# Patient Record
Sex: MALE | Race: WHITE | Employment: OTHER | URBAN - METROPOLITAN AREA
[De-identification: names, ages, dates, MRNs, and addresses within clinical notes are randomized per-mention and may not be internally consistent; named-entity substitution may affect disease eponyms.]

---

## 2017-01-03 DIAGNOSIS — E11.9 TYPE 2 DIABETES MELLITUS WITHOUT COMPLICATION (H): Primary | ICD-10-CM

## 2017-01-03 RX ORDER — INSULIN ASPART 100 [IU]/ML
INJECTION, SOLUTION INTRAVENOUS; SUBCUTANEOUS
Qty: 30 ML | Refills: 3 | Status: SHIPPED
Start: 2017-01-03 | End: 2017-05-26

## 2017-01-05 ENCOUNTER — TELEPHONE (OUTPATIENT)
Dept: INTERNAL MEDICINE | Facility: CLINIC | Age: 62
End: 2017-01-05

## 2017-01-05 NOTE — TELEPHONE ENCOUNTER
Prior Authorization Retail Medication Request  Medication/Dose: NOVOLOG FLEXPEN   Diagnosis and ICD code :  New/Renewal/Insurance Change PA:   Previously Tried and Failed Therapies:     Insurance ID (if provided):   Insurance Phone (if provided):     Any additional info from fax request:     If you received a fax notification from an outside Pharmacy:  Pharmacy Name:  Pharmacy #:  Pharmacy Fax:

## 2017-01-09 NOTE — TELEPHONE ENCOUNTER
[facility] Prior Authorization Team   Phone: [phone]  Fax: [phone]    PA Initiation    Medication: NOVOLOG FLEXPEN   Insurance Company: [facility]  Fax Number: [phone]    Phone Number: [phone]  Pharmacy Filling the Rx: [facility] #[id number] - [address]  Filling Pharmacy Phone: [phone]  Filling Pharmacy Fax: [phone]  Start Date: 1/9/2017

## 2017-01-11 DIAGNOSIS — I85.00 ESOPHAGEAL VARICES (H): Primary | ICD-10-CM

## 2017-01-11 NOTE — TELEPHONE ENCOUNTER
Ranitidine 150 mg tabs      Last Written Prescription Date:  5-31-16  Last Fill Quantity: 180,   # refills: 1  Last Office Visit : 12-22-16  Future Office visit:  3-2-17    Kathleen M Doege RN

## 2017-01-22 ENCOUNTER — MYC MEDICAL ADVICE (OUTPATIENT)
Dept: INTERNAL MEDICINE | Facility: CLINIC | Age: 62
End: 2017-01-22

## 2017-01-26 ENCOUNTER — TELEPHONE (OUTPATIENT)
Dept: INTERNAL MEDICINE | Facility: CLINIC | Age: 62
End: 2017-01-26

## 2017-01-26 DIAGNOSIS — E11.29 TYPE 2 DIABETES MELLITUS WITH MICROALBUMINURIA, UNSPECIFIED LONG TERM INSULIN USE STATUS: Primary | ICD-10-CM

## 2017-01-26 DIAGNOSIS — R80.9 TYPE 2 DIABETES MELLITUS WITH MICROALBUMINURIA, UNSPECIFIED LONG TERM INSULIN USE STATUS: Primary | ICD-10-CM

## 2017-01-26 DIAGNOSIS — F32.89 OTHER DEPRESSION: Primary | ICD-10-CM

## 2017-01-26 NOTE — TELEPHONE ENCOUNTER
Spoke to Rain at [facility], they received Pa on 1/10/2017. At that time Pa was pending,but he now has eligibility through [facility]. Rep will use the same PA to process through. Thanks.

## 2017-01-27 RX ORDER — DESVENLAFAXINE 100 MG/1
200 TABLET, EXTENDED RELEASE ORAL DAILY
Qty: 180 TABLET | Refills: 3 | Status: SHIPPED | OUTPATIENT
Start: 2017-01-27 | End: 2018-01-25

## 2017-01-27 RX ORDER — INSULIN GLARGINE 100 [IU]/ML
40 INJECTION, SOLUTION SUBCUTANEOUS DAILY
Qty: 12 ML | Refills: 3 | Status: SHIPPED | OUTPATIENT
Start: 2017-01-27 | End: 2017-02-02

## 2017-01-27 NOTE — TELEPHONE ENCOUNTER
Hi, I am unable to start on this PA until patient's insurance is active 02/01/17. As of right now he has no active insurance per [place]. Also, can you please update patients script for this med, it looks like it was discontinued in epic. The PA team will start PA 02/01/17.        Thank you, Shivani           Above message left for pt, rx faxed to the pharmacy.  Naomi Lee RN

## 2017-01-27 NOTE — TELEPHONE ENCOUNTER
Hi, I am unable to start this request until 02/01/17 when patient's insurance is active. The PA team will start this request 02/01/17.       Thank you, Shivani

## 2017-02-01 NOTE — TELEPHONE ENCOUNTER
[facility] Prior Authorization Team   Phone: [phone]  Fax: [phone]    PA Initiation    Medication: desvenlafaxine succinate (PRISTIQ) 100 MG TB24 24 hr tablet  Insurance Company: [employer] - Phone [phone] Fax [phone]  Pharmacy Filling the Rx: [facility] #[id number] - [address]  Filling Pharmacy Phone: [phone]  Filling Pharmacy Fax: [phone]  Start Date: 2/1/2017    ALSO FAXED CLINIC NOTE.

## 2017-02-01 NOTE — TELEPHONE ENCOUNTER
[facility] Prior Authorization Team   Phone: [phone]  Fax: [phone]    PA Initiation    Medication: LANTUS SOLOSTAR 100 UNIT/ML soln  Insurance Company: [employer] - Phone [phone] Fax [phone]  Pharmacy Filling the Rx: [facility] #[id number] - [address]  Filling Pharmacy Phone: [phone]  Filling Pharmacy Fax: [phone]  Start Date: 2/1/2017    ALSO FAXED CLINIC NOTE.

## 2017-02-02 DIAGNOSIS — E11.9 DIABETES MELLITUS, TYPE 2 (H): Primary | ICD-10-CM

## 2017-02-02 DIAGNOSIS — Z79.4 TYPE 2 DIABETES MELLITUS WITH DIABETIC NEUROPATHY, WITH LONG-TERM CURRENT USE OF INSULIN (H): Primary | ICD-10-CM

## 2017-02-02 DIAGNOSIS — E11.40 TYPE 2 DIABETES MELLITUS WITH DIABETIC NEUROPATHY, WITH LONG-TERM CURRENT USE OF INSULIN (H): Primary | ICD-10-CM

## 2017-02-02 RX ORDER — INSULIN GLARGINE 100 [IU]/ML
40 INJECTION, SOLUTION SUBCUTANEOUS DAILY
Qty: 12 ML | Refills: 11 | Status: SHIPPED | OUTPATIENT
Start: 2017-02-02 | End: 2017-03-21

## 2017-02-02 RX ORDER — INSULIN GLARGINE 100 [IU]/ML
INJECTION, SOLUTION SUBCUTANEOUS DAILY
Qty: 30 ML | Refills: 1 | Status: CANCELLED | OUTPATIENT
Start: 2017-02-02

## 2017-02-02 NOTE — TELEPHONE ENCOUNTER
[facility] Prior Authorization Team   Phone: [phone]  Fax: [phone]    PA Initiation    Medication: NOVOLOG FLEXPEN   Insurance Company:    Pharmacy Filling the Rx: [facility][id number] [address]  Filling Pharmacy Phone: [phone]  Filling Pharmacy Fax: [phone]  Start Date: 1/9/2017

## 2017-02-06 NOTE — TELEPHONE ENCOUNTER
PRIOR AUTHORIZATION DENIED    Medication: LANTUS SOLOSTAR 100 UNIT/ML soln - DENIED    Denial Date: 2/3/2017    Denial Rational:     PA DENIED: Formulary alternatives have not been tried/failed. (Jhony Vidal).  If you feel there is additional information related to this case that might affect this decision and an appeal is desired, please submit a written letter of medical necessity to our PA Team.          Appeal Information:

## 2017-02-07 NOTE — TELEPHONE ENCOUNTER
PRIOR AUTHORIZATION DENIED    Medication: desvenlafaxine succinate (PRISTIQ) 100 MG TB24 24 hr tablet - DENIED    Denial Date: 2/4/2017    Denial Rational:     PA DENIED: Medical criteria not met. Plan covers this drug when all of these conditions are met. -You tried the requested non formulary drug in the past and this drug worked for you. -Your doctor certified in writing that this drug is the best to treat your condition. - You cannot change to a preferred drug due to medical reasons.   Your use of this drug does not meet the requirement base on the information provided.  If you feel there is additional information related to this case that might affect this decision and an appeal is desired, please submit a written letter of medical necessity to our PA Team.          Appeal Information:

## 2017-02-09 DIAGNOSIS — E11.9 DIABETES MELLITUS, TYPE 2 (H): Primary | ICD-10-CM

## 2017-02-13 NOTE — TELEPHONE ENCOUNTER
MEDICATION APPEAL APPROVED    Medication: desvenlafaxine succinate (PRISTIQ) 100 MG TB24 24 hr tablet - APPEAL APPROVED  Authorization Effective Date: 2/10/2017  Authorization Expiration Date: 2/10/2018  Approved Dose/Quantity:   Reference #: [id number]   Insurance Company: Other (see comments)Comment:  [facility] PH:[phone] FAX: [phone]  Expected CoPay: $0.00 -Patient p/u meds 2/10/17     CoPay Card Available:      Foundation Assistance Needed:    Which Pharmacy is filling the prescription (Not needed for infusion/clinic administered): [facility] #[id number] - [address]

## 2017-02-27 DIAGNOSIS — R94.5 ABNORMAL RESULTS OF LIVER FUNCTION STUDIES: Primary | ICD-10-CM

## 2017-03-15 NOTE — TELEPHONE ENCOUNTER
Per Cover My Meds the request was cancelled as no pa required based on diagnosis. Confirmed with pharmacy patient has picked up medication with $0 copay.

## 2017-03-21 ENCOUNTER — TELEPHONE (OUTPATIENT)
Dept: INTERNAL MEDICINE | Facility: CLINIC | Age: 62
End: 2017-03-21

## 2017-03-21 ENCOUNTER — OFFICE VISIT (OUTPATIENT)
Dept: ORTHOPEDICS | Facility: CLINIC | Age: 62
End: 2017-03-21

## 2017-03-21 ENCOUNTER — OFFICE VISIT (OUTPATIENT)
Dept: INTERNAL MEDICINE | Facility: CLINIC | Age: 62
End: 2017-03-21

## 2017-03-21 VITALS
WEIGHT: 315 LBS | SYSTOLIC BLOOD PRESSURE: 122 MMHG | HEART RATE: 77 BPM | BODY MASS INDEX: 42.66 KG/M2 | DIASTOLIC BLOOD PRESSURE: 77 MMHG

## 2017-03-21 VITALS — WEIGHT: 315 LBS | HEIGHT: 74 IN | BODY MASS INDEX: 40.43 KG/M2

## 2017-03-21 DIAGNOSIS — M17.12 PRIMARY OSTEOARTHRITIS OF LEFT KNEE: Primary | ICD-10-CM

## 2017-03-21 DIAGNOSIS — M79.644 PAIN OF FINGER OF RIGHT HAND: ICD-10-CM

## 2017-03-21 DIAGNOSIS — G56.01 CARPAL TUNNEL SYNDROME OF RIGHT WRIST: ICD-10-CM

## 2017-03-21 DIAGNOSIS — G56.01 CARPAL TUNNEL SYNDROME OF RIGHT WRIST: Primary | ICD-10-CM

## 2017-03-21 DIAGNOSIS — G89.29 CHRONIC PAIN OF LEFT KNEE: ICD-10-CM

## 2017-03-21 DIAGNOSIS — E11.51 TYPE II DIABETES MELLITUS WITH PERIPHERAL CIRCULATORY DISORDER (H): Primary | ICD-10-CM

## 2017-03-21 DIAGNOSIS — E11.638: ICD-10-CM

## 2017-03-21 DIAGNOSIS — R79.89 LOW TESTOSTERONE: ICD-10-CM

## 2017-03-21 DIAGNOSIS — R94.5 ABNORMAL RESULTS OF LIVER FUNCTION STUDIES: ICD-10-CM

## 2017-03-21 DIAGNOSIS — M25.562 CHRONIC PAIN OF LEFT KNEE: ICD-10-CM

## 2017-03-21 DIAGNOSIS — M65.341 TRIGGER RING FINGER OF RIGHT HAND: ICD-10-CM

## 2017-03-21 LAB
ALBUMIN SERPL-MCNC: 3.3 G/DL (ref 3.4–5)
ALP SERPL-CCNC: 113 U/L (ref 40–150)
ALT SERPL W P-5'-P-CCNC: 40 U/L (ref 0–70)
ANION GAP SERPL CALCULATED.3IONS-SCNC: 6 MMOL/L (ref 3–14)
AST SERPL W P-5'-P-CCNC: 33 U/L (ref 0–45)
BILIRUB DIRECT SERPL-MCNC: 0.2 MG/DL (ref 0–0.2)
BILIRUB SERPL-MCNC: 0.6 MG/DL (ref 0.2–1.3)
BUN SERPL-MCNC: 13 MG/DL (ref 7–30)
CALCIUM SERPL-MCNC: 8.4 MG/DL (ref 8.5–10.1)
CHLORIDE SERPL-SCNC: 105 MMOL/L (ref 94–109)
CO2 SERPL-SCNC: 30 MMOL/L (ref 20–32)
CREAT SERPL-MCNC: 0.76 MG/DL (ref 0.66–1.25)
ERYTHROCYTE [DISTWIDTH] IN BLOOD BY AUTOMATED COUNT: 14.1 % (ref 10–15)
GFR SERPL CREATININE-BSD FRML MDRD: ABNORMAL ML/MIN/1.7M2
GLUCOSE SERPL-MCNC: 82 MG/DL (ref 70–99)
HBA1C MFR BLD: 6.4 % (ref 4.3–6)
HCT VFR BLD AUTO: 41.3 % (ref 40–53)
HGB BLD-MCNC: 13.6 G/DL (ref 13.3–17.7)
INR PPP: 1.02 (ref 0.86–1.14)
MCH RBC QN AUTO: 30 PG (ref 26.5–33)
MCHC RBC AUTO-ENTMCNC: 32.9 G/DL (ref 31.5–36.5)
MCV RBC AUTO: 91 FL (ref 78–100)
PLATELET # BLD AUTO: 94 10E9/L (ref 150–450)
POTASSIUM SERPL-SCNC: 4.1 MMOL/L (ref 3.4–5.3)
PROT SERPL-MCNC: 7.1 G/DL (ref 6.8–8.8)
RBC # BLD AUTO: 4.54 10E12/L (ref 4.4–5.9)
SODIUM SERPL-SCNC: 140 MMOL/L (ref 133–144)
WBC # BLD AUTO: 4.2 10E9/L (ref 4–11)

## 2017-03-21 RX ORDER — TRIAMCINOLONE ACETONIDE 40 MG/ML
40 INJECTION, SUSPENSION INTRA-ARTICULAR; INTRAMUSCULAR ONCE
Qty: 1 ML | Refills: 0 | OUTPATIENT
Start: 2017-03-21 | End: 2017-03-21

## 2017-03-21 ASSESSMENT — PAIN SCALES - GENERAL: PAINLEVEL: NO PAIN (0)

## 2017-03-21 NOTE — MR AVS SNAPSHOT
After Visit Summary   3/21/2017    Lawrence Louie    MRN: [id number]           Patient Information     Date Of Birth          1955        Visit Information        Provider Department      3/21/2017 2:00 PM Skyler White MD [facility] Sports Medicine        Today's Diagnoses     Primary osteoarthritis of left knee    -  1    Trigger ring finger of right hand        Carpal tunnel syndrome of right wrist           Follow-ups after your visit        Additional Services     ORTHOPEDICS ADULT REFERRAL       Your provider has referred you to: [facility]: Orthopaedic Clinic [facility] [phone]   [email or web address]  Hand surgery    Please be aware that coverage of these services is subject to the terms and limitations of your health insurance plan.  Call member services at your health plan with any benefit or coverage questions.      Please bring the following to your appointment:    >>   Any x-rays, CTs or MRIs which have been performed.  Contact the facility where they were done to arrange for  prior to your scheduled appointment.    >>   List of current medications   >>   This referral request   >>   Any documents/labs given to you for this referral                  Your next 10 appointments already scheduled     Apr 11, 2017 10:15 AM CDT   Lab with  LAB    Health Lab ([facility])    [address] [place] [address] [phone]            Apr 11, 2017 10:45 AM CDT   US ABDOMEN COMPLETE with [address] [facility] US ([facility])    [address] [place] [address] [phone]           Please bring a list of your medicines (including vitamins, minerals and over-the-counter drugs). Also, tell your doctor about any allergies you may have. Wear comfortable clothes and leave your valuables at home.  Adults: No eating or drinking for  8 hours before the exam. You may take medicine with a small sip of water.  Children: - Children 6+ years: No food or drink for 6 hours before exam. - Children 1-5 years: No food or drink for 4 hours before exam. - Infants, breast-fed: may have breast milk up to 2 hours before exam. - Infants, formula: may have bottle until 4 hours before exam.  Please call the Imaging Department at your exam site with any questions.            Apr 11, 2017 11:50 AM CDT   (Arrive by 11:35 AM)   New General Liver with Meghna Simmons MD   [facility] Hepatology ([facility])    [address]  3rd [facility] [address] [phone]            May 30, 2017  1:00 PM CDT   (Arrive by 12:45 PM)   Return Visit with CORY Toussaint [facility] ([facility])    [address]  4th [facility] [address] [phone]              Who to contact     Please call your clinic at [phone] to:    Ask questions about your health    Make or cancel appointments    Discuss your medicines    Learn about your test results    Speak to your doctor   If you have compliments or concerns about an experience at your clinic, or if you wish to file a complaint, please contact [facility] Patient Relations at [phone] or email us at [email or web address]         Additional Information About Your Visit        [employer] Information     [employer] gives you secure access to your electronic health record. If you see a primary care provider, you can also send messages to your care team and make appointments. If you have questions, please call your primary care clinic.  If you do not have a primary care provider, please call [phone] and they will assist you.      [employer] is an electronic gateway that provides easy, online access to your medical records. With [employer], you can request a clinic  "appointment, read your test results, renew a prescription or communicate with your care team.     To access your existing account, please contact your [facility] or call [phone] for assistance.        Care EveryWhere ID     This is your Care EveryWhere ID. This could be used by other organizations to access your [facility] medical records  [id number]        Your Vitals Were     Height BMI (Body Mass Index)                6' 2\" (1.88 m) 42.63 kg/m2           Blood Pressure from Last 3 Encounters:   03/21/17 122/77   12/22/16 160/84   11/21/16 133/81    Weight from Last 3 Encounters:   03/21/17 (!) 332 lb (150.6 kg)   03/21/17 (!) 332 lb 6.4 oz (150.8 kg)   12/22/16 (!) 339 lb 12.8 oz (154.1 kg)              We Performed the Following     Injection Single Tendon Sheath/Ligament [09126]     ORTHOPEDICS ADULT REFERRAL     TRIAMCINOLONE ACET INJ NOS          Today's Medication Changes          These changes are accurate as of: 3/21/17 11:59 PM.  If you have any questions, ask your nurse or doctor.               Start taking these medicines.        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, unspecified long term insulin use status (H)   Started by:  Ary Murphy APRN CNP        Dose:  40 Units   Inject 40 Units Subcutaneous At Bedtime   Quantity:  30 mL   Refills:  1       triamcinolone acetonide 40 MG/ML injection   Commonly known as:  KENALOG   Used for:  Primary osteoarthritis of left knee   Started by:  Skyler White MD        Dose:  40 mg   1 mL (40 mg) by INTRA-ARTICULAR route once for 1 dose   Quantity:  1 mL   Refills:  0            Where to get your medicines      These medications were sent to [id number] - [place], [place] - [address] [place], [place] [address] [address]     Phone:  [phone]     insulin glargine 100 UNIT/ML " injection         Some of these will need a paper prescription and others can be bought over the counter.  Ask your nurse if you have questions.     You don't need a prescription for these medications     triamcinolone acetonide 40 MG/ML injection                Primary Care Provider Office Phone # Fax #    CORY Toussaint [phone]       [facility] [address] [place] [address] [address]  [facility] [address]        Thank you!     Thank you for choosing [facility]  for your care. Our goal is always to provide you with excellent care. Hearing back from our patients is one way we can continue to improve our services. Please take a few minutes to complete the written survey that you may receive in the mail after your visit with us. Thank you!             Your Updated Medication List - Protect others around you: Learn how to safely use, store and throw away your medicines at www.disposemymeds.org.          This list is accurate as of: 3/21/17 11:59 PM.  Always use your most recent med list.                   Brand Name Dispense Instructions for use    aspirin 81 MG tablet     30 tablet    Take 1 tablet (81 mg) by mouth daily       blood glucose lancets standard    no brand specified    1 Box    Use to test blood sugar 4 X  times daily or as directed.       blood glucose monitoring meter device kit    no brand specified    1 kit    Use to test blood sugar 4 X  times daily or as directed.       blood glucose monitoring test strip    no brand specified    200 strip    Use to test blood sugars 4 X  times daily or as directed       bumetanide 1 MG tablet    BUMEX    90 tablet    Take 1 tablet (1 mg) by mouth 2 times daily       calcium carb 1250 mg (500 mg [place])/vitamin D 200 units 500-200 MG-UNIT per tablet    OSCAL with D    90 tablet    Take 1 tablet by mouth daily       desvenlafaxine succinate  MG 24 hr tablet    PRISTIQ    180 tablet    Take 2 tablets (200 mg) by mouth daily        furosemide 20 MG tablet    LASIX    90 tablet    Take 2 tabs in morning and 1 tablet at noon.       glipiZIDE 5 MG 24 hr tablet    glipiZIDE XL    180 tablet    1 tablet twice a day       insulin glargine 100 UNIT/ML injection    LANTUS SOLOSTAR    30 mL    Inject 40 Units Subcutaneous At Bedtime       insulin pen needle 31G X 8 MM    B-D U/F    200 each    Inject into the skin 6 times daily Use 6 pen needles daily or as directed.       magnesium 250 MG tablet      Take 1 tablet by mouth daily Takes 400 mg daily.Deneen Chin LPN 2:34 PM on 12/22/2015       NovoLOG FLEXPEN 100 UNIT/ML injection   Generic drug:  insulin aspart     30 mL    20 units before breakfast, 20 units before lunch, 20 units before dinner, 20 units before snacks.       ondansetron 4 MG tablet    ZOFRAN    18 tablet    Take 1 tablet (4 mg) by mouth every 8 hours as needed for nausea       order for DME     1 Units    Equipment being ordered: carpal tunnel wrist splint.       propranolol 10 MG tablet    INDERAL    180 tablet    Take 1 tablet (10 mg) by mouth 2 times daily       ranitidine 150 MG tablet    ZANTAC    180 tablet    Take 1 tablet (150 mg) by mouth 2 times daily       spironolactone 50 MG tablet    ALDACTONE    540 tablet    Takes 3 tablets in am.and p.m.       STATIN NOT PRESCRIBED (INTENTIONAL)     0 each    1 each daily Statin not prescribed intentionally due to Active liver disease       triamcinolone acetonide 40 MG/ML injection    KENALOG    1 mL    1 mL (40 mg) by INTRA-ARTICULAR route once for 1 dose       VITAMIN B-12 PO      Take 1 tablet by mouth daily       vitamin D3 2000 UNITS Caps     90 capsule    Take 1 capsule by mouth daily

## 2017-03-21 NOTE — PROGRESS NOTES
HPI: Lawrence Louie is a 61 year old male who comes in for follow-up for his T2 DM.  He takes his Novolog before breakfast and adjusts it according to his breakfast.  He takes his Lantus about 2-3 hours after breakfast.  His blood sugars remain under 150.  When his blood glucose reaches 150 he gets a headache.     He continues to have lower leg edema and tries to take his diuretics but   Finds he cannot leave the house or even go for a walk when he takes them.     He wants to see the Hand Ortho Surgeon concerning his right carpal tunnel symptoms (EMG 2014 indicated significant median nerve dysfunction) , and right fourth trigger finger. He has had cortisone injections 2013 in this finger in the past with resolution of symptoms but now they have returned.     He would also like to see Ortho for his left  Knee.      He has f/u with Hepatology 4/11/2017 with Dr. Simmons and abdominal US.     Patient Active Problem List   Diagnosis     Mild major depression (H)     Hyperglycemia     Thrombocytopenia (H)     Hypertension goal BP (blood pressure) < 130/80     Plantar warts     Corns and callosities     Family history of colon cancer     Type 2 diabetes, HbA1c goal < 7% (H)     Portal hypertension (H)     Alcoholic cirrhosis (H)     Advanced directives, counseling/discussion     Ascites     Esophageal varices (H)     Multiple rib fractures     Tobacco use disorder     Hyperlipidemia LDL goal <100     Dental caries     Prurigo nodularis     Esophageal reflux     Morbid obesity (H)     History of colonic polyps: tubular adenomas and serrated adenomas     Type II diabetes mellitus with peripheral circulatory disorder (H)       He has a medical hx of  does not have any pertinent problems on file.    Current Outpatient Prescriptions   Medication Sig Dispense Refill     blood glucose monitoring (NO BRAND SPECIFIED) meter device kit Use to test blood sugar 4 X  times daily or as directed. 1 kit 0     blood glucose  monitoring (NO BRAND SPECIFIED) test strip Use to test blood sugars 4 X  times daily or as directed 200 strip 3     blood glucose (NO BRAND SPECIFIED) lancets standard Use to test blood sugar 4 X  times daily or as directed. 1 Box 3     insulin glargine (BASAGLAR KWIKPEN) 100 UNIT/ML injection Inject 40 Units Subcutaneous daily 12 mL 11     desvenlafaxine succinate ER (PRISTIQ) 100 MG 24 hr tablet Take 2 tablets (200 mg) by mouth daily 180 tablet 3     ranitidine (ZANTAC) 150 MG tablet Take 1 tablet (150 mg) by mouth 2 times daily 180 tablet 3     NOVOLOG FLEXPEN 100 UNIT/ML soln 20 units before breakfast, 20 units before lunch, 20 units before dinner, 20 units before snacks. 30 mL 3     insulin pen needle (B-D U/F) 31G X 8 MM Inject into the skin 6 times daily Use 6 pen needles daily or as directed. 200 each 3     venlafaxine (EFFEXOR-XR) 75 MG 24 hr capsule Take 3 capsules (225 mg) by mouth daily 270 capsule 3     furosemide (LASIX) 20 MG tablet Take 2 tabs in morning and 1 tablet at noon. 90 tablet 5     propranolol (INDERAL) 10 MG tablet Take 1 tablet (10 mg) by mouth 2 times daily 180 tablet 3     Cyanocobalamin (VITAMIN B-12 PO) Take 1 tablet by mouth daily       STATIN NOT PRESCRIBED, INTENTIONAL, 1 each daily Statin not prescribed intentionally due to Active liver disease 0 each 0     spironolactone (ALDACTONE) 50 MG tablet Takes 3 tablets in am.and p.m. 540 tablet 6     aspirin 81 MG tablet Take 1 tablet (81 mg) by mouth daily 30 tablet 11     Cholecalciferol (VITAMIN D3) 2000 UNITS CAPS Take 1 capsule by mouth daily 90 capsule 3     bumetanide (BUMEX) 1 MG tablet Take 1 tablet (1 mg) by mouth 2 times daily 90 tablet 3     calcium carb 1250 mg, 500 mg [place],/vitamin D 200 units (OSCAL WITH D) 500-200 MG-UNIT per tablet Take 1 tablet by mouth daily 90 tablet 3     glipiZIDE (GLIPIZIDE XL) 5 MG 24 hr tablet 1 tablet twice a day 180 tablet 3     magnesium 250 MG tablet Take 1 tablet by mouth daily Takes 400 mg  daily.Deneen Chin NAE 2:34 PM on 12/22/2015       ondansetron (ZOFRAN) 4 MG tablet Take 1 tablet (4 mg) by mouth every 8 hours as needed for nausea 18 tablet 1     ORDER FOR DME Equipment being ordered: carpal tunnel wrist splint. 1 Units 0       ALLERGIES: Review of patient's allergies indicates no known allergies.    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     Esophagoscopy, gastroscopy, duodenoscopy (egd), combined  2/13/2013     Procedure: COMBINED ESOPHAGOSCOPY, GASTROSCOPY, DUODENOSCOPY (EGD);;  Surgeon: Tara Cook MD;  Location:  GI     Excise lesion trunk  9/24/2012     Procedure: EXCISE LESION TRUNK;;  Surgeon: Pepe Dominguez MD;  Location: [facility]     Esophagoscopy, gastroscopy, duodenoscopy (egd), combined  11/4/2013     Procedure: COMBINED ESOPHAGOSCOPY, GASTROSCOPY, DUODENOSCOPY (EGD);;  Surgeon: Lonny Diaz MD;  Location:  GI     Biopsy of skin lesion       Esophagoscopy, gastroscopy, duodenoscopy (egd), combined Left 6/16/2016     Procedure: COMBINED ESOPHAGOSCOPY, GASTROSCOPY, DUODENOSCOPY (EGD), BIOPSY SINGLE OR MULTIPLE;  Surgeon: Brandy Barnett MD;  Location:  GI     Colonoscopy Left 6/16/2016     Procedure: COMBINED COLONOSCOPY, SINGLE OR MULTIPLE BIOPSY/POLYPECTOMY BY BIOPSY;  Surgeon: Brandy Barnett,  MD;  Location:  GI       IMMUNIZATION HX:   Immunization History   Administered Date(s) Administered     DTAP (<7y) 01/18/1988     Hepatitis A Vac Ped/Adol-2 Dose 01/02/2013, 02/18/2013, 10/30/2013     Hepatitis B 01/02/2013, 02/18/2013, 10/30/2013     Influenza (IIV3) 10/04/2010, 12/18/2012, 09/08/2014, 09/26/2015, 09/27/2016     Pneumococcal (PCV 13) 05/12/2015     Pneumococcal 23 valent 09/25/2009, 10/01/2010, 01/02/2013     TD (ADULT, 7+) 09/08/1997     TDAP (ADACEL AGES 11-64) 01/02/2013     Twinrix A/B 01/02/2013     Zoster vaccine, live 12/22/2016       SOCIAL HX:   Social History     Social History Narrative    . 3 grown daughters. He has been sober since 2012.       ROS:   CONSTITUTIONAL: no fatigue, no unexpected change in weight  SKIN: no worrisome rashes, no worrisome moles, no worrisome lesions  EYES: no acute vision problems or changes  ENT: no ear problems, no mouth problems, no throat problems  RESP: no significant cough, no shortness of breath  CV: no chest pain, no palpitations, no new or worsening peripheral edema  GI: no nausea, no vomiting, no constipation, no diarrhea  : no frequency, no dysuria, no hematuria  MS: no claudication, no myalgias, no joint aches  NEURO: no weakness, no dizziness, no syncope, no headaches  ENDOCRINE: no temperature intolerance, no skin/hair changes  HEME: no easy bruising, no bleeding problems  PSYCHIATRIC: NEGATIVE for changes in mood or affect    OBJECTIVE:  /77  Pulse 77  Wt (!) 150.8 kg (332 lb 6.4 oz)  BMI 42.66 kg/m2   Wt Readings from Last 1 Encounters:   03/21/17 (!) 150.8 kg (332 lb 6.4 oz)     Constitutional: no distress, comfortable, pleasant   Eyes: anicteric..   Cardiovascular: regular rate and rhythm, normal S1 and S2, no murmurs, rubs or gallops, peripheral pulses full and symmetric   Respiratory: clear to auscultation, no wheezes or crackles, normal breath sounds   Gastrointestinal: positive bowel sounds, nontender, no  hepatosplenomegaly, no masses   Musculoskeletal: full range of motion, 3 + edema LE up to upper calf.    Skin: no concerning lesions, no jaundice, temp normal   Neurological: ,normal speech, no tremor   Psychological: appropriate mood     ASSESSMENT/PLAN:  Lawrence was seen today for diabetes.    Diagnoses and all orders for this visit:    Carpal tunnel syndrome of right wrist  -     ORTHOPEDICS ADULT REFERRAL for consultation for surgical repair.     Chronic pain of left knee  -     SPORTS MEDICINE REFERRAL knee pain    Pain of finger of right hand  -     SPORTS MEDICINE REFERRAL 4th finger trigger finger.     Type 2 diabetes mellitus with other oral complication, unspecified long term insulin use status (H)  -     insulin glargine (LANTUS SOLOSTAR) 100 UNIT/ML injection; Inject 40 Units Subcutaneous At Bedtime    Low testosterone  -     Testosterone Free and Total; Future      Results for orders placed or performed in visit on 12/22/16   Comprehensive metabolic panel   Result Value Ref Range    Sodium 139 133 - 144 mmol/L    Potassium 3.9 3.4 - 5.3 mmol/L    Chloride 106 94 - 109 mmol/L    Carbon Dioxide 28 20 - 32 mmol/L    Anion Gap 5 3 - 14 mmol/L    Glucose 88 70 - 99 mg/dL    Urea Nitrogen 14 7 - 30 mg/dL    Creatinine 0.85 0.66 - 1.25 mg/dL    GFR Estimate >90  Non  GFR Calc   >60 mL/min/1.7m2    GFR Estimate If Black >90   GFR Calc   >60 mL/min/1.7m2    Calcium 8.6 8.5 - 10.1 mg/dL    Bilirubin Total 0.5 0.2 - 1.3 mg/dL    Albumin 3.3 (L) 3.4 - 5.0 g/dL    Protein Total 7.2 6.8 - 8.8 g/dL    Alkaline Phosphatase 128 40 - 150 U/L    ALT 55 0 - 70 U/L    AST 46 (H) 0 - 45 U/L   Hemoglobin A1c   Result Value Ref Range    Hemoglobin A1C 6.6 (H) 4.3 - 6.0 %   CRP inflammation   Result Value Ref Range    CRP Inflammation 9.3 (H) 0.0 - 8.0 mg/L         Total time spent 25 minutes.  More than 50% of the time spent with Mr. Louie on counseling / coordinating his care    Ary  Katherine RAY, CNP

## 2017-03-21 NOTE — NURSING NOTE
Chief Complaint   Patient presents with     Diabetes     pt is here for a diabetic follow up        Rosa Tyson CMA at 11:46 AM on 3/21/2017

## 2017-03-21 NOTE — PROGRESS NOTES
"Sports Medicine Clinic Visit    PCP: Ary Murphy Liban is a 61 year old male who is seen  in consultation at the request of Dr. Murphy presenting with right 4th digit trigger finger and right wrist carpal tunnel and left knee pain. He is right handed    Injury: NA    Location of Pain: right 4th digit and right wrist and left knee  Duration of Pain: 4 weeks on right trigger finger  Rating of Pain: 8/10    Pain is worse with: AROM, gripping objects.  Additional Features: Prior visit by , knee injections  Treatment so far consists of: knee injections, 1 injection in trigger finger 1 year ago.  Prior History of related problems: Chronic history of knee.    Ht 6' 2\" (1.88 m)  Wt (!) 332 lb (150.6 kg)  BMI 42.63 kg/m2  "

## 2017-03-21 NOTE — PATIENT INSTRUCTIONS
[facility] Phone Number [phone]  [facility] Medication Refill Request Information:  * Please contact your pharmacy regarding ANY request for medication refills.  ** [facility] Prescription Fax = [phone]  * Please allow 3 business days for routine medication refills.  * Please allow 5 business days for controlled substance medication refills.     [facility] Test Result notification information:  *You will be notified with in 7-10 days of your appointment day regarding the results of your test.  If you are on MyChart you will be notified as soon as the provider has reviewed the results and signed off on them.    [facility] [phone] (4th Floor [facility])   Sports Medicine [phone] (5th Floor [facility])  [facility] [phone] (4th Floor [facility])          HOW TO QUIT SMOKING  Smoking is one of the hardest habits to break. About half of all those who have ever smoked have been able to quit, and most of those (about 70%) who still smoke want to quit. Here are some of the best ways to stop smoking.     KEEP TRYING:  It takes most smokers about 8 tries before they are finally able to fully quit. So, the more often you try and fail, the better your chance of quitting the next time! So, don't give up!    GO COLD TURKEY:  Most ex-smokers quit cold turkey. Trying to cut back gradually doesn't seem to work as well, perhaps because it continues the smoking habit. Also, it is possible to fool yourself by inhaling more while smoking fewer cigarettes. This results in the same amount of nicotine in your body!    GET SUPPORT:  Support programs can make an important difference, especially for the heavy smoker. These groups offer lectures, methods to change your behavior and peer support. Call the free national Quitline for more information. 800-QUIT-NOW (846-169-7847). Low-cost or free programs are offered by many hospitals, local chapters of the American Lung Association  (870.809.6356) and the American Cancer Society (869-097-7608). Support at home is important too. Non-smokers can help by offering praise and encouragement. If the smoker fails to quit, encourage them to try again!  OVER-THE-COUNTER MEDICINES:  For those who can't quit on their own, Nicotine Replacement Therapy (NRT) may make quitting much easier. Certain aids such as the nicotine patch, gum and lozenge are available without a prescription. However, it is best to use these under the guidance of your doctor. The skin patch provides a steady supply of nicotine to the body. Nicotine gum and lozenge gives temporary bursts of low levels of nicotine. Both methods take the edge off the craving for cigarettes. WARNING: If you feel symptoms of nicotine overdose, such as nausea, vomiting, dizziness, weakness, or fast heartbeat, stop using these and see your doctor.    PRESCRIPTION MEDICINES:  After evaluating your smoking patterns and prior attempts at quitting, your doctor may offer a prescription medicine such as bupropion (Zyban, Wellbutrin), varenicline (Chantix, Champix), a niocotine inhaler or nasal spray. Each has its unique advantage and side effects which your doctor can review with you.    HEALTH BENEFITS OF QUITTING:  The benefits of quitting start right away and keep improving the longer you go without smokin minutes: blood pressure and pulse return to normal    8 hours: oxygen levels return to normal    2 days: ability to smell and taste begins to improve as damaged nerves start to regrow    2-3 weeks: circulation and lung function improves    1-9 months: decreased cough, congestion and shortness of breath; less tired    1 year: risk of heart attack decreases by half    5 years: risk of lung cancer decreases by half; risk of stroke becomes the same as a non-smoker  For information about how to quit smoking, visit the following links:    National Cancer [place] ,   Clearing the Air, Quit Smoking Today   -  an online booklet. http://www.smokefree.gov/pubs/clearing_the_air.pdf    Smokefree.gov http://smokefree.gov/    QuitNet http://www.quitnet.com/    7267-3555 [employer], [address] [place] [address], [place] [address]. All rights reserved. This information is not intended as a substitute for professional medical care. Always follow your healthcare professional's instructions.

## 2017-03-21 NOTE — PROGRESS NOTES
"HPI:  Lawrence Louie is a 61 year old male seen at request of Ary Murphy in consultation for left knee djd and pain, and for h/o CTS sx involving his right hand.  He gets episodic numbness in \"all the fingers\" and radiating pain into the 2nd and 3rd fingers.  Had EMG bilateral UE in 2014 indicating significant median nerve dysfunction.  EMG bilateral UE ws repeated about 1.5 years ago when he had successful left CTS surgery done by Dr. Kristine Kothari at [facility].  Was told to \"come back and get the right done\"  Had an appt to see Dr. Belcher in hand surgery in 2014 but for some reason it did not happen.  Had right trigger finger 4th digit injection previously in 2013.  Finger is triggering again and he would like cortisone shot for it.    In 2015 for the left knee he had a cortisone shot for pain relief. He has had better results from synvisc-one injections in knee with longer pain relief.  He will need to check with insurance to make sure this is still covered.  He does not want cortisone today for the knee, he prefers synvisc-one.  The pain has recurred and it keeps him from walking as much as he would like due to the discomfort he feels about the knee joint itself. It is not associated currently with radicular pain from his back or radicular pain into the lower extremity. He has had standing x-rays of the knee that do not show significant degenerative change and had an MRI that shows some horizontal meniscal tearing consistent with degenerative findings. He saw Dr. Rich and was not offered a surgery. Physical therapy was encouraged. He has seen a holistic physical therapist but has not had an alignment strengthening program. He does have diabetes.       [facility]  Electrodiagnostic Laboratory  Nerve Conduction & EMG Report            Patient: Liban Bravo YOB: 1955  MR#: [id number] Age: 58 Years 7 Months  Sex: Male Date of Visit: 7/28/2014      History & " Examination:  Lawrence Louie is a 58 year old man referred for evaluation of possible median and ulnar entrapment neuropathy.     Techniques:  Motor conduction studies were done with surface recording electrodes. Sensory conduction studies were done with subdermal needle recording electrodes. EMG was done with a concentric needle electrode.      Results:  A left median sensory conduction study demonstrated mild attenuation of sensory nerve action potential amplitude and severe slowing of conduction. A right median sensory conduction study demonstrated a normal sensory nerve action potential amplitude and severe slowing of conduction. A left ulnar sensory conduction study demonstrated marked attenuation of amplitude and mild slowing of conduction. A right ulnar sensory conduction study demonstrated marked attenuation of amplitude and normal conduction velocity. Radial sensory conduction studies were normal bilaterally. Median motor conduction studies demonstrated mild prolongation of distal latency bilaterally. Bilateral ulnar motor conduction studies demonstrated severe slowing of conduction across the elbow on the left and mild slowing of conduction across the elbow on the right. Bilateral median and left ulnar minimum F-response latencies were mildly prolonged. Electromyography of the first dorsal interosseous muscle was normal.      Impression:  1. 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  2. Moderately severe left ulnar neuropathy at the elbow.  3. Probable mild right ulnar neuropathy at the elbow.        Mathew العلي M.D.       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)      Splenomegaly      Squamous cell carcinoma (H)      Thrombocytopenia (H)      Varices, esophageal (H)       Banded in 2011       Active problem list:  Patient Active Problem List   Diagnosis     Mild major depression (H)     Hyperglycemia     Thrombocytopenia (H)     Hypertension goal BP (blood pressure) < 130/80     Plantar warts     Corns and callosities     Family history of colon cancer     Type 2 diabetes, HbA1c goal < 7% (H)     Portal hypertension (H)     Alcoholic cirrhosis (H)     Advanced directives, counseling/discussion     Ascites     Esophageal varices (H)     Multiple rib fractures     Tobacco use disorder     Hyperlipidemia LDL goal <100     Dental caries     Prurigo nodularis     Esophageal reflux     Morbid obesity (H)     History of colonic polyps: tubular adenomas and serrated adenomas     Type II diabetes mellitus with peripheral circulatory disorder (H)       FH:  Family History   Problem Relation Age of Onset     Breast Cancer Mother      Cardiovascular Father      CEREBROVASCULAR DISEASE Father      very low blood pressure     CANCER Father      rectal cancer     Cardiovascular Paternal Grandfather      DIABETES Brother      CANCER Sister      skin cancer     Thyroid Disease No family hx of      Lipids No family hx of      Anesthesia Reaction No family hx of      C.A.D. Other      MI, 70's     Liver Cancer Mother        SH:  Social History     Social History     Marital status:      Spouse name: N/A     Number of children: N/A     Years of education: N/A     Occupational History     Not on file.     Social History Main Topics     Smoking status: Current Every Day Smoker     Packs/day: 0.30     Types: Cigarettes     Smokeless tobacco: Never Used      Comment: 45 yr     Alcohol use No      Comment: 2-3 per day , none since Dec 2012     Drug use: No     Sexual activity: Yes     Partners: Female     Birth control/ protection: Surgical     Other Topics Concern      Service No     Blood Transfusions No     Caffeine Concern No     very little coffee, likes beer     Occupational  Exposure No     Hobby Hazards No     Sleep Concern Yes     bed at 3:30 AM, up at 1:00 PM     Stress Concern Yes     Weight Concern Yes     Special Diet No     Back Care No     Exercise No     Bike Helmet No     Seat Belt Yes     Self-Exams No     Social History Narrative    . 3 grown daughters. He has been sober since 2012.       MEDS:  See EMR, reviewed  ALL:  See EMR, reviewed    REVIEW OF SYSTEMS:  CONSTITUTIONAL:NEGATIVE for fever, chills, change in weight  INTEGUMENTARY/SKIN: NEGATIVE for worrisome rashes, moles or lesions  EYES: NEGATIVE for vision changes or irritation  ENT/MOUTH: NEGATIVE for ear, mouth and throat problems  RESP:NEGATIVE for significant cough or SOB  BREAST: NEGATIVE for masses, tenderness or discharge  CV: NEGATIVE for chest pain, palpitations or peripheral edema  GI: NEGATIVE for nausea, abdominal pain, heartburn, or change in bowel habits  :NEGATIVE for frequency, dysuria, or hematuria  :NEGATIVE for frequency, dysuria, or hematuria  NEURO: NEGATIVE for weakness, dizziness or paresthesias  ENDOCRINE: NEGATIVE for temperature intolerance, skin/hair changes  HEME/ALLERGY/IMMUNE: NEGATIVE for bleeding problems  PSYCHIATRIC: NEGATIVE for changes in mood or affect      OBJECTIVE:  He has a tender nodule at the base of the A1 pulley at the right 4th digit, ring finger.  The digit itself is not swollen.  There are no signs of synovial thickening about the MCPs of his hands, the PIPs or DIPs.  He can make a full fist.  There are no signs of thenar atrophy.  His thumb strength is intact to abduction, to opponens and to cocking.  He can make a full fist with good strength.  There is no effusion at the wrist.  Phalen sign reproduces numbness and tingling into his fingertips.  Tinel sign reproduces shooting pain into his hand on the right.  The right knee reveals no effusion.  He can flex and extend it fully.  He is mildly tender over the medial joint line, nontender over the lateral joint  line, nontender at the pes anserine bursa or distal IT band.  There appears to be good range of motion at the hip.  Straight leg raise is negative.  Lower extremity strength to flexion/extension at hip, knee, ankle and foot is intact.  He is above ideal weight.  He is appropriate in conversation, alert and oriented x3 with a normal-appearing affect.      ASSESSMENT:   1.  Trigger finger of the 4th digit of the right hand.   2.  Right-sided carpal tunnel syndrome.   3.  Left-sided knee DJD.      PLAN:  He will check with his insurance to make sure Synvisc-One is covered before we give the injection so he does not get stuck with a large bill.  He knows that if it is not covered cortisone is an option.  He will see one of the hand surgeons to discuss his ongoing carpal tunnel syndrome and we will send off a release of information to get copies of his most recent EMG of the bilateral upper extremities from [facility].  Finally, after informed consent about bleeding, infection or steroid flare and after prepping with surgical scrub, he was injected with 0.5 cc of Kenalog-40 and 2 cc of 1% lidocaine.  One cc of this was injected over the tender nodule at the base of the 4th digit of the right hand.  He tolerated this without difficulty.  He will look for improvement with the injection over the next week.  He knows if the trigger finger is recurrent despite the injection that he can discuss it with hand therapy and a trigger finger release could be considered.

## 2017-03-21 NOTE — NURSING NOTE
[address] [address]  Dept: [phone]  ______________________________________________________________________________    Patient: Lawrence Louie   : 1955   MRN: [id number]   2017    INVASIVE PROCEDURE SAFETY CHECKLIST    Date: 3/21/2017   Procedure:right fourth finger trigger finger injection  Patient Name: Lawrence Louie  MRN: [id number]  YOB: 1955    Action: Complete sections as appropriate. Any discrepancy results in a HARD COPY until resolved.     PRE PROCEDURE:  Patient ID verified with 2 identifiers (name and  or MRN): Yes  Procedure and site verified with patient/designee (when able): Yes  Accurate consent documentation in medical record: Yes  H&P (or appropriate assessment) documented in medical record: Yes  H&P must be up to 20 days prior to procedure and updates within 24 hours of procedure as applicable: NA  Relevant diagnostic and radiology test results appropriately labeled and displayed as applicable: Yes  Procedure site(s) marked with provider initials: NA    TIMEOUT:  Time-Out performed immediately prior to starting procedure, including verbal and active participation of all team members addressing the following:Yes  * Correct patient identify  * Confirmed that the correct side and site are marked  * An accurate procedure consent form  * Agreement on the procedure to be done  * Correct patient position  * Relevant images and results are properly labeled and appropriately displayed  * The need to administer antibiotics or fluids for irrigation purposes during the procedure as applicable   * Safety precautions based on patient history or medication use    DURING PROCEDURE: Verification of correct person, site, and procedures any time the responsibility for care of the patient is transferred to another member of the care team.

## 2017-03-21 NOTE — LETTER
"3/21/2017    RE: Lawrence Louie  [facility] [facility] [address]     HPI:  Lawrence Louie is a 61 year old male seen at request of Ary Murphy in consultation for left knee djd and pain, and for h/o CTS sx involving his right hand.  He gets episodic numbness in \"all the fingers\" and radiating pain into the 2nd and 3rd fingers.  Had EMG bilateral UE in 2014 indicating significant median nerve dysfunction.  EMG bilateral UE ws repeated about 1.5 years ago when he had successful left CTS surgery done by Dr. Kristine Kothari at [facility].  Was told to \"come back and get the right done\"  Had an appt to see Dr. Belcher in hand surgery in 2014 but for some reason it did not happen.  Had right trigger finger 4th digit injection previously in 2013.  Finger is triggering again and he would like cortisone shot for it.    In 2015 for the left knee he had a cortisone shot for pain relief. He has had better results from synvisc-one injections in knee with longer pain relief.  He will need to check with insurance to make sure this is still covered.  He does not want cortisone today for the knee, he prefers synvisc-one.  The pain has recurred and it keeps him from walking as much as he would like due to the discomfort he feels about the knee joint itself. It is not associated currently with radicular pain from his back or radicular pain into the lower extremity. He has had standing x-rays of the knee that do not show significant degenerative change and had an MRI that shows some horizontal meniscal tearing consistent with degenerative findings. He saw Dr. Rich and was not offered a surgery. Physical therapy was encouraged. He has seen a holistic physical therapist but has not had an alignment strengthening program. He does have diabetes.       [facility]  Electrodiagnostic Laboratory  Nerve Conduction & EMG Report            Patient: Liban Bravo Date of Birth: " 1955  MR#: [id number] Age: 58 Years 7 Months  Sex: Male Date of Visit: 7/28/2014      History & Examination:  Lawrence Louie is a 58 year old man referred for evaluation of possible median and ulnar entrapment neuropathy.     Techniques:  Motor conduction studies were done with surface recording electrodes. Sensory conduction studies were done with subdermal needle recording electrodes. EMG was done with a concentric needle electrode.      Results:  A left median sensory conduction study demonstrated mild attenuation of sensory nerve action potential amplitude and severe slowing of conduction. A right median sensory conduction study demonstrated a normal sensory nerve action potential amplitude and severe slowing of conduction. A left ulnar sensory conduction study demonstrated marked attenuation of amplitude and mild slowing of conduction. A right ulnar sensory conduction study demonstrated marked attenuation of amplitude and normal conduction velocity. Radial sensory conduction studies were normal bilaterally. Median motor conduction studies demonstrated mild prolongation of distal latency bilaterally. Bilateral ulnar motor conduction studies demonstrated severe slowing of conduction across the elbow on the left and mild slowing of conduction across the elbow on the right. Bilateral median and left ulnar minimum F-response latencies were mildly prolonged. Electromyography of the first dorsal interosseous muscle was normal.      Impression:  1. 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  2. Moderately severe left ulnar neuropathy at the elbow.  3. Probable mild right ulnar neuropathy at the elbow.        Mathew العلي M.D.       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)       Splenomegaly      Squamous cell carcinoma (H)      Thrombocytopenia (H)      Varices, esophageal (H)      Banded in 2011       Active problem list:  Patient Active Problem List   Diagnosis     Mild major depression (H)     Hyperglycemia     Thrombocytopenia (H)     Hypertension goal BP (blood pressure) < 130/80     Plantar warts     Corns and callosities     Family history of colon cancer     Type 2 diabetes, HbA1c goal < 7% (H)     Portal hypertension (H)     Alcoholic cirrhosis (H)     Advanced directives, counseling/discussion     Ascites     Esophageal varices (H)     Multiple rib fractures     Tobacco use disorder     Hyperlipidemia LDL goal <100     Dental caries     Prurigo nodularis     Esophageal reflux     Morbid obesity (H)     History of colonic polyps: tubular adenomas and serrated adenomas     Type II diabetes mellitus with peripheral circulatory disorder (H)       FH:  Family History   Problem Relation Age of Onset     Breast Cancer Mother      Cardiovascular Father      CEREBROVASCULAR DISEASE Father      very low blood pressure     CANCER Father      rectal cancer     Cardiovascular Paternal Grandfather      DIABETES Brother      CANCER Sister      skin cancer     Thyroid Disease No family hx of      Lipids No family hx of      Anesthesia Reaction No family hx of      C.A.D. Other      MI, 70's     Liver Cancer Mother        SH:  Social History     Social History     Marital status:      Spouse name: N/A     Number of children: N/A     Years of education: N/A     Occupational History     Not on file.     Social History Main Topics     Smoking status: Current Every Day Smoker     Packs/day: 0.30     Types: Cigarettes     Smokeless tobacco: Never Used      Comment: 45 yr     Alcohol use No      Comment: 2-3 per day , none since Dec 2012     Drug use: No     Sexual activity: Yes     Partners: Female     Birth control/ protection: Surgical     Other Topics Concern      Service No      Blood Transfusions No     Caffeine Concern No     very little coffee, likes beer     Occupational Exposure No     Hobby Hazards No     Sleep Concern Yes     bed at 3:30 AM, up at 1:00 PM     Stress Concern Yes     Weight Concern Yes     Special Diet No     Back Care No     Exercise No     Bike Helmet No     Seat Belt Yes     Self-Exams No     Social History Narrative    . 3 grown daughters. He has been sober since 2012.       MEDS:  See EMR, reviewed  ALL:  See EMR, reviewed    REVIEW OF SYSTEMS:  CONSTITUTIONAL:NEGATIVE for fever, chills, change in weight  INTEGUMENTARY/SKIN: NEGATIVE for worrisome rashes, moles or lesions  EYES: NEGATIVE for vision changes or irritation  ENT/MOUTH: NEGATIVE for ear, mouth and throat problems  RESP:NEGATIVE for significant cough or SOB  BREAST: NEGATIVE for masses, tenderness or discharge  CV: NEGATIVE for chest pain, palpitations or peripheral edema  GI: NEGATIVE for nausea, abdominal pain, heartburn, or change in bowel habits  :NEGATIVE for frequency, dysuria, or hematuria  :NEGATIVE for frequency, dysuria, or hematuria  NEURO: NEGATIVE for weakness, dizziness or paresthesias  ENDOCRINE: NEGATIVE for temperature intolerance, skin/hair changes  HEME/ALLERGY/IMMUNE: NEGATIVE for bleeding problems  PSYCHIATRIC: NEGATIVE for changes in mood or affect      OBJECTIVE:  He has a tender nodule at the base of the A1 pulley at the right 4th digit, ring finger.  The digit itself is not swollen.  There are no signs of synovial thickening about the MCPs of his hands, the PIPs or DIPs.  He can make a full fist.  There are no signs of thenar atrophy.  His thumb strength is intact to abduction, to opponens and to cocking.  He can make a full fist with good strength.  There is no effusion at the wrist.  Phalen sign reproduces numbness and tingling into his fingertips.  Tinel sign reproduces shooting pain into his hand on the right.  The right knee reveals no effusion.  He can flex and  extend it fully.  He is mildly tender over the medial joint line, nontender over the lateral joint line, nontender at the pes anserine bursa or distal IT band.  There appears to be good range of motion at the hip.  Straight leg raise is negative.  Lower extremity strength to flexion/extension at hip, knee, ankle and foot is intact.  He is above ideal weight.  He is appropriate in conversation, alert and oriented x3 with a normal-appearing affect.      ASSESSMENT:   1.  Trigger finger of the 4th digit of the right hand.   2.  Right-sided carpal tunnel syndrome.   3.  Left-sided knee DJD.      PLAN:  He will check with his insurance to make sure Synvisc-One is covered before we give the injection so he does not get stuck with a large bill.  He knows that if it is not covered cortisone is an option.  He will see one of the hand surgeons to discuss his ongoing carpal tunnel syndrome and we will send off a release of information to get copies of his most recent EMG of the bilateral upper extremities from [facility].  Finally, after informed consent about bleeding, infection or steroid flare and after prepping with surgical scrub, he was injected with 0.5 cc of Kenalog-40 and 2 cc of 1% lidocaine.  One cc of this was injected over the tender nodule at the base of the 4th digit of the right hand.  He tolerated this without difficulty.  He will look for improvement with the injection over the next week.  He knows if the trigger finger is recurrent despite the injection that he can discuss it with hand therapy and a trigger finger release could be considered.                 Sports Medicine Clinic Visit    PCP: Ary Murphy is a 61 year old male who is seen  in consultation at the request of Dr. Murphy presenting with right 4th digit trigger finger and right wrist carpal tunnel and left knee pain. He is right handed    Injury: NA    Location of Pain: right 4th digit and right wrist and left  "knee  Duration of Pain: 4 weeks on right trigger finger  Rating of Pain: 8/10    Pain is worse with: AROM, gripping objects.  Additional Features: Prior visit by , knee injections  Treatment so far consists of: knee injections, 1 injection in trigger finger 1 year ago.  Prior History of related problems: Chronic history of knee.    Ht 6' 2\" (1.88 m)  Wt (!) 332 lb (150.6 kg)  BMI 42.63 kg/m2    Skyler White MD    "

## 2017-03-21 NOTE — TELEPHONE ENCOUNTER
----- Message from Roberto Carlos Ponce RN sent at 3/21/2017  2:29 PM CDT -----  Pt is in the lab now, was told by Ary to have A1C today, but no order. Please order A1C.

## 2017-03-21 NOTE — MR AVS SNAPSHOT
After Visit Summary   3/21/2017    Lawrence Louie    MRN: [id number]           Patient Information     Date Of Birth          1955        Visit Information        Provider Department      3/21/2017 11:45 AM Ary Murphy, APRN CNP [facility]        Today's Diagnoses     Carpal tunnel syndrome of right wrist    -  1    Chronic pain of left knee        Pain of finger of right hand        Type 2 diabetes mellitus with other oral complication, unspecified long term insulin use status (H)        Low testosterone          Care Instructions    Primary Care Center Phone Number [phone]  Primary Care Center Medication Refill Request Information:  * Please contact your pharmacy regarding ANY request for medication refills.  ** [facility] Prescription Fax = [phone]  * Please allow 3 business days for routine medication refills.  * Please allow 5 business days for controlled substance medication refills.     Primary Care Center Test Result notification information:  *You will be notified with in 7-10 days of your appointment day regarding the results of your test.  If you are on MyChart you will be notified as soon as the provider has reviewed the results and signed off on them.    Primary Care Center [phone] (4th Floor [facility])   Sports Medicine [phone] (5th Floor [facility])  U Ortho [phone] (4th Floor [facility])          HOW TO QUIT SMOKING  Smoking is one of the hardest habits to break. About half of all those who have ever smoked have been able to quit, and most of those (about 70%) who still smoke want to quit. Here are some of the best ways to stop smoking.     KEEP TRYING:  It takes most smokers about 8 tries before they are finally able to fully quit. So, the more often you try and fail, the better your chance of quitting the next time! So, don't give up!    GO COLD TURKEY:  Most ex-smokers quit cold turkey. Trying to cut back gradually doesn't  seem to work as well, perhaps because it continues the smoking habit. Also, it is possible to fool yourself by inhaling more while smoking fewer cigarettes. This results in the same amount of nicotine in your body!    GET SUPPORT:  Support programs can make an important difference, especially for the heavy smoker. These groups offer lectures, methods to change your behavior and peer support. Call the free national Quitline for more information. 800-QUIT-NOW (058-858-5587). Low-cost or free programs are offered by many hospitals, local chapters of the American Lung Association (103-478-8147) and the American Cancer Society (171-678-5379). Support at home is important too. Non-smokers can help by offering praise and encouragement. If the smoker fails to quit, encourage them to try again!  OVER-THE-COUNTER MEDICINES:  For those who can't quit on their own, Nicotine Replacement Therapy (NRT) may make quitting much easier. Certain aids such as the nicotine patch, gum and lozenge are available without a prescription. However, it is best to use these under the guidance of your doctor. The skin patch provides a steady supply of nicotine to the body. Nicotine gum and lozenge gives temporary bursts of low levels of nicotine. Both methods take the edge off the craving for cigarettes. WARNING: If you feel symptoms of nicotine overdose, such as nausea, vomiting, dizziness, weakness, or fast heartbeat, stop using these and see your doctor.    PRESCRIPTION MEDICINES:  After evaluating your smoking patterns and prior attempts at quitting, your doctor may offer a prescription medicine such as bupropion (Zyban, Wellbutrin), varenicline (Chantix, Champix), a niocotine inhaler or nasal spray. Each has its unique advantage and side effects which your doctor can review with you.    HEALTH BENEFITS OF QUITTING:  The benefits of quitting start right away and keep improving the longer you go without smokin minutes: blood pressure  and pulse return to normal    8 hours: oxygen levels return to normal    2 days: ability to smell and taste begins to improve as damaged nerves start to regrow    2-3 weeks: circulation and lung function improves    1-9 months: decreased cough, congestion and shortness of breath; less tired    1 year: risk of heart attack decreases by half    5 years: risk of lung cancer decreases by half; risk of stroke becomes the same as a non-smoker  For information about how to quit smoking, visit the following links:    National Cancer [place] ,   Clearing the Air, Quit Smoking Today   - an online booklet. http://www.smokefree.gov/pubs/clearing_the_air.pdf    Smokefree.gov http://smokefree.gov/    QuitNet http://www.quitnet.com/    4921-3793 [facility] [facility], [address] [place] [address]. All rights reserved. This information is not intended as a substitute for professional medical care. Always follow your healthcare professional's instructions.             Follow-ups after your visit        Additional Services     ORTHOPEDICS ADULT REFERRAL           SPORTS MEDICINE REFERRAL       Your provider has referred you to:  Sports medicine, Dr. Eason    Please be aware that coverage of these services is subject to the terms and limitations of your health insurance plan.  Call member services at your health plan with any benefit or coverage questions.      Please bring the following to your appointment:    >>   Any x-rays, CTs or MRIs which have been performed.  Contact the facility where they were done to arrange for  prior to your scheduled appointment.    >>   List of current medications   >>   This referral request   >>   Any documents/labs given to you for this referral                  Your next 10 appointments already scheduled     Mar 21, 2017  2:00 PM CDT   (Arrive by 1:45 PM)   New Patient Visit with Skyler White MD   [facility] ([facility])    [address] [place] [address]  [facility]  [address] [facility] [address]   [phone]            Apr 11, 2017 10:15 AM CDT   Lab with  LAB   [facility] ([facility])    [address] [facility]  [address] [facility] [id number] [phone]            Apr 11, 2017 10:45 AM CDT   US ABDOMEN COMPLETE with UCUS1   [facility] US ([facility])    [address] [facility]  [address] [facility] [id number] [phone]           Please bring a list of your medicines (including vitamins, minerals and over-the-counter drugs). Also, tell your doctor about any allergies you may have. Wear comfortable clothes and leave your valuables at home.  Adults: No eating or drinking for 8 hours before the exam. You may take medicine with a small sip of water.  Children: - Children 6+ years: No food or drink for 6 hours before exam. - Children 1-5 years: No food or drink for 4 hours before exam. - Infants, breast-fed: may have breast milk up to 2 hours before exam. - Infants, formula: may have bottle until 4 hours before exam.  Please call the Imaging Department at your exam site with any questions.            Apr 11, 2017 11:50 AM CDT   (Arrive by 11:35 AM)   New General Liver with Meghna Simmons MD   [facility] Hepatology ([facility])    [address] [place] [address]  [address] [facility] [id number] [phone]            May 30, 2017  1:00 PM CDT   (Arrive by 12:45 PM)   Return Visit with CORY Toussaint CNP   [facility] ([facility])    [address] [facility]  [address] [facility] [id number] [phone]              Future tests that were ordered for you today     Open Future Orders        Priority Expected Expires Ordered    Testosterone Free and Total Routine 3/21/2017 3/21/2018 3/21/2017            Who to contact     Please call your clinic at [phone] to:    Ask questions about your  health    Make or cancel appointments    Discuss your medicines    Learn about your test results    Speak to your doctor   If you have compliments or concerns about an experience at your clinic, or if you wish to file a complaint, please contact [facility] Patient Relations at [phone] or email us at [email or web address]         Additional Information About Your Visit        MyChart Information     Fly me [employer] the [employer] gives you secure access to your electronic health record. If you see a primary care provider, you can also send messages to your care team and make appointments. If you have questions, please call your primary care clinic.  If you do not have a primary care provider, please call [phone] and they will assist you.      [employer] is an electronic gateway that provides easy, online access to your medical records. With [employer], you can request a clinic appointment, read your test results, renew a prescription or communicate with your care team.     To access your existing account, please contact your [facility] or call [phone] for assistance.        Care EveryWhere ID     This is your Care EveryWhere ID. This could be used by other organizations to access your [place] medical records  [id number]        Your Vitals Were     Pulse BMI (Body Mass Index)                77 42.66 kg/m2           Blood Pressure from Last 3 Encounters:   03/21/17 122/77   12/22/16 160/84   11/21/16 133/81    Weight from Last 3 Encounters:   03/21/17 (!) 150.8 kg (332 lb 6.4 oz)   12/22/16 (!) 154.1 kg (339 lb 12.8 oz)   11/21/16 (!) 152.9 kg (337 lb)              We Performed the Following     ORTHOPEDICS ADULT REFERRAL     SPORTS MEDICINE REFERRAL          Today's Medication Changes          These changes are accurate as of: 3/21/17 12:55 PM.  If you have any questions, ask your nurse or doctor.               Start taking these medicines.         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, unspecified long term insulin use status (H)   Started by:  Ary Murphy APRN CNP        Dose:  40 Units   Inject 40 Units Subcutaneous At Bedtime   Quantity:  30 mL   Refills:  1            Where to get your medicines      These medications were sent to [id number] - [place], [place] - [address]  [address] [place] [address]     Phone:  [phone]     insulin glargine 100 UNIT/ML injection                Primary Care Provider Office Phone # Fax #    CORY Toussaint [phone]       PRIMARY CARE CENTER [address] [place] [address] [address]  [facility] [address]        Thank you!     Thank you for choosing [facility]  for your care. Our goal is always to provide you with excellent care. Hearing back from our patients is one way we can continue to improve our services. Please take a few minutes to complete the written survey that you may receive in the mail after your visit with us. Thank you!             Your Updated Medication List - Protect others around you: Learn how to safely use, store and throw away your medicines at www.disposemymeds.org.          This list is accurate as of: 3/21/17 12:55 PM.  Always use your most recent med list.                   Brand Name Dispense Instructions for use    aspirin 81 MG tablet     30 tablet    Take 1 tablet (81 mg) by mouth daily       blood glucose lancets standard    no brand specified    1 Box    Use to test blood sugar 4 X  times daily or as directed.       blood glucose monitoring meter device kit    no brand specified    1 kit    Use to test blood sugar 4 X  times daily or as directed.       blood glucose monitoring test strip    no brand specified    200 strip    Use to test blood sugars 4 X  times daily or as directed       bumetanide 1 MG  tablet    BUMEX    90 tablet    Take 1 tablet (1 mg) by mouth 2 times daily       calcium carb 1250 mg (500 mg [place])/vitamin D 200 units 500-200 MG-UNIT per tablet    OSCAL with D    90 tablet    Take 1 tablet by mouth daily       desvenlafaxine succinate  MG 24 hr tablet    PRISTIQ    180 tablet    Take 2 tablets (200 mg) by mouth daily       furosemide 20 MG tablet    LASIX    90 tablet    Take 2 tabs in morning and 1 tablet at noon.       glipiZIDE 5 MG 24 hr tablet    glipiZIDE XL    180 tablet    1 tablet twice a day       insulin glargine 100 UNIT/ML injection    LANTUS SOLOSTAR    30 mL    Inject 40 Units Subcutaneous At Bedtime       insulin pen needle 31G X 8 MM    B-D U/F    200 each    Inject into the skin 6 times daily Use 6 pen needles daily or as directed.       magnesium 250 MG tablet      Take 1 tablet by mouth daily Takes 400 mg daily.Deneen Chin LPN 2:34 PM on 12/22/2015       NovoLOG FLEXPEN 100 UNIT/ML injection   Generic drug:  insulin aspart     30 mL    20 units before breakfast, 20 units before lunch, 20 units before dinner, 20 units before snacks.       ondansetron 4 MG tablet    ZOFRAN    18 tablet    Take 1 tablet (4 mg) by mouth every 8 hours as needed for nausea       order for DME     1 Units    Equipment being ordered: carpal tunnel wrist splint.       propranolol 10 MG tablet    INDERAL    180 tablet    Take 1 tablet (10 mg) by mouth 2 times daily       ranitidine 150 MG tablet    ZANTAC    180 tablet    Take 1 tablet (150 mg) by mouth 2 times daily       spironolactone 50 MG tablet    ALDACTONE    540 tablet    Takes 3 tablets in am.and p.m.       STATIN NOT PRESCRIBED (INTENTIONAL)     0 each    1 each daily Statin not prescribed intentionally due to Active liver disease       VITAMIN B-12 PO      Take 1 tablet by mouth daily       vitamin D3 2000 UNITS Caps     90 capsule    Take 1 capsule by mouth daily

## 2017-03-23 LAB
SHBG SERPL-SCNC: 70 NMOL/L (ref 11–80)
TESTOST FREE SERPL-MCNC: 3.77 NG/DL (ref 4.7–24.4)
TESTOST SERPL-MCNC: 323 NG/DL (ref 240–950)

## 2017-04-04 ENCOUNTER — TELEPHONE (OUTPATIENT)
Dept: ORTHOPEDICS | Facility: CLINIC | Age: 62
End: 2017-04-04

## 2017-04-04 NOTE — TELEPHONE ENCOUNTER
Sent BASHIR request to [facility] who said they do not have records of a repeat EMG done of UE. Only EMG is by Dr. العلي at [place].

## 2017-04-05 DIAGNOSIS — R94.5 ABNORMAL RESULTS OF LIVER FUNCTION STUDIES: Primary | ICD-10-CM

## 2017-04-06 ENCOUNTER — PRE VISIT (OUTPATIENT)
Dept: GASTROENTEROLOGY | Facility: CLINIC | Age: 62
End: 2017-04-06

## 2017-04-06 NOTE — TELEPHONE ENCOUNTER
Was the patient contacted by phone and reminded of the upcoming visit? message left    Was the patient instructed to bring a current list of all medications to the appointment or instructed to bring in all medication bottles? Yes     Is it anticipated the patient will need additional appointments? Yes, Testing    Were the additional appointments scheduled? Yes, message left reminding to fast for 8 hours prior to ultrasound     Was the patient instructed to arrive prior to the appointment time to have ordered labs drawn? Yes     Were the needed lab orders placed? Yes    Jenifer Banda CMA

## 2017-04-11 ENCOUNTER — OFFICE VISIT (OUTPATIENT)
Dept: GASTROENTEROLOGY | Facility: CLINIC | Age: 62
End: 2017-04-11
Attending: INTERNAL MEDICINE
Payer: COMMERCIAL

## 2017-04-11 ENCOUNTER — TELEPHONE (OUTPATIENT)
Dept: INTERNAL MEDICINE | Facility: CLINIC | Age: 62
End: 2017-04-11

## 2017-04-11 VITALS
WEIGHT: 315 LBS | SYSTOLIC BLOOD PRESSURE: 121 MMHG | OXYGEN SATURATION: 97 % | HEART RATE: 74 BPM | DIASTOLIC BLOOD PRESSURE: 80 MMHG | BODY MASS INDEX: 40.43 KG/M2 | TEMPERATURE: 98.4 F | HEIGHT: 74 IN

## 2017-04-11 DIAGNOSIS — K70.31 ALCOHOLIC CIRRHOSIS OF LIVER WITH ASCITES (H): Primary | ICD-10-CM

## 2017-04-11 DIAGNOSIS — R94.5 ABNORMAL RESULTS OF LIVER FUNCTION STUDIES: ICD-10-CM

## 2017-04-11 DIAGNOSIS — E66.01 MORBID OBESITY DUE TO EXCESS CALORIES (H): ICD-10-CM

## 2017-04-11 DIAGNOSIS — I85.10 SECONDARY ESOPHAGEAL VARICES WITHOUT BLEEDING (H): ICD-10-CM

## 2017-04-11 DIAGNOSIS — K76.6 PORTAL HYPERTENSION (H): ICD-10-CM

## 2017-04-11 DIAGNOSIS — E11.9 DIABETES MELLITUS, TYPE 2 (H): ICD-10-CM

## 2017-04-11 DIAGNOSIS — E11.9 DIABETES MELLITUS, TYPE 2 (H): Primary | ICD-10-CM

## 2017-04-11 DIAGNOSIS — F17.200 TOBACCO USE DISORDER: ICD-10-CM

## 2017-04-11 LAB
ALBUMIN SERPL-MCNC: 3.1 G/DL (ref 3.4–5)
ALP SERPL-CCNC: 115 U/L (ref 40–150)
ALT SERPL W P-5'-P-CCNC: 37 U/L (ref 0–70)
ANION GAP SERPL CALCULATED.3IONS-SCNC: 5 MMOL/L (ref 3–14)
AST SERPL W P-5'-P-CCNC: 31 U/L (ref 0–45)
BILIRUB DIRECT SERPL-MCNC: 0.1 MG/DL (ref 0–0.2)
BILIRUB SERPL-MCNC: 0.5 MG/DL (ref 0.2–1.3)
BUN SERPL-MCNC: 13 MG/DL (ref 7–30)
CALCIUM SERPL-MCNC: 8.4 MG/DL (ref 8.5–10.1)
CHLORIDE SERPL-SCNC: 104 MMOL/L (ref 94–109)
CHOLEST SERPL-MCNC: 141 MG/DL
CO2 SERPL-SCNC: 30 MMOL/L (ref 20–32)
CREAT SERPL-MCNC: 0.84 MG/DL (ref 0.66–1.25)
ERYTHROCYTE [DISTWIDTH] IN BLOOD BY AUTOMATED COUNT: 14.5 % (ref 10–15)
GFR SERPL CREATININE-BSD FRML MDRD: ABNORMAL ML/MIN/1.7M2
GLUCOSE SERPL-MCNC: 93 MG/DL (ref 70–99)
HCT VFR BLD AUTO: 39.1 % (ref 40–53)
HDLC SERPL-MCNC: 50 MG/DL
HGB BLD-MCNC: 12.8 G/DL (ref 13.3–17.7)
INR PPP: 1.11 (ref 0.86–1.14)
LDLC SERPL CALC-MCNC: 79 MG/DL
MCH RBC QN AUTO: 30 PG (ref 26.5–33)
MCHC RBC AUTO-ENTMCNC: 32.7 G/DL (ref 31.5–36.5)
MCV RBC AUTO: 92 FL (ref 78–100)
NONHDLC SERPL-MCNC: 90 MG/DL
PLATELET # BLD AUTO: 86 10E9/L (ref 150–450)
POTASSIUM SERPL-SCNC: 4.1 MMOL/L (ref 3.4–5.3)
PROT SERPL-MCNC: 7 G/DL (ref 6.8–8.8)
RBC # BLD AUTO: 4.27 10E12/L (ref 4.4–5.9)
SODIUM SERPL-SCNC: 139 MMOL/L (ref 133–144)
TRIGL SERPL-MCNC: 57 MG/DL
WBC # BLD AUTO: 3.6 10E9/L (ref 4–11)

## 2017-04-11 PROCEDURE — 36415 COLL VENOUS BLD VENIPUNCTURE: CPT | Performed by: INTERNAL MEDICINE

## 2017-04-11 PROCEDURE — 80076 HEPATIC FUNCTION PANEL: CPT | Performed by: INTERNAL MEDICINE

## 2017-04-11 PROCEDURE — 99212 OFFICE O/P EST SF 10 MIN: CPT | Mod: ZF

## 2017-04-11 PROCEDURE — 85610 PROTHROMBIN TIME: CPT | Performed by: INTERNAL MEDICINE

## 2017-04-11 PROCEDURE — 85027 COMPLETE CBC AUTOMATED: CPT | Performed by: INTERNAL MEDICINE

## 2017-04-11 PROCEDURE — 80048 BASIC METABOLIC PNL TOTAL CA: CPT | Performed by: INTERNAL MEDICINE

## 2017-04-11 RX ORDER — CALCIUM CARBONATE/VITAMIN D3 500MG-5MCG
500 TABLET ORAL DAILY
Refills: 3 | COMMUNITY
Start: 2016-09-26 | End: 2017-08-25

## 2017-04-11 ASSESSMENT — PAIN SCALES - GENERAL: PAINLEVEL: MODERATE PAIN (4)

## 2017-04-11 NOTE — PROGRESS NOTES
"SUBJECTIVE:  Mr. Louie is a 61-year-old man with SMITH and alcoholic cirrhosis.  He has been sober from alcohol since 2012.      He previously was seen by Dr. Tara Cook, Cecy Hanks and Nichole Avelar.  This is my first time meeting him.      His cirrhosis was previously decompensated, but he has improved over the years.  Now he has normal liver tests and is doing quite well.  He had blood tests done this morning and also had an ultrasound.  His ultrasound has not yet been read.  He had an MRI in October that showed no masses.      Last EGD and colonoscopy were in 06/2016.  He had grade 1 varices and is on nadolol.  He also had evidence of scarring from previous banding.  There is a question of Boateng's, but the biopsy did not show this.  His colonoscopy showed adenomatous polyps.      His biggest issue is fluid retention.  He is morbidly obese, does not strictly follow a low-sodium diet but is on diuretics.  He is on Lasix 40 a.m. and 20 p.m., spironolactone 150 b.i.d. and Bumex 1 b.i.d.  Yesterday, for example, for lunch he had a packet of [place] soup and 2 pieces of bread and some fruit.  He said he does not salt foods, but he is not as careful as he should be about sodium in his diet.      He has struggled with his weight.  He said he has been a \"yo-yo\" dieter, and when I look back at the last few years, he has a varied his weight by approximately 40 pounds.      He had a DEXA scan in December that showed some osteopenia.  He is on calcium and vitamin D.  He is also on a PPI and ranitidine.        OTHER COMORBIDITIES:  He has diabetes, GERD and hyperlipidemia.      SOCIAL HISTORY:  He lives with his wife.  He has 3 kids in the [facility].  He does most of the cooking.  He is retired.      PHYSICAL EXAMINATION:   VITAL SIGNS:  His blood pressure is 121/80, temperature 98.4, pulse 74, O2 sats 97%, weight is 355, BMI is 46.   GENERAL:  Pleasant, well-appearing, in no acute distress.   HEENT:  No " icterus, no oral lesions.   LYMPH:  No supraclavicular or cervical lymphadenopathy.   CARDIOVASCULAR:  Regular rate and rhythm.   CHEST:  Lungs are clear.   ABDOMEN:  Bowel sounds are present.  He is morbidly obese.   EXTREMITIES:  2+ edema bilaterally.   NEUROLOGIC:  Speech is fluent and clear.  No asterixis or tremor.      LABORATORY DATA:  From today were reviewed and are normal with the exception a platelet count of 86,000.      ASSESSMENT AND PLAN:  A 61-year-old man with alcoholic and SMITH cirrhosis.  He has been sober from alcohol for 5 years.  He is well compensated and up-to-date on all of his screening.      We discussed fluid overload and sodium.  I encouraged him to really watch the sodium in his diet, as this is the 1 thing that he can have some control over.  I also encouraged him really to focus on weight loss.  Even if he were to lose 20-30 pounds, I think this would be very helpful for him.  He complains of knee problems, and certainly his morbid obesity is contributing to multiple comorbidities and risks with such.  I also discussed his continued tobacco abuse with him and recommended he quit smoking.      From a liver standpoint, he will continue to get labs and ultrasound every 6 months, and I will see her back in 6 months.

## 2017-04-11 NOTE — MR AVS SNAPSHOT
After Visit Summary   4/11/2017    Lawrence Louie    MRN: [id number]           Patient Information     Date Of Birth          1955        Visit Information        Provider Department      4/11/2017 11:50 AM Meghna Simmons MD [facility]        Today's Diagnoses     Alcoholic cirrhosis of liver with ascites (H)    -  1    Morbid obesity due to excess calories (H)        Portal hypertension (H)        Secondary esophageal varices without bleeding (H)        Tobacco use disorder           Follow-ups after your visit        Follow-up notes from your care team     Return in about 6 months (around 10/11/2017).      Your next 10 appointments already scheduled     May 30, 2017  1:00 PM CDT   (Arrive by 12:45 PM)   Return Visit with CORY Toussaint CNP   [facility] ([facility])    [address] [place] [address]  4th Floor  [facility] [id number] [phone]            Oct 03, 2017 11:00 AM CDT   Lab with  LAB   [facility] ([facility])    [address] [place] [address]  [address]st [facility] [id number] [phone]            Oct 03, 2017 11:50 AM CDT   (Arrive by 11:35 AM)   Return General Liver with Meghna Simmons MD   [facility] ([facility])    [address] [place] [address]  3rd Floor  [facility] [id number] [phone]              Future tests that were ordered for you today     Open Future Orders        Priority Expected Expires Ordered    INR Routine 10/11/2017 4/11/2018 4/11/2017    CBC with platelets Routine 10/11/2017 4/11/2018 4/11/2017    Hepatic panel Routine 10/11/2017 4/11/2018 4/11/2017    Basic metabolic panel Routine 10/11/2017 4/11/2018 4/11/2017            Who to contact     If you have questions or need follow up information about today's clinic visit or your schedule please contact [facility] directly at [phone].  Normal or  "non-critical lab and imaging results will be communicated to you by MyChart, letter or phone within 4 business days after the clinic has received the results. If you do not hear from us within 7 days, please contact the clinic through Oppa or phone. If you have a critical or abnormal lab result, we will notify you by phone as soon as possible.  Submit refill requests through [employer] or call your pharmacy and they will forward the refill request to us. Please allow 3 business days for your refill to be completed.          Additional Information About Your Visit        Oppa Information     Oppa gives you secure access to your electronic health record. If you see a primary care provider, you can also send messages to your care team and make appointments. If you have questions, please call your primary care clinic.  If you do not have a primary care provider, please call [phone] and they will assist you.        Care EveryWhere ID     This is your Care EveryWhere ID. This could be used by other organizations to access your [place] medical records  [id number]        Your Vitals Were     Pulse Temperature Height Pulse Oximetry BMI (Body Mass Index)       74 98.4  F (36.9  C) (Oral) 1.88 m (6' 2\") 97% 45.68 kg/m2        Blood Pressure from Last 3 Encounters:   04/11/17 121/80   03/21/17 122/77   12/22/16 160/84    Weight from Last 3 Encounters:   04/11/17 (!) 161.4 kg (355 lb 12.8 oz)   03/21/17 (!) 150.6 kg (332 lb)   03/21/17 (!) 150.8 kg (332 lb 6.4 oz)               Primary Care Provider Office Phone # Fax #    Ary JACKSON CORY Murphy [phone]       [facility] [address] [facility] [address]  [facility] [address]        Thank you!     Thank you for choosing [facility]  for your care. Our goal is always to provide you with excellent care. Hearing back from our patients is one way we can continue to improve our services. Please take a few minutes to complete the written " survey that you may receive in the mail after your visit with us. Thank you!             Your Updated Medication List - Protect others around you: Learn how to safely use, store and throw away your medicines at www.disposemymeds.org.          This list is accurate as of: 4/11/17 12:27 PM.  Always use your most recent med list.                   Brand Name Dispense Instructions for use    aspirin 81 MG tablet     30 tablet    Take 1 tablet (81 mg) by mouth daily       blood glucose lancets standard    no brand specified    1 Box    Use to test blood sugar 4 X  times daily or as directed.       blood glucose monitoring meter device kit    no brand specified    1 kit    Use to test blood sugar 4 X  times daily or as directed.       blood glucose monitoring test strip    no brand specified    200 strip    Use to test blood sugars 4 X  times daily or as directed       bumetanide 1 MG tablet    BUMEX    90 tablet    Take 1 tablet (1 mg) by mouth 2 times daily       * calcium-vitamin D 600-400 MG-UNIT per tablet    CALTRATE     Take by mouth daily       * calcium carb 1250 mg (500 mg [place])/vitamin D 200 units 500-200 MG-UNIT per tablet    OSCAL with D    90 tablet    Take 1 tablet by mouth daily       DESVENLAFAXINE ER PO      Take 100 mg by mouth daily 2 tablets once daily.       desvenlafaxine succinate  MG 24 hr tablet    PRISTIQ    180 tablet    Take 2 tablets (200 mg) by mouth daily       furosemide 20 MG tablet    LASIX    90 tablet    Take 2 tabs in morning and 1 tablet at noon.       glipiZIDE 5 MG 24 hr tablet    glipiZIDE XL    180 tablet    1 tablet twice a day       insulin glargine 100 UNIT/ML injection    LANTUS SOLOSTAR    30 mL    Inject 40 Units Subcutaneous At Bedtime       insulin pen needle 31G X 8 MM    B-D U/F    200 each    Inject into the skin 6 times daily Use 6 pen needles daily or as directed.       magnesium 250 MG tablet      Take 1 tablet by mouth daily Takes 400 mg daily.Deneen Chin  LPN 2:34 PM on 12/22/2015       NovoLOG FLEXPEN 100 UNIT/ML injection   Generic drug:  insulin aspart     30 mL    20 units before breakfast, 20 units before lunch, 20 units before dinner, 20 units before snacks.       ondansetron 4 MG tablet    ZOFRAN    18 tablet    Take 1 tablet (4 mg) by mouth every 8 hours as needed for nausea       order for DME     1 Units    Equipment being ordered: carpal tunnel wrist splint.       OYSCO 500 + D 500-200 MG-UNIT Tabs   Generic drug:  Calcium Carb-Cholecalciferol      Take 500 mg by mouth daily       PANTOPRAZOLE SODIUM PO      Take by mouth daily       propranolol 10 MG tablet    INDERAL    180 tablet    Take 1 tablet (10 mg) by mouth 2 times daily       ranitidine 150 MG tablet    ZANTAC    180 tablet    Take 1 tablet (150 mg) by mouth 2 times daily       spironolactone 50 MG tablet    ALDACTONE    540 tablet    Takes 3 tablets in am.and p.m.       STATIN NOT PRESCRIBED (INTENTIONAL)     0 each    1 each daily Statin not prescribed intentionally due to Active liver disease       VITAMIN B-12 PO      Take 1 tablet by mouth daily       vitamin D3 2000 UNITS Caps     90 capsule    Take 1 capsule by mouth daily       * Notice:  This list has 2 medication(s) that are the same as other medications prescribed for you. Read the directions carefully, and ask your doctor or other care provider to review them with you.

## 2017-04-11 NOTE — LETTER
"4/11/2017      RE: Lawrence Louie  [facility] [facility] [address]       SUBJECTIVE:  Mr. Louie is a 61-year-old man with SMITH and alcoholic cirrhosis.  He has been sober from alcohol since 2012.      He previously was seen by Dr. Tara Cook, Cecy Hanks and Nichole Avelar.  This is my first time meeting him.      His cirrhosis was previously decompensated, but he has improved over the years.  Now he has normal liver tests and is doing quite well.  He had blood tests done this morning and also had an ultrasound.  His ultrasound has not yet been read.  He had an MRI in October that showed no masses.      Last EGD and colonoscopy were in 06/2016.  He had grade 1 varices and is on nadolol.  He also had evidence of scarring from previous banding.  There is a question of Boateng's, but the biopsy did not show this.  His colonoscopy showed adenomatous polyps.      His biggest issue is fluid retention.  He is morbidly obese, does not strictly follow a low-sodium diet but is on diuretics.  He is on Lasix 40 a.m. and 20 p.m., spironolactone 150 b.i.d. and Bumex 1 b.i.d.  Yesterday, for example, for lunch he had a packet of [place] soup and 2 pieces of bread and some fruit.  He said he does not salt foods, but he is not as careful as he should be about sodium in his diet.      He has struggled with his weight.  He said he has been a \"yo-yo\" dieter, and when I look back at the last few years, he has a varied his weight by approximately 40 pounds.      He had a DEXA scan in December that showed some osteopenia.  He is on calcium and vitamin D.  He is also on a PPI and ranitidine.        OTHER COMORBIDITIES:  He has diabetes, GERD and hyperlipidemia.      SOCIAL HISTORY:  He lives with his wife.  He has 3 kids in the [facility].  He does most of the cooking.  He is retired.      PHYSICAL EXAMINATION:   VITAL SIGNS:  His blood pressure is 121/80, temperature 98.4, pulse 74, O2 sats 97%, " weight is 355, BMI is 46.   GENERAL:  Pleasant, well-appearing, in no acute distress.   HEENT:  No icterus, no oral lesions.   LYMPH:  No supraclavicular or cervical lymphadenopathy.   CARDIOVASCULAR:  Regular rate and rhythm.   CHEST:  Lungs are clear.   ABDOMEN:  Bowel sounds are present.  He is morbidly obese.   EXTREMITIES:  2+ edema bilaterally.   NEUROLOGIC:  Speech is fluent and clear.  No asterixis or tremor.      LABORATORY DATA:  From today were reviewed and are normal with the exception a platelet count of 86,000.      ASSESSMENT AND PLAN:  A 61-year-old man with alcoholic and SMITH cirrhosis.  He has been sober from alcohol for 5 years.  He is well compensated and up-to-date on all of his screening.      We discussed fluid overload and sodium.  I encouraged him to really watch the sodium in his diet, as this is the 1 thing that he can have some control over.  I also encouraged him really to focus on weight loss.  Even if he were to lose 20-30 pounds, I think this would be very helpful for him.  He complains of knee problems, and certainly his morbid obesity is contributing to multiple comorbidities and risks with such.  I also discussed his continued tobacco abuse with him and recommended he quit smoking.      From a liver standpoint, he will continue to get labs and ultrasound every 6 months, and I will see her back in 6 months.       Meghna Simmons MD

## 2017-04-11 NOTE — NURSING NOTE
"Chief Complaint   Patient presents with     Consult For     Alcoholic cirrhosis, Referral for Liver check.       Initial /80  Pulse 74  Temp 98.4  F (36.9  C) (Oral)  Ht 1.88 m (6' 2\")  Wt (!) 161.4 kg (355 lb 12.8 oz)  SpO2 97%  BMI 45.68 kg/m2 Estimated body mass index is 45.68 kg/(m^2) as calculated from the following:    Height as of this encounter: 1.88 m (6' 2\").    Weight as of this encounter: 161.4 kg (355 lb 12.8 oz).  Medication Reconciliation: complete   Sussy Evans. CMA    "

## 2017-04-12 ENCOUNTER — TELEPHONE (OUTPATIENT)
Dept: GASTROENTEROLOGY | Facility: CLINIC | Age: 62
End: 2017-04-12

## 2017-04-12 DIAGNOSIS — K70.31 ALCOHOLIC CIRRHOSIS OF LIVER WITH ASCITES (H): Primary | ICD-10-CM

## 2017-04-12 RX ORDER — ALBUMIN (HUMAN) 12.5 G/50ML
12.5 SOLUTION INTRAVENOUS 4 TIMES DAILY PRN
Status: CANCELLED
Start: 2017-04-12

## 2017-04-12 NOTE — TELEPHONE ENCOUNTER
Instructed pt the following orders per Dr. Simmons: Significant ascites. If he wants to have a paracentesis, he can (max 10 L, therapeutic with albumin). Pt states that he would be interested in having syed weekly when needed. Writer informed pt that the para order has been created and gave pt the number to schedule with [facility].

## 2017-04-13 ENCOUNTER — OFFICE VISIT (OUTPATIENT)
Dept: INFUSION THERAPY | Facility: CLINIC | Age: 62
End: 2017-04-13
Attending: INTERNAL MEDICINE
Payer: COMMERCIAL

## 2017-04-13 ENCOUNTER — RADIANT APPOINTMENT (OUTPATIENT)
Dept: ULTRASOUND IMAGING | Facility: CLINIC | Age: 62
End: 2017-04-13
Attending: INTERNAL MEDICINE
Payer: COMMERCIAL

## 2017-04-13 VITALS
HEART RATE: 82 BPM | DIASTOLIC BLOOD PRESSURE: 76 MMHG | BODY MASS INDEX: 41.33 KG/M2 | OXYGEN SATURATION: 97 % | SYSTOLIC BLOOD PRESSURE: 141 MMHG | WEIGHT: 315 LBS | TEMPERATURE: 97.2 F

## 2017-04-13 DIAGNOSIS — K70.31 ALCOHOLIC CIRRHOSIS OF LIVER WITH ASCITES (H): Primary | ICD-10-CM

## 2017-04-13 PROCEDURE — 27210190 US PARACENTESIS

## 2017-04-13 PROCEDURE — P9047 ALBUMIN (HUMAN), 25%, 50ML: HCPCS | Mod: ZF | Performed by: INTERNAL MEDICINE

## 2017-04-13 PROCEDURE — 27210995 ZZH RX 272: Mod: ZF | Performed by: INTERNAL MEDICINE

## 2017-04-13 PROCEDURE — 25000128 H RX IP 250 OP 636: Mod: ZF | Performed by: INTERNAL MEDICINE

## 2017-04-13 RX ORDER — ALBUMIN (HUMAN) 12.5 G/50ML
12.5 SOLUTION INTRAVENOUS 4 TIMES DAILY PRN
Status: DISCONTINUED | OUTPATIENT
Start: 2017-04-13 | End: 2017-04-13 | Stop reason: HOSPADM

## 2017-04-13 RX ORDER — ALBUMIN (HUMAN) 12.5 G/50ML
12.5 SOLUTION INTRAVENOUS 4 TIMES DAILY PRN
Status: CANCELLED
Start: 2017-04-13

## 2017-04-13 RX ADMIN — ALBUMIN HUMAN 12.5 G: 0.25 SOLUTION INTRAVENOUS at 12:42

## 2017-04-13 RX ADMIN — ALBUMIN HUMAN 12.5 G: 0.25 SOLUTION INTRAVENOUS at 12:47

## 2017-04-13 RX ADMIN — ALBUMIN HUMAN 12.5 G: 0.25 SOLUTION INTRAVENOUS at 12:56

## 2017-04-13 RX ADMIN — ALBUMIN HUMAN 12.5 G: 0.25 SOLUTION INTRAVENOUS at 13:16

## 2017-04-13 RX ADMIN — LIDOCAINE HYDROCHLORIDE 20 ML: 10 INJECTION, SOLUTION INFILTRATION; PERINEURAL at 12:27

## 2017-04-13 NOTE — PROGRESS NOTES
Paracentesis Nursing Note  Lawrence Louie presents today to [facility] for a paracentesis.    During today's appointment orders from Meghna Simmons MD were completed.    Progress Note:  Patient identification verified by name and date of birth.  Assessment completed.  Vitals monitored throughout appointment and recorded in Doc Flowsheets.  See proceduralist note in ultrasound.    Date of consent or authorization: 4/13/17.  Invasive Procedure Safety Checklist was completed and sent for scanning.     Paracentesis performed by Bren Toribio PA-C Radiology on the right side in the usual sterile fashion.   Catheter fell out after draining 3.1 liters, Bren returned to place another catheter.      The following labs were communicated to provider performing paracentesis:136/58  Lab Results   Component Value Date    PLT 86 04/11/2017       Total amount of ascites fluid drained: 5.5 liters.  Color of ascites fluid: yellow.  Total amount of albumin given: 50  grams.    Patient tolerated procedure well.    Post procedure,denies pain or discomfort post paracentesis.      Discharge Plan:  Discharge instructions were reviewed with patient.  Patient/Representative verbalized understanding and all questions were answered.   Discharged from [facility] in stable condition.    Dulce Anderson RN        /64  Pulse 86  Temp 97.2  F (36.2  C) (Tympanic)  Wt (!) 151.4 kg (333 lb 12.8 oz)  SpO2 97%  BMI 42.86 kg/m2

## 2017-04-13 NOTE — MR AVS SNAPSHOT
After Visit Summary   4/13/2017    Lawrence Louie    MRN: [id number]           Patient Information     Date Of Birth          1955        Visit Information        Provider Department      4/13/2017 12:00 PM Provider, Cristobal [facility] Inf Para;  [facility]        Today's Diagnoses     Alcoholic cirrhosis of liver with ascites (H)    -  1      Care Instructions    Dear Lawrence Louie    Thank you for choosing [facility] ([facility]) for your procedure.  The following information is a summary of our appointment as well as important reminders.              We look forward in seeing you on your next appointment here at [facility].  Please don t hesitate to call us at [phone] to reschedule any of your appointments or to speak with one of the [facility] registered nurses.  It was a pleasure taking care of you today.    Sincerely,  Dulce Anderson, RADHA  [facility]  Specialty [facility]fusion [facility] Procedure Center  [address] [place] [address]  [address]  Phone:  [phone]      Paracentesis  Your doctor recommends that you have paracentesis to remove excess fluid from your abdomen (belly). A needle is used to drain the fluid. A small sample of fluid may be taken and tested for problems. If the fluid buildup is causing discomfort or pain, all of the fluid may be drained. To do this, a tube is attached to the needle. The fluid is drained into a container that sits outside of the body. If symptoms are severe, paracentesis may be done as an emergency procedure. Otherwise, it will be scheduled ahead of time. Read on to learn more about paracentesis and how it works.     Understanding ascites  Many of the body s organs, including the liver and intestines, are inside the abdomen. The organs are covered in a thin membrane called the peritoneum. The peritoneum has two layers. The  membrane makes a fluid that allows the layers to glide smoothly past each other. If this fluid builds up in the abdomen, the condition is called ascites. Ascites causes pain and discomfort. It can also make it hard to breathe. Fluid can build up for a number of reasons. Some of these include chronic liver disease (cirrhosis), heart or kidney failure, and cancer. Your doctor can tell you more about the cause of your ascites.  How paracentesis works  The goal of paracentesis may be to help diagnose the cause of the excess fluid. Or, the goal may be to drain excess fluid from the abdomen. In some cases, fluid returns and the procedure needs to be repeated.  Before the procedure    Tell your doctor about all medications you take. Also, mention any allergies you have.    Before the procedure begins, you ll be asked to empty your bladder. This helps prevent injury to the bladder during the procedure. If needed, a thin tube (Payne catheter) may be placed into your bladder to drain urine during the procedure. This tube is removed after the procedure.    An IV line may be put into a vein in your arm or hand. This line supplies fluids and medications.  During the procedure    You are awake during the procedure.    An imaging method called ultrasound may be used to guide the procedure. It shows live images of the inside of your abdomen on a video screen. This helps the doctor locate the site of the excess fluid inside your abdomen and decide where to insert the needle.    Local anesthesia (numbing medication) is injected into the skin of the abdomen where the needle will be inserted.    Once the skin is numb, the doctor carefully inserts the needle into the abdomen. This causes the needle to fill with fluid.    The needle may be removed with only a small sample of fluid. This sample is sent to a lab for testing. Obtaining a sample takes about 10 to15 minutes.    Or, a tube may be attached to the needle so that more of the  excess fluid can be drained. The tube may be taped or stitched into place. This keeps it from pulling the needle out of your abdomen.    How long it takes to drain all of the fluid varies for each person. In most cases, it takes about 30 minutes. Your doctor will let you know if the procedure is expected to take longer than usual.    Once all of the fluid is drained, the needle and tube are removed.    Pressure is put on the puncture site to stop any fluid leakage or bleeding.    A small bandage is placed over the puncture site. Albumin may be given during or after the procedure to prevent low blood pressure or kidney problems.  After the procedure  You may be taken to a recovery room to rest after the procedure. If you are in pain, you will be given medication as needed. You will likely be sent home 1 to 2 hours after the procedure is done. When you leave the hospital, have an adult family member or friend drive you home.  Risks and possible complications of paracentesis  The procedure is considered safe. But like all procedures, it carries some risks. These include the following:    Bleeding    Infection    Injury to structures in the abdomen    Fast drop in blood pressure   Recovering at home    If needed, your doctor can prescribe or recommend pain medications for you to take at home. Take these exactly as directed. If you stopped taking other medications before the procedure, ask your doctor when you can start them again.    You may remove the bandage 24 hours after the procedure.    Take it easy for 24 hours after the procedure. Avoid physical activity until your doctor says it s OK  Follow-up care  Make a follow-up appointment with your doctor as directed. During your follow-up visit, your doctor will check your healing. Let your doctor know how you are feeling. You can also discuss the cause of your ascites and whether any further treatment is needed.  When to seek medical care  Call your doctor if you  notice any of the following after the procedure:    A fever of 100.4 F (38.0 C) or higher    Trouble breathing    Pain that does not go away even after taking pain medication    Abdominal pain not caused by having the skin punctured    Bleeding from the puncture site    More than a small amount of fluid leakage from the puncture site    Swelling of the abdomen    Signs of infection at the puncture site. These include increased pain, redness, or swelling, warmth, or foul-smelling drainage.    Blood in your urine    Dizziness, lightheadedness, or fainting     2562-8717 [employer]. [address] [place] [address]. All rights reserved. This information is not intended as a substitute for professional medical care. Always follow your healthcare professional's instructions.              Follow-ups after your visit        Your next 10 appointments already scheduled     May 30, 2017  1:00 PM CDT   (Arrive by 12:45 PM)   Return Visit with CORY Toussaint CNP   [facility] ([facility] [place])    [address] [place] [address]  4th Floor  [facility] [address] [phone]            Oct 03, 2017 11:00 AM CDT   Lab with  LAB   [facility] Lab ([facility])    [address] [place] [address]  1st Floor  [facility] [address] [phone]            Oct 03, 2017 11:50 AM CDT   (Arrive by 11:35 AM)   Return General Liver with Meghna Simmons MD   [facility] Hepatology ([facility])    [address] [place] [address]  3rd Floor  [facility] [id number] [phone]              Who to contact     If you have questions or need follow up information about today's clinic visit or your schedule please contact [facility] directly at [phone].  Normal or non-critical lab and imaging results will be communicated to you by MyChart, letter or phone within 4 business days after the  clinic has received the results. If you do not hear from us within 7 days, please contact the clinic through [employer] or phone. If you have a critical or abnormal lab result, we will notify you by phone as soon as possible.  Submit refill requests through [employer] or call your pharmacy and they will forward the refill request to us. Please allow 3 business days for your refill to be completed.          Additional Information About Your Visit        [employer] Information     [employer] gives you secure access to your electronic health record. If you see a primary care provider, you can also send messages to your care team and make appointments. If you have questions, please call your primary care clinic.  If you do not have a primary care provider, please call [phone] and they will assist you.        Care EveryWhere ID     This is your Care EveryWhere ID. This could be used by other organizations to access your [place] medical records  [id number]        Your Vitals Were     Pulse Temperature Pulse Oximetry BMI (Body Mass Index)          82 97.2  F (36.2  C) (Tympanic) 97% 42.86 kg/m2         Blood Pressure from Last 3 Encounters:   04/13/17 141/76   04/11/17 121/80   03/21/17 122/77    Weight from Last 3 Encounters:   04/13/17 (!) 151.4 kg (333 lb 12.8 oz)   04/11/17 (!) 161.4 kg (355 lb 12.8 oz)   03/21/17 (!) 150.6 kg (332 lb)              We Performed the Following     Treatment Conditions     US Paracentesis        Primary Care Provider Office Phone # Fax #    Ary CORY López [phone]       PRIMARY CARE CENTER [address] [facility] [address]  [facility] [address]        Thank you!     Thank you for choosing [facility] SPECIAL[facility] PROCED[facility]  for your care. Our goal is always to provide you with excellent care. Hearing back from our patients is one way we can continue to improve our services. Please take a few minutes to complete the written survey that you may  receive in the mail after your visit with us. Thank you!             Your Updated Medication List - Protect others around you: Learn how to safely use, store and throw away your medicines at www.disposemymeds.org.          This list is accurate as of: 4/13/17  2:01 PM.  Always use your most recent med list.                   Brand Name Dispense Instructions for use    aspirin 81 MG tablet     30 tablet    Take 1 tablet (81 mg) by mouth daily       blood glucose lancets standard    no brand specified    1 Box    Use to test blood sugar 4 X  times daily or as directed.       blood glucose monitoring meter device kit    no brand specified    1 kit    Use to test blood sugar 4 X  times daily or as directed.       blood glucose monitoring test strip    no brand specified    200 strip    Use to test blood sugars 4 X  times daily or as directed       bumetanide 1 MG tablet    BUMEX    90 tablet    Take 1 tablet (1 mg) by mouth 2 times daily       * calcium-vitamin D 600-400 MG-UNIT per tablet    CALTRATE     Take by mouth daily       * calcium carb 1250 mg (500 mg Barrow)/vitamin D 200 units 500-200 MG-UNIT per tablet    OSCAL with D    90 tablet    Take 1 tablet by mouth daily       DESVENLAFAXINE ER PO      Take 100 mg by mouth daily 2 tablets once daily.       desvenlafaxine succinate  MG 24 hr tablet    PRISTIQ    180 tablet    Take 2 tablets (200 mg) by mouth daily       furosemide 20 MG tablet    LASIX    90 tablet    Take 2 tabs in morning and 1 tablet at noon.       glipiZIDE 5 MG 24 hr tablet    glipiZIDE XL    180 tablet    1 tablet twice a day       insulin glargine 100 UNIT/ML injection    LANTUS SOLOSTAR    30 mL    Inject 40 Units Subcutaneous At Bedtime       insulin pen needle 31G X 8 MM    B-D U/F    200 each    Inject into the skin 6 times daily Use 6 pen needles daily or as directed.       magnesium 250 MG tablet      Take 1 tablet by mouth daily Takes 400 mg daily.Deneen Chin LPN 2:34 PM on  12/22/2015       NovoLOG FLEXPEN 100 UNIT/ML injection   Generic drug:  insulin aspart     30 mL    20 units before breakfast, 20 units before lunch, 20 units before dinner, 20 units before snacks.       ondansetron 4 MG tablet    ZOFRAN    18 tablet    Take 1 tablet (4 mg) by mouth every 8 hours as needed for nausea       order for DME     1 Units    Equipment being ordered: carpal tunnel wrist splint.       OYSCO 500 + D 500-200 MG-UNIT Tabs   Generic drug:  Calcium Carb-Cholecalciferol      Take 500 mg by mouth daily       PANTOPRAZOLE SODIUM PO      Take by mouth daily       propranolol 10 MG tablet    INDERAL    180 tablet    Take 1 tablet (10 mg) by mouth 2 times daily       ranitidine 150 MG tablet    ZANTAC    180 tablet    Take 1 tablet (150 mg) by mouth 2 times daily       spironolactone 50 MG tablet    ALDACTONE    540 tablet    Takes 3 tablets in am.and p.m.       STATIN NOT PRESCRIBED (INTENTIONAL)     0 each    1 each daily Statin not prescribed intentionally due to Active liver disease       VITAMIN B-12 PO      Take 1 tablet by mouth daily       vitamin D3 2000 UNITS Caps     90 capsule    Take 1 capsule by mouth daily       * Notice:  This list has 2 medication(s) that are the same as other medications prescribed for you. Read the directions carefully, and ask your doctor or other care provider to review them with you.

## 2017-04-13 NOTE — PATIENT INSTRUCTIONS
Dear Lawrence Louie    Thank you for choosing [facility] ([facility]) for your procedure.  The following information is a summary of our appointment as well as important reminders.              We look forward in seeing you on your next appointment here at [facility].  Please don t hesitate to call us at [phone] to reschedule any of your appointments or to speak with one of the [facility] registered nurses.  It was a pleasure taking care of you today.    Sincerely,  Dulce Anderson, RADHA  [facility]  Specialty Infusion & Procedure Center  [address] [place] [address]  [address]  Phone:  [phone]      Paracentesis  Your doctor recommends that you have paracentesis to remove excess fluid from your abdomen (belly). A needle is used to drain the fluid. A small sample of fluid may be taken and tested for problems. If the fluid buildup is causing discomfort or pain, all of the fluid may be drained. To do this, a tube is attached to the needle. The fluid is drained into a container that sits outside of the body. If symptoms are severe, paracentesis may be done as an emergency procedure. Otherwise, it will be scheduled ahead of time. Read on to learn more about paracentesis and how it works.     Understanding ascites  Many of the body s organs, including the liver and intestines, are inside the abdomen. The organs are covered in a thin membrane called the peritoneum. The peritoneum has two layers. The membrane makes a fluid that allows the layers to glide smoothly past each other. If this fluid builds up in the abdomen, the condition is called ascites. Ascites causes pain and discomfort. It can also make it hard to breathe. Fluid can build up for a number of reasons. Some of these include chronic liver disease (cirrhosis), heart or kidney failure, and cancer. Your doctor can tell you more about the cause of your ascites.  How  paracentesis works  The goal of paracentesis may be to help diagnose the cause of the excess fluid. Or, the goal may be to drain excess fluid from the abdomen. In some cases, fluid returns and the procedure needs to be repeated.  Before the procedure    Tell your doctor about all medications you take. Also, mention any allergies you have.    Before the procedure begins, you ll be asked to empty your bladder. This helps prevent injury to the bladder during the procedure. If needed, a thin tube (Payne catheter) may be placed into your bladder to drain urine during the procedure. This tube is removed after the procedure.    An IV line may be put into a vein in your arm or hand. This line supplies fluids and medications.  During the procedure    You are awake during the procedure.    An imaging method called ultrasound may be used to guide the procedure. It shows live images of the inside of your abdomen on a video screen. This helps the doctor locate the site of the excess fluid inside your abdomen and decide where to insert the needle.    Local anesthesia (numbing medication) is injected into the skin of the abdomen where the needle will be inserted.    Once the skin is numb, the doctor carefully inserts the needle into the abdomen. This causes the needle to fill with fluid.    The needle may be removed with only a small sample of fluid. This sample is sent to a lab for testing. Obtaining a sample takes about 10 to15 minutes.    Or, a tube may be attached to the needle so that more of the excess fluid can be drained. The tube may be taped or stitched into place. This keeps it from pulling the needle out of your abdomen.    How long it takes to drain all of the fluid varies for each person. In most cases, it takes about 30 minutes. Your doctor will let you know if the procedure is expected to take longer than usual.    Once all of the fluid is drained, the needle and tube are removed.    Pressure is put on the puncture  site to stop any fluid leakage or bleeding.    A small bandage is placed over the puncture site. Albumin may be given during or after the procedure to prevent low blood pressure or kidney problems.  After the procedure  You may be taken to a recovery room to rest after the procedure. If you are in pain, you will be given medication as needed. You will likely be sent home 1 to 2 hours after the procedure is done. When you leave the hospital, have an adult family member or friend drive you home.  Risks and possible complications of paracentesis  The procedure is considered safe. But like all procedures, it carries some risks. These include the following:    Bleeding    Infection    Injury to structures in the abdomen    Fast drop in blood pressure   Recovering at home    If needed, your doctor can prescribe or recommend pain medications for you to take at home. Take these exactly as directed. If you stopped taking other medications before the procedure, ask your doctor when you can start them again.    You may remove the bandage 24 hours after the procedure.    Take it easy for 24 hours after the procedure. Avoid physical activity until your doctor says it s OK  Follow-up care  Make a follow-up appointment with your doctor as directed. During your follow-up visit, your doctor will check your healing. Let your doctor know how you are feeling. You can also discuss the cause of your ascites and whether any further treatment is needed.  When to seek medical care  Call your doctor if you notice any of the following after the procedure:    A fever of 100.4 F (38.0 C) or higher    Trouble breathing    Pain that does not go away even after taking pain medication    Abdominal pain not caused by having the skin punctured    Bleeding from the puncture site    More than a small amount of fluid leakage from the puncture site    Swelling of the abdomen    Signs of infection at the puncture site. These include increased pain,  redness, or swelling, warmth, or foul-smelling drainage.    Blood in your urine    Dizziness, lightheadedness, or fainting     2242-8884 [employer]. [address] [place] [address], [place] [address]. All rights reserved. This information is not intended as a substitute for professional medical care. Always follow your healthcare professional's instructions.

## 2017-05-03 ENCOUNTER — OFFICE VISIT (OUTPATIENT)
Dept: INFUSION THERAPY | Facility: CLINIC | Age: 62
End: 2017-05-03
Attending: INTERNAL MEDICINE
Payer: COMMERCIAL

## 2017-05-03 ENCOUNTER — RADIANT APPOINTMENT (OUTPATIENT)
Dept: ULTRASOUND IMAGING | Facility: CLINIC | Age: 62
End: 2017-05-03
Attending: INTERNAL MEDICINE
Payer: COMMERCIAL

## 2017-05-03 VITALS
TEMPERATURE: 97.5 F | BODY MASS INDEX: 43.37 KG/M2 | DIASTOLIC BLOOD PRESSURE: 62 MMHG | SYSTOLIC BLOOD PRESSURE: 124 MMHG | HEART RATE: 68 BPM | WEIGHT: 315 LBS

## 2017-05-03 DIAGNOSIS — K70.31 ALCOHOLIC CIRRHOSIS OF LIVER WITH ASCITES (H): Primary | ICD-10-CM

## 2017-05-03 PROCEDURE — 25000128 H RX IP 250 OP 636: Mod: ZF | Performed by: INTERNAL MEDICINE

## 2017-05-03 PROCEDURE — 27210995 ZZH RX 272: Mod: ZF | Performed by: INTERNAL MEDICINE

## 2017-05-03 PROCEDURE — P9047 ALBUMIN (HUMAN), 25%, 50ML: HCPCS | Mod: ZF | Performed by: INTERNAL MEDICINE

## 2017-05-03 PROCEDURE — 27210190 US PARACENTESIS

## 2017-05-03 RX ORDER — ALBUMIN (HUMAN) 12.5 G/50ML
12.5 SOLUTION INTRAVENOUS 4 TIMES DAILY PRN
Status: CANCELLED
Start: 2017-05-03

## 2017-05-03 RX ORDER — ALBUMIN (HUMAN) 12.5 G/50ML
12.5 SOLUTION INTRAVENOUS 4 TIMES DAILY PRN
Status: DISCONTINUED | OUTPATIENT
Start: 2017-05-03 | End: 2017-05-03 | Stop reason: HOSPADM

## 2017-05-03 RX ADMIN — ALBUMIN HUMAN 12.5 G: 0.25 SOLUTION INTRAVENOUS at 15:06

## 2017-05-03 RX ADMIN — ALBUMIN HUMAN 12.5 G: 0.25 SOLUTION INTRAVENOUS at 14:52

## 2017-05-03 RX ADMIN — ALBUMIN HUMAN 12.5 G: 0.25 SOLUTION INTRAVENOUS at 15:15

## 2017-05-03 RX ADMIN — LIDOCAINE HYDROCHLORIDE 20 ML: 10 INJECTION, SOLUTION INFILTRATION; PERINEURAL at 14:45

## 2017-05-03 RX ADMIN — ALBUMIN HUMAN 12.5 G: 0.25 SOLUTION INTRAVENOUS at 14:59

## 2017-05-03 NOTE — PATIENT INSTRUCTIONS
Dear Lawrence Louie    Thank you for choosing [facility] Specialty Infusion [facility] Procedure [facility] ([facility]) for your procedure.  The following information is a summary of our appointment as well as important reminders.        To cancel or re-schedule any appointment, call Specialty Infusion at [phone]    Option 7 for Specialty Infusion   Option 2 for Scheduling    DISCHARGE INSTRUCTIONS FOLLOWING ABDOMINAL PARACENTESIS    After you go home:    No strenuous activity for 24 hours    Resume your regular diet    Limit fluid intake for the first 48 hours to no more than 2 quarts per day.  There should be minimal drainage from the needle site.  If drainage does occur and soaks through the bandage, apply gentle pressure with your hand for 5 minutes.    Notify MD for the following:    Excessive drainage    Excessive swelling, redness or tenderness at the needle site    Fever greater than 101 degrees F    Dizziness or light-headedness when getting up or walking      IF THIS IS A MEDICAL EMERGENCY, CALL [phone]    If you have any questions or concerns    Contact the Hepatology Clinic:   [phone]    If you are a post-transplant patient, contact the Transplant Office:  [phone]    If this is after hours, contact the hospital :  [phone] and as to have the GI resident on call paged                         Additional information: you had a paracentesis performed today.       We look forward in seeing you on your next appointment here at [facility].  Please don t hesitate to call us at [phone] to reschedule any of your appointments or to speak with one of the [facility] registered nurses.  It was a pleasure taking care of you today.    Sincerely,  Ita Chin RN  [facility]  Specialty Infusion & Procedure Center  [address] [place] [address]  [address]  Phone:  [phone]

## 2017-05-03 NOTE — PROGRESS NOTES
Paracentesis Nursing Note  Lawrence Louie presents [facility] to [facility] for a paracentesis.    During today's appointment orders from Meghna Simmons MD were completed.    Progress Note:  Patient identification verified by name and date of birth.  Assessment completed.  Vitals monitored throughout appointment and recorded in Doc Flowsheets.  See proceduralist note in ultrasound.    Date of consent or authorization: 4/13/17.  Invasive Procedure Safety Checklist was completed and sent for scanning.     Paracentesis performed by Bren Toribio PA-C.    The following labs were communicated to provider performing paracentesis:  Lab Results   Component Value Date    PLT 86 04/11/2017       Total amount of ascites fluid drained: 8.1 liters.  Color of ascites fluid: light yellow and clear.  Total amount of albumin given: 50  grams.    Patient tolerated procedure well.    Post procedure,denies pain or discomfort post paracentesis. Pt had vague c/o abd spasms following withdrawal of large quantity of ascites. Encouraged pt to make more frequent appointments; now set up for weekly procedures.       Discharge Plan:  Discharge instructions were reviewed with patient.  Patient/Representative verbalized understanding and all questions were answered.   Discharged from [facility] in stable condition.    Ita Chin RN    Administrations This Visit     albumin human 25 % injection 12.5 g     Admin Date Action Dose Route Administered By             05/03/2017 New Bag 12.5 g Intravenous Ita Chin RN              Admin Date Action Dose Route Administered By             05/03/2017 New Bag 12.5 g Intravenous Ita Chin RN              Admin Date Action Dose Route Administered By             05/03/2017 New Bag 12.5 g Intravenous Ita Chin RN              Admin Date Action Dose Route Administered By             05/03/2017 New Bag 12.5 g Intravenous  Ita Chin, RN                    lidocaine BUFFERED 1 % injection 20 mL     Admin Date Action Dose Route Administered By             05/03/2017 Given by Other Clinician 20 mL Injection Ita Chin, RN                          /72  Pulse 66  Temp 97.5  F (36.4  C) (Tympanic)  Wt (!) 153.2 kg (337 lb 12.8 oz)  BMI 43.37 kg/m2

## 2017-05-03 NOTE — MR AVS SNAPSHOT
After Visit Summary   5/3/2017    Lawrence Louie    MRN: [id number]           Patient Information     Date Of Birth          1955        Visit Information        Provider Department      5/3/2017 2:00 PM Provider, Cristobal Spec Inf Para; CRISTOBAL 40 ATC [facility] Specialty and Procedure        Today's Diagnoses     Alcoholic cirrhosis of liver with ascites (H)    -  1      Care Instructions    Dear Lawrence Louie    Thank you for choosing [facility] ([facility]) for your procedure.  The following information is a summary of our appointment as well as important reminders.        To cancel or re-schedule any appointment, call Specialty Infusion at [phone]    Option 7 for Specialty Infusion   Option 2 for Scheduling    DISCHARGE INSTRUCTIONS FOLLOWING ABDOMINAL PARACENTESIS    After you go home:    No strenuous activity for 24 hours    Resume your regular diet    Limit fluid intake for the first 48 hours to no more than 2 quarts per day.  There should be minimal drainage from the needle site.  If drainage does occur and soaks through the bandage, apply gentle pressure with your hand for 5 minutes.    Notify MD for the following:    Excessive drainage    Excessive swelling, redness or tenderness at the needle site    Fever greater than 101 degrees F    Dizziness or light-headedness when getting up or walking      IF THIS IS A MEDICAL EMERGENCY, CALL 911    If you have any questions or concerns    Contact the Hepatology Clinic:   [phone]    If you are a post-transplant patient, contact the Transplant Office:  [phone]    If this is after hours, contact the hospital :  [phone] and as to have the GI resident on call paged                         Additional information: you had a paracentesis performed today.       We look forward in seeing you on your next appointment here at [facility].  Please don t  hesitate to call us at [phone] to reschedule any of your appointments or to speak with one of the [facility] registered nurses.  It was a pleasure taking care of you today.    Sincerely,  Ita Chin, RN  [facility]  Specialty Infusion & Procedure Center  [address] [place] [address]  [id number]  Phone:  [phone]          Follow-ups after your visit        Your next 10 appointments already scheduled     May 11, 2017  2:00 PM CDT   Paracentesis Visit with Uc Spec Inf Para Provider, UC 40 ATC   [facility] Specialty and Procedure ([facility])    [address] [place] [address]  [address] [facility] [id number] [phone]            May 17, 2017 12:00 PM CDT   Paracentesis Visit with Uc Spec Inf Para Provider, UC 39 ATC   [facility] Specialty and Procedure ([facility])    [address] [place] [address]  [address] [facility] [id number] [phone]            May 24, 2017 12:00 PM CDT   Paracentesis Visit with Uc Spec Inf Para Provider, UC 39 ATC   [facility] Specialty and Procedure ([facility])    [address] [place] [address]  [address] [facility] [id number] [phone]            May 30, 2017  1:00 PM CDT   (Arrive by 12:45 PM)   Return Visit with CORY Toussaint CNP   [facility] Primary [facility] Clinic ([facility])    [address] [place] [address]  4th Floor  [facility] [id number] [phone]            Oct 03, 2017 11:00 AM CDT   Lab with UC LAB   [facility] Lab ([facility])    [address] [place] [address]  [address] [facility] [id number] [phone]            Oct 03, 2017 11:50 AM CDT   (Arrive by 11:35 AM)   Return General Liver with Meghna Simmons MD   [facility] Hepatology ([facility])    [address] [place] [address]  [address] [facility] [address]    [phone]              Who to contact     If you have questions or need follow up information about today's clinic visit or your schedule please contact [facility] SPECIALTY AND PROCEDURE directly at [phone].  Normal or non-critical lab and imaging results will be communicated to you by MyChart, letter or phone within 4 business days after the clinic has received the results. If you do not hear from us within 7 days, please contact the clinic through MyChart or phone. If you have a critical or abnormal lab result, we will notify you by phone as soon as possible.  Submit refill requests through [employer] or call your pharmacy and they will forward the refill request to us. Please allow 3 business days for your refill to be completed.          Additional Information About Your Visit        [employer] Information     [employer] gives you secure access to your electronic health record. If you see a primary care provider, you can also send messages to your care team and make appointments. If you have questions, please call your primary care clinic.  If you do not have a primary care provider, please call [phone] and they will assist you.        Care EveryWhere ID     This is your Care EveryWhere ID. This could be used by other organizations to access your [place] medical records  [id number]        Your Vitals Were     Pulse Temperature BMI (Body Mass Index)             68 97.5  F (36.4  C) (Tympanic) 43.37 kg/m2          Blood Pressure from Last 3 Encounters:   05/03/17 124/62   04/13/17 141/76   04/11/17 121/80    Weight from Last 3 Encounters:   05/03/17 (!) 153.2 kg (337 lb 12.8 oz)   04/13/17 (!) 146 kg (321 lb 14 oz)   04/11/17 (!) 161.4 kg (355 lb 12.8 oz)              We Performed the Following     Treatment Conditions     US Paracentesis        Primary Care Provider Office Phone # Fax #    CORY Toussaint [phone]       [facility] CARE CENTER [facility]  [address]  [facility] [address]        Thank you!     Thank you for choosing [facility] SPECIALTY AND PROCEDURE  for your care. Our goal is always to provide you with excellent care. Hearing back from our patients is one way we can continue to improve our services. Please take a few minutes to complete the written survey that you may receive in the mail after your visit with us. Thank you!             Your Updated Medication List - Protect others around you: Learn how to safely use, store and throw away your medicines at www.disposemymeds.org.          This list is accurate as of: 5/3/17  3:49 PM.  Always use your most recent med list.                   Brand Name Dispense Instructions for use    aspirin 81 MG tablet     30 tablet    Take 1 tablet (81 mg) by mouth daily       blood glucose lancets standard    no brand specified    1 Box    Use to test blood sugar 4 X  times daily or as directed.       blood glucose monitoring meter device kit    no brand specified    1 kit    Use to test blood sugar 4 X  times daily or as directed.       blood glucose monitoring test strip    no brand specified    200 strip    Use to test blood sugars 4 X  times daily or as directed       bumetanide 1 MG tablet    BUMEX    90 tablet    Take 1 tablet (1 mg) by mouth 2 times daily       * calcium-vitamin D 600-400 MG-UNIT per tablet    CALTRATE     Take by mouth daily       * calcium carb 1250 mg (500 mg [place])/vitamin D 200 units 500-200 MG-UNIT per tablet    OSCAL with D    90 tablet    Take 1 tablet by mouth daily       DESVENLAFAXINE ER PO      Take 100 mg by mouth daily 2 tablets once daily.       desvenlafaxine succinate 100 MG 24 hr tablet    PRISTIQ    180 tablet    Take 2 tablets (200 mg) by mouth daily       furosemide 20 MG tablet    LASIX    90 tablet    Take 2 tabs in morning and 1 tablet at noon.       glipiZIDE 5 MG 24 hr tablet    glipiZIDE XL    180 tablet    1 tablet twice a day        insulin glargine 100 UNIT/ML injection    LANTUS SOLOSTAR    30 mL    Inject 40 Units Subcutaneous At Bedtime       insulin pen needle 31G X 8 MM    B-D U/F    200 each    Inject into the skin 6 times daily Use 6 pen needles daily or as directed.       magnesium 250 MG tablet      Take 1 tablet by mouth daily Takes 400 mg daily.Deneen Chin LPN 2:34 PM on 12/22/2015       NovoLOG FLEXPEN 100 UNIT/ML injection   Generic drug:  insulin aspart     30 mL    20 units before breakfast, 20 units before lunch, 20 units before dinner, 20 units before snacks.       ondansetron 4 MG tablet    ZOFRAN    18 tablet    Take 1 tablet (4 mg) by mouth every 8 hours as needed for nausea       order for DME     1 Units    Equipment being ordered: carpal tunnel wrist splint.       OYSCO 500 + D 500-200 MG-UNIT Tabs   Generic drug:  Calcium Carb-Cholecalciferol      Take 500 mg by mouth daily       PANTOPRAZOLE SODIUM PO      Take by mouth daily       propranolol 10 MG tablet    INDERAL    180 tablet    Take 1 tablet (10 mg) by mouth 2 times daily       ranitidine 150 MG tablet    ZANTAC    180 tablet    Take 1 tablet (150 mg) by mouth 2 times daily       spironolactone 50 MG tablet    ALDACTONE    540 tablet    Takes 3 tablets in am.and p.m.       STATIN NOT PRESCRIBED (INTENTIONAL)     0 each    1 each daily Statin not prescribed intentionally due to Active liver disease       VITAMIN B-12 PO      Take 1 tablet by mouth daily       vitamin D3 2000 UNITS Caps     90 capsule    Take 1 capsule by mouth daily       * Notice:  This list has 2 medication(s) that are the same as other medications prescribed for you. Read the directions carefully, and ask your doctor or other care provider to review them with you.

## 2017-05-11 ENCOUNTER — RADIANT APPOINTMENT (OUTPATIENT)
Dept: ULTRASOUND IMAGING | Facility: CLINIC | Age: 62
End: 2017-05-11
Attending: INTERNAL MEDICINE
Payer: COMMERCIAL

## 2017-05-11 ENCOUNTER — OFFICE VISIT (OUTPATIENT)
Dept: INTERNAL MEDICINE | Facility: CLINIC | Age: 62
End: 2017-05-11

## 2017-05-11 ENCOUNTER — OFFICE VISIT (OUTPATIENT)
Dept: INFUSION THERAPY | Facility: CLINIC | Age: 62
End: 2017-05-11
Attending: INTERNAL MEDICINE
Payer: COMMERCIAL

## 2017-05-11 VITALS
DIASTOLIC BLOOD PRESSURE: 73 MMHG | BODY MASS INDEX: 41.68 KG/M2 | HEART RATE: 72 BPM | WEIGHT: 315 LBS | OXYGEN SATURATION: 98 % | TEMPERATURE: 97.7 F | SYSTOLIC BLOOD PRESSURE: 150 MMHG

## 2017-05-11 DIAGNOSIS — M79.662 PAIN OF LEFT LOWER LEG: Primary | ICD-10-CM

## 2017-05-11 DIAGNOSIS — K70.31 ALCOHOLIC CIRRHOSIS OF LIVER WITH ASCITES (H): Primary | ICD-10-CM

## 2017-05-11 PROCEDURE — P9047 ALBUMIN (HUMAN), 25%, 50ML: HCPCS | Mod: ZF | Performed by: INTERNAL MEDICINE

## 2017-05-11 PROCEDURE — 25000128 H RX IP 250 OP 636: Mod: ZF | Performed by: INTERNAL MEDICINE

## 2017-05-11 PROCEDURE — 27210995 ZZH RX 272: Mod: ZF | Performed by: INTERNAL MEDICINE

## 2017-05-11 PROCEDURE — 27210190 US PARACENTESIS

## 2017-05-11 RX ORDER — ALBUMIN (HUMAN) 12.5 G/50ML
12.5 SOLUTION INTRAVENOUS 4 TIMES DAILY PRN
Status: DISCONTINUED | OUTPATIENT
Start: 2017-05-11 | End: 2017-05-11 | Stop reason: HOSPADM

## 2017-05-11 RX ORDER — ALBUMIN (HUMAN) 12.5 G/50ML
12.5 SOLUTION INTRAVENOUS 4 TIMES DAILY PRN
Status: CANCELLED
Start: 2017-05-11

## 2017-05-11 RX ADMIN — ALBUMIN HUMAN 12.5 G: 0.25 SOLUTION INTRAVENOUS at 15:34

## 2017-05-11 RX ADMIN — ALBUMIN HUMAN 12.5 G: 0.25 SOLUTION INTRAVENOUS at 15:55

## 2017-05-11 RX ADMIN — ALBUMIN HUMAN 12.5 G: 0.25 SOLUTION INTRAVENOUS at 15:24

## 2017-05-11 RX ADMIN — LIDOCAINE HYDROCHLORIDE 20 ML: 10 INJECTION, SOLUTION INFILTRATION; PERINEURAL at 15:10

## 2017-05-11 RX ADMIN — ALBUMIN HUMAN 12.5 G: 0.25 SOLUTION INTRAVENOUS at 15:14

## 2017-05-11 NOTE — NURSING NOTE
Chief Complaint   Patient presents with     Pain     Patient is here to discuss pain in the left leg.      Sahara Mims LPN at 4:44 PM on 5/11/2017.

## 2017-05-11 NOTE — MR AVS SNAPSHOT
After Visit Summary   5/11/2017    Lawrence Louie    MRN: [id number]           Patient Information     Date Of Birth          1955        Visit Information        Provider Department      5/11/2017 4:20 PM Ary Murphy APRN CNP [facility]        Today's Diagnoses     Pain of left lower leg    -  1       Follow-ups after your visit        Your next 10 appointments already scheduled     May 12, 2017 12:30 PM CDT   US LOWER EXTREMITY VENOUS DUPLEX LEFT with UCUSV2   [facility] Imaging [place] US ([facility])    [address] [place] [id number] [phone]           Please bring a list of your medicines (including vitamins, minerals and over-the-counter drugs). Also, tell your doctor about any allergies you may have. Wear comfortable clothes and leave your valuables at home.  You do not need to do anything special to prepare for your exam.  Please call the Imaging Department at your exam site with any questions.            May 17, 2017 12:00 PM CDT   Paracentesis Visit with Uc Spec Inf Para Provider, UC 39 ATC   [facility] Specialty and Procedure ([facility])    [address] [address]   [phone]            May 24, 2017 12:00 PM CDT   Paracentesis Visit with Uc Spec Inf Para Provider, UC 39 ATC   [facility] Specialty and Procedure ([facility])    [address] [address]   [phone]            May 31, 2017 12:00 PM CDT   Paracentesis Visit with Uc Spec Inf Para Provider, UC 39 ATC   [facility] Specialty and Procedure ([facility])    [address] [address]   [phone]            Jun 07, 2017 12:00 PM CDT   Paracentesis Visit with Uc Spec Inf Para Provider, UC 39  ATC   [facility] Specialty and Procedure ([facility])    [address] [facility]  [address] [facility] [id number] [phone]            Jun 14, 2017 12:00 PM CDT   Paracentesis Visit with  Spec Inf Para Provider   [facility] Specialty and Procedure ([facility])    [address] [facility]  [address] [facility] [id number] [phone]              Future tests that were ordered for you today     Open Future Orders        Priority Expected Expires Ordered    US Lower Extremity Venous Duplex Left Routine  5/11/2018 5/11/2017            Who to contact     Please call your clinic at [phone] to:    Ask questions about your health    Make or cancel appointments    Discuss your medicines    Learn about your test results    Speak to your doctor   If you have compliments or concerns about an experience at your clinic, or if you wish to file a complaint, please contact [facility] Patient Relations at [phone] or email us at [email or web address]         Additional Information About Your Visit        [employer] Information     [employer] gives you secure access to your electronic health record. If you see a primary care provider, you can also send messages to your care team and make appointments. If you have questions, please call your primary care clinic.  If you do not have a primary care provider, please call [phone] and they will assist you.      [employer] is an electronic gateway that provides easy, online access to your medical records. With [employer] you can request a clinic appointment, read your test results, renew a prescription or communicate with your care team.     To access your existing account, please contact your [facility] Clinic or call [phone] for assistance.        Care EveryWhere ID     This is your Care EveryWhere ID. This could  be used by other organizations to access your [place] medical records  [id number]         Blood Pressure from Last 3 Encounters:   05/11/17 (P) 139/61   05/03/17 124/62   04/13/17 141/76    Weight from Last 3 Encounters:   05/11/17 (!) 147.2 kg (324 lb 9.6 oz)   05/03/17 (!) 153.2 kg (337 lb 12.8 oz)   04/13/17 (!) 146 kg (321 lb 14 oz)               Primary Care Provider Office Phone # Fax #    CORY Toussaint [phone]       PRIMARY CARE CENTER [address] [facility] [address]  [facility] [address]        Thank you!     Thank you for choosing [facility] PRIMARY CARE CLINIC  for your care. Our goal is always to provide you with excellent care. Hearing back from our patients is one way we can continue to improve our services. Please take a few minutes to complete the written survey that you may receive in the mail after your visit with us. Thank you!             Your Updated Medication List - Protect others around you: Learn how to safely use, store and throw away your medicines at www.disposemymeds.org.          This list is accurate as of: 5/11/17  5:12 PM.  Always use your most recent med list.                   Brand Name Dispense Instructions for use    aspirin 81 MG tablet     30 tablet    Take 1 tablet (81 mg) by mouth daily       blood glucose lancets standard    no brand specified    1 Box    Use to test blood sugar 4 X  times daily or as directed.       blood glucose monitoring meter device kit    no brand specified    1 kit    Use to test blood sugar 4 X  times daily or as directed.       blood glucose monitoring test strip    no brand specified    200 strip    Use to test blood sugars 4 X  times daily or as directed       bumetanide 1 MG tablet    BUMEX    90 tablet    Take 1 tablet (1 mg) by mouth 2 times daily       * calcium-vitamin D 600-400 MG-UNIT per tablet    CALTRATE     Take by mouth daily       * calcium carb 1250 mg (500 mg [place])/vitamin D 200 units 500-200 MG-UNIT per  tablet    OSCAL with D    90 tablet    Take 1 tablet by mouth daily       DESVENLAFAXINE ER PO      Take 100 mg by mouth daily 2 tablets once daily.       desvenlafaxine succinate 100 MG 24 hr tablet    PRISTIQ    180 tablet    Take 2 tablets (200 mg) by mouth daily       furosemide 20 MG tablet    LASIX    90 tablet    Take 2 tabs in morning and 1 tablet at noon.       glipiZIDE 5 MG 24 hr tablet    glipiZIDE XL    180 tablet    1 tablet twice a day       insulin glargine 100 UNIT/ML injection    LANTUS SOLOSTAR    30 mL    Inject 40 Units Subcutaneous At Bedtime       insulin pen needle 31G X 8 MM    B-D U/F    200 each    Inject into the skin 6 times daily Use 6 pen needles daily or as directed.       magnesium 250 MG tablet      Take 1 tablet by mouth daily Takes 400 mg daily.Deneen Chin LPN 2:34 PM on 12/22/2015       NovoLOG FLEXPEN 100 UNIT/ML injection   Generic drug:  insulin aspart     30 mL    20 units before breakfast, 20 units before lunch, 20 units before dinner, 20 units before snacks.       ondansetron 4 MG tablet    ZOFRAN    18 tablet    Take 1 tablet (4 mg) by mouth every 8 hours as needed for nausea       order for DME     1 Units    Equipment being ordered: carpal tunnel wrist splint.       OYSCO 500 + D 500-200 MG-UNIT Tabs   Generic drug:  Calcium Carb-Cholecalciferol      Take 500 mg by mouth daily       PANTOPRAZOLE SODIUM PO      Take by mouth daily       propranolol 10 MG tablet    INDERAL    180 tablet    Take 1 tablet (10 mg) by mouth 2 times daily       ranitidine 150 MG tablet    ZANTAC    180 tablet    Take 1 tablet (150 mg) by mouth 2 times daily       spironolactone 50 MG tablet    ALDACTONE    540 tablet    Takes 3 tablets in am.and p.m.       STATIN NOT PRESCRIBED (INTENTIONAL)     0 each    1 each daily Statin not prescribed intentionally due to Active liver disease       VITAMIN B-12 PO      Take 1 tablet by mouth daily       vitamin D3 2000 UNITS Caps     90 capsule    Take  1 capsule by mouth daily       * Notice:  This list has 2 medication(s) that are the same as other medications prescribed for you. Read the directions carefully, and ask your doctor or other care provider to review them with you.

## 2017-05-11 NOTE — PROGRESS NOTES
Paracentesis Nursing Note  Lawrence Louie presents today [facility] for a [facility]acentesis.    During today's appointment orders from Meghna Simmons MD were completed.    Progress Note:  Patient identification verified by name and date of birth.  Assessment completed.  Vitals monitored throughout appointment and recorded in Doc Flowsheets.  See proceduralist note in ultrasound.    Date of consent or authorization: 4/13/17.  Invasive Procedure Safety Checklist was completed and sent for scanning.     Paracentesis performed by Vipin Fisher PA-C Radiology.    The following labs were communicated to provider performing paracentesis:  Lab Results   Component Value Date    PLT 86 04/11/2017     1510  Total amount of ascites fluid drained: 4.4 liters.  Color of ascites fluid: clear gold.  Total amount of albumin given: 50  grams.    Patient tolerated procedure well.    Post procedure,denies pain or discomfort post paracentesis.  Pt denies dizziness    Pt will be going to see Dr Murphy directly after this appointment to have a very sore spot on his calf assessed.    Discharge Plan:  Discharge instructions were reviewed with patient.  Patient/Representative verbalized understanding and all questions were answered.   Discharged from [facility] in stable condition.    Astrid Saldaña RN        Temp 97.7  F (36.5  C) (Tympanic)  Wt (!) 147.2 kg (324 lb 9.6 oz)  SpO2 98%  BMI 41.68 kg/m2

## 2017-05-11 NOTE — MR AVS SNAPSHOT
After Visit Summary   5/11/2017    Lawrence Louie    MRN: [id number]           Patient Information     Date Of Birth          1955        Visit Information        Provider Department      5/11/2017 2:00 PM Vipin Fisher PA-C; UC 40 ATC [facility]        Today's Diagnoses     Alcoholic cirrhosis of liver with ascites (H)    -  1       Follow-ups after your visit        Your next 10 appointments already scheduled     May 12, 2017 12:30 PM CDT   US LOWER EXTREMITY VENOUS DUPLEX LEFT with [id number]   [facility] US ([facility])    [address] [id number] [phone]           Please bring a list of your medicines (including vitamins, minerals and over-the-counter drugs). Also, tell your doctor about any allergies you may have. Wear comfortable clothes and leave your valuables at home.  You do not need to do anything special to prepare for your exam.  Please call the Imaging Department at your exam site with any questions.            May 17, 2017 12:00 PM CDT   Paracentesis Visit with Uc Spec Inf Para Provider, UC 39 ATC   [facility] ([facility])    [address] [id number] [phone]            May 24, 2017 12:00 PM CDT   Paracentesis Visit with Uc Spec Inf Para Provider, UC 39 ATC   [facility] ([facility])    [address] [id number] [phone]            May 31, 2017 12:00 PM CDT   Paracentesis Visit with Uc Spec Inf Para Provider, UC 39 ATC   [facility] ([facility])    [address] [address] [phone]            Jun 07, 2017 12:00 PM CDT    Paracentesis Visit with  Spec Inf Para Provider, UC 39 ATC   [facility] ([facility])    [address]  2nd [address]  [facility] [address] [phone]            Jun 14, 2017 12:00 PM CDT   Paracentesis Visit with  Spec Inf Para Provider   [facility] ([facility])    [address]  [address] [facility] [address] [phone]              Future tests that were ordered for you today     Open Future Orders        Priority Expected Expires Ordered    US Lower Extremity Venous Duplex Left Routine  5/11/2018 5/11/2017            Who to contact     If you have questions or need follow up information about today's clinic visit or your schedule please contact [facility] directly at [phone].  Normal or non-critical lab and imaging results will be communicated to you by MyChart, letter or phone within 4 business days after the clinic has received the results. If you do not hear from us within 7 days, please contact the clinic through medineeringhart or phone. If you have a critical or abnormal lab result, we will notify you by phone as soon as possible.  Submit refill requests through [employer] or call your pharmacy and they will forward the refill request to us. Please allow 3 business days for your refill to be completed.          Additional Information About Your Visit        MyChart Information     [employer] gives you secure access to your electronic health record. If you see a primary care provider, you can also send messages to your care team and make appointments. If you have questions, please call your primary care clinic.  If you do not have a primary care provider, please call [phone] and they will assist you.        Care EveryWhere ID     This is your Care EveryWhere ID. This could be used by  other organizations to access your [place] medical records  [id number]        Your Vitals Were     Pulse Temperature Pulse Oximetry BMI (Body Mass Index)          72 97.7  F (36.5  C) (Tympanic) 98% 41.68 kg/m2         Blood Pressure from Last 3 Encounters:   05/11/17 (P) 139/61   05/03/17 124/62   04/13/17 141/76    Weight from Last 3 Encounters:   05/11/17 (!) 147.2 kg (324 lb 9.6 oz)   05/03/17 (!) 153.2 kg (337 lb 12.8 oz)   04/13/17 (!) 146 kg (321 lb 14 oz)              We Performed the Following     Treatment Conditions     US Paracentesis        Primary Care Provider Office Phone # Fax #    Ary JACKSON CORY Murphy [phone]       [facility] [address] [facility] [address]  [facility] [address]        Thank you!     Thank you for choosing [facility]  for your care. Our goal is always to provide you with excellent care. Hearing back from our patients is one way we can continue to improve our services. Please take a few minutes to complete the written survey that you may receive in the mail after your visit with us. Thank you!             Your Updated Medication List - Protect others around you: Learn how to safely use, store and throw away your medicines at www.disposemymeds.org.          This list is accurate as of: 5/11/17  7:28 PM.  Always use your most recent med list.                   Brand Name Dispense Instructions for use    aspirin 81 MG tablet     30 tablet    Take 1 tablet (81 mg) by mouth daily       blood glucose lancets standard    no brand specified    1 Box    Use to test blood sugar 4 X  times daily or as directed.       blood glucose monitoring meter device kit    no brand specified    1 kit    Use to test blood sugar 4 X  times daily or as directed.       blood glucose monitoring test strip    no brand specified    200 strip    Use to test blood sugars 4 X  times daily or as directed       bumetanide 1 MG tablet     BUMEX    90 tablet    Take 1 tablet (1 mg) by mouth 2 times daily       * calcium-vitamin D 600-400 MG-UNIT per tablet    CALTRATE     Take by mouth daily       * calcium carb 1250 mg (500 mg [place])/vitamin D 200 units 500-200 MG-UNIT per tablet    OSCAL with D    90 tablet    Take 1 tablet by mouth daily       DESVENLAFAXINE ER PO      Take 100 mg by mouth daily 2 tablets once daily.       desvenlafaxine succinate 100 MG 24 hr tablet    PRISTIQ    180 tablet    Take 2 tablets (200 mg) by mouth daily       furosemide 20 MG tablet    LASIX    90 tablet    Take 2 tabs in morning and 1 tablet at noon.       glipiZIDE 5 MG 24 hr tablet    glipiZIDE XL    180 tablet    1 tablet twice a day       insulin glargine 100 UNIT/ML injection    LANTUS SOLOSTAR    30 mL    Inject 40 Units Subcutaneous At Bedtime       insulin pen needle 31G X 8 MM    B-D U/F    200 each    Inject into the skin 6 times daily Use 6 pen needles daily or as directed.       magnesium 250 MG tablet      Take 1 tablet by mouth daily Takes 400 mg daily.Deneen Chin LPN 2:34 PM on 12/22/2015       NovoLOG FLEXPEN 100 UNIT/ML injection   Generic drug:  insulin aspart     30 mL    20 units before breakfast, 20 units before lunch, 20 units before dinner, 20 units before snacks.       ondansetron 4 MG tablet    ZOFRAN    18 tablet    Take 1 tablet (4 mg) by mouth every 8 hours as needed for nausea       order for DME     1 Units    Equipment being ordered: carpal tunnel wrist splint.       OYSCO 500 + D 500-200 MG-UNIT Tabs   Generic drug:  Calcium Carb-Cholecalciferol      Take 500 mg by mouth daily       PANTOPRAZOLE SODIUM PO      Take by mouth daily       propranolol 10 MG tablet    INDERAL    180 tablet    Take 1 tablet (10 mg) by mouth 2 times daily       ranitidine 150 MG tablet    ZANTAC    180 tablet    Take 1 tablet (150 mg) by mouth 2 times daily       spironolactone 50 MG tablet    ALDACTONE    540 tablet    Takes 3 tablets in am.and p.m.        STATIN NOT PRESCRIBED (INTENTIONAL)     0 each    1 each daily Statin not prescribed intentionally due to Active liver disease       VITAMIN B-12 PO      Take 1 tablet by mouth daily       vitamin D3 2000 UNITS Caps     90 capsule    Take 1 capsule by mouth daily       * Notice:  This list has 2 medication(s) that are the same as other medications prescribed for you. Read the directions carefully, and ask your doctor or other care provider to review them with you.

## 2017-05-12 NOTE — PROGRESS NOTES
HPI: Lawrence Louie is a 61 year old male who comes in for left leg swelling, redness and pain.  He was seen today for a paracentesis and the nurses there called regarding his left lower leg redness, pain and warmth.They advised him to suraj to [facility] and be evaluated for possible DVT.  Hre denies any recent chest pain, SOB, fever, cough.      Patient Active Problem List   Diagnosis     Mild major depression (H)     Hyperglycemia     Thrombocytopenia (H)     Hypertension goal BP (blood pressure) < 130/80     Plantar warts     Corns and callosities     Family history of colon cancer     Type 2 diabetes, HbA1c goal < 7% (H)     Portal hypertension (H)     Alcoholic cirrhosis (H)     Advanced directives, counseling/discussion     Ascites     Esophageal varices (H)     Multiple rib fractures     Tobacco use disorder     Hyperlipidemia LDL goal <100     Dental caries     Prurigo nodularis     Esophageal reflux     Morbid obesity (H)     History of colonic polyps: tubular adenomas and serrated adenomas     Type II diabetes mellitus with peripheral circulatory disorder (H)     Alcoholic cirrhosis of liver with ascites (H)       He has a medical hx of  does not have any pertinent problems on file.    Current Outpatient Prescriptions   Medication Sig Dispense Refill     DESVENLAFAXINE ER PO Take 100 mg by mouth daily 2 tablets once daily.  3     OYSCO 500 + D 500-200 MG-UNIT TABS Take 500 mg by mouth daily  3     calcium-vitamin D (CALTRATE) 600-400 MG-UNIT per tablet Take by mouth daily  3     PANTOPRAZOLE SODIUM PO Take by mouth daily       insulin glargine (LANTUS SOLOSTAR) 100 UNIT/ML injection Inject 40 Units Subcutaneous At Bedtime 30 mL 1     blood glucose monitoring (NO BRAND SPECIFIED) meter device kit Use to test blood sugar 4 X  times daily or as directed. 1 kit 0     blood glucose monitoring (NO BRAND SPECIFIED) test strip Use to test blood sugars 4 X  times daily or as directed 200 strip 3     blood glucose (NO  BRAND SPECIFIED) lancets standard Use to test blood sugar 4 X  times daily or as directed. 1 Box 3     desvenlafaxine succinate ER (PRISTIQ) 100 MG 24 hr tablet Take 2 tablets (200 mg) by mouth daily 180 tablet 3     ranitidine (ZANTAC) 150 MG tablet Take 1 tablet (150 mg) by mouth 2 times daily 180 tablet 3     NOVOLOG FLEXPEN 100 UNIT/ML soln 20 units before breakfast, 20 units before lunch, 20 units before dinner, 20 units before snacks. 30 mL 3     insulin pen needle (B-D U/F) 31G X 8 MM Inject into the skin 6 times daily Use 6 pen needles daily or as directed. 200 each 3     furosemide (LASIX) 20 MG tablet Take 2 tabs in morning and 1 tablet at noon. 90 tablet 5     propranolol (INDERAL) 10 MG tablet Take 1 tablet (10 mg) by mouth 2 times daily 180 tablet 3     Cyanocobalamin (VITAMIN B-12 PO) Take 1 tablet by mouth daily       STATIN NOT PRESCRIBED, INTENTIONAL, 1 each daily Statin not prescribed intentionally due to Active liver disease 0 each 0     spironolactone (ALDACTONE) 50 MG tablet Takes 3 tablets in am.and p.m. 540 tablet 6     aspirin 81 MG tablet Take 1 tablet (81 mg) by mouth daily 30 tablet 11     Cholecalciferol (VITAMIN D3) 2000 UNITS CAPS Take 1 capsule by mouth daily 90 capsule 3     bumetanide (BUMEX) 1 MG tablet Take 1 tablet (1 mg) by mouth 2 times daily 90 tablet 3     calcium carb 1250 mg, 500 mg [place],/vitamin D 200 units (OSCAL WITH D) 500-200 MG-UNIT per tablet Take 1 tablet by mouth daily 90 tablet 3     glipiZIDE (GLIPIZIDE XL) 5 MG 24 hr tablet 1 tablet twice a day 180 tablet 3     magnesium 250 MG tablet Take 1 tablet by mouth daily Takes 400 mg daily.Deneen Chin LPN 2:34 PM on 12/22/2015       ondansetron (ZOFRAN) 4 MG tablet Take 1 tablet (4 mg) by mouth every 8 hours as needed for nausea 18 tablet 1     ORDER FOR DME Equipment being ordered: carpal tunnel wrist splint. 1 Units 0         ALLERGIES: Review of patient's allergies indicates no known allergies.    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)      Splenomegaly      Squamous cell carcinoma (H)      Thrombocytopenia (H)      Varices, esophageal (H)     Banded in 2011       PAST SURGICAL HX:   Past Surgical History:   Procedure Laterality Date     BIOPSY OF SKIN LESION       COLONOSCOPY Left 6/16/2016    Procedure: COMBINED COLONOSCOPY, SINGLE OR MULTIPLE BIOPSY/POLYPECTOMY BY BIOPSY;  Surgeon: Brandy Barnett MD;  [facility]     ESOPHAGOSCOPY, GASTROSCOPY, DUODENOSCOPY (EGD), COMBINED  2/13/2013    Procedure: COMBINED ESOPHAGOSCOPY, GASTROSCOPY, DUODENOSCOPY (EGD);;  Surgeon: Tara Cook MD;  [facility]     ESOPHAGOSCOPY, GASTROSCOPY, DUODENOSCOPY (EGD), COMBINED  11/4/2013    Procedure: COMBINED ESOPHAGOSCOPY, GASTROSCOPY, DUODENOSCOPY (EGD);;  Surgeon: Lonny Diaz MD;  Location:  GI     ESOPHAGOSCOPY, GASTROSCOPY, DUODENOSCOPY (EGD), COMBINED Left 6/16/2016    Procedure: COMBINED ESOPHAGOSCOPY, GASTROSCOPY, DUODENOSCOPY (EGD), BIOPSY SINGLE OR MULTIPLE;  Surgeon: Brandy Barnett MD;  Location:  GI     EXCISE LESION TRUNK  9/24/2012    Procedure: EXCISE LESION TRUNK;;  Surgeon: Pepe Dominguez MD;  Location: [facility]     GENITOURINARY SURGERY      vasectomy     HERNIORRHAPHY UMBILICAL  9/24/2012    Procedure: HERNIORRHAPHY UMBILICAL;  UMBILICAL HERNIA REPAIR , EXCISION OF PERIUMBILICAL CYST;  Surgeon: Pepe Dominguez MD;  Location: [facility]       IMMUNIZATION HX:   Immunization History   Administered Date(s) Administered     DTAP (<7y) 01/18/1988     Hepatitis A Vac Ped/Adol-2 Dose 01/02/2013, 02/18/2013, 10/30/2013     Hepatitis B 01/02/2013, 02/18/2013, 10/30/2013     Influenza (IIV3) 10/04/2010, 12/18/2012, 09/08/2014, 09/26/2015, 09/27/2016      Pneumococcal (PCV 13) 05/12/2015     Pneumococcal 23 valent 09/25/2009, 10/01/2010, 01/02/2013     TD (ADULT, 7+) 09/08/1997     TDAP Vaccine (Adacel) 01/02/2013     Twinrix A/B 01/02/2013     Zoster vaccine, live 12/22/2016       SOCIAL HX:   Social History     Social History Narrative    . 3 grown daughters. He has been sober since 2012.     ROS: 7 system ROS reviewed w/o changes except for above      OBJECTIVE:  There were no vitals taken for this visit.   Wt Readings from Last 1 Encounters:   05/11/17 (!) 147.2 kg (324 lb 9.6 oz)     Constitutional: no distress, comfortable, pleasant, friendly, happy.  Eyes: anicteric,conjunctiva pink   Cardiovascular: regular rate and rhythm, normal S1 and S2, no murmurs, rubs or gallops, peripheral pulses full and symmetric   Respiratory: clear to auscultation, no wheezes or crackles, normal breath sounds   Musculoskeletal: LLE warmth, redness but no calf tenderness.   Skin: no concerning lesions, no jaundice, temp normal   Neurological: normal speech, no tremor   Psychological: appropriate mood       ASSESSMENT/PLAN:  Lawrence was seen today for pain.    Diagnoses and all orders for this visit:    Pain of left lower leg  -     US Lower Extremity Venous Duplex Left; Future    US negative.        Total time spent 15 minutes.  More than 50% of the time spent with Mr. Louie on counseling / coordinating his care    Ary RAY, CNP

## 2017-05-16 DIAGNOSIS — R60.1 GENERALIZED EDEMA: ICD-10-CM

## 2017-05-16 RX ORDER — FUROSEMIDE 20 MG
TABLET ORAL
Qty: 90 TABLET | Refills: 5 | Status: SHIPPED | OUTPATIENT
Start: 2017-05-16 | End: 2017-05-18

## 2017-05-16 NOTE — TELEPHONE ENCOUNTER
----- Message from Jodi Vera sent at 5/15/2017  3:59 PM CDT -----  Regarding: med refill       furosemide (LASIX) 20 MG tablet               Take 2 tabs in morning and 1 tablet at noon.    [facility] # [id number]

## 2017-05-16 NOTE — TELEPHONE ENCOUNTER
Lasix      Last Written Prescription Date: 12-27-16  Last Fill Quantity: 90, # refills: 5  Last Office Visit : 5-11-17  Next Clinic Visit: none       Potassium   Date Value Ref Range Status   04/11/2017 4.1 3.4 - 5.3 mmol/L Final     Creatinine   Date Value Ref Range Status   04/11/2017 0.84 0.66 - 1.25 mg/dL Final     BP Readings from Last 3 Encounters:   05/11/17 (P) 139/61   05/03/17 124/62   04/13/17 141/76     Kathleen M Doege RN

## 2017-05-17 ENCOUNTER — OFFICE VISIT (OUTPATIENT)
Dept: INFUSION THERAPY | Facility: CLINIC | Age: 62
End: 2017-05-17
Attending: INTERNAL MEDICINE
Payer: COMMERCIAL

## 2017-05-17 ENCOUNTER — RADIANT APPOINTMENT (OUTPATIENT)
Dept: ULTRASOUND IMAGING | Facility: CLINIC | Age: 62
End: 2017-05-17
Attending: INTERNAL MEDICINE
Payer: COMMERCIAL

## 2017-05-17 VITALS
DIASTOLIC BLOOD PRESSURE: 55 MMHG | WEIGHT: 310.7 LBS | BODY MASS INDEX: 39.89 KG/M2 | HEART RATE: 82 BPM | SYSTOLIC BLOOD PRESSURE: 117 MMHG

## 2017-05-17 DIAGNOSIS — K70.31 ALCOHOLIC CIRRHOSIS OF LIVER WITH ASCITES (H): Primary | ICD-10-CM

## 2017-05-17 LAB — PLATELET # BLD AUTO: 98 10E9/L (ref 150–450)

## 2017-05-17 PROCEDURE — 25000128 H RX IP 250 OP 636: Mod: ZF | Performed by: INTERNAL MEDICINE

## 2017-05-17 PROCEDURE — 27210995 ZZH RX 272: Mod: ZF | Performed by: INTERNAL MEDICINE

## 2017-05-17 PROCEDURE — 85049 AUTOMATED PLATELET COUNT: CPT | Performed by: INTERNAL MEDICINE

## 2017-05-17 PROCEDURE — P9047 ALBUMIN (HUMAN), 25%, 50ML: HCPCS | Mod: ZF | Performed by: INTERNAL MEDICINE

## 2017-05-17 PROCEDURE — 27210190 US PARACENTESIS

## 2017-05-17 RX ORDER — ALBUMIN (HUMAN) 12.5 G/50ML
12.5 SOLUTION INTRAVENOUS 4 TIMES DAILY PRN
Status: DISCONTINUED | OUTPATIENT
Start: 2017-05-17 | End: 2017-05-17 | Stop reason: HOSPADM

## 2017-05-17 RX ORDER — ALBUMIN (HUMAN) 12.5 G/50ML
12.5 SOLUTION INTRAVENOUS 4 TIMES DAILY PRN
Status: CANCELLED
Start: 2017-05-17

## 2017-05-17 RX ADMIN — ALBUMIN HUMAN 12.5 G: 0.25 SOLUTION INTRAVENOUS at 13:20

## 2017-05-17 RX ADMIN — ALBUMIN HUMAN 12.5 G: 0.25 SOLUTION INTRAVENOUS at 13:28

## 2017-05-17 RX ADMIN — ALBUMIN HUMAN 12.5 G: 0.25 SOLUTION INTRAVENOUS at 13:37

## 2017-05-17 RX ADMIN — ALBUMIN HUMAN 12.5 G: 0.25 SOLUTION INTRAVENOUS at 13:09

## 2017-05-17 RX ADMIN — LIDOCAINE HYDROCHLORIDE 20 ML: 10 INJECTION, SOLUTION INFILTRATION; PERINEURAL at 13:09

## 2017-05-17 NOTE — PATIENT INSTRUCTIONS
Dear Lawrence Louie    Thank you for choosing [facility] ([facility]) for your procedure.  The following information is a summary of our appointment as well as important reminders.              We look forward in seeing you on your next appointment here at [facility].  Please don t hesitate to call us at [phone] to reschedule any of your appointments or to speak with one of the [facility] registered nurses.  It was a pleasure taking care of you today.    Sincerely,  Dulce Anderson, RADHA  [facility]  [facility]  [address] [place] [address]  [address]  Phone:  [phone]      Paracentesis  Your doctor recommends that you have paracentesis to remove excess fluid from your abdomen (belly). A needle is used to drain the fluid. A small sample of fluid may be taken and tested for problems. If the fluid buildup is causing discomfort or pain, all of the fluid may be drained. To do this, a tube is attached to the needle. The fluid is drained into a container that sits outside of the body. If symptoms are severe, paracentesis may be done as an emergency procedure. Otherwise, it will be scheduled ahead of time. Read on to learn more about paracentesis and how it works.     Understanding ascites  Many of the body s organs, including the liver and intestines, are inside the abdomen. The organs are covered in a thin membrane called the peritoneum. The peritoneum has two layers. The membrane makes a fluid that allows the layers to glide smoothly past each other. If this fluid builds up in the abdomen, the condition is called ascites. Ascites causes pain and discomfort. It can also make it hard to breathe. Fluid can build up for a number of reasons. Some of these include chronic liver disease (cirrhosis), heart or kidney failure, and cancer. Your doctor can tell you more about the cause of your ascites.  How  paracentesis works  The goal of paracentesis may be to help diagnose the cause of the excess fluid. Or, the goal may be to drain excess fluid from the abdomen. In some cases, fluid returns and the procedure needs to be repeated.  Before the procedure    Tell your doctor about all medications you take. Also, mention any allergies you have.    Before the procedure begins, you ll be asked to empty your bladder. This helps prevent injury to the bladder during the procedure. If needed, a thin tube (Payne catheter) may be placed into your bladder to drain urine during the procedure. This tube is removed after the procedure.    An IV line may be put into a vein in your arm or hand. This line supplies fluids and medications.  During the procedure    You are awake during the procedure.    An imaging method called ultrasound may be used to guide the procedure. It shows live images of the inside of your abdomen on a video screen. This helps the doctor locate the site of the excess fluid inside your abdomen and decide where to insert the needle.    Local anesthesia (numbing medication) is injected into the skin of the abdomen where the needle will be inserted.    Once the skin is numb, the doctor carefully inserts the needle into the abdomen. This causes the needle to fill with fluid.    The needle may be removed with only a small sample of fluid. This sample is sent to a lab for testing. Obtaining a sample takes about 10 to15 minutes.    Or, a tube may be attached to the needle so that more of the excess fluid can be drained. The tube may be taped or stitched into place. This keeps it from pulling the needle out of your abdomen.    How long it takes to drain all of the fluid varies for each person. In most cases, it takes about 30 minutes. Your doctor will let you know if the procedure is expected to take longer than usual.    Once all of the fluid is drained, the needle and tube are removed.    Pressure is put on the puncture  site to stop any fluid leakage or bleeding.    A small bandage is placed over the puncture site. Albumin may be given during or after the procedure to prevent low blood pressure or kidney problems.  After the procedure  You may be taken to a recovery room to rest after the procedure. If you are in pain, you will be given medication as needed. You will likely be sent home 1 to 2 hours after the procedure is done. When you leave the hospital, have an adult family member or friend drive you home.  Risks and possible complications of paracentesis  The procedure is considered safe. But like all procedures, it carries some risks. These include the following:    Bleeding    Infection    Injury to structures in the abdomen    Fast drop in blood pressure   Recovering at home    If needed, your doctor can prescribe or recommend pain medications for you to take at home. Take these exactly as directed. If you stopped taking other medications before the procedure, ask your doctor when you can start them again.    You may remove the bandage 24 hours after the procedure.    Take it easy for 24 hours after the procedure. Avoid physical activity until your doctor says it s OK  Follow-up care  Make a follow-up appointment with your doctor as directed. During your follow-up visit, your doctor will check your healing. Let your doctor know how you are feeling. You can also discuss the cause of your ascites and whether any further treatment is needed.  When to seek medical care  Call your doctor if you notice any of the following after the procedure:    A fever of 100.4 F (38.0 C) or higher    Trouble breathing    Pain that does not go away even after taking pain medication    Abdominal pain not caused by having the skin punctured    Bleeding from the puncture site    More than a small amount of fluid leakage from the puncture site    Swelling of the abdomen    Signs of infection at the puncture site. These include increased pain,  redness, or swelling, warmth, or foul-smelling drainage.    Blood in your urine    Dizziness, lightheadedness, or fainting     6107-8252 [employer]. [address] [place] [address], [place] [address]. All rights reserved. This information is not intended as a substitute for professional medical care. Always follow your healthcare professional's instructions.

## 2017-05-17 NOTE — PROGRESS NOTES
Paracentesis Nursing Note  Lawrence Lou[facility].    During today's appointment orders from Meghna Simmons MD were completed.    Progress Note:  Patient identification verified by name and date of birth.  Assessment completed.  Vitals monitored throughout appointment and recorded in Doc Flowsheets.  See proceduralist note in ultrasound.    Date of consent or authorization: 5/17/17  Invasive Procedure Safety Checklist was completed and sent for scanning.     Paracentesis performed by Sudeep Kramer PA-C.    The following labs were communicated to provider performing paracentesis:  Lab Results   Component Value Date    PLT 98 05/17/2017       Total amount of ascites fluid drained: 4 liters.  Color of ascites fluid: light yellow and clear.  Total amount of albumin given: 50  grams.    Patient tolerated procedure well.    Post procedure,denies pain or discomfort post paracentesis. Pt had vague c/o abd spasms following withdrawal of large quantity of ascites. Encouraged pt to make more frequent appointments; now set up for weekly procedures.       Discharge Plan:  Discharge instructions were reviewed with patient.  Patient/Representative verbalized understanding and all questions were answered.   [facility] in stable condition.    Dulce Anderson RN        /60  Pulse 60

## 2017-05-17 NOTE — MR AVS SNAPSHOT
After Visit Summary   5/17/2017    Lawrence Louie    MRN: [id number]           Patient Information     Date Of Birth          1955        Visit Information        Provider Department      5/17/2017 12:00 PM Provider, Cristobal Spec Inf Para;  39 [facility] Special[facility] Procedure        Today's Diagnoses     Alcoholic cirrhosis of liver with ascites (H)    -  1      Care Instructions    Dear Lawrence Louie    Thank you for choosing [facility] ([facility]) for your procedure.  The following information is a summary of our appointment as well as important reminders.              We look forward in seeing you on your next appointment here at [facility].  Please don t hesitate to call us at [phone] to reschedule any of your appointments or to speak with one of the [facility] registered nurses.  It was a pleasure taking care of you today.    Sincerely,  Dulce Anderson, RADHA  [facility]  [address] [place] [address]  [address]  Phone:  [phone]      Paracentesis  Your doctor recommends that you have paracentesis to remove excess fluid from your abdomen (belly). A needle is used to drain the fluid. A small sample of fluid may be taken and tested for problems. If the fluid buildup is causing discomfort or pain, all of the fluid may be drained. To do this, a tube is attached to the needle. The fluid is drained into a container that sits outside of the body. If symptoms are severe, paracentesis may be done as an emergency procedure. Otherwise, it will be scheduled ahead of time. Read on to learn more about paracentesis and how it works.     Understanding ascites  Many of the body s organs, including the liver and intestines, are inside the abdomen. The organs are covered in a thin membrane called the peritoneum. The peritoneum has two layers. The  membrane makes a fluid that allows the layers to glide smoothly past each other. If this fluid builds up in the abdomen, the condition is called ascites. Ascites causes pain and discomfort. It can also make it hard to breathe. Fluid can build up for a number of reasons. Some of these include chronic liver disease (cirrhosis), heart or kidney failure, and cancer. Your doctor can tell you more about the cause of your ascites.  How paracentesis works  The goal of paracentesis may be to help diagnose the cause of the excess fluid. Or, the goal may be to drain excess fluid from the abdomen. In some cases, fluid returns and the procedure needs to be repeated.  Before the procedure    Tell your doctor about all medications you take. Also, mention any allergies you have.    Before the procedure begins, you ll be asked to empty your bladder. This helps prevent injury to the bladder during the procedure. If needed, a thin tube (Payne catheter) may be placed into your bladder to drain urine during the procedure. This tube is removed after the procedure.    An IV line may be put into a vein in your arm or hand. This line supplies fluids and medications.  During the procedure    You are awake during the procedure.    An imaging method called ultrasound may be used to guide the procedure. It shows live images of the inside of your abdomen on a video screen. This helps the doctor locate the site of the excess fluid inside your abdomen and decide where to insert the needle.    Local anesthesia (numbing medication) is injected into the skin of the abdomen where the needle will be inserted.    Once the skin is numb, the doctor carefully inserts the needle into the abdomen. This causes the needle to fill with fluid.    The needle may be removed with only a small sample of fluid. This sample is sent to a lab for testing. Obtaining a sample takes about 10 to15 minutes.    Or, a tube may be attached to the needle so that more of the  excess fluid can be drained. The tube may be taped or stitched into place. This keeps it from pulling the needle out of your abdomen.    How long it takes to drain all of the fluid varies for each person. In most cases, it takes about 30 minutes. Your doctor will let you know if the procedure is expected to take longer than usual.    Once all of the fluid is drained, the needle and tube are removed.    Pressure is put on the puncture site to stop any fluid leakage or bleeding.    A small bandage is placed over the puncture site. Albumin may be given during or after the procedure to prevent low blood pressure or kidney problems.  After the procedure  You may be taken to a recovery room to rest after the procedure. If you are in pain, you will be given medication as needed. You will likely be sent home 1 to 2 hours after the procedure is done. When you leave the hospital, have an adult family member or friend drive you home.  Risks and possible complications of paracentesis  The procedure is considered safe. But like all procedures, it carries some risks. These include the following:    Bleeding    Infection    Injury to structures in the abdomen    Fast drop in blood pressure   Recovering at home    If needed, your doctor can prescribe or recommend pain medications for you to take at home. Take these exactly as directed. If you stopped taking other medications before the procedure, ask your doctor when you can start them again.    You may remove the bandage 24 hours after the procedure.    Take it easy for 24 hours after the procedure. Avoid physical activity until your doctor says it s OK  Follow-up care  Make a follow-up appointment with your doctor as directed. During your follow-up visit, your doctor will check your healing. Let your doctor know how you are feeling. You can also discuss the cause of your ascites and whether any further treatment is needed.  When to seek medical care  Call your doctor if you  notice any of the following after the procedure:    A fever of 100.4 F (38.0 C) or higher    Trouble breathing    Pain that does not go away even after taking pain medication    Abdominal pain not caused by having the skin punctured    Bleeding from the puncture site    More than a small amount of fluid leakage from the puncture site    Swelling of the abdomen    Signs of infection at the puncture site. These include increased pain, redness, or swelling, warmth, or foul-smelling drainage.    Blood in your urine    Dizziness, lightheadedness, or fainting     8295-4432 [employer]. [address] [place] [address]. All rights reserved. This information is not intended as a substitute for professional medical care. Always follow your healthcare professional's instructions.              Follow-ups after your visit        Your next 10 appointments already scheduled     May 24, 2017 12:00 PM CDT   Paracentesis Visit with Uc Spec Inf Para Provider, UC 39 ATC   [facility] Specialty and Procedure ([facility])    [address] [place] [address]   [phone]            May 31, 2017 12:00 PM CDT   Paracentesis Visit with Uc Spec Inf Para Provider, UC 39 ATC   [facility] Specialty and Procedure ([facility])    [address] [address]   [phone]            Jun 07, 2017 12:00 PM CDT   Paracentesis Visit with Uc Spec Inf Para Provider, UC 39 ATC   [facility] Specialty and Procedure ([facility])    [address] [address]   [phone]            Jun 14, 2017 12:00 PM CDT   Paracentesis Visit with Uc Spec Inf Para Provider, UC 39 ATC   [facility] Specialty and Procedure ([facility])    [address] [place] [address]  Floor  [facility] [address] [phone]            Jun 21, 2017 12:00 PM CDT   Paracentesis Visit with Uc Spec Inf Para Provider, UC 39 ATC   [facility] Specialty and Procedure ([facility])    [address] [facility]  2nd Floor  [facility] [address] [phone]              Who to contact     If you have questions or need follow up information about today's clinic visit or your schedule please contact [facility] SPECIALTY AND PROCEDURE directly at [phone].  Normal or non-critical lab and imaging results will be communicated to you by [employer], letter or phone within 4 business days after the clinic has received the results. If you do not hear from us within 7 days, please contact the clinic through Manzamat or phone. If you have a critical or abnormal lab result, we will notify you by phone as soon as possible.  Submit refill requests through Corduro or call your pharmacy and they will forward the refill request to us. Please allow 3 business days for your refill to be completed.          Additional Information About Your Visit        MyChart Information     [employer] gives you secure access to your electronic health record. If you see a primary care provider, you can also send messages to your care team and make appointments. If you have questions, please call your primary care clinic.  If you do not have a primary care provider, please call [phone] and they will assist you.        Care EveryWhere ID     This is your Care EveryWhere ID. This could be used by other organizations to access your [place] medical records  [id number]        Your Vitals Were     Pulse BMI (Body Mass Index)                82 39.89 kg/m2           Blood Pressure from Last 3 Encounters:   05/17/17 117/55   05/11/17 (P) 139/61   05/03/17 124/62    Weight from Last 3 Encounters:   05/17/17 (!) 140.9 kg (310 lb 11.2 oz)   05/11/17 (!) 147.2 kg (324 lb  9.6 oz)   05/03/17 (!) 153.2 kg (337 lb 12.8 oz)              We Performed the Following     Platelet count     Treatment Conditions     US Paracentesis        Primary Care Provider Office Phone # Fax #    CORY Toussaint [phone]       PRIMARY [facility]ARE C[facility] [address] [facility] [address]  [facility] [address]        Thank you!     Thank you for choosing [facility] PROCED[facility]  for your care. Our goal is always to provide you with excellent care. Hearing back from our patients is one way we can continue to improve our services. Please take a few minutes to complete the written survey that you may receive in the mail after your visit with us. Thank you!             Your Updated Medication List - Protect others around you: Learn how to safely use, store and throw away your medicines at www.disposemymeds.org.          This list is accurate as of: 5/17/17  2:07 PM.  Always use your most recent med list.                   Brand Name Dispense Instructions for use    aspirin 81 MG tablet     30 tablet    Take 1 tablet (81 mg) by mouth daily       blood glucose lancets standard    no brand specified    1 Box    Use to test blood sugar 4 X  times daily or as directed.       blood glucose monitoring meter device kit    no brand specified    1 kit    Use to test blood sugar 4 X  times daily or as directed.       blood glucose monitoring test strip    no brand specified    200 strip    Use to test blood sugars 4 X  times daily or as directed       bumetanide 1 MG tablet    BUMEX    90 tablet    Take 1 tablet (1 mg) by mouth 2 times daily       * calcium-vitamin D 600-400 MG-UNIT per tablet    CALTRATE     Take by mouth daily       * calcium carb 1250 mg (500 mg Tribe)/vitamin D 200 units 500-200 MG-UNIT per tablet    OSCAL with D    90 tablet    Take 1 tablet by mouth daily       DESVENLAFAXINE ER PO      Take 100 mg by mouth daily 2 tablets once daily.       desvenlafaxine  succinate 100 MG 24 hr tablet    PRISTIQ    180 tablet    Take 2 tablets (200 mg) by mouth daily       furosemide 20 MG tablet    LASIX    90 tablet    Take 2 tabs in morning and 1 tablet at noon.       glipiZIDE 5 MG 24 hr tablet    glipiZIDE XL    180 tablet    1 tablet twice a day       insulin glargine 100 UNIT/ML injection    LANTUS SOLOSTAR    30 mL    Inject 40 Units Subcutaneous At Bedtime       insulin pen needle 31G X 8 MM    B-D U/F    200 each    Inject into the skin 6 times daily Use 6 pen needles daily or as directed.       magnesium 250 MG tablet      Take 1 tablet by mouth daily Takes 400 mg daily.Deneen Chin LPN 2:34 PM on 12/22/2015       NovoLOG FLEXPEN 100 UNIT/ML injection   Generic drug:  insulin aspart     30 mL    20 units before breakfast, 20 units before lunch, 20 units before dinner, 20 units before snacks.       ondansetron 4 MG tablet    ZOFRAN    18 tablet    Take 1 tablet (4 mg) by mouth every 8 hours as needed for nausea       order for DME     1 Units    Equipment being ordered: carpal tunnel wrist splint.       OYSCO 500 + D 500-200 MG-UNIT Tabs   Generic drug:  Calcium Carb-Cholecalciferol      Take 500 mg by mouth daily       PANTOPRAZOLE SODIUM PO      Take by mouth daily       propranolol 10 MG tablet    INDERAL    180 tablet    Take 1 tablet (10 mg) by mouth 2 times daily       ranitidine 150 MG tablet    ZANTAC    180 tablet    Take 1 tablet (150 mg) by mouth 2 times daily       spironolactone 50 MG tablet    ALDACTONE    540 tablet    Takes 3 tablets in am.and p.m.       STATIN NOT PRESCRIBED (INTENTIONAL)     0 each    1 each daily Statin not prescribed intentionally due to Active liver disease       VITAMIN B-12 PO      Take 1 tablet by mouth daily       vitamin D3 2000 UNITS Caps     90 capsule    Take 1 capsule by mouth daily       * Notice:  This list has 2 medication(s) that are the same as other medications prescribed for you. Read the directions carefully, and ask  your doctor or other care provider to review them with you.

## 2017-05-18 RX ORDER — FUROSEMIDE 20 MG
TABLET ORAL
Qty: 270 TABLET | Refills: 1 | Status: SHIPPED | OUTPATIENT
Start: 2017-05-18 | End: 2017-05-18

## 2017-05-18 RX ORDER — FUROSEMIDE 20 MG
TABLET ORAL
Qty: 270 TABLET | Refills: 1 | Status: SHIPPED | OUTPATIENT
Start: 2017-05-18 | End: 2017-08-25

## 2017-05-26 DIAGNOSIS — E11.9 TYPE 2 DIABETES MELLITUS WITHOUT COMPLICATION (H): ICD-10-CM

## 2017-05-29 NOTE — TELEPHONE ENCOUNTER
NOVOLOG FLEXPEN 100 UNIT/ML soln      Last Written Prescription Date:  1-3-17  Last Fill Quantity: 30ml,   # refills: 3  Last Office Visit : 5-11-17  Future Office visit:  none    Creatinine   Date Value Ref Range Status   04/11/2017 0.84 0.66 - 1.25 mg/dL Final   ]  Lab Results   Component Value Date    A1C 6.4 03/21/2017    A1C 6.6 12/22/2016    A1C 6.7 09/27/2016    A1C 6.5 06/28/2016    A1C 6.5 12/22/2015     Kathleen M Doege RN

## 2017-05-31 ENCOUNTER — RADIANT APPOINTMENT (OUTPATIENT)
Dept: ULTRASOUND IMAGING | Facility: CLINIC | Age: 62
End: 2017-05-31
Attending: INTERNAL MEDICINE
Payer: COMMERCIAL

## 2017-05-31 ENCOUNTER — OFFICE VISIT (OUTPATIENT)
Dept: INFUSION THERAPY | Facility: CLINIC | Age: 62
End: 2017-05-31
Attending: INTERNAL MEDICINE
Payer: COMMERCIAL

## 2017-05-31 VITALS
HEART RATE: 73 BPM | WEIGHT: 313.2 LBS | DIASTOLIC BLOOD PRESSURE: 54 MMHG | OXYGEN SATURATION: 97 % | BODY MASS INDEX: 40.21 KG/M2 | SYSTOLIC BLOOD PRESSURE: 121 MMHG

## 2017-05-31 DIAGNOSIS — I85.10 SECONDARY ESOPHAGEAL VARICES WITHOUT BLEEDING (H): ICD-10-CM

## 2017-05-31 DIAGNOSIS — K76.6 PORTAL HYPERTENSION (H): ICD-10-CM

## 2017-05-31 DIAGNOSIS — E66.01 MORBID OBESITY DUE TO EXCESS CALORIES (H): ICD-10-CM

## 2017-05-31 DIAGNOSIS — K70.31 ALCOHOLIC CIRRHOSIS OF LIVER WITH ASCITES (H): Primary | ICD-10-CM

## 2017-05-31 DIAGNOSIS — K70.31 ALCOHOLIC CIRRHOSIS OF LIVER WITH ASCITES (H): ICD-10-CM

## 2017-05-31 DIAGNOSIS — F17.200 TOBACCO USE DISORDER: ICD-10-CM

## 2017-05-31 PROCEDURE — 27210190 US PARACENTESIS

## 2017-05-31 PROCEDURE — P9047 ALBUMIN (HUMAN), 25%, 50ML: HCPCS | Mod: ZF | Performed by: INTERNAL MEDICINE

## 2017-05-31 PROCEDURE — 27210995 ZZH RX 272: Mod: ZF | Performed by: INTERNAL MEDICINE

## 2017-05-31 PROCEDURE — 25000128 H RX IP 250 OP 636: Mod: ZF | Performed by: INTERNAL MEDICINE

## 2017-05-31 RX ORDER — ALBUMIN (HUMAN) 12.5 G/50ML
12.5 SOLUTION INTRAVENOUS 4 TIMES DAILY PRN
Status: DISCONTINUED | OUTPATIENT
Start: 2017-05-31 | End: 2017-05-31 | Stop reason: HOSPADM

## 2017-05-31 RX ORDER — ALBUMIN (HUMAN) 12.5 G/50ML
12.5 SOLUTION INTRAVENOUS 4 TIMES DAILY PRN
Status: CANCELLED
Start: 2017-05-31

## 2017-05-31 RX ADMIN — LIDOCAINE HYDROCHLORIDE 20 ML: 10 INJECTION, SOLUTION INFILTRATION; PERINEURAL at 12:48

## 2017-05-31 RX ADMIN — ALBUMIN HUMAN 50 G: 0.25 SOLUTION INTRAVENOUS at 12:48

## 2017-05-31 NOTE — MR AVS SNAPSHOT
After Visit Summary   5/31/2017    Lawrence Louie    MRN: [id number]           Patient Information     Date Of Birth          1955        Visit Information        Provider Department      5/31/2017 12:00 PM Provider, Uc Spec Inf Para; UC 39 ATC [facility] Specialty and Procedure        Today's Diagnoses     Alcoholic cirrhosis of liver with ascites (H)    -  1      Care Instructions      Discharge Instructions for Paracentesis  Paracentesis is a procedure to remove extra fluid from your belly (abdomen). Buildup of fluid in the abdomen is called ascites. The procedure may have been done to take a sample of the fluid. Or, it may have been done to drain the extra fluid from your abdomen.     Ascites is buildup of excess fluid in the abdomen.   Home care    If you have pain after the procedure, your health care provider can prescribe or recommend pain medicines. Take these exactly as directed. If you stopped taking other medicines before the procedure, ask your provider when you can start them again.    Take it easy for 24 hours after the procedure. Avoid physical activity until your provider says it s OK.    You will have a small bandage over the puncture site. Stitches (sutures), surgical staples, adhesive tapes, adhesive strips, or surgical glue may be used to close the incision. They also help stop bleeding and speed healing. You may take the bandage off in 24 hours.    Check the puncture site for the signs of infection listed below.  Follow-up care  Make a follow-up appointment with your health care provider as directed. During your follow-up visit, your provider will check your healing. Let your provider know how you are feeling. You can also discuss the cause of your ascites and whether you need any further treatment.  When to call the doctor  Call your health care provider if you notice any of the following after the procedure:    A fever of 100.4 F (38.0 C) or  higher    Trouble breathing    Pain that doesn't go away even after taking pain medicine    Abdominal pain not caused by having the skin punctured    Bleeding from the puncture site    More than a small amount of fluid leaking from the puncture site    Swelling of the abdomen    Signs of infection at the puncture site. These include increased pain, redness, or swelling, warmth, or foul-smelling drainage.    Blood in your urine    Dizziness, lightheadedness, or fainting      3591-3078 [employer]. [address] [place] [address]. All rights reserved. This information is not intended as a substitute for professional medical care. Always follow your healthcare professional's instructions.                Follow-ups after your visit        Your next 10 appointments already scheduled     Jun 07, 2017 12:00 PM CDT   Paracentesis Visit with Uc Spec Inf Para Provider, UC 39 ATC   [facility] Specialty and Procedure ([facility])    [address] [place] [address]   [phone]            Jun 14, 2017 12:00 PM CDT   Paracentesis Visit with Uc Spec Inf Para Provider, UC 39 ATC   [facility] Specialty and Procedure ([facility])    [address] [address]   [phone]            Jun 21, 2017 12:00 PM CDT   Paracentesis Visit with Uc Spec Inf Para Provider, UC 39 ATC   [facility] Specialty and Procedure ([facility])    [address] [address]   [phone]            Jun 28, 2017 12:00 PM CDT   Paracentesis Visit with Uc Spec Inf Para Provider, UC 39 ATC   [facility] Specialty and Procedure ([facility])    [address] [address]   [phone]            Jul 05, 2017 12:00 PM CDT    Paracentesis Visit with  Spec Inf Para Provider,  39 ATC   [facility] Specialty and Procedure ([facility])    [address] [facility]  2nd Floor  [facility] [address] [phone]              Who to contact     If you have questions or need follow up information about today's clinic visit or your schedule please contact [facility] SPECIALTY AND PROCEDURE directly at [phone].  Normal or non-critical lab and imaging results will be communicated to you by Pairyhart, letter or phone within 4 business days after the clinic has received the results. If you do not hear from us within 7 days, please contact the clinic through [employer] or phone. If you have a critical or abnormal lab result, we will notify you by phone as soon as possible.  Submit refill requests through [employer] or call your pharmacy and they will forward the refill request to us. Please allow 3 business days for your refill to be completed.          Additional Information About Your Visit        PairyharYododo Information     [employer] gives you secure access to your electronic health record. If you see a primary care provider, you can also send messages to your care team and make appointments. If you have questions, please call your primary care clinic.  If you do not have a primary care provider, please call [phone] and they will assist you.        Care EveryWhere ID     This is your Care EveryWhere ID. This could be used by other organizations to access your [place] medical records  [id number]        Your Vitals Were     Pulse Pulse Oximetry BMI (Body Mass Index)             73 97% 40.21 kg/m2          Blood Pressure from Last 3 Encounters:   05/31/17 121/54   05/17/17 117/55   05/11/17 (P) 139/61    Weight from Last 3 Encounters:   05/31/17 (!) 142.1 kg (313 lb 3.2 oz)   05/17/17 (!) 140.9 kg (310 lb 11.2 oz)   05/11/17 (!) 147.2 kg (324 lb 9.6 oz)              We  Performed the Following     US Paracentesis        Primary Care Provider Office Phone # Fax #    CORY Toussaint [phone]       PRIMARY CARE CENTER [address] [place] [address]  [facility] [address]        Thank you!     Thank you for choosing [facility] SPECIALTY AND PROCEDURE  for your care. Our goal is always to provide you with excellent care. Hearing back from our patients is one way we can continue to improve our services. Please take a few minutes to complete the written survey that you may receive in the mail after your visit with us. Thank you!             Your Updated Medication List - Protect others around you: Learn how to safely use, store and throw away your medicines at www.disposemymeds.org.          This list is accurate as of: 5/31/17  1:45 PM.  Always use your most recent med list.                   Brand Name Dispense Instructions for use    aspirin 81 MG tablet     30 tablet    Take 1 tablet (81 mg) by mouth daily       blood glucose lancets standard    no brand specified    1 Box    Use to test blood sugar 4 X  times daily or as directed.       blood glucose monitoring meter device kit    no brand specified    1 kit    Use to test blood sugar 4 X  times daily or as directed.       blood glucose monitoring test strip    no brand specified    200 strip    Use to test blood sugars 4 X  times daily or as directed       bumetanide 1 MG tablet    BUMEX    90 tablet    Take 1 tablet (1 mg) by mouth 2 times daily       * calcium-vitamin D 600-400 MG-UNIT per tablet    CALTRATE     Take by mouth daily       * calcium carb 1250 mg (500 mg [place])/vitamin D 200 units 500-200 MG-UNIT per tablet    OSCAL with D    90 tablet    Take 1 tablet by mouth daily       DESVENLAFAXINE ER PO      Take 100 mg by mouth daily 2 tablets once daily.       desvenlafaxine succinate 100 MG 24 hr tablet    PRISTIQ    180 tablet    Take 2 tablets (200 mg) by mouth daily        furosemide 20 MG tablet    LASIX    270 tablet    Take 2 tabs in morning and 1 tablet at noon.       glipiZIDE 5 MG 24 hr tablet    glipiZIDE XL    180 tablet    1 tablet twice a day       insulin aspart 100 UNIT/ML injection    NovoLOG FLEXPEN    30 mL    20 units before breakfast, 20 units before lunch, 20 units before dinner, 20 units before snacks       insulin glargine 100 UNIT/ML injection    LANTUS SOLOSTAR    30 mL    Inject 40 Units Subcutaneous At Bedtime       insulin pen needle 31G X 8 MM    B-D U/F    200 each    Inject into the skin 6 times daily Use 6 pen needles daily or as directed.       magnesium 250 MG tablet      Take 1 tablet by mouth daily Takes 400 mg daily.Deneen Chin LPN 2:34 PM on 12/22/2015       ondansetron 4 MG tablet    ZOFRAN    18 tablet    Take 1 tablet (4 mg) by mouth every 8 hours as needed for nausea       order for DME     1 Units    Equipment being ordered: carpal tunnel wrist splint.       OYSCO 500 + D 500-200 MG-UNIT Tabs   Generic drug:  Calcium Carb-Cholecalciferol      Take 500 mg by mouth daily       PANTOPRAZOLE SODIUM PO      Take by mouth daily       propranolol 10 MG tablet    INDERAL    180 tablet    Take 1 tablet (10 mg) by mouth 2 times daily       ranitidine 150 MG tablet    ZANTAC    180 tablet    Take 1 tablet (150 mg) by mouth 2 times daily       spironolactone 50 MG tablet    ALDACTONE    540 tablet    Takes 3 tablets in am.and p.m.       STATIN NOT PRESCRIBED (INTENTIONAL)     0 each    1 each daily Statin not prescribed intentionally due to Active liver disease       VITAMIN B-12 PO      Take 1 tablet by mouth daily       vitamin D3 2000 UNITS Caps     90 capsule    Take 1 capsule by mouth daily       * Notice:  This list has 2 medication(s) that are the same as other medications prescribed for you. Read the directions carefully, and ask your doctor or other care provider to review them with you.

## 2017-05-31 NOTE — PATIENT INSTRUCTIONS
Discharge Instructions for Paracentesis  Paracentesis is a procedure to remove extra fluid from your belly (abdomen). Buildup of fluid in the abdomen is called ascites. The procedure may have been done to take a sample of the fluid. Or, it may have been done to drain the extra fluid from your abdomen.     Ascites is buildup of excess fluid in the abdomen.   Home care    If you have pain after the procedure, your health care provider can prescribe or recommend pain medicines. Take these exactly as directed. If you stopped taking other medicines before the procedure, ask your provider when you can start them again.    Take it easy for 24 hours after the procedure. Avoid physical activity until your provider says it s OK.    You will have a small bandage over the puncture site. Stitches (sutures), surgical staples, adhesive tapes, adhesive strips, or surgical glue may be used to close the incision. They also help stop bleeding and speed healing. You may take the bandage off in 24 hours.    Check the puncture site for the signs of infection listed below.  Follow-up care  Make a follow-up appointment with your health care provider as directed. During your follow-up visit, your provider will check your healing. Let your provider know how you are feeling. You can also discuss the cause of your ascites and whether you need any further treatment.  When to call the doctor  Call your health care provider if you notice any of the following after the procedure:    A fever of 100.4 F (38.0 C) or higher    Trouble breathing    Pain that doesn't go away even after taking pain medicine    Abdominal pain not caused by having the skin punctured    Bleeding from the puncture site    More than a small amount of fluid leaking from the puncture site    Swelling of the abdomen    Signs of infection at the puncture site. These include increased pain, redness, or swelling, warmth, or foul-smelling drainage.    Blood in your  urine    Dizziness, lightheadedness, or fainting      1298-2164 [employer]. [address] [place] [address], [place] [address]. All rights reserved. This information is not intended as a substitute for professional medical care. Always follow your healthcare professional's instructions.

## 2017-05-31 NOTE — PROGRESS NOTES
Paracentesis Nursing Note  Lawrence Louie presents today to [facility] for a paracentesis.    During today's appointment orders from Meghna Simmons MD were completed.    Progress Note:  Patient identification verified by name and date of birth.  Assessment completed.  Vitals monitored throughout appointment and recorded in Doc Flowsheets.  See proceduralist note in ultrasound.    Date of consent or authorization: 5/17/17.  Invasive Procedure Safety Checklist was completed and sent for scanning.     Paracentesis performed by Vipin Fisher PA-C Radiology.    The following labs were communicated to provider performing paracentesis:  Lab Results   Component Value Date    PLT 98 05/17/2017       Total amount of ascites fluid drained: 8.5 liters.  Color of ascites fluid: yellow and clear.  Total amount of albumin given: 50  grams.    Patient tolerated procedure well.    Post procedure, denies.       Discharge Plan:  Discharge instructions were reviewed with patient.  Patient/Representative verbalized understanding and all questions were answered.   Discharged from [facility] in stable condition.    Mariajose Billingsley RN        /88  Pulse 70  Wt (!) 150.4 kg (331 lb 9.6 oz)  SpO2 97%  BMI 42.57 kg/m2    Administrations This Visit     albumin human 25 % injection 12.5 g     Admin Date Action Dose Route Administered By             05/31/2017 New Bag 50 g Intravenous Mariajose Billingsley RN                    lidocaine BUFFERED 1 % injection 20 mL     Admin Date Action Dose Route Administered By             05/31/2017 Given by Other Clinician 20 mL Injection Dulce Anderson, RADHA

## 2017-06-01 RX ORDER — PANTOPRAZOLE SODIUM 40 MG/1
40 TABLET, DELAYED RELEASE ORAL DAILY
Qty: 90 TABLET | Refills: 3 | Status: SHIPPED | OUTPATIENT
Start: 2017-06-01 | End: 2017-12-21

## 2017-06-01 NOTE — TELEPHONE ENCOUNTER
PANTOPRAZOLE      Last Written Prescription Date: UNK  Last Fill Quantity:UNK   # refills: UNK  Last Office Visit : 5/11/17  Future Office visit:  NONE    Routing refill request to provider for review/approval because:  Medication is reported/historical

## 2017-06-14 ENCOUNTER — RADIANT APPOINTMENT (OUTPATIENT)
Dept: ULTRASOUND IMAGING | Facility: CLINIC | Age: 62
End: 2017-06-14
Attending: INTERNAL MEDICINE
Payer: COMMERCIAL

## 2017-06-14 ENCOUNTER — OFFICE VISIT (OUTPATIENT)
Dept: INFUSION THERAPY | Facility: CLINIC | Age: 62
End: 2017-06-14
Attending: INTERNAL MEDICINE
Payer: COMMERCIAL

## 2017-06-14 VITALS
DIASTOLIC BLOOD PRESSURE: 65 MMHG | SYSTOLIC BLOOD PRESSURE: 133 MMHG | HEART RATE: 68 BPM | BODY MASS INDEX: 40.96 KG/M2 | WEIGHT: 315 LBS | OXYGEN SATURATION: 97 % | TEMPERATURE: 97.8 F

## 2017-06-14 DIAGNOSIS — K70.31 ALCOHOLIC CIRRHOSIS OF LIVER WITH ASCITES (H): Primary | ICD-10-CM

## 2017-06-14 LAB
GLUCOSE BLDC GLUCOMTR-MCNC: 48 MG/DL (ref 70–99)
GLUCOSE BLDC GLUCOMTR-MCNC: 53 MG/DL (ref 70–99)
PLATELET # BLD AUTO: 96 10E9/L (ref 150–450)

## 2017-06-14 PROCEDURE — 82962 GLUCOSE BLOOD TEST: CPT

## 2017-06-14 PROCEDURE — 85049 AUTOMATED PLATELET COUNT: CPT | Performed by: INTERNAL MEDICINE

## 2017-06-14 PROCEDURE — 27210190 US PARACENTESIS

## 2017-06-14 PROCEDURE — 36415 COLL VENOUS BLD VENIPUNCTURE: CPT | Performed by: INTERNAL MEDICINE

## 2017-06-14 PROCEDURE — P9047 ALBUMIN (HUMAN), 25%, 50ML: HCPCS | Mod: ZF | Performed by: INTERNAL MEDICINE

## 2017-06-14 PROCEDURE — 27210995 ZZH RX 272: Mod: ZF | Performed by: INTERNAL MEDICINE

## 2017-06-14 PROCEDURE — 25000128 H RX IP 250 OP 636: Mod: ZF | Performed by: INTERNAL MEDICINE

## 2017-06-14 RX ORDER — ALBUMIN (HUMAN) 12.5 G/50ML
12.5 SOLUTION INTRAVENOUS 4 TIMES DAILY PRN
Status: DISCONTINUED | OUTPATIENT
Start: 2017-06-14 | End: 2017-06-14 | Stop reason: HOSPADM

## 2017-06-14 RX ORDER — ALBUMIN (HUMAN) 12.5 G/50ML
12.5 SOLUTION INTRAVENOUS 4 TIMES DAILY PRN
Status: CANCELLED
Start: 2017-06-14

## 2017-06-14 RX ADMIN — ALBUMIN HUMAN 12.5 G: 0.25 SOLUTION INTRAVENOUS at 13:13

## 2017-06-14 RX ADMIN — ALBUMIN HUMAN 12.5 G: 0.25 SOLUTION INTRAVENOUS at 13:03

## 2017-06-14 RX ADMIN — ALBUMIN HUMAN 12.5 G: 0.25 SOLUTION INTRAVENOUS at 12:58

## 2017-06-14 RX ADMIN — ALBUMIN HUMAN 12.5 G: 0.25 SOLUTION INTRAVENOUS at 13:23

## 2017-06-14 RX ADMIN — Medication 20 ML: at 12:49

## 2017-06-14 NOTE — PROGRESS NOTES
Paracentesis Nursing Note  Lawrence Luoie presents today to [facility] for a paracentesis.    During today's appointment orders from Meghna Simmons MD were completed.    Progress Note:  Patient identification verified by name and date of birth.  Assessment completed.  Vitals monitored throughout appointment and recorded in Doc Flowsheets.  See proceduralist note in ultrasound.    Date of consent or authorization: 5/17/17.  Invasive Procedure Safety Checklist was completed and sent for scanning.     Paracentesis performed by Sudeep Kramer PA-C Radiology on the right side in the usual sterile fashion.     The following labs were communicated to provider performing paracentesis:  Lab Results   Component Value Date    PLT 96 06/14/2017       Total amount of ascites fluid drained: 9 liters.  Color of ascites fluid: yellow and clear.  Total amount of albumin given: 50  grams.    Patient tolerated procedure well.    Post procedure, denies pain. However patient c/o of dizziness after 9 liters pulled: blood pressure 133/65 HR of 68. Blood sugar checked and was 48: patient a/o x 4: apple juice given and patient ate one of his own suckers.   Blood sugar recheck was 53. Patient declined a sandwich but ate an oreo. Patient did not want to stay for observation. Patient reports he will continue to eat snacks and check his blood sugar. His wife drove him today. Patient was told if he can't keep his sugars up to go to the ER and recommend patient follow up with his endocrinologist if he continues to have drops in his blood sugar.      Discharge Plan:  Discharge instructions were reviewed with patient.  Patient/Representative verbalized understanding and all questions were answered.   Discharged from [facility] in stable condition.    Dulce Anderson RN        Temp 97.8  F (36.6  C) (Tympanic)  Wt (!) 153.9 kg (339 lb 3.2 oz)  SpO2 97%  BMI 43.55 kg/m2

## 2017-06-14 NOTE — PATIENT INSTRUCTIONS
Discharge Instructions for Paracentesis  Paracentesis is a procedure to remove extra fluid from your belly (abdomen). Buildup of fluid in the abdomen is called ascites. The procedure may have been done to take a sample of the fluid. Or, it may have been done to drain the extra fluid from your abdomen.     Ascites is buildup of excess fluid in the abdomen.   Home care    If you have pain after the procedure, your health care provider can prescribe or recommend pain medicines. Take these exactly as directed. If you stopped taking other medicines before the procedure, ask your provider when you can start them again.    Take it easy for 24 hours after the procedure. Avoid physical activity until your provider says it s OK.    You will have a small bandage over the puncture site. Stitches (sutures), surgical staples, adhesive tapes, adhesive strips, or surgical glue may be used to close the incision. They also help stop bleeding and speed healing. You may take the bandage off in 24 hours.    Check the puncture site for the signs of infection listed below.  Follow-up care  Make a follow-up appointment with your health care provider as directed. During your follow-up visit, your provider will check your healing. Let your provider know how you are feeling. You can also discuss the cause of your ascites and whether you need any further treatment.  When to call the doctor  Call your health care provider if you notice any of the following after the procedure:    A fever of 100.4 F (38.0 C) or higher    Trouble breathing    Pain that doesn't go away even after taking pain medicine    Abdominal pain not caused by having the skin punctured    Bleeding from the puncture site    More than a small amount of fluid leaking from the puncture site    Swelling of the abdomen    Signs of infection at the puncture site. These include increased pain, redness, or swelling, warmth, or foul-smelling drainage.    Blood in your  urine    Dizziness, lightheadedness, or fainting      6208-5280 [employer]. [address] [place] [address], [place] [address]. All rights reserved. This information is not intended as a substitute for professional medical care. Always follow your healthcare professional's instructions.

## 2017-06-14 NOTE — MR AVS SNAPSHOT
After Visit Summary   6/14/2017    Lawrence Louie    MRN: [id number]           Patient Information     Date Of Birth          1955        Visit Information        Provider Department      6/14/2017 12:00 PM Provider, Uc Spec Inf Para; UC 39 ATC M [facility]        Today's Diagnoses     Alcoholic cirrhosis of liver with ascites (H)    -  1      Care Instructions      Discharge Instructions for Paracentesis  Paracentesis is a procedure to remove extra fluid from your belly (abdomen). Buildup of fluid in the abdomen is called ascites. The procedure may have been done to take a sample of the fluid. Or, it may have been done to drain the extra fluid from your abdomen.     Ascites is buildup of excess fluid in the abdomen.   Home care    If you have pain after the procedure, your health care provider can prescribe or recommend pain medicines. Take these exactly as directed. If you stopped taking other medicines before the procedure, ask your provider when you can start them again.    Take it easy for 24 hours after the procedure. Avoid physical activity until your provider says it s OK.    You will have a small bandage over the puncture site. Stitches (sutures), surgical staples, adhesive tapes, adhesive strips, or surgical glue may be used to close the incision. They also help stop bleeding and speed healing. You may take the bandage off in 24 hours.    Check the puncture site for the signs of infection listed below.  Follow-up care  Make a follow-up appointment with your health care provider as directed. During your follow-up visit, your provider will check your healing. Let your provider know how you are feeling. You can also discuss the cause of your ascites and whether you need any further treatment.  When to call the doctor  Call your health care provider if you notice any of the following after the procedure:    A fever of 100.4 F (38.0 C) or  higher    Trouble breathing    Pain that doesn't go away even after taking pain medicine    Abdominal pain not caused by having the skin punctured    Bleeding from the puncture site    More than a small amount of fluid leaking from the puncture site    Swelling of the abdomen    Signs of infection at the puncture site. These include increased pain, redness, or swelling, warmth, or foul-smelling drainage.    Blood in your urine    Dizziness, lightheadedness, or fainting      3617-3920 [employer]. [address] [place] [address]. All rights reserved. This information is not intended as a substitute for professional medical care. Always follow your healthcare professional's instructions.                Follow-ups after your visit        Your next 10 appointments already scheduled     Jun 21, 2017 12:00 PM CDT   Paracentesis Visit with Uc Spec Inf Para Provider, UC 39 ATC   [facility] Procedure ([facility])    [address] [place] [address]   [phone]            Jun 28, 2017 12:00 PM CDT   Paracentesis Visit with Uc Spec Inf Para Provider, UC 39 ATC   [facility] ([facility])    [address] [address]   [phone]            Jul 05, 2017 12:00 PM CDT   Paracentesis Visit with Uc Spec Inf Para Provider, UC 39 ATC   [facility] ([facility])    [address] [address]   [phone]            Jul 12, 2017 12:00 PM CDT   Paracentesis Visit with Uc Spec Inf Para Provider, UC 39 ATC   [facility] ([facility])    [address] [address]   [phone]            Jul 19, 2017 12:00 PM CDT    Paracentesis Visit with  Spec Inf Para Provider,  39 ATC   [facility] ([facility])    [address] [facility]  2nd Floor  [facility] [address] [phone]              Who to contact     If you have questions or need follow up information about today's clinic visit or your schedule please contact [facility] SPECIAL[facility] AND PROCEDURE directly at [phone].  Normal or non-critical lab and imaging results will be communicated to you by WatchPartyhart, letter or phone within 4 business days after the clinic has received the results. If you do not hear from us within 7 days, please contact the clinic through [employer] Buzz[employer] or phone. If you have a critical or abnormal lab result, we will notify you by phone as soon as possible.  Submit refill requests through [employer] or call your pharmacy and they will forward the refill request to us. Please allow 3 business days for your refill to be completed.          Additional Information About Your Visit        WatchPartyharItaro Information     [employer] gives you secure access to your electronic health record. If you see a primary care provider, you can also send messages to your care team and make appointments. If you have questions, please call your primary care clinic.  If you do not have a primary care provider, please call [phone] and they will assist you.        Care EveryWhere ID     This is your Care EveryWhere ID. This could be used by other organizations to access your [facility] medical records  [id number]        Your Vitals Were     Pulse Temperature Pulse Oximetry BMI (Body Mass Index)          68 97.8  F (36.6  C) (Tympanic) 97% 40.96 kg/m2         Blood Pressure from Last 3 Encounters:   06/14/17 133/65   05/31/17 121/54   05/17/17 117/55    Weight from Last 3 Encounters:   06/14/17 (!) 144.7 kg (319 lb)   05/31/17 (!) 142.1 kg (313 lb 3.2 oz)   05/17/17 (!) 140.9 kg (310 lb 11.2  oz)              We Performed the Following     Platelet count     US Paracentesis        Primary Care Provider Office Phone # Fax #    CORY Toussaint [phone]       [facility] [address] [facility] [address]  [facility] [address]        Thank you!     Thank you for choosing [facility] PROCED[facility]  for your care. Our goal is always to provide you with excellent care. Hearing back from our patients is one way we can continue to improve our services. Please take a few minutes to complete the written survey that you may receive in the mail after your visit with us. Thank you!             Your Updated Medication List - Protect others around you: Learn how to safely use, store and throw away your medicines at www.disposemymeds.org.          This list is accurate as of: 6/14/17  2:39 PM.  Always use your most recent med list.                   Brand Name Dispense Instructions for use    aspirin 81 MG tablet     30 tablet    Take 1 tablet (81 mg) by mouth daily       blood glucose lancets standard    no brand specified    1 Box    Use to test blood sugar 4 X  times daily or as directed.       blood glucose monitoring meter device kit    no brand specified    1 kit    Use to test blood sugar 4 X  times daily or as directed.       blood glucose monitoring test strip    no brand specified    200 strip    Use to test blood sugars 4 X  times daily or as directed       bumetanide 1 MG tablet    BUMEX    90 tablet    Take 1 tablet (1 mg) by mouth 2 times daily       * calcium-vitamin D 600-400 MG-UNIT per tablet    CALTRATE     Take by mouth daily       * calcium carb 1250 mg (500 mg [place])/vitamin D 200 units 500-200 MG-UNIT per tablet    OSCAL with D    90 tablet    Take 1 tablet by mouth daily       DESVENLAFAXINE ER PO      Take 100 mg by mouth daily 2 tablets once daily.       desvenlafaxine succinate 100 MG 24 hr tablet    PRISTIQ    180 tablet    Take 2  tablets (200 mg) by mouth daily       furosemide 20 MG tablet    LASIX    270 tablet    Take 2 tabs in morning and 1 tablet at noon.       glipiZIDE 5 MG 24 hr tablet    glipiZIDE XL    180 tablet    1 tablet twice a day       insulin aspart 100 UNIT/ML injection    NovoLOG FLEXPEN    30 mL    20 units before breakfast, 20 units before lunch, 20 units before dinner, 20 units before snacks       insulin glargine 100 UNIT/ML injection    LANTUS SOLOSTAR    30 mL    Inject 40 Units Subcutaneous At Bedtime       insulin pen needle 31G X 8 MM    B-D U/F    200 each    Inject into the skin 6 times daily Use 6 pen needles daily or as directed.       magnesium 250 MG tablet      Take 1 tablet by mouth daily Takes 400 mg daily.Deneen Chin LPN 2:34 PM on 12/22/2015       ondansetron 4 MG tablet    ZOFRAN    18 tablet    Take 1 tablet (4 mg) by mouth every 8 hours as needed for nausea       order for DME     1 Units    Equipment being ordered: carpal tunnel wrist splint.       OYSCO 500 + D 500-200 MG-UNIT Tabs   Generic drug:  Calcium Carb-Cholecalciferol      Take 500 mg by mouth daily       pantoprazole 40 MG EC tablet    PROTONIX    90 tablet    Take 1 tablet (40 mg) by mouth daily       propranolol 10 MG tablet    INDERAL    180 tablet    Take 1 tablet (10 mg) by mouth 2 times daily       ranitidine 150 MG tablet    ZANTAC    180 tablet    Take 1 tablet (150 mg) by mouth 2 times daily       spironolactone 50 MG tablet    ALDACTONE    540 tablet    Takes 3 tablets in am.and p.m.       STATIN NOT PRESCRIBED (INTENTIONAL)     0 each    1 each daily Statin not prescribed intentionally due to Active liver disease       VITAMIN B-12 PO      Take 1 tablet by mouth daily       vitamin D3 2000 UNITS Caps     90 capsule    Take 1 capsule by mouth daily       * Notice:  This list has 2 medication(s) that are the same as other medications prescribed for you. Read the directions carefully, and ask your doctor or other care provider  to review them with you.

## 2017-06-21 ENCOUNTER — RADIANT APPOINTMENT (OUTPATIENT)
Dept: ULTRASOUND IMAGING | Facility: CLINIC | Age: 62
End: 2017-06-21
Attending: INTERNAL MEDICINE
Payer: COMMERCIAL

## 2017-06-21 ENCOUNTER — OFFICE VISIT (OUTPATIENT)
Dept: INFUSION THERAPY | Facility: CLINIC | Age: 62
End: 2017-06-21
Attending: INTERNAL MEDICINE
Payer: COMMERCIAL

## 2017-06-21 VITALS
HEART RATE: 66 BPM | DIASTOLIC BLOOD PRESSURE: 58 MMHG | SYSTOLIC BLOOD PRESSURE: 108 MMHG | OXYGEN SATURATION: 96 % | BODY MASS INDEX: 39.62 KG/M2 | WEIGHT: 308.6 LBS

## 2017-06-21 DIAGNOSIS — K70.31 ALCOHOLIC CIRRHOSIS OF LIVER WITH ASCITES (H): Primary | ICD-10-CM

## 2017-06-21 PROCEDURE — P9047 ALBUMIN (HUMAN), 25%, 50ML: HCPCS | Mod: ZF | Performed by: INTERNAL MEDICINE

## 2017-06-21 PROCEDURE — 27210190 US PARACENTESIS

## 2017-06-21 PROCEDURE — 27210995 ZZH RX 272: Mod: ZF | Performed by: INTERNAL MEDICINE

## 2017-06-21 PROCEDURE — 25000128 H RX IP 250 OP 636: Mod: ZF | Performed by: INTERNAL MEDICINE

## 2017-06-21 RX ORDER — ALBUMIN (HUMAN) 12.5 G/50ML
12.5 SOLUTION INTRAVENOUS 4 TIMES DAILY PRN
Status: CANCELLED
Start: 2017-06-21

## 2017-06-21 RX ORDER — ALBUMIN (HUMAN) 12.5 G/50ML
12.5 SOLUTION INTRAVENOUS 4 TIMES DAILY PRN
Status: DISCONTINUED | OUTPATIENT
Start: 2017-06-21 | End: 2017-06-21 | Stop reason: HOSPADM

## 2017-06-21 RX ADMIN — ALBUMIN HUMAN 12.5 G: 0.25 SOLUTION INTRAVENOUS at 13:53

## 2017-06-21 RX ADMIN — ALBUMIN HUMAN 12.5 G: 0.25 SOLUTION INTRAVENOUS at 13:22

## 2017-06-21 RX ADMIN — Medication 20 ML: at 12:50

## 2017-06-21 RX ADMIN — ALBUMIN HUMAN 12.5 G: 0.25 SOLUTION INTRAVENOUS at 13:12

## 2017-06-21 RX ADMIN — ALBUMIN HUMAN 12.5 G: 0.25 SOLUTION INTRAVENOUS at 13:05

## 2017-06-21 NOTE — MR AVS SNAPSHOT
After Visit Summary   6/21/2017    Lawrence Louie    MRN: [id number]           Patient Information     Date Of Birth          1955        Visit Information        Provider Department      6/21/2017 12:00 PM Provider, Uc Spec Inf Para; UC 39 ATC [facility]        Today's Diagnoses     Alcoholic cirrhosis of liver with ascites (H)    -  1       Follow-ups after your visit        Your next 10 appointments already scheduled     Jun 28, 2017 12:00 PM CDT   Paracentesis Visit with Uc Spec Inf Para Provider, UC 39 ATC   [facility] ([facility])    [address]  [address] [facility] [address]   [phone]            Jul 05, 2017 12:00 PM CDT   Paracentesis Visit with Uc Spec Inf Para Provider, UC 39 ATC   [facility] ([facility])    [address]  [address] [facility] [address] [phone]            Jul 12, 2017 12:00 PM CDT   Paracentesis Visit with Uc Spec Inf Para Provider, UC 39 ATC   [facility] ([facility])    [address]  2nd Floor  [facility] [address] [phone]            Jul 19, 2017 12:00 PM CDT   Paracentesis Visit with Uc Spec Inf Para Provider, UC 39 ATC   [facility] ([facility])    [address]  2nd Floor  [facility] [address] [phone]            Jul 26, 2017 12:00 PM CDT   Paracentesis Visit with Uc Spec Inf Para Provider, UC 39 ATC   [facility] ([facility])    [address]  2nd Floor  [facility] [address] [phone]              Who to contact     If you have questions or need follow up  information about today's clinic visit or your schedule please contact [facility] directly at [phone].  Normal or non-critical lab and imaging results will be communicated to you by for; to (do)hart, letter or phone within 4 business days after the clinic has received the results. If you do not hear from us within 7 days, please contact the clinic through for; to (do)hart or phone. If you have a critical or abnormal lab result, we will notify you by phone as soon as possible.  Submit refill requests through [employer] or call your pharmacy and they will forward the refill request to us. Please allow 3 business days for your refill to be completed.          Additional Information About Your Visit        for; to (do)harSigmatix Information     [employer] gives you secure access to your electronic health record. If you see a primary care provider, you can also send messages to your care team and make appointments. If you have questions, please call your primary care clinic.  If you do not have a primary care provider, please call [phone] and they will assist you.        Care EveryWhere ID     This is your Care EveryWhere ID. This could be used by other organizations to access your [place] medical records  [id number]        Your Vitals Were     Pulse Pulse Oximetry BMI (Body Mass Index)             66 96% 39.62 kg/m2          Blood Pressure from Last 3 Encounters:   06/21/17 108/58   06/14/17 133/65   05/31/17 121/54    Weight from Last 3 Encounters:   06/21/17 (!) 140 kg (308 lb 9.6 oz)   06/14/17 (!) 144.7 kg (319 lb)   05/31/17 (!) 142.1 kg (313 lb 3.2 oz)              We Performed the Following     US Paracentesis        Primary Care Provider Office Phone # Fax #    Ary JACKSON CORY Murphy [phone]       [facility] [address] [facility] [address]  [facility] [address]        Equal Access to Services     JOSEF LORENZO : brianda Reyes qaybta  silvia atkinson haymiguel christensensarah richardson ah. [facility] [phone].    ATENCIÓN: Si zarina myers, tiene a rondon disposición servicios gratuitos de asistencia lingüística. Boaz al [phone].    We comply with applicable federal civil rights laws and [place] laws. We do not discriminate on the basis of race, color, national origin, age, disability sex, sexual orientation or gender identity.            Thank you!     Thank you for choosing [facility] SPECIALTY AND PROCEDURE  for your care. Our goal is always to provide you with excellent care. Hearing back from our patients is one way we can continue to improve our services. Please take a few minutes to complete the written survey that you may receive in the mail after your visit with us. Thank you!             Your Updated Medication List - Protect others around you: Learn how to safely use, store and throw away your medicines at www.disposemymeds.org.          This list is accurate as of: 6/21/17  2:28 PM.  Always use your most recent med list.                   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    Diabetes mellitus, type 2 (H)       blood glucose monitoring meter device kit    no brand specified    1 kit    Use to test blood sugar 4 X  times daily or as directed.    Diabetes mellitus, type 2 (H)       blood glucose monitoring test strip    no brand specified    200 strip    Use to test blood sugars 4 X  times daily or as directed    Diabetes mellitus, type 2 (H)       bumetanide 1 MG tablet    BUMEX    90 tablet    Take 1 tablet (1 mg) by mouth 2 times daily    Generalized edema       * calcium-vitamin D 600-400 MG-UNIT per tablet    CALTRATE      Take by mouth daily    Morbid obesity due to excess calories (H), Alcoholic cirrhosis of liver with ascites (H), Portal hypertension (H), Secondary esophageal varices without bleeding (H), Tobacco use disorder       * calcium carb 1250 mg (500 mg [place])/vitamin D 200 units 500-200 MG-UNIT per tablet    OSCAL with D    90 tablet    Take 1 tablet by mouth daily    Hypotestosteronism       DESVENLAFAXINE ER PO      Take 100 mg by mouth daily 2 tablets once daily.    Morbid obesity due to excess calories (H), Alcoholic cirrhosis of liver with ascites (H), Portal hypertension (H), Secondary esophageal varices without bleeding (H), Tobacco use disorder       desvenlafaxine succinate 100 MG 24 hr tablet    PRISTIQ    180 tablet    Take 2 tablets (200 mg) by mouth daily    Other depression       furosemide 20 MG tablet    LASIX    270 tablet    Take 2 tabs in morning and 1 tablet at noon.    Generalized edema       glipiZIDE 5 MG 24 hr tablet    glipiZIDE XL    180 tablet    1 tablet twice a day    Type 2 diabetes mellitus without complication (H)       insulin aspart 100 UNIT/ML injection    NovoLOG FLEXPEN    30 mL    20 units before breakfast, 20 units before lunch, 20 units before dinner, 20 units before snacks    Type 2 diabetes mellitus without complication (H)       insulin glargine 100 UNIT/ML injection    LANTUS SOLOSTAR    30 mL    Inject 40 Units Subcutaneous At Bedtime    Type 2 diabetes mellitus with other oral complication, unspecified long term insulin use status (H)       insulin pen needle 31G X 8 MM    B-D U/F    200 each    Inject into the skin 6 times daily Use 6 pen needles daily or as directed.    Type 2 diabetes, HbA1c goal < 7% (H)       magnesium 250 MG tablet      Take 1 tablet by mouth daily Takes 400 mg daily.Deneen Chin LPN 2:34 PM on 12/22/2015        ondansetron 4 MG tablet    ZOFRAN    18 tablet    Take 1 tablet (4 mg) by mouth every 8 hours as needed for nausea    Alcoholic cirrhosis  (H), Nausea       order for DME     1 Units    Equipment being ordered: carpal tunnel wrist splint.    CTS (carpal tunnel syndrome)       OYSCO 500 + D 500-200 MG-UNIT Tabs   Generic drug:  Calcium Carb-Cholecalciferol      Take 500 mg by mouth daily    Morbid obesity due to excess calories (H), Alcoholic cirrhosis of liver with ascites (H), Portal hypertension (H), Secondary esophageal varices without bleeding (H), Tobacco use disorder       pantoprazole 40 MG EC tablet    PROTONIX    90 tablet    Take 1 tablet (40 mg) by mouth daily    Morbid obesity due to excess calories (H), Alcoholic cirrhosis of liver with ascites (H), Portal hypertension (H), Secondary esophageal varices without bleeding (H), Tobacco use disorder       propranolol 10 MG tablet    INDERAL    180 tablet    Take 1 tablet (10 mg) by mouth 2 times daily    Portal hypertension (H)       ranitidine 150 MG tablet    ZANTAC    180 tablet    Take 1 tablet (150 mg) by mouth 2 times daily    Esophageal varices (H)       spironolactone 50 MG tablet    ALDACTONE    540 tablet    Takes 3 tablets in am.and p.m.    Portal hypertension (H), Generalized edema       STATIN NOT PRESCRIBED (INTENTIONAL)     0 each    1 each daily Statin not prescribed intentionally due to Active liver disease    Hyperlipidemia LDL goal <100       VITAMIN B-12 PO      Take 1 tablet by mouth daily        vitamin D3 2000 UNITS Caps     90 capsule    Take 1 capsule by mouth daily    Mild major depression (H)       * Notice:  This list has 2 medication(s) that are the same as other medications prescribed for you. Read the directions carefully, and ask your doctor or other care provider to review them with you.

## 2017-06-21 NOTE — PROGRESS NOTES
Paracentesis Nursing Note  Lawrence Louie [facility].    During today's appointment orders from Meghna Simmons MD were completed.    Progress Note:  Patient identification verified by name and date of birth.  Assessment completed.  Vitals monitored throughout appointment and recorded in Doc Flowsheets.  See proceduralist note in ultrasound.    Date of consent or authorization: 6/21/17.  Invasive Procedure Safety Checklist was completed and sent for scanning.     Paracentesis performed by OSKAR Torres.    The following labs were communicated to provider performing paracentesis:  Lab Results   Component Value Date    PLT 96 06/14/2017       Total amount of ascites fluid drained: 8.3 liters.  Color of ascites fluid: yellow.  Total amount of albumin given: 50  grams.    Patient tolerated procedure well.    Post procedure,denies pain or discomfort post paracentesis.      Discharge Plan:  Discharge instructions were reviewed with patient.  Patient/Representative verbalized understanding and all questions were answered.   [facility] in stable condition.    Zahra Murphy RN        Wt (!) 148.2 kg (326 lb 12.8 oz)  SpO2 96%  BMI 41.96 kg/m2

## 2017-06-28 ENCOUNTER — RADIANT APPOINTMENT (OUTPATIENT)
Dept: ULTRASOUND IMAGING | Facility: CLINIC | Age: 62
End: 2017-06-28
Attending: INTERNAL MEDICINE
Payer: COMMERCIAL

## 2017-06-28 ENCOUNTER — OFFICE VISIT (OUTPATIENT)
Dept: INFUSION THERAPY | Facility: CLINIC | Age: 62
End: 2017-06-28
Attending: INTERNAL MEDICINE
Payer: COMMERCIAL

## 2017-06-28 VITALS
OXYGEN SATURATION: 97 % | WEIGHT: 311.4 LBS | RESPIRATION RATE: 18 BRPM | TEMPERATURE: 97.1 F | SYSTOLIC BLOOD PRESSURE: 118 MMHG | BODY MASS INDEX: 39.98 KG/M2 | HEART RATE: 60 BPM | DIASTOLIC BLOOD PRESSURE: 68 MMHG

## 2017-06-28 DIAGNOSIS — K70.31 ALCOHOLIC CIRRHOSIS OF LIVER WITH ASCITES (H): Primary | ICD-10-CM

## 2017-06-28 PROCEDURE — 25000128 H RX IP 250 OP 636: Mod: ZF | Performed by: INTERNAL MEDICINE

## 2017-06-28 PROCEDURE — 25000125 ZZHC RX 250: Mod: ZF | Performed by: INTERNAL MEDICINE

## 2017-06-28 PROCEDURE — 27210190 US PARACENTESIS

## 2017-06-28 PROCEDURE — P9047 ALBUMIN (HUMAN), 25%, 50ML: HCPCS | Mod: ZF | Performed by: INTERNAL MEDICINE

## 2017-06-28 RX ORDER — ALBUMIN (HUMAN) 12.5 G/50ML
12.5 SOLUTION INTRAVENOUS 4 TIMES DAILY PRN
Status: CANCELLED
Start: 2017-06-28

## 2017-06-28 RX ORDER — ALBUMIN (HUMAN) 12.5 G/50ML
12.5 SOLUTION INTRAVENOUS 4 TIMES DAILY PRN
Status: DISCONTINUED | OUTPATIENT
Start: 2017-06-28 | End: 2017-06-28 | Stop reason: HOSPADM

## 2017-06-28 RX ADMIN — ALBUMIN HUMAN 12.5 G: 0.25 SOLUTION INTRAVENOUS at 13:01

## 2017-06-28 RX ADMIN — LIDOCAINE HYDROCHLORIDE 20 ML: 10 INJECTION, SOLUTION INFILTRATION; PERINEURAL at 12:45

## 2017-06-28 RX ADMIN — ALBUMIN HUMAN 12.5 G: 0.25 SOLUTION INTRAVENOUS at 12:55

## 2017-06-28 RX ADMIN — ALBUMIN HUMAN 12.5 G: 0.25 SOLUTION INTRAVENOUS at 13:17

## 2017-06-28 RX ADMIN — ALBUMIN HUMAN 12.5 G: 0.25 SOLUTION INTRAVENOUS at 13:08

## 2017-06-28 NOTE — PROGRESS NOTES
Paracentesis Nursing Note  Lawrence Louie presents [facility] for a [facility]esis.    During today's appointment orders from Meghna Simmons MD were completed.    Progress Note:  Patient identification verified by name and date of birth.  Assessment completed.  Vitals monitored throughout appointment and recorded in Doc Flowsheets.  See proceduralist note in ultrasound.    Date of consent or authorization: 6/21/17.  Invasive Procedure Safety Checklist was completed and sent for scanning.     Paracentesis performed by Vipin Fisher PA-C Radiology.    The following labs were communicated to provider performing paracentesis:  Lab Results   Component Value Date    PLT 96 06/14/2017       Total amount of ascites fluid drained: 4.7 liters.  Color of ascites fluid: yellow.  Total amount of albumin given: 50  grams.    Patient tolerated procedure well.    Post procedure,denies pain or discomfort post paracentesis.      Discharge Plan:  Discharge instructions were reviewed with patient.  Patient/Representative verbalized understanding and all questions were answered.   [facility] in [facility] condition.    Zahra Murphy RN    Administrations This Visit     albumin human 25 % injection 12.5 g     Admin Date Action Dose Route Administered By             06/28/2017 New Bag 12.5 g Intravenous Zahra Murphy RN                    lidocaine 1 % 20 mL     Admin Date Action Dose Route Administered By             06/28/2017 Given 20 mL Injection Zahra Murphy RN                          /70  Pulse 64  Temp 97.1  F (36.2  C) (Oral)  Resp 18  Wt (!) 146.1 kg (322 lb)  SpO2 97%  BMI 41.34 kg/m2

## 2017-06-28 NOTE — MR AVS SNAPSHOT
After Visit Summary   6/28/2017    Lawrence Louie    MRN: [id number]           Patient Information     Date Of Birth          1955        Visit Information        Provider Department      6/28/2017 12:00 PM Vipin Fisher PA-C; UC 39 ATC [facility]        Today's Diagnoses     Alcoholic cirrhosis of liver with ascites (H)    -  1       Follow-ups after your visit        Your next 10 appointments already scheduled     Jul 05, 2017 12:00 PM CDT   Paracentesis Visit with Uc Spec Inf Para Provider, UC 39 ATC   [facility] ([facility])    [address]  [address] [facility] [address]   [phone]            Jul 12, 2017 12:00 PM CDT   Paracentesis Visit with Uc Spec Inf Para Provider, UC 39 ATC   [facility] ([facility])    [address]  [address] [facility] [address] [phone]            Jul 19, 2017 12:00 PM CDT   Paracentesis Visit with Uc Spec Inf Para Provider, UC 39 ATC   [facility] ([facility])    [address]  [address] [facility] [address] [phone]            Jul 26, 2017 12:00 PM CDT   Paracentesis Visit with Uc Spec Inf Para Provider, UC 39 ATC   [facility] ([facility])    [address]  [address] [facility] [address] [phone]            Aug 02, 2017 12:00 PM CDT   Paracentesis Visit with Uc Spec Inf Para Provider, UC 39 ATC   [facility] ([facility])    [address]  2nd Floor  [facility] [address] [phone]              Who to contact     If you have questions or need follow up  information about today's clinic visit or your schedule please contact [facility]ED[facility] directly at [phone].  Normal or non-critical lab and imaging results will be communicated to you by Baremetricshart, letter or phone within 4 business days after the clinic has received the results. If you do not hear from us within 7 days, please contact the clinic through Baremetricshart or phone. If you have a critical or abnormal lab result, we will notify you by phone as soon as possible.  Submit refill requests through [employer] or call your pharmacy and they will forward the refill request to us. Please allow 3 business days for your refill to be completed.          Additional Information About Your Visit        Bare[employer]sharLockitron Information     [employer] gives you secure access to your electronic health record. If you see a primary care provider, you can also send messages to your care team and make appointments. If you have questions, please call your primary care clinic.  If you do not have a primary care provider, please call [phone] and they will assist you.        Care EveryWhere ID     This is your Care EveryWhere ID. This could be used by other organizations to access your [place] medical records  [id number]        Your Vitals Were     Pulse Temperature Respirations Pulse Oximetry BMI (Body Mass Index)       60 97.1  F (36.2  C) (Oral) 18 97% 39.98 kg/m2        Blood Pressure from Last 3 Encounters:   06/28/17 118/68   06/21/17 108/58   06/14/17 133/65    Weight from Last 3 Encounters:   06/28/17 (!) 141.3 kg (311 lb 6.4 oz)   06/21/17 (!) 140 kg (308 lb 9.6 oz)   06/14/17 (!) 144.7 kg (319 lb)              We Performed the Following     US Paracentesis        Primary Care Provider Office Phone # Fax #    CORY Toussaint [phone]       [facility] [address]  [facility] [address]        Equal Access to Services     JOSEF LORENZO AH: Jose Eduardo mujica  raj Del Toro, wateganda luqadaha, qaybta karosibel cohen, silvia carolynin hayaan amparosarah galarza laavtargerson maritza. So [facility] [phone].    ATENCIÓN: Si habla louis, tiene a rondon disposición servicios gratuitos de asistencia lingüística. Antoninoame al [phone].    We comply with applicable federal civil rights laws and [place] laws. We do not discriminate on the basis of race, color, national origin, age, disability sex, sexual orientation or gender identity.            Thank you!     Thank you for choosing [facility] SPECIALTY AND PROCEDURE  for your care. Our goal is always to provide you with excellent care. Hearing back from our patients is one way we can continue to improve our services. Please take a few minutes to complete the written survey that you may receive in the mail after your visit with us. Thank you!             Your Updated Medication List - Protect others around you: Learn how to safely use, store and throw away your medicines at www.disposemymeds.org.          This list is accurate as of: 6/28/17  3:11 PM.  Always use your most recent med list.                   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    Diabetes mellitus, type 2 (H)       blood glucose monitoring meter device kit    no brand specified    1 kit    Use to test blood sugar 4 X  times daily or as directed.    Diabetes mellitus, type 2 (H)       blood glucose monitoring test strip    no brand specified    200 strip    Use to test blood sugars 4 X  times daily or as directed    Diabetes mellitus, type 2 (H)       bumetanide 1 MG tablet    BUMEX    90 tablet    Take 1 tablet (1 mg) by mouth 2 times daily    Generalized edema       *  calcium-vitamin D 600-400 MG-UNIT per tablet    CALTRATE     Take by mouth daily    Morbid obesity due to excess calories (H), Alcoholic cirrhosis of liver with ascites (H), Portal hypertension (H), Secondary esophageal varices without bleeding (H), Tobacco use disorder       * calcium carb 1250 mg (500 mg [place])/vitamin D 200 units 500-200 MG-UNIT per tablet    OSCAL with D    90 tablet    Take 1 tablet by mouth daily    Hypotestosteronism       DESVENLAFAXINE ER PO      Take 100 mg by mouth daily 2 tablets once daily.    Morbid obesity due to excess calories (H), Alcoholic cirrhosis of liver with ascites (H), Portal hypertension (H), Secondary esophageal varices without bleeding (H), Tobacco use disorder       desvenlafaxine succinate 100 MG 24 hr tablet    PRISTIQ    180 tablet    Take 2 tablets (200 mg) by mouth daily    Other depression       furosemide 20 MG tablet    LASIX    270 tablet    Take 2 tabs in morning and 1 tablet at noon.    Generalized edema       glipiZIDE 5 MG 24 hr tablet    glipiZIDE XL    180 tablet    1 tablet twice a day    Type 2 diabetes mellitus without complication (H)       insulin aspart 100 UNIT/ML injection    NovoLOG FLEXPEN    30 mL    20 units before breakfast, 20 units before lunch, 20 units before dinner, 20 units before snacks    Type 2 diabetes mellitus without complication (H)       insulin glargine 100 UNIT/ML injection    LANTUS SOLOSTAR    30 mL    Inject 40 Units Subcutaneous At Bedtime    Type 2 diabetes mellitus with other oral complication, unspecified long term insulin use status (H)       insulin pen needle 31G X 8 MM    B-D U/F    200 each    Inject into the skin 6 times daily Use 6 pen needles daily or as directed.    Type 2 diabetes, HbA1c goal < 7% (H)       magnesium 250 MG tablet      Take 1 tablet by mouth daily Takes 400 mg daily.Deneen Chin LPN 2:34 PM on 12/22/2015        ondansetron 4 MG tablet    ZOFRAN    18 tablet    Take 1 tablet (4 mg) by mouth  every 8 hours as needed for nausea    Alcoholic cirrhosis (H), Nausea       order for DME     1 Units    Equipment being ordered: carpal tunnel wrist splint.    CTS (carpal tunnel syndrome)       OYSCO 500 + D 500-200 MG-UNIT Tabs   Generic drug:  Calcium Carb-Cholecalciferol      Take 500 mg by mouth daily    Morbid obesity due to excess calories (H), Alcoholic cirrhosis of liver with ascites (H), Portal hypertension (H), Secondary esophageal varices without bleeding (H), Tobacco use disorder       pantoprazole 40 MG EC tablet    PROTONIX    90 tablet    Take 1 tablet (40 mg) by mouth daily    Morbid obesity due to excess calories (H), Alcoholic cirrhosis of liver with ascites (H), Portal hypertension (H), Secondary esophageal varices without bleeding (H), Tobacco use disorder       propranolol 10 MG tablet    INDERAL    180 tablet    Take 1 tablet (10 mg) by mouth 2 times daily    Portal hypertension (H)       ranitidine 150 MG tablet    ZANTAC    180 tablet    Take 1 tablet (150 mg) by mouth 2 times daily    Esophageal varices (H)       spironolactone 50 MG tablet    ALDACTONE    540 tablet    Takes 3 tablets in am.and p.m.    Portal hypertension (H), Generalized edema       STATIN NOT PRESCRIBED (INTENTIONAL)     0 each    1 each daily Statin not prescribed intentionally due to Active liver disease    Hyperlipidemia LDL goal <100       VITAMIN B-12 PO      Take 1 tablet by mouth daily        vitamin D3 2000 UNITS Caps     90 capsule    Take 1 capsule by mouth daily    Mild major depression (H)       * Notice:  This list has 2 medication(s) that are the same as other medications prescribed for you. Read the directions carefully, and ask your doctor or other care provider to review them with you.

## 2017-06-29 DIAGNOSIS — E11.9 TYPE 2 DIABETES MELLITUS WITHOUT COMPLICATION (H): ICD-10-CM

## 2017-06-29 NOTE — TELEPHONE ENCOUNTER
glipiZIDE (GLIPIZIDE XL) 5 MG 24 hr tablet  Last Written Prescription Date:  6/28/16  Last Fill Quantity: 180,   # refills: 3  Last Office Visit with G, P or [facility] prescribing provider: 5/11/17  Future Office visit:   None  Creatinine   Date Value Ref Range Status   04/11/2017 0.84 0.66 - 1.25 mg/dL Final   ]  Lab Results   Component Value Date    A1C 6.4 03/21/2017    A1C 6.6 12/22/2016    A1C 6.7 09/27/2016    A1C 6.5 06/28/2016    A1C 6.5 12/22/2015     Lab Results   Component Value Date    MICROL 5 06/28/2016     No results found for: MICROALBUMIN      Routing refill request to provider for review/approval because:   glipiZIDE (GLIPIZIDE XL) 5 MG 24 hr tablet   microalbumin lab due  now rf?

## 2017-06-30 DIAGNOSIS — E11.9 TYPE 2 DIABETES, HBA1C GOAL < 7% (H): ICD-10-CM

## 2017-06-30 RX ORDER — GLIPIZIDE 5 MG/1
TABLET, FILM COATED, EXTENDED RELEASE ORAL
Qty: 180 TABLET | Refills: 0 | Status: SHIPPED | OUTPATIENT
Start: 2017-06-30 | End: 2017-09-25

## 2017-07-05 ENCOUNTER — OFFICE VISIT (OUTPATIENT)
Dept: INFUSION THERAPY | Facility: CLINIC | Age: 62
End: 2017-07-05
Attending: INTERNAL MEDICINE
Payer: COMMERCIAL

## 2017-07-05 ENCOUNTER — RADIANT APPOINTMENT (OUTPATIENT)
Dept: ULTRASOUND IMAGING | Facility: CLINIC | Age: 62
End: 2017-07-05
Attending: INTERNAL MEDICINE
Payer: COMMERCIAL

## 2017-07-05 VITALS
BODY MASS INDEX: 39.6 KG/M2 | HEART RATE: 60 BPM | WEIGHT: 308.4 LBS | SYSTOLIC BLOOD PRESSURE: 112 MMHG | RESPIRATION RATE: 20 BRPM | DIASTOLIC BLOOD PRESSURE: 58 MMHG

## 2017-07-05 DIAGNOSIS — K70.31 ALCOHOLIC CIRRHOSIS OF LIVER WITH ASCITES (H): Primary | ICD-10-CM

## 2017-07-05 PROCEDURE — 27210190 US PARACENTESIS

## 2017-07-05 PROCEDURE — P9047 ALBUMIN (HUMAN), 25%, 50ML: HCPCS | Mod: ZF | Performed by: INTERNAL MEDICINE

## 2017-07-05 PROCEDURE — 25000128 H RX IP 250 OP 636: Mod: ZF | Performed by: INTERNAL MEDICINE

## 2017-07-05 PROCEDURE — 25000125 ZZHC RX 250: Mod: ZF | Performed by: INTERNAL MEDICINE

## 2017-07-05 RX ORDER — ALBUMIN (HUMAN) 12.5 G/50ML
12.5 SOLUTION INTRAVENOUS 4 TIMES DAILY PRN
Status: CANCELLED
Start: 2017-07-05

## 2017-07-05 RX ORDER — ALBUMIN (HUMAN) 12.5 G/50ML
12.5 SOLUTION INTRAVENOUS 4 TIMES DAILY PRN
Status: DISCONTINUED | OUTPATIENT
Start: 2017-07-05 | End: 2017-07-05 | Stop reason: HOSPADM

## 2017-07-05 RX ADMIN — ALBUMIN HUMAN 12.5 G: 0.25 SOLUTION INTRAVENOUS at 13:06

## 2017-07-05 RX ADMIN — ALBUMIN HUMAN 12.5 G: 0.25 SOLUTION INTRAVENOUS at 13:20

## 2017-07-05 RX ADMIN — ALBUMIN HUMAN 12.5 G: 0.25 SOLUTION INTRAVENOUS at 13:11

## 2017-07-05 RX ADMIN — LIDOCAINE HYDROCHLORIDE 20 ML: 10 INJECTION, SOLUTION INFILTRATION; PERINEURAL at 12:48

## 2017-07-05 RX ADMIN — ALBUMIN HUMAN 12.5 G: 0.25 SOLUTION INTRAVENOUS at 13:29

## 2017-07-05 NOTE — PATIENT INSTRUCTIONS
Dear Lawrence Louie    Thank you for choosing [facility] ([facility]) for your procedure.  The following information is a summary of our appointment as well as important reminders.        To cancel or re-schedule any appointment, call Specialty Infusion at [phone]    Option 7 for Specialty Infusion   Option 2 for Scheduling    DISCHARGE INSTRUCTIONS FOLLOWING ABDOMINAL PARACENTESIS    After you go home:    No strenuous activity for 24 hours    Resume your regular diet    Limit fluid intake for the first 48 hours to no more than 2 quarts per day.  There should be minimal drainage from the needle site.  If drainage does occur and soaks through the bandage, apply gentle pressure with your hand for 5 minutes.    Notify MD for the following:    Excessive drainage    Excessive swelling, redness or tenderness at the needle site    Fever greater than 101 degrees F    Dizziness or light-headedness when getting up or walking      IF THIS IS A MEDICAL EMERGENCY, CALL [phone]    If you have any questions or concerns    Contact the Hepatology Clinic:   [phone]    If you are a post-transplant patient, contact the Transplant Office:  [phone]    If this is after hours, contact the hospital :  [phone] and as to have the GI resident on call paged                         Additional information: you had a paracentesis performed today.       We look forward in seeing you on your next appointment here at [facility].  Please don t hesitate to call us at [phone] to reschedule any of your appointments or to speak with one of the [facility] registered nurses.  It was a pleasure taking care of you today.    Sincerely,  Ita Chin RN  [facility]  [facility]fusion [facility]  [address] [place] [address]  [address]  Phone:  [phone]

## 2017-07-05 NOTE — MR AVS SNAPSHOT
After Visit Summary   7/5/2017    Lawrence Louie    MRN: [id number]           Patient Information     Date Of Birth          1955        Visit Information        Provider Department      7/5/2017 12:00 PM Frankie Yu PA-C; [address] Specialty and Procedure        Today's Diagnoses     Alcoholic cirrhosis of liver with ascites (H)    -  1      Care Instructions    Dear Lawrence Louie    Thank you for choosing [facility] ([facility]) for your procedure.  The following information is a summary of our appointment as well as important reminders.        To cancel or re-schedule any appointment, call Specialty Infusion at [phone]    Option 7 for Specialty Infusion   Option 2 for Scheduling    DISCHARGE INSTRUCTIONS FOLLOWING ABDOMINAL PARACENTESIS    After you go home:    No strenuous activity for 24 hours    Resume your regular diet    Limit fluid intake for the first 48 hours to no more than 2 quarts per day.  There should be minimal drainage from the needle site.  If drainage does occur and soaks through the bandage, apply gentle pressure with your hand for 5 minutes.    Notify MD for the following:    Excessive drainage    Excessive swelling, redness or tenderness at the needle site    Fever greater than 101 degrees F    Dizziness or light-headedness when getting up or walking      IF THIS IS A MEDICAL EMERGENCY, CALL [phone]    If you have any questions or concerns    Contact the Hepatology Clinic:   [phone]    If you are a post-transplant patient, contact the Transplant Office:  [phone]    If this is after hours, contact the hospital :  [phone] and as to have the GI resident on call paged                         Additional information: you had a paracentesis performed today.       We look forward in seeing you on your next appointment here at [facility].  Please don t  hesitate to call us at [phone] to reschedule any of your appointments or to speak with one of the [facility] registered nurses.  It was a pleasure taking care of you today.    Sincerely,  Ita Chin RN  [facility]  Specialty Infusion & Procedure Center  [address] [place] [address]  [address]  Phone:  [phone]            Follow-ups after your visit        Your next 10 appointments already scheduled     Jul 12, 2017 12:00 PM CDT   Paracentesis Visit with Uc Spec Inf Para Provider, UC 39 ATC   [facility] Specialty and Procedure ([facility])    [address] [place] [id number] [phone]            Jul 19, 2017 12:00 PM CDT   Paracentesis Visit with Uc Spec Inf Para Provider, UC 39 ATC   [facility] Specialty and Procedure ([facility])    [address] [place] [address] [id number] [phone]            Jul 26, 2017 12:00 PM CDT   Paracentesis Visit with Uc Spec Inf Para Provider, UC 39 ATC   [facility] Specialty and Procedure ([facility])    [address] [place] [phone]            Aug 02, 2017 12:00 PM CDT   Paracentesis Visit with Uc Spec Inf Para Provider, UC 39 ATC   [facility] Specialty and Procedure ([facility])    [address] [place] [id number] [phone]            Aug 09, 2017 12:00 PM CDT   Paracentesis Visit with Uc Spec Inf Para Provider, UC 39 ATC   [facility] Specialty and Procedure ([facility])    [address] [place] [address] [phone]              Who to contact     If you have questions or need follow up information about today's clinic visit or your schedule  please contact [facility] directly at [phone].  Normal or non-critical lab and imaging results will be communicated to you by MyChart, letter or phone within 4 business days after the clinic has received the results. If you do not hear from us within 7 days, please contact the clinic through [employer] or phone. If you have a critical or abnormal lab result, we will notify you by phone as soon as possible.  Submit refill requests through [employer] or call your pharmacy and they will forward the refill request to us. Please allow 3 business days for your refill to be completed.          Additional Information About Your Visit        StruqharJildy Information     [employer] gives you secure access to your electronic health record. If you see a primary care provider, you can also send messages to your care team and make appointments. If you have questions, please call your primary care clinic.  If you do not have a primary care provider, please call [phone] and they will assist you.        Care EveryWhere ID     This is your Care EveryWhere ID. This could be used by other organizations to access your [facility] medical records  [id number]        Your Vitals Were     Pulse Respirations BMI (Body Mass Index)             60 20 39.6 kg/m2          Blood Pressure from Last 3 Encounters:   07/05/17 112/58   06/28/17 118/68   06/21/17 108/58    Weight from Last 3 Encounters:   07/05/17 (!) 139.9 kg (308 lb 6.4 oz)   06/28/17 (!) 141.3 kg (311 lb 6.4 oz)   06/21/17 (!) 140 kg (308 lb 9.6 oz)              We Performed the Following     US Paracentesis        Primary Care Provider Office Phone # Fax #    Ary JACKSON CORY Murphy [phone]       [facility] [address] [facility] [address]  [facility] [address]        Equal Access to Services     JOSEF LORENZO : Jose Eduardo Del Toro, brianda borden, jonathan cohen, silvia galarza  lalorenzo salas. So [facility] [phone].    ATENCIÓN: Si habla louis, tiene a rondon disposición servicios gratuitos de asistencia lingüística. Boaz al [phone].    We comply with applicable federal civil rights laws and [place] laws. We do not discriminate on the basis of race, color, national origin, age, disability sex, sexual orientation or gender identity.            Thank you!     Thank you for choosing [facility] SPECIALTY AND PROCEDURE  for your care. Our goal is always to provide you with excellent care. Hearing back from our patients is one way we can continue to improve our services. Please take a few minutes to complete the written survey that you may receive in the mail after your visit with us. Thank you!             Your Updated Medication List - Protect others around you: Learn how to safely use, store and throw away your medicines at www.disposemymeds.org.          This list is accurate as of: 7/5/17  5:20 PM.  Always use your most recent med list.                   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    Diabetes mellitus, type 2 (H)       blood glucose monitoring meter device kit    no brand specified    1 kit    Use to test blood sugar 4 X  times daily or as directed.    Diabetes mellitus, type 2 (H)       blood glucose monitoring test strip    no brand specified    200 strip    Use to test blood sugars 4 X  times daily or as directed    Diabetes mellitus, type 2 (H)       bumetanide 1 MG tablet    BUMEX    90 tablet    Take 1 tablet (1 mg) by mouth 2 times daily    Generalized edema       * calcium-vitamin D 600-400 MG-UNIT per tablet    CALTRATE     Take by mouth daily    Morbid obesity due to  excess calories (H), Alcoholic cirrhosis of liver with ascites (H), Portal hypertension (H), Secondary esophageal varices without bleeding (H), Tobacco use disorder       * calcium carb 1250 mg (500 mg [place])/vitamin D 200 units 500-200 MG-UNIT per tablet    OSCAL with D    90 tablet    Take 1 tablet by mouth daily    Hypotestosteronism       DESVENLAFAXINE ER PO      Take 100 mg by mouth daily 2 tablets once daily.    Morbid obesity due to excess calories (H), Alcoholic cirrhosis of liver with ascites (H), Portal hypertension (H), Secondary esophageal varices without bleeding (H), Tobacco use disorder       desvenlafaxine succinate 100 MG 24 hr tablet    PRISTIQ    180 tablet    Take 2 tablets (200 mg) by mouth daily    Other depression       furosemide 20 MG tablet    LASIX    270 tablet    Take 2 tabs in morning and 1 tablet at noon.    Generalized edema       glipiZIDE 5 MG 24 hr tablet    glipiZIDE XL    180 tablet    1 tablet twice a day    Type 2 diabetes mellitus without complication (H)       insulin aspart 100 UNIT/ML injection    NovoLOG FLEXPEN    30 mL    20 units before breakfast, 20 units before lunch, 20 units before dinner, 20 units before snacks    Type 2 diabetes mellitus without complication (H)       insulin glargine 100 UNIT/ML injection    LANTUS SOLOSTAR    30 mL    Inject 40 Units Subcutaneous At Bedtime    Type 2 diabetes mellitus with other oral complication, unspecified long term insulin use status (H)       insulin pen needle 31G X 8 MM    B-D U/F    300 each    USE  6 times daily / OR AS DIRECTED    Type 2 diabetes, HbA1c goal < 7% (H)       magnesium 250 MG tablet      Take 1 tablet by mouth daily Takes 400 mg daily.Deneen Chin LPN 2:34 PM on 12/22/2015        ondansetron 4 MG tablet    ZOFRAN    18 tablet    Take 1 tablet (4 mg) by mouth every 8 hours as needed for nausea    Alcoholic cirrhosis (H), Nausea       order for DME     1 Units    Equipment being ordered: carpal tunnel  wrist splint.    CTS (carpal tunnel syndrome)       OYSCO 500 + D 500-200 MG-UNIT Tabs   Generic drug:  Calcium Carb-Cholecalciferol      Take 500 mg by mouth daily    Morbid obesity due to excess calories (H), Alcoholic cirrhosis of liver with ascites (H), Portal hypertension (H), Secondary esophageal varices without bleeding (H), Tobacco use disorder       pantoprazole 40 MG EC tablet    PROTONIX    90 tablet    Take 1 tablet (40 mg) by mouth daily    Morbid obesity due to excess calories (H), Alcoholic cirrhosis of liver with ascites (H), Portal hypertension (H), Secondary esophageal varices without bleeding (H), Tobacco use disorder       propranolol 10 MG tablet    INDERAL    180 tablet    Take 1 tablet (10 mg) by mouth 2 times daily    Portal hypertension (H)       ranitidine 150 MG tablet    ZANTAC    180 tablet    Take 1 tablet (150 mg) by mouth 2 times daily    Esophageal varices (H)       spironolactone 50 MG tablet    ALDACTONE    540 tablet    Takes 3 tablets in am.and p.m.    Portal hypertension (H), Generalized edema       STATIN NOT PRESCRIBED (INTENTIONAL)     0 each    1 each daily Statin not prescribed intentionally due to Active liver disease    Hyperlipidemia LDL goal <100       VITAMIN B-12 PO      Take 1 tablet by mouth daily        vitamin D3 2000 UNITS Caps     90 capsule    Take 1 capsule by mouth daily    Mild major depression (H)       * Notice:  This list has 2 medication(s) that are the same as other medications prescribed for you. Read the directions carefully, and ask your doctor or other care provider to review them with you.

## 2017-07-05 NOTE — PROGRESS NOTES
Paracentesis Nursing Note  Lawrence Louie presents [facility] for a [facility]acentesis.    During today's appointment orders from Meghna Simmons MD were completed.    Progress Note:  Patient identification verified by name and date of birth.  Assessment completed.  Vitals monitored throughout appointment and recorded in Doc Flowsheets.  See proceduralist note in ultrasound.    Date of consent or authorization: 6/21/17.  Invasive Procedure Safety Checklist was completed and sent for scanning.     Paracentesis performed by Vipin Fisher PA-C Radiology.    The following labs were communicated to provider performing paracentesis:  Lab Results   Component Value Date    PLT 96 06/14/2017       Total amount of ascites fluid drained: 8.1 liters.  Color of ascites fluid: light yellow,nearly clear.  Total amount of albumin given: 50  grams.    Patient tolerated procedure well.    Post procedure,denies pain or discomfort post paracentesis.      Discharge Plan:  Discharge instructions were reviewed with patient.  Patient/Representative verbalized understanding and all questions were answered.   Discharged from [facility] in stable condition.    Ita Chin RN       Administrations This Visit     albumin human 25 % injection 12.5 g     Admin Date Action Dose Route Administered By             07/05/2017 New Bag 12.5 g Intravenous Ita Chin RN              Admin Date Action Dose Route Administered By             07/05/2017 New Bag 12.5 g Intravenous Ita Chin RN              Admin Date Action Dose Route Administered By             07/05/2017 New Bag 12.5 g Intravenous Ita Chin RN              Admin Date Action Dose Route Administered By             07/05/2017 New Bag 12.5 g Intravenous Ita Chin RN                    lidocaine 1 % 20 mL     Admin Date Action Dose Route Administered By             07/05/2017 Given by Other  Clinician 20 mL Injection Ita Chin, RN                            /58  Pulse 60  Resp 20  Wt (!) 139.9 kg (308 lb 6.4 oz)  BMI 39.6 kg/m2

## 2017-07-12 ENCOUNTER — OFFICE VISIT (OUTPATIENT)
Dept: INFUSION THERAPY | Facility: CLINIC | Age: 62
End: 2017-07-12
Attending: INTERNAL MEDICINE
Payer: COMMERCIAL

## 2017-07-12 ENCOUNTER — RADIANT APPOINTMENT (OUTPATIENT)
Dept: ULTRASOUND IMAGING | Facility: CLINIC | Age: 62
End: 2017-07-12
Attending: INTERNAL MEDICINE
Payer: COMMERCIAL

## 2017-07-12 VITALS
RESPIRATION RATE: 18 BRPM | SYSTOLIC BLOOD PRESSURE: 118 MMHG | DIASTOLIC BLOOD PRESSURE: 56 MMHG | BODY MASS INDEX: 39.29 KG/M2 | WEIGHT: 306 LBS | HEART RATE: 60 BPM | OXYGEN SATURATION: 98 %

## 2017-07-12 DIAGNOSIS — K70.31 ALCOHOLIC CIRRHOSIS OF LIVER WITH ASCITES (H): Primary | ICD-10-CM

## 2017-07-12 LAB — PLATELET # BLD AUTO: 96 10E9/L (ref 150–450)

## 2017-07-12 PROCEDURE — P9047 ALBUMIN (HUMAN), 25%, 50ML: HCPCS | Mod: ZF | Performed by: INTERNAL MEDICINE

## 2017-07-12 PROCEDURE — 25000128 H RX IP 250 OP 636: Mod: ZF | Performed by: INTERNAL MEDICINE

## 2017-07-12 PROCEDURE — 25000125 ZZHC RX 250: Mod: ZF | Performed by: INTERNAL MEDICINE

## 2017-07-12 PROCEDURE — 27210190 US PARACENTESIS

## 2017-07-12 PROCEDURE — 36415 COLL VENOUS BLD VENIPUNCTURE: CPT | Performed by: INTERNAL MEDICINE

## 2017-07-12 PROCEDURE — 85049 AUTOMATED PLATELET COUNT: CPT | Performed by: INTERNAL MEDICINE

## 2017-07-12 RX ORDER — ALBUMIN (HUMAN) 12.5 G/50ML
12.5 SOLUTION INTRAVENOUS 4 TIMES DAILY PRN
Status: DISCONTINUED | OUTPATIENT
Start: 2017-07-12 | End: 2017-07-12 | Stop reason: HOSPADM

## 2017-07-12 RX ORDER — ALBUMIN (HUMAN) 12.5 G/50ML
12.5 SOLUTION INTRAVENOUS 4 TIMES DAILY PRN
Status: CANCELLED
Start: 2017-07-12

## 2017-07-12 RX ADMIN — ALBUMIN HUMAN 12.5 G: 0.25 SOLUTION INTRAVENOUS at 13:30

## 2017-07-12 RX ADMIN — ALBUMIN HUMAN 12.5 G: 0.25 SOLUTION INTRAVENOUS at 13:22

## 2017-07-12 RX ADMIN — LIDOCAINE HYDROCHLORIDE 20 ML: 10 INJECTION, SOLUTION INFILTRATION; PERINEURAL at 12:44

## 2017-07-12 RX ADMIN — ALBUMIN HUMAN 12.5 G: 0.25 SOLUTION INTRAVENOUS at 13:41

## 2017-07-12 RX ADMIN — ALBUMIN HUMAN 12.5 G: 0.25 SOLUTION INTRAVENOUS at 13:17

## 2017-07-12 NOTE — MR AVS SNAPSHOT
After Visit Summary   7/12/2017    Lawrence Louie    MRN: [id number]           Patient Information     Date Of Birth          1955        Visit Information        Provider Department      7/12/2017 12:00 PM Provider, Cristobal Spec Inf Para;  [facility] Specialty and Procedure        Today's Diagnoses     Alcoholic cirrhosis of liver with ascites (H)    -  1      Care Instructions    Dear Lawrence Louie    Thank you for choosing [facility] Specialty Infusion [facility] Procedure Center ([facility]) for your procedure.  The following information is a summary of our appointment as well as important reminders.        To cancel or re-schedule any appointment, call Specialty Infusion at [phone]    Option 7 for Specialty Infusion   Option 2 for Scheduling    DISCHARGE INSTRUCTIONS FOLLOWING ABDOMINAL PARACENTESIS    After you go home:    No strenuous activity for 24 hours    Resume your regular diet    Limit fluid intake for the first 48 hours to no more than 2 quarts per day.  There should be minimal drainage from the needle site.  If drainage does occur and soaks through the bandage, apply gentle pressure with your hand for 5 minutes.    Notify MD for the following:    Excessive drainage    Excessive swelling, redness or tenderness at the needle site    Fever greater than 101 degrees F    Dizziness or light-headedness when getting up or walking      IF THIS IS A MEDICAL EMERGENCY, CALL 911    If you have any questions or concerns    Contact the Hepatology Clinic:   [phone]    If you are a post-transplant patient, contact the Transplant Office:  [phone]    If this is after hours, contact the hospital :  [phone] and as to have the GI resident on call paged                         Additional information: you had a paracentesis performed today.       We look forward in seeing you on your next appointment here at [facility].  Please  don t hesitate to call us at [phone] to reschedule any of your appointments or to speak with one of the [facility] registered nurses.  It was a pleasure taking care of you today.    Sincerely,  Ita Chin RN  [facility]  Specialty Infusion & Procedure Center  [address] [place] [address]  [address]  Phone:  [phone]            Follow-ups after your visit        Your next 10 appointments already scheduled     Jul 19, 2017 12:00 PM CDT   Paracentesis Visit with Uc Spec Inf Para Provider, UC 39 ATC   [facility] Specialty and Procedure ([facility] [place])    [address] [place] [address]  2nd Floor  [facility] [id number] [phone]            Jul 20, 2017  3:40 PM CDT   (Arrive by 3:25 PM)   Return Visit with C[facility] ([facility] [place])    [address] [place] [address]  4th Floor  [facility] [id number] [phone]            Jul 26, 2017 12:00 PM CDT   Paracentesis Visit with Uc Spec Inf Para Provider, UC 39 ATC   [facility] Specialty and Procedure ([facility] [place])    [address] [place] [address]  2nd [facility] [id number] [phone]            Aug 02, 2017 12:00 PM CDT   Paracentesis Visit with Uc Spec Inf Para Provider, UC 39 ATC   [facility] Specialty and Procedure ([facility] [place])    [address] [place] [address]  2nd [facility] [id number] [phone]            Aug 09, 2017 12:00 PM CDT   Paracentesis Visit with Uc Spec Inf Para Provider, UC 39 ATC   [facility] Specialty and Procedure ([facility] [place])    [address] [place] [address]  2nd [facility] [id number] [phone]            Aug 16, 2017 12:00 PM CDT   Paracentesis Visit with Uc Spec Inf Para Provider   [facility] Specialty and  Procedure ([facility])    [address] [facility]  2nd Floor  [facility] [address] [phone]              Who to contact     If you have questions or need follow up information about today's clinic visit or your schedule please contact [facility] [place] SPECIALTY AND PROCEDURE directly at [phone].  Normal or non-critical lab and imaging results will be communicated to you by MyChart, letter or phone within 4 business days after the clinic has received the results. If you do not hear from us within 7 days, please contact the clinic through [employer] or phone. If you have a critical or abnormal lab result, we will notify you by phone as soon as possible.  Submit refill requests through [email or web address] or call your pharmacy and they will forward the refill request to us. Please allow 3 business days for your refill to be completed.          Additional Information About Your Visit        [employer] Information     [email or web address] gives you secure access to your electronic health record. If you see a primary care provider, you can also send messages to your care team and make appointments. If you have questions, please call your primary care clinic.  If you do not have a primary care provider, please call [phone] and they will assist you.        Care EveryWhere ID     This is your Care EveryWhere ID. This could be used by other organizations to access your [facility] medical records  [id number]        Your Vitals Were     Pulse Respirations Pulse Oximetry BMI (Body Mass Index)          60 18 98% 39.29 kg/m2         Blood Pressure from Last 3 Encounters:   07/12/17 118/56   07/05/17 112/58   06/28/17 118/68    Weight from Last 3 Encounters:   07/12/17 (!) 138.8 kg (306 lb)   07/05/17 (!) 139.9 kg (308 lb 6.4 oz)   06/28/17 (!) 141.3 kg (311 lb 6.4 oz)              We Performed the Following     Platelet count     US Paracentesis        Primary Care Provider Office Phone # Fax #     Ary Murphy, APRN [phone]       PRIMARY CARE CENTER [address] [place] [address]  [facility] [address]        Equal Access to Services     JOSEF LORENZO : Hadii aad ku hadgardeniao Sodonnaali, waaxda luqadaha, qaybta kaalmada adeegyada, silvia sarkargerson christensensarah galarza debra salas. So [facility] [phone].    ATENCIÓN: Si habla español, tiene a rondon disposición servicios gratuitos de asistencia lingüística. Llame al [phone].    We comply with applicable federal civil rights laws and [place] laws. We do not discriminate on the basis of race, color, national origin, age, disability sex, sexual orientation or gender identity.            Thank you!     Thank you for choosing [facility] SPECIALTY AND PROCEDURE  for your care. Our goal is always to provide you with excellent care. Hearing back from our patients is one way we can continue to improve our services. Please take a few minutes to complete the written survey that you may receive in the mail after your visit with us. Thank you!             Your Updated Medication List - Protect others around you: Learn how to safely use, store and throw away your medicines at www.disposemymeds.org.          This list is accurate as of: 7/12/17  2:12 PM.  Always use your most recent med list.                   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    Diabetes mellitus, type 2 (H)       blood glucose monitoring meter device kit    no brand specified    1 kit    Use to test blood sugar 4 X  times daily or as directed.    Diabetes mellitus, type 2 (H)       blood glucose monitoring test strip    no brand specified    200 strip    Use to test blood sugars 4 X  times daily  or as directed    Diabetes mellitus, type 2 (H)       bumetanide 1 MG tablet    BUMEX    90 tablet    Take 1 tablet (1 mg) by mouth 2 times daily    Generalized edema       * calcium-vitamin D 600-400 MG-UNIT per tablet    CALTRATE     Take by mouth daily    Morbid obesity due to excess calories (H), Alcoholic cirrhosis of liver with ascites (H), Portal hypertension (H), Secondary esophageal varices without bleeding (H), Tobacco use disorder       * calcium carb 1250 mg (500 mg [place])/vitamin D 200 units 500-200 MG-UNIT per tablet    OSCAL with D    90 tablet    Take 1 tablet by mouth daily    Hypotestosteronism       DESVENLAFAXINE ER PO      Take 100 mg by mouth daily 2 tablets once daily.    Morbid obesity due to excess calories (H), Alcoholic cirrhosis of liver with ascites (H), Portal hypertension (H), Secondary esophageal varices without bleeding (H), Tobacco use disorder       desvenlafaxine succinate 100 MG 24 hr tablet    PRISTIQ    180 tablet    Take 2 tablets (200 mg) by mouth daily    Other depression       furosemide 20 MG tablet    LASIX    270 tablet    Take 2 tabs in morning and 1 tablet at noon.    Generalized edema       glipiZIDE 5 MG 24 hr tablet    glipiZIDE XL    180 tablet    1 tablet twice a day    Type 2 diabetes mellitus without complication (H)       insulin aspart 100 UNIT/ML injection    NovoLOG FLEXPEN    30 mL    20 units before breakfast, 20 units before lunch, 20 units before dinner, 20 units before snacks    Type 2 diabetes mellitus without complication (H)       insulin glargine 100 UNIT/ML injection    LANTUS SOLOSTAR    30 mL    Inject 40 Units Subcutaneous At Bedtime    Type 2 diabetes mellitus with other oral complication, unspecified long term insulin use status (H)       insulin pen needle 31G X 8 MM    B-D U/F    300 each    USE  6 times daily / OR AS DIRECTED    Type 2 diabetes, HbA1c goal < 7% (H)       magnesium 250 MG tablet      Take 1 tablet by mouth daily Takes 400 mg  daily.Deneen Chin NAE 2:34 PM on 12/22/2015        ondansetron 4 MG tablet    ZOFRAN    18 tablet    Take 1 tablet (4 mg) by mouth every 8 hours as needed for nausea    Alcoholic cirrhosis (H), Nausea       order for DME     1 Units    Equipment being ordered: carpal tunnel wrist splint.    CTS (carpal tunnel syndrome)       OYSCO 500 + D 500-200 MG-UNIT Tabs   Generic drug:  Calcium Carb-Cholecalciferol      Take 500 mg by mouth daily    Morbid obesity due to excess calories (H), Alcoholic cirrhosis of liver with ascites (H), Portal hypertension (H), Secondary esophageal varices without bleeding (H), Tobacco use disorder       pantoprazole 40 MG EC tablet    PROTONIX    90 tablet    Take 1 tablet (40 mg) by mouth daily    Morbid obesity due to excess calories (H), Alcoholic cirrhosis of liver with ascites (H), Portal hypertension (H), Secondary esophageal varices without bleeding (H), Tobacco use disorder       propranolol 10 MG tablet    INDERAL    180 tablet    Take 1 tablet (10 mg) by mouth 2 times daily    Portal hypertension (H)       ranitidine 150 MG tablet    ZANTAC    180 tablet    Take 1 tablet (150 mg) by mouth 2 times daily    Esophageal varices (H)       spironolactone 50 MG tablet    ALDACTONE    540 tablet    Takes 3 tablets in am.and p.m.    Portal hypertension (H), Generalized edema       STATIN NOT PRESCRIBED (INTENTIONAL)     0 each    1 each daily Statin not prescribed intentionally due to Active liver disease    Hyperlipidemia LDL goal <100       VITAMIN B-12 PO      Take 1 tablet by mouth daily        vitamin D3 2000 UNITS Caps     90 capsule    Take 1 capsule by mouth daily    Mild major depression (H)       * Notice:  This list has 2 medication(s) that are the same as other medications prescribed for you. Read the directions carefully, and ask your doctor or other care provider to review them with you.

## 2017-07-12 NOTE — PATIENT INSTRUCTIONS
Dear Lawrence Louie    Thank you for choosing [facility] Specialty Infusion and Procedure Center ([facility]) for your procedure.  The following information is a summary of our appointment as well as important reminders.        To cancel or re-schedule any appointment, call Specialty Infusion at [phone]    Option 7 for Specialty Infusion   Option 2 for Scheduling    DISCHARGE INSTRUCTIONS FOLLOWING ABDOMINAL PARACENTESIS    After you go home:    No strenuous activity for 24 hours    Resume your regular diet    Limit fluid intake for the first 48 hours to no more than 2 quarts per day.  There should be minimal drainage from the needle site.  If drainage does occur and soaks through the bandage, apply gentle pressure with your hand for 5 minutes.    Notify MD for the following:    Excessive drainage    Excessive swelling, redness or tenderness at the needle site    Fever greater than 101 degrees F    Dizziness or light-headedness when getting up or walking      IF THIS IS A MEDICAL EMERGENCY, CALL [phone]    If you have any questions or concerns    Contact the Hepatology Clinic:   [phone]    If you are a post-transplant patient, contact the Transplant Office:  [phone]    If this is after hours, contact the hospital :  [phone] and as to have the GI resident on call paged                         Additional information: you had a paracentesis performed today.       We look forward in seeing you on your next appointment here at [facility].  Please don t hesitate to call us at [phone] to reschedule any of your appointments or to speak with one of the [facility] registered nurses.  It was a pleasure taking care of you today.    Sincerely,  Ita Chin RN  [facility]  Specialty Infusion & Procedure Center  [address] [place] [address]  [address]  Phone:  [phone]

## 2017-07-12 NOTE — PROGRESS NOTES
Paracentesis Nursing Note  Lawrence Louie presents today to [facility] Center for a paracentesis.    During today's appointment orders from Meghna Simmons MD were completed.    Progress Note:  Patient identification verified by name and date of birth.  Assessment completed.  Vitals monitored throughout appointment and recorded in Doc Flowsheets.  See proceduralist note in ultrasound.    Date of consent or authorization: 6/21/17.  Invasive Procedure Safety Checklist was completed and sent for scanning.     Paracentesis performed by Donal Kramer PA-C Radiology.    The following labs were communicated to provider performing paracentesis:  Lab Results   Component Value Date    PLT 96 06/14/2017       Total amount of ascites fluid drained: 7.6 liters.  Color of ascites fluid: light yellow.  Total amount of albumin given: 50  grams.    Patient tolerated procedure well.    Post procedure,denies pain or discomfort post paracentesis.      Discharge Plan:  Discharge instructions were reviewed with patient.  Patient/Representative verbalized understanding and all questions were answered.   Discharged from [facility] Infusion [facility] Procedure Center in stable condition.    Ita Chin RN    Administrations This Visit     albumin human 25 % injection 12.5 g     Admin Date Action Dose Route Administered By             07/12/2017 New Bag 12.5 g Intravenous Ita Chin RN              Admin Date Action Dose Route Administered By             07/12/2017 New Bag 12.5 g Intravenous Ita Chin RN              Admin Date Action Dose Route Administered By             07/12/2017 New Bag 12.5 g Intravenous Ita Chin RN              Admin Date Action Dose Route Administered By             07/12/2017 New Bag 12.5 g Intravenous Ita Chin RN                    lidocaine 1 % 20 mL     Admin Date Action Dose Route Administered By             07/12/2017 Given by Other  Clinician 20 mL Injection Ita Chin, RN                          Resp 18  Wt (!) 146.4 kg (322 lb 11.2 oz)  SpO2 98%  BMI 41.43 kg/m2

## 2017-07-13 DIAGNOSIS — E34.9 HYPOTESTOSTERONISM: ICD-10-CM

## 2017-07-13 DIAGNOSIS — F32.0 MILD MAJOR DEPRESSION (H): ICD-10-CM

## 2017-07-16 RX ORDER — ACETAMINOPHEN 160 MG
1 TABLET,DISINTEGRATING ORAL DAILY
Qty: 90 CAPSULE | Refills: 3 | Status: SHIPPED | OUTPATIENT
Start: 2017-07-16 | End: 2018-08-15

## 2017-07-16 NOTE — TELEPHONE ENCOUNTER
Vitamin D3       Last Written Prescription Date:  7/14/16  Last Fill Quantity: 90,   # refills: 3  Last Office Visit : 5/11/17  Future Office visit:  7/20/17     Oyster Shell Calcium/D      Last Written Prescription Date:  6/29/16  Last Fill Quantity: 90,   # refills: 3  Last Office Visit : 5/11/17  Future Office visit:  7/20/17

## 2017-07-17 DIAGNOSIS — E34.9 HYPOTESTOSTERONISM: ICD-10-CM

## 2017-07-19 ENCOUNTER — RADIANT APPOINTMENT (OUTPATIENT)
Dept: ULTRASOUND IMAGING | Facility: CLINIC | Age: 62
End: 2017-07-19
Attending: INTERNAL MEDICINE
Payer: COMMERCIAL

## 2017-07-19 ENCOUNTER — OFFICE VISIT (OUTPATIENT)
Dept: INFUSION THERAPY | Facility: CLINIC | Age: 62
End: 2017-07-19
Attending: INTERNAL MEDICINE
Payer: COMMERCIAL

## 2017-07-19 VITALS
HEART RATE: 69 BPM | RESPIRATION RATE: 16 BRPM | WEIGHT: 304 LBS | SYSTOLIC BLOOD PRESSURE: 107 MMHG | TEMPERATURE: 97.4 F | DIASTOLIC BLOOD PRESSURE: 59 MMHG | OXYGEN SATURATION: 97 % | BODY MASS INDEX: 39.03 KG/M2

## 2017-07-19 DIAGNOSIS — K70.31 ALCOHOLIC CIRRHOSIS OF LIVER WITH ASCITES (H): Primary | ICD-10-CM

## 2017-07-19 PROCEDURE — 27210190 US PARACENTESIS

## 2017-07-19 PROCEDURE — P9047 ALBUMIN (HUMAN), 25%, 50ML: HCPCS | Mod: ZF | Performed by: INTERNAL MEDICINE

## 2017-07-19 PROCEDURE — 25000128 H RX IP 250 OP 636: Mod: ZF | Performed by: INTERNAL MEDICINE

## 2017-07-19 PROCEDURE — 25000125 ZZHC RX 250: Mod: ZF | Performed by: INTERNAL MEDICINE

## 2017-07-19 RX ORDER — ALBUMIN (HUMAN) 12.5 G/50ML
12.5 SOLUTION INTRAVENOUS 4 TIMES DAILY PRN
Status: DISCONTINUED | OUTPATIENT
Start: 2017-07-19 | End: 2017-07-19 | Stop reason: HOSPADM

## 2017-07-19 RX ORDER — ALBUMIN (HUMAN) 12.5 G/50ML
12.5 SOLUTION INTRAVENOUS 4 TIMES DAILY PRN
Status: CANCELLED
Start: 2017-07-19

## 2017-07-19 RX ADMIN — ALBUMIN HUMAN 50 G: 0.25 SOLUTION INTRAVENOUS at 13:03

## 2017-07-19 RX ADMIN — LIDOCAINE HYDROCHLORIDE 20 ML: 10 INJECTION, SOLUTION INFILTRATION; PERINEURAL at 12:29

## 2017-07-19 NOTE — PATIENT INSTRUCTIONS
Dear Lawrence Louie    Thank you for choosing [facility] Specialty Infusion and Procedure Center ([facility]) for your infusion.  The following information is a summary of our appointment as well as important reminders.      Additional information: Your paracentesis procedure today, beginning weight 320.7 lb (145.5 lb), removed 7.6 liters ascites fluid, gave 50.0 grams albumin, ending weight 304 lb (    We look forward in seeing you on your next appointment here at [facility].  Please don t hesitate to call us at [phone] to reschedule any of your appointments or to speak with one of the [facility] registered nurses.  It was a pleasure taking care of you today.    Sincerely,  Miranda Kenyon RN  [facility]  Specialty Infusion & Procedure Center  [address] [place] [address]  [address]  Phone:  [phone]  DISCHARGE INSTRUCTIONS FOLLOWING ABDOMINAL PARACENTESIS    After you go home:    No strenuous activity for 24 hours    Resume your regular diet    Limit fluid intake for the first 48 hours to no more than 2 quarts per day.  There should be minimal drainage from the needle site.  If drainage does occur and soaks through the bandage, apply gentle pressure with your hand for 5 minutes.    Notify MD for the following:    Excessive drainage    Excessive swelling, redness or tenderness at the needle site    Fever greater than 101 degrees F    Dizziness or light-headedness when getting up or walking      IF THIS IS A MEDICAL EMERGENCY, CALL [phone]    If you have any questions or concerns    Contact the Hepatology Clinic:   [phone]    If you are a post-transplant patient, contact the Transplant Office:  [phone]    If this is after hours, contact the hospital :  [phone] and as to have the GI resident on call paged                   I have received and understand my discharge instructions and I have all of my personal  belongings.          ------------------------------------------------------        ---------------------------------------------------    Patient / Significant Other's Signature     Relationship

## 2017-07-19 NOTE — MR AVS SNAPSHOT
After Visit Summary   7/19/2017    Lawrence Louie    MRN: [id number]           Patient Information     Date Of Birth          1955        Visit Information        Provider Department      7/19/2017 12:00 PM Provider, Cristobal Spec Inf Para;  39 [facility]        Today's Diagnoses     Alcoholic cirrhosis of liver with ascites (H)    -  1      Care Instructions    Dear Lawrence Louie    Thank you for choosing [facility] ([facility]) for your infusion.  The following information is a summary of our appointment as well as important reminders.      Additional information: Your paracentesis procedure today, beginning weight 320.7 lb (145.5 lb), removed 7.6 liters ascites fluid, gave 50.0 grams albumin, ending weight 304 lb (    We look forward in seeing you on your next appointment here at [facility].  Please don t hesitate to call us at [phone] to reschedule any of your appointments or to speak with one of the [facility] registered nurses.  It was a pleasure taking care of you today.    Sincerely,  Miranda Kenyon RN  [facility]  [facility]  [address] [place] [address]  Phone:  [phone]  DISCHARGE INSTRUCTIONS FOLLOWING ABDOMINAL PARACENTESIS    After you go home:    No strenuous activity for 24 hours    Resume your regular diet    Limit fluid intake for the first 48 hours to no more than 2 quarts per day.  There should be minimal drainage from the needle site.  If drainage does occur and soaks through the bandage, apply gentle pressure with your hand for 5 minutes.    Notify MD for the following:    Excessive drainage    Excessive swelling, redness or tenderness at the needle site    Fever greater than 101 degrees F    Dizziness or light-headedness when getting up or walking      IF THIS IS A MEDICAL EMERGENCY,  CALL [phone]    If you have any questions or concerns    Contact the Hepatology Clinic:   [phone]    If you are a post-transplant patient, contact the Transplant Office:  [phone]    If this is after hours, contact the hospital :  [phone] and as to have the GI resident on call paged                   I have received and understand my discharge instructions and I have all of my personal belongings.          ------------------------------------------------------        ---------------------------------------------------    Patient / Significant Other's Signature     Relationship            Follow-ups after your visit        Your next 10 appointments already scheduled     Jul 26, 2017 12:00 PM CDT   Paracentesis Visit with Uc Spec Inf Para Provider, UC 39 ATC   [facility] Specialty and Procedure ([facility])    [address] [place] [id number] [phone]            Jul 27, 2017  3:20 PM CDT   [facility]    [address] [place] [id number] [phone]            Aug 02, 2017 12:00 PM CDT   Paracentesis Visit with Uc Spec Inf Para Provider, UC 39 ATC   [facility] Special[facility] Procedure ([facility])    [address] [place] [id number] [phone]            Aug 09, 2017 12:00 PM CDT   Paracentesis Visit with Uc Spec Inf Para Provider, UC 39 ATC   [facility] ([facility])    [address] [place] [id number] [phone]            Aug 16, 2017 12:00 PM CDT   Paracentesis Visit with Uc Spec Inf Para Provider, UC 39 ATC   [facility] ([facility])    [address] [place] [address]  Floor  [facility] [address] [phone]            Aug 23, 2017 12:00 PM CDT   Paracentesis Visit with  Spec Inf Para Provider   [facility] Specialty and Procedure ([facility])    [address]  2nd Floor  [facility] [id number] [phone]              Who to contact     If you have questions or need follow up information about today's clinic visit or your schedule please contact [facility] SPECIALTY AND PROCEDURE directly at [phone].  Normal or non-critical lab and imaging results will be communicated to you by [employer], letter or phone within 4 business days after the clinic has received the results. If you do not hear from us within 7 days, please contact the clinic through [employer] or phone. If you have a critical or abnormal lab result, we will notify you by phone as soon as possible.  Submit refill requests through [employer] or call your pharmacy and they will forward the refill request to us. Please allow 3 business days for your refill to be completed.          Additional Information About Your Visit        [facility] [facility] Information     [employer] gives you secure access to your electronic health record. If you see a primary care provider, you can also send messages to your care team and make appointments. If you have questions, please call your primary care clinic.  If you do not have a primary care provider, please call [phone] and they will assist you.        Care EveryWhere ID     This is your Care EveryWhere ID. This could be used by other organizations to access your [place] medical records  [id number]        Your Vitals Were     Pulse Temperature Respirations Pulse Oximetry BMI (Body Mass Index)       69 97.4  F (36.3  C) (Oral) 16 97% 39.03 kg/m2        Blood Pressure from Last 3 Encounters:   07/19/17 107/59   07/12/17 118/56   07/05/17 112/58    Weight from Last 3 Encounters:   07/19/17 (!) 137.9 kg  (304 lb)   07/12/17 (!) 138.8 kg (306 lb)   07/05/17 (!) 139.9 kg (308 lb 6.4 oz)              We Performed the Following     US Paracentesis        Primary Care Provider Office Phone # Fax #    CORY Toussaint [phone]       PRIMARY [facility] [address] [place] [address] [phone]  [facility] [address]        Equal Access to Services     JOSEF LORENZO : Hadii aad ku hadasho Soomaali, waaxda luqadaha, qaybta kaalmada adeegyada, waxay idiin hayaan adeeg kharash la'miguel . So [facility] [phone].    ATENCIÓN: Si habla español, tiene a rondon disposición servicios gratuitos de asistencia lingüística. Llame al [phone].    We comply with applicable federal civil rights laws and [place] laws. We do not discriminate on the basis of race, color, national origin, age, disability sex, sexual orientation or gender identity.            Thank you!     Thank you for choosing [facility] SPECIALTY AND PROCEDURE  for your care. Our goal is always to provide you with excellent care. Hearing back from our patients is one way we can continue to improve our services. Please take a few minutes to complete the written survey that you may receive in the mail after your visit with us. Thank you!             Your Updated Medication List - Protect others around you: Learn how to safely use, store and throw away your medicines at www.disposemymeds.org.          This list is accurate as of: 7/19/17  5:02 PM.  Always use your most recent med list.                   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    Diabetes mellitus, type 2 (H)       blood glucose monitoring meter device kit    no brand specified    1 kit    Use to test  blood sugar 4 X  times daily or as directed.    Diabetes mellitus, type 2 (H)       blood glucose monitoring test strip    no brand specified    200 strip    Use to test blood sugars 4 X  times daily or as directed    Diabetes mellitus, type 2 (H)       bumetanide 1 MG tablet    BUMEX    90 tablet    Take 1 tablet (1 mg) by mouth 2 times daily    Generalized edema       * calcium-vitamin D 600-400 MG-UNIT per tablet    CALTRATE     Take by mouth daily    Morbid obesity due to excess calories (H), Alcoholic cirrhosis of liver with ascites (H), Portal hypertension (H), Secondary esophageal varices without bleeding (H), Tobacco use disorder       * calcium carb 1250 mg (500 mg [place])/vitamin D 200 units 500-200 MG-UNIT per tablet    OSCAL with D    90 tablet    Take 1 tablet by mouth daily    Hypotestosteronism       DESVENLAFAXINE ER PO      Take 100 mg by mouth daily 2 tablets once daily.    Morbid obesity due to excess calories (H), Alcoholic cirrhosis of liver with ascites (H), Portal hypertension (H), Secondary esophageal varices without bleeding (H), Tobacco use disorder       desvenlafaxine succinate 100 MG 24 hr tablet    PRISTIQ    180 tablet    Take 2 tablets (200 mg) by mouth daily    Other depression       furosemide 20 MG tablet    LASIX    270 tablet    Take 2 tabs in morning and 1 tablet at noon.    Generalized edema       glipiZIDE 5 MG 24 hr tablet    glipiZIDE XL    180 tablet    1 tablet twice a day    Type 2 diabetes mellitus without complication (H)       insulin aspart 100 UNIT/ML injection    NovoLOG FLEXPEN    30 mL    20 units before breakfast, 20 units before lunch, 20 units before dinner, 20 units before snacks    Type 2 diabetes mellitus without complication (H)       insulin glargine 100 UNIT/ML injection    LANTUS SOLOSTAR    30 mL    Inject 40 Units Subcutaneous At Bedtime    Type 2 diabetes mellitus with other oral complication, unspecified long term insulin use status (H)       insulin  pen needle 31G X 8 MM    B-D U/F    300 each    USE  6 times daily / OR AS DIRECTED    Type 2 diabetes, HbA1c goal < 7% (H)       magnesium 250 MG tablet      Take 1 tablet by mouth daily Takes 400 mg daily.Deneen Chin LPN 2:34 PM on 12/22/2015        ondansetron 4 MG tablet    ZOFRAN    18 tablet    Take 1 tablet (4 mg) by mouth every 8 hours as needed for nausea    Alcoholic cirrhosis (H), Nausea       order for DME     1 Units    Equipment being ordered: carpal tunnel wrist splint.    CTS (carpal tunnel syndrome)       OYSCO 500 + D 500-200 MG-UNIT Tabs   Generic drug:  Calcium Carb-Cholecalciferol      Take 500 mg by mouth daily    Morbid obesity due to excess calories (H), Alcoholic cirrhosis of liver with ascites (H), Portal hypertension (H), Secondary esophageal varices without bleeding (H), Tobacco use disorder       pantoprazole 40 MG EC tablet    PROTONIX    90 tablet    Take 1 tablet (40 mg) by mouth daily    Morbid obesity due to excess calories (H), Alcoholic cirrhosis of liver with ascites (H), Portal hypertension (H), Secondary esophageal varices without bleeding (H), Tobacco use disorder       propranolol 10 MG tablet    INDERAL    180 tablet    Take 1 tablet (10 mg) by mouth 2 times daily    Portal hypertension (H)       ranitidine 150 MG tablet    ZANTAC    180 tablet    Take 1 tablet (150 mg) by mouth 2 times daily    Esophageal varices (H)       spironolactone 50 MG tablet    ALDACTONE    540 tablet    Takes 3 tablets in am.and p.m.    Portal hypertension (H), Generalized edema       STATIN NOT PRESCRIBED (INTENTIONAL)     0 each    1 each daily Statin not prescribed intentionally due to Active liver disease    Hyperlipidemia LDL goal <100       VITAMIN B-12 PO      Take 1 tablet by mouth daily        vitamin D3 2000 UNITS Caps     90 capsule    Take 1 capsule by mouth daily    Mild major depression (H)       * Notice:  This list has 2 medication(s) that are the same as other medications  prescribed for you. Read the directions carefully, and ask your doctor or other care provider to review them with you.

## 2017-07-19 NOTE — PROGRESS NOTES
Paracentesis Nursing Note  Lawrence Louie [facility].    During today's appointment orders from Meghna Simmons MD were completed.    Progress Note:  Patient identification verified by name and date of birth.  Assessment completed.  Vitals monitored throughout appointment and recorded in Doc Flowsheets.  See proceduralist note in ultrasound.    Date of consent or authorization: 06/21/2017.  Invasive Procedure Safety Checklist was completed and sent for scanning.     Paracentesis performed by Bren Toribio PA-C.    The following labs were communicated to provider performing paracentesis:  Lab Results   Component Value Date    PLT 96 07/12/2017       Total amount of ascites fluid drained: 7.6 liters.  Color of ascites fluid: yellow.  Total amount of albumin given: 50  grams.    Patient tolerated procedure well.    Post procedure,denies pain or discomfort post paracentesis.      Discharge Plan:  Discharge instructions were reviewed with patient.  Patient/Representative verbalized understanding and all questions were answered.   [facility] in stable condition.    Miranda Kenyon RN    Administrations This Visit     albumin human 25 % injection 12.5 g     Admin Date Action Dose Route Administered By             07/19/2017 New Bag 50 g Intravenous Mrianda Kenyon RN                    lidocaine 1 % 20 mL     Admin Date Action Dose Route Administered By             07/19/2017 Given by Other 20 mL Injection Miranda Kenyon RN                          /79  Pulse 68  Temp 97.4  F (36.3  C) (Oral)  Resp 16  Wt (!) 145.5 kg (320 lb 11.2 oz)  SpO2 97%  BMI 41.18 kg/m2

## 2017-07-26 ENCOUNTER — OFFICE VISIT (OUTPATIENT)
Dept: INFUSION THERAPY | Facility: CLINIC | Age: 62
End: 2017-07-26
Attending: INTERNAL MEDICINE
Payer: COMMERCIAL

## 2017-07-26 ENCOUNTER — RADIANT APPOINTMENT (OUTPATIENT)
Dept: ULTRASOUND IMAGING | Facility: CLINIC | Age: 62
End: 2017-07-26
Attending: INTERNAL MEDICINE
Payer: COMMERCIAL

## 2017-07-26 VITALS
RESPIRATION RATE: 18 BRPM | WEIGHT: 307 LBS | BODY MASS INDEX: 39.42 KG/M2 | TEMPERATURE: 97.8 F | DIASTOLIC BLOOD PRESSURE: 80 MMHG | HEART RATE: 63 BPM | SYSTOLIC BLOOD PRESSURE: 158 MMHG

## 2017-07-26 DIAGNOSIS — K70.31 ALCOHOLIC CIRRHOSIS OF LIVER WITH ASCITES (H): Primary | ICD-10-CM

## 2017-07-26 PROCEDURE — P9047 ALBUMIN (HUMAN), 25%, 50ML: HCPCS | Mod: ZF | Performed by: INTERNAL MEDICINE

## 2017-07-26 PROCEDURE — 27210190 US PARACENTESIS

## 2017-07-26 PROCEDURE — 25000125 ZZHC RX 250: Mod: ZF | Performed by: INTERNAL MEDICINE

## 2017-07-26 PROCEDURE — 25000128 H RX IP 250 OP 636: Mod: ZF | Performed by: INTERNAL MEDICINE

## 2017-07-26 RX ORDER — ALBUMIN (HUMAN) 12.5 G/50ML
12.5 SOLUTION INTRAVENOUS 4 TIMES DAILY PRN
Status: CANCELLED
Start: 2017-07-26

## 2017-07-26 RX ORDER — ALBUMIN (HUMAN) 12.5 G/50ML
12.5 SOLUTION INTRAVENOUS 4 TIMES DAILY PRN
Status: DISCONTINUED | OUTPATIENT
Start: 2017-07-26 | End: 2017-07-26 | Stop reason: HOSPADM

## 2017-07-26 RX ADMIN — ALBUMIN HUMAN 12.5 G: 0.25 SOLUTION INTRAVENOUS at 13:14

## 2017-07-26 RX ADMIN — ALBUMIN HUMAN 12.5 G: 0.25 SOLUTION INTRAVENOUS at 12:55

## 2017-07-26 RX ADMIN — ALBUMIN HUMAN 12.5 G: 0.25 SOLUTION INTRAVENOUS at 13:06

## 2017-07-26 RX ADMIN — LIDOCAINE HYDROCHLORIDE 20 ML: 10 INJECTION, SOLUTION INFILTRATION; PERINEURAL at 12:55

## 2017-07-26 RX ADMIN — ALBUMIN HUMAN 12.5 G: 0.25 SOLUTION INTRAVENOUS at 12:57

## 2017-07-26 NOTE — PROGRESS NOTES
Paracentesis Nursing Note  Lawrence Louie presents [facility] for a [facility]acentesis.    During today's appointment orders from Meghna Simmons MD were completed.    Progress Note:  Patient identification verified by name and date of birth.  Assessment completed.  Vitals monitored throughout appointment and recorded in Doc Flowsheets.  See proceduralist note in ultrasound.    Date of consent or authorization: 7/26/17.   Invasive Procedure Safety Checklist was completed and sent for scanning.     Paracentesis performed by Bren Toribio PA-C on the right side in the usual sterile fashion. Catheter appeared to have slipped out after draining 4.9 liters of fluid. There was still a drain-able amount left. Bren paged to come back and assess. Patient required a second poke to drain the remaining fluid.     The following labs were communicated to provider performing paracentesis:  Lab Results   Component Value Date    PLT 96 07/12/2017       Total amount of ascites fluid drained: 7.5 liters.  Color of ascites fluid: yellow.  Total amount of albumin given: 50  grams.    Patient tolerated procedure well.    Post procedure,denies pain or discomfort post paracentesis.     Administrations This Visit     albumin human 25 % injection 12.5 g     Admin Date Action Dose Route Administered By             07/26/2017 New Bag 12.5 g Intravenous Dulce Anderson RN              Admin Date Action Dose Route Administered By             07/26/2017 New Bag 12.5 g Intravenous Dulce Anderson RN              Admin Date Action Dose Route Administered By             07/26/2017 New Bag 12.5 g Intravenous Dulce Anderson RN              Admin Date Action Dose Route Administered By             07/26/2017 New Bag 12.5 g Intravenous Dulce Anderson RN                    lidocaine 1 % 20 mL     Admin Date Action Dose Route Administered By             07/26/2017 Given 20 mL Injection Dulce Anderson RN                             Discharge Plan:  Discharge instructions were reviewed with patient.  Patient/Representative verbalized understanding and all questions were answered.   Dis[facility] in stable condition.    Dulce Anderson RN        /89  Pulse 68  Temp 97.8  F (36.6  C) (Tympanic)  Resp 18  Wt 105.3 kg (232 lb 3.2 oz)  BMI 29.81 kg/m2

## 2017-07-26 NOTE — PATIENT INSTRUCTIONS
Discharge Instructions for Paracentesis  Paracentesis is a procedure to remove extra fluid from your belly (abdomen). Buildup of fluid in the abdomen is called ascites. The procedure may have been done to take a sample of the fluid. Or, it may have been done to drain the extra fluid from your abdomen.     Ascites is buildup of excess fluid in the abdomen.   Home care    If you have pain after the procedure, your health care provider can prescribe or recommend pain medicines. Take these exactly as directed. If you stopped taking other medicines before the procedure, ask your provider when you can start them again.    Take it easy for 24 hours after the procedure. Avoid physical activity until your provider says it s OK.    You will have a small bandage over the puncture site. Stitches (sutures), surgical staples, adhesive tapes, adhesive strips, or surgical glue may be used to close the incision. They also help stop bleeding and speed healing. You may take the bandage off in 24 hours.    Check the puncture site for the signs of infection listed below.  Follow-up care  Make a follow-up appointment with your health care provider as directed. During your follow-up visit, your provider will check your healing. Let your provider know how you are feeling. You can also discuss the cause of your ascites and whether you need any further treatment.  When to call the doctor  Call your health care provider if you notice any of the following after the procedure:    A fever of 100.4 F (38.0 C) or higher    Trouble breathing    Pain that doesn't go away even after taking pain medicine    Abdominal pain not caused by having the skin punctured    Bleeding from the puncture site    More than a small amount of fluid leaking from the puncture site    Swelling of the abdomen    Signs of infection at the puncture site. These include increased pain, redness, or swelling, warmth, or foul-smelling drainage.    Blood in your  urine    Dizziness, lightheadedness, or fainting      5171-4974 [employer]. [address] [place] [address], [place] [address]. All rights reserved. This information is not intended as a substitute for professional medical care. Always follow your healthcare professional's instructions.

## 2017-07-26 NOTE — MR AVS SNAPSHOT
After Visit Summary   7/26/2017    Lawrence Louie    MRN: [id number]           Patient Information     Date Of Birth          1955        Visit Information        Provider Department      7/26/2017 12:00 PM Provider, Uc Spec Inf Para; UC 39 ATC M [facility]        Today's Diagnoses     Alcoholic cirrhosis of liver with ascites (H)    -  1      Care Instructions      Discharge Instructions for Paracentesis  Paracentesis is a procedure to remove extra fluid from your belly (abdomen). Buildup of fluid in the abdomen is called ascites. The procedure may have been done to take a sample of the fluid. Or, it may have been done to drain the extra fluid from your abdomen.     Ascites is buildup of excess fluid in the abdomen.   Home care    If you have pain after the procedure, your health care provider can prescribe or recommend pain medicines. Take these exactly as directed. If you stopped taking other medicines before the procedure, ask your provider when you can start them again.    Take it easy for 24 hours after the procedure. Avoid physical activity until your provider says it s OK.    You will have a small bandage over the puncture site. Stitches (sutures), surgical staples, adhesive tapes, adhesive strips, or surgical glue may be used to close the incision. They also help stop bleeding and speed healing. You may take the bandage off in 24 hours.    Check the puncture site for the signs of infection listed below.  Follow-up care  Make a follow-up appointment with your health care provider as directed. During your follow-up visit, your provider will check your healing. Let your provider know how you are feeling. You can also discuss the cause of your ascites and whether you need any further treatment.  When to call the doctor  Call your health care provider if you notice any of the following after the procedure:    A fever of 100.4 F (38.0 C) or  higher    Trouble breathing    Pain that doesn't go away even after taking pain medicine    Abdominal pain not caused by having the skin punctured    Bleeding from the puncture site    More than a small amount of fluid leaking from the puncture site    Swelling of the abdomen    Signs of infection at the puncture site. These include increased pain, redness, or swelling, warmth, or foul-smelling drainage.    Blood in your urine    Dizziness, lightheadedness, or fainting      0442-5945 [employer]. [address] [place] [address]. All rights reserved. This information is not intended as a substitute for professional medical care. Always follow your healthcare professional's instructions.                Follow-ups after your visit        Your next 10 appointments already scheduled     Jul 27, 2017  3:20 PM CDT   Return Visit with Mathew Eason MD   [facility] ([facility])    [address] [place] [address]   [phone]            Aug 02, 2017 12:00 PM CDT   Paracentesis Visit with Uc Spec Inf Para Provider, UC 39 ATC   [facility] Specialty and Procedure ([facility])    [address] [address]   [phone]            Aug 09, 2017 12:00 PM CDT   Paracentesis Visit with Uc Spec Inf Para Provider, UC 39 ATC   [facility] Procedure ([facility])    [address] [address]   [phone]            Aug 16, 2017 12:00 PM CDT   Paracentesis Visit with Uc Spec Inf Para Provider, UC 39 ATC   [facility] Procedure ([facility])    [address] [address]   [phone]            Aug 23, 2017 12:00 PM CDT   Paracentesis Visit with Uc Spec Inf Para Provider, UC 39 ATC   M  [facility] Specialty [facility] Procedure ([facility])    [address] [facility]  2nd [facility] [id number] [phone]            Aug 30, 2017 12:00 PM CDT   Paracentesis Visit with  Spec Inf Para Provider   [facility] Specialty and Procedure ([facility])    [address] [facility]  2nd [facility] [id number] [phone]              Who to contact     If you have questions or need follow up information about today's clinic visit or your schedule please contact [facility] SPECIALTY AND PROCEDURE directly at [phone].  Normal or non-critical lab and imaging results will be communicated to you by BioCriticahart, letter or phone within 4 business days after the clinic has received the results. If you do not hear from us within 7 days, please contact the clinic through GroupStreamt or phone. If you have a critical or abnormal lab result, we will notify you by phone as soon as possible.  Submit refill requests through Qloud or call your pharmacy and they will forward the refill request to us. Please allow 3 business days for your refill to be completed.          Additional Information About Your Visit        BioCriticaharSurfly Information     Qloud gives you secure access to your electronic health record. If you see a primary care provider, you can also send messages to your care team and make appointments. If you have questions, please call your primary care clinic.  If you do not have a primary care provider, please call [phone] and they will assist you.        Care EveryWhere ID     This is your Care EveryWhere ID. This could be used by other organizations to access your [place] medical records  [id number]        Your Vitals Were     Pulse Temperature Respirations BMI (Body Mass Index)          63 97.8  F (36.6  C) (Tympanic) 18 39.42 kg/m2         Blood Pressure from Last 3 Encounters:    07/26/17 158/80   07/19/17 107/59   07/12/17 118/56    Weight from Last 3 Encounters:   07/26/17 (!) 139.3 kg (307 lb)   07/19/17 (!) 137.9 kg (304 lb)   07/12/17 (!) 138.8 kg (306 lb)              We Performed the Following     US Paracentesis        Primary Care Provider Office Phone # Fax #    Ary Murphy, CORY [phone]       PRIMARY CARE CENTER [address] [place] [address] [phone]  [facility] [address]        Equal Access to Services     JOSEF LORENZO : Hadii sil ku hadasho Soanthony, waaxda luqadaha, qaybta kaalmada danielayadeysi, silvia richardson . So [facility] [phone].    ATENCIÓN: Si habla español, tiene a rondon disposición servicios gratuitos de asistencia lingüística. [facility] [phone].    We comply with applicable federal civil rights laws and [place] laws. We do not discriminate on the basis of race, color, national origin, age, disability sex, sexual orientation or gender identity.            Thank you!     Thank you for choosing [facility] SPECIALTY AND PROCEDURE  for your care. Our goal is always to provide you with excellent care. Hearing back from our patients is one way we can continue to improve our services. Please take a few minutes to complete the written survey that you may receive in the mail after your visit with us. Thank you!             Your Updated Medication List - Protect others around you: Learn how to safely use, store and throw away your medicines at www.disposemymeds.org.          This list is accurate as of: 7/26/17  2:34 PM.  Always use your most recent med list.                   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    Diabetes mellitus, type  2 (H)       blood glucose monitoring meter device kit    no brand specified    1 kit    Use to test blood sugar 4 X  times daily or as directed.    Diabetes mellitus, type 2 (H)       blood glucose monitoring test strip    no brand specified    200 strip    Use to test blood sugars 4 X  times daily or as directed    Diabetes mellitus, type 2 (H)       bumetanide 1 MG tablet    BUMEX    90 tablet    Take 1 tablet (1 mg) by mouth 2 times daily    Generalized edema       * calcium-vitamin D 600-400 MG-UNIT per tablet    CALTRATE     Take by mouth daily    Morbid obesity due to excess calories (H), Alcoholic cirrhosis of liver with ascites (H), Portal hypertension (H), Secondary esophageal varices without bleeding (H), Tobacco use disorder       * calcium carb 1250 mg (500 mg [place])/vitamin D 200 units 500-200 MG-UNIT per tablet    OSCAL with D    90 tablet    Take 1 tablet by mouth daily    Hypotestosteronism       DESVENLAFAXINE ER PO      Take 100 mg by mouth daily 2 tablets once daily.    Morbid obesity due to excess calories (H), Alcoholic cirrhosis of liver with ascites (H), Portal hypertension (H), Secondary esophageal varices without bleeding (H), Tobacco use disorder       desvenlafaxine succinate 100 MG 24 hr tablet    PRISTIQ    180 tablet    Take 2 tablets (200 mg) by mouth daily    Other depression       furosemide 20 MG tablet    LASIX    270 tablet    Take 2 tabs in morning and 1 tablet at noon.    Generalized edema       glipiZIDE 5 MG 24 hr tablet    glipiZIDE XL    180 tablet    1 tablet twice a day    Type 2 diabetes mellitus without complication (H)       insulin aspart 100 UNIT/ML injection    NovoLOG FLEXPEN    30 mL    20 units before breakfast, 20 units before lunch, 20 units before dinner, 20 units before snacks    Type 2 diabetes mellitus without complication (H)       insulin glargine 100 UNIT/ML injection    LANTUS SOLOSTAR    30 mL    Inject 40 Units Subcutaneous At Bedtime    Type 2 diabetes  mellitus with other oral complication, unspecified long term insulin use status (H)       insulin pen needle 31G X 8 MM    B-D U/F    300 each    USE  6 times daily / OR AS DIRECTED    Type 2 diabetes, HbA1c goal < 7% (H)       magnesium 250 MG tablet      Take 1 tablet by mouth daily Takes 400 mg daily.Deneen Chin LPN 2:34 PM on 12/22/2015        ondansetron 4 MG tablet    ZOFRAN    18 tablet    Take 1 tablet (4 mg) by mouth every 8 hours as needed for nausea    Alcoholic cirrhosis (H), Nausea       order for DME     1 Units    Equipment being ordered: carpal tunnel wrist splint.    CTS (carpal tunnel syndrome)       OYSCO 500 + D 500-200 MG-UNIT Tabs   Generic drug:  Calcium Carb-Cholecalciferol      Take 500 mg by mouth daily    Morbid obesity due to excess calories (H), Alcoholic cirrhosis of liver with ascites (H), Portal hypertension (H), Secondary esophageal varices without bleeding (H), Tobacco use disorder       pantoprazole 40 MG EC tablet    PROTONIX    90 tablet    Take 1 tablet (40 mg) by mouth daily    Morbid obesity due to excess calories (H), Alcoholic cirrhosis of liver with ascites (H), Portal hypertension (H), Secondary esophageal varices without bleeding (H), Tobacco use disorder       propranolol 10 MG tablet    INDERAL    180 tablet    Take 1 tablet (10 mg) by mouth 2 times daily    Portal hypertension (H)       ranitidine 150 MG tablet    ZANTAC    180 tablet    Take 1 tablet (150 mg) by mouth 2 times daily    Esophageal varices (H)       spironolactone 50 MG tablet    ALDACTONE    540 tablet    Takes 3 tablets in am.and p.m.    Portal hypertension (H), Generalized edema       STATIN NOT PRESCRIBED (INTENTIONAL)     0 each    1 each daily Statin not prescribed intentionally due to Active liver disease    Hyperlipidemia LDL goal <100       VITAMIN B-12 PO      Take 1 tablet by mouth daily        vitamin D3 2000 UNITS Caps     90 capsule    Take 1 capsule by mouth daily    Mild major  depression (H)       * Notice:  This list has 2 medication(s) that are the same as other medications prescribed for you. Read the directions carefully, and ask your doctor or other care provider to review them with you.

## 2017-07-27 ENCOUNTER — OFFICE VISIT (OUTPATIENT)
Dept: ORTHOPEDICS | Facility: CLINIC | Age: 62
End: 2017-07-27
Payer: COMMERCIAL

## 2017-07-27 VITALS — HEART RATE: 61 BPM | DIASTOLIC BLOOD PRESSURE: 72 MMHG | SYSTOLIC BLOOD PRESSURE: 130 MMHG

## 2017-07-27 DIAGNOSIS — M17.12 PRIMARY OSTEOARTHRITIS OF LEFT KNEE: Primary | ICD-10-CM

## 2017-07-27 PROCEDURE — 20610 DRAIN/INJ JOINT/BURSA W/O US: CPT | Mod: LT | Performed by: PREVENTIVE MEDICINE

## 2017-07-27 PROCEDURE — 99207 ZZC NO CHARGE LOS: CPT | Performed by: PREVENTIVE MEDICINE

## 2017-07-27 ASSESSMENT — PAIN SCALES - GENERAL: PAINLEVEL: EXTREME PAIN (8)

## 2017-07-27 NOTE — MR AVS SNAPSHOT
After Visit Summary   7/27/2017    Lawrence Louie    MRN: [id number]           Patient Information     Date Of Birth          1955        Visit Information        Provider Department      7/27/2017 3:20 PM Mathew Eason MD [facility]        Today's Diagnoses     Primary osteoarthritis of left knee    -  1      Care Instructions    Thanks for coming today.  Ortho/Sports Medicine Clinic  [address] [place] [address]    To schedule future appointments in Ortho Clinic, you may call [phone].    To schedule ordered imaging by your Provider: Call [facility] at [phone]    [employer] available online at:   [email or web address]    Please call if any further questions or concerns [phone] and ask for the Orthopedic Department. Clinic hours 8 am to 5 pm.    Return to clinic if symptoms worsen.          Follow-ups after your visit        Your next 10 appointments already scheduled     Aug 02, 2017 12:00 PM CDT   Paracentesis Visit with Uc Spec Inf Para Provider,  39 ATC   [facility] Procedure ([facility])    [address] [place] [id number] [phone]            Aug 09, 2017 12:00 PM CDT   Paracentesis Visit with Uc Spec Inf Para Provider,  39 ATC   [facility] ([facility])    [address] [place] [id number] [phone]            Aug 16, 2017 12:00 PM CDT   Paracentesis Visit with Uc Spec Inf Para Provider,  39 ATC   [facility] ([facility])    [address] [id number] [phone]            Aug 23, 2017 12:00 PM CDT   Paracentesis Visit with Uc Spec Inf Para Provider, UC 39 ATC   [facility] Specialty and  Procedure ([facility])    [address] [facility]  2nd Floor  [facility] [address] [phone]            Aug 30, 2017 12:00 PM CDT   Paracentesis Visit with Cristobal Spec Inf Para Provider, UC 39 ATC   [facility] Specialty and Procedure ([facility])    [address] [facility]  [address]nd [facility] [address] [phone]              Who to contact     If you have questions or need follow up information about today's clinic visit or your schedule please contact [facility] directly at [phone].  Normal or non-critical lab and imaging results will be communicated to you by Swink.tvhart, letter or phone within 4 business days after the clinic has received the results. If you do not hear from us within 7 days, please contact the clinic through Swink.tvhart or phone. If you have a critical or abnormal lab result, we will notify you by phone as soon as possible.  Submit refill requests through Ticket Cake or call your pharmacy and they will forward the refill request to us. Please allow 3 business days for your refill to be completed.          Additional Information About Your Visit        Swink.tvharGertrude Information     Ticket Cake gives you secure access to your electronic health record. If you see a primary care provider, you can also send messages to your care team and make appointments. If you have questions, please call your primary care clinic.  If you do not have a primary care provider, please call [phone] and they will assist you.      Ticket Cake is an electronic gateway that provides easy, online access to your medical records. With Ticket Cake, you can request a clinic appointment, read your test results, renew a prescription or communicate with your care team.     To access your existing account, please contact your [facility] or call [phone] for assistance.        Care EveryWhere ID     This is your  Care EveryWhere ID. This could be used by other organizations to access your [place] medical records  [id number]        Your Vitals Were     Pulse                   61            Blood Pressure from Last 3 Encounters:   07/27/17 130/72   07/26/17 158/80   07/19/17 107/59    Weight from Last 3 Encounters:   07/26/17 (!) 139.3 kg (307 lb)   07/19/17 (!) 137.9 kg (304 lb)   07/12/17 (!) 138.8 kg (306 lb)              We Performed the Following     DRAIN/INJECT LARGE JOINT/BURSA     SYNVISC PER 1 MG [] - specify units in quantity        Primary Care Provider Office Phone # Fax #    Ary JACKSON Katherine, APRN [phone]       PRIMARY CARE CENTER [address] [place] [address] [address] [place] [address]        Equal Access to Services     JOSEF LORENZO : Hadii sil ku hadasho Soomaali, waaxda luqadaha, qaybta kaalmada adeegyada, silvia galaviz haymiguel richardson . So [facility] [phone].    ATENCIÓN: Si habla español, tiene a rondon disposición servicios gratuitos de asistencia lingüística. Llame al [phone].    We comply with applicable federal civil rights laws and [place] laws. We do not discriminate on the basis of race, color, national origin, age, disability sex, sexual orientation or gender identity.            Thank you!     Thank you for choosing [facility]  for your care. Our goal is always to provide you with excellent care. Hearing back from our patients is one way we can continue to improve our services. Please take a few minutes to complete the written survey that you may receive in the mail after your visit with us. Thank you!             Your Updated Medication List - Protect others around you: Learn how to safely use, store and throw away your medicines at www.disposemymeds.org.          This list is accurate as of: 7/27/17 11:59 PM.  Always use your most recent med list.                   Brand Name Dispense Instructions for use Diagnosis    aspirin 81 MG tablet     30  tablet    Take 1 tablet (81 mg) by mouth daily    Type 2 diabetes mellitus with other skin complications (H)       blood glucose lancets standard    no brand specified    1 Box    Use to test blood sugar 4 X  times daily or as directed.    Diabetes mellitus, type 2 (H)       blood glucose monitoring meter device kit    no brand specified    1 kit    Use to test blood sugar 4 X  times daily or as directed.    Diabetes mellitus, type 2 (H)       blood glucose monitoring test strip    no brand specified    200 strip    Use to test blood sugars 4 X  times daily or as directed    Diabetes mellitus, type 2 (H)       bumetanide 1 MG tablet    BUMEX    90 tablet    Take 1 tablet (1 mg) by mouth 2 times daily    Generalized edema       * calcium-vitamin D 600-400 MG-UNIT per tablet    CALTRATE     Take by mouth daily    Morbid obesity due to excess calories (H), Alcoholic cirrhosis of liver with ascites (H), Portal hypertension (H), Secondary esophageal varices without bleeding (H), Tobacco use disorder       * calcium carb 1250 mg (500 mg [place])/vitamin D 200 units 500-200 MG-UNIT per tablet    OSCAL with D    90 tablet    Take 1 tablet by mouth daily    Hypotestosteronism       DESVENLAFAXINE ER PO      Take 100 mg by mouth daily 2 tablets once daily.    Morbid obesity due to excess calories (H), Alcoholic cirrhosis of liver with ascites (H), Portal hypertension (H), Secondary esophageal varices without bleeding (H), Tobacco use disorder       desvenlafaxine succinate 100 MG 24 hr tablet    PRISTIQ    180 tablet    Take 2 tablets (200 mg) by mouth daily    Other depression       furosemide 20 MG tablet    LASIX    270 tablet    Take 2 tabs in morning and 1 tablet at noon.    Generalized edema       glipiZIDE 5 MG 24 hr tablet    glipiZIDE XL    180 tablet    1 tablet twice a day    Type 2 diabetes mellitus without complication (H)       insulin aspart 100 UNIT/ML injection    NovoLOG FLEXPEN    30 mL    20 units before  breakfast, 20 units before lunch, 20 units before dinner, 20 units before snacks    Type 2 diabetes mellitus without complication (H)       insulin glargine 100 UNIT/ML injection    LANTUS SOLOSTAR    30 mL    Inject 40 Units Subcutaneous At Bedtime    Type 2 diabetes mellitus with other oral complication, unspecified long term insulin use status (H)       insulin pen needle 31G X 8 MM    B-D U/F    300 each    USE  6 times daily / OR AS DIRECTED    Type 2 diabetes, HbA1c goal < 7% (H)       magnesium 250 MG tablet      Take 1 tablet by mouth daily Takes 400 mg daily.Deneen Chin LPN 2:34 PM on 12/22/2015        ondansetron 4 MG tablet    ZOFRAN    18 tablet    Take 1 tablet (4 mg) by mouth every 8 hours as needed for nausea    Alcoholic cirrhosis (H), Nausea       order for DME     1 Units    Equipment being ordered: carpal tunnel wrist splint.    CTS (carpal tunnel syndrome)       OYSCO 500 + D 500-200 MG-UNIT Tabs   Generic drug:  Calcium Carb-Cholecalciferol      Take 500 mg by mouth daily    Morbid obesity due to excess calories (H), Alcoholic cirrhosis of liver with ascites (H), Portal hypertension (H), Secondary esophageal varices without bleeding (H), Tobacco use disorder       pantoprazole 40 MG EC tablet    PROTONIX    90 tablet    Take 1 tablet (40 mg) by mouth daily    Morbid obesity due to excess calories (H), Alcoholic cirrhosis of liver with ascites (H), Portal hypertension (H), Secondary esophageal varices without bleeding (H), Tobacco use disorder       propranolol 10 MG tablet    INDERAL    180 tablet    Take 1 tablet (10 mg) by mouth 2 times daily    Portal hypertension (H)       ranitidine 150 MG tablet    ZANTAC    180 tablet    Take 1 tablet (150 mg) by mouth 2 times daily    Esophageal varices (H)       spironolactone 50 MG tablet    ALDACTONE    540 tablet    Takes 3 tablets in am.and p.m.    Portal hypertension (H), Generalized edema       STATIN NOT PRESCRIBED (INTENTIONAL)     0 each     1 each daily Statin not prescribed intentionally due to Active liver disease    Hyperlipidemia LDL goal <100       VITAMIN B-12 PO      Take 1 tablet by mouth daily        vitamin D3 2000 UNITS Caps     90 capsule    Take 1 capsule by mouth daily    Mild major depression (H)       * Notice:  This list has 2 medication(s) that are the same as other medications prescribed for you. Read the directions carefully, and ask your doctor or other care provider to review them with you.

## 2017-07-27 NOTE — NURSING NOTE
"Lawrence Louie's goals for this visit include: Left knee Synvisc injection.   He requests these members of his care team be copied on today's visit information: no    PCP: Ary Murphy    Referring Provider:  No referring provider defined for this encounter.    Chief Complaint   Patient presents with     RECHECK     Follow up for left knee. Requesting Synvisc injection.       Initial /72  Pulse 61 Estimated body mass index is 39.42 kg/(m^2) as calculated from the following:    Height as of 4/11/17: 1.88 m (6' 2\").    Weight as of 7/26/17: 139.3 kg (307 lb).  Medication Reconciliation: complete  "

## 2017-07-27 NOTE — PATIENT INSTRUCTIONS
Thanks for coming today.  Ortho/Sports Medicine Clinic  [address] [place] [address]    To schedule future appointments in Ortho Clinic, you may call [phone].    To schedule ordered imaging by your Provider: Call [facility] at [phone]    [employer] available online at:   [email or web address]    Please call if any further questions or concerns [phone] and ask for the Orthopedic Department. Clinic hours 8 am to 5 pm.    Return to clinic if symptoms worsen.

## 2017-07-27 NOTE — PROGRESS NOTES
Left Knee Injection   synvisc  Had last year and helped for greater than 6 months, here for another.  The patient was informed of the risks and the benefits of the procedure and a written consent was signed.  The patient s knee was prepped with chlorhexidine in sterile fashion.   SYNVISC-ONE NDC [address]-23 WAS INJECTED    Injection was performed using sterile technique. 1.5-inch 22-gauge needle was used to enter the superolateral aspect of the knee and inject .  There were no complications. The patient tolerated the procedure well. There was negligible bleeding.   The patient was instructed to ice the knee upon leaving clinic and refrain from overuse over the next 3 days.   The patient was instructed to call or go to the emergency room with any unusual pain, swelling, redness, or if otherwise concerned.  A follow up appointment will be scheduled to evaluate response to the injection, and to assess range of motion and pain.  followup in 6 months for repeat synvisc injection  Dr Eason

## 2017-07-31 ENCOUNTER — TELEPHONE (OUTPATIENT)
Dept: ORTHOPEDICS | Facility: CLINIC | Age: 62
End: 2017-07-31

## 2017-07-31 NOTE — TELEPHONE ENCOUNTER
[facility] Call Center    Phone Message    Name of Caller: Lawrence    Phone Number: Home number on file [phone] (home)    Best time to return call: Any    May a detailed message be left on voicemail: yes    Relation to patient: Self    Reason for Call: Lawrence called to confirm that he received a gel injection and not just Cortisone.  Lawrence said the injection has given him no relief.  Requesting a call to discuss.  Thank you.      Action Taken: Message routed to:  Adult Clinics: Sports Medicine p [phone]

## 2017-08-01 NOTE — TELEPHONE ENCOUNTER
Returned call to patient- he just wanted to verify that the injection he received on the 27th of July with Dr. Eason.     All questions answered.

## 2017-08-02 ENCOUNTER — RADIANT APPOINTMENT (OUTPATIENT)
Dept: ULTRASOUND IMAGING | Facility: CLINIC | Age: 62
End: 2017-08-02
Attending: INTERNAL MEDICINE
Payer: COMMERCIAL

## 2017-08-02 ENCOUNTER — OFFICE VISIT (OUTPATIENT)
Dept: INFUSION THERAPY | Facility: CLINIC | Age: 62
End: 2017-08-02
Attending: INTERNAL MEDICINE
Payer: COMMERCIAL

## 2017-08-02 VITALS
WEIGHT: 315 LBS | TEMPERATURE: 97.6 F | HEART RATE: 64 BPM | DIASTOLIC BLOOD PRESSURE: 72 MMHG | RESPIRATION RATE: 18 BRPM | BODY MASS INDEX: 41.69 KG/M2 | SYSTOLIC BLOOD PRESSURE: 134 MMHG

## 2017-08-02 DIAGNOSIS — K70.31 ALCOHOLIC CIRRHOSIS OF LIVER WITH ASCITES (H): Primary | ICD-10-CM

## 2017-08-02 PROCEDURE — P9047 ALBUMIN (HUMAN), 25%, 50ML: HCPCS | Mod: ZF | Performed by: INTERNAL MEDICINE

## 2017-08-02 PROCEDURE — 25000128 H RX IP 250 OP 636: Mod: ZF | Performed by: INTERNAL MEDICINE

## 2017-08-02 PROCEDURE — 27210190 US PARACENTESIS

## 2017-08-02 PROCEDURE — 25000125 ZZHC RX 250: Mod: ZF | Performed by: INTERNAL MEDICINE

## 2017-08-02 RX ORDER — ALBUMIN (HUMAN) 12.5 G/50ML
12.5 SOLUTION INTRAVENOUS 4 TIMES DAILY PRN
Status: DISCONTINUED | OUTPATIENT
Start: 2017-08-02 | End: 2017-08-02 | Stop reason: HOSPADM

## 2017-08-02 RX ORDER — ALBUMIN (HUMAN) 12.5 G/50ML
12.5 SOLUTION INTRAVENOUS 4 TIMES DAILY PRN
Status: CANCELLED
Start: 2017-08-02

## 2017-08-02 RX ADMIN — ALBUMIN HUMAN 12.5 G: 0.25 SOLUTION INTRAVENOUS at 13:37

## 2017-08-02 RX ADMIN — ALBUMIN HUMAN 12.5 G: 0.25 SOLUTION INTRAVENOUS at 13:23

## 2017-08-02 RX ADMIN — ALBUMIN HUMAN 12.5 G: 0.25 SOLUTION INTRAVENOUS at 13:14

## 2017-08-02 RX ADMIN — LIDOCAINE HYDROCHLORIDE 20 ML: 10 INJECTION, SOLUTION INFILTRATION; PERINEURAL at 13:00

## 2017-08-02 RX ADMIN — ALBUMIN HUMAN 12.5 G: 0.25 SOLUTION INTRAVENOUS at 13:10

## 2017-08-02 NOTE — MR AVS SNAPSHOT
After Visit Summary   8/2/2017    Lawrence Louie    MRN: [id number]           Patient Information     Date Of Birth          1955        Visit Information        Provider Department      8/2/2017 12:00 PM Vipin Fisher PA-C; [facility]        Today's Diagnoses     Alcoholic cirrhosis of liver with ascites (H)    -  1       Follow-ups after your visit        Your next 10 appointments already scheduled     Aug 09, 2017 12:00 PM CDT   Paracentesis Visit with [facility] Spec Inf Para Provider, [facility]   [facility] ([facility])    [address]  [address] [facility] [address]   [phone]            Aug 16, 2017 12:00 PM CDT   Paracentesis Visit with Uc Spec Inf Para Provider, UC 39 ATC   [facility] ([facility])    [address]  [address] [facility] [id number] [phone]            Aug 23, 2017 12:00 PM CDT   Paracentesis Visit with Uc Spec Inf Para Provider, UC 39 ATC   [facility] ([facility])    [address]  [address] [facility] [id number] [phone]            Aug 30, 2017 12:00 PM CDT   Paracentesis Visit with Uc Spec Inf Para Provider, UC 39 ATC   [facility] ([facility])    [address]  [address] [facility] [id number] [phone]            Sep 06, 2017 12:00 PM CDT   Paracentesis Visit with Uc Spec Inf Para Provider, UC 39 ATC   [facility] ([facility])    [address]  2nd [address]  [facility] [id number] [phone]              Who to contact     If you have questions or need follow up  information about today's clinic visit or your schedule please contact [facility] directly at [phone].  Normal or non-critical lab and imaging results will be communicated to you by [employer], letter or phone within 4 business days after the clinic has received the results. If you do not hear from us within 7 days, please contact the clinic through [employer] or phone. If you have a critical or abnormal lab result, we will notify you by phone as soon as possible.  Submit refill requests through [employer] or call your pharmacy and they will forward the refill request to us. Please allow 3 business days for your refill to be completed.          Additional Information About Your Visit        [employer] Information     [employer] gives you secure access to your electronic health record. If you see a primary care provider, you can also send messages to your care team and make appointments. If you have questions, please call your primary care clinic.  If you do not have a primary care provider, please call [phone] and they will assist you.        Care EveryWhere ID     This is your Care EveryWhere ID. This could be used by other organizations to access your [facility] medical records  [id number]        Your Vitals Were     Pulse Temperature Respirations BMI (Body Mass Index)          64 97.6  F (36.4  C) (Oral) 18 41.69 kg/m2         Blood Pressure from Last 3 Encounters:   08/02/17 134/72   07/27/17 130/72   07/26/17 158/80    Weight from Last 3 Encounters:   08/02/17 (!) 147.3 kg (324 lb 11.8 oz)   07/26/17 (!) 139.3 kg (307 lb)   07/19/17 (!) 137.9 kg (304 lb)              We Performed the Following     US Paracentesis        Primary Care Provider Office Phone # Fax #    Ary CORY López [phone]       [facility] [address]  [facility] [address]        Equal Access to Services     JOSEF LORENZO AH: Jose Eduardo Del Toro,  dianada barbarapb, qaybta karosibel cohen, silvia bobbyaagerson ah. So [facility] [phone].    ATENCIÓN: Si zarina myers, tiene a rondon disposición servicios gratuitos de asistencia lingüística. Llame al [phone].    We comply with applicable federal civil rights laws and [place] laws. We do not discriminate on the basis of race, color, national origin, age, disability sex, sexual orientation or gender identity.            Thank you!     Thank you for choosing [facility]  for your care. Our goal is always to provide you with excellent care. Hearing back from our patients is one way we can continue to improve our services. Please take a few minutes to complete the written survey that you may receive in the mail after your visit with us. Thank you!             Your Updated Medication List - Protect others around you: Learn how to safely use, store and throw away your medicines at www.disposemymeds.org.          This list is accurate as of: 8/2/17  2:51 PM.  Always use your most recent med list.                   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    Diabetes mellitus, type 2 (H)       blood glucose monitoring meter device kit    no brand specified    1 kit    Use to test blood sugar 4 X  times daily or as directed.    Diabetes mellitus, type 2 (H)       blood glucose monitoring test strip    no brand specified    200 strip    Use to test blood sugars 4 X  times daily or as directed    Diabetes mellitus, type 2 (H)       bumetanide 1 MG tablet    BUMEX    90 tablet    Take 1 tablet (1 mg) by mouth 2 times daily    Generalized edema       * calcium-vitamin D 600-400 MG-UNIT  per tablet    CALTRATE     Take by mouth daily    Morbid obesity due to excess calories (H), Alcoholic cirrhosis of liver with ascites (H), Portal hypertension (H), Secondary esophageal varices without bleeding (H), Tobacco use disorder       * calcium carb 1250 mg (500 mg [place])/vitamin D 200 units 500-200 MG-UNIT per tablet    OSCAL with D    90 tablet    Take 1 tablet by mouth daily    Hypotestosteronism       DESVENLAFAXINE ER PO      Take 100 mg by mouth daily 2 tablets once daily.    Morbid obesity due to excess calories (H), Alcoholic cirrhosis of liver with ascites (H), Portal hypertension (H), Secondary esophageal varices without bleeding (H), Tobacco use disorder       desvenlafaxine succinate 100 MG 24 hr tablet    PRISTIQ    180 tablet    Take 2 tablets (200 mg) by mouth daily    Other depression       furosemide 20 MG tablet    LASIX    270 tablet    Take 2 tabs in morning and 1 tablet at noon.    Generalized edema       glipiZIDE 5 MG 24 hr tablet    glipiZIDE XL    180 tablet    1 tablet twice a day    Type 2 diabetes mellitus without complication (H)       insulin aspart 100 UNIT/ML injection    NovoLOG FLEXPEN    30 mL    20 units before breakfast, 20 units before lunch, 20 units before dinner, 20 units before snacks    Type 2 diabetes mellitus without complication (H)       insulin glargine 100 UNIT/ML injection    LANTUS SOLOSTAR    30 mL    Inject 40 Units Subcutaneous At Bedtime    Type 2 diabetes mellitus with other oral complication, unspecified long term insulin use status (H)       insulin pen needle 31G X 8 MM    B-D U/F    300 each    USE  6 times daily / OR AS DIRECTED    Type 2 diabetes, HbA1c goal < 7% (H)       magnesium 250 MG tablet      Take 1 tablet by mouth daily Takes 400 mg daily.Deneen Chin LPN 2:34 PM on 12/22/2015        ondansetron 4 MG tablet    ZOFRAN    18 tablet    Take 1 tablet (4 mg) by mouth every 8 hours as needed for nausea    Alcoholic cirrhosis (H), Nausea        order for DME     1 Units    Equipment being ordered: carpal tunnel wrist splint.    CTS (carpal tunnel syndrome)       OYSCO 500 + D 500-200 MG-UNIT Tabs   Generic drug:  Calcium Carb-Cholecalciferol      Take 500 mg by mouth daily    Morbid obesity due to excess calories (H), Alcoholic cirrhosis of liver with ascites (H), Portal hypertension (H), Secondary esophageal varices without bleeding (H), Tobacco use disorder       pantoprazole 40 MG EC tablet    PROTONIX    90 tablet    Take 1 tablet (40 mg) by mouth daily    Morbid obesity due to excess calories (H), Alcoholic cirrhosis of liver with ascites (H), Portal hypertension (H), Secondary esophageal varices without bleeding (H), Tobacco use disorder       propranolol 10 MG tablet    INDERAL    180 tablet    Take 1 tablet (10 mg) by mouth 2 times daily    Portal hypertension (H)       ranitidine 150 MG tablet    ZANTAC    180 tablet    Take 1 tablet (150 mg) by mouth 2 times daily    Esophageal varices (H)       spironolactone 50 MG tablet    ALDACTONE    540 tablet    Takes 3 tablets in am.and p.m.    Portal hypertension (H), Generalized edema       STATIN NOT PRESCRIBED (INTENTIONAL)     0 each    1 each daily Statin not prescribed intentionally due to Active liver disease    Hyperlipidemia LDL goal <100       VITAMIN B-12 PO      Take 1 tablet by mouth daily        vitamin D3 2000 UNITS Caps     90 capsule    Take 1 capsule by mouth daily    Mild major depression (H)       * Notice:  This list has 2 medication(s) that are the same as other medications prescribed for you. Read the directions carefully, and ask your doctor or other care provider to review them with you.

## 2017-08-02 NOTE — PROGRESS NOTES
Paracentesis Nursing Note  Lawrence Louie presents [facility].    During today's appointment orders from Meghna Simmons MD were completed.    Progress Note:  Patient identification verified by name and date of birth.  Assessment completed.  Vitals monitored throughout appointment and recorded in Doc Flowsheets.  See proceduralist note in ultrasound.    Date of consent or authorization: 7/26/17.  Invasive Procedure Safety Checklist was completed and sent for scanning.     Paracentesis performed by Vipin Fisher PA-C Radiology.    The following labs were communicated to provider performing paracentesis:  Lab Results   Component Value Date    PLT 96 07/12/2017       Total amount of ascites fluid drained: 7.8 liters.  Color of ascites fluid: yellow.  Total amount of albumin given: 50  grams.    Patient tolerated procedure well.    Post procedure,denies pain or discomfort post paracentesis.      Discharge Plan:  Discharge instructions were reviewed with patient.  Patient/Representative verbalized understanding and all questions were answered.   Discharged from [facility] in stable condition.    Zahra Murphy RN        /74  Pulse 69  Temp 97.6  F (36.4  C) (Oral)  Resp 18  Wt (!) 147.3 kg (324 lb 11.8 oz)  BMI 41.69 kg/m2

## 2017-08-09 ENCOUNTER — RADIANT APPOINTMENT (OUTPATIENT)
Dept: ULTRASOUND IMAGING | Facility: CLINIC | Age: 62
End: 2017-08-09
Attending: INTERNAL MEDICINE
Payer: COMMERCIAL

## 2017-08-09 ENCOUNTER — OFFICE VISIT (OUTPATIENT)
Dept: INFUSION THERAPY | Facility: CLINIC | Age: 62
End: 2017-08-09
Attending: INTERNAL MEDICINE
Payer: COMMERCIAL

## 2017-08-09 VITALS
HEART RATE: 69 BPM | RESPIRATION RATE: 18 BRPM | BODY MASS INDEX: 39.17 KG/M2 | TEMPERATURE: 98.1 F | WEIGHT: 305.1 LBS | SYSTOLIC BLOOD PRESSURE: 120 MMHG | DIASTOLIC BLOOD PRESSURE: 59 MMHG

## 2017-08-09 DIAGNOSIS — K70.31 ALCOHOLIC CIRRHOSIS OF LIVER WITH ASCITES (H): Primary | ICD-10-CM

## 2017-08-09 LAB — PLATELET # BLD AUTO: 86 10E9/L (ref 150–450)

## 2017-08-09 PROCEDURE — 25000125 ZZHC RX 250: Mod: ZF | Performed by: INTERNAL MEDICINE

## 2017-08-09 PROCEDURE — P9047 ALBUMIN (HUMAN), 25%, 50ML: HCPCS | Mod: ZF | Performed by: INTERNAL MEDICINE

## 2017-08-09 PROCEDURE — 85049 AUTOMATED PLATELET COUNT: CPT | Performed by: INTERNAL MEDICINE

## 2017-08-09 PROCEDURE — 25000128 H RX IP 250 OP 636: Mod: ZF | Performed by: INTERNAL MEDICINE

## 2017-08-09 PROCEDURE — 27210190 US PARACENTESIS

## 2017-08-09 RX ORDER — ALBUMIN (HUMAN) 12.5 G/50ML
12.5 SOLUTION INTRAVENOUS 4 TIMES DAILY PRN
Status: DISCONTINUED | OUTPATIENT
Start: 2017-08-09 | End: 2017-08-09 | Stop reason: HOSPADM

## 2017-08-09 RX ORDER — ALBUMIN (HUMAN) 12.5 G/50ML
12.5 SOLUTION INTRAVENOUS 4 TIMES DAILY PRN
Status: CANCELLED
Start: 2017-08-09

## 2017-08-09 RX ADMIN — ALBUMIN HUMAN 50 G: 0.25 SOLUTION INTRAVENOUS at 13:12

## 2017-08-09 RX ADMIN — LIDOCAINE HYDROCHLORIDE 20 ML: 10 INJECTION, SOLUTION INFILTRATION; PERINEURAL at 12:25

## 2017-08-09 NOTE — MR AVS SNAPSHOT
After Visit Summary   8/9/2017    Lawrence Louie    MRN: [id number]           Patient Information     Date Of Birth          1955        Visit Information        Provider Department      8/9/2017 12:00 PM Provider, Cristobal Spec Inf Para;  [facility]        Today's Diagnoses     Alcoholic cirrhosis of liver with ascites (H)    -  1      Care Instructions    Dear Lawrence Louie    Thank you for choosing [facility] ([facility]) for your procedure.  The following information is a summary of our appointment as well as important reminders.      Additional information: Your paracentesis procedure today, beginning weight 325.0 lb (147.6 kg), removed 9.0 liters ascites fluid, gave 50.0 grams albumin, ending weight 305.1 lb (138.4 kg).    We look forward in seeing you on your next appointment here at [facility].  Please don t hesitate to call us at [phone] to reschedule any of your appointments or to speak with one of the [facility] registered nurses.  It was a pleasure taking care of you today.    Sincerely,  Miranda Kenyon, RN  [facility]  [facility]  [address] [place] [address]  [address]  Phone:  [phone]  DISCHARGE INSTRUCTIONS FOLLOWING ABDOMINAL PARACENTESIS    After you go home:    No strenuous activity for 24 hours    Resume your regular diet    Limit fluid intake for the first 48 hours to no more than 2 quarts per day.  There should be minimal drainage from the needle site.  If drainage does occur and soaks through the bandage, apply gentle pressure with your hand for 5 minutes.    Notify MD for the following:    Excessive drainage    Excessive swelling, redness or tenderness at the needle site    Fever greater than 101 degrees F    Dizziness or light-headedness when getting up or walking      IF THIS IS A MEDICAL  EMERGENCY, CALL [phone]    If you have any questions or concerns    Contact the Hepatology Clinic:   [phone]    If you are a post-transplant patient, contact the Transplant Office:  [phone]    If this is after hours, contact the hospital :  [phone] and as to have the GI resident on call paged                   I have received and understand my discharge instructions and I have all of my personal belongings.          ------------------------------------------------------        ---------------------------------------------------    Patient / Significant Other's Signature     Relationship              Follow-ups after your visit        Your next 10 appointments already scheduled     Aug 16, 2017 12:00 PM CDT   Paracentesis Visit with [facility]c Spec Inf Para Provider, UC 39 ATC   [facility] ([facility])    [address] [id number] [phone]            Aug 23, 2017 12:00 PM CDT   Paracentesis Visit with Uc Spec Inf Para Provider, UC 39 ATC   [facility] ([facility])    [address] [id number] [phone]            Aug 25, 2017  2:00 PM CDT   (Arrive by 1:45 PM)   Return Visit with CORY Toussaint CNP   [facility] ([facility])    [address] [facility]  [address]th [facility] [address] [phone]            Aug 30, 2017 12:00 PM CDT   Paracentesis Visit with Uc Spec Inf Para Provider, UC 39 ATC   [facility] ([facility])    [address] [id number] [phone]            Sep 06, 2017 12:00 PM CDT   Paracentesis Visit with Uc Spec Inf Para Provider, UC 39 ATC   [facility] Special[facility] ( [facility])    [address] [facility]  2nd [facility] [address] [phone]            Sep 13, 2017 12:00 PM CDT   Paracentesis Visit with  Spec Inf Para Provider   [facility] Specialty and Procedure ([facility])    [address] [address] [phone]              Who to contact     If you have questions or need follow up information about today's clinic visit or your schedule please contact [facility] SPECIAL[facility] PROCEDURE directly at [phone].  Normal or non-critical lab and imaging results will be communicated to you by [employer], letter or phone within 4 business days after the clinic has received the results. If you do not hear from us within 7 days, please contact the clinic through [employer] or phone. If you have a critical or abnormal lab result, we will notify you by phone as soon as possible.  Submit refill requests through [employer] or call your pharmacy and they will forward the refill request to us. Please allow 3 business days for your refill to be completed.          Additional Information About Your Visit        [employer] Information     [employer] gives you secure access to your electronic health record. If you see a primary care provider, you can also send messages to your care team and make appointments. If you have questions, please call your primary care clinic.  If you do not have a primary care provider, please call [phone] and they will assist you.        Care EveryWhere ID     This is your Care EveryWhere ID. This could be used by other organizations to access your [facility] medical records  [id number]        Your Vitals Were     Pulse Temperature Respirations BMI (Body Mass Index)          69 98.1  F (36.7  C) (Oral) 18 39.17 kg/m2         Blood Pressure from Last 3 Encounters:   08/09/17 120/59   08/02/17 134/72   07/27/17 130/72    Weight from Last  3 Encounters:   08/09/17 (!) 138.4 kg (305 lb 1.6 oz)   08/02/17 (!) 147.3 kg (324 lb 11.8 oz)   07/26/17 (!) 139.3 kg (307 lb)              We Performed the Following     Platelet count     US Paracentesis        Primary Care Provider Office Phone # Fax #    Ary Murphy, CORY [phone]       [address] [place] [address]        Equal Access to Services     JOSEF LORENZO : Hadii aad ku hadasho Soomaali, waaxda luqadaha, qaybta kaalmada adeegyada, waxay idiin hayaan daniela khcande richardson . So [facility] [phone].    ATENCIÓN: Si habla español, tiene a rondon disposición servicios gratuitos de asistencia lingüística. Llame al [phone].    We comply with applicable federal civil rights laws and [place] laws. We do not discriminate on the basis of race, color, national origin, age, disability sex, sexual orientation or gender identity.            Thank you!     Thank you for choosing [facility] SPECIAL[facility] PROCEDURE  for your care. Our goal is always to provide you with excellent care. Hearing back from our patients is one way we can continue to improve our services. Please take a few minutes to complete the written survey that you may receive in the mail after your visit with us. Thank you!             Your Updated Medication List - Protect others around you: Learn how to safely use, store and throw away your medicines at www.disposemymeds.org.          This list is accurate as of: 8/9/17  2:45 PM.  Always use your most recent med list.                   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    Diabetes mellitus, type 2 (H)       blood glucose monitoring meter device kit    no  brand specified    1 kit    Use to test blood sugar 4 X  times daily or as directed.    Diabetes mellitus, type 2 (H)       blood glucose monitoring test strip    no brand specified    200 strip    Use to test blood sugars 4 X  times daily or as directed    Diabetes mellitus, type 2 (H)       bumetanide 1 MG tablet    BUMEX    90 tablet    Take 1 tablet (1 mg) by mouth 2 times daily    Generalized edema       * calcium-vitamin D 600-400 MG-UNIT per tablet    CALTRATE     Take by mouth daily    Morbid obesity due to excess calories (H), Alcoholic cirrhosis of liver with ascites (H), Portal hypertension (H), Secondary esophageal varices without bleeding (H), Tobacco use disorder       * calcium carb 1250 mg (500 mg [place])/vitamin D 200 units 500-200 MG-UNIT per tablet    OSCAL with D    90 tablet    Take 1 tablet by mouth daily    Hypotestosteronism       DESVENLAFAXINE ER PO      Take 100 mg by mouth daily 2 tablets once daily.    Morbid obesity due to excess calories (H), Alcoholic cirrhosis of liver with ascites (H), Portal hypertension (H), Secondary esophageal varices without bleeding (H), Tobacco use disorder       desvenlafaxine succinate 100 MG 24 hr tablet    PRISTIQ    180 tablet    Take 2 tablets (200 mg) by mouth daily    Other depression       furosemide 20 MG tablet    LASIX    270 tablet    Take 2 tabs in morning and 1 tablet at noon.    Generalized edema       glipiZIDE 5 MG 24 hr tablet    glipiZIDE XL    180 tablet    1 tablet twice a day    Type 2 diabetes mellitus without complication (H)       insulin aspart 100 UNIT/ML injection    NovoLOG FLEXPEN    30 mL    20 units before breakfast, 20 units before lunch, 20 units before dinner, 20 units before snacks    Type 2 diabetes mellitus without complication (H)       insulin glargine 100 UNIT/ML injection    LANTUS SOLOSTAR    30 mL    Inject 40 Units Subcutaneous At Bedtime    Type 2 diabetes mellitus with other oral complication, unspecified long  term insulin use status (H)       insulin pen needle 31G X 8 MM    B-D U/F    300 each    USE  6 times daily / OR AS DIRECTED    Type 2 diabetes, HbA1c goal < 7% (H)       magnesium 250 MG tablet      Take 1 tablet by mouth daily Takes 400 mg daily.Deneen Chin LPN 2:34 PM on 12/22/2015        ondansetron 4 MG tablet    ZOFRAN    18 tablet    Take 1 tablet (4 mg) by mouth every 8 hours as needed for nausea    Alcoholic cirrhosis (H), Nausea       order for DME     1 Units    Equipment being ordered: carpal tunnel wrist splint.    CTS (carpal tunnel syndrome)       OYSCO 500 + D 500-200 MG-UNIT Tabs   Generic drug:  Calcium Carb-Cholecalciferol      Take 500 mg by mouth daily    Morbid obesity due to excess calories (H), Alcoholic cirrhosis of liver with ascites (H), Portal hypertension (H), Secondary esophageal varices without bleeding (H), Tobacco use disorder       pantoprazole 40 MG EC tablet    PROTONIX    90 tablet    Take 1 tablet (40 mg) by mouth daily    Morbid obesity due to excess calories (H), Alcoholic cirrhosis of liver with ascites (H), Portal hypertension (H), Secondary esophageal varices without bleeding (H), Tobacco use disorder       propranolol 10 MG tablet    INDERAL    180 tablet    Take 1 tablet (10 mg) by mouth 2 times daily    Portal hypertension (H)       ranitidine 150 MG tablet    ZANTAC    180 tablet    Take 1 tablet (150 mg) by mouth 2 times daily    Esophageal varices (H)       spironolactone 50 MG tablet    ALDACTONE    540 tablet    Takes 3 tablets in am.and p.m.    Portal hypertension (H), Generalized edema       STATIN NOT PRESCRIBED (INTENTIONAL)     0 each    1 each daily Statin not prescribed intentionally due to Active liver disease    Hyperlipidemia LDL goal <100       VITAMIN B-12 PO      Take 1 tablet by mouth daily        vitamin D3 2000 UNITS Caps     90 capsule    Take 1 capsule by mouth daily    Mild major depression (H)       * Notice:  This list has 2 medication(s)  that are the same as other medications prescribed for you. Read the directions carefully, and ask your doctor or other care provider to review them with you.

## 2017-08-09 NOTE — PATIENT INSTRUCTIONS
Dear Lawrence Louie    Thank you for choosing [facility] ([facility]) for your procedure.  The following information is a summary of our appointment as well as important reminders.      Additional information: Your paracentesis procedure today, beginning weight 325.0 lb (147.6 kg), removed 9.0 liters ascites fluid, gave 50.0 grams albumin, ending weight 305.1 lb (138.4 kg).    We look forward in seeing you on your next appointment here at [facility].  Please don t hesitate to call us at [phone] to reschedule any of your appointments or to speak with one of the [facility] registered nurses.  It was a pleasure taking care of you today.    Sincerely,  Miranda Kenyon RN  [facility]  Specialty Infusion [facility] Procedure Center  [address] [place] [address]  [address]  Phone:  [phone]  DISCHARGE INSTRUCTIONS FOLLOWING ABDOMINAL PARACENTESIS    After you go home:    No strenuous activity for 24 hours    Resume your regular diet    Limit fluid intake for the first 48 hours to no more than 2 quarts per day.  There should be minimal drainage from the needle site.  If drainage does occur and soaks through the bandage, apply gentle pressure with your hand for 5 minutes.    Notify MD for the following:    Excessive drainage    Excessive swelling, redness or tenderness at the needle site    Fever greater than 101 degrees F    Dizziness or light-headedness when getting up or walking      IF THIS IS A MEDICAL EMERGENCY, CALL [phone]    If you have any questions or concerns    Contact the Hepatology Clinic:   [phone]    If you are a post-transplant patient, contact the Transplant Office:  [phone]    If this is after hours, contact the hospital :  [phone] and as to have the GI resident on call paged                   I have received and understand my discharge instructions and I have all of my personal  belongings.          ------------------------------------------------------        ---------------------------------------------------    Patient / Significant Other's Signature     Relationship

## 2017-08-09 NOTE — PROGRESS NOTES
Paracentesis Nursing Note  Lawrence Louie [facility] for [facility]acentesis.    During today's appointment orders from Meghna Simmons MD were completed.    Progress Note:  Patient identification verified by name and date of birth.  Assessment completed.  Vitals monitored throughout appointment and recorded in Doc Flowsheets.  See proceduralist note in ultrasound.    Date of consent or authorization: 7/26/2017.  Invasive Procedure Safety Checklist was completed and sent for scanning.     Paracentesis performed by Donal Kramer PA-C Radiology.    The following labs were communicated to provider performing paracentesis:  Lab Results   Component Value Date    PLT 86 08/09/2017       Total amount of ascites fluid drained: 9.0 liters.  Color of ascites fluid: yellow.  Total amount of albumin given: 50  grams.    Patient tolerated procedure well.    Post procedure,denies pain or discomfort post paracentesis.      Discharge Plan:  Discharge instructions were reviewed with patient.  Patient/Representative verbalized understanding and all questions were answered.   Discharged from [facility] in stable condition.    Miranda Kenyon RN    Administrations This Visit     albumin human 25 % injection 12.5 g     Admin Date Action Dose Route Administered By             08/09/2017 New Bag 50 g Intravenous Miranda Kenyon RN                    lidocaine 1 % 20 mL     Admin Date Action Dose Route Administered By             08/09/2017 Given by Other 20 mL Injection Miranda Kenyon RN                          /63  Pulse 68  Temp 98.1  F (36.7  C) (Oral)  Resp 18  Wt (!) 147.6 kg (325 lb 8 oz)  BMI 41.79 kg/m2

## 2017-08-16 ENCOUNTER — RADIANT APPOINTMENT (OUTPATIENT)
Dept: ULTRASOUND IMAGING | Facility: CLINIC | Age: 62
End: 2017-08-16
Attending: INTERNAL MEDICINE
Payer: COMMERCIAL

## 2017-08-16 ENCOUNTER — OFFICE VISIT (OUTPATIENT)
Dept: INFUSION THERAPY | Facility: CLINIC | Age: 62
End: 2017-08-16
Attending: INTERNAL MEDICINE
Payer: COMMERCIAL

## 2017-08-16 VITALS
DIASTOLIC BLOOD PRESSURE: 66 MMHG | BODY MASS INDEX: 39.33 KG/M2 | HEART RATE: 60 BPM | WEIGHT: 306.3 LBS | TEMPERATURE: 97.5 F | OXYGEN SATURATION: 96 % | SYSTOLIC BLOOD PRESSURE: 117 MMHG

## 2017-08-16 DIAGNOSIS — K70.31 ALCOHOLIC CIRRHOSIS OF LIVER WITH ASCITES (H): Primary | ICD-10-CM

## 2017-08-16 PROCEDURE — 27210190 US PARACENTESIS

## 2017-08-16 PROCEDURE — 25000128 H RX IP 250 OP 636: Mod: ZF | Performed by: INTERNAL MEDICINE

## 2017-08-16 PROCEDURE — 25000125 ZZHC RX 250: Mod: ZF | Performed by: INTERNAL MEDICINE

## 2017-08-16 PROCEDURE — P9047 ALBUMIN (HUMAN), 25%, 50ML: HCPCS | Mod: ZF | Performed by: INTERNAL MEDICINE

## 2017-08-16 RX ORDER — ALBUMIN (HUMAN) 12.5 G/50ML
12.5 SOLUTION INTRAVENOUS 4 TIMES DAILY PRN
Status: CANCELLED
Start: 2017-08-16

## 2017-08-16 RX ORDER — ALBUMIN (HUMAN) 12.5 G/50ML
12.5 SOLUTION INTRAVENOUS 4 TIMES DAILY PRN
Status: DISCONTINUED | OUTPATIENT
Start: 2017-08-16 | End: 2017-08-16 | Stop reason: HOSPADM

## 2017-08-16 RX ADMIN — LIDOCAINE HYDROCHLORIDE 20 ML: 10 INJECTION, SOLUTION INFILTRATION; PERINEURAL at 13:00

## 2017-08-16 RX ADMIN — ALBUMIN HUMAN 50 G: 0.25 SOLUTION INTRAVENOUS at 13:00

## 2017-08-16 NOTE — PROGRESS NOTES
Paracentesis Nursing Note  Lawrence Louie presents [facility] for a [facility]acentesis.    During today's appointment orders from Meghna Simmons MD were completed.    Progress Note:  Patient identification verified by name and date of birth.  Assessment completed.  Vitals monitored throughout appointment and recorded in Doc Flowsheets.  See proceduralist note in ultrasound.    Date of consent or authorization: 7/26/17.  Invasive Procedure Safety Checklist was completed and sent for scanning.     Paracentesis performed by Bren Toribio PA-C Radiology.    The following labs were communicated to provider performing paracentesis:  Lab Results   Component Value Date    PLT 86    08/09/2017 07/12/2017       Total amount of ascites fluid drained: 8.4 liters.  Color of ascites fluid: yellow.  Total amount of albumin given: 50  grams.    Patient tolerated procedure well.    Post procedure,denies pain or discomfort post paracentesis.      Discharge Plan:  Discharge instructions were reviewed with patient.  Patient/Representative verbalized understanding and all questions were answered.   Discharged from [facility] in stable condition.    Kailyn Jennings RN    Administrations This Visit     albumin human 25 % injection 12.5 g     Admin Date Action Dose Route Administered By             08/16/2017 New Bag 50 g Intravenous Kailyn Jennings RN                    lidocaine 1 % 20 mL     Admin Date Action Dose Route Administered By             08/16/2017 Given by Other Clinician 20 mL Injection Kailyn Jennings RN                          /58  Pulse 67  Temp 97.5  F (36.4  C) (Oral)  Wt (!) 147.6 kg (325 lb 6.4 oz)  SpO2 96%  BMI 41.78 kg/m2

## 2017-08-16 NOTE — MR AVS SNAPSHOT
After Visit Summary   8/16/2017    Lawrence Louie    MRN: [id number]           Patient Information     Date Of Birth          1955        Visit Information        Provider Department      8/16/2017 12:00 PM Provider, Uc Spec Inf Para; UC 39 ATC [facility]        Today's Diagnoses     Alcoholic cirrhosis of liver with ascites (H)    -  1       Follow-ups after your visit        Your next 10 appointments already scheduled     Aug 23, 2017 12:00 PM CDT   Paracentesis Visit with Uc Spec Inf Para Provider, UC 39 ATC   [facility] ([facility])    [address]  2nd [address]  [place] [address]   [phone]            Aug 25, 2017  2:00 PM CDT   (Arrive by 1:45 PM)   Return Visit with CORY Toussaint [facility] ([facility])    [address]  4th [address]  [place] [address] [phone]            Aug 30, 2017 12:00 PM CDT   Paracentesis Visit with Uc Spec Inf Para Provider, UC 39 ATC   [facility] ([facility])    [address]  [address]  [place] [facility] [address] [phone]            Sep 06, 2017 12:00 PM CDT   Paracentesis Visit with Uc Spec Inf Para Provider, UC 39 ATC   [facility] ([facility])    [address]  [address]  [place] [facility] [address]   [phone]            Sep 13, 2017 12:00 PM CDT   Paracentesis Visit with Uc Spec Inf Para Provider, UC 39 ATC   [facility] ([facility])    [address]  [address]  [place] [facility] [address]   [phone]            Sep 20, 2017 12:00 PM CDT   Paracentesis Visit with Uc Spec Inf Para Provider   [facility]  [facility] ([facility])    [address]  [address]  [place] [facility] [address] [phone]              Who to contact     If you have questions or need follow up information about today's clinic visit or your schedule please contact [facility] directly at [phone].  Normal or non-critical lab and imaging results will be communicated to you by MyChart, letter or phone within 4 business days after the clinic has received the results. If you do not hear from us within 7 days, please contact the clinic through [employer] or phone. If you have a critical or abnormal lab result, we will notify you by phone as soon as possible.  Submit refill requests through [employer] or call your pharmacy and they will forward the refill request to us. Please allow 3 business days for your refill to be completed.          Additional Information About Your Visit        [employer] Information     [employer] gives you secure access to your electronic health record. If you see a primary care provider, you can also send messages to your care team and make appointments. If you have questions, please call your primary care clinic.  If you do not have a primary care provider, please call [phone] and they will assist you.        Care EveryWhere ID     This is your Care EveryWhere ID. This could be used by other organizations to access your [place] medical records  [id number]        Your Vitals Were     Pulse Temperature Pulse Oximetry BMI (Body Mass Index)          60 97.5  F (36.4  C) (Oral) 96% 39.33 kg/m2         Blood Pressure from Last 3 Encounters:   08/16/17 117/66   08/09/17 120/59   08/02/17 134/72    Weight from Last 3 Encounters:   08/16/17 (!) 138.9 kg (306 lb 4.8 oz)   08/09/17 (!) 138.4 kg (305 lb 1.6 oz)   08/02/17 (!) 147.3 kg (324 lb 11.8 oz)              We Performed the Following     US Paracentesis         Primary Care Provider Office Phone # Fax #    Ary Murphy, APRN [phone]       [address] [place] [address]  [facility] [address]        Equal Access to Services     JOSEF LORENZO : Hadii aad ku hadgardeniao Soomaali, waaxda luqadaha, qaybta kaalmada adeegyada, silvia galarza laKellymiguel salas. So [facility] [phone].    ATENCIÓN: Si habla español, tiene a rondon disposición servicios gratuitos de asistencia lingüística. Llame al [phone].    We comply with applicable federal civil rights laws and [place] laws. We do not discriminate on the basis of race, color, national origin, age, disability sex, sexual orientation or gender identity.            Thank you!     Thank you for choosing [facility]  for your care. Our goal is always to provide you with excellent care. Hearing back from our patients is one way we can continue to improve our services. Please take a few minutes to complete the written survey that you may receive in the mail after your visit with us. Thank you!             Your Updated Medication List - Protect others around you: Learn how to safely use, store and throw away your medicines at www.disposemymeds.org.          This list is accurate as of: 8/16/17  2:06 PM.  Always use your most recent med list.                   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    Diabetes mellitus, type 2 (H)       blood glucose monitoring meter device kit    no brand specified    1 kit    Use to test blood sugar 4 X  times daily or as directed.    Diabetes mellitus, type 2 (H)       blood glucose monitoring test strip    no brand specified    200 strip    Use to test  blood sugars 4 X  times daily or as directed    Diabetes mellitus, type 2 (H)       bumetanide 1 MG tablet    BUMEX    90 tablet    Take 1 tablet (1 mg) by mouth 2 times daily    Generalized edema       * calcium-vitamin D 600-400 MG-UNIT per tablet    CALTRATE     Take by mouth daily    Morbid obesity due to excess calories (H), Alcoholic cirrhosis of liver with ascites (H), Portal hypertension (H), Secondary esophageal varices without bleeding (H), Tobacco use disorder       * calcium carb 1250 mg (500 mg [place])/vitamin D 200 units 500-200 MG-UNIT per tablet    OSCAL with D    90 tablet    Take 1 tablet by mouth daily    Hypotestosteronism       DESVENLAFAXINE ER PO      Take 100 mg by mouth daily 2 tablets once daily.    Morbid obesity due to excess calories (H), Alcoholic cirrhosis of liver with ascites (H), Portal hypertension (H), Secondary esophageal varices without bleeding (H), Tobacco use disorder       desvenlafaxine succinate 100 MG 24 hr tablet    PRISTIQ    180 tablet    Take 2 tablets (200 mg) by mouth daily    Other depression       furosemide 20 MG tablet    LASIX    270 tablet    Take 2 tabs in morning and 1 tablet at noon.    Generalized edema       glipiZIDE 5 MG 24 hr tablet    glipiZIDE XL    180 tablet    1 tablet twice a day    Type 2 diabetes mellitus without complication (H)       insulin aspart 100 UNIT/ML injection    NovoLOG FLEXPEN    30 mL    20 units before breakfast, 20 units before lunch, 20 units before dinner, 20 units before snacks    Type 2 diabetes mellitus without complication (H)       insulin glargine 100 UNIT/ML injection    LANTUS SOLOSTAR    30 mL    Inject 40 Units Subcutaneous At Bedtime    Type 2 diabetes mellitus with other oral complication, unspecified long term insulin use status (H)       insulin pen needle 31G X 8 MM    B-D U/F    300 each    USE  6 times daily / OR AS DIRECTED    Type 2 diabetes, HbA1c goal < 7% (H)       magnesium 250 MG tablet      Take 1  tablet by mouth daily Takes 400 mg daily.Deneen Chin NAE 2:34 PM on 12/22/2015        ondansetron 4 MG tablet    ZOFRAN    18 tablet    Take 1 tablet (4 mg) by mouth every 8 hours as needed for nausea    Alcoholic cirrhosis (H), Nausea       order for DME     1 Units    Equipment being ordered: carpal tunnel wrist splint.    CTS (carpal tunnel syndrome)       OYSCO 500 + D 500-200 MG-UNIT Tabs   Generic drug:  Calcium Carb-Cholecalciferol      Take 500 mg by mouth daily    Morbid obesity due to excess calories (H), Alcoholic cirrhosis of liver with ascites (H), Portal hypertension (H), Secondary esophageal varices without bleeding (H), Tobacco use disorder       pantoprazole 40 MG EC tablet    PROTONIX    90 tablet    Take 1 tablet (40 mg) by mouth daily    Morbid obesity due to excess calories (H), Alcoholic cirrhosis of liver with ascites (H), Portal hypertension (H), Secondary esophageal varices without bleeding (H), Tobacco use disorder       propranolol 10 MG tablet    INDERAL    180 tablet    Take 1 tablet (10 mg) by mouth 2 times daily    Portal hypertension (H)       ranitidine 150 MG tablet    ZANTAC    180 tablet    Take 1 tablet (150 mg) by mouth 2 times daily    Esophageal varices (H)       spironolactone 50 MG tablet    ALDACTONE    540 tablet    Takes 3 tablets in am.and p.m.    Portal hypertension (H), Generalized edema       STATIN NOT PRESCRIBED (INTENTIONAL)     0 each    1 each daily Statin not prescribed intentionally due to Active liver disease    Hyperlipidemia LDL goal <100       VITAMIN B-12 PO      Take 1 tablet by mouth daily        vitamin D3 2000 UNITS Caps     90 capsule    Take 1 capsule by mouth daily    Mild major depression (H)       * Notice:  This list has 2 medication(s) that are the same as other medications prescribed for you. Read the directions carefully, and ask your doctor or other care provider to review them with you.

## 2017-08-23 ENCOUNTER — OFFICE VISIT (OUTPATIENT)
Dept: INFUSION THERAPY | Facility: CLINIC | Age: 62
End: 2017-08-23
Attending: INTERNAL MEDICINE
Payer: COMMERCIAL

## 2017-08-23 ENCOUNTER — RADIANT APPOINTMENT (OUTPATIENT)
Dept: ULTRASOUND IMAGING | Facility: CLINIC | Age: 62
End: 2017-08-23
Attending: INTERNAL MEDICINE
Payer: COMMERCIAL

## 2017-08-23 VITALS
HEART RATE: 70 BPM | BODY MASS INDEX: 39.01 KG/M2 | WEIGHT: 303.8 LBS | SYSTOLIC BLOOD PRESSURE: 110 MMHG | RESPIRATION RATE: 18 BRPM | DIASTOLIC BLOOD PRESSURE: 60 MMHG | TEMPERATURE: 97.1 F

## 2017-08-23 DIAGNOSIS — K70.31 ALCOHOLIC CIRRHOSIS OF LIVER WITH ASCITES (H): Primary | ICD-10-CM

## 2017-08-23 PROCEDURE — 40000269 ZZH STATISTIC NO CHARGE FACILITY FEE: Mod: ZF

## 2017-08-23 PROCEDURE — 25000125 ZZHC RX 250: Mod: ZF | Performed by: INTERNAL MEDICINE

## 2017-08-23 PROCEDURE — 25000128 H RX IP 250 OP 636: Mod: ZF | Performed by: INTERNAL MEDICINE

## 2017-08-23 PROCEDURE — P9047 ALBUMIN (HUMAN), 25%, 50ML: HCPCS | Mod: ZF | Performed by: INTERNAL MEDICINE

## 2017-08-23 PROCEDURE — 27210190 US PARACENTESIS

## 2017-08-23 RX ORDER — ALBUMIN (HUMAN) 12.5 G/50ML
12.5 SOLUTION INTRAVENOUS 4 TIMES DAILY PRN
Status: CANCELLED
Start: 2017-08-23

## 2017-08-23 RX ORDER — ALBUMIN (HUMAN) 12.5 G/50ML
12.5 SOLUTION INTRAVENOUS 4 TIMES DAILY PRN
Status: DISCONTINUED | OUTPATIENT
Start: 2017-08-23 | End: 2017-08-23 | Stop reason: HOSPADM

## 2017-08-23 RX ADMIN — LIDOCAINE HYDROCHLORIDE 20 ML: 10 INJECTION, SOLUTION INFILTRATION; PERINEURAL at 12:45

## 2017-08-23 RX ADMIN — ALBUMIN HUMAN 50 G: 0.25 SOLUTION INTRAVENOUS at 12:44

## 2017-08-23 NOTE — PROGRESS NOTES
Paracentesis Nursing Note  Lawrence Louie presents [facility].    During today's appointment orders from Meghna Simmons MD were completed.    Progress Note:  Patient identification verified by name and date of birth.  Assessment completed.  Vitals monitored throughout appointment and recorded in Doc Flowsheets.  See proceduralist note in ultrasound.    Date of consent or authorization: 7/26/17.  Invasive Procedure Safety Checklist was completed and sent for scanning.     Paracentesis performed by Vipin Fisher PA-C Radiology.    The following labs were communicated to provider performing paracentesis:  Lab Results   Component Value Date    PLT 86 08/09/2017       Total amount of ascites fluid drained: 9.1 liters.  Color of ascites fluid: yellow.  Total amount of albumin given: 50  grams.    Patient tolerated procedure well.    Post procedure,denies pain or discomfort post paracentesis.      Discharge Plan:  Discharge instructions were reviewed with patient.  Patient/Representative verbalized understanding and all questions were answered.   Discharged from [facility] in stable condition.    Maday Almonte RN       Administrations This Visit     albumin human 25 % injection 12.5 g     Admin Date Action Dose Route Administered By             08/23/2017 New Bag 50 g Intravenous Maday Almonte, RADHA                    lidocaine 1 % 20 mL     Admin Date Action Dose Route Administered By             08/23/2017 Given by Other Clinician 20 mL Injection Maday Almonte, RADHA

## 2017-08-23 NOTE — PATIENT INSTRUCTIONS
Discharge Instructions for Paracentesis  Paracentesis is a procedure to remove extra fluid from your belly (abdomen). This fluid buildup in the abdomen is called ascites. The procedure may have been done to take a sample of the fluid. Or, it may have been done to drain the extra fluid from your abdomen and help make you more comfortable.     Ascites is buildup of excess fluid in the abdomen.   Home care    If you have pain after the procedure, your healthcare provider can prescribe or recommend pain medicines. Take these exactly as directed. If you stopped taking other medicines before the procedure, ask your provider when you can start them again.    Take it easy for 24 hours after the procedure. Avoid physical activity until your provider says it s OK.    You will have a small bandage over the puncture site. Stitches (sutures), surgical staples, adhesive tapes, adhesive strips, or surgical glue may be used to close the incision. They also help stop bleeding and speed healing. You may take the bandage off in 24 hours.    Check the puncture site for the signs of infection listed below.  Follow-up care  Make a follow-up appointment with your healthcare provider as directed. During your follow-up visit, your provider will check your healing. Let your provider know how you are feeling. You can also discuss the cause of your ascites and if you need any further treatment.  When to seek medical advice  Call your healthcare provider if you have any of the following after the procedure:    A fever of 100.4 F (38.0 C) or higher    Trouble breathing    Pain that doesn't go away even after taking pain medicine    Belly pain not caused by having the skin punctured    Bleeding from the puncture site    More than a small amount of fluid leaking from the puncture site    Swollen belly    Signs of infection at the puncture site. These include increased pain, redness, or swelling, warmth, or bad-smelling drainage.    Blood in your  urine    Feeling dizzy or lightheaded, or fainting   Date Last Reviewed: 7/1/2016 2000-2017 [employer]. [address] [place] [address], [place] [address]. All rights reserved. This information is not intended as a substitute for professional medical care. Always follow your healthcare professional's instructions.

## 2017-08-23 NOTE — MR AVS SNAPSHOT
After Visit Summary   8/23/2017    Lawrence Louie    MRN: [id number]           Patient Information     Date Of Birth          1955        Visit Information        Provider Department      8/23/2017 12:00 PM Vipin Fisher PA-C;  [address] [facility]        Today's Diagnoses     Alcoholic cirrhosis of liver with ascites (H)    -  1      Care Instructions      Discharge Instructions for Paracentesis  Paracentesis is a procedure to remove extra fluid from your belly (abdomen). This fluid buildup in the abdomen is called ascites. The procedure may have been done to take a sample of the fluid. Or, it may have been done to drain the extra fluid from your abdomen and help make you more comfortable.     Ascites is buildup of excess fluid in the abdomen.   Home care    If you have pain after the procedure, your healthcare provider can prescribe or recommend pain medicines. Take these exactly as directed. If you stopped taking other medicines before the procedure, ask your provider when you can start them again.    Take it easy for 24 hours after the procedure. Avoid physical activity until your provider says it s OK.    You will have a small bandage over the puncture site. Stitches (sutures), surgical staples, adhesive tapes, adhesive strips, or surgical glue may be used to close the incision. They also help stop bleeding and speed healing. You may take the bandage off in 24 hours.    Check the puncture site for the signs of infection listed below.  Follow-up care  Make a follow-up appointment with your healthcare provider as directed. During your follow-up visit, your provider will check your healing. Let your provider know how you are feeling. You can also discuss the cause of your ascites and if you need any further treatment.  When to seek medical advice  Call your healthcare provider if you have any of the following after the procedure:    A  fever of 100.4 F (38.0 C) or higher    Trouble breathing    Pain that doesn't go away even after taking pain medicine    Belly pain not caused by having the skin punctured    Bleeding from the puncture site    More than a small amount of fluid leaking from the puncture site    Swollen belly    Signs of infection at the puncture site. These include increased pain, redness, or swelling, warmth, or bad-smelling drainage.    Blood in your urine    Feeling dizzy or lightheaded, or fainting   Date Last Reviewed: 7/1/2016 2000-2017 [employer]. [address] [place] [id number]. All rights reserved. This information is not intended as a substitute for professional medical care. Always follow your healthcare professional's instructions.                Follow-ups after your visit        Your next 10 appointments already scheduled     Aug 25, 2017  2:00 PM CDT   (Arrive by 1:45 PM)   Return Visit with CORY Toussaint CNP   [facility] ([facility])    [address] [place] [address]  [address] [facility] [id number] [phone]            Aug 30, 2017 12:00 PM CDT   Paracentesis Visit with Uc Spec Inf Para Provider, UC 39 ATC   [facility] Procedure ([facility])    [address] [place] [id number] [phone]            Sep 06, 2017 12:00 PM CDT   Paracentesis Visit with Uc Spec Inf Para Provider, UC 39 ATC   [facility] ([facility])    [address] [place] [id number] [phone]            Sep 13, 2017 12:00 PM CDT   Paracentesis Visit with Uc Spec Inf Para Provider, UC 39 ATC   [facility] ([facility])    [address] [place] [id number] [phone]            Sep  20, 2017 12:00 PM CDT   Paracentesis Visit with Uc Spec Inf Para Provider, UC 39 ATC   [facility] ([facility])    [address] [facility]  [address] [facility] [address] [phone]            Sep 27, 2017 12:00 PM CDT   Paracentesis Visit with Uc Spec Inf Para Provider   [facility] ([facility])    [address] [address] [phone]              Who to contact     If you have questions or need follow up information about today's clinic visit or your schedule please contact [facility] directly at [phone].  Normal or non-critical lab and imaging results will be communicated to you by [employer], letter or phone within 4 business days after the clinic has received the results. If you do not hear from us within 7 days, please contact the clinic through [employer] or phone. If you have a critical or abnormal lab result, we will notify you by phone as soon as possible.  Submit refill requests through [employer] or call your pharmacy and they will forward the refill request to us. Please allow 3 business days for your refill to be completed.          Additional Information About Your Visit        [employer] Information     [employer] gives you secure access to your electronic health record. If you see a primary care provider, you can also send messages to your care team and make appointments. If you have questions, please call your primary care clinic.  If you do not have a primary care provider, please call [phone] and they will assist you.        Care EveryWhere ID     This is your Care EveryWhere ID. This could be used by other organizations to access your [facility] medical records  [id number]        Your Vitals Were     Pulse Temperature Respirations BMI (Body Mass Index)          70 97.1   F (36.2  C) (Oral) 18 39.01 kg/m2         Blood Pressure from Last 3 Encounters:   08/23/17 110/60   08/16/17 117/66   08/09/17 120/59    Weight from Last 3 Encounters:   08/23/17 (!) 137.8 kg (303 lb 12.8 oz)   08/16/17 (!) 138.9 kg (306 lb 4.8 oz)   08/09/17 (!) 138.4 kg (305 lb 1.6 oz)              We Performed the Following     US Paracentesis        Primary Care Provider Office Phone # Fax #    Ary Murphy, APRN [phone]       [address] [facility] [address]  [facility] [address]        Equal Access to Services     JOSEF LORENZO : Hadii aad ku hadasho Soanthony, waaxda luqadaha, qaybta kaalmada adeegyada, waxmary salas. So [facility] [phone].    ATENCIÓN: Si habla español, tiene a rondon disposición servicios gratuitos de asistencia lingüística. Llame al [phone].    We comply with applicable federal civil rights laws and [place] laws. We do not discriminate on the basis of race, color, national origin, age, disability sex, sexual orientation or gender identity.            Thank you!     Thank you for choosing [facility]  for your care. Our goal is always to provide you with excellent care. Hearing back from our patients is one way we can continue to improve our services. Please take a few minutes to complete the written survey that you may receive in the mail after your visit with us. Thank you!             Your Updated Medication List - Protect others around you: Learn how to safely use, store and throw away your medicines at www.disposemymeds.org.          This list is accurate as of: 8/23/17  2:19 PM.  Always use your most recent med list.                   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    Diabetes mellitus, type 2 (H)       blood glucose monitoring meter device kit    no brand specified    1 kit    Use to test blood sugar 4 X  times daily or as directed.    Diabetes mellitus, type 2 (H)       blood glucose monitoring test strip    no brand specified    200 strip    Use to test blood sugars 4 X  times daily or as directed    Diabetes mellitus, type 2 (H)       bumetanide 1 MG tablet    BUMEX    90 tablet    Take 1 tablet (1 mg) by mouth 2 times daily    Generalized edema       * calcium-vitamin D 600-400 MG-UNIT per tablet    CALTRATE     Take by mouth daily    Morbid obesity due to excess calories (H), Alcoholic cirrhosis of liver with ascites (H), Portal hypertension (H), Secondary esophageal varices without bleeding (H), Tobacco use disorder       * calcium carb 1250 mg (500 mg [place])/vitamin D 200 units 500-200 MG-UNIT per tablet    OSCAL with D    90 tablet    Take 1 tablet by mouth daily    Hypotestosteronism       DESVENLAFAXINE ER PO      Take 100 mg by mouth daily 2 tablets once daily.    Morbid obesity due to excess calories (H), Alcoholic cirrhosis of liver with ascites (H), Portal hypertension (H), Secondary esophageal varices without bleeding (H), Tobacco use disorder       desvenlafaxine succinate 100 MG 24 hr tablet    PRISTIQ    180 tablet    Take 2 tablets (200 mg) by mouth daily    Other depression       furosemide 20 MG tablet    LASIX    270 tablet    Take 2 tabs in morning and 1 tablet at noon.    Generalized edema       glipiZIDE 5 MG 24 hr tablet    glipiZIDE XL    180 tablet    1 tablet twice a day    Type 2 diabetes mellitus without complication (H)       insulin aspart 100 UNIT/ML injection    NovoLOG FLEXPEN    30 mL    20 units before breakfast, 20 units before lunch, 20 units before dinner, 20 units before snacks    Type 2 diabetes mellitus without complication (H)       insulin glargine 100 UNIT/ML injection     LANTUS SOLOSTAR    30 mL    Inject 40 Units Subcutaneous At Bedtime    Type 2 diabetes mellitus with other oral complication, unspecified long term insulin use status (H)       insulin pen needle 31G X 8 MM    B-D U/F    300 each    USE  6 times daily / OR AS DIRECTED    Type 2 diabetes, HbA1c goal < 7% (H)       magnesium 250 MG tablet      Take 1 tablet by mouth daily Takes 400 mg daily.Deneen Cihn LPN 2:34 PM on 12/22/2015        ondansetron 4 MG tablet    ZOFRAN    18 tablet    Take 1 tablet (4 mg) by mouth every 8 hours as needed for nausea    Alcoholic cirrhosis (H), Nausea       order for DME     1 Units    Equipment being ordered: carpal tunnel wrist splint.    CTS (carpal tunnel syndrome)       OYSCO 500 + D 500-200 MG-UNIT Tabs   Generic drug:  Calcium Carb-Cholecalciferol      Take 500 mg by mouth daily    Morbid obesity due to excess calories (H), Alcoholic cirrhosis of liver with ascites (H), Portal hypertension (H), Secondary esophageal varices without bleeding (H), Tobacco use disorder       pantoprazole 40 MG EC tablet    PROTONIX    90 tablet    Take 1 tablet (40 mg) by mouth daily    Morbid obesity due to excess calories (H), Alcoholic cirrhosis of liver with ascites (H), Portal hypertension (H), Secondary esophageal varices without bleeding (H), Tobacco use disorder       propranolol 10 MG tablet    INDERAL    180 tablet    Take 1 tablet (10 mg) by mouth 2 times daily    Portal hypertension (H)       ranitidine 150 MG tablet    ZANTAC    180 tablet    Take 1 tablet (150 mg) by mouth 2 times daily    Esophageal varices (H)       spironolactone 50 MG tablet    ALDACTONE    540 tablet    Takes 3 tablets in am.and p.m.    Portal hypertension (H), Generalized edema       STATIN NOT PRESCRIBED (INTENTIONAL)     0 each    1 each daily Statin not prescribed intentionally due to Active liver disease    Hyperlipidemia LDL goal <100       VITAMIN B-12 PO      Take 1 tablet by mouth daily        vitamin D3  2000 UNITS Caps     90 capsule    Take 1 capsule by mouth daily    Mild major depression (H)       * Notice:  This list has 2 medication(s) that are the same as other medications prescribed for you. Read the directions carefully, and ask your doctor or other care provider to review them with you.

## 2017-08-25 ENCOUNTER — OFFICE VISIT (OUTPATIENT)
Dept: INTERNAL MEDICINE | Facility: CLINIC | Age: 62
End: 2017-08-25

## 2017-08-25 VITALS
DIASTOLIC BLOOD PRESSURE: 76 MMHG | HEART RATE: 64 BPM | SYSTOLIC BLOOD PRESSURE: 148 MMHG | BODY MASS INDEX: 40.21 KG/M2 | WEIGHT: 313.2 LBS | OXYGEN SATURATION: 98 %

## 2017-08-25 DIAGNOSIS — I85.10 SECONDARY ESOPHAGEAL VARICES WITHOUT BLEEDING (H): ICD-10-CM

## 2017-08-25 DIAGNOSIS — E66.01 MORBID OBESITY DUE TO EXCESS CALORIES (H): ICD-10-CM

## 2017-08-25 DIAGNOSIS — E11.9 TYPE 2 DIABETES MELLITUS WITHOUT COMPLICATION, WITH LONG-TERM CURRENT USE OF INSULIN (H): ICD-10-CM

## 2017-08-25 DIAGNOSIS — F17.200 TOBACCO USE DISORDER: ICD-10-CM

## 2017-08-25 DIAGNOSIS — E11.628 TYPE 2 DIABETES MELLITUS WITH OTHER SKIN COMPLICATIONS (H): ICD-10-CM

## 2017-08-25 DIAGNOSIS — R60.1 GENERALIZED EDEMA: ICD-10-CM

## 2017-08-25 DIAGNOSIS — K70.31 ALCOHOLIC CIRRHOSIS OF LIVER WITH ASCITES (H): ICD-10-CM

## 2017-08-25 DIAGNOSIS — Z79.4 TYPE 2 DIABETES MELLITUS WITHOUT COMPLICATION, WITH LONG-TERM CURRENT USE OF INSULIN (H): ICD-10-CM

## 2017-08-25 DIAGNOSIS — E11.638: ICD-10-CM

## 2017-08-25 DIAGNOSIS — E11.69 TYPE 2 DIABETES MELLITUS WITH OTHER SPECIFIED COMPLICATION (H): ICD-10-CM

## 2017-08-25 DIAGNOSIS — K76.6 PORTAL HYPERTENSION (H): ICD-10-CM

## 2017-08-25 DIAGNOSIS — R25.2 CRAMP OF LIMB: Primary | ICD-10-CM

## 2017-08-25 LAB
ALBUMIN SERPL-MCNC: 3.4 G/DL (ref 3.4–5)
ALP SERPL-CCNC: 116 U/L (ref 40–150)
ALT SERPL W P-5'-P-CCNC: 36 U/L (ref 0–70)
ANION GAP SERPL CALCULATED.3IONS-SCNC: 5 MMOL/L (ref 3–14)
AST SERPL W P-5'-P-CCNC: 31 U/L (ref 0–45)
BILIRUB SERPL-MCNC: 0.5 MG/DL (ref 0.2–1.3)
BUN SERPL-MCNC: 12 MG/DL (ref 7–30)
CALCIUM SERPL-MCNC: 8.4 MG/DL (ref 8.5–10.1)
CHLORIDE SERPL-SCNC: 104 MMOL/L (ref 94–109)
CO2 SERPL-SCNC: 29 MMOL/L (ref 20–32)
CREAT SERPL-MCNC: 0.81 MG/DL (ref 0.66–1.25)
CREAT UR-MCNC: 249 MG/DL
ERYTHROCYTE [DISTWIDTH] IN BLOOD BY AUTOMATED COUNT: 14.7 % (ref 10–15)
GFR SERPL CREATININE-BSD FRML MDRD: >90 ML/MIN/1.7M2
GLUCOSE SERPL-MCNC: 94 MG/DL (ref 70–99)
HBA1C MFR BLD: 6 % (ref 4.3–6)
HCT VFR BLD AUTO: 40.6 % (ref 40–53)
HGB BLD-MCNC: 13.3 G/DL (ref 13.3–17.7)
MCH RBC QN AUTO: 29.4 PG (ref 26.5–33)
MCHC RBC AUTO-ENTMCNC: 32.8 G/DL (ref 31.5–36.5)
MCV RBC AUTO: 90 FL (ref 78–100)
MICROALBUMIN UR-MCNC: 12 MG/L
MICROALBUMIN/CREAT UR: 5.02 MG/G CR (ref 0–17)
PLATELET # BLD AUTO: 89 10E9/L (ref 150–450)
POTASSIUM SERPL-SCNC: 3.9 MMOL/L (ref 3.4–5.3)
PROT SERPL-MCNC: 6.9 G/DL (ref 6.8–8.8)
RBC # BLD AUTO: 4.53 10E12/L (ref 4.4–5.9)
SODIUM SERPL-SCNC: 138 MMOL/L (ref 133–144)
WBC # BLD AUTO: 4.4 10E9/L (ref 4–11)

## 2017-08-25 RX ORDER — CALCIUM CARBONATE/VITAMIN D3 500MG-5MCG
500 TABLET ORAL DAILY
Qty: 200 TABLET | Refills: 3 | Status: SHIPPED | OUTPATIENT
Start: 2017-08-25 | End: 2017-12-21

## 2017-08-25 RX ORDER — MULTIVITAMIN WITH IRON
1 TABLET ORAL DAILY
Qty: 30 TABLET | Refills: 3 | Status: SHIPPED | OUTPATIENT
Start: 2017-08-25 | End: 2017-08-25

## 2017-08-25 RX ORDER — SPIRONOLACTONE 50 MG/1
TABLET, FILM COATED ORAL
Qty: 540 TABLET | Refills: 6 | Status: ON HOLD | OUTPATIENT
Start: 2017-08-25 | End: 2017-11-06

## 2017-08-25 RX ORDER — BUMETANIDE 1 MG/1
1 TABLET ORAL 2 TIMES DAILY
Qty: 90 TABLET | Refills: 3 | Status: SHIPPED | OUTPATIENT
Start: 2017-08-25 | End: 2017-09-07 | Stop reason: ALTCHOICE

## 2017-08-25 RX ORDER — FUROSEMIDE 20 MG
TABLET ORAL
Qty: 270 TABLET | Refills: 3 | Status: SHIPPED | OUTPATIENT
Start: 2017-08-25 | End: 2017-09-14

## 2017-08-25 RX ORDER — PROPRANOLOL HYDROCHLORIDE 10 MG/1
10 TABLET ORAL 2 TIMES DAILY
Qty: 180 TABLET | Refills: 3 | Status: SHIPPED | OUTPATIENT
Start: 2017-08-25 | End: 2017-12-21 | Stop reason: ALTCHOICE

## 2017-08-25 ASSESSMENT — PAIN SCALES - GENERAL: PAINLEVEL: MODERATE PAIN (5)

## 2017-08-25 NOTE — NURSING NOTE
Chief Complaint   Patient presents with     Medication Follow-up     Patient is here to follow up on medications.      Sahara Mims LPN at 2:09 PM on 8/25/2017.

## 2017-08-25 NOTE — MR AVS SNAPSHOT
After Visit Summary   8/25/2017    Lawrence Louie    MRN: [id number]           Patient Information     Date Of Birth          1955        Visit Information        Provider Department      8/25/2017 2:00 PM Ary Murphy [facility]N [facility]        Today's Diagnoses     Cramp of limb    -  1    Generalized edema        Type 2 diabetes mellitus without complication, with long-term current use of insulin (H)        Morbid obesity due to excess calories (H)        Alcoholic cirrhosis of liver with ascites (H)        Portal hypertension (H)        Secondary esophageal varices without bleeding (H)        Tobacco use disorder        Type 2 diabetes mellitus with other specified complication (H)        Type 2 diabetes mellitus with other oral complication, unspecified long term insulin use status (H)        Type 2 diabetes mellitus with other skin complications (H)          Care Instructions    [facility] Medication Refill Request Information:  * Please contact your pharmacy regarding ANY request for medication refills.  ** [facility] Prescription Fax = [phone]  * Please allow 3 business days for routine medication refills.  * Please allow 5 business days for controlled substance medication refills.     [facility] Center Test Result notification information:  *You will be notified with in 7-10 days of your appointment day regarding the results of your test.  If you are on MyChart you will be notified as soon as the provider has reviewed the results and signed off on them.    Primary Care Center [phone]             Follow-ups after your visit        Follow-up notes from your care team     Return in about 3 months (around 11/25/2017).      Your next 10 appointments already scheduled     Aug 30, 2017 12:00 PM CDT   Paracentesis Visit with Uc Spec Inf Para Provider, UC 39 [facility] Specialty [facility] Procedure ([facility]  [place])    [address] [address]   [phone]            Sep 06, 2017 12:00 PM CDT   Paracentesis Visit with Uc Spec Inf Para Provider, UC 39 ATC   [facility] Specialty and Procedure ([facility])    [address] [address]   [phone]            Sep 13, 2017 12:00 PM CDT   Paracentesis Visit with Uc Spec Inf Para Provider, UC 39 ATC   [facility] Specialty and Procedure ([facility])    [address] [address]   [phone]            Sep 20, 2017 12:00 PM CDT   Paracentesis Visit with Uc Spec Inf Para Provider, UC 39 ATC   [facility] Specialty and Procedure ([facility])    [address] [address]   [phone]            Sep 27, 2017 12:00 PM CDT   Paracentesis Visit with Uc Spec Inf Para Provider, UC 39 ATC   [facility] Specialty and Procedure ([facility])    [address] [address]   [phone]              Future tests that were ordered for you today     Open Future Orders        Priority Expected Expires Ordered    CBC with platelets Routine 8/25/2017 9/8/2017 8/25/2017    Comprehensive metabolic panel Routine 8/25/2017 9/8/2017 8/25/2017    HEMOGLOBIN A1C -(Today) Routine 8/25/2017 8/25/2018 8/25/2017    Albumin Random Urine Quantitative with Creat Ratio Routine 8/25/2017 8/25/2018 8/25/2017            Who to contact     Please call your clinic at [phone] to:    Ask questions about your health    Make or cancel appointments    Discuss your medicines    Learn about your test results    Speak to your doctor   If you have compliments or concerns about an experience at your clinic, or if you wish to file a complaint, please contact  [facility] Patient Relations at [phone] or email us at [email or web address]         Additional Information About Your Visit        AppMeshhart Information     [employer] gives you secure access to your electronic health record. If you see a primary care provider, you can also send messages to your care team and make appointments. If you have questions, please call your primary care clinic.  If you do not have a primary care provider, please call [phone] and they will assist you.      [employer] is an electronic gateway that provides easy, online access to your medical records. With [employer], you can request a clinic appointment, read your test results, renew a prescription or communicate with your care team.     To access your existing account, please contact your [facility] or call [phone] for assistance.        Care EveryWhere ID     This is your Care EveryWhere ID. This could be used by other organizations to access your [place] medical records  [id number]        Your Vitals Were     Pulse Pulse Oximetry BMI (Body Mass Index)             64 98% 40.21 kg/m2          Blood Pressure from Last 3 Encounters:   08/25/17 148/76   08/23/17 110/60   08/16/17 117/66    Weight from Last 3 Encounters:   08/25/17 (!) 142.1 kg (313 lb 3.2 oz)   08/23/17 (!) 137.8 kg (303 lb 12.8 oz)   08/16/17 (!) 138.9 kg (306 lb 4.8 oz)                 Today's Medication Changes          These changes are accurate as of: 8/25/17  3:03 PM.  If you have any questions, ask your nurse or doctor.               These medicines have changed or have updated prescriptions.        Dose/Directions    * calcium carb 1250 mg (500 mg [place] IRA)/vitamin D 200 units 500-200 MG-UNIT per tablet   Commonly known as:  OSCAL with D   This may have changed:  Another medication with the same name was changed. Make sure you understand how and when to take each.   Used for:  Hypotestosteronism    Changed by:  Ary Murphy APRN CNP        Dose:  1 tablet   Take 1 tablet by mouth daily   Quantity:  90 tablet   Refills:  3       * calcium-vitamin D 600-400 MG-UNIT per tablet   Commonly known as:  CALTRATE   This may have changed:  how much to take   Used for:  Morbid obesity due to excess calories (H), Alcoholic cirrhosis of liver with ascites (H), Portal hypertension (H), Secondary esophageal varices without bleeding (H), Tobacco use disorder   Changed by:  Ary Murphy APRN CNP        Dose:  1 tablet   Take 1 tablet by mouth daily   Quantity:  60 tablet   Refills:  11       * Notice:  This list has 2 medication(s) that are the same as other medications prescribed for you. Read the directions carefully, and ask your doctor or other care provider to review them with you.         Where to get your medicines      These medications were sent to [id number] - [place] [id number] [place] [address] [address].  [address] [place] [address] N. [facility] [address]     Phone:  [phone]     aspirin 81 MG tablet    blood glucose monitoring meter device kit    bumetanide 1 MG tablet    calcium-vitamin D 600-400 MG-UNIT per tablet    furosemide 20 MG tablet    insulin aspart 100 UNIT/ML injection    insulin glargine 100 UNIT/ML injection    magnesium 250 MG tablet    OYSCO 500 + D 500-200 MG-UNIT Tabs    propranolol 10 MG tablet    spironolactone 50 MG tablet                Primary Care Provider Office Phone # Fax #    CORY Toussaint [phone]       [address] [place] [address] 741  [facility] [address]        Equal Access to Services     JOSEF LORENZO AH: Hadii aad ku hadasho Soomaali, waaxda luqadaha, qaybta kaalmada adeegyada, silvia galaviz haymiguel richardson . So [facility] [phone].    ATENCIÓN: Si habla español, tiene a rondon disposición servicios gratuitos de asistencia lingüística. Llame al [phone].    We comply with applicable federal civil rights laws and [place] laws. We do not  discriminate on the basis of race, color, national origin, age, disability sex, sexual orientation or gender identity.            Thank you!     Thank you for choosing [facility]  for your care. Our goal is always to provide you with excellent care. Hearing back from our patients is one way we can continue to improve our services. Please take a few minutes to complete the written survey that you may receive in the mail after your visit with us. Thank you!             Your Updated Medication List - Protect others around you: Learn how to safely use, store and throw away your medicines at www.disposemymeds.org.          This list is accurate as of: 8/25/17  3:03 PM.  Always use your most recent med list.                   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    Diabetes mellitus, type 2 (H)       blood glucose monitoring meter device kit    no brand specified    1 kit    Use to test blood sugar 4 X  times daily or as directed.    Type 2 diabetes mellitus with other specified complication (H)       blood glucose monitoring test strip    no brand specified    200 strip    Use to test blood sugars 4 X  times daily or as directed    Diabetes mellitus, type 2 (H)       bumetanide 1 MG tablet    BUMEX    90 tablet    Take 1 tablet (1 mg) by mouth 2 times daily    Generalized edema       * calcium carb 1250 mg (500 mg [place])/vitamin D 200 units 500-200 MG-UNIT per tablet    OSCAL with D    90 tablet    Take 1 tablet by mouth daily    Hypotestosteronism       * calcium-vitamin D 600-400 MG-UNIT per tablet    CALTRATE    60 tablet    Take 1 tablet by mouth daily    Morbid obesity due to excess calories (H), Alcoholic cirrhosis of  liver with ascites (H), Portal hypertension (H), Secondary esophageal varices without bleeding (H), Tobacco use disorder       DESVENLAFAXINE ER PO      Take 100 mg by mouth daily 2 tablets once daily.    Morbid obesity due to excess calories (H), Alcoholic cirrhosis of liver with ascites (H), Portal hypertension (H), Secondary esophageal varices without bleeding (H), Tobacco use disorder       desvenlafaxine succinate 100 MG 24 hr tablet    PRISTIQ    180 tablet    Take 2 tablets (200 mg) by mouth daily    Other depression       furosemide 20 MG tablet    LASIX    270 tablet    Take 2 tabs in morning and 1 tablet at noon.    Generalized edema       glipiZIDE 5 MG 24 hr tablet    glipiZIDE XL    180 tablet    1 tablet twice a day    Type 2 diabetes mellitus without complication (H)       insulin aspart 100 UNIT/ML injection    NovoLOG FLEXPEN    30 mL    20 units before breakfast, 20 units before lunch, 20 units before dinner, 20 units before snacks    Type 2 diabetes mellitus without complication, with long-term current use of insulin (H)       insulin glargine 100 UNIT/ML injection    LANTUS SOLOSTAR    30 mL    Inject 40 Units Subcutaneous At Bedtime    Type 2 diabetes mellitus with other oral complication, unspecified long term insulin use status (H)       insulin pen needle 31G X 8 MM    B-D U/F    300 each    USE  6 times daily / OR AS DIRECTED    Type 2 diabetes, HbA1c goal < 7% (H)       magnesium 250 MG tablet     30 tablet    Take 1 tablet by mouth daily Takes 400 mg daily.Deneen Chin LPN 2:34 PM on 12/22/2015    Cramp of limb       ondansetron 4 MG tablet    ZOFRAN    18 tablet    Take 1 tablet (4 mg) by mouth every 8 hours as needed for nausea    Alcoholic cirrhosis (H), Nausea       order for DME     1 Units    Equipment being ordered: carpal tunnel wrist splint.    CTS (carpal tunnel syndrome)       OYSCO 500 + D 500-200 MG-UNIT Tabs   Generic drug:  Calcium Carb-Cholecalciferol     200 tablet     Take 500 mg by mouth daily    Morbid obesity due to excess calories (H), Alcoholic cirrhosis of liver with ascites (H), Portal hypertension (H), Secondary esophageal varices without bleeding (H), Tobacco use disorder       pantoprazole 40 MG EC tablet    PROTONIX    90 tablet    Take 1 tablet (40 mg) by mouth daily    Morbid obesity due to excess calories (H), Alcoholic cirrhosis of liver with ascites (H), Portal hypertension (H), Secondary esophageal varices without bleeding (H), Tobacco use disorder       propranolol 10 MG tablet    INDERAL    180 tablet    Take 1 tablet (10 mg) by mouth 2 times daily    Portal hypertension (H)       ranitidine 150 MG tablet    ZANTAC    180 tablet    Take 1 tablet (150 mg) by mouth 2 times daily    Esophageal varices (H)       spironolactone 50 MG tablet    ALDACTONE    540 tablet    Takes 3 tablets in am.and p.m.    Portal hypertension (H), Generalized edema       STATIN NOT PRESCRIBED (INTENTIONAL)     0 each    1 each daily Statin not prescribed intentionally due to Active liver disease    Hyperlipidemia LDL goal <100       VITAMIN B-12 PO      Take 1 tablet by mouth daily        vitamin D3 2000 UNITS Caps     90 capsule    Take 1 capsule by mouth daily    Mild major depression (H)       * Notice:  This list has 2 medication(s) that are the same as other medications prescribed for you. Read the directions carefully, and ask your doctor or other care provider to review them with you.

## 2017-08-25 NOTE — PATIENT INSTRUCTIONS
[facility] Medication Refill Request Information:  * Please contact your pharmacy regarding ANY request for medication refills.  ** [facility] Prescription Fax = [phone]  * Please allow 3 business days for routine medication refills.  * Please allow 5 business days for controlled substance medication refills.     [facility] Test Result notification information:  *You will be notified with in 7-10 days of your appointment day regarding the results of your test.  If you are on MyChart you will be notified as soon as the provider has reviewed the results and signed off on them.    [facility] [phone]

## 2017-08-28 NOTE — PROGRESS NOTES
HPI: Lawrence Louie is a 61 year old male who comes in for follow-up of his several medical issues.      He is discouraged that he is requiring paracentesis weekly and amounts being removed are >9L.  He is not eating much because he feels distended all the time. He takes his 40 u of Lantus but because he is not eating much takes 18 units of Novolog twice a day only. He occasionally checks his BS.  It is rarely high. His legs continue to be very edematous but he currently has no open wounds.     He is asking if his antidepressant dose can be increased. He is on max. dose.     He sometimes skips his diuretic if he wants to leave the house.     His wife is being followed for a lung lesion. May need surgery.    Patient Active Problem List   Diagnosis     Mild major depression (H)     Hyperglycemia     Thrombocytopenia (H)     Hypertension goal BP (blood pressure) < 130/80     Plantar warts     Corns and callosities     Family history of colon cancer     Type 2 diabetes, HbA1c goal < 7% (H)     Portal hypertension (H)     Alcoholic cirrhosis (H)     Advanced directives, counseling/discussion     Ascites     Esophageal varices (H)     Multiple rib fractures     Tobacco use disorder     Hyperlipidemia LDL goal <100     Dental caries     Prurigo nodularis     Esophageal reflux     Morbid obesity (H)     History of colonic polyps: tubular adenomas and serrated adenomas     Type II diabetes mellitus with peripheral circulatory disorder (H)     Alcoholic cirrhosis of liver with ascites (H)       He has a medical hx of  does not have any pertinent problems on file.    Current Outpatient Prescriptions   Medication Sig Dispense Refill     furosemide (LASIX) 20 MG tablet Take 2 tabs in morning and 1 tablet at noon. 270 tablet 3     insulin aspart (NOVOLOG FLEXPEN) 100 UNIT/ML injection 20 units before breakfast, 20 units before lunch, 20 units before dinner, 20 units before snacks 30 mL 3     calcium-vitamin D (CALTRATE)  600-400 MG-UNIT per tablet Take 1 tablet by mouth daily 60 tablet 11     blood glucose monitoring (NO BRAND SPECIFIED) meter device kit Use to test blood sugar 4 X  times daily or as directed. 1 kit 0     insulin glargine (LANTUS SOLOSTAR) 100 UNIT/ML injection Inject 40 Units Subcutaneous At Bedtime 30 mL 1     propranolol (INDERAL) 10 MG tablet Take 1 tablet (10 mg) by mouth 2 times daily 180 tablet 3     spironolactone (ALDACTONE) 50 MG tablet Takes 3 tablets in am.and p.m. 540 tablet 6     bumetanide (BUMEX) 1 MG tablet Take 1 tablet (1 mg) by mouth 2 times daily 90 tablet 3     aspirin 81 MG tablet Take 1 tablet (81 mg) by mouth daily 30 tablet 11     OYSCO 500 + D 500-200 MG-UNIT TABS Take 500 mg by mouth daily 200 tablet 3     magnesium oxide (MAG-OX) 400 (241.3 MG) MG tablet Take 1 tablet (400 mg) by mouth daily 90 tablet 3     Cholecalciferol (VITAMIN D3) 2000 UNITS CAPS Take 1 capsule by mouth daily 90 capsule 3     insulin pen needle (B-D U/F) 31G X 8 MM USE  6 times daily / OR AS DIRECTED 300 each 3     glipiZIDE  XL) 5 MG 24 hr tablet 1 tablet twice a day 180 tablet 0     pantoprazole (PROTONIX) 40 MG EC tablet Take 1 tablet (40 mg) by mouth daily 90 tablet 3     DESVENLAFAXINE ER PO Take 100 mg by mouth daily 2 tablets once daily.  3     blood glucose monitoring (NO BRAND SPECIFIED) test strip Use to test blood sugars 4 X  times daily or as directed 200 strip 3     blood glucose (NO BRAND SPECIFIED) lancets standard Use to test blood sugar 4 X  times daily or as directed. 1 Box 3     desvenlafaxine succinate ER (PRISTIQ) 100 MG 24 hr tablet Take 2 tablets (200 mg) by mouth daily 180 tablet 3     ranitidine (ZANTAC) 150 MG tablet Take 1 tablet (150 mg) by mouth 2 times daily 180 tablet 3     Cyanocobalamin (VITAMIN B-12 PO) Take 1 tablet by mouth daily       STATIN NOT PRESCRIBED, INTENTIONAL, 1 each daily Statin not prescribed intentionally due to Active liver disease 0 each 0     ondansetron (ZOFRAN)  4 MG tablet Take 1 tablet (4 mg) by mouth every 8 hours as needed for nausea 18 tablet 1     ORDER FOR DME Equipment being ordered: carpal tunnel wrist splint. 1 Units 0                ALLERGIES: Review of patient's allergies indicates no known allergies.    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)      Splenomegaly      Squamous cell carcinoma      Thrombocytopenia (H)      Varices, esophageal (H)     Banded in 2011       PAST SURGICAL HX:   Past Surgical History:   Procedure Laterality Date     BIOPSY OF SKIN LESION       COLONOSCOPY Left 6/16/2016    Procedure: COMBINED COLONOSCOPY, SINGLE OR MULTIPLE BIOPSY/POLYPECTOMY BY BIOPSY;  Surgeon: Brandy Barnett MD;  Location:  GI     ESOPHAGOSCOPY, GASTROSCOPY, DUODENOSCOPY (EGD), COMBINED  2/13/2013    Procedure: COMBINED ESOPHAGOSCOPY, GASTROSCOPY, DUODENOSCOPY (EGD);;  Surgeon: Tara Cook MD;  Location:  GI     ESOPHAGOSCOPY, GASTROSCOPY, DUODENOSCOPY (EGD), COMBINED  11/4/2013    Procedure: COMBINED ESOPHAGOSCOPY, GASTROSCOPY, DUODENOSCOPY (EGD);;  Surgeon: Lonny Diaz MD;  Location:  GI     ESOPHAGOSCOPY, GASTROSCOPY, DUODENOSCOPY (EGD), COMBINED Left 6/16/2016    Procedure: COMBINED ESOPHAGOSCOPY, GASTROSCOPY, DUODENOSCOPY (EGD), BIOPSY SINGLE OR MULTIPLE;  Surgeon: Brandy Barnett MD;  Location:  GI     EXCISE LESION TRUNK  9/24/2012    Procedure: EXCISE LESION TRUNK;;  Surgeon: Pepe Dominguez MD;  Location: [facility]     GENITOURINARY SURGERY      vasectomy     HERNIORRHAPHY UMBILICAL  9/24/2012    Procedure: HERNIORRHAPHY UMBILICAL;  UMBILICAL HERNIA REPAIR , EXCISION OF PERIUMBILICAL CYST;  Surgeon: Pepe Dominguez MD;  Location: [facility]       FAMILY HX:    Family History   Problem Relation  Age of Onset     Breast Cancer Mother      Cardiovascular Father      CEREBROVASCULAR DISEASE Father      very low blood pressure     CANCER Father      rectal cancer     Cardiovascular Paternal Grandfather      DIABETES Brother      CANCER Sister      skin cancer     Thyroid Disease No family hx of      Lipids No family hx of      Anesthesia Reaction No family hx of      C.A.D. Other      MI, 70's     Liver Cancer Mother        IMMUNIZATION HX:   Immunization History   Administered Date(s) Administered     DTAP (<7y) 01/18/1988     HepA-Ped 2 dose 01/02/2013, 02/18/2013, 10/30/2013     HepB-Peds 01/02/2013, 02/18/2013, 10/30/2013     Influenza (IIV3) 10/04/2010, 12/18/2012, 09/08/2014, 09/26/2015, 09/27/2016     Pneumococcal (PCV 13) 05/12/2015     Pneumococcal 23 valent 09/25/2009, 10/01/2010, 01/02/2013     TD (ADULT, 7+) 09/08/1997     TDAP Vaccine (Adacel) 01/02/2013     Twinrix A/B 01/02/2013     Zoster vaccine, live 12/22/2016       SOCIAL HX:   Social History     Social History Narrative    . 3 grown daughters. He has been sober since 2012.       ROS: 7 system ROS reviewed w/o changes except for above      OBJECTIVE:  /76  Pulse 64  Wt (!) 142.1 kg (313 lb 3.2 oz)  SpO2 98%  BMI 40.21 kg/m2   Wt Readings from Last 1 Encounters:   08/25/17 (!) 142.1 kg (313 lb 3.2 oz)     Constitutional: no distress, comfortable, pleasant   Eyes: anicteric,conjunctiva pink.  Cardiovascular: regular rate and rhythm, normal S1 and S2, no murmurs, rubs or gallops. Both legs are very large and edematous to the upper thighs with 3+ pitting. No acute redness, open wounds, increased warmth.   Respiratory: clear to auscultation, no wheezes or crackles, normal breath sounds    Musculoskeletal: full range of motion, no edema   Skin: no concerning lesions, no jaundice, temp normal.  Several paracentesis needle markings present on abdomen.   Neurological: normal speech, no tremor   Psychological: appropriate  mood    ASSESSMENT/PLAN:  Lawrence was seen today for medication follow-up.    Diagnoses and all orders for this visit:    Cramp of limb  -     magnesium oxide (MAG-OX) 400 (241.3 MG) MG tablet; Take 1 tablet (400 mg) by mouth daily    Generalized edema  -     HEMOGLOBIN A1C -(Today); Future  -     Albumin Random Urine Quantitative with Creat Ratio; Future  -     furosemide (LASIX) 20 MG tablet; Take 2 tabs in morning and 1 tablet at noon.  -     spironolactone (ALDACTONE) 50 MG tablet; Takes 3 tablets in am.and p.m.  -     bumetanide (BUMEX) 1 MG tablet; Take 1 tablet (1 mg) by mouth 2 times daily    Type 2 diabetes mellitus without complication, with long-term current use of insulin (H)  -     insulin aspart (NOVOLOG FLEXPEN) 100 UNIT/ML injection; 20 units before breakfast, 20 units before lunch, 20 units before dinner, 20 units before snacks    Alcoholic cirrhosis of liver with ascites (H)  -     Comprehensive metabolic panel; Future        -     He has an appointment with Dr. Simmons 10/3/2017.  Portal hypertension (H)  -     propranolol (INDERAL) 10 MG tablet; Take 1 tablet (10 mg) by mouth 2 times daily  -     spironolactone (ALDACTONE) 50 MG tablet; Takes 3 tablets in am.and p.m.      Type 2 diabetes mellitus with other specified complication (H)  -     blood glucose monitoring (NO BRAND SPECIFIED) meter device kit; Use to test blood sugar 4 X  times daily or as directed  -     insulin glargine (LANTUS SOLOSTAR) 100 UNIT/ML injection; Inject 40 Units Subcutaneous At Bedtime -             aspirin 81 MG tablet; Take 1 tablet (81 mg) by mouth daily  -     CBC with platelets; Future    RTC 3 months.     Total time spent 40 minutes.  More than 50% of the time spent with Mr. Louie on counseling / coordinating his care    Ary RAY, CNP

## 2017-08-30 ENCOUNTER — OFFICE VISIT (OUTPATIENT)
Dept: INFUSION THERAPY | Facility: CLINIC | Age: 62
End: 2017-08-30
Attending: INTERNAL MEDICINE
Payer: COMMERCIAL

## 2017-08-30 ENCOUNTER — RADIANT APPOINTMENT (OUTPATIENT)
Dept: ULTRASOUND IMAGING | Facility: CLINIC | Age: 62
End: 2017-08-30
Attending: INTERNAL MEDICINE
Payer: COMMERCIAL

## 2017-08-30 VITALS
OXYGEN SATURATION: 98 % | TEMPERATURE: 97.7 F | WEIGHT: 315 LBS | RESPIRATION RATE: 18 BRPM | HEART RATE: 63 BPM | SYSTOLIC BLOOD PRESSURE: 113 MMHG | DIASTOLIC BLOOD PRESSURE: 53 MMHG | BODY MASS INDEX: 41.83 KG/M2

## 2017-08-30 DIAGNOSIS — K70.31 ALCOHOLIC CIRRHOSIS OF LIVER WITH ASCITES (H): Primary | ICD-10-CM

## 2017-08-30 PROCEDURE — 25000125 ZZHC RX 250: Mod: ZF | Performed by: INTERNAL MEDICINE

## 2017-08-30 PROCEDURE — 25000128 H RX IP 250 OP 636: Mod: ZF | Performed by: INTERNAL MEDICINE

## 2017-08-30 PROCEDURE — 27210190 US PARACENTESIS

## 2017-08-30 PROCEDURE — P9047 ALBUMIN (HUMAN), 25%, 50ML: HCPCS | Mod: ZF | Performed by: INTERNAL MEDICINE

## 2017-08-30 RX ORDER — ALBUMIN (HUMAN) 12.5 G/50ML
12.5 SOLUTION INTRAVENOUS 4 TIMES DAILY PRN
Status: CANCELLED
Start: 2017-08-30

## 2017-08-30 RX ORDER — ALBUMIN (HUMAN) 12.5 G/50ML
12.5 SOLUTION INTRAVENOUS 4 TIMES DAILY PRN
Status: DISCONTINUED | OUTPATIENT
Start: 2017-08-30 | End: 2017-08-30 | Stop reason: HOSPADM

## 2017-08-30 RX ADMIN — LIDOCAINE HYDROCHLORIDE 20 ML: 10 INJECTION, SOLUTION INFILTRATION; PERINEURAL at 13:15

## 2017-08-30 RX ADMIN — ALBUMIN HUMAN 12.5 G: 0.25 SOLUTION INTRAVENOUS at 13:35

## 2017-08-30 RX ADMIN — ALBUMIN HUMAN 12.5 G: 0.25 SOLUTION INTRAVENOUS at 13:20

## 2017-08-30 RX ADMIN — ALBUMIN HUMAN 12.5 G: 0.25 SOLUTION INTRAVENOUS at 13:28

## 2017-08-30 RX ADMIN — ALBUMIN HUMAN 12.5 G: 0.25 SOLUTION INTRAVENOUS at 13:46

## 2017-08-30 NOTE — MR AVS SNAPSHOT
After Visit Summary   8/30/2017    Lawrence Louie    MRN: [id number]           Patient Information     Date Of Birth          1955        Visit Information        Provider Department      8/30/2017 12:00 PM Provider, Uc Spec Inf Para; UC 39 ATC [facility] Special[facility] Procedure        Today's Diagnoses     Alcoholic cirrhosis of liver with ascites (H)    -  1       Follow-ups after your visit        Your next 10 appointments already scheduled     Sep 06, 2017 12:00 PM CDT   Paracentesis Visit with Uc Spec Inf Para Provider, UC 39 ATC   [facility] Special[facility] Procedure ([facility])    [address]  [address] [facility] [address]   [phone]            Sep 13, 2017 12:00 PM CDT   Paracentesis Visit with Uc Spec Inf Para Provider, UC 39 ATC   [facility] Special[facility] Procedure ([facility])    [address]  [address] [facility] [address]   [phone]            Sep 20, 2017 12:00 PM CDT   Paracentesis Visit with Uc Spec Inf Para Provider, UC 39 ATC   [facility] ([facility])    [address]  [address] [facility] [address]   [phone]            Sep 27, 2017 12:00 PM CDT   Paracentesis Visit with Uc Spec Inf Para Provider, UC 39 ATC   [facility] Procedure ([facility])    [address]  [address] [facility] [address]   [phone]            Oct 03, 2017 11:00 AM CDT   Lab with UC LAB   [facility] Lab ([facility])    [address]  1st Floor  [facility] [address]   [phone]            Oct 03, 2017 11:50 AM CDT   (Arrive by 11:35 AM)   Return General Liver with Meghna Simmons MD   [facility] Hepatology ([facility])    [address]  [address] [facility] [address] [phone]              Who to contact     If you have questions or need follow up information about today's clinic visit or your schedule please contact [facility] SPECIAL[facility] AND PROCEDURE directly at [phone].  Normal or non-critical lab and imaging results will be communicated to you by MyChart, letter or phone within 4 business days after the clinic has received the results. If you do not hear from us within 7 days, please contact the clinic through [employer] or phone. If you have a critical or abnormal lab result, we will notify you by phone as soon as possible.  Submit refill requests through [employer] or call your pharmacy and they will forward the refill request to us. Please allow 3 business days for your refill to be completed.          Additional Information About Your Visit        MyChart Information     Word[employer] gives you secure access to your electronic health record. If you see a primary care provider, you can also send messages to your care team and make appointments. If you have questions, please call your primary care clinic.  If you do not have a primary care provider, please call [phone] and they will assist you.        Care EveryWhere ID     This is your Care EveryWhere ID. This could be used by other organizations to access your [place] medical records  [id number]        Your Vitals Were     Pulse Temperature Respirations Pulse Oximetry BMI (Body Mass Index)       63 97.7  F (36.5  C) (Oral) 18 98% 41.83 kg/m2        Blood Pressure from Last 3 Encounters:   08/30/17 113/53   08/25/17 148/76   08/23/17 110/60    Weight from Last 3 Encounters:   08/30/17 (!) 147.8 kg (325 lb 12.8 oz)   08/25/17 (!) 142.1 kg (313 lb 3.2 oz)   08/23/17 (!) 137.8 kg (303 lb 12.8 oz)              We Performed the Following     US Paracentesis        Primary Care Provider Office Phone # Fax #    Ary Murphy,  APRN [phone]       [address] [place] [address] [address]  [facility] [address]        Equal Access to Services     JOSEF LORENZO : Hadii aad ku hadblanca Del Toro, brianda barbaracalha, jonathan vickrosibel cohen, silvia bobbymiguel salas. So [facility] [phone].    ATENCIÓN: Si habla español, tiene a rondon disposición servicios gratuitos de asistencia lingüística. Llame al [phone].    We comply with applicable federal civil rights laws and [place] laws. We do not discriminate on the basis of race, color, national origin, age, disability sex, sexual orientation or gender identity.            Thank you!     Thank you for choosing [facility] SPECIALTY AND PROCEDURE  for your care. Our goal is always to provide you with excellent care. Hearing back from our patients is one way we can continue to improve our services. Please take a few minutes to complete the written survey that you may receive in the mail after your visit with us. Thank you!             Your Updated Medication List - Protect others around you: Learn how to safely use, store and throw away your medicines at www.disposemymeds.org.          This list is accurate as of: 8/30/17  5:24 PM.  Always use your most recent med list.                   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    Diabetes mellitus, type 2 (H)       blood glucose monitoring meter device kit    no brand specified    1 kit    Use to test blood sugar 4 X  times daily or as directed.    Type 2 diabetes mellitus with other specified complication (H)       blood glucose monitoring test strip    no brand specified    200 strip    Use to test blood sugars 4 X  times daily or as  directed    Diabetes mellitus, type 2 (H)       bumetanide 1 MG tablet    BUMEX    90 tablet    Take 1 tablet (1 mg) by mouth 2 times daily    Generalized edema       calcium-vitamin D 600-400 MG-UNIT per tablet    CALTRATE    60 tablet    Take 1 tablet by mouth daily    Morbid obesity due to excess calories (H), Alcoholic cirrhosis of liver with ascites (H), Portal hypertension (H), Secondary esophageal varices without bleeding (H), Tobacco use disorder       DESVENLAFAXINE ER PO      Take 100 mg by mouth daily 2 tablets once daily.    Morbid obesity due to excess calories (H), Alcoholic cirrhosis of liver with ascites (H), Portal hypertension (H), Secondary esophageal varices without bleeding (H), Tobacco use disorder       desvenlafaxine succinate 100 MG 24 hr tablet    PRISTIQ    180 tablet    Take 2 tablets (200 mg) by mouth daily    Other depression       furosemide 20 MG tablet    LASIX    270 tablet    Take 2 tabs in morning and 1 tablet at noon.    Generalized edema       glipiZIDE 5 MG 24 hr tablet    glipiZIDE XL    180 tablet    1 tablet twice a day    Type 2 diabetes mellitus without complication (H)       insulin aspart 100 UNIT/ML injection    NovoLOG FLEXPEN    30 mL    20 units before breakfast, 20 units before lunch, 20 units before dinner, 20 units before snacks    Type 2 diabetes mellitus without complication, with long-term current use of insulin (H)       insulin glargine 100 UNIT/ML injection    LANTUS SOLOSTAR    30 mL    Inject 40 Units Subcutaneous At Bedtime    Type 2 diabetes mellitus with other oral complication, unspecified long term insulin use status (H)       insulin pen needle 31G X 8 MM    B-D U/F    300 each    USE  6 times daily / OR AS DIRECTED    Type 2 diabetes, HbA1c goal < 7% (H)       magnesium oxide 400 (241.3 MG) MG tablet    MAG-OX    90 tablet    Take 1 tablet (400 mg) by mouth daily    Cramp of limb       ondansetron 4 MG tablet    ZOFRAN    18 tablet    Take 1 tablet  (4 mg) by mouth every 8 hours as needed for nausea    Alcoholic cirrhosis (H), Nausea       order for DME     1 Units    Equipment being ordered: carpal tunnel wrist splint.    CTS (carpal tunnel syndrome)       OYSCO 500 + D 500-200 MG-UNIT Tabs   Generic drug:  Calcium Carb-Cholecalciferol     200 tablet    Take 500 mg by mouth daily    Morbid obesity due to excess calories (H), Alcoholic cirrhosis of liver with ascites (H), Portal hypertension (H), Secondary esophageal varices without bleeding (H), Tobacco use disorder       pantoprazole 40 MG EC tablet    PROTONIX    90 tablet    Take 1 tablet (40 mg) by mouth daily    Morbid obesity due to excess calories (H), Alcoholic cirrhosis of liver with ascites (H), Portal hypertension (H), Secondary esophageal varices without bleeding (H), Tobacco use disorder       propranolol 10 MG tablet    INDERAL    180 tablet    Take 1 tablet (10 mg) by mouth 2 times daily    Portal hypertension (H)       ranitidine 150 MG tablet    ZANTAC    180 tablet    Take 1 tablet (150 mg) by mouth 2 times daily    Esophageal varices (H)       spironolactone 50 MG tablet    ALDACTONE    540 tablet    Takes 3 tablets in am.and p.m.    Portal hypertension (H), Generalized edema       STATIN NOT PRESCRIBED (INTENTIONAL)     0 each    1 each daily Statin not prescribed intentionally due to Active liver disease    Hyperlipidemia LDL goal <100       VITAMIN B-12 PO      Take 1 tablet by mouth daily        vitamin D3 2000 UNITS Caps     90 capsule    Take 1 capsule by mouth daily    Mild major depression (H)

## 2017-08-30 NOTE — PROGRESS NOTES
Paracentesis Nursing Note  Lawrence Louie [facility].    During today's appointment orders from Meghna Simmons MD were completed.    Progress Note:  Patient identification verified by name and date of birth.  Assessment completed.  Vitals monitored throughout appointment and recorded in Doc Flowsheets.  See proceduralist note in ultrasound.    Date of consent or authorization: 8/30/17.  Invasive Procedure Safety Checklist was completed and sent for scanning.     Paracentesis performed by Moy Potter PA-C Radiology.    The following labs were communicated to provider performing paracentesis:  Lab Results   Component Value Date    PLT 89 08/25/2017       Total amount of ascites fluid drained: 9.3 liters.  Color of ascites fluid: yellow.  Total amount of albumin given: 50  grams.    Patient tolerated procedure well.    Post procedure,denies pain or discomfort post paracentesis.      Discharge Plan:  Discharge instructions were reviewed with patient.  Patient/Representative verbalized understanding and all questions were answered.   [facility] in stable condition.    Zahra Murphy RN    Administrations This Visit     albumin human 25 % injection 12.5 g     Admin Date Action Dose Route Administered By             08/30/2017 New Bag 12.5 g Intravenous Zahra Murphy RN                    lidocaine 1 % 20 mL     Admin Date Action Dose Route Administered By             08/30/2017 Given 20 mL Injection Zahra Murphy RN                          /56  Pulse 60  Temp 97.7  F (36.5  C) (Oral)  Resp 18  Wt (!) 147.8 kg (325 lb 12.8 oz)  SpO2 98%  BMI 41.83 kg/m2

## 2017-09-01 ENCOUNTER — MYC MEDICAL ADVICE (OUTPATIENT)
Dept: INTERNAL MEDICINE | Facility: CLINIC | Age: 62
End: 2017-09-01

## 2017-09-06 ENCOUNTER — RADIANT APPOINTMENT (OUTPATIENT)
Dept: ULTRASOUND IMAGING | Facility: CLINIC | Age: 62
End: 2017-09-06
Attending: INTERNAL MEDICINE
Payer: COMMERCIAL

## 2017-09-06 ENCOUNTER — OFFICE VISIT (OUTPATIENT)
Dept: INFUSION THERAPY | Facility: CLINIC | Age: 62
End: 2017-09-06
Attending: INTERNAL MEDICINE
Payer: COMMERCIAL

## 2017-09-06 ENCOUNTER — MYC MEDICAL ADVICE (OUTPATIENT)
Dept: INTERNAL MEDICINE | Facility: CLINIC | Age: 62
End: 2017-09-06

## 2017-09-06 ENCOUNTER — TELEPHONE (OUTPATIENT)
Dept: GASTROENTEROLOGY | Facility: CLINIC | Age: 62
End: 2017-09-06

## 2017-09-06 VITALS
WEIGHT: 302.5 LBS | OXYGEN SATURATION: 95 % | SYSTOLIC BLOOD PRESSURE: 119 MMHG | HEART RATE: 64 BPM | DIASTOLIC BLOOD PRESSURE: 64 MMHG | BODY MASS INDEX: 38.84 KG/M2 | TEMPERATURE: 97.6 F

## 2017-09-06 DIAGNOSIS — E11.51 TYPE II DIABETES MELLITUS WITH PERIPHERAL CIRCULATORY DISORDER (H): Primary | ICD-10-CM

## 2017-09-06 DIAGNOSIS — K70.31 ALCOHOLIC CIRRHOSIS OF LIVER WITH ASCITES (H): Primary | ICD-10-CM

## 2017-09-06 PROCEDURE — 25000128 H RX IP 250 OP 636: Mod: ZF | Performed by: INTERNAL MEDICINE

## 2017-09-06 PROCEDURE — P9047 ALBUMIN (HUMAN), 25%, 50ML: HCPCS | Mod: ZF | Performed by: INTERNAL MEDICINE

## 2017-09-06 PROCEDURE — 27210190 US PARACENTESIS

## 2017-09-06 PROCEDURE — 25000125 ZZHC RX 250: Mod: ZF | Performed by: INTERNAL MEDICINE

## 2017-09-06 RX ORDER — ALBUMIN (HUMAN) 12.5 G/50ML
12.5 SOLUTION INTRAVENOUS 4 TIMES DAILY PRN
Status: DISCONTINUED | OUTPATIENT
Start: 2017-09-06 | End: 2017-09-06 | Stop reason: HOSPADM

## 2017-09-06 RX ORDER — ALBUMIN (HUMAN) 12.5 G/50ML
12.5 SOLUTION INTRAVENOUS 4 TIMES DAILY PRN
Status: CANCELLED
Start: 2017-09-06

## 2017-09-06 RX ADMIN — ALBUMIN HUMAN 50 G: 0.25 SOLUTION INTRAVENOUS at 12:49

## 2017-09-06 RX ADMIN — LIDOCAINE HYDROCHLORIDE 20 ML: 10 INJECTION, SOLUTION INFILTRATION; PERINEURAL at 12:49

## 2017-09-06 NOTE — MR AVS SNAPSHOT
After Visit Summary   9/6/2017    Lawrence Louie    MRN: [id number]           Patient Information     Date Of Birth          1955        Visit Information        Provider Department      9/6/2017 12:00 PM Provider, Uc Spec Inf Para; UC 39 ATC [facility] Procedure        Today's Diagnoses     Alcoholic cirrhosis of liver with ascites (H)    -  1       Follow-ups after your visit        Your next 10 appointments already scheduled     Sep 13, 2017 12:00 PM CDT   Paracentesis Visit with Uc Spec Inf Para Provider, UC 39 ATC   [facility] Procedure ([facility])    [address]  [address] [facility] [address]   [phone]            Sep 20, 2017 12:00 PM CDT   Paracentesis Visit with Uc Spec Inf Para Provider, UC 39 ATC   [facility] Procedure ([facility])    [address]  [address] [facility] [address]   [phone]            Sep 27, 2017 12:00 PM CDT   Paracentesis Visit with Uc Spec Inf Para Provider, UC 39 ATC   [facility] Procedure ([facility])    [address]  2nd Floor  [facility] [address]   [phone]            Oct 03, 2017 11:00 AM CDT   Lab with UC LAB   [facility] Lab ([facility])    [address] [place] [address]  1st Floor  [facility] [address]   [phone]            Oct 03, 2017 11:50 AM CDT   (Arrive by 11:35 AM)   Return General Liver with Meghna Simmons MD   [facility] Hepatology ([facility])    [address] [place] [address]  3rd Floor  [facility] [address]   [phone]            Oct 04, 2017 12:00 PM CDT   Paracentesis Visit with Uc Spec Inf Para Provider, UC 40 ATC   [facility] Procedure ([facility]  [place])    [address] [facility]  [address] [facility] [address] [phone]              Who to contact     If you have questions or need follow up information about today's clinic visit or your schedule please contact [facility] SPECIALTY AND PROCEDURE directly at [phone].  Normal or non-critical lab and imaging results will be communicated to you by MyChart, letter or phone within 4 business days after the clinic has received the results. If you do not hear from us within 7 days, please contact the clinic through [employer] or phone. If you have a critical or abnormal lab result, we will notify you by phone as soon as possible.  Submit refill requests through [employer] or call your pharmacy and they will forward the refill request to us. Please allow 3 business days for your refill to be completed.          Additional Information About Your Visit        MyChart Information     [employer] gives you secure access to your electronic health record. If you see a primary care provider, you can also send messages to your care team and make appointments. If you have questions, please call your primary care clinic.  If you do not have a primary care provider, please call [phone] and they will assist you.        Care EveryWhere ID     This is your Care EveryWhere ID. This could be used by other organizations to access your [place] medical records  [id number]        Your Vitals Were     Pulse Temperature Pulse Oximetry BMI (Body Mass Index)          64 97.6  F (36.4  C) (Oral) 95% 38.84 kg/m2         Blood Pressure from Last 3 Encounters:   09/06/17 119/64   08/30/17 113/53   08/25/17 148/76    Weight from Last 3 Encounters:   09/06/17 (!) 137.2 kg (302 lb 8 oz)   08/30/17 (!) 147.8 kg (325 lb 12.8 oz)   08/25/17 (!) 142.1 kg (313 lb 3.2 oz)              We Performed the Following     US Paracentesis        Primary Care Provider Office Phone # Fax #    CORY Toussaint CNP  [phone]       [address] [place] [address] [address]  [facility] [address]        Equal Access to Services     JOSEF LORENZO : Hadii sil anthony spencer Del Toro, wateganda lunikko, qakunta kabernadetteda noel, silvia wattsgerson sheridan So [facility] [phone].    ATENCIÓN: Si habla español, tiene a rondon disposición servicios gratuitos de asistencia lingüística. Llame al [phone].    We comply with applicable federal civil rights laws and [place] laws. We do not discriminate on the basis of race, color, national origin, age, disability sex, sexual orientation or gender identity.            Thank you!     Thank you for choosing [facility] SPECIALTY AND PROCEDURE  for your care. Our goal is always to provide you with excellent care. Hearing back from our patients is one way we can continue to improve our services. Please take a few minutes to complete the written survey that you may receive in the mail after your visit with us. Thank you!             Your Updated Medication List - Protect others around you: Learn how to safely use, store and throw away your medicines at www.disposemymeds.org.          This list is accurate as of: 9/6/17  3:45 PM.  Always use your most recent med list.                   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    Diabetes mellitus, type 2 (H)       blood glucose monitoring meter device kit    no brand specified    1 kit    Use to test blood sugar 4 X  times daily or as directed.    Type 2 diabetes mellitus with other specified complication (H)       blood glucose monitoring test strip    no brand specified    200 strip    Use to test blood sugars 4 X  times daily or as directed     Diabetes mellitus, type 2 (H)       bumetanide 1 MG tablet    BUMEX    90 tablet    Take 1 tablet (1 mg) by mouth 2 times daily    Generalized edema       calcium-vitamin D 600-400 MG-UNIT per tablet    CALTRATE    60 tablet    Take 1 tablet by mouth daily    Morbid obesity due to excess calories (H), Alcoholic cirrhosis of liver with ascites (H), Portal hypertension (H), Secondary esophageal varices without bleeding (H), Tobacco use disorder       DESVENLAFAXINE ER PO      Take 100 mg by mouth daily 2 tablets once daily.    Morbid obesity due to excess calories (H), Alcoholic cirrhosis of liver with ascites (H), Portal hypertension (H), Secondary esophageal varices without bleeding (H), Tobacco use disorder       desvenlafaxine succinate 100 MG 24 hr tablet    PRISTIQ    180 tablet    Take 2 tablets (200 mg) by mouth daily    Other depression       furosemide 20 MG tablet    LASIX    270 tablet    Take 2 tabs in morning and 1 tablet at noon.    Generalized edema       glipiZIDE 5 MG 24 hr tablet    glipiZIDE XL    180 tablet    1 tablet twice a day    Type 2 diabetes mellitus without complication (H)       insulin aspart 100 UNIT/ML injection    NovoLOG FLEXPEN    30 mL    20 units before breakfast, 20 units before lunch, 20 units before dinner, 20 units before snacks    Type 2 diabetes mellitus without complication, with long-term current use of insulin (H)       insulin glargine 100 UNIT/ML injection    LANTUS SOLOSTAR    30 mL    Inject 40 Units Subcutaneous At Bedtime    Type 2 diabetes mellitus with other oral complication, unspecified long term insulin use status (H)       insulin pen needle 31G X 8 MM    B-D U/F    300 each    USE  6 times daily / OR AS DIRECTED    Type 2 diabetes, HbA1c goal < 7% (H)       magnesium oxide 400 (241.3 MG) MG tablet    MAG-OX    90 tablet    Take 1 tablet (400 mg) by mouth daily    Cramp of limb       ondansetron 4 MG tablet    ZOFRAN    18 tablet    Take 1 tablet (4 mg) by  mouth every 8 hours as needed for nausea    Alcoholic cirrhosis (H), Nausea       order for DME     1 Units    Equipment being ordered: carpal tunnel wrist splint.    CTS (carpal tunnel syndrome)       OYSCO 500 + D 500-200 MG-UNIT Tabs   Generic drug:  Calcium Carb-Cholecalciferol     200 tablet    Take 500 mg by mouth daily    Morbid obesity due to excess calories (H), Alcoholic cirrhosis of liver with ascites (H), Portal hypertension (H), Secondary esophageal varices without bleeding (H), Tobacco use disorder       pantoprazole 40 MG EC tablet    PROTONIX    90 tablet    Take 1 tablet (40 mg) by mouth daily    Morbid obesity due to excess calories (H), Alcoholic cirrhosis of liver with ascites (H), Portal hypertension (H), Secondary esophageal varices without bleeding (H), Tobacco use disorder       propranolol 10 MG tablet    INDERAL    180 tablet    Take 1 tablet (10 mg) by mouth 2 times daily    Portal hypertension (H)       ranitidine 150 MG tablet    ZANTAC    180 tablet    Take 1 tablet (150 mg) by mouth 2 times daily    Esophageal varices (H)       spironolactone 50 MG tablet    ALDACTONE    540 tablet    Takes 3 tablets in am.and p.m.    Portal hypertension (H), Generalized edema       STATIN NOT PRESCRIBED (INTENTIONAL)     0 each    1 each daily Statin not prescribed intentionally due to Active liver disease    Hyperlipidemia LDL goal <100       VITAMIN B-12 PO      Take 1 tablet by mouth daily        vitamin D3 2000 UNITS Caps     90 capsule    Take 1 capsule by mouth daily    Mild major depression (H)

## 2017-09-06 NOTE — PROGRESS NOTES
Paracentesis Nursing Note  Lawrence Louie presents [facility].    During today's appointment orders from Meghna Simmons MD were completed.    Progress Note:  Patient identification verified by name and date of birth.  Assessment completed.  Vitals monitored throughout appointment and recorded in Doc Flowsheets.  See proceduralist note in ultrasound.    Date of consent or authorization: 08/30/17.  Invasive Procedure Safety Checklist was completed and sent for scanning.     Paracentesis performed by Aimee LEON PA-C Radiology.    The following labs were communicated to provider performing paracentesis:  Lab Results   Component Value Date    PLT 86    08/09/2017 07/12/2017       Total amount of ascites fluid drained: 9.8 liters.  Color of ascites fluid: yellow.  Total amount of albumin given: 50. grams.    Patient tolerated procedure well.    Post procedure,denies pain or discomfort post paracentesis.      Discharge Plan:  Discharge instructions were reviewed with patient.  Patient/Representative verbalized understanding and all questions were answered.   Discharged [facility] in stable condition.    Kailyn Jennings RN    Administrations This Visit     albumin human 25 % injection 12.5 g     Admin Date Action Dose Route Administered By             09/06/2017 New Bag 50 g Intravenous Kailyn Jennings RN                    lidocaine 1 % 20 mL     Admin Date Action Dose Route Administered By             09/06/2017 Given by Other Clinician 20 mL Injection Kailyn Jennings RN                          /68  Pulse 63  Temp 97.6  F (36.4  C) (Oral)  Wt (!) 147.6 kg (325 lb 4.8 oz)  SpO2 95%  BMI 41.77 kg/m2

## 2017-09-06 NOTE — TELEPHONE ENCOUNTER
Pt stops by as he was in the building for para. Pt states that 9.5 Liters was taken today. Pt states that by Sunday, abdomen feels pretty full and becomes easily winded with minimal excursion. Pt c/o getting up quite frequently during the night from the diuretics. Questioned what time of day the diuretics were being taken and pt states @ noon and 6pm. Writer suggested changing the time to 8 and 2pm to at least eliminate one bathroom run dscussed following a low sodium diet. Pt does have an appt with you on October 3rd. Writer asked if pt was willing to go in for syed twice weekly rather then once and pt agreed. Informed pt I would discuss with Dr. Simmons. Please advise.

## 2017-09-07 DIAGNOSIS — K70.31 ALCOHOLIC CIRRHOSIS OF LIVER WITH ASCITES (H): Primary | ICD-10-CM

## 2017-09-07 NOTE — TELEPHONE ENCOUNTER
Verified diuretic dosing. The doses were correct. Instructed pt the diuretic dose changing, recheck lab work and changed appt per Dr. Simmons's recommendations.

## 2017-09-07 NOTE — TELEPHONE ENCOUNTER
Current diuretic regimen recorded (not created by me)    Spironolactone 150 mg bid  Furosemide 40 am 20 pm  Bumex 1 mg bid    Can you verify this?  If this is correct, I would like to make some changes  Continue spironolactone at 150 bid  Stop bumex  Change furosemide to 60 mg am and  40 mg pm  Check BMP in 1 week.    Reiterate importance of sodium restriction!! The diuretics will not work if he is taking in a lot of sodium    I can see him Friday Sept 29 at 11:10 am.

## 2017-09-13 ENCOUNTER — OFFICE VISIT (OUTPATIENT)
Dept: INFUSION THERAPY | Facility: CLINIC | Age: 62
End: 2017-09-13
Attending: INTERNAL MEDICINE
Payer: COMMERCIAL

## 2017-09-13 ENCOUNTER — RADIANT APPOINTMENT (OUTPATIENT)
Dept: ULTRASOUND IMAGING | Facility: CLINIC | Age: 62
End: 2017-09-13
Attending: INTERNAL MEDICINE
Payer: COMMERCIAL

## 2017-09-13 VITALS
TEMPERATURE: 97.5 F | BODY MASS INDEX: 38.84 KG/M2 | DIASTOLIC BLOOD PRESSURE: 69 MMHG | SYSTOLIC BLOOD PRESSURE: 111 MMHG | RESPIRATION RATE: 16 BRPM | HEART RATE: 77 BPM | WEIGHT: 302.5 LBS

## 2017-09-13 DIAGNOSIS — K70.31 ALCOHOLIC CIRRHOSIS OF LIVER WITH ASCITES (H): Primary | ICD-10-CM

## 2017-09-13 LAB
ANION GAP SERPL CALCULATED.3IONS-SCNC: 6 MMOL/L (ref 3–14)
BUN SERPL-MCNC: 10 MG/DL (ref 7–30)
CALCIUM SERPL-MCNC: 7.8 MG/DL (ref 8.5–10.1)
CHLORIDE SERPL-SCNC: 103 MMOL/L (ref 94–109)
CO2 SERPL-SCNC: 27 MMOL/L (ref 20–32)
CREAT SERPL-MCNC: 0.72 MG/DL (ref 0.66–1.25)
GFR SERPL CREATININE-BSD FRML MDRD: >90 ML/MIN/1.7M2
GLUCOSE SERPL-MCNC: 125 MG/DL (ref 70–99)
POTASSIUM SERPL-SCNC: 3.5 MMOL/L (ref 3.4–5.3)
SODIUM SERPL-SCNC: 136 MMOL/L (ref 133–144)

## 2017-09-13 PROCEDURE — 27210190 US PARACENTESIS

## 2017-09-13 PROCEDURE — P9047 ALBUMIN (HUMAN), 25%, 50ML: HCPCS | Mod: ZF | Performed by: INTERNAL MEDICINE

## 2017-09-13 PROCEDURE — 80048 BASIC METABOLIC PNL TOTAL CA: CPT | Performed by: INTERNAL MEDICINE

## 2017-09-13 PROCEDURE — 25000128 H RX IP 250 OP 636: Mod: ZF | Performed by: INTERNAL MEDICINE

## 2017-09-13 PROCEDURE — 25000125 ZZHC RX 250: Mod: ZF | Performed by: INTERNAL MEDICINE

## 2017-09-13 RX ORDER — ALBUMIN (HUMAN) 12.5 G/50ML
12.5 SOLUTION INTRAVENOUS 4 TIMES DAILY PRN
Status: CANCELLED
Start: 2017-09-13

## 2017-09-13 RX ORDER — ALBUMIN (HUMAN) 12.5 G/50ML
12.5 SOLUTION INTRAVENOUS 4 TIMES DAILY PRN
Status: DISCONTINUED | OUTPATIENT
Start: 2017-09-13 | End: 2017-09-13 | Stop reason: HOSPADM

## 2017-09-13 RX ADMIN — ALBUMIN HUMAN 50 G: 0.25 SOLUTION INTRAVENOUS at 12:51

## 2017-09-13 RX ADMIN — LIDOCAINE HYDROCHLORIDE 20 ML: 10 INJECTION, SOLUTION INFILTRATION; PERINEURAL at 12:50

## 2017-09-13 NOTE — MR AVS SNAPSHOT
After Visit Summary   9/13/2017    Lawrence Louie    MRN: [id number]           Patient Information     Date Of Birth          1955        Visit Information        Provider Department      9/13/2017 12:00 PM Vipin Fisher PA-C; [address] Specialty and Procedure        Today's Diagnoses     Alcoholic cirrhosis of liver with ascites (H)    -  1      Care Instructions    DISCHARGE INSTRUCTIONS FOLLOWING ABDOMINAL PARACENTESIS    After you go home:    No strenuous activity for 24 hours    Resume your regular diet    Limit fluid intake for the first 48 hours to no more than 2 quarts per day.  There should be minimal drainage from the needle site.  If drainage does occur and soaks through the bandage, apply gentle pressure with your hand for 5 minutes.    Notify MD for the following:    Excessive drainage    Excessive swelling, redness or tenderness at the needle site    Fever greater than 101 degrees F    Dizziness or light-headedness when getting up or walking      IF THIS IS A MEDICAL EMERGENCY, CALL [phone]    If you have any questions or concerns    Contact the Hepatology Clinic:   [phone]    If you are a post-transplant patient, contact the Transplant Office:  [phone]    If this is after hours, contact the hospital :  [phone] and as to have the GI resident on call paged                   I have received and understand my discharge instructions and I have all of my personal belongings.          ------------------------------------------------------        ---------------------------------------------------    Patient / Significant Other's Signature     Relationship            Follow-ups after your visit        Your next 10 appointments already scheduled     Sep 20, 2017 12:00 PM CDT   Paracentesis Visit with Uc Spec Inf Para Provider, [address] Specialty and Procedure ([facility]  [place])    [address] [place] [address]  2nd [facility] [address]   [phone]            Sep 27, 2017 12:00 PM CDT   Paracentesis Visit with Uc Spec Inf Para Provider, UC 39 ATC   [facility] Specialty and Procedure ([facility])    [address] [place] [address]   [phone]            Sep 29, 2017 10:15 AM CDT   Lab with UC LAB   [facility] Lab ([facility])    [address] [place] [address]   [phone]            Sep 29, 2017 11:10 AM CDT   (Arrive by 10:55 AM)   Return General Liver with Meghna Simmons MD   [facility] Hepatology ([facility])    [address] [place] [address]   [phone]            Oct 04, 2017 12:00 PM CDT   Paracentesis Visit with Uc Spec Inf Para Provider, UC 40 ATC   [facility] Specialty and Procedure ([facility])    [address] [place] [id number] [phone]            Oct 11, 2017 12:00 PM CDT   Paracentesis Visit with Uc Spec Inf Para Provider, UC 40 ATC   [facility] Specialty and Procedure ([facility])    [address] [place] [id number] [phone]              Who to contact     If you have questions or need follow up information about today's clinic visit or your schedule please contact [facility] SPECIALTY AND PROCEDURE directly at [phone].  Normal or non-critical lab and imaging results will be communicated to you by MyChart, letter or phone within 4 business days after the clinic has received the results. If you do not hear from us within 7 days, please contact the clinic through MyChart or phone. If you have a critical or abnormal lab result, we will notify you by phone as soon as possible.  Submit  refill requests through [employer] or call your pharmacy and they will forward the refill request to us. Please allow 3 business days for your refill to be completed.          Additional Information About Your Visit        [employer] Information     [employer] gives you secure access to your electronic health record. If you see a primary care provider, you can also send messages to your care team and make appointments. If you have questions, please call your primary care clinic.  If you do not have a primary care provider, please call [phone] and they will assist you.        Care EveryWhere ID     This is your Care EveryWhere ID. This could be used by other organizations to access your [place] medical records  [id number]        Your Vitals Were     Pulse Temperature Respirations BMI (Body Mass Index)          77 97.5  F (36.4  C) (Oral) 16 38.84 kg/m2         Blood Pressure from Last 3 Encounters:   09/13/17 111/69   09/06/17 119/64   08/30/17 113/53    Weight from Last 3 Encounters:   09/13/17 (!) 137.2 kg (302 lb 8 oz)   09/06/17 (!) 137.2 kg (302 lb 8 oz)   08/30/17 (!) 147.8 kg (325 lb 12.8 oz)              We Performed the Following     Basic metabolic panel     US Paracentesis          Today's Medication Changes          These changes are accurate as of: 9/13/17  1:40 PM.  If you have any questions, ask your nurse or doctor.               These medicines have changed or have updated prescriptions.        Dose/Directions    furosemide 20 MG tablet   Commonly known as:  LASIX   This may have changed:    - how much to take  - how to take this  - when to take this  - additional instructions   Used for:  Generalized edema        Take 2 tabs in morning and 1 tablet at noon.   Quantity:  270 tablet   Refills:  3                Primary Care Provider Office Phone # Fax #    CORY Toussaint [phone]       [address] [place] [address]  [facility] [place] [facility] [address]        Equal Access to Services     JOSEF  GAAR : Hadii aad raj spencer Del Toro, waaxda luqadaha, qaybta karosibel amparoclyde, waxmary anastacia haymiguel bernardpatricksangeeta richardson . [facility] [phone].    ATENCIÓN: Si habla español, tiene a rondon disposición servicios gratuitos de asistencia lingüística. Llame al [phone].    We comply with applicable federal civil rights laws and [place] laws. We do not discriminate on the basis of race, color, national origin, age, disability sex, sexual orientation or gender identity.            Thank you!     Thank you for choosing [facility] SPECIALTY AND PROCEDURE  for your care. Our goal is always to provide you with excellent care. Hearing back from our patients is one way we can continue to improve our services. Please take a few minutes to complete the written survey that you may receive in the mail after your visit with us. Thank you!             Your Updated Medication List - Protect others around you: Learn how to safely use, store and throw away your medicines at www.disposemymeds.org.          This list is accurate as of: 9/13/17  1:40 PM.  Always use your most recent med list.                   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    Diabetes mellitus, type 2 (H)       blood glucose monitoring meter device kit    no brand specified    1 kit    Use to test blood sugar 4 X  times daily or as directed.    Type 2 diabetes mellitus with other specified complication (H)       blood glucose monitoring test strip    no brand specified    200 strip    Use to test blood sugars 4 X  times daily or as directed    Diabetes mellitus, type 2 (H)       calcium-vitamin D 600-400 MG-UNIT per tablet    CALTRATE    60 tablet    Take 1  tablet by mouth daily    Morbid obesity due to excess calories (H), Alcoholic cirrhosis of liver with ascites (H), Portal hypertension (H), Secondary esophageal varices without bleeding (H), Tobacco use disorder       DESVENLAFAXINE ER PO      Take 100 mg by mouth daily 2 tablets once daily.    Morbid obesity due to excess calories (H), Alcoholic cirrhosis of liver with ascites (H), Portal hypertension (H), Secondary esophageal varices without bleeding (H), Tobacco use disorder       desvenlafaxine succinate 100 MG 24 hr tablet    PRISTIQ    180 tablet    Take 2 tablets (200 mg) by mouth daily    Other depression       furosemide 20 MG tablet    LASIX    270 tablet    Take 2 tabs in morning and 1 tablet at noon.    Generalized edema       glipiZIDE 5 MG 24 hr tablet    glipiZIDE XL    180 tablet    1 tablet twice a day    Type 2 diabetes mellitus without complication (H)       insulin aspart 100 UNIT/ML injection    NovoLOG FLEXPEN    30 mL    20 units before breakfast, 20 units before lunch, 20 units before dinner, 20 units before snacks    Type 2 diabetes mellitus without complication, with long-term current use of insulin (H)       insulin glargine 100 UNIT/ML injection    LANTUS SOLOSTAR    30 mL    Inject 40 Units Subcutaneous At Bedtime    Type 2 diabetes mellitus with other oral complication, unspecified long term insulin use status (H)       insulin pen needle 31G X 8 MM    B-D U/F    300 each    USE  6 times daily / OR AS DIRECTED    Type 2 diabetes, HbA1c goal < 7% (H)       magnesium oxide 400 (241.3 MG) MG tablet    MAG-OX    90 tablet    Take 1 tablet (400 mg) by mouth daily    Cramp of limb       ondansetron 4 MG tablet    ZOFRAN    18 tablet    Take 1 tablet (4 mg) by mouth every 8 hours as needed for nausea    Alcoholic cirrhosis (H), Nausea       order for DME     1 Units    Equipment being ordered: carpal tunnel wrist splint.    CTS (carpal tunnel syndrome)       OYSCO 500 + D 500-200 MG-UNIT Tabs    Generic drug:  Calcium Carb-Cholecalciferol     200 tablet    Take 500 mg by mouth daily    Morbid obesity due to excess calories (H), Alcoholic cirrhosis of liver with ascites (H), Portal hypertension (H), Secondary esophageal varices without bleeding (H), Tobacco use disorder       pantoprazole 40 MG EC tablet    PROTONIX    90 tablet    Take 1 tablet (40 mg) by mouth daily    Morbid obesity due to excess calories (H), Alcoholic cirrhosis of liver with ascites (H), Portal hypertension (H), Secondary esophageal varices without bleeding (H), Tobacco use disorder       propranolol 10 MG tablet    INDERAL    180 tablet    Take 1 tablet (10 mg) by mouth 2 times daily    Portal hypertension (H)       ranitidine 150 MG tablet    ZANTAC    180 tablet    Take 1 tablet (150 mg) by mouth 2 times daily    Esophageal varices (H)       spironolactone 50 MG tablet    ALDACTONE    540 tablet    Takes 3 tablets in am.and p.m.    Portal hypertension (H), Generalized edema       STATIN NOT PRESCRIBED (INTENTIONAL)     0 each    1 each daily Statin not prescribed intentionally due to Active liver disease    Hyperlipidemia LDL goal <100       VITAMIN B-12 PO      Take 1 tablet by mouth daily        vitamin D3 2000 UNITS Caps     90 capsule    Take 1 capsule by mouth daily    Mild major depression (H)

## 2017-09-13 NOTE — PATIENT INSTRUCTIONS
DISCHARGE INSTRUCTIONS FOLLOWING ABDOMINAL PARACENTESIS    After you go home:    No strenuous activity for 24 hours    Resume your regular diet    Limit fluid intake for the first 48 hours to no more than 2 quarts per day.  There should be minimal drainage from the needle site.  If drainage does occur and soaks through the bandage, apply gentle pressure with your hand for 5 minutes.    Notify MD for the following:    Excessive drainage    Excessive swelling, redness or tenderness at the needle site    Fever greater than 101 degrees F    Dizziness or light-headedness when getting up or walking      IF THIS IS A MEDICAL EMERGENCY, CALL [phone]    If you have any questions or concerns    Contact the Hepatology Clinic:   [phone]    If you are a post-transplant patient, contact the Transplant Office:  [phone]    If this is after hours, contact the hospital :  [phone] and as to have the GI resident on call paged                   I have received and understand my discharge instructions and I have all of my personal belongings.          ------------------------------------------------------        ---------------------------------------------------    Patient / Significant Other's Signature     Relationship

## 2017-09-14 ENCOUNTER — TELEPHONE (OUTPATIENT)
Dept: GASTROENTEROLOGY | Facility: CLINIC | Age: 62
End: 2017-09-14

## 2017-09-14 DIAGNOSIS — R60.1 GENERALIZED EDEMA: ICD-10-CM

## 2017-09-14 DIAGNOSIS — E87.6 HYPOKALEMIA: Primary | ICD-10-CM

## 2017-09-14 RX ORDER — POTASSIUM CHLORIDE 1500 MG/1
20 TABLET, EXTENDED RELEASE ORAL DAILY
Qty: 30 TABLET | Refills: 1 | Status: SHIPPED | OUTPATIENT
Start: 2017-09-14 | End: 2017-11-04

## 2017-09-14 RX ORDER — FUROSEMIDE 20 MG
60 TABLET ORAL 2 TIMES DAILY
Qty: 180 TABLET | Refills: 1 | Status: ON HOLD | OUTPATIENT
Start: 2017-09-14 | End: 2017-11-06

## 2017-09-14 NOTE — TELEPHONE ENCOUNTER
Connected with patient regarding result note.  Went over new orders as stated per Dr. Simmons below.  Patient verified current diuretic regimen is correct.  Pt voiced understanding of new instructions.  Pt will have BMP drawn plus other labs needed for appointment with Dr. Simmons nest Wednesday during his para that way Dr. Simmons will have the results for Fridays appointment.  KCL 20 meq ordered from preferred pharmacy.            Meghna Simmons MD Boos, Anna, NAE                     Labs look good. Continue sodium restriction.   According to notes current diuretic regimen is spironolactone 150 bid,  furosemide to 60 mg am and  40 mg pm   He can increase furosemide to 60 bid and take KCl 20 meq with that. BMP in 1 week.

## 2017-09-20 ENCOUNTER — RADIANT APPOINTMENT (OUTPATIENT)
Dept: ULTRASOUND IMAGING | Facility: CLINIC | Age: 62
End: 2017-09-20
Attending: INTERNAL MEDICINE
Payer: COMMERCIAL

## 2017-09-20 ENCOUNTER — OFFICE VISIT (OUTPATIENT)
Dept: INFUSION THERAPY | Facility: CLINIC | Age: 62
End: 2017-09-20
Attending: INTERNAL MEDICINE
Payer: COMMERCIAL

## 2017-09-20 VITALS
TEMPERATURE: 98.7 F | WEIGHT: 306 LBS | SYSTOLIC BLOOD PRESSURE: 118 MMHG | BODY MASS INDEX: 39.29 KG/M2 | DIASTOLIC BLOOD PRESSURE: 55 MMHG | HEART RATE: 68 BPM

## 2017-09-20 DIAGNOSIS — I85.10 SECONDARY ESOPHAGEAL VARICES WITHOUT BLEEDING (H): ICD-10-CM

## 2017-09-20 DIAGNOSIS — K76.6 PORTAL HYPERTENSION (H): ICD-10-CM

## 2017-09-20 DIAGNOSIS — F17.200 TOBACCO USE DISORDER: ICD-10-CM

## 2017-09-20 DIAGNOSIS — K70.31 ALCOHOLIC CIRRHOSIS OF LIVER WITH ASCITES (H): Primary | ICD-10-CM

## 2017-09-20 DIAGNOSIS — E66.01 MORBID OBESITY DUE TO EXCESS CALORIES (H): ICD-10-CM

## 2017-09-20 LAB
ALBUMIN SERPL-MCNC: 3.1 G/DL (ref 3.4–5)
ALP SERPL-CCNC: 147 U/L (ref 40–150)
ALT SERPL W P-5'-P-CCNC: 41 U/L (ref 0–70)
ANION GAP SERPL CALCULATED.3IONS-SCNC: 6 MMOL/L (ref 3–14)
AST SERPL W P-5'-P-CCNC: 42 U/L (ref 0–45)
BILIRUB DIRECT SERPL-MCNC: 0.1 MG/DL (ref 0–0.2)
BILIRUB SERPL-MCNC: 0.4 MG/DL (ref 0.2–1.3)
BUN SERPL-MCNC: 9 MG/DL (ref 7–30)
CALCIUM SERPL-MCNC: 8.3 MG/DL (ref 8.5–10.1)
CHLORIDE SERPL-SCNC: 105 MMOL/L (ref 94–109)
CO2 SERPL-SCNC: 27 MMOL/L (ref 20–32)
CREAT SERPL-MCNC: 0.89 MG/DL (ref 0.66–1.25)
ERYTHROCYTE [DISTWIDTH] IN BLOOD BY AUTOMATED COUNT: 14.5 % (ref 10–15)
GFR SERPL CREATININE-BSD FRML MDRD: 86 ML/MIN/1.7M2
GLUCOSE SERPL-MCNC: 71 MG/DL (ref 70–99)
HCT VFR BLD AUTO: 38.8 % (ref 40–53)
HGB BLD-MCNC: 12.8 G/DL (ref 13.3–17.7)
INR PPP: 1.08 (ref 0.86–1.14)
MCH RBC QN AUTO: 29.6 PG (ref 26.5–33)
MCHC RBC AUTO-ENTMCNC: 33 G/DL (ref 31.5–36.5)
MCV RBC AUTO: 90 FL (ref 78–100)
PLATELET # BLD AUTO: 108 10E9/L (ref 150–450)
POTASSIUM SERPL-SCNC: 4.2 MMOL/L (ref 3.4–5.3)
PROT SERPL-MCNC: 7 G/DL (ref 6.8–8.8)
RBC # BLD AUTO: 4.33 10E12/L (ref 4.4–5.9)
SODIUM SERPL-SCNC: 138 MMOL/L (ref 133–144)
WBC # BLD AUTO: 3.9 10E9/L (ref 4–11)

## 2017-09-20 PROCEDURE — 27210190 US PARACENTESIS

## 2017-09-20 PROCEDURE — 25000128 H RX IP 250 OP 636: Mod: ZF | Performed by: INTERNAL MEDICINE

## 2017-09-20 PROCEDURE — 80048 BASIC METABOLIC PNL TOTAL CA: CPT | Performed by: INTERNAL MEDICINE

## 2017-09-20 PROCEDURE — 80076 HEPATIC FUNCTION PANEL: CPT | Performed by: INTERNAL MEDICINE

## 2017-09-20 PROCEDURE — P9047 ALBUMIN (HUMAN), 25%, 50ML: HCPCS | Mod: ZF | Performed by: INTERNAL MEDICINE

## 2017-09-20 PROCEDURE — 85610 PROTHROMBIN TIME: CPT | Performed by: INTERNAL MEDICINE

## 2017-09-20 PROCEDURE — 85027 COMPLETE CBC AUTOMATED: CPT | Performed by: INTERNAL MEDICINE

## 2017-09-20 PROCEDURE — 25000125 ZZHC RX 250: Mod: ZF | Performed by: INTERNAL MEDICINE

## 2017-09-20 RX ORDER — ALBUMIN (HUMAN) 12.5 G/50ML
12.5 SOLUTION INTRAVENOUS 4 TIMES DAILY PRN
Status: CANCELLED
Start: 2017-09-20

## 2017-09-20 RX ORDER — ALBUMIN (HUMAN) 12.5 G/50ML
12.5 SOLUTION INTRAVENOUS 4 TIMES DAILY PRN
Status: DISCONTINUED | OUTPATIENT
Start: 2017-09-20 | End: 2017-09-20 | Stop reason: HOSPADM

## 2017-09-20 RX ADMIN — ALBUMIN HUMAN 12.5 G: 0.25 SOLUTION INTRAVENOUS at 13:25

## 2017-09-20 RX ADMIN — LIDOCAINE HYDROCHLORIDE 20 ML: 10 INJECTION, SOLUTION INFILTRATION; PERINEURAL at 12:38

## 2017-09-20 RX ADMIN — ALBUMIN HUMAN 12.5 G: 0.25 SOLUTION INTRAVENOUS at 13:16

## 2017-09-20 RX ADMIN — ALBUMIN HUMAN 12.5 G: 0.25 SOLUTION INTRAVENOUS at 13:08

## 2017-09-20 RX ADMIN — ALBUMIN HUMAN 12.5 G: 0.25 SOLUTION INTRAVENOUS at 13:02

## 2017-09-20 NOTE — LETTER
September 25, 2017       TO: Lawrence Louie  [address] [facility] [address]       Dear Mr. Louie,    We are writing to inform you of your test results.    Blood tests look good.   Current diuretic regimen   Spironolactone 150 bid, furosemide 60 am, 40 pm     Ok to increase furosemide to 60 bid. Check BMP in 1 week      Resulted Orders   INR   Result Value Ref Range    INR 1.08 0.86 - 1.14   CBC with platelets   Result Value Ref Range    WBC 3.9 (L) 4.0 - 11.0 10e9/L    RBC Count 4.33 (L) 4.4 - 5.9 10e12/L    Hemoglobin 12.8 (L) 13.3 - 17.7 g/dL    Hematocrit 38.8 (L) 40.0 - 53.0 %    MCV 90 78 - 100 fl    MCH 29.6 26.5 - 33.0 pg    MCHC 33.0 31.5 - 36.5 g/dL    RDW 14.5 10.0 - 15.0 %    Platelet Count 108 (L) 150 - 450 10e9/L   Hepatic panel   Result Value Ref Range    Bilirubin Direct 0.1 0.0 - 0.2 mg/dL    Bilirubin Total 0.4 0.2 - 1.3 mg/dL    Albumin 3.1 (L) 3.4 - 5.0 g/dL    Protein Total 7.0 6.8 - 8.8 g/dL    Alkaline Phosphatase 147 40 - 150 U/L    ALT 41 0 - 70 U/L    AST 42 0 - 45 U/L   Basic metabolic panel   Result Value Ref Range    Sodium 138 133 - 144 mmol/L    Potassium 4.2 3.4 - 5.3 mmol/L    Chloride 105 94 - 109 mmol/L    Carbon Dioxide 27 20 - 32 mmol/L    Anion Gap 6 3 - 14 mmol/L    Glucose 71 70 - 99 mg/dL    Urea Nitrogen 9 7 - 30 mg/dL    Creatinine 0.89 0.66 - 1.25 mg/dL    GFR Estimate 86 >60 mL/min/1.7m2      Comment:      Non  GFR Calc    GFR Estimate If Black >90 >60 mL/min/1.7m2      Comment:       GFR Calc    Calcium 8.3 (L) 8.5 - 10.1 mg/dL         It was a pleasure to see you at your recent visit. Please let me know if you have any questions or concerns.     Clinic Staff - [phone] option 3     Sincerely,     Meghna Simmons MD  [address] [place] [address], Mail Code [id number]  [place]  [address].

## 2017-09-20 NOTE — PROGRESS NOTES
Paracentesis Nursing Note  Lawrnece Louie [facility].    During today's appointment orders from Meghna Simmons MD were completed.    Progress Note:  Patient identification verified by name and date of birth.  Assessment completed.  Vitals monitored throughout appointment and recorded in Doc Flowsheets.  See proceduralist note in ultrasound.    Date of consent or authorization: 8/30/17.  Invasive Procedure Safety Checklist was completed and sent for scanning.     Paracentesis performed by Moy Potter PA-C Radiology.    The following labs were communicated to provider performing paracentesis:  Lab Results   Component Value Date    PLT 89 08/25/2017       Total amount of ascites fluid drained: 9.1 liters.  Color of ascites fluid: light yellow, nearly clear.  Total amount of albumin given: 50  grams.    Patient tolerated procedure well.    Post procedure,denies pain or discomfort post paracentesis.      Discharge Plan:  Discharge instructions were reviewed with patient.  Patient/Representative verbalized understanding and all questions were answered.   Discharged [facility] in stable condition.    Ita Chin RN       Administrations This Visit     albumin human 25 % injection 12.5 g     Admin Date Action Dose Route Administered By             09/20/2017 New Bag 12.5 g Intravenous Ita Chin RN              Admin Date Action Dose Route Administered By             09/20/2017 New Bag 12.5 g Intravenous Ita Chin RN              Admin Date Action Dose Route Administered By             09/20/2017 New Bag 12.5 g Intravenous Ita Chin RN              Admin Date Action Dose Route Administered By             09/20/2017 New Bag 12.5 g Intravenous Ita Chin RN                    lidocaine 1 % 20 mL     Admin Date Action Dose Route Administered By             09/20/2017 Given  20 mL Injection Ita Chin, RN                            /55  Pulse 68  Temp 98.7  F (37.1  C) (Oral)  Wt (!) 138.8 kg (306 lb)  BMI 39.29 kg/m2

## 2017-09-20 NOTE — MR AVS SNAPSHOT
After Visit Summary   9/20/2017    Lawrence Louie    MRN: [id number]           Patient Information     Date Of Birth          1955        Visit Information        Provider Department      9/20/2017 12:00 PM Provider, Cristobal Spec Inf Para;  [address] [facility] Specialty and Procedure        Today's Diagnoses     Alcoholic cirrhosis of liver with ascites (H)    -  1    Morbid obesity due to excess calories (H)        Portal hypertension (H)        Secondary esophageal varices without bleeding (H)        Tobacco use disorder          Care Instructions    Dear Lawrence Louie    Thank you for choosing [facility] ([facility] for your procedure.  The following information is a summary of our appointment as well as important reminders.        To cancel or re-schedule any appointment, call Specialty Infusion at [phone]    Option 7 for Specialty Infusion   Option 2 for Scheduling    DISCHARGE INSTRUCTIONS FOLLOWING ABDOMINAL PARACENTESIS    After you go home:    No strenuous activity for 24 hours    Resume your regular diet    Limit fluid intake for the first 48 hours to no more than 2 quarts per day.  There should be minimal drainage from the needle site.  If drainage does occur and soaks through the bandage, apply gentle pressure with your hand for 5 minutes.    Notify MD for the following:    Excessive drainage    Excessive swelling, redness or tenderness at the needle site    Fever greater than 101 degrees F    Dizziness or light-headedness when getting up or walking      IF THIS IS A MEDICAL EMERGENCY, CALL [phone]    If you have any questions or concerns    Contact the Hepatology Clinic:   [phone]    If you are a post-transplant patient, contact the Transplant Office:  [phone]    If this is after hours, contact the hospital :  [phone] and as to have the GI resident on call paged                          Additional information: you had a paracentesis performed today.       We look forward in seeing you on your next appointment here at [facility].  Please don t hesitate to call us at [phone] to reschedule any of your appointments or to speak with one of the [facility] registered nurses.  It was a pleasure taking care of you today.    Sincerely,  Ita Chin RN  [facility]  Specialty Infusion & Procedure Center  [address] [place] [address]  [address]  Phone:  [phone]            Follow-ups after your visit        Your next 10 appointments already scheduled     Sep 27, 2017 12:00 PM CDT   Paracentesis Visit with Uc Spec Inf Para Provider, UC 39 ATC   [facility] Specialty and Procedure ([facility])    [address] [place] [address]  [address] [facility] [address] [phone]            Sep 29, 2017 10:15 AM CDT   Lab with UC LAB   [facility] ([facility])    [address] [place] [address]  [address] [facility] [address] [phone]            Sep 29, 2017 11:10 AM CDT   (Arrive by 10:55 AM)   Return General Liver with Meghna Simmons MD   [facility] Hepatology ([facility])    [address] [place] [address]  [address] [facility] [id number] [phone]            Oct 04, 2017 12:00 PM CDT   Paracentesis Visit with Uc Spec Inf Para Provider, UC 40 ATC   [facility] Specialty and Procedure ([facility])    [address] [place] [address]  [address] [facility] [id number] [phone]            Oct 11, 2017 12:00 PM CDT   Paracentesis Visit with Uc Spec Inf Para Provider, UC 40 ATC   [facility] Specialty and Procedure ([facility])    [address] [place] [id number] [phone]            Oct 18, 2017 12:00 PM CDT   Paracentesis Visit with Uc Spec  Inf Para Provider, UC 40 ATC   [facility] Specialty and Procedure ([facility])    [address] [facility]  2nd Floor  [facility] [address] [phone]              Who to contact     If you have questions or need follow up information about today's clinic visit or your schedule please contact [facility] SPECIALTY AND PROCEDURE directly at [phone].  Normal or non-critical lab and imaging results will be communicated to you by STAR FESTIVALhart, letter or phone within 4 business days after the clinic has received the results. If you do not hear from us within 7 days, please contact the clinic through Pulmonxt or phone. If you have a critical or abnormal lab result, we will notify you by phone as soon as possible.  Submit refill requests through [employer] or call your pharmacy and they will forward the refill request to us. Please allow 3 business days for your refill to be completed.          Additional Information About Your Visit        STAR FESTIVALharRocketick Information     [employer] gives you secure access to your electronic health record. If you see a primary care provider, you can also send messages to your care team and make appointments. If you have questions, please call your primary care clinic.  If you do not have a primary care provider, please call [phone] and they will assist you.        Care EveryWhere ID     This is your Care EveryWhere ID. This could be used by other organizations to access your [place] medical records  [id number]        Your Vitals Were     Pulse Temperature BMI (Body Mass Index)             68 98.7  F (37.1  C) (Oral) 39.29 kg/m2          Blood Pressure from Last 3 Encounters:   09/20/17 118/55   09/13/17 111/69   09/06/17 119/64    Weight from Last 3 Encounters:   09/20/17 (!) 138.8 kg (306 lb)   09/13/17 (!) 137.2 kg (302 lb 8 oz)   09/06/17 (!) 137.2 kg (302 lb 8 oz)              We Performed the Following     Basic  metabolic panel     CBC with platelets     Hepatic panel     INR     US Paracentesis        Primary Care Provider Office Phone # Fax #    Ary Murphy, APRN [phone]       [address] [place] [address]  [facility] [address]        Equal Access to Services     JOSEF LORENZO : Hadii aad ku hadasho Soomaali, waaxda luqadaha, qaybta kaalmada adeegyada, waxay idiin hayaan adeeg kharash lalorenzo salas. So [facility] [phone].    ATENCIÓN: Si habla español, tiene a rondon disposición servicios gratuitos de asistencia lingüística. Llame al [phone].    We comply with applicable federal civil rights laws and [place] laws. We do not discriminate on the basis of race, color, national origin, age, disability sex, sexual orientation or gender identity.            Thank you!     Thank you for choosing [facility] SPECIALTY AND PROCEDURE  for your care. Our goal is always to provide you with excellent care. Hearing back from our patients is one way we can continue to improve our services. Please take a few minutes to complete the written survey that you may receive in the mail after your visit with us. Thank you!             Your Updated Medication List - Protect others around you: Learn how to safely use, store and throw away your medicines at www.disposemymeds.org.          This list is accurate as of: 9/20/17  3:03 PM.  Always use your most recent med list.                   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    Diabetes mellitus, type 2 (H)       blood glucose monitoring meter device kit    no brand specified    1 kit    Use to test blood sugar 4 X  times daily or as directed.    Type 2 diabetes mellitus with  other specified complication (H)       blood glucose monitoring test strip    no brand specified    200 strip    Use to test blood sugars 4 X  times daily or as directed    Diabetes mellitus, type 2 (H)       calcium-vitamin D 600-400 MG-UNIT per tablet    CALTRATE    60 tablet    Take 1 tablet by mouth daily    Morbid obesity due to excess calories (H), Alcoholic cirrhosis of liver with ascites (H), Portal hypertension (H), Secondary esophageal varices without bleeding (H), Tobacco use disorder       DESVENLAFAXINE ER PO      Take 100 mg by mouth daily 2 tablets once daily.    Morbid obesity due to excess calories (H), Alcoholic cirrhosis of liver with ascites (H), Portal hypertension (H), Secondary esophageal varices without bleeding (H), Tobacco use disorder       desvenlafaxine succinate 100 MG 24 hr tablet    PRISTIQ    180 tablet    Take 2 tablets (200 mg) by mouth daily    Other depression       furosemide 20 MG tablet    LASIX    180 tablet    Take 3 tablets (60 mg) by mouth 2 times daily    Generalized edema       glipiZIDE 5 MG 24 hr tablet    glipiZIDE XL    180 tablet    1 tablet twice a day    Type 2 diabetes mellitus without complication (H)       insulin aspart 100 UNIT/ML injection    NovoLOG FLEXPEN    30 mL    20 units before breakfast, 20 units before lunch, 20 units before dinner, 20 units before snacks    Type 2 diabetes mellitus without complication, with long-term current use of insulin (H)       insulin glargine 100 UNIT/ML injection    LANTUS SOLOSTAR    30 mL    Inject 40 Units Subcutaneous At Bedtime    Type 2 diabetes mellitus with other oral complication, unspecified long term insulin use status (H)       insulin pen needle 31G X 8 MM    B-D U/F    300 each    USE  6 times daily / OR AS DIRECTED    Type 2 diabetes, HbA1c goal < 7% (H)       magnesium oxide 400 (241.3 MG) MG tablet    MAG-OX    90 tablet    Take 1 tablet (400 mg) by mouth daily    Cramp of limb       ondansetron 4 MG  tablet    ZOFRAN    18 tablet    Take 1 tablet (4 mg) by mouth every 8 hours as needed for nausea    Alcoholic cirrhosis (H), Nausea       order for DME     1 Units    Equipment being ordered: carpal tunnel wrist splint.    CTS (carpal tunnel syndrome)       OYSCO 500 + D 500-200 MG-UNIT Tabs   Generic drug:  Calcium Carb-Cholecalciferol     200 tablet    Take 500 mg by mouth daily    Morbid obesity due to excess calories (H), Alcoholic cirrhosis of liver with ascites (H), Portal hypertension (H), Secondary esophageal varices without bleeding (H), Tobacco use disorder       pantoprazole 40 MG EC tablet    PROTONIX    90 tablet    Take 1 tablet (40 mg) by mouth daily    Morbid obesity due to excess calories (H), Alcoholic cirrhosis of liver with ascites (H), Portal hypertension (H), Secondary esophageal varices without bleeding (H), Tobacco use disorder       potassium chloride SA 20 MEQ CR tablet    potassium chloride    30 tablet    Take 1 tablet (20 mEq) by mouth daily    Hypokalemia       propranolol 10 MG tablet    INDERAL    180 tablet    Take 1 tablet (10 mg) by mouth 2 times daily    Portal hypertension (H)       ranitidine 150 MG tablet    ZANTAC    180 tablet    Take 1 tablet (150 mg) by mouth 2 times daily    Esophageal varices (H)       spironolactone 50 MG tablet    ALDACTONE    540 tablet    Takes 3 tablets in am.and p.m.    Portal hypertension (H), Generalized edema       STATIN NOT PRESCRIBED (INTENTIONAL)     0 each    1 each daily Statin not prescribed intentionally due to Active liver disease    Hyperlipidemia LDL goal <100       VITAMIN B-12 PO      Take 1 tablet by mouth daily        vitamin D3 2000 UNITS Caps     90 capsule    Take 1 capsule by mouth daily    Mild major depression (H)

## 2017-09-20 NOTE — PATIENT INSTRUCTIONS
Dear Lawrence Louie    Thank you for choosing [facility] ([facility]) for your procedure.  The following information is a summary of our appointment as well as important reminders.        To cancel or re-schedule any appointment, call Specialty Infusion at [phone]    Option 7 for Specialty Infusion   Option 2 for Scheduling    DISCHARGE INSTRUCTIONS FOLLOWING ABDOMINAL PARACENTESIS    After you go home:    No strenuous activity for 24 hours    Resume your regular diet    Limit fluid intake for the first 48 hours to no more than 2 quarts per day.  There should be minimal drainage from the needle site.  If drainage does occur and soaks through the bandage, apply gentle pressure with your hand for 5 minutes.    Notify MD for the following:    Excessive drainage    Excessive swelling, redness or tenderness at the needle site    Fever greater than 101 degrees F    Dizziness or light-headedness when getting up or walking      IF THIS IS A MEDICAL EMERGENCY, CALL [phone]    If you have any questions or concerns    Contact the Hepatology Clinic:   [phone]    If you are a post-transplant patient, contact the Transplant Office:  [phone]    If this is after hours, contact the hospital :  [phone] and as to have the GI resident on call paged                         Additional information: you had a paracentesis performed today.       We look forward in seeing you on your next appointment here at [facility].  Please don t hesitate to call us at [phone] to reschedule any of your appointments or to speak with one of the [facility] registered nurses.  It was a pleasure taking care of you today.    Sincerely,  Ita Chin RN  [facility]  Specialty Infusion & Procedure Center  [address] [place] [address]  [address]  Phone:  [phone]

## 2017-09-21 ENCOUNTER — TELEPHONE (OUTPATIENT)
Dept: GASTROENTEROLOGY | Facility: CLINIC | Age: 62
End: 2017-09-21

## 2017-09-21 ENCOUNTER — PRE VISIT (OUTPATIENT)
Dept: GASTROENTEROLOGY | Facility: CLINIC | Age: 62
End: 2017-09-21

## 2017-09-21 NOTE — TELEPHONE ENCOUNTER
Was the patient contacted by phone and reminded of the upcoming visit? message left    Was the patient instructed to bring a current list of all medications to the appointment or instructed to bring in all medication bottles? Yes     Were ordered labs and tests completed prior to the appointment? Yes    Labs were drawn yesterday, no additional labs needed, message left that lab appointment has been cancelled and to arrive to provider visit only    Patient instructed to arrive early for check-in    Jenifer Banda CMA  9/21/2017 12:01 PM

## 2017-09-22 ENCOUNTER — MYC MEDICAL ADVICE (OUTPATIENT)
Dept: INTERNAL MEDICINE | Facility: CLINIC | Age: 62
End: 2017-09-22

## 2017-09-22 DIAGNOSIS — E11.9 TYPE 2 DIABETES, HBA1C GOAL < 7% (H): Primary | ICD-10-CM

## 2017-09-25 DIAGNOSIS — E11.9 TYPE 2 DIABETES MELLITUS WITHOUT COMPLICATION (H): ICD-10-CM

## 2017-09-25 DIAGNOSIS — E11.9 TYPE 2 DIABETES, HBA1C GOAL < 7% (H): ICD-10-CM

## 2017-09-27 ENCOUNTER — OFFICE VISIT (OUTPATIENT)
Dept: INFUSION THERAPY | Facility: CLINIC | Age: 62
End: 2017-09-27
Attending: INTERNAL MEDICINE
Payer: COMMERCIAL

## 2017-09-27 ENCOUNTER — RADIANT APPOINTMENT (OUTPATIENT)
Dept: ULTRASOUND IMAGING | Facility: CLINIC | Age: 62
End: 2017-09-27
Attending: INTERNAL MEDICINE
Payer: COMMERCIAL

## 2017-09-27 VITALS
TEMPERATURE: 97.5 F | WEIGHT: 301.15 LBS | DIASTOLIC BLOOD PRESSURE: 56 MMHG | HEART RATE: 70 BPM | OXYGEN SATURATION: 100 % | BODY MASS INDEX: 38.67 KG/M2 | RESPIRATION RATE: 18 BRPM | SYSTOLIC BLOOD PRESSURE: 113 MMHG

## 2017-09-27 DIAGNOSIS — K70.31 ALCOHOLIC CIRRHOSIS OF LIVER WITH ASCITES (H): Primary | ICD-10-CM

## 2017-09-27 PROCEDURE — P9047 ALBUMIN (HUMAN), 25%, 50ML: HCPCS | Mod: ZF | Performed by: INTERNAL MEDICINE

## 2017-09-27 PROCEDURE — 27210190 US PARACENTESIS

## 2017-09-27 PROCEDURE — 25000125 ZZHC RX 250: Mod: ZF | Performed by: INTERNAL MEDICINE

## 2017-09-27 PROCEDURE — 25000128 H RX IP 250 OP 636: Mod: ZF | Performed by: INTERNAL MEDICINE

## 2017-09-27 RX ORDER — ALBUMIN (HUMAN) 12.5 G/50ML
12.5 SOLUTION INTRAVENOUS 4 TIMES DAILY PRN
Status: DISCONTINUED | OUTPATIENT
Start: 2017-09-27 | End: 2017-09-27 | Stop reason: HOSPADM

## 2017-09-27 RX ORDER — ALBUMIN (HUMAN) 12.5 G/50ML
12.5 SOLUTION INTRAVENOUS 4 TIMES DAILY PRN
Status: CANCELLED
Start: 2017-09-27

## 2017-09-27 RX ADMIN — ALBUMIN HUMAN 12.5 G: 0.25 SOLUTION INTRAVENOUS at 13:05

## 2017-09-27 RX ADMIN — ALBUMIN HUMAN 12.5 G: 0.25 SOLUTION INTRAVENOUS at 12:56

## 2017-09-27 RX ADMIN — ALBUMIN HUMAN 12.5 G: 0.25 SOLUTION INTRAVENOUS at 12:51

## 2017-09-27 RX ADMIN — ALBUMIN HUMAN 12.5 G: 0.25 SOLUTION INTRAVENOUS at 13:14

## 2017-09-27 RX ADMIN — LIDOCAINE HYDROCHLORIDE 20 ML: 10 INJECTION, SOLUTION INFILTRATION; PERINEURAL at 12:51

## 2017-09-27 NOTE — MR AVS SNAPSHOT
After Visit Summary   9/27/2017    Lawrence Louie    MRN: [id number]           Patient Information     Date Of Birth          1955        Visit Information        Provider Department      9/27/2017 12:00 PM Provider, Cristobal Spec Inf Para;  [facility] Specialty and Procedure        Today's Diagnoses     Alcoholic cirrhosis of liver with ascites (H)    -  1      Care Instructions    Dear Lawrence Louie    Thank you for choosing [facility] ([facility]) for your procedure.  The following information is a summary of our appointment as well as important reminders.          DISCHARGE INSTRUCTIONS FOLLOWING ABDOMINAL PARACENTESIS    After you go home:    No strenuous activity for 24 hours    Resume your regular diet    Limit fluid intake for the first 48 hours to no more than 2 quarts per day.  There should be minimal drainage from the needle site.  If drainage does occur and soaks through the bandage, apply gentle pressure with your hand for 5 minutes.    Notify MD for the following:    Excessive drainage    Excessive swelling, redness or tenderness at the needle site    Fever greater than 101 degrees F    Dizziness or light-headedness when getting up or walking      IF THIS IS A MEDICAL EMERGENCY, CALL [phone]    If you have any questions or concerns    Contact the Hepatology Clinic:   [phone]    If you are a post-transplant patient, contact the Transplant Office:  [phone]    If this is after hours, contact the hospital :  [phone] and as to have the GI resident on call paged                   I have received and understand my discharge instructions and I have all of my personal belongings.          ------------------------------------------------------        ---------------------------------------------------    Patient / Significant Other's Signature     Relationship        We look  forward in seeing you on your next appointment here at [facility].  Please don t hesitate to call us at [phone] to reschedule any of your appointments or to speak with one of the [facility] registered nurses.  It was a pleasure taking care of you today.    Sincerely,    [facility]  Specialty Infusion & Procedure Center  [address] [place] [address]  [address]  Phone:  [phone]            Follow-ups after your visit        Your next 10 appointments already scheduled     Sep 29, 2017 11:10 AM CDT   (Arrive by 10:55 AM)   Return General Liver with Meghna Simmons MD   [facility] Hepatology ([facility] [place])    [address]  3rd Floor  [facility] [id number] [phone]            Oct 04, 2017 12:00 PM CDT   Paracentesis Visit with Uc Spec Inf Para Provider, UC 40 ATC   [facility] Specialty and Procedure ([facility] [place])    [address]  2nd [facility] [id number] [phone]            Oct 11, 2017 12:00 PM CDT   Paracentesis Visit with Uc Spec Inf Para Provider, UC 40 ATC   [facility] Specialty and Procedure ([facility])    [address]  2nd [facility] [id number] [phone]            Oct 18, 2017 12:00 PM CDT   Paracentesis Visit with Uc Spec Inf Para Provider, UC 40 ATC   [facility] Specialty and Procedure ([facility] [place])    [address]  [address]nd [facility] [id number] [phone]            Oct 25, 2017 12:00 PM CDT   Paracentesis Visit with Uc Spec Inf Para Provider, UC 40 ATC   [facility] Specialty and Procedure ([facility] [place])    [address]  2nd Floor  [facility] [id number] [phone]            Nov 01, 2017 12:00 PM CDT   Paracentesis Visit with Uc Spec Inf Para Provider   M  [facility] Specialty [facility] Procedure ([facility])    [address]  2nd Floor  [facility] [address] [phone]              Who to contact     If you have questions or need follow up information about today's clinic visit or your schedule please contact [facility] SPECIALTY AND PROCEDURE directly at [phone].  Normal or non-critical lab and imaging results will be communicated to you by MyChart, letter or phone within 4 business days after the clinic has received the results. If you do not hear from us within 7 days, please contact the clinic through [employer] or phone. If you have a critical or abnormal lab result, we will notify you by phone as soon as possible.  Submit refill requests through [employer] or call your pharmacy and they will forward the refill request to us. Please allow 3 business days for your refill to be completed.          Additional Information About Your Visit        [email or web address] Information     [employer] gives you secure access to your electronic health record. If you see a primary care provider, you can also send messages to your care team and make appointments. If you have questions, please call your primary care clinic.  If you do not have a primary care provider, please call [phone] and they will assist you.        Care EveryWhere ID     This is your Care EveryWhere ID. This could be used by other organizations to access your [facility] medical records  [id number]        Your Vitals Were     Pulse Temperature Respirations Pulse Oximetry BMI (Body Mass Index)       70 97.5  F (36.4  C) (Oral) 18 100% 41.15 kg/m2        Blood Pressure from Last 3 Encounters:   09/27/17 117/59   09/20/17 118/55   09/13/17 111/69    Weight from Last 3 Encounters:   09/27/17 (!) 145.4 kg (320 lb 8 oz)   09/20/17 (!) 138.8 kg (306 lb)   09/13/17 (!) 137.2 kg (302 lb 8 oz)              We Performed the Following     US  Paracentesis        Primary Care Provider Office Phone # Fax #    Ary Murphy, APRN [phone]       [address] [place] [address]  [facility] [address]        Equal Access to Services     JOSEF LORENZO : Hadii aad ku hadgardeniao Sodonnaali, waaxda luqadaha, qaybta kaalmada adeegyada, waxmary idiin mellon daniela galarza debra salas. So [facility] [phone].    ATENCIÓN: Si habla español, tiene a rondon disposición servicios gratuitos de asistencia lingüística. Llame al [phone].    We comply with applicable federal civil rights laws and [place] laws. We do not discriminate on the basis of race, color, national origin, age, disability sex, sexual orientation or gender identity.            Thank you!     Thank you for choosing [facility] SPECIALTY AND PROCEDURE  for your care. Our goal is always to provide you with excellent care. Hearing back from our patients is one way we can continue to improve our services. Please take a few minutes to complete the written survey that you may receive in the mail after your visit with us. Thank you!             Your Updated Medication List - Protect others around you: Learn how to safely use, store and throw away your medicines at www.disposemymeds.org.          This list is accurate as of: 9/27/17  1:00 PM.  Always use your most recent med list.                   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    Diabetes mellitus, type 2 (H)       blood glucose monitoring meter device kit    no brand specified    1 kit    Use to test blood sugar 4 X  times daily or as directed.    Type 2 diabetes mellitus with other specified complication (H)       blood glucose monitoring test strip     no brand specified    200 strip    Use to test blood sugars 4 X  times daily or as directed    Diabetes mellitus, type 2 (H)       calcium-vitamin D 600-400 MG-UNIT per tablet    CALTRATE    60 tablet    Take 1 tablet by mouth daily    Morbid obesity due to excess calories (H), Alcoholic cirrhosis of liver with ascites (H), Portal hypertension (H), Secondary esophageal varices without bleeding (H), Tobacco use disorder       DESVENLAFAXINE ER PO      Take 100 mg by mouth daily 2 tablets once daily.    Morbid obesity due to excess calories (H), Alcoholic cirrhosis of liver with ascites (H), Portal hypertension (H), Secondary esophageal varices without bleeding (H), Tobacco use disorder       desvenlafaxine succinate 100 MG 24 hr tablet    PRISTIQ    180 tablet    Take 2 tablets (200 mg) by mouth daily    Other depression       furosemide 20 MG tablet    LASIX    180 tablet    Take 3 tablets (60 mg) by mouth 2 times daily    Generalized edema       glipiZIDE 5 MG 24 hr tablet    glipiZIDE XL    180 tablet    1 tablet twice a day    Type 2 diabetes mellitus without complication (H)       insulin aspart 100 UNIT/ML injection    NovoLOG FLEXPEN    30 mL    20 units before breakfast, 20 units before lunch, 20 units before dinner, 20 units before snacks    Type 2 diabetes mellitus without complication, with long-term current use of insulin (H)       insulin glargine 100 UNIT/ML injection    LANTUS SOLOSTAR    30 mL    Inject 40 Units Subcutaneous At Bedtime    Type 2 diabetes mellitus with other oral complication, unspecified long term insulin use status (H)       * insulin pen needle 31G X 8 MM    B-D U/F    300 each    USE  6 times daily / OR AS DIRECTED    Type 2 diabetes, HbA1c goal < 7% (H)       * insulin pen needle 31G X 8 MM    B-D U/F    600 each    Use 6 times daily or as directed    Type 2 diabetes, HbA1c goal < 7% (H)       magnesium oxide 400 (241.3 MG) MG tablet    MAG-OX    90 tablet    Take 1 tablet (400  mg) by mouth daily    Cramp of limb       ondansetron 4 MG tablet    ZOFRAN    18 tablet    Take 1 tablet (4 mg) by mouth every 8 hours as needed for nausea    Alcoholic cirrhosis (H), Nausea       order for DME     1 Units    Equipment being ordered: carpal tunnel wrist splint.    CTS (carpal tunnel syndrome)       order for DME     1 each    Orthotic diabetic shoes -1 pair    Type 2 diabetes, HbA1c goal < 7% (H)       OYSCO 500 + D 500-200 MG-UNIT Tabs   Generic drug:  Calcium Carb-Cholecalciferol     200 tablet    Take 500 mg by mouth daily    Morbid obesity due to excess calories (H), Alcoholic cirrhosis of liver with ascites (H), Portal hypertension (H), Secondary esophageal varices without bleeding (H), Tobacco use disorder       pantoprazole 40 MG EC tablet    PROTONIX    90 tablet    Take 1 tablet (40 mg) by mouth daily    Morbid obesity due to excess calories (H), Alcoholic cirrhosis of liver with ascites (H), Portal hypertension (H), Secondary esophageal varices without bleeding (H), Tobacco use disorder       potassium chloride SA 20 MEQ CR tablet    KLOR-CON    30 tablet    Take 1 tablet (20 mEq) by mouth daily    Hypokalemia       propranolol 10 MG tablet    INDERAL    180 tablet    Take 1 tablet (10 mg) by mouth 2 times daily    Portal hypertension (H)       ranitidine 150 MG tablet    ZANTAC    180 tablet    Take 1 tablet (150 mg) by mouth 2 times daily    Esophageal varices (H)       spironolactone 50 MG tablet    ALDACTONE    540 tablet    Takes 3 tablets in am.and p.m.    Portal hypertension (H), Generalized edema       STATIN NOT PRESCRIBED (INTENTIONAL)     0 each    1 each daily Statin not prescribed intentionally due to Active liver disease    Hyperlipidemia LDL goal <100       VITAMIN B-12 PO      Take 1 tablet by mouth daily        vitamin D3 2000 UNITS Caps     90 capsule    Take 1 capsule by mouth daily    Mild major depression (H)       * Notice:  This list has 2 medication(s) that are the  same as other medications prescribed for you. Read the directions carefully, and ask your doctor or other care provider to review them with you.

## 2017-09-27 NOTE — PATIENT INSTRUCTIONS
Dear Lawrence Louie    Thank you for choosing [facility] Specialty Infusion [facility] Procedure Center ([facility]) for your procedure.  The following information is a summary of our appointment as well as important reminders.          DISCHARGE INSTRUCTIONS FOLLOWING ABDOMINAL PARACENTESIS    After you go home:    No strenuous activity for 24 hours    Resume your regular diet    Limit fluid intake for the first 48 hours to no more than 2 quarts per day.  There should be minimal drainage from the needle site.  If drainage does occur and soaks through the bandage, apply gentle pressure with your hand for 5 minutes.    Notify MD for the following:    Excessive drainage    Excessive swelling, redness or tenderness at the needle site    Fever greater than 101 degrees F    Dizziness or light-headedness when getting up or walking      IF THIS IS A MEDICAL EMERGENCY, CALL [phone]    If you have any questions or concerns    Contact the Hepatology Clinic:   [phone]    If you are a post-transplant patient, contact the Transplant Office:  [phone]    If this is after hours, contact the hospital :  [phone] and as to have the GI resident on call paged                   I have received and understand my discharge instructions and I have all of my personal belongings.          ------------------------------------------------------        ---------------------------------------------------    Patient / Significant Other's Signature     Relationship        We look forward in seeing you on your next appointment here at [facility].  Please don t hesitate to call us at [phone] to reschedule any of your appointments or to speak with one of the [facility] registered nurses.  It was a pleasure taking care of you today.    Sincerely,    [facility]  Specialty Infusion & Procedure Center  [address] [place] [address]  [address]  Phone:  [phone]

## 2017-09-27 NOTE — PROGRESS NOTES
Paracentesis Nursing Note  Lawrence Louie presents today to [facility] for a paracentesis.    During today's appointment orders from Meghna Simmons MD were completed.    Progress Note:  Patient identification verified by name and date of birth.  Assessment completed.  Vitals monitored throughout appointment and recorded in Doc Flowsheets.  See proceduralist note in ultrasound.    Date of consent or authorization: 8/30/2017.  Invasive Procedure Safety Checklist was completed and sent for scanning.     Paracentesis performed by Donal Kramer PA-C Radiology.    The following labs were communicated to provider performing paracentesis:  Lab Results   Component Value Date     09/20/2017       Total amount of ascites fluid drained: 8.9 liters.  Color of ascites fluid: light yellow.  Total amount of albumin given: 50  grams.    Patient tolerated procedure well.    Post procedure,denies pain or discomfort post paracentesis.      Discharge Plan:  Discharge instructions were reviewed with patient.  Patient/Representative verbalized understanding and all questions were answered.   Discharged from [facility] in stable condition.    Kathleen Damon RN        /72  Pulse 83  Temp 97.5  F (36.4  C) (Oral)  Resp 18  Wt (!) 145.4 kg (320 lb 8 oz)  SpO2 100%  BMI 41.15 kg/m2

## 2017-09-27 NOTE — TELEPHONE ENCOUNTER
glipiZIDE (GLIPIZIDE XL) 5 MG 24 hr tablet  Last Written Prescription Date:  6/30/17  Last Fill Quantity: 180,   # refills: 0  Last Office Visit with G, P or [facility] prescribing provider: 8/25/17  Future Office visit:   11/28/17  Creatinine   Date Value Ref Range Status   09/20/2017 0.89 0.66 - 1.25 mg/dL Final   ]  Lab Results   Component Value Date    A1C 6.0 08/25/2017    A1C 6.4 03/21/2017    A1C 6.6 12/22/2016    A1C 6.7 09/27/2016    A1C 6.5 06/28/2016     Lab Results   Component Value Date    MICROL 12 08/25/2017     No results found for: MICROALBUMIN        Routing refill request to provider for review/approval because:   glipiZIDE (GLIPIZIDE XL) 5 MG 24 hr tablet.   dosing alert.

## 2017-09-28 RX ORDER — GLIPIZIDE 5 MG/1
5 TABLET, FILM COATED, EXTENDED RELEASE ORAL 2 TIMES DAILY
Qty: 180 TABLET | Refills: 1 | Status: ON HOLD | OUTPATIENT
Start: 2017-09-28 | End: 2017-11-06

## 2017-09-29 ENCOUNTER — OFFICE VISIT (OUTPATIENT)
Dept: GASTROENTEROLOGY | Facility: CLINIC | Age: 62
End: 2017-09-29
Attending: INTERNAL MEDICINE
Payer: COMMERCIAL

## 2017-09-29 VITALS
HEART RATE: 67 BPM | TEMPERATURE: 97.8 F | BODY MASS INDEX: 39.66 KG/M2 | DIASTOLIC BLOOD PRESSURE: 77 MMHG | OXYGEN SATURATION: 98 % | SYSTOLIC BLOOD PRESSURE: 133 MMHG | HEIGHT: 74 IN | WEIGHT: 309 LBS

## 2017-09-29 DIAGNOSIS — K76.6 PORTAL HYPERTENSION (H): Primary | ICD-10-CM

## 2017-09-29 DIAGNOSIS — K70.31 ALCOHOLIC CIRRHOSIS OF LIVER WITH ASCITES (H): ICD-10-CM

## 2017-09-29 DIAGNOSIS — I85.10 SECONDARY ESOPHAGEAL VARICES WITHOUT BLEEDING (H): ICD-10-CM

## 2017-09-29 PROCEDURE — 99212 OFFICE O/P EST SF 10 MIN: CPT | Mod: ZF

## 2017-09-29 ASSESSMENT — PAIN SCALES - GENERAL: PAINLEVEL: NO PAIN (0)

## 2017-09-29 NOTE — LETTER
"9/29/2017      RE: Lawrence Louie  [facility] [facility] [address]       SUBJECTIVE:  Mr. Louie is a 61-year-old man with SMITH and alcoholic cirrhosis.  He has been sober from alcohol since 2012. He is here to discuss fluid retention.          His biggest issue is fluid retention. He gets weekly paracentesis 8-9 L He is morbidly obese, does not strictly follow a low-sodium diet but is on diuretics.  He is on Lasix 60 BID, spironolactone 150 b.i.d. and Bumex was stopped. He was not following a low sodium diet. He says he does not ear much processed food. He does not watch the sodium intake specifically. He said he has poor appetite.He said he does not salt foods, but he is not as careful as he should be about sodium in his diet.     We have not yet discussed TIPS.  His creat is 0.89, tbili 0.4, INR 1.08,       OTHER COMORBIDITIES:  He has diabetes, GERD and hyperlipidemia.       SOCIAL HISTORY:  He lives with his wife.  He has 3 kids in the [facility].  He does most of the cooking.  He is retired.       PHYSICAL EXAMINATION:   Vitals: /77  Pulse 67  Temp 97.8  F (36.6  C) (Oral)  Ht 1.88 m (6' 2\")  Wt (!) 140.2 kg (309 lb)  SpO2 98%  BMI 39.67 kg/m2  BMI= Body mass index is 39.67 kg/(m^2).  GENERAL:  Pleasant, well-appearing, in no acute distress.   HEENT:  No icterus, no oral lesions.   LYMPH:  No supraclavicular or cervical lymphadenopathy.   CARDIOVASCULAR:  Regular rate and rhythm.   CHEST:  Lungs are clear.   ABDOMEN:  Bowel sounds are present.  He is morbidly obese.   EXTREMITIES:  2+ edema bilaterally.   NEUROLOGIC:  Speech is fluent and clear.  No asterixis or tremor.       LABORATORY DATA:  From today were reviewed from last week  .      ASSESSMENT AND PLAN:  A 61-year-old man with alcoholic and SMITH cirrhosis.  He has been sober from alcohol for 5 years.   He has maximized his diuretics and is not having any improvement in the ascites.. We discussed TIPS. I He is " a good candidate, has well compensated liver function tests and no HE. There is no indication for prophylactic HE treatment.     I again encouraged him to really watch the sodium in his diet, as this is the 1 thing that he can have some control over, but I don't think it is going to fix the underlying ascites to the degree he has it.    I ordered TIPS referral and will see him back in 3-4 months.    This was a 25 minute visit, over 50% counseling and coordination of care regarding ascites, portal hypertension. Effects of diuretics and sodium restriction, TIPS and potential complications.       Meghna Simmons MD

## 2017-09-29 NOTE — MR AVS SNAPSHOT
After Visit Summary   9/29/2017    Lawrence Louie    MRN: [id number]           Patient Information     Date Of Birth          1955        Visit Information        Provider Department      9/29/2017 11:10 AM Meghna Simmons MD [facility] Hepatology        Today's Diagnoses     Portal hypertension (H)    -  1    Alcoholic cirrhosis of liver with ascites (H)        Secondary esophageal varices without bleeding (H)           Follow-ups after your visit        Additional Services     IR REFERRAL       [facility] IR REFERRAL  Call [phone] to schedule.    IR General Referral    Procedure requested: TIPS  Associated Diagnosis: diuretic refractory ascites  Date preferred: as able      The Interventional Radiology 1C will consult patients for these procedures:  Lung biopsy, kidney biopsy, liver biopsy, line placement, port placement.  If the procedure requested is not in the list above, please place this referral and call [phone].    Please be aware that coverage of these services is subject to the terms and limitations of your health insurance plan.  Call member services at your health plan with any benefit or coverage questions.       Please bring the following to your appointment:  >>   Any x-rays, CTs or MRIs which have been performed.  Contact the facility where they were done to arrange for  prior to your scheduled appointment.  Any new CT, MRI or other procedures ordered by your specialist must be performed at a [place] facility or coordinated by your clinic's referral office.    >>   List of current medications  >>   This referral request   >>   Any documents/labs given to you for this referral                  Follow-up notes from your care team     Return in about 4 months (around 1/29/2018).      Your next 10 appointments already scheduled     Oct 04, 2017 12:00 PM CDT   Paracentesis Visit with  Spec Inf Para Provider,  [facility]  Specialty and [facility] ([facility])    [address]  2nd Floor  [facility] [address]   [phone]            Oct 11, 2017 12:00 PM CDT   Paracentesis Visit with Uc Spec Inf Para Provider, UC 40 ATC   [facility] Specialty and Procedure ([facility])    [address]  2nd Floor  [facility] [address]   [phone]            Oct 11, 2017  2:40 PM CDT   (Arrive by 2:25 PM)   New Vascular Visit with David Davis MD   [facility] ([facility]    [address]  3rd Floor  [facility] [address] [phone]            Oct 18, 2017 12:00 PM CDT   Paracentesis Visit with Uc Spec Inf Para Provider, UC 40 ATC   [facility] Specialty and Procedure ([facility])    [address]  [address] [facility] [address]   [phone]            Oct 25, 2017 12:00 PM CDT   Paracentesis Visit with Uc Spec Inf Para Provider, UC 40 ATC   [facility] Specialty and Procedure ([facility])    [address]  [address] [facility] [address]   [phone]            Oct 26, 2017 12:00 PM CDT   (Arrive by 11:45 AM)   Hui Rivera with CORY Toussaint CNP   [facility] ([facility]    [address] [place] [address]  [address] [facility] [address] [phone]              Who to contact     If you have questions or need follow up information about today's clinic visit or your schedule please contact [facility] HEPATOLOGY directly at [phone].  Normal or non-critical lab and imaging results will be communicated to you by MyChart, letter or phone within 4 business days after the clinic has received the results. If you do not hear from us within 7 days, please contact the clinic through MyChart or phone. If you have a critical or  "abnormal lab result, we will notify you by phone as soon as possible.  Submit refill requests through [employer] or call your pharmacy and they will forward the refill request to us. Please allow 3 business days for your refill to be completed.          Additional Information About Your Visit        [employer] Information     [employer] gives you secure access to your electronic health record. If you see a primary care provider, you can also send messages to your care team and make appointments. If you have questions, please call your primary care clinic.  If you do not have a primary care provider, please call [phone] and they will assist you.        Care EveryWhere ID     This is your Care EveryWhere ID. This could be used by other organizations to access your [facility] medical records  [id number]        Your Vitals Were     Pulse Temperature Height Pulse Oximetry BMI (Body Mass Index)       67 97.8  F (36.6  C) (Oral) 1.88 m (6' 2\") 98% 39.67 kg/m2        Blood Pressure from Last 3 Encounters:   09/29/17 133/77   09/27/17 113/56   09/20/17 118/55    Weight from Last 3 Encounters:   09/29/17 (!) 140.2 kg (309 lb)   09/27/17 (!) 136.6 kg (301 lb 2.4 oz)   09/20/17 (!) 138.8 kg (306 lb)              We Performed the Following     IR REFERRAL        Primary Care Provider Office Phone # Fax #    Ary Murphy, APRN [phone]       [address] [place] [address] [address] [place] [address]        Equal Access to Services     JOSEF LORENZO AH: Hadii sil ku hadasho Soomaali, waaxda luqadaha, qaybta kaalmada adeegyada, silvia salas. So [facility] [phone].    ATENCIÓN: Si habla español, tiene a rondon disposición servicios gratuitos de asistencia lingüística. Llame al [phone].    We comply with applicable federal civil rights laws and [place] laws. We do not discriminate on the basis of race, color, national origin, age, disability sex, sexual orientation or gender identity.          "   Thank you!     Thank you for choosing [facility] HEPATOLOGY  for your care. Our goal is always to provide you with excellent care. Hearing back from our patients is one way we can continue to improve our services. Please take a few minutes to complete the written survey that you may receive in the mail after your visit with us. Thank you!             Your Updated Medication List - Protect others around you: Learn how to safely use, store and throw away your medicines at www.disposemymeds.org.          This list is accurate as of: 9/29/17 12:04 PM.  Always use your most recent med list.                   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    Diabetes mellitus, type 2 (H)       blood glucose monitoring meter device kit    no brand specified    1 kit    Use to test blood sugar 4 X  times daily or as directed.    Type 2 diabetes mellitus with other specified complication (H)       blood glucose monitoring test strip    no brand specified    200 strip    Use to test blood sugars 4 X  times daily or as directed    Diabetes mellitus, type 2 (H)       calcium-vitamin D 600-400 MG-UNIT per tablet    CALTRATE    60 tablet    Take 1 tablet by mouth daily    Morbid obesity due to excess calories (H), Alcoholic cirrhosis of liver with ascites (H), Portal hypertension (H), Secondary esophageal varices without bleeding (H), Tobacco use disorder       DESVENLAFAXINE ER PO      Take 100 mg by mouth daily 2 tablets once daily.    Morbid obesity due to excess calories (H), Alcoholic cirrhosis of liver with ascites (H), Portal hypertension (H), Secondary esophageal varices without bleeding (H), Tobacco use disorder       desvenlafaxine succinate 100 MG 24  hr tablet    PRISTIQ    180 tablet    Take 2 tablets (200 mg) by mouth daily    Other depression       furosemide 20 MG tablet    LASIX    180 tablet    Take 3 tablets (60 mg) by mouth 2 times daily    Generalized edema       glipiZIDE 5 MG 24 hr tablet    glipiZIDE XL    180 tablet    Take 1 tablet (5 mg) by mouth 2 times daily    Type 2 diabetes mellitus without complication (H)       insulin aspart 100 UNIT/ML injection    NovoLOG FLEXPEN    30 mL    20 units before breakfast, 20 units before lunch, 20 units before dinner, 20 units before snacks    Type 2 diabetes mellitus without complication, with long-term current use of insulin (H)       insulin glargine 100 UNIT/ML injection    LANTUS SOLOSTAR    30 mL    Inject 40 Units Subcutaneous At Bedtime    Type 2 diabetes mellitus with other oral complication, unspecified long term insulin use status (H)       * insulin pen needle 31G X 8 MM    B-D U/F    300 each    USE  6 times daily / OR AS DIRECTED    Type 2 diabetes, HbA1c goal < 7% (H)       * insulin pen needle 31G X 8 MM    B-D U/F    600 each    Use 6 times daily or as directed    Type 2 diabetes, HbA1c goal < 7% (H)       magnesium oxide 400 (241.3 MG) MG tablet    MAG-OX    90 tablet    Take 1 tablet (400 mg) by mouth daily    Cramp of limb       ondansetron 4 MG tablet    ZOFRAN    18 tablet    Take 1 tablet (4 mg) by mouth every 8 hours as needed for nausea    Alcoholic cirrhosis (H), Nausea       order for DME     1 Units    Equipment being ordered: carpal tunnel wrist splint.    CTS (carpal tunnel syndrome)       order for DME     1 each    Orthotic diabetic shoes -1 pair    Type 2 diabetes, HbA1c goal < 7% (H)       OYSCO 500 + D 500-200 MG-UNIT Tabs   Generic drug:  Calcium Carb-Cholecalciferol     200 tablet    Take 500 mg by mouth daily    Morbid obesity due to excess calories (H), Alcoholic cirrhosis of liver with ascites (H), Portal hypertension (H), Secondary esophageal varices without bleeding  (H), Tobacco use disorder       pantoprazole 40 MG EC tablet    PROTONIX    90 tablet    Take 1 tablet (40 mg) by mouth daily    Morbid obesity due to excess calories (H), Alcoholic cirrhosis of liver with ascites (H), Portal hypertension (H), Secondary esophageal varices without bleeding (H), Tobacco use disorder       potassium chloride SA 20 MEQ CR tablet    KLOR-CON    30 tablet    Take 1 tablet (20 mEq) by mouth daily    Hypokalemia       propranolol 10 MG tablet    INDERAL    180 tablet    Take 1 tablet (10 mg) by mouth 2 times daily    Portal hypertension (H)       ranitidine 150 MG tablet    ZANTAC    180 tablet    Take 1 tablet (150 mg) by mouth 2 times daily    Esophageal varices (H)       spironolactone 50 MG tablet    ALDACTONE    540 tablet    Takes 3 tablets in am.and p.m.    Portal hypertension (H), Generalized edema       STATIN NOT PRESCRIBED (INTENTIONAL)     0 each    1 each daily Statin not prescribed intentionally due to Active liver disease    Hyperlipidemia LDL goal <100       VITAMIN B-12 PO      Take 1 tablet by mouth daily        vitamin D3 2000 UNITS Caps     90 capsule    Take 1 capsule by mouth daily    Mild major depression (H)       * Notice:  This list has 2 medication(s) that are the same as other medications prescribed for you. Read the directions carefully, and ask your doctor or other care provider to review them with you.

## 2017-09-29 NOTE — PROGRESS NOTES
"SUBJECTIVE:  Mr. Louie is a 61-year-old man with SMITH and alcoholic cirrhosis.  He has been sober from alcohol since 2012. He is here to discuss fluid retention.          His biggest issue is fluid retention. He gets weekly paracentesis 8-9 L He is morbidly obese, does not strictly follow a low-sodium diet but is on diuretics.  He is on Lasix 60 BID, spironolactone 150 b.i.d. and Bumex was stopped. He was not following a low sodium diet. He says he does not ear much processed food. He does not watch the sodium intake specifically. He said he has poor appetite.He said he does not salt foods, but he is not as careful as he should be about sodium in his diet.     We have not yet discussed TIPS.  His creat is 0.89, tbili 0.4, INR 1.08,       OTHER COMORBIDITIES:  He has diabetes, GERD and hyperlipidemia.       SOCIAL HISTORY:  He lives with his wife.  He has 3 kids in the [facility].  He does most of the cooking.  He is retired.       PHYSICAL EXAMINATION:   Vitals: /77  Pulse 67  Temp 97.8  F (36.6  C) (Oral)  Ht 1.88 m (6' 2\")  Wt (!) 140.2 kg (309 lb)  SpO2 98%  BMI 39.67 kg/m2  BMI= Body mass index is 39.67 kg/(m^2).  GENERAL:  Pleasant, well-appearing, in no acute distress.   HEENT:  No icterus, no oral lesions.   LYMPH:  No supraclavicular or cervical lymphadenopathy.   CARDIOVASCULAR:  Regular rate and rhythm.   CHEST:  Lungs are clear.   ABDOMEN:  Bowel sounds are present.  He is morbidly obese.   EXTREMITIES:  2+ edema bilaterally.   NEUROLOGIC:  Speech is fluent and clear.  No asterixis or tremor.       LABORATORY DATA:  From today were reviewed from last week  .      ASSESSMENT AND PLAN:  A 61-year-old man with alcoholic and SMITH cirrhosis.  He has been sober from alcohol for 5 years.  He has maximized his diuretics and is not having any improvement in the ascites.. We discussed TIPS. I He is a good candidate, has well compensated liver function tests and no HE. There is no indication for " prophylactic HE treatment.     I again encouraged him to really watch the sodium in his diet, as this is the 1 thing that he can have some control over, but I don't think it is going to fix the underlying ascites to the degree he has it.    I ordered TIPS referral and will see him back in 3-4 months.    This was a 25 minute visit, over 50% counseling and coordination of care regarding ascites, portal hypertension. Effects of diuretics and sodium restriction, TIPS and potential complications.

## 2017-09-29 NOTE — NURSING NOTE
"Chief Complaint   Patient presents with     RECHECK     CIRRHOSIS OF LIVER       Initial /77  Pulse 67  Temp 97.8  F (36.6  C) (Oral)  Ht 1.88 m (6' 2\")  Wt (!) 140.2 kg (309 lb)  SpO2 98%  BMI 39.67 kg/m2 Estimated body mass index is 39.67 kg/(m^2) as calculated from the following:    Height as of this encounter: 1.88 m (6' 2\").    Weight as of this encounter: 140.2 kg (309 lb).  Medication Reconciliation: complete   KARLEE TILLMAN CMA      "

## 2017-10-04 ENCOUNTER — OFFICE VISIT (OUTPATIENT)
Dept: INFUSION THERAPY | Facility: CLINIC | Age: 62
End: 2017-10-04
Attending: INTERNAL MEDICINE
Payer: COMMERCIAL

## 2017-10-04 ENCOUNTER — RADIANT APPOINTMENT (OUTPATIENT)
Dept: ULTRASOUND IMAGING | Facility: CLINIC | Age: 62
End: 2017-10-04
Attending: INTERNAL MEDICINE
Payer: COMMERCIAL

## 2017-10-04 VITALS
TEMPERATURE: 97.5 F | SYSTOLIC BLOOD PRESSURE: 117 MMHG | DIASTOLIC BLOOD PRESSURE: 64 MMHG | RESPIRATION RATE: 18 BRPM | WEIGHT: 309.08 LBS | HEART RATE: 65 BPM | BODY MASS INDEX: 39.68 KG/M2 | OXYGEN SATURATION: 98 %

## 2017-10-04 DIAGNOSIS — K70.31 ALCOHOLIC CIRRHOSIS OF LIVER WITH ASCITES (H): Primary | ICD-10-CM

## 2017-10-04 PROCEDURE — 27210190 US PARACENTESIS

## 2017-10-04 PROCEDURE — P9047 ALBUMIN (HUMAN), 25%, 50ML: HCPCS | Mod: ZF | Performed by: INTERNAL MEDICINE

## 2017-10-04 PROCEDURE — 25000128 H RX IP 250 OP 636: Mod: ZF | Performed by: INTERNAL MEDICINE

## 2017-10-04 PROCEDURE — 25000125 ZZHC RX 250: Mod: ZF | Performed by: INTERNAL MEDICINE

## 2017-10-04 RX ORDER — ALBUMIN (HUMAN) 12.5 G/50ML
12.5 SOLUTION INTRAVENOUS 4 TIMES DAILY PRN
Status: CANCELLED
Start: 2017-10-04

## 2017-10-04 RX ORDER — ALBUMIN (HUMAN) 12.5 G/50ML
12.5 SOLUTION INTRAVENOUS 4 TIMES DAILY PRN
Status: DISCONTINUED | OUTPATIENT
Start: 2017-10-04 | End: 2017-10-04 | Stop reason: HOSPADM

## 2017-10-04 RX ADMIN — ALBUMIN HUMAN 12.5 G: 0.25 SOLUTION INTRAVENOUS at 13:01

## 2017-10-04 RX ADMIN — LIDOCAINE HYDROCHLORIDE 20 ML: 10 INJECTION, SOLUTION INFILTRATION; PERINEURAL at 12:53

## 2017-10-04 RX ADMIN — ALBUMIN HUMAN 12.5 G: 0.25 SOLUTION INTRAVENOUS at 13:12

## 2017-10-04 RX ADMIN — ALBUMIN HUMAN 12.5 G: 0.25 SOLUTION INTRAVENOUS at 13:21

## 2017-10-04 RX ADMIN — ALBUMIN HUMAN 12.5 G: 0.25 SOLUTION INTRAVENOUS at 12:53

## 2017-10-04 NOTE — PATIENT INSTRUCTIONS
Dear Lawrence Louie    Thank you for choosing [facility] ([facility]) for your procedure.  The following information is a summary of our appointment as well as important reminders.      DISCHARGE INSTRUCTIONS FOLLOWING ABDOMINAL PARACENTESIS    After you go home:    No strenuous activity for 24 hours    Resume your regular diet    Limit fluid intake for the first 48 hours to no more than 2 quarts per day.  There should be minimal drainage from the needle site.  If drainage does occur and soaks through the bandage, apply gentle pressure with your hand for 5 minutes.    Notify MD for the following:    Excessive drainage    Excessive swelling, redness or tenderness at the needle site    Fever greater than 101 degrees F    Dizziness or light-headedness when getting up or walking      IF THIS IS A MEDICAL EMERGENCY, CALL [phone]    If you have any questions or concerns    Contact the Hepatology Clinic:   [phone]    If you are a post-transplant patient, contact the Transplant Office:  [phone]    If this is after hours, contact the hospital :  [phone] and as to have the GI resident on call paged                   I have received and understand my discharge instructions and I have all of my personal belongings.          ------------------------------------------------------        ---------------------------------------------------    Patient / Significant Other's Signature     Relationship      We look forward in seeing you on your next appointment here at [facility].  Please don t hesitate to call us at [phone] to reschedule any of your appointments or to speak with one of the [facility] registered nurses.  It was a pleasure taking care of you today.    Sincerely,    [facility]  Specialty Infusion [facility] Procedure Center  [address] [place] [address]  [id number]  Phone:  [phone]

## 2017-10-04 NOTE — PROGRESS NOTES
Paracentesis Nursing Note  Lawrence Louie presents today [facility] for a [facility]acentesis.    During today's appointment orders from Meghna Simmons MD were completed.    Progress Note:  Patient identification verified by name and date of birth.  Assessment completed.  Vitals monitored throughout appointment and recorded in Doc Flowsheets.  See proceduralist note in ultrasound.    Date of consent or authorization: 8/30/2017.  Invasive Procedure Safety Checklist was completed and sent for scanning.     Paracentesis performed by OSKAR Torres.    The following labs were communicated to provider performing paracentesis:  Lab Results   Component Value Date     09/20/2017       Total amount of ascites fluid drained: 10 liters.  Color of ascites fluid: cream and light yellow.  Total amount of albumin given: 50  grams.    Patient tolerated procedure well.    Post procedure,denies pain or discomfort post paracentesis.      Discharge Plan:  Discharge instructions were reviewed with patient. Pr declined printed AVS  Patient/Representative verbalized understanding and all questions were answered.   Discharged from [facility] in stable condition.    Kathleen Damon RN        Temp 97.5  F (36.4  C) (Oral)  Resp 18  Wt (!) 149.7 kg (330 lb 0.5 oz)  SpO2 98%  BMI 42.37 kg/m2

## 2017-10-04 NOTE — MR AVS SNAPSHOT
After Visit Summary   10/4/2017    Lawrence Louie    MRN: [id number]           Patient Information     Date Of Birth          1955        Visit Information        Provider Department      10/4/2017 12:00 PM Provider, Uc Spec Inf Para;  40 ATC [facility] Specialty [facility] Procedure        Today's Diagnoses     Alcoholic cirrhosis of liver with ascites (H)    -  1      Care Instructions    Dear Lawrence Louie    Thank you for choosing [facility] ([facility]) for your procedure.  The following information is a summary of our appointment as well as important reminders.      DISCHARGE INSTRUCTIONS FOLLOWING ABDOMINAL PARACENTESIS    After you go home:    No strenuous activity for 24 hours    Resume your regular diet    Limit fluid intake for the first 48 hours to no more than 2 quarts per day.  There should be minimal drainage from the needle site.  If drainage does occur and soaks through the bandage, apply gentle pressure with your hand for 5 minutes.    Notify MD for the following:    Excessive drainage    Excessive swelling, redness or tenderness at the needle site    Fever greater than 101 degrees F    Dizziness or light-headedness when getting up or walking      IF THIS IS A MEDICAL EMERGENCY, CALL 911    If you have any questions or concerns    Contact the Hepatology Clinic:   [phone]    If you are a post-transplant patient, contact the Transplant Office:  [phone]    If this is after hours, contact the hospital :  [phone] and as to have the GI resident on call paged                   I have received and understand my discharge instructions and I have all of my personal belongings.          ------------------------------------------------------        ---------------------------------------------------    Patient / Significant Other's Signature     Relationship      We look forward in  seeing you on your next appointment here at [facility].  Please don t hesitate to call us at [phone] to reschedule any of your appointments or to speak with one of the [facility] registered nurses.  It was a pleasure taking care of you today.    Sincerely,    [facility]  [facility]fusion [facility] Center  [address] [place] [address]  [id number]  Phone:  [phone]            Follow-ups after your visit        Your next 10 appointments already scheduled     Oct 11, 2017 12:00 PM CDT   Paracentesis Visit with Uc Spec Inf Para Provider, UC 40 ATC   [facility] Specialty [facility] Procedure ([facility])    [address]  2nd Floor  [facility] [id number] [phone]            Oct 11, 2017  2:40 PM CDT   (Arrive by 2:25 PM)   New Vascular Visit with David Davis MD   [facility] ([facility])    [address] [place] [address]  3rd Floor  [facility] [id number] [phone]            Oct 18, 2017 12:00 PM CDT   Paracentesis Visit with Uc Spec Inf Para Provider, UC 40 ATC   [facility] Specialty and Procedure ([facility])    [address] [place] [address]  2nd Floor  [facility] [id number] [phone]            Oct 25, 2017 12:00 PM CDT   Paracentesis Visit with Uc Spec Inf Para Provider, UC 40 ATC   [facility] Specialty and Procedure ([facility])    [address]  2nd Floor  [facility] [id number] [phone]            Oct 26, 2017 12:00 PM CDT   (Arrive by 11:45 AM)   Hui Rivera with CORY Toussaint CNP   [facility] ([facility])    [address] [place] [address]  4th Floor  [facility] [id number] [phone]            Nov 01, 2017 12:00 PM CDT   Paracentesis Visit with Uc Spec Inf Para Provider, UC 39 ATC   [facility] Specialty and Procedure ([facility])    [address]  2nd Floor  [facility] [address] [phone]              Who to contact     If you have questions or need follow up information about today's clinic visit or your schedule please contact [facility] directly at [phone].  Normal or non-critical lab and imaging results will be communicated to you by MyChart, letter or phone within 4 business days after the clinic has received the results. If you do not hear from us within 7 days, please contact the clinic through [facility] or phone. If you have a critical or abnormal lab result, we will notify you by phone as soon as possible.  Submit refill requests through [employer] or call your pharmacy and they will forward the refill request to us. Please allow 3 business days for your refill to be completed.          Additional Information About Your Visit        [place] Information     [employer] gives you secure access to your electronic health record. If you see a primary care provider, you can also send messages to your care team and make appointments. If you have questions, please call your primary care clinic.  If you do not have a primary care provider, please call [phone] and they will assist you.        Care EveryWhere ID     This is your Care EveryWhere ID. This could be used by other organizations to access your [place] medical records  [id number]        Your Vitals Were     Pulse Temperature Respirations Pulse Oximetry BMI (Body Mass Index)       65 97.5  F (36.4  C) (Oral) 18 98% 39.68 kg/m2        Blood Pressure from Last 3 Encounters:   10/04/17 117/64   09/29/17 133/77   09/27/17 113/56    Weight from Last 3 Encounters:   10/04/17 (!) 140.2 kg (309 lb 1.4 oz)   09/29/17 (!) 140.2 kg (309 lb)   09/27/17 (!) 136.6 kg (301 lb 2.4 oz)              We Performed the Following     US Paracentesis        Primary Care  Provider Office Phone # Fax #    CORY Toussaint [phone]       [address] [place] [address]  [facility] [address]        Equal Access to Services     JOSEF LORENZO : Hadii sil ku hadgardeniao Soomaali, waaxda luqadaha, qaybta kaalmada adeegyada, silvia sarkarn daniela galarza laKellymiguel salas. So [facility] [phone].    ATENCIÓN: Si habla español, tiene a rondon disposición servicios gratuitos de asistencia lingüística. Llame al [phone].    We comply with applicable federal civil rights laws and [place] laws. We do not discriminate on the basis of race, color, national origin, age, disability, sex, sexual orientation, or gender identity.            Thank you!     Thank you for choosing [facility] SPECIALTY AND PROCEDURE  for your care. Our goal is always to provide you with excellent care. Hearing back from our patients is one way we can continue to improve our services. Please take a few minutes to complete the written survey that you may receive in the mail after your visit with us. Thank you!             Your Updated Medication List - Protect others around you: Learn how to safely use, store and throw away your medicines at www.disposemymeds.org.          This list is accurate as of: 10/4/17  2:22 PM.  Always use your most recent med list.                   Brand Name Dispense Instructions for use Diagnosis    aspirin 81 MG tablet     30 tablet    Take 1 tablet (81 mg) by mouth daily    Type 2 diabetes mellitus with other skin complications       blood glucose lancets standard    no brand specified    1 Box    Use to test blood sugar 4 X  times daily or as directed.    Diabetes mellitus, type 2 (H)       blood glucose monitoring meter device kit    no brand specified    1 kit    Use to test blood sugar 4 X  times daily or as directed.    Type 2 diabetes mellitus with other specified complication (H)       blood glucose monitoring test strip    no brand specified    200 strip     Use to test blood sugars 4 X  times daily or as directed    Diabetes mellitus, type 2 (H)       calcium-vitamin D 600-400 MG-UNIT per tablet    CALTRATE    60 tablet    Take 1 tablet by mouth daily    Morbid obesity due to excess calories (H), Alcoholic cirrhosis of liver with ascites (H), Portal hypertension (H), Secondary esophageal varices without bleeding (H), Tobacco use disorder       DESVENLAFAXINE ER PO      Take 100 mg by mouth daily 2 tablets once daily.    Morbid obesity due to excess calories (H), Alcoholic cirrhosis of liver with ascites (H), Portal hypertension (H), Secondary esophageal varices without bleeding (H), Tobacco use disorder       desvenlafaxine succinate 100 MG 24 hr tablet    PRISTIQ    180 tablet    Take 2 tablets (200 mg) by mouth daily    Other depression       furosemide 20 MG tablet    LASIX    180 tablet    Take 3 tablets (60 mg) by mouth 2 times daily    Generalized edema       glipiZIDE 5 MG 24 hr tablet    glipiZIDE XL    180 tablet    Take 1 tablet (5 mg) by mouth 2 times daily    Type 2 diabetes mellitus without complication (H)       insulin aspart 100 UNIT/ML injection    NovoLOG FLEXPEN    30 mL    20 units before breakfast, 20 units before lunch, 20 units before dinner, 20 units before snacks    Type 2 diabetes mellitus without complication, with long-term current use of insulin (H)       insulin glargine 100 UNIT/ML injection    LANTUS SOLOSTAR    30 mL    Inject 40 Units Subcutaneous At Bedtime    Type 2 diabetes mellitus with other oral complication, unspecified long term insulin use status (H)       * insulin pen needle 31G X 8 MM    B-D U/F    300 each    USE  6 times daily / OR AS DIRECTED    Type 2 diabetes, HbA1c goal < 7% (H)       * insulin pen needle 31G X 8 MM    B-D U/F    600 each    Use 6 times daily or as directed    Type 2 diabetes, HbA1c goal < 7% (H)       magnesium oxide 400 (241.3 MG) MG tablet    MAG-OX    90 tablet    Take 1 tablet (400 mg) by  mouth daily    Cramp of limb       ondansetron 4 MG tablet    ZOFRAN    18 tablet    Take 1 tablet (4 mg) by mouth every 8 hours as needed for nausea    Alcoholic cirrhosis (H), Nausea       order for DME     1 Units    Equipment being ordered: carpal tunnel wrist splint.    CTS (carpal tunnel syndrome)       order for DME     1 each    Orthotic diabetic shoes -1 pair    Type 2 diabetes, HbA1c goal < 7% (H)       OYSCO 500 + D 500-200 MG-UNIT Tabs   Generic drug:  Calcium Carb-Cholecalciferol     200 tablet    Take 500 mg by mouth daily    Morbid obesity due to excess calories (H), Alcoholic cirrhosis of liver with ascites (H), Portal hypertension (H), Secondary esophageal varices without bleeding (H), Tobacco use disorder       pantoprazole 40 MG EC tablet    PROTONIX    90 tablet    Take 1 tablet (40 mg) by mouth daily    Morbid obesity due to excess calories (H), Alcoholic cirrhosis of liver with ascites (H), Portal hypertension (H), Secondary esophageal varices without bleeding (H), Tobacco use disorder       potassium chloride SA 20 MEQ CR tablet    KLOR-CON    30 tablet    Take 1 tablet (20 mEq) by mouth daily    Hypokalemia       propranolol 10 MG tablet    INDERAL    180 tablet    Take 1 tablet (10 mg) by mouth 2 times daily    Portal hypertension (H)       ranitidine 150 MG tablet    ZANTAC    180 tablet    Take 1 tablet (150 mg) by mouth 2 times daily    Esophageal varices (H)       spironolactone 50 MG tablet    ALDACTONE    540 tablet    Takes 3 tablets in am.and p.m.    Portal hypertension (H), Generalized edema       STATIN NOT PRESCRIBED (INTENTIONAL)     0 each    1 each daily Statin not prescribed intentionally due to Active liver disease    Hyperlipidemia LDL goal <100       VITAMIN B-12 PO      Take 1 tablet by mouth daily        vitamin D3 2000 UNITS Caps     90 capsule    Take 1 capsule by mouth daily    Mild major depression (H)       * Notice:  This list has 2 medication(s) that are the same  as other medications prescribed for you. Read the directions carefully, and ask your doctor or other care provider to review them with you.

## 2017-10-11 ENCOUNTER — OFFICE VISIT (OUTPATIENT)
Dept: RADIOLOGY | Facility: CLINIC | Age: 62
End: 2017-10-11

## 2017-10-11 ENCOUNTER — OFFICE VISIT (OUTPATIENT)
Dept: INFUSION THERAPY | Facility: CLINIC | Age: 62
End: 2017-10-11
Attending: INTERNAL MEDICINE
Payer: COMMERCIAL

## 2017-10-11 ENCOUNTER — RADIANT APPOINTMENT (OUTPATIENT)
Dept: ULTRASOUND IMAGING | Facility: CLINIC | Age: 62
End: 2017-10-11
Attending: INTERNAL MEDICINE
Payer: COMMERCIAL

## 2017-10-11 VITALS
DIASTOLIC BLOOD PRESSURE: 66 MMHG | WEIGHT: 313.1 LBS | BODY MASS INDEX: 40.2 KG/M2 | SYSTOLIC BLOOD PRESSURE: 123 MMHG | HEART RATE: 58 BPM

## 2017-10-11 VITALS — SYSTOLIC BLOOD PRESSURE: 110 MMHG | HEART RATE: 58 BPM | DIASTOLIC BLOOD PRESSURE: 61 MMHG

## 2017-10-11 DIAGNOSIS — K70.31 ALCOHOLIC CIRRHOSIS OF LIVER WITH ASCITES (H): Primary | ICD-10-CM

## 2017-10-11 PROCEDURE — P9047 ALBUMIN (HUMAN), 25%, 50ML: HCPCS | Mod: ZF | Performed by: INTERNAL MEDICINE

## 2017-10-11 PROCEDURE — 25000128 H RX IP 250 OP 636: Mod: ZF | Performed by: INTERNAL MEDICINE

## 2017-10-11 PROCEDURE — 25000125 ZZHC RX 250: Mod: ZF | Performed by: INTERNAL MEDICINE

## 2017-10-11 PROCEDURE — 27210190 US PARACENTESIS

## 2017-10-11 RX ORDER — ALBUMIN (HUMAN) 12.5 G/50ML
12.5 SOLUTION INTRAVENOUS 4 TIMES DAILY PRN
Status: CANCELLED
Start: 2017-10-11

## 2017-10-11 RX ORDER — ALBUMIN (HUMAN) 12.5 G/50ML
12.5 SOLUTION INTRAVENOUS 4 TIMES DAILY PRN
Status: DISCONTINUED | OUTPATIENT
Start: 2017-10-11 | End: 2017-10-11 | Stop reason: HOSPADM

## 2017-10-11 RX ADMIN — ALBUMIN HUMAN 12.5 G: 0.25 SOLUTION INTRAVENOUS at 12:50

## 2017-10-11 RX ADMIN — ALBUMIN HUMAN 12.5 G: 0.25 SOLUTION INTRAVENOUS at 12:59

## 2017-10-11 RX ADMIN — ALBUMIN HUMAN 12.5 G: 0.25 SOLUTION INTRAVENOUS at 13:08

## 2017-10-11 RX ADMIN — LIDOCAINE HYDROCHLORIDE 20 ML: 10 INJECTION, SOLUTION INFILTRATION; PERINEURAL at 12:40

## 2017-10-11 RX ADMIN — ALBUMIN HUMAN 12.5 G: 0.25 SOLUTION INTRAVENOUS at 12:42

## 2017-10-11 ASSESSMENT — PAIN SCALES - GENERAL: PAINLEVEL: NO PAIN (0)

## 2017-10-11 NOTE — LETTER
10/11/2017       RE: Lawrence Louie  [facility] [facility] [address]     Dear Colleague,    Thank you for referring your patient, Lawrence Louie, to the [facility] VASCULAR [facility]IC at [facility]. Please see a copy of my visit note below.        Interventional Radiology Clinic Visit    Date of this visit: 10/11/2017    Lawrence Louie presents for consultation for evaluation of large volume recurrent ascites in setting of cirrhosis     Primary Physician: Ary Murphy        History Of Present Illness:    Lawrence Louie is a 61 year old male with a diagnosis of SMITH/EtOH Cirrhosis complicated by recurrent large volume ascites necessitating weekly large volume paracentesis.     Denies recent GI bleed, episodes of cognitive impairment, sleep issues, abdominal pain apart from distension/ascites. Has significant lower extremity swelling and edema.    Review of Systems:    10 Point ROS is positive for what is described in the HPI. Otherwise, the remainder of the ROS is negative.    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)      Splenomegaly      Squamous cell carcinoma      Thrombocytopenia (H)      Varices, esophageal (H)     Banded in 2011     Past Surgical History:   Procedure Laterality Date     BIOPSY OF SKIN LESION       COLONOSCOPY Left 6/16/2016    Procedure: COMBINED COLONOSCOPY, SINGLE OR MULTIPLE BIOPSY/POLYPECTOMY BY BIOPSY;  Surgeon: Brandy Barnett MD;  Location: [facility]     ESOPHAGOSCOPY, GASTROSCOPY, DUODENOSCOPY (EGD), COMBINED  2/13/2013    Procedure: COMBINED ESOPHAGOSCOPY, GASTROSCOPY, DUODENOSCOPY (EGD);;  Surgeon: Tara Cook MD;  Location:  GI      ESOPHAGOSCOPY, GASTROSCOPY, DUODENOSCOPY (EGD), COMBINED  11/4/2013    Procedure: COMBINED ESOPHAGOSCOPY, GASTROSCOPY, DUODENOSCOPY (EGD);;  Surgeon: Lonny Diaz MD;  Location:  GI     ESOPHAGOSCOPY, GASTROSCOPY, DUODENOSCOPY (EGD), COMBINED Left 6/16/2016    Procedure: COMBINED ESOPHAGOSCOPY, GASTROSCOPY, DUODENOSCOPY (EGD), BIOPSY SINGLE OR MULTIPLE;  Surgeon: Brandy Barnett MD;  Location:  GI     EXCISE LESION TRUNK  9/24/2012    Procedure: EXCISE LESION TRUNK;;  Surgeon: Pepe Dominguez MD;  Location: [facility]     GENITOURINARY SURGERY      vasectomy     HERNIORRHAPHY UMBILICAL  9/24/2012    Procedure: HERNIORRHAPHY UMBILICAL;  UMBILICAL HERNIA REPAIR , EXCISION OF PERIUMBILICAL CYST;  Surgeon: Pepe Dominguez MD;  Location: [facility]       Current Medications:    Current Outpatient Prescriptions   Medication Sig Dispense Refill     glipiZIDE (GLIPIZIDE XL) 5 MG 24 hr tablet Take 1 tablet (5 mg) by mouth 2 times daily 180 tablet 1     insulin pen needle (B-D U/F) 31G X 8 MM Use 6 times daily or as directed 600 each 1     order for DME Orthotic diabetic shoes -1 pair 1 each 0     potassium chloride SA (POTASSIUM CHLORIDE) 20 MEQ CR tablet Take 1 tablet (20 mEq) by mouth daily 30 tablet 1     furosemide (LASIX) 20 MG tablet Take 3 tablets (60 mg) by mouth 2 times daily 180 tablet 1     insulin aspart (NOVOLOG FLEXPEN) 100 UNIT/ML injection 20 units before breakfast, 20 units before lunch, 20 units before dinner, 20 units before snacks 30 mL 3     calcium-vitamin D (CALTRATE) 600-400 MG-UNIT per tablet Take 1 tablet by mouth daily 60 tablet 11     blood glucose monitoring (NO BRAND SPECIFIED) meter device kit Use to test blood sugar 4 X  times daily or as directed. 1 kit 0     insulin glargine (LANTUS SOLOSTAR) 100 UNIT/ML injection Inject 40 Units Subcutaneous At Bedtime 30 mL 1     propranolol (INDERAL) 10 MG tablet Take 1 tablet (10 mg) by mouth 2 times daily 180 tablet 3     spironolactone  (ALDACTONE) 50 MG tablet Takes 3 tablets in am.and p.m. 540 tablet 6     aspirin 81 MG tablet Take 1 tablet (81 mg) by mouth daily 30 tablet 11     OYSCO 500 + D 500-200 MG-UNIT TABS Take 500 mg by mouth daily 200 tablet 3     magnesium oxide (MAG-OX) 400 (241.3 MG) MG tablet Take 1 tablet (400 mg) by mouth daily 90 tablet 3     Cholecalciferol (VITAMIN D3) 2000 UNITS CAPS Take 1 capsule by mouth daily 90 capsule 3     insulin pen needle (B-D U/F) 31G X 8 MM USE  6 times daily / OR AS DIRECTED 300 each 3     pantoprazole (PROTONIX) 40 MG EC tablet Take 1 tablet (40 mg) by mouth daily 90 tablet 3     DESVENLAFAXINE ER PO Take 100 mg by mouth daily 2 tablets once daily.  3     blood glucose monitoring (NO BRAND SPECIFIED) test strip Use to test blood sugars 4 X  times daily or as directed 200 strip 3     blood glucose (NO BRAND SPECIFIED) lancets standard Use to test blood sugar 4 X  times daily or as directed. 1 Box 3     desvenlafaxine succinate ER (PRISTIQ) 100 MG 24 hr tablet Take 2 tablets (200 mg) by mouth daily 180 tablet 3     ranitidine (ZANTAC) 150 MG tablet Take 1 tablet (150 mg) by mouth 2 times daily 180 tablet 3     Cyanocobalamin (VITAMIN B-12 PO) Take 1 tablet by mouth daily       STATIN NOT PRESCRIBED, INTENTIONAL, 1 each daily Statin not prescribed intentionally due to Active liver disease 0 each 0     ondansetron (ZOFRAN) 4 MG tablet Take 1 tablet (4 mg) by mouth every 8 hours as needed for nausea 18 tablet 1     ORDER FOR DME Equipment being ordered: carpal tunnel wrist splint. 1 Units 0     Allergies:    Review of patient's allergies indicates no known allergies.    Family History:    Family History   Problem Relation Age of Onset     Breast Cancer Mother      Liver Cancer Mother      Cardiovascular Father      CEREBROVASCULAR DISEASE Father      very low blood pressure     CANCER Father      rectal cancer     Cardiovascular Paternal Grandfather      DIABETES Brother      CANCER Sister      skin  cancer     C.A.D. Other      MI, 70's     Thyroid Disease No family hx of      Lipids No family hx of      Anesthesia Reaction No family hx of      Social History:    Social History     Social History     Marital status:      Spouse name: N/A     Number of children: N/A     Years of education: N/A     Social History Main Topics     Smoking status: Current Every Day Smoker     Packs/day: 0.30     Types: Cigarettes     Smokeless tobacco: Never Used      Comment: 43 yr     Alcohol use No      Comment: 2-3 per day , none since Dec 2012     Drug use: No     Sexual activity: Yes     Partners: Female     Birth control/ protection: Surgical     Other Topics Concern      Service No     Blood Transfusions No     Caffeine Concern No     very little coffee, likes beer     Occupational Exposure No     Hobby Hazards No     Sleep Concern Yes     bed at 3:30 AM, up at 1:00 PM     Stress Concern Yes     Weight Concern Yes     Special Diet No     Back Care No     Exercise No     Bike Helmet No     Seat Belt Yes     Self-Exams No     Social History Narrative    . 3 grown daughters. He has been sober since 2012.       Physical Exam:    /61 (BP Location: Left arm, Patient Position: Sitting, Cuff Size: Adult Regular)  Pulse 58     GENERAL APPEARANCE: alert, nad, obese  HEENT: nc/at  RESP: cta  CARDIOVASCULAR: rrr  ABDOMEN: protuberant, distended, no focal tenderness  LOWER EXTREMITIES: +2 pitting edema to knees    Laboratory Studies:    Lab Results   Component Value Date    HGB 12.8 09/20/2017     Lab Results   Component Value Date     09/20/2017     Lab Results   Component Value Date    WBC 3.9 09/20/2017       Lab Results   Component Value Date    INR 1.08 09/20/2017         Lab Results   Component Value Date    CR 0.89 09/20/2017     Lab Results   Component Value Date    BUN 9 09/20/2017       Alpha Fetoprotein   Date Value Ref Range Status   09/21/2015 5.2 0 - 8 ug/L Final       Imaging:     I  reviewed the MRI from 10/25/16, which demonstrates hepatic hepatic vasculature.    ASSESSMENT/PLAN:      Lawrence Louie is a 61 year old male with SMITH/EtOH with medication refractory ascites necessitating weekly LVP.     WILMER-Mayra is 7.     Apart from morbid obesity, he is good candidate for TIPS, MELD is low, CPT A.     Plan:  TIPS with offsite GET.    A total of 45 minutes was spent in care for the patient, of which >50% was spent in counseling and co-ordination of care.     It was a pleasure seeing the patient.     Signed,    David Davis M.D.  Department of Interventional Radiology  [facility]    CC  Patient Care Team:  Ary Murphy APRN CNP as PCP - General (Nurse Practitioner)  Monisha Velásquez MD as MD (Dermatology)  Junie Reddy, RN (Diabetic Education)  Ary Murphy APRN CNP as Referring Physician (Nurse Practitioner)  Meghna Simmons MD as MD (Internal Medicine)

## 2017-10-11 NOTE — PROGRESS NOTES
Paracentesis Nursing Note  Lawrence Louie [facility] for [facility].    During today's appointment orders from Dr KAN Simmons were completed.    Progress Note:  Patient identification verified by name and date of birth.  Assessment completed.  Vitals monitored throughout appointment and recorded in Doc Flowsheets.  See proceduralist note in ultrasound.    Date of consent or authorization: 8/30/2017.  Invasive Procedure Safety Checklist was completed and sent for scanning.     Paracentesis performed by OSKAR Goldstein.    The following labs were communicated to provider performing paracentesis:  Lab Results   Component Value Date     09/20/2017     Total amount of ascites fluid drained: 10 liters.  Color of ascites fluid: straw colored, clear  Total amount of albumin given: 50 grams.    Patient tolerated procedure well.    Post procedure,denies pain or discomfort post paracentesis.      Discharge Plan:  Discharge instructions were reviewed with patient.  Patient/Representative verbalized understanding and all questions were answered.   Discharged [facility] in stable condition.    DAVID GONZALEZ, RADHA Champagne RN        /66  Pulse 58  Wt (!) 142 kg (313 lb 1.6 oz)  BMI 40.2 kg/m2

## 2017-10-11 NOTE — MR AVS SNAPSHOT
After Visit Summary   10/11/2017    Lawrence Louie    MRN: [id number]           Patient Information     Date Of Birth          1955        Visit Information        Provider Department      10/11/2017 12:00 PM Provider, Uc Spec Inf Para; MARY 40 ATC [facility] Specialty [facility] Procedure        Today's Diagnoses     Alcoholic cirrhosis of liver with ascites (H)    -  1      Care Instructions    Dear Lawrence Louie    Thank you for choosing [facility] ([facility]) for your procedure.  The following information is a summary of our appointment as well as important reminders.      Additional information: You had a paracentesis today with 10,000 mls fluid removed. Also received Albumin 50 Gms. (200 mls.) infused through a PIV.    DISCHARGE INSTRUCTIONS FOLLOWING ABDOMINAL PARACENTESIS    After you go home:    No strenuous activity for 24 hours    Resume your regular diet    Limit fluid intake for the first 48 hours to no more than 2 quarts per day.  There should be minimal drainage from the needle site.  If drainage does occur and soaks through the bandage, apply gentle pressure with your hand for 5 minutes.    Notify MD for the following:    Excessive drainage    Excessive swelling, redness or tenderness at the needle site    Fever greater than 101 degrees F    Dizziness or light-headedness when getting up or walking      IF THIS IS A MEDICAL EMERGENCY, CALL 911    If you have any questions or concerns    Contact the Hepatology Clinic:   [phone]    If you are a post-transplant patient, contact the Transplant Office:  [phone]    If this is after hours, contact the hospital :  [phone] and as to have the GI resident on call paged         We look forward in seeing you on your next appointment here at [facility].  Please don t hesitate to call us at [phone] to reschedule any of your appointments or  to speak with one of the [facility] registered nurses.  It was a pleasure taking care of you today.    Sincerely,    [facility]  Specialty Infusion & Procedure Center  [address] [place] [address]  [address]  Phone:  [phone]          Follow-ups after your visit        Your next 10 appointments already scheduled     Oct 11, 2017  2:20 PM CDT   (Arrive by 2:05 PM)   New Vascular Visit with David Davis MD   [facility] ([facility])    [address] [place] [address]  3rd [facility] [id number] [phone]            Oct 18, 2017 12:00 PM CDT   Paracentesis Visit with Uc Spec Inf Para Provider, UC 40 ATC   [facility] ([facility])    [address] [place] [id number] [phone]            Oct 25, 2017 12:00 PM CDT   Paracentesis Visit with Uc Spec Inf Para Provider, UC 40 ATC   [facility] ([facility])    [address] [place] [id number] [phone]            Oct 26, 2017 12:00 PM CDT   (Arrive by 11:45 AM)   Hui Rivera with CORY Toussaint CNP   [facility] ([facility])    [address] [place] [address]  4th [facility] [id number] [phone]            Nov 01, 2017 12:00 PM CDT   Paracentesis Visit with Uc Spec Inf Para Provider, UC 39 ATC   [facility] ([facility])    [address] [place] [id number] [phone]            Nov 08, 2017 12:00 PM CST   Paracentesis Visit with Uc Spec Inf Para Provider, UC 39 ATC   [facility] ([facility])    [address] [place] [address] [phone]               Who to contact     If you have questions or need follow up information about today's clinic visit or your schedule please contact [facility] SPECIALTY AND PROCEDURE directly at [phone].  Normal or non-critical lab and imaging results will be communicated to you by MyChart, letter or phone within 4 business days after the clinic has received the results. If you do not hear from us within 7 days, please contact the clinic through MyChart or phone. If you have a critical or abnormal lab result, we will notify you by phone as soon as possible.  Submit refill requests through [employer] or call your pharmacy and they will forward the refill request to us. Please allow 3 business days for your refill to be completed.          Additional Information About Your Visit        [employer] Information     [employer] gives you secure access to your electronic health record. If you see a primary care provider, you can also send messages to your care team and make appointments. If you have questions, please call your primary care clinic.  If you do not have a primary care provider, please call [phone] and they will assist you.        Care EveryWhere ID     This is your Care EveryWhere ID. This could be used by other organizations to access your [facility] medical records  [id number]        Your Vitals Were     Pulse                   58            Blood Pressure from Last 3 Encounters:   10/11/17 123/66   10/04/17 117/64   09/29/17 133/77    Weight from Last 3 Encounters:   10/04/17 (!) 140.2 kg (309 lb 1.4 oz)   09/29/17 (!) 140.2 kg (309 lb)   09/27/17 (!) 136.6 kg (301 lb 2.4 oz)              We Performed the Following     US Paracentesis        Primary Care Provider Office Phone # Fax #    Ary CORY López [phone]       [address] [facility] [address]  [facility] [address]        Equal Access to Services     JOSEF LORENZO : brianda Reyes,  jonathan vickbernadettedeysi suarezroddy silvia carolynin hayaan adeeg kharash la'aan ah. So [facility] [phone].    ATENCIÓN: Si zarina myers, tiene a rondon disposición servicios gratuitos de asistencia lingüística. Boaz al [phone].    We comply with applicable federal civil rights laws and [place] laws. We do not discriminate on the basis of race, color, national origin, age, disability, sex, sexual orientation, or gender identity.            Thank you!     Thank you for choosing [facility] SPECIALTY AND PROCEDURE  for your care. Our goal is always to provide you with excellent care. Hearing back from our patients is one way we can continue to improve our services. Please take a few minutes to complete the written survey that you may receive in the mail after your visit with us. Thank you!             Your Updated Medication List - Protect others around you: Learn how to safely use, store and throw away your medicines at www.disposemymeds.org.          This list is accurate as of: 10/11/17  1:27 PM.  Always use your most recent med list.                   Brand Name Dispense Instructions for use Diagnosis    aspirin 81 MG tablet     30 tablet    Take 1 tablet (81 mg) by mouth daily    Type 2 diabetes mellitus with other skin complications       blood glucose lancets standard    no brand specified    1 Box    Use to test blood sugar 4 X  times daily or as directed.    Diabetes mellitus, type 2 (H)       blood glucose monitoring meter device kit    no brand specified    1 kit    Use to test blood sugar 4 X  times daily or as directed.    Type 2 diabetes mellitus with other specified complication (H)       blood glucose monitoring test strip    no brand specified    200 strip    Use to test blood sugars 4 X  times daily or as directed    Diabetes mellitus, type 2 (H)       calcium-vitamin D 600-400 MG-UNIT per tablet    CALTRATE    60 tablet    Take 1 tablet by mouth daily    Morbid obesity due to excess  calories (H), Alcoholic cirrhosis of liver with ascites (H), Portal hypertension (H), Secondary esophageal varices without bleeding (H), Tobacco use disorder       DESVENLAFAXINE ER PO      Take 100 mg by mouth daily 2 tablets once daily.    Morbid obesity due to excess calories (H), Alcoholic cirrhosis of liver with ascites (H), Portal hypertension (H), Secondary esophageal varices without bleeding (H), Tobacco use disorder       desvenlafaxine succinate 100 MG 24 hr tablet    PRISTIQ    180 tablet    Take 2 tablets (200 mg) by mouth daily    Other depression       furosemide 20 MG tablet    LASIX    180 tablet    Take 3 tablets (60 mg) by mouth 2 times daily    Generalized edema       glipiZIDE 5 MG 24 hr tablet    glipiZIDE XL    180 tablet    Take 1 tablet (5 mg) by mouth 2 times daily    Type 2 diabetes mellitus without complication (H)       insulin aspart 100 UNIT/ML injection    NovoLOG FLEXPEN    30 mL    20 units before breakfast, 20 units before lunch, 20 units before dinner, 20 units before snacks    Type 2 diabetes mellitus without complication, with long-term current use of insulin (H)       insulin glargine 100 UNIT/ML injection    LANTUS SOLOSTAR    30 mL    Inject 40 Units Subcutaneous At Bedtime    Type 2 diabetes mellitus with other oral complication, unspecified long term insulin use status (H)       * insulin pen needle 31G X 8 MM    B-D U/F    300 each    USE  6 times daily / OR AS DIRECTED    Type 2 diabetes, HbA1c goal < 7% (H)       * insulin pen needle 31G X 8 MM    B-D U/F    600 each    Use 6 times daily or as directed    Type 2 diabetes, HbA1c goal < 7% (H)       magnesium oxide 400 (241.3 MG) MG tablet    MAG-OX    90 tablet    Take 1 tablet (400 mg) by mouth daily    Cramp of limb       ondansetron 4 MG tablet    ZOFRAN    18 tablet    Take 1 tablet (4 mg) by mouth every 8 hours as needed for nausea    Alcoholic cirrhosis (H), Nausea       order for DME     1 Units    Equipment being  ordered: carpal tunnel wrist splint.    CTS (carpal tunnel syndrome)       order for DME     1 each    Orthotic diabetic shoes -1 pair    Type 2 diabetes, HbA1c goal < 7% (H)       OYSCO 500 + D 500-200 MG-UNIT Tabs   Generic drug:  Calcium Carb-Cholecalciferol     200 tablet    Take 500 mg by mouth daily    Morbid obesity due to excess calories (H), Alcoholic cirrhosis of liver with ascites (H), Portal hypertension (H), Secondary esophageal varices without bleeding (H), Tobacco use disorder       pantoprazole 40 MG EC tablet    PROTONIX    90 tablet    Take 1 tablet (40 mg) by mouth daily    Morbid obesity due to excess calories (H), Alcoholic cirrhosis of liver with ascites (H), Portal hypertension (H), Secondary esophageal varices without bleeding (H), Tobacco use disorder       potassium chloride SA 20 MEQ CR tablet    KLOR-CON    30 tablet    Take 1 tablet (20 mEq) by mouth daily    Hypokalemia       propranolol 10 MG tablet    INDERAL    180 tablet    Take 1 tablet (10 mg) by mouth 2 times daily    Portal hypertension (H)       ranitidine 150 MG tablet    ZANTAC    180 tablet    Take 1 tablet (150 mg) by mouth 2 times daily    Esophageal varices (H)       spironolactone 50 MG tablet    ALDACTONE    540 tablet    Takes 3 tablets in am.and p.m.    Portal hypertension (H), Generalized edema       STATIN NOT PRESCRIBED (INTENTIONAL)     0 each    1 each daily Statin not prescribed intentionally due to Active liver disease    Hyperlipidemia LDL goal <100       VITAMIN B-12 PO      Take 1 tablet by mouth daily        vitamin D3 2000 UNITS Caps     90 capsule    Take 1 capsule by mouth daily    Mild major depression (H)       * Notice:  This list has 2 medication(s) that are the same as other medications prescribed for you. Read the directions carefully, and ask your doctor or other care provider to review them with you.

## 2017-10-11 NOTE — PATIENT INSTRUCTIONS
Dear Lawrence Louie    Thank you for choosing [facility] ([facility]) for your procedure.  The following information is a summary of our appointment as well as important reminders.      Additional information: You had a paracentesis today with 10,000 mls fluid removed. Also received Albumin 50 Gms. (200 mls.) infused through a PIV.    DISCHARGE INSTRUCTIONS FOLLOWING ABDOMINAL PARACENTESIS    After you go home:    No strenuous activity for 24 hours    Resume your regular diet    Limit fluid intake for the first 48 hours to no more than 2 quarts per day.  There should be minimal drainage from the needle site.  If drainage does occur and soaks through the bandage, apply gentle pressure with your hand for 5 minutes.    Notify MD for the following:    Excessive drainage    Excessive swelling, redness or tenderness at the needle site    Fever greater than 101 degrees F    Dizziness or light-headedness when getting up or walking      IF THIS IS A MEDICAL EMERGENCY, CALL 911    If you have any questions or concerns    Contact the Hepatology Clinic:   [phone]    If you are a post-transplant patient, contact the Transplant Office:  [phone]    If this is after hours, contact the hospital :  [phone] and as to have the GI resident on call paged         We look forward in seeing you on your next appointment here at [facility].  Please don t hesitate to call us at [phone] to reschedule any of your appointments or to speak with one of the [facility] registered nurses.  It was a pleasure taking care of you today.    Sincerely,    [facility]  Specialty Infusion & Procedure Center  [address] [place] [address]  [address]  Phone:  [phone]  Paracentesis  Your healthcare provider recommends that you have paracentesis. This is a procedure to remove extra fluid from your belly (abdomen). A needle is used to drain the  fluid. A small sample of fluid may be taken and tested for problems. If the fluid buildup is causing discomfort or pain, all of the fluid may be drained. To do this, a tube is attached to the needle. The fluid is drained into a container that sits outside of the body. If symptoms are severe, paracentesis may be done as an emergency procedure. Otherwise, it will be scheduled ahead of time. Read on to learn more about paracentesis and how it works.     Understanding ascites  Many of the body s organs, including the liver and intestines, are inside the belly (abdomen). The organs are covered in a thin membrane called the peritoneum. The peritoneum has 2 layers. It makes a fluid that allows the layers to glide smoothly past each other. If this fluid builds up in the belly, the condition is called ascites. Ascites causes pain and discomfort. It can also make it hard to breathe. Fluid can build up for a number of reasons. These include chronic liver disease (cirrhosis), heart or kidney failure, and cancer. Your provider can tell you more about the cause of your ascites.  How paracentesis works  The goal of paracentesis may be to help diagnose the cause of the excess fluid. Or, the goal may be to drain excess fluid from the abdomen. In some cases, fluid returns and the procedure needs to be repeated.  Before the procedure    Tell your provider about any medicines you are taking. This includes all prescription medicines, over-the-counter medicines, street drugs, herbs, vitamins, and other supplements.    Tell your provider about any allergies you have.    Before the procedure begins, you ll be asked to empty your bladder. This helps prevent injury to the bladder during the procedure. If needed, a thin tube (Payne catheter) may be placed into your bladder to drain urine during the procedure. This tube is removed after the procedure.    An IV (intravenous) line may be put into a vein in your arm or hand. This line supplies  fluids and medicines.  During the procedure    You are awake during the procedure.    An imaging method called ultrasound may be used to guide the procedure. It shows live images of the inside of your belly on a video screen. This helps the provider find the site of the excess fluid inside your belly and decide where to insert the needle.    A numbing medicine (local anesthesia) is injected into your belly where the needle will be inserted.    Once the skin is numb, the provider carefully inserts the needle into the belly. This causes the needle to fill with fluid.    The needle may be removed with only a small sample of fluid. This sample is sent to a lab for testing. Getting a sample takes about 10 to 15 minutes.    Or, a tube may be attached to the needle so that more of the excess fluid can be drained. The tube may be taped or stitched into place. This keeps it from pulling the needle out of your belly.    How long it takes to drain all of the fluid varies for each person. In most cases, it takes about 30 minutes. Your provider will let you know if the procedure is expected to take longer than usual.    Once all of the fluid is drained, the needle and tube are removed.    Pressure is put on the puncture site to stop any fluid leakage or bleeding.    A small bandage is placed over the puncture site. Albumin may be given during or after the procedure to prevent low blood pressure or kidney problems.  After the procedure  You may be taken to a recovery room to rest after the procedure. If you are in pain, you will be given medicine as needed. You will likely be sent home 1 to 2 hours after the procedure is done. When you leave the hospital, have an adult family member or friend drive you home. If you are staying in the hospital, you will return to your hospital room.  Risks and possible complications of paracentesis  This procedure is considered safe. But like all procedures, it carries some risks. These include  the following:    Bleeding    Infection    Injury to structures in the belly    Fast drop in blood pressure   Recovering at home    If needed, your provider can prescribe or recommend pain medicines for you to take at home. Take these exactly as directed. If you stopped taking other medicines before the procedure, ask your provider when you can start them again.    You may remove the bandage 24 hours after the procedure.    Take it easy for 24 hours after the procedure. Avoid physical activity until your provider says it s OK.  Follow-up care  Make a follow-up appointment with your provider as directed. During your follow-up visit, your provider will check your healing. Let your provider know how you are feeling. You can also discuss the cause of your ascites and if any more treatment is needed.   When to seek medical care  Call your healthcare provider if you notice any of the following after the procedure:    A fever of 100.4 F (38.0 C) or higher    Trouble breathing    Pain that does not go away even after taking pain medicine    Belly pain not caused by having the skin punctured    Bleeding from the puncture site    More than a small amount of fluid leakage from the puncture site    Swollen belly    Signs of infection at the puncture site. These include increased pain, redness, or swelling, as well as warmth or bad-smelling drainage.    Blood in your urine    Dizziness, lightheadedness, or fainting   Date Last Reviewed: 7/1/2016 2000-2017 [employer]. [address] [place] [address], [place] [address]. All rights reserved. This information is not intended as a substitute for professional medical care. Always follow your healthcare professional's instructions.

## 2017-10-11 NOTE — MR AVS SNAPSHOT
MRN:[id number]                      After Visit Summary   10/11/2017    Lawrence Louie    MRN: [id number]           Visit Information        Provider Department      10/11/2017 12:00 PM Provider, Cristobal Spec Inf Para; CRISTOBAL 40 ATC [facility]           Review of your medicines          These changes are accurate as of: 10/11/17  1:35 PM.  If you have any questions, ask your nurse or doctor.               CONTINUE these medicines which have NOT CHANGED        Dose / Directions    aspirin 81 MG tablet   Used for:  Type 2 diabetes mellitus with other skin complications        Dose:  81 mg   Take 1 tablet (81 mg) by mouth daily   Quantity:  30 tablet   Refills:  11       blood glucose lancets standard   Commonly known as:  no brand specified   Used for:  Diabetes mellitus, type 2 (H)        Use to test blood sugar 4 X  times daily or as directed.   Quantity:  1 Box   Refills:  3       blood glucose monitoring meter device kit   Commonly known as:  no brand specified   Used for:  Type 2 diabetes mellitus with other specified complication (H)        Use to test blood sugar 4 X  times daily or as directed.   Quantity:  1 kit   Refills:  0       blood glucose monitoring test strip   Commonly known as:  no brand specified   Used for:  Diabetes mellitus, type 2 (H)        Use to test blood sugars 4 X  times daily or as directed   Quantity:  200 strip   Refills:  3       calcium-vitamin D 600-400 MG-UNIT per tablet   Commonly known as:  CALTRATE   Used for:  Morbid obesity due to excess calories (H), Alcoholic cirrhosis of liver with ascites (H), Portal hypertension (H), Secondary esophageal varices without bleeding (H), Tobacco use disorder        Dose:  1 tablet   Take 1 tablet by mouth daily   Quantity:  60 tablet   Refills:  11       DESVENLAFAXINE ER PO   Used for:  Morbid obesity due to excess calories (H), Alcoholic cirrhosis of liver with ascites (H),  Portal hypertension (H), Secondary esophageal varices without bleeding (H), Tobacco use disorder        Dose:  100 mg   Take 100 mg by mouth daily 2 tablets once daily.   Refills:  3       desvenlafaxine succinate 100 MG 24 hr tablet   Commonly known as:  PRISTIQ   Used for:  Other depression        Dose:  200 mg   Take 2 tablets (200 mg) by mouth daily   Quantity:  180 tablet   Refills:  3       furosemide 20 MG tablet   Commonly known as:  LASIX   Used for:  Generalized edema        Dose:  60 mg   Take 3 tablets (60 mg) by mouth 2 times daily   Quantity:  180 tablet   Refills:  1       glipiZIDE 5 MG 24 hr tablet   Commonly known as:  glipiZIDE XL   Used for:  Type 2 diabetes mellitus without complication (H)        Dose:  5 mg   Take 1 tablet (5 mg) by mouth 2 times daily   Quantity:  180 tablet   Refills:  1       insulin aspart 100 UNIT/ML injection   Commonly known as:  NovoLOG FLEXPEN   Used for:  Type 2 diabetes mellitus without complication, with long-term current use of insulin (H)        20 units before breakfast, 20 units before lunch, 20 units before dinner, 20 units before snacks   Quantity:  30 mL   Refills:  3       insulin glargine 100 UNIT/ML injection   Commonly known as:  LANTUS SOLOSTAR   Used for:  Type 2 diabetes mellitus with other oral complication, unspecified long term insulin use status (H)        Dose:  40 Units   Inject 40 Units Subcutaneous At Bedtime   Quantity:  30 mL   Refills:  1       * insulin pen needle 31G X 8 MM   Commonly known as:  B-D U/F   Used for:  Type 2 diabetes, HbA1c goal < 7% (H)        USE  6 times daily / OR AS DIRECTED   Quantity:  300 each   Refills:  3       * insulin pen needle 31G X 8 MM   Commonly known as:  B-D U/F   Used for:  Type 2 diabetes, HbA1c goal < 7% (H)        Use 6 times daily or as directed   Quantity:  600 each   Refills:  1       magnesium oxide 400 (241.3 MG) MG tablet   Commonly known as:  MAG-OX   Used for:  Cramp of limb        Dose:  400  mg   Take 1 tablet (400 mg) by mouth daily   Quantity:  90 tablet   Refills:  3       ondansetron 4 MG tablet   Commonly known as:  ZOFRAN   Used for:  Alcoholic cirrhosis (H), Nausea        Dose:  4 mg   Take 1 tablet (4 mg) by mouth every 8 hours as needed for nausea   Quantity:  18 tablet   Refills:  1       order for DME   Used for:  CTS (carpal tunnel syndrome)        Equipment being ordered: carpal tunnel wrist splint.   Quantity:  1 Units   Refills:  0       order for DME   Used for:  Type 2 diabetes, HbA1c goal < 7% (H)        Orthotic diabetic shoes -1 pair   Quantity:  1 each   Refills:  0       OYSCO 500 + D 500-200 MG-UNIT Tabs   Used for:  Morbid obesity due to excess calories (H), Alcoholic cirrhosis of liver with ascites (H), Portal hypertension (H), Secondary esophageal varices without bleeding (H), Tobacco use disorder   Generic drug:  Calcium Carb-Cholecalciferol        Dose:  500 mg   Take 500 mg by mouth daily   Quantity:  200 tablet   Refills:  3       pantoprazole 40 MG EC tablet   Commonly known as:  PROTONIX   Used for:  Morbid obesity due to excess calories (H), Alcoholic cirrhosis of liver with ascites (H), Portal hypertension (H), Secondary esophageal varices without bleeding (H), Tobacco use disorder        Dose:  40 mg   Take 1 tablet (40 mg) by mouth daily   Quantity:  90 tablet   Refills:  3       potassium chloride SA 20 MEQ CR tablet   Commonly known as:  KLOR-CON   Used for:  Hypokalemia        Dose:  20 mEq   Take 1 tablet (20 mEq) by mouth daily   Quantity:  30 tablet   Refills:  1       propranolol 10 MG tablet   Commonly known as:  INDERAL   Used for:  Portal hypertension (H)        Dose:  10 mg   Take 1 tablet (10 mg) by mouth 2 times daily   Quantity:  180 tablet   Refills:  3       ranitidine 150 MG tablet   Commonly known as:  ZANTAC   Used for:  Esophageal varices (H)        Dose:  150 mg   Take 1 tablet (150 mg) by mouth 2 times daily   Quantity:  180 tablet   Refills:  3        spironolactone 50 MG tablet   Commonly known as:  ALDACTONE   Used for:  Portal hypertension (H), Generalized edema        Takes 3 tablets in am.and p.m.   Quantity:  540 tablet   Refills:  6       STATIN NOT PRESCRIBED (INTENTIONAL)   Used for:  Hyperlipidemia LDL goal <100        Dose:  1 each   1 each daily Statin not prescribed intentionally due to Active liver disease   Quantity:  0 each   Refills:  0       VITAMIN B-12 PO        Dose:  1 tablet   Take 1 tablet by mouth daily   Refills:  0       vitamin D3 2000 UNITS Caps   Used for:  Mild major depression (H)        Dose:  1 capsule   Take 1 capsule by mouth daily   Quantity:  90 capsule   Refills:  3       * Notice:  This list has 2 medication(s) that are the same as other medications prescribed for you. Read the directions carefully, and ask your doctor or other care provider to review them with you.             Protect others around you: Learn how to safely use, store and throw away your medicines at www.disposemymeds.org.         Follow-ups after your visit        Your next 10 appointments already scheduled     Oct 11, 2017  2:20 PM CDT   (Arrive by 2:05 PM)   New Vascular Visit with David Davis MD   [facility] ([facility])    [address] [place] [address]  3rd [facility] [address]   [phone]            Oct 18, 2017 12:00 PM CDT   Paracentesis Visit with  Spec Inf Para Provider,  40 ATC   [facility] ([facility])    [address]  [address] [facility] [address]   [phone]            Oct 25, 2017 12:00 PM CDT   Paracentesis Visit with  Spec Inf Para Provider,  40 ATC   [facility] ([facility])    [address]  [address] [facility] [address]   [phone]            Oct 26, 2017 12:00 PM CDT   (Arrive by 11:45 AM)    Hui Rivera with CORY Toussaint CNP   [facility] ([facility])    [address]  4th Floor  [facility] [id number] [phone]            Nov 01, 2017 12:00 PM CDT   Paracentesis Visit with  Spec Inf Para Provider, UC 39 ATC   [facility] ([facility])    [address]  2nd Floor  [facility] [id number] [phone]            Nov 08, 2017 12:00 PM CST   Paracentesis Visit with  Spec Inf Para Provider, UC 39 ATC   [facility] Special[facility] Procedure ([facility])    [address]  2nd Floor  [facility] [id number] [phone]               Care Instructions        Care Instructions    Dear Lawrence Louie    Thank you for choosing [facility] ([facility]) for your procedure.  The following information is a summary of our appointment as well as important reminders.      Additional information: You had a paracentesis today with 10,000 mls fluid removed. Also received Albumin 50 Gms. (200 mls.) infused through a PIV.    DISCHARGE INSTRUCTIONS FOLLOWING ABDOMINAL PARACENTESIS    After you go home:    No strenuous activity for 24 hours    Resume your regular diet    Limit fluid intake for the first 48 hours to no more than 2 quarts per day.  There should be minimal drainage from the needle site.  If drainage does occur and soaks through the bandage, apply gentle pressure with your hand for 5 minutes.    Notify MD for the following:    Excessive drainage    Excessive swelling, redness or tenderness at the needle site    Fever greater than 101 degrees F    Dizziness or light-headedness when getting up or walking      IF THIS IS A MEDICAL EMERGENCY, CALL 911    If you have any questions or concerns    Contact the Hepatology Clinic:   [phone]    If  you are a post-transplant patient, contact the Transplant Office:  [phone]    If this is after hours, contact the hospital :  [phone] and as to have the GI resident on call paged         We look forward in seeing you on your next appointment here at [facility].  Please don t hesitate to call us at [phone] to reschedule any of your appointments or to speak with one of the [facility] registered nurses.  It was a pleasure taking care of you today.    Sincerely,    [facility]  [address] [place] [address]  [address]  Phone:  [phone]  Paracentesis  Your healthcare provider recommends that you have paracentesis. This is a procedure to remove extra fluid from your belly (abdomen). A needle is used to drain the fluid. A small sample of fluid may be taken and tested for problems. If the fluid buildup is causing discomfort or pain, all of the fluid may be drained. To do this, a tube is attached to the needle. The fluid is drained into a container that sits outside of the body. If symptoms are severe, paracentesis may be done as an emergency procedure. Otherwise, it will be scheduled ahead of time. Read on to learn more about paracentesis and how it works.     Understanding ascites  Many of the body s organs, including the liver and intestines, are inside the belly (abdomen). The organs are covered in a thin membrane called the peritoneum. The peritoneum has 2 layers. It makes a fluid that allows the layers to glide smoothly past each other. If this fluid builds up in the belly, the condition is called ascites. Ascites causes pain and discomfort. It can also make it hard to breathe. Fluid can build up for a number of reasons. These include chronic liver disease (cirrhosis), heart or kidney failure, and cancer. Your provider can tell you more about the cause of your ascites.  How paracentesis works  The goal of paracentesis may be to help  diagnose the cause of the excess fluid. Or, the goal may be to drain excess fluid from the abdomen. In some cases, fluid returns and the procedure needs to be repeated.  Before the procedure    Tell your provider about any medicines you are taking. This includes all prescription medicines, over-the-counter medicines, street drugs, herbs, vitamins, and other supplements.    Tell your provider about any allergies you have.    Before the procedure begins, you ll be asked to empty your bladder. This helps prevent injury to the bladder during the procedure. If needed, a thin tube (Payne catheter) may be placed into your bladder to drain urine during the procedure. This tube is removed after the procedure.    An IV (intravenous) line may be put into a vein in your arm or hand. This line supplies fluids and medicines.  During the procedure    You are awake during the procedure.    An imaging method called ultrasound may be used to guide the procedure. It shows live images of the inside of your belly on a video screen. This helps the provider find the site of the excess fluid inside your belly and decide where to insert the needle.    A numbing medicine (local anesthesia) is injected into your belly where the needle will be inserted.    Once the skin is numb, the provider carefully inserts the needle into the belly. This causes the needle to fill with fluid.    The needle may be removed with only a small sample of fluid. This sample is sent to a lab for testing. Getting a sample takes about 10 to 15 minutes.    Or, a tube may be attached to the needle so that more of the excess fluid can be drained. The tube may be taped or stitched into place. This keeps it from pulling the needle out of your belly.    How long it takes to drain all of the fluid varies for each person. In most cases, it takes about 30 minutes. Your provider will let you know if the procedure is expected to take longer than usual.    Once all of the fluid  is drained, the needle and tube are removed.    Pressure is put on the puncture site to stop any fluid leakage or bleeding.    A small bandage is placed over the puncture site. Albumin may be given during or after the procedure to prevent low blood pressure or kidney problems.  After the procedure  You may be taken to a recovery room to rest after the procedure. If you are in pain, you will be given medicine as needed. You will likely be sent home 1 to 2 hours after the procedure is done. When you leave the hospital, have an adult family member or friend drive you home. If you are staying in the hospital, you will return to your hospital room.  Risks and possible complications of paracentesis  This procedure is considered safe. But like all procedures, it carries some risks. These include the following:    Bleeding    Infection    Injury to structures in the belly    Fast drop in blood pressure   Recovering at home    If needed, your provider can prescribe or recommend pain medicines for you to take at home. Take these exactly as directed. If you stopped taking other medicines before the procedure, ask your provider when you can start them again.    You may remove the bandage 24 hours after the procedure.    Take it easy for 24 hours after the procedure. Avoid physical activity until your provider says it s OK.  Follow-up care  Make a follow-up appointment with your provider as directed. During your follow-up visit, your provider will check your healing. Let your provider know how you are feeling. You can also discuss the cause of your ascites and if any more treatment is needed.   When to seek medical care  Call your healthcare provider if you notice any of the following after the procedure:    A fever of 100.4 F (38.0 C) or higher    Trouble breathing    Pain that does not go away even after taking pain medicine    Belly pain not caused by having the skin punctured    Bleeding from the puncture site    More than a  small amount of fluid leakage from the puncture site    Swollen belly    Signs of infection at the puncture site. These include increased pain, redness, or swelling, as well as warmth or bad-smelling drainage.    Blood in your urine    Dizziness, lightheadedness, or fainting   Date Last Reviewed: 7/1/2016 2000-2017 [employer]. [address] [place] [address]. All rights reserved. This information is not intended as a substitute for professional medical care. Always follow your healthcare professional's instructions.                Additional Information About Your Visit        MyChart Information     [employer] gives you secure access to your electronic health record. If you see a primary care provider, you can also send messages to your care team and make appointments. If you have questions, please call your primary care clinic.  If you do not have a primary care provider, please call [phone] and they will assist you.        Care EveryWhere ID     This is your Care EveryWhere ID. This could be used by other organizations to access your [facility] medical records  [id number]        Your Vitals Were     Blood Pressure Pulse                123/66 58           Primary Care Provider Office Phone # Fax #    Ary Murphy, APRN [phone]      Equal Access to Services     JOSEF LORENZO AH: Hadii sil kellyo Sodonnaali, waaxda luqadaha, qaybta kaalmada adeegyada, silvia salas. So [facility] [phone].    ATENCIÓN: Si habla español, tiene a rondon disposición servicios gratuitos de asistencia lingüística. Llame al [phone].    We comply with applicable federal civil rights laws and [place] laws. We do not discriminate on the basis of race, color, national origin, age, disability, sex, sexual orientation, or gender identity.            Thank you!     Thank you for choosing [facility] for your care. Our goal is always to provide you with excellent  care. Hearing back from our patients is one way we can continue to improve our services. Please take a few minutes to complete the written survey that you may receive in the mail after you visit with us. Thank you!             Medication List: This is a list of all your medications and when to take them. Check marks below indicate your daily home schedule. Keep this list as a reference.          This list is accurate as of: 10/11/17  1:35 PM.  Always use your most recent med list.               Medications           Morning Afternoon Evening Bedtime As Needed    aspirin 81 MG tablet   Take 1 tablet (81 mg) by mouth daily                                blood glucose lancets standard   Commonly known as:  no brand specified   Use to test blood sugar 4 X  times daily or as directed.                                blood glucose monitoring meter device kit   Commonly known as:  no brand specified   Use to test blood sugar 4 X  times daily or as directed.                                blood glucose monitoring test strip   Commonly known as:  no brand specified   Use to test blood sugars 4 X  times daily or as directed                                calcium-vitamin D 600-400 MG-UNIT per tablet   Commonly known as:  CALTRATE   Take 1 tablet by mouth daily                                DESVENLAFAXINE ER PO   Take 100 mg by mouth daily 2 tablets once daily.                                desvenlafaxine succinate 100 MG 24 hr tablet   Commonly known as:  PRISTIQ   Take 2 tablets (200 mg) by mouth daily                                furosemide 20 MG tablet   Commonly known as:  LASIX   Take 3 tablets (60 mg) by mouth 2 times daily                                glipiZIDE 5 MG 24 hr tablet   Commonly known as:  glipiZIDE XL   Take 1 tablet (5 mg) by mouth 2 times daily                                insulin aspart 100 UNIT/ML injection   Commonly known as:  NovoLOG FLEXPEN   20 units before breakfast, 20 units before lunch,  20 units before dinner, 20 units before snacks                                insulin glargine 100 UNIT/ML injection   Commonly known as:  LANTUS SOLOSTAR   Inject 40 Units Subcutaneous At Bedtime                                * insulin pen needle 31G X 8 MM   Commonly known as:  B-D U/F   USE  6 times daily / OR AS DIRECTED                                * insulin pen needle 31G X 8 MM   Commonly known as:  B-D U/F   Use 6 times daily or as directed                                magnesium oxide 400 (241.3 MG) MG tablet   Commonly known as:  MAG-OX   Take 1 tablet (400 mg) by mouth daily                                ondansetron 4 MG tablet   Commonly known as:  ZOFRAN   Take 1 tablet (4 mg) by mouth every 8 hours as needed for nausea                                order for DME   Equipment being ordered: carpal tunnel wrist splint.                                order for DME   Orthotic diabetic shoes -1 pair                                OYSCO 500 + D 500-200 MG-UNIT Tabs   Take 500 mg by mouth daily   Generic drug:  Calcium Carb-Cholecalciferol                                pantoprazole 40 MG EC tablet   Commonly known as:  PROTONIX   Take 1 tablet (40 mg) by mouth daily                                potassium chloride SA 20 MEQ CR tablet   Commonly known as:  KLOR-CON   Take 1 tablet (20 mEq) by mouth daily                                propranolol 10 MG tablet   Commonly known as:  INDERAL   Take 1 tablet (10 mg) by mouth 2 times daily                                ranitidine 150 MG tablet   Commonly known as:  ZANTAC   Take 1 tablet (150 mg) by mouth 2 times daily                                spironolactone 50 MG tablet   Commonly known as:  ALDACTONE   Takes 3 tablets in am.and p.m.                                STATIN NOT PRESCRIBED (INTENTIONAL)   1 each daily Statin not prescribed intentionally due to Active liver disease                                VITAMIN B-12 PO   Take 1 tablet by mouth  daily                                vitamin D3 2000 UNITS Caps   Take 1 capsule by mouth daily                                * Notice:  This list has 2 medication(s) that are the same as other medications prescribed for you. Read the directions carefully, and ask your doctor or other care provider to review them with you.

## 2017-10-11 NOTE — NURSING NOTE
Chief Complaint   Patient presents with     Consult     Lawrence comes to clinic today for a tips consult.     Merced Shah, CMA

## 2017-10-11 NOTE — LETTER
October 12, 2017    Lawrence Louie  [address] [facility] [address]    Dear Lawrence,     We will call you with the appointment if your TIPS PLACEMENT Friday, October 27th @ 8 am.    On the day of the appointment, you will check  in 2 hours early to your scheduled procedure.  Please check in at 6 am.     Please check into the 3rd floor, Pre-Op , located  at [facility], located at [address] [place] [address], [place] [address].     *please note that you will be given General Anesthesia sedation for this procedure.       Reminders:    -Nothing to eat or drink after midnight the night before.     -Please take your morning medications as indicated with enough water to swallow, with the exception of your Diabetes medications.  Hold your Insulin and Glipizide day of the procedure.  Please Hold your Aspirin 5 days before the procedure.    -You will need a History and Physical completed within 30 days of your procedure date (prior to).    -Please also note that you may be admitted afterwards so therefore you can pack a small overnight bag.       *We ask that you continue to take your medications and attend Paracentesis appointments as scheduled. We do not want you to discontinue your medications, especially if it is related to your liver disease.     Once the TIPS is placed, it will take a few weeks for it to function properly, so therefore attending Paracentesis appointments is important as you will notice that over time there will be less fluid removed.     **Should you experience any of the symptoms below please let us know.     1. Confusion- Hepatic encephalopathy. This is a dangerous symptom that can happen after the TIPS procedure. Please go immediately to the Emergency room    2. Swelling of lower legs- please notify us as soon as possible. This can happen afterwards as well. Please make sure to connect with your Hepatologist/Liver doctor for any diuretics.     3. Fever and  abd pain-please call me as this may indicate an infection or so.      We will call you 2-3 days after you go home.     Follow up: We will see you at 1, 3 ,6, 12 months after TIPS placement for proper follow up with an Ultrasound to evaluate if the TIPS is still patent. Otherwise your Hepatologist will follow up with you.       Should you have any further questions, please call us anytime. It has been a pleasure to see you today.        Kristi Burrell RN, BSN  Interventional Radiology Nurse Coordinator   Phone: [phone]

## 2017-10-16 NOTE — PROGRESS NOTES
Interventional Radiology Clinic Visit    Date of this visit: 10/11/2017    Lawrence Louie presents for consultation for evaluation of large volume recurrent ascites in setting of cirrhosis     Primary Physician: Ary Murphy        History Of Present Illness:    Lawrence Louie is a 61 year old male with a diagnosis of SMITH/EtOH Cirrhosis complicated by recurrent large volume ascites necessitating weekly large volume paracentesis.     Denies recent GI bleed, episodes of cognitive impairment, sleep issues, abdominal pain apart from distension/ascites. Has significant lower extremity swelling and edema.    Review of Systems:    10 Point ROS is positive for what is described in the HPI. Otherwise, the remainder of the ROS is negative.    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)      Splenomegaly      Squamous cell carcinoma      Thrombocytopenia (H)      Varices, esophageal (H)     Banded in 2011     Past Surgical History:   Procedure Laterality Date     BIOPSY OF SKIN LESION       COLONOSCOPY Left 6/16/2016    Procedure: COMBINED COLONOSCOPY, SINGLE OR MULTIPLE BIOPSY/POLYPECTOMY BY BIOPSY;  Surgeon: Brandy Barnett MD;  Location:  GI     ESOPHAGOSCOPY, GASTROSCOPY, DUODENOSCOPY (EGD), COMBINED  2/13/2013    Procedure: COMBINED ESOPHAGOSCOPY, GASTROSCOPY, DUODENOSCOPY (EGD);;  Surgeon: Tara Cook MD;  Location:  GI     ESOPHAGOSCOPY, GASTROSCOPY, DUODENOSCOPY (EGD), COMBINED  11/4/2013    Procedure: COMBINED ESOPHAGOSCOPY, GASTROSCOPY, DUODENOSCOPY (EGD);;  Surgeon: Lonny Diaz MD;  Location:  GI     ESOPHAGOSCOPY, GASTROSCOPY, DUODENOSCOPY (EGD), COMBINED Left 6/16/2016    Procedure: COMBINED ESOPHAGOSCOPY, GASTROSCOPY,  DUODENOSCOPY (EGD), BIOPSY SINGLE OR MULTIPLE;  Surgeon: Brandy Barnett MD;  Location: [facility]     EXCISE LESION TRUNK  9/24/2012    Procedure: EXCISE LESION TRUNK;;  Surgeon: Pepe Dominguez MD;  Location: [facility]     GENITOURINARY SURGERY      vasectomy     HERNIORRHAPHY UMBILICAL  9/24/2012    Procedure: HERNIORRHAPHY UMBILICAL;  UMBILICAL HERNIA REPAIR , EXCISION OF PERIUMBILICAL CYST;  Surgeon: Pepe Dominguez MD;  Location: [facility]       Current Medications:    Current Outpatient Prescriptions   Medication Sig Dispense Refill     glipiZIDE (GLIPIZIDE XL) 5 MG 24 hr tablet Take 1 tablet (5 mg) by mouth 2 times daily 180 tablet 1     insulin pen needle (B-D U/F) 31G X 8 MM Use 6 times daily or as directed 600 each 1     order for DME Orthotic diabetic shoes -1 pair 1 each 0     potassium chloride SA (POTASSIUM CHLORIDE) 20 MEQ CR tablet Take 1 tablet (20 mEq) by mouth daily 30 tablet 1     furosemide (LASIX) 20 MG tablet Take 3 tablets (60 mg) by mouth 2 times daily 180 tablet 1     insulin aspart (NOVOLOG FLEXPEN) 100 UNIT/ML injection 20 units before breakfast, 20 units before lunch, 20 units before dinner, 20 units before snacks 30 mL 3     calcium-vitamin D (CALTRATE) 600-400 MG-UNIT per tablet Take 1 tablet by mouth daily 60 tablet 11     blood glucose monitoring (NO BRAND SPECIFIED) meter device kit Use to test blood sugar 4 X  times daily or as directed. 1 kit 0     insulin glargine (LANTUS SOLOSTAR) 100 UNIT/ML injection Inject 40 Units Subcutaneous At Bedtime 30 mL 1     propranolol (INDERAL) 10 MG tablet Take 1 tablet (10 mg) by mouth 2 times daily 180 tablet 3     spironolactone (ALDACTONE) 50 MG tablet Takes 3 tablets in am.and p.m. 540 tablet 6     aspirin 81 MG tablet Take 1 tablet (81 mg) by mouth daily 30 tablet 11     OYSCO 500 + D 500-200 MG-UNIT TABS Take 500 mg by mouth daily 200 tablet 3     magnesium oxide (MAG-OX) 400 (241.3 MG) MG tablet Take 1 tablet (400 mg) by mouth daily 90  tablet 3     Cholecalciferol (VITAMIN D3) 2000 UNITS CAPS Take 1 capsule by mouth daily 90 capsule 3     insulin pen needle (B-D U/F) 31G X 8 MM USE  6 times daily / OR AS DIRECTED 300 each 3     pantoprazole (PROTONIX) 40 MG EC tablet Take 1 tablet (40 mg) by mouth daily 90 tablet 3     DESVENLAFAXINE ER PO Take 100 mg by mouth daily 2 tablets once daily.  3     blood glucose monitoring (NO BRAND SPECIFIED) test strip Use to test blood sugars 4 X  times daily or as directed 200 strip 3     blood glucose (NO BRAND SPECIFIED) lancets standard Use to test blood sugar 4 X  times daily or as directed. 1 Box 3     desvenlafaxine succinate ER (PRISTIQ) 100 MG 24 hr tablet Take 2 tablets (200 mg) by mouth daily 180 tablet 3     ranitidine (ZANTAC) 150 MG tablet Take 1 tablet (150 mg) by mouth 2 times daily 180 tablet 3     Cyanocobalamin (VITAMIN B-12 PO) Take 1 tablet by mouth daily       STATIN NOT PRESCRIBED, INTENTIONAL, 1 each daily Statin not prescribed intentionally due to Active liver disease 0 each 0     ondansetron (ZOFRAN) 4 MG tablet Take 1 tablet (4 mg) by mouth every 8 hours as needed for nausea 18 tablet 1     ORDER FOR DME Equipment being ordered: carpal tunnel wrist splint. 1 Units 0     Allergies:    Review of patient's allergies indicates no known allergies.    Family History:    Family History   Problem Relation Age of Onset     Breast Cancer Mother      Liver Cancer Mother      Cardiovascular Father      CEREBROVASCULAR DISEASE Father      very low blood pressure     CANCER Father      rectal cancer     Cardiovascular Paternal Grandfather      DIABETES Brother      CANCER Sister      skin cancer     C.A.D. Other      MI, 70's     Thyroid Disease No family hx of      Lipids No family hx of      Anesthesia Reaction No family hx of      Social History:    Social History     Social History     Marital status:      Spouse name: N/A     Number of children: N/A     Years of education: N/A     Social  History Main Topics     Smoking status: Current Every Day Smoker     Packs/day: 0.30     Types: Cigarettes     Smokeless tobacco: Never Used      Comment: 43 yr     Alcohol use No      Comment: 2-3 per day , none since Dec 2012     Drug use: No     Sexual activity: Yes     Partners: Female     Birth control/ protection: Surgical     Other Topics Concern      Service No     Blood Transfusions No     Caffeine Concern No     very little coffee, likes beer     Occupational Exposure No     Hobby Hazards No     Sleep Concern Yes     bed at 3:30 AM, up at 1:00 PM     Stress Concern Yes     Weight Concern Yes     Special Diet No     Back Care No     Exercise No     Bike Helmet No     Seat Belt Yes     Self-Exams No     Social History Narrative    . 3 grown daughters. He has been sober since 2012.       Physical Exam:    /61 (BP Location: Left arm, Patient Position: Sitting, Cuff Size: Adult Regular)  Pulse 58     GENERAL APPEARANCE: alert, nad, obese  HEENT: nc/at  RESP: cta  CARDIOVASCULAR: rrr  ABDOMEN: protuberant, distended, no focal tenderness  LOWER EXTREMITIES: +2 pitting edema to knees    Laboratory Studies:    Lab Results   Component Value Date    HGB 12.8 09/20/2017     Lab Results   Component Value Date     09/20/2017     Lab Results   Component Value Date    WBC 3.9 09/20/2017       Lab Results   Component Value Date    INR 1.08 09/20/2017         Lab Results   Component Value Date    CR 0.89 09/20/2017     Lab Results   Component Value Date    BUN 9 09/20/2017       Alpha Fetoprotein   Date Value Ref Range Status   09/21/2015 5.2 0 - 8 ug/L Final       Imaging:     I reviewed the MRI from 10/25/16, which demonstrates hepatic hepatic vasculature.    ASSESSMENT/PLAN:      Lawrence Louie is a 61 year old male with SMITH/EtOH with medication refractory ascites necessitating weekly LVP.     WILMER-Mayra is 7.     Apart from morbid obesity, he is good candidate for TIPS, MELD is low, CPT  A.     Plan:  TIPS with offsite GET.    A total of 45 minutes was spent in care for the patient, of which >50% was spent in counseling and co-ordination of care.     It was a pleasure seeing the patient.     David Bearden M.D.  Department of Interventional Radiology  [facility]  Patient Care Team:  Ary Murphy APRN CNP as PCP - General (Nurse Practitioner)  Monisha Velásquez MD as MD (Dermatology)  Junie Reddy, RN (Diabetic Education)  Ary Murphy APRN CNP as Referring Physician (Nurse Practitioner)  Dinora Burr MD as MD (Internal Medicine)  David Davis MD as Resident (Radiology)  DINORA BURR

## 2017-10-18 ENCOUNTER — OFFICE VISIT (OUTPATIENT)
Dept: INFUSION THERAPY | Facility: CLINIC | Age: 62
End: 2017-10-18
Attending: INTERNAL MEDICINE
Payer: COMMERCIAL

## 2017-10-18 ENCOUNTER — RADIANT APPOINTMENT (OUTPATIENT)
Dept: ULTRASOUND IMAGING | Facility: CLINIC | Age: 62
End: 2017-10-18
Attending: INTERNAL MEDICINE
Payer: COMMERCIAL

## 2017-10-18 VITALS
RESPIRATION RATE: 18 BRPM | TEMPERATURE: 96.6 F | DIASTOLIC BLOOD PRESSURE: 71 MMHG | BODY MASS INDEX: 40.24 KG/M2 | WEIGHT: 313.4 LBS | HEART RATE: 64 BPM | SYSTOLIC BLOOD PRESSURE: 132 MMHG

## 2017-10-18 DIAGNOSIS — K70.31 ALCOHOLIC CIRRHOSIS OF LIVER WITH ASCITES (H): Primary | ICD-10-CM

## 2017-10-18 PROCEDURE — P9047 ALBUMIN (HUMAN), 25%, 50ML: HCPCS | Mod: ZF | Performed by: INTERNAL MEDICINE

## 2017-10-18 PROCEDURE — 27210190 US PARACENTESIS

## 2017-10-18 PROCEDURE — 25000125 ZZHC RX 250: Mod: ZF | Performed by: INTERNAL MEDICINE

## 2017-10-18 PROCEDURE — 25000128 H RX IP 250 OP 636: Mod: ZF | Performed by: INTERNAL MEDICINE

## 2017-10-18 RX ORDER — ALBUMIN (HUMAN) 12.5 G/50ML
12.5 SOLUTION INTRAVENOUS 4 TIMES DAILY PRN
Status: DISCONTINUED | OUTPATIENT
Start: 2017-10-18 | End: 2017-10-18 | Stop reason: HOSPADM

## 2017-10-18 RX ORDER — ALBUMIN (HUMAN) 12.5 G/50ML
12.5 SOLUTION INTRAVENOUS 4 TIMES DAILY PRN
Status: CANCELLED
Start: 2017-10-18

## 2017-10-18 RX ADMIN — ALBUMIN HUMAN 12.5 G: 0.25 SOLUTION INTRAVENOUS at 12:58

## 2017-10-18 RX ADMIN — ALBUMIN HUMAN 12.5 G: 0.25 SOLUTION INTRAVENOUS at 13:07

## 2017-10-18 RX ADMIN — ALBUMIN HUMAN 12.5 G: 0.25 SOLUTION INTRAVENOUS at 13:15

## 2017-10-18 RX ADMIN — LIDOCAINE HYDROCHLORIDE 20 ML: 10 INJECTION, SOLUTION INFILTRATION; PERINEURAL at 12:40

## 2017-10-18 RX ADMIN — ALBUMIN HUMAN 12.5 G: 0.25 SOLUTION INTRAVENOUS at 12:50

## 2017-10-18 NOTE — MR AVS SNAPSHOT
After Visit Summary   10/18/2017    Lawrence Louie    MRN: [id number]           Patient Information     Date Of Birth          1955        Visit Information        Provider Department      10/18/2017 12:00 PM Vipin Fisher PA-C;  40 ATC [facility] Specialty [facility] Procedure        Today's Diagnoses     Alcoholic cirrhosis of liver with ascites (H)    -  1       Follow-ups after your visit        Your next 10 appointments already scheduled     Oct 25, 2017 12:00 PM CDT   Paracentesis Visit with  Spec Inf Para Provider,  40 ATC   [facility] Specialty and Procedure ([facility])    [address]  2nd Floor  [facility] [address]   [phone]            Oct 26, 2017 12:00 PM CDT   (Arrive by 11:45 AM)   Hui Rivera with CORY Toussaint [facility] ([facility])    [address]  4th Floor  [facility] [address] [phone]            Oct 27, 2017   Procedure with GENERIC ANESTHESIA PROVIDER   [facility], Same Day Surgery (--)    [address] [address]   [phone]            Oct 27, 2017  8:00 AM CDT   IR TRANSVEN INTRAHEPATIC PORTOSYST SHUNT with [id number]   [facility], Interventional Radiology ([facility])    [address] [address] [phone]           1. You will need to have had a history and physical exam within 7 days of the procedure. 2. Laboratory test are to be obtained by your doctor prior to the exam (CBCP, INR and PTT) 3. Someone will need to drive you to and from the hospital. 4. If you are or may be pregnant, contact your doctor or a Radiology nurse prior to the day of the exam. 5. If you have diabetes, check with your doctor or a Radiology nurse to see if your insulin needs to be adjusted for the exam. 6. If you are  taking Coumadin (to thin you blood) please contact your doctor or a Radiology nurse at least 3 days before the exam for special instructions. 7. The day before your exam you may eat your regular diet and are encouraged to drink at least 2 quarts of clear liquids. Drink no alcoholic beverages for 24 hours prior to the exam. 8. Do not eat any solid food or milk products for 6 hours prior to the exam. You may drink clear liquids until 2 hours prior to the exam. Clear liquids include the following: water, Jell-O, clear broth, apple juice or any noncarbonated drink that you can see through (no pop!) 9. The morning of the exam you may brush your teeth and take medications as directed with a sip of water. 10. Tell the Radiology nurse if you have any allergies.            Nov 01, 2017 12:00 PM CDT   Paracentesis Visit with  Spec Inf Para Provider, UC 39 ATC   [facility] Specialty and Procedure ([facility])    [address] [id number] [phone]            Nov 08, 2017 12:00 PM CST   Paracentesis Visit with  Spec Inf Para Provider, UC 39 ATC   [facility] Specialty and Procedure ([facility])    [address] [id number] [phone]            Nov 15, 2017 12:00 PM CST   Paracentesis Visit with  Spec Inf Para Provider   [facility] Specialty and Procedure ([facility])    [address] [id number] [phone]              Who to contact     If you have questions or need follow up information about today's clinic visit or your schedule please contact [facility] SPECIALTY AND PROCEDURE directly at [phone].  Normal or non-critical lab and imaging results will be communicated to you by MyChart, letter or phone within 4 business days after the  clinic has received the results. If you do not hear from us within 7 days, please contact the clinic through Basha or phone. If you have a critical or abnormal lab result, we will notify you by phone as soon as possible.  Submit refill requests through Basha or call your pharmacy and they will forward the refill request to us. Please allow 3 business days for your refill to be completed.          Additional Information About Your Visit        [email or web address] Information     [facility] gives you secure access to your electronic health record. If you see a primary care provider, you can also send messages to your care team and make appointments. If you have questions, please call your primary care clinic.  If you do not have a primary care provider, please call [phone] and they will assist you.        Care EveryWhere ID     This is your Care EveryWhere ID. This could be used by other organizations to access your [place] medical records  [id number]        Your Vitals Were     Pulse Temperature Respirations BMI (Body Mass Index)          64 96.6  F (35.9  C) (Oral) 18 40.24 kg/m2         Blood Pressure from Last 3 Encounters:   10/18/17 132/71   10/11/17 110/61   10/11/17 123/66    Weight from Last 3 Encounters:   10/18/17 (!) 142.2 kg (313 lb 6.4 oz)   10/11/17 (!) 142 kg (313 lb 1.6 oz)   10/04/17 (!) 140.2 kg (309 lb 1.4 oz)              We Performed the Following     US Paracentesis        Primary Care Provider Office Phone # Fax #    Ary JACKSON Katherine, APRN [phone]       [address] [place] [address] [address] [place] [address]        Equal Access to Services     JOSEF LORENZO AH: Hadii sil palmer Soanthony, waaxda luqadaha, qaybta kaalmasilvia pena. So [facility] [phone].    ATENCIÓN: Si habla español, tiene a rondon disposición servicios gratuitos de asistencia lingüística. Boaz al [phone].    We comply with applicable federal civil rights laws and [place]  laws. We do not discriminate on the basis of race, color, national origin, age, disability, sex, sexual orientation, or gender identity.            Thank you!     Thank you for choosing [facility] SPECIALTY AND PROCEDURE  for your care. Our goal is always to provide you with excellent care. Hearing back from our patients is one way we can continue to improve our services. Please take a few minutes to complete the written survey that you may receive in the mail after your visit with us. Thank you!             Your Updated Medication List - Protect others around you: Learn how to safely use, store and throw away your medicines at www.disposemymeds.org.          This list is accurate as of: 10/18/17  2:49 PM.  Always use your most recent med list.                   Brand Name Dispense Instructions for use Diagnosis    aspirin 81 MG tablet     30 tablet    Take 1 tablet (81 mg) by mouth daily    Type 2 diabetes mellitus with other skin complications       blood glucose lancets standard    no brand specified    1 Box    Use to test blood sugar 4 X  times daily or as directed.    Diabetes mellitus, type 2 (H)       blood glucose monitoring meter device kit    no brand specified    1 kit    Use to test blood sugar 4 X  times daily or as directed.    Type 2 diabetes mellitus with other specified complication (H)       blood glucose monitoring test strip    no brand specified    200 strip    Use to test blood sugars 4 X  times daily or as directed    Diabetes mellitus, type 2 (H)       calcium-vitamin D 600-400 MG-UNIT per tablet    CALTRATE    60 tablet    Take 1 tablet by mouth daily    Morbid obesity due to excess calories (H), Alcoholic cirrhosis of liver with ascites (H), Portal hypertension (H), Secondary esophageal varices without bleeding (H), Tobacco use disorder       DESVENLAFAXINE ER PO      Take 100 mg by mouth daily 2 tablets once daily.    Morbid obesity due to excess calories (H),  Alcoholic cirrhosis of liver with ascites (H), Portal hypertension (H), Secondary esophageal varices without bleeding (H), Tobacco use disorder       desvenlafaxine succinate 100 MG 24 hr tablet    PRISTIQ    180 tablet    Take 2 tablets (200 mg) by mouth daily    Other depression       furosemide 20 MG tablet    LASIX    180 tablet    Take 3 tablets (60 mg) by mouth 2 times daily    Generalized edema       glipiZIDE 5 MG 24 hr tablet    glipiZIDE XL    180 tablet    Take 1 tablet (5 mg) by mouth 2 times daily    Type 2 diabetes mellitus without complication (H)       insulin aspart 100 UNIT/ML injection    NovoLOG FLEXPEN    30 mL    20 units before breakfast, 20 units before lunch, 20 units before dinner, 20 units before snacks    Type 2 diabetes mellitus without complication, with long-term current use of insulin (H)       insulin glargine 100 UNIT/ML injection    LANTUS SOLOSTAR    30 mL    Inject 40 Units Subcutaneous At Bedtime    Type 2 diabetes mellitus with other oral complication, unspecified long term insulin use status (H)       * insulin pen needle 31G X 8 MM    B-D U/F    300 each    USE  6 times daily / OR AS DIRECTED    Type 2 diabetes, HbA1c goal < 7% (H)       * insulin pen needle 31G X 8 MM    B-D U/F    600 each    Use 6 times daily or as directed    Type 2 diabetes, HbA1c goal < 7% (H)       magnesium oxide 400 (241.3 MG) MG tablet    MAG-OX    90 tablet    Take 1 tablet (400 mg) by mouth daily    Cramp of limb       ondansetron 4 MG tablet    ZOFRAN    18 tablet    Take 1 tablet (4 mg) by mouth every 8 hours as needed for nausea    Alcoholic cirrhosis (H), Nausea       order for DME     1 Units    Equipment being ordered: carpal tunnel wrist splint.    CTS (carpal tunnel syndrome)       order for DME     1 each    Orthotic diabetic shoes -1 pair    Type 2 diabetes, HbA1c goal < 7% (H)       OYSCO 500 + D 500-200 MG-UNIT Tabs   Generic drug:  Calcium Carb-Cholecalciferol     200 tablet    Take  500 mg by mouth daily    Morbid obesity due to excess calories (H), Alcoholic cirrhosis of liver with ascites (H), Portal hypertension (H), Secondary esophageal varices without bleeding (H), Tobacco use disorder       pantoprazole 40 MG EC tablet    PROTONIX    90 tablet    Take 1 tablet (40 mg) by mouth daily    Morbid obesity due to excess calories (H), Alcoholic cirrhosis of liver with ascites (H), Portal hypertension (H), Secondary esophageal varices without bleeding (H), Tobacco use disorder       potassium chloride SA 20 MEQ CR tablet    KLOR-CON    30 tablet    Take 1 tablet (20 mEq) by mouth daily    Hypokalemia       propranolol 10 MG tablet    INDERAL    180 tablet    Take 1 tablet (10 mg) by mouth 2 times daily    Portal hypertension (H)       ranitidine 150 MG tablet    ZANTAC    180 tablet    Take 1 tablet (150 mg) by mouth 2 times daily    Esophageal varices (H)       spironolactone 50 MG tablet    ALDACTONE    540 tablet    Takes 3 tablets in am.and p.m.    Portal hypertension (H), Generalized edema       STATIN NOT PRESCRIBED (INTENTIONAL)     0 each    1 each daily Statin not prescribed intentionally due to Active liver disease    Hyperlipidemia LDL goal <100       VITAMIN B-12 PO      Take 1 tablet by mouth daily        vitamin D3 2000 UNITS Caps     90 capsule    Take 1 capsule by mouth daily    Mild major depression (H)       * Notice:  This list has 2 medication(s) that are the same as other medications prescribed for you. Read the directions carefully, and ask your doctor or other care provider to review them with you.

## 2017-10-18 NOTE — PROGRESS NOTES
Paracentesis Nursing Note  Lawrence Louie [facility]    During today's appointment orders from Meghna Simmons MD were completed.    Progress Note:  Patient identification verified by name and date of birth.  Assessment completed.  Vitals monitored throughout appointment and recorded in Doc Flowsheets.  See proceduralist note in ultrasound.    Date of consent or authorization: 8/30/17.  Invasive Procedure Safety Checklist was completed and sent for scanning.     Paracentesis performed by Vipin Fisher PA-C Radiology.    The following labs were communicated to provider performing paracentesis:  Lab Results   Component Value Date     09/20/2017       Total amount of ascites fluid drained: 10 liters.  Color of ascites fluid: yellow.  Total amount of albumin given: 50  grams.    Patient tolerated procedure well.    Post procedure,denies pain or discomfort post paracentesis.      Discharge Plan:  Discharge instructions were reviewed with patient.  Patient/Representative verbalized understanding and all questions were answered.   [facility] in stable condition.    Zahra Murphy RN        Temp 96.6  F (35.9  C) (Oral)  Resp 18  Wt (!) 152.2 kg (335 lb 9.6 oz)  BMI 43.09 kg/m2

## 2017-10-22 ASSESSMENT — ENCOUNTER SYMPTOMS
SNORES LOUDLY: 0
WHEEZING: 0
SYNCOPE: 0
BRUISES/BLEEDS EASILY: 1
LOSS OF CONSCIOUSNESS: 0
SWOLLEN GLANDS: 0
VOMITING: 0
ABDOMINAL PAIN: 0
TINGLING: 0
JAUNDICE: 0
BLOOD IN STOOL: 0
NUMBNESS: 1
DYSURIA: 1
DIFFICULTY URINATING: 0
DYSPNEA ON EXERTION: 0
PARALYSIS: 0
CLAUDICATION: 0
SLEEP DISTURBANCES DUE TO BREATHING: 0
SEIZURES: 0
COUGH: 0
RESPIRATORY PAIN: 0
WEIGHT GAIN: 1
HYPOTENSION: 0
RECTAL BLEEDING: 0
LIGHT-HEADEDNESS: 0
ALTERED TEMPERATURE REGULATION: 0
POSTURAL DYSPNEA: 1
DECREASED APPETITE: 1
EXERCISE INTOLERANCE: 1
WEAKNESS: 0
FLANK PAIN: 0
POLYDIPSIA: 0
SKIN CHANGES: 0
BOWEL INCONTINENCE: 0
FEVER: 0
NAUSEA: 1
NAIL CHANGES: 0
DISTURBANCES IN COORDINATION: 0
SPUTUM PRODUCTION: 0
CONSTIPATION: 0
LEG SWELLING: 1
SPEECH CHANGE: 0
HEMOPTYSIS: 0
TREMORS: 0
DIZZINESS: 1
HEARTBURN: 0
POLYPHAGIA: 0
PALPITATIONS: 0
RECTAL PAIN: 0
TACHYCARDIA: 0
SHORTNESS OF BREATH: 1
MEMORY LOSS: 0
CHILLS: 0
HEADACHES: 1
LEG PAIN: 1
COUGH DISTURBING SLEEP: 0
INCREASED ENERGY: 1
HEMATURIA: 0
NIGHT SWEATS: 0
HALLUCINATIONS: 0
POOR WOUND HEALING: 1
WEIGHT LOSS: 0
BLOATING: 1
DIARRHEA: 0
FATIGUE: 1
ORTHOPNEA: 0
HYPERTENSION: 0

## 2017-10-25 ENCOUNTER — RADIANT APPOINTMENT (OUTPATIENT)
Dept: ULTRASOUND IMAGING | Facility: CLINIC | Age: 62
End: 2017-10-25
Attending: INTERNAL MEDICINE
Payer: COMMERCIAL

## 2017-10-25 ENCOUNTER — OFFICE VISIT (OUTPATIENT)
Dept: INFUSION THERAPY | Facility: CLINIC | Age: 62
End: 2017-10-25
Attending: INTERNAL MEDICINE
Payer: COMMERCIAL

## 2017-10-25 VITALS
WEIGHT: 313.6 LBS | TEMPERATURE: 98 F | SYSTOLIC BLOOD PRESSURE: 117 MMHG | HEART RATE: 60 BPM | OXYGEN SATURATION: 95 % | BODY MASS INDEX: 40.26 KG/M2 | DIASTOLIC BLOOD PRESSURE: 54 MMHG

## 2017-10-25 DIAGNOSIS — K70.31 ALCOHOLIC CIRRHOSIS OF LIVER WITH ASCITES (H): Primary | ICD-10-CM

## 2017-10-25 LAB — PLATELET # BLD AUTO: 90 10E9/L (ref 150–450)

## 2017-10-25 PROCEDURE — 27210190 US PARACENTESIS

## 2017-10-25 PROCEDURE — P9047 ALBUMIN (HUMAN), 25%, 50ML: HCPCS | Mod: ZF | Performed by: INTERNAL MEDICINE

## 2017-10-25 PROCEDURE — 25000128 H RX IP 250 OP 636: Mod: ZF | Performed by: INTERNAL MEDICINE

## 2017-10-25 PROCEDURE — 25000125 ZZHC RX 250: Mod: ZF | Performed by: INTERNAL MEDICINE

## 2017-10-25 PROCEDURE — 85049 AUTOMATED PLATELET COUNT: CPT | Performed by: INTERNAL MEDICINE

## 2017-10-25 RX ORDER — ALBUMIN (HUMAN) 12.5 G/50ML
12.5 SOLUTION INTRAVENOUS 4 TIMES DAILY PRN
Status: DISCONTINUED | OUTPATIENT
Start: 2017-10-25 | End: 2017-10-25 | Stop reason: HOSPADM

## 2017-10-25 RX ORDER — ALBUMIN (HUMAN) 12.5 G/50ML
12.5 SOLUTION INTRAVENOUS 4 TIMES DAILY PRN
Status: CANCELLED
Start: 2017-10-25

## 2017-10-25 RX ADMIN — ALBUMIN HUMAN 12.5 G: 0.25 SOLUTION INTRAVENOUS at 13:49

## 2017-10-25 RX ADMIN — ALBUMIN HUMAN 12.5 G: 0.25 SOLUTION INTRAVENOUS at 13:39

## 2017-10-25 RX ADMIN — ALBUMIN HUMAN 12.5 G: 0.25 SOLUTION INTRAVENOUS at 13:57

## 2017-10-25 RX ADMIN — LIDOCAINE HYDROCHLORIDE 20 ML: 10 INJECTION, SOLUTION INFILTRATION; PERINEURAL at 13:16

## 2017-10-25 RX ADMIN — ALBUMIN HUMAN 12.5 G: 0.25 SOLUTION INTRAVENOUS at 13:21

## 2017-10-25 NOTE — PATIENT INSTRUCTIONS
Dear Lawrence Louie    Thank you for choosing [facility] ([facility]) for your procedure.  The following information is a summary of our appointment as well as important reminders.      Additional information: You had a paracentesis today for 10 liters of fluid  And received 50 grams Albumin.    We look forward in seeing you on your next appointment here at [facility].  Please don t hesitate to call us at [phone] to reschedule any of your appointments or to speak with one of the [facility] registered nurses.  It was a pleasure taking care of you today.    Sincerely,    [facility]  Special[facility] Center  [address] [place] [address]  [address]  Phone:  [phone]

## 2017-10-25 NOTE — MR AVS SNAPSHOT
After Visit Summary   10/25/2017    Lawrence Louie    MRN: [id number]           Patient Information     Date Of Birth          1955        Visit Information        Provider Department      10/25/2017 12:00 PM Provider, Mary Spec Inf Para; MARY 40 ATC [facility] Specialty and Procedure        Today's Diagnoses     Alcoholic cirrhosis of liver with ascites (H)    -  1      Care Instructions    Dear Lawrence Louie    Thank you for choosing [facility] ([facility]) for your procedure.  The following information is a summary of our appointment as well as important reminders.      Additional information: You had a paracentesis today for 10 liters of fluid  And received 50 grams Albumin.    We look forward in seeing you on your next appointment here at [facility].  Please don t hesitate to call us at [phone] to reschedule any of your appointments or to speak with one of the [facility] registered nurses.  It was a pleasure taking care of you today.    Sincerely,    [facility]  Specialty Infusion & Procedure Center  [address] [place] [address]  [address]  Phone:  [phone]          Follow-ups after your visit        Your next 10 appointments already scheduled     Oct 26, 2017 12:00 PM CDT   (Arrive by 11:45 AM)   PRE-OP with CORY Toussaint CNP   [facility] ([facility])    [address] [place] [address] [phone]            Oct 27, 2017   Procedure with GENERIC ANESTHESIA PROVIDER   [facility]    [address] [facility] [id number] [phone]            Oct 27, 2017  8:00 AM CDT   IR TRANSVEN INTRAHEPATIC PORTOSYST SHUNT with UUIR5   [facility] ([facility]    [address] [facility]  [address]   [phone]           1. You will need to have had a history and physical exam within 7 days of the procedure. 2. Laboratory test are to be obtained by your doctor prior to the exam (CBCP, INR and PTT) 3. Someone will need to drive you to and from the hospital. 4. If you are or may be pregnant, contact your doctor or a Radiology nurse prior to the day of the exam. 5. If you have diabetes, check with your doctor or a Radiology nurse to see if your insulin needs to be adjusted for the exam. 6. If you are taking Coumadin (to thin you blood) please contact your doctor or a Radiology nurse at least 3 days before the exam for special instructions. 7. The day before your exam you may eat your regular diet and are encouraged to drink at least 2 quarts of clear liquids. Drink no alcoholic beverages for 24 hours prior to the exam. 8. Do not eat any solid food or milk products for 6 hours prior to the exam. You may drink clear liquids until 2 hours prior to the exam. Clear liquids include the following: water, Jell-O, clear broth, apple juice or any noncarbonated drink that you can see through (no pop!) 9. The morning of the exam you may brush your teeth and take medications as directed with a sip of water. 10. Tell the Radiology nurse if you have any allergies.            Nov 01, 2017 12:00 PM CDT   Paracentesis Visit with Cristobal Spec Inf Para Provider, UC 39 ATC   [facility] Specialty and Procedure ([facility])    [address] [address]   [phone]            Nov 08, 2017 12:00 PM CST   Paracentesis Visit with Cristobal Spec Inf Para Provider, UC 39 ATC   [facility] Specialty and Procedure ([facility])    [address] [address]   [phone]            Nov 15, 2017 12:00 PM CST   Paracentesis Visit with Cristobal Spec Inf Para Provider, UC 40 ATC   [facility] Specialty and Procedure ([facility])    [address] [facility]  [address] [facility] [id number] [phone]            Nov 22, 2017 12:00 PM CST   Paracentesis Visit with  Spec Inf Para Provider   [facility] Specialty and Procedure ([facility])    [address] [id number] [phone]              Who to contact     If you have questions or need follow up information about today's clinic visit or your schedule please contact [facility] SPECIALTY AND PROCEDURE directly at [phone].  Normal or non-critical lab and imaging results will be communicated to you by [employer], letter or phone within 4 business days after the clinic has received the results. If you do not hear from us within 7 days, please contact the clinic through Text[employer] or phone. If you have a critical or abnormal lab result, we will notify you by phone as soon as possible.  Submit refill requests through [employer] or call your pharmacy and they will forward the refill request to us. Please allow 3 business days for your refill to be completed.          Additional Information About Your Visit        [employer] (ML[employer]D) Information     [employer] gives you secure access to your electronic health record. If you see a primary care provider, you can also send messages to your care team and make appointments. If you have questions, please call your primary care clinic.  If you do not have a primary care provider, please call [phone] and they will assist you.        Care EveryWhere ID     This is your Care EveryWhere ID. This could be used by other organizations to access your [place] medical records  [id number]        Your Vitals Were     Pulse Temperature Pulse Oximetry BMI (Body Mass Index)          60 98  F (36.7  C) 95% 43.13 kg/m2         Blood Pressure from Last 3 Encounters:   10/25/17 117/54    10/18/17 132/71   10/11/17 110/61    Weight from Last 3 Encounters:   10/25/17 (!) 152.4 kg (335 lb 14.4 oz)   10/18/17 (!) 142.2 kg (313 lb 6.4 oz)   10/11/17 (!) 142 kg (313 lb 1.6 oz)              We Performed the Following     Platelet count     US Paracentesis        Primary Care Provider Office Phone # Fax #    Ary Murphy, CORY [phone]       [address] [place] [address] [address] [place] [phone]        Equal Access to Services     MARY LOU [facility] SystemNOHEMY : Hadii sil ku hadasho Soanthony, waaxda luqadaha, qaybta kaalmada adesarahyadeysi, silvia richardson . So [facility] [phone].    ATENCIÓN: Si habla español, tiene a rondon disposición servicios gratuitos de asistencia lingüística. LlSelect [facility] [phone].    We comply with applicable federal civil rights laws and [place] laws. We do not discriminate on the basis of race, color, national origin, age, disability, sex, sexual orientation, or gender identity.            Thank you!     Thank you for choosing [facility] SPECIALTY AND PROCEDURE  for your care. Our goal is always to provide you with excellent care. Hearing back from our patients is one way we can continue to improve our services. Please take a few minutes to complete the written survey that you may receive in the mail after your visit with us. Thank you!             Your Updated Medication List - Protect others around you: Learn how to safely use, store and throw away your medicines at www.disposemymeds.org.          This list is accurate as of: 10/25/17  2:07 PM.  Always use your most recent med list.                   Brand Name Dispense Instructions for use Diagnosis    aspirin 81 MG tablet     30 tablet    Take 1 tablet (81 mg) by mouth daily    Type 2 diabetes mellitus with other skin complications       blood glucose lancets standard    no brand specified    1 Box    Use to test blood sugar 4 X  times daily or as directed.    Diabetes mellitus, type  2 (H)       blood glucose monitoring meter device kit    no brand specified    1 kit    Use to test blood sugar 4 X  times daily or as directed.    Type 2 diabetes mellitus with other specified complication (H)       blood glucose monitoring test strip    no brand specified    200 strip    Use to test blood sugars 4 X  times daily or as directed    Diabetes mellitus, type 2 (H)       calcium-vitamin D 600-400 MG-UNIT per tablet    CALTRATE    60 tablet    Take 1 tablet by mouth daily    Morbid obesity due to excess calories (H), Alcoholic cirrhosis of liver with ascites (H), Portal hypertension (H), Secondary esophageal varices without bleeding (H), Tobacco use disorder       DESVENLAFAXINE ER PO      Take 100 mg by mouth daily 2 tablets once daily.    Morbid obesity due to excess calories (H), Alcoholic cirrhosis of liver with ascites (H), Portal hypertension (H), Secondary esophageal varices without bleeding (H), Tobacco use disorder       desvenlafaxine succinate 100 MG 24 hr tablet    PRISTIQ    180 tablet    Take 2 tablets (200 mg) by mouth daily    Other depression       furosemide 20 MG tablet    LASIX    180 tablet    Take 3 tablets (60 mg) by mouth 2 times daily    Generalized edema       glipiZIDE 5 MG 24 hr tablet    glipiZIDE XL    180 tablet    Take 1 tablet (5 mg) by mouth 2 times daily    Type 2 diabetes mellitus without complication (H)       insulin aspart 100 UNIT/ML injection    NovoLOG FLEXPEN    30 mL    20 units before breakfast, 20 units before lunch, 20 units before dinner, 20 units before snacks    Type 2 diabetes mellitus without complication, with long-term current use of insulin (H)       insulin glargine 100 UNIT/ML injection    LANTUS SOLOSTAR    30 mL    Inject 40 Units Subcutaneous At Bedtime    Type 2 diabetes mellitus with other oral complication, unspecified long term insulin use status (H)       * insulin pen needle 31G X 8 MM    B-D U/F    300 each    USE  6 times daily / OR AS  DIRECTED    Type 2 diabetes, HbA1c goal < 7% (H)       * insulin pen needle 31G X 8 MM    B-D U/F    600 each    Use 6 times daily or as directed    Type 2 diabetes, HbA1c goal < 7% (H)       magnesium oxide 400 (241.3 MG) MG tablet    MAG-OX    90 tablet    Take 1 tablet (400 mg) by mouth daily    Cramp of limb       ondansetron 4 MG tablet    ZOFRAN    18 tablet    Take 1 tablet (4 mg) by mouth every 8 hours as needed for nausea    Alcoholic cirrhosis (H), Nausea       order for DME     1 Units    Equipment being ordered: carpal tunnel wrist splint.    CTS (carpal tunnel syndrome)       order for DME     1 each    Orthotic diabetic shoes -1 pair    Type 2 diabetes, HbA1c goal < 7% (H)       OYSCO 500 + D 500-200 MG-UNIT Tabs   Generic drug:  Calcium Carb-Cholecalciferol     200 tablet    Take 500 mg by mouth daily    Morbid obesity due to excess calories (H), Alcoholic cirrhosis of liver with ascites (H), Portal hypertension (H), Secondary esophageal varices without bleeding (H), Tobacco use disorder       pantoprazole 40 MG EC tablet    PROTONIX    90 tablet    Take 1 tablet (40 mg) by mouth daily    Morbid obesity due to excess calories (H), Alcoholic cirrhosis of liver with ascites (H), Portal hypertension (H), Secondary esophageal varices without bleeding (H), Tobacco use disorder       potassium chloride SA 20 MEQ CR tablet    KLOR-CON    30 tablet    Take 1 tablet (20 mEq) by mouth daily    Hypokalemia       propranolol 10 MG tablet    INDERAL    180 tablet    Take 1 tablet (10 mg) by mouth 2 times daily    Portal hypertension (H)       ranitidine 150 MG tablet    ZANTAC    180 tablet    Take 1 tablet (150 mg) by mouth 2 times daily    Esophageal varices (H)       spironolactone 50 MG tablet    ALDACTONE    540 tablet    Takes 3 tablets in am.and p.m.    Portal hypertension (H), Generalized edema       STATIN NOT PRESCRIBED (INTENTIONAL)     0 each    1 each daily Statin not prescribed intentionally due to  Active liver disease    Hyperlipidemia LDL goal <100       VITAMIN B-12 PO      Take 1 tablet by mouth daily        vitamin D3 2000 UNITS Caps     90 capsule    Take 1 capsule by mouth daily    Mild major depression (H)       * Notice:  This list has 2 medication(s) that are the same as other medications prescribed for you. Read the directions carefully, and ask your doctor or other care provider to review them with you.

## 2017-10-25 NOTE — PROGRESS NOTES
Paracentesis Nursing Note  Lawrence Louie [facility].    During today's appointment orders from Meghna Simmons MD were completed.    Progress Note:  Patient identification verified by name and date of birth.  Assessment completed.  Vitals monitored throughout appointment and recorded in Doc Flowsheets.  See proceduralist note in ultrasound.    Date of consent or authorization: 8/30/2017.  Invasive Procedure Safety Checklist was completed and sent for scanning.     Paracentesis performed by Moy Potter PA-C Radiology.    The following labs were communicated to provider performing paracentesis: Plt Ct today=90k  Lab Results   Component Value Date     09/20/2017       Total amount of ascites fluid drained: 10 liters.  Color of ascites fluid: pale yellow.  Total amount of albumin given: 50                                      grams.    Patient tolerated procedure well.    Post procedure,denies pain or discomfort post paracentesis.      Discharge Plan:  Discharge instructions were reviewed with patient.  Patient/Representative verbalized understanding and all questions were answered.   [facility] in stable condition.    DAVID GONZALEZ RN    Administrations This Visit     albumin human 25 % injection 12.5 g     Admin Date Action Dose Route Administered By             10/25/2017 New Bag 12.5 g Intravenous Oly Bah RN              Admin Date Action Dose Route Administered By             10/25/2017 New Bag 12.5 g Intravenous Oly Bah RN              Admin Date Action Dose Route Administered By             10/25/2017 New Bag 12.5 g Intravenous Oly Bah RN              Admin Date Action Dose Route Administered By             10/25/2017 New Bag 12.5 g Intravenous Oly Bah RN                    lidocaine 1 % 20 mL     Admin Date Action Dose Route Administered By              10/25/2017 Given by Other Clinician 20 mL Injection Oly Bah, RN                          /54  Pulse 60  Temp 98  F (36.7  C)  Wt (!) 152.4 kg (335 lb 14.4 oz)  SpO2 95%  BMI 43.13 kg/m2

## 2017-10-26 ENCOUNTER — OFFICE VISIT (OUTPATIENT)
Dept: INTERNAL MEDICINE | Facility: CLINIC | Age: 62
End: 2017-10-26

## 2017-10-26 ENCOUNTER — ANESTHESIA EVENT (OUTPATIENT)
Dept: SURGERY | Facility: CLINIC | Age: 62
End: 2017-10-26
Payer: COMMERCIAL

## 2017-10-26 VITALS
DIASTOLIC BLOOD PRESSURE: 77 MMHG | HEART RATE: 74 BPM | WEIGHT: 315 LBS | SYSTOLIC BLOOD PRESSURE: 112 MMHG | BODY MASS INDEX: 40.57 KG/M2

## 2017-10-26 DIAGNOSIS — L28.1 PRURIGO NODULARIS: ICD-10-CM

## 2017-10-26 DIAGNOSIS — Z01.810 PRE-OPERATIVE CARDIOVASCULAR EXAMINATION: ICD-10-CM

## 2017-10-26 DIAGNOSIS — Z01.810 PRE-OPERATIVE CARDIOVASCULAR EXAMINATION: Primary | ICD-10-CM

## 2017-10-26 LAB
ALBUMIN SERPL-MCNC: 3.5 G/DL (ref 3.4–5)
ALP SERPL-CCNC: 144 U/L (ref 40–150)
ALT SERPL W P-5'-P-CCNC: 36 U/L (ref 0–70)
ANION GAP SERPL CALCULATED.3IONS-SCNC: 4 MMOL/L (ref 3–14)
AST SERPL W P-5'-P-CCNC: 31 U/L (ref 0–45)
BILIRUB SERPL-MCNC: 0.4 MG/DL (ref 0.2–1.3)
BUN SERPL-MCNC: 9 MG/DL (ref 7–30)
CALCIUM SERPL-MCNC: 8.2 MG/DL (ref 8.5–10.1)
CHLORIDE SERPL-SCNC: 106 MMOL/L (ref 94–109)
CO2 SERPL-SCNC: 29 MMOL/L (ref 20–32)
CREAT SERPL-MCNC: 0.74 MG/DL (ref 0.66–1.25)
ERYTHROCYTE [DISTWIDTH] IN BLOOD BY AUTOMATED COUNT: 14.6 % (ref 10–15)
GFR SERPL CREATININE-BSD FRML MDRD: >90 ML/MIN/1.7M2
GLUCOSE SERPL-MCNC: 70 MG/DL (ref 70–99)
HCT VFR BLD AUTO: 39.5 % (ref 40–53)
HGB BLD-MCNC: 12.7 G/DL (ref 13.3–17.7)
MCH RBC QN AUTO: 29.1 PG (ref 26.5–33)
MCHC RBC AUTO-ENTMCNC: 32.2 G/DL (ref 31.5–36.5)
MCV RBC AUTO: 91 FL (ref 78–100)
PLATELET # BLD AUTO: 97 10E9/L (ref 150–450)
POTASSIUM SERPL-SCNC: 4.1 MMOL/L (ref 3.4–5.3)
PROT SERPL-MCNC: 7 G/DL (ref 6.8–8.8)
RBC # BLD AUTO: 4.36 10E12/L (ref 4.4–5.9)
SODIUM SERPL-SCNC: 139 MMOL/L (ref 133–144)
WBC # BLD AUTO: 4.1 10E9/L (ref 4–11)

## 2017-10-26 ASSESSMENT — PAIN SCALES - GENERAL: PAINLEVEL: NO PAIN (0)

## 2017-10-26 NOTE — PATIENT INSTRUCTIONS
[facility]: [phone]     [facility] Medication Refill Request Information:  * Please contact your pharmacy regarding ANY request for medication refills.  ** [facility] Prescription Fax = [phone]  * Please allow 3 business days for routine medication refills.  * Please allow 5 business days for controlled substance medication refills.     [facility] Test Result notification information:  *You will be notified with in 7-10 days of your appointment day regarding the results of your test.  If you are on MyChart you will be notified as soon as the provider has reviewed the results and signed off on them.

## 2017-10-26 NOTE — MR AVS SNAPSHOT
After Visit Summary   10/26/2017    Lawrence Louie    MRN: [id number]           Patient Information     Date Of Birth          1955        Visit Information        Provider Department      10/26/2017 12:00 PM Ary Murphy, APRN CNP [facility]        Today's Diagnoses     Pre-operative cardiovascular examination    -  1      Care Instructions    [facility]: [phone]     [facility] Medication Refill Request Information:  * Please contact your pharmacy regarding ANY request for medication refills.  ** [facility] Prescription Fax = [phone]  * Please allow 3 business days for routine medication refills.  * Please allow 5 business days for controlled substance medication refills.     Primary [facility] Center Test Result notification information:  *You will be notified with in 7-10 days of your appointment day regarding the results of your test.  If you are on MyChart you will be notified as soon as the provider has reviewed the results and signed off on them.            Follow-ups after your visit        Your next 10 appointments already scheduled     Oct 27, 2017   Procedure with GENERIC ANESTHESIA PROVIDER   [facility]    [address] [place] [id number] [phone]            Oct 27, 2017  8:00 AM CDT   IR TRANSVEN INTRAHEPATIC PORTOSYST SHUNT with [id number]   [facility] ([facility]    [address] [place] [id number] [phone]           1. You will need to have had a history and physical exam within 7 days of the procedure. 2. Laboratory test are to be obtained by your doctor prior to the exam (CBCP, INR and PTT) 3. Someone will need to drive you to and from the hospital. 4. If you are or may be pregnant, contact your doctor or a Radiology nurse prior to the day of the exam. 5. If you have diabetes, check with your doctor or a  Radiology nurse to see if your insulin needs to be adjusted for the exam. 6. If you are taking Coumadin (to thin you blood) please contact your doctor or a Radiology nurse at least 3 days before the exam for special instructions. 7. The day before your exam you may eat your regular diet and are encouraged to drink at least 2 quarts of clear liquids. Drink no alcoholic beverages for 24 hours prior to the exam. 8. Do not eat any solid food or milk products for 6 hours prior to the exam. You may drink clear liquids until 2 hours prior to the exam. Clear liquids include the following: water, Jell-O, clear broth, apple juice or any noncarbonated drink that you can see through (no pop!) 9. The morning of the exam you may brush your teeth and take medications as directed with a sip of water. 10. Tell the Radiology nurse if you have any allergies.            Nov 01, 2017 12:00 PM CDT   Paracentesis Visit with Uc Spec Inf Para Provider, UC 39 ATC   [facility] ([facility])    [address] [place] [id number] [phone]            Nov 08, 2017 12:00 PM CST   Paracentesis Visit with Uc Spec Inf Para Provider, UC 39 ATC   [facility] ([facility])    [address] [place] [id number]   [phone]            Nov 15, 2017 12:00 PM CST   Paracentesis Visit with Uc Spec Inf Para Provider, UC 40 ATC   [facility] ([facility])    [address] [place] [id number]   [phone]            Nov 22, 2017 12:00 PM CST   Paracentesis Visit with Uc Spec Inf Para Provider, UC 40 ATC   [facility] ([facility])    [address] [place] [id number]    [phone]              Future tests that were ordered for you today     Open Future Orders        Priority Expected Expires Ordered    Comprehensive metabolic panel Routine 10/26/2017 11/9/2017 10/26/2017    CBC with platelets Routine 10/26/2017 11/9/2017 10/26/2017            Who to contact     Please call your clinic at [phone] to:    Ask questions about your health    Make or cancel appointments    Discuss your medicines    Learn about your test results    Speak to your doctor   If you have compliments or concerns about an experience at your clinic, or if you wish to file a complaint, please contact [facility] Patient Relations at [phone] or email us at [email or web address]         Additional Information About Your Visit        [employer] Information     [employer] gives you secure access to your electronic health record. If you see a primary care provider, you can also send messages to your care team and make appointments. If you have questions, please call your primary care clinic.  If you do not have a primary care provider, please call [phone] and they will assist you.      [employer] is an electronic gateway that provides easy, online access to your medical records. With [employer], you can request a clinic appointment, read your test results, renew a prescription or communicate with your care team.     To access your existing account, please contact your [facility] or call [phone] for assistance.        Care EveryWhere ID     This is your Care EveryWhere ID. This could be used by other organizations to access your [facility] medical records  [id number]        Your Vitals Were     Pulse BMI (Body Mass Index)                74 40.57 kg/m2           Blood Pressure from Last 3 Encounters:   10/26/17 112/77   10/25/17 117/54   10/18/17 132/71    Weight from Last 3 Encounters:   10/26/17 (!) 143.3 kg (316 lb)   10/25/17 (!) 142.2 kg  (313 lb 9.6 oz)   10/18/17 (!) 142.2 kg (313 lb 6.4 oz)              We Performed the Following     EKG Performed in Clinic w/ Provider Reading Fee        Primary Care Provider Office Phone # Fax #    CORY Toussaint [phone]       [address] [place] [address]  [facility] [address]        Equal Access to Services     JOSEF LORENZO : Hadii aad ku hadasho Soomaali, waaxda luqadaha, qaybta kaalmada adeegyada, waxay idiin hayaan adeeg kharash la'aan . So [facility] [phone].    ATENCIÓN: Si habla español, tiene a rondon disposición servicios gratuitos de asistencia lingüística. Llame al [phone].    We comply with applicable federal civil rights laws and [place] laws. We do not discriminate on the basis of race, color, national origin, age, disability, sex, sexual orientation, or gender identity.            Thank you!     Thank you for choosing [facility]  for your care. Our goal is always to provide you with excellent care. Hearing back from our patients is one way we can continue to improve our services. Please take a few minutes to complete the written survey that you may receive in the mail after your visit with us. Thank you!             Your Updated Medication List - Protect others around you: Learn how to safely use, store and throw away your medicines at www.disposemymeds.org.          This list is accurate as of: 10/26/17  1:12 PM.  Always use your most recent med list.                   Brand Name Dispense Instructions for use Diagnosis    aspirin 81 MG tablet     30 tablet    Take 1 tablet (81 mg) by mouth daily    Type 2 diabetes mellitus with other skin complications       blood glucose lancets standard    no brand specified    1 Box    Use to test blood sugar 4 X  times daily or as directed.    Diabetes mellitus, type 2 (H)       blood glucose monitoring meter device kit    no brand specified    1 kit    Use to test blood sugar 4 X  times daily or as directed.     Type 2 diabetes mellitus with other specified complication (H)       blood glucose monitoring test strip    no brand specified    200 strip    Use to test blood sugars 4 X  times daily or as directed    Diabetes mellitus, type 2 (H)       calcium-vitamin D 600-400 MG-UNIT per tablet    CALTRATE    60 tablet    Take 1 tablet by mouth daily    Morbid obesity due to excess calories (H), Alcoholic cirrhosis of liver with ascites (H), Portal hypertension (H), Secondary esophageal varices without bleeding (H), Tobacco use disorder       desvenlafaxine succinate 100 MG 24 hr tablet    PRISTIQ    180 tablet    Take 2 tablets (200 mg) by mouth daily    Other depression       furosemide 20 MG tablet    LASIX    180 tablet    Take 3 tablets (60 mg) by mouth 2 times daily    Generalized edema       glipiZIDE 5 MG 24 hr tablet    glipiZIDE XL    180 tablet    Take 1 tablet (5 mg) by mouth 2 times daily    Type 2 diabetes mellitus without complication (H)       insulin aspart 100 UNIT/ML injection    NovoLOG FLEXPEN    30 mL    20 units before breakfast, 20 units before lunch, 20 units before dinner, 20 units before snacks    Type 2 diabetes mellitus without complication, with long-term current use of insulin (H)       insulin glargine 100 UNIT/ML injection    LANTUS SOLOSTAR    30 mL    Inject 40 Units Subcutaneous At Bedtime    Type 2 diabetes mellitus with other oral complication, unspecified long term insulin use status (H)       * insulin pen needle 31G X 8 MM    B-D U/F    300 each    USE  6 times daily / OR AS DIRECTED    Type 2 diabetes, HbA1c goal < 7% (H)       * insulin pen needle 31G X 8 MM    B-D U/F    600 each    Use 6 times daily or as directed    Type 2 diabetes, HbA1c goal < 7% (H)       magnesium oxide 400 (241.3 MG) MG tablet    MAG-OX    90 tablet    Take 1 tablet (400 mg) by mouth daily    Cramp of limb       ondansetron 4 MG tablet    ZOFRAN    18 tablet    Take 1 tablet (4 mg) by mouth every 8 hours as  needed for nausea    Alcoholic cirrhosis (H), Nausea       order for DME     1 Units    Equipment being ordered: carpal tunnel wrist splint.    CTS (carpal tunnel syndrome)       order for DME     1 each    Orthotic diabetic shoes -1 pair    Type 2 diabetes, HbA1c goal < 7% (H)       OYSCO 500 + D 500-200 MG-UNIT Tabs   Generic drug:  Calcium Carb-Cholecalciferol     200 tablet    Take 500 mg by mouth daily    Morbid obesity due to excess calories (H), Alcoholic cirrhosis of liver with ascites (H), Portal hypertension (H), Secondary esophageal varices without bleeding (H), Tobacco use disorder       pantoprazole 40 MG EC tablet    PROTONIX    90 tablet    Take 1 tablet (40 mg) by mouth daily    Morbid obesity due to excess calories (H), Alcoholic cirrhosis of liver with ascites (H), Portal hypertension (H), Secondary esophageal varices without bleeding (H), Tobacco use disorder       potassium chloride SA 20 MEQ CR tablet    KLOR-CON    30 tablet    Take 1 tablet (20 mEq) by mouth daily    Hypokalemia       propranolol 10 MG tablet    INDERAL    180 tablet    Take 1 tablet (10 mg) by mouth 2 times daily    Portal hypertension (H)       ranitidine 150 MG tablet    ZANTAC    180 tablet    Take 1 tablet (150 mg) by mouth 2 times daily    Esophageal varices (H)       spironolactone 50 MG tablet    ALDACTONE    540 tablet    Takes 3 tablets in am.and p.m.    Portal hypertension (H), Generalized edema       STATIN NOT PRESCRIBED (INTENTIONAL)     0 each    1 each daily Statin not prescribed intentionally due to Active liver disease    Hyperlipidemia LDL goal <100       VITAMIN B-12 PO      Take 1 tablet by mouth daily        vitamin D3 2000 UNITS Caps     90 capsule    Take 1 capsule by mouth daily    Mild major depression (H)       * Notice:  This list has 2 medication(s) that are the same as other medications prescribed for you. Read the directions carefully, and ask your doctor or other care provider to review them  with you.

## 2017-10-26 NOTE — PROGRESS NOTES
Pre-Operative Information  [facility] PREOP PROCEDURE RS 10/22/2017   Date? 10/27/2017   Time? 8:00am   Location? [facility]   Surgeon? David Davis    Surgical Procedure? TIPS procedure       History of Present Illness       10/22/2017   Surgical procedure TIPS procedure   Surgeon David Davis    Date 10/27/2017       Anesthesia     This pt has been león;luated in PAC and is rated as ASAIII.       10/22/2017   Personal or family hx of adverse reaction to anesthesia? No   Anticipated anesthesia type: General      Relevant Medical History      10/22/2017   Hx of MI/CHF, cardiac stent, CABG, other heart surgery, prosthetic heart valve, stroke, CKD, or prosthetic joint? None     Cardiac/Vascular      10/22/2017   Chest pain? No   Claudication? Yes     Respiratory    10/22/2017   Cough, SOB, or wheezing? Yes   Orthopnea, dyspnea walking on a level surface, or dyspnea walking up a slight hill? Yes   Current symptoms of a cold, bronchitis, or respiratory infection? No   Hx of sleep apnea, snoring or daytime drowsiness? Yes     Hematology/Blood Loss  [facility] PREOP HEME 10/22/2017   Personal or family history of blood clots? No   Excessive bleeding or post-surgical bleeding complications? Yes   Abnormal blood loss such as melena? No   Hx of anemia or iron deficiency? No   Hx of blood transfusion? No     Medications      10/22/2017   Taking a blood thinner? No   Taking ASA, plavix, or NSAID? Yes   Which one? Aspirin       Review of Systems     Notable ROS 10/22/2017   Fever? No   Post-operative complications? No   Easy bruising or bleeding? Yes   Bright blood in stool? No   Melena? No   Anemia? No   Chest pain? No   Chest pain at rest? No   Paroxysmal nocturnal dyspnea? No   Orthopnea? No   Exercise intolerance? Yes   Claudication? Yes   Syncope? No   Wheezing? No   Shortness of breath? Yes       Past Medical History    Medications updated and reviewed.  Past, family and surgical history is updated and reviewed in the  record.    Current Outpatient Prescriptions   Medication Sig Dispense Refill     glipiZIDE (GLIPIZIDE XL) 5 MG 24 hr tablet Take 1 tablet (5 mg) by mouth 2 times daily 180 tablet 1     insulin pen needle (B-D U/F) 31G X 8 MM Use 6 times daily or as directed 600 each 1     order for DME Orthotic diabetic shoes -1 pair 1 each 0     potassium chloride SA (POTASSIUM CHLORIDE) 20 MEQ CR tablet Take 1 tablet (20 mEq) by mouth daily 30 tablet 1     furosemide (LASIX) 20 MG tablet Take 3 tablets (60 mg) by mouth 2 times daily 180 tablet 1     insulin aspart (NOVOLOG FLEXPEN) 100 UNIT/ML injection 20 units before breakfast, 20 units before lunch, 20 units before dinner, 20 units before snacks 30 mL 3     calcium-vitamin D (CALTRATE) 600-400 MG-UNIT per tablet Take 1 tablet by mouth daily 60 tablet 11     blood glucose monitoring (NO BRAND SPECIFIED) meter device kit Use to test blood sugar 4 X  times daily or as directed. 1 kit 0     insulin glargine (LANTUS SOLOSTAR) 100 UNIT/ML injection Inject 40 Units Subcutaneous At Bedtime 30 mL 1     propranolol (INDERAL) 10 MG tablet Take 1 tablet (10 mg) by mouth 2 times daily 180 tablet 3     spironolactone (ALDACTONE) 50 MG tablet Takes 3 tablets in am.and p.m. 540 tablet 6     aspirin 81 MG tablet Take 1 tablet (81 mg) by mouth daily 30 tablet 11     OYSCO 500 + D 500-200 MG-UNIT TABS Take 500 mg by mouth daily 200 tablet 3     magnesium oxide (MAG-OX) 400 (241.3 MG) MG tablet Take 1 tablet (400 mg) by mouth daily 90 tablet 3     Cholecalciferol (VITAMIN D3) 2000 UNITS CAPS Take 1 capsule by mouth daily 90 capsule 3     insulin pen needle (B-D U/F) 31G X 8 MM USE  6 times daily / OR AS DIRECTED 300 each 3     pantoprazole (PROTONIX) 40 MG EC tablet Take 1 tablet (40 mg) by mouth daily 90 tablet 3     blood glucose monitoring (NO BRAND SPECIFIED) test strip Use to test blood sugars 4 X  times daily or as directed 200 strip 3     blood glucose (NO BRAND SPECIFIED) lancets standard  Use to test blood sugar 4 X  times daily or as directed. 1 Box 3     desvenlafaxine succinate ER (PRISTIQ) 100 MG 24 hr tablet Take 2 tablets (200 mg) by mouth daily 180 tablet 3     ranitidine (ZANTAC) 150 MG tablet Take 1 tablet (150 mg) by mouth 2 times daily 180 tablet 3     Cyanocobalamin (VITAMIN B-12 PO) Take 1 tablet by mouth daily       STATIN NOT PRESCRIBED, INTENTIONAL, 1 each daily Statin not prescribed intentionally due to Active liver disease 0 each 0     ondansetron (ZOFRAN) 4 MG tablet Take 1 tablet (4 mg) by mouth every 8 hours as needed for nausea 18 tablet 1     ORDER FOR DME Equipment being ordered: carpal tunnel wrist splint. 1 Units 0       Social History   Social History   Substance Use Topics     Smoking status: Current Every Day Smoker     Packs/day: 0.30     Types: Cigarettes     Smokeless tobacco: Never Used      Comment: 43 yr     Alcohol use No      Comment: 2-3 per day , none since Dec 2012       Physical Exam   /77  Pulse 74  Wt (!) 143.3 kg (316 lb)  BMI 40.57 kg/m2    GENERAL APPEARANCE:alert and no distress     EYES: EOMI,  PERRL, conjunctiva pink.     HENT: ear canals and TM's normal and nose and mouth without ulcers or lesions     RESP: lungs clear to auscultation - no rales, rhonchi or wheezes     CV: regular rates and rhythm, normal S1 S2, no S3 or S4 and no murmur, click or rub     ABDOMEN:  Distended due to ascites,     MS: .3+++pitting edema bilateral lower extremities with numerous open lesions both LEs but dry.      SKIN: ecchymosis on the right abdomen from LVP. 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     NEURO: Mood is good, , mentation intact and speech normal     PSYCH: mentation appears normal. and affect normal/bright     LYMPHATICS: No axillary, cervical, or supraclavicular nodes.    Data   EKG:Incomplete right BBB,sinus arrhythmia no acute ST/T changes c/w ischemia, no LVH by voltage criteria, unchanged from  previous tracings  Lawrence was seen today for pre-op exam.    Diagnoses and all orders for this visit:    Pre-operative cardiovascular examination  -     EKG Performed in Clinic w/ Provider Reading Fee  -     Comprehensive metabolic panel; Future  -     CBC with platelets; Future    Pt evaluated in PAC    Assessment & Plan   Lawrence Louie presents for a preoperative assessment prior to a planned TIPS    Lawrence was seen today for pre-op exam.    Diagnoses and all orders for this visit:    Pre-operative cardiovascular examination  -     EKG Performed in Clinic w/ Provider Reading Fee  -     Comprehensive metabolic panel; Future  -     CBC with platelets; Future        Recommendations:  The morning of surgery:  He has been instructed.    Prurigo Nodularis: Lesion on posterior neck and left lateral abdomen treated with 4 applications of LN2 for 3-5 seconds each application.     Signed Electronically by: CORY Frye CNP    Copy of this evaluation report is provided to requesting physician.    [facility] Preop Guidelines

## 2017-10-26 NOTE — ANESTHESIA PREPROCEDURE EVALUATION
Anesthesia Evaluation     . Pt has had prior anesthetic.            ROS/MED HX    ENT/Pulmonary:     (+)CARRIE risk factors hypertension, obese, , . .    Neurologic:       Cardiovascular:     (+) hypertension----. : . . . :. . Previous cardiac testing date:results:date: results:ECG reviewed date:3/4/14 results:Sinus bradycardia with Premature atrial complexes  Incomplete right bundle branch block  Minimal voltage criteria for LVH, may be normal variant  Nonspecific ST abnormality  Abnormal ECG  Unconfirmed report - interpretation of this ECG is computer generated - see medical record for final interpretation date: results:          METS/Exercise Tolerance:     Hematologic:         Musculoskeletal:         GI/Hepatic:     (+) GERD liver disease, Other GI/Hepatic SMITH, splenomegally, varicies with previous banding,       Renal/Genitourinary:     (+) Nephrolithiasis ,       Endo:     (+) type II DM Obesity, .      Psychiatric:         Infectious Disease:         Malignancy:         Other:                   Procedure: Procedure(s):  Anesthesia Out of OR Transvenous Intrahepatic Portosystemic Shunt  - Wound Class:     HPI: Lawrence Louie is a 61 year old male with complex PMH as noted above presenting for TIPS procedure.    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)      Splenomegaly      Squamous cell carcinoma      Thrombocytopenia (H)      Varices, esophageal (H)     Banded in 2011       Past Surgical History:   Procedure Laterality Date     BIOPSY OF SKIN LESION       COLONOSCOPY Left 6/16/2016    Procedure: COMBINED COLONOSCOPY, SINGLE OR MULTIPLE BIOPSY/POLYPECTOMY BY BIOPSY;  Surgeon: Brandy Barnett MD;  Location:  GI     ESOPHAGOSCOPY, GASTROSCOPY, DUODENOSCOPY (EGD), COMBINED   2/13/2013    Procedure: COMBINED ESOPHAGOSCOPY, GASTROSCOPY, DUODENOSCOPY (EGD);;  Surgeon: Tara Cook MD;  Location: [facility]     ESOPHAGOSCOPY, GASTROSCOPY, DUODENOSCOPY (EGD), COMBINED  11/4/2013    Procedure: COMBINED ESOPHAGOSCOPY, GASTROSCOPY, DUODENOSCOPY (EGD);;  Surgeon: Lonny Diaz MD;  Location: [facility]     ESOPHAGOSCOPY, GASTROSCOPY, DUODENOSCOPY (EGD), COMBINED Left 6/16/2016    Procedure: COMBINED ESOPHAGOSCOPY, GASTROSCOPY, DUODENOSCOPY (EGD), BIOPSY SINGLE OR MULTIPLE;  Surgeon: Brandy Barnett MD;  Location:  GI     EXCISE LESION TRUNK  9/24/2012    Procedure: EXCISE LESION TRUNK;;  Surgeon: Pepe Dominguez MD;  Location: [facility]     GENITOURINARY SURGERY      vasectomy     HERNIORRHAPHY UMBILICAL  9/24/2012    Procedure: HERNIORRHAPHY UMBILICAL;  UMBILICAL HERNIA REPAIR , EXCISION OF PERIUMBILICAL CYST;  Surgeon: Pepe Dominguez MD;  Location: [facility]         No current facility-administered medications on file prior to encounter.   Current Outpatient Prescriptions on File Prior to Encounter:  glipiZIDE (GLIPIZIDE XL) 5 MG 24 hr tablet Take 1 tablet (5 mg) by mouth 2 times daily   insulin pen needle (B-D U/F) 31G X 8 MM Use 6 times daily or as directed   order for DME Orthotic diabetic shoes -1 pair   potassium chloride SA (POTASSIUM CHLORIDE) 20 MEQ CR tablet Take 1 tablet (20 mEq) by mouth daily   furosemide (LASIX) 20 MG tablet Take 3 tablets (60 mg) by mouth 2 times daily   insulin aspart (NOVOLOG FLEXPEN) 100 UNIT/ML injection 20 units before breakfast, 20 units before lunch, 20 units before dinner, 20 units before snacks   calcium-vitamin D (CALTRATE) 600-400 MG-UNIT per tablet Take 1 tablet by mouth daily   blood glucose monitoring (NO BRAND SPECIFIED) meter device kit Use to test blood sugar 4 X  times daily or as directed.   insulin glargine (LANTUS SOLOSTAR) 100 UNIT/ML injection Inject 40 Units Subcutaneous At Bedtime   propranolol (INDERAL) 10 MG tablet Take 1  tablet (10 mg) by mouth 2 times daily   spironolactone (ALDACTONE) 50 MG tablet Takes 3 tablets in am.and p.m.   aspirin 81 MG tablet Take 1 tablet (81 mg) by mouth daily   OYSCO 500 + D 500-200 MG-UNIT TABS Take 500 mg by mouth daily   magnesium oxide (MAG-OX) 400 (241.3 MG) MG tablet Take 1 tablet (400 mg) by mouth daily   Cholecalciferol (VITAMIN D3) 2000 UNITS CAPS Take 1 capsule by mouth daily   insulin pen needle (B-D U/F) 31G X 8 MM USE  6 times daily / OR AS DIRECTED   pantoprazole (PROTONIX) 40 MG EC tablet Take 1 tablet (40 mg) by mouth daily   blood glucose monitoring (NO BRAND SPECIFIED) test strip Use to test blood sugars 4 X  times daily or as directed   blood glucose (NO BRAND SPECIFIED) lancets standard Use to test blood sugar 4 X  times daily or as directed.   desvenlafaxine succinate ER (PRISTIQ) 100 MG 24 hr tablet Take 2 tablets (200 mg) by mouth daily   ranitidine (ZANTAC) 150 MG tablet Take 1 tablet (150 mg) by mouth 2 times daily   Cyanocobalamin (VITAMIN B-12 PO) Take 1 tablet by mouth daily   STATIN NOT PRESCRIBED, INTENTIONAL, 1 each daily Statin not prescribed intentionally due to Active liver disease   ondansetron (ZOFRAN) 4 MG tablet Take 1 tablet (4 mg) by mouth every 8 hours as needed for nausea   ORDER FOR DME Equipment being ordered: carpal tunnel wrist splint.       Social Hx:   Social History   Substance Use Topics     Smoking status: Current Every Day Smoker     Packs/day: 0.30     Types: Cigarettes     Smokeless tobacco: Never Used      Comment: 43 yr     Alcohol use No      Comment: 2-3 per day , none since Dec 2012       Allergies: No Known Allergies      NPO Status: Per ASA Guidelines    Labs:    Blood Bank:  No results found for: ABO, RH, AS  BMP:  Recent Labs   Lab Test  09/20/17   1225   NA  138   POTASSIUM  4.2   CHLORIDE  105   CO2  27   BUN  9   CR  0.89   GLC  71   ERIN  8.3*     CBC:   Recent Labs   Lab Test  10/25/17   1200  09/20/17   1225   WBC   --   3.9*   RBC   --    4.33*   HGB   --   12.8*   HCT   --   38.8*   MCV   --   90   MCH   --   29.6   MCHC   --   33.0   RDW   --   14.5   PLT  90*  108*     Coags:  Recent Labs   Lab Test  09/20/17   1225   09/24/12   1202  09/13/12   1411   INR  1.08   < >  Duplicate request  Charge credited  1.17*  1.16*   PTT   --    --   29  30   FIBR   --    --    --   256    < > = values in this interval not displayed.     Physical Exam  Normal systems: cardiovascular and pulmonary    Airway   Mallampati: II  TM distance: >3 FB  Neck ROM: full    Dental     Cardiovascular   Rhythm and rate: regular and normal      Pulmonary    breath sounds clear to auscultation                    Anesthesia Plan      History & Physical Review  History and physical reviewed and following examination; no interval change.    ASA Status:  3 .    NPO Status:  > 6 hours    Plan for General, ETT and RSI with Intravenous and Propofol induction. Maintenance will be Balanced.    PONV prophylaxis:  Ondansetron (or other 5HT-3)  Additional equipment: 2nd IV     - Standard ASA monitors  - Abx per procedural teams    Final anesthetic plan per staff anesthesiologist on the day of procedure.      Postoperative Care  Postoperative pain management:  IV analgesics.      Consents  Anesthetic plan, risks, benefits and alternatives discussed with:  Patient.  Use of blood products discussed: No .   .        Ish Mendoza MD  Anesthesiology Resident CA2, PGY3

## 2017-10-26 NOTE — NURSING NOTE
Chief Complaint   Patient presents with     Pre-Op Exam     Patient here for pre op exam.       Don Mckay CMA at 12:12 PM on 10/26/2017.

## 2017-10-27 ENCOUNTER — HOSPITAL ENCOUNTER (OUTPATIENT)
Facility: CLINIC | Age: 62
Discharge: HOME | End: 2017-10-27
Attending: RADIOLOGY | Admitting: RADIOLOGY
Payer: COMMERCIAL

## 2017-10-27 ENCOUNTER — ANESTHESIA (OUTPATIENT)
Dept: SURGERY | Facility: CLINIC | Age: 62
End: 2017-10-27
Payer: COMMERCIAL

## 2017-10-27 ENCOUNTER — APPOINTMENT (OUTPATIENT)
Dept: INTERVENTIONAL RADIOLOGY/VASCULAR | Facility: CLINIC | Age: 62
End: 2017-10-27
Attending: RADIOLOGY
Payer: COMMERCIAL

## 2017-10-27 VITALS
BODY MASS INDEX: 40.43 KG/M2 | HEIGHT: 74 IN | RESPIRATION RATE: 16 BRPM | TEMPERATURE: 97.7 F | WEIGHT: 315 LBS | OXYGEN SATURATION: 97 % | SYSTOLIC BLOOD PRESSURE: 115 MMHG | DIASTOLIC BLOOD PRESSURE: 54 MMHG

## 2017-10-27 DIAGNOSIS — I10 HYPERTENSION GOAL BP (BLOOD PRESSURE) < 130/80: Primary | ICD-10-CM

## 2017-10-27 DIAGNOSIS — K70.31 ALCOHOLIC CIRRHOSIS OF LIVER WITH ASCITES (H): ICD-10-CM

## 2017-10-27 DIAGNOSIS — E11.9 TYPE 2 DIABETES MELLITUS WITHOUT COMPLICATION (H): ICD-10-CM

## 2017-10-27 LAB
ABO + RH BLD: NORMAL
ABO + RH BLD: NORMAL
BLD GP AB SCN SERPL QL: NORMAL
BLD PROD TYP BPU: NORMAL
BLOOD BANK CMNT PATIENT-IMP: NORMAL
GLUCOSE BLDC GLUCOMTR-MCNC: 133 MG/DL (ref 70–99)
GLUCOSE BLDC GLUCOMTR-MCNC: 157 MG/DL (ref 70–99)
GLUCOSE BLDC GLUCOMTR-MCNC: 186 MG/DL (ref 70–99)
INR PPP: 1.01 (ref 0.86–1.14)
INTERPRETATION ECG - MUSE: NORMAL
NUM BPU REQUESTED: 2
SPECIMEN EXP DATE BLD: NORMAL

## 2017-10-27 PROCEDURE — 27210732 ZZH ACCESSORY CR1

## 2017-10-27 PROCEDURE — 25000125 ZZHC RX 250: Performed by: NURSE ANESTHETIST, CERTIFIED REGISTERED

## 2017-10-27 PROCEDURE — 25000128 H RX IP 250 OP 636: Performed by: RADIOLOGY

## 2017-10-27 PROCEDURE — C1769 GUIDE WIRE: HCPCS

## 2017-10-27 PROCEDURE — 25000128 H RX IP 250 OP 636: Performed by: ANESTHESIOLOGY

## 2017-10-27 PROCEDURE — 71000015 ZZH RECOVERY PHASE 1 LEVEL 2 EA ADDTL HR

## 2017-10-27 PROCEDURE — 37000009 ZZH ANESTHESIA TECHNICAL FEE, EACH ADDTL 15 MIN

## 2017-10-27 PROCEDURE — 27210845 ZZH DEVICE INFLATION CR5

## 2017-10-27 PROCEDURE — 27210891 ZZH BALLOON (NON-PTA) CR6

## 2017-10-27 PROCEDURE — 27210907 ZZH KIT CR9

## 2017-10-27 PROCEDURE — 25000128 H RX IP 250 OP 636: Performed by: NURSE ANESTHETIST, CERTIFIED REGISTERED

## 2017-10-27 PROCEDURE — 37000008 ZZH ANESTHESIA TECHNICAL FEE, 1ST 30 MIN

## 2017-10-27 PROCEDURE — 27210995 ZZH RX 272: Performed by: RADIOLOGY

## 2017-10-27 PROCEDURE — C1725 CATH, TRANSLUMIN NON-LASER: HCPCS

## 2017-10-27 PROCEDURE — 27210903 ZZH KIT CR5

## 2017-10-27 PROCEDURE — 86850 RBC ANTIBODY SCREEN: CPT | Performed by: RADIOLOGY

## 2017-10-27 PROCEDURE — P9041 ALBUMIN (HUMAN),5%, 50ML: HCPCS | Performed by: NURSE ANESTHETIST, CERTIFIED REGISTERED

## 2017-10-27 PROCEDURE — 86901 BLOOD TYPING SEROLOGIC RH(D): CPT | Performed by: RADIOLOGY

## 2017-10-27 PROCEDURE — 86923 COMPATIBILITY TEST ELECTRIC: CPT | Performed by: RADIOLOGY

## 2017-10-27 PROCEDURE — 25000565 ZZH ISOFLURANE, EA 15 MIN

## 2017-10-27 PROCEDURE — 71000014 ZZH RECOVERY PHASE 1 LEVEL 2 FIRST HR

## 2017-10-27 PROCEDURE — C1887 CATHETER, GUIDING: HCPCS

## 2017-10-27 PROCEDURE — C9399 UNCLASSIFIED DRUGS OR BIOLOG: HCPCS | Performed by: ANESTHESIOLOGY

## 2017-10-27 PROCEDURE — 40000170 ZZH STATISTIC PRE-PROCEDURE ASSESSMENT II

## 2017-10-27 PROCEDURE — C1874 STENT, COATED/COV W/DEL SYS: HCPCS

## 2017-10-27 PROCEDURE — 37182 INSERT HEPATIC SHUNT (TIPS): CPT

## 2017-10-27 PROCEDURE — 27210908 ZZH NEEDLE CR4

## 2017-10-27 PROCEDURE — 40000014 ZZH STATISTIC ARTERIAL MONITORING DAILY

## 2017-10-27 PROCEDURE — C1729 CATH, DRAINAGE: HCPCS

## 2017-10-27 PROCEDURE — 40000065 ZZH STATISTIC EKG NON-CHARGEABLE

## 2017-10-27 PROCEDURE — 27211039 ZZH NEEDLE CR2

## 2017-10-27 PROCEDURE — 00000146 ZZHCL STATISTIC GLUCOSE BY METER IP

## 2017-10-27 PROCEDURE — 25000132 ZZH RX MED GY IP 250 OP 250 PS 637: Performed by: RADIOLOGY

## 2017-10-27 PROCEDURE — 25000125 ZZHC RX 250: Performed by: RADIOLOGY

## 2017-10-27 PROCEDURE — 85610 PROTHROMBIN TIME: CPT | Performed by: RADIOLOGY

## 2017-10-27 PROCEDURE — 27210905 ZZH KIT CR7

## 2017-10-27 PROCEDURE — 36415 COLL VENOUS BLD VENIPUNCTURE: CPT | Performed by: RADIOLOGY

## 2017-10-27 PROCEDURE — 49083 ABD PARACENTESIS W/IMAGING: CPT

## 2017-10-27 PROCEDURE — 40000275 ZZH STATISTIC RCP TIME EA 10 MIN

## 2017-10-27 PROCEDURE — 40000196 ZZH STATISTIC RAPCV CVP MONITORING

## 2017-10-27 PROCEDURE — 71000027 ZZH RECOVERY PHASE 2 EACH 15 MINS

## 2017-10-27 PROCEDURE — 86900 BLOOD TYPING SEROLOGIC ABO: CPT | Performed by: RADIOLOGY

## 2017-10-27 RX ORDER — SODIUM CHLORIDE, SODIUM LACTATE, POTASSIUM CHLORIDE, CALCIUM CHLORIDE 600; 310; 30; 20 MG/100ML; MG/100ML; MG/100ML; MG/100ML
INJECTION, SOLUTION INTRAVENOUS CONTINUOUS
Status: DISCONTINUED | OUTPATIENT
Start: 2017-10-27 | End: 2017-10-27 | Stop reason: HOSPADM

## 2017-10-27 RX ORDER — SODIUM CHLORIDE 9 MG/ML
INJECTION, SOLUTION INTRAVENOUS CONTINUOUS
Status: DISCONTINUED | OUTPATIENT
Start: 2017-10-27 | End: 2017-10-27 | Stop reason: HOSPADM

## 2017-10-27 RX ORDER — ALBUMIN, HUMAN INJ 5% 5 %
SOLUTION INTRAVENOUS CONTINUOUS PRN
Status: DISCONTINUED | OUTPATIENT
Start: 2017-10-27 | End: 2017-10-27

## 2017-10-27 RX ORDER — LIDOCAINE 40 MG/G
CREAM TOPICAL
Status: DISCONTINUED | OUTPATIENT
Start: 2017-10-27 | End: 2017-10-27 | Stop reason: HOSPADM

## 2017-10-27 RX ORDER — OXYCODONE HYDROCHLORIDE 5 MG/1
5 TABLET ORAL EVERY 6 HOURS PRN
Qty: 18 TABLET | Refills: 0 | Status: SHIPPED | OUTPATIENT
Start: 2017-10-27 | End: 2017-12-21

## 2017-10-27 RX ORDER — AMPICILLIN AND SULBACTAM 2; 1 G/1; G/1
3 INJECTION, POWDER, FOR SOLUTION INTRAMUSCULAR; INTRAVENOUS
Status: COMPLETED | OUTPATIENT
Start: 2017-10-27 | End: 2017-10-27

## 2017-10-27 RX ORDER — MEPERIDINE HYDROCHLORIDE 25 MG/ML
12.5 INJECTION INTRAMUSCULAR; INTRAVENOUS; SUBCUTANEOUS ONCE
Status: DISCONTINUED | OUTPATIENT
Start: 2017-10-27 | End: 2017-10-27 | Stop reason: HOSPADM

## 2017-10-27 RX ORDER — ONDANSETRON 4 MG/1
4 TABLET, ORALLY DISINTEGRATING ORAL EVERY 30 MIN PRN
Status: DISCONTINUED | OUTPATIENT
Start: 2017-10-27 | End: 2017-10-27 | Stop reason: HOSPADM

## 2017-10-27 RX ORDER — ONDANSETRON 2 MG/ML
INJECTION INTRAMUSCULAR; INTRAVENOUS PRN
Status: DISCONTINUED | OUTPATIENT
Start: 2017-10-27 | End: 2017-10-27

## 2017-10-27 RX ORDER — FENTANYL CITRATE 50 UG/ML
25-50 INJECTION, SOLUTION INTRAMUSCULAR; INTRAVENOUS EVERY 5 MIN PRN
Status: DISCONTINUED | OUTPATIENT
Start: 2017-10-27 | End: 2017-10-27 | Stop reason: HOSPADM

## 2017-10-27 RX ORDER — NALOXONE HYDROCHLORIDE 0.4 MG/ML
.1-.4 INJECTION, SOLUTION INTRAMUSCULAR; INTRAVENOUS; SUBCUTANEOUS
Status: DISCONTINUED | OUTPATIENT
Start: 2017-10-27 | End: 2017-10-27 | Stop reason: HOSPADM

## 2017-10-27 RX ORDER — IODIXANOL 320 MG/ML
150 INJECTION, SOLUTION INTRAVASCULAR ONCE
Status: COMPLETED | OUTPATIENT
Start: 2017-10-27 | End: 2017-10-27

## 2017-10-27 RX ORDER — LIDOCAINE HYDROCHLORIDE 20 MG/ML
INJECTION, SOLUTION INFILTRATION; PERINEURAL PRN
Status: DISCONTINUED | OUTPATIENT
Start: 2017-10-27 | End: 2017-10-27

## 2017-10-27 RX ORDER — ONDANSETRON 2 MG/ML
4 INJECTION INTRAMUSCULAR; INTRAVENOUS EVERY 30 MIN PRN
Status: DISCONTINUED | OUTPATIENT
Start: 2017-10-27 | End: 2017-10-27 | Stop reason: HOSPADM

## 2017-10-27 RX ORDER — EPHEDRINE SULFATE 50 MG/ML
INJECTION, SOLUTION INTRAMUSCULAR; INTRAVENOUS; SUBCUTANEOUS PRN
Status: DISCONTINUED | OUTPATIENT
Start: 2017-10-27 | End: 2017-10-27

## 2017-10-27 RX ORDER — LIDOCAINE HYDROCHLORIDE 10 MG/ML
1-30 INJECTION, SOLUTION EPIDURAL; INFILTRATION; INTRACAUDAL; PERINEURAL
Status: COMPLETED | OUTPATIENT
Start: 2017-10-27 | End: 2017-10-27

## 2017-10-27 RX ORDER — PROPOFOL 10 MG/ML
INJECTION, EMULSION INTRAVENOUS PRN
Status: DISCONTINUED | OUTPATIENT
Start: 2017-10-27 | End: 2017-10-27

## 2017-10-27 RX ORDER — FENTANYL CITRATE 50 UG/ML
INJECTION, SOLUTION INTRAMUSCULAR; INTRAVENOUS PRN
Status: DISCONTINUED | OUTPATIENT
Start: 2017-10-27 | End: 2017-10-27

## 2017-10-27 RX ORDER — GLYCOPYRROLATE 0.2 MG/ML
INJECTION, SOLUTION INTRAMUSCULAR; INTRAVENOUS PRN
Status: DISCONTINUED | OUTPATIENT
Start: 2017-10-27 | End: 2017-10-27

## 2017-10-27 RX ORDER — OXYCODONE HYDROCHLORIDE 5 MG/1
5 TABLET ORAL ONCE
Status: COMPLETED | OUTPATIENT
Start: 2017-10-27 | End: 2017-10-27

## 2017-10-27 RX ADMIN — FENTANYL CITRATE 25 MCG: 50 INJECTION, SOLUTION INTRAMUSCULAR; INTRAVENOUS at 11:45

## 2017-10-27 RX ADMIN — Medication 0.2 MG: at 08:23

## 2017-10-27 RX ADMIN — MIDAZOLAM HYDROCHLORIDE 1 MG: 1 INJECTION, SOLUTION INTRAMUSCULAR; INTRAVENOUS at 08:00

## 2017-10-27 RX ADMIN — Medication 10 MG: at 09:21

## 2017-10-27 RX ADMIN — AMPICILLIN SODIUM AND SULBACTAM SODIUM 3 G: 2; 1 INJECTION, POWDER, FOR SOLUTION INTRAMUSCULAR; INTRAVENOUS at 08:54

## 2017-10-27 RX ADMIN — PROPOFOL 30 MG: 10 INJECTION, EMULSION INTRAVENOUS at 10:37

## 2017-10-27 RX ADMIN — Medication 5000 UNITS: at 09:13

## 2017-10-27 RX ADMIN — FENTANYL CITRATE 25 MCG: 50 INJECTION, SOLUTION INTRAMUSCULAR; INTRAVENOUS at 11:30

## 2017-10-27 RX ADMIN — SUGAMMADEX 200 MG: 100 INJECTION, SOLUTION INTRAVENOUS at 10:47

## 2017-10-27 RX ADMIN — FENTANYL CITRATE 100 MCG: 50 INJECTION, SOLUTION INTRAMUSCULAR; INTRAVENOUS at 08:23

## 2017-10-27 RX ADMIN — ONDANSETRON 4 MG: 2 INJECTION INTRAMUSCULAR; INTRAVENOUS at 08:00

## 2017-10-27 RX ADMIN — ROCURONIUM BROMIDE 50 MG: 10 INJECTION INTRAVENOUS at 08:23

## 2017-10-27 RX ADMIN — MIDAZOLAM HYDROCHLORIDE 1 MG: 1 INJECTION, SOLUTION INTRAMUSCULAR; INTRAVENOUS at 08:10

## 2017-10-27 RX ADMIN — Medication 10 MG: at 09:43

## 2017-10-27 RX ADMIN — ONDANSETRON 4 MG: 2 INJECTION INTRAMUSCULAR; INTRAVENOUS at 11:30

## 2017-10-27 RX ADMIN — ROCURONIUM BROMIDE 30 MG: 10 INJECTION INTRAVENOUS at 09:51

## 2017-10-27 RX ADMIN — SODIUM CHLORIDE, POTASSIUM CHLORIDE, SODIUM LACTATE AND CALCIUM CHLORIDE: 600; 310; 30; 20 INJECTION, SOLUTION INTRAVENOUS at 07:45

## 2017-10-27 RX ADMIN — PROPOFOL 10 MG: 10 INJECTION, EMULSION INTRAVENOUS at 10:06

## 2017-10-27 RX ADMIN — LIDOCAINE HYDROCHLORIDE 30 ML: 10 INJECTION, SOLUTION EPIDURAL; INFILTRATION; INTRACAUDAL; PERINEURAL at 09:12

## 2017-10-27 RX ADMIN — OXYCODONE HYDROCHLORIDE 5 MG: 5 TABLET ORAL at 13:00

## 2017-10-27 RX ADMIN — ALBUMIN (HUMAN): 12.5 SOLUTION INTRAVENOUS at 09:20

## 2017-10-27 RX ADMIN — IODIXANOL 50 ML: 320 INJECTION, SOLUTION INTRAVASCULAR at 10:35

## 2017-10-27 RX ADMIN — LIDOCAINE HYDROCHLORIDE 100 MG: 20 INJECTION, SOLUTION INFILTRATION; PERINEURAL at 08:23

## 2017-10-27 RX ADMIN — ROCURONIUM BROMIDE 50 MG: 10 INJECTION INTRAVENOUS at 08:50

## 2017-10-27 NOTE — IP AVS SNAPSHOT
Same Day Surgery [address] [address]    Phone:  [phone]                                       After Visit Summary   10/27/2017    Lawrence Louie    MRN: [id number]           After Visit Summary Signature Page     I have received my discharge instructions, and my questions have been answered. I have discussed any challenges I see with this plan with the nurse or doctor.    ..........................................................................................................................................  Patient/Patient Representative Signature      ..........................................................................................................................................  Patient Representative Print Name and Relationship to Patient    ..................................................               ................................................  Date                                            Time    ..........................................................................................................................................  Reviewed by Signature/Title    ...................................................              ..............................................  Date                                                            Time

## 2017-10-27 NOTE — OR NURSING
Dr. Manzanares and Dr. Davis at bedside reviewing procedure and performing discharge instructions with patient. Rx for oxycodone received and sent to outpatient pharmacy at this time.

## 2017-10-27 NOTE — ANESTHESIA PROCEDURE NOTES
Arterial Line Procedure Note  Staff:     Anesthesiologist:  KARLI PITTMAN    Resident/CRNA:  MIHIR RUIZ    Arterial line performed by resident/CRNA in presence of a teaching physician    Location: In OR After Induction  Procedure Start/Stop Times:     patient identified, IV checked, site marked, risks and benefits discussed, informed consent, monitors and equipment checked, pre-op evaluation and at physician/surgeon's request      Correct Patient: Yes      Correct Position: Yes      Correct Site: Yes      Correct Procedure: Yes      Correct Laterality:  Yes    Site Marked:  Yes  Line Placement:     Procedure:  Arterial Line    Insertion Site:  Radial    Insertion laterality:  Right    Skin Prep: Chloraprep      Patient Prep: patient draped, mask, sterile gloves, hat and hand hygiene      Local skin infiltration:  None    Ultrasound Guided?: No      Catheter size:  20 gauge, 12 cm    Dressing:  Tegaderm    Complications:  None obvious    Arterial waveform: Yes      IBP within 10% of NIBP: Yes

## 2017-10-27 NOTE — OR NURSING
Dr. Quezada notified of pt's BG of 186.  MD does not want insulin drip started.  They will manage BG intra-op as needed per MD.

## 2017-10-27 NOTE — ANESTHESIA CARE TRANSFER NOTE
Patient: Lawrence Louie    Procedure(s):  Anesthesia Out of OR Transvenous Intrahepatic Portosystemic Shunt @0800     Diagnosis: Cirrhosis of Liver   Diagnosis Additional Information: No value filed.    Anesthesia Type:   General, ETT     Note:  Airway :ETT (ambu bag)  Patient transferred to:PACU  Comments: Pt transported to PACU on 10L ambu bag with monitors in place and VSS throughout transport. VSS on arrival. Reversed NMB on arrival, pulling large tidal volumes on CPAP. Extubated on arrival. Report given to PACU nurse on arrival.Handoff Report: Identifed the Patient, Identified the Reponsible Provider, Reviewed the pertinent medical history, Discussed the surgical course, Reviewed Intra-OP anesthesia mangement and issues during anesthesia, Set expectations for post-procedure period and Allowed opportunity for questions and acknowledgement of understanding      Vitals: (Last set prior to Anesthesia Care Transfer)    CRNA VITALS  10/27/2017 1009 - 10/27/2017 1054      10/27/2017             ART BP: 135/60    ART Mean: 86    Resp Rate (observed): 15                Electronically Signed By: Ish Mendoza MD  October 27, 2017  10:54 AM

## 2017-10-27 NOTE — OR NURSING
1044 pt on arrived on ventilator from IR. Vent settings CMV -TV, 600, RR 12, Peep 5 and FIO2 40%.Pt reversed at 1045 per anesthesia and  pt placed on CPAP, PS 5, and FIO2 . BBS clear and RR 12. Patient's  Tidal Volumes 900-1000, and RR 20. Patient tolerating ventilator weaning well. 1050  patient extubated  RR 14-16 and respirations even and non-labored and lung sounds clear in BUD lobes and diminished on BLLs.

## 2017-10-27 NOTE — DISCHARGE INSTRUCTIONS
IR Discharge Instructions per Dr. Manzanares's Orders:    - Up ad homar. No heavy lifting for 6-7 days. Hold Right neck puncture site during valsalva (straining, coughing)  - Clear liquid diet and advance as tolerated  - Change dressing to right upper neck daily for 3 days  [facility], [place]  Same-Day Surgery   Adult Discharge Orders & Instructions     For 24 hours after surgery    1. Get plenty of rest.  A responsible adult must stay with you for at least 24 hours after you leave the hospital.   2. Do not drive or use heavy equipment.  If you have weakness or tingling, don't drive or use heavy equipment until this feeling goes away.  3. Do not drink alcohol.  4. Avoid strenuous or risky activities.  Ask for help when climbing stairs.   5. You may feel lightheaded.  IF so, sit for a few minutes before standing.  Have someone help you get up.   6. If you have nausea (feel sick to your stomach): Drink only clear liquids such as apple juice, ginger ale, broth or 7-Up.  Rest may also help.  Be sure to drink enough fluids.  Move to a regular diet as you feel able.  7. You may have a slight fever. Call the doctor if your fever is over 100 F (37.7 C) (taken under the tongue) or lasts longer than 24 hours.  8. You may have a dry mouth, a sore throat, muscle aches or trouble sleeping.  These should go away after 24 hours.  9. Do not make important or legal decisions.   Call your doctor for any of the followin.  Signs of infection (fever, growing tenderness at the surgery site, a large amount of drainage or bleeding, severe pain, foul-smelling drainage, redness, swelling).    2. It has been over 8 to 10 hours since surgery and you are still not able to urinate (pass water).    3.  Headache for over 24 hours.    4.  Numbness, tingling or weakness the day after surgery (if you had spinal anesthesia).         To contact the anesthesiologist on call dial [phone] and ask for the resident  on call for   Anesthesiology (answered 24 hours a day)      Emergency Department:    [facility]: [phone]       (TTY for hearing impaired: [phone])

## 2017-10-27 NOTE — PROCEDURES
Interventional Radiology Brief Post Procedure Note    Procedure: IR TRANSVEN INTRAHEPATIC PORTOSYST SHUNT    Proceduralist: David Davis MD    Assistant: Eric Manzanares MD    Time Out: Prior to the start of the procedure and with procedural staff participation, I verbally confirmed the patient s identity using two indicators, relevant allergies, that the procedure was appropriate and matched the consent or emergent situation, and that the correct equipment/implants were available. Immediately prior to starting the procedure I conducted the Time Out with the procedural staff and re-confirmed the patient s name, procedure, and site/side. (The Joint Commission universal protocol was followed.)  Yes    Medications   Medication Event Details Admin User Admin Time   lidocaine (PF) (XYLOCAINE) 1 % injection  mg Medication Given by Other Dose: 30 mL; Route: Subcutaneous; Comment: placed on sterile field for use during IR procedure Tiffany Calderón RN 10/27/2017  9:12 AM   heparin 5,000 units in 0.9% sodium chloride 1000 mL Medication New Bag by Other Clinician Dose: 5,000 Units; Route: TABLE SOLN; Comment: placed on sterile field for use during IR procedure Tiffany Calderón RN 10/27/2017  9:13 AM       Sedation: General Endotracheal Anesthesia (GET) administered and documented by Anesthesia Care Provider    Findings:   Successful TIPS placement (8/2 cm Viator)     Estimated Blood Loss: Less than 10 ml    Fluoroscopy Time:  minute(s)    SPECIMENS: None    Complications: 1. None     Condition: Stable    Plan:   - bedrest for 4 hours, first in the PACU, then 3C.     Comments: See dictated procedure note for full details.    Eric Manzanares MD

## 2017-10-27 NOTE — PROGRESS NOTES
Interventional Radiology Intra-procedural Nursing Note    Patient Name: Lawrence Louie  Medical Record Number: [id number]  Today's Date: October 27, 2017    Procedure: transvenouis intra-hepatic portosystemic shunt, paracentesis    Attending MD in room during timeout: Dr Davis  Proceduralists: Dr Davis, Dr Nunez    Sedation start time:  Sedation end time:  Sedation medications given:  General anesthesia    Procedure Start Time: 0900  Procedure Puncture time: 0905  Procedure End Time: 1030    Report given to: per CRNA  : not needed    D: Patient arrived to IR  Room 4 at 0810 accompanied by the anesthesia team.. Verified Patient's ID and informed consent using two identifiers. Patient denied having questions or concerns.  A: Patient was positioned supine and was monitored per Anesthesia protocol. Patient tolerated the procedure without apparent incident. 4000 mL of serous ascites was removed. The right lower quadrant dressing and the right internal jugular dressing is clean, dry and intact.   P: Patient transferred to the PACU by the CRNA for post procedure recovery.     Tiffany Calderón RN  [phone]

## 2017-10-27 NOTE — IP AVS SNAPSHOT
MRN:[id number]                      After Visit Summary   10/27/2017    Lawrence Louie    MRN: [id number]           Thank you!     Thank you for choosing [facility] for your care. Our goal is always to provide you with excellent care. Hearing back from our patients is one way we can continue to improve our services. Please take a few minutes to complete the written survey that you may receive in the mail after you visit with us. Thank you!        Patient Information     Date Of Birth          1955        About your hospital stay     You were admitted on:  October 27, 2017 You last received care in the:  Same Day Surgery [facility]    You were discharged on:  October 27, 2017       Who to Call     For medical emergencies, please call 911.  For non-urgent questions about your medical care, please call your primary care provider or clinic, [phone]  For questions related to your surgery, please call your surgery clinic        Attending Provider     Provider Specialty    David Davis MD Radiology       Primary Care Provider Office Phone # Fax #    Ary CORY López [phone]      Your next 10 appointments already scheduled     Nov 01, 2017 12:00 PM CDT   Paracentesis Visit with Uc Spec Inf Para Provider, UC 39 ATC   [facility] ([facility])    [address] [place] [address] [id number] [phone]            Nov 08, 2017 12:00 PM CST   Paracentesis Visit with Uc Spec Inf Para Provider, UC 39 ATC   [facility] ([facility])    [address] [place] [address] [id number] [phone]            Nov 15, 2017 12:00 PM CST   Paracentesis Visit with Uc Spec Inf Para Provider, UC 40 ATC   [facility] ([facility])     [address]  [address] [facility] [address]   [phone]            Nov 22, 2017 12:00 PM CST   Paracentesis Visit with Uc Spec Inf Para Provider, UC 40 ATC   [facility] ([facility])    [address]  [address] [facility] [address]   [phone]            Nov 28, 2017  1:00 PM CST   (Arrive by 12:45 PM)   Return Visit with CORY Toussaint CNP   [facility] ([facility])    [address]  4th [address]  [facility] [address]   [phone]            Nov 29, 2017 12:00 PM CST   Paracentesis Visit with  Spec Inf Para Provider   [facility] ([facility])    [address]  [address] [facility] [address]   [phone]              Further instructions from your care team       IR Discharge Instructions per Dr. Manzanares's Orders:    - Up ad homar. No heavy lifting for 6-7 days. Hold Right neck puncture site during valsalva (straining, coughing)  - Clear liquid diet and advance as tolerated  - Change dressing to right upper neck daily for 3 days  [facility]  Same-Day Surgery   Adult Discharge Orders & Instructions     For 24 hours after surgery    1. Get plenty of rest.  A responsible adult must stay with you for at least 24 hours after you leave the hospital.   2. Do not drive or use heavy equipment.  If you have weakness or tingling, don't drive or use heavy equipment until this feeling goes away.  3. Do not drink alcohol.  4. Avoid strenuous or risky activities.  Ask for help when climbing stairs.   5. You may feel lightheaded.  IF so, sit for a few minutes before standing.  Have someone help you get up.   6. If you have nausea (feel sick to your stomach): Drink only clear liquids such as apple juice, ginger ale, broth or 7-Up.  Rest  "may also help.  Be sure to drink enough fluids.  Move to a regular diet as you feel able.  7. You may have a slight fever. Call the doctor if your fever is over 100 F (37.7 C) (taken under the tongue) or lasts longer than 24 hours.  8. You may have a dry mouth, a sore throat, muscle aches or trouble sleeping.  These should go away after 24 hours.  9. Do not make important or legal decisions.   Call your doctor for any of the followin.  Signs of infection (fever, growing tenderness at the surgery site, a large amount of drainage or bleeding, severe pain, foul-smelling drainage, redness, swelling).    2. It has been over 8 to 10 hours since surgery and you are still not able to urinate (pass water).    3.  Headache for over 24 hours.    4.  Numbness, tingling or weakness the day after surgery (if you had spinal anesthesia).         To contact the anesthesiologist on call dial [phone] and ask for the resident on call for   Anesthesiology (answered 24 hours a day)      Emergency Department:    [facility]: [phone]       (TTY for hearing impaired: [phone])          Pending Results     Date and Time Order Name Status Description    10/27/2017 0705 CBC with platelets In process     10/27/2017 0705 Comprehensive metabolic panel In process     10/27/2017 0652 IR Transven Intrahepatic Portosyst Shunt In process     10/26/2017 1253 EKG 12-LEAD CLINIC READ Preliminary             Admission Information     Date & Time Provider Department Dept. Phone    10/27/2017 David Davis MD Same Day Surgery [facility] [phone]      Your Vitals Were     Blood Pressure Temperature Respirations Height Weight Pulse Oximetry    123/54 97.6  F (36.4  C) (Oral) 16 1.88 m (6' 2\") 144.2 kg (317 lb 14.5 oz) 97%    BMI (Body Mass Index)                   40.82 kg/m2           MyChart Information     [employer] gives you secure access to your electronic health record. If you see a primary care provider, you can " also send messages to your care team and make appointments. If you have questions, please call your primary care clinic.  If you do not have a primary care provider, please call [phone] and they will assist you.        Care EveryWhere ID     This is your Care EveryWhere ID. This could be used by other organizations to access your [place] medical records  [id number]        Equal Access to Services     [facility] : Hadii aad raj hadasho Sodonnaali, waaxda luqadaha, qaybta kaalmada adesarahyada, silvia bernardpatricksangeeta richardson . So [facility] [phone].    ATENCIÓN: Si habla español, tiene a rondon disposición servicios gratuitos de asistencia lingüística. Boaz al [phone].    We comply with applicable federal civil rights laws and [place] laws. We do not discriminate on the basis of race, color, national origin, age, disability, sex, sexual orientation, or gender identity.               Review of your medicines      UNREVIEWED medicines. Ask your doctor about these medicines        Dose / Directions    aspirin 81 MG tablet   Used for:  Type 2 diabetes mellitus with other skin complications        Dose:  81 mg   Take 1 tablet (81 mg) by mouth daily   Quantity:  30 tablet   Refills:  11       calcium-vitamin D 600-400 MG-UNIT per tablet   Commonly known as:  CALTRATE   Used for:  Morbid obesity due to excess calories (H), Alcoholic cirrhosis of liver with ascites (H), Portal hypertension (H), Secondary esophageal varices without bleeding (H), Tobacco use disorder        Dose:  1 tablet   Take 1 tablet by mouth daily   Quantity:  60 tablet   Refills:  11       desvenlafaxine succinate 100 MG 24 hr tablet   Commonly known as:  PRISTIQ   Used for:  Other depression        Dose:  200 mg   Take 2 tablets (200 mg) by mouth daily   Quantity:  180 tablet   Refills:  3       furosemide 20 MG tablet   Commonly known as:  LASIX   Used for:  Generalized edema        Dose:  60 mg   Take 3 tablets (60 mg) by mouth 2  times daily   Quantity:  180 tablet   Refills:  1       glipiZIDE 5 MG 24 hr tablet   Commonly known as:  glipiZIDE XL   Used for:  Type 2 diabetes mellitus without complication (H)        Dose:  5 mg   Take 1 tablet (5 mg) by mouth 2 times daily   Quantity:  180 tablet   Refills:  1       insulin aspart 100 UNIT/ML injection   Commonly known as:  NovoLOG FLEXPEN   Used for:  Type 2 diabetes mellitus without complication, with long-term current use of insulin (H)        20 units before breakfast, 20 units before lunch, 20 units before dinner, 20 units before snacks   Quantity:  30 mL   Refills:  3       insulin glargine 100 UNIT/ML injection   Commonly known as:  LANTUS SOLOSTAR   Used for:  Type 2 diabetes mellitus with other oral complication, unspecified long term insulin use status (H)        Dose:  40 Units   Inject 40 Units Subcutaneous At Bedtime   Quantity:  30 mL   Refills:  1       magnesium oxide 400 (241.3 MG) MG tablet   Commonly known as:  MAG-OX   Used for:  Cramp of limb        Dose:  400 mg   Take 1 tablet (400 mg) by mouth daily   Quantity:  90 tablet   Refills:  3       ondansetron 4 MG tablet   Commonly known as:  ZOFRAN   Used for:  Alcoholic cirrhosis (H), Nausea        Dose:  4 mg   Take 1 tablet (4 mg) by mouth every 8 hours as needed for nausea   Quantity:  18 tablet   Refills:  1       OYSCO 500 + D 500-200 MG-UNIT Tabs   Used for:  Morbid obesity due to excess calories (H), Alcoholic cirrhosis of liver with ascites (H), Portal hypertension (H), Secondary esophageal varices without bleeding (H), Tobacco use disorder   Generic drug:  Calcium Carb-Cholecalciferol        Dose:  500 mg   Take 500 mg by mouth daily   Quantity:  200 tablet   Refills:  3       pantoprazole 40 MG EC tablet   Commonly known as:  PROTONIX   Used for:  Morbid obesity due to excess calories (H), Alcoholic cirrhosis of liver with ascites (H), Portal hypertension (H), Secondary esophageal varices without bleeding (H),  Tobacco use disorder        Dose:  40 mg   Take 1 tablet (40 mg) by mouth daily   Quantity:  90 tablet   Refills:  3       potassium chloride SA 20 MEQ CR tablet   Commonly known as:  KLOR-CON   Used for:  Hypokalemia        Dose:  20 mEq   Take 1 tablet (20 mEq) by mouth daily   Quantity:  30 tablet   Refills:  1       propranolol 10 MG tablet   Commonly known as:  INDERAL   Used for:  Portal hypertension (H)        Dose:  10 mg   Take 1 tablet (10 mg) by mouth 2 times daily   Quantity:  180 tablet   Refills:  3       ranitidine 150 MG tablet   Commonly known as:  ZANTAC   Used for:  Esophageal varices (H)        Dose:  150 mg   Take 1 tablet (150 mg) by mouth 2 times daily   Quantity:  180 tablet   Refills:  3       spironolactone 50 MG tablet   Commonly known as:  ALDACTONE   Used for:  Portal hypertension (H), Generalized edema        Takes 3 tablets in am.and p.m.   Quantity:  540 tablet   Refills:  6       STATIN NOT PRESCRIBED (INTENTIONAL)   Used for:  Hyperlipidemia LDL goal <100        Dose:  1 each   1 each daily Statin not prescribed intentionally due to Active liver disease   Quantity:  0 each   Refills:  0       VITAMIN B-12 PO        Dose:  1 tablet   Take 1 tablet by mouth daily   Refills:  0       vitamin D3 2000 UNITS Caps   Used for:  Mild major depression (H)        Dose:  1 capsule   Take 1 capsule by mouth daily   Quantity:  90 capsule   Refills:  3         START taking        Dose / Directions    oxyCODONE 5 MG IR tablet   Commonly known as:  ROXICODONE   Used for:  Alcoholic cirrhosis of liver with ascites (H)        Dose:  5 mg   Take 1 tablet (5 mg) by mouth every 6 hours as needed for moderate to severe pain maximum 6 tablet(s) per day   Quantity:  18 tablet   Refills:  0         CONTINUE these medicines which have NOT CHANGED        Dose / Directions    blood glucose lancets standard   Commonly known as:  no brand specified   Used for:  Diabetes mellitus, type 2 (H)        Use to test  blood sugar 4 X  times daily or as directed.   Quantity:  1 Box   Refills:  3       blood glucose monitoring meter device kit   Commonly known as:  no brand specified   Used for:  Type 2 diabetes mellitus with other specified complication (H)        Use to test blood sugar 4 X  times daily or as directed.   Quantity:  1 kit   Refills:  0       blood glucose monitoring test strip   Commonly known as:  no brand specified   Used for:  Diabetes mellitus, type 2 (H)        Use to test blood sugars 4 X  times daily or as directed   Quantity:  200 strip   Refills:  3       * insulin pen needle 31G X 8 MM   Commonly known as:  B-D U/F   Used for:  Type 2 diabetes, HbA1c goal < 7% (H)        USE  6 times daily / OR AS DIRECTED   Quantity:  300 each   Refills:  3       * insulin pen needle 31G X 8 MM   Commonly known as:  B-D U/F   Used for:  Type 2 diabetes, HbA1c goal < 7% (H)        Use 6 times daily or as directed   Quantity:  600 each   Refills:  1       order for DME   Used for:  CTS (carpal tunnel syndrome)        Equipment being ordered: carpal tunnel wrist splint.   Quantity:  1 Units   Refills:  0       order for DME   Used for:  Type 2 diabetes, HbA1c goal < 7% (H)        Orthotic diabetic shoes -1 pair   Quantity:  1 each   Refills:  0       * Notice:  This list has 2 medication(s) that are the same as other medications prescribed for you. Read the directions carefully, and ask your doctor or other care provider to review them with you.         Where to get your medicines      Some of these will need a paper prescription and others can be bought over the counter. Ask your nurse if you have questions.     Bring a paper prescription for each of these medications     oxyCODONE 5 MG IR tablet                Protect others around you: Learn how to safely use, store and throw away your medicines at www.disposemymeds.org.             Medication List: This is a list of all your medications and when to take them. Check  marks below indicate your daily home schedule. Keep this list as a reference.      Medications           Morning Afternoon Evening Bedtime As Needed    aspirin 81 MG tablet   Take 1 tablet (81 mg) by mouth daily                                blood glucose lancets standard   Commonly known as:  no brand specified   Use to test blood sugar 4 X  times daily or as directed.                                blood glucose monitoring meter device kit   Commonly known as:  no brand specified   Use to test blood sugar 4 X  times daily or as directed.                                blood glucose monitoring test strip   Commonly known as:  no brand specified   Use to test blood sugars 4 X  times daily or as directed                                calcium-vitamin D 600-400 MG-UNIT per tablet   Commonly known as:  CALTRATE   Take 1 tablet by mouth daily                                desvenlafaxine succinate 100 MG 24 hr tablet   Commonly known as:  PRISTIQ   Take 2 tablets (200 mg) by mouth daily                                furosemide 20 MG tablet   Commonly known as:  LASIX   Take 3 tablets (60 mg) by mouth 2 times daily                                glipiZIDE 5 MG 24 hr tablet   Commonly known as:  glipiZIDE XL   Take 1 tablet (5 mg) by mouth 2 times daily                                insulin aspart 100 UNIT/ML injection   Commonly known as:  NovoLOG FLEXPEN   20 units before breakfast, 20 units before lunch, 20 units before dinner, 20 units before snacks                                insulin glargine 100 UNIT/ML injection   Commonly known as:  LANTUS SOLOSTAR   Inject 40 Units Subcutaneous At Bedtime                                * insulin pen needle 31G X 8 MM   Commonly known as:  B-D U/F   USE  6 times daily / OR AS DIRECTED                                * insulin pen needle 31G X 8 MM   Commonly known as:  B-D U/F   Use 6 times daily or as directed                                magnesium oxide 400 (241.3 MG) MG  tablet   Commonly known as:  MAG-OX   Take 1 tablet (400 mg) by mouth daily                                ondansetron 4 MG tablet   Commonly known as:  ZOFRAN   Take 1 tablet (4 mg) by mouth every 8 hours as needed for nausea                                order for DME   Equipment being ordered: carpal tunnel wrist splint.                                order for DME   Orthotic diabetic shoes -1 pair                                oxyCODONE 5 MG IR tablet   Commonly known as:  ROXICODONE   Take 1 tablet (5 mg) by mouth every 6 hours as needed for moderate to severe pain maximum 6 tablet(s) per day   Last time this was given:  5 mg on 10/27/2017  1:00 PM                                OYSCO 500 + D 500-200 MG-UNIT Tabs   Take 500 mg by mouth daily   Generic drug:  Calcium Carb-Cholecalciferol                                pantoprazole 40 MG EC tablet   Commonly known as:  PROTONIX   Take 1 tablet (40 mg) by mouth daily                                potassium chloride SA 20 MEQ CR tablet   Commonly known as:  KLOR-CON   Take 1 tablet (20 mEq) by mouth daily                                propranolol 10 MG tablet   Commonly known as:  INDERAL   Take 1 tablet (10 mg) by mouth 2 times daily                                ranitidine 150 MG tablet   Commonly known as:  ZANTAC   Take 1 tablet (150 mg) by mouth 2 times daily                                spironolactone 50 MG tablet   Commonly known as:  ALDACTONE   Takes 3 tablets in am.and p.m.                                STATIN NOT PRESCRIBED (INTENTIONAL)   1 each daily Statin not prescribed intentionally due to Active liver disease                                VITAMIN B-12 PO   Take 1 tablet by mouth daily                                vitamin D3 2000 UNITS Caps   Take 1 capsule by mouth daily                                * Notice:  This list has 2 medication(s) that are the same as other medications prescribed for you. Read the directions carefully, and ask  your doctor or other care provider to review them with you.

## 2017-10-27 NOTE — ANESTHESIA POSTPROCEDURE EVALUATION
Patient: Lawrence Louie    Procedure(s):  Anesthesia Out of OR Transvenous Intrahepatic Portosystemic Shunt @0800     Diagnosis:Cirrhosis of Liver   Diagnosis Additional Information: No value filed.    Anesthesia Type:  General, ETT    Note:  Anesthesia Post Evaluation    Patient location during evaluation: PACU and Bedside  Patient participation: Able to fully participate in evaluation  Level of consciousness: awake  Pain management: adequate  Airway patency: patent  Cardiovascular status: acceptable  Respiratory status: acceptable  Hydration status: acceptable  PONV: controlled     Anesthetic complications: None          Last vitals:  Vitals:    10/27/17 1115 10/27/17 1130 10/27/17 1145   BP: 115/59 114/53 117/57   Resp:      Temp:      SpO2: 97% 95% 96%         Electronically Signed By: Jose David Quezada MD  October 27, 2017  12:00 PM

## 2017-10-27 NOTE — OR NURSING
Discharge instruction reviewed with patient, wife, and daughter.  No questions or concerns at this time.  All belongings returned to patient, prescription picked up by wife.  Wheelchair transportation to the front lobby.

## 2017-10-31 ENCOUNTER — MYC MEDICAL ADVICE (OUTPATIENT)
Dept: INTERNAL MEDICINE | Facility: CLINIC | Age: 62
End: 2017-10-31

## 2017-10-31 ENCOUNTER — TELEPHONE (OUTPATIENT)
Dept: INTERVENTIONAL RADIOLOGY/VASCULAR | Facility: CLINIC | Age: 62
End: 2017-10-31

## 2017-10-31 DIAGNOSIS — Z95.828 S/P TIPS (TRANSJUGULAR INTRAHEPATIC PORTOSYSTEMIC SHUNT): Primary | ICD-10-CM

## 2017-10-31 DIAGNOSIS — R11.0 NAUSEA: ICD-10-CM

## 2017-10-31 LAB
BLD PROD TYP BPU: NORMAL
BLD PROD TYP BPU: NORMAL
BLD UNIT ID BPU: 0
BLD UNIT ID BPU: 0
BLOOD PRODUCT CODE: NORMAL
BLOOD PRODUCT CODE: NORMAL
BPU ID: NORMAL
BPU ID: NORMAL
TRANSFUSION STATUS PATIENT QL: NORMAL

## 2017-10-31 RX ORDER — ONDANSETRON 4 MG/1
4 TABLET, FILM COATED ORAL EVERY 8 HOURS PRN
Qty: 30 TABLET | Refills: 0 | Status: SHIPPED | OUTPATIENT
Start: 2017-10-31 | End: 2017-12-21

## 2017-10-31 NOTE — TELEPHONE ENCOUNTER
I called and spoke with Lawrence, he is doing ok after his TIPS procedure.  He has been having nausea.and vomiting, but denies pain.  No issues with his access sites.  Dr. Davis notified and order sent to [facility] pharmacy for Zofran.  Pt encouraged to eat and drink, to attend his para appts until deemed not necessary.  All questions addressed.  CARLO Burrell RN, BSN  Interventional Radiology Care Coordinator   Phone:  [phone]

## 2017-11-04 ENCOUNTER — HOSPITAL ENCOUNTER (OUTPATIENT)
Facility: CLINIC | Age: 62
Setting detail: OBSERVATION
Discharge: HOME | End: 2017-11-06
Attending: INTERNAL MEDICINE | Admitting: INTERNAL MEDICINE
Payer: COMMERCIAL

## 2017-11-04 ENCOUNTER — APPOINTMENT (OUTPATIENT)
Dept: CT IMAGING | Facility: CLINIC | Age: 62
End: 2017-11-04
Attending: INTERNAL MEDICINE
Payer: COMMERCIAL

## 2017-11-04 ENCOUNTER — APPOINTMENT (OUTPATIENT)
Dept: GENERAL RADIOLOGY | Facility: CLINIC | Age: 62
End: 2017-11-04
Attending: INTERNAL MEDICINE
Payer: COMMERCIAL

## 2017-11-04 DIAGNOSIS — I89.0 LYMPHEDEMA OF BOTH LOWER EXTREMITIES: ICD-10-CM

## 2017-11-04 DIAGNOSIS — R11.0 NAUSEA: ICD-10-CM

## 2017-11-04 DIAGNOSIS — Z95.828 S/P TIPS (TRANSJUGULAR INTRAHEPATIC PORTOSYSTEMIC SHUNT): ICD-10-CM

## 2017-11-04 DIAGNOSIS — R63.0 ANOREXIA: ICD-10-CM

## 2017-11-04 DIAGNOSIS — M62.81 GENERALIZED MUSCLE WEAKNESS: ICD-10-CM

## 2017-11-04 DIAGNOSIS — E11.51 TYPE II DIABETES MELLITUS WITH PERIPHERAL CIRCULATORY DISORDER (H): ICD-10-CM

## 2017-11-04 DIAGNOSIS — E11.638: ICD-10-CM

## 2017-11-04 DIAGNOSIS — K70.30 ALCOHOLIC CIRRHOSIS OF LIVER WITHOUT ASCITES (H): ICD-10-CM

## 2017-11-04 DIAGNOSIS — K76.6 PORTAL HYPERTENSION (H): ICD-10-CM

## 2017-11-04 DIAGNOSIS — K76.82 HEPATIC ENCEPHALOPATHY (H): Primary | ICD-10-CM

## 2017-11-04 DIAGNOSIS — R60.1 GENERALIZED EDEMA: ICD-10-CM

## 2017-11-04 LAB
ALBUMIN SERPL-MCNC: 2.8 G/DL (ref 3.4–5)
ALBUMIN UR-MCNC: 10 MG/DL
ALP SERPL-CCNC: 174 U/L (ref 40–150)
ALT SERPL W P-5'-P-CCNC: 110 U/L (ref 0–70)
AMMONIA PLAS-SCNC: 41 UMOL/L (ref 10–50)
ANION GAP SERPL CALCULATED.3IONS-SCNC: 6 MMOL/L (ref 3–14)
APPEARANCE UR: CLEAR
AST SERPL W P-5'-P-CCNC: 62 U/L (ref 0–45)
BASOPHILS # BLD AUTO: 0 10E9/L (ref 0–0.2)
BASOPHILS NFR BLD AUTO: 0.2 %
BILIRUB DIRECT SERPL-MCNC: 0.6 MG/DL (ref 0–0.2)
BILIRUB SERPL-MCNC: 1.7 MG/DL (ref 0.2–1.3)
BILIRUB UR QL STRIP: NEGATIVE
BUN SERPL-MCNC: 9 MG/DL (ref 7–30)
CALCIUM SERPL-MCNC: 8.3 MG/DL (ref 8.5–10.1)
CHLORIDE SERPL-SCNC: 108 MMOL/L (ref 94–109)
CO2 SERPL-SCNC: 28 MMOL/L (ref 20–32)
COLOR UR AUTO: ABNORMAL
CREAT SERPL-MCNC: 0.79 MG/DL (ref 0.66–1.25)
DIFFERENTIAL METHOD BLD: ABNORMAL
EOSINOPHIL # BLD AUTO: 0.1 10E9/L (ref 0–0.7)
EOSINOPHIL NFR BLD AUTO: 2 %
ERYTHROCYTE [DISTWIDTH] IN BLOOD BY AUTOMATED COUNT: 17.2 % (ref 10–15)
GFR SERPL CREATININE-BSD FRML MDRD: >90 ML/MIN/1.7M2
GLUCOSE SERPL-MCNC: 99 MG/DL (ref 70–99)
GLUCOSE UR STRIP-MCNC: NEGATIVE MG/DL
HCT VFR BLD AUTO: 38.5 % (ref 40–53)
HGB BLD-MCNC: 12.3 G/DL (ref 13.3–17.7)
HGB UR QL STRIP: NEGATIVE
IMM GRANULOCYTES # BLD: 0 10E9/L (ref 0–0.4)
IMM GRANULOCYTES NFR BLD: 0.2 %
INTERPRETATION ECG - MUSE: NORMAL
KETONES UR STRIP-MCNC: NEGATIVE MG/DL
LEUKOCYTE ESTERASE UR QL STRIP: NEGATIVE
LYMPHOCYTES # BLD AUTO: 0.8 10E9/L (ref 0.8–5.3)
LYMPHOCYTES NFR BLD AUTO: 15.8 %
MCH RBC QN AUTO: 29.3 PG (ref 26.5–33)
MCHC RBC AUTO-ENTMCNC: 31.9 G/DL (ref 31.5–36.5)
MCV RBC AUTO: 92 FL (ref 78–100)
MONOCYTES # BLD AUTO: 0.7 10E9/L (ref 0–1.3)
MONOCYTES NFR BLD AUTO: 14.9 %
MUCOUS THREADS #/AREA URNS LPF: PRESENT /LPF
NEUTROPHILS # BLD AUTO: 3.3 10E9/L (ref 1.6–8.3)
NEUTROPHILS NFR BLD AUTO: 66.9 %
NITRATE UR QL: NEGATIVE
NRBC # BLD AUTO: 0 10*3/UL
NRBC BLD AUTO-RTO: 0 /100
PH UR STRIP: 7 PH (ref 5–7)
PLATELET # BLD AUTO: 77 10E9/L (ref 150–450)
POTASSIUM SERPL-SCNC: 3.9 MMOL/L (ref 3.4–5.3)
PROT SERPL-MCNC: 6.2 G/DL (ref 6.8–8.8)
RBC # BLD AUTO: 4.2 10E12/L (ref 4.4–5.9)
RBC #/AREA URNS AUTO: 2 /HPF (ref 0–2)
SODIUM SERPL-SCNC: 141 MMOL/L (ref 133–144)
SOURCE: ABNORMAL
SP GR UR STRIP: 1.02 (ref 1–1.03)
UROBILINOGEN UR STRIP-MCNC: 4 MG/DL (ref 0–2)
WBC # BLD AUTO: 5 10E9/L (ref 4–11)
WBC #/AREA URNS AUTO: 1 /HPF (ref 0–2)

## 2017-11-04 PROCEDURE — 96374 THER/PROPH/DIAG INJ IV PUSH: CPT | Performed by: INTERNAL MEDICINE

## 2017-11-04 PROCEDURE — 87086 URINE CULTURE/COLONY COUNT: CPT | Performed by: EMERGENCY MEDICINE

## 2017-11-04 PROCEDURE — 82140 ASSAY OF AMMONIA: CPT | Performed by: INTERNAL MEDICINE

## 2017-11-04 PROCEDURE — 80076 HEPATIC FUNCTION PANEL: CPT | Performed by: EMERGENCY MEDICINE

## 2017-11-04 PROCEDURE — 80048 BASIC METABOLIC PNL TOTAL CA: CPT | Performed by: EMERGENCY MEDICINE

## 2017-11-04 PROCEDURE — 93005 ELECTROCARDIOGRAM TRACING: CPT | Performed by: INTERNAL MEDICINE

## 2017-11-04 PROCEDURE — 81001 URINALYSIS AUTO W/SCOPE: CPT | Performed by: EMERGENCY MEDICINE

## 2017-11-04 PROCEDURE — 85025 COMPLETE CBC W/AUTO DIFF WBC: CPT | Performed by: EMERGENCY MEDICINE

## 2017-11-04 PROCEDURE — 99285 EMERGENCY DEPT VISIT HI MDM: CPT | Mod: 25 | Performed by: INTERNAL MEDICINE

## 2017-11-04 PROCEDURE — 93010 ELECTROCARDIOGRAM REPORT: CPT | Mod: Z6 | Performed by: INTERNAL MEDICINE

## 2017-11-04 PROCEDURE — 71020 XR CHEST 2 VW: CPT

## 2017-11-04 PROCEDURE — 74020 XR ABDOMEN 2 VW: CPT

## 2017-11-04 PROCEDURE — 70450 CT HEAD/BRAIN W/O DYE: CPT

## 2017-11-04 PROCEDURE — 25000128 H RX IP 250 OP 636: Performed by: INTERNAL MEDICINE

## 2017-11-04 RX ORDER — ONDANSETRON 2 MG/ML
4 INJECTION INTRAMUSCULAR; INTRAVENOUS ONCE
Status: COMPLETED | OUTPATIENT
Start: 2017-11-04 | End: 2017-11-04

## 2017-11-04 RX ORDER — ONDANSETRON 4 MG/1
4 TABLET, ORALLY DISINTEGRATING ORAL
Status: DISCONTINUED | OUTPATIENT
Start: 2017-11-04 | End: 2017-11-05

## 2017-11-04 RX ORDER — SODIUM CHLORIDE 9 MG/ML
1000 INJECTION, SOLUTION INTRAVENOUS CONTINUOUS
Status: DISCONTINUED | OUTPATIENT
Start: 2017-11-04 | End: 2017-11-05

## 2017-11-04 RX ORDER — NICOTINE 21 MG/24HR
1 PATCH, TRANSDERMAL 24 HOURS TRANSDERMAL ONCE
Status: DISCONTINUED | OUTPATIENT
Start: 2017-11-04 | End: 2017-11-05

## 2017-11-04 RX ADMIN — SODIUM CHLORIDE 1000 ML: 9 INJECTION, SOLUTION INTRAVENOUS at 21:35

## 2017-11-04 RX ADMIN — ONDANSETRON 4 MG: 2 INJECTION INTRAMUSCULAR; INTRAVENOUS at 21:36

## 2017-11-04 ASSESSMENT — ENCOUNTER SYMPTOMS
ABDOMINAL PAIN: 1
DIFFICULTY URINATING: 0
ABDOMINAL DISTENTION: 1
CHILLS: 0
FEVER: 0
BACK PAIN: 0
ADENOPATHY: 0
SPEECH DIFFICULTY: 0
DIARRHEA: 0
HEADACHES: 0
PALPITATIONS: 0
DIZZINESS: 1
NECK PAIN: 0
FATIGUE: 1
COUGH: 0
NAUSEA: 1
LIGHT-HEADEDNESS: 1
WEAKNESS: 1
SHORTNESS OF BREATH: 0
WHEEZING: 0
VOMITING: 1
NUMBNESS: 0
CONFUSION: 0
APPETITE CHANGE: 1

## 2017-11-04 NOTE — IP AVS SNAPSHOT
MRN:[id number]                      After Visit Summary   11/4/2017    Lawrence Louie    MRN: [id number]           Thank you!     Thank you for choosing [facility] for your care. Our goal is always to provide you with excellent care. Hearing back from our patients is one way we can continue to improve our services. Please take a few minutes to complete the written survey that you may receive in the mail after you visit with us. Thank you!        Patient Information     Date Of Birth          1955        Designated Caregiver       Most Recent Value    Caregiver    Will someone help with your care after discharge? no      About your hospital stay     You were admitted on:  November 5, 2017 You last received care in the:  [address] [facility] [place]    You were discharged on:  November 6, 2017        Reason for your hospital stay       You were hospitalized due to confusion and generalized weakness following a recent TIPS procedure. You were started on lactulose and your mental status improved.                  Who to Call     For medical emergencies, please call 911.  For non-urgent questions about your medical care, please call your primary care provider or clinic, [phone]          Attending Provider     Provider Specialty    Jose David Rico MD Emergency Medicine    [facility]    Tommy Man MD Internal Medicine       Primary Care Provider Office Phone # Fax #    Ary CORY López [phone]       When to contact your care team       Call your primary doctor if you have any of the following: temperature greater than 102 or less than 95, chest pain, shortness of breath, difficulty breathing, increased confusion or weakness.                  After Care Instructions     Activity       Your activity upon discharge: activity as tolerated            Diet       Follow this diet upon discharge: Orders Placed This Encounter      2  Gram Sodium Diet            Discharge Instructions       We decreased your dose of insulin from 40 units every morning to 20 units as you have had decreased oral intake recently and your sugars were running low on admission. We also discontinued your glipizide and meal time insulin. Please check your blood glucose with meals and at bedtime, record your results and present them at your follow up appointment with your PCP. If sugars are not in range adjustments can be made at that visit.            Monitor and record       blood glucose 4 times a day, before meals and at bedtime                  Follow-up Appointments     Adult [facility] Follow-up and recommended labs and tests       Follow up with primary care provider, Ary Murphy, within 7 days for hospital follow- up.  The following labs/tests are recommended: BG. Please adjust BG regimen as needed.    Follow up with Lymphedema clinic.      Appointments on [place] and/or [facility] (with [facility] or [facility] provider or service). Call [phone] if you haven't heard regarding these appointments within 7 days of discharge.                  Your next 10 appointments already scheduled     Nov 08, 2017 12:00 PM CST   Paracentesis Visit with  Spec Inf Para Provider, UC 39 ATC   [facility] ([facility])    [address] [address] [phone]            Nov 15, 2017 12:00 PM CST   Paracentesis Visit with  Spec Inf Para Provider,  40 ATC   [facility] ([facility])    [address] [address] [phone]            Nov 22, 2017 12:00 PM CST   Paracentesis Visit with  Spec Inf Para Provider,  40 ATC   [facility] ([facility])    [address]  Floor  [facility] [address]   [phone]            Nov 28, 2017  1:00 PM CST   (Arrive by 12:45 PM)   Return Visit with CORY Toussaint CNP   [facility] ([facility])    [address] [place] [address]  4th Floor  [facility] [address]   [phone]            Nov 29, 2017 12:00 PM CST   Paracentesis Visit with Uc Spec Inf Para Provider, UC 40 ATC   [facility] ([facility])    [address] [place] [address]  2nd Floor  [facility] [address] [phone]            Dec 06, 2017 12:00 PM CST   Paracentesis Visit with Uc Spec Inf Para Provider   [facility] ([facility])    [address] [place] [address]  2nd Floor  [facility] [id number] [phone]              Additional Services     Lymphedema Clinic Referral           Occupational Therapy Referral       *This therapy referral will be filtered to a centralized scheduling office at [facility] and the patient will receive a call to schedule an appointment at a [place] location most convenient for them. *     [facility] provides Occupational Therapy evaluation and treatment and many specialty services across the [place] system.      If you have not heard from the scheduling office within 2 business days, please call [phone] for all locations.     Treatment: Evaluation & Treatment  Special Instructions/Modalities: none  Special Programs: [facility]    Please be aware that coverage of these services is subject to the terms and limitations of your health insurance plan.  Call member services at your health plan with any benefit or coverage questions.            Physical Therapy Referral       *This therapy referral will be filtered to a centralized scheduling office at [facility] and the patient will  "receive a call to schedule an appointment at a [place] location most convenient for them. *     [facility] provides Physical Therapy evaluation and treatment and many specialty services across the [place] system.      If you have not heard from the scheduling office within 2 business days, please call [phone] for all locations.  Treatment: Evaluation & Treatment  Special Instructions/Modalities: none  Special Programs: None    Please be aware that coverage of these services is subject to the terms and limitations of your health insurance plan.  Call member services at your health plan with any benefit or coverage questions.                  Pending Results     Date and Time Order Name Status Description    11/5/2017 0251 Fluid Culture Aerobic Bacterial Preliminary     11/4/2017 2024 EKG 12-lead, tracing only Preliminary             Statement of Approval     Ordered          11/06/17 1523  I have reviewed and agree with all the recommendations and orders detailed in this document.  EFFECTIVE NOW     Approved and electronically signed by:  Dulce Wilkes PA-C             Admission Information     Date & Time Department Dept. Phone    11/4/2017 [address] [facility] [phone]      Your Vitals Were     Blood Pressure Pulse Temperature Respirations Height Weight    118/65 (BP Location: Right arm) 69 95.5  F (35.3  C) (Oral) 20 1.88 m (6' 2\") 145.2 kg (320 lb)    Pulse Oximetry BMI (Body Mass Index)                96% 41.09 kg/m2          MyChart Information     [employer] gives you secure access to your electronic health record. If you see a primary care provider, you can also send messages to your care team and make appointments. If you have questions, please call your primary care clinic.  If you do not have a primary care provider, please call [phone] and they will assist you.        Care EveryWhere ID     This is your Care EveryWhere ID. This could be used by other organizations " to access your [place] medical records  [id number]        Equal Access to Services     JOSEF LORENZO : Hadii sil Del Toro, brianda borden, jonathan cohen, silvia salas. So [facility] [phone].    ATENCIÓN: Si habla español, tiene a rondon disposición servicios gratuitos de asistencia lingüística. Llame al [phone].    We comply with applicable federal civil rights laws and [place] laws. We do not discriminate on the basis of race, color, national origin, age, disability, sex, sexual orientation, or gender identity.               Review of your medicines      START taking        Dose / Directions    lactulose 10 GM/15ML solution   Commonly known as:  CHRONULAC   Used for:  Hepatic encephalopathy (H)        Dose:  20 g   30 mLs (20 g) by Oral or NG Tube route 3 times daily   Quantity:  473 mL   Refills:  0         CONTINUE these medicines which may have CHANGED, or have new prescriptions. If we are uncertain of the size of tablets/capsules you have at home, strength may be listed as something that might have changed.        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   Quantity:  540 tablet   Refills:  6         CONTINUE these medicines which have NOT CHANGED        Dose / Directions    aspirin 81 MG tablet   Used for:  Type 2 diabetes mellitus with other skin complications        Dose:  81 mg   Take 1 tablet (81 mg) by mouth daily   Quantity:  30 tablet   Refills:  11       blood glucose lancets standard   Commonly known as:  no brand specified   Used for:  Diabetes mellitus, type 2 (H)        Use to test blood sugar 4 X  times daily or as directed.   Quantity:  1 Box   Refills:  3       blood glucose monitoring meter device kit   Commonly known as:  no brand specified   Used for:  Type 2 diabetes mellitus with other specified complication (H)        Use to test blood sugar 4 X  times daily or as directed.   Quantity:  1 kit   Refills:  0       blood glucose monitoring test strip   Commonly known as:  no brand specified   Used for:  Diabetes mellitus, type 2 (H)        Use to test blood sugars 4 X  times daily or as directed   Quantity:  200 strip   Refills:  3       calcium-vitamin D 600-400 MG-UNIT per tablet   Commonly known as:  CALTRATE   Used for:  Morbid obesity due to excess calories (H), Alcoholic cirrhosis of liver with ascites (H), Portal hypertension (H), Secondary esophageal varices without bleeding (H), Tobacco use disorder        Dose:  1 tablet   Take 1 tablet by mouth daily   Quantity:  60 tablet   Refills:  11       desvenlafaxine succinate 100 MG 24 hr tablet   Commonly known as:  PRISTIQ   Used for:  Other depression        Dose:  200 mg   Take 2 tablets (200 mg) by mouth daily   Quantity:  180 tablet   Refills:  3       * insulin pen needle 31G X 8 MM   Commonly known as:  B-D U/F   Used for:  Type 2 diabetes, HbA1c goal < 7% (H)        USE  6 times daily / OR AS DIRECTED   Quantity:  300 each   Refills:  3       * insulin pen needle 31G X 8 MM   Commonly known as:  B-D U/F   Used for:  Type 2 diabetes, HbA1c goal < 7% (H)        Use 6 times daily or  as directed   Quantity:  600 each   Refills:  1       magnesium oxide 400 (241.3 MG) MG tablet   Commonly known as:  MAG-OX   Used for:  Cramp of limb        Dose:  400 mg   Take 1 tablet (400 mg) by mouth daily   Quantity:  90 tablet   Refills:  3       * ondansetron 4 MG tablet   Commonly known as:  ZOFRAN   Used for:  Alcoholic cirrhosis (H), Nausea        Dose:  4 mg   Take 1 tablet (4 mg) by mouth every 8 hours as needed for nausea   Quantity:  18 tablet   Refills:  1       * ondansetron 4 MG tablet   Commonly known as:  ZOFRAN   Used for:  S/P TIPS (transjugular intrahepatic portosystemic shunt), Nausea        Dose:  4 mg   Take 1 tablet (4 mg) by mouth every 8 hours as needed for nausea (4 mg by mouth as needed for Nausea)   Quantity:  30 tablet   Refills:  0       order for DME   Used for:  CTS (carpal tunnel syndrome)        Equipment being ordered: carpal tunnel wrist splint.   Quantity:  1 Units   Refills:  0       order for DME   Used for:  Type 2 diabetes, HbA1c goal < 7% (H)        Orthotic diabetic shoes -1 pair   Quantity:  1 each   Refills:  0       oxyCODONE IR 5 MG tablet   Commonly known as:  ROXICODONE   Used for:  Alcoholic cirrhosis of liver with ascites (H)        Dose:  5 mg   Take 1 tablet (5 mg) by mouth every 6 hours as needed for moderate to severe pain maximum 6 tablet(s) per day   Quantity:  18 tablet   Refills:  0       OYSCO 500 + D 500-200 MG-UNIT Tabs   Used for:  Morbid obesity due to excess calories (H), Alcoholic cirrhosis of liver with ascites (H), Portal hypertension (H), Secondary esophageal varices without bleeding (H), Tobacco use disorder   Generic drug:  Calcium Carb-Cholecalciferol        Dose:  500 mg   Take 500 mg by mouth daily   Quantity:  200 tablet   Refills:  3       pantoprazole 40 MG EC tablet   Commonly known as:  PROTONIX   Used for:  Morbid obesity due to excess calories (H), Alcoholic cirrhosis of liver with ascites (H), Portal hypertension (H), Secondary  esophageal varices without bleeding (H), Tobacco use disorder        Dose:  40 mg   Take 1 tablet (40 mg) by mouth daily   Quantity:  90 tablet   Refills:  3       propranolol 10 MG tablet   Commonly known as:  INDERAL   Used for:  Portal hypertension (H)        Dose:  10 mg   Take 1 tablet (10 mg) by mouth 2 times daily   Quantity:  180 tablet   Refills:  3       ranitidine 150 MG tablet   Commonly known as:  ZANTAC   Used for:  Esophageal varices (H)        Dose:  150 mg   Take 1 tablet (150 mg) by mouth 2 times daily   Quantity:  180 tablet   Refills:  3       STATIN NOT PRESCRIBED (INTENTIONAL)   Used for:  Hyperlipidemia LDL goal <100        Dose:  1 each   1 each daily Statin not prescribed intentionally due to Active liver disease   Quantity:  0 each   Refills:  0       VITAMIN B-12 PO        Dose:  1 tablet   Take 1 tablet by mouth daily   Refills:  0       vitamin D3 2000 UNITS Caps   Used for:  Mild major depression (H)        Dose:  1 capsule   Take 1 capsule by mouth daily   Quantity:  90 capsule   Refills:  3       * Notice:  This list has 4 medication(s) that are the same as other medications prescribed for you. Read the directions carefully, and ask your doctor or other care provider to review them with you.      STOP taking     glipiZIDE 5 MG 24 hr tablet   Commonly known as:  glipiZIDE XL           insulin aspart 100 UNIT/ML injection   Commonly known as:  NovoLOG FLEXPEN                Where to get your medicines      These medications were sent to [facility] - [place], [place] - [address] [address]     Phone:  [phone]     insulin glargine 100 UNIT/ML injection    lactulose 10 GM/15ML solution                Protect others around you: Learn how to safely use, store and throw away your medicines at www.disposemymeds.org.             Medication List: This is a list of all your medications and when to take them. Check marks below  indicate your daily home schedule. Keep this list as a reference.      Medications           Morning Afternoon Evening Bedtime As Needed    aspirin 81 MG tablet   Take 1 tablet (81 mg) by mouth daily                                blood glucose lancets standard   Commonly known as:  no brand specified   Use to test blood sugar 4 X  times daily or as directed.                                blood glucose monitoring meter device kit   Commonly known as:  no brand specified   Use to test blood sugar 4 X  times daily or as directed.                                blood glucose monitoring test strip   Commonly known as:  no brand specified   Use to test blood sugars 4 X  times daily or as directed                                calcium-vitamin D 600-400 MG-UNIT per tablet   Commonly known as:  CALTRATE   Take 1 tablet by mouth daily   Last time this was given:  1 tablet on 11/6/2017  8:29 AM                                desvenlafaxine succinate 100 MG 24 hr tablet   Commonly known as:  PRISTIQ   Take 2 tablets (200 mg) by mouth daily   Last time this was given:  200 mg on 11/6/2017  8:29 AM                                furosemide 20 MG tablet   Commonly known as:  LASIX   Take 1 tablet (20 mg) by mouth 2 times daily                                insulin glargine 100 UNIT/ML injection   Commonly known as:  LANTUS SOLOSTAR   Inject 20 Units Subcutaneous every morning   Last time this was given:  20 Units on 11/6/2017  8:32 AM                                * insulin pen needle 31G X 8 MM   Commonly known as:  B-D U/F   USE  6 times daily / OR AS DIRECTED                                * insulin pen needle 31G X 8 MM   Commonly known as:  B-D U/F   Use 6 times daily or as directed                                lactulose 10 GM/15ML solution   Commonly known as:  CHRONULAC   30 mLs (20 g) by Oral or NG Tube route 3 times daily   Last time this was given:  20 g on 11/6/2017  2:02 PM                                 magnesium oxide 400 (241.3 MG) MG tablet   Commonly known as:  MAG-OX   Take 1 tablet (400 mg) by mouth daily   Last time this was given:  400 mg on 11/6/2017  8:29 AM                                * ondansetron 4 MG tablet   Commonly known as:  ZOFRAN   Take 1 tablet (4 mg) by mouth every 8 hours as needed for nausea                                * ondansetron 4 MG tablet   Commonly known as:  ZOFRAN   Take 1 tablet (4 mg) by mouth every 8 hours as needed for nausea (4 mg by mouth as needed for Nausea)                                order for DME   Equipment being ordered: carpal tunnel wrist splint.                                order for DME   Orthotic diabetic shoes -1 pair                                oxyCODONE IR 5 MG tablet   Commonly known as:  ROXICODONE   Take 1 tablet (5 mg) by mouth every 6 hours as needed for moderate to severe pain maximum 6 tablet(s) per day                                OYSCO 500 + D 500-200 MG-UNIT Tabs   Take 500 mg by mouth daily   Generic drug:  Calcium Carb-Cholecalciferol                                pantoprazole 40 MG EC tablet   Commonly known as:  PROTONIX   Take 1 tablet (40 mg) by mouth daily   Last time this was given:  40 mg on 11/6/2017  8:29 AM                                propranolol 10 MG tablet   Commonly known as:  INDERAL   Take 1 tablet (10 mg) by mouth 2 times daily   Last time this was given:  10 mg on 11/5/2017  8:48 AM                                ranitidine 150 MG tablet   Commonly known as:  ZANTAC   Take 1 tablet (150 mg) by mouth 2 times daily   Last time this was given:  150 mg on 11/6/2017  8:29 AM                                spironolactone 50 MG tablet   Commonly known as:  ALDACTONE   1 tablet (50 mg) 2 times daily Takes 3 tablets in am.and p.m.   Last time this was given:  75 mg on 11/6/2017 11:33 AM                                STATIN NOT PRESCRIBED (INTENTIONAL)   1 each daily Statin not prescribed intentionally due to Active liver  disease                                VITAMIN B-12 PO   Take 1 tablet by mouth daily                                vitamin D3 2000 UNITS Caps   Take 1 capsule by mouth daily                                * Notice:  This list has 4 medication(s) that are the same as other medications prescribed for you. Read the directions carefully, and ask your doctor or other care provider to review them with you.

## 2017-11-04 NOTE — IP AVS SNAPSHOT
[address] [address]    Phone:  [phone]                                       After Visit Summary   11/4/2017    Lawrence Louie    MRN: [id number]           After Visit Summary Signature Page     I have received my discharge instructions, and my questions have been answered. I have discussed any challenges I see with this plan with the nurse or doctor.    ..........................................................................................................................................  Patient/Patient Representative Signature      ..........................................................................................................................................  Patient Representative Print Name and Relationship to Patient    ..................................................               ................................................  Date                                            Time    ..........................................................................................................................................  Reviewed by Signature/Title    ...................................................              ..............................................  Date                                                            Time

## 2017-11-05 ENCOUNTER — APPOINTMENT (OUTPATIENT)
Dept: ULTRASOUND IMAGING | Facility: CLINIC | Age: 62
End: 2017-11-05
Attending: INTERNAL MEDICINE
Payer: COMMERCIAL

## 2017-11-05 ENCOUNTER — APPOINTMENT (OUTPATIENT)
Dept: PHYSICAL THERAPY | Facility: CLINIC | Age: 62
End: 2017-11-05
Attending: INTERNAL MEDICINE
Payer: COMMERCIAL

## 2017-11-05 LAB
ALBUMIN SERPL-MCNC: 2.5 G/DL (ref 3.4–5)
ALP SERPL-CCNC: 142 U/L (ref 40–150)
ALT SERPL W P-5'-P-CCNC: 91 U/L (ref 0–70)
ANION GAP SERPL CALCULATED.3IONS-SCNC: 9 MMOL/L (ref 3–14)
APPEARANCE FLD: NORMAL
AST SERPL W P-5'-P-CCNC: 54 U/L (ref 0–45)
BACTERIA SPEC CULT: NORMAL
BILIRUB SERPL-MCNC: 1.9 MG/DL (ref 0.2–1.3)
BUN SERPL-MCNC: 9 MG/DL (ref 7–30)
CALCIUM SERPL-MCNC: 8.1 MG/DL (ref 8.5–10.1)
CHLORIDE SERPL-SCNC: 111 MMOL/L (ref 94–109)
CO2 SERPL-SCNC: 24 MMOL/L (ref 20–32)
COLOR FLD: NORMAL
CREAT SERPL-MCNC: 0.68 MG/DL (ref 0.66–1.25)
ERYTHROCYTE [DISTWIDTH] IN BLOOD BY AUTOMATED COUNT: 17.8 % (ref 10–15)
GFR SERPL CREATININE-BSD FRML MDRD: >90 ML/MIN/1.7M2
GLUCOSE BLDC GLUCOMTR-MCNC: 111 MG/DL (ref 70–99)
GLUCOSE BLDC GLUCOMTR-MCNC: 120 MG/DL (ref 70–99)
GLUCOSE BLDC GLUCOMTR-MCNC: 130 MG/DL (ref 70–99)
GLUCOSE BLDC GLUCOMTR-MCNC: 158 MG/DL (ref 70–99)
GLUCOSE BLDC GLUCOMTR-MCNC: 69 MG/DL (ref 70–99)
GLUCOSE BLDC GLUCOMTR-MCNC: 78 MG/DL (ref 70–99)
GLUCOSE BLDC GLUCOMTR-MCNC: 81 MG/DL (ref 70–99)
GLUCOSE BLDC GLUCOMTR-MCNC: 90 MG/DL (ref 70–99)
GLUCOSE SERPL-MCNC: 91 MG/DL (ref 70–99)
GRAM STN SPEC: NORMAL
HCT VFR BLD AUTO: 35.6 % (ref 40–53)
HGB BLD-MCNC: 11.3 G/DL (ref 13.3–17.7)
HGB BLD-MCNC: 11.7 G/DL (ref 13.3–17.7)
INR PPP: 1.3 (ref 0.86–1.14)
INTERPRETATION ECG - MUSE: NORMAL
LYMPHOCYTES NFR FLD MANUAL: 63 %
Lab: NORMAL
MCH RBC QN AUTO: 29.2 PG (ref 26.5–33)
MCHC RBC AUTO-ENTMCNC: 31.7 G/DL (ref 31.5–36.5)
MCV RBC AUTO: 92 FL (ref 78–100)
NEUTS BAND NFR FLD MANUAL: 5 %
OTHER CELLS FLD MANUAL: 32 %
PLATELET # BLD AUTO: 85 10E9/L (ref 150–450)
POTASSIUM SERPL-SCNC: 3.6 MMOL/L (ref 3.4–5.3)
PROT SERPL-MCNC: 5.6 G/DL (ref 6.8–8.8)
RBC # BLD AUTO: 3.87 10E12/L (ref 4.4–5.9)
RBC # FLD: NORMAL /UL
SODIUM SERPL-SCNC: 143 MMOL/L (ref 133–144)
SPECIMEN SOURCE FLD: NORMAL
SPECIMEN SOURCE: NORMAL
SPECIMEN SOURCE: NORMAL
WBC # BLD AUTO: 4.2 10E9/L (ref 4–11)
WBC # FLD AUTO: 587 /UL

## 2017-11-05 PROCEDURE — 49082 ABD PARACENTESIS: CPT | Performed by: INTERNAL MEDICINE

## 2017-11-05 PROCEDURE — 25000132 ZZH RX MED GY IP 250 OP 250 PS 637: Performed by: INTERNAL MEDICINE

## 2017-11-05 PROCEDURE — 87205 SMEAR GRAM STAIN: CPT | Performed by: INTERNAL MEDICINE

## 2017-11-05 PROCEDURE — 85610 PROTHROMBIN TIME: CPT | Performed by: INTERNAL MEDICINE

## 2017-11-05 PROCEDURE — 80053 COMPREHEN METABOLIC PANEL: CPT | Performed by: INTERNAL MEDICINE

## 2017-11-05 PROCEDURE — 36415 COLL VENOUS BLD VENIPUNCTURE: CPT | Performed by: INTERNAL MEDICINE

## 2017-11-05 PROCEDURE — 89051 BODY FLUID CELL COUNT: CPT | Performed by: INTERNAL MEDICINE

## 2017-11-05 PROCEDURE — 99220 ZZC INITIAL OBSERVATION CARE,LEVL III: CPT | Mod: 25 | Performed by: INTERNAL MEDICINE

## 2017-11-05 PROCEDURE — 85018 HEMOGLOBIN: CPT | Mod: 91 | Performed by: PHYSICIAN ASSISTANT

## 2017-11-05 PROCEDURE — 40000193 ZZH STATISTIC PT WARD VISIT: Performed by: PHYSICAL THERAPIST

## 2017-11-05 PROCEDURE — 00000146 ZZHCL STATISTIC GLUCOSE BY METER IP

## 2017-11-05 PROCEDURE — 85027 COMPLETE CBC AUTOMATED: CPT | Performed by: INTERNAL MEDICINE

## 2017-11-05 PROCEDURE — 87070 CULTURE OTHR SPECIMN AEROBIC: CPT | Performed by: INTERNAL MEDICINE

## 2017-11-05 PROCEDURE — 76705 ECHO EXAM OF ABDOMEN: CPT

## 2017-11-05 PROCEDURE — 36415 COLL VENOUS BLD VENIPUNCTURE: CPT | Performed by: PHYSICIAN ASSISTANT

## 2017-11-05 PROCEDURE — 97161 PT EVAL LOW COMPLEX 20 MIN: CPT | Mod: GP | Performed by: PHYSICAL THERAPIST

## 2017-11-05 PROCEDURE — G0378 HOSPITAL OBSERVATION PER HR: HCPCS

## 2017-11-05 PROCEDURE — 96372 THER/PROPH/DIAG INJ SC/IM: CPT

## 2017-11-05 PROCEDURE — 99225 ZZC SUBSEQUENT OBSERVATION CARE,LEVEL II: CPT | Performed by: PHYSICIAN ASSISTANT

## 2017-11-05 PROCEDURE — 25800025 ZZH RX 258: Performed by: INTERNAL MEDICINE

## 2017-11-05 PROCEDURE — 25000128 H RX IP 250 OP 636: Performed by: PHYSICIAN ASSISTANT

## 2017-11-05 PROCEDURE — 99207 ZZC CDG-CODE CATEGORY CHANGED: CPT | Performed by: PHYSICIAN ASSISTANT

## 2017-11-05 PROCEDURE — 96375 TX/PRO/DX INJ NEW DRUG ADDON: CPT

## 2017-11-05 PROCEDURE — 25000131 ZZH RX MED GY IP 250 OP 636 PS 637: Performed by: PHYSICIAN ASSISTANT

## 2017-11-05 PROCEDURE — 25000128 H RX IP 250 OP 636: Performed by: INTERNAL MEDICINE

## 2017-11-05 RX ORDER — MAGNESIUM OXIDE 400 MG/1
400 TABLET ORAL DAILY
Status: DISCONTINUED | OUTPATIENT
Start: 2017-11-05 | End: 2017-11-06 | Stop reason: HOSPADM

## 2017-11-05 RX ORDER — ACETAMINOPHEN 650 MG/1
650 SUPPOSITORY RECTAL EVERY 4 HOURS PRN
Status: DISCONTINUED | OUTPATIENT
Start: 2017-11-05 | End: 2017-11-06 | Stop reason: HOSPADM

## 2017-11-05 RX ORDER — PANTOPRAZOLE SODIUM 40 MG/1
40 TABLET, DELAYED RELEASE ORAL DAILY
Status: DISCONTINUED | OUTPATIENT
Start: 2017-11-05 | End: 2017-11-06 | Stop reason: HOSPADM

## 2017-11-05 RX ORDER — LACTULOSE 10 G/15ML
20 SOLUTION ORAL 3 TIMES DAILY
Status: DISCONTINUED | OUTPATIENT
Start: 2017-11-05 | End: 2017-11-06 | Stop reason: HOSPADM

## 2017-11-05 RX ORDER — ASPIRIN 81 MG/1
81 TABLET ORAL DAILY
Status: DISCONTINUED | OUTPATIENT
Start: 2017-11-05 | End: 2017-11-05

## 2017-11-05 RX ORDER — LACTULOSE 10 G/15ML
100 SOLUTION ORAL
Status: DISCONTINUED | OUTPATIENT
Start: 2017-11-05 | End: 2017-11-06 | Stop reason: HOSPADM

## 2017-11-05 RX ORDER — ACETAMINOPHEN 325 MG/1
650 TABLET ORAL EVERY 4 HOURS PRN
Status: DISCONTINUED | OUTPATIENT
Start: 2017-11-05 | End: 2017-11-06 | Stop reason: HOSPADM

## 2017-11-05 RX ORDER — ONDANSETRON 2 MG/ML
4 INJECTION INTRAMUSCULAR; INTRAVENOUS EVERY 6 HOURS PRN
Status: DISCONTINUED | OUTPATIENT
Start: 2017-11-05 | End: 2017-11-06 | Stop reason: HOSPADM

## 2017-11-05 RX ORDER — DESVENLAFAXINE 50 MG/1
200 TABLET, FILM COATED, EXTENDED RELEASE ORAL DAILY
Status: DISCONTINUED | OUTPATIENT
Start: 2017-11-05 | End: 2017-11-06 | Stop reason: HOSPADM

## 2017-11-05 RX ORDER — PROPRANOLOL HYDROCHLORIDE 10 MG/1
10 TABLET ORAL 2 TIMES DAILY
Status: DISCONTINUED | OUTPATIENT
Start: 2017-11-05 | End: 2017-11-05

## 2017-11-05 RX ORDER — DEXTROSE MONOHYDRATE 25 G/50ML
25-50 INJECTION, SOLUTION INTRAVENOUS
Status: DISCONTINUED | OUTPATIENT
Start: 2017-11-05 | End: 2017-11-06 | Stop reason: HOSPADM

## 2017-11-05 RX ORDER — NICOTINE POLACRILEX 4 MG
15-30 LOZENGE BUCCAL
Status: DISCONTINUED | OUTPATIENT
Start: 2017-11-05 | End: 2017-11-06 | Stop reason: HOSPADM

## 2017-11-05 RX ORDER — LACTULOSE 10 G/15ML
20 SOLUTION ORAL
Status: DISCONTINUED | OUTPATIENT
Start: 2017-11-05 | End: 2017-11-06 | Stop reason: HOSPADM

## 2017-11-05 RX ORDER — ONDANSETRON 4 MG/1
4 TABLET, ORALLY DISINTEGRATING ORAL EVERY 6 HOURS PRN
Status: DISCONTINUED | OUTPATIENT
Start: 2017-11-05 | End: 2017-11-06 | Stop reason: HOSPADM

## 2017-11-05 RX ADMIN — SODIUM CHLORIDE 1000 ML: 9 INJECTION, SOLUTION INTRAVENOUS at 00:59

## 2017-11-05 RX ADMIN — PROPRANOLOL HYDROCHLORIDE 10 MG: 10 TABLET ORAL at 08:48

## 2017-11-05 RX ADMIN — PANTOPRAZOLE SODIUM 40 MG: 40 TABLET, DELAYED RELEASE ORAL at 08:49

## 2017-11-05 RX ADMIN — RANITIDINE 150 MG: 75 TABLET, FILM COATED ORAL at 08:48

## 2017-11-05 RX ADMIN — Medication 1 TABLET: at 08:49

## 2017-11-05 RX ADMIN — INSULIN GLARGINE 20 UNITS: 100 INJECTION, SOLUTION SUBCUTANEOUS at 09:33

## 2017-11-05 RX ADMIN — VITAMIN D, TAB 1000IU (100/BT) 2000 UNITS: 25 TAB at 08:49

## 2017-11-05 RX ADMIN — LACTULOSE 20 G: 20 SOLUTION ORAL at 08:48

## 2017-11-05 RX ADMIN — ASPIRIN 81 MG: 81 TABLET, COATED ORAL at 08:49

## 2017-11-05 RX ADMIN — DEXTROSE 25 ML: 50 INJECTION, SOLUTION INTRAVENOUS at 04:06

## 2017-11-05 RX ADMIN — MAGNESIUM OXIDE TAB 400 MG (241.3 MG ELEMENTAL MG) 400 MG: 400 (241.3 MG) TAB at 08:48

## 2017-11-05 RX ADMIN — SODIUM CHLORIDE 500 ML: 9 INJECTION, SOLUTION INTRAVENOUS at 13:05

## 2017-11-05 RX ADMIN — DESVENLAFAXINE SUCCINATE 200 MG: 50 TABLET, EXTENDED RELEASE ORAL at 08:49

## 2017-11-05 RX ADMIN — LACTULOSE 20 G: 20 SOLUTION ORAL at 14:14

## 2017-11-05 RX ADMIN — LACTULOSE 20 G: 20 SOLUTION ORAL at 19:51

## 2017-11-05 RX ADMIN — RANITIDINE 150 MG: 75 TABLET, FILM COATED ORAL at 19:51

## 2017-11-05 NOTE — ED NOTES
Lawrence comes in for progressive weakness and nausea over the last 5 days. He did miss his paracentesis appointment on Tuesday, October 31st. Now, over the last two days he complains of sleepiness and dizziness. He is alert and oriented at this time.

## 2017-11-05 NOTE — PROGRESS NOTES
11/05/17 0850   Quick Adds   Type of Visit Initial PT Evaluation   Living Environment   Lives With spouse   Living Arrangements house   Home Accessibility stairs to enter home;grab bars present (bathtub);bed and bath on same level;tub/shower is not walk in   Number of Stairs to Enter Home 2   Number of Stairs Within Home 12  (to basement, does not use)   Transportation Available car;family or friend will provide   Living Environment Comment Pt lives with his wife who still works full time   Self-Care   Usual Activity Tolerance good   Current Activity Tolerance moderate   Regular Exercise no   Equipment Currently Used at Home none   Activity/Exercise/Self-Care Comment Pt reports he has grab bars, shower chair, cane, but does not currently use any equipment   Functional Level Prior   Ambulation 0-->independent   Transferring 0-->independent   Toileting 0-->independent   Bathing 0-->independent   Dressing 0-->independent   Eating 0-->independent   Communication 0-->understands/communicates without difficulty   Swallowing 0-->swallows foods/liquids without difficulty   Cognition 0 - no cognition issues reported   Fall history within last six months no   Which of the above functional risks had a recent onset or change? cognition;ambulation   Prior Functional Level Comment pt reporting he is IND at baseline, reports since his TIPS his memory feels worse and his balance is off   General Information   Onset of Illness/Injury or Date of Surgery - Date 11/04/17   Referring Physician Adonis Song MD   Patient/Family Goals Statement to feel better and go home   Pertinent History of Current Problem (include personal factors and/or comorbidities that impact the POC) 61 year old male with history of SMITH cirrhosis who is s/p TIPS on 10/27 and presents with fatigue, weakness, poor balance, nausea/vomiting, and abdominal pain.    Precautions/Limitations fall precautions   Heart Disease Risk Factors Medical  history;Overweight;Gender   General Observations pt with LE edema, supine, pleasant   General Info Comments Activity orders: ambulate with assist    Cognitive Status Examination   Orientation orientation to person, place and time   Level of Consciousness alert   Follows Commands and Answers Questions 100% of the time   Personal Safety and Judgment intact   Memory impaired   Cognitive Comment pt reports some recent memory changes after procedure however appears cognitively intact    Pain Assessment   Patient Currently in Pain No   Integumentary/Edema   Integumentary/Edema Comments LE edema, pt reports he has had this for awhile, has not tried wraps   Posture    Posture Forward head position;Protracted shoulders   Range of Motion (ROM)   ROM Comment WFL per mobility    Strength   Strength Comments WFL per mobility    Bed Mobility   Bed Mobility Comments SBA, appears difficult but pt able to complete with some increased time without physical assist    Transfer Skills   Transfer Comments IND   Gait   Gait Comments pt ambulates ~ 300ft without AD, reports some feelings of instability, no overt losses of balance, pt limited by LE edema    Balance   Balance Comments pt without LOB, ocassionally reaching for external objects for stability   Sensory Examination   Sensory Perception no deficits were identified   Sensory Perception Comments pt reports some neuropathy in hands at night    Coordination   Coordination no deficits were identified   Muscle Tone   Muscle Tone no deficits were identified   Clinical Impression   Criteria for Skilled Therapeutic Intervention evaluation only   PT Diagnosis impaired mobility    Influenced by the following impairments LE edema, some balance deficits    Clinical Presentation Stable/Uncomplicated   Clinical Presentation Rationale Pt with clear presentation and discharge plan    Clinical Decision Making (Complexity) Low complexity   Anticipated Discharge Disposition Home with Outpatient  "Therapy   Risk & Benefits of therapy have been explained Yes   Patient, Family & other staff in agreement with plan of care Yes   Clinical Impression Comments pt presents with some minor balance deficits and LE edema, agreeable to OP PT   [facility] TM \"6 Clicks\"   2016, Trustees of [facility], under license to [employer].  All rights reserved.   6 Clicks Short Forms Basic Mobility Inpatient Short Form   [facility] AM-PAC  \"6 Clicks\" V.2 Basic Mobility Inpatient Short Form   1. Turning from your back to your side while in a flat bed without using bedrails? 4 - None   2. Moving from lying on your back to sitting on the side of a flat bed without using bedrails? 4 - None   3. Moving to and from a bed to a chair (including a wheelchair)? 4 - None   4. Standing up from a chair using your arms (e.g., wheelchair, or bedside chair)? 4 - None   5. To walk in hospital room? 3 - A Little   6. Climbing 3-5 steps with a railing? 3 - A Little   Basic Mobility Raw Score (Score out of 24.Lower scores equate to lower levels of function) 22   Total Evaluation Time   Total Evaluation Time (Minutes) 30     "

## 2017-11-05 NOTE — H&P
[facility] History and Physical  Department of Internal Medicine    Patient Name: Lawrence Louie MRN# [id number]   Age: 61 year old YOB: 1955     Date of Admission:11/4/2017   Primary care provider: Ary Murphy  Date of Service: 11/5/2017  Admitting Team: [facility]         Assessment and Plan:   Lawrence Louie is a 61 year old male with history of SMITH cirrhosis who is s/p TIPS on 10/27 and presents with fatigue, weakness, poor balance, nausea/vomiting, and abdominal pain.     # Fatigue, weakness, gait disturbance, abdominal pain, N/V: Possible hepatic encephalopathy, though no asterixis on exam. Ammonia 40 in the ER. No recent comparison but was <9 back in 2013. Has never been on lactulose before. May now need it after TIPS. Bili is up slightly.  Also consider infections. Urine bland. CXR with left basilar infilatrate--infection vs atelectasis. No fever, no cough or sputum production, so favor atelectasis  - Diagnostic para tonight.   - TIPS US in am  - F/u urine culture  - Start TID lactulose + [facility] protocol. If improving with lactulose, consider rifaximin  - Zofran PRN  - Hold oxycodone. Tylenol for pain    # SMITH Cirrhosis: Bili up to 1.7 from baseline below 1. Transaminases at baseline.   - TIPS US  - Trend MELD labs  - Consider GI consult if further concerns    # FEN: Poor PO intake. Was given 1 L NS in ER. Seems overall hypervolemic.   - Will hold off on further IVF for now  - Restart lasix and spironolactone in am  - PO as tolerated    # Type 2 DM: Most recent A1c 6.  - Home regimen:       - glipizide: will hold      - Lantus 40 units q am: Will cut to 30 units given poor PO intake      - Novolog 20 units TID with meals. Will cut back to 15 units with poor PO. Hold if not eating  - Medium intensity correction scale     Stable chronic conditions:  # Depression: Continue pristique  # GERD: continue protonix and ranitidine    CODE: full  DVT ppx:  ambulate  Disposition/Admission Status: observation for now.     Anival Song MD   of Medicine  Med-Peds Hospitalist  Pager [phone]             Chief Complaint:   Faitgue, weakness, abdominal pain         HPI:   Lawrence Louie is a 61 year old male with history of SMITH cirrhosis who is s/p TIPS on 10/27 and presents with fatigue, weakness, poor balance, nausea/vomiting, and abdominal pain. Pt states he was at his baseline (requiring weekly paracentesis) prior to getting his TIPS earlier this week. Since then, he has had the above symptoms which have gotten progressively worse. He was not discharged on lactulose after his TIPS and has never been on it before. He has had poor appetite since the procedure. He has not had any low blood glucoses.  He feels that his abdominal distention is significantly improved since the time of his procedure.          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)      Splenomegaly      Squamous cell carcinoma      Thrombocytopenia (H)      Varices, esophageal (H)     Banded in 2011              Past Surgical History:     Past Surgical History:   Procedure Laterality Date     BIOPSY OF SKIN LESION       COLONOSCOPY Left 6/16/2016    Procedure: COMBINED COLONOSCOPY, SINGLE OR MULTIPLE BIOPSY/POLYPECTOMY BY BIOPSY;  Surgeon: Brandy Barnett MD;  Location:  GI     ESOPHAGOSCOPY, GASTROSCOPY, DUODENOSCOPY (EGD), COMBINED  2/13/2013    Procedure: COMBINED ESOPHAGOSCOPY, GASTROSCOPY, DUODENOSCOPY (EGD);;  Surgeon: Tara Cook MD;  Location:  GI     ESOPHAGOSCOPY, GASTROSCOPY, DUODENOSCOPY (EGD), COMBINED  11/4/2013    Procedure: COMBINED ESOPHAGOSCOPY, GASTROSCOPY, DUODENOSCOPY (EGD);;  Surgeon: Lonny Diaz MD;   Location:  GI     ESOPHAGOSCOPY, GASTROSCOPY, DUODENOSCOPY (EGD), COMBINED Left 6/16/2016    Procedure: COMBINED ESOPHAGOSCOPY, GASTROSCOPY, DUODENOSCOPY (EGD), BIOPSY SINGLE OR MULTIPLE;  Surgeon: Brandy Barnett MD;  Location:  GI     EXCISE LESION TRUNK  9/24/2012    Procedure: EXCISE LESION TRUNK;;  Surgeon: Pepe Dominguez MD;  Location[facility]     GENITOURINARY SURGERY      vasectomy     HERNIORRHAPHY UMBILICAL  9/24/2012    Procedure: HERNIORRHAPHY UMBILICAL;  UMBILICAL HERNIA REPAIR , EXCISION OF PERIUMBILICAL CYST;  Surgeon: Pepe Dominguez MD;  Location: [facility]              Social History:     Social History     Social History     Marital status:      Spouse name: N/A     Number of children: N/A     Years of education: N/A     Occupational History     Not on file.     Social History Main Topics     Smoking status: Current Every Day Smoker     Packs/day: 0.30     Types: Cigarettes     Smokeless tobacco: Never Used      Comment: 43 yr     Alcohol use No      Comment: 2-3 per day , none since Dec 2012     Drug use: No     Sexual activity: Yes     Partners: Female     Birth control/ protection: Surgical     Other Topics Concern      Service No     Blood Transfusions No     Caffeine Concern No     very little coffee, likes beer     Occupational Exposure No     Hobby Hazards No     Sleep Concern Yes     bed at 3:30 AM, up at 1:00 PM     Stress Concern Yes     Weight Concern Yes     Special Diet No     Back Care No     Exercise No     Bike Helmet No     Seat Belt Yes     Self-Exams No     Social History Narrative    . 3 grown daughters. He has been sober since 2012.            Family History:     Family History   Problem Relation Age of Onset     Breast Cancer Mother      Liver Cancer Mother      Cardiovascular Father      CEREBROVASCULAR DISEASE Father      very low blood pressure     CANCER Father      rectal cancer     Cardiovascular Paternal Grandfather      DIABETES  Brother      CANCER Sister      skin cancer     C.A.D. Other      MI, 70's     Breast Cancer Sister      Thyroid Disease No family hx of      Lipids No family hx of      Anesthesia Reaction No family hx of             Immunizations:     Immunization History   Administered Date(s) Administered     DTAP (<7y) 1988     HEPA 2013, 2013, 10/30/2013     HepB 2013, 2013, 10/30/2013     Influenza (IIV3) 10/04/2010, 2012, 2014, 2015, 2016     Influenza Intranasal Vaccine 4 valent 10/12/2017     Pneumococcal (PCV 13) 2015     Pneumococcal 23 valent 2009, 10/01/2010, 2013     TD (ADULT, 7+) 1997     TDAP Vaccine (Adacel) 2013     Twinrix A/B 2013     Zoster vaccine, live 2016              Allergies:    No Known Allergies         Medications:     Prior to Admission Medications   Prescriptions Last Dose Informant Patient Reported? Taking?   Cholecalciferol (VITAMIN D3) 2000 UNITS CAPS 11/3/2017 at Unknown time  No Yes   Sig: Take 1 capsule by mouth daily   Cyanocobalamin (VITAMIN B-12 PO) 11/3/2017 at Unknown time  Yes Yes   Sig: Take 1 tablet by mouth daily   ORDER FOR DME 2017 at Unknown time  No Yes   Sig: Equipment being ordered: carpal tunnel wrist splint.   [employer] 500 + D 500-200 MG-UNIT TABS 2017 at Unknown time  No Yes   Sig: Take 500 mg by mouth daily   STATIN NOT PRESCRIBED, INTENTIONAL, 2017 at Unknown time  No Yes   Si each daily Statin not prescribed intentionally due to Active liver disease   aspirin 81 MG tablet 11/3/2017 at Unknown time  No Yes   Sig: Take 1 tablet (81 mg) by mouth daily   blood glucose (NO BRAND SPECIFIED) lancets standard 2017 at Unknown time  No Yes   Sig: Use to test blood sugar 4 X  times daily or as directed.   blood glucose monitoring (NO BRAND SPECIFIED) meter device kit 2017 at Unknown time  No Yes   Sig: Use to test blood sugar 4 X  times daily or as directed.    blood glucose monitoring (NO BRAND SPECIFIED) test strip 2017 at Unknown time  No Yes   Sig: Use to test blood sugars 4 X  times daily or as directed   calcium-vitamin D (CALTRATE) 600-400 MG-UNIT per tablet 11/3/2017 at Unknown time  No Yes   Sig: Take 1 tablet by mouth daily   desvenlafaxine succinate ER (PRISTIQ) 100 MG 24 hr tablet 2017 at Unknown time  No Yes   Sig: Take 2 tablets (200 mg) by mouth daily   furosemide (LASIX) 20 MG tablet 11/3/2017 at Unknown time  No Yes   Sig: Take 3 tablets (60 mg) by mouth 2 times daily   glipiZIDE (GLIPIZIDE XL) 5 MG 24 hr tablet 2017 at Unknown time  No Yes   Sig: Take 1 tablet (5 mg) by mouth 2 times daily   insulin aspart (NOVOLOG FLEXPEN) 100 UNIT/ML injection 11/3/2017 at Unknown time  No Yes   Si units before breakfast, 20 units before lunch, 20 units before dinner, 20 units before snacks   insulin glargine (LANTUS SOLOSTAR) 100 UNIT/ML injection 2017 at Unknown time  No Yes   Sig: Inject 40 Units Subcutaneous At Bedtime   Patient taking differently: Inject 40 Units Subcutaneous every morning    insulin pen needle (B-D U/F) 31G X 8 MM 2017 at Unknown time  No Yes   Sig: USE  6 times daily / OR AS DIRECTED   insulin pen needle (B-D U/F) 31G X 8 MM 2017 at Unknown time  No Yes   Sig: Use 6 times daily or as directed   magnesium oxide (MAG-OX) 400 (241.3 MG) MG tablet 11/3/2017 at Unknown time  No Yes   Sig: Take 1 tablet (400 mg) by mouth daily   ondansetron (ZOFRAN) 4 MG tablet 2017 at Unknown time  No Yes   Sig: Take 1 tablet (4 mg) by mouth every 8 hours as needed for nausea   ondansetron (ZOFRAN) 4 MG tablet 2017 at Unknown time  No Yes   Sig: Take 1 tablet (4 mg) by mouth every 8 hours as needed for nausea (4 mg by mouth as needed for Nausea)   order for DME 2017 at Unknown time  No Yes   Sig: Orthotic diabetic shoes -1 pair   oxyCODONE (ROXICODONE) 5 MG IR tablet Past Month at Unknown time  No Yes   Sig: Take 1  "tablet (5 mg) by mouth every 6 hours as needed for moderate to severe pain maximum 6 tablet(s) per day   pantoprazole (PROTONIX) 40 MG EC tablet 11/4/2017 at Unknown time  No Yes   Sig: Take 1 tablet (40 mg) by mouth daily   propranolol (INDERAL) 10 MG tablet 11/4/2017 at Unknown time  No Yes   Sig: Take 1 tablet (10 mg) by mouth 2 times daily   ranitidine (ZANTAC) 150 MG tablet 11/4/2017 at Unknown time  No Yes   Sig: Take 1 tablet (150 mg) by mouth 2 times daily   spironolactone (ALDACTONE) 50 MG tablet 11/3/2017 at Unknown time  No Yes   Sig: Takes 3 tablets in am.and p.m.      Facility-Administered Medications: None             Review of Systems:   A complete ROS was performed and is negative other than what is stated in the HPI.             Physical Exam:   Blood pressure 105/63, pulse 71, temperature 98.2  F (36.8  C), temperature source Oral, resp. rate 21, height 1.88 m (6' 2\"), weight (!) 143.8 kg (317 lb), SpO2 99 %.  General: Sleeping on stretcher in ER. Easily arousable, NAD  HEENT: NCAT, EOMI, Anicteric. Mouth slightly dry.   Neck: supple  Chest/Resp: CTAB  Heart/CV: RRR, no MRG, 3+ LE edema  Abdomen/GI: soft, diffusely tender but no rebound or guarding, not distended, + BS  Extremities/MSK: normal exam of major joints  Skin: multiple scabs at insulin injection areas on abdomen, otherwise no rash or notable jaundice.  Neuro: moves all ext, oriented x3. No asterixis  Psych: normal affect, memory intact    Tubes/Lines/Devices:   Peripheral IV 11/04/17 Right Upper forearm (Active)   Number of days:1            Data:   All labs and imaging reviewed.           "

## 2017-11-05 NOTE — ED PROVIDER NOTES
"  History     Chief Complaint   Patient presents with     Generalized Weakness     HPI  Jacob Simmons is a 39 year old male who presents with family to to weakness, poor balance, nausea, vomiting and epigastric pain with eating. He has a history of alcoholic liver disease. He was requiring therapeutic paracenteses of 10 liters every Wednesday. He had  TIPS and paracentesis at the beginning of this week. He states he has not done well since the procedure. He hasn't eaten much. He has nausea and pain with eating. He has moderate recurrence of ascites. He denies fever, chills, vomiting blood, shortness of breath, chest pain, diarrhea or constipation. He has chronic bilateral leg pain unchanged from baseline. He denies headache, visual changes, focal weakness.      I have reviewed the Medications, Allergies, Past Medical and Surgical History, and Social History in the Epic system.    Review of Systems   Constitutional: Positive for appetite change and fatigue. Negative for chills and fever.   HENT: Negative for congestion.    Eyes: Negative for visual disturbance.   Respiratory: Negative for cough, shortness of breath and wheezing.    Cardiovascular: Positive for leg swelling. Negative for chest pain and palpitations.   Gastrointestinal: Positive for abdominal distention, abdominal pain, nausea and vomiting. Negative for diarrhea.   Genitourinary: Negative for difficulty urinating.   Musculoskeletal: Positive for gait problem. Negative for back pain and neck pain.   Skin: Negative for rash.   Neurological: Positive for dizziness, weakness and light-headedness. Negative for speech difficulty, numbness and headaches.   Hematological: Negative for adenopathy.   Psychiatric/Behavioral: Negative for confusion.       Physical Exam   BP: 134/61  Pulse: 71  Heart Rate: 66  Temp: 98.3  F (36.8  C)  Resp: 22  Height: 188 cm (6' 2\")  Weight: (!) 143.8 kg (317 lb)  SpO2: 97 %      Physical Exam   Constitutional: He is oriented " to person, place, and time. He appears well-developed and well-nourished. He appears lethargic. No distress.   HENT:   Head: Normocephalic and atraumatic.   Right Ear: External ear normal.   Left Ear: External ear normal.   Nose: Nose normal.   Mouth/Throat: Oropharynx is clear and moist. No oropharyngeal exudate.   Eyes: EOM are normal. Pupils are equal, round, and reactive to light. No scleral icterus.   Neck: Normal range of motion. Neck supple. No JVD present.   Cardiovascular: Normal rate, regular rhythm and S2 normal.    Murmur heard.   Systolic murmur is present with a grade of 2/6   Pulmonary/Chest: He has no wheezes. He has no rales.   Abdominal: He exhibits distension. There is no tenderness. There is no rebound and no guarding.   Musculoskeletal: He exhibits edema (3+ bilaterally to the knees).   Lymphadenopathy:     He has no cervical adenopathy.   Neurological: He is oriented to person, place, and time. He appears lethargic. He displays normal reflexes. No cranial nerve deficit. Coordination normal.   Skin: Skin is warm and dry.   Psychiatric: He has a normal mood and affect. His behavior is normal.   Nursing note and vitals reviewed.      ED Course     ED Course     Procedures             EKG Interpretation:      Interpreted by KARLI DO  Time reviewed: 2035  Symptoms at time of EKG: weakness   Rhythm: normal sinus   Rate: Normal  Axis: Left Axis Deviation  Ectopy: none  Conduction: normal  ST Segments/ T Waves: No ST-T wave changes and No acute ischemic changes  Q Waves: none  Comparison to prior: Unchanged    Clinical Impression: right bundle branch block    Labs/Imaging    Results for orders placed or performed during the hospital encounter of 11/04/17 (from the past 24 hour(s))   Routine UA with microscopic   Result Value Ref Range    Color Urine Dark Yellow     Appearance Urine Clear     Glucose Urine Negative NEG^Negative mg/dL    Bilirubin Urine Negative NEG^Negative    Ketones Urine  Negative NEG^Negative mg/dL    Specific Gravity Urine 1.020 1.003 - 1.035    Blood Urine Negative NEG^Negative    pH Urine 7.0 5.0 - 7.0 pH    Protein Albumin Urine 10 (A) NEG^Negative mg/dL    Urobilinogen mg/dL 4.0 (H) 0.0 - 2.0 mg/dL    Nitrite Urine Negative NEG^Negative    Leukocyte Esterase Urine Negative NEG^Negative    Source Midstream Urine     WBC Urine 1 0 - 2 /HPF    RBC Urine 2 0 - 2 /HPF    Mucous Urine Present (A) NEG^Negative /LPF   Urine Culture Aerobic Bacterial   Result Value Ref Range    Specimen Description Midstream Urine     Special Requests Specimen received in preservative     Culture Micro PENDING    CBC with platelets differential   Result Value Ref Range    WBC 5.0 4.0 - 11.0 10e9/L    RBC Count 4.20 (L) 4.4 - 5.9 10e12/L    Hemoglobin 12.3 (L) 13.3 - 17.7 g/dL    Hematocrit 38.5 (L) 40.0 - 53.0 %    MCV 92 78 - 100 fl    MCH 29.3 26.5 - 33.0 pg    MCHC 31.9 31.5 - 36.5 g/dL    RDW 17.2 (H) 10.0 - 15.0 %    Platelet Count 77 (L) 150 - 450 10e9/L    Diff Method Automated Method     % Neutrophils 66.9 %    % Lymphocytes 15.8 %    % Monocytes 14.9 %    % Eosinophils 2.0 %    % Basophils 0.2 %    % Immature Granulocytes 0.2 %    Nucleated RBCs 0 0 /100    Absolute Neutrophil 3.3 1.6 - 8.3 10e9/L    Absolute Lymphocytes 0.8 0.8 - 5.3 10e9/L    Absolute Monocytes 0.7 0.0 - 1.3 10e9/L    Absolute Eosinophils 0.1 0.0 - 0.7 10e9/L    Absolute Basophils 0.0 0.0 - 0.2 10e9/L    Abs Immature Granulocytes 0.0 0 - 0.4 10e9/L    Absolute Nucleated RBC 0.0    Basic metabolic panel   Result Value Ref Range    Sodium 141 133 - 144 mmol/L    Potassium 3.9 3.4 - 5.3 mmol/L    Chloride 108 94 - 109 mmol/L    Carbon Dioxide 28 20 - 32 mmol/L    Anion Gap 6 3 - 14 mmol/L    Glucose 99 70 - 99 mg/dL    Urea Nitrogen 9 7 - 30 mg/dL    Creatinine 0.79 0.66 - 1.25 mg/dL    GFR Estimate >90 >60 mL/min/1.7m2    GFR Estimate If Black >90 >60 mL/min/1.7m2    Calcium 8.3 (L) 8.5 - 10.1 mg/dL   Ammonia (on ice)   Result  Value Ref Range    Ammonia 41 10 - 50 umol/L   Hepatic panel   Result Value Ref Range    Bilirubin Direct 0.6 (H) 0.0 - 0.2 mg/dL    Bilirubin Total 1.7 (H) 0.2 - 1.3 mg/dL    Albumin 2.8 (L) 3.4 - 5.0 g/dL    Protein Total 6.2 (L) 6.8 - 8.8 g/dL    Alkaline Phosphatase 174 (H) 40 - 150 U/L     (H) 0 - 70 U/L    AST 62 (H) 0 - 45 U/L   EKG 12-lead, tracing only   Result Value Ref Range    Interpretation ECG Click View Image link to view waveform and result    Head CT w/o contrast    Narrative         Impression    Impression: No acute intracranial pathology.   Abdomen XR, 2 vw, flat and upright    Impression    Impression:     1. Nonobstructive bowel gas pattern.  2. TIPS stent seen in the right upper quadrant.   Chest XR,  PA & LAT    Impression    Impression:     1. Predominantly left basilar opacities which may represent infection  versus pulmonary edema versus atelectasis.  2. Small bilateral pleural effusions, more prominent on the left.     Labs/Imaging    Results for orders placed or performed during the hospital encounter of 11/04/17 (from the past 24 hour(s))   Routine UA with microscopic   Result Value Ref Range    Color Urine Dark Yellow     Appearance Urine Clear     Glucose Urine Negative NEG^Negative mg/dL    Bilirubin Urine Negative NEG^Negative    Ketones Urine Negative NEG^Negative mg/dL    Specific Gravity Urine 1.020 1.003 - 1.035    Blood Urine Negative NEG^Negative    pH Urine 7.0 5.0 - 7.0 pH    Protein Albumin Urine 10 (A) NEG^Negative mg/dL    Urobilinogen mg/dL 4.0 (H) 0.0 - 2.0 mg/dL    Nitrite Urine Negative NEG^Negative    Leukocyte Esterase Urine Negative NEG^Negative    Source Midstream Urine     WBC Urine 1 0 - 2 /HPF    RBC Urine 2 0 - 2 /HPF    Mucous Urine Present (A) NEG^Negative /LPF   Urine Culture Aerobic Bacterial   Result Value Ref Range    Specimen Description Midstream Urine     Special Requests Specimen received in preservative     Culture Micro PENDING    CBC with  platelets differential   Result Value Ref Range    WBC 5.0 4.0 - 11.0 10e9/L    RBC Count 4.20 (L) 4.4 - 5.9 10e12/L    Hemoglobin 12.3 (L) 13.3 - 17.7 g/dL    Hematocrit 38.5 (L) 40.0 - 53.0 %    MCV 92 78 - 100 fl    MCH 29.3 26.5 - 33.0 pg    MCHC 31.9 31.5 - 36.5 g/dL    RDW 17.2 (H) 10.0 - 15.0 %    Platelet Count 77 (L) 150 - 450 10e9/L    Diff Method Automated Method     % Neutrophils 66.9 %    % Lymphocytes 15.8 %    % Monocytes 14.9 %    % Eosinophils 2.0 %    % Basophils 0.2 %    % Immature Granulocytes 0.2 %    Nucleated RBCs 0 0 /100    Absolute Neutrophil 3.3 1.6 - 8.3 10e9/L    Absolute Lymphocytes 0.8 0.8 - 5.3 10e9/L    Absolute Monocytes 0.7 0.0 - 1.3 10e9/L    Absolute Eosinophils 0.1 0.0 - 0.7 10e9/L    Absolute Basophils 0.0 0.0 - 0.2 10e9/L    Abs Immature Granulocytes 0.0 0 - 0.4 10e9/L    Absolute Nucleated RBC 0.0    Basic metabolic panel   Result Value Ref Range    Sodium 141 133 - 144 mmol/L    Potassium 3.9 3.4 - 5.3 mmol/L    Chloride 108 94 - 109 mmol/L    Carbon Dioxide 28 20 - 32 mmol/L    Anion Gap 6 3 - 14 mmol/L    Glucose 99 70 - 99 mg/dL    Urea Nitrogen 9 7 - 30 mg/dL    Creatinine 0.79 0.66 - 1.25 mg/dL    GFR Estimate >90 >60 mL/min/1.7m2    GFR Estimate If Black >90 >60 mL/min/1.7m2    Calcium 8.3 (L) 8.5 - 10.1 mg/dL   Ammonia (on ice)   Result Value Ref Range    Ammonia 41 10 - 50 umol/L   Hepatic panel   Result Value Ref Range    Bilirubin Direct 0.6 (H) 0.0 - 0.2 mg/dL    Bilirubin Total 1.7 (H) 0.2 - 1.3 mg/dL    Albumin 2.8 (L) 3.4 - 5.0 g/dL    Protein Total 6.2 (L) 6.8 - 8.8 g/dL    Alkaline Phosphatase 174 (H) 40 - 150 U/L     (H) 0 - 70 U/L    AST 62 (H) 0 - 45 U/L   EKG 12-lead, tracing only   Result Value Ref Range    Interpretation ECG Click View Image link to view waveform and result    Head CT w/o contrast    Narrative         Impression    Impression: No acute intracranial pathology.   Abdomen XR, 2 vw, flat and upright    Impression    Impression:      1. Nonobstructive bowel gas pattern.  2. TIPS stent seen in the right upper quadrant.   Chest XR,  PA & LAT    Impression    Impression:     1. Predominantly left basilar opacities which may represent infection  versus pulmonary edema versus atelectasis.  2. Small bilateral pleural effusions, more prominent on the left.         Assessments & Plan (with Medical Decision Making)   Impression:  Middle aged male with a history of alcoholic cirrhosis who was requiring weekly paracentesis had TIPS at the beginning of this week. Since the procedure he has had weakness, lethargy, lightheadedness, nausea and loss of appetite. He has no fevers. Ascites appears modest and his weight has not risen much since the procedure. He has mild elevation of ammonia which is new, possibly due to the TIPS. This could be contributing to lethargy and weakness. He is not overtly encephalopathic. He is diabetic and has not been able to eat since the procedure due to nausea and anorexia. The TIPS is in place on AXR. He has been mildly short of breath and has bibasilar atelectasis and small effusions. There is questionable infiltrate in the LLL. He does not have fever or elevated WBC. LFTs are fairly stable.    I have reviewed the nursing notes.    I have reviewed the findings, diagnosis, plan and need for follow up with the patient.    New Prescriptions    No medications on file       Final diagnoses:   Generalized muscle weakness   Nausea   Anorexia   S/P TIPS (transjugular intrahepatic portosystemic shunt)   Type II diabetes mellitus with peripheral circulatory disorder (H)       11/4/2017   [facility], EMERGENCY DEPARTMENT     Jose David Rico MD  11/04/17 0339

## 2017-11-05 NOTE — PLAN OF CARE
Problem: Patient Care Overview  Goal: Plan of Care/Patient Progress Review  Outcome: No Change    Observation Goals:  -mental status returned to baseline: Met, pt A/Ox4   -diagnostic tests and consults completed and resulted: Partially met, ascites fluid culture needs to be followed up.  -vital signs normal or at patient baseline: Met. VSS  -tolerating oral intake to maintain hydration: Not met, pt does not have diet order in.  -safe disposition plan has been identified: Not met, further plans to be discussed today.    Pt. admitted from [facility] for fatigue, weakness, poor balance, nausea vomiting, and abd pain following TIPs procedure on 10/27. Pt A/Ox4, VSS on RA. BG 69, 25 mL D50 given, 130 BG on last recheck. Denies pain/discomfort. Up w/ SBA, pt calling appropriately. Paracentesis fluid cultures to be followed up. Bilateral lower extremities edematous +3. Per pt, he is currently not using any ANDRE stockings to assist w/ edema. Pt currently NPO. PIV NANDO.

## 2017-11-05 NOTE — PLAN OF CARE
Problem: Patient Care Overview  Goal: Plan of Care/Patient Progress Review  OT 5A: OT orders received and acknowledged. Pt under observation status and per PT, pt with no acute OT needs. Reports mild memory impairments but demonstrates good awareness and has support from spouse at home. OT will sign off.

## 2017-11-05 NOTE — PLAN OF CARE
Problem: Patient Care Overview  Goal: Plan of Care/Patient Progress Review  PT 5A: Physical therapy evaluation completed, no treatment indicated as pt with discharge plan in place and is OBSERVATION status. See recommendations below. PT orders will be completed.     Discharge Planner PT   Patient plan for discharge: home  Current status: Pt ambulates 300ft without AD and no overt losses of balance although does have some minor balance deficits, limited by LE edema   Barriers to return to prior living situation: balance below baseline  Recommendations for discharge: home with outpatient PT to address balance and deconditioning, OP lymphedema therapy to reduce LE edema which is limiting pt's mobility/stability.   Rationale for recommendations: pt would benefit from further PT to improve balance and safety, edema therapy to reduce LE swelling.       Entered by: Mariel Howell 11/05/2017 9:58 AM

## 2017-11-05 NOTE — PROGRESS NOTES
[facility], [place]    Internal Medicine Progress Note - Gold Service      Assessment & Plan   Lawrence Louie is a 61 year old male admitted on 11/4/2017. He has a history of SMITH cirrhosis s/p TIPS 10/27/17, DMII, Depression, and GERD and is admitted for weakness and abdominal pain.    Generalized weakness, gait disturbance, abdominal pain. Likely multifactorial due to dehydration s/p TIPS in setting of poor PO intake/nausea. Per daughter, more confused in past couple of days, not on lactulose PTA - may be component of HE. Negative w/u includes: Ammonia, paracentesis neg for SBP, CXR w/ mild pulm edema, CT Head without intracranial pathology, AXR w/o obstructive gas pattern, Abd US w/ patent TIPS although unable to see definitive flow in L portal vein. D/w Dr. Matty Egan who recommends no further w/u and feels lactulose is appropriate.   - Lactulose TID w/ [place] protocol - titrate to 3-4 stools daily  - 500cc IVF Bolus, caution with IVF in setting of liver disease  - Holding PTA lasix, will need to resume at lower dose  - Holding PTA propranolol due to low HR, consider resuming  - PT/OT recommending outpatient PT  - Lymphedema consult     SMITH & Alcoholic Cirrhosis s/p TIPS 10/27/17. Follows with Dr. Meghna Simmons, most recent visit 9/29/17. Labs mildly worsened compared to baseline: T. Bili 1.9 (baseline 1.0), Direct Bili 0.6, ALT/AST 91/54, Plt 85 (baseline 90s). MELD is 12. Abd US w/ patent TIPS, Para w/o SBP.  - Lactulose as above  - Holding PTA lasix & spironolactone d/t concern for dehydration, may resume in AM  - 2g NA diet    DMII. Most recent Hgb A1C was 6.0. PTA on Lantus 40u qAM, Novolog 20u TID w/ meals, Glipizide 5mg BID. BG low this admission d/t poor PO intake. Pt's daughter reports he usually eats only one large meal at dinner. Discussed that this is dangerous in setting of DM and insulin use.  - Lantus 20u qAM (increase tomorrow when pt eating)  - Holding  Novolog w/ meals held as pt is not eating (may resume tomorrow)  - Holding Glipizide  - Medium SSI  - Hypoglycemia protocol  - BG TID w/ meals, qHS, 0200    GERD. Continue PTA zantac and protonix    Diet: 2 Gram Sodium Diet  Fluids: IVF bolus and PO encouraged  DVT Prophylaxis: Pneumatic Compression Devices  Code Status: Full Code    Disposition Plan   Expected discharge: 1-2 days recommended to prior living arrangement once adequate pain management/ tolerating PO medications, mental status at baseline and abdominal pain improved.     Entered: Tom Mays 11/05/2017, 8:15 AM   Information in the above section will display in the discharge planner report.      The patient's care was discussed with the Attending Physician, Dr. Man, Bedside Nurse, Patient, Patient's Family and GI Team.    Tom Mays  Internal Medicine Staff Hospitalist Service  [facility]  Pager: [phone]  Please see sticky note for cross cover information    Interval History   Patient reports feeling ok this morning, very tired. He did not sleep much because he got here at 4am. He reports symptoms of weakness/abdominal pain have improved since prior to admission. He denies significant abdominal pain, chest pain, shortness of breath, diarrhea, and urinary symptoms.     Data reviewed today: I reviewed all medications, new labs and imaging results over the last 24 hours. I personally reviewed the chest x-ray image(s) showing bilateral opacities (edema vs infection), the abdominal x-ray image(s) showing no obstruction and the head CT image(s) showing no intracranial pathology.    Physical Exam   Vital Signs: Temp: 96.6  F (35.9  C) Temp src: Oral BP: 104/58 Pulse: 61 Heart Rate: 64 Resp: 16 SpO2: 92 % O2 Device: None (Room air)    Weight: 320 lbs 0 oz  General Appearance: Alert and oriented x3. NAD. Sleeping in bed.  Respiratory: Normal effort on RA. Lungs CTAB, mildly decreased BS at bases. No wheezing or  rales.  Cardiovascular: RRR. S1, S2. Systolic murmur noted.   GI: Abdomen soft, mildly distended w/ positive bowel sounds. Mild tenderness to palpation, diffusely. No peritonitis.  Skin: No jaundice.   Other: 2-3+ peripheral edema. No asterixis.      Data   Data     Recent Labs  Lab 11/05/17  0903 11/04/17  2035   WBC 4.2 5.0   HGB 11.3* 12.3*   MCV 92 92   PLT 85* 77*   INR 1.30*  --     141   POTASSIUM 3.6 3.9   CHLORIDE 111* 108   CO2 24 28   BUN 9 9   CR 0.68 0.79   ANIONGAP 9 6   ERIN 8.1* 8.3*   GLC 91 99   ALBUMIN 2.5* 2.8*   PROTTOTAL 5.6* 6.2*   BILITOTAL 1.9* 1.7*   ALKPHOS 142 174*   ALT 91* 110*   AST 54* 62*     Recent Results (from the past 24 hour(s))   Head CT w/o contrast    Narrative    CT HEAD W/O CONTRAST 11/4/2017 10:15 PM    Provided History: weakness, balance issues, ESLD, TIPS    Comparison: None.    Technique: Using multidetector thin collimation helical acquisition  technique, axial, coronal and sagittal CT images from the skull base  to the vertex were obtained without intravenous contrast.     Findings:    No intracranial hemorrhage, mass effect, or midline shift. The  ventricles are proportionate to the cerebral sulci. The gray to white  matter differentiation of the cerebral hemispheres is preserved. The  basal cisterns are patent. Symmetric hyperdensity  in bilateral  temporal horns likely represent prominent choroid plexus/villous   hyperplasia of the choroid plexus.    The visualized paranasal sinuses are clear. The mastoid air cells are  clear.       Impression    Impression: No acute intracranial hemorrhage. No overt findings of  hepatic encephalopathy although MRI is much more sensitive to evaluate  this phenomenon.    I have personally reviewed the examination and initial interpretation  and I agree with the findings.    SERGIO MONTES MD   Abdomen XR, 2 vw, flat and upright    Narrative    Exam: XR ABDOMEN 2 VW, 11/4/2017 10:21 PM    Indication: REcent  TIPS    Comparison: None    Findings:   Supine and upright frontal XRay Abdomen. Nonobstructive bowel gas  pattern. TIPS stent seen in the right upper quadrant. No pneumatosis  or portal venous gas      Impression    Impression:     1. Nonobstructive bowel gas pattern.  2. TIPS stent seen in the right upper quadrant.    I have personally reviewed the examination and initial interpretation  and I agree with the findings.    ESTEFANY СВЕТЛАНА   Chest XR,  PA & LAT    Narrative    Exam: XR CHEST 2 VW, 11/4/2017 10:23 PM    Indication: weakness, ESLD, TIPS    Comparison: Chest x-ray dated 3/4/2014    Findings:     Frontal and lateral chest x-ray. No pneumothorax. Small bilateral  pleural effusion, more prominent on the left. Bibasilar opacities,  left more than right. Cardiac silhouette is not enlarged. No acute  osseous abnormality.      Impression    Impression:     1. Predominantly left basilar opacities which may represent pulmonary  edema versus infection.  2. Small bilateral pleural effusions, more prominent on the left.    I have personally reviewed the examination and initial interpretation  and I agree with the findings.    ESTEFANY SOTOMAYOR   US Abdomen Limited w Doppler Complete    Narrative    EXAMINATION: US ABDOMEN LIMITED WITH DOPPLER COMPLETE, 11/5/2017 10:16  AM     COMPARISON: None    HISTORY: Recent TIPS. Presented with fatigue, abdominal pain. Bili is  slightly elevated from baseline.    TECHNIQUE:  Grey-scale, color Doppler and spectral flow analysis.    Findings:     Liver: Hepatic parenchyma demonstrates increased echogenicity and  mildly heterogeneous echotexture along with capsular nodularity. The  liver measures 17.3 cm. No suspicious focal mass. The main portal vein  is patent with antegrade flow.    Gallbladder: The gallbladder wall is upper limits of normal measuring  3 mm though this is nonspecific in the setting of ascites. No  cholelithiasis, pericholecystic fluid, or sonographic Lozoya's  sign.    Bile Ducts: Both the intra and extrahepatic biliary system are of  normal caliber with the common duct measuring 2.6 mm in diameter.    Pancreas: Not visualized    Kidneys: The right kidney demonstrates normal echotexture, without  mass nor hydronephrosis.   The craniocaudal dimensions is 12.0 cm.    Aorta and IVC: The aorta and IVC are largely obscured.    Fluid: There is a small amount of perihepatic ascites.    DOPPLER:     There is flow towards the liver in the main portal vein:  46 cm/sec in the main portal vein  Retrograde at 10 cm/sec in the right portal vein  Flow in the left portal vein is not visualized    There is flow towards the liver in the hepatic artery:  100 cm/sec peak systolic flow  24 cm/sec minimum diastolic flow   0.76 resistive index     The stent velocities are  201 cm/sec at the portal vein  163 cm/sec in mid stent  153 cm/sec in the right hepatic vein distal shunt end.     The splenic vein is patent and flow is towards the liver.     The left and right hepatic veins are patent with flow towards the IVC.  The middle hepatic vein is obscured.       Impression    Impression:   1. Patent TIPS.  2. No definitive flow is identified on Doppler ultrasound in the left  portal vein.  3. Cirrhotic appearing liver with at least mild abdominal ascites. No  suspicious hepatic lesion.      I have personally reviewed the examination and initial interpretation  and I agree with the findings.    ESTEFANY SOTOMAYOR

## 2017-11-05 NOTE — PLAN OF CARE
Problem: Patient Care Overview  Goal: Plan of Care/Patient Progress Review  Observation Goals:  -mental status returned to baseline: Met, pt A/Ox4, able to make needs known  -diagnostic tests and consults completed and resulted: Partially met  -vital signs normal or at patient baseline: Met  -tolerating oral intake to maintain hydration: Partially met, pt has reg diet and is eating lunch now, bolus given  -safe disposition plan has been identified: Not met  Pt A&Ox4, VSS on RA. [place] 0. Lethargic throughout shift. Bg 78 at start of shift, MD notified, increased with apple juice. Reg diet with ok appetite, lunch time BG 90, no SSI given. Pt needs encouragement with meals. Up SBA, walked unit with PT. Voiding. BM x2 on lactulose. 500 ml bolus given. PIV SL. Pt refused diuretics this AM, MD aware. BLE 3+ edema. Lymph consulted. Para completed overnight, see results. Plan to continue to monitor and follow POC.

## 2017-11-05 NOTE — PLAN OF CARE
Problem: Patient Care Overview  Goal: Plan of Care/Patient Progress Review  Observation Goals:  -mental status returned to baseline: Met, pt A/Ox4   -diagnostic tests and consults completed and resulted: Partially met, ascites fluid culture needs to be followed up.  -vital signs normal or at patient baseline: Met. VSS  -tolerating oral intake to maintain hydration: Not met, pt does not have diet order in.  -safe disposition plan has been identified: Not met, further plans to be discussed today.

## 2017-11-05 NOTE — PLAN OF CARE
Problem: Patient Care Overview  Goal: Plan of Care/Patient Progress Review  Outcome: No Change  Observation Goals:  -mental status returned to baseline: Met, pt A/Ox4, able to make needs known  -diagnostic tests and consults completed and resulted: Partially met, ascites fluid culture pending  -vital signs normal or at patient baseline: Met  -tolerating oral intake to maintain hydration: Partially met, pt has reg diet but poor appetite  -safe disposition plan has been identified: Not met

## 2017-11-06 VITALS
HEART RATE: 69 BPM | TEMPERATURE: 95.5 F | DIASTOLIC BLOOD PRESSURE: 65 MMHG | WEIGHT: 315 LBS | HEIGHT: 74 IN | BODY MASS INDEX: 40.43 KG/M2 | RESPIRATION RATE: 20 BRPM | SYSTOLIC BLOOD PRESSURE: 118 MMHG | OXYGEN SATURATION: 96 %

## 2017-11-06 LAB
ALBUMIN SERPL-MCNC: 2.4 G/DL (ref 3.4–5)
ALP SERPL-CCNC: 131 U/L (ref 40–150)
ALT SERPL W P-5'-P-CCNC: 76 U/L (ref 0–70)
ANION GAP SERPL CALCULATED.3IONS-SCNC: 8 MMOL/L (ref 3–14)
AST SERPL W P-5'-P-CCNC: 50 U/L (ref 0–45)
BILIRUB SERPL-MCNC: 2 MG/DL (ref 0.2–1.3)
BUN SERPL-MCNC: 10 MG/DL (ref 7–30)
CALCIUM SERPL-MCNC: 7.9 MG/DL (ref 8.5–10.1)
CHLORIDE SERPL-SCNC: 113 MMOL/L (ref 94–109)
CO2 SERPL-SCNC: 24 MMOL/L (ref 20–32)
CREAT SERPL-MCNC: 0.71 MG/DL (ref 0.66–1.25)
ERYTHROCYTE [DISTWIDTH] IN BLOOD BY AUTOMATED COUNT: 17.9 % (ref 10–15)
GFR SERPL CREATININE-BSD FRML MDRD: >90 ML/MIN/1.7M2
GLUCOSE BLDC GLUCOMTR-MCNC: 105 MG/DL (ref 70–99)
GLUCOSE BLDC GLUCOMTR-MCNC: 139 MG/DL (ref 70–99)
GLUCOSE BLDC GLUCOMTR-MCNC: 95 MG/DL (ref 70–99)
GLUCOSE SERPL-MCNC: 93 MG/DL (ref 70–99)
HCT VFR BLD AUTO: 35.9 % (ref 40–53)
HGB BLD-MCNC: 11.3 G/DL (ref 13.3–17.7)
INR PPP: 1.3 (ref 0.86–1.14)
MAGNESIUM SERPL-MCNC: 1.9 MG/DL (ref 1.6–2.3)
MCH RBC QN AUTO: 28.9 PG (ref 26.5–33)
MCHC RBC AUTO-ENTMCNC: 31.5 G/DL (ref 31.5–36.5)
MCV RBC AUTO: 92 FL (ref 78–100)
PLATELET # BLD AUTO: 72 10E9/L (ref 150–450)
POTASSIUM SERPL-SCNC: 3.7 MMOL/L (ref 3.4–5.3)
PROT SERPL-MCNC: 5.5 G/DL (ref 6.8–8.8)
RBC # BLD AUTO: 3.91 10E12/L (ref 4.4–5.9)
SODIUM SERPL-SCNC: 144 MMOL/L (ref 133–144)
WBC # BLD AUTO: 3.7 10E9/L (ref 4–11)

## 2017-11-06 PROCEDURE — 85027 COMPLETE CBC AUTOMATED: CPT | Performed by: PHYSICIAN ASSISTANT

## 2017-11-06 PROCEDURE — 99207 ZZC CDG-CODE CATEGORY CHANGED: CPT | Performed by: PHYSICIAN ASSISTANT

## 2017-11-06 PROCEDURE — 96375 TX/PRO/DX INJ NEW DRUG ADDON: CPT

## 2017-11-06 PROCEDURE — 00000146 ZZHCL STATISTIC GLUCOSE BY METER IP

## 2017-11-06 PROCEDURE — 25000128 H RX IP 250 OP 636: Performed by: PHYSICIAN ASSISTANT

## 2017-11-06 PROCEDURE — 96372 THER/PROPH/DIAG INJ SC/IM: CPT

## 2017-11-06 PROCEDURE — 36415 COLL VENOUS BLD VENIPUNCTURE: CPT | Performed by: PHYSICIAN ASSISTANT

## 2017-11-06 PROCEDURE — 83735 ASSAY OF MAGNESIUM: CPT | Performed by: PHYSICIAN ASSISTANT

## 2017-11-06 PROCEDURE — 25000132 ZZH RX MED GY IP 250 OP 250 PS 637: Performed by: INTERNAL MEDICINE

## 2017-11-06 PROCEDURE — 80053 COMPREHEN METABOLIC PANEL: CPT | Performed by: PHYSICIAN ASSISTANT

## 2017-11-06 PROCEDURE — 85610 PROTHROMBIN TIME: CPT | Performed by: PHYSICIAN ASSISTANT

## 2017-11-06 PROCEDURE — G0378 HOSPITAL OBSERVATION PER HR: HCPCS

## 2017-11-06 PROCEDURE — 99217 ZZC OBSERVATION CARE DISCHARGE: CPT | Performed by: PHYSICIAN ASSISTANT

## 2017-11-06 PROCEDURE — 25000132 ZZH RX MED GY IP 250 OP 250 PS 637: Performed by: PHYSICIAN ASSISTANT

## 2017-11-06 RX ORDER — LACTULOSE 10 G/15ML
20 SOLUTION ORAL 3 TIMES DAILY
Qty: 473 ML | Refills: 0 | Status: SHIPPED | OUTPATIENT
Start: 2017-11-06 | End: 2017-12-07

## 2017-11-06 RX ORDER — FUROSEMIDE 20 MG
20 TABLET ORAL 2 TIMES DAILY
Qty: 30 TABLET | COMMUNITY
Start: 2017-11-06 | End: 2017-12-21

## 2017-11-06 RX ORDER — FUROSEMIDE 10 MG/ML
20 INJECTION INTRAMUSCULAR; INTRAVENOUS DAILY
Status: DISCONTINUED | OUTPATIENT
Start: 2017-11-06 | End: 2017-11-06 | Stop reason: HOSPADM

## 2017-11-06 RX ORDER — SPIRONOLACTONE 50 MG/1
50 TABLET, FILM COATED ORAL 2 TIMES DAILY
Qty: 540 TABLET | Refills: 6 | COMMUNITY
Start: 2017-11-06 | End: 2018-03-22

## 2017-11-06 RX ADMIN — VITAMIN D, TAB 1000IU (100/BT) 2000 UNITS: 25 TAB at 08:30

## 2017-11-06 RX ADMIN — DESVENLAFAXINE SUCCINATE 200 MG: 50 TABLET, EXTENDED RELEASE ORAL at 08:29

## 2017-11-06 RX ADMIN — SPIRONOLACTONE 75 MG: 50 TABLET ORAL at 11:33

## 2017-11-06 RX ADMIN — RANITIDINE 150 MG: 75 TABLET, FILM COATED ORAL at 08:29

## 2017-11-06 RX ADMIN — INSULIN GLARGINE 20 UNITS: 100 INJECTION, SOLUTION SUBCUTANEOUS at 08:32

## 2017-11-06 RX ADMIN — FUROSEMIDE 20 MG: 10 INJECTION, SOLUTION INTRAVENOUS at 11:33

## 2017-11-06 RX ADMIN — PANTOPRAZOLE SODIUM 40 MG: 40 TABLET, DELAYED RELEASE ORAL at 08:29

## 2017-11-06 RX ADMIN — LACTULOSE 20 G: 20 SOLUTION ORAL at 14:02

## 2017-11-06 RX ADMIN — Medication 1 TABLET: at 08:29

## 2017-11-06 RX ADMIN — MAGNESIUM OXIDE TAB 400 MG (241.3 MG ELEMENTAL MG) 400 MG: 400 (241.3 MG) TAB at 08:29

## 2017-11-06 RX ADMIN — LACTULOSE 20 G: 20 SOLUTION ORAL at 08:29

## 2017-11-06 NOTE — PLAN OF CARE
Pt A&Ox4, PIV removed catheter in tact. All belongings with pt. In plastic bag. All instructions including low sodium diet, future appointments, and medications explained to pt. He stated he understood all instructions and was able to teach back. Pt d/c's with wife providing ride.

## 2017-11-06 NOTE — PLAN OF CARE
Problem: Patient Care Overview  Goal: Plan of Care/Patient Progress Review  Edema 5A: Lymphedema orders received however patient appropriate for outpatient lymphedema services vs inpatient with OBS status due to short hospital stay and inability to provide follow up care with changing of edema wraps. Place outpatient lymphedema referral as appropriate. Inpatient will sign off.

## 2017-11-06 NOTE — PLAN OF CARE
Problem: Patient Care Overview  Goal: Plan of Care/Patient Progress Review  Observation Goals:  -mental status returned to baseline: Met, pt A/Ox4, able to make needs known  -diagnostic tests and consults completed and resulted: Partially met, awaiting Dr. Simmons to see pt??  -vital signs normal or at patient baseline: Met  -tolerating oral intake to maintain hydration: Met  -safe disposition plan has been identified: Met

## 2017-11-06 NOTE — PLAN OF CARE
Problem: Patient Care Overview  Goal: Plan of Care/Patient Progress Review  Patient alert and oriented x 4. [place] protocol. Had one diarrhea stool this shift. Vitals stable on room air denies pain no nausea, vomiting. Up with SBA. Abdomen distended. BLE 3 + edema. Lymphedema consult.  and 105. Patient slept between cares. Will continue to monitor and follow plan of care. .

## 2017-11-06 NOTE — PLAN OF CARE
Problem: Patient Care Overview  Goal: Plan of Care/Patient Progress Review  Observation Goals:  -mental status returned to baseline: Met, pt A/Ox4, able to make needs known  -diagnostic tests and consults completed and resulted: Partially met, awaiting Dr. Simmons to see pt??  -vital signs normal or at patient baseline: Met  -tolerating oral intake to maintain hydration: Met  -safe disposition plan has been identified: Met     Pt AO. VSS on RA. Up IND in room. Denies pain, nausea, or SOB. Tolerating PO. Voiding, restarted diuretics. Moving bowels with lactulose. BLE edema, will arrange with outpatient lymphedema. Pt hopeful to d/c home today, states wife will provide transport home.

## 2017-11-06 NOTE — PLAN OF CARE
Problem: Patient Care Overview  Goal: Plan of Care/Patient Progress Review  Outcome: No Change  1530 to 1930: pt is alert and oriented x 4, denies pain and nausea.LS diminished wish some expiratory wheezing,BS+,had x 2 BM,voided adequately,BLE +3 to +4 edema. BLE skin lauri/dusky color.numbness and tingling present due to diabetic neuropathy.multiple scabs, scars and  open areas  on his abdomen and BLE ..will continue to monitor.

## 2017-11-06 NOTE — DISCHARGE SUMMARY
[facility], [place]    Internal Medicine Discharge Summary- Gold Service    Date of Admission:  11/4/2017  Date of Discharge:  11/6/2017  Discharging Provider: Dulce Wilkes PA-C/ Dr. Man  Discharge Team: Gold 2    Discharge Diagnoses   SMITH & Alcoholic Cirrhosis s/p TIPS 10/27/17  DMII  GERD    Follow-ups Needed After Discharge   F/u with PCP within one week, adjust insulin regimen at this time if needed  F/u with Lymphedema clinic, referral placed    Hospital Course   Lawrence Louie was admitted on 11/4/2017 for generalized weakness and abdominal pain.  The following problems were addressed during his hospitalization:    # Generalized weakness, gait disturbance, abdominal pain likely 2/2 dehydration s/p TIPS in setting of poor PO intake/nausea; resolved. Negative w/u on admission including ammonia, paracentesis neg for SBP, CXR w/ mild pulm edema, CT head w/o intracranial pathology, AXR w/o obstructive gas pattern, Abd US w/ patent TIPS. Started on lactulose with improvement in symptoms. Given 500cc IVF bolus and held home diuretics d/t concern for dehydration.    - Continue Lactulose on discharge, 20 mg TID with goal of 3-4 stools per day    SMITH & Alcoholic Cirrhosis s/p TIPS 10/27/17. Follows with Dr. Simmons, most recent visit 9/29/17. MELD 12. Abd US w/ patent TIPS. Para on admission negative for SBP.  - Lactulose as above  - Resumed home diuretics at 1/2 dose (Spirinolactone 50 mg BID, Lasix 20 mg BID)    DMII. Most recent Hgb A1c 6.0. PTA regimen includes Lantus 40u qam, novolog 20u TID w/ meals, Glipizide 5 mg BID. BG low this admission d/t poor PO intake. Pt daughter reports he generally eats one meal per day. Held glipizide and mealtime insulin d/t low BG and poor PO intake. Lantus dose decreased.  - Discharged on Lantus 20u qam  - Instructed to monitor BG with meals and before sleep  - Should follow up with PCP within one week and adjust regimen at that time if  needed    GERD. Continued PTA zantac and protonix.        Consultations This Hospital Stay   PHYSICAL THERAPY ADULT IP CONSULT  OCCUPATIONAL THERAPY ADULT IP CONSULT  LYMPHEDEMA THERAPY IP CONSULT     Code Status   Full Code    Time Spent on this Encounter   I, Dulce Wilkes, personally saw the patient today and spent greater than 30 minutes discharging this patient.       Dulce Wilkes  Internal Medicine Staff Hospitalist Service  [facility]  Pager: [phone]  ______________________________________________________________________    Physical Exam   Vital Signs: Temp: 96  F (35.6  C) Temp src: Oral BP: 114/57 Pulse: 69   Resp: 18 SpO2: 94 % O2 Device: None (Room air)    Weight: 320 lbs 0 oz    General Appearance: Alert and oriented x 3, NAD  Respiratory: Lungs CTAB, no wheezing or crackles  Cardiovascular: RRR, no murmurs or gallops  GI: Abdomen rotund, soft and non distended, non tender to palpation  Skin: warm and dry to touch, no rashes or excoriations  Other: 3+ bilateral LE edema    Significant Results and Procedures   Most Recent 3 CBC's:  Recent Labs   Lab Test  11/06/17   0744  11/05/17   1358  11/05/17   0903  11/04/17   2035   WBC  3.7*   --   4.2  5.0   HGB  11.3*  11.7*  11.3*  12.3*   MCV  92   --   92  92   PLT  72*   --   85*  77*       Pending Results   These results will be followed up by PCP  Unresulted Labs Ordered in the Past 30 Days of this Admission     Date and Time Order Name Status Description    11/5/2017 0251 Fluid Culture Aerobic Bacterial Preliminary     10/27/2017 0705 CBC with platelets In process     10/27/2017 0705 Comprehensive metabolic panel In process              Primary Care Physician   Ary Murphy    Discharge Disposition   Discharged to home  Condition at discharge: Stable    Discharge Orders     Lymphedema Clinic Referral     Physical Therapy Referral     Occupational Therapy Referral     Reason for your hospital stay   You were  hospitalized due to confusion and generalized weakness following a recent TIPS procedure. You were started on lactulose and your mental status improved.     Adult [facility] Follow-up and recommended labs and tests   Follow up with primary care provider, Ary Murphy, within 7 days for hospital follow- up.  The following labs/tests are recommended: BG. Please adjust BG regimen as needed.    Follow up with Lymphedema clinic.      Appointments on [place] and/or [facility] (with [facility] or [facility] provider or service). Call [phone] if you haven't heard regarding these appointments within 7 days of discharge.     Activity   Your activity upon discharge: activity as tolerated     Monitor and record   blood glucose 4 times a day, before meals and at bedtime     When to contact your care team   Call your primary doctor if you have any of the following: temperature greater than 102 or less than 95, chest pain, shortness of breath, difficulty breathing, increased confusion or weakness.     Discharge Instructions   We decreased your dose of insulin from 40 units every morning to 20 units as you have had decreased oral intake recently and your sugars were running low on admission. We also discontinued your glipizide and meal time insulin. Please check your blood glucose with meals and at bedtime, record your results and present them at your follow up appointment with your PCP. If sugars are not in range adjustments can be made at that visit.     Full Code     Diet   Follow this diet upon discharge: Orders Placed This Encounter     2 Gram Sodium Diet       Discharge Medications   Current Discharge Medication List      START taking these medications    Details   lactulose (CHRONULAC) 10 GM/15ML solution 30 mLs (20 g) by Oral or NG Tube route 3 times daily  Qty: 473 mL, Refills: 0    Associated Diagnoses: Hepatic encephalopathy (H)         CONTINUE these medications which have CHANGED    Details   insulin glargine  (LANTUS SOLOSTAR) 100 UNIT/ML injection Inject 20 Units Subcutaneous every morning  Qty: 30 mL, Refills: 1    Associated Diagnoses: Type 2 diabetes mellitus with other oral complication, unspecified long term insulin use status (H)         CONTINUE these medications which have NOT CHANGED    Details   !! ondansetron (ZOFRAN) 4 MG tablet Take 1 tablet (4 mg) by mouth every 8 hours as needed for nausea (4 mg by mouth as needed for Nausea)  Qty: 30 tablet, Refills: 0    Associated Diagnoses: S/P TIPS (transjugular intrahepatic portosystemic shunt); Nausea      oxyCODONE (ROXICODONE) 5 MG IR tablet Take 1 tablet (5 mg) by mouth every 6 hours as needed for moderate to severe pain maximum 6 tablet(s) per day  Qty: 18 tablet, Refills: 0    Associated Diagnoses: Alcoholic cirrhosis of liver with ascites (H)      !! insulin pen needle (B-D U/F) 31G X 8 MM Use 6 times daily or as directed  Qty: 600 each, Refills: 1    Associated Diagnoses: Type 2 diabetes, HbA1c goal < 7% (H)      !! order for DME Orthotic diabetic shoes -1 pair  Qty: 1 each, Refills: 0    Associated Diagnoses: Type 2 diabetes, HbA1c goal < 7% (H)      calcium-vitamin D (CALTRATE) 600-400 MG-UNIT per tablet Take 1 tablet by mouth daily  Qty: 60 tablet, Refills: 11    Associated Diagnoses: Morbid obesity due to excess calories (H); Alcoholic cirrhosis of liver with ascites (H); Portal hypertension (H); Secondary esophageal varices without bleeding (H); Tobacco use disorder      blood glucose monitoring (NO BRAND SPECIFIED) meter device kit Use to test blood sugar 4 X  times daily or as directed.  Qty: 1 kit, Refills: 0    Associated Diagnoses: Type 2 diabetes mellitus with other specified complication (H)      propranolol (INDERAL) 10 MG tablet Take 1 tablet (10 mg) by mouth 2 times daily  Qty: 180 tablet, Refills: 3    Associated Diagnoses: Portal hypertension (H)      spironolactone (ALDACTONE) 50 MG tablet Takes 3 tablets in am.and p.m.  Qty: 540 tablet,  Refills: 6    Associated Diagnoses: Portal hypertension (H); Generalized edema      aspirin 81 MG tablet Take 1 tablet (81 mg) by mouth daily  Qty: 30 tablet, Refills: 11    Associated Diagnoses: Type 2 diabetes mellitus with other skin complications      OYSCO 500 + D 500-200 MG-UNIT TABS Take 500 mg by mouth daily  Qty: 200 tablet, Refills: 3    Associated Diagnoses: Morbid obesity due to excess calories (H); Alcoholic cirrhosis of liver with ascites (H); Portal hypertension (H); Secondary esophageal varices without bleeding (H); Tobacco use disorder      magnesium oxide (MAG-OX) 400 (241.3 MG) MG tablet Take 1 tablet (400 mg) by mouth daily  Qty: 90 tablet, Refills: 3    Associated Diagnoses: Cramp of limb      Cholecalciferol (VITAMIN D3) 2000 UNITS CAPS Take 1 capsule by mouth daily  Qty: 90 capsule, Refills: 3    Associated Diagnoses: Mild major depression (H)      !! insulin pen needle (B-D U/F) 31G X 8 MM USE  6 times daily / OR AS DIRECTED  Qty: 300 each, Refills: 3    Associated Diagnoses: Type 2 diabetes, HbA1c goal < 7% (H)      pantoprazole (PROTONIX) 40 MG EC tablet Take 1 tablet (40 mg) by mouth daily  Qty: 90 tablet, Refills: 3    Associated Diagnoses: Morbid obesity due to excess calories (H); Alcoholic cirrhosis of liver with ascites (H); Portal hypertension (H); Secondary esophageal varices without bleeding (H); Tobacco use disorder      blood glucose monitoring (NO BRAND SPECIFIED) test strip Use to test blood sugars 4 X  times daily or as directed  Qty: 200 strip, Refills: 3    Associated Diagnoses: Diabetes mellitus, type 2 (H)      blood glucose (NO BRAND SPECIFIED) lancets standard Use to test blood sugar 4 X  times daily or as directed.  Qty: 1 Box, Refills: 3    Associated Diagnoses: Diabetes mellitus, type 2 (H)      desvenlafaxine succinate ER (PRISTIQ) 100 MG 24 hr tablet Take 2 tablets (200 mg) by mouth daily  Qty: 180 tablet, Refills: 3    Associated Diagnoses: Other depression       ranitidine (ZANTAC) 150 MG tablet Take 1 tablet (150 mg) by mouth 2 times daily  Qty: 180 tablet, Refills: 3    Associated Diagnoses: Esophageal varices (H)      Cyanocobalamin (VITAMIN B-12 PO) Take 1 tablet by mouth daily      STATIN NOT PRESCRIBED, INTENTIONAL, 1 each daily Statin not prescribed intentionally due to Active liver disease  Qty: 0 each, Refills: 0    Associated Diagnoses: Hyperlipidemia LDL goal <100      !! ondansetron (ZOFRAN) 4 MG tablet Take 1 tablet (4 mg) by mouth every 8 hours as needed for nausea  Qty: 18 tablet, Refills: 1    Associated Diagnoses: Alcoholic cirrhosis (H); Nausea      !! ORDER FOR DME Equipment being ordered: carpal tunnel wrist splint.  Qty: 1 Units, Refills: 0    Associated Diagnoses: CTS (carpal tunnel syndrome)       !! - Potential duplicate medications found. Please discuss with provider.      STOP taking these medications       glipiZIDE (GLIPIZIDE XL) 5 MG 24 hr tablet Comments:   Reason for Stopping:         furosemide (LASIX) 20 MG tablet Comments:   Reason for Stopping:         insulin aspart (NOVOLOG FLEXPEN) 100 UNIT/ML injection Comments:   Reason for Stopping:             Allergies   No Known Allergies

## 2017-11-06 NOTE — CONSULTS
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     Recommendations  -Would decrease diuretics at time of d/c to 1/2 previous dose and have patient check labs with PCP in one week  -Continue scheduled paracenteses as previous  -Agree with lactulose therapy at d/c    Gastroenterology outpatient follow up recommendations: f/u with PCP, patient knows to contact hepatology or PCP clinic with questions regarding ongoing diuretic dosing    Thank you for involving us in this patient's care. Please do not hesitate to contact the GI service with any questions or concerns.     Pt care plan discussed with Dr. Simmons, GI staff physician.    Matty Egan  -------------------------------------------------------------------------------------------------------------------          Chief Complaint:   We were asked by [facility] medicine to evaluate this patient with encephalopathy    History is obtained from the patient and the medical record.          History of Present Illness:   Lawrence Louie is a 61 year old male with a history of SMITH cirrhosis with diuretic refractory ascites s/p TIPS 10/27 who presented with nausea, fatigue, generalized weakness and vague abdominal pain and was felt to be encephalopathic from a combination of post-TIPS encephalopathy and dehydration and constipation. He has been treated with lactulose, has a diagnostic tap that does not show SBP and has a TIPS US that shows improvement in his ascites. He was treated with lactulose and has had several bowel movements with total relief of the pain. He has no other  complaints.            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)      Splenomegaly      Squamous cell carcinoma      Thrombocytopenia (H)      Varices, esophageal (H)     Banded in 2011            Past Surgical History:   Reviewed and edited as appropriate   Past Surgical History:   Procedure Laterality Date     BIOPSY OF SKIN LESION       COLONOSCOPY Left 6/16/2016    Procedure: COMBINED COLONOSCOPY, SINGLE OR MULTIPLE BIOPSY/POLYPECTOMY BY BIOPSY;  Surgeon: Brandy Barnett MD;  Location:  GI     ESOPHAGOSCOPY, GASTROSCOPY, DUODENOSCOPY (EGD), COMBINED  2/13/2013    Procedure: COMBINED ESOPHAGOSCOPY, GASTROSCOPY, DUODENOSCOPY (EGD);;  Surgeon: Tara Cook MD;  Location:  GI     ESOPHAGOSCOPY, GASTROSCOPY, DUODENOSCOPY (EGD), COMBINED  11/4/2013    Procedure: COMBINED ESOPHAGOSCOPY, GASTROSCOPY, DUODENOSCOPY (EGD);;  Surgeon: Lonny Diaz MD;  Location:  GI     ESOPHAGOSCOPY, GASTROSCOPY, DUODENOSCOPY (EGD), COMBINED Left 6/16/2016    Procedure: COMBINED ESOPHAGOSCOPY, GASTROSCOPY, DUODENOSCOPY (EGD), BIOPSY SINGLE OR MULTIPLE;  Surgeon: Brandy Barnett MD;  Location:  GI     EXCISE LESION TRUNK  9/24/2012    Procedure: EXCISE LESION TRUNK;;  Surgeon: Pepe Dominguez MD;  Location: [facility]     GENITOURINARY SURGERY      vasectomy     HERNIORRHAPHY UMBILICAL  9/24/2012    Procedure: HERNIORRHAPHY UMBILICAL;  UMBILICAL HERNIA REPAIR , EXCISION OF PERIUMBILICAL CYST;  Surgeon: Pepe Dominguez MD;  Location: [facility]            Previous Endoscopy:     Results for orders placed or performed in visit on 04/28/16   COLONOSCOPY   Result Value Ref Range    COLONOSCOPY       [address]  [place] [address] [phone]     Endoscopy Department  _______________________________________________________________________________  Patient Name: Lawrence Louie       Procedure Date: 6/16/2016 11:03 AM  MRN: [id number]                       Account Number: [id number]  YOB: 1955             Admit Type: Outpatient  Age: 60                                Gender: Male  Note Status: Finalized                Attending MD: Brandy Barnett MD  Pause for the Cause: pause for cause done   _______________________________________________________________________________     Procedure:           Colonoscopy  Indications:         Surveillance: Personal history of adenomatous polyps on                        last colonoscopy 3 years ago, High risk colon cancer                        surveillance: Personal history of traditional serrated                        adenoma of the colon  Providers:           Brandy Barnett MD, Jareth Villavicencio RN  Patient Profile:     Mr. Louie is a 59 year old male with cirrhosis 2/2                        EtOH/?SMITH, DMT2, morbid obesity with prior esophageal                        varices s/p banding who presents for colonoscopy for                        follow-up of multiple tubular adenomas and a sessile                        serrated adenoma seen on his last colonoscopy 3 years                        ago.  Referring MD:        Ary Murphy, ROBB  Complications:       No immediate complications.  _______________________________________________________________________________  Procedure:           Pre-Anesthesia Assessment:                       - See the other procedure note for documentation of the                        pre-procedure assessment.                       After obtaining informed consent, the colonoscope was                        passed under direct vision. Throughout the procedure,                        the patient's blood  pressure, pul se, and oxygen                        saturations were monitored continuously. The Colonoscope                        was introduced through the anus with the intention of                        advancing to the cecum. The scope was advanced to the                        ascending colon before the procedure was aborted due to                        reaching solid stool which could not be cleared.                        Medications were given. The colonoscopy was performed                        without difficulty. The patient tolerated the procedure                        well. The quality of the bowel preparation was evaluated                        using the BBPS (Clyde Bowel Preparation Scale) with                        scores of: Right Colon = 1 (portion of mucosa seen, but                        other areas not well seen due to staining, residual                        stool and/or opaque liquid), Transverse Colon = 1                        (portion of mucosa seen, but other are as not well seen                        due to staining, residual stool and/or opaque liquid)                        and Left Colon = 2 (minor amount of residual staining,                        small fragments of stool and/or opaque liquid, but                        mucosa seen well). The total BBPS score equals 4. The                        quality of the bowel preparation was inadequate.                                                                                   Findings:       The perianal and digital rectal examinations were normal.       Semi-solid stool was found in the entire colon, interfering with        visualization. Lavage of the area was performed, resulting in incomplete        clearance with continued poor visualization.       A semi-sessile polyp was found in the transverse colon. The polyp was 8        mm in size. The polyp was removed with a hot snare. Resection and        retrieval were  complete. (Jar B)       A sessile polyp was found in the recto-sigmoid co candelaria. The polyp was 5 mm        in size. The polyp was removed with a hot snare. Resection and retrieval        were complete. (Jar C)       Four sessile polyps were found in the recto-sigmoid colon. The polyps        were 2 to 4 mm in size. These polyps were removed with a piecemeal        technique using a cold biopsy forceps. Resection and retrieval were        complete. (Jar C - all rectosigmoid polyps placed in the same jar).       Visualized portions of the mucosa in the entire colon appeared normal.       The retroflexed view of the distal rectum and anal verge was normal and        showed no anal or rectal abnormalities.                                                                                   Impression:          - Stool in the entire examined colon.                       - One 8 mm polyp in the transverse colon. Resected and                        retrieved.                       - One 5 mm polyp at the recto-sigmoid colon. Resected                        and retrieved .                       - Four 2 to 4 mm polyps at the recto-sigmoid colon.                        Resected and retrieved.                       - Normal mucosa in the entire examined colon.                       - The distal rectum and anal verge are normal on                        retroflexion view.  Recommendation:      - Await pathology results.                       - Repeat colonoscopy at the next available appointment                        because the bowel preparation was poor. Recommend                        patient undergo a two-day prep (2 gallons of Go-lytely)                        prior to his next colonoscopy.                       - Return to previous diet.                       - Discharge patient to home.                                                                                     Brandy Barnett  MD  _______________________  Brandy Barnett MD  6/16/2016 12:37 PM  Number of Addenda: 0    Note Initiated On: 6/16/2016 11:03 AM              Social History:   Reviewed and edited as appropriate  Social History     Social History     Marital status:      Spouse name: N/A     Number of children: N/A     Years of education: N/A     Occupational History     Not on file.     Social History Main Topics     Smoking status: Current Every Day Smoker     Packs/day: 0.30     Types: Cigarettes     Smokeless tobacco: Never Used      Comment: 43 yr     Alcohol use No      Comment: 2-3 per day , none since Dec 2012     Drug use: No     Sexual activity: Yes     Partners: Female     Birth control/ protection: Surgical     Other Topics Concern      Service No     Blood Transfusions No     Caffeine Concern No     very little coffee, likes beer     Occupational Exposure No     Hobby Hazards No     Sleep Concern Yes     bed at 3:30 AM, up at 1:00 PM     Stress Concern Yes     Weight Concern Yes     Special Diet No     Back Care No     Exercise No     Bike Helmet No     Seat Belt Yes     Self-Exams No     Social History Narrative    . 3 grown daughters. He has been sober since 2012.            Family History:   Reviewed and edited as appropriate  Family History   Problem Relation Age of Onset     Breast Cancer Mother      Liver Cancer Mother      Cardiovascular Father      CEREBROVASCULAR DISEASE Father      very low blood pressure     CANCER Father      rectal cancer     Cardiovascular Paternal Grandfather      DIABETES Brother      CANCER Sister      skin cancer     C.A.D. Other      MI, 70's     Breast Cancer Sister      Thyroid Disease No family hx of      Lipids No family hx of      Anesthesia Reaction No family hx of         No known history of colorectal cancer, liver disease, or inflammatory bowel disease.       Allergies:   Reviewed and edited as appropriate   No Known Allergies         Medications:      Prescriptions Prior to Admission   Medication Sig Dispense Refill Last Dose     ondansetron (ZOFRAN) 4 MG tablet Take 1 tablet (4 mg) by mouth every 8 hours as needed for nausea (4 mg by mouth as needed for Nausea) 30 tablet 0 11/4/2017 at Unknown time     oxyCODONE (ROXICODONE) 5 MG IR tablet Take 1 tablet (5 mg) by mouth every 6 hours as needed for moderate to severe pain maximum 6 tablet(s) per day 18 tablet 0 Past Month at Unknown time     insulin pen needle (B-D U/F) 31G X 8 MM Use 6 times daily or as directed 600 each 1 11/4/2017 at Unknown time     order for DME Orthotic diabetic shoes -1 pair 1 each 0 11/4/2017 at Unknown time     calcium-vitamin D (CALTRATE) 600-400 MG-UNIT per tablet Take 1 tablet by mouth daily 60 tablet 11 11/3/2017 at Unknown time     blood glucose monitoring (NO BRAND SPECIFIED) meter device kit Use to test blood sugar 4 X  times daily or as directed. 1 kit 0 11/4/2017 at Unknown time     propranolol (INDERAL) 10 MG tablet Take 1 tablet (10 mg) by mouth 2 times daily 180 tablet 3 11/4/2017 at Unknown time     aspirin 81 MG tablet Take 1 tablet (81 mg) by mouth daily 30 tablet 11 11/3/2017 at Unknown time     OYSCO 500 + D 500-200 MG-UNIT TABS Take 500 mg by mouth daily 200 tablet 3 11/4/2017 at Unknown time     magnesium oxide (MAG-OX) 400 (241.3 MG) MG tablet Take 1 tablet (400 mg) by mouth daily 90 tablet 3 11/3/2017 at Unknown time     Cholecalciferol (VITAMIN D3) 2000 UNITS CAPS Take 1 capsule by mouth daily 90 capsule 3 11/3/2017 at Unknown time     insulin pen needle (B-D U/F) 31G X 8 MM USE  6 times daily / OR AS DIRECTED 300 each 3 11/4/2017 at Unknown time     pantoprazole (PROTONIX) 40 MG EC tablet Take 1 tablet (40 mg) by mouth daily 90 tablet 3 11/4/2017 at Unknown time     blood glucose monitoring (NO BRAND SPECIFIED) test strip Use to test blood sugars 4 X  times daily or as directed 200 strip 3 11/4/2017 at Unknown time     blood glucose (NO BRAND SPECIFIED) lancets  "standard Use to test blood sugar 4 X  times daily or as directed. 1 Box 3 11/4/2017 at Unknown time     desvenlafaxine succinate ER (PRISTIQ) 100 MG 24 hr tablet Take 2 tablets (200 mg) by mouth daily 180 tablet 3 11/4/2017 at Unknown time     ranitidine (ZANTAC) 150 MG tablet Take 1 tablet (150 mg) by mouth 2 times daily 180 tablet 3 11/4/2017 at Unknown time     Cyanocobalamin (VITAMIN B-12 PO) Take 1 tablet by mouth daily   11/3/2017 at Unknown time     STATIN NOT PRESCRIBED, INTENTIONAL, 1 each daily Statin not prescribed intentionally due to Active liver disease 0 each 0 11/4/2017 at Unknown time     ondansetron (ZOFRAN) 4 MG tablet Take 1 tablet (4 mg) by mouth every 8 hours as needed for nausea 18 tablet 1 11/4/2017 at Unknown time     ORDER FOR DME Equipment being ordered: carpal tunnel wrist splint. 1 Units 0 11/4/2017 at Unknown time     [DISCONTINUED] glipiZIDE (GLIPIZIDE XL) 5 MG 24 hr tablet Take 1 tablet (5 mg) by mouth 2 times daily 180 tablet 1 11/4/2017 at Unknown time     [DISCONTINUED] furosemide (LASIX) 20 MG tablet Take 3 tablets (60 mg) by mouth 2 times daily 180 tablet 1 11/3/2017 at Unknown time     [DISCONTINUED] insulin aspart (NOVOLOG FLEXPEN) 100 UNIT/ML injection 20 units before breakfast, 20 units before lunch, 20 units before dinner, 20 units before snacks 30 mL 3 11/3/2017 at Unknown time             Review of Systems:   A complete review of systems was performed and is negative except as noted in the HPI           Physical Exam:   /65 (BP Location: Right arm)  Pulse 69  Temp 95.5  F (35.3  C) (Oral)  Resp 20  Ht 1.88 m (6' 2\")  Wt (!) 145.2 kg (320 lb)  SpO2 96%  BMI 41.09 kg/m2  Wt:   Wt Readings from Last 2 Encounters:   11/05/17 (!) 145.2 kg (320 lb)   10/27/17 (!) 144.2 kg (317 lb 14.5 oz)      Constitutional: cooperative, pleasant, not dyspneic/diaphoretic, no acute distress  Eyes: Sclera anicteric/injected  Ears/nose/mouth/throat: Normal oropharynx without ulcers " or exudate, mucus membranes moist, hearing intact  Neck: supple, thyroid normal size  CV: 3+ LE edema  Respiratory: Unlabored breathing  Lymph: No axillary, submandibular, supraclavicular or inguinal lymphadenopathy  Abd: No readily discernable umbilical hernia. Nondistended, +bs, no hepatosplenomegaly, nontender, no peritoneal signs  Skin: warm, perfused, no jaundice  Neuro: AAO x 3, No asterixis  Psych: Normal affect  MSK: No gross deformities         Data:   Labs and imaging below were independently reviewed and interpreted    BMP  Recent Labs  Lab 11/06/17 0744 11/05/17 0903 11/04/17 2035    143 141   POTASSIUM 3.7 3.6 3.9   CHLORIDE 113* 111* 108   ERIN 7.9* 8.1* 8.3*   CO2 24 24 28   BUN 10 9 9   CR 0.71 0.68 0.79   GLC 93 91 99     CBC  Recent Labs  Lab 11/06/17 0744 11/05/17 0903 11/04/17 2035   WBC 3.7*  --  4.2 5.0   RBC 3.91*  --  3.87* 4.20*   HGB 11.3*  < > 11.3* 12.3*   HCT 35.9*  --  35.6* 38.5*   MCV 92  --  92 92   MCH 28.9  --  29.2 29.3   MCHC 31.5  --  31.7 31.9   RDW 17.9*  --  17.8* 17.2*   PLT 72*  --  85* 77*   < > = values in this interval not displayed.  INR  Recent Labs  Lab 11/06/17 0744 11/05/17 0903   INR 1.30* 1.30*     LFTs  Recent Labs  Lab 11/06/17 0744 11/05/17 0903 11/04/17 2035   ALKPHOS 131 142 174*   AST 50* 54* 62*   ALT 76* 91* 110*   BILITOTAL 2.0* 1.9* 1.7*   PROTTOTAL 5.5* 5.6* 6.2*   ALBUMIN 2.4* 2.5* 2.8*      PANCNo lab results found in last 7 days.    Imaging: TIPS US reviewed    ATTENDING NOTE, GASTROENTEROLOGY/HEPATOLOGY    I saw and discussed this patient with the fellow and participated in the decision making. I agree with the fellow's note. Meghna Simmons MD

## 2017-11-06 NOTE — PROGRESS NOTES
Care Coordinator Progress Note     Admission Date/Time:  11/4/2017  Attending MD:  Tommy Man MD     Data  Chart reviewed, discussed with interdisciplinary team.   Patient was admitted for:    Generalized muscle weakness  Nausea  Anorexia  S/P TIPS (transjugular intrahepatic portosystemic shunt)  Type II diabetes mellitus with peripheral circulatory disorder (H)  Alcoholic cirrhosis of liver without ascites (H).    Concerns with insurance coverage for discharge needs: None.  Current Living Situation: Patient lives with spouse.  Support System: Supportive and Involved  Services Involved: none  Transportation: Family or Friend will provide  Barriers to Discharge: chronically ill and pending medical clearance    Coordination of Care and Referrals: Provided patient/family with options for Outpatient Rehab.        Assessment  Spoke with patient at bedside.  Introduced RNCC role and discharge planning.  Patient lives with his wife in a one level home.  He has grab bars installed in the shower and feels he does well within the home.  PT recommends OP PT and lymphedema therapy.  Patient is agreeable to this recommendation and will use [facility] system if something is close to home for him.  Order placed.  Wife will provide transportation upon discharge.     Plan  Anticipated Discharge Date:  tomorrow  Anticipated Discharge Plan:  Home with OP PT and lymphedema wraps    Shanel Alvarado RN, BSN  Care Coordinator [facility]  Pager: [phone]  Phone: [phone]

## 2017-11-06 NOTE — PLAN OF CARE
Problem: Patient Care Overview  Goal: Plan of Care/Patient Progress Review  Observation Goals:  -mental status returned to baseline: Met, pt A/Ox4, able to make needs known  -diagnostic tests and consults completed and resulted: Partially met  -vital signs normal or at patient baseline: Met  -tolerating oral intake to maintain hydration: Partially met   -safe disposition plan has been identified: Not met

## 2017-11-06 NOTE — PLAN OF CARE
Problem: Patient Care Overview  Goal: Plan of Care/Patient Progress Review  Observation Goals:  -mental status returned to baseline: Met, pt A/Ox4, able to make needs known  -diagnostic tests and consults completed and resulted: Partially met  -vital signs normal or at patient baseline: Met  -tolerating oral intake to maintain hydration: Met  -safe disposition plan has been identified: Met

## 2017-11-07 ENCOUNTER — CARE COORDINATION (OUTPATIENT)
Dept: CARE COORDINATION | Facility: CLINIC | Age: 62
End: 2017-11-07

## 2017-11-08 ENCOUNTER — OFFICE VISIT (OUTPATIENT)
Dept: INFUSION THERAPY | Facility: CLINIC | Age: 62
End: 2017-11-08
Attending: INTERNAL MEDICINE
Payer: COMMERCIAL

## 2017-11-08 ENCOUNTER — RADIANT APPOINTMENT (OUTPATIENT)
Dept: ULTRASOUND IMAGING | Facility: CLINIC | Age: 62
End: 2017-11-08
Attending: INTERNAL MEDICINE
Payer: COMMERCIAL

## 2017-11-08 VITALS
BODY MASS INDEX: 41.59 KG/M2 | HEART RATE: 58 BPM | SYSTOLIC BLOOD PRESSURE: 121 MMHG | OXYGEN SATURATION: 99 % | TEMPERATURE: 98.2 F | DIASTOLIC BLOOD PRESSURE: 58 MMHG | WEIGHT: 315 LBS

## 2017-11-08 DIAGNOSIS — K70.31 ALCOHOLIC CIRRHOSIS OF LIVER WITH ASCITES (H): Primary | ICD-10-CM

## 2017-11-08 PROCEDURE — 25000125 ZZHC RX 250: Mod: ZF | Performed by: INTERNAL MEDICINE

## 2017-11-08 PROCEDURE — 27210190 US PARACENTESIS

## 2017-11-08 PROCEDURE — P9047 ALBUMIN (HUMAN), 25%, 50ML: HCPCS | Mod: ZF | Performed by: INTERNAL MEDICINE

## 2017-11-08 PROCEDURE — 25000128 H RX IP 250 OP 636: Mod: ZF | Performed by: INTERNAL MEDICINE

## 2017-11-08 RX ORDER — ALBUMIN (HUMAN) 12.5 G/50ML
12.5 SOLUTION INTRAVENOUS 4 TIMES DAILY PRN
Status: CANCELLED
Start: 2017-11-08

## 2017-11-08 RX ORDER — ALBUMIN (HUMAN) 12.5 G/50ML
12.5 SOLUTION INTRAVENOUS 4 TIMES DAILY PRN
Status: DISCONTINUED | OUTPATIENT
Start: 2017-11-08 | End: 2017-11-08 | Stop reason: HOSPADM

## 2017-11-08 RX ADMIN — LIDOCAINE HYDROCHLORIDE 20 ML: 10 INJECTION, SOLUTION INFILTRATION; PERINEURAL at 12:47

## 2017-11-08 RX ADMIN — ALBUMIN HUMAN 12.5 G: 0.25 SOLUTION INTRAVENOUS at 12:55

## 2017-11-08 RX ADMIN — ALBUMIN HUMAN 12.5 G: 0.25 SOLUTION INTRAVENOUS at 13:01

## 2017-11-08 RX ADMIN — ALBUMIN HUMAN 12.5 G: 0.25 SOLUTION INTRAVENOUS at 13:18

## 2017-11-08 NOTE — MR AVS SNAPSHOT
After Visit Summary   11/8/2017    Lawrence Louie    MRN: [id number]           Patient Information     Date Of Birth          1955        Visit Information        Provider Department      11/8/2017 12:00 PM Vipin Fisher PA-C; UC 39 ATC [facility]        Today's Diagnoses     Alcoholic cirrhosis of liver with ascites (H)    -  1      Care Instructions    Dear Lawrence Louie    Thank you for choosing [facility] ([facility]) for your infusion.  The following information is a summary of our appointment as well as important reminders.      Additional information: you had a paracentesis today for 2.8 liters removed & received 37.5 grams Albumin     We look forward in seeing you on your next appointment here at [facility].  Please don t hesitate to call us at [phone] to reschedule any of your appointments or to speak with one of the [facility] registered nurses.  It was a pleasure taking care of you today.    Sincerely,    [facility]  [facility]  [address] [place] [address]  [address]  Phone:  [phone]          Follow-ups after your visit        Your next 10 appointments already scheduled     Nov 15, 2017 12:00 PM CST   Paracentesis Visit with Uc Spec Inf Para Provider, UC 40 ATC   [facility] ([facility])    [address] [place] [id number] [phone]            Nov 22, 2017 12:00 PM CST   Paracentesis Visit with Uc Spec Inf Para Provider, UC 40 ATC   [facility] ([facility])    [address] [place] [address] [phone]            Nov 28, 2017  1:00 PM CST   (Arrive by 12:45 PM)   Return Visit with Ary Murphy  APRN CNP   [facility] ([facility])    [address]  4th Floor  [facility] [id number] [phone]            Nov 29, 2017 12:00 PM CST   Paracentesis Visit with Uc Spec Inf Para Provider, UC 40 ATC   [facility] ([facility])    [address]  2nd Floor  [facility] [id number] [phone]            Dec 06, 2017 12:00 PM CST   Paracentesis Visit with Uc Spec Inf Para Provider, UC 40 ATC   [facility] ([facility])    [address]  2nd Floor  [facility] [id number] [phone]            Dec 13, 2017 12:00 PM CST   Paracentesis Visit with Uc Spec Inf Para Provider   [facility] ([facility])    [address]  2nd Floor  [facility] [id number] [phone]              Who to contact     If you have questions or need follow up information about today's clinic visit or your schedule please contact [facility] directly at [phone].  Normal or non-critical lab and imaging results will be communicated to you by Vendor Registry[employer], letter or phone within 4 business days after the clinic has received the results. If you do not hear from us within 7 days, please contact the clinic through V[employer] Registryhart or phone. If you have a critical or abnormal lab result, we will notify you by phone as soon as possible.  Submit refill requests through On-Q-ity or call your pharmacy and they will forward the refill request to us. Please allow 3 business days for your refill to be completed.          Additional Information About Your Visit        On-Q-ity Information     On-[employer]-ity gives you secure access to your electronic health record. If you see a primary care provider, you can also send messages to your  care team and make appointments. If you have questions, please call your primary care clinic.  If you do not have a primary care provider, please call [phone] and they will assist you.        Care EveryWhere ID     This is your Care EveryWhere ID. This could be used by other organizations to access your [facility] medical records  [id number]        Your Vitals Were     Pulse Temperature Pulse Oximetry BMI (Body Mass Index)          58 98.2  F (36.8  C) 99% 41.59 kg/m2         Blood Pressure from Last 3 Encounters:   11/08/17 121/58   11/06/17 118/65   10/27/17 115/54    Weight from Last 3 Encounters:   11/08/17 (!) 146.9 kg (323 lb 14.4 oz)   11/05/17 (!) 145.2 kg (320 lb)   10/27/17 (!) 144.2 kg (317 lb 14.5 oz)              We Performed the Following     US Paracentesis        Primary Care Provider Office Phone # Fax #    Ary JACKSON Katherine, APRN [phone]       [address] [place] [address] [address] [place] [address]        Equal Access to Services     [facility] : Hadii aad ku hadasho Sodonnaali, waaxda luqadaha, qaybta kaalmada adesarahyada, silvia richardson . [facility] [phone].    ATENCIÓN: Si habla español, tiene a rondon disposición servicios gratuitos de asistencia lingüística. [facility] [phone].    We comply with applicable federal civil rights laws and [place] laws. We do not discriminate on the basis of race, color, national origin, age, disability, sex, sexual orientation, or gender identity.            Thank you!     Thank you for choosing [facility] PROCED[facility]  for your care. Our goal is always to provide you with excellent care. Hearing back from our patients is one way we can continue to improve our services. Please take a few minutes to complete the written survey that you may receive in the mail after your visit with us. Thank you!             Your Updated Medication List - Protect others around you: Learn how to safely  use, store and throw away your medicines at www.disposemymeds.org.          This list is accurate as of: 11/8/17  1:30 PM.  Always use your most recent med list.                   Brand Name Dispense Instructions for use Diagnosis    aspirin 81 MG tablet     30 tablet    Take 1 tablet (81 mg) by mouth daily    Type 2 diabetes mellitus with other skin complications       blood glucose lancets standard    no brand specified    1 Box    Use to test blood sugar 4 X  times daily or as directed.    Diabetes mellitus, type 2 (H)       blood glucose monitoring meter device kit    no brand specified    1 kit    Use to test blood sugar 4 X  times daily or as directed.    Type 2 diabetes mellitus with other specified complication (H)       blood glucose monitoring test strip    no brand specified    200 strip    Use to test blood sugars 4 X  times daily or as directed    Diabetes mellitus, type 2 (H)       calcium-vitamin D 600-400 MG-UNIT per tablet    CALTRATE    60 tablet    Take 1 tablet by mouth daily    Morbid obesity due to excess calories (H), Alcoholic cirrhosis of liver with ascites (H), Portal hypertension (H), Secondary esophageal varices without bleeding (H), Tobacco use disorder       desvenlafaxine succinate 100 MG 24 hr tablet    PRISTIQ    180 tablet    Take 2 tablets (200 mg) by mouth daily    Other depression       furosemide 20 MG tablet    LASIX    30 tablet    Take 1 tablet (20 mg) by mouth 2 times daily        insulin glargine 100 UNIT/ML injection    LANTUS SOLOSTAR    30 mL    Inject 20 Units Subcutaneous every morning    Type 2 diabetes mellitus with other oral complication, unspecified long term insulin use status (H)       * insulin pen needle 31G X 8 MM    B-D U/F    300 each    USE  6 times daily / OR AS DIRECTED    Type 2 diabetes, HbA1c goal < 7% (H)       * insulin pen needle 31G X 8 MM    B-D U/F    600 each    Use 6 times daily or as directed    Type 2 diabetes, HbA1c goal < 7% (H)        lactulose 10 GM/15ML solution    CHRONULAC    473 mL    30 mLs (20 g) by Oral or NG Tube route 3 times daily    Hepatic encephalopathy (H)       magnesium oxide 400 (241.3 MG) MG tablet    MAG-OX    90 tablet    Take 1 tablet (400 mg) by mouth daily    Cramp of limb       * ondansetron 4 MG tablet    ZOFRAN    18 tablet    Take 1 tablet (4 mg) by mouth every 8 hours as needed for nausea    Alcoholic cirrhosis (H), Nausea       * ondansetron 4 MG tablet    ZOFRAN    30 tablet    Take 1 tablet (4 mg) by mouth every 8 hours as needed for nausea (4 mg by mouth as needed for Nausea)    S/P TIPS (transjugular intrahepatic portosystemic shunt), Nausea       order for DME     1 Units    Equipment being ordered: carpal tunnel wrist splint.    CTS (carpal tunnel syndrome)       order for DME     1 each    Orthotic diabetic shoes -1 pair    Type 2 diabetes, HbA1c goal < 7% (H)       oxyCODONE IR 5 MG tablet    ROXICODONE    18 tablet    Take 1 tablet (5 mg) by mouth every 6 hours as needed for moderate to severe pain maximum 6 tablet(s) per day    Alcoholic cirrhosis of liver with ascites (H)       OYSCO 500 + D 500-200 MG-UNIT Tabs   Generic drug:  Calcium Carb-Cholecalciferol     200 tablet    Take 500 mg by mouth daily    Morbid obesity due to excess calories (H), Alcoholic cirrhosis of liver with ascites (H), Portal hypertension (H), Secondary esophageal varices without bleeding (H), Tobacco use disorder       pantoprazole 40 MG EC tablet    PROTONIX    90 tablet    Take 1 tablet (40 mg) by mouth daily    Morbid obesity due to excess calories (H), Alcoholic cirrhosis of liver with ascites (H), Portal hypertension (H), Secondary esophageal varices without bleeding (H), Tobacco use disorder       propranolol 10 MG tablet    INDERAL    180 tablet    Take 1 tablet (10 mg) by mouth 2 times daily    Portal hypertension (H)       ranitidine 150 MG tablet    ZANTAC    180 tablet    Take 1 tablet (150 mg) by mouth 2 times daily     Esophageal varices (H)       spironolactone 50 MG tablet    ALDACTONE    540 tablet    1 tablet (50 mg) 2 times daily Takes 3 tablets in am.and p.m.    Portal hypertension (H), Generalized edema       STATIN NOT PRESCRIBED (INTENTIONAL)     0 each    1 each daily Statin not prescribed intentionally due to Active liver disease    Hyperlipidemia LDL goal <100       VITAMIN B-12 PO      Take 1 tablet by mouth daily        vitamin D3 2000 UNITS Caps     90 capsule    Take 1 capsule by mouth daily    Mild major depression (H)       * Notice:  This list has 4 medication(s) that are the same as other medications prescribed for you. Read the directions carefully, and ask your doctor or other care provider to review them with you.

## 2017-11-08 NOTE — PROGRESS NOTES
Paracentesis Nursing Note  Lawrence Louie [facility].    During today's appointment orders from Dr. Simmons were completed.    Progress Note:  Patient identification verified by name and date of birth.  Assessment completed.  Vitals monitored throughout appointment and recorded in Doc Flowsheets.  See proceduralist note in ultrasound.    Date of consent or authorization: 8/30/2017.  Invasive Procedure Safety Checklist was completed and sent for scanning.     Paracentesis performed by Vipin Fisher PA-C Radiology.    The following labs were communicated to provider performing paracentesis:  Lab Results   Component Value Date    PLT 72 11/06/2017       Total amount of ascites fluid drained: 2.8 liters.  Color of ascites fluid: blood tinged.  Total amount of albumin given: 37.5  grams.    Patient tolerated procedure well.    Post procedure,denies pain or discomfort post paracentesis.      Discharge Plan:  Discharge instructions were reviewed with patient.  Patient/Representative verbalized understanding and all questions were answered.   [facility] in stable condition.    DAVID GONZALEZ RN        Temp 98.2  F (36.8  C)  SpO2 99%

## 2017-11-08 NOTE — PATIENT INSTRUCTIONS
Dear Lawrence Louie    Thank you for choosing [facility] ([facility]) for your infusion.  The following information is a summary of our appointment as well as important reminders.      Additional information: you had a paracentesis today for 2.8 liters removed & received 37.5 grams Albumin     We look forward in seeing you on your next appointment here at [facility].  Please don t hesitate to call us at [phone] to reschedule any of your appointments or to speak with one of the [facility] registered nurses.  It was a pleasure taking care of you today.    Sincerely,    [facility]  [facility]  [address] [place] [address]  [address]  Phone:  [phone]

## 2017-11-10 LAB
BACTERIA SPEC CULT: NO GROWTH
SPECIMEN SOURCE: NORMAL

## 2017-11-22 ENCOUNTER — RADIANT APPOINTMENT (OUTPATIENT)
Dept: ULTRASOUND IMAGING | Facility: CLINIC | Age: 62
End: 2017-11-22
Attending: INTERNAL MEDICINE
Payer: COMMERCIAL

## 2017-11-22 ENCOUNTER — OFFICE VISIT (OUTPATIENT)
Dept: INFUSION THERAPY | Facility: CLINIC | Age: 62
End: 2017-11-22
Attending: INTERNAL MEDICINE
Payer: COMMERCIAL

## 2017-11-22 VITALS
OXYGEN SATURATION: 98 % | TEMPERATURE: 98.2 F | WEIGHT: 315 LBS | SYSTOLIC BLOOD PRESSURE: 127 MMHG | DIASTOLIC BLOOD PRESSURE: 55 MMHG | RESPIRATION RATE: 66 BRPM | BODY MASS INDEX: 41.57 KG/M2

## 2017-11-22 DIAGNOSIS — K70.31 ALCOHOLIC CIRRHOSIS OF LIVER WITH ASCITES (H): Primary | ICD-10-CM

## 2017-11-22 PROCEDURE — P9047 ALBUMIN (HUMAN), 25%, 50ML: HCPCS | Mod: ZF | Performed by: INTERNAL MEDICINE

## 2017-11-22 PROCEDURE — 25000128 H RX IP 250 OP 636: Mod: ZF | Performed by: INTERNAL MEDICINE

## 2017-11-22 PROCEDURE — 25000125 ZZHC RX 250: Mod: ZF | Performed by: INTERNAL MEDICINE

## 2017-11-22 PROCEDURE — 27210190 US PARACENTESIS

## 2017-11-22 RX ORDER — ALBUMIN (HUMAN) 12.5 G/50ML
12.5 SOLUTION INTRAVENOUS 4 TIMES DAILY PRN
Status: CANCELLED
Start: 2017-11-22

## 2017-11-22 RX ORDER — ALBUMIN (HUMAN) 12.5 G/50ML
12.5 SOLUTION INTRAVENOUS 4 TIMES DAILY PRN
Status: DISCONTINUED | OUTPATIENT
Start: 2017-11-22 | End: 2017-11-22 | Stop reason: HOSPADM

## 2017-11-22 RX ADMIN — ALBUMIN HUMAN 12.5 G: 0.25 SOLUTION INTRAVENOUS at 13:12

## 2017-11-22 RX ADMIN — LIDOCAINE HYDROCHLORIDE 20 ML: 10 INJECTION, SOLUTION INFILTRATION; PERINEURAL at 12:47

## 2017-11-22 RX ADMIN — ALBUMIN HUMAN 12.5 G: 0.25 SOLUTION INTRAVENOUS at 12:57

## 2017-11-22 RX ADMIN — ALBUMIN HUMAN 12.5 G: 0.25 SOLUTION INTRAVENOUS at 12:58

## 2017-11-22 NOTE — PATIENT INSTRUCTIONS
Dear Lawrence Louie    Thank you for choosing [facility] ([facility]) for your procedure.  The following information is a summary of our appointment as well as important reminders.      Additional information: You had a paracentesis with 3 liters removed and received 37.5 grams Albumin    We look forward in seeing you on your next appointment here at [facility].  Please don t hesitate to call us at [phone] to reschedule any of your appointments or to speak with one of the [facility] registered nurses.  It was a pleasure taking care of you today.    Sincerely,    [facility]  [facility]  [address] [place] [address]  [address]  Phone:  [phone]

## 2017-11-22 NOTE — MR AVS SNAPSHOT
After Visit Summary   11/22/2017    Lawrence Louie    MRN: [id number]           Patient Information     Date Of Birth          1955        Visit Information        Provider Department      11/22/2017 12:00 PM Provider, Cristobal Spec Inf Para; UC 40 ATC [facility]        Today's Diagnoses     Alcoholic cirrhosis of liver with ascites (H)    -  1      Care Instructions    Dear Lawrence Louie    Thank you for choosing [facility] ([facility]) for your procedure.  The following information is a summary of our appointment as well as important reminders.      Additional information: You had a paracentesis with 3 liters removed and received 37.5 grams Albumin    We look forward in seeing you on your next appointment here at [facility].  Please don t hesitate to call us at [phone] to reschedule any of your appointments or to speak with one of the [facility] registered nurses.  It was a pleasure taking care of you today.    Sincerely,    [facility]  [facility]  [address] [place] [address]  [address]  Phone:  [phone]          Follow-ups after your visit        Your next 10 appointments already scheduled     Nov 28, 2017  1:00 PM CST   (Arrive by 12:45 PM)   Return Visit with CORY Toussaint [facility] ([facility])    [address] [place] [id number] [phone]            Nov 29, 2017 11:15 AM CST   US ABDOMEN/PELVIS DUPLEX COMPLETE with UCUS2   [facility] ([facility])    [address] [place] [address] [phone]           Please bring a list of your medicines (including vitamins, minerals and over-the-counter drugs). Also, tell your doctor about any allergies you may have. Wear  comfortable clothes and leave your valuables at home.  Adults: No eating or drinking for 8 hours before the exam. You may take medicine with a small sip of water.  Children: - Children 6+ years: No food or drink for 6 hours before exam. - Children 1-5 years: No food or drink for 4 hours before exam. - Infants, breast-fed: may have breast milk up to 2 hours before exam. - Infants, formula: may have bottle until 4 hours before exam.  Please call the Imaging Department at your exam site with any questions.            Nov 29, 2017 12:00 PM CST   Paracentesis Visit with Uc Spec Inf Para Provider, UC 40 ATC   [facility] Specialty [facility] Procedure ([facility])    [address] [facility]  [address] [facility] [address] [phone]            Nov 29, 2017  2:20 PM CST   (Arrive by 2:05 PM)   Return Vascular Visit with David Davis MD   [facility] ([facility])    [address] [place] [address]  [address] [facility] [address]   [phone]            Dec 06, 2017 12:00 PM CST   Paracentesis Visit with Uc Spec Inf Para Provider, UC 40 ATC   [facility] Special[facility] Procedure ([facility])    [address] [id number] [phone]            Dec 13, 2017 12:00 PM CST   Paracentesis Visit with Uc Spec Inf Para Provider, UC 40 ATC   [facility] Special[facility] Procedure ([facility])    [address] [id number] [phone]            Dec 20, 2017 12:00 PM CST   Paracentesis Visit with Uc Spec Inf Para Provider   [facility] Specialty [facility] Procedure ([facility])    [address] [place] [id number] [phone]              Who to contact     If you have questions or need follow up information about  today's clinic visit or your schedule please contact [facility] SPECIALTY AND PROCEDURE directly at [phone].  Normal or non-critical lab and imaging results will be communicated to you by [facility], letter or phone within 4 business days after the clinic has received the results. If you do not hear from us within 7 days, please contact the clinic through [facility] or phone. If you have a critical or abnormal lab result, we will notify you by phone as soon as possible.  Submit refill requests through [facility] or call your pharmacy and they will forward the refill request to us. Please allow 3 business days for your refill to be completed.          Additional Information About Your Visit        [facility] Information     [facility] gives you secure access to your electronic health record. If you see a primary care provider, you can also send messages to your care team and make appointments. If you have questions, please call your primary care clinic.  If you do not have a primary care provider, please call [phone] and they will assist you.        Care EveryWhere ID     This is your Care EveryWhere ID. This could be used by other organizations to access your [facility] medical records  [id number]        Your Vitals Were     Temperature Respirations Pulse Oximetry BMI (Body Mass Index)          98.2  F (36.8  C) 66 98% 41.57 kg/m2         Blood Pressure from Last 3 Encounters:   11/22/17 127/55   11/08/17 121/58   11/06/17 118/65    Weight from Last 3 Encounters:   11/22/17 (!) 146.9 kg (323 lb 12.8 oz)   11/08/17 (!) 146.9 kg (323 lb 14.4 oz)   11/05/17 (!) 145.2 kg (320 lb)              We Performed the Following     US Paracentesis        Primary Care Provider Office Phone # Fax #    Ary CORY López [phone]       [address] [place] [address] [address] [place] [address]        Equal Access to Services     JOSEF LORENZO : Jose Eduardo Del Toro, brianda borden, jonathan  silvia atkinsonsarah richardson ah. [facility] [phone].    ATENCIÓN: Si zarina myers, tiene a rondon disposición servicios gratuitos de asistencia lingüística. Boaz al [phone].    We comply with applicable federal civil rights laws and [place] laws. We do not discriminate on the basis of race, color, national origin, age, disability, sex, sexual orientation, or gender identity.            Thank you!     Thank you for choosing [facility] SPECIALTY AND PROCEDURE  for your care. Our goal is always to provide you with excellent care. Hearing back from our patients is one way we can continue to improve our services. Please take a few minutes to complete the written survey that you may receive in the mail after your visit with us. Thank you!             Your Updated Medication List - Protect others around you: Learn how to safely use, store and throw away your medicines at www.disposemymeds.org.          This list is accurate as of: 11/22/17  1:53 PM.  Always use your most recent med list.                   Brand Name Dispense Instructions for use Diagnosis    aspirin 81 MG tablet     30 tablet    Take 1 tablet (81 mg) by mouth daily    Type 2 diabetes mellitus with other skin complications       blood glucose lancets standard    no brand specified    1 Box    Use to test blood sugar 4 X  times daily or as directed.    Diabetes mellitus, type 2 (H)       blood glucose monitoring meter device kit    no brand specified    1 kit    Use to test blood sugar 4 X  times daily or as directed.    Type 2 diabetes mellitus with other specified complication (H)       blood glucose monitoring test strip    no brand specified    200 strip    Use to test blood sugars 4 X  times daily or as directed    Diabetes mellitus, type 2 (H)       calcium-vitamin D 600-400 MG-UNIT per tablet    CALTRATE    60 tablet    Take 1 tablet by mouth daily    Morbid obesity due to excess calories  (H), Alcoholic cirrhosis of liver with ascites (H), Portal hypertension (H), Secondary esophageal varices without bleeding (H), Tobacco use disorder       desvenlafaxine succinate 100 MG 24 hr tablet    PRISTIQ    180 tablet    Take 2 tablets (200 mg) by mouth daily    Other depression       furosemide 20 MG tablet    LASIX    30 tablet    Take 1 tablet (20 mg) by mouth 2 times daily        insulin glargine 100 UNIT/ML injection    LANTUS SOLOSTAR    30 mL    Inject 20 Units Subcutaneous every morning    Type 2 diabetes mellitus with other oral complication, unspecified long term insulin use status (H)       * insulin pen needle 31G X 8 MM    B-D U/F    300 each    USE  6 times daily / OR AS DIRECTED    Type 2 diabetes, HbA1c goal < 7% (H)       * insulin pen needle 31G X 8 MM    B-D U/F    600 each    Use 6 times daily or as directed    Type 2 diabetes, HbA1c goal < 7% (H)       lactulose 10 GM/15ML solution    CHRONULAC    473 mL    30 mLs (20 g) by Oral or NG Tube route 3 times daily    Hepatic encephalopathy (H)       magnesium oxide 400 (241.3 MG) MG tablet    MAG-OX    90 tablet    Take 1 tablet (400 mg) by mouth daily    Cramp of limb       * ondansetron 4 MG tablet    ZOFRAN    18 tablet    Take 1 tablet (4 mg) by mouth every 8 hours as needed for nausea    Alcoholic cirrhosis (H), Nausea       * ondansetron 4 MG tablet    ZOFRAN    30 tablet    Take 1 tablet (4 mg) by mouth every 8 hours as needed for nausea (4 mg by mouth as needed for Nausea)    S/P TIPS (transjugular intrahepatic portosystemic shunt), Nausea       order for DME     1 Units    Equipment being ordered: carpal tunnel wrist splint.    CTS (carpal tunnel syndrome)       order for DME     1 each    Orthotic diabetic shoes -1 pair    Type 2 diabetes, HbA1c goal < 7% (H)       oxyCODONE IR 5 MG tablet    ROXICODONE    18 tablet    Take 1 tablet (5 mg) by mouth every 6 hours as needed for moderate to severe pain maximum 6 tablet(s) per day     Alcoholic cirrhosis of liver with ascites (H)       OYSCO 500 + D 500-200 MG-UNIT Tabs   Generic drug:  Calcium Carb-Cholecalciferol     200 tablet    Take 500 mg by mouth daily    Morbid obesity due to excess calories (H), Alcoholic cirrhosis of liver with ascites (H), Portal hypertension (H), Secondary esophageal varices without bleeding (H), Tobacco use disorder       pantoprazole 40 MG EC tablet    PROTONIX    90 tablet    Take 1 tablet (40 mg) by mouth daily    Morbid obesity due to excess calories (H), Alcoholic cirrhosis of liver with ascites (H), Portal hypertension (H), Secondary esophageal varices without bleeding (H), Tobacco use disorder       propranolol 10 MG tablet    INDERAL    180 tablet    Take 1 tablet (10 mg) by mouth 2 times daily    Portal hypertension (H)       ranitidine 150 MG tablet    ZANTAC    180 tablet    Take 1 tablet (150 mg) by mouth 2 times daily    Esophageal varices (H)       spironolactone 50 MG tablet    ALDACTONE    540 tablet    1 tablet (50 mg) 2 times daily Takes 3 tablets in am.and p.m.    Portal hypertension (H), Generalized edema       STATIN NOT PRESCRIBED (INTENTIONAL)     0 each    1 each daily Statin not prescribed intentionally due to Active liver disease    Hyperlipidemia LDL goal <100       VITAMIN B-12 PO      Take 1 tablet by mouth daily        vitamin D3 2000 UNITS Caps     90 capsule    Take 1 capsule by mouth daily    Mild major depression (H)       * Notice:  This list has 4 medication(s) that are the same as other medications prescribed for you. Read the directions carefully, and ask your doctor or other care provider to review them with you.

## 2017-11-22 NOTE — PROGRESS NOTES
Paracentesis Nursing Note  Lawrence Louie [facility] for [facility].    During today's appointment orders from Meghna Simmons MD were completed.    Progress Note:  Patient identification verified by name and date of birth.  Assessment completed.  Vitals monitored throughout appointment and recorded in Doc Flowsheets.  See proceduralist note in ultrasound.    Date of consent or authorization: 8/30/2017.  Invasive Procedure Safety Checklist was completed and sent for scanning.     Paracentesis performed by Donal Kramer PA-C Radiology.    The following labs were communicated to provider performing paracentesis:  Lab Results   Component Value Date    PLT 72 11/06/2017       Total amount of ascites fluid drained: 3 liters.  Color of ascites fluid: straw colored.  Total amount of albumin given: 37.5  grams.    Patient tolerated procedure well.    Post procedure,denies pain or discomfort post paracentesis.    Discharge Plan:  Discharge instructions were reviewed with patient.  Patient/Representative verbalized understanding and all questions were answered.   Dis[facility] in stable condition.    DAVID GONZALEZ RN        There were no vitals taken for this visit.

## 2017-11-28 ENCOUNTER — OFFICE VISIT (OUTPATIENT)
Dept: INTERNAL MEDICINE | Facility: CLINIC | Age: 62
End: 2017-11-28

## 2017-11-28 VITALS
DIASTOLIC BLOOD PRESSURE: 68 MMHG | HEART RATE: 109 BPM | SYSTOLIC BLOOD PRESSURE: 103 MMHG | WEIGHT: 315 LBS | BODY MASS INDEX: 42.86 KG/M2

## 2017-11-28 DIAGNOSIS — R18.8 CIRRHOSIS OF LIVER WITH ASCITES, UNSPECIFIED HEPATIC CIRRHOSIS TYPE (H): ICD-10-CM

## 2017-11-28 DIAGNOSIS — K74.60 CIRRHOSIS OF LIVER WITH ASCITES, UNSPECIFIED HEPATIC CIRRHOSIS TYPE (H): ICD-10-CM

## 2017-11-28 DIAGNOSIS — E11.638: ICD-10-CM

## 2017-11-28 DIAGNOSIS — L28.1 PRURIGO NODULARIS: ICD-10-CM

## 2017-11-28 DIAGNOSIS — Z79.4 TYPE 2 DIABETES MELLITUS WITHOUT COMPLICATION, WITH LONG-TERM CURRENT USE OF INSULIN (H): ICD-10-CM

## 2017-11-28 DIAGNOSIS — E11.9 TYPE 2 DIABETES MELLITUS WITHOUT COMPLICATION, WITH LONG-TERM CURRENT USE OF INSULIN (H): ICD-10-CM

## 2017-11-28 DIAGNOSIS — N39.490 OVERFLOW INCONTINENCE: Primary | ICD-10-CM

## 2017-11-28 LAB — AMMONIA PLAS-SCNC: 60 UMOL/L (ref 10–50)

## 2017-11-28 RX ORDER — GLIPIZIDE 5 MG/1
5 TABLET, FILM COATED, EXTENDED RELEASE ORAL 2 TIMES DAILY
Qty: 180 TABLET | Refills: 1 | Status: SHIPPED | OUTPATIENT
Start: 2017-11-28 | End: 2018-07-17

## 2017-11-28 ASSESSMENT — PAIN SCALES - GENERAL: PAINLEVEL: EXTREME PAIN (8)

## 2017-11-28 NOTE — PATIENT INSTRUCTIONS
[facility]: [phone]     [facility] Medication Refill Request Information:  * Please contact your pharmacy regarding ANY request for medication refills.  ** [facility] Prescription Fax = [phone]  * Please allow 3 business days for routine medication refills.  * Please allow 5 business days for controlled substance medication refills.     [facility] Test Result notification information:  *You will be notified with in 7-10 days of your appointment day regarding the results of your test.  If you are on MyChart you will be notified as soon as the provider has reviewed the results and signed off on them.

## 2017-11-28 NOTE — PROGRESS NOTES
HPI: Lawrence Louie is a 61 year old male who comes in for follow-up after TIPs procedure performed 10/27/2017. Since then he has experienced some low glucose readings and his diabetes meds were discontinued temporarily due to prior P.O. intake and eventually his long acting insulin was re-instated at half the previous dose prescribed.  His glipizide and short acting insulin has been held and he is upset that his blood glucose was over 300 yesterday.  His diuretics were also reduced due to concerns of dehydration.  He was started on Lactulose and because of stool incontinence is only taking it twice a day.  He complains that between urinating and stool ing he cannot get any sleep or go anywhere and so has been spending alot of time in  bed wearing incontinence pads.  He has also discontinued his furosemide and has re-instates his spironolactone at half dose and only once a day.  States he still has to have paracentesis twice a week but they are taking smaller amounts off. He feels very cold all the time, especially at night he is wearing 2 sweatshirts, socks and sweat pants to bed. His legs are huge and heavy.     His balance is unsteady and so her has to hold onto objects.   States he is eating.       Patient Active Problem List   Diagnosis     Mild major depression (H)     Hyperglycemia     Thrombocytopenia (H)     Hypertension goal BP (blood pressure) < 130/80     Plantar warts     Corns and callosities     Family history of colon cancer     Type 2 diabetes, HbA1c goal < 7% (H)     Portal hypertension (H)     Alcoholic cirrhosis (H)     Advanced directives, counseling/discussion     Ascites     Esophageal varices (H)     Multiple rib fractures     Tobacco use disorder     Hyperlipidemia LDL goal <100     Dental caries     Prurigo nodularis     Esophageal reflux     Morbid obesity (H)     History of colonic polyps: tubular adenomas and serrated adenomas     Type II diabetes mellitus with peripheral  circulatory disorder (H)     Alcoholic cirrhosis of liver with ascites (H)     Hepatic encephalopathy (H)         Current Outpatient Prescriptions   Medication Sig Dispense Refill     insulin glargine (LANTUS SOLOSTAR) 100 UNIT/ML injection Inject 20 Units Subcutaneous every morning 30 mL 1     lactulose (CHRONULAC) 10 GM/15ML solution 30 mLs (20 g) by Oral or NG Tube route 3 times daily 473 mL 0     furosemide (LASIX) 20 MG tablet Take 1 tablet (20 mg) by mouth 2 times daily 30 tablet      spironolactone (ALDACTONE) 50 MG tablet 1 tablet (50 mg) 2 times daily Takes 3 tablets in am.and p.m. 540 tablet 6     ondansetron (ZOFRAN) 4 MG tablet Take 1 tablet (4 mg) by mouth every 8 hours as needed for nausea (4 mg by mouth as needed for Nausea) 30 tablet 0     oxyCODONE (ROXICODONE) 5 MG IR tablet Take 1 tablet (5 mg) by mouth every 6 hours as needed for moderate to severe pain maximum 6 tablet(s) per day 18 tablet 0     insulin pen needle (B-D U/F) 31G X 8 MM Use 6 times daily or as directed 600 each 1     order for DME Orthotic diabetic shoes -1 pair 1 each 0     calcium-vitamin D (CALTRATE) 600-400 MG-UNIT per tablet Take 1 tablet by mouth daily 60 tablet 11     blood glucose monitoring (NO BRAND SPECIFIED) meter device kit Use to test blood sugar 4 X  times daily or as directed. 1 kit 0     propranolol (INDERAL) 10 MG tablet Take 1 tablet (10 mg) by mouth 2 times daily 180 tablet 3     aspirin 81 MG tablet Take 1 tablet (81 mg) by mouth daily 30 tablet 11     OYSCO 500 + D 500-200 MG-UNIT TABS Take 500 mg by mouth daily 200 tablet 3     magnesium oxide (MAG-OX) 400 (241.3 MG) MG tablet Take 1 tablet (400 mg) by mouth daily 90 tablet 3     Cholecalciferol (VITAMIN D3) 2000 UNITS CAPS Take 1 capsule by mouth daily 90 capsule 3     insulin pen needle (B-D U/F) 31G X 8 MM USE  6 times daily / OR AS DIRECTED 300 each 3     pantoprazole (PROTONIX) 40 MG EC tablet Take 1 tablet (40 mg) by mouth daily 90 tablet 3     blood  glucose monitoring (NO BRAND SPECIFIED) test strip Use to test blood sugars 4 X  times daily or as directed 200 strip 3     blood glucose (NO BRAND SPECIFIED) lancets standard Use to test blood sugar 4 X  times daily or as directed. 1 Box 3     desvenlafaxine succinate ER (PRISTIQ) 100 MG 24 hr tablet Take 2 tablets (200 mg) by mouth daily 180 tablet 3     ranitidine (ZANTAC) 150 MG tablet Take 1 tablet (150 mg) by mouth 2 times daily 180 tablet 3     Cyanocobalamin (VITAMIN B-12 PO) Take 1 tablet by mouth daily       STATIN NOT PRESCRIBED, INTENTIONAL, 1 each daily Statin not prescribed intentionally due to Active liver disease 0 each 0     ondansetron (ZOFRAN) 4 MG tablet Take 1 tablet (4 mg) by mouth every 8 hours as needed for nausea 18 tablet 1     ORDER FOR DME Equipment being ordered: carpal tunnel wrist splint. 1 Units 0         ALLERGIES: Review of patient's allergies indicates no known allergies.    IMMUNIZATION HX:   Immunization History   Administered Date(s) Administered     DTAP (<7y) 01/18/1988     HEPA 01/02/2013, 02/18/2013, 10/30/2013     HepB 01/02/2013, 02/18/2013, 10/30/2013     Influenza (IIV3) PF 10/04/2010, 12/18/2012, 09/08/2014, 09/26/2015, 09/27/2016     Influenza Intranasal Vaccine 4 valent 10/12/2017     Pneumococcal (PCV 13) 05/12/2015     Pneumococcal 23 valent 09/25/2009, 10/01/2010, 01/02/2013     TD (ADULT, 7+) 09/08/1997     TDAP Vaccine (Adacel) 01/02/2013     Twinrix A/B 01/02/2013     Zoster vaccine, live 12/22/2016       SOCIAL HX:   Social History     Social History Narrative    . 3 grown daughters. He has been sober since 2012.     ROS: 8 system ROS reviewed w/o changes except for above      OBJECTIVE:  /68  Pulse 109  Wt (!) 151.4 kg (333 lb 12.8 oz)  BMI 42.86 kg/m2   Wt Readings from Last 1 Encounters:   11/28/17 (!) 151.4 kg (333 lb 12.8 oz)     Constitutional: no acute distress,  pleasant but seems depressed, not smiling.   Eyes: anicteric,conjunctiva  pink.  Cardiovascular: regular rate and rhythm, normal S1 and S2, grade 2/6 murmur, no rubs or gallops,bilateral lower extremity 3+ edema to the knees.   Respiratory: clear to auscultation, no wheezes or crackles, normal breath sounds   Musculoskeletal: full range of motion, no edema   Skin:multiple areas of ecchymosis on the abdomen.   No jaundice, temp normal.  He has a  Prurigo nodularis nodule at the hairline at the nape of the neck which he requests treatment with LN2. It is 4 mm x 4 mm in diameter.   Neurological: normal speech, no tremor. A and O x 3,  good historian.  Psychological: depressed mood, sleep normal, good eye contact.      ASSESSMENT/PLAN:  Lawrence was seen today for balance/ vestibular, urinary problem and medication question.    Diagnoses and all orders for this visit:    Overflow incontinence  -     order for DME; Depends incontinence underwear.    Type 2 diabetes mellitus with other oral complication, unspecified long term insulin use status (H)  -     insulin glargine (LANTUS SOLOSTAR) 100 UNIT/ML injection; Inject 40 Units Subcutaneous every morning.  He would like to return to his usual dose of 40 units as he is eating again and asks to re-instate his Novolog as well.     Type 2 diabetes mellitus without complication, with long-term current use of insulin (H)  -     glipiZIDE (GLIPIZIDE XL) 5 MG 24 hr tablet; Take 1 tablet (5 mg) by mouth 2 times daily.  This is re-instituted as he is eating again.    Cirrhosis of liver with ascites, unspecified hepatic cirrhosis type (H)  -     Ammonia; Future        I will inquire to Dr. Simmons regarding his lactulose.     Itreated his prurigo nodularis with 3 applications of LN2.      Total time spent 25 minutes.  More than 50% of the time spent with Mr. Louie on counseling / coordinating his care    Ary RAY, CNP

## 2017-11-28 NOTE — MR AVS SNAPSHOT
After Visit Summary   11/28/2017    Lawrence Louie    MRN: [id number]           Patient Information     Date Of Birth          1955        Visit Information        Provider Department      11/28/2017 1:00 PM Ary Murphy APRN [facility]        Today's Diagnoses     Overflow incontinence    -  1    Type 2 diabetes mellitus with other oral complication, unspecified long term insulin use status (H)        Type 2 diabetes mellitus without complication, with long-term current use of insulin (H)        Cirrhosis of liver with ascites, unspecified hepatic cirrhosis type (H)        Prurigo nodularis          Care Instructions    Primary Care Center: [phone]     Primary Care Center Medication Refill Request Information:  * Please contact your pharmacy regarding ANY request for medication refills.  ** [facility] Prescription Fax = [phone]  * Please allow 3 business days for routine medication refills.  * Please allow 5 business days for controlled substance medication refills.     Primary Care Center Test Result notification information:  *You will be notified with in 7-10 days of your appointment day regarding the results of your test.  If you are on MyChart you will be notified as soon as the provider has reviewed the results and signed off on them.            Follow-ups after your visit        Follow-up notes from your care team     Return in about 1 month (around 12/28/2017).      Your next 10 appointments already scheduled     Dec 06, 2017 12:00 PM CST   Paracentesis Visit with Cristobal Spec Inf Para Provider, UC 40 [facility] Specialty and Procedure ([facility])    [address] [place] [id number] [phone]            Dec 13, 2017 12:00 PM CST   Paracentesis Visit with  Spec Inf Para Provider,  40 ATC   [facility] Specialty and Procedure ([facility]  [facility])    [address]  [address] [facility] [address]   [phone]            Dec 20, 2017 12:00 PM CST   Paracentesis Visit with Uc Spec Inf Para Provider, UC 40 ATC   [facility] Specialty and Procedure ([facility])    [address]  [address] [facility] [address]   [phone]            Dec 27, 2017 12:00 PM CST   Paracentesis Visit with Uc Spec Inf Para Provider, UC 40 ATC   [facility] Specialty and Procedure ([facility])    [address]  [address] [facility] [address]   [phone]            Dec 28, 2017  1:00 PM CST   (Arrive by 12:45 PM)   Return Visit with CORY Toussaint [facility] ([facility])    [address] [place] [address]  [address] [facility] [id number] [phone]            Jan 30, 2018 11:30 AM CST   (Arrive by 11:15 AM)   Return General Liver with Meghna Simmons MD   [facility] Hepatology ([facility])    [address] [place] [address]  [address] [facility] [address] [phone]              Who to contact     Please call your clinic at [phone] to:    Ask questions about your health    Make or cancel appointments    Discuss your medicines    Learn about your test results    Speak to your doctor   If you have compliments or concerns about an experience at your clinic, or if you wish to file a complaint, please contact [facility] Patient Relations at [phone] or email us at [email or web address]         Additional Information About Your Visit        MyChart Information     [employer] gives you secure access to your electronic health record. If you see a primary care provider, you can also send messages to your care team and make appointments. If you have questions, please call your primary care clinic.  If you do  not have a primary care provider, please call [phone] and they will assist you.      [employer] is an electronic gateway that provides easy, online access to your medical records. With [employer], you can request a clinic appointment, read your test results, renew a prescription or communicate with your care team.     To access your existing account, please contact your [facility] or call [phone] for assistance.        Care EveryWhere ID     This is your Care EveryWhere ID. This could be used by other organizations to access your [place] medical records  [id number]        Your Vitals Were     Pulse BMI (Body Mass Index)                109 42.86 kg/m2           Blood Pressure from Last 3 Encounters:   11/29/17 112/41   11/28/17 103/68   11/22/17 127/55    Weight from Last 3 Encounters:   11/29/17 (!) 149.1 kg (328 lb 9.6 oz)   11/28/17 (!) 151.4 kg (333 lb 12.8 oz)   11/22/17 (!) 146.9 kg (323 lb 12.8 oz)              We Performed the Following     DESTRUCT BENIGN LESION, UP TO 14          Today's Medication Changes          These changes are accurate as of: 11/28/17 11:59 PM.  If you have any questions, ask your nurse or doctor.               Start taking these medicines.        Dose/Directions    glipiZIDE 5 MG 24 hr tablet   Commonly known as:  glipiZIDE XL   Used for:  Type 2 diabetes mellitus without complication, with long-term current use of insulin (H)   Started by:  Ary Murphy APRN CNP        Dose:  5 mg   Take 1 tablet (5 mg) by mouth 2 times daily   Quantity:  180 tablet   Refills:  1         These medicines have changed or have updated prescriptions.        Dose/Directions    insulin glargine 100 UNIT/ML injection   Commonly known as:  LANTUS SOLOSTAR   This may have changed:  how much to take   Used for:  Type 2 diabetes mellitus with other oral complication, unspecified long term insulin use status (H)   Changed by:  Ary Murphy APRN CNP         Dose:  40 Units   Inject 40 Units Subcutaneous every morning   Quantity:  30 mL   Refills:  1       * order for DME   This may have changed:  Another medication with the same name was added. Make sure you understand how and when to take each.   Used for:  Type 2 diabetes, HbA1c goal < 7% (H)   Changed by:  Ary Murphy APRN CNP        Orthotic diabetic shoes -1 pair   Quantity:  1 each   Refills:  0       * order for DME   This may have changed:  You were already taking a medication with the same name, and this prescription was added. Make sure you understand how and when to take each.   Used for:  Overflow incontinence   Changed by:  Ary Murphy APRN CNP        Depends incontinence underwear.   Quantity:  90 Units   Refills:  3       * Notice:  This list has 2 medication(s) that are the same as other medications prescribed for you. Read the directions carefully, and ask your doctor or other care provider to review them with you.         Where to get your medicines      These medications were sent to [id number] - [address]  [address] [place] [address] [facility] [address]     Phone:  [phone]     glipiZIDE 5 MG 24 hr tablet    insulin glargine 100 UNIT/ML injection         Some of these will need a paper prescription and others can be bought over the counter.  Ask your nurse if you have questions.     Bring a paper prescription for each of these medications     order for DME                Primary Care Provider Office Phone # Fax #    CORY Toussaint [phone]       [address] [place] [address]  [facility] [address]        Equal Access to Services     [facility] AH: Hadii sil ku hadasho Soomaali, waaxda luqadaha, qaybta kaalmada adeegyada, silvia salas. So [facility] [phone].    ATENCIÓN: Si habla español, tiene a rondon disposición servicios gratuitos de asistencia lingüística. Llame al [phone].    We comply with applicable  federal civil rights laws and [place] laws. We do not discriminate on the basis of race, color, national origin, age, disability, sex, sexual orientation, or gender identity.            Thank you!     Thank you for choosing [facility]  for your care. Our goal is always to provide you with excellent care. Hearing back from our patients is one way we can continue to improve our services. Please take a few minutes to complete the written survey that you may receive in the mail after your visit with us. Thank you!             Your Updated Medication List - Protect others around you: Learn how to safely use, store and throw away your medicines at www.disposemymeds.org.          This list is accurate as of: 11/28/17 11:59 PM.  Always use your most recent med list.                   Brand Name Dispense Instructions for use Diagnosis    aspirin 81 MG tablet     30 tablet    Take 1 tablet (81 mg) by mouth daily    Type 2 diabetes mellitus with other skin complications       blood glucose lancets standard    no brand specified    1 Box    Use to test blood sugar 4 X  times daily or as directed.    Diabetes mellitus, type 2 (H)       blood glucose monitoring meter device kit    no brand specified    1 kit    Use to test blood sugar 4 X  times daily or as directed.    Type 2 diabetes mellitus with other specified complication (H)       blood glucose monitoring test strip    no brand specified    200 strip    Use to test blood sugars 4 X  times daily or as directed    Diabetes mellitus, type 2 (H)       calcium-vitamin D 600-400 MG-UNIT per tablet    CALTRATE    60 tablet    Take 1 tablet by mouth daily    Morbid obesity due to excess calories (H), Alcoholic cirrhosis of liver with ascites (H), Portal hypertension (H), Secondary esophageal varices without bleeding (H), Tobacco use disorder       desvenlafaxine succinate 100 MG 24 hr tablet    PRISTIQ    180 tablet    Take 2 tablets (200 mg) by mouth daily     Other depression       furosemide 20 MG tablet    LASIX    30 tablet    Take 1 tablet (20 mg) by mouth 2 times daily        glipiZIDE 5 MG 24 hr tablet    glipiZIDE XL    180 tablet    Take 1 tablet (5 mg) by mouth 2 times daily    Type 2 diabetes mellitus without complication, with long-term current use of insulin (H)       insulin glargine 100 UNIT/ML injection    LANTUS SOLOSTAR    30 mL    Inject 40 Units Subcutaneous every morning    Type 2 diabetes mellitus with other oral complication, unspecified long term insulin use status (H)       * insulin pen needle 31G X 8 MM    B-D U/F    300 each    USE  6 times daily / OR AS DIRECTED    Type 2 diabetes, HbA1c goal < 7% (H)       * insulin pen needle 31G X 8 MM    B-D U/F    600 each    Use 6 times daily or as directed    Type 2 diabetes, HbA1c goal < 7% (H)       lactulose 10 GM/15ML solution    CHRONULAC    473 mL    30 mLs (20 g) by Oral or NG Tube route 3 times daily    Hepatic encephalopathy (H)       magnesium oxide 400 (241.3 MG) MG tablet    MAG-OX    90 tablet    Take 1 tablet (400 mg) by mouth daily    Cramp of limb       * ondansetron 4 MG tablet    ZOFRAN    18 tablet    Take 1 tablet (4 mg) by mouth every 8 hours as needed for nausea    Alcoholic cirrhosis (H), Nausea       * ondansetron 4 MG tablet    ZOFRAN    30 tablet    Take 1 tablet (4 mg) by mouth every 8 hours as needed for nausea (4 mg by mouth as needed for Nausea)    S/P TIPS (transjugular intrahepatic portosystemic shunt), Nausea       order for DME     1 Units    Equipment being ordered: carpal tunnel wrist splint.    CTS (carpal tunnel syndrome)       * order for DME     1 each    Orthotic diabetic shoes -1 pair    Type 2 diabetes, HbA1c goal < 7% (H)       * order for DME     90 Units    Depends incontinence underwear.    Overflow incontinence       oxyCODONE IR 5 MG tablet    ROXICODONE    18 tablet    Take 1 tablet (5 mg) by mouth every 6 hours as needed for moderate to severe pain  maximum 6 tablet(s) per day    Alcoholic cirrhosis of liver with ascites (H)       OYSCO 500 + D 500-200 MG-UNIT Tabs   Generic drug:  Calcium Carb-Cholecalciferol     200 tablet    Take 500 mg by mouth daily    Morbid obesity due to excess calories (H), Alcoholic cirrhosis of liver with ascites (H), Portal hypertension (H), Secondary esophageal varices without bleeding (H), Tobacco use disorder       pantoprazole 40 MG EC tablet    PROTONIX    90 tablet    Take 1 tablet (40 mg) by mouth daily    Morbid obesity due to excess calories (H), Alcoholic cirrhosis of liver with ascites (H), Portal hypertension (H), Secondary esophageal varices without bleeding (H), Tobacco use disorder       propranolol 10 MG tablet    INDERAL    180 tablet    Take 1 tablet (10 mg) by mouth 2 times daily    Portal hypertension (H)       ranitidine 150 MG tablet    ZANTAC    180 tablet    Take 1 tablet (150 mg) by mouth 2 times daily    Esophageal varices (H)       spironolactone 50 MG tablet    ALDACTONE    540 tablet    1 tablet (50 mg) 2 times daily Takes 3 tablets in am.and p.m.    Portal hypertension (H), Generalized edema       STATIN NOT PRESCRIBED (INTENTIONAL)     0 each    1 each daily Statin not prescribed intentionally due to Active liver disease    Hyperlipidemia LDL goal <100       VITAMIN B-12 PO      Take 1 tablet by mouth daily        vitamin D3 2000 UNITS Caps     90 capsule    Take 1 capsule by mouth daily    Mild major depression (H)       * Notice:  This list has 6 medication(s) that are the same as other medications prescribed for you. Read the directions carefully, and ask your doctor or other care provider to review them with you.

## 2017-11-29 ENCOUNTER — RADIANT APPOINTMENT (OUTPATIENT)
Dept: ULTRASOUND IMAGING | Facility: CLINIC | Age: 62
End: 2017-11-29
Attending: INTERNAL MEDICINE
Payer: COMMERCIAL

## 2017-11-29 PROCEDURE — 27210190 US PARACENTESIS

## 2017-11-29 PROCEDURE — 25000125 ZZHC RX 250: Mod: ZF | Performed by: INTERNAL MEDICINE

## 2017-11-29 PROCEDURE — P9047 ALBUMIN (HUMAN), 25%, 50ML: HCPCS | Mod: ZF | Performed by: INTERNAL MEDICINE

## 2017-11-29 PROCEDURE — 25000128 H RX IP 250 OP 636: Mod: ZF | Performed by: INTERNAL MEDICINE

## 2017-11-30 ENCOUNTER — TELEPHONE (OUTPATIENT)
Dept: GASTROENTEROLOGY | Facility: CLINIC | Age: 62
End: 2017-11-30

## 2017-11-30 DIAGNOSIS — K76.82 HEPATIC ENCEPHALOPATHY (H): ICD-10-CM

## 2017-11-30 NOTE — TELEPHONE ENCOUNTER
Attempted to reach patient for check in, no answer, message left requesting call back, number provided.

## 2017-12-01 NOTE — TELEPHONE ENCOUNTER
Patient returned call and left message. Attempted to reach patient for check in, no answer, message left requesting call back, number provided.

## 2017-12-03 DIAGNOSIS — Z79.4 TYPE 2 DIABETES MELLITUS WITHOUT COMPLICATION, WITH LONG-TERM CURRENT USE OF INSULIN (H): ICD-10-CM

## 2017-12-03 DIAGNOSIS — E11.9 TYPE 2 DIABETES MELLITUS WITHOUT COMPLICATION, WITH LONG-TERM CURRENT USE OF INSULIN (H): ICD-10-CM

## 2017-12-07 RX ORDER — LACTULOSE 10 G/15ML
20 SOLUTION ORAL 3 TIMES DAILY
Qty: 2000 ML | Refills: 11 | Status: SHIPPED | OUTPATIENT
Start: 2017-12-07 | End: 2018-04-05

## 2017-12-07 NOTE — TELEPHONE ENCOUNTER
Reached patient for check in. Reviewed medications, specifically lactulose and the rationale for its use. Discussed titration and goal bowel movements daily. Patient has trouble with taste of medication, okay to mix with liquids if helpful to get down. Refill sent to his requested pharmacy. He asked if there is another medication he can try in place of lactulose. Will discuss with Dr. Simmons.

## 2017-12-12 ENCOUNTER — TELEPHONE (OUTPATIENT)
Dept: INTERNAL MEDICINE | Facility: CLINIC | Age: 62
End: 2017-12-12

## 2017-12-12 DIAGNOSIS — K76.82 HEPATIC ENCEPHALOPATHY (H): ICD-10-CM

## 2017-12-12 NOTE — TELEPHONE ENCOUNTER
Xifaxan ordered per Dr. Simmons. Discussed usage of this medication in conjunction with lactulose for prevention and management of hepatic encephalopathy. Patient will begin this today, and will connect next week to see how things are going.

## 2017-12-13 ENCOUNTER — OFFICE VISIT (OUTPATIENT)
Dept: INFUSION THERAPY | Facility: CLINIC | Age: 62
End: 2017-12-13
Attending: INTERNAL MEDICINE
Payer: COMMERCIAL

## 2017-12-13 ENCOUNTER — RADIANT APPOINTMENT (OUTPATIENT)
Dept: ULTRASOUND IMAGING | Facility: CLINIC | Age: 62
End: 2017-12-13
Attending: INTERNAL MEDICINE
Payer: COMMERCIAL

## 2017-12-13 VITALS
TEMPERATURE: 97.9 F | SYSTOLIC BLOOD PRESSURE: 122 MMHG | HEART RATE: 64 BPM | DIASTOLIC BLOOD PRESSURE: 60 MMHG | OXYGEN SATURATION: 100 % | BODY MASS INDEX: 42.07 KG/M2 | RESPIRATION RATE: 20 BRPM | WEIGHT: 315 LBS

## 2017-12-13 DIAGNOSIS — K70.31 ALCOHOLIC CIRRHOSIS OF LIVER WITH ASCITES (H): Primary | ICD-10-CM

## 2017-12-13 LAB — PLATELET # BLD AUTO: 92 10E9/L (ref 150–450)

## 2017-12-13 PROCEDURE — 25000128 H RX IP 250 OP 636: Mod: ZF | Performed by: INTERNAL MEDICINE

## 2017-12-13 PROCEDURE — P9047 ALBUMIN (HUMAN), 25%, 50ML: HCPCS | Mod: ZF | Performed by: INTERNAL MEDICINE

## 2017-12-13 PROCEDURE — 27210190 US PARACENTESIS

## 2017-12-13 PROCEDURE — 85049 AUTOMATED PLATELET COUNT: CPT | Performed by: INTERNAL MEDICINE

## 2017-12-13 PROCEDURE — 25000125 ZZHC RX 250: Mod: ZF | Performed by: INTERNAL MEDICINE

## 2017-12-13 RX ORDER — ALBUMIN (HUMAN) 12.5 G/50ML
12.5 SOLUTION INTRAVENOUS 4 TIMES DAILY PRN
Status: CANCELLED
Start: 2017-12-13

## 2017-12-13 RX ORDER — ALBUMIN (HUMAN) 12.5 G/50ML
12.5 SOLUTION INTRAVENOUS 4 TIMES DAILY PRN
Status: DISCONTINUED | OUTPATIENT
Start: 2017-12-13 | End: 2017-12-13 | Stop reason: HOSPADM

## 2017-12-13 RX ADMIN — ALBUMIN HUMAN 12.5 G: 0.25 SOLUTION INTRAVENOUS at 12:44

## 2017-12-13 RX ADMIN — ALBUMIN HUMAN 12.5 G: 0.25 SOLUTION INTRAVENOUS at 13:00

## 2017-12-13 RX ADMIN — ALBUMIN HUMAN 12.5 G: 0.25 SOLUTION INTRAVENOUS at 13:08

## 2017-12-13 RX ADMIN — LIDOCAINE HYDROCHLORIDE 20 ML: 10 INJECTION, SOLUTION INFILTRATION; PERINEURAL at 12:43

## 2017-12-13 RX ADMIN — ALBUMIN HUMAN 12.5 G: 0.25 SOLUTION INTRAVENOUS at 12:51

## 2017-12-13 NOTE — MR AVS SNAPSHOT
After Visit Summary   12/13/2017    Lawrence Louie    MRN: [id number]           Patient Information     Date Of Birth          1955        Visit Information        Provider Department      12/13/2017 12:00 PM Provider, Uc Spec Inf Para; MARY 40 ATC [facility] [place]        Today's Diagnoses     Alcoholic cirrhosis of liver with ascites (H)    -  1      Care Instructions    Dear Lawrence Louie    Thank you for choosing [facility] ([facility]) for your procedure.  The following information is a summary of our appointment as well as important reminders.      Additional information: You had a Paracentensis today with 4.1 liters removed and received 50 grams Albumin.    We look forward in seeing you on your next appointment here at [facility].  Please don t hesitate to call us at [phone] to reschedule any of your appointments or to speak with one of the [facility] registered nurses.  It was a pleasure taking care of you today.    Sincerely,    [facility]  [facility]  [address] [place] [address]  [address]  Phone:  [phone]          Follow-ups after your visit        Your next 10 appointments already scheduled     Dec 20, 2017 12:00 PM CST   Paracentesis Visit with Uc Spec Inf Para Provider, UC 40 ATC   [facility] Special[facility] Procedure ([facility])    [address] [place] [id number] [phone]            Dec 27, 2017 12:00 PM CST   Paracentesis Visit with Uc Spec Inf Para Provider, UC 40 ATC   [facility] [place] ([facility])    [address] [place] [id number] [phone]            Dec 28, 2017  1:00 PM CST   (Arrive by 12:45 PM)   Return Visit with Ary JACKSON  CORY Murphy CNP   [facility] ([facility])    [address] [place] [address]  [address]th [facility] [id number] [phone]            Jan 30, 2018 11:30 AM CST   (Arrive by 11:15 AM)   Return General Liver with Meghna Simmons MD   [facility] Hepatology ([facility])    [address] [place] [address]  3rd [facility] [id number] [phone]            Jan 31, 2018  3:00 PM CST   US ABDOMEN/PELVIS DUPLEX COMPLETE with UCUS2   [facility] Imaging Center US ([facility])    [address] [place] [address]  [address]st [facility] [id number] [phone]           Please bring a list of your medicines (including vitamins, minerals and over-the-counter drugs). Also, tell your doctor about any allergies you may have. Wear comfortable clothes and leave your valuables at home.  Adults: No eating or drinking for 8 hours before the exam. You may take medicine with a small sip of water.  Children: - Children 6+ years: No food or drink for 6 hours before exam. - Children 1-5 years: No food or drink for 4 hours before exam. - Infants, breast-fed: may have breast milk up to 2 hours before exam. - Infants, formula: may have bottle until 4 hours before exam.  Please call the Imaging Department at your exam site with any questions.            Jan 31, 2018  4:00 PM CST   (Arrive by 3:45 PM)   Return Vascular Visit with David Davis MD   [facility] Vascular Clinic ([facility])    [address] [place] [address]  3rd [facility] [id number] [phone]              Who to contact     If you have questions or need follow up information about today's clinic visit or your schedule please contact [facility] directly at [phone].  Normal or non-critical lab and imaging results will be communicated to you by MyChart, letter or phone within 4 business days after the  clinic has received the results. If you do not hear from us within 7 days, please contact the clinic through AMS-Qi or phone. If you have a critical or abnormal lab result, we will notify you by phone as soon as possible.  Submit refill requests through AMS-Qi or call your pharmacy and they will forward the refill request to us. Please allow 3 business days for your refill to be completed.          Additional Information About Your Visit        [employer] Information     AMS-Qi gives you secure access to your electronic health record. If you see a primary care provider, you can also send messages to your care team and make appointments. If you have questions, please call your primary care clinic.  If you do not have a primary care provider, please call [phone] and they will assist you.        Care EveryWhere ID     This is your Care EveryWhere ID. This could be used by other organizations to access your [facility] medical records  [id number]        Your Vitals Were     Pulse Temperature Respirations Pulse Oximetry BMI (Body Mass Index)       64 97.9  F (36.6  C) 20 100% 42.07 kg/m2        Blood Pressure from Last 3 Encounters:   12/13/17 122/60   11/29/17 112/41   11/28/17 103/68    Weight from Last 3 Encounters:   12/13/17 (!) 148.6 kg (327 lb 11.2 oz)   11/29/17 (!) 149.1 kg (328 lb 9.6 oz)   11/28/17 (!) 151.4 kg (333 lb 12.8 oz)              We Performed the Following     Platelet count     US Paracentesis        Primary Care Provider Office Phone # Fax #    Ary RENETTA Murphy, APRN [phone]       [address] [place] [phone]  [facility] [address]        Equal Access to Services     JOSEF LORENZO : Hadii sil palmer Soanthony, waaxda luqadaha, qaybta kaalmada adesarahyadeysi, silvia richardson . So [facility] [phone].    ATENCIÓN: Si habla español, tiene a rondon disposición servicios gratuitos de asistencia lingüística. Llame al [phone].    We comply with applicable federal  civil rights laws and [place] laws. We do not discriminate on the basis of race, color, national origin, age, disability, sex, sexual orientation, or gender identity.            Thank you!     Thank you for choosing [facility] SPECIAL[facility] PROCEDURE  for your care. Our goal is always to provide you with excellent care. Hearing back from our patients is one way we can continue to improve our services. Please take a few minutes to complete the written survey that you may receive in the mail after your visit with us. Thank you!             Your Updated Medication List - Protect others around you: Learn how to safely use, store and throw away your medicines at www.disposemymeds.org.          This list is accurate as of: 12/13/17  1:32 PM.  Always use your most recent med list.                   Brand Name Dispense Instructions for use Diagnosis    aspirin 81 MG tablet     30 tablet    Take 1 tablet (81 mg) by mouth daily    Type 2 diabetes mellitus with other skin complications       blood glucose lancets standard    no brand specified    1 Box    Use to test blood sugar 4 X  times daily or as directed.    Diabetes mellitus, type 2 (H)       blood glucose monitoring meter device kit    no brand specified    1 kit    Use to test blood sugar 4 X  times daily or as directed.    Type 2 diabetes mellitus with other specified complication (H)       blood glucose monitoring test strip    no brand specified    200 strip    Use to test blood sugars 4 X  times daily or as directed    Diabetes mellitus, type 2 (H)       calcium-vitamin D 600-400 MG-UNIT per tablet    CALTRATE    60 tablet    Take 1 tablet by mouth daily    Morbid obesity due to excess calories (H), Alcoholic cirrhosis of liver with ascites (H), Portal hypertension (H), Secondary esophageal varices without bleeding (H), Tobacco use disorder       desvenlafaxine succinate 100 MG 24 hr tablet    PRISTIQ    180 tablet    Take 2 tablets (200  mg) by mouth daily    Other depression       furosemide 20 MG tablet    LASIX    30 tablet    Take 1 tablet (20 mg) by mouth 2 times daily        glipiZIDE 5 MG 24 hr tablet    glipiZIDE XL    180 tablet    Take 1 tablet (5 mg) by mouth 2 times daily    Type 2 diabetes mellitus without complication, with long-term current use of insulin (H)       insulin aspart 100 UNIT/ML injection    NovoLOG FLEXPEN    30 mL    20 units before breakfast, 20 units before lunch, 20 units before dinner, 20 units before snacks    Type 2 diabetes mellitus without complication, with long-term current use of insulin (H)       insulin glargine 100 UNIT/ML injection    LANTUS SOLOSTAR    30 mL    Inject 40 Units Subcutaneous every morning    Type 2 diabetes mellitus with other oral complication, unspecified long term insulin use status (H)       * insulin pen needle 31G X 8 MM    B-D U/F    300 each    USE  6 times daily / OR AS DIRECTED    Type 2 diabetes, HbA1c goal < 7% (H)       * insulin pen needle 31G X 8 MM    B-D U/F    600 each    Use 6 times daily or as directed    Type 2 diabetes, HbA1c goal < 7% (H)       lactulose 10 GM/15ML solution    CHRONULAC    2000 mL    Take 30 mLs (20 g) by mouth 3 times daily Adjust dosing as needed to achieve 3-4 bowel movements daily.    Hepatic encephalopathy (H)       magnesium oxide 400 (241.3 MG) MG tablet    MAG-OX    90 tablet    Take 1 tablet (400 mg) by mouth daily    Cramp of limb       * ondansetron 4 MG tablet    ZOFRAN    18 tablet    Take 1 tablet (4 mg) by mouth every 8 hours as needed for nausea    Alcoholic cirrhosis (H), Nausea       * ondansetron 4 MG tablet    ZOFRAN    30 tablet    Take 1 tablet (4 mg) by mouth every 8 hours as needed for nausea (4 mg by mouth as needed for Nausea)    S/P TIPS (transjugular intrahepatic portosystemic shunt), Nausea       order for DME     1 Units    Equipment being ordered: carpal tunnel wrist splint.    CTS (carpal tunnel syndrome)       * order  for DME     1 each    Orthotic diabetic shoes -1 pair    Type 2 diabetes, HbA1c goal < 7% (H)       * order for DME     90 Units    Depends incontinence underwear.    Overflow incontinence       oxyCODONE IR 5 MG tablet    ROXICODONE    18 tablet    Take 1 tablet (5 mg) by mouth every 6 hours as needed for moderate to severe pain maximum 6 tablet(s) per day    Alcoholic cirrhosis of liver with ascites (H)       OYSCO 500 + D 500-200 MG-UNIT Tabs   Generic drug:  Calcium Carb-Cholecalciferol     200 tablet    Take 500 mg by mouth daily    Morbid obesity due to excess calories (H), Alcoholic cirrhosis of liver with ascites (H), Portal hypertension (H), Secondary esophageal varices without bleeding (H), Tobacco use disorder       pantoprazole 40 MG EC tablet    PROTONIX    90 tablet    Take 1 tablet (40 mg) by mouth daily    Morbid obesity due to excess calories (H), Alcoholic cirrhosis of liver with ascites (H), Portal hypertension (H), Secondary esophageal varices without bleeding (H), Tobacco use disorder       propranolol 10 MG tablet    INDERAL    180 tablet    Take 1 tablet (10 mg) by mouth 2 times daily    Portal hypertension (H)       ranitidine 150 MG tablet    ZANTAC    180 tablet    Take 1 tablet (150 mg) by mouth 2 times daily    Esophageal varices (H)       rifaximin 550 MG Tabs tablet    XIFAXAN    60 tablet    Take 1 tablet (550 mg) by mouth 2 times daily    Hepatic encephalopathy (H)       spironolactone 50 MG tablet    ALDACTONE    540 tablet    1 tablet (50 mg) 2 times daily Takes 3 tablets in am.and p.m.    Portal hypertension (H), Generalized edema       STATIN NOT PRESCRIBED (INTENTIONAL)     0 each    1 each daily Statin not prescribed intentionally due to Active liver disease    Hyperlipidemia LDL goal <100       VITAMIN B-12 PO      Take 1 tablet by mouth daily        vitamin D3 2000 UNITS Caps     90 capsule    Take 1 capsule by mouth daily    Mild major depression (H)       * Notice:  This  list has 6 medication(s) that are the same as other medications prescribed for you. Read the directions carefully, and ask your doctor or other care provider to review them with you.

## 2017-12-13 NOTE — PROGRESS NOTES
Paracentesis Nursing Note  Lawrence Louie [facility] for [facility].    During today's appointment orders from Meghna Simmons MD were completed.    Progress Note:  Patient identification verified by name and date of birth.  Assessment completed.  Vitals monitored throughout appointment and recorded in Doc Flowsheets.  See proceduralist note in ultrasound.    Date of consent or authorization: 11/29/17.  Invasive Procedure Safety Checklist was completed and sent for scanning.     Paracentesis performed by Donal Kramer PA-C Radiology.    The following labs were communicated to provider performing paracentesis:  Lab Results   Component Value Date    PLT 72 11/06/2017       Total amount of ascites fluid drained: 4.1 liters.  Color of ascites fluid: straw colored.  Total amount of albumin given: 50  grams.    Patient tolerated procedure well.    Post procedure,denies pain or discomfort post paracentesis.      Discharge Plan:  Discharge instructions were reviewed with patient.  Patient/Representative verbalized understanding and all questions were answered.   Dis[facility] in stable condition.    DAVID GONZALEZ RN    Administrations This Visit     albumin human 25 % injection 12.5 g     Admin Date Action Dose Route Administered By             12/13/2017 New Bag 12.5 g Intravenous Oly Bah RN              Admin Date Action Dose Route Administered By             12/13/2017 New Bag 12.5 g Intravenous Oly Bah RN              Admin Date Action Dose Route Administered By             12/13/2017 New Bag 12.5 g Intravenous Oly Bah RN              Admin Date Action Dose Route Administered By             12/13/2017 New Bag 12.5 g Intravenous Oly Bah RN                    lidocaine 1 % 20 mL     Admin Date Action Dose Route Administered By             12/13/2017 Given by Other Clinician 20 mL  Injection Oly Bah, RN                          /68  Pulse 71  Temp 97.9  F (36.6  C)  Resp 20  Wt (!) 152.7 kg (336 lb 11.2 oz)  SpO2 100%  BMI 43.23 kg/m2

## 2017-12-13 NOTE — PATIENT INSTRUCTIONS
Dear Lawrence Louie    Thank you for choosing [facility] ([facility]) for your procedure.  The following information is a summary of our appointment as well as important reminders.      Additional information: You had a Paracentensis today with 4.1 liters removed and received 50 grams Albumin.    We look forward in seeing you on your next appointment here at [facility].  Please don t hesitate to call us at [phone] to reschedule any of your appointments or to speak with one of the [facility] registered nurses.  It was a pleasure taking care of you today.    Sincerely,    [facility]  [facility] Center  [address] [place] [address]  [address]  Phone:  [phone]

## 2017-12-19 ENCOUNTER — CARE COORDINATION (OUTPATIENT)
Dept: GASTROENTEROLOGY | Facility: CLINIC | Age: 62
End: 2017-12-19

## 2017-12-19 DIAGNOSIS — K70.31 ALCOHOLIC CIRRHOSIS OF LIVER WITH ASCITES (H): Primary | ICD-10-CM

## 2017-12-19 DIAGNOSIS — E11.51 TYPE II DIABETES MELLITUS WITH PERIPHERAL CIRCULATORY DISORDER (H): ICD-10-CM

## 2017-12-20 ENCOUNTER — MYC MEDICAL ADVICE (OUTPATIENT)
Dept: GASTROENTEROLOGY | Facility: CLINIC | Age: 62
End: 2017-12-20

## 2017-12-21 RX ORDER — CALCIUM CARBONATE 500(1250)
1 TABLET ORAL DAILY
COMMUNITY
End: 2019-04-22

## 2017-12-21 NOTE — PROGRESS NOTES
Attempted to reach patient for check in, no answer at either number provided, message left requesting call back. Also sent [employer] reply. Dr. Simmons recommends the followin. He needs a full set of labs (LFTs, CBC, BMP, INR) plus a hemoglobin A1C   2. He only had the TIPS about 7 weeks ago. It can take up to 4 months for things to equalize and show improvement. It does not happen quickly.   3. Stop the lactulose if it is causing too much stool/discomfort. Please review with him.  4. Feeling cold is a symptom associated with liver disease. Unfortunately there is no medication for it.   5. Before a living donor would be worked up, he would need to be evaluated for liver transplant. We can talk about it at our next visit   6. STOP the propranolol   7. Review all of his medications and update list.   8. He needs to get back to endocrinology for diabetes management. He hasn't had an AIC.   9. Schedule  at 0930 for a 40 minute visit.      Reached patient and reviewed above items. He states he will have lab work done next week when at his para appointment on . Reviewed lactulose titration with goal of 3-4 bowel movements daily. He is currently only having 1 bowel movement daily. He is taking lactulose as directed, and states it is covered by insurance. He will stop the propanolol as directed. Medication list in EPIC was updated, instructed patient to bring his active medications with him to his PCP appointment next week to double check med list accuracy. He does not feel he needs to see endocrine for diabetes management at this point because after his most recent meeting with his PCP, he returned to taking his insulin as previously, and blood sugars are back in a good range. He is unable to attend the 18 appointment with Dr. Simmons because he only has transportation on Wed/Thurs/Fri. His other complaint, not listed in his [employer] message is that his lower extremity swelling has increased and  both legs are weeping. Will place lymphedema referral and MTM referral. Notable medication discrepancy is that he is not taking Lasix, and he thinks someone may have taken him off of it but is not sure.

## 2017-12-22 NOTE — PROGRESS NOTES
1. If he is not having confusion or cloudiness, he really does not need to titrate the lactulose to 3 stools. He can stay where he is.  2. Reiterate low sodium diet.  3. I can see him Friday Jan 5 at 11:30  4. After I see his labs I may restart lasix but want labs first.  5. Agree with lymphedema clinic

## 2017-12-22 NOTE — PROGRESS NOTES
Reviewed recommendations from Dr. Simmons. He will have labs drawn at para next week as planned, and will follow up with Dr. Simmons on Friday 1/5. Lymphedema and MTM referrals placed.

## 2017-12-28 ENCOUNTER — OFFICE VISIT (OUTPATIENT)
Dept: PHARMACY | Facility: CLINIC | Age: 62
End: 2017-12-28
Payer: COMMERCIAL

## 2017-12-28 ENCOUNTER — OFFICE VISIT (OUTPATIENT)
Dept: INTERNAL MEDICINE | Facility: CLINIC | Age: 62
End: 2017-12-28
Payer: COMMERCIAL

## 2017-12-28 VITALS
HEART RATE: 89 BPM | DIASTOLIC BLOOD PRESSURE: 72 MMHG | BODY MASS INDEX: 44.58 KG/M2 | SYSTOLIC BLOOD PRESSURE: 139 MMHG | WEIGHT: 315 LBS

## 2017-12-28 DIAGNOSIS — I89.0 LYMPHEDEMA: ICD-10-CM

## 2017-12-28 DIAGNOSIS — L03.90 CELLULITIS, UNSPECIFIED CELLULITIS SITE: ICD-10-CM

## 2017-12-28 DIAGNOSIS — K76.82 HEPATIC ENCEPHALOPATHY (H): Primary | ICD-10-CM

## 2017-12-28 DIAGNOSIS — L03.119 CELLULITIS AND ABSCESS OF LEG: Primary | ICD-10-CM

## 2017-12-28 DIAGNOSIS — Z00.00 PREVENTATIVE HEALTH CARE: ICD-10-CM

## 2017-12-28 DIAGNOSIS — K21.9 GASTROESOPHAGEAL REFLUX DISEASE, ESOPHAGITIS PRESENCE NOT SPECIFIED: ICD-10-CM

## 2017-12-28 DIAGNOSIS — L02.419 CELLULITIS AND ABSCESS OF LEG: Primary | ICD-10-CM

## 2017-12-28 DIAGNOSIS — K70.31 ALCOHOLIC CIRRHOSIS OF LIVER WITH ASCITES (H): ICD-10-CM

## 2017-12-28 DIAGNOSIS — F32.0 MILD MAJOR DEPRESSION (H): ICD-10-CM

## 2017-12-28 PROCEDURE — 99607 MTMS BY PHARM ADDL 15 MIN: CPT | Performed by: PHARMACIST

## 2017-12-28 PROCEDURE — 99605 MTMS BY PHARM NP 15 MIN: CPT | Performed by: PHARMACIST

## 2017-12-28 RX ORDER — CEPHALEXIN 500 MG/1
500 CAPSULE ORAL 4 TIMES DAILY
Qty: 40 CAPSULE | Refills: 0 | Status: SHIPPED | OUTPATIENT
Start: 2017-12-28 | End: 2018-03-12

## 2017-12-28 ASSESSMENT — PAIN SCALES - GENERAL: PAINLEVEL: EXTREME PAIN (9)

## 2017-12-28 NOTE — PROGRESS NOTES
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     The patient was hospitalized between 11/4/17 - 11/6/17 for abdominal discomfort and encephalopathy thought secondary to diuretic therapy and resultant dehydration after a TIPS procedure. During his hospitalization, his diuretics (spironolactone and furosemide) were decreased by 50% and his meds for diabetes mellitus were discontinued. We discussed this at our last visit, on 11/28/17, at which time we restarted his glipizide and insulin injections, though did not address his diuretics at that time.     He returns today with increased swelling, grade 8-10 pain, and serous weeping from his bilateral lower extremities. They are keeping his legs lightly wrapped, and using ace wraps  but have failed use of compression stockings in the past due to the size of his legs. His pain and swelling is radiating up his legs, now past his knee. He is not using any pain medication currently. Notably, he has an appointment with lymphedema specialists next week.     He and his wife have no other concerns today.      Patient Active Problem List   Diagnosis     Mild major depression (H)     Hyperglycemia     Thrombocytopenia (H)     Hypertension goal BP (blood pressure) < 130/80     Plantar warts     Corns and callosities     Family history of colon cancer     Type 2 diabetes, HbA1c goal < 7% (H)     Portal hypertension (H)     Alcoholic cirrhosis (H)     Advanced directives, counseling/discussion     Ascites     Esophageal varices (H)     Multiple rib fractures     Tobacco use disorder     Hyperlipidemia LDL goal <100     Dental  caries     Prurigo nodularis     Esophageal reflux     Morbid obesity (H)     History of colonic polyps: tubular adenomas and serrated adenomas     Type II diabetes mellitus with peripheral circulatory disorder (H)     Alcoholic cirrhosis of liver with ascites (H)     Hepatic encephalopathy (H)       Review of Systems:   A full 10-pt Review of Systems was performed, verified and is negative except as documented in the HPI.  All health questionnaires were reviewed, verified and relevant information documented above.     Active Medications:     Current Outpatient Prescriptions:      FUROSEMIDE PO, Take 180 mg by mouth daily, Disp: , Rfl:      calcium carbonate (OS-ERIN 500 MG [place]. [place]) 1250 MG tablet, Take 1 tablet by mouth daily , Disp: , Rfl:      rifaximin (XIFAXAN) 550 MG TABS tablet, Take 1 tablet (550 mg) by mouth 2 times daily, Disp: 60 tablet, Rfl: 11     lactulose (CHRONULAC) 10 GM/15ML solution, Take 30 mLs (20 g) by mouth 3 times daily Adjust dosing as needed to achieve 3-4 bowel movements daily., Disp: 2000 mL, Rfl: 11     insulin aspart (NOVOLOG FLEXPEN) 100 UNIT/ML injection, 20 units before breakfast, 20 units before lunch, 20 units before dinner, 20 units before snacks, Disp: 30 mL, Rfl: 3     insulin glargine (LANTUS SOLOSTAR) 100 UNIT/ML injection, Inject 40 Units Subcutaneous every morning, Disp: 30 mL, Rfl: 1     glipiZIDE (GLIPIZIDE XL) 5 MG 24 hr tablet, Take 1 tablet (5 mg) by mouth 2 times daily, Disp: 180 tablet, Rfl: 1     spironolactone (ALDACTONE) 50 MG tablet, 1 tablet (50 mg) 2 times daily Takes 3 tablets in am.and p.m., Disp: 540 tablet, Rfl: 6     insulin pen needle (B-D U/F) 31G X 8 MM, Use 6 times daily or as directed, Disp: 600 each, Rfl: 1     blood glucose monitoring (NO BRAND SPECIFIED) meter device kit, Use to test blood sugar 4 X  times daily or as directed., Disp: 1 kit, Rfl: 0     aspirin 81 MG tablet, Take 1 tablet (81 mg) by mouth daily, Disp: 30 tablet, Rfl: 11     magnesium  oxide (MAG-OX) 400 (241.3 MG) MG tablet, Take 1 tablet (400 mg) by mouth daily, Disp: 90 tablet, Rfl: 3     insulin pen needle (B-D U/F) 31G X 8 MM, USE  6 times daily / OR AS DIRECTED, Disp: 300 each, Rfl: 3     blood glucose monitoring (NO BRAND SPECIFIED) test strip, Use to test blood sugars 4 X  times daily or as directed, Disp: 200 strip, Rfl: 3     blood glucose (NO BRAND SPECIFIED) lancets standard, Use to test blood sugar 4 X  times daily or as directed., Disp: 1 Box, Rfl: 3     desvenlafaxine succinate ER (PRISTIQ) 100 MG 24 hr tablet, Take 2 tablets (200 mg) by mouth daily, Disp: 180 tablet, Rfl: 3     ranitidine (ZANTAC) 150 MG tablet, Take 1 tablet (150 mg) by mouth 2 times daily (Patient taking differently: Take 150 mg by mouth daily ), Disp: 180 tablet, Rfl: 3     Cyanocobalamin (VITAMIN B-12 PO), Take 1 tablet by mouth daily, Disp: , Rfl:      STATIN NOT PRESCRIBED, INTENTIONAL,, 1 each daily Statin not prescribed intentionally due to Active liver disease, Disp: 0 each, Rfl: 0     ondansetron (ZOFRAN) 4 MG tablet, Take 1 tablet (4 mg) by mouth every 8 hours as needed for nausea, Disp: 18 tablet, Rfl: 1     Cholecalciferol (VITAMIN D3) 2000 UNITS CAPS, Take 1 capsule by mouth daily, Disp: 90 capsule, Rfl: 3      Allergies:   No known allergies.     Past Medical History:  Liver cirrhosis, SMITH   History of alcoholism, in remission   Splenomegaly   Diabetes mellitus  Hypertension   Thrombocytopenia   Squamous cell carcinoma  Hernia   Varices, esophageal      Past Surgical History:  Abdomen surgery   Herniorrhaphy, umbilical     Family History:   Mother - breast and liver cancer  Father - cardiovascular disease, rectal cancer   Siblings - brother with history of diabetes mellitus, sister with history of skin cancer      Social History:   Presents with his wife.   Current smoker of 0.30 packs/day.  History of alcoholism, in remission since 2012.      Physical Exam:   /72  Pulse 89  Wt (!) 157.5 kg  (347 lb 3.2 oz)  BMI 44.58 kg/m2   Bilateral lower legs positive for massive lymphoedema with open weeping serosanguinous wounds.      Assessment and Plan:  1. Cellulitis, lower extremities   Appears secondary to lower extremity edema from discontinuing his diuretics. He will start a course of antibiotics, as below.   - cephALEXin (KEFLEX) 500 MG capsule  Dispense: 40 capsule; Refill: 0    2. Lymphedema, lower extremities   He has a follow-up with the pharmacist later today. Pharmacy will discuss the patient's medications with Dr. Meghna Simmons, specifically in regard to restarting his furosemide. Randy Menendez in medication therapy management will research appropriate pain medications in the setting of liver cirrhosis.  I have placed a call to the [facility] regarding obtaining pneumatic compression air boots. Call back is pending.    3. Diabetes mellitus   Stable since returning to his daily DM medications. Continue as discussed.        Follow-up: as needed.         Scribe Disclosure:   We, Izabela Machuca and Any Littlejohn, are serving as scribes to document services personally performed by CORY Frye CNP at this visit, based upon the provider's statements to me. All documentation has been reviewed by the aforementioned provider prior to being entered into the official medical record.     Portions of this medical record were completed by a scribe. UPON MY REVIEW AND AUTHENTICATION BY ELECTRONIC SIGNATURE, this confirms (a) I performed the applicable clinical services, and (b) the record is accurate.  Total time spent 25 minutes.  More than 50% of the time spent with Mr. Louie on counseling / coordinating his care  .[email or web address]

## 2017-12-28 NOTE — Clinical Note
Dr. Simmons... FYI: Pt's Furosemide was D/C'd some time ago, he just restarted it due to severe lymphoedema at 60mg BID. This gave him intense muscle cramps and I recommended he decrease this to 60mg once daily per preferred Spironolactone ratio (100:40mg). He will be coming in for a BMP next week. Just wanted to give you an update on this before you saw him.   Let me know if there are any additional changes you would like him to make!  Randy Menendez, PharmD [facility] Pharmacist  Phone: [phone]

## 2017-12-28 NOTE — MR AVS SNAPSHOT
After Visit Summary   12/28/2017    Lawrence Louie    MRN: [id number]           Patient Information     Date Of Birth          1955        Visit Information        Provider Department      12/28/2017 2:00 PM [facility]        Care Instructions    Recommendations from today's MTM visit:                                                      1. Titrate your Lactulose to get 2-3 bowel movements a day. You can set your dose on this medication to reach this goal.     2. You will need lab draws 3-7 days after starting your Furosemide. For now just take 3 tablets of the 20mg once daily. The most important thing to reduce your pain and swelling is to get your doses of both Furosemide and Spironolactone in everyday.     3. Keep your sodium below 2000mg daily. This and the diuretics is the best way to control your ascites and edema.     4. For pain, try taking 1/4-1/2 of your Oxycodone 5mg daily and Acetaminophen (Tylenol) 325mg  Up to four times a day. Max dose 2000mg per day.     Next MTM visit: 1/31/18 at 2 PM bring your medications to this appointment, we will go through them all.     To schedule another MTM appointment, please call the clinic directly or you may call the MTM scheduling line at [phone] or toll-free at [phone].     My Clinical Pharmacist's contact information:                                                      It was a pleasure seeing you today!  Please feel free to contact me with any questions or concerns you have.      Randy Menendez, PharmCHARLEE  MTM Pharmacist    Phone: [phone]     You may receive a survey about the MTM services you received.  I would appreciate your feedback to help me serve you better in the future. Please fill it out and return it when you can. Your comments will be anonymous.              Follow-ups after your visit        Your next 10 appointments already scheduled     Jan 05, 2018 10:30  AM CST   Lab with UC LAB   [facility] ([facility])    [address]  1st [address]  [facility] [address]   [phone]            Jan 05, 2018 11:30 AM CST   (Arrive by 11:15 AM)   Return General Liver with Meghna Simmons MD   [facility] ([facility])    [address]  [address] [facility] [address]   [phone]            Jan 05, 2018  1:15 PM CST   (Arrive by 1:00 PM)   Lymphedema Evaluation with Any Guaman PT   [facility] ([facility])    [address]  [address] [facility] [address]   [phone]            Jan 16, 2018  7:30 AM CST   Paracentesis Visit with Uc Spec Inf Para Provider, UC 39 ATC   [facility] ([facility])    [address] [place] [address]  [address] [facility] [address]   [phone]            Jan 24, 2018  9:30 AM CST   Paracentesis Visit with Uc Spec Inf Para Provider, UC 39 ATC   [facility] ([facility])    [address]  [address] [facility] [address] [phone]            Jan 30, 2018 11:30 AM CST   (Arrive by 11:15 AM)   Return General Liver with Meghna Simmons MD   [facility] ([facility])    [address] [place] [address]  [address] [facility] [address]   [phone]            Jan 31, 2018  9:30 AM CST   Paracentesis Visit with Uc Spec Inf Para Provider, UC 39 ATC   [facility] ([facility])    [address]  [address] [facility] [id number] [phone]              Who to contact     If you have questions or need follow up information about today's clinic visit or your schedule please contact [facility] MEDICATION THERAPY MANAGEMENT directly at  [phone].  Normal or non-critical lab and imaging results will be communicated to you by [email or web address], letter or phone within 4 business days after the clinic has received the results. If you do not hear from us within 7 days, please contact the clinic through Jambotecht or phone. If you have a critical or abnormal lab result, we will notify you by phone as soon as possible.  Submit refill requests through BISON or call your pharmacy and they will forward the refill request to us. Please allow 3 business days for your refill to be completed.          Additional Information About Your Visit        [email or web address] Information     BISON gives you secure access to your electronic health record. If you see a primary care provider, you can also send messages to your care team and make appointments. If you have questions, please call your primary care clinic.  If you do not have a primary care provider, please call [phone] and they will assist you.        Care EveryWhere ID     This is your Care EveryWhere ID. This could be used by other organizations to access your [facility] medical records  [id number]         Blood Pressure from Last 3 Encounters:   12/28/17 139/72   12/13/17 122/60   11/29/17 112/41    Weight from Last 3 Encounters:   12/28/17 (!) 347 lb 3.2 oz (157.5 kg)   12/13/17 (!) 327 lb 11.2 oz (148.6 kg)   11/29/17 (!) 328 lb 9.6 oz (149.1 kg)              Today, you had the following     No orders found for display         Today's Medication Changes          These changes are accurate as of: 12/28/17  3:12 PM.  If you have any questions, ask your nurse or doctor.               Start taking these medicines.        Dose/Directions    cephALEXin 500 MG capsule   Commonly known as:  KEFLEX   Used for:  Cellulitis and abscess of leg   Started by:  Ary Murphy APRN CNP        Dose:  500 mg   Take 1 capsule (500 mg) by mouth 4 times daily   Quantity:  40 capsule   Refills:  0         These medicines have changed  or have updated prescriptions.        Dose/Directions    ranitidine 150 MG tablet   Commonly known as:  ZANTAC   This may have changed:  when to take this   Used for:  Esophageal varices (H)        Dose:  150 mg   Take 1 tablet (150 mg) by mouth 2 times daily   Quantity:  180 tablet   Refills:  3            Where to get your medicines      These medications were sent to [id number] - [place], [place] - [address]  [address] [address]     Phone:  [phone]     cephALEXin 500 MG capsule                Primary Care Provider Office Phone # Fax #    Ary Murphy, APRN [phone]       [address] [place] [address]  [facility] [address]        Equal Access to Services     JOSEF LORENZO : Hadii sil kellyo Sodonnaali, waaxda luqadaha, qaybta kaalmada adeegyada, silvia richardson . [facility] [phone].    ATENCIÓN: Si habla español, tiene a rondon disposición servicios gratuitos de asistencia lingüística. [facility] [phone].    We comply with applicable federal civil rights laws and [place] laws. We do not discriminate on the basis of race, color, national origin, age, disability, sex, sexual orientation, or gender identity.            Thank you!     Thank you for choosing [facility] MEDICATION THERAPY MANAGEMENT  for your care. Our goal is always to provide you with excellent care. Hearing back from our patients is one way we can continue to improve our services. Please take a few minutes to complete the written survey that you may receive in the mail after your visit with us. Thank you!             Your Updated Medication List - Protect others around you: Learn how to safely use, store and throw away your medicines at www.disposemymeds.org.          This list is accurate as of: 12/28/17  3:12 PM.  Always use your most recent med list.                   Brand Name Dispense Instructions for use Diagnosis    aspirin 81 MG tablet     30 tablet    Take 1  tablet (81 mg) by mouth daily    Type 2 diabetes mellitus with other skin complications       blood glucose lancets standard    no brand specified    1 Box    Use to test blood sugar 4 X  times daily or as directed.    Diabetes mellitus, type 2 (H)       blood glucose monitoring meter device kit    no brand specified    1 kit    Use to test blood sugar 4 X  times daily or as directed.    Type 2 diabetes mellitus with other specified complication (H)       blood glucose monitoring test strip    no brand specified    200 strip    Use to test blood sugars 4 X  times daily or as directed    Diabetes mellitus, type 2 (H)       calcium carbonate 1250 MG tablet    OS-ERIN 500 mg [place]     Take 1 tablet by mouth daily        cephALEXin 500 MG capsule    KEFLEX    40 capsule    Take 1 capsule (500 mg) by mouth 4 times daily    Cellulitis and abscess of leg       desvenlafaxine succinate 100 MG 24 hr tablet    PRISTIQ    180 tablet    Take 2 tablets (200 mg) by mouth daily    Other depression       FUROSEMIDE PO      Take 60 mg by mouth 2 times daily        glipiZIDE 5 MG 24 hr tablet    glipiZIDE XL    180 tablet    Take 1 tablet (5 mg) by mouth 2 times daily    Type 2 diabetes mellitus without complication, with long-term current use of insulin (H)       insulin aspart 100 UNIT/ML injection    NovoLOG FLEXPEN    30 mL    20 units before breakfast, 20 units before lunch, 20 units before dinner, 20 units before snacks    Type 2 diabetes mellitus without complication, with long-term current use of insulin (H)       insulin glargine 100 UNIT/ML injection    LANTUS SOLOSTAR    30 mL    Inject 40 Units Subcutaneous every morning    Type 2 diabetes mellitus with other oral complication, unspecified long term insulin use status (H)       * insulin pen needle 31G X 8 MM    B-D U/F    300 each    USE  6 times daily / OR AS DIRECTED    Type 2 diabetes, HbA1c goal < 7% (H)       * insulin pen needle 31G X 8 MM    B-D U/F    600 each     Use 6 times daily or as directed    Type 2 diabetes, HbA1c goal < 7% (H)       lactulose 10 GM/15ML solution    CHRONULAC    2000 mL    Take 30 mLs (20 g) by mouth 3 times daily Adjust dosing as needed to achieve 3-4 bowel movements daily.    Hepatic encephalopathy (H)       magnesium oxide 400 (241.3 MG) MG tablet    MAG-OX    90 tablet    Take 1 tablet (400 mg) by mouth daily    Cramp of limb       ondansetron 4 MG tablet    ZOFRAN    18 tablet    Take 1 tablet (4 mg) by mouth every 8 hours as needed for nausea    Alcoholic cirrhosis (H), Nausea       ranitidine 150 MG tablet    ZANTAC    180 tablet    Take 1 tablet (150 mg) by mouth 2 times daily    Esophageal varices (H)       rifaximin 550 MG Tabs tablet    XIFAXAN    60 tablet    Take 1 tablet (550 mg) by mouth 2 times daily    Hepatic encephalopathy (H)       spironolactone 50 MG tablet    ALDACTONE    540 tablet    1 tablet (50 mg) 2 times daily Takes 3 tablets in am.and p.m.    Portal hypertension (H), Generalized edema       STATIN NOT PRESCRIBED (INTENTIONAL)     0 each    1 each daily Statin not prescribed intentionally due to Active liver disease    Hyperlipidemia LDL goal <100       VITAMIN B-12 PO      Take 1 tablet by mouth daily        vitamin D3 2000 UNITS Caps     90 capsule    Take 1 capsule by mouth daily    Mild major depression (H)       * Notice:  This list has 2 medication(s) that are the same as other medications prescribed for you. Read the directions carefully, and ask your doctor or other care provider to review them with you.

## 2017-12-28 NOTE — PROGRESS NOTES
SUBJECTIVE/OBJECTIVE:                           Lawrence Louie is a 62 year old male coming in for an initial visit for Medication Therapy Management.  He was referred to me from Dr. Simmons.     Chief Complaint: Pain in lower extremities due to edema.    Allergies/ADRs: None  Tobacco: 0-1 pack per day - is not interested in quittingTobacco Cessation Action Plan: Information offered: Patient not interested at this time  Alcohol: not currently using  Caffeine: 1 cups/day of coffee  PMH: Reviewed in Epic, TIPs surgery in 11/17    Medication Adherence/Access  The patient misses their medication 3 times per week.  Mostly his diuretics and lactulose. He does not take his diuretics because he can't leave the house after taking it, and he was confused on the lactulose dose, so he stopped this as well.     Ascites: Pt is taking Spironolactone 50mg tablet BID, Furosemide 60mg (3 tablets of 20mg) BID (misses 2-3 doses a week of these) he had terrible cramps since restarting Furosemide yesterday. It was stopped just before thanksgiving in hospital for unknown reasons. Pt is not following a low sodium diet. He adds some salt to food and eats potato chips frequently. Paracentesis 4 times a month, missed the last 2 sessions. Usually gets 4 L pulled off since the TIPs, before TIPs it was 10 L. Pt has trouble staying on top of his diuretics because he cannot do anything after taking them.     Hepatic Encephalopathy: Pt was taking Lactulose 30 mL daily, but stopped this due to confusion about how many BMs he should be having a day.  Xifaxan 550mg BID.     GERD/ Esophageal Varices: Current medications include: Ranitidine 150mg daily (patient reduce from 150mg BID). Pt c/o no current symptoms.  Patient feels that current regimen is effective. Pt states he had TIPs procedure as well as varices banded this year.     Lymphoedema: Pt is experiencing a lot of pain from his lower extremity edema. Pt does have some Oxycodone at home,  but is wondering what else he can take. He took a full oxycodone right after his TIPs procedure, but this caused some disorientation. He is thinking about trying a smaller amount of Oxycodone. Legs are weeping and extremely swollen per Dr. Murphy.     Depression:  Current medications include: Pristiq 200mg daily. Did not discuss sx in length today.    Cellulitis: Pt was started on Keflex 500mg QID today for Cellulitis.     Primary Prevention: Pt is taking Aspirin 81mg daily. Platelets have been low.     Current labs include:  BP Readings from Last 3 Encounters:   12/28/17 139/72   12/13/17 122/60   11/29/17 112/41     Today's Vitals: There were no vitals taken for this visit.  Lab Results   Component Value Date    A1C 6.0 08/25/2017   .  Lab Results   Component Value Date    CHOL 141 04/11/2017     Lab Results   Component Value Date    TRIG 57 04/11/2017     Lab Results   Component Value Date    HDL 50 04/11/2017     Lab Results   Component Value Date    LDL 79 04/11/2017       Liver Function Studies -   Recent Labs   Lab Test  11/06/17   0744   PROTTOTAL  5.5*   ALBUMIN  2.4*   BILITOTAL  2.0*   ALKPHOS  131   AST  50*   ALT  76*       Lab Results   Component Value Date    UCRR 249 08/25/2017    MICROL 12 08/25/2017    UMALCR 5.02 08/25/2017       Last Basic Metabolic Panel:  Lab Results   Component Value Date     11/06/2017      Lab Results   Component Value Date    POTASSIUM 3.7 11/06/2017     Lab Results   Component Value Date    CHLORIDE 113 11/06/2017     Lab Results   Component Value Date    BUN 10 11/06/2017     Lab Results   Component Value Date    CR 0.71 11/06/2017     GFR Estimate   Date Value Ref Range Status   11/06/2017 >90 >60 mL/min/1.7m2 Final     Comment:     Non  GFR Calc   11/05/2017 >90 >60 mL/min/1.7m2 Final     Comment:     Non  GFR Calc   11/04/2017 >90 >60 mL/min/1.7m2 Final     Comment:     Non  GFR Calc     TSH   Date Value Ref Range  Status   2016 1.61 0.40 - 4.00 mU/L Final     Most Recent Immunizations   Administered Date(s) Administered     DTAP (<7y) 1988     HEPA 10/30/2013     HepB 10/30/2013     Influenza (IIV3) PF 2016     Influenza Intranasal Vaccine 4 valent 10/12/2017     Pneumo Conj 13-V (2010&after) 2015     Pneumococcal 23 valent 2013     TD (ADULT, 7+) 1997     TDAP Vaccine (Adacel) 2013     Twinrix A/B 2013     Zoster vaccine, live 2016   Deferred Date(s) Deferred     Zoster vaccine, live 2016       ASSESSMENT:                             Current medications were reviewed today.     Medication Adherence: needs improement - see below    Ascites: Needs improvement. Per the typical diuretic ratio for ascites (100mmg Spironolacton: Furosemide respectively), his furosemide dose is too high (100:120mg, respectively). This is likely causing to his intense muscle cramps. I recommended he only take 60mg Furosemide once daily until he can come in for a BMP. This is closer to the preferred ratio to keep potassium WNL. Will discuss with Dr. Simmons.     Hepatic Encephalopathy: Needs improvement. Pt has hx of Encephalopathy and should be taking Lactulose. Recommended he self titrate until he is having 2-3 BMs daily.     GERD/ Esophageal Varices: Stable. Lower dose of Ranitidine likely safe. Not any proven efficacy for acid suppression in setting of Esophageal Varices. See Primary Prevention for recommendations concerning Aspirin    Lymphoedema: Needs improvement. We spent much time discussing the importance of adherence to diuretic, paracentesis, and low sodium regimens. This will be the main method to reduce the pain overtime. A topical regimen, such as lidocaine, was considered, but the area that would have to be covered is quite large, and could lead to substantial systemic absorption. For pain, I will have patient use the small amounts of Oxycodone (1/4-1/2 5mg tablet) he  has at home and monitor for disorientation/ confusion. May also use small amounts of tylenol/APAP, 2000mg per day or less.     Depression: Needs improvement. Did not discuss in length with patient, but Pristiq max dose should be 100mg in setting of moderate to severe liver liver disease. Pt is on 200mg currently, will discuss with CORY De Jesus CNP.    Cellulitis: Stable.    Primary Prevention: Needs improvement. Pt does not have hx of CVD, has had banded esophageal varices, and has low platelets. Risk of bleed with Aspirin use is quite high and outweighs any benefit at this point. Will discuss with PCP to D/C.     PLAN:                            Ary Murphy CNP...  1. Max dose of Pristiq in severe liver disease is 100mg daily.   2. Pt is high risk of bleeds, recommend D/Cing aspirin    Dr. Simmons...  1. Pt's Furosemide was D/C'd some time ago, he just restarted it due to severe lymphoedema at 60mg BID. This gave him intense muscle cramps and I recommended he decrease this to 60mg once daily per preferred Spironolactone ratio (100:40mg). He will be coming in for a BMP next week. Just wanted to give you an update on this before you saw him.     Pt to...  1. Use 1/4-1/2 tablet of Oxycodone 5mg prn for pain  2. May use up to 2000mg or less of APAP daily  3. Keep sodium below 2000mg daily  4. Take Furosemide 3 tablets (60mg) once daily rather than twice.  5. Restart Lactulose, titrate to 2-3 BMs daily.    I spent 60 minutes with this patient today. All changes were made via collaborative practice agreement with Ary Murphy A copy of the visit note was provided to the patient's primary care provider.    Will follow up in 1 month.    The patient was given a summary of these recommendations as an after visit summary.     Randy Menendez, PharmD  [facility] Pharmacist    Phone: [phone]

## 2017-12-28 NOTE — PATIENT INSTRUCTIONS
Recommendations from today's MTM visit:                                                      1. Titrate your Lactulose to get 2-3 bowel movements a day. You can set your dose on this medication to reach this goal.     2. You will need lab draws 3-7 days after starting your Furosemide. For now just take 3 tablets of the 20mg once daily. The most important thing to reduce your pain and swelling is to get your doses of both Furosemide and Spironolactone in everyday.     3. Keep your sodium below 2000mg daily. This and the diuretics is the best way to control your ascites and edema.     4. For pain, try taking 1/4-1/2 of your Oxycodone 5mg daily and Acetaminophen (Tylenol) 325mg  Up to four times a day. Max dose 2000mg per day.     Next MTM visit: 1/31/18 at 2 PM bring your medications to this appointment, we will go through them all.     To schedule another MTM appointment, please call the clinic directly or you may call the MTM scheduling line at [phone] or toll-free at [phone].     My Clinical Pharmacist's contact information:                                                      It was a pleasure seeing you today!  Please feel free to contact me with any questions or concerns you have.      Randy Menendez, PharmD  [facility] Pharmacist    Phone: [phone]     You may receive a survey about the MTM services you received.  I would appreciate your feedback to help me serve you better in the future. Please fill it out and return it when you can. Your comments will be anonymous.

## 2017-12-28 NOTE — MR AVS SNAPSHOT
After Visit Summary   12/28/2017    Lawrence Louie    MRN: [id number]           Patient Information     Date Of Birth          1955        Visit Information        Provider Department      12/28/2017 1:00 PM Ary Murphy, APRN CNP [facility]        Today's Diagnoses     Cellulitis and abscess of leg    -  1       Follow-ups after your visit        Your next 10 appointments already scheduled     Jan 05, 2018 10:30 AM CST   Lab with UC LAB   [facility] ([facility])    [address] [place] [address]  [address] [facility] [address] [phone]            Jan 05, 2018 11:30 AM CST   (Arrive by 11:15 AM)   Return General Liver with Meghna Simmons MD   [facility] ([facility])    [address] [place] [address]  [address] [facility] [id number] [phone]            Jan 05, 2018  1:15 PM CST   (Arrive by 1:00 PM)   Lymphedema Evaluation with Any Guaman PT   [facility] ([facility])    [address] [place] [address]  [address] [facility] [address] [phone]            Jan 16, 2018  7:30 AM CST   Paracentesis Visit with Uc Spec Inf Para Provider, UC 39 ATC   [facility] ([facility])    [address] [place] [id number] [phone]            Jan 24, 2018  9:30 AM CST   Paracentesis Visit with Uc Spec Inf Para Provider, UC 39 ATC   [facility] ([facility])    [address] [place] [address]  [address] [facility] [address] [phone]            Jan 30, 2018 11:30 AM CST   (Arrive by 11:15 AM)   Return General Liver with Meghna Simmons MD   [facility] ([facility])    [address] [place] [id number] [phone]             Jan 31, 2018  9:30 AM CST   Paracentesis Visit with  Spec Inf Para Provider, UC 39 ATC   [facility] ([facility])    [address] [facility]  [address] [facility] [address] [phone]              Who to contact     Please call your clinic at [phone] to:    Ask questions about your health    Make or cancel appointments    Discuss your medicines    Learn about your test results    Speak to your doctor   If you have compliments or concerns about an experience at your clinic, or if you wish to file a complaint, please contact [facility] Patient Relations at [phone] or email us at [email or web address]         Additional Information About Your Visit        [employer] Information     Lookback gives you secure access to your electronic health record. If you see a primary care provider, you can also send messages to your care team and make appointments. If you have questions, please call your primary care clinic.  If you do not have a primary care provider, please call [phone] and they will assist you.      Lookback is an electronic gateway that provides easy, online access to your medical records. With Lookback, you can request a clinic appointment, read your test results, renew a prescription or communicate with your care team.     To access your existing account, please contact your [facility] or call [phone] for assistance.        Care EveryWhere ID     This is your Care EveryWhere ID. This could be used by other organizations to access your [place] medical records  [id number]        Your Vitals Were     Pulse BMI (Body Mass Index)                89 44.58 kg/m2           Blood Pressure from Last 3 Encounters:   12/28/17 139/72   12/13/17 122/60   11/29/17 112/41    Weight from Last 3 Encounters:   12/28/17 (!) 157.5 kg (347 lb 3.2 oz)   12/13/17 (!) 148.6  kg (327 lb 11.2 oz)   11/29/17 (!) 149.1 kg (328 lb 9.6 oz)              Today, you had the following     No orders found for display         Today's Medication Changes          These changes are accurate as of: 12/28/17  2:11 PM.  If you have any questions, ask your nurse or doctor.               Start taking these medicines.        Dose/Directions    cephALEXin 500 MG capsule   Commonly known as:  KEFLEX   Used for:  Cellulitis and abscess of leg   Started by:  Ary Murphy APRN CNP        Dose:  500 mg   Take 1 capsule (500 mg) by mouth 4 times daily   Quantity:  40 capsule   Refills:  0         These medicines have changed or have updated prescriptions.        Dose/Directions    ranitidine 150 MG tablet   Commonly known as:  ZANTAC   This may have changed:  when to take this   Used for:  Esophageal varices (H)        Dose:  150 mg   Take 1 tablet (150 mg) by mouth 2 times daily   Quantity:  180 tablet   Refills:  3            Where to get your medicines      These medications were sent to [id number] - [address] [facility] [address]     Phone:  [phone]     cephALEXin 500 MG capsule                Primary Care Provider Office Phone # Fax #    CORY Toussaint [phone]       [address] [place] [address] [address]  [facility] [address]        Equal Access to Services     MARY LOU LORENZO AH: Hadmario anthony hadasho Soomaali, waaxda luqadaha, qaybta kaalmada adeegyada, silvia richardson . So [facility] [phone].    ATENCIÓN: Si habla español, tiene a rondon disposición servicios gratuitos de asistencia lingüística. Llame al [phone].    We comply with applicable federal civil rights laws and [place] laws. We do not discriminate on the basis of race, color, national origin, age, disability, sex, sexual orientation, or gender identity.            Thank you!     Thank you for choosing [facility]  for your care. Our  goal is always to provide you with excellent care. Hearing back from our patients is one way we can continue to improve our services. Please take a few minutes to complete the written survey that you may receive in the mail after your visit with us. Thank you!             Your Updated Medication List - Protect others around you: Learn how to safely use, store and throw away your medicines at www.disposemymeds.org.          This list is accurate as of: 12/28/17  2:11 PM.  Always use your most recent med list.                   Brand Name Dispense Instructions for use Diagnosis    aspirin 81 MG tablet     30 tablet    Take 1 tablet (81 mg) by mouth daily    Type 2 diabetes mellitus with other skin complications       blood glucose lancets standard    no brand specified    1 Box    Use to test blood sugar 4 X  times daily or as directed.    Diabetes mellitus, type 2 (H)       blood glucose monitoring meter device kit    no brand specified    1 kit    Use to test blood sugar 4 X  times daily or as directed.    Type 2 diabetes mellitus with other specified complication (H)       blood glucose monitoring test strip    no brand specified    200 strip    Use to test blood sugars 4 X  times daily or as directed    Diabetes mellitus, type 2 (H)       calcium carbonate 1250 MG tablet    OS-ERIN 500 mg [place]. [place]     Take 1 tablet by mouth daily        cephALEXin 500 MG capsule    KEFLEX    40 capsule    Take 1 capsule (500 mg) by mouth 4 times daily    Cellulitis and abscess of leg       desvenlafaxine succinate 100 MG 24 hr tablet    PRISTIQ    180 tablet    Take 2 tablets (200 mg) by mouth daily    Other depression       FUROSEMIDE PO      Take 180 mg by mouth daily        glipiZIDE 5 MG 24 hr tablet    glipiZIDE XL    180 tablet    Take 1 tablet (5 mg) by mouth 2 times daily    Type 2 diabetes mellitus without complication, with long-term current use of insulin (H)       insulin aspart 100 UNIT/ML injection    NovoLOG FLEXPEN     30 mL    20 units before breakfast, 20 units before lunch, 20 units before dinner, 20 units before snacks    Type 2 diabetes mellitus without complication, with long-term current use of insulin (H)       insulin glargine 100 UNIT/ML injection    LANTUS SOLOSTAR    30 mL    Inject 40 Units Subcutaneous every morning    Type 2 diabetes mellitus with other oral complication, unspecified long term insulin use status (H)       * insulin pen needle 31G X 8 MM    B-D U/F    300 each    USE  6 times daily / OR AS DIRECTED    Type 2 diabetes, HbA1c goal < 7% (H)       * insulin pen needle 31G X 8 MM    B-D U/F    600 each    Use 6 times daily or as directed    Type 2 diabetes, HbA1c goal < 7% (H)       lactulose 10 GM/15ML solution    CHRONULAC    2000 mL    Take 30 mLs (20 g) by mouth 3 times daily Adjust dosing as needed to achieve 3-4 bowel movements daily.    Hepatic encephalopathy (H)       magnesium oxide 400 (241.3 MG) MG tablet    MAG-OX    90 tablet    Take 1 tablet (400 mg) by mouth daily    Cramp of limb       ondansetron 4 MG tablet    ZOFRAN    18 tablet    Take 1 tablet (4 mg) by mouth every 8 hours as needed for nausea    Alcoholic cirrhosis (H), Nausea       ranitidine 150 MG tablet    ZANTAC    180 tablet    Take 1 tablet (150 mg) by mouth 2 times daily    Esophageal varices (H)       rifaximin 550 MG Tabs tablet    XIFAXAN    60 tablet    Take 1 tablet (550 mg) by mouth 2 times daily    Hepatic encephalopathy (H)       spironolactone 50 MG tablet    ALDACTONE    540 tablet    1 tablet (50 mg) 2 times daily Takes 3 tablets in am.and p.m.    Portal hypertension (H), Generalized edema       STATIN NOT PRESCRIBED (INTENTIONAL)     0 each    1 each daily Statin not prescribed intentionally due to Active liver disease    Hyperlipidemia LDL goal <100       VITAMIN B-12 PO      Take 1 tablet by mouth daily        vitamin D3 2000 UNITS Caps     90 capsule    Take 1 capsule by mouth daily    Mild major depression  (H)       * Notice:  This list has 2 medication(s) that are the same as other medications prescribed for you. Read the directions carefully, and ask your doctor or other care provider to review them with you.

## 2017-12-28 NOTE — Clinical Note
Ary Murphy CNP, I saw your patient for medication review, here are my recommendations. 1. Max dose of Pristiq in severe liver disease is 100mg daily.  2. Pt is high risk of bleeds, recommend D/Cing aspirin  See note for details.   Randy Menendez, PharmD [facility] Pharmacist  Phone: [phone]

## 2017-12-29 ENCOUNTER — CARE COORDINATION (OUTPATIENT)
Dept: GASTROENTEROLOGY | Facility: CLINIC | Age: 62
End: 2017-12-29

## 2017-12-29 NOTE — PROGRESS NOTES
Attempted to reach patient for check in, no answer, message left requesting call back, number provided.  Missed para appointment this week, next scheduled on 1/4. Did not get labs drawn this week as discussed. Per MTM visit, restarted Lasix at half dose, and plan for labs at 1/4 para. Requested callback, will continue to attempt to reach for check in and reminder.

## 2018-01-02 ENCOUNTER — HOSPITAL ENCOUNTER (OUTPATIENT)
Dept: PHYSICAL THERAPY | Facility: CLINIC | Age: 63
Setting detail: THERAPIES SERIES
End: 2018-01-02
Attending: INTERNAL MEDICINE
Payer: COMMERCIAL

## 2018-01-02 PROCEDURE — G8987 SELF CARE CURRENT STATUS: HCPCS | Mod: GP,CM | Performed by: PHYSICAL THERAPIST

## 2018-01-02 PROCEDURE — G8988 SELF CARE GOAL STATUS: HCPCS | Mod: GP,CK | Performed by: PHYSICAL THERAPIST

## 2018-01-02 PROCEDURE — 40000449 ZZHC STATISTIC PT VISIT, LYMPHEDEMA: Performed by: PHYSICAL THERAPIST

## 2018-01-02 PROCEDURE — 97162 PT EVAL MOD COMPLEX 30 MIN: CPT | Mod: GP | Performed by: PHYSICAL THERAPIST

## 2018-01-02 PROCEDURE — 97602 WOUND(S) CARE NON-SELECTIVE: CPT | Mod: GP | Performed by: PHYSICAL THERAPIST

## 2018-01-02 PROCEDURE — 97140 MANUAL THERAPY 1/> REGIONS: CPT | Mod: GP | Performed by: PHYSICAL THERAPIST

## 2018-01-02 NOTE — PROGRESS NOTES
Patient's wife called through triage looking for social work assistance. She is in tears, and states the patient is losing his will to live. He is in pain much of the time, lower extremity swelling is getting worse, reaching his upper thighs now, and he has gotten generally worse since TIPS. Reviewed with wife Raven the conversation this writer had with patient on 12/21, refer to note. She states they did meet with MTM who was helpful, and he will have para on 1/4. Will have labs drawn then, and meet with Dr. Simmons on 1/5. Today he met with lymphedema specialist, will watch for their recommendations.  Message sent to CEFERINO.

## 2018-01-03 NOTE — PROGRESS NOTES
01/02/18 1300   Rehab Discipline   Discipline PT   Type of Visit   Type of visit Initial Edema Evaluation   General Information   Start of care 01/02/18   Referring physician Meghna Simmons MD    Orders Evaluate and treat as indicated   Order date 12/22/17   Medical diagnosis cirrhosis of liver and B LE lymphedema   Onset of illness / date of surgery 10/27/17  (TIPS surgery)   Edema onset 01/01/15   Affected body parts LLE;RLE   Edema etiology Surgery  (cirrhosis)   Edema etiology comments Pt has had LE edema for several years. Per pt, after TIPS procedure his LE edema recently increased and B LEs are weeping.He and his wife have tried ace wraps but edema has not decreased. Balance is unsteady. Depression, type 2 DM. He has difficulty sleeping.    Pertinent history of current problem (PT: include personal factors and/or comorbidities that impact the POC; OT: include additional occupational profile info) moderate complexity: pt has so much difficulty with daily cares and finding clothing to fit and weeping of legs that he spends much of day in bed. He is having paracentesis every 2 weeks.   Surgical / medical history reviewed Yes   Prior level of functional mobility able to care for self, be up all day and participate in household activities   Prior treatment ACE wraps   Community support Family / friend caregiver  (wife, Flores)   Patient role / employment history Retired   Psychosocial concerns Depression;Impaired body image;Financial concerns;Impaired coping;Impaired sleep   Current assistive devices Standard cane   Fall Risk Screen   Fall screen completed by PT   Have you fallen 2 or more times in the past year? No   Have you fallen and had an injury in the past year? No   Is patient a fall risk? No   System Outcome Measures   Outcome Measures Lymphedema   Lymphedema Life Impact Scale (score range 0-72). A higher score indicates greater impairment. 55   Subjective Report   Patient report of  symptoms pain, heaviness   Patient / Family Goals   Patient / family goals statement to be able to take care of legs, decrease swelling and weeping   Pain   Patient currently in pain Yes   Pain location B lower legs and lateral proximal thighs   Pain description Heaviness;Ache;Pressure;Discomfort   Vital Signs   Vital signs Weight   Weight 157 KG   Cognitive Status   Orientation Orientation to person, place and time   Level of consciousness Alert   Follows commands and answers questions 100% of the time   Edema Exam / Assessment   Skin condition Non-pitting;Hemosiderin deposits;Temperature   Skin condition comments multiple (>10) open areas of 1 cm or less on lower legs, clear drainage. Firm and fibrotic lower legs and thighs. erythema anterior lower leg and lateral proximal thigh, skin breakdown between toes   Scar No   Capillary refill Symmetrical   Stemmer sign Positive   Stemmer sign comments B 2nd toes   Ulceration Yes   Wounds Wound 1   Ulceration comments B lower legs each have >10 open areas of 1 cm or less   Wound 1   Location B lower legs   Size 10 + of <1 cm   Depth skin depth   Odor None   Color Red   Drainage Other   Drainage amount Other;Moderate   Girth Measurements   Girth Measurements Refer to separate girth measurement flowsheet   Volume LE   LE volume comparison RLE volume greater than LLE volume   % difference 5.1   Range of Motion   ROM comments decreased df and knee flexion due to edema in LEs   Strength   Strength comments weak gluteals and quads   Transfers   Transfers supine to sit with Min A for LEs   Gait / Locomotion   Gait / Locomotion slowly, using SEC   Sensory   Sensory perception comments normal to light touch on lower legs   Planned Edema Interventions   Planned edema interventions Manual lymph drainage;Gradient compression bandaging;Fit for compression garment;Exercises;Precautions to prevent infection / exacerbation;Education;Manual therapy;Wound care / dressing changes;ADL  training;Skin care / precautions;Home management program development   Clinical Impression   Criteria for skilled therapeutic intervention met Yes   Therapy diagnosis B LE lymphedema   Influenced by the following impairments / conditions Wounds / Ulcers;Stage 2   Functional limitations due to impairments / conditions 4 out of 4 difficulty with daily self cares and household activities, 3 out of 4 difficulty finding clothing to fit   Clinical Presentation Evolving/Changing   Clinical Presentation Rationale LLIS of 55, unable to participate in household of leisure activities, depression.   Clinical Decision Making (Complexity) Moderate complexity   Treatment frequency Other   Treatment duration 4xwx3w then 1xmx2m   Patient / family and/or staff in agreement with plan of care Yes   Risks and benefits of therapy have been explained Yes   Clinical impression comments Pt presents with several year history of lymphedema of LEs, recently increased after TIPS procedure, with multiple open areas causing weeping of LEs.   Education Assessment   Preferred learning style Listening;Demonstration   Barriers to learning Emotional   Goals   Edema Eval Goals 1;2;3   Goal 1   Goal identifier LLIS   Goal description Pt will have an 8 or more point decrease in LLIS score.   Target date 03/01/18   Goal 2   Goal identifier home program   Goal description Pt/wife will be independent in home program of compression, exercises and life style modification   Target date 03/15/18   Goal 3   Goal identifier volume   Goal description Volume of LEs will decrease 10% or more to enable ease with clothing   Target date 03/15/18   Total Evaluation Time   Total evaluation time 23

## 2018-01-04 ENCOUNTER — OFFICE VISIT (OUTPATIENT)
Dept: INFUSION THERAPY | Facility: CLINIC | Age: 63
End: 2018-01-04
Attending: INTERNAL MEDICINE
Payer: COMMERCIAL

## 2018-01-04 ENCOUNTER — RADIANT APPOINTMENT (OUTPATIENT)
Dept: ULTRASOUND IMAGING | Facility: CLINIC | Age: 63
End: 2018-01-04
Attending: INTERNAL MEDICINE
Payer: COMMERCIAL

## 2018-01-04 VITALS
SYSTOLIC BLOOD PRESSURE: 119 MMHG | DIASTOLIC BLOOD PRESSURE: 37 MMHG | OXYGEN SATURATION: 98 % | HEART RATE: 94 BPM | WEIGHT: 315 LBS | TEMPERATURE: 97.5 F | BODY MASS INDEX: 44.53 KG/M2 | RESPIRATION RATE: 18 BRPM

## 2018-01-04 DIAGNOSIS — E11.51 TYPE II DIABETES MELLITUS WITH PERIPHERAL CIRCULATORY DISORDER (H): ICD-10-CM

## 2018-01-04 DIAGNOSIS — K70.31 ALCOHOLIC CIRRHOSIS OF LIVER WITH ASCITES (H): Primary | ICD-10-CM

## 2018-01-04 LAB
ALBUMIN SERPL-MCNC: 2.5 G/DL (ref 3.4–5)
ALP SERPL-CCNC: 142 U/L (ref 40–150)
ALT SERPL W P-5'-P-CCNC: 22 U/L (ref 0–70)
ANION GAP SERPL CALCULATED.3IONS-SCNC: 8 MMOL/L (ref 3–14)
AST SERPL W P-5'-P-CCNC: 42 U/L (ref 0–45)
BILIRUB DIRECT SERPL-MCNC: 0.6 MG/DL (ref 0–0.2)
BILIRUB SERPL-MCNC: 1.8 MG/DL (ref 0.2–1.3)
BUN SERPL-MCNC: 14 MG/DL (ref 7–30)
CALCIUM SERPL-MCNC: 8.1 MG/DL (ref 8.5–10.1)
CHLORIDE SERPL-SCNC: 103 MMOL/L (ref 94–109)
CO2 SERPL-SCNC: 25 MMOL/L (ref 20–32)
CREAT SERPL-MCNC: 0.77 MG/DL (ref 0.66–1.25)
ERYTHROCYTE [DISTWIDTH] IN BLOOD BY AUTOMATED COUNT: 18.1 % (ref 10–15)
GFR SERPL CREATININE-BSD FRML MDRD: >90 ML/MIN/1.7M2
GLUCOSE SERPL-MCNC: 87 MG/DL (ref 70–99)
HBA1C MFR BLD: NORMAL % (ref 4.3–6)
HCT VFR BLD AUTO: 29 % (ref 40–53)
HGB BLD-MCNC: 9.5 G/DL (ref 13.3–17.7)
INR PPP: 1.2 (ref 0.86–1.14)
MCH RBC QN AUTO: 33 PG (ref 26.5–33)
MCHC RBC AUTO-ENTMCNC: 32.8 G/DL (ref 31.5–36.5)
MCV RBC AUTO: 101 FL (ref 78–100)
PLATELET # BLD AUTO: 97 10E9/L (ref 150–450)
POTASSIUM SERPL-SCNC: 4 MMOL/L (ref 3.4–5.3)
PROT SERPL-MCNC: 6.6 G/DL (ref 6.8–8.8)
RBC # BLD AUTO: 2.88 10E12/L (ref 4.4–5.9)
SODIUM SERPL-SCNC: 136 MMOL/L (ref 133–144)
WBC # BLD AUTO: 3.9 10E9/L (ref 4–11)

## 2018-01-04 PROCEDURE — 85027 COMPLETE CBC AUTOMATED: CPT | Performed by: INTERNAL MEDICINE

## 2018-01-04 PROCEDURE — 80076 HEPATIC FUNCTION PANEL: CPT | Performed by: INTERNAL MEDICINE

## 2018-01-04 PROCEDURE — 25000128 H RX IP 250 OP 636: Mod: ZF | Performed by: INTERNAL MEDICINE

## 2018-01-04 PROCEDURE — P9047 ALBUMIN (HUMAN), 25%, 50ML: HCPCS | Mod: ZF | Performed by: INTERNAL MEDICINE

## 2018-01-04 PROCEDURE — 25000125 ZZHC RX 250: Mod: ZF | Performed by: INTERNAL MEDICINE

## 2018-01-04 PROCEDURE — 80048 BASIC METABOLIC PNL TOTAL CA: CPT | Performed by: INTERNAL MEDICINE

## 2018-01-04 PROCEDURE — 27210190 US PARACENTESIS

## 2018-01-04 PROCEDURE — 85610 PROTHROMBIN TIME: CPT | Performed by: INTERNAL MEDICINE

## 2018-01-04 PROCEDURE — 83036 HEMOGLOBIN GLYCOSYLATED A1C: CPT | Performed by: INTERNAL MEDICINE

## 2018-01-04 RX ORDER — ALBUMIN (HUMAN) 12.5 G/50ML
12.5 SOLUTION INTRAVENOUS 4 TIMES DAILY PRN
Status: CANCELLED
Start: 2018-01-04

## 2018-01-04 RX ORDER — ALBUMIN (HUMAN) 12.5 G/50ML
12.5 SOLUTION INTRAVENOUS 4 TIMES DAILY PRN
Status: DISCONTINUED | OUTPATIENT
Start: 2018-01-04 | End: 2018-01-04 | Stop reason: HOSPADM

## 2018-01-04 RX ADMIN — ALBUMIN HUMAN 12.5 G: 0.25 SOLUTION INTRAVENOUS at 15:38

## 2018-01-04 RX ADMIN — LIDOCAINE HYDROCHLORIDE 20 ML: 10 INJECTION, SOLUTION INFILTRATION; PERINEURAL at 15:25

## 2018-01-04 RX ADMIN — ALBUMIN HUMAN 12.5 G: 0.25 SOLUTION INTRAVENOUS at 15:33

## 2018-01-04 NOTE — MR AVS SNAPSHOT
After Visit Summary   1/4/2018    Lawrence Louie    MRN: [id number]           Patient Information     Date Of Birth          1955        Visit Information        Provider Department      1/4/2018 2:00 PM Provider, [facility]; [address] Specialty and Procedure        Today's Diagnoses     Alcoholic cirrhosis of liver with ascites (H)    -  1    Type II diabetes mellitus with peripheral circulatory disorder (H)          Care Instructions    Dear Lawrence Louie    Thank you for choosing [facility] ([facility]) for your procedure.  The following information is a summary of our appointment as well as important reminders.      Additional information: You had a paracentesis today with 2.3 liters removed and 25 grams Albumin given.    We look forward in seeing you on your next appointment here at [facility].  Please don t hesitate to call us at [phone] to reschedule any of your appointments or to speak with one of the [facility] registered nurses.  It was a pleasure taking care of you today.    Sincerely,    [facility]  Specialty Infusion & Procedure Center  [address] [place] [address]  [address]  Phone:  [phone]          Follow-ups after your visit        Your next 10 appointments already scheduled     Jan 05, 2018 10:30 AM CST   Lab with  LAB   [facility] ([facility])    [address] [place] [address] Floor  [facility] [address] [phone]            Jan 05, 2018 11:30 AM CST   (Arrive by 11:15 AM)   Return General Liver with Meghna Simmons MD   [facility] Hepat[facility] ([facility])    [address] [place] [address]  [address] [place] [address] [phone]            Jan 16, 2018  7:30 AM CST   Paracentesis Visit with [facility] Provider,  [address] [facility]  Specialty and Procedure ([facility])    [address]  [address]  [facility] [address]   [phone]            Jan 24, 2018  9:30 AM CST   Paracentesis Visit with [facility] Provider, UC 39 ATC   [facility] Specialty and Procedure ([facility])    [address]  [address]  [facility] [address]   [phone]            Jan 30, 2018 11:30 AM CST   (Arrive by 11:15 AM)   Return General Liver with Meghna Simmons MD   [facility] Hepatology ([facility])    [address]  [address]  [facility] [address] [phone]            Jan 31, 2018  9:30 AM CST   Paracentesis Visit with [facility] Provider, UC 39 ATC   [facility] Specialty and Procedure ([facility])    [address]  [address]  [facility] [address] [phone]            Jan 31, 2018  2:00 PM CST   (Arrive by 1:45 PM)   Office Visit with Randy Menendez RPH   [facility] Medication Therapy Management ([facility])    [address]  3rd Floor  [facility] [address] [phone]           Bring a current list of meds and any records pertaining to this visit. For Physicals, please bring immunization records and any forms needing to be filled out. Please arrive 10 minutes early to complete paperwork.              Who to contact     If you have questions or need follow up information about today's clinic visit or your schedule please contact [facility] SPECIALTY AND PROCEDURE directly at [phone].  Normal or non-critical lab and imaging results will be communicated to you by MyChart, letter or phone within 4 business days after the clinic has received the results. If you do not hear from us within 7 days, please contact the clinic through MyChart or phone. If you have a critical  or abnormal lab result, we will notify you by phone as soon as possible.  Submit refill requests through [employer] or call your pharmacy and they will forward the refill request to us. Please allow 3 business days for your refill to be completed.          Additional Information About Your Visit        [employer] Information     [employer] gives you secure access to your electronic health record. If you see a primary care provider, you can also send messages to your care team and make appointments. If you have questions, please call your primary care clinic.  If you do not have a primary care provider, please call [phone] and they will assist you.        Care EveryWhere ID     This is your Care EveryWhere ID. This could be used by other organizations to access your [facility] medical records  [id number]        Your Vitals Were     Pulse Temperature Respirations Pulse Oximetry BMI (Body Mass Index)       94 97.5  F (36.4  C) 18 98% 44.53 kg/m2        Blood Pressure from Last 3 Encounters:   01/04/18 (!) 119/37   12/28/17 139/72   12/13/17 122/60    Weight from Last 3 Encounters:   01/04/18 (!) 157.3 kg (346 lb 12.8 oz)   12/28/17 (!) 157.5 kg (347 lb 3.2 oz)   12/13/17 (!) 148.6 kg (327 lb 11.2 oz)              We Performed the Following     Basic metabolic panel     CBC with platelets     Hemoglobin A1c     Hepatic panel     INR     US Paracentesis          Today's Medication Changes          These changes are accurate as of: 1/4/18  4:09 PM.  If you have any questions, ask your nurse or doctor.               These medicines have changed or have updated prescriptions.        Dose/Directions    ranitidine 150 MG tablet   Commonly known as:  ZANTAC   This may have changed:  when to take this   Used for:  Esophageal varices (H)        Dose:  150 mg   Take 1 tablet (150 mg) by mouth 2 times daily   Quantity:  180 tablet   Refills:  3                Primary Care Provider Office Phone # Fax #    CORY Toussaint CNP  [phone]       [address] [place] [address]  [facility] [address]        Equal Access to Services     JOSEF LORENZO : Hadii sil anthony spencer Del Toro, wateganda lunikko, qakunta kabernadetteda noel, silvia wattsgerson maritza. So [facility] [phone].    ATENCIÓN: Si habla español, tiene a rondon disposición servicios gratuitos de asistencia lingüística. Llame al [phone].    We comply with applicable federal civil rights laws and [place] laws. We do not discriminate on the basis of race, color, national origin, age, disability, sex, sexual orientation, or gender identity.            Thank you!     Thank you for choosing [facility] SPECIALTY AND PROCEDURE  for your care. Our goal is always to provide you with excellent care. Hearing back from our patients is one way we can continue to improve our services. Please take a few minutes to complete the written survey that you may receive in the mail after your visit with us. Thank you!             Your Updated Medication List - Protect others around you: Learn how to safely use, store and throw away your medicines at www.disposemymeds.org.          This list is accurate as of: 1/4/18  4:09 PM.  Always use your most recent med list.                   Brand Name Dispense Instructions for use Diagnosis    aspirin 81 MG tablet     30 tablet    Take 1 tablet (81 mg) by mouth daily    Type 2 diabetes mellitus with other skin complications       blood glucose lancets standard    no brand specified    1 Box    Use to test blood sugar 4 X  times daily or as directed.    Diabetes mellitus, type 2 (H)       blood glucose monitoring meter device kit    no brand specified    1 kit    Use to test blood sugar 4 X  times daily or as directed.    Type 2 diabetes mellitus with other specified complication (H)       blood glucose monitoring test strip    no brand specified    200 strip    Use to test blood sugars 4 X  times daily or as directed     Diabetes mellitus, type 2 (H)       calcium carbonate 1250 MG tablet    OS-ERIN 500 mg [place]. Ca     Take 1 tablet by mouth daily        cephALEXin 500 MG capsule    KEFLEX    40 capsule    Take 1 capsule (500 mg) by mouth 4 times daily    Cellulitis and abscess of leg       desvenlafaxine succinate 100 MG 24 hr tablet    PRISTIQ    180 tablet    Take 2 tablets (200 mg) by mouth daily    Other depression       FUROSEMIDE PO      Take 60 mg by mouth 2 times daily        glipiZIDE 5 MG 24 hr tablet    glipiZIDE XL    180 tablet    Take 1 tablet (5 mg) by mouth 2 times daily    Type 2 diabetes mellitus without complication, with long-term current use of insulin (H)       insulin aspart 100 UNIT/ML injection    NovoLOG FLEXPEN    30 mL    20 units before breakfast, 20 units before lunch, 20 units before dinner, 20 units before snacks    Type 2 diabetes mellitus without complication, with long-term current use of insulin (H)       insulin glargine 100 UNIT/ML injection    LANTUS SOLOSTAR    30 mL    Inject 40 Units Subcutaneous every morning    Type 2 diabetes mellitus with other oral complication, unspecified long term insulin use status (H)       * insulin pen needle 31G X 8 MM    B-D U/F    300 each    USE  6 times daily / OR AS DIRECTED    Type 2 diabetes, HbA1c goal < 7% (H)       * insulin pen needle 31G X 8 MM    B-D U/F    600 each    Use 6 times daily or as directed    Type 2 diabetes, HbA1c goal < 7% (H)       lactulose 10 GM/15ML solution    CHRONULAC    2000 mL    Take 30 mLs (20 g) by mouth 3 times daily Adjust dosing as needed to achieve 3-4 bowel movements daily.    Hepatic encephalopathy (H)       magnesium oxide 400 (241.3 MG) MG tablet    MAG-OX    90 tablet    Take 1 tablet (400 mg) by mouth daily    Cramp of limb       ondansetron 4 MG tablet    ZOFRAN    18 tablet    Take 1 tablet (4 mg) by mouth every 8 hours as needed for nausea    Alcoholic cirrhosis (H), Nausea       ranitidine 150 MG tablet     ZANTAC    180 tablet    Take 1 tablet (150 mg) by mouth 2 times daily    Esophageal varices (H)       rifaximin 550 MG Tabs tablet    XIFAXAN    60 tablet    Take 1 tablet (550 mg) by mouth 2 times daily    Hepatic encephalopathy (H)       spironolactone 50 MG tablet    ALDACTONE    540 tablet    1 tablet (50 mg) 2 times daily Takes 3 tablets in am.and p.m.    Portal hypertension (H), Generalized edema       STATIN NOT PRESCRIBED (INTENTIONAL)     0 each    1 each daily Statin not prescribed intentionally due to Active liver disease    Hyperlipidemia LDL goal <100       VITAMIN B-12 PO      Take 1 tablet by mouth daily        vitamin D3 2000 UNITS Caps     90 capsule    Take 1 capsule by mouth daily    Mild major depression (H)       * Notice:  This list has 2 medication(s) that are the same as other medications prescribed for you. Read the directions carefully, and ask your doctor or other care provider to review them with you.

## 2018-01-04 NOTE — PROGRESS NOTES
Paracentesis Nursing Note  Lawrence Louie [facility] for a [facility].    During today's appointment orders from Meghna Simmons MD were completed.    Progress Note:  Patient identification verified by name and date of birth.  Assessment completed.  Vitals monitored throughout appointment and recorded in Doc Flowsheets.  See proceduralist note in ultrasound.    Date of consent or authorization: 11/29/2017.  Invasive Procedure Safety Checklist was completed and sent for scanning.     Paracentesis performed by Moy Potetr PA-C Radiology.    The following labs were communicated to provider performing paracentesis:  Lab Results   Component Value Date    PLT 92 12/13/2017       Total amount of ascites fluid drained: 2.3 liters.  Color of ascites fluid: straw colored.  Total amount of albumin given: 25  grams.    Patient tolerated procedure well.    Post procedure,denies pain or discomfort post paracentesis.    Discharge Plan:  Discharge instructions were reviewed with patient.  Patient/Representative verbalized understanding and all questions were answered.   Discharged from [facility] in stable condition.    DAVID GONZALEZ RN        There were no vitals taken for this visit.

## 2018-01-04 NOTE — PATIENT INSTRUCTIONS
Dear Lawrence Louie    Thank you for choosing [facility] ([facility]) for your procedure.  The following information is a summary of our appointment as well as important reminders.      Additional information: You had a paracentesis today with 2.3 liters removed and 25 grams Albumin given.    We look forward in seeing you on your next appointment here at [facility].  Please don t hesitate to call us at [phone] to reschedule any of your appointments or to speak with one of the [facility] registered nurses.  It was a pleasure taking care of you today.    Sincerely,    [facility]  Specialty Infusion [facility] Procedure Center  [address] [place] [address]  [address]  Phone:  [phone]

## 2018-01-05 ENCOUNTER — OFFICE VISIT (OUTPATIENT)
Dept: INTERNAL MEDICINE | Facility: CLINIC | Age: 63
End: 2018-01-05
Payer: COMMERCIAL

## 2018-01-05 ENCOUNTER — MYC MEDICAL ADVICE (OUTPATIENT)
Dept: INTERNAL MEDICINE | Facility: CLINIC | Age: 63
End: 2018-01-05

## 2018-01-05 ENCOUNTER — OFFICE VISIT (OUTPATIENT)
Dept: GASTROENTEROLOGY | Facility: CLINIC | Age: 63
End: 2018-01-05
Attending: INTERNAL MEDICINE
Payer: COMMERCIAL

## 2018-01-05 ENCOUNTER — ALLIED HEALTH/NURSE VISIT (OUTPATIENT)
Dept: GASTROENTEROLOGY | Facility: CLINIC | Age: 63
End: 2018-01-05

## 2018-01-05 VITALS
BODY MASS INDEX: 41.75 KG/M2 | TEMPERATURE: 97.3 F | SYSTOLIC BLOOD PRESSURE: 120 MMHG | DIASTOLIC BLOOD PRESSURE: 60 MMHG | OXYGEN SATURATION: 97 % | HEART RATE: 98 BPM | HEIGHT: 73 IN | WEIGHT: 315 LBS

## 2018-01-05 DIAGNOSIS — Z71.9 VISIT FOR COUNSELING: Primary | ICD-10-CM

## 2018-01-05 DIAGNOSIS — R60.0 EDEMA EXTREMITIES: ICD-10-CM

## 2018-01-05 DIAGNOSIS — E66.01 MORBID OBESITY (H): ICD-10-CM

## 2018-01-05 DIAGNOSIS — F32.0 MILD MAJOR DEPRESSION (H): Primary | ICD-10-CM

## 2018-01-05 DIAGNOSIS — K70.31 ALCOHOLIC CIRRHOSIS OF LIVER WITH ASCITES (H): Primary | ICD-10-CM

## 2018-01-05 PROCEDURE — G0463 HOSPITAL OUTPT CLINIC VISIT: HCPCS | Mod: ZF

## 2018-01-05 ASSESSMENT — PAIN SCALES - GENERAL: PAINLEVEL: EXTREME PAIN (8)

## 2018-01-05 NOTE — MR AVS SNAPSHOT
After Visit Summary   1/5/2018    Lawrence Louie    MRN: [id number]           Patient Information     Date Of Birth          1955        Visit Information        Provider Department      1/5/2018 11:30 AM Meghna Simmons MD [facility]         Follow-ups after your visit        Your next 10 appointments already scheduled     Jan 12, 2018  1:00 PM CST   (Arrive by 12:45 PM)   [facility]    [address]  4th Floor  [facility] [id number] [phone]            Jan 25, 2018  2:00 PM CST   Paracentesis Visit with  39 ATC   [facility] ([facility])    [address] [place] [address]  [address]  [id number] [phone]            Jan 30, 2018 11:30 AM CST   (Arrive by 11:15 AM)   Return General Liver with Meghna Simmons MD   [facility] ([facility])    [address] [place] [address]  [address]  [facility] [id number] [phone]            Jan 31, 2018  2:00 PM CST   (Arrive by 1:45 PM)   Office Visit with Randy Menendez [facility] ([facility])    [address] [place] [address]  3rd Floor  [facility] [id number] [phone]           Bring a current list of meds and any records pertaining to this visit. For Physicals, please bring immunization records and any forms needing to be filled out. Please arrive 10 minutes early to complete paperwork.            Jan 31, 2018  3:00 PM CST   US ABDOMEN/PELVIS DUPLEX COMPLETE with UCUS2   [facility] ([facility])    [address] [place] [address]  1st Floor  [facility] [id number] [phone]           Please bring a list of your medicines (including vitamins, minerals and over-the-counter drugs). Also, tell your doctor about any  allergies you may have. Wear comfortable clothes and leave your valuables at home.  Adults: No eating or drinking for 8 hours before the exam. You may take medicine with a small sip of water.  Children: - Children 6+ years: No food or drink for 6 hours before exam. - Children 1-5 years: No food or drink for 4 hours before exam. - Infants, breast-fed: may have breast milk up to 2 hours before exam. - Infants, formula: may have bottle until 4 hours before exam.  Please call the Imaging Department at your exam site with any questions.            Jan 31, 2018  4:00 PM CST   (Arrive by 3:45 PM)   Return Vascular Visit with David Davis MD   [facility] ([facility])    [address] [place] [address]  3rd Floor  [facility] [address] [phone]            Feb 07, 2018  2:00 PM CST   Paracentesis Visit with Uc Spec Inf Para Provider, UC 40 ATC   [facility] ([facility])    [address] [place] [address]  [address]  [facility] [address] [phone]            Feb 14, 2018  2:00 PM CST   Paracentesis Visit with Uc Spec Inf Para Provider, UC 40 ATC   [facility] ([facility])    [address] [place] [address]  [address]  [facility] [address] [phone]              Who to contact     If you have questions or need follow up information about today's clinic visit or your schedule please contact [facility] HEPAT[facility] directly at [phone].  Normal or non-critical lab and imaging results will be communicated to you by MyChart, letter or phone within 4 business days after the clinic has received the results. If you do not hear from us within 7 days, please contact the clinic through MyChart or phone. If you have a critical or abnormal lab result, we will notify you by phone as soon as possible.  Submit refill requests through [employer] or call your  "pharmacy and they will forward the refill request to us. Please allow 3 business days for your refill to be completed.          Additional Information About Your Visit        MyChart Information     [employer] gives you secure access to your electronic health record. If you see a primary care provider, you can also send messages to your care team and make appointments. If you have questions, please call your primary care clinic.  If you do not have a primary care provider, please call [phone] and they will assist you.        Care EveryWhere ID     This is your Care EveryWhere ID. This could be used by other organizations to access your [facility] medical records  [id number]        Your Vitals Were     Pulse Temperature Height Pulse Oximetry BMI (Body Mass Index)       98 97.3  F (36.3  C) (Oral) 1.854 m (6' 1\") 97% 45.62 kg/m2        Blood Pressure from Last 3 Encounters:   01/05/18 120/60   01/04/18 (!) 119/37   12/28/17 139/72    Weight from Last 3 Encounters:   01/05/18 (!) 156.9 kg (345 lb 12.8 oz)   01/04/18 (!) 157.3 kg (346 lb 12.8 oz)   12/28/17 (!) 157.5 kg (347 lb 3.2 oz)              Today, you had the following     No orders found for display         Today's Medication Changes          These changes are accurate as of: 1/5/18  1:06 PM.  If you have any questions, ask your nurse or doctor.               These medicines have changed or have updated prescriptions.        Dose/Directions    ranitidine 150 MG tablet   Commonly known as:  ZANTAC   This may have changed:  when to take this   Used for:  Esophageal varices (H)        Dose:  150 mg   Take 1 tablet (150 mg) by mouth 2 times daily   Quantity:  180 tablet   Refills:  3                Primary Care Provider Office Phone # Fax #    CORY Toussaint [phone]       [address] [place] [address]  [facility] [address]        Equal Access to Services     JOSEF LORENZO AH: Jose Eduardo Del Toro, brianda borden, jonathan elmore " silvia cohensarah bobbyaan ah. So [facility] [phone].    ATENCIÓN: Si habla louis, tiene a rondon disposición servicios gratuitos de asistencia lingüística. [place] al [phone].    We comply with applicable federal civil rights laws and [place] laws. We do not discriminate on the basis of race, color, national origin, age, disability, sex, sexual orientation, or gender identity.            Thank you!     Thank you for choosing [facility]  for your care. Our goal is always to provide you with excellent care. Hearing back from our patients is one way we can continue to improve our services. Please take a few minutes to complete the written survey that you may receive in the mail after your visit with us. Thank you!             Your Updated Medication List - Protect others around you: Learn how to safely use, store and throw away your medicines at www.disposemymeds.org.          This list is accurate as of: 1/5/18  1:06 PM.  Always use your most recent med list.                   Brand Name Dispense Instructions for use Diagnosis    aspirin 81 MG tablet     30 tablet    Take 1 tablet (81 mg) by mouth daily    Type 2 diabetes mellitus with other skin complications       blood glucose lancets standard    no brand specified    1 Box    Use to test blood sugar 4 X  times daily or as directed.    Diabetes mellitus, type 2 (H)       blood glucose monitoring meter device kit    no brand specified    1 kit    Use to test blood sugar 4 X  times daily or as directed.    Type 2 diabetes mellitus with other specified complication (H)       blood glucose monitoring test strip    no brand specified    200 strip    Use to test blood sugars 4 X  times daily or as directed    Diabetes mellitus, type 2 (H)       calcium carbonate 1250 MG tablet    OS-ERIN 500 mg [place]. Ca     Take 1 tablet by mouth daily        cephALEXin 500 MG capsule    KEFLEX    40 capsule    Take 1 capsule (500 mg) by mouth 4 times  daily    Cellulitis and abscess of leg       desvenlafaxine succinate 100 MG 24 hr tablet    PRISTIQ    180 tablet    Take 2 tablets (200 mg) by mouth daily    Other depression       FUROSEMIDE PO      Take 60 mg by mouth 2 times daily        glipiZIDE 5 MG 24 hr tablet    glipiZIDE XL    180 tablet    Take 1 tablet (5 mg) by mouth 2 times daily    Type 2 diabetes mellitus without complication, with long-term current use of insulin (H)       insulin aspart 100 UNIT/ML injection    NovoLOG FLEXPEN    30 mL    20 units before breakfast, 20 units before lunch, 20 units before dinner, 20 units before snacks    Type 2 diabetes mellitus without complication, with long-term current use of insulin (H)       insulin glargine 100 UNIT/ML injection    LANTUS SOLOSTAR    30 mL    Inject 40 Units Subcutaneous every morning    Type 2 diabetes mellitus with other oral complication, unspecified long term insulin use status (H)       * insulin pen needle 31G X 8 MM    B-D U/F    300 each    USE  6 times daily / OR AS DIRECTED    Type 2 diabetes, HbA1c goal < 7% (H)       * insulin pen needle 31G X 8 MM    B-D U/F    600 each    Use 6 times daily or as directed    Type 2 diabetes, HbA1c goal < 7% (H)       lactulose 10 GM/15ML solution    CHRONULAC    2000 mL    Take 30 mLs (20 g) by mouth 3 times daily Adjust dosing as needed to achieve 3-4 bowel movements daily.    Hepatic encephalopathy (H)       magnesium oxide 400 (241.3 MG) MG tablet    MAG-OX    90 tablet    Take 1 tablet (400 mg) by mouth daily    Cramp of limb       ondansetron 4 MG tablet    ZOFRAN    18 tablet    Take 1 tablet (4 mg) by mouth every 8 hours as needed for nausea    Alcoholic cirrhosis (H), Nausea       ranitidine 150 MG tablet    ZANTAC    180 tablet    Take 1 tablet (150 mg) by mouth 2 times daily    Esophageal varices (H)       rifaximin 550 MG Tabs tablet    XIFAXAN    60 tablet    Take 1 tablet (550 mg) by mouth 2 times daily    Hepatic encephalopathy  (H)       spironolactone 50 MG tablet    ALDACTONE    540 tablet    1 tablet (50 mg) 2 times daily Takes 3 tablets in am.and p.m.    Portal hypertension (H), Generalized edema       STATIN NOT PRESCRIBED (INTENTIONAL)     0 each    1 each daily Statin not prescribed intentionally due to Active liver disease    Hyperlipidemia LDL goal <100       VITAMIN B-12 PO      Take 1 tablet by mouth daily        vitamin D3 2000 UNITS Caps     90 capsule    Take 1 capsule by mouth daily    Mild major depression (H)       * Notice:  This list has 2 medication(s) that are the same as other medications prescribed for you. Read the directions carefully, and ask your doctor or other care provider to review them with you.

## 2018-01-05 NOTE — PROGRESS NOTES
ASSESSMENT AND PLAN:  A 61-year-old man with alcoholic and SMITH cirrhosis.  He has been sober from alcohol for 5 years. S/P TIPS 2 months ago. Starting to see some improvement in ascites, with decreased frequency of paracentesis and about 1/2 the previous volume. Post TIPS mild elevation in bili and mild decrease in hemoglobin.     Ongoing, very long-standing issues with severe LE edema, now worse per his report, after TIPS. Discussed that this is very long-standing and severe and will likely never be completely resolved, but would aim for continued improvement.    He is not adherent to a low sodium diet. He has had problems with cramping with higher diuretic doses.    Diabetes under better control.    Does not appear to need lactulose.    1. Re-iterated sodium restriction.   2. Increase gus from 100 daily to 100 AM and 25 PM  3. Increase furosemide from 60 daily to 60 AM and 20 PM  4. BMP and CBC in 1 week  5. Vitamin E and zinc 200 bid for cramping. Continue magnesium. Can also try diet tonic water.  6. Continue prn paracentesis  7. Attempt as much mobility as possible, even if it is 5 minutes of walking around the house  8. Continue to work with lymphedema clinic as able  9. Stop lactulose. I don't think it is making any difference  10. Provided med card today  11. Check in with Palma Paiz RN care coordinator for help with side effects, questions about meds.  12. RTC 3 months.      SUBJECTIVE:  Mr. Louie is a 61-year-old man with SMITH and alcoholic cirrhosis.  He has been sober from alcohol since 2012. He underwent TIPS 10/27/17. He had a hospitalization shortly after for weakness, dehydration, n/v. He was discharged to home.    Taking gus 50 mg tabs, 2 tabs once a day. Furosemide 20 mg tabs, taking 3 tabs all at once. Not taking higher doses because he gets cramping. He also tried taking another dose later in the day and got cramping. Taking magnesium tab also for a long time. Not on zinc or vitamin  "E.    Diet: does not add salt but he does not restrict sodium otherwise.  He eats potato chips, pickled herring and other food very high in sodium because they taste good. \"Baked potatoes without salt are terrible\".    His biggest complaint is LE edema, which he has had for many years and he says is worse now after TIPS. He has seen lymphedema clinic but there are concerns about whether this will be accessible for him re: insurance issues and frequency of visits. They are recommending 3-4 visits per week. Having trouble getting in and out of vehicles. Wrapping legs in diapers and ace. Has pain in the legs that he says is worse than pre-TIPS.     He is taking lactulose and rifaximin. No confusion. Has about 1 BM/day. Drinking more lactulose does not seem to make any difference in BM frequency or anything else.    Paracentesis is now less often and less fluid. Was weekly and 8-10L at a time, now every 2-3 weeks and 2-4 L.    OTHER COMORBIDITIES:  He has diabetes, GERD, hyperlipidemia , morbid obesity      SOCIAL HISTORY:  He lives with his wife.  He has 3 kids in [place].  He is retired. Wife is here with him today.      PHYSICAL EXAMINATION:   Vitals: /60 (BP Location: Right arm, Patient Position: Sitting, Cuff Size: Adult Regular)  Pulse 98  Temp 97.3  F (36.3  C) (Oral)  Ht 1.854 m (6' 1\")  Wt (!) 156.9 kg (345 lb 12.8 oz)  SpO2 97%  BMI 45.62 kg/m2  BMI= Body mass index is 45.62 kg/(m^2).  GENERAL:  Pleasant, well-appearing, in no acute distress.   HEENT:  No icterus, no oral lesions. Very poor dentition. Multiple missing teeth, broken teeth.  LYMPH:  No supraclavicular or cervical lymphadenopathy.   CARDIOVASCULAR:  Regular rate and rhythm.   CHEST:  Lungs are clear.   ABDOMEN:  Bowel sounds are present.  He is morbidly obese.   EXTREMITIES:  Significant woody edema bilaterally.   NEUROLOGIC:  Speech is fluent and clear.  No asterixis or tremor.       LABORATORY DATA:  From today were " reviewed. Slight worsening of bilirubin after TIPS is now stable, went from 0.4 to peak 2, now 1.8. Hemoglobin still drifting down: was 12-13 preTIPS, then 11s, now 9.5.  .

## 2018-01-05 NOTE — PROGRESS NOTES
Ary Murphy APRN CNP [place], [facility]                     That sounds fine.  We can DC ASA.     Ary RAY CNP            Previous Messages       ----- Message -----      From: Randy Menendez RPH      Sent: 1/2/2018  10:15 AM        To: CORY Toussaint CNP     I discussed Furosemide and ASA with him. Pt had already restarted Furosemide prior to our appointment, and we were waiting on the okay from you to D/C Aspirin. Unfortunately, I did not get to Pristiq in visit. I will be seeing him again at the end of the month and can work with him on reducing Pristiq at that time if you like!     Randy Ware       ----- Message -----      From: Ary Murphy APRN CNP      Sent: 1/1/2018   9:31 PM        To: Randy Menendez [facility]     Did you discuss the Pristique dose with him?   Did you discuss restarting the furosemide?   Did you discuss stopping the ASA?     Thanks for your help.     Ary RAY CNP     ----- Message -----      From: Randy Menendez HALEY      Sent: 12/29/2017   8:53 AM        To: CORY Toussaint CNP, CNP,   I saw your patient for medication review, here are my recommendations.   1. Max dose of Pristiq in severe liver disease is 100mg daily.   2. Pt is high risk of bleeds, recommend D/Cing aspirin     See note for details.     Randy Menendez PharmD   [facility] Pharmacist     Phone: [phone]                We will these changes at next visit.     Randy Menendez PharmD  [facility] Pharmacist    Phone: [phone]

## 2018-01-05 NOTE — MR AVS SNAPSHOT
After Visit Summary   1/5/2018    Lawrence Louie    MRN: [id number]           Patient Information     Date Of Birth          1955        Visit Information        Provider Department      1/5/2018 5:38 PM Any Pickering LICSW [facility] Hepatology        Today's Diagnoses     Visit for counseling    -  1       Follow-ups after your visit        Your next 10 appointments already scheduled     Jan 12, 2018  1:00 PM CST   (Arrive by 12:45 PM)   New Patient Visit with RAFA Prasad   [facility] ([facility])    [address] [place] [address]  4th Floor  [facility] [id number] [phone]            Jan 25, 2018  2:00 PM CST   Paracentesis Visit with  39 ATC   [facility] Infusion ([facility])    [address] [place] [address]  [address]  [id number] [phone]            Jan 30, 2018 11:30 AM CST   (Arrive by 11:15 AM)   Return General Liver with Meghna Simmons MD   [facility] Hepatology ([facility])    [address] [place] [address]  [address]  [facility] [id number] [phone]            Jan 31, 2018  2:00 PM CST   (Arrive by 1:45 PM)   Office Visit with Randy Menendez [facility] Medication Therapy Management ([facility])    [address] [place] [address]  3rd Floor  [facility] [id number] [phone]           Bring a current list of meds and any records pertaining to this visit. For Physicals, please bring immunization records and any forms needing to be filled out. Please arrive 10 minutes early to complete paperwork.            Jan 31, 2018  3:00 PM CST   US ABDOMEN/PELVIS DUPLEX COMPLETE with UCUS2   [facility] US ([facility])    [address] [place] [address]  1st Floor  [facility] [id number] [phone]           Please bring a list of your medicines (including vitamins, minerals and  over-the-counter drugs). Also, tell your doctor about any allergies you may have. Wear comfortable clothes and leave your valuables at home.  Adults: No eating or drinking for 8 hours before the exam. You may take medicine with a small sip of water.  Children: - Children 6+ years: No food or drink for 6 hours before exam. - Children 1-5 years: No food or drink for 4 hours before exam. - Infants, breast-fed: may have breast milk up to 2 hours before exam. - Infants, formula: may have bottle until 4 hours before exam.  Please call the Imaging Department at your exam site with any questions.            Jan 31, 2018  4:00 PM CST   (Arrive by 3:45 PM)   Return Vascular Visit with David Davis MD   [facility] ([facility])    [address] [place] [address]  3rd [employer] [id number] [phone]            Feb 07, 2018  2:00 PM CST   Paracentesis Visit with  Spec Inf Para Provider, UC 40 ATC   [facility] Specialty and Procedure ([facility])    [address] [place] [address]  [address] [place] [id number] [phone]            Feb 14, 2018  2:00 PM CST   Paracentesis Visit with  Spec Inf Para Provider, UC 40 ATC   [facility] Special[facility] Procedure ([facility])    [address] [place] [address]  [address] [place] [id number] [phone]              Who to contact     If you have questions or need follow up information about today's clinic visit or your schedule please contact [facility] HEPATOLOGY directly at [phone].  Normal or non-critical lab and imaging results will be communicated to you by MyChart, letter or phone within 4 business days after the clinic has received the results. If you do not hear from us within 7 days, please contact the clinic through MyChart or phone. If you have a critical or abnormal lab result, we will notify you by phone as soon as  possible.  Submit refill requests through [employer] or call your pharmacy and they will forward the refill request to us. Please allow 3 business days for your refill to be completed.          Additional Information About Your Visit        [employer] Recruiting Information     [employer] gives you secure access to your electronic health record. If you see a primary care provider, you can also send messages to your care team and make appointments. If you have questions, please call your primary care clinic.  If you do not have a primary care provider, please call [phone] and they will assist you.        Care EveryWhere ID     This is your Care EveryWhere ID. This could be used by other organizations to access your [facility] medical records  [id number]         Blood Pressure from Last 3 Encounters:   01/05/18 120/60   01/04/18 (!) 119/37   12/28/17 139/72    Weight from Last 3 Encounters:   01/05/18 (!) 156.9 kg (345 lb 12.8 oz)   01/04/18 (!) 157.3 kg (346 lb 12.8 oz)   12/28/17 (!) 157.5 kg (347 lb 3.2 oz)              Today, you had the following     No orders found for display         Today's Medication Changes          These changes are accurate as of: 1/5/18  5:43 PM.  If you have any questions, ask your nurse or doctor.               These medicines have changed or have updated prescriptions.        Dose/Directions    ranitidine 150 MG tablet   Commonly known as:  ZANTAC   This may have changed:  when to take this   Used for:  Esophageal varices (H)        Dose:  150 mg   Take 1 tablet (150 mg) by mouth 2 times daily   Quantity:  180 tablet   Refills:  3                Primary Care Provider Office Phone # Fax #    Ary JACKSON CORY Murphy [phone]       [address] [place] [address]  [facility] [address]        Equal Access to Services     JOSEF LORENZO : Jose Eduardo Del Toro, brianda borden, silvia jimenez. So [facility] [phone].    ATENCIÓN: Si  zarina myers, tiene a rondon disposición servicios gratuitos de asistencia lingüística. Boaz morales [phone].    We comply with applicable federal civil rights laws and [place] laws. We do not discriminate on the basis of race, color, national origin, age, disability, sex, sexual orientation, or gender identity.            Thank you!     Thank you for choosing [facility] HEPATOLOGY  for your care. Our goal is always to provide you with excellent care. Hearing back from our patients is one way we can continue to improve our services. Please take a few minutes to complete the written survey that you may receive in the mail after your visit with us. Thank you!             Your Updated Medication List - Protect others around you: Learn how to safely use, store and throw away your medicines at www.disposemymeds.org.          This list is accurate as of: 1/5/18  5:43 PM.  Always use your most recent med list.                   Brand Name Dispense Instructions for use Diagnosis    aspirin 81 MG tablet     30 tablet    Take 1 tablet (81 mg) by mouth daily    Type 2 diabetes mellitus with other skin complications       blood glucose lancets standard    no brand specified    1 Box    Use to test blood sugar 4 X  times daily or as directed.    Diabetes mellitus, type 2 (H)       blood glucose monitoring meter device kit    no brand specified    1 kit    Use to test blood sugar 4 X  times daily or as directed.    Type 2 diabetes mellitus with other specified complication (H)       blood glucose monitoring test strip    no brand specified    200 strip    Use to test blood sugars 4 X  times daily or as directed    Diabetes mellitus, type 2 (H)       calcium carbonate 1250 MG tablet    OS-ERIN 500 mg [place]. Ca     Take 1 tablet by mouth daily        cephALEXin 500 MG capsule    KEFLEX    40 capsule    Take 1 capsule (500 mg) by mouth 4 times daily    Cellulitis and abscess of leg       desvenlafaxine succinate 100 MG 24 hr tablet     PRISTIQ    180 tablet    Take 2 tablets (200 mg) by mouth daily    Other depression       FUROSEMIDE PO      Take 60 mg by mouth 2 times daily        glipiZIDE 5 MG 24 hr tablet    glipiZIDE XL    180 tablet    Take 1 tablet (5 mg) by mouth 2 times daily    Type 2 diabetes mellitus without complication, with long-term current use of insulin (H)       insulin aspart 100 UNIT/ML injection    NovoLOG FLEXPEN    30 mL    20 units before breakfast, 20 units before lunch, 20 units before dinner, 20 units before snacks    Type 2 diabetes mellitus without complication, with long-term current use of insulin (H)       insulin glargine 100 UNIT/ML injection    LANTUS SOLOSTAR    30 mL    Inject 40 Units Subcutaneous every morning    Type 2 diabetes mellitus with other oral complication, unspecified long term insulin use status (H)       * insulin pen needle 31G X 8 MM    B-D U/F    300 each    USE  6 times daily / OR AS DIRECTED    Type 2 diabetes, HbA1c goal < 7% (H)       * insulin pen needle 31G X 8 MM    B-D U/F    600 each    Use 6 times daily or as directed    Type 2 diabetes, HbA1c goal < 7% (H)       lactulose 10 GM/15ML solution    CHRONULAC    2000 mL    Take 30 mLs (20 g) by mouth 3 times daily Adjust dosing as needed to achieve 3-4 bowel movements daily.    Hepatic encephalopathy (H)       magnesium oxide 400 (241.3 MG) MG tablet    MAG-OX    90 tablet    Take 1 tablet (400 mg) by mouth daily    Cramp of limb       ondansetron 4 MG tablet    ZOFRAN    18 tablet    Take 1 tablet (4 mg) by mouth every 8 hours as needed for nausea    Alcoholic cirrhosis (H), Nausea       ranitidine 150 MG tablet    ZANTAC    180 tablet    Take 1 tablet (150 mg) by mouth 2 times daily    Esophageal varices (H)       rifaximin 550 MG Tabs tablet    XIFAXAN    60 tablet    Take 1 tablet (550 mg) by mouth 2 times daily    Hepatic encephalopathy (H)       spironolactone 50 MG tablet    ALDACTONE    540 tablet    1 tablet (50 mg) 2 times  daily Takes 3 tablets in am.and p.m.    Portal hypertension (H), Generalized edema       STATIN NOT PRESCRIBED (INTENTIONAL)     0 each    1 each daily Statin not prescribed intentionally due to Active liver disease    Hyperlipidemia LDL goal <100       VITAMIN B-12 PO      Take 1 tablet by mouth daily        vitamin D3 2000 UNITS Caps     90 capsule    Take 1 capsule by mouth daily    Mild major depression (H)       * Notice:  This list has 2 medication(s) that are the same as other medications prescribed for you. Read the directions carefully, and ask your doctor or other care provider to review them with you.

## 2018-01-05 NOTE — PROGRESS NOTES
Visit Activities (Refresh list every visit): [facility] and [facility] Only    [facility] met with Lawrence at health care team's request to assess his current behavioral health needs and provide appropriate intervention.  Lawrence was referred by Palma Perkins in the hepatology clinic to assist with stress and challenge of living with chronic diseases.  I met briefly with Lawrence and his wife to discuss their concerns, establish a beginning relationship, and establish a plan for moving forward/offer [facility] services. They report that Lawrence's quality of life is really a challenge right now. He says his wife is good support, has her own therapist, but that he may need more to get through. He is okay with trying support through therapy. We arrange for a meeting in a week or two.    I affirmed the steps this patient has taken to address physical and behavioral health issues, and offered continued behavioral health services or referral, now or in the future, as needed by the patient.    I met with the patient less than 15 minutes today. No charge.    Cy Henson MA, LMFT  Lead Behavioral Health Clinician  [facility]    [email or web address]       Phone: [phone] (mobility extension)  Pager: [phone]

## 2018-01-05 NOTE — MR AVS SNAPSHOT
After Visit Summary   1/5/2018    Lawrence Louie    MRN: [id number]           Patient Information     Date Of Birth          1955        Visit Information        Provider Department      1/5/2018 12:00 PM Clinton Henson LMFT [facility] Clinic        Today's Diagnoses     Mild major depression (H)    -  1       Follow-ups after your visit        Your next 10 appointments already scheduled     Jan 25, 2018  2:00 PM CST   Paracentesis Visit with  39 ATC   [facility] Advanced Treatment Oncology Infusion ([facility])    [address] [facility]  [address]  [address] [phone]            Jan 31, 2018  2:00 PM CST   (Arrive by 1:45 PM)   Office Visit with Randy Menendez [facility] Medication Therapy Management ([facility])    [address] [place] [address]  3rd [facility] [address] [phone]           Bring a current list of meds and any records pertaining to this visit. For Physicals, please bring immunization records and any forms needing to be filled out. Please arrive 10 minutes early to complete paperwork.            Jan 31, 2018  3:00 PM CST   US ABDOMEN/PELVIS DUPLEX COMPLETE with UCUS2   [facility] Imaging [facility] US ([facility])    [address] [place] [address]  1st [facility] [address] [phone]           Please bring a list of your medicines (including vitamins, minerals and over-the-counter drugs). Also, tell your doctor about any allergies you may have. Wear comfortable clothes and leave your valuables at home.  Adults: No eating or drinking for 8 hours before the exam. You may take medicine with a small sip of water.  Children: - Children 6+ years: No food or drink for 6 hours before exam. - Children 1-5 years: No food or drink for 4 hours before exam. - Infants, breast-fed: may have breast milk up to 2 hours before exam. - Infants, formula: may have bottle until 4  hours before exam.  Please call the Imaging Department at your exam site with any questions.            Jan 31, 2018  4:00 PM CST   (Arrive by 3:45 PM)   Return Vascular Visit with David Davis MD   [facility] ([facility])    [address] [place] [address]  3rd Floor  [facility] [address] [phone]            Feb 07, 2018  2:00 PM CST   Paracentesis Visit with Uc Spec Inf Para Provider, UC 40 ATC   [facility] ([facility])    [address] [place] [address]  [address]  [facility] [id number] [phone]            Feb 14, 2018  2:00 PM CST   Paracentesis Visit with Uc Spec Inf Para Provider, UC 40 ATC   [facility] ([facility])    [address] [place] [address]  [address]  [facility] [id number] [phone]            Feb 21, 2018  2:00 PM CST   Paracentesis Visit with Uc Spec Inf Para Provider, UC 40 ATC   [facility] ([facility])    [address] [place] [address]  [address]  [facility] [id number] [phone]              Who to contact     Please call your clinic at [phone] to:    Ask questions about your health    Make or cancel appointments    Discuss your medicines    Learn about your test results    Speak to your doctor   If you have compliments or concerns about an experience at your clinic, or if you wish to file a complaint, please contact [facility] Patient Relations at [phone] or email us at [email or web address]         Additional Information About Your Visit        MyChart Information     [employer] gives you secure access to your electronic health record. If you see a primary care provider, you can also send messages to your care team and make appointments. If you have questions, please call your primary care  clinic.  If you do not have a primary care provider, please call [phone] and they will assist you.      [employer] is an electronic gateway that provides easy, online access to your medical records. With [employer], you can request a clinic appointment, read your test results, renew a prescription or communicate with your care team.     To access your existing account, please contact your [facility] or call [phone] for assistance.        Care EveryWhere ID     This is your Care EveryWhere ID. This could be used by other organizations to access your [facility] medical records  [id number]         Blood Pressure from Last 3 Encounters:   01/05/18 120/60   01/04/18 (!) 119/37   12/28/17 139/72    Weight from Last 3 Encounters:   01/05/18 (!) 156.9 kg (345 lb 12.8 oz)   01/04/18 (!) 157.3 kg (346 lb 12.8 oz)   12/28/17 (!) 157.5 kg (347 lb 3.2 oz)              Today, you had the following     No orders found for display         Today's Medication Changes          These changes are accurate as of: 1/5/18 11:59 PM.  If you have any questions, ask your nurse or doctor.               These medicines have changed or have updated prescriptions.        Dose/Directions    ranitidine 150 MG tablet   Commonly known as:  ZANTAC   This may have changed:  when to take this   Used for:  Esophageal varices (H)        Dose:  150 mg   Take 1 tablet (150 mg) by mouth 2 times daily   Quantity:  180 tablet   Refills:  3                Primary Care Provider Office Phone # Fax #    Ary RENETTA Murphy, APRN [phone]       [address] [place] [address] [address] [place] [address]        Equal Access to Services     MARY LOU LORENZO AH: Hadii sil palmer Soanthony, waaxda luqadaha, qaybta kaalmada adeegyada, silvia richardson . So [facility] [phone].    ATENCIÓN: Si habla español, tiene a rondon disposición servicios gratuitos de asistencia lingüística. Llame al [phone].    We comply  with applicable federal civil rights laws and [place] laws. We do not discriminate on the basis of race, color, national origin, age, disability, sex, sexual orientation, or gender identity.            Thank you!     Thank you for choosing [facility] PRIMARY CARE CLINIC  for your care. Our goal is always to provide you with excellent care. Hearing back from our patients is one way we can continue to improve our services. Please take a few minutes to complete the written survey that you may receive in the mail after your visit with us. Thank you!             Your Updated Medication List - Protect others around you: Learn how to safely use, store and throw away your medicines at www.disposemymeds.org.          This list is accurate as of: 1/5/18 11:59 PM.  Always use your most recent med list.                   Brand Name Dispense Instructions for use Diagnosis    aspirin 81 MG tablet     30 tablet    Take 1 tablet (81 mg) by mouth daily    Type 2 diabetes mellitus with other skin complications       blood glucose lancets standard    no brand specified    1 Box    Use to test blood sugar 4 X  times daily or as directed.    Diabetes mellitus, type 2 (H)       blood glucose monitoring meter device kit    no brand specified    1 kit    Use to test blood sugar 4 X  times daily or as directed.    Type 2 diabetes mellitus with other specified complication (H)       blood glucose monitoring test strip    no brand specified    200 strip    Use to test blood sugars 4 X  times daily or as directed    Diabetes mellitus, type 2 (H)       calcium carbonate 1250 MG tablet    OS-ERIN 500 mg [place]. Ca     Take 1 tablet by mouth daily        cephALEXin 500 MG capsule    KEFLEX    40 capsule    Take 1 capsule (500 mg) by mouth 4 times daily    Cellulitis and abscess of leg       desvenlafaxine succinate 100 MG 24 hr tablet    PRISTIQ    180 tablet    Take 2 tablets (200 mg) by mouth daily    Other depression       FUROSEMIDE PO      Take  60 mg by mouth 2 times daily        glipiZIDE 5 MG 24 hr tablet    glipiZIDE XL    180 tablet    Take 1 tablet (5 mg) by mouth 2 times daily    Type 2 diabetes mellitus without complication, with long-term current use of insulin (H)       insulin aspart 100 UNIT/ML injection    NovoLOG FLEXPEN    30 mL    20 units before breakfast, 20 units before lunch, 20 units before dinner, 20 units before snacks    Type 2 diabetes mellitus without complication, with long-term current use of insulin (H)       insulin glargine 100 UNIT/ML injection    LANTUS SOLOSTAR    30 mL    Inject 40 Units Subcutaneous every morning    Type 2 diabetes mellitus with other oral complication, unspecified long term insulin use status (H)       * insulin pen needle 31G X 8 MM    B-D U/F    300 each    USE  6 times daily / OR AS DIRECTED    Type 2 diabetes, HbA1c goal < 7% (H)       * insulin pen needle 31G X 8 MM    B-D U/F    600 each    Use 6 times daily or as directed    Type 2 diabetes, HbA1c goal < 7% (H)       lactulose 10 GM/15ML solution    CHRONULAC    2000 mL    Take 30 mLs (20 g) by mouth 3 times daily Adjust dosing as needed to achieve 3-4 bowel movements daily.    Hepatic encephalopathy (H)       magnesium oxide 400 (241.3 MG) MG tablet    MAG-OX    90 tablet    Take 1 tablet (400 mg) by mouth daily    Cramp of limb       ondansetron 4 MG tablet    ZOFRAN    18 tablet    Take 1 tablet (4 mg) by mouth every 8 hours as needed for nausea    Alcoholic cirrhosis (H), Nausea       ranitidine 150 MG tablet    ZANTAC    180 tablet    Take 1 tablet (150 mg) by mouth 2 times daily    Esophageal varices (H)       rifaximin 550 MG Tabs tablet    XIFAXAN    60 tablet    Take 1 tablet (550 mg) by mouth 2 times daily    Hepatic encephalopathy (H)       spironolactone 50 MG tablet    ALDACTONE    540 tablet    1 tablet (50 mg) 2 times daily Takes 3 tablets in am.and p.m.    Portal hypertension (H), Generalized edema       STATIN NOT PRESCRIBED  (INTENTIONAL)     0 each    1 each daily Statin not prescribed intentionally due to Active liver disease    Hyperlipidemia LDL goal <100       VITAMIN B-12 PO      Take 1 tablet by mouth daily        vitamin D3 2000 UNITS Caps     90 capsule    Take 1 capsule by mouth daily    Mild major depression (H)       * Notice:  This list has 2 medication(s) that are the same as other medications prescribed for you. Read the directions carefully, and ask your doctor or other care provider to review them with you.

## 2018-01-05 NOTE — PROGRESS NOTES
Social Work Brief Intervention  [facility]    Data/Intervention:    Patient Name:  Lawrence Louie  /Age:  1955 (62 year old)    Visit Type: in person  Referral Source: Hepatology Clinic  Reason for Referral:  Transportation, support concerns    Collaborated With:    -RADHA Paiz  -Patient  -Patient's spouse, Flores Louie    Patient Concerns/Issues:   Patient is preparing for lymphedema appointments 4x/week and needs transportation services. Patient's wife works 30 hours/week, but has Wed, Thurs, and Fri off. Patient also asked about where he can get his Rx for Adult diapers filled. Patient/wife also have coping and support concerns. Cy Henson of Behavioral Health met with them today. No other concerns were expressed at this time.    Intervention/Education/Resources Provided:   provided Patient with the phone number for [employer]. Patient declined [employer] application at this time. RADHA Paiz was in the room at time of meeting and possible home care referral was discussed. If Patient can receive lymphedema treatment at home, this will eliminate need for transportation. If home care is not an option, possibility of lymphedema treatment at [facility] was discussed as [facility] is very close to where Patient lives.     Assessment/Plan:  Home health referral will be placed. Patient's wife will check with insurance about in-network providers for DME as well as lymphedema treatment.    Provided patient/family with contact information and availability to follow up as needed.    ROB Wade  Outpatient Specialty Clinics  Direct Phone: [phone]  Pager:  [phone]

## 2018-01-05 NOTE — LETTER
1/5/2018      RE: Lawrence Louie  [facility] [facility] [address]       ASSESSMENT AND PLAN:  A 61-year-old man with alcoholic and SMITH cirrhosis.  He has been sober from alcohol for 5 years. S/P TIPS 2 months ago. Starting to see some improvement in ascites, with decreased frequency of paracentesis and about 1/2 the previous volume. Post TIPS mild elevation in bili and mild decrease in hemoglobin.     Ongoing, very long-standing issues with severe LE edema, now worse per his report, after TIPS. Discussed that this is very long-standing and severe and will likely never be completely resolved, but would aim for continued improvement.    He is not adherent to a low sodium diet. He has had problems with cramping with higher diuretic doses.    Diabetes under better control.    Does not appear to need lactulose.    1. Re-iterated sodium restriction.   2. Increase gus from 100 daily to 100 AM and 25 PM  3. Increase furosemide from 60 daily to 60 AM and 20 PM  4. BMP and CBC in 1 week  5. Vitamin E and zinc 200 bid for cramping. Continue magnesium. Can also try diet tonic water.  6. Continue prn paracentesis  7. Attempt as much mobility as possible, even if it is 5 minutes of walking around the house  8. Continue to work with lymphedema clinic as able  9. Stop lactulose. I don't think it is making any difference  10. Provided med card today  11. Check in with Palma Paiz RN care coordinator for help with side effects, questions about meds.  12. RTC 3 months.      SUBJECTIVE:  Mr. Louie is a 61-year-old man with SMITH and alcoholic cirrhosis.  He has been sober from alcohol since 2012. He underwent TIPS 10/27/17. He had a hospitalization shortly after for weakness, dehydration, n/v. He was discharged to home.    Taking gus 50 mg tabs, 2 tabs once a day. Furosemide 20 mg tabs, taking 3 tabs all at once. Not taking higher doses because he gets cramping. He also tried taking another dose later  "in the day and got cramping. Taking magnesium tab also for a long time. Not on zinc or vitamin E.    Diet: does not add salt but he does not restrict sodium otherwise.  He eats potato chips, pickled herring and other food very high in sodium because they taste good. \"Baked potatoes without salt are terrible\".    His biggest complaint is LE edema, which he has had for many years and he says is worse now after TIPS. He has seen lymphedema clinic but there are concerns about whether this will be accessible for him re: insurance issues and frequency of visits. They are recommending 3-4 visits per week. Having trouble getting in and out of vehicles. Wrapping legs in diapers and ace. Has pain in the legs that he says is worse than pre-TIPS.     He is taking lactulose and rifaximin. No confusion. Has about 1 BM/day. Drinking more lactulose does not seem to make any difference in BM frequency or anything else.    Paracentesis is now less often and less fluid. Was weekly and 8-10L at a time, now every 2-3 weeks and 2-4 L.    OTHER COMORBIDITIES:  He has diabetes, GERD, hyperlipidemia , morbid obesity      SOCIAL HISTORY:  He lives with his wife.  He has 3 kids in the [place].  He is retired. Wife is here with him today.      PHYSICAL EXAMINATION:   Vitals: /60 (BP Location: Right arm, Patient Position: Sitting, Cuff Size: Adult Regular)  Pulse 98  Temp 97.3  F (36.3  C) (Oral)  Ht 1.854 m (6' 1\")  Wt (!) 156.9 kg (345 lb 12.8 oz)  SpO2 97%  BMI 45.62 kg/m2  BMI= Body mass index is 45.62 kg/(m^2).  GENERAL:  Pleasant, well-appearing, in no acute distress.   HEENT:  No icterus, no oral lesions. Very poor dentition. Multiple missing teeth, broken teeth.  LYMPH:  No supraclavicular or cervical lymphadenopathy.   CARDIOVASCULAR:  Regular rate and rhythm.   CHEST:  Lungs are clear.   ABDOMEN:  Bowel sounds are present.  He is morbidly obese.   EXTREMITIES:  Significant woody edema bilaterally.   NEUROLOGIC:  Speech " is fluent and clear.  No asterixis or tremor.       LABORATORY DATA:  From today were reviewed. Slight worsening of bilirubin after TIPS is now stable, went from 0.4 to peak 2, now 1.8. Hemoglobin still drifting down: was 12-13 preTIPS, then 11s, now 9.5.  .         Meghna Simmons MD

## 2018-01-05 NOTE — NURSING NOTE
"Chief Complaint   Patient presents with     RECHECK     Alcoholic cirrhosis of liver with ascites        Initial /60 (BP Location: Right arm, Patient Position: Sitting, Cuff Size: Adult Regular)  Pulse 98  Temp 97.3  F (36.3  C) (Oral)  Ht 1.854 m (6' 1\")  Wt (!) 156.9 kg (345 lb 12.8 oz)  SpO2 97%  BMI 45.62 kg/m2 Estimated body mass index is 45.62 kg/(m^2) as calculated from the following:    Height as of this encounter: 1.854 m (6' 1\").    Weight as of this encounter: 156.9 kg (345 lb 12.8 oz).  Medication Reconciliation: complete     Hal Israel MA      "

## 2018-01-08 ENCOUNTER — CARE COORDINATION (OUTPATIENT)
Dept: GASTROENTEROLOGY | Facility: CLINIC | Age: 63
End: 2018-01-08

## 2018-01-08 DIAGNOSIS — R18.8 ASCITES: ICD-10-CM

## 2018-01-08 DIAGNOSIS — I89.0 LYMPHEDEMA OF BOTH LOWER EXTREMITIES: ICD-10-CM

## 2018-01-08 DIAGNOSIS — E66.01 MORBID OBESITY (H): ICD-10-CM

## 2018-01-08 DIAGNOSIS — K70.31 ALCOHOLIC CIRRHOSIS OF LIVER WITH ASCITES (H): Primary | ICD-10-CM

## 2018-01-08 NOTE — NURSING NOTE
Met with patient and wife along with Dr. Simmons and then also with SW. Main issue is severe LE edema which has worsened since TIPS as reported by patient and wife. Reviewed importance of low sodium diet and maintaining mobility as able, increasing ambulation around the house for now until warmer weather and safer conditions allow for outdoor walks. Adjustments to diuretics made, along with addition of medications to help with cramping. Dr. Simmons also discontinued lactulose. Will order home care referral with lymphedema therapy if approved per Dr. Simmons, as mobility an issue right now. Will need lab check in 1 week and return to clinic in 3 months.

## 2018-01-08 NOTE — PROGRESS NOTES
Attempted to reach patient for check in, no answer, message left requesting call back, number provided.  Left information regarding trying diet tonic water to help with leg cramps, home care referral placed, and reminder of labs needed on Friday 1/12.

## 2018-01-09 DIAGNOSIS — K70.31 ALCOHOLIC CIRRHOSIS OF LIVER WITH ASCITES (H): ICD-10-CM

## 2018-01-09 RX ORDER — OXYCODONE HYDROCHLORIDE 5 MG/1
5 TABLET ORAL EVERY 6 HOURS PRN
Qty: 60 TABLET | Refills: 0 | Status: SHIPPED | OUTPATIENT
Start: 2018-01-09 | End: 2018-02-23

## 2018-01-12 ENCOUNTER — MYC MEDICAL ADVICE (OUTPATIENT)
Dept: INTERNAL MEDICINE | Facility: CLINIC | Age: 63
End: 2018-01-12

## 2018-01-12 ENCOUNTER — TELEPHONE (OUTPATIENT)
Dept: GASTROENTEROLOGY | Facility: CLINIC | Age: 63
End: 2018-01-12

## 2018-01-12 DIAGNOSIS — E66.01 MORBID OBESITY (H): ICD-10-CM

## 2018-01-12 DIAGNOSIS — R60.0 EDEMA EXTREMITIES: ICD-10-CM

## 2018-01-12 DIAGNOSIS — K70.31 ALCOHOLIC CIRRHOSIS OF LIVER WITH ASCITES (H): ICD-10-CM

## 2018-01-12 LAB
ANION GAP SERPL CALCULATED.3IONS-SCNC: 7 MMOL/L (ref 3–14)
BUN SERPL-MCNC: 15 MG/DL (ref 7–30)
CALCIUM SERPL-MCNC: 8.4 MG/DL (ref 8.5–10.1)
CHLORIDE SERPL-SCNC: 102 MMOL/L (ref 94–109)
CO2 SERPL-SCNC: 27 MMOL/L (ref 20–32)
CREAT SERPL-MCNC: 0.77 MG/DL (ref 0.66–1.25)
ERYTHROCYTE [DISTWIDTH] IN BLOOD BY AUTOMATED COUNT: 17 % (ref 10–15)
GFR SERPL CREATININE-BSD FRML MDRD: >90 ML/MIN/1.7M2
GLUCOSE SERPL-MCNC: 97 MG/DL (ref 70–99)
HCT VFR BLD AUTO: 29 % (ref 40–53)
HGB BLD-MCNC: 9.5 G/DL (ref 13.3–17.7)
MCH RBC QN AUTO: 33.1 PG (ref 26.5–33)
MCHC RBC AUTO-ENTMCNC: 32.8 G/DL (ref 31.5–36.5)
MCV RBC AUTO: 101 FL (ref 78–100)
PLATELET # BLD AUTO: 111 10E9/L (ref 150–450)
POTASSIUM SERPL-SCNC: 4.2 MMOL/L (ref 3.4–5.3)
RBC # BLD AUTO: 2.87 10E12/L (ref 4.4–5.9)
SODIUM SERPL-SCNC: 136 MMOL/L (ref 133–144)
WBC # BLD AUTO: 3.5 10E9/L (ref 4–11)

## 2018-01-12 PROCEDURE — 36415 COLL VENOUS BLD VENIPUNCTURE: CPT | Performed by: INTERNAL MEDICINE

## 2018-01-12 PROCEDURE — 85027 COMPLETE CBC AUTOMATED: CPT | Performed by: INTERNAL MEDICINE

## 2018-01-12 PROCEDURE — 80048 BASIC METABOLIC PNL TOTAL CA: CPT | Performed by: INTERNAL MEDICINE

## 2018-01-24 ENCOUNTER — MEDICAL CORRESPONDENCE (OUTPATIENT)
Dept: HEALTH INFORMATION MANAGEMENT | Facility: CLINIC | Age: 63
End: 2018-01-24

## 2018-01-24 ENCOUNTER — TELEPHONE (OUTPATIENT)
Dept: INTERNAL MEDICINE | Facility: CLINIC | Age: 63
End: 2018-01-24

## 2018-01-24 DIAGNOSIS — I85.00 ESOPHAGEAL VARICES (H): ICD-10-CM

## 2018-01-24 NOTE — TELEPHONE ENCOUNTER
----- Message from Naima Rubi LPN sent at 1/24/2018  1:49 PM CST -----  Regarding: FW: Verbal orders      ----- Message -----     From: Jodi Vera     Sent: 1/24/2018   1:15 PM       To: Naima Rubi LPN  Subject: Verbal orders                                    Sus[facility]  [phone]    Skilled Nursing    2 x 1 week  3 x 2 weeks  2 x 1 week  3 prns    PT, OT &     1x 1 each    Patient declined HHA  -----------------  Above home care orders authorized, message given to Susanne.  Naomi Lee RN

## 2018-01-25 ENCOUNTER — CARE COORDINATION (OUTPATIENT)
Dept: GASTROENTEROLOGY | Facility: CLINIC | Age: 63
End: 2018-01-25

## 2018-01-25 DIAGNOSIS — F32.89 OTHER DEPRESSION: ICD-10-CM

## 2018-01-25 RX ORDER — DESVENLAFAXINE 100 MG/1
200 TABLET, EXTENDED RELEASE ORAL DAILY
Qty: 180 TABLET | Refills: 0 | Status: SHIPPED | OUTPATIENT
Start: 2018-01-25 | End: 2018-04-13

## 2018-01-25 NOTE — TELEPHONE ENCOUNTER
desvenlafaxine succinate ER (PRISTIQ) 100 MG 24 hr tablet      Last Written Prescription Date:  1-27-17  Last Fill Quantity: 180,   # refills: 3  Last Office Visit : 12-28-17  Future Office visit:  none    Routing refill request to provider for review/approval because:  No PHQ-9 since 2014 found on record.    Kathleen M Doege RN

## 2018-01-25 NOTE — PROGRESS NOTES
Patient's wife called and left message to inform that patient had a home care evaluation and that he is approved to begin lymphedema treatment at home. She did not think she was going to be able to get the patient to his paracentesis appointment today, and inquired as to how to best proceed. Returned call to wife, no answer, left detailed message reviewing ride information per SW, number to call to cancel para if needed, and this writer's call back number. It was noted later that the para appointment for today did get cancelled.

## 2018-01-30 ENCOUNTER — MEDICAL CORRESPONDENCE (OUTPATIENT)
Dept: HEALTH INFORMATION MANAGEMENT | Facility: CLINIC | Age: 63
End: 2018-01-30

## 2018-01-31 ENCOUNTER — DOCUMENTATION ONLY (OUTPATIENT)
Dept: CARE COORDINATION | Facility: CLINIC | Age: 63
End: 2018-01-31

## 2018-01-31 NOTE — PROGRESS NOTES
[facility] now requests orders and shares plan of care/discharge summaries for some patients through [employer].  Please REPLY TO THIS MESSAGE in order to give authorization for orders when needed.  This is considered a verbal order, you will still receive a faxed copy of orders for signature.  Thank you for your assistance in improving collaboration for our patients.    ORDER    Home Care wound nurse assessment to LE stasis wounds.  Pt has weeping edema with open, slough covered wounds.  Recommend applying moisture barrier to periwound skin, super absorbant dressing that locks drainage away, secure with kerlix and compression.  May apply adapitic if sticking present.  Change daily and prn.  Recommend Lymph OT to assess for long term compression needs.    Beverley Boggs RN, CWON  [phone]

## 2018-02-01 NOTE — TELEPHONE ENCOUNTER
I am not this person's provider nor do I supervise this person's provider.  Therefore I can not sign these orders.    Britton Vargas

## 2018-02-02 ENCOUNTER — DOCUMENTATION ONLY (OUTPATIENT)
Dept: CARE COORDINATION | Facility: CLINIC | Age: 63
End: 2018-02-02

## 2018-02-02 DIAGNOSIS — E11.42 DIABETIC POLYNEUROPATHY ASSOCIATED WITH TYPE 2 DIABETES MELLITUS (H): Primary | ICD-10-CM

## 2018-02-02 NOTE — PROGRESS NOTES
[facility] now requests orders and shares plan of care/discharge summaries for some patients through [employer].  Please REPLY TO THIS MESSAGE in order to give authorization for orders when needed.  This is considered a verbal order, you will still receive a faxed copy of orders for signature.  Thank you for your assistance in improving collaboration for our patients.    ORDER    Home Care wound nurse assessment to LE stasis wounds.  Pt has weeping edema with open, slough covered wounds.  Recommend applying moisture barrier to periwound skin, super absorbant dressing that locks drainage away, secure with kerlix and compression.  May apply adapitic if sticking present.  Change daily and prn.  Recommend Lymph OT to assess for long term compression needs.    Beverley Boggs RN, CWON  [phone]

## 2018-02-02 NOTE — PROGRESS NOTES
[facility] now requests orders and shares plan of care/discharge summaries for some patients through [employer]  Please REPLY TO THIS MESSAGE in order to give authorization for orders when needed.  This is considered a verbal order, you will still receive a faxed copy of orders for signature.  Thank you for your assistance in improving collaboration for our patients.    ORDER    PT to continue 2week2 effective 2/4/18    Sussy Bernal PT  [phone]

## 2018-02-05 ENCOUNTER — TELEPHONE (OUTPATIENT)
Dept: GASTROENTEROLOGY | Facility: CLINIC | Age: 63
End: 2018-02-05

## 2018-02-05 NOTE — TELEPHONE ENCOUNTER
Stephanie Pizano ( [facility] Care) requesting home care orders to continue skilled OT. Requesting to hear back ASAP so can visit pt 2/6/18.Please advise at [phone].

## 2018-02-05 NOTE — TELEPHONE ENCOUNTER
Returned call to Stephanie at [facility], and gave verbal approval per Dr. Simmons for home care PT/OT.

## 2018-02-07 RX ORDER — GABAPENTIN 300 MG/1
300 CAPSULE ORAL 3 TIMES DAILY
Qty: 90 CAPSULE | Refills: 3 | Status: SHIPPED | OUTPATIENT
Start: 2018-02-07 | End: 2018-07-31

## 2018-02-14 ENCOUNTER — OFFICE VISIT (OUTPATIENT)
Dept: INFUSION THERAPY | Facility: CLINIC | Age: 63
End: 2018-02-14
Attending: INTERNAL MEDICINE
Payer: COMMERCIAL

## 2018-02-14 ENCOUNTER — RADIANT APPOINTMENT (OUTPATIENT)
Dept: ULTRASOUND IMAGING | Facility: CLINIC | Age: 63
End: 2018-02-14
Attending: INTERNAL MEDICINE
Payer: COMMERCIAL

## 2018-02-14 VITALS
WEIGHT: 314.6 LBS | SYSTOLIC BLOOD PRESSURE: 115 MMHG | DIASTOLIC BLOOD PRESSURE: 46 MMHG | BODY MASS INDEX: 41.51 KG/M2 | TEMPERATURE: 98.4 F

## 2018-02-14 DIAGNOSIS — K70.31 ALCOHOLIC CIRRHOSIS OF LIVER WITH ASCITES (H): Primary | ICD-10-CM

## 2018-02-14 LAB — PLATELET # BLD AUTO: 81 10E9/L (ref 150–450)

## 2018-02-14 PROCEDURE — 27210190 US PARACENTESIS

## 2018-02-14 PROCEDURE — 25000128 H RX IP 250 OP 636: Mod: ZF | Performed by: INTERNAL MEDICINE

## 2018-02-14 PROCEDURE — 85049 AUTOMATED PLATELET COUNT: CPT | Performed by: INTERNAL MEDICINE

## 2018-02-14 PROCEDURE — P9047 ALBUMIN (HUMAN), 25%, 50ML: HCPCS | Mod: ZF | Performed by: INTERNAL MEDICINE

## 2018-02-14 PROCEDURE — 25000125 ZZHC RX 250: Mod: ZF | Performed by: INTERNAL MEDICINE

## 2018-02-14 RX ORDER — ALBUMIN (HUMAN) 12.5 G/50ML
12.5 SOLUTION INTRAVENOUS 4 TIMES DAILY PRN
Status: CANCELLED
Start: 2018-02-14

## 2018-02-14 RX ORDER — ALBUMIN (HUMAN) 12.5 G/50ML
12.5 SOLUTION INTRAVENOUS 4 TIMES DAILY PRN
Status: DISCONTINUED | OUTPATIENT
Start: 2018-02-14 | End: 2018-02-14 | Stop reason: HOSPADM

## 2018-02-14 RX ADMIN — LIDOCAINE HYDROCHLORIDE 20 ML: 10 INJECTION, SOLUTION INFILTRATION; PERINEURAL at 14:55

## 2018-02-14 RX ADMIN — ALBUMIN HUMAN 25 G: 0.25 SOLUTION INTRAVENOUS at 14:55

## 2018-02-14 NOTE — PROGRESS NOTES
Paracentesis Nursing Note  Lawrence Louie presents [facility] for [facility].    During today's appointment orders from Meghna Simmons MD were completed.    Progress Note:  Patient identification verified by name and date of birth.  Assessment completed.  Vitals monitored throughout appointment and recorded in Doc Flowsheets.  See proceduralist note in ultrasound.    Date of consent or authorization: 11/29.  Invasive Procedure Safety Checklist was completed and sent for scanning.     Paracentesis performed by Miri Correia.    The following labs were communicated to provider performing paracentesis:  Lab Results   Component Value Date     01/12/2018       Total amount of ascites fluid drained: 2.4 liters.  Color of ascites fluid: yellow and clear.  Total amount of albumin given: 25  grams.    Patient tolerated procedure well.    Post procedure,denies pain or discomfort post paracentesis.      Discharge Plan:  Discharge instructions were reviewed with patient.  Patient/Representative verbalized understanding and all questions were answered.   Discharged from [facility] in stable condition.    Mariajose Billingsley RN      Administrations This Visit     albumin human 25 % injection 12.5 g     Admin Date Action Dose Route Administered By             02/14/2018 New Bag 25 g Intravenous Mariajose Billingsley RN                    lidocaine 1 % 20 mL     Admin Date Action Dose Route Administered By             02/14/2018 Given by Other Clinician 20 mL Injection Mariajose Billingsley RN                          /54  Temp 98.4  F (36.9  C) (Oral)  Wt (!) 145.5 kg (320 lb 11.2 oz)  BMI 42.31 kg/m2

## 2018-02-14 NOTE — MR AVS SNAPSHOT
After Visit Summary   2/14/2018    Lawrence Louie    MRN: [id number]           Patient Information     Date Of Birth          1955        Visit Information        Provider Department      2/14/2018 2:00 PM Provider, [facility]c Spec Inf Para; UC 40 ATC [facility]        Today's Diagnoses     Alcoholic cirrhosis of liver with ascites (H)    -  1       Follow-ups after your visit        Your next 10 appointments already scheduled     Feb 21, 2018  2:00 PM CST   Paracentesis Visit with Uc Spec Inf Para Provider, UC 40 ATC   [facility] Special[facility] ([facility])    [address]  [address]  [place] [place] [address] [phone]            Feb 28, 2018  2:00 PM CST   Paracentesis Visit with Uc Spec Inf Para Provider, UC 40 ATC   [facility] ([facility])    [address]  [address]  [place] [address] [phone]            Apr 06, 2018 11:30 AM CDT   (Arrive by 11:15 AM)   Return General Liver with Meghna Simmons MD   [facility] Hepatology ([facility])    [address]  [address]  [place] [address] [phone]              Who to contact     If you have questions or need follow up information about today's clinic visit or your schedule please contact [facility] SPECIALTY AND PROCEDURE directly at [phone].  Normal or non-critical lab and imaging results will be communicated to you by MyChart, letter or phone within 4 business days after the clinic has received the results. If you do not hear from us within 7 days, please contact the clinic through MyChart or phone. If you have a critical or abnormal lab result, we will notify you by phone as soon as possible.  Submit refill requests through [employer] or call your pharmacy  and they will forward the refill request to us. Please allow 3 business days for your refill to be completed.          Additional Information About Your Visit        [employer] Information     [employer] gives you secure access to your electronic health record. If you see a primary care provider, you can also send messages to your care team and make appointments. If you have questions, please call your primary care clinic.  If you do not have a primary care provider, please call [phone] and they will assist you.        Care EveryWhere ID     This is your Care EveryWhere ID. This could be used by other organizations to access your [place] medical records  [id number]        Your Vitals Were     Temperature BMI (Body Mass Index)                98.4  F (36.9  C) (Oral) 41.51 kg/m2           Blood Pressure from Last 3 Encounters:   02/14/18 115/46   01/05/18 120/60   01/04/18 (!) 119/37    Weight from Last 3 Encounters:   02/14/18 (!) 142.7 kg (314 lb 9.6 oz)   01/05/18 (!) 156.9 kg (345 lb 12.8 oz)   01/04/18 (!) 157.3 kg (346 lb 12.8 oz)              We Performed the Following     Platelet count     US Paracentesis        Primary Care Provider Office Phone # Fax #    Ary JACKSON CORY Murphy [phone]       [address] [place] [address]  [facility] [address]        Equal Access to Services     [facility] CampusNOHEMY : Hadii aad ku hadasho Soanthony, waaxda luqadaha, qaybta kaalmada noel, silvia richardson . So [facility] [phone].    ATENCIÓN: Si habla español, tiene a rondon disposición servicios gratuitos de asistencia lingüística. Llame al [phone].    We comply with applicable federal civil rights laws and [place] laws. We do not discriminate on the basis of race, color, national origin, age, disability, sex, sexual orientation, or gender identity.            Thank you!     Thank you for choosing [facility] SPECIAL[facility] PROCEDURE  for your care. Our goal is  always to provide you with excellent care. Hearing back from our patients is one way we can continue to improve our services. Please take a few minutes to complete the written survey that you may receive in the mail after your visit with us. Thank you!             Your Updated Medication List - Protect others around you: Learn how to safely use, store and throw away your medicines at www.disposemymeds.org.          This list is accurate as of 2/14/18  3:39 PM.  Always use your most recent med list.                   Brand Name Dispense Instructions for use Diagnosis    aspirin 81 MG tablet     30 tablet    Take 1 tablet (81 mg) by mouth daily    Type 2 diabetes mellitus with other skin complications       blood glucose lancets standard    no brand specified    1 Box    Use to test blood sugar 4 X  times daily or as directed.    Diabetes mellitus, type 2 (H)       blood glucose monitoring meter device kit    no brand specified    1 kit    Use to test blood sugar 4 X  times daily or as directed.    Type 2 diabetes mellitus with other specified complication (H)       blood glucose monitoring test strip    no brand specified    200 strip    Use to test blood sugars 4 X  times daily or as directed    Diabetes mellitus, type 2 (H)       calcium carbonate 1250 MG tablet    OS-ERIN 500 mg [place]. Ca     Take 1 tablet by mouth daily        cephALEXin 500 MG capsule    KEFLEX    40 capsule    Take 1 capsule (500 mg) by mouth 4 times daily    Cellulitis and abscess of leg       desvenlafaxine succinate 100 MG 24 hr tablet    PRISTIQ    180 tablet    Take 2 tablets (200 mg) by mouth daily    Other depression       FUROSEMIDE PO      Take 60 mg by mouth 2 times daily        * gabapentin 300 MG capsule    NEURONTIN    60 capsule    Take 1 capsule in the morning for 3 days, then 1 capsule twice a day for 3 days and let me know to what degree this impacts the pain.    Pain in both feet       * gabapentin 300 MG capsule    NEURONTIN     90 capsule    Take 1 capsule (300 mg) by mouth 3 times daily    Diabetic polyneuropathy associated with type 2 diabetes mellitus (H)       glipiZIDE 5 MG 24 hr tablet    glipiZIDE XL    180 tablet    Take 1 tablet (5 mg) by mouth 2 times daily    Type 2 diabetes mellitus without complication, with long-term current use of insulin (H)       insulin aspart 100 UNIT/ML injection    NovoLOG FLEXPEN    30 mL    20 units before breakfast, 20 units before lunch, 20 units before dinner, 20 units before snacks    Type 2 diabetes mellitus without complication, with long-term current use of insulin (H)       insulin glargine 100 UNIT/ML injection    LANTUS SOLOSTAR    30 mL    Inject 40 Units Subcutaneous every morning    Type 2 diabetes mellitus with other oral complication, unspecified long term insulin use status (H)       * insulin pen needle 31G X 8 MM    B-D U/F    300 each    USE  6 times daily / OR AS DIRECTED    Type 2 diabetes, HbA1c goal < 7% (H)       * insulin pen needle 31G X 8 MM    B-D U/F    600 each    Use 6 times daily or as directed    Type 2 diabetes, HbA1c goal < 7% (H)       lactulose 10 GM/15ML solution    CHRONULAC    2000 mL    Take 30 mLs (20 g) by mouth 3 times daily Adjust dosing as needed to achieve 3-4 bowel movements daily.    Hepatic encephalopathy (H)       magnesium oxide 400 (241.3 MG) MG tablet    MAG-OX    90 tablet    Take 1 tablet (400 mg) by mouth daily    Cramp of limb       ondansetron 4 MG tablet    ZOFRAN    18 tablet    Take 1 tablet (4 mg) by mouth every 8 hours as needed for nausea    Alcoholic cirrhosis (H), Nausea       oxyCODONE IR 5 MG tablet    ROXICODONE    60 tablet    Take 1 tablet (5 mg) by mouth every 6 hours as needed for moderate to severe pain maximum 6 tablet(s) per day    Alcoholic cirrhosis of liver with ascites (H)       ranitidine 150 MG tablet    ZANTAC    180 tablet    Take 1 tablet (150 mg) by mouth 2 times daily    Esophageal varices (H)       rifaximin 550  MG Tabs tablet    XIFAXAN    60 tablet    Take 1 tablet (550 mg) by mouth 2 times daily    Hepatic encephalopathy (H)       spironolactone 50 MG tablet    ALDACTONE    540 tablet    1 tablet (50 mg) 2 times daily Takes 3 tablets in am.and p.m.    Portal hypertension (H), Generalized edema       STATIN NOT PRESCRIBED (INTENTIONAL)     0 each    1 each daily Statin not prescribed intentionally due to Active liver disease    Hyperlipidemia LDL goal <100       VITAMIN B-12 PO      Take 1 tablet by mouth daily        vitamin D3 2000 UNITS Caps     90 capsule    Take 1 capsule by mouth daily    Mild major depression (H)       * Notice:  This list has 4 medication(s) that are the same as other medications prescribed for you. Read the directions carefully, and ask your doctor or other care provider to review them with you.

## 2018-02-15 ENCOUNTER — DOCUMENTATION ONLY (OUTPATIENT)
Dept: CARE COORDINATION | Facility: CLINIC | Age: 63
End: 2018-02-15

## 2018-02-16 NOTE — PROGRESS NOTES
[facility] now requests orders and shares plan of care/discharge summaries for some patients through [employer].  Please REPLY TO THIS MESSAGE in order to give authorization for orders when needed.  This is considered a verbal order, you will still receive a faxed copy of orders for signature.  Thank you for your assistance in improving collaboration for our patients.    ORDER    Requesting SN visits 2W3 and 3 PRN visits to start the week of 2/18/2018.      Thank you,    RADHA Flores Case Manager  [phone]  [email or web address]

## 2018-02-18 ENCOUNTER — MEDICAL CORRESPONDENCE (OUTPATIENT)
Dept: HEALTH INFORMATION MANAGEMENT | Facility: CLINIC | Age: 63
End: 2018-02-18

## 2018-02-23 ENCOUNTER — CARE COORDINATION (OUTPATIENT)
Dept: GASTROENTEROLOGY | Facility: CLINIC | Age: 63
End: 2018-02-23

## 2018-02-23 ENCOUNTER — MYC REFILL (OUTPATIENT)
Dept: INTERNAL MEDICINE | Facility: CLINIC | Age: 63
End: 2018-02-23

## 2018-02-23 DIAGNOSIS — K70.31 ALCOHOLIC CIRRHOSIS OF LIVER WITH ASCITES (H): ICD-10-CM

## 2018-02-23 RX ORDER — OXYCODONE HYDROCHLORIDE 5 MG/1
5 TABLET ORAL EVERY 6 HOURS PRN
Qty: 60 TABLET | Refills: 0 | Status: CANCELLED | OUTPATIENT
Start: 2018-02-23

## 2018-02-23 RX ORDER — OXYCODONE HYDROCHLORIDE 5 MG/1
5 TABLET ORAL EVERY 6 HOURS PRN
Qty: 60 TABLET | Refills: 0 | Status: SHIPPED | OUTPATIENT
Start: 2018-02-23 | End: 2018-02-23

## 2018-02-23 RX ORDER — OXYCODONE HYDROCHLORIDE 5 MG/1
5 TABLET ORAL EVERY 6 HOURS PRN
Qty: 60 TABLET | Refills: 0 | Status: SHIPPED | OUTPATIENT
Start: 2018-02-23 | End: 2018-03-22

## 2018-02-23 NOTE — PROGRESS NOTES
2/23/2018  8:10 AM      RN Care Coordination Hepatology   This RN covering for Palma Paiz RN CC. [facility]  San[facility] [phone] contacted the clinic to have an order placed for x1 more visit from the . Patient's spouse has lost her job per  and they are concerned about their financial status due to the sudden financial changePatient and spouse would like to review state programs and other resources available to cover medical and other expenses. This would be the reasoning for the  visit. Verbal given for x1 visit for  on behalf of Dr. Simmons for patient. Facility to fax over an order to have provider sign, I will scan into the Epic system once received and signed. I will update Palma CARVAJAL On patient status and to follow up with patient/family when she returns. Patient and spouse are aware they can contact the clinic directly at [phone] with any other questions or concerns.           Shanel Monet RN, BSN, PHN  [facility]   RN Care Coordinator Hepatology Specialty Clinic/Program

## 2018-02-26 ENCOUNTER — MEDICAL CORRESPONDENCE (OUTPATIENT)
Dept: HEALTH INFORMATION MANAGEMENT | Facility: CLINIC | Age: 63
End: 2018-02-26

## 2018-02-27 ENCOUNTER — MEDICAL CORRESPONDENCE (OUTPATIENT)
Dept: HEALTH INFORMATION MANAGEMENT | Facility: CLINIC | Age: 63
End: 2018-02-27

## 2018-02-27 ENCOUNTER — TELEPHONE (OUTPATIENT)
Dept: INTERNAL MEDICINE | Facility: CLINIC | Age: 63
End: 2018-02-27

## 2018-02-28 ENCOUNTER — RADIANT APPOINTMENT (OUTPATIENT)
Dept: ULTRASOUND IMAGING | Facility: CLINIC | Age: 63
End: 2018-02-28
Attending: INTERNAL MEDICINE
Payer: COMMERCIAL

## 2018-02-28 ENCOUNTER — OFFICE VISIT (OUTPATIENT)
Dept: INFUSION THERAPY | Facility: CLINIC | Age: 63
End: 2018-02-28
Attending: INTERNAL MEDICINE
Payer: COMMERCIAL

## 2018-02-28 VITALS
TEMPERATURE: 98.3 F | HEART RATE: 82 BPM | SYSTOLIC BLOOD PRESSURE: 126 MMHG | WEIGHT: 315 LBS | DIASTOLIC BLOOD PRESSURE: 52 MMHG | OXYGEN SATURATION: 99 % | BODY MASS INDEX: 44.34 KG/M2

## 2018-02-28 DIAGNOSIS — K70.31 ALCOHOLIC CIRRHOSIS OF LIVER WITH ASCITES (H): Primary | ICD-10-CM

## 2018-02-28 PROCEDURE — 25000128 H RX IP 250 OP 636: Mod: ZF | Performed by: INTERNAL MEDICINE

## 2018-02-28 PROCEDURE — P9047 ALBUMIN (HUMAN), 25%, 50ML: HCPCS | Mod: ZF | Performed by: INTERNAL MEDICINE

## 2018-02-28 PROCEDURE — 49083 ABD PARACENTESIS W/IMAGING: CPT

## 2018-02-28 PROCEDURE — 25000125 ZZHC RX 250: Mod: ZF | Performed by: INTERNAL MEDICINE

## 2018-02-28 RX ORDER — ALBUMIN (HUMAN) 12.5 G/50ML
12.5 SOLUTION INTRAVENOUS 4 TIMES DAILY PRN
Status: DISCONTINUED | OUTPATIENT
Start: 2018-02-28 | End: 2018-02-28 | Stop reason: HOSPADM

## 2018-02-28 RX ORDER — ALBUMIN (HUMAN) 12.5 G/50ML
12.5 SOLUTION INTRAVENOUS 4 TIMES DAILY PRN
Status: CANCELLED
Start: 2018-02-28

## 2018-02-28 RX ADMIN — LIDOCAINE HYDROCHLORIDE 20 ML: 10 INJECTION, SOLUTION INFILTRATION; PERINEURAL at 15:06

## 2018-02-28 RX ADMIN — ALBUMIN HUMAN 25 G: 0.25 SOLUTION INTRAVENOUS at 15:07

## 2018-02-28 NOTE — PROGRESS NOTES
Paracentesis Nursing Note  Lawrence Louie presents today to [facility] for a [facility]acentesis.    During today's appointment orders from Meghna Simmons MD were completed.    Progress Note:  Patient identification verified by name and date of birth.  Assessment completed.  Vitals monitored throughout appointment and recorded in Doc Flowsheets.  See proceduralist note in ultrasound.    Date of consent or authorization: 11/29/2017.  Invasive Procedure Safety Checklist was completed and sent for scanning.     Paracentesis performed by CAROLYN Yu.    The following labs were communicated to provider performing paracentesis:  Lab Results   Component Value Date    PLT 81 02/14/2018       Total amount of ascites fluid drained: 1.5 liters.  Color of ascites fluid: yellow.  Total amount of albumin given: 25  grams.    Patient tolerated procedure well.    Post procedure,denies pain or discomfort post paracentesis.      Discharge Plan:  Discharge instructions were reviewed with patient.  Patient/Representative verbalized understanding and all questions were answered.   Discharged from [facility] in stable condition.    Shanel Champion RN    Administrations This Visit     albumin human 25 % injection 12.5 g     Admin Date Action Dose Route Administered By             02/28/2018 New Bag 25 g Intravenous Shanel Champion RN                    lidocaine 1 % 20 mL     Admin Date Action Dose Route Administered By             02/28/2018 Given by Other 20 mL Injection Shanel Champion RN                          /53  Pulse 80  Temp 98.3  F (36.8  C)  Wt (!) 154 kg (339 lb 6.4 oz)  SpO2 99%  BMI 44.78 kg/m2

## 2018-02-28 NOTE — PATIENT INSTRUCTIONS
Dear Lawrence Louie    Thank you for choosing [facility] ([facility]) for your procedure.  The following information is a summary of our appointment as well as important reminders.          We look forward in seeing you on your next appointment here at [facility].  Please don t hesitate to call us at [phone] to reschedule any of your appointments or to speak with one of the [facility] registered nurses.  It was a pleasure taking care of you today.    Sincerely,    [facility]  [facility]  [address] [place] [address]  [address]  Phone:  [phone] INSTRUCTIONS FOLLOWING ABDOMINAL PARACENTESIS    After you go home:    No strenuous activity for 24 hours    Resume your regular diet    Limit fluid intake for the first 48 hours to no more than 2 quarts per day.  There should be minimal drainage from the needle site.  If drainage does occur and soaks through the bandage, apply gentle pressure with your hand for 5 minutes.    Notify MD for the following:    Excessive drainage    Excessive swelling, redness or tenderness at the needle site    Fever greater than 101 degrees F    Dizziness or light-headedness when getting up or walking      IF THIS IS A MEDICAL EMERGENCY, CALL [phone]    If you have any questions or concerns    Contact the Hepatology Clinic:   [phone]    If you are a post-transplant patient, contact the Transplant Office:  [phone]    If this is after hours, contact the hospital :  [phone] and as to have the GI resident on call paged                   I have received and understand my discharge instructions and I have all of my personal belongings.          ------------------------------------------------------        ---------------------------------------------------    Patient / Significant Other's Signature     Relationship

## 2018-03-01 NOTE — TELEPHONE ENCOUNTER
PA Initiation-MAN FAX    Medication: desvenlafaxine succinate (PRISTIQ) 100 MG 24 hr tablet-Initiated  Insurance Company: [employer] - Phone [phone] Fax [phone]  Pharmacy Filling the Rx: [id number] - [place], [address]  Filling Pharmacy Phone: [phone]  Filling Pharmacy Fax:    Start Date: 3/1/2018    Filled out [employer] PA form and manually faxed in to insurance at [phone]

## 2018-03-02 NOTE — TELEPHONE ENCOUNTER
Prior Authorization Approval    Authorization Effective Date: 2/28/2018  Authorization Expiration Date: 3/1/2019  Medication: desvenlafaxine succinate (PRISTIQ) 100 MG 24 hr tablet-Initiated-Approved-  Approved Dose/Quantity:   Reference #:     Insurance Company: [employer] - Phone [phone] Fax [phone]  Expected CoPay: $6.00     CoPay Card Available:      Foundation Assistance Needed:    Which Pharmacy is filling the prescription (Not needed for infusion/clinic administered): [id number] - [address]  Pharmacy Notified: Yes  Patient Notified: Yes

## 2018-03-08 ENCOUNTER — RADIANT APPOINTMENT (OUTPATIENT)
Dept: ULTRASOUND IMAGING | Facility: CLINIC | Age: 63
End: 2018-03-08
Attending: INTERNAL MEDICINE
Payer: COMMERCIAL

## 2018-03-08 ENCOUNTER — OFFICE VISIT (OUTPATIENT)
Dept: INFUSION THERAPY | Facility: CLINIC | Age: 63
End: 2018-03-08
Attending: INTERNAL MEDICINE
Payer: COMMERCIAL

## 2018-03-08 VITALS
DIASTOLIC BLOOD PRESSURE: 49 MMHG | BODY MASS INDEX: 43.67 KG/M2 | RESPIRATION RATE: 18 BRPM | OXYGEN SATURATION: 97 % | WEIGHT: 315 LBS | TEMPERATURE: 97.7 F | SYSTOLIC BLOOD PRESSURE: 118 MMHG

## 2018-03-08 DIAGNOSIS — K70.31 ALCOHOLIC CIRRHOSIS OF LIVER WITH ASCITES (H): Primary | ICD-10-CM

## 2018-03-08 PROCEDURE — P9047 ALBUMIN (HUMAN), 25%, 50ML: HCPCS | Mod: ZF | Performed by: INTERNAL MEDICINE

## 2018-03-08 PROCEDURE — 27210190 US PARACENTESIS

## 2018-03-08 PROCEDURE — 40000141 ZZH STATISTIC PERIPHERAL IV START W/O US GUIDANCE: Mod: ZF

## 2018-03-08 PROCEDURE — 25000128 H RX IP 250 OP 636: Mod: ZF | Performed by: INTERNAL MEDICINE

## 2018-03-08 PROCEDURE — 25000125 ZZHC RX 250: Mod: ZF | Performed by: INTERNAL MEDICINE

## 2018-03-08 RX ORDER — ALBUMIN (HUMAN) 12.5 G/50ML
12.5 SOLUTION INTRAVENOUS 4 TIMES DAILY PRN
Status: CANCELLED
Start: 2018-03-08

## 2018-03-08 RX ORDER — ALBUMIN (HUMAN) 12.5 G/50ML
12.5 SOLUTION INTRAVENOUS 4 TIMES DAILY PRN
Status: DISCONTINUED | OUTPATIENT
Start: 2018-03-08 | End: 2018-03-08 | Stop reason: HOSPADM

## 2018-03-08 RX ADMIN — LIDOCAINE HYDROCHLORIDE 20 ML: 10 INJECTION, SOLUTION INFILTRATION; PERINEURAL at 13:06

## 2018-03-08 RX ADMIN — ALBUMIN HUMAN 50 G: 0.25 SOLUTION INTRAVENOUS at 13:07

## 2018-03-08 NOTE — PROGRESS NOTES
Paracentesis Nursing Note  Lawrence Louie presents today to [facility] for a [facility]acentesis.    During today's appointment orders from Meghna Simmons MD were completed.    Progress Note:  Patient identification verified by name and date of birth.  Assessment completed.  Vitals monitored throughout appointment and recorded in Doc Flowsheets.  See proceduralist note in ultrasound.    Date of consent or authorization: 3/8/2018.  Invasive Procedure Safety Checklist was completed and sent for scanning.     Paracentesis performed by Moy Potter PA-C Radiology.    The following labs were communicated to provider performing paracentesis:  Lab Results   Component Value Date    PLT 81 02/14/2018       Total amount of ascites fluid drained: 4.1 liters.  Color of ascites fluid: yellow.  Total amount of albumin given: 50  grams.    Patient tolerated procedure well.    Post procedure,denies pain or discomfort post paracentesis.      Discharge Plan:  Discharge instructions were reviewed with patient.  Patient/Representative verbalized understanding and all questions were answered.   Discharged from [facility] in stable condition.    Shanel Champion RN    Administrations This Visit     albumin human 25 % injection 12.5 g     Admin Date Action Dose Route Administered By             03/08/2018 New Bag 12.5 g Intravenous Shanel Champion RN                    lidocaine 1 % 20 mL     Admin Date Action Dose Route Administered By             03/08/2018 Given by Other 20 mL Injection Shanel Champion RN                          /51  Temp 97.7  F (36.5  C) (Oral)  Resp 18  Wt (!) 154 kg (339 lb 9.6 oz)  SpO2 97%  BMI 44.8 kg/m2

## 2018-03-08 NOTE — PATIENT INSTRUCTIONS
Dear Lawrence Louie    Thank you for choosing [facility] ([facility]) for your procedure.  The following information is a summary of our appointment as well as important reminders.          We look forward in seeing you on your next appointment here at [facility].  Please don t hesitate to call us at [phone] to reschedule any of your appointments or to speak with one of the [facility] registered nurses.  It was a pleasure taking care of you today.    Sincerely,    [facility]  [facility]  [address] [place] [address]  [address]  Phone:  [phone]      Discharge Instructions for Paracentesis  Paracentesis is a procedure to remove extra fluid from your belly (abdomen). This fluid buildup in the abdomen is called ascites. The procedure may have been done to take a sample of the fluid. Or, it may have been done to drain the extra fluid from your abdomen and help make you more comfortable.     Ascites is buildup of excess fluid in the abdomen.   Home care    If you have pain after the procedure, your healthcare provider can prescribe or recommend pain medicines. Take these exactly as directed. If you stopped taking other medicines before the procedure, ask your provider when you can start them again.    Take it easy for 24 hours after the procedure. Avoid physical activity until your provider says it s OK.    You will have a small bandage over the puncture site. Stitches (sutures), surgical staples, adhesive tapes, adhesive strips, or surgical glue may be used to close the incision. They also help stop bleeding and speed healing. You may take the bandage off in 24 hours.    Check the puncture site for the signs of infection listed below.  Follow-up care  Make a follow-up appointment with your healthcare provider as directed. During your follow-up visit, your provider will check your healing. Let your  provider know how you are feeling. You can also discuss the cause of your ascites and if you need any further treatment.  When to seek medical advice  Call your healthcare provider if you have any of the following after the procedure:    A fever of 100.4 F (38.0 C) or higher    Trouble breathing    Pain that doesn't go away even after taking pain medicine    Belly pain not caused by having the skin punctured    Bleeding from the puncture site    More than a small amount of fluid leaking from the puncture site    Swollen belly    Signs of infection at the puncture site. These include increased pain, redness, or swelling, warmth, or bad-smelling drainage.    Blood in your urine    Feeling dizzy or lightheaded, or fainting   Date Last Reviewed: 7/1/2016 2000-2017 [employer]. [address] [place] [address], [place] [address]. All rights reserved. This information is not intended as a substitute for professional medical care. Always follow your healthcare professional's instructions.

## 2018-03-08 NOTE — MR AVS SNAPSHOT
After Visit Summary   3/8/2018    Lawrence Louie    MRN: [id number]           Patient Information     Date Of Birth          1955        Visit Information        Provider Department      3/8/2018 12:00 PM Provider, Cristobal Spec Inf Para; UC 40 ATC [facility]        Today's Diagnoses     Alcoholic cirrhosis of liver with ascites (H)    -  1      Care Instructions    Dear Lawrence Louie    Thank you for choosing [facility] ([facility]) for your procedure.  The following information is a summary of our appointment as well as important reminders.          We look forward in seeing you on your next appointment here at [facility].  Please don t hesitate to call us at [phone] to reschedule any of your appointments or to speak with one of the [facility] registered nurses.  It was a pleasure taking care of you today.    Sincerely,    [facility]  [facility]  [address] [place] [address]  [address]  Phone:  [phone]      Discharge Instructions for Paracentesis  Paracentesis is a procedure to remove extra fluid from your belly (abdomen). This fluid buildup in the abdomen is called ascites. The procedure may have been done to take a sample of the fluid. Or, it may have been done to drain the extra fluid from your abdomen and help make you more comfortable.     Ascites is buildup of excess fluid in the abdomen.   Home care    If you have pain after the procedure, your healthcare provider can prescribe or recommend pain medicines. Take these exactly as directed. If you stopped taking other medicines before the procedure, ask your provider when you can start them again.    Take it easy for 24 hours after the procedure. Avoid physical activity until your provider says it s OK.    You will have a small bandage over the puncture site. Stitches  (sutures), surgical staples, adhesive tapes, adhesive strips, or surgical glue may be used to close the incision. They also help stop bleeding and speed healing. You may take the bandage off in 24 hours.    Check the puncture site for the signs of infection listed below.  Follow-up care  Make a follow-up appointment with your healthcare provider as directed. During your follow-up visit, your provider will check your healing. Let your provider know how you are feeling. You can also discuss the cause of your ascites and if you need any further treatment.  When to seek medical advice  Call your healthcare provider if you have any of the following after the procedure:    A fever of 100.4 F (38.0 C) or higher    Trouble breathing    Pain that doesn't go away even after taking pain medicine    Belly pain not caused by having the skin punctured    Bleeding from the puncture site    More than a small amount of fluid leaking from the puncture site    Swollen belly    Signs of infection at the puncture site. These include increased pain, redness, or swelling, warmth, or bad-smelling drainage.    Blood in your urine    Feeling dizzy or lightheaded, or fainting   Date Last Reviewed: 7/1/2016 2000-2017 The [employer]. [address] [place] [address]. All rights reserved. This information is not intended as a substitute for professional medical care. Always follow your healthcare professional's instructions.                Follow-ups after your visit        Your next 10 appointments already scheduled     Mar 12, 2018  3:00 PM CDT   (Arrive by 2:45 PM)   Return Visit with Marlen Pardo MD   [facility] ([facility])    [address] [place] [address] [phone]            Mar 14, 2018 12:00 PM CDT   Paracentesis Visit with  Spec Inf Para Provider,  [facility] Special[facility] Procedure ([facility]  [facility])    [address]  [address]  [facility] [address]   [phone]            Mar 21, 2018 12:00 PM CDT   Paracentesis Visit with Uc Spec Inf Para Provider, UC 40 ATC   [facility] Specialty and Procedure ([facility])    [address]  [address]  [facility] [address]   [phone]            Mar 30, 2018 12:00 PM CDT   Paracentesis Visit with Uc Spec Inf Para Provider, UC 40 ATC   [facility] Specialty and Procedure ([facility])    [address]  [address]  [facility] [address]   [phone]            Apr 05, 2018  2:00 PM CDT   (Arrive by 1:45 PM)   Return Visit with CORY Toussaint CNP   [facility] ([facility])    [address]  4th Floor  [facility] [address] [phone]            Apr 06, 2018 11:30 AM CDT   (Arrive by 11:15 AM)   Return General Liver with Meghna Simmons MD   [facility] ([facility])    [address]  [address]  [facility] [address]   [phone]            Apr 06, 2018 12:00 PM CDT   Paracentesis Visit with Uc Spec Inf Para Provider   [facility] Special[facility] Procedure ([facility])    [address]  [address]  [facility] [address] [phone]              Who to contact     If you have questions or need follow up information about today's clinic visit or your schedule please contact [facility] directly at [phone].  Normal or non-critical lab and imaging results will be communicated to you by MyChart, letter or phone within 4 business days after the clinic has received the results. If you do not hear from us within 7 days, please contact the clinic through MyChart or phone. If you have a critical or  abnormal lab result, we will notify you by phone as soon as possible.  Submit refill requests through [facility] or call your pharmacy and they will forward the refill request to us. Please allow 3 business days for your refill to be completed.          Additional Information About Your Visit        [facility] Information     [facility] gives you secure access to your electronic health record. If you see a primary care provider, you can also send messages to your care team and make appointments. If you have questions, please call your primary care clinic.  If you do not have a primary care provider, please call [phone] and they will assist you.        Care EveryWhere ID     This is your Care EveryWhere ID. This could be used by other organizations to access your [facility] medical records  [id number]        Your Vitals Were     Temperature Respirations Pulse Oximetry BMI (Body Mass Index)          97.7  F (36.5  C) (Oral) 18 97% 44.8 kg/m2         Blood Pressure from Last 3 Encounters:   03/08/18 118/49   02/28/18 126/52   02/14/18 115/46    Weight from Last 3 Encounters:   03/08/18 (!) 154 kg (339 lb 9.6 oz)   02/28/18 (!) 152.5 kg (336 lb 1.6 oz)   02/14/18 (!) 142.7 kg (314 lb 9.6 oz)              We Performed the Following     US Paracentesis        Primary Care Provider Office Phone # Fax #    Ary Murphy, APRN [phone]       [address] [place] [address] 741  [facility] [address]        Equal Access to Services     JOSEF LORENZO AH: Hadii aad ku hadasho Soomaali, waaxda luqadaha, qaybta kaalmada adeegyada, silvia salas. So [facility] [phone].    ATENCIÓN: Si habla español, tiene a rondon disposición servicios gratuitos de asistencia lingüística. Llame al [phone].    We comply with applicable federal civil rights laws and [place] laws. We do not discriminate on the basis of race, color, national origin, age, disability, sex, sexual orientation, or gender identity.             Thank you!     Thank you for choosing [facility]ED[facility]  for your care. Our goal is always to provide you with excellent care. Hearing back from our patients is one way we can continue to improve our services. Please take a few minutes to complete the written survey that you may receive in the mail after your visit with us. Thank you!             Your Updated Medication List - Protect others around you: Learn how to safely use, store and throw away your medicines at www.disposemymeds.org.          This list is accurate as of 3/8/18  1:27 PM.  Always use your most recent med list.                   Brand Name Dispense Instructions for use Diagnosis    aspirin 81 MG tablet     30 tablet    Take 1 tablet (81 mg) by mouth daily    Type 2 diabetes mellitus with other skin complications       blood glucose lancets standard    no brand specified    1 Box    Use to test blood sugar 4 X  times daily or as directed.    Diabetes mellitus, type 2 (H)       blood glucose monitoring meter device kit    no brand specified    1 kit    Use to test blood sugar 4 X  times daily or as directed.    Type 2 diabetes mellitus with other specified complication (H)       blood glucose monitoring test strip    no brand specified    200 strip    Use to test blood sugars 4 X  times daily or as directed    Diabetes mellitus, type 2 (H)       calcium carbonate 1250 MG tablet    OS-ERIN 500 mg [place]. Ca     Take 1 tablet by mouth daily        cephALEXin 500 MG capsule    KEFLEX    40 capsule    Take 1 capsule (500 mg) by mouth 4 times daily    Cellulitis and abscess of leg       desvenlafaxine succinate 100 MG 24 hr tablet    PRISTIQ    180 tablet    Take 2 tablets (200 mg) by mouth daily    Other depression       FUROSEMIDE PO      Take 60 mg by mouth 2 times daily        * gabapentin 300 MG capsule    NEURONTIN    60 capsule    Take 1 capsule in the morning for 3 days, then 1 capsule twice a day for 3  days and let me know to what degree this impacts the pain.    Pain in both feet       * gabapentin 300 MG capsule    NEURONTIN    90 capsule    Take 1 capsule (300 mg) by mouth 3 times daily    Diabetic polyneuropathy associated with type 2 diabetes mellitus (H)       glipiZIDE 5 MG 24 hr tablet    glipiZIDE XL    180 tablet    Take 1 tablet (5 mg) by mouth 2 times daily    Type 2 diabetes mellitus without complication, with long-term current use of insulin (H)       insulin aspart 100 UNIT/ML injection    NovoLOG FLEXPEN    30 mL    20 units before breakfast, 20 units before lunch, 20 units before dinner, 20 units before snacks    Type 2 diabetes mellitus without complication, with long-term current use of insulin (H)       insulin glargine 100 UNIT/ML injection    LANTUS SOLOSTAR    30 mL    Inject 40 Units Subcutaneous every morning    Type 2 diabetes mellitus with other oral complication, unspecified long term insulin use status (H)       * insulin pen needle 31G X 8 MM    B-D U/F    300 each    USE  6 times daily / OR AS DIRECTED    Type 2 diabetes, HbA1c goal < 7% (H)       * insulin pen needle 31G X 8 MM    B-D U/F    600 each    Use 6 times daily or as directed    Type 2 diabetes, HbA1c goal < 7% (H)       lactulose 10 GM/15ML solution    CHRONULAC    2000 mL    Take 30 mLs (20 g) by mouth 3 times daily Adjust dosing as needed to achieve 3-4 bowel movements daily.    Hepatic encephalopathy (H)       magnesium oxide 400 (241.3 MG) MG tablet    MAG-OX    90 tablet    Take 1 tablet (400 mg) by mouth daily    Cramp of limb       ondansetron 4 MG tablet    ZOFRAN    18 tablet    Take 1 tablet (4 mg) by mouth every 8 hours as needed for nausea    Alcoholic cirrhosis (H), Nausea       oxyCODONE IR 5 MG tablet    ROXICODONE    60 tablet    Take 1 tablet (5 mg) by mouth every 6 hours as needed for moderate to severe pain maximum 6 tablet(s) per day    Alcoholic cirrhosis of liver with ascites (H)       ranitidine 150  MG tablet    ZANTAC    180 tablet    Take 1 tablet (150 mg) by mouth 2 times daily    Esophageal varices (H)       rifaximin 550 MG Tabs tablet    XIFAXAN    60 tablet    Take 1 tablet (550 mg) by mouth 2 times daily    Hepatic encephalopathy (H)       spironolactone 50 MG tablet    ALDACTONE    540 tablet    1 tablet (50 mg) 2 times daily Takes 3 tablets in am.and p.m.    Portal hypertension (H), Generalized edema       STATIN NOT PRESCRIBED (INTENTIONAL)     0 each    1 each daily Statin not prescribed intentionally due to Active liver disease    Hyperlipidemia LDL goal <100       VITAMIN B-12 PO      Take 1 tablet by mouth daily        vitamin D3 2000 UNITS Caps     90 capsule    Take 1 capsule by mouth daily    Mild major depression (H)       * Notice:  This list has 4 medication(s) that are the same as other medications prescribed for you. Read the directions carefully, and ask your doctor or other care provider to review them with you.

## 2018-03-09 ENCOUNTER — DOCUMENTATION ONLY (OUTPATIENT)
Dept: CARE COORDINATION | Facility: CLINIC | Age: 63
End: 2018-03-09

## 2018-03-09 ENCOUNTER — TELEPHONE (OUTPATIENT)
Dept: INTERVENTIONAL RADIOLOGY/VASCULAR | Facility: CLINIC | Age: 63
End: 2018-03-09

## 2018-03-09 NOTE — TELEPHONE ENCOUNTER
I called and left a voicemail including my call back number.  Pt missed his 3 month f/up visit 1/31/18 post TIPS placement with Dr. Davis.  CARLO Burrell RN, BSN  Interventional Radiology Care Coordinator   Phone:  [phone]

## 2018-03-12 ENCOUNTER — OFFICE VISIT (OUTPATIENT)
Dept: INTERNAL MEDICINE | Facility: CLINIC | Age: 63
End: 2018-03-12
Payer: COMMERCIAL

## 2018-03-12 VITALS
SYSTOLIC BLOOD PRESSURE: 115 MMHG | DIASTOLIC BLOOD PRESSURE: 56 MMHG | BODY MASS INDEX: 44.15 KG/M2 | OXYGEN SATURATION: 100 % | HEART RATE: 88 BPM | WEIGHT: 315 LBS | TEMPERATURE: 98.4 F

## 2018-03-12 DIAGNOSIS — R01.1 HEART MURMUR: ICD-10-CM

## 2018-03-12 DIAGNOSIS — E66.01 MORBID OBESITY (H): ICD-10-CM

## 2018-03-12 DIAGNOSIS — R21 RASH AND NONSPECIFIC SKIN ERUPTION: Primary | ICD-10-CM

## 2018-03-12 DIAGNOSIS — I89.0 LYMPHEDEMA: ICD-10-CM

## 2018-03-12 DIAGNOSIS — K70.31 ALCOHOLIC CIRRHOSIS OF LIVER WITH ASCITES (H): ICD-10-CM

## 2018-03-12 DIAGNOSIS — L98.9 SKIN LESION: ICD-10-CM

## 2018-03-12 ASSESSMENT — PAIN SCALES - GENERAL: PAINLEVEL: EXTREME PAIN (8)

## 2018-03-12 NOTE — PROGRESS NOTES
[facility]  Primary Care Center   Marlen Pardo MD  03/12/2018      Chief Complaint:   No chief complaint on file.       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    Patient would like to see MD per his wife    Other concerns discussed:  ***     Review of Systems:   Pertinent items are noted in HPI, remainder of complete ROS is negative.      Active Medications:      oxyCODONE IR (ROXICODONE) 5 MG tablet, Take 1 tablet (5 mg) by mouth every 6 hours as needed for moderate to severe pain maximum 6 tablet(s) per day, Disp: 60 tablet, Rfl: 0     gabapentin (NEURONTIN) 300 MG capsule, Take 1 capsule (300 mg) by mouth 3 times daily, Disp: 90 capsule, Rfl: 3     ranitidine (ZANTAC) 150 MG tablet, Take 1 tablet (150 mg) by mouth 2 times daily, Disp: 180 tablet, Rfl: 3     desvenlafaxine succinate (PRISTIQ) 100 MG 24 hr tablet, Take 2 tablets (200 mg) by mouth daily, Disp: 180 tablet, Rfl: 0     FUROSEMIDE PO, Take 60 mg by mouth 2 times daily , Disp: , Rfl:      calcium carbonate (OS-ERIN 500 MG [place]) 1250 MG tablet, Take 1 tablet by mouth daily , Disp: , Rfl:      rifaximin (XIFAXAN) 550 MG TABS tablet, Take 1 tablet (550 mg) by mouth 2 times daily, Disp: 60 tablet, Rfl: 11     lactulose (CHRONULAC) 10 GM/15ML solution, Take 30 mLs (20 g) by mouth 3 times daily Adjust dosing as needed to achieve 3-4 bowel movements daily., Disp: 2000 mL, Rfl: 11     insulin aspart (NOVOLOG FLEXPEN) 100 UNIT/ML injection, 20 units before breakfast, 20 units before lunch, 20 units before dinner, 20 units before snacks, Disp: 30 mL, Rfl: 3     insulin glargine (LANTUS SOLOSTAR) 100 UNIT/ML injection, Inject 40 Units Subcutaneous every morning, Disp: 30 mL, Rfl: 1     glipiZIDE (GLIPIZIDE XL) 5 MG 24 hr tablet, Take 1 tablet (5 mg) by mouth 2 times daily, Disp: 180  tablet, Rfl: 1     spironolactone (ALDACTONE) 50 MG tablet, 1 tablet (50 mg) 2 times daily Takes 3 tablets in am.and p.m., Disp: 540 tablet, Rfl: 6     aspirin 81 MG tablet, Take 1 tablet (81 mg) by mouth daily, Disp: 30 tablet, Rfl: 11     magnesium oxide (MAG-OX) 400 (241.3 MG) MG tablet, Take 1 tablet (400 mg) by mouth daily, Disp: 90 tablet, Rfl: 3     Cholecalciferol (VITAMIN D3) 2000 UNITS CAPS, Take 1 capsule by mouth daily, Disp: 90 capsule, Rfl: 3     Cyanocobalamin (VITAMIN B-12 PO), Take 1 tablet by mouth daily, Disp: , Rfl:      STATIN NOT PRESCRIBED, INTENTIONAL, 1 each daily Statin not prescribed intentionally due to Active liver disease, Disp: 0 each, Rfl: 0     ondansetron (ZOFRAN) 4 MG tablet, Take 1 tablet (4 mg) by mouth every 8 hours as needed for nausea, Disp: 18 tablet, Rfl: 1      Allergies:   Review of patient's allergies indicates no known allergies.      Past Medical History:  Mild major depression   Hyperglycemia  Thrombocytopenia   Hypertension goal BP (blood pressure) < 130/80  Plantar warts  Corns and callosities  Type 2 diabetes, HbA1c goal < 7%   Portal hypertension   Alcoholic cirrhosis   Advanced directives, counseling/discussion  Ascites  Esophageal varices   Multiple rib fractures  Tobacco use disorder  Dental caries  Prurigo nodularis  Esophageal reflux  Morbid obesity   History of colonic polyps: tubular adenomas and serrated adenomas  Hepatic encephalopathy   Lymphedema of both lower extremities   Elevated LFTs   Hernia, umbilical   Hypertension   Kidney stones   Leukopenia   Liver cirrhosis secondary to SMITH    Recovering alcoholic in remission   Splenomegaly   Squamous cell carcinoma      Past Surgical History:  Biopsy of skin lesion  Colonoscopy  EGD combined x3  Excise lesion trunk  Vasectomy  Herniorrhaphy, umbilical    Family History:   Breast cancer - Mother  Liver cancer - Mother  Cerebrovascular disease  - Father  Rectal cancer - Father  Diabetes - Brother  Skin cancer  - Sister      Social History:  The patient was accompanied to the appointment by ***.  Smoking Status: Current every day smoker, 0.3 PPD   Smokeless Tobacco: Never  Alcohol Use: None since December 2012  Marital Status:   Primary Care Provider: CORY De Jesus CNP     Physical Exam:   There were no vitals taken for this visit.   ***      Assessment and Plan:  There are no diagnoses linked to this encounter.         Follow-up: Data Unavailable         Scribe Disclosure:   I, Hui Vega, am serving as a scribe to document services personally performed by Marlen Pardo MD at this visit, based upon the provider's statements to me. All documentation has been reviewed by the aforementioned provider prior to being entered into the official medical record.     Portions of this medical record were completed by a scribe. UPON MY REVIEW AND AUTHENTICATION BY ELECTRONIC SIGNATURE, this confirms (a) I performed the applicable clinical services, and (b) the record is accurate.

## 2018-03-12 NOTE — MR AVS SNAPSHOT
After Visit Summary   3/12/2018    Lawrence Louie    MRN: [id number]           Patient Information     Date Of Birth          1955        Visit Information        Provider Department      3/12/2018 3:00 PM Marlen Pardo MD [facility]        Today's Diagnoses     Rash and nonspecific skin eruption    -  1    Lymphedema        Skin lesion        Heart murmur        Alcoholic cirrhosis of liver with ascites (H)        Morbid obesity (H)           Follow-ups after your visit        Additional Services     DERMATOLOGY REFERRAL       Your provider has referred you to: [facility]: Dermatology Clinic [facility] [phone]   [email or web address]    Please be aware that coverage of these services is subject to the terms and limitations of your health insurance plan.  Call member services at your health plan with any benefit or coverage questions.      Please bring the following with you to your appointment:    (1) Any X-Rays, CTs or MRIs which have been performed.  Contact the facility where they were done to arrange for  prior to your scheduled appointment.    (2) List of current medications  (3) This referral request   (4) Any documents/labs given to you for this referral                  Follow-up notes from your care team     Return in about 3 months (around 6/12/2018) for Ary Perry.      Your next 10 appointments already scheduled     Mar 14, 2018 12:00 PM CDT   Paracentesis Visit with  Spec Inf Para Provider,  40 ATC   [facility] ([facility])    [address] [place] [address]  [address] [place] [address] [phone]            Mar 21, 2018 12:00 PM CDT   Paracentesis Visit with  Spec Inf Para Provider,  40 ATC   [facility] ([facility])    [address] [place] [address]  [address]  [facility] [address] [phone]            Mar 21, 2018  3:00 PM CDT   Ech Complete with [id number]   [facility] ([facility])    [address]  3rd Floor  [facility] [address] [phone]           1. Please bring or wear a comfortable two-piece outfit. 2. You may eat, drink and take your normal medicines. 3. For any questions that cannot be answered, please contact the ordering physician            Mar 30, 2018 12:00 PM CDT   Paracentesis Visit with  Spec Inf Para Provider, UC 40 ATC   [facility] ([facility])    [address]  [address]  [facility] [address] [phone]            Apr 06, 2018 11:30 AM CDT   (Arrive by 11:15 AM)   Return General Liver with Meghna Simmons MD   [facility] ([facility])    [address]  [address]  [facility] [id number] [phone]            Apr 06, 2018 12:00 PM CDT   Paracentesis Visit with  Spec Inf Para Provider, UC 40 ATC   [facility] ([facility])    [address]  [address]  [facility] [address] [phone]              Future tests that were ordered for you today     Open Future Orders        Priority Expected Expires Ordered    Echocardiogram Routine  3/12/2019 3/12/2018            Who to contact     Please call your clinic at [phone] to:    Ask questions about your health    Make or cancel appointments    Discuss your medicines    Learn about your test results    Speak to your doctor            Additional Information About Your Visit        MyChart Information     [employer] gives you secure access to your electronic health record. If you see a primary care provider, you can also send messages to your care team and make appointments. If you have questions, please call your primary  care clinic.  If you do not have a primary care provider, please call [phone] and they will assist you.      [employer] is an electronic gateway that provides easy, online access to your medical records. With [employer], you can request a clinic appointment, read your test results, renew a prescription or communicate with your care team.     To access your existing account, please contact your [facility] or call [phone] for assistance.        Care EveryWhere ID     This is your Care EveryWhere ID. This could be used by other organizations to access your [facility] medical records  [id number]        Your Vitals Were     Pulse Temperature Pulse Oximetry BMI (Body Mass Index)          88 98.4  F (36.9  C) (Oral) 100% 44.15 kg/m2         Blood Pressure from Last 3 Encounters:   03/12/18 115/56   03/08/18 118/49   02/28/18 126/52    Weight from Last 3 Encounters:   03/12/18 (!) 151.8 kg (334 lb 9.6 oz)   03/08/18 (!) 150.1 kg (331 lb)   02/28/18 (!) 152.5 kg (336 lb 1.6 oz)              We Performed the Following     DERMATOLOGY REFERRAL          Today's Medication Changes          These changes are accurate as of 3/12/18  5:39 PM.  If you have any questions, ask your nurse or doctor.               These medicines have changed or have updated prescriptions.        Dose/Directions    * gabapentin 300 MG capsule   Commonly known as:  NEURONTIN   This may have changed:  Another medication with the same name was changed. Make sure you understand how and when to take each.   Used for:  Pain in both feet        Take 1 capsule in the morning for 3 days, then 1 capsule twice a day for 3 days and let me know to what degree this impacts the pain.   Quantity:  60 capsule   Refills:  1       * gabapentin 300 MG capsule   Commonly known as:  NEURONTIN   This may have changed:  additional instructions   Used for:  Diabetic polyneuropathy associated with type 2 diabetes mellitus (H)        Dose:  300  mg   Take 1 capsule (300 mg) by mouth 3 times daily   Quantity:  90 capsule   Refills:  3       * Notice:  This list has 2 medication(s) that are the same as other medications prescribed for you. Read the directions carefully, and ask your doctor or other care provider to review them with you.      Stop taking these medicines if you haven't already. Please contact your care team if you have questions.     cephALEXin 500 MG capsule   Commonly known as:  KEFLEX   Stopped by:  Marlen Pardo MD                    Primary Care Provider Office Phone # Fax #    Ary Murphy, APRN [phone]       [address]  [facility] [address]        Equal Access to Services     MARY LOU [facility] Hospital[facility]HEMY : Jose Eduardo Del Toro, waalbertina borden, qaarmen gutierrezmadeysi cohen, silvia richardson . So [facility] [phone].    ATENCIÓN: Si habla español, tiene a rondon disposición servicios gratuitos de asistencia lingüística. Llame al [phone].    We comply with applicable federal civil rights laws and [place] laws. We do not discriminate on the basis of race, color, national origin, age, disability, sex, sexual orientation, or gender identity.            Thank you!     Thank you for choosing [facility]  for your care. Our goal is always to provide you with excellent care. Hearing back from our patients is one way we can continue to improve our services. Please take a few minutes to complete the written survey that you may receive in the mail after your visit with us. Thank you!             Your Updated Medication List - Protect others around you: Learn how to safely use, store and throw away your medicines at www.disposemymeds.org.          This list is accurate as of 3/12/18  5:39 PM.  Always use your most recent med list.                   Brand Name Dispense Instructions for use Diagnosis    aspirin 81 MG tablet     30 tablet    Take 1 tablet (81 mg) by mouth daily     Type 2 diabetes mellitus with other skin complications       blood glucose lancets standard    no brand specified    1 Box    Use to test blood sugar 4 X  times daily or as directed.    Diabetes mellitus, type 2 (H)       blood glucose monitoring meter device kit    no brand specified    1 kit    Use to test blood sugar 4 X  times daily or as directed.    Type 2 diabetes mellitus with other specified complication (H)       blood glucose monitoring test strip    no brand specified    200 strip    Use to test blood sugars 4 X  times daily or as directed    Diabetes mellitus, type 2 (H)       calcium carbonate 1250 MG tablet    OS-ERIN 500 mg [place]     Take 1 tablet by mouth daily        desvenlafaxine succinate 100 MG 24 hr tablet    PRISTIQ    180 tablet    Take 2 tablets (200 mg) by mouth daily    Other depression       FUROSEMIDE PO      Take 60 mg by mouth 2 times daily        * gabapentin 300 MG capsule    NEURONTIN    60 capsule    Take 1 capsule in the morning for 3 days, then 1 capsule twice a day for 3 days and let me know to what degree this impacts the pain.    Pain in both feet       * gabapentin 300 MG capsule    NEURONTIN    90 capsule    Take 1 capsule (300 mg) by mouth 3 times daily    Diabetic polyneuropathy associated with type 2 diabetes mellitus (H)       glipiZIDE 5 MG 24 hr tablet    glipiZIDE XL    180 tablet    Take 1 tablet (5 mg) by mouth 2 times daily    Type 2 diabetes mellitus without complication, with long-term current use of insulin (H)       insulin aspart 100 UNIT/ML injection    NovoLOG FLEXPEN    30 mL    20 units before breakfast, 20 units before lunch, 20 units before dinner, 20 units before snacks    Type 2 diabetes mellitus without complication, with long-term current use of insulin (H)       insulin glargine 100 UNIT/ML injection    LANTUS SOLOSTAR    30 mL    Inject 40 Units Subcutaneous every morning    Type 2 diabetes mellitus with other oral complication, unspecified  long term insulin use status (H)       * insulin pen needle 31G X 8 MM    B-D U/F    300 each    USE  6 times daily / OR AS DIRECTED    Type 2 diabetes, HbA1c goal < 7% (H)       * insulin pen needle 31G X 8 MM    B-D U/F    600 each    Use 6 times daily or as directed    Type 2 diabetes, HbA1c goal < 7% (H)       lactulose 10 GM/15ML solution    CHRONULAC    2000 mL    Take 30 mLs (20 g) by mouth 3 times daily Adjust dosing as needed to achieve 3-4 bowel movements daily.    Hepatic encephalopathy (H)       magnesium oxide 400 (241.3 MG) MG tablet    MAG-OX    90 tablet    Take 1 tablet (400 mg) by mouth daily    Cramp of limb       ondansetron 4 MG tablet    ZOFRAN    18 tablet    Take 1 tablet (4 mg) by mouth every 8 hours as needed for nausea    Alcoholic cirrhosis (H), Nausea       oxyCODONE IR 5 MG tablet    ROXICODONE    60 tablet    Take 1 tablet (5 mg) by mouth every 6 hours as needed for moderate to severe pain maximum 6 tablet(s) per day    Alcoholic cirrhosis of liver with ascites (H)       ranitidine 150 MG tablet    ZANTAC    180 tablet    Take 1 tablet (150 mg) by mouth 2 times daily    Esophageal varices (H)       rifaximin 550 MG Tabs tablet    XIFAXAN    60 tablet    Take 1 tablet (550 mg) by mouth 2 times daily    Hepatic encephalopathy (H)       spironolactone 50 MG tablet    ALDACTONE    540 tablet    1 tablet (50 mg) 2 times daily Takes 3 tablets in am.and p.m.    Portal hypertension (H), Generalized edema       STATIN NOT PRESCRIBED (INTENTIONAL)     0 each    1 each daily Statin not prescribed intentionally due to Active liver disease    Hyperlipidemia LDL goal <100       VITAMIN B-12 PO      Take 1 tablet by mouth daily        vitamin D3 2000 UNITS Caps     90 capsule    Take 1 capsule by mouth daily    Mild major depression (H)       * Notice:  This list has 4 medication(s) that are the same as other medications prescribed for you. Read the directions carefully, and ask your doctor or other  care provider to review them with you.

## 2018-03-12 NOTE — PROGRESS NOTES
"CC: Leg swelling/sores & heel pain.     Subjective:  Lawrence Louie is a 62 year old male w/ a complex hx including morbid obesity, liver cirrhosis s/p TIPS (10/2017), esophogeal varices, ascites, hepatic encephalopathy, HLD, tobacco use, DM II, MDD, thombocytopenia p/w severe leg edema w/ wheeping lesions and left heel pain.     Leg swelling/sores: He reports that the swelling in his legs have been present for approximately 5 yrs, and he receives paracentesis from his abdomen periodically. He has a regular ambulatory  Nurse visit his home twice weekly and a specialized [facility] nurse three times weekly. He also reports intermittent SOB, poor balance, falls x2, wheeping fliud from lesions on his legs that adhere to the bandages and when the bandages are removed, they rip his skin off. He has intermittent dysgeusia, and decreased optimism about his overall condition. He denies it being warm to touch, F/ch, CP, ASA use anymore.     Heel pain: the patient has been experiencing heel pain for approximately 5 months. He reports that the pain feels like \"a bee is stinging it\". Sliding it across the floor exacerbates the pain. It does not radiate anywhere and he ranks the severity from 0-10/10.    Chief Complaint   Patient presents with     Derm Problem     Sores on legs, lymphedema.        Current Outpatient Prescriptions   Medication Sig Dispense Refill     oxyCODONE IR (ROXICODONE) 5 MG tablet Take 1 tablet (5 mg) by mouth every 6 hours as needed for moderate to severe pain maximum 6 tablet(s) per day 60 tablet 0     gabapentin (NEURONTIN) 300 MG capsule Take 1 capsule (300 mg) by mouth 3 times daily (Patient taking differently: Take 300 mg by mouth 3 times daily Currently taking one capsule at bedtime.) 90 capsule 3     ranitidine (ZANTAC) 150 MG tablet Take 1 tablet (150 mg) by mouth 2 times daily 180 tablet 3     desvenlafaxine succinate (PRISTIQ) 100 MG 24 hr tablet Take 2 tablets (200 mg) by mouth daily 180 " tablet 0     FUROSEMIDE PO Take 60 mg by mouth 2 times daily        calcium carbonate (OS-ERIN 500 MG [place]. [place]) 1250 MG tablet Take 1 tablet by mouth daily        rifaximin (XIFAXAN) 550 MG TABS tablet Take 1 tablet (550 mg) by mouth 2 times daily 60 tablet 11     lactulose (CHRONULAC) 10 GM/15ML solution Take 30 mLs (20 g) by mouth 3 times daily Adjust dosing as needed to achieve 3-4 bowel movements daily. 2000 mL 11     insulin aspart (NOVOLOG FLEXPEN) 100 UNIT/ML injection 20 units before breakfast, 20 units before lunch, 20 units before dinner, 20 units before snacks 30 mL 3     insulin glargine (LANTUS SOLOSTAR) 100 UNIT/ML injection Inject 40 Units Subcutaneous every morning 30 mL 1     glipiZIDE (GLIPIZIDE XL) 5 MG 24 hr tablet Take 1 tablet (5 mg) by mouth 2 times daily 180 tablet 1     spironolactone (ALDACTONE) 50 MG tablet 1 tablet (50 mg) 2 times daily Takes 3 tablets in am.and p.m. 540 tablet 6     insulin pen needle (B-D U/F) 31G X 8 MM Use 6 times daily or as directed 600 each 1     blood glucose monitoring (NO BRAND SPECIFIED) meter device kit Use to test blood sugar 4 X  times daily or as directed. 1 kit 0     aspirin 81 MG tablet Take 1 tablet (81 mg) by mouth daily 30 tablet 11     magnesium oxide (MAG-OX) 400 (241.3 MG) MG tablet Take 1 tablet (400 mg) by mouth daily 90 tablet 3     Cholecalciferol (VITAMIN D3) 2000 UNITS CAPS Take 1 capsule by mouth daily 90 capsule 3     insulin pen needle (B-D U/F) 31G X 8 MM USE  6 times daily / OR AS DIRECTED 300 each 3     blood glucose monitoring (NO BRAND SPECIFIED) test strip Use to test blood sugars 4 X  times daily or as directed 200 strip 3     blood glucose (NO BRAND SPECIFIED) lancets standard Use to test blood sugar 4 X  times daily or as directed. 1 Box 3     Cyanocobalamin (VITAMIN B-12 PO) Take 1 tablet by mouth daily       STATIN NOT PRESCRIBED, INTENTIONAL, 1 each daily Statin not prescribed intentionally due to Active liver disease 0 each 0      ondansetron (ZOFRAN) 4 MG tablet Take 1 tablet (4 mg) by mouth every 8 hours as needed for nausea 18 tablet 1     gabapentin (NEURONTIN) 300 MG capsule Take 1 capsule in the morning for 3 days, then 1 capsule twice a day for 3 days and let me know to what degree this impacts the pain. (Patient not taking: Reported on 3/12/2018) 60 capsule 1     No Known Allergies  Patient Active Problem List   Diagnosis     Mild major depression (H)     Hyperglycemia     Thrombocytopenia (H)     Hypertension goal BP (blood pressure) < 130/80     Plantar warts     Corns and callosities     Family history of colon cancer     Type 2 diabetes, HbA1c goal < 7% (H)     Portal hypertension (H)     Alcoholic cirrhosis (H)     Advanced directives, counseling/discussion     Ascites     Esophageal varices (H)     Multiple rib fractures     Tobacco use disorder     Hyperlipidemia LDL goal <100     Dental caries     Prurigo nodularis     Esophageal reflux     Morbid obesity (H)     History of colonic polyps: tubular adenomas and serrated adenomas     Type II diabetes mellitus with peripheral circulatory disorder (H)     Alcoholic cirrhosis of liver with ascites (H)     Hepatic encephalopathy (H)     Lymphedema of both lower extremities     Past Surgical History:   Procedure Laterality Date     BIOPSY OF SKIN LESION       COLONOSCOPY Left 6/16/2016    Procedure: COMBINED COLONOSCOPY, SINGLE OR MULTIPLE BIOPSY/POLYPECTOMY BY BIOPSY;  Surgeon: Brandy Barnett MD;  Location:  GI     ESOPHAGOSCOPY, GASTROSCOPY, DUODENOSCOPY (EGD), COMBINED  2/13/2013    Procedure: COMBINED ESOPHAGOSCOPY, GASTROSCOPY, DUODENOSCOPY (EGD);;  Surgeon: Tara Cook MD;  Location:  GI     ESOPHAGOSCOPY, GASTROSCOPY, DUODENOSCOPY (EGD), COMBINED  11/4/2013    Procedure: COMBINED ESOPHAGOSCOPY, GASTROSCOPY, DUODENOSCOPY (EGD);;  Surgeon: Lonny Diaz MD;  Location:  GI     ESOPHAGOSCOPY, GASTROSCOPY, DUODENOSCOPY (EGD), COMBINED Left  "6/16/2016    Procedure: COMBINED ESOPHAGOSCOPY, GASTROSCOPY, DUODENOSCOPY (EGD), BIOPSY SINGLE OR MULTIPLE;  Surgeon: Brandy Barnett MD;  Location:  GI     EXCISE LESION TRUNK  9/24/2012    Procedure: EXCISE LESION TRUNK;;  Surgeon: Pepe Dominguez MD;  Location: [facility]     GENITOURINARY SURGERY      vasectomy     HERNIORRHAPHY UMBILICAL  9/24/2012    Procedure: HERNIORRHAPHY UMBILICAL;  UMBILICAL HERNIA REPAIR , EXCISION OF PERIUMBILICAL CYST;  Surgeon: Pepe Dominguez MD;  Location: [facility]       Review Of Systems  Skin: negative, positive swelling, and lesions forming from swelling.   Eyes: negative  Ears/Nose/Throat: negative  Respiratory: intermittent SOB  Cardiovascular: negative  Musculoskeletal: Poor movement due to lymphedema  Neurologic: negative  Psychiatric: reported, \"I don't want to do this anymore  Hematologic/Lymphatic/Immunologic: Lymphedema  Endocrine: negative    Medical, surgical, social, and family histories, medications and allergies reviewed and updated.    Objective:  /56  Pulse 88  Temp 98.4  F (36.9  C) (Oral)  Wt (!) 151.8 kg (334 lb 9.6 oz)  SpO2 100%  BMI 44.15 kg/m2  Wt Readings from Last 5 Encounters:   03/12/18 (!) 151.8 kg (334 lb 9.6 oz)   03/08/18 (!) 150.1 kg (331 lb)   02/28/18 (!) 152.5 kg (336 lb 1.6 oz)   02/14/18 (!) 142.7 kg (314 lb 9.6 oz)   01/05/18 (!) 156.9 kg (345 lb 12.8 oz)     Exam:  Constitutional: AOx3, NAD  Head: Normocephalic. No masses, lesions, tenderness or abnormalities  ENT: ENT exam normal, no neck nodes or sinus tenderness  Cardiovascular: RRR II/VI systolic murmur, No gallups, or rubs or extra heart sounds. Normal S1/diminshed S2  Respiratory: negative, Percussion normal. Good diaphragmatic excursion. Lungs clear  Gastrointestinal: Severely distended, pants very tight. Non-tender to palpation.   : Deferred  Musculoskeletal: extremities normal- no gross deformities noted, gait normal and normal muscle tone  Skin:  Lower extrem: " "Swollen, and edematous extremities. Hair follicles pores easily identified. Diffusely erythematous, and scaling. Multiple enlarged reducible nodules. Multiple wheeping lesions with yellow crusting.   Ulcer through the dermis right anterior LE, measures 2.5 x 1.75 cm, does not appear infected.  Suspicious lesion right anterior foot, nodular, dark gray toned, irregular surface r/o SCC  Psychiatric: Patient said, \"I don't want to do this anymore\" in reference to his life. Pleasant affect, appropriate responses. Confused about medication regimen. Feels guilty about diagnosis and burden on family, decreased energy, increased psychomotor agitation. Denies Sleep problems, loss of interest, concentration problems, suicidality.   Hematologic/Lymphatic/Immunologic: Normal cervical lymph nodes Noted above in skin.     ASSESSMENT / PLAN:  Patient is a 61 yo M w/ hx of morbid obesity, alcoholism, cirrhotic liver s/p TIPS 10/17, esophogeal varices, ascites, hepatic encephalopathy, HLD, tobacco use, DM II, thrombocytopenia p/w concerns for lower extremity edema, wheeping ulcerations/excorations, heel pain, and newfound heart murmur.     Lower Extremity edema  Most likely 2/2 to liver decompensation. Even thtough the patient has received a TIPS procedure and it would be expected to have an encephalopathy over fluid overload, this is the current manifestation. The patient also has a newly found murmur, but would less likely precipitate in this fashion without SOB, lung crackles etc. The edema has gotten severe enough to require wraps that are not working, and the patient seems to be confused about his diuretic regimen set up via GI. Will defer to GI for diuresis management as to not complicate or go outside the scope of PCP.Not a transplant candidate. Patient needs rigorous f/u and management.  - F/u GI  - Continue Lasix 60 mg BID  - Continue Spirinolactone 150 mg BID  - Continue leg wraps  - See additional comments " "below    Wheeping ulceration/excorations  Patient reports that the bandages that are left on q 18-24 hrs at times are wheeping and secrete fluid. The fluid dries and adheres between the skin and the bandage. When the bandage is taken off it rips the skin off of it as well. The excoriated skin then wheeps more fluid and the cycle repeats itself. The patient has tried multiple creams and reports coconut oil is what works best for him. He should continue wraps, and potentially should receive cream barrier in-between wraps and skin. Persistent edema, and ulcerations are risk factors for cellulitis and DVT. Long-term solution needs to be addressed. Will defer to Lymphedema specialists. Will need derm consult.   - Continue wraps  - Coconut oil  - [facility] team reassessment  - Dermatology consult     Heel Pain  Patient reports heel pain that started approximately 5 months ago. It is provoked by sliding the heel across the floor. It feels like \"bees are stinging it\" and ranges in severity from 0-10/10. Surrounding skin is flesh-colored and not callused. All other skin is dusky, yellow and callused. May need dermatology or podiatry to examine. Potential excoriation with foreign body entrapment after normal skin heeled.  - Podiatry/Derm consult    Heart murmur:  Newfound systolic murmur on exam II/VI. No other symptoms associated with heart failure or decompensation. No echo on file or in Care everywhere.   Plan:  - Echo  - F/u w/ PCP and potentially Cardiology after Echo    Health Maintenance  The patient has been non-adherant in the past, and is showing signs of liver decompensation even with TIPS procedure. The overall clinical picture seems to be progressively getting worse. Patient also reported \"not wanting to do this anymore\" in reference to his life.   Would recommend patient talk to palliative care      Alfonso Zepeda, MS4 served as scribe.     Provider Disclosure:    The above student acted as a scribe for this " encounter.  I was present and performed the service.  I have reviewed the review of systems and past medical, family and social history.  I have reviewed the student's documentation of the exam and I have performed the exam.   I have reviewed and verified the medical student's documentation and it is correct except as follows:   Tough situation.    I am hesitant to alter medications at this time, as I suspect he is not compliant with his medications.  I advised him to bring all of his pill bottles with him to every office visit.  His cirrhosis management is beyond my scope of practice at this point, needs more regular f/u's with GI until stable  Likewise, his lymphedema and skin management requires specialty care  Will help coordinate appointments.  Currently his symptoms are not acute.  I did not engage in a discussion with him about palliative care today.  I have not met him before.  Would need to get to know him and explore his perspective more.    Total time spent 45 minutes.  More than 50% of the time spent with Mr. Louie on counseling / coordinating his care          Marlen Pardo M.D.  Internal Medicine   pager [phone]

## 2018-03-14 ENCOUNTER — RADIANT APPOINTMENT (OUTPATIENT)
Dept: ULTRASOUND IMAGING | Facility: CLINIC | Age: 63
End: 2018-03-14
Attending: INTERNAL MEDICINE
Payer: COMMERCIAL

## 2018-03-14 ENCOUNTER — OFFICE VISIT (OUTPATIENT)
Dept: INFUSION THERAPY | Facility: CLINIC | Age: 63
End: 2018-03-14
Attending: INTERNAL MEDICINE
Payer: COMMERCIAL

## 2018-03-14 VITALS
BODY MASS INDEX: 44.22 KG/M2 | SYSTOLIC BLOOD PRESSURE: 109 MMHG | HEART RATE: 79 BPM | OXYGEN SATURATION: 100 % | DIASTOLIC BLOOD PRESSURE: 48 MMHG | TEMPERATURE: 98 F | RESPIRATION RATE: 18 BRPM | WEIGHT: 315 LBS

## 2018-03-14 DIAGNOSIS — K70.31 ALCOHOLIC CIRRHOSIS OF LIVER WITH ASCITES (H): Primary | ICD-10-CM

## 2018-03-14 LAB — PLATELET # BLD AUTO: 102 10E9/L (ref 150–450)

## 2018-03-14 PROCEDURE — 25000125 ZZHC RX 250: Mod: ZF | Performed by: INTERNAL MEDICINE

## 2018-03-14 PROCEDURE — 27210190 US PARACENTESIS

## 2018-03-14 PROCEDURE — 25000128 H RX IP 250 OP 636: Mod: ZF | Performed by: INTERNAL MEDICINE

## 2018-03-14 PROCEDURE — 85049 AUTOMATED PLATELET COUNT: CPT | Performed by: INTERNAL MEDICINE

## 2018-03-14 PROCEDURE — P9047 ALBUMIN (HUMAN), 25%, 50ML: HCPCS | Mod: ZF | Performed by: INTERNAL MEDICINE

## 2018-03-14 RX ORDER — ALBUMIN (HUMAN) 12.5 G/50ML
12.5 SOLUTION INTRAVENOUS 4 TIMES DAILY PRN
Status: CANCELLED
Start: 2018-03-14

## 2018-03-14 RX ORDER — ALBUMIN (HUMAN) 12.5 G/50ML
12.5 SOLUTION INTRAVENOUS 4 TIMES DAILY PRN
Status: DISCONTINUED | OUTPATIENT
Start: 2018-03-14 | End: 2018-03-14 | Stop reason: HOSPADM

## 2018-03-14 RX ADMIN — LIDOCAINE HYDROCHLORIDE 20 ML: 10 INJECTION, SOLUTION INFILTRATION; PERINEURAL at 12:49

## 2018-03-14 RX ADMIN — ALBUMIN HUMAN 37.5 G: 0.25 SOLUTION INTRAVENOUS at 12:49

## 2018-03-14 NOTE — PROGRESS NOTES
Paracentesis Nursing Note  Lawrence Louie presents today to [facility] for a paracentesis.    During today's appointment orders from Meghna Simmons MD were completed.    Progress Note:  Patient identification verified by name and date of birth.  Assessment completed.  Vitals monitored throughout appointment and recorded in Doc Flowsheets.  See proceduralist note in ultrasound.    Date of consent or authorization: 3/08/18.  Invasive Procedure Safety Checklist was completed and sent for scanning.     Paracentesis performed by Moy Potter PA-C Radiology with a student.    The following labs were communicated to provider performing paracentesis:  Lab Results   Component Value Date     03/14/2018       Total amount of ascites fluid drained: 3 liters.  Color of ascites fluid: yellow.  Total amount of albumin given: 37.5  grams.    Patient tolerated procedure well.    Post procedure,denies pain or discomfort post paracentesis.      Discharge Plan:  Discharge instructions were reviewed with patient.  Patient/Representative verbalized understanding and all questions were answered.   Discharged from [facility] in stable condition.    Shanel Champion RN    Administrations This Visit     albumin human 25 % injection 12.5 g     Admin Date Action Dose Route Administered By             03/14/2018 New Bag 50 g Intravenous Shanel Champion RN                    lidocaine 1 % 20 mL     Admin Date Action Dose Route Administered By             03/14/2018 Given 20 mL Injection Shanel Champion RN                          Temp 98  F (36.7  C)  Resp 18  Wt (!) 142.8 kg (314 lb 12.8 oz)  SpO2 100%  BMI 41.53 kg/m2

## 2018-03-14 NOTE — MR AVS SNAPSHOT
After Visit Summary   3/14/2018    Lawrence Louie    MRN: [id number]           Patient Information     Date Of Birth          1955        Visit Information        Provider Department      3/14/2018 12:00 PM Provider, Cristobal Spec Inf Para; UC 40 ATC [facility] Specialty and Procedure        Today's Diagnoses     Alcoholic cirrhosis of liver with ascites (H)    -  1      Care Instructions    Dear Lawrence Louie    Thank you for choosing [facility] Specialty [facility]fusion [facility] Center ([facility]) for your procedure.  The following information is a summary of our appointment as well as important reminders.            We look forward in seeing you on your next appointment here at [facility].  Please don t hesitate to call us at [phone] to reschedule any of your appointments or to speak with one of the [facility] registered nurses.  It was a pleasure taking care of you today.    Sincerely,    [facility]  Specialty Infusion & Procedure Center  [address] [place] [address]  [address]  Phone:  [phone]    Discharge Instructions for Paracentesis  Paracentesis is a procedure to remove extra fluid from your belly (abdomen). This fluid buildup in the abdomen is called ascites. The procedure may have been done to take a sample of the fluid. Or, it may have been done to drain the extra fluid from your abdomen and help make you more comfortable.     Ascites is buildup of excess fluid in the abdomen.   Home care    If you have pain after the procedure, your healthcare provider can prescribe or recommend pain medicines. Take these exactly as directed. If you stopped taking other medicines before the procedure, ask your provider when you can start them again.    Take it easy for 24 hours after the procedure. Avoid physical activity until your provider says it s OK.    You will have a small bandage over the puncture site. Stitches  (sutures), surgical staples, adhesive tapes, adhesive strips, or surgical glue may be used to close the incision. They also help stop bleeding and speed healing. You may take the bandage off in 24 hours.    Check the puncture site for the signs of infection listed below.  Follow-up care  Make a follow-up appointment with your healthcare provider as directed. During your follow-up visit, your provider will check your healing. Let your provider know how you are feeling. You can also discuss the cause of your ascites and if you need any further treatment.  When to seek medical advice  Call your healthcare provider if you have any of the following after the procedure:    A fever of 100.4 F (38.0 C) or higher    Trouble breathing    Pain that doesn't go away even after taking pain medicine    Belly pain not caused by having the skin punctured    Bleeding from the puncture site    More than a small amount of fluid leaking from the puncture site    Swollen belly    Signs of infection at the puncture site. These include increased pain, redness, or swelling, warmth, or bad-smelling drainage.    Blood in your urine    Feeling dizzy or lightheaded, or fainting   Date Last Reviewed: 7/1/2016 2000-2017 [employer]. [address] [place] [address]. All rights reserved. This information is not intended as a substitute for professional medical care. Always follow your healthcare professional's instructions.                Follow-ups after your visit        Your next 10 appointments already scheduled     Mar 21, 2018 12:00 PM CDT   Paracentesis Visit with Cristobal Spec Inf Para Provider, CRISTOBAL 40 ATC   [facility] Specialty and Procedure ([facility])    [address] [place] [address]  [address] [place] [id number] [phone]            Mar 21, 2018  3:00 PM CDT   Ech Complete with [id number]   [facility] ([facility])    [address] [place] [address]  Se  3rd Floor  [facility] [id number] [phone]           1. Please bring or wear a comfortable two-piece outfit. 2. You may eat, drink and take your normal medicines. 3. For any questions that cannot be answered, please contact the ordering physician            Mar 30, 2018 12:00 PM CDT   Paracentesis Visit with Uc Spec Inf Para Provider, UC 40 ATC   [facility] Specialty and Procedure ([facility])    [address]  [address]  [facility] [id number] [phone]            Apr 06, 2018 11:30 AM CDT   (Arrive by 11:15 AM)   Return General Liver with Meghna Simmons MD   [facility] ([facility])    [address]  [address]  [facility] [id number] [phone]            Apr 06, 2018 12:00 PM CDT   Paracentesis Visit with Uc Spec Inf Para Provider, UC 40 ATC   [facility] Specialty and Procedure ([facility])    [address]  [address]  [facility] [id number] [phone]            Apr 13, 2018 12:00 PM CDT   Paracentesis Visit with Uc Spec Inf Para Provider, UC 40 ATC   [facility] Specialty and Procedure ([facility])    [address]  [address]  [facility] [id number] [phone]              Who to contact     If you have questions or need follow up information about today's clinic visit or your schedule please contact [facility] SPECIALTY AND PROCEDURE directly at [phone].  Normal or non-critical lab and imaging results will be communicated to you by MyChart, letter or phone within 4 business days after the clinic has received the results. If you do not hear from us within 7 days, please contact the clinic through MyChart or phone. If you have a critical or abnormal lab result, we will notify you by phone as soon as possible.  Submit refill  requests through S B E or call your pharmacy and they will forward the refill request to us. Please allow 3 business days for your refill to be completed.          Additional Information About Your Visit        [employer] Information     [employer] gives you secure access to your electronic health record. If you see a primary care provider, you can also send messages to your care team and make appointments. If you have questions, please call your primary care clinic.  If you do not have a primary care provider, please call [phone] and they will assist you.        Care EveryWhere ID     This is your Care EveryWhere ID. This could be used by other organizations to access your [facility] medical records  [id number]        Your Vitals Were     Pulse Temperature Respirations Pulse Oximetry BMI (Body Mass Index)       74 98  F (36.7  C) 18 100% 41.53 kg/m2        Blood Pressure from Last 3 Encounters:   03/14/18 126/52   03/12/18 115/56   03/08/18 118/49    Weight from Last 3 Encounters:   03/14/18 (!) 142.8 kg (314 lb 12.8 oz)   03/12/18 (!) 151.8 kg (334 lb 9.6 oz)   03/08/18 (!) 150.1 kg (331 lb)              We Performed the Following     Platelet count     US Paracentesis          Today's Medication Changes          These changes are accurate as of 3/14/18  1:04 PM.  If you have any questions, ask your nurse or doctor.               These medicines have changed or have updated prescriptions.        Dose/Directions    * gabapentin 300 MG capsule   Commonly known as:  NEURONTIN   This may have changed:  Another medication with the same name was changed. Make sure you understand how and when to take each.   Used for:  Pain in both feet        Take 1 capsule in the morning for 3 days, then 1 capsule twice a day for 3 days and let me know to what degree this impacts the pain.   Quantity:  60 capsule   Refills:  1       * gabapentin 300 MG capsule   Commonly known as:  NEURONTIN   This may have changed:  additional  instructions   Used for:  Diabetic polyneuropathy associated with type 2 diabetes mellitus (H)        Dose:  300 mg   Take 1 capsule (300 mg) by mouth 3 times daily   Quantity:  90 capsule   Refills:  3       * Notice:  This list has 2 medication(s) that are the same as other medications prescribed for you. Read the directions carefully, and ask your doctor or other care provider to review them with you.             Primary Care Provider Office Phone # Fax #    CORY Toussaint [phone]       [address] [place] [address]  [facility] [address]        Equal Access to Services     [facility]: Hadii sil kellyo Soanthony, waaxda luqadaha, qaybta kaalmada noel, silvia richardson . So [facility] [phone].    ATENCIÓN: Si habla español, tiene a rondon disposición servicios gratuitos de asistencia lingüística. Ll[facility] [phone].    We comply with applicable federal civil rights laws and [place] laws. We do not discriminate on the basis of race, color, national origin, age, disability, sex, sexual orientation, or gender identity.            Thank you!     Thank you for choosing [facility] SPECIALTY AND PROCEDURE  for your care. Our goal is always to provide you with excellent care. Hearing back from our patients is one way we can continue to improve our services. Please take a few minutes to complete the written survey that you may receive in the mail after your visit with us. Thank you!             Your Updated Medication List - Protect others around you: Learn how to safely use, store and throw away your medicines at www.disposemymeds.org.          This list is accurate as of 3/14/18  1:04 PM.  Always use your most recent med list.                   Brand Name Dispense Instructions for use Diagnosis    aspirin 81 MG tablet     30 tablet    Take 1 tablet (81 mg) by mouth daily    Type 2 diabetes mellitus with other skin complications       blood  glucose lancets standard    no brand specified    1 Box    Use to test blood sugar 4 X  times daily or as directed.    Diabetes mellitus, type 2 (H)       blood glucose monitoring meter device kit    no brand specified    1 kit    Use to test blood sugar 4 X  times daily or as directed.    Type 2 diabetes mellitus with other specified complication (H)       blood glucose monitoring test strip    no brand specified    200 strip    Use to test blood sugars 4 X  times daily or as directed    Diabetes mellitus, type 2 (H)       calcium carbonate 1250 MG tablet    OS-ERIN 500 mg [place]. Ca     Take 1 tablet by mouth daily        desvenlafaxine succinate 100 MG 24 hr tablet    PRISTIQ    180 tablet    Take 2 tablets (200 mg) by mouth daily    Other depression       FUROSEMIDE PO      Take 60 mg by mouth 2 times daily        * gabapentin 300 MG capsule    NEURONTIN    60 capsule    Take 1 capsule in the morning for 3 days, then 1 capsule twice a day for 3 days and let me know to what degree this impacts the pain.    Pain in both feet       * gabapentin 300 MG capsule    NEURONTIN    90 capsule    Take 1 capsule (300 mg) by mouth 3 times daily    Diabetic polyneuropathy associated with type 2 diabetes mellitus (H)       glipiZIDE 5 MG 24 hr tablet    glipiZIDE XL    180 tablet    Take 1 tablet (5 mg) by mouth 2 times daily    Type 2 diabetes mellitus without complication, with long-term current use of insulin (H)       insulin aspart 100 UNIT/ML injection    NovoLOG FLEXPEN    30 mL    20 units before breakfast, 20 units before lunch, 20 units before dinner, 20 units before snacks    Type 2 diabetes mellitus without complication, with long-term current use of insulin (H)       insulin glargine 100 UNIT/ML injection    LANTUS SOLOSTAR    30 mL    Inject 40 Units Subcutaneous every morning    Type 2 diabetes mellitus with other oral complication, unspecified long term insulin use status (H)       * insulin pen needle 31G X 8 MM     B-D U/F    300 each    USE  6 times daily / OR AS DIRECTED    Type 2 diabetes, HbA1c goal < 7% (H)       * insulin pen needle 31G X 8 MM    B-D U/F    600 each    Use 6 times daily or as directed    Type 2 diabetes, HbA1c goal < 7% (H)       lactulose 10 GM/15ML solution    CHRONULAC    2000 mL    Take 30 mLs (20 g) by mouth 3 times daily Adjust dosing as needed to achieve 3-4 bowel movements daily.    Hepatic encephalopathy (H)       magnesium oxide 400 (241.3 MG) MG tablet    MAG-OX    90 tablet    Take 1 tablet (400 mg) by mouth daily    Cramp of limb       ondansetron 4 MG tablet    ZOFRAN    18 tablet    Take 1 tablet (4 mg) by mouth every 8 hours as needed for nausea    Alcoholic cirrhosis (H), Nausea       oxyCODONE IR 5 MG tablet    ROXICODONE    60 tablet    Take 1 tablet (5 mg) by mouth every 6 hours as needed for moderate to severe pain maximum 6 tablet(s) per day    Alcoholic cirrhosis of liver with ascites (H)       ranitidine 150 MG tablet    ZANTAC    180 tablet    Take 1 tablet (150 mg) by mouth 2 times daily    Esophageal varices (H)       rifaximin 550 MG Tabs tablet    XIFAXAN    60 tablet    Take 1 tablet (550 mg) by mouth 2 times daily    Hepatic encephalopathy (H)       spironolactone 50 MG tablet    ALDACTONE    540 tablet    1 tablet (50 mg) 2 times daily Takes 3 tablets in am.and p.m.    Portal hypertension (H), Generalized edema       STATIN NOT PRESCRIBED (INTENTIONAL)     0 each    1 each daily Statin not prescribed intentionally due to Active liver disease    Hyperlipidemia LDL goal <100       VITAMIN B-12 PO      Take 1 tablet by mouth daily        vitamin D3 2000 UNITS Caps     90 capsule    Take 1 capsule by mouth daily    Mild major depression (H)       * Notice:  This list has 4 medication(s) that are the same as other medications prescribed for you. Read the directions carefully, and ask your doctor or other care provider to review them with you.

## 2018-03-14 NOTE — PATIENT INSTRUCTIONS
Dear Lawrence Louie    Thank you for choosing [facility] Specialty Infusion and Procedure Center ([facility]) for your procedure.  The following information is a summary of our appointment as well as important reminders.            We look forward in seeing you on your next appointment here at [facility].  Please don t hesitate to call us at [phone] to reschedule any of your appointments or to speak with one of the [facility] registered nurses.  It was a pleasure taking care of you today.    Sincerely,    [facility]  Specialty Infusion & Procedure Center  [address] [place] [address]  [address]  Phone:  [phone]    Discharge Instructions for Paracentesis  Paracentesis is a procedure to remove extra fluid from your belly (abdomen). This fluid buildup in the abdomen is called ascites. The procedure may have been done to take a sample of the fluid. Or, it may have been done to drain the extra fluid from your abdomen and help make you more comfortable.     Ascites is buildup of excess fluid in the abdomen.   Home care    If you have pain after the procedure, your healthcare provider can prescribe or recommend pain medicines. Take these exactly as directed. If you stopped taking other medicines before the procedure, ask your provider when you can start them again.    Take it easy for 24 hours after the procedure. Avoid physical activity until your provider says it s OK.    You will have a small bandage over the puncture site. Stitches (sutures), surgical staples, adhesive tapes, adhesive strips, or surgical glue may be used to close the incision. They also help stop bleeding and speed healing. You may take the bandage off in 24 hours.    Check the puncture site for the signs of infection listed below.  Follow-up care  Make a follow-up appointment with your healthcare provider as directed. During your follow-up visit, your provider will check your healing. Let your  provider know how you are feeling. You can also discuss the cause of your ascites and if you need any further treatment.  When to seek medical advice  Call your healthcare provider if you have any of the following after the procedure:    A fever of 100.4 F (38.0 C) or higher    Trouble breathing    Pain that doesn't go away even after taking pain medicine    Belly pain not caused by having the skin punctured    Bleeding from the puncture site    More than a small amount of fluid leaking from the puncture site    Swollen belly    Signs of infection at the puncture site. These include increased pain, redness, or swelling, warmth, or bad-smelling drainage.    Blood in your urine    Feeling dizzy or lightheaded, or fainting   Date Last Reviewed: 7/1/2016 2000-2017 [employer]. [address] [place] [address], [place] [address]. All rights reserved. This information is not intended as a substitute for professional medical care. Always follow your healthcare professional's instructions.

## 2018-03-15 ENCOUNTER — MEDICAL CORRESPONDENCE (OUTPATIENT)
Dept: HEALTH INFORMATION MANAGEMENT | Facility: CLINIC | Age: 63
End: 2018-03-15

## 2018-03-19 ENCOUNTER — TELEPHONE (OUTPATIENT)
Dept: GASTROENTEROLOGY | Facility: CLINIC | Age: 63
End: 2018-03-19

## 2018-03-19 ENCOUNTER — CARE COORDINATION (OUTPATIENT)
Dept: GASTROENTEROLOGY | Facility: CLINIC | Age: 63
End: 2018-03-19

## 2018-03-19 DIAGNOSIS — K76.6 PORTAL HYPERTENSION (H): ICD-10-CM

## 2018-03-19 DIAGNOSIS — K70.31 ALCOHOLIC CIRRHOSIS OF LIVER WITH ASCITES (H): Primary | ICD-10-CM

## 2018-03-19 DIAGNOSIS — R60.1 GENERALIZED EDEMA: ICD-10-CM

## 2018-03-19 NOTE — PROGRESS NOTES
Attempted to reach patient for check in, no answer, messages left requesting call back at both home and wife's cell number, number provided.

## 2018-03-19 NOTE — TELEPHONE ENCOUNTER
Aurelia Garcia [facility]   Patient of Dr. Meghna Simmons  Request for continued OT Lymphedema therapy. Please call Aurelia back at [phone]. Will send to patient's care coordinator.

## 2018-03-21 ENCOUNTER — RADIANT APPOINTMENT (OUTPATIENT)
Dept: ULTRASOUND IMAGING | Facility: CLINIC | Age: 63
End: 2018-03-21
Attending: INTERNAL MEDICINE
Payer: COMMERCIAL

## 2018-03-21 ENCOUNTER — RADIANT APPOINTMENT (OUTPATIENT)
Dept: CARDIOLOGY | Facility: CLINIC | Age: 63
End: 2018-03-21
Attending: INTERNAL MEDICINE
Payer: COMMERCIAL

## 2018-03-21 ENCOUNTER — OFFICE VISIT (OUTPATIENT)
Dept: INFUSION THERAPY | Facility: CLINIC | Age: 63
End: 2018-03-21
Attending: INTERNAL MEDICINE
Payer: COMMERCIAL

## 2018-03-21 VITALS
HEART RATE: 81 BPM | DIASTOLIC BLOOD PRESSURE: 42 MMHG | BODY MASS INDEX: 43.67 KG/M2 | SYSTOLIC BLOOD PRESSURE: 122 MMHG | TEMPERATURE: 98.4 F | OXYGEN SATURATION: 99 % | WEIGHT: 315 LBS

## 2018-03-21 DIAGNOSIS — I89.0 LYMPHEDEMA: ICD-10-CM

## 2018-03-21 DIAGNOSIS — R01.1 HEART MURMUR: ICD-10-CM

## 2018-03-21 DIAGNOSIS — K70.31 ALCOHOLIC CIRRHOSIS OF LIVER WITH ASCITES (H): Primary | ICD-10-CM

## 2018-03-21 LAB
ANION GAP SERPL CALCULATED.3IONS-SCNC: 8 MMOL/L (ref 3–14)
BUN SERPL-MCNC: 14 MG/DL (ref 7–30)
CALCIUM SERPL-MCNC: 8.1 MG/DL (ref 8.5–10.1)
CHLORIDE SERPL-SCNC: 106 MMOL/L (ref 94–109)
CO2 SERPL-SCNC: 25 MMOL/L (ref 20–32)
CREAT SERPL-MCNC: 0.77 MG/DL (ref 0.66–1.25)
GFR SERPL CREATININE-BSD FRML MDRD: >90 ML/MIN/1.7M2
GLUCOSE SERPL-MCNC: 57 MG/DL (ref 70–99)
POTASSIUM SERPL-SCNC: 3.7 MMOL/L (ref 3.4–5.3)
SODIUM SERPL-SCNC: 140 MMOL/L (ref 133–144)

## 2018-03-21 PROCEDURE — 25000128 H RX IP 250 OP 636: Mod: ZF | Performed by: INTERNAL MEDICINE

## 2018-03-21 PROCEDURE — 49083 ABD PARACENTESIS W/IMAGING: CPT

## 2018-03-21 PROCEDURE — 80048 BASIC METABOLIC PNL TOTAL CA: CPT | Performed by: INTERNAL MEDICINE

## 2018-03-21 PROCEDURE — 25000125 ZZHC RX 250: Mod: ZF | Performed by: INTERNAL MEDICINE

## 2018-03-21 PROCEDURE — P9047 ALBUMIN (HUMAN), 25%, 50ML: HCPCS | Mod: ZF | Performed by: INTERNAL MEDICINE

## 2018-03-21 RX ORDER — ALBUMIN (HUMAN) 12.5 G/50ML
12.5 SOLUTION INTRAVENOUS 4 TIMES DAILY PRN
Status: DISCONTINUED | OUTPATIENT
Start: 2018-03-21 | End: 2018-03-21 | Stop reason: HOSPADM

## 2018-03-21 RX ORDER — ALBUMIN (HUMAN) 12.5 G/50ML
12.5 SOLUTION INTRAVENOUS 4 TIMES DAILY PRN
Status: CANCELLED
Start: 2018-03-21

## 2018-03-21 RX ADMIN — LIDOCAINE HYDROCHLORIDE 20 ML: 10 INJECTION, SOLUTION INFILTRATION; PERINEURAL at 13:11

## 2018-03-21 RX ADMIN — ALBUMIN HUMAN 12.5 G: 0.25 SOLUTION INTRAVENOUS at 13:15

## 2018-03-21 RX ADMIN — ALBUMIN HUMAN 12.5 G: 0.25 SOLUTION INTRAVENOUS at 13:11

## 2018-03-21 RX ADMIN — ALBUMIN HUMAN 12.5 G: 0.25 SOLUTION INTRAVENOUS at 13:32

## 2018-03-21 RX ADMIN — ALBUMIN HUMAN 12.5 G: 0.25 SOLUTION INTRAVENOUS at 13:24

## 2018-03-21 NOTE — PATIENT INSTRUCTIONS
Dear Lawrence Louie    Thank you for choosing [facility] Specialty Infusion and Procedure Center ([facility]) for your procedure.  The following information is a summary of our appointment as well as important reminders.      We look forward in seeing you on your next appointment here at [facility].  Please don t hesitate to call us at [phone] to reschedule any of your appointments or to speak with one of the [facility] registered nurses.  It was a pleasure taking care of you today.    Sincerely,    [facility]  Specialty Infusion & Procedure Center  [address] [place] [address]  [address]  Phone:  [phone]    DISCHARGE INSTRUCTIONS FOLLOWING ABDOMINAL PARACENTESIS    After you go home:    No strenuous activity for 24 hours    Resume your regular diet    Limit fluid intake for the first 48 hours to no more than 2 quarts per day.  There should be minimal drainage from the needle site.  If drainage does occur and soaks through the bandage, apply gentle pressure with your hand for 5 minutes.    Notify MD for the following:    Excessive drainage    Excessive swelling, redness or tenderness at the needle site    Fever greater than 101 degrees F    Dizziness or light-headedness when getting up or walking      IF THIS IS A MEDICAL EMERGENCY, CALL [phone]    If you have any questions or concerns    Contact the Hepatology Clinic:   [phone]    If you are a post-transplant patient, contact the Transplant Office:  [phone]    If this is after hours, contact the hospital :  [phone] and as to have the GI resident on call paged                   I have received and understand my discharge instructions and I have all of my personal belongings.          ------------------------------------------------------        ---------------------------------------------------    Patient / Significant Other's Signature     Relationship

## 2018-03-21 NOTE — MR AVS SNAPSHOT
After Visit Summary   3/21/2018    Lawrence Louie    MRN: [id number]           Patient Information     Date Of Birth          1955        Visit Information        Provider Department      3/21/2018 12:00 PM Provider, Cristobal Spec Inf Para;  40 ATC [facility]        Today's Diagnoses     Alcoholic cirrhosis of liver with ascites (H)    -  1      Care Instructions    Dear Lawrence Louie    Thank you for choosing [facility] ([facility]) for your procedure.  The following information is a summary of our appointment as well as important reminders.      We look forward in seeing you on your next appointment here at [facility].  Please don t hesitate to call us at [phone] to reschedule any of your appointments or to speak with one of the [facility] registered nurses.  It was a pleasure taking care of you today.    Sincerely,    [facility]  [facility]  [address] [place] [address]  [id number]  Phone:  [phone]    DISCHARGE INSTRUCTIONS FOLLOWING ABDOMINAL PARACENTESIS    After you go home:    No strenuous activity for 24 hours    Resume your regular diet    Limit fluid intake for the first 48 hours to no more than 2 quarts per day.  There should be minimal drainage from the needle site.  If drainage does occur and soaks through the bandage, apply gentle pressure with your hand for 5 minutes.    Notify MD for the following:    Excessive drainage    Excessive swelling, redness or tenderness at the needle site    Fever greater than 101 degrees F    Dizziness or light-headedness when getting up or walking      IF THIS IS A MEDICAL EMERGENCY, CALL 911    If you have any questions or concerns    Contact the Hepatology Clinic:   [phone]    If you are a post-transplant patient, contact the Transplant Office:  [phone]    If this is  after hours, contact the hospital :  [phone] and as to have the GI resident on call paged                   I have received and understand my discharge instructions and I have all of my personal belongings.          ------------------------------------------------------        ---------------------------------------------------    Patient / Significant Other's Signature     Relationship              Follow-ups after your visit        Your next 10 appointments already scheduled     Mar 21, 2018  3:00 PM CDT   Ech Complete with [id number]   [facility] Echo ([facility])    [address] [place] [address]  3rd Floor  [facility] [id number] [phone]           1. Please bring or wear a comfortable two-piece outfit. 2. You may eat, drink and take your normal medicines. 3. For any questions that cannot be answered, please contact the ordering physician            Mar 30, 2018 12:00 PM CDT   Paracentesis Visit with Uc Spec Inf Para Provider, UC 40 ATC   [facility] Specialty and Procedure ([facility])    [address]  [address]  [facility] [id number] [phone]            Apr 06, 2018 11:30 AM CDT   (Arrive by 11:15 AM)   Return General Liver with Meghna Simmons MD   [facility] Hepatology ([facility])    [address]  [address]  [facility] [id number] [phone]            Apr 06, 2018 12:00 PM CDT   Paracentesis Visit with Uc Spec Inf Para Provider, UC 40 ATC   [facility] Specialty and Procedure ([facility])    [address]  [address]  [facility] [id number] [phone]            Apr 13, 2018 12:00 PM CDT   Paracentesis Visit with Uc Spec Inf Para Provider, UC 40 ATC   [facility] Specialty [facility] Procedure ([facility])    [address] [place] [address]  [address]  [facility]  [id number] [phone]            Apr 20, 2018 12:00 PM CDT   Paracentesis Visit with Uc Spec Inf Para Provider, UC 39 ATC   [facility] Specialty and Procedure ([facility])    [address]  [address]  [facility] [address] [phone]              Who to contact     If you have questions or need follow up information about today's clinic visit or your schedule please contact [facility] SPECIALTY AND PROCEDURE directly at [phone].  Normal or non-critical lab and imaging results will be communicated to you by [employer], letter or phone within 4 business days after the clinic has received the results. If you do not hear from us within 7 days, please contact the clinic through [employer] or phone. If you have a critical or abnormal lab result, we will notify you by phone as soon as possible.  Submit refill requests through Cell[employer] or call your pharmacy and they will forward the refill request to us. Please allow 3 business days for your refill to be completed.          Additional Information About Your Visit        Thingy [employer] Information     Cellity gives you secure access to your electronic health record. If you see a primary care provider, you can also send messages to your care team and make appointments. If you have questions, please call your primary care clinic.  If you do not have a primary care provider, please call [phone] and they will assist you.        Care EveryWhere ID     This is your Care EveryWhere ID. This could be used by other organizations to access your [facility] medical records  [id number]        Your Vitals Were     Pulse Temperature Pulse Oximetry BMI (Body Mass Index)          81 98.4  F (36.9  C) (Oral) 99% 45.03 kg/m2         Blood Pressure from Last 3 Encounters:   03/21/18 122/42   03/14/18 109/48   03/12/18 115/56    Weight from Last 3 Encounters:   03/21/18 (!) 154.8 kg (341 lb 4.8 oz)   03/14/18  (!) 152 kg (335 lb 3.2 oz)   03/12/18 (!) 151.8 kg (334 lb 9.6 oz)              We Performed the Following     Basic metabolic panel     US Paracentesis          Today's Medication Changes          These changes are accurate as of 3/21/18  2:02 PM.  If you have any questions, ask your nurse or doctor.               These medicines have changed or have updated prescriptions.        Dose/Directions    * gabapentin 300 MG capsule   Commonly known as:  NEURONTIN   This may have changed:  Another medication with the same name was changed. Make sure you understand how and when to take each.   Used for:  Pain in both feet        Take 1 capsule in the morning for 3 days, then 1 capsule twice a day for 3 days and let me know to what degree this impacts the pain.   Quantity:  60 capsule   Refills:  1       * gabapentin 300 MG capsule   Commonly known as:  NEURONTIN   This may have changed:  additional instructions   Used for:  Diabetic polyneuropathy associated with type 2 diabetes mellitus (H)        Dose:  300 mg   Take 1 capsule (300 mg) by mouth 3 times daily   Quantity:  90 capsule   Refills:  3       * Notice:  This list has 2 medication(s) that are the same as other medications prescribed for you. Read the directions carefully, and ask your doctor or other care provider to review them with you.             Primary Care Provider Office Phone # Fax #    Ary RENETTA Murphy, APRN [phone]       [address] [place] [address] [address] [place] [id number]        Equal Access to Services     JOSEF LORENZO AH: Jose Eduardo kellyo Soanthony, waaxda luqadaha, qaybta kaalmada noel, silvia salas. So [facility] [phone].    ATENCIÓN: Si habla español, tiene a rondon disposición servicios gratuitos de asistencia lingüística. Llame al [phone].    We comply with applicable federal civil rights laws and [place] laws. We do not discriminate on the basis of race, color, national origin, age,  disability, sex, sexual orientation, or gender identity.            Thank you!     Thank you for choosing [facility] SPECIALTY AND PROCEDURE  for your care. Our goal is always to provide you with excellent care. Hearing back from our patients is one way we can continue to improve our services. Please take a few minutes to complete the written survey that you may receive in the mail after your visit with us. Thank you!             Your Updated Medication List - Protect others around you: Learn how to safely use, store and throw away your medicines at www.disposemymeds.org.          This list is accurate as of 3/21/18  2:02 PM.  Always use your most recent med list.                   Brand Name Dispense Instructions for use Diagnosis    aspirin 81 MG tablet     30 tablet    Take 1 tablet (81 mg) by mouth daily    Type 2 diabetes mellitus with other skin complications       blood glucose lancets standard    no brand specified    1 Box    Use to test blood sugar 4 X  times daily or as directed.    Diabetes mellitus, type 2 (H)       blood glucose monitoring meter device kit    no brand specified    1 kit    Use to test blood sugar 4 X  times daily or as directed.    Type 2 diabetes mellitus with other specified complication (H)       blood glucose monitoring test strip    no brand specified    200 strip    Use to test blood sugars 4 X  times daily or as directed    Diabetes mellitus, type 2 (H)       calcium carbonate 1250 MG tablet    OS-ERIN 500 mg [place]. [place]     Take 1 tablet by mouth daily        desvenlafaxine succinate 100 MG 24 hr tablet    PRISTIQ    180 tablet    Take 2 tablets (200 mg) by mouth daily    Other depression       FUROSEMIDE PO      Take 60 mg by mouth 2 times daily        * gabapentin 300 MG capsule    NEURONTIN    60 capsule    Take 1 capsule in the morning for 3 days, then 1 capsule twice a day for 3 days and let me know to what degree this impacts the pain.    Pain in both  feet       * gabapentin 300 MG capsule    NEURONTIN    90 capsule    Take 1 capsule (300 mg) by mouth 3 times daily    Diabetic polyneuropathy associated with type 2 diabetes mellitus (H)       glipiZIDE 5 MG 24 hr tablet    glipiZIDE XL    180 tablet    Take 1 tablet (5 mg) by mouth 2 times daily    Type 2 diabetes mellitus without complication, with long-term current use of insulin (H)       insulin aspart 100 UNIT/ML injection    NovoLOG FLEXPEN    30 mL    20 units before breakfast, 20 units before lunch, 20 units before dinner, 20 units before snacks    Type 2 diabetes mellitus without complication, with long-term current use of insulin (H)       insulin glargine 100 UNIT/ML injection    LANTUS SOLOSTAR    30 mL    Inject 40 Units Subcutaneous every morning    Type 2 diabetes mellitus with other oral complication, unspecified long term insulin use status (H)       * insulin pen needle 31G X 8 MM    B-D U/F    300 each    USE  6 times daily / OR AS DIRECTED    Type 2 diabetes, HbA1c goal < 7% (H)       * insulin pen needle 31G X 8 MM    B-D U/F    600 each    Use 6 times daily or as directed    Type 2 diabetes, HbA1c goal < 7% (H)       lactulose 10 GM/15ML solution    CHRONULAC    2000 mL    Take 30 mLs (20 g) by mouth 3 times daily Adjust dosing as needed to achieve 3-4 bowel movements daily.    Hepatic encephalopathy (H)       magnesium oxide 400 (241.3 MG) MG tablet    MAG-OX    90 tablet    Take 1 tablet (400 mg) by mouth daily    Cramp of limb       ondansetron 4 MG tablet    ZOFRAN    18 tablet    Take 1 tablet (4 mg) by mouth every 8 hours as needed for nausea    Alcoholic cirrhosis (H), Nausea       oxyCODONE IR 5 MG tablet    ROXICODONE    60 tablet    Take 1 tablet (5 mg) by mouth every 6 hours as needed for moderate to severe pain maximum 6 tablet(s) per day    Alcoholic cirrhosis of liver with ascites (H)       ranitidine 150 MG tablet    ZANTAC    180 tablet    Take 1 tablet (150 mg) by mouth 2  times daily    Esophageal varices (H)       rifaximin 550 MG Tabs tablet    XIFAXAN    60 tablet    Take 1 tablet (550 mg) by mouth 2 times daily    Hepatic encephalopathy (H)       spironolactone 50 MG tablet    ALDACTONE    540 tablet    1 tablet (50 mg) 2 times daily Takes 3 tablets in am.and p.m.    Portal hypertension (H), Generalized edema       STATIN NOT PRESCRIBED (INTENTIONAL)     0 each    1 each daily Statin not prescribed intentionally due to Active liver disease    Hyperlipidemia LDL goal <100       VITAMIN B-12 PO      Take 1 tablet by mouth daily        vitamin D3 2000 UNITS Caps     90 capsule    Take 1 capsule by mouth daily    Mild major depression (H)       * Notice:  This list has 4 medication(s) that are the same as other medications prescribed for you. Read the directions carefully, and ask your doctor or other care provider to review them with you.

## 2018-03-21 NOTE — PROGRESS NOTES
Paracentesis Nursing Note  Lawrence Louie [facility] for [facility].    During today's appointment orders from Meghna Simmons MD were completed.    Progress Note:  Patient identification verified by name and date of birth.  Assessment completed.  Vitals monitored throughout appointment and recorded in Doc Flowsheets.  See proceduralist note in ultrasound.    Date of consent or authorization: 3/8/18.  Invasive Procedure Safety Checklist was completed and sent for scanning.     Paracentesis performed by Moy Potter PA-C Radiology.    The following labs were communicated to provider performing paracentesis:  Lab Results   Component Value Date     03/14/2018       Total amount of ascites fluid drained: 4.4 liters.  Color of ascites fluid: yellow.  Total amount of albumin given: 50  grams.    Patient tolerated procedure well.    Post procedure,denies pain or discomfort post paracentesis.      Discharge Plan:  Discharge instructions were reviewed with patient.  Patient/Representative verbalized understanding and all questions were answered.   Discharged from [facility] in stable condition.    Ericka Severson, RN    Administrations This Visit     albumin human 25 % injection 12.5 g     Admin Date Action Dose Route Administered By             03/21/2018 New Bag 12.5 g Intravenous Severson, Ericka, RN              Admin Date Action Dose Route Administered By             03/21/2018 New Bag 12.5 g Intravenous Severson, Ericka, RN              Admin Date Action Dose Route Administered By             03/21/2018 New Bag 12.5 g Intravenous Severson, Ericka, RN              Admin Date Action Dose Route Administered By             03/21/2018 New Bag 12.5 g Intravenous Severson, Ericka, RN                    lidocaine 1 % 20 mL     Admin Date Action Dose Route Administered By             03/21/2018 Given by Other Clinician 20  mL Injection Severson, Ericka, RN                          /61  Pulse 82  Temp 98.4  F (36.9  C) (Oral)  Wt (!) 154.8 kg (341 lb 4.8 oz)  SpO2 99%  BMI 45.03 kg/m2

## 2018-03-22 RX ORDER — FUROSEMIDE 20 MG
TABLET ORAL
Qty: 150 TABLET | Refills: 11 | Status: SHIPPED | OUTPATIENT
Start: 2018-03-22 | End: 2018-04-05

## 2018-03-22 RX ORDER — SPIRONOLACTONE 50 MG/1
TABLET, FILM COATED ORAL
Qty: 150 TABLET | Refills: 11 | Status: SHIPPED | OUTPATIENT
Start: 2018-03-22 | End: 2019-06-20

## 2018-03-22 NOTE — PROGRESS NOTES
Called patient and left detailed message with instructions to continue diuretics as he is currently taking: furosemide 60 mg every AM and furosemide 40 mg every PM; spironolactone 150 mg every AM and spironolactone 100 mg every PM. Will recheck BMP in 1 month. Also left reminder of appointment with Dr. Simmons on 4/6. Call back number provided.

## 2018-03-23 ENCOUNTER — DOCUMENTATION ONLY (OUTPATIENT)
Dept: CARE COORDINATION | Facility: CLINIC | Age: 63
End: 2018-03-23

## 2018-03-23 ENCOUNTER — TELEPHONE (OUTPATIENT)
Dept: FAMILY MEDICINE | Facility: CLINIC | Age: 63
End: 2018-03-23

## 2018-03-23 ENCOUNTER — NURSE TRIAGE (OUTPATIENT)
Dept: NURSING | Facility: CLINIC | Age: 63
End: 2018-03-23

## 2018-03-23 NOTE — TELEPHONE ENCOUNTER
Lawrence wasn't able to call the clinic before the end of the day.   He has a new prescription at the clinic for oxycodone. He wants that mailed to his pharmacy.   He uses [facility] in [place] [address]  I told him the prescription will be mailed, at the earliest, on Monday, 3/26/2018.    I told him too there is no guarantee that the prescription will arrive at the pharmacy in time for him to pick it up on 3/30/2018 as that is what he plans to do.    He stated understanding.  Routed:  PCC-UMP   .Nicole BAUMANN RN [facility]

## 2018-03-23 NOTE — TELEPHONE ENCOUNTER
Lawrence has a new prescription at the clinic for oxycodone.   He wants that mailed to his pharmacy.   He uses [facility] in [place] [address]   I told him the prescription will be mailed, at the earliest, on Monday, 3/26/2018.    I told him too there is no guarantee that the prescription will arrive at the pharmacy in time for him to pick it up on 3/30/2018 as that is what he plans to do.    He stated understanding.  Routed:  PCC-P   Nicole BAUMANN RN [facility]

## 2018-03-24 NOTE — PROGRESS NOTES
[facility] now requests orders and shares plan of care/discharge summaries for some patients through [employer].  Please REPLY TO THIS MESSAGE in order to give authorization for orders when needed.  This is considered a verbal order, you will still receive a faxed copy of orders for signature.  Thank you for your assistance in improving collaboration for our patients.    ORDER    Requesting SN orders for recertification period starting 3/25/2018. Requesting the following orders:    SN 1 W 8        1 W 1        3 PRN    Patient is requesting referral to pain clinic for pain BLE      Thank you,    Sandra RN Case Manager  [phone]  [email or web address]

## 2018-03-25 ENCOUNTER — MEDICAL CORRESPONDENCE (OUTPATIENT)
Dept: HEALTH INFORMATION MANAGEMENT | Facility: CLINIC | Age: 63
End: 2018-03-25

## 2018-03-26 ENCOUNTER — DOCUMENTATION ONLY (OUTPATIENT)
Dept: CARE COORDINATION | Facility: CLINIC | Age: 63
End: 2018-03-26

## 2018-03-26 NOTE — PROGRESS NOTES
[facility] now requests orders and shares plan of care/discharge summaries for some patients through [employer].  Please REPLY TO THIS MESSAGE in order to give authorization for orders when needed.  This is considered a verbal order, you will still receive a faxed copy of orders for signature.  Thank you for your assistance in improving collaboration for our patients.    ORDER    OT Lymphedema therapy requesting continued treatment  orders for new certification period beginning 3/25/18 for 3w4 for bilateral LE edema management with manual lymph drainage, compression bandages, patient and caregiver education and progression into appropriate garments as indicated.    Thank you,  Aurelia Garcia OTR/L, CLT  [facility]  [phone]  [email or web address]    ------------------  Ok for the above home care orders.  Verbal order Ary Murphy/Naomi Lee RN

## 2018-03-26 NOTE — TELEPHONE ENCOUNTER
"----- Message from Eliazar  sent at 3/26/2018 12:13 PM CDT -----  Regarding: Verbal order   Contact: [phone]  Sandra with [facility]  [phone]    Requesting verbal order for home care.    Skilled nursing   2 X a week for 8 weeks  1 X a week for 1 week  with 3 PRN visit    OT Lymphedema therapy   3 X a week for 4 weeks    \"Requesting continued treatment  orders for new certification period beginning 3/25/18 for for bilateral LE edema management with manual lymph drainage, compression bandages, patient and caregiver education and progression into appropriate garments as indicated\"    Need to see patient today, would like a verbal today.       "

## 2018-03-27 ENCOUNTER — DOCUMENTATION ONLY (OUTPATIENT)
Dept: CARE COORDINATION | Facility: CLINIC | Age: 63
End: 2018-03-27

## 2018-03-27 NOTE — PROGRESS NOTES
[facility] and [facility] now requests orders and shares plan of care/discharge summaries for some patients through [employer].  Please REPLY TO THIS MESSAGE in order to give authorization for orders when needed.  This is considered a verbal order, you will still receive a faxed copy of orders for signature.  Thank you for your assistance in improving collaboration for our patients.    ORDER    Requesting the following wound care orders for stasis ulcers to BLE to start 3/25/2018 for recertification with [facility].    SN to perform the following wound care to BLE wounds.  Cleanse with soap and water or wound cleanser and pat dry. Apply moisture barrier to periwound skin to protect from drainage.  Apply super absorbent dressing, secure with kerlix.  May apply adaptic if sticking present.  Patient to see Lymph OT for compression/lymphedema wraps.  Patient/caregiver to perform on non nursing days.    RADHA Flores Case Manager  [facility] and [facility]  [phone]  [email or web address]

## 2018-03-29 ENCOUNTER — DOCUMENTATION ONLY (OUTPATIENT)
Dept: CARE COORDINATION | Facility: CLINIC | Age: 63
End: 2018-03-29

## 2018-03-29 ENCOUNTER — TELEPHONE (OUTPATIENT)
Dept: INTERNAL MEDICINE | Facility: CLINIC | Age: 63
End: 2018-03-29

## 2018-03-29 NOTE — TELEPHONE ENCOUNTER
----- Message from Naima Rubi LPN sent at 3/28/2018  3:43 PM CDT -----  Regarding: FW: Verbal Orders      ----- Message -----     From: Jodi Vera     Sent: 3/28/2018   3:20 PM       To: Naima Rubi LPN  Subject: Verbal Orders                                    Sandra [facility]  [phone]    Skilled nursing    2x 8 weeks  1 x 1 week  3 prns    Lymphedema therapy    3x 4 weeks    Wound Care orders     -------------  Home care orders authorized, message left for Sandra.  Naomi Lee RN

## 2018-04-03 ENCOUNTER — NURSE TRIAGE (OUTPATIENT)
Dept: NURSING | Facility: CLINIC | Age: 63
End: 2018-04-03

## 2018-04-04 NOTE — TELEPHONE ENCOUNTER
FloresLawrence nguyen' wife was the caller.  Lawrence wasn't able to go for paracentesis 3/30/2018. His abdomen is swollen in the same way as it usually is before the paracentesis but he has a large amount of edema in his hips and groin area.  Flores said he isn't having breathing trouble.  His scrotum is very swollen to the point of not being able to see his penis.  He's very uncomfortable.  I instructed, because of Lawrence' size and trouble doing his own cares and because of the scrotal swelling that [place] call 911 and tell them it's a non-life threatening reason for the call. Flores agreed and will call 911 and have Lawrence taken to [facility] ER.  Nicole BAUMANN RN [facility]

## 2018-04-05 ENCOUNTER — OFFICE VISIT (OUTPATIENT)
Dept: INTERNAL MEDICINE | Facility: CLINIC | Age: 63
End: 2018-04-05
Payer: COMMERCIAL

## 2018-04-05 VITALS
BODY MASS INDEX: 45.78 KG/M2 | DIASTOLIC BLOOD PRESSURE: 76 MMHG | WEIGHT: 315 LBS | SYSTOLIC BLOOD PRESSURE: 138 MMHG | HEART RATE: 82 BPM | OXYGEN SATURATION: 100 %

## 2018-04-05 DIAGNOSIS — M79.672 LEFT FOOT PAIN: ICD-10-CM

## 2018-04-05 DIAGNOSIS — E11.51 TYPE II DIABETES MELLITUS WITH PERIPHERAL CIRCULATORY DISORDER (H): Primary | ICD-10-CM

## 2018-04-05 DIAGNOSIS — R60.0 EDEMA OF BOTH LEGS: ICD-10-CM

## 2018-04-05 DIAGNOSIS — K70.31 ALCOHOLIC CIRRHOSIS OF LIVER WITH ASCITES (H): ICD-10-CM

## 2018-04-05 DIAGNOSIS — F33.9 RECURRENT MAJOR DEPRESSIVE DISORDER, REMISSION STATUS UNSPECIFIED (H): ICD-10-CM

## 2018-04-05 RX ORDER — CITALOPRAM HYDROBROMIDE 40 MG/1
40 TABLET ORAL DAILY
Qty: 30 TABLET | Refills: 3 | Status: SHIPPED | OUTPATIENT
Start: 2018-04-05 | End: 2018-09-24

## 2018-04-05 RX ORDER — FUROSEMIDE 40 MG
TABLET ORAL
Qty: 540 TABLET | Refills: 1 | Status: SHIPPED | OUTPATIENT
Start: 2018-04-05 | End: 2019-01-02

## 2018-04-05 RX ORDER — OXYCODONE HYDROCHLORIDE 5 MG/1
TABLET ORAL
Qty: 90 TABLET | Refills: 0 | Status: SHIPPED | OUTPATIENT
Start: 2018-04-05 | End: 2018-04-24

## 2018-04-05 ASSESSMENT — PAIN SCALES - GENERAL: PAINLEVEL: EXTREME PAIN (8)

## 2018-04-05 NOTE — NURSING NOTE
Chief Complaint   Patient presents with     Swelling     In bilateral legs and feet. Currently on diuretics. States feels like walking on a rock.      Refill Request     Oxycodone

## 2018-04-05 NOTE — PROGRESS NOTES
[facility]  Primary Care Center   Ary RENETTAAnna Murphy, CORY CNP  04/05/2018      Chief Complaint:   No chief complaint on file.       History of Present Illness:   Lawrence Louie is a 62 year old male with a history of weeping bilateral lower leg wounds due to edema.  He is seen in Lymphedema Clinic and is on diuretics but does occasionally does not take  them when he has to be out for medical appointments, which is frequently. He has a home visiting nurse who wraps his legs.   He continues to have abdominal paracentesis for his ascites.   He continues to have bilateral lower extremity neuropathy as well as right foot pain under the sole of his heel which is minimal at rest but 8-9/10 with weight bearing.     Immunization History   Administered Date(s) Administered     DTAP (<7y) (Quadracel) 01/18/1988     HEPA 01/02/2013, 02/18/2013, 10/30/2013     HepB 01/02/2013, 02/18/2013, 10/30/2013     Influenza (IIV3) PF 10/04/2010, 12/18/2012, 09/08/2014, 09/26/2015, 09/27/2016     Influenza Intranasal Vaccine 4 valent 10/12/2017     Pneumo Conj 13-V (2010&after) 05/12/2015     Pneumococcal 23 valent 09/25/2009, 10/01/2010, 01/02/2013     TD (ADULT, 7+) 09/08/1997     TDAP Vaccine (Adacel) 01/02/2013     Twinrix A/B 01/02/2013     Zoster vaccine, live 12/22/2016        The following health maintenance issues were also reviewed and addressed as needed:  Blood pressure (>18 years annually)  Depression - PHQ-2 Score:   PHQ-2 ( 1999 Pfizer) 9/16/2013 4/2/2013   Q1: Little interest or pleasure in doing things 2 2   Q2: Feeling down, depressed or hopeless 2 2   PHQ-2 Score 4 4     Lifestyle habits (including exercise, diet, weight)  Health risk behaviors (sexual history, alcohol, tobacco, drug use, partner violence)  Routine eye, dental, hearing, and skin exams  Advanced directives     Review of Systems:   Pertinent items are noted in HPI, remainder of complete ROS is negative.      Active Medications:     Current Outpatient  Prescriptions:      oxyCODONE IR (ROXICODONE) 5 MG tablet, Take 1 tablet (5 mg) by mouth every 6 hours as needed for moderate to severe pain maximum 6 tablet(s) per day, Disp: 60 tablet, Rfl: 0     spironolactone (ALDACTONE) 50 MG tablet, Takes 3 tablets (150 mg) every AM and take 2 tablets (100 mg) every PM., Disp: 150 tablet, Rfl: 11     furosemide (LASIX) 20 MG tablet, Take 3 tablets (60 mg) every AM, and take 2 tablets (40 mg) every PM., Disp: 150 tablet, Rfl: 11     gabapentin (NEURONTIN) 300 MG capsule, Take 1 capsule (300 mg) by mouth 3 times daily (Patient taking differently: Take 300 mg by mouth 3 times daily Currently taking one capsule at bedtime.), Disp: 90 capsule, Rfl: 3     gabapentin (NEURONTIN) 300 MG capsule, Take 1 capsule in the morning for 3 days, then 1 capsule twice a day for 3 days and let me know to what degree this impacts the pain. (Patient not taking: Reported on 3/12/2018), Disp: 60 capsule, Rfl: 1     ranitidine (ZANTAC) 150 MG tablet, Take 1 tablet (150 mg) by mouth 2 times daily, Disp: 180 tablet, Rfl: 3     desvenlafaxine succinate (PRISTIQ) 100 MG 24 hr tablet, Take 2 tablets (200 mg) by mouth daily, Disp: 180 tablet, Rfl: 0     calcium carbonate (OS-ERIN 500 MG [place]) 1250 MG tablet, Take 1 tablet by mouth daily , Disp: , Rfl:      rifaximin (XIFAXAN) 550 MG TABS tablet, Take 1 tablet (550 mg) by mouth 2 times daily, Disp: 60 tablet, Rfl: 11     lactulose (CHRONULAC) 10 GM/15ML solution, Take 30 mLs (20 g) by mouth 3 times daily Adjust dosing as needed to achieve 3-4 bowel movements daily., Disp: 2000 mL, Rfl: 11     insulin aspart (NOVOLOG FLEXPEN) 100 UNIT/ML injection, 20 units before breakfast, 20 units before lunch, 20 units before dinner, 20 units before snacks, Disp: 30 mL, Rfl: 3     insulin glargine (LANTUS SOLOSTAR) 100 UNIT/ML injection, Inject 40 Units Subcutaneous every morning, Disp: 30 mL, Rfl: 1     glipiZIDE (GLIPIZIDE XL) 5 MG 24 hr tablet, Take 1 tablet (5 mg)  by mouth 2 times daily, Disp: 180 tablet, Rfl: 1     insulin pen needle (B-D U/F) 31G X 8 MM, Use 6 times daily or as directed, Disp: 600 each, Rfl: 1     blood glucose monitoring (NO BRAND SPECIFIED) meter device kit, Use to test blood sugar 4 X  times daily or as directed., Disp: 1 kit, Rfl: 0     aspirin 81 MG tablet, Take 1 tablet (81 mg) by mouth daily, Disp: 30 tablet, Rfl: 11     magnesium oxide (MAG-OX) 400 (241.3 MG) MG tablet, Take 1 tablet (400 mg) by mouth daily, Disp: 90 tablet, Rfl: 3     Cholecalciferol (VITAMIN D3) 2000 UNITS CAPS, Take 1 capsule by mouth daily, Disp: 90 capsule, Rfl: 3     insulin pen needle (B-D U/F) 31G X 8 MM, USE  6 times daily / OR AS DIRECTED, Disp: 300 each, Rfl: 3     blood glucose monitoring (NO BRAND SPECIFIED) test strip, Use to test blood sugars 4 X  times daily or as directed, Disp: 200 strip, Rfl: 3     blood glucose (NO BRAND SPECIFIED) lancets standard, Use to test blood sugar 4 X  times daily or as directed., Disp: 1 Box, Rfl: 3     Cyanocobalamin (VITAMIN B-12 PO), Take 1 tablet by mouth daily, Disp: , Rfl:      STATIN NOT PRESCRIBED, INTENTIONAL,, 1 each daily Statin not prescribed intentionally due to Active liver disease, Disp: 0 each, Rfl: 0     ondansetron (ZOFRAN) 4 MG tablet, Take 1 tablet (4 mg) by mouth every 8 hours as needed for nausea, Disp: 18 tablet, Rfl: 1      Allergies:   Review of patient's allergies indicates no known allergies.      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  Splenomegaly   Recovering alcoholic in remission  Ascites   Tobacco use disorder  Hepatic encephalopathy   Esophageal reflux  Esophageal varices, banded in 2011  Umbilical hernia, s/p repair  History of colonic polyps: tubular adenomas and  serrated adenomas  Kidney stones  Squamous cell carcinoma  Plantar warts  Corns and callosities   Prurigo nodularis  Dental caries  Mild major depression     Past Surgical History:  Biopsy of skin lesion  Excise lesion trunk  Vasectomy  Herniorrhaphy umbilical     Family History:   Mother - breast cancer, liver cancer  Father - cardiovascular, cerebrovascular disease, hypotension, rectal cancer  Paternal grandfather - cardiovascular  Brother - diabetes  Sister - skin cancer, breast cancer      Social History:   Marital Status:   Tobacco Use: current every day smoker, 0.3 PPD cigarettes  Alcohol Use: none since 12/2012  PCP: Ary Murphy      Physical Exam:     Wt Readings from Last 1 Encounters:   03/21/18 (!) 150.1 kg (331 lb)     Constitutional: no acute distress, appears unhappy but cooperative.   Eyes: anicteric.   Cardiovascular: Cardiovascular: RRR II/VI systolic murmur, No ricky, or rubs or extra heart sounds. Normal S1/diminished S2.  Bilateral LE edema with weeping wounds bilaterally.  No areas of intense redness or edema or purulent   Respiratory: clear to auscultation, no wheezes or crackles, normal breath sounds   Gastrointestinal: ascites.   Musculoskeletal: full range of motion, no edema   Neurological: t, normal speech, no tremor. A and O x 3, good historian.  Psychological: appropriate mood, good eye contact, normal affect      Assessment and Plan:  Bilateral lower extremity edema with skin breakdown.  Pt is interested with a higher dose of diuretic.   Will increase his Lasix and spironolactone.        Scribe Disclosure:   Kylie WANG, am serving as a scribe to document services personally performed by CORY Frye CNP at this visit, based upon the provider's statements to me. All documentation has been reviewed by the aforementioned provider prior to being entered into the official medical record.     Portions of this medical record were completed by a scribe.  UPON MY REVIEW AND AUTHENTICATION BY ELECTRONIC SIGNATURE, this confirms (a) I performed the applicable clinical services, and (b) the record is accurate.    Total time spent 25 minutes.  More than 50% of the time spent with Mr. Louie on counseling / coordinating his care    Ary RAY CNP

## 2018-04-06 ENCOUNTER — RADIANT APPOINTMENT (OUTPATIENT)
Dept: ULTRASOUND IMAGING | Facility: CLINIC | Age: 63
End: 2018-04-06
Attending: INTERNAL MEDICINE
Payer: COMMERCIAL

## 2018-04-06 ENCOUNTER — RADIANT APPOINTMENT (OUTPATIENT)
Dept: GENERAL RADIOLOGY | Facility: CLINIC | Age: 63
End: 2018-04-06
Attending: NURSE PRACTITIONER
Payer: COMMERCIAL

## 2018-04-06 ENCOUNTER — DOCUMENTATION ONLY (OUTPATIENT)
Dept: CARE COORDINATION | Facility: CLINIC | Age: 63
End: 2018-04-06

## 2018-04-06 ENCOUNTER — OFFICE VISIT (OUTPATIENT)
Dept: GASTROENTEROLOGY | Facility: CLINIC | Age: 63
End: 2018-04-06
Attending: INTERNAL MEDICINE
Payer: COMMERCIAL

## 2018-04-06 ENCOUNTER — OFFICE VISIT (OUTPATIENT)
Dept: INFUSION THERAPY | Facility: CLINIC | Age: 63
End: 2018-04-06
Attending: INTERNAL MEDICINE
Payer: COMMERCIAL

## 2018-04-06 VITALS
HEART RATE: 96 BPM | WEIGHT: 315 LBS | HEIGHT: 74 IN | DIASTOLIC BLOOD PRESSURE: 70 MMHG | SYSTOLIC BLOOD PRESSURE: 129 MMHG | BODY MASS INDEX: 40.43 KG/M2 | TEMPERATURE: 97.9 F

## 2018-04-06 VITALS
OXYGEN SATURATION: 96 % | BODY MASS INDEX: 42.51 KG/M2 | HEART RATE: 77 BPM | WEIGHT: 315 LBS | SYSTOLIC BLOOD PRESSURE: 129 MMHG | DIASTOLIC BLOOD PRESSURE: 53 MMHG

## 2018-04-06 DIAGNOSIS — K70.31 ALCOHOLIC CIRRHOSIS OF LIVER WITH ASCITES (H): Primary | ICD-10-CM

## 2018-04-06 DIAGNOSIS — M79.672 LEFT FOOT PAIN: ICD-10-CM

## 2018-04-06 DIAGNOSIS — E11.51 TYPE II DIABETES MELLITUS WITH PERIPHERAL CIRCULATORY DISORDER (H): ICD-10-CM

## 2018-04-06 LAB
ALBUMIN SERPL-MCNC: 2.8 G/DL (ref 3.4–5)
ALP SERPL-CCNC: 127 U/L (ref 40–150)
ALT SERPL W P-5'-P-CCNC: 17 U/L (ref 0–70)
ANION GAP SERPL CALCULATED.3IONS-SCNC: 7 MMOL/L (ref 3–14)
AST SERPL W P-5'-P-CCNC: 31 U/L (ref 0–45)
BILIRUB DIRECT SERPL-MCNC: 0.6 MG/DL (ref 0–0.2)
BILIRUB SERPL-MCNC: 1.6 MG/DL (ref 0.2–1.3)
BUN SERPL-MCNC: 16 MG/DL (ref 7–30)
CALCIUM SERPL-MCNC: 8.6 MG/DL (ref 8.5–10.1)
CHLORIDE SERPL-SCNC: 104 MMOL/L (ref 94–109)
CO2 SERPL-SCNC: 25 MMOL/L (ref 20–32)
CREAT SERPL-MCNC: 0.83 MG/DL (ref 0.66–1.25)
ERYTHROCYTE [DISTWIDTH] IN BLOOD BY AUTOMATED COUNT: 20.1 % (ref 10–15)
GFR SERPL CREATININE-BSD FRML MDRD: >90 ML/MIN/1.7M2
GLUCOSE SERPL-MCNC: 65 MG/DL (ref 70–99)
HBA1C MFR BLD: NORMAL % (ref 0–6.4)
HCT VFR BLD AUTO: 29.1 % (ref 40–53)
HGB BLD-MCNC: 9.3 G/DL (ref 13.3–17.7)
INR PPP: 1.4 (ref 0.86–1.14)
MCH RBC QN AUTO: 29.8 PG (ref 26.5–33)
MCHC RBC AUTO-ENTMCNC: 32 G/DL (ref 31.5–36.5)
MCV RBC AUTO: 93 FL (ref 78–100)
PLATELET # BLD AUTO: 97 10E9/L (ref 150–450)
POTASSIUM SERPL-SCNC: 3.8 MMOL/L (ref 3.4–5.3)
PROT SERPL-MCNC: 6.8 G/DL (ref 6.8–8.8)
RBC # BLD AUTO: 3.12 10E12/L (ref 4.4–5.9)
SODIUM SERPL-SCNC: 136 MMOL/L (ref 133–144)
WBC # BLD AUTO: 3.1 10E9/L (ref 4–11)

## 2018-04-06 PROCEDURE — G0463 HOSPITAL OUTPT CLINIC VISIT: HCPCS | Mod: ZF

## 2018-04-06 PROCEDURE — 25000128 H RX IP 250 OP 636: Mod: ZF | Performed by: INTERNAL MEDICINE

## 2018-04-06 PROCEDURE — 49083 ABD PARACENTESIS W/IMAGING: CPT

## 2018-04-06 PROCEDURE — 80076 HEPATIC FUNCTION PANEL: CPT | Performed by: INTERNAL MEDICINE

## 2018-04-06 PROCEDURE — 85610 PROTHROMBIN TIME: CPT | Performed by: INTERNAL MEDICINE

## 2018-04-06 PROCEDURE — P9047 ALBUMIN (HUMAN), 25%, 50ML: HCPCS | Mod: ZF | Performed by: INTERNAL MEDICINE

## 2018-04-06 PROCEDURE — 80048 BASIC METABOLIC PNL TOTAL CA: CPT | Performed by: INTERNAL MEDICINE

## 2018-04-06 PROCEDURE — 25000125 ZZHC RX 250: Mod: ZF | Performed by: INTERNAL MEDICINE

## 2018-04-06 PROCEDURE — 85027 COMPLETE CBC AUTOMATED: CPT | Performed by: INTERNAL MEDICINE

## 2018-04-06 RX ORDER — ALBUMIN (HUMAN) 12.5 G/50ML
12.5 SOLUTION INTRAVENOUS 4 TIMES DAILY PRN
Status: CANCELLED
Start: 2018-04-06

## 2018-04-06 RX ORDER — ALBUMIN (HUMAN) 12.5 G/50ML
12.5 SOLUTION INTRAVENOUS 4 TIMES DAILY PRN
Status: DISCONTINUED | OUTPATIENT
Start: 2018-04-06 | End: 2018-04-06 | Stop reason: HOSPADM

## 2018-04-06 RX ADMIN — LIDOCAINE HYDROCHLORIDE 20 ML: 10 INJECTION, SOLUTION INFILTRATION; PERINEURAL at 12:37

## 2018-04-06 RX ADMIN — ALBUMIN HUMAN 50 G: 0.25 SOLUTION INTRAVENOUS at 12:50

## 2018-04-06 ASSESSMENT — PAIN SCALES - GENERAL: PAINLEVEL: NO PAIN (0)

## 2018-04-06 NOTE — NURSING NOTE
"Chief Complaint   Patient presents with     RECHECK     follow up with cirrhosis, tzimmer cma       Initial /70  Pulse 96  Temp 97.9  F (36.6  C) (Oral)  Ht 1.88 m (6' 2\")  Wt (!) 155.8 kg (343 lb 6.4 oz)  BMI 44.09 kg/m2 Estimated body mass index is 44.09 kg/(m^2) as calculated from the following:    Height as of this encounter: 1.88 m (6' 2\").    Weight as of this encounter: 155.8 kg (343 lb 6.4 oz).  Medication Reconciliation: complete    "

## 2018-04-06 NOTE — PROGRESS NOTES
ASSESSMENT AND PLAN:  A 62-year-old man with alcoholic and SMITH cirrhosis.  He has been sober from alcohol for 5 years. S/P TIPS 5 months ago. Starting to see some improvement in ascites, with decreased frequency of paracentesis and about 1/2 the previous volume. Diuretics recently increased and he is on high doses. Will get BMP with paracentesis today. He is not adherent to a low sodium diet.     Has TIPS US and IR visit coming up.     Ongoing, very long-standing issues with severe LE edema, now worse per his report, after TIPS. Discussed that this is very long-standing and severe and will likely never be completely resolved, but would aim for continued improvement.    Diabetes under better control.     Does not appear to need lactulose. Unclear if he is taking rifaximin. No system for keeping track of meds. We provided med card last time. He is not using it.    Liver is overall stable. RTC 6 mo     Meghna Simmons MD  Hepatology/Liver Transplant  Medical Director, Liver Transplantation  [facility]  ===================================================================  SUBJECTIVE:  Mr. Louie is a 62-year-old man with SMITH and alcoholic cirrhosis He has been sober from alcohol since 2012. He underwent TIPS 10/27/17. He had a hospitalization shortly after for weakness, dehydration, n/v. He was discharged to home.    Taking gus 50 mg tabs, 3 in the am and 2 in the pm. Furosemide 40 mg tabs, taking 3 tabs twice a day, had been 60 mg bid. He had cramping and started gabapentin at night. Also eats a few bananas and this seems to help. Taking magnesium tab also for a long time. Not on zinc or vitamin E.     Diet: does not add salt but he does not restrict sodium otherwise.  He eats potato chips, pickled herring and other food very high in sodium because they taste good.     His biggest complaint is LE edema, which he has had for many years and he says is worse now after TIPS. He has seen lymphedema  "clinic but there are concerns about whether this will be accessible for him re: insurance issues and frequency of visits. They are recommending 3-4 visits per week. Having trouble getting in and out of vehicles. Wrapping legs in diapers and ace. Has pain in the legs that he says is worse than pre-TIPS. He is now seeing the lympehedma clinic.         Not taking lactulose. Doesn't think he needs it. No confusion but he is forgetful. Has about 1 BM/day. He thinks he is taking the rifaximin.     Paracentesis is now less often and less fluid. Was weekly and 8-10L at a time, now every week with about 1-4 L. Feels better when he has para.    EGD 6/16/16 grade 1 varices  HCC screening 11/29/17     OTHER COMORBIDITIES:  He has diabetes, GERD, hyperlipidemia , morbid obesity      SOCIAL HISTORY:  He lives with his wife.  He has 3 kids in the [facility].  He is retired. Wife is here with him today.      PHYSICAL EXAMINATION:   /70  Pulse 96  Temp 97.9  F (36.6  C) (Oral)  Ht 1.88 m (6' 2\")  Wt (!) 155.8 kg (343 lb 6.4 oz)  BMI 44.09 kg/m2    GENERAL:  well-appearing, in no acute distress.   HEENT:  No icterus, no oral lesions. Very poor dentition. Multiple missing teeth, broken teeth.  LYMPH:  No supraclavicular or cervical lymphadenopathy.   CARDIOVASCULAR:  Regular rate and rhythm.   CHEST:  Lungs are clear.   ABDOMEN:  Bowel sounds are present.  He is morbidly obese.   EXTREMITIES:  Significant woody edema bilaterally.   NEUROLOGIC:  Speech is fluent and clear.  No asterixis or tremor.       LABORATORY DATA:  Recent labs were reviewed. Overall stable.  "

## 2018-04-06 NOTE — PATIENT INSTRUCTIONS
Dear Lawrence Louie    Thank you for choosing [facility] ([facility]) for your procedure.  The following information is a summary of our appointment as well as important reminders.      Additional information: Your paracentesis procedure today, beginning weight 345.5 lb (156.7 kg), removed 4.8 liters ascites fluid, gave 50.0 grams albumin fluid, ending weight 331.4 lb (150.2 kg).    We look forward in seeing you on your next appointment here at [facility].  Please don t hesitate to call us at [phone] to reschedule any of your appointments or to speak with one of the [facility] registered nurses.  It was a pleasure taking care of you today.    Sincerely,    [facility]  Specialty Infusion [facility] Procedure [facility]  [address] [place] [address]  [id number]  Phone:  [phone]  DISCHARGE INSTRUCTIONS FOLLOWING ABDOMINAL PARACENTESIS    After you go home:    No strenuous activity for 24 hours    Resume your regular diet    Limit fluid intake for the first 48 hours to no more than 2 quarts per day.  There should be minimal drainage from the needle site.  If drainage does occur and soaks through the bandage, apply gentle pressure with your hand for 5 minutes.    Notify MD for the following:    Excessive drainage    Excessive swelling, redness or tenderness at the needle site    Fever greater than 101 degrees F    Dizziness or light-headedness when getting up or walking      IF THIS IS A MEDICAL EMERGENCY, CALL 911    If you have any questions or concerns    Contact the Hepatology Clinic:   [phone]    If you are a post-transplant patient, contact the Transplant Office:  [phone]    If this is after hours, contact the hospital :  [phone] and as to have the GI resident on call paged                   I have received and understand my discharge instructions and I have all of my personal  belongings.          ------------------------------------------------------        ---------------------------------------------------    Patient / Significant Other's Signature     Relationship

## 2018-04-06 NOTE — MR AVS SNAPSHOT
After Visit Summary   4/6/2018    Lawrence Louie    MRN: [id number]           Patient Information     Date Of Birth          1955        Visit Information        Provider Department      4/6/2018 11:30 AM Meghna Simmons MD [facility] Hepatology        Today's Diagnoses     Alcoholic cirrhosis of liver with ascites (H)    -  1    Type II diabetes mellitus with peripheral circulatory disorder (H)           Follow-ups after your visit        Follow-up notes from your care team     Return in about 6 months (around 10/6/2018).      Your next 10 appointments already scheduled     Apr 06, 2018  2:20 PM CDT   XR FOOT LEFT G/E 3 VIEWS with UCXR1   [facility] Xray ([facility])    [address] [place] [id number] [phone]           Please bring a list of your current medicines to your exam. (Include vitamins, minerals and over-thecounter medicines.) Leave your valuables at home.  Tell your doctor if there is a chance you may be pregnant.  You do not need to do anything special for this exam.            Apr 11, 2018  2:30 PM CDT   US ABDOMEN/PELVIS DUPLEX COMPLETE with UCUS1   [facility] [place] ([facility])    [address] [place] [id number] [phone]           Please bring a list of your medicines (including vitamins, minerals and over-the-counter drugs). Also, tell your doctor about any allergies you may have. Wear comfortable clothes and leave your valuables at home.  Adults: No eating or drinking for 8 hours before the exam. You may take medicine with a small sip of water.  Children: - Children 6+ years: No food or drink for 6 hours before exam. - Children 1-5 years: No food or drink for 4 hours before exam. - Infants, breast-fed: may have breast milk up to 2 hours before exam. - Infants, formula: may have bottle until 4 hours before exam.  Please call  the Imaging Department at your exam site with any questions.            Apr 11, 2018  3:40 PM CDT   (Arrive by 3:25 PM)   Return Vascular Visit with David Davis MD   [facility] ([facility])    [address]  3rd Floor  [place] [address]   [phone]            Apr 13, 2018 12:00 PM CDT   Paracentesis Visit with Uc Spec Inf Para Provider, UC 40 ATC   [facility] ([facility])    [address]  [address]  [place] [address]   [phone]            Apr 20, 2018 12:00 PM CDT   Paracentesis Visit with Uc Spec Inf Para Provider, UC 39 ATC   [facility] ([facility])    [address]  [address]  [place] [address]   [phone]            Apr 27, 2018 12:00 PM CDT   Paracentesis Visit with Uc Spec Inf Para Provider, UC 40 ATC   [facility] ([facility])    [address]  [address]  [place] [address]   [phone]            May 03, 2018  2:15 PM CDT   (Arrive by 2:00 PM)   Return Visit with Trav Hartman MD   [facility] Dermatology ([facility])    [address] [place] [address]  [address]rd [facility] [address]   [phone]              Future tests that were ordered for you today     Open Future Orders        Priority Expected Expires Ordered    Hemoglobin A1c Routine 4/6/2018 4/6/2019 4/6/2018    INR Routine 4/6/2018 4/27/2018 4/6/2018    CBC with platelets Routine 4/6/2018 4/27/2018 4/6/2018    Basic metabolic panel Routine 4/6/2018 4/27/2018 4/6/2018    Hepatic panel Routine 4/6/2018 4/27/2018 4/6/2018    XR Foot Left 2 Views Routine 4/5/2018 4/5/2019 4/5/2018    Albumin Random Urine Quantitative with Creat Ratio Routine 4/5/2018 4/5/2019 4/5/2018    Hemoglobin A1c Routine   "4/5/2019 4/5/2018            Who to contact     If you have questions or need follow up information about today's clinic visit or your schedule please contact [facility] HEPATOLOG[facility] directly at [phone].  Normal or non-critical lab and imaging results will be communicated to you by MyChart, letter or phone within 4 business days after the clinic has received the results. If you do not hear from us within 7 days, please contact the clinic through [employer] or phone. If you have a critical or abnormal lab result, we will notify you by phone as soon as possible.  Submit refill requests through [employer] or call your pharmacy and they will forward the refill request to us. Please allow 3 business days for your refill to be completed.          Additional Information About Your Visit        [employer] Information     [employer] gives you secure access to your electronic health record. If you see a primary care provider, you can also send messages to your care team and make appointments. If you have questions, please call your primary care clinic.  If you do not have a primary care provider, please call [phone] and they will assist you.        Care EveryWhere ID     This is your Care EveryWhere ID. This could be used by other organizations to access your [place] medical records  [id number]        Your Vitals Were     Pulse Temperature Height BMI (Body Mass Index)          96 97.9  F (36.6  C) (Oral) 1.88 m (6' 2\") 44.09 kg/m2         Blood Pressure from Last 3 Encounters:   04/06/18 129/70   04/05/18 138/76   03/21/18 122/42    Weight from Last 3 Encounters:   04/06/18 (!) 155.8 kg (343 lb 6.4 oz)   04/05/18 (!) 157.4 kg (347 lb)   03/21/18 (!) 150.1 kg (331 lb)                 Today's Medication Changes          These changes are accurate as of 4/6/18 12:05 PM.  If you have any questions, ask your nurse or doctor.               These medicines have changed or have updated prescriptions.        Dose/Directions    gabapentin " 300 MG capsule   Commonly known as:  NEURONTIN   This may have changed:  additional instructions   Used for:  Diabetic polyneuropathy associated with type 2 diabetes mellitus (H)        Dose:  300 mg   Take 1 capsule (300 mg) by mouth 3 times daily   Quantity:  90 capsule   Refills:  3                Primary Care Provider Office Phone # Fax #    CORY Toussaint [phone]       [address] [place] [phone]  [facility] [address]        Equal Access to Services     JOSEF LORENZO : Hadii aad ku hadasho Soomaali, waaxda luqadaha, qaybta kaalmada adeegyada, waxay idiin hayaan adeeg kharash la'miguel . So [facility] [phone].    ATENCIÓN: Si habla español, tiene a rondon disposición servicios gratuitos de asistencia lingüística. [facility] [phone].    We comply with applicable federal civil rights laws and [place] laws. We do not discriminate on the basis of race, color, national origin, age, disability, sex, sexual orientation, or gender identity.            Thank you!     Thank you for choosing [facility] HEPATOLOGY  for your care. Our goal is always to provide you with excellent care. Hearing back from our patients is one way we can continue to improve our services. Please take a few minutes to complete the written survey that you may receive in the mail after your visit with us. Thank you!             Your Updated Medication List - Protect others around you: Learn how to safely use, store and throw away your medicines at www.disposemymeds.org.          This list is accurate as of 4/6/18 12:05 PM.  Always use your most recent med list.                   Brand Name Dispense Instructions for use Diagnosis    blood glucose lancets standard    no brand specified    1 Box    Use to test blood sugar 4 X  times daily or as directed.    Diabetes mellitus, type 2 (H)       blood glucose monitoring meter device kit    no brand specified    1 kit    Use to test blood sugar 4 X  times daily or as directed.    Type  2 diabetes mellitus with other specified complication (H)       blood glucose monitoring test strip    no brand specified    200 strip    Use to test blood sugars 4 X  times daily or as directed    Diabetes mellitus, type 2 (H)       calcium carbonate 1250 MG tablet    OS-ERIN 500 mg [place]. [place]     Take 1 tablet by mouth daily        citalopram 40 MG tablet    celeXA    30 tablet    Take 1 tablet (40 mg) by mouth daily    Recurrent major depressive disorder, remission status unspecified (H)       desvenlafaxine succinate 100 MG 24 hr tablet    PRISTIQ    180 tablet    Take 2 tablets (200 mg) by mouth daily    Other depression       furosemide 40 MG tablet    LASIX    540 tablet    Take 3 tablets twice a day.    Edema of both legs       gabapentin 300 MG capsule    NEURONTIN    90 capsule    Take 1 capsule (300 mg) by mouth 3 times daily    Diabetic polyneuropathy associated with type 2 diabetes mellitus (H)       glipiZIDE 5 MG 24 hr tablet    glipiZIDE XL    180 tablet    Take 1 tablet (5 mg) by mouth 2 times daily    Type 2 diabetes mellitus without complication, with long-term current use of insulin (H)       insulin aspart 100 UNIT/ML injection    NovoLOG FLEXPEN    30 mL    20 units before breakfast, 20 units before lunch, 20 units before dinner, 20 units before snacks    Type 2 diabetes mellitus without complication, with long-term current use of insulin (H)       insulin glargine 100 UNIT/ML injection    LANTUS SOLOSTAR    30 mL    Inject 40 Units Subcutaneous every morning    Type 2 diabetes mellitus with other oral complication, unspecified long term insulin use status (H)       * insulin pen needle 31G X 8 MM    B-D U/F    300 each    USE  6 times daily / OR AS DIRECTED    Type 2 diabetes, HbA1c goal < 7% (H)       * insulin pen needle 31G X 8 MM    B-D U/F    600 each    Use 6 times daily or as directed    Type 2 diabetes, HbA1c goal < 7% (H)       magnesium oxide 400 (241.3 MG) MG tablet    MAG-OX    90  tablet    Take 1 tablet (400 mg) by mouth daily    Cramp of limb       ondansetron 4 MG tablet    ZOFRAN    18 tablet    Take 1 tablet (4 mg) by mouth every 8 hours as needed for nausea    Alcoholic cirrhosis (H), Nausea       oxyCODONE IR 5 MG tablet    ROXICODONE    90 tablet    Take 1 tablet every 8 hours as needed    Alcoholic cirrhosis of liver with ascites (H)       ranitidine 150 MG tablet    ZANTAC    180 tablet    Take 1 tablet (150 mg) by mouth 2 times daily    Esophageal varices (H)       rifaximin 550 MG Tabs tablet    XIFAXAN    60 tablet    Take 1 tablet (550 mg) by mouth 2 times daily    Hepatic encephalopathy (H)       spironolactone 50 MG tablet    ALDACTONE    150 tablet    Takes 3 tablets (150 mg) every AM and take 2 tablets (100 mg) every PM.    Portal hypertension (H), Generalized edema       STATIN NOT PRESCRIBED (INTENTIONAL)     0 each    1 each daily Statin not prescribed intentionally due to Active liver disease    Hyperlipidemia LDL goal <100       VITAMIN B-12 PO      Take 1 tablet by mouth daily        vitamin D3 2000 UNITS Caps     90 capsule    Take 1 capsule by mouth daily    Mild major depression (H)       * Notice:  This list has 2 medication(s) that are the same as other medications prescribed for you. Read the directions carefully, and ask your doctor or other care provider to review them with you.

## 2018-04-06 NOTE — PATIENT INSTRUCTIONS
Your x-ray is scheduled for tomorrow (Friday 4.6.18) after your paracentesis.    Ary also ordered a Hgb A1C and urine test, please call to schedule a lab appointment for this at [phone].

## 2018-04-06 NOTE — MR AVS SNAPSHOT
After Visit Summary   4/6/2018    Lawrence Louie    MRN: [id number]           Patient Information     Date Of Birth          1955        Visit Information        Provider Department      4/6/2018 12:00 PM Vipin Fisher PA-C;  40 ATC M [facility] Specialty and Procedure        Today's Diagnoses     Alcoholic cirrhosis of liver with ascites (H)    -  1    Type II diabetes mellitus with peripheral circulatory disorder (H)          Care Instructions    Dear Lawrence NOHEMY Louie    Thank you for choosing [facility] ([facility]) for your procedure.  The following information is a summary of our appointment as well as important reminders.      Additional information: Your paracentesis procedure today, beginning weight 345.5 lb (156.7 kg), removed 4.8 liters ascites fluid, gave 50.0 grams albumin fluid, ending weight 331.4 lb (150.2 kg).    We look forward in seeing you on your next appointment here at [facility].  Please don t hesitate to call us at [phone] to reschedule any of your appointments or to speak with one of the [facility] registered nurses.  It was a pleasure taking care of you today.    Sincerely,    [facility]  [facility]  [address] [address]  [address]  Phone:  [phone]  DISCHARGE INSTRUCTIONS FOLLOWING ABDOMINAL PARACENTESIS    After you go home:    No strenuous activity for 24 hours    Resume your regular diet    Limit fluid intake for the first 48 hours to no more than 2 quarts per day.  There should be minimal drainage from the needle site.  If drainage does occur and soaks through the bandage, apply gentle pressure with your hand for 5 minutes.    Notify MD for the following:    Excessive drainage    Excessive swelling, redness or tenderness at the needle site    Fever greater than 101 degrees F    Dizziness or  light-headedness when getting up or walking      IF THIS IS A MEDICAL EMERGENCY, CALL 911    If you have any questions or concerns    Contact the Hepatology Clinic:   [phone]    If you are a post-transplant patient, contact the Transplant Office:  [phone]    If this is after hours, contact the hospital :  [phone] and as to have the GI resident on call paged                   I have received and understand my discharge instructions and I have all of my personal belongings.          ------------------------------------------------------        ---------------------------------------------------    Patient / Significant Other's Signature     Relationship              Follow-ups after your visit        Your next 10 appointments already scheduled     Apr 11, 2018  2:30 PM CDT   US ABDOMEN/PELVIS DUPLEX COMPLETE with UCUS1   [facility] US ([facility])    [address] [place] [address] [phone]           Please bring a list of your medicines (including vitamins, minerals and over-the-counter drugs). Also, tell your doctor about any allergies you may have. Wear comfortable clothes and leave your valuables at home.  Adults: No eating or drinking for 8 hours before the exam. You may take medicine with a small sip of water.  Children: - Children 6+ years: No food or drink for 6 hours before exam. - Children 1-5 years: No food or drink for 4 hours before exam. - Infants, breast-fed: may have breast milk up to 2 hours before exam. - Infants, formula: may have bottle until 4 hours before exam.  Please call the Imaging Department at your exam site with any questions.            Apr 11, 2018  3:40 PM CDT   (Arrive by 3:25 PM)   Return Vascular Visit with David Davis MD   [facility] ([facility])    [address] [place] [address] [phone]            Apr 13, 2018  12:00 PM CDT   Paracentesis Visit with Uc Spec Inf Para Provider, UC 40 ATC   [facility] Specialty and Procedure ([facility])    [address]  [address]  [facility] [id number] [phone]            Apr 20, 2018 12:00 PM CDT   Paracentesis Visit with Uc Spec Inf Para Provider, UC 39 ATC   [facility] ([facility])    [address]  [address]  [facility] [id number] [phone]            Apr 27, 2018 12:00 PM CDT   Paracentesis Visit with Uc Spec Inf Para Provider, UC 40 ATC   [facility] ([facility])    [address]  [address]  [facility] [id number] [phone]            May 03, 2018  2:15 PM CDT   (Arrive by 2:00 PM)   Return Visit with Trav Hartman MD   [facility] ([facility])    [address] [place] [address]  3rd Floor  [facility] [id number] [phone]            May 04, 2018 12:00 PM CDT   Paracentesis Visit with Uc Spec Inf Para Provider   [facility] ([facility])    [address]  [address]  [facility] [id number] [phone]              Future tests that were ordered for you today     Open Future Orders        Priority Expected Expires Ordered    Albumin Random Urine Quantitative with Creat Ratio Routine 4/5/2018 4/5/2019 4/5/2018    Hemoglobin A1c Routine  4/5/2019 4/5/2018            Who to contact     If you have questions or need follow up information about today's clinic visit or your schedule please contact [facility] SPECIALTY AND PROCEDURE directly at [phone].  Normal or non-critical lab and imaging results will be communicated to you by MyChart, letter or phone within 4 business days after the clinic has received the  results. If you do not hear from us within 7 days, please contact the clinic through apprupt or phone. If you have a critical or abnormal lab result, we will notify you by phone as soon as possible.  Submit refill requests through apprupt or call your pharmacy and they will forward the refill request to us. Please allow 3 business days for your refill to be completed.          Additional Information About Your Visit        [facility] Information     apprupt gives you secure access to your electronic health record. If you see a primary care provider, you can also send messages to your care team and make appointments. If you have questions, please call your primary care clinic.  If you do not have a primary care provider, please call [phone] and they will assist you.        Care EveryWhere ID     This is your Care EveryWhere ID. This could be used by other organizations to access your [facility] medical records  [id number]        Your Vitals Were     Pulse Pulse Oximetry BMI (Body Mass Index)             77 96% 42.51 kg/m2          Blood Pressure from Last 3 Encounters:   04/06/18 129/53   04/06/18 129/70   04/05/18 138/76    Weight from Last 3 Encounters:   04/06/18 (!) 150.2 kg (331 lb 1.6 oz)   04/06/18 (!) 155.8 kg (343 lb 6.4 oz)   04/05/18 (!) 157.4 kg (347 lb)              We Performed the Following     Basic metabolic panel     CBC with platelets     Hemoglobin A1c     Hepatic panel     INR     US Paracentesis          Today's Medication Changes          These changes are accurate as of 4/6/18  2:37 PM.  If you have any questions, ask your nurse or doctor.               These medicines have changed or have updated prescriptions.        Dose/Directions    gabapentin 300 MG capsule   Commonly known as:  NEURONTIN   This may have changed:  additional instructions   Used for:  Diabetic polyneuropathy associated with type 2 diabetes mellitus (H)        Dose:  300 mg   Take 1 capsule (300 mg) by mouth 3 times  daily   Quantity:  90 capsule   Refills:  3                Primary Care Provider Office Phone # Fax #    Ary Murphy, APRN [phone]       [address] [place] [address] [address] [place] [address]        Equal Access to Services     JOSEF LORENZO : Hadii sil ku hadgardeniao Soomaali, waaxda luqadaha, qaybta kaalmada adeegyada, waxmary idiin hayaan adesarah galarza debra . So [facility] [phone].    ATENCIÓN: Si habla español, tiene a rondon disposición servicios gratuitos de asistencia lingüística. Llame al [phone].    We comply with applicable federal civil rights laws and [place] laws. We do not discriminate on the basis of race, color, national origin, age, disability, sex, sexual orientation, or gender identity.            Thank you!     Thank you for choosing [facility] SPECIALTY AND PROCEDURE  for your care. Our goal is always to provide you with excellent care. Hearing back from our patients is one way we can continue to improve our services. Please take a few minutes to complete the written survey that you may receive in the mail after your visit with us. Thank you!             Your Updated Medication List - Protect others around you: Learn how to safely use, store and throw away your medicines at www.disposemymeds.org.          This list is accurate as of 4/6/18  2:37 PM.  Always use your most recent med list.                   Brand Name Dispense Instructions for use Diagnosis    blood glucose lancets standard    no brand specified    1 Box    Use to test blood sugar 4 X  times daily or as directed.    Diabetes mellitus, type 2 (H)       blood glucose monitoring meter device kit    no brand specified    1 kit    Use to test blood sugar 4 X  times daily or as directed.    Type 2 diabetes mellitus with other specified complication (H)       blood glucose monitoring test strip    no brand specified    200 strip    Use to test blood sugars 4 X  times daily or as directed     Diabetes mellitus, type 2 (H)       calcium carbonate 1250 MG tablet    OS-ERIN 500 mg Tribe. Ca     Take 1 tablet by mouth daily        citalopram 40 MG tablet    celeXA    30 tablet    Take 1 tablet (40 mg) by mouth daily    Recurrent major depressive disorder, remission status unspecified (H)       desvenlafaxine succinate 100 MG 24 hr tablet    PRISTIQ    180 tablet    Take 2 tablets (200 mg) by mouth daily    Other depression       furosemide 40 MG tablet    LASIX    540 tablet    Take 3 tablets twice a day.    Edema of both legs       gabapentin 300 MG capsule    NEURONTIN    90 capsule    Take 1 capsule (300 mg) by mouth 3 times daily    Diabetic polyneuropathy associated with type 2 diabetes mellitus (H)       glipiZIDE 5 MG 24 hr tablet    glipiZIDE XL    180 tablet    Take 1 tablet (5 mg) by mouth 2 times daily    Type 2 diabetes mellitus without complication, with long-term current use of insulin (H)       insulin aspart 100 UNIT/ML injection    NovoLOG FLEXPEN    30 mL    20 units before breakfast, 20 units before lunch, 20 units before dinner, 20 units before snacks    Type 2 diabetes mellitus without complication, with long-term current use of insulin (H)       insulin glargine 100 UNIT/ML injection    LANTUS SOLOSTAR    30 mL    Inject 40 Units Subcutaneous every morning    Type 2 diabetes mellitus with other oral complication, unspecified long term insulin use status (H)       * insulin pen needle 31G X 8 MM    B-D U/F    300 each    USE  6 times daily / OR AS DIRECTED    Type 2 diabetes, HbA1c goal < 7% (H)       * insulin pen needle 31G X 8 MM    B-D U/F    600 each    Use 6 times daily or as directed    Type 2 diabetes, HbA1c goal < 7% (H)       magnesium oxide 400 (241.3 MG) MG tablet    MAG-OX    90 tablet    Take 1 tablet (400 mg) by mouth daily    Cramp of limb       ondansetron 4 MG tablet    ZOFRAN    18 tablet    Take 1 tablet (4 mg) by mouth every 8 hours as needed for nausea    Alcoholic  cirrhosis (H), Nausea       oxyCODONE IR 5 MG tablet    ROXICODONE    90 tablet    Take 1 tablet every 8 hours as needed    Alcoholic cirrhosis of liver with ascites (H)       ranitidine 150 MG tablet    ZANTAC    180 tablet    Take 1 tablet (150 mg) by mouth 2 times daily    Esophageal varices (H)       rifaximin 550 MG Tabs tablet    XIFAXAN    60 tablet    Take 1 tablet (550 mg) by mouth 2 times daily    Hepatic encephalopathy (H)       spironolactone 50 MG tablet    ALDACTONE    150 tablet    Takes 3 tablets (150 mg) every AM and take 2 tablets (100 mg) every PM.    Portal hypertension (H), Generalized edema       STATIN NOT PRESCRIBED (INTENTIONAL)     0 each    1 each daily Statin not prescribed intentionally due to Active liver disease    Hyperlipidemia LDL goal <100       VITAMIN B-12 PO      Take 1 tablet by mouth daily        vitamin D3 2000 UNITS Caps     90 capsule    Take 1 capsule by mouth daily    Mild major depression (H)       * Notice:  This list has 2 medication(s) that are the same as other medications prescribed for you. Read the directions carefully, and ask your doctor or other care provider to review them with you.

## 2018-04-06 NOTE — PROGRESS NOTES
Paracentesis Nursing Note  Lawrence Louie [facility].    During today's appointment orders from Meghna Simmons MD were completed.    Progress Note:  Patient identification verified by name and date of birth.  Assessment completed.  Vitals monitored throughout appointment and recorded in Doc Flowsheets.  See proceduralist note in ultrasound.    Date of consent or authorization: 3/8/18 - 6/8/18.  Invasive Procedure Safety Checklist was completed and sent for scanning.     Paracentesis performed by Vipin Fisher PA-C Radiology.    The following labs were communicated to provider performing paracentesis:  Lab Results   Component Value Date    PLT 97 04/06/2018       Total amount of ascites fluid drained: 4.8 liters.  Color of ascites fluid: yellow.  Total amount of albumin given: 50  grams.    Patient tolerated procedure well.    Post procedure,denies pain or discomfort post paracentesis.      Discharge Plan:  Discharge instructions were reviewed with patient.  Patient/Representative verbalized understanding and all questions were answered.   Dis[facility] in stable condition.    Miranda Kenyon RN    Administrations This Visit     albumin human 25 % injection 12.5 g     Admin Date Action Dose Route Administered By             04/06/2018 New Bag 50 g Intravenous Miranda Kenyon RN                    lidocaine 1 % 20 mL     Admin Date Action Dose Route Administered By             04/06/2018 Given by Other 20 mL Injection Miranda Kenyon RN                          /49  Pulse 79  Wt (!) 156.7 kg (345 lb 8 oz)  SpO2 96%  BMI 44.36 kg/m2

## 2018-04-06 NOTE — LETTER
4/6/2018      RE: Lawrence Louie  [facility] [facility] [address]       ASSESSMENT AND PLAN:  A 62-year-old man with alcoholic and SMITH cirrhosis.  He has been sober from alcohol for 5 years. S/P TIPS 5 months ago. Starting to see some improvement in ascites, with decreased frequency of paracentesis and about 1/2 the previous volume. Diuretics recently increased and he is on high doses. Will get BMP with paracentesis today. He is not adherent to a low sodium diet.     Has TIPS US and IR visit coming up.     Ongoing, very long-standing issues with severe LE edema, now worse per his report, after TIPS. Discussed that this is very long-standing and severe and will likely never be completely resolved, but would aim for continued improvement.    Diabetes under better control.     Does not appear to need lactulose. Unclear if he is taking rifaximin. No system for keeping track of meds. We provided med card last time. He is not using it.    Liver is overall stable. RTC 6 mo     Meghna Simmons MD  Hepatology/Liver Transplant  Medical Director, Liver Transplantation  [facility]  ===================================================================  SUBJECTIVE:  Mr. Louie is a 62-year-old man with SMITH and alcoholic cirrhosis He has been sober from alcohol since 2012. He underwent TIPS 10/27/17. He had a hospitalization shortly after for weakness, dehydration, n/v. He was discharged to home.    Taking gus 50 mg tabs, 3 in the am and 2 in the pm. Furosemide 40 mg tabs, taking 3 tabs twice a day, had been 60 mg bid. He had cramping and started gabapentin at night. Also eats a few bananas and this seems to help. Taking magnesium tab also for a long time. Not on zinc or vitamin E.     Diet: does not add salt but he does not restrict sodium otherwise.  He eats potato chips, pickled herring and other food very high in sodium because they taste good.     His biggest complaint is LE  "edema, which he has had for many years and he says is worse now after TIPS. He has seen lymphedema clinic but there are concerns about whether this will be accessible for him re: insurance issues and frequency of visits. They are recommending 3-4 visits per week. Having trouble getting in and out of vehicles. Wrapping legs in diapers and ace. Has pain in the legs that he says is worse than pre-TIPS. He is now seeing the lympehedma clinic.         Not taking lactulose. Doesn't think he needs it. No confusion but he is forgetful. Has about 1 BM/day. He thinks he is taking the rifaximin.     Paracentesis is now less often and less fluid. Was weekly and 8-10L at a time, now every week with about 1-4 L. Feels better when he has para.    EGD 6/16/16 grade 1 varices  HCC screening 11/29/17     OTHER COMORBIDITIES:  He has diabetes, GERD, hyperlipidemia , morbid obesity      SOCIAL HISTORY:  He lives with his wife.  He has 3 kids in the [facility].  He is retired. Wife is here with him today.      PHYSICAL EXAMINATION:   /70  Pulse 96  Temp 97.9  F (36.6  C) (Oral)  Ht 1.88 m (6' 2\")  Wt (!) 155.8 kg (343 lb 6.4 oz)  BMI 44.09 kg/m2    GENERAL:  well-appearing, in no acute distress.   HEENT:  No icterus, no oral lesions. Very poor dentition. Multiple missing teeth, broken teeth.  LYMPH:  No supraclavicular or cervical lymphadenopathy.   CARDIOVASCULAR:  Regular rate and rhythm.   CHEST:  Lungs are clear.   ABDOMEN:  Bowel sounds are present.  He is morbidly obese.   EXTREMITIES:  Significant woody edema bilaterally.   NEUROLOGIC:  Speech is fluent and clear.  No asterixis or tremor.       LABORATORY DATA:  Recent labs were reviewed. Overall stable.    Meghna Simmons MD      "

## 2018-04-06 NOTE — PROGRESS NOTES
Dear Dr. Sunshine Trevizo  Medicare Home Health regulations requires [facility] to notify the Physician when the plan for visits has been altered.  We have provided fewer visits than ordered.  We are notifying you of a Missed Visit.  Lawrence Louie; MRN [id number]  Missed Visit  Is SN  Dates of missed services  4/5/18  Reason: Patient cancelled due to MD ogden  Sincerely [facility]  Fabiana Chin  [phone]

## 2018-04-10 ENCOUNTER — TELEPHONE (OUTPATIENT)
Dept: INTERNAL MEDICINE | Facility: CLINIC | Age: 63
End: 2018-04-10

## 2018-04-10 DIAGNOSIS — M79.673 HEEL PAIN: Primary | ICD-10-CM

## 2018-04-10 NOTE — TELEPHONE ENCOUNTER
----- Message from CORY Toussaint CNP sent at 4/10/2018  8:38 AM CDT -----  Regarding: foot pain  Naomi,    Can you call him and let him know the xray did not show any heel bone abnormality.  I could refer him to Podiatry to consider heel insert or injection.    Ary RAY CNP    --------------------  Call to pt, discussed the above message, referral done, provided phone # for pt to call for appt.  Naomi Lee RN

## 2018-04-11 ENCOUNTER — OFFICE VISIT (OUTPATIENT)
Dept: RADIOLOGY | Facility: CLINIC | Age: 63
End: 2018-04-11
Payer: COMMERCIAL

## 2018-04-11 ENCOUNTER — RADIANT APPOINTMENT (OUTPATIENT)
Dept: ULTRASOUND IMAGING | Facility: CLINIC | Age: 63
End: 2018-04-11
Attending: RADIOLOGY
Payer: COMMERCIAL

## 2018-04-11 VITALS
HEART RATE: 82 BPM | OXYGEN SATURATION: 97 % | RESPIRATION RATE: 18 BRPM | DIASTOLIC BLOOD PRESSURE: 71 MMHG | SYSTOLIC BLOOD PRESSURE: 145 MMHG

## 2018-04-11 DIAGNOSIS — Z95.828 S/P TIPS (TRANSJUGULAR INTRAHEPATIC PORTOSYSTEMIC SHUNT): ICD-10-CM

## 2018-04-11 DIAGNOSIS — Z95.828 S/P TIPS (TRANSJUGULAR INTRAHEPATIC PORTOSYSTEMIC SHUNT): Primary | ICD-10-CM

## 2018-04-11 ASSESSMENT — PAIN SCALES - GENERAL: PAINLEVEL: NO PAIN (0)

## 2018-04-11 NOTE — PATIENT INSTRUCTIONS
TIPs Revision procedure and paracentesis Scheduled for Monday, April 16th @ 8 am, please check in at 6:30 am.  Please check into Gold waiting room, located  at [facility], located at [address] [place], [place] [id number].     *please note that you will be given sedation for this procedure.       Reminders:    -Nothing to eat or drink after midnight the night before.     -Please take your morning medications as indicated with enough water to swallow. Hold Diabetic medications day of the procedure    -Please also note that you may be admitted afterwards so therefore you can pack a small overnight bag.       *We ask that you continue to take your medications and attend Paracentesis appointments as scheduled. We do not want you to discontinue your medications, especially if it is related to your liver disease.     Once the TIPS is placed, it will take a few weeks for it to function properly, so therefore attending Paracentesis appointments is important as you will notice that over time there will be less fluid removed.     **Should you experience any of the symptoms below please let us know.     1. Confusion- Hepatic encephalopathy. This is a dangerous symptom that can happen after the TIPS procedure. Please go immediately to the Emergency room    2. Swelling of lower legs- please notify us as soon as possible. This can happen afterwards as well. Please make sure to connect with your Hepatologist/Liver doctor for any diuretics.     3. Fever and abd pain-please call me as this may indicate an infection or so.      We will call you 2-3 days after you go home.     Follow up: We will see you at 1, 3 ,6, 12 months after TIPS placement for proper follow up with an Ultrasound to evaluate if the TIPS is still patent. Otherwise your Hepatologist will follow up with you.       Should you have any further questions, please call us anytime. It has been a pleasure to see you today.      Kristi Burrell RN,  [place]  Interventional Radiology Nurse Coordinator   Phone: [phone]

## 2018-04-11 NOTE — LETTER
4/11/2018       RE: Lawrence Louie  [address] [place] [id number]     Dear Colleague,    Thank you for referring your patient, Lawrence Louie, to the [facility] VASCULAR [facility] at [facility]. Please see a copy of my visit note below.        INTERVENTIONAL RADIOLOGY FOLLOW UP VISIT    Clinic Visit:  Date on this visit: 4/11/2018  Primary Physician: Ary Murphy     History Of Present Illness:  Lawrence Louie is a 62 year old male who had TIPS in Oct 2017 for refractory ascites. He has history of DM, obesity, etoh cirrhosis, and HTN. After TIPS the amount of ascites and paracentesis has gone down to 2-3 L every 2 weeks. Currently it appears that he needs 4-5 L and he is going rather weekly for paracentesis. Additionally the LE edema has worsened after TIPS and he had to increase the dose of Lasix significantly which helped some the extent and severity of edema but he edema causes imbalance and limits his mobility.       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)      Splenomegaly      Squamous cell carcinoma      Thrombocytopenia (H)      Varices, esophageal (H)     Banded in 2011     Past Surgical History:   Procedure Laterality Date     BIOPSY OF SKIN LESION       COLONOSCOPY Left 6/16/2016    Procedure: COMBINED COLONOSCOPY, SINGLE OR MULTIPLE BIOPSY/POLYPECTOMY BY BIOPSY;  Surgeon: Brandy Barnett MD;  Location:  GI     ESOPHAGOSCOPY, GASTROSCOPY, DUODENOSCOPY (EGD), COMBINED  2/13/2013    Procedure: COMBINED ESOPHAGOSCOPY, GASTROSCOPY, DUODENOSCOPY (EGD);;  Surgeon: Tara Cook MD;  Location:  GI     ESOPHAGOSCOPY, GASTROSCOPY, DUODENOSCOPY (EGD), COMBINED  11/4/2013     Procedure: COMBINED ESOPHAGOSCOPY, GASTROSCOPY, DUODENOSCOPY (EGD);;  Surgeon: Lonny Diaz MD;  Location:  GI     ESOPHAGOSCOPY, GASTROSCOPY, DUODENOSCOPY (EGD), COMBINED Left 6/16/2016    Procedure: COMBINED ESOPHAGOSCOPY, GASTROSCOPY, DUODENOSCOPY (EGD), BIOPSY SINGLE OR MULTIPLE;  Surgeon: Brandy Barnett MD;  Location:  GI     EXCISE LESION TRUNK  9/24/2012    Procedure: EXCISE LESION TRUNK;;  Surgeon: Pepe Dominguez MD;  Location: [facility]     GENITOURINARY SURGERY      vasectomy     HERNIORRHAPHY UMBILICAL  9/24/2012    Procedure: HERNIORRHAPHY UMBILICAL;  UMBILICAL HERNIA REPAIR , EXCISION OF PERIUMBILICAL CYST;  Surgeon: Pepe Dominguez MD;  Location: [facility]       Family History:  Family History   Problem Relation Age of Onset     Breast Cancer Mother      Liver Cancer Mother      Cardiovascular Father      CEREBROVASCULAR DISEASE Father      very low blood pressure     CANCER Father      rectal cancer     Cardiovascular Paternal Grandfather      DIABETES Brother      CANCER Sister      skin cancer     C.A.D. Other      MI, 70's     Breast Cancer Sister      Thyroid Disease No family hx of      Lipids No family hx of      Anesthesia Reaction No family hx of        Social History:  Social History     Social History     Marital status:      Spouse name: N/A     Number of children: N/A     Years of education: N/A     Social History Main Topics     Smoking status: Current Every Day Smoker     Packs/day: 0.30     Types: Cigarettes     Smokeless tobacco: Never Used      Comment: 43 yr     Alcohol use No      Comment: 2-3 per day , none since Dec 2012     Drug use: No     Sexual activity: Yes     Partners: Female     Birth control/ protection: Surgical     Other Topics Concern      Service No     Blood Transfusions No     Caffeine Concern No     very little coffee, likes beer     Occupational Exposure No     Hobby Hazards No     Sleep Concern Yes     bed at 3:30 AM, up at 1:00 PM      Stress Concern Yes     Weight Concern Yes     Special Diet No     Back Care No     Exercise No     Bike Helmet No     Seat Belt Yes     Self-Exams No     Social History Narrative    . 3 grown daughters. He has been sober since 2012.       Review of Systems:  A 12 point review of systems was obtained and is negative other than as outlined above.    Current Medications:  Current Outpatient Prescriptions   Medication Sig Dispense Refill     oxyCODONE IR (ROXICODONE) 5 MG tablet Take 1 tablet every 8 hours as needed 90 tablet 0     furosemide (LASIX) 40 MG tablet Take 3 tablets twice a day. 540 tablet 1     citalopram (CELEXA) 40 MG tablet Take 1 tablet (40 mg) by mouth daily 30 tablet 3     spironolactone (ALDACTONE) 50 MG tablet Takes 3 tablets (150 mg) every AM and take 2 tablets (100 mg) every PM. 150 tablet 11     gabapentin (NEURONTIN) 300 MG capsule Take 1 capsule (300 mg) by mouth 3 times daily (Patient taking differently: Take 300 mg by mouth 3 times daily Currently taking one capsule at bedtime.) 90 capsule 3     ranitidine (ZANTAC) 150 MG tablet Take 1 tablet (150 mg) by mouth 2 times daily 180 tablet 3     desvenlafaxine succinate (PRISTIQ) 100 MG 24 hr tablet Take 2 tablets (200 mg) by mouth daily 180 tablet 0     calcium carbonate (OS-ERIN 500 MG [place]. [place]) 1250 MG tablet Take 1 tablet by mouth daily        rifaximin (XIFAXAN) 550 MG TABS tablet Take 1 tablet (550 mg) by mouth 2 times daily 60 tablet 11     insulin aspart (NOVOLOG FLEXPEN) 100 UNIT/ML injection 20 units before breakfast, 20 units before lunch, 20 units before dinner, 20 units before snacks 30 mL 3     insulin glargine (LANTUS SOLOSTAR) 100 UNIT/ML injection Inject 40 Units Subcutaneous every morning 30 mL 1     glipiZIDE (GLIPIZIDE XL) 5 MG 24 hr tablet Take 1 tablet (5 mg) by mouth 2 times daily 180 tablet 1     insulin pen needle (B-D U/F) 31G X 8 MM Use 6 times daily or as directed 600 each 1     blood glucose monitoring (NO BRAND  SPECIFIED) meter device kit Use to test blood sugar 4 X  times daily or as directed. 1 kit 0     magnesium oxide (MAG-OX) 400 (241.3 MG) MG tablet Take 1 tablet (400 mg) by mouth daily 90 tablet 3     Cholecalciferol (VITAMIN D3) 2000 UNITS CAPS Take 1 capsule by mouth daily 90 capsule 3     insulin pen needle (B-D U/F) 31G X 8 MM USE  6 times daily / OR AS DIRECTED 300 each 3     blood glucose monitoring (NO BRAND SPECIFIED) test strip Use to test blood sugars 4 X  times daily or as directed 200 strip 3     blood glucose (NO BRAND SPECIFIED) lancets standard Use to test blood sugar 4 X  times daily or as directed. 1 Box 3     Cyanocobalamin (VITAMIN B-12 PO) Take 1 tablet by mouth daily       STATIN NOT PRESCRIBED, INTENTIONAL, 1 each daily Statin not prescribed intentionally due to Active liver disease 0 each 0     ondansetron (ZOFRAN) 4 MG tablet Take 1 tablet (4 mg) by mouth every 8 hours as needed for nausea 18 tablet 1     Physical Exam:  /71 (BP Location: Right arm)  Pulse 82  Resp 18  SpO2 97%   GENERAL APPEARANCE: alert and no distress  NECK: no adenopathy, no asymmetry   RESP: lungs clear to auscultation, no rales, crackles, wheezing  CARDIOVASCULAR: regular rates and rhythm, normal S1 S2, no S3   ABDOMEN: soft, distended, moderate to large ascites, non tender  MUSCULOSKELETAL: 3+ edema bilaterally, weeping wounds bilaterally  PSYCHIATRIC: mentation appears normal and affect normal    Imaging:   US on 4/11/2018 shows patent TIPS with elevated velocities at the hepatic venous. Moderate ascites is present. Retrograde flow in the RPV and antegrade flow in the LPV.       ASSESSMENT/PLAN:   Lawrence Louie is a 62 year old male with etoh cirrhosis, refractory ascites for which TIPS was done in Oct 2017 successfully. He is having severe LE edema on double high dose diuretics and being followed at lymphedema clinic). Also he has moderate ascites for which he requires 4-5 L of paracentesis every 7-10  days. US demonstrates some elevated velocities in hepatic venous end but it is not conclusive. He does have encephalopathy which will take into consideration at the time of revision however his encephalopathy appears to be stable with medical management.     Plan:   TIPS venogram w possible revision.     Total of 25 minutes was spent in care for the patient, of which >50% was spent in counseling and co-ordination of care.     Eric Manzanares MD. MPH  Vascular and Interventional Radiology Fellow   Pager [phone]    I saw and examined the patient with the fellow and agree with the assessment and plan.    CC  Patient Care Team:  Ary Murphy APRN CNP as PCP - General (Nurse Practitioner)  Junie Reddy, RN (Diabetic Education)  Ary Murphy APRN CNP as Referring Physician (Nurse Practitioner)  Meghna Simmons MD as MD (Internal Medicine)  David Davis MD as Resident (Radiology)  Meghna Simmons MD as MD (Internal Medicine)  Marlen Pardo MD as MD (Internal Medicine)  SELF, REFERRED      Again, thank you for allowing me to participate in the care of your patient.      Sincerely,    David Davis MD

## 2018-04-11 NOTE — NURSING NOTE
Chief Complaint   Patient presents with     RECHECK     3 month follow up TIPS       Vitals:    04/11/18 1605   BP: 145/71   BP Location: Right arm   Pulse: 82   Resp: 18   SpO2: 97%       There is no height or weight on file to calculate BMI.               Reva Chin LPN

## 2018-04-11 NOTE — PROGRESS NOTES
INTERVENTIONAL RADIOLOGY FOLLOW UP VISIT    Clinic Visit:  Date on this visit: 4/11/2018  Primary Physician: Ary Murphy     History Of Present Illness:  Lawrence Louie is a 62 year old male who had TIPS in Oct 2017 for refractory ascites. He has history of DM, obesity, etoh cirrhosis, and HTN. After TIPS the amount of ascites and paracentesis has gone down to 2-3 L every 2 weeks. Currently it appears that he needs 4-5 L and he is going rather weekly for paracentesis. Additionally the LE edema has worsened after TIPS and he had to increase the dose of Lasix significantly which helped some the extent and severity of edema but he edema causes imbalance and limits his mobility.       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)      Splenomegaly      Squamous cell carcinoma      Thrombocytopenia (H)      Varices, esophageal (H)     Banded in 2011     Past Surgical History:   Procedure Laterality Date     BIOPSY OF SKIN LESION       COLONOSCOPY Left 6/16/2016    Procedure: COMBINED COLONOSCOPY, SINGLE OR MULTIPLE BIOPSY/POLYPECTOMY BY BIOPSY;  Surgeon: Brandy Barnett MD;  Location:  GI     ESOPHAGOSCOPY, GASTROSCOPY, DUODENOSCOPY (EGD), COMBINED  2/13/2013    Procedure: COMBINED ESOPHAGOSCOPY, GASTROSCOPY, DUODENOSCOPY (EGD);;  Surgeon: Tara Cook MD;  Location:  GI     ESOPHAGOSCOPY, GASTROSCOPY, DUODENOSCOPY (EGD), COMBINED  11/4/2013    Procedure: COMBINED ESOPHAGOSCOPY, GASTROSCOPY, DUODENOSCOPY (EGD);;  Surgeon: Lonny Diaz MD;  Location:  GI     ESOPHAGOSCOPY, GASTROSCOPY, DUODENOSCOPY (EGD), COMBINED Left 6/16/2016    Procedure: COMBINED ESOPHAGOSCOPY, GASTROSCOPY, DUODENOSCOPY (EGD), BIOPSY SINGLE OR MULTIPLE;  Surgeon: Ericka  Brandy Resendiz MD;  Location: [facility]     EXCISE LESION TRUNK  9/24/2012    Procedure: EXCISE LESION TRUNK;;  Surgeon: Pepe Dominguez MD;  Location: [facility]     GENITOURINARY SURGERY      vasectomy     HERNIORRHAPHY UMBILICAL  9/24/2012    Procedure: HERNIORRHAPHY UMBILICAL;  UMBILICAL HERNIA REPAIR , EXCISION OF PERIUMBILICAL CYST;  Surgeon: Pepe Dominguez MD;  Location: [facility]       Family History:  Family History   Problem Relation Age of Onset     Breast Cancer Mother      Liver Cancer Mother      Cardiovascular Father      CEREBROVASCULAR DISEASE Father      very low blood pressure     CANCER Father      rectal cancer     Cardiovascular Paternal Grandfather      DIABETES Brother      CANCER Sister      skin cancer     C.A.D. Other      MI, 70's     Breast Cancer Sister      Thyroid Disease No family hx of      Lipids No family hx of      Anesthesia Reaction No family hx of        Social History:  Social History     Social History     Marital status:      Spouse name: N/A     Number of children: N/A     Years of education: N/A     Social History Main Topics     Smoking status: Current Every Day Smoker     Packs/day: 0.30     Types: Cigarettes     Smokeless tobacco: Never Used      Comment: 43 yr     Alcohol use No      Comment: 2-3 per day , none since Dec 2012     Drug use: No     Sexual activity: Yes     Partners: Female     Birth control/ protection: Surgical     Other Topics Concern      Service No     Blood Transfusions No     Caffeine Concern No     very little coffee, likes beer     Occupational Exposure No     Hobby Hazards No     Sleep Concern Yes     bed at 3:30 AM, up at 1:00 PM     Stress Concern Yes     Weight Concern Yes     Special Diet No     Back Care No     Exercise No     Bike Helmet No     Seat Belt Yes     Self-Exams No     Social History Narrative    . 3 grown daughters. He has been sober since 2012.       Review of Systems:  A 12 point review of systems was obtained  and is negative other than as outlined above.    Current Medications:  Current Outpatient Prescriptions   Medication Sig Dispense Refill     oxyCODONE IR (ROXICODONE) 5 MG tablet Take 1 tablet every 8 hours as needed 90 tablet 0     furosemide (LASIX) 40 MG tablet Take 3 tablets twice a day. 540 tablet 1     citalopram (CELEXA) 40 MG tablet Take 1 tablet (40 mg) by mouth daily 30 tablet 3     spironolactone (ALDACTONE) 50 MG tablet Takes 3 tablets (150 mg) every AM and take 2 tablets (100 mg) every PM. 150 tablet 11     gabapentin (NEURONTIN) 300 MG capsule Take 1 capsule (300 mg) by mouth 3 times daily (Patient taking differently: Take 300 mg by mouth 3 times daily Currently taking one capsule at bedtime.) 90 capsule 3     ranitidine (ZANTAC) 150 MG tablet Take 1 tablet (150 mg) by mouth 2 times daily 180 tablet 3     desvenlafaxine succinate (PRISTIQ) 100 MG 24 hr tablet Take 2 tablets (200 mg) by mouth daily 180 tablet 0     calcium carbonate (OS-ERIN 500 MG [place]. [place]) 1250 MG tablet Take 1 tablet by mouth daily        rifaximin (XIFAXAN) 550 MG TABS tablet Take 1 tablet (550 mg) by mouth 2 times daily 60 tablet 11     insulin aspart (NOVOLOG FLEXPEN) 100 UNIT/ML injection 20 units before breakfast, 20 units before lunch, 20 units before dinner, 20 units before snacks 30 mL 3     insulin glargine (LANTUS SOLOSTAR) 100 UNIT/ML injection Inject 40 Units Subcutaneous every morning 30 mL 1     glipiZIDE (GLIPIZIDE XL) 5 MG 24 hr tablet Take 1 tablet (5 mg) by mouth 2 times daily 180 tablet 1     insulin pen needle (B-D U/F) 31G X 8 MM Use 6 times daily or as directed 600 each 1     blood glucose monitoring (NO BRAND SPECIFIED) meter device kit Use to test blood sugar 4 X  times daily or as directed. 1 kit 0     magnesium oxide (MAG-OX) 400 (241.3 MG) MG tablet Take 1 tablet (400 mg) by mouth daily 90 tablet 3     Cholecalciferol (VITAMIN D3) 2000 UNITS CAPS Take 1 capsule by mouth daily 90 capsule 3     insulin pen  needle (B-D U/F) 31G X 8 MM USE  6 times daily / OR AS DIRECTED 300 each 3     blood glucose monitoring (NO BRAND SPECIFIED) test strip Use to test blood sugars 4 X  times daily or as directed 200 strip 3     blood glucose (NO BRAND SPECIFIED) lancets standard Use to test blood sugar 4 X  times daily or as directed. 1 Box 3     Cyanocobalamin (VITAMIN B-12 PO) Take 1 tablet by mouth daily       STATIN NOT PRESCRIBED, INTENTIONAL, 1 each daily Statin not prescribed intentionally due to Active liver disease 0 each 0     ondansetron (ZOFRAN) 4 MG tablet Take 1 tablet (4 mg) by mouth every 8 hours as needed for nausea 18 tablet 1     Physical Exam:  /71 (BP Location: Right arm)  Pulse 82  Resp 18  SpO2 97%   GENERAL APPEARANCE: alert and no distress  NECK: no adenopathy, no asymmetry   RESP: lungs clear to auscultation, no rales, crackles, wheezing  CARDIOVASCULAR: regular rates and rhythm, normal S1 S2, no S3   ABDOMEN: soft, distended, moderate to large ascites, non tender  MUSCULOSKELETAL: 3+ edema bilaterally, weeping wounds bilaterally  PSYCHIATRIC: mentation appears normal and affect normal    Imaging:   US on 4/11/2018 shows patent TIPS with elevated velocities at the hepatic venous. Moderate ascites is present. Retrograde flow in the RPV and antegrade flow in the LPV.       ASSESSMENT/PLAN:   Lawrence Louie is a 62 year old male with etoh cirrhosis, refractory ascites for which TIPS was done in Oct 2017 successfully. He is having severe LE edema on double high dose diuretics and being followed at lymphedema clinic). Also he has moderate ascites for which he requires 4-5 L of paracentesis every 7-10 days. US demonstrates some elevated velocities in hepatic venous end but it is not conclusive. He does have encephalopathy which will take into consideration at the time of revision however his encephalopathy appears to be stable with medical management.     Plan:   TIPS venogram w possible revision.      Total of 25 minutes was spent in care for the patient, of which >50% was spent in counseling and co-ordination of care.     Eric Manzanares MD. MPH  Vascular and Interventional Radiology Fellow   Pager [phone]    I saw and examined the patient with the fellow and agree with the assessment and plan.    David Davis MD    CC  Patient Care Team:  Ary Murphy APRN CNP as PCP - General (Nurse Practitioner)  Junie Reddy RN (Diabetic Education)  Ary Murphy APRN CNP as Referring Physician (Nurse Practitioner)  Meghna Simmons MD as MD (Internal Medicine)  David Davis MD as Resident (Radiology)  Meghna Simmons MD as MD (Internal Medicine)  Marlen Pardo MD as MD (Internal Medicine)  SELF, REFERRED

## 2018-04-11 NOTE — MR AVS SNAPSHOT
After Visit Summary   4/11/2018    Lawrence Louie    MRN: [id number]           Patient Information     Date Of Birth          1955        Visit Information        Provider Department      4/11/2018 3:40 PM David Davis MD [facility]        Today's Diagnoses     S/P TIPS (transjugular intrahepatic portosystemic shunt)    -  1      Care Instructions    TIPs Revision procedure and paracentesis Scheduled for Monday, April 16th @ 8 am, please check in at 6:30 am.  Please check into [facility] waiting room, located  at [facility], located at [address] [place] [address].     *please note that you will be given sedation for this procedure.       Reminders:    -Nothing to eat or drink after midnight the night before.     -Please take your morning medications as indicated with enough water to swallow. Hold Diabetic medications day of the procedure    -Please also note that you may be admitted afterwards so therefore you can pack a small overnight bag.       *We ask that you continue to take your medications and attend Paracentesis appointments as scheduled. We do not want you to discontinue your medications, especially if it is related to your liver disease.     Once the TIPS is placed, it will take a few weeks for it to function properly, so therefore attending Paracentesis appointments is important as you will notice that over time there will be less fluid removed.     **Should you experience any of the symptoms below please let us know.     1. Confusion- Hepatic encephalopathy. This is a dangerous symptom that can happen after the TIPS procedure. Please go immediately to the Emergency room    2. Swelling of lower legs- please notify us as soon as possible. This can happen afterwards as well. Please make sure to connect with your Hepatologist/Liver doctor for any diuretics.     3. Fever and abd pain-please call me as this may indicate an infection  or so.      We will call you 2-3 days after you go home.     Follow up: We will see you at 1, 3 ,6, 12 months after TIPS placement for proper follow up with an Ultrasound to evaluate if the TIPS is still patent. Otherwise your Hepatologist will follow up with you.       Should you have any further questions, please call us anytime. It has been a pleasure to see you today.      Kristi Burrell, RN, BSN  Interventional Radiology Nurse Coordinator   Phone: [phone]          Follow-ups after your visit        Your next 10 appointments already scheduled     Apr 13, 2018 12:00 PM CDT   Paracentesis Visit with Uc Spec Inf Para Provider, UC 40 ATC   [facility] Specialty and Procedure ([facility] [place])    [address]  [address]  [place] [address] [phone]            Apr 16, 2018  6:30 AM CDT   Procedure 5 hr with U2A ROOM 16   [address] [facility] ([facility])    [address] [address]               Apr 20, 2018 12:00 PM CDT   Paracentesis Visit with Uc Spec Inf Para Provider, UC 39 ATC   [facility] Specialty and Procedure ([facility])    [address]  [address]  [place] [address] [phone]            Apr 27, 2018 12:00 PM CDT   Paracentesis Visit with Uc Spec Inf Para Provider, UC 40 ATC   [facility] Specialty and Procedure ([facility])    [address]  [address]  [place] [address] [phone]            May 03, 2018  2:15 PM CDT   (Arrive by 2:00 PM)   Return Visit with Trav Hartman MD   [facility] Dermatology ([facility])    [address]  3rd Floor  [place] [id number] [phone]            May 04, 2018 12:00 PM CDT   Paracentesis Visit with Uc Spec Inf Para Provider, UC 39 ATC   [facility]  Specialty and Procedure ([facility] [place])    [address]  [address]  [facility] [address] [phone]              Future tests that were ordered for you today     Open Future Orders        Priority Expected Expires Ordered    IR Transven Intrahepatic Portosyst Rev Routine  4/11/2019 4/11/2018    IR Paracentesis Routine  4/11/2019 4/11/2018            Who to contact     Please call your clinic at [phone] to:    Ask questions about your health    Make or cancel appointments    Discuss your medicines    Learn about your test results    Speak to your doctor            Additional Information About Your Visit        To8[employer]har[employer] Information     [employer] gives you secure access to your electronic health record. If you see a primary care provider, you can also send messages to your care team and make appointments. If you have questions, please call your primary care clinic.  If you do not have a primary care provider, please call [phone] and they will assist you.      [employer] is an electronic gateway that provides easy, online access to your medical records. With [employer], you can request a clinic appointment, read your test results, renew a prescription or communicate with your care team.     To access your existing account, please contact your [facility] or call [phone] for assistance.        Care EveryWhere ID     This is your Care EveryWhere ID. This could be used by other organizations to access your [place] medical records  [id number]        Your Vitals Were     Pulse Respirations Pulse Oximetry             82 18 97%          Blood Pressure from Last 3 Encounters:   04/11/18 145/71   04/06/18 129/53   04/06/18 129/70    Weight from Last 3 Encounters:   04/06/18 (!) 150.2 kg (331 lb 1.6 oz)   04/06/18 (!) 155.8 kg (343 lb 6.4 oz)   04/05/18 (!) 157.4 kg (347 lb)                 Today's Medication Changes          These changes are accurate  as of 4/11/18  4:57 PM.  If you have any questions, ask your nurse or doctor.               These medicines have changed or have updated prescriptions.        Dose/Directions    gabapentin 300 MG capsule   Commonly known as:  NEURONTIN   This may have changed:  additional instructions   Used for:  Diabetic polyneuropathy associated with type 2 diabetes mellitus (H)        Dose:  300 mg   Take 1 capsule (300 mg) by mouth 3 times daily   Quantity:  90 capsule   Refills:  3                Primary Care Provider Office Phone # Fax #    Ary Murphy, APRN [phone]       [address] [place] [address] [address] [place] [address]        Equal Access to Services     [facility]: Hadii sil anthony hadasho Soomaali, waaxda luqadaha, qaybta kaalmada adesarahyada, silvia richardson . So [facility] [phone].    ATENCIÓN: Si habla español, tiene a rondon disposición servicios gratuitos de asistencia lingüística. Llame al [phone].    We comply with applicable federal civil rights laws and [place] laws. We do not discriminate on the basis of race, color, national origin, age, disability, sex, sexual orientation, or gender identity.            Thank you!     Thank you for choosing [facility]  for your care. Our goal is always to provide you with excellent care. Hearing back from our patients is one way we can continue to improve our services. Please take a few minutes to complete the written survey that you may receive in the mail after your visit with us. Thank you!             Your Updated Medication List - Protect others around you: Learn how to safely use, store and throw away your medicines at www.disposemymeds.org.          This list is accurate as of 4/11/18  4:57 PM.  Always use your most recent med list.                   Brand Name Dispense Instructions for use Diagnosis    blood glucose lancets standard    no brand specified    1 Box    Use to test blood sugar 4 X  times daily  or as directed.    Diabetes mellitus, type 2 (H)       blood glucose monitoring meter device kit    no brand specified    1 kit    Use to test blood sugar 4 X  times daily or as directed.    Type 2 diabetes mellitus with other specified complication (H)       blood glucose monitoring test strip    no brand specified    200 strip    Use to test blood sugars 4 X  times daily or as directed    Diabetes mellitus, type 2 (H)       calcium carbonate 1250 MG tablet    OS-ERIN 500 mg [place]. Ca     Take 1 tablet by mouth daily        citalopram 40 MG tablet    celeXA    30 tablet    Take 1 tablet (40 mg) by mouth daily    Recurrent major depressive disorder, remission status unspecified (H)       desvenlafaxine succinate 100 MG 24 hr tablet    PRISTIQ    180 tablet    Take 2 tablets (200 mg) by mouth daily    Other depression       furosemide 40 MG tablet    LASIX    540 tablet    Take 3 tablets twice a day.    Edema of both legs       gabapentin 300 MG capsule    NEURONTIN    90 capsule    Take 1 capsule (300 mg) by mouth 3 times daily    Diabetic polyneuropathy associated with type 2 diabetes mellitus (H)       glipiZIDE 5 MG 24 hr tablet    glipiZIDE XL    180 tablet    Take 1 tablet (5 mg) by mouth 2 times daily    Type 2 diabetes mellitus without complication, with long-term current use of insulin (H)       insulin aspart 100 UNIT/ML injection    NovoLOG FLEXPEN    30 mL    20 units before breakfast, 20 units before lunch, 20 units before dinner, 20 units before snacks    Type 2 diabetes mellitus without complication, with long-term current use of insulin (H)       insulin glargine 100 UNIT/ML injection    LANTUS SOLOSTAR    30 mL    Inject 40 Units Subcutaneous every morning    Type 2 diabetes mellitus with other oral complication, unspecified long term insulin use status (H)       * insulin pen needle 31G X 8 MM    B-D U/F    300 each    USE  6 times daily / OR AS DIRECTED    Type 2 diabetes, HbA1c goal < 7% (H)       *  insulin pen needle 31G X 8 MM    B-D U/F    600 each    Use 6 times daily or as directed    Type 2 diabetes, HbA1c goal < 7% (H)       magnesium oxide 400 (241.3 Mg) MG tablet    MAG-OX    90 tablet    Take 1 tablet (400 mg) by mouth daily    Cramp of limb       ondansetron 4 MG tablet    ZOFRAN    18 tablet    Take 1 tablet (4 mg) by mouth every 8 hours as needed for nausea    Alcoholic cirrhosis (H), Nausea       oxyCODONE IR 5 MG tablet    ROXICODONE    90 tablet    Take 1 tablet every 8 hours as needed    Alcoholic cirrhosis of liver with ascites (H)       ranitidine 150 MG tablet    ZANTAC    180 tablet    Take 1 tablet (150 mg) by mouth 2 times daily    Esophageal varices (H)       rifaximin 550 MG Tabs tablet    XIFAXAN    60 tablet    Take 1 tablet (550 mg) by mouth 2 times daily    Hepatic encephalopathy (H)       spironolactone 50 MG tablet    ALDACTONE    150 tablet    Takes 3 tablets (150 mg) every AM and take 2 tablets (100 mg) every PM.    Portal hypertension (H), Generalized edema       STATIN NOT PRESCRIBED (INTENTIONAL)     0 each    1 each daily Statin not prescribed intentionally due to Active liver disease    Hyperlipidemia LDL goal <100       VITAMIN B-12 PO      Take 1 tablet by mouth daily        vitamin D3 2000 units Caps     90 capsule    Take 1 capsule by mouth daily    Mild major depression (H)       * Notice:  This list has 2 medication(s) that are the same as other medications prescribed for you. Read the directions carefully, and ask your doctor or other care provider to review them with you.

## 2018-04-12 ENCOUNTER — DOCUMENTATION ONLY (OUTPATIENT)
Dept: CARE COORDINATION | Facility: CLINIC | Age: 63
End: 2018-04-12

## 2018-04-12 ENCOUNTER — TELEPHONE (OUTPATIENT)
Dept: INTERVENTIONAL RADIOLOGY/VASCULAR | Facility: CLINIC | Age: 63
End: 2018-04-12

## 2018-04-12 DIAGNOSIS — R11.0 NAUSEA: ICD-10-CM

## 2018-04-12 DIAGNOSIS — K70.31 ALCOHOLIC CIRRHOSIS OF LIVER WITH ASCITES (H): ICD-10-CM

## 2018-04-13 ENCOUNTER — RADIANT APPOINTMENT (OUTPATIENT)
Dept: ULTRASOUND IMAGING | Facility: CLINIC | Age: 63
End: 2018-04-13
Attending: INTERNAL MEDICINE
Payer: COMMERCIAL

## 2018-04-13 ENCOUNTER — OFFICE VISIT (OUTPATIENT)
Dept: INFUSION THERAPY | Facility: CLINIC | Age: 63
End: 2018-04-13
Attending: INTERNAL MEDICINE
Payer: COMMERCIAL

## 2018-04-13 ENCOUNTER — CARE COORDINATION (OUTPATIENT)
Dept: GASTROENTEROLOGY | Facility: CLINIC | Age: 63
End: 2018-04-13

## 2018-04-13 VITALS
DIASTOLIC BLOOD PRESSURE: 47 MMHG | BODY MASS INDEX: 41.89 KG/M2 | OXYGEN SATURATION: 100 % | WEIGHT: 315 LBS | TEMPERATURE: 97.9 F | SYSTOLIC BLOOD PRESSURE: 120 MMHG | HEART RATE: 78 BPM

## 2018-04-13 DIAGNOSIS — K70.31 ALCOHOLIC CIRRHOSIS OF LIVER WITH ASCITES (H): Primary | ICD-10-CM

## 2018-04-13 PROCEDURE — 27210190 US PARACENTESIS

## 2018-04-13 PROCEDURE — 25000125 ZZHC RX 250: Mod: ZF | Performed by: INTERNAL MEDICINE

## 2018-04-13 PROCEDURE — P9047 ALBUMIN (HUMAN), 25%, 50ML: HCPCS | Mod: ZF | Performed by: INTERNAL MEDICINE

## 2018-04-13 PROCEDURE — 25000128 H RX IP 250 OP 636: Mod: ZF | Performed by: INTERNAL MEDICINE

## 2018-04-13 RX ORDER — ALBUMIN (HUMAN) 12.5 G/50ML
12.5 SOLUTION INTRAVENOUS 4 TIMES DAILY PRN
Status: CANCELLED
Start: 2018-04-13

## 2018-04-13 RX ORDER — ALBUMIN (HUMAN) 12.5 G/50ML
12.5 SOLUTION INTRAVENOUS 4 TIMES DAILY PRN
Status: DISCONTINUED | OUTPATIENT
Start: 2018-04-13 | End: 2018-04-13 | Stop reason: HOSPADM

## 2018-04-13 RX ADMIN — ALBUMIN HUMAN 50 G: 0.25 SOLUTION INTRAVENOUS at 12:34

## 2018-04-13 RX ADMIN — LIDOCAINE HYDROCHLORIDE 10 ML: 10 INJECTION, SOLUTION EPIDURAL; INFILTRATION; INTRACAUDAL; PERINEURAL at 12:49

## 2018-04-13 NOTE — MR AVS SNAPSHOT
After Visit Summary   4/13/2018    Lawrence Louie    MRN: [id number]           Patient Information     Date Of Birth          1955        Visit Information        Provider Department      4/13/2018 12:00 PM Provider, Cristobal Spec Inf Para; CRISTOBAL 40 ATC [facility] Specialty and Procedure        Today's Diagnoses     Alcoholic cirrhosis of liver with ascites (H)    -  1       Follow-ups after your visit        Your next 10 appointments already scheduled     Apr 16, 2018  6:30 AM CDT   Procedure 5 hr with U2A ROOM 16   [address] [facility] ([facility])    [address] [address]               Apr 16, 2018  8:00 AM CDT   (Arrive by 7:45 AM)   IR TRANSVEN INTRAHEPATIC PORTOSYST REV with UUIR5   [facility] [place], Interventional Radiology ([facility])    [address] [id number] [phone]           1. You will need to have had a history and physical exam within 7 days of the procedure. 2. Laboratory test are to be obtained by your doctor prior to the exam (CBCP, INR and PTT) 3. Someone will need to drive you to and from the hospital. 4. If you are or may be pregnant, contact your doctor or a Radiology nurse prior to the day of the exam. 5. If you have diabetes, check with your doctor or a Radiology nurse to see if your insulin needs to be adjusted for the exam. 6. If you are taking Coumadin (to thin you blood) please contact your doctor or a Radiology nurse at least 3 days before the exam for special instructions. 7. The day before your exam you may eat your regular diet and are encouraged to drink at least 2 quarts of clear liquids. Drink no alcoholic beverages for 24 hours prior to the exam. 8. Do not eat any solid food or milk products for 6 hours prior to the exam. You may drink clear liquids until 2 hours prior to the exam. Clear liquids  include the following: water, Jell-O, clear broth, apple juice or any noncarbonated drink that you can see through (no pop!) 9. The morning of the exam you may brush your teeth and take medications as directed with a sip of water. 10. Tell the Radiology nurse if you have any allergies.            Apr 20, 2018 12:00 PM CDT   Paracentesis Visit with Uc Spec Inf Para Provider, UC 39 ATC   [facility] Specialty [facility] Procedure ([facility])    [address]  [address]  [facility] [address] [phone]            Apr 27, 2018 12:00 PM CDT   Paracentesis Visit with Uc Spec Inf Para Provider, UC 40 ATC   [facility] Specialty [facility] Procedure ([facility])    [address] [place] [address]  [address]  [facility] [address] [phone]            May 03, 2018  2:15 PM CDT   (Arrive by 2:00 PM)   Return Visit with Trav Hartman MD   [facility] Dermatology ([facility])    [address] [place] [address]  3rd Floor  [facility] [id number] [phone]            May 04, 2018 12:00 PM CDT   Paracentesis Visit with Uc Spec Inf Para Provider, UC 39 ATC   [facility] Specialty and Procedure ([facility])    [address] [place] [address]  [address]  [facility] [id number] [phone]            May 11, 2018 12:00 PM CDT   Paracentesis Visit with Uc Spec Inf Para Provider   [facility] Specialty and Procedure ([facility])    [address] [place] [address]  [address]  [facility] [id number] [phone]              Who to contact     If you have questions or need follow up information about today's clinic visit or your schedule please contact [facility] SPECIALTY AND PROCEDURE directly at [phone].  Normal or non-critical lab and imaging results will be communicated to you by MyChart, letter or phone  within 4 business days after the clinic has received the results. If you do not hear from us within 7 days, please contact the clinic through [employer] or phone. If you have a critical or abnormal lab result, we will notify you by phone as soon as possible.  Submit refill requests through [employer] or call your pharmacy and they will forward the refill request to us. Please allow 3 business days for your refill to be completed.          Additional Information About Your Visit        [employer] Information     [employer] gives you secure access to your electronic health record. If you see a primary care provider, you can also send messages to your care team and make appointments. If you have questions, please call your primary care clinic.  If you do not have a primary care provider, please call [phone] and they will assist you.        Care EveryWhere ID     This is your Care EveryWhere ID. This could be used by other organizations to access your [place] medical records  [id number]        Your Vitals Were     Pulse Temperature Pulse Oximetry BMI (Body Mass Index)          78 97.9  F (36.6  C) (Oral) 100% 41.89 kg/m2         Blood Pressure from Last 3 Encounters:   04/13/18 120/47   04/11/18 145/71   04/06/18 129/53    Weight from Last 3 Encounters:   04/13/18 148 kg (326 lb 4.8 oz)   04/06/18 (!) 150.2 kg (331 lb 1.6 oz)   04/06/18 (!) 155.8 kg (343 lb 6.4 oz)              We Performed the Following     US Paracentesis          Today's Medication Changes          These changes are accurate as of 4/13/18  3:58 PM.  If you have any questions, ask your nurse or doctor.               These medicines have changed or have updated prescriptions.        Dose/Directions    gabapentin 300 MG capsule   Commonly known as:  NEURONTIN   This may have changed:  additional instructions   Used for:  Diabetic polyneuropathy associated with type 2 diabetes mellitus (H)        Dose:  300 mg   Take 1 capsule (300 mg) by mouth 3 times daily    Quantity:  90 capsule   Refills:  3                Primary Care Provider Office Phone # Fax #    Ary Murphy, APRN [phone]       [address] [place] [address] [address] [place] [address]        Equal Access to Services     JOSEF LORENZO : Hadii sil ku hadgardeniao Soomaali, waaxda luqadaha, qaybta kaalmada adeegyada, waxmary carolynin haysaravanann daniela salliesangeeta richardson . So [facility] [phone].    ATENCIÓN: Si habla español, tiene a rondon disposición servicios gratuitos de asistencia lingüística. Llame al [phone].    We comply with applicable federal civil rights laws and [place] laws. We do not discriminate on the basis of race, color, national origin, age, disability, sex, sexual orientation, or gender identity.            Thank you!     Thank you for choosing [facility] SPECIALTY AND PROCEDURE  for your care. Our goal is always to provide you with excellent care. Hearing back from our patients is one way we can continue to improve our services. Please take a few minutes to complete the written survey that you may receive in the mail after your visit with us. Thank you!             Your Updated Medication List - Protect others around you: Learn how to safely use, store and throw away your medicines at www.disposemymeds.org.          This list is accurate as of 4/13/18  3:58 PM.  Always use your most recent med list.                   Brand Name Dispense Instructions for use Diagnosis    blood glucose lancets standard    no brand specified    1 Box    Use to test blood sugar 4 X  times daily or as directed.    Diabetes mellitus, type 2 (H)       blood glucose monitoring meter device kit    no brand specified    1 kit    Use to test blood sugar 4 X  times daily or as directed.    Type 2 diabetes mellitus with other specified complication (H)       blood glucose monitoring test strip    no brand specified    200 strip    Use to test blood sugars 4 X  times daily or as directed    Diabetes  mellitus, type 2 (H)       calcium carbonate 1250 MG tablet    OS-ERIN 500 mg [place]. Ca     Take 1 tablet by mouth daily        citalopram 40 MG tablet    celeXA    30 tablet    Take 1 tablet (40 mg) by mouth daily    Recurrent major depressive disorder, remission status unspecified (H)       furosemide 40 MG tablet    LASIX    540 tablet    Take 3 tablets twice a day.    Edema of both legs       gabapentin 300 MG capsule    NEURONTIN    90 capsule    Take 1 capsule (300 mg) by mouth 3 times daily    Diabetic polyneuropathy associated with type 2 diabetes mellitus (H)       glipiZIDE 5 MG 24 hr tablet    glipiZIDE XL    180 tablet    Take 1 tablet (5 mg) by mouth 2 times daily    Type 2 diabetes mellitus without complication, with long-term current use of insulin (H)       insulin aspart 100 UNIT/ML injection    NovoLOG FLEXPEN    30 mL    20 units before breakfast, 20 units before lunch, 20 units before dinner, 20 units before snacks    Type 2 diabetes mellitus without complication, with long-term current use of insulin (H)       insulin glargine 100 UNIT/ML injection    LANTUS SOLOSTAR    30 mL    Inject 40 Units Subcutaneous every morning    Type 2 diabetes mellitus with other oral complication, unspecified long term insulin use status (H)       * insulin pen needle 31G X 8 MM    B-D U/F    300 each    USE  6 times daily / OR AS DIRECTED    Type 2 diabetes, HbA1c goal < 7% (H)       * insulin pen needle 31G X 8 MM    B-D U/F    600 each    Use 6 times daily or as directed    Type 2 diabetes, HbA1c goal < 7% (H)       magnesium oxide 400 (241.3 Mg) MG tablet    MAG-OX    90 tablet    Take 1 tablet (400 mg) by mouth daily    Cramp of limb       ondansetron 4 MG tablet    ZOFRAN    18 tablet    Take 1 tablet (4 mg) by mouth every 8 hours as needed for nausea    Alcoholic cirrhosis (H), Nausea       oxyCODONE IR 5 MG tablet    ROXICODONE    90 tablet    Take 1 tablet every 8 hours as needed    Alcoholic cirrhosis of  liver with ascites (H)       ranitidine 150 MG tablet    ZANTAC    180 tablet    Take 1 tablet (150 mg) by mouth 2 times daily    Esophageal varices (H)       rifaximin 550 MG Tabs tablet    XIFAXAN    60 tablet    Take 1 tablet (550 mg) by mouth 2 times daily    Hepatic encephalopathy (H)       spironolactone 50 MG tablet    ALDACTONE    150 tablet    Takes 3 tablets (150 mg) every AM and take 2 tablets (100 mg) every PM.    Portal hypertension (H), Generalized edema       STATIN NOT PRESCRIBED (INTENTIONAL)     0 each    1 each daily Statin not prescribed intentionally due to Active liver disease    Hyperlipidemia LDL goal <100       VITAMIN B-12 PO      Take 1 tablet by mouth daily        vitamin D3 2000 units Caps     90 capsule    Take 1 capsule by mouth daily    Mild major depression (H)       * Notice:  This list has 2 medication(s) that are the same as other medications prescribed for you. Read the directions carefully, and ask your doctor or other care provider to review them with you.

## 2018-04-13 NOTE — PROGRESS NOTES
Will route to [facility] for review, as this is her patient.  Thanks,  Sunshine Trevizo MD  Internal Medicine

## 2018-04-13 NOTE — PROGRESS NOTES
Medication alerts received with med review.     Level 2 severe interaction between Celexa and ondansetron    Duplicate Therapy warning in antidepressant and SSRI classes for Celexa and Pristiq.    Please advise if medication changes are necessary or if action required to assure patient safety.      Thank you,    RADHA Flores Case Manager  [facility]  [phone]  [email or web address]

## 2018-04-13 NOTE — PROGRESS NOTES
Paracentesis Nursing Note  Lawrence Louie presents today to [facility] for a paracentesis.    During today's appointment orders from Meghna Simmons MD were completed.    Progress Note:  Patient identification verified by name and date of birth.  Assessment completed.  Vitals monitored throughout appointment and recorded in Doc Flowsheets.  See proceduralist note in ultrasound.    Date of consent or authorization: 03/08/18  Invasive Procedure Safety Checklist was completed and sent for scanning.     Paracentesis performed by Lb Fisher PA-C Radiology    The following labs were communicated to provider performing paracentesis:  Lab Results   Component Value Date    PLT 97 04/06/18 01/12/2018       Total amount of ascites fluid drained: 4 liters.  Color of ascites fluid: yellow and clear.  Total amount of albumin given: 50  grams.    Patient tolerated procedure well.    Post procedure,denies pain or discomfort post paracentesis.      Discharge Plan:  Discharge instructions were reviewed with patient.  Patient/Representative verbalized understanding and all questions were answered.   Discharged from [facility] in stable condition.    Kailyn Jennings RN    Administrations This Visit     albumin human 25 % injection 12.5 g     Admin Date Action Dose Route Administered By             04/13/2018 New Bag 50 g Intravenous Kailyn Jennings RN                    lidocaine 1 % 20 mL     Admin Date Action Dose Route Administered By             04/13/2018 Given 10 mL Injection Kailyn Jennings RN                            /48  Pulse 78  Temp 97.9  F (36.6  C) (Oral)  Wt 148 kg (326 lb 4.8 oz)  SpO2 100%  BMI 41.89 kg/m2

## 2018-04-16 ENCOUNTER — APPOINTMENT (OUTPATIENT)
Dept: INTERVENTIONAL RADIOLOGY/VASCULAR | Facility: CLINIC | Age: 63
End: 2018-04-16
Attending: RADIOLOGY
Payer: COMMERCIAL

## 2018-04-16 ENCOUNTER — HOSPITAL ENCOUNTER (OUTPATIENT)
Facility: CLINIC | Age: 63
Discharge: HOME | End: 2018-04-16
Attending: RADIOLOGY | Admitting: RADIOLOGY
Payer: COMMERCIAL

## 2018-04-16 ENCOUNTER — APPOINTMENT (OUTPATIENT)
Dept: MEDSURG UNIT | Facility: CLINIC | Age: 63
End: 2018-04-16
Payer: COMMERCIAL

## 2018-04-16 VITALS
BODY MASS INDEX: 40.43 KG/M2 | TEMPERATURE: 97.4 F | DIASTOLIC BLOOD PRESSURE: 72 MMHG | OXYGEN SATURATION: 93 % | WEIGHT: 315 LBS | SYSTOLIC BLOOD PRESSURE: 118 MMHG | RESPIRATION RATE: 16 BRPM | HEIGHT: 74 IN

## 2018-04-16 DIAGNOSIS — Z95.828 S/P TIPS (TRANSJUGULAR INTRAHEPATIC PORTOSYSTEMIC SHUNT): ICD-10-CM

## 2018-04-16 LAB
GLUCOSE BLDC GLUCOMTR-MCNC: 83 MG/DL (ref 70–99)
GLUCOSE BLDC GLUCOMTR-MCNC: 92 MG/DL (ref 70–99)

## 2018-04-16 PROCEDURE — 27210732 ZZH ACCESSORY CR1

## 2018-04-16 PROCEDURE — 27211039 ZZH NEEDLE CR2

## 2018-04-16 PROCEDURE — C1729 CATH, DRAINAGE: HCPCS

## 2018-04-16 PROCEDURE — C1887 CATHETER, GUIDING: HCPCS

## 2018-04-16 PROCEDURE — 27210905 ZZH KIT CR7

## 2018-04-16 PROCEDURE — 36012 PLACE CATHETER IN VEIN: CPT

## 2018-04-16 PROCEDURE — 99153 MOD SED SAME PHYS/QHP EA: CPT

## 2018-04-16 PROCEDURE — C1769 GUIDE WIRE: HCPCS

## 2018-04-16 PROCEDURE — 25000128 H RX IP 250 OP 636: Performed by: RADIOLOGY

## 2018-04-16 PROCEDURE — 40000168 ZZH STATISTIC PP CARE STAGE 3

## 2018-04-16 PROCEDURE — 27210908 ZZH NEEDLE CR4

## 2018-04-16 PROCEDURE — 25000125 ZZHC RX 250: Performed by: RADIOLOGY

## 2018-04-16 PROCEDURE — 25000128 H RX IP 250 OP 636: Performed by: PHYSICIAN ASSISTANT

## 2018-04-16 PROCEDURE — 99152 MOD SED SAME PHYS/QHP 5/>YRS: CPT

## 2018-04-16 PROCEDURE — 75885 VEIN X-RAY LIVER W/HEMODYNAM: CPT

## 2018-04-16 PROCEDURE — 82962 GLUCOSE BLOOD TEST: CPT

## 2018-04-16 PROCEDURE — 27210804 ZZH SHEATH CR3

## 2018-04-16 RX ORDER — IODIXANOL 320 MG/ML
100 INJECTION, SOLUTION INTRAVASCULAR ONCE
Status: COMPLETED | OUTPATIENT
Start: 2018-04-16 | End: 2018-04-16

## 2018-04-16 RX ORDER — FENTANYL CITRATE 50 UG/ML
25-50 INJECTION, SOLUTION INTRAMUSCULAR; INTRAVENOUS EVERY 5 MIN PRN
Status: DISCONTINUED | OUTPATIENT
Start: 2018-04-16 | End: 2018-04-16 | Stop reason: HOSPADM

## 2018-04-16 RX ORDER — ACETAMINOPHEN 500 MG
500 TABLET ORAL EVERY 6 HOURS PRN
Status: DISCONTINUED | OUTPATIENT
Start: 2018-04-16 | End: 2018-04-16 | Stop reason: HOSPADM

## 2018-04-16 RX ORDER — FLUMAZENIL 0.1 MG/ML
0.2 INJECTION, SOLUTION INTRAVENOUS
Status: DISCONTINUED | OUTPATIENT
Start: 2018-04-16 | End: 2018-04-16 | Stop reason: HOSPADM

## 2018-04-16 RX ORDER — LIDOCAINE 40 MG/G
CREAM TOPICAL
Status: DISCONTINUED | OUTPATIENT
Start: 2018-04-16 | End: 2018-04-16 | Stop reason: HOSPADM

## 2018-04-16 RX ORDER — SODIUM CHLORIDE 9 MG/ML
INJECTION, SOLUTION INTRAVENOUS CONTINUOUS
Status: DISCONTINUED | OUTPATIENT
Start: 2018-04-16 | End: 2018-04-16 | Stop reason: HOSPADM

## 2018-04-16 RX ORDER — NALOXONE HYDROCHLORIDE 0.4 MG/ML
.1-.4 INJECTION, SOLUTION INTRAMUSCULAR; INTRAVENOUS; SUBCUTANEOUS
Status: DISCONTINUED | OUTPATIENT
Start: 2018-04-16 | End: 2018-04-16 | Stop reason: HOSPADM

## 2018-04-16 RX ADMIN — SODIUM CHLORIDE: 9 INJECTION, SOLUTION INTRAVENOUS at 07:51

## 2018-04-16 RX ADMIN — MIDAZOLAM HYDROCHLORIDE 2 MG: 1 INJECTION, SOLUTION INTRAMUSCULAR; INTRAVENOUS at 09:33

## 2018-04-16 RX ADMIN — FENTANYL CITRATE 100 MCG: 50 INJECTION INTRAMUSCULAR; INTRAVENOUS at 09:34

## 2018-04-16 RX ADMIN — LIDOCAINE HYDROCHLORIDE 5 ML: 10 INJECTION, SOLUTION EPIDURAL; INFILTRATION; INTRACAUDAL; PERINEURAL at 09:40

## 2018-04-16 RX ADMIN — IODIXANOL 40 ML: 320 INJECTION, SOLUTION INTRAVASCULAR at 09:46

## 2018-04-16 ASSESSMENT — PAIN DESCRIPTION - DESCRIPTORS: DESCRIPTORS: ACHING

## 2018-04-16 NOTE — PROGRESS NOTES
Patient finally woke up around 2, ready to go home, got up to the bathroom, left unit via wheelchair to the front  with daughter wife.

## 2018-04-16 NOTE — PROGRESS NOTES
Pt arrives to 2a, with spouse, for TIPS revision and paracentesis. Pre procedure assessment completed. H&P is up to date. Consent is signed. PIV placed. Labs are up to date.

## 2018-04-16 NOTE — IP AVS SNAPSHOT
[address] [address]                                       After Visit Summary   4/16/2018    Lawrence Louie    MRN: [id number]           After Visit Summary Signature Page     I have received my discharge instructions, and my questions have been answered. I have discussed any challenges I see with this plan with the nurse or doctor.    ..........................................................................................................................................  Patient/Patient Representative Signature      ..........................................................................................................................................  Patient Representative Print Name and Relationship to Patient    ..................................................               ................................................  Date                                            Time    ..........................................................................................................................................  Reviewed by Signature/Title    ...................................................              ..............................................  Date                                                            Time

## 2018-04-16 NOTE — BRIEF OP NOTE
Interventional Radiology Brief Post Procedure Note    Procedure: TIPS venogram and pressure measurements    Proceduralist: David Davis MD    Assistant: Hill Howell MD    Time Out: Prior to the start of the procedure and with procedural staff participation, I verbally confirmed the patient s identity using two indicators, relevant allergies, that the procedure was appropriate and matched the consent or emergent situation, and that the correct equipment/implants were available. Immediately prior to starting the procedure I conducted the Time Out with the procedural staff and re-confirmed the patient s name, procedure, and site/side. (The Joint Commission universal protocol was followed.)  Yes        Sedation: IR Nurse Monitored Care   Post Procedure Summary:  Prior to the start of the procedure and with procedural staff participation, I verbally confirmed the patient s identity using two indicators, relevant allergies, that the procedure was appropriate and matched the consent or emergent situation, and that the correct equipment/implants were available. Immediately prior to starting the procedure I conducted the Time Out with the procedural staff and re-confirmed the patient s name, procedure, and site/side. (The Joint Commission universal protocol was followed.)  Yes       Sedatives: Fentanyl and Midazolam (Versed)    Vital signs, airway and pulse oximetry were monitored and remained stable throughout the procedure and sedation was maintained until the procedure was complete.  The patient was monitored by staff until sedation discharge criteria were met.    Patient tolerance: Patient tolerated the procedure well with no immediate complications.    Time of sedation in minutes: 60 Minutes minutes from beginning to end of physician one to one monitoring.          Findings: Patent TIPS without stenosis. Unchanged pressures.    Estimated Blood Loss: Minimal    Fluoroscopy Time:  minute(s)    SPECIMENS:  None    Complications: 1. None     Condition: Stable    Plan: Patient to 2A for postprocedure cares. Bedrest for 1 hour with HOB>30 degrees. Followup to be arranged by IR.    Comments: See dictated procedure note for full details.    Hill Howell MD

## 2018-04-16 NOTE — PROGRESS NOTES
Pt has returned to unit 2a s/p TIPS venogram and pressure measurements. Right neck site CDI, site flat, soft. Pt drowsy, arouses to voice. VSS. HOB elevated 30-45 degrees. Spouse and daughter at bedside.   1215 Pt has tolerated sips of orange juice. Discharge instructions reviewed with pt and pt's spouse and daughter, all verbalize understanding. Pt remains drowsy between cares.

## 2018-04-16 NOTE — PROGRESS NOTES
Interventional Radiology Pre-Procedure Sedation Assessment   Time of Assessment: 8:01 AM    Expected Level: Moderate Sedation    Indication: Sedation is required for the following type of Procedure: Venous    Sedation and procedural consent: Risks, benefits and alternatives were discussed with Patient    PO Intake: Appropriately NPO for procedure    ASA Class: Class 3 - SEVERE SYSTEMIC DISEASE, DEFINITE FUNCTIONAL LIMITATIONS.    Mallampati: Grade 2:  Soft palate, base of uvula, tonsillar pillars, and portion of posterior pharyngeal wall visible    Lungs: Lungs Clear with good breath sounds bilaterally    Heart: Normal heart sounds and rate    History and physical reviewed and no updates needed. I have reviewed the lab findings, diagnostic data, medications, and the plan for sedation. I have determined this patient to be an appropriate candidate for the planned sedation and procedure and have reassessed the patient IMMEDIATELY PRIOR to sedation and procedure.    Hill Howell MD

## 2018-04-16 NOTE — IP AVS SNAPSHOT
MRN:[id number]                      After Visit Summary   4/16/2018    Lawrence Louie    MRN: [id number]           Visit Information        Department      4/16/2018  6:41 AM [address] [facility]          Review of your medicines      CONTINUE these medicines which may have CHANGED, or have new prescriptions. If we are uncertain of the size of tablets/capsules you have at home, strength may be listed as something that might have changed.        Dose / Directions    gabapentin 300 MG capsule   Commonly known as:  NEURONTIN   This may have changed:  additional instructions   Used for:  Diabetic polyneuropathy associated with type 2 diabetes mellitus (H)        Dose:  300 mg   Take 1 capsule (300 mg) by mouth 3 times daily   Quantity:  90 capsule   Refills:  3         CONTINUE these medicines which have NOT CHANGED        Dose / Directions    blood glucose lancets standard   Commonly known as:  no brand specified   Used for:  Diabetes mellitus, type 2 (H)        Use to test blood sugar 4 X  times daily or as directed.   Quantity:  1 Box   Refills:  3       blood glucose monitoring meter device kit   Commonly known as:  no brand specified   Used for:  Type 2 diabetes mellitus with other specified complication (H)        Use to test blood sugar 4 X  times daily or as directed.   Quantity:  1 kit   Refills:  0       blood glucose monitoring test strip   Commonly known as:  no brand specified   Used for:  Diabetes mellitus, type 2 (H)        Use to test blood sugars 4 X  times daily or as directed   Quantity:  200 strip   Refills:  3       calcium carbonate 1250 MG tablet   Commonly known as:  OS-ERIN 500 mg [place]. Ca        Dose:  1 tablet   Take 1 tablet by mouth daily   Refills:  0       citalopram 40 MG tablet   Commonly known as:  celeXA   Used for:  Recurrent major depressive disorder, remission status unspecified (H)        Dose:  40 mg   Take 1 tablet (40 mg) by mouth daily   Quantity:  30  tablet   Refills:  3       furosemide 40 MG tablet   Commonly known as:  LASIX   Used for:  Edema of both legs        Take 3 tablets twice a day.   Quantity:  540 tablet   Refills:  1       glipiZIDE 5 MG 24 hr tablet   Commonly known as:  glipiZIDE XL   Used for:  Type 2 diabetes mellitus without complication, with long-term current use of insulin (H)        Dose:  5 mg   Take 1 tablet (5 mg) by mouth 2 times daily   Quantity:  180 tablet   Refills:  1       insulin aspart 100 UNIT/ML injection   Commonly known as:  NovoLOG FLEXPEN   Used for:  Type 2 diabetes mellitus without complication, with long-term current use of insulin (H)        20 units before breakfast, 20 units before lunch, 20 units before dinner, 20 units before snacks   Quantity:  30 mL   Refills:  3       insulin glargine 100 UNIT/ML injection   Commonly known as:  LANTUS SOLOSTAR   Used for:  Type 2 diabetes mellitus with other oral complication, unspecified long term insulin use status (H)        Dose:  40 Units   Inject 40 Units Subcutaneous every morning   Quantity:  30 mL   Refills:  1       * insulin pen needle 31G X 8 MM   Commonly known as:  B-D U/F   Used for:  Type 2 diabetes, HbA1c goal < 7% (H)        USE  6 times daily / OR AS DIRECTED   Quantity:  300 each   Refills:  3       * insulin pen needle 31G X 8 MM   Commonly known as:  B-D U/F   Used for:  Type 2 diabetes, HbA1c goal < 7% (H)        Use 6 times daily or as directed   Quantity:  600 each   Refills:  1       magnesium oxide 400 (241.3 Mg) MG tablet   Commonly known as:  MAG-OX   Used for:  Cramp of limb        Dose:  400 mg   Take 1 tablet (400 mg) by mouth daily   Quantity:  90 tablet   Refills:  3       ondansetron 4 MG tablet   Commonly known as:  ZOFRAN   Used for:  Alcoholic cirrhosis (H), Nausea        Dose:  4 mg   Take 1 tablet (4 mg) by mouth every 8 hours as needed for nausea   Quantity:  18 tablet   Refills:  1       oxyCODONE IR 5 MG tablet   Commonly known as:   ROXICODONE   Used for:  Alcoholic cirrhosis of liver with ascites (H)        Take 1 tablet every 8 hours as needed   Quantity:  90 tablet   Refills:  0       ranitidine 150 MG tablet   Commonly known as:  ZANTAC   Used for:  Esophageal varices (H)        Dose:  150 mg   Take 1 tablet (150 mg) by mouth 2 times daily   Quantity:  180 tablet   Refills:  3       rifaximin 550 MG Tabs tablet   Commonly known as:  XIFAXAN   Used for:  Hepatic encephalopathy (H)        Dose:  550 mg   Take 1 tablet (550 mg) by mouth 2 times daily   Quantity:  60 tablet   Refills:  11       spironolactone 50 MG tablet   Commonly known as:  ALDACTONE   Used for:  Portal hypertension (H), Generalized edema        Takes 3 tablets (150 mg) every AM and take 2 tablets (100 mg) every PM.   Quantity:  150 tablet   Refills:  11       STATIN NOT PRESCRIBED (INTENTIONAL)   Used for:  Hyperlipidemia LDL goal <100        Dose:  1 each   1 each daily Statin not prescribed intentionally due to Active liver disease   Quantity:  0 each   Refills:  0       VITAMIN B-12 PO        Dose:  1 tablet   Take 1 tablet by mouth daily   Refills:  0       vitamin D3 2000 units Caps   Used for:  Mild major depression (H)        Dose:  1 capsule   Take 1 capsule by mouth daily   Quantity:  90 capsule   Refills:  3       * Notice:  This list has 2 medication(s) that are the same as other medications prescribed for you. Read the directions carefully, and ask your doctor or other care provider to review them with you.             Protect others around you: Learn how to safely use, store and throw away your medicines at www.disposemymeds.org.         Follow-ups after your visit        Your next 10 appointments already scheduled     Apr 20, 2018 12:00 PM CDT   Paracentesis Visit with  Spec Inf Para Provider,  [facility] [place] [facility] ([facility])    [address]  [address]  [place]  [address]   [phone]            Apr 27, 2018 12:00 PM CDT   Paracentesis Visit with Uc Spec Inf Para Provider, UC 40 ATC   [facility] ([facility])    [address]  [address]  [place] [address]   [phone]            May 03, 2018  2:15 PM CDT   (Arrive by 2:00 PM)   Return Visit with Trav Hartman MD   [facility] ([facility])    [address]  3rd [address]  [place] [address]   [phone]            May 04, 2018 12:00 PM CDT   Paracentesis Visit with Uc Spec Inf Para Provider, UC 39 ATC   [facility] ([facility])    [address]  [address]  [place] [address]   [phone]            May 11, 2018 12:00 PM CDT   Paracentesis Visit with Uc Spec Inf Para Provider, UC 40 ATC   [facility] ([facility])    [address]  [address]  [place] [address]   [phone]            May 18, 2018 12:00 PM CDT   Paracentesis Visit with Uc Spec Inf Para Provider   [facility] ([facility])    [address]  [address]  [place] [address]   [phone]               Care Instructions        Further instructions from your care team       [facility]  Going Home after TIPS venogram with pressure measurements                                                     After you go home:    Drink plenty of fluids.    Resume your normal diet    Do not scub the procedure site vigorously for 3 days    No lotion or powder to the puncture site for 3 days    DO NOT do any strenuous exercise or lifting greater than 10 pounds for at least 2 days following your procedure    HOB elevated to at least 30 degrees. Do not lay flat for at  least 2 hours after you go home.  IF YOU WERE GIVEN SEDATION    No driving or alcoholic beverages today    Do not make any important or legal decisions today    We recommend an adult stay with you for the first 24 hours    CALL THE PHYSICIAN IF:    Monitor neck site for bleeding, swelling. If any bleeding or swelling immediately apply pressure and call the doctor.    If you notice any changes in your voice or breathing you should call your doctor immediately & come to the closest Emergency Department.  Do Not Drive Yourself.    You develop nausea or vomiting    You develop hives or a rash or any unexplained itching    ADDITIONAL INSTRUCTIONS:   -Support the puncture site for coughing, sneezing, or moving your bowels for the first 48 hours.   -No tub bath for 5 days post procedure, ok to shower next day    [facility] INTERVENTIONAL RADIOLOGY DEPARTMENT        Procedure Physicians: Dr Howell, Dr Davis                    Date of procedure:April 16, 2018        Telephone numbers:     [phone]  Monday-Friday 8:00 am to 4:30 pm                                              [phone]  After 4:30 pm Monday-Friday, Weekends & Holidays. Ask for the Interventional Radiologist on call.Someone is available  24 hours/day        [facility] toll free number: [phone]  Monday-Friday 8:00 am to 4:30 pm           Additional Information About Your Visit        Your [facility] Information     [facility] gives you secure access to your electronic health record. If you see a primary care provider, you can also send messages to your care team and make appointments. If you have questions, please call your primary care clinic.  If you do not have a primary care provider, please call [phone] and they will assist you.        Care EveryWhere ID     This is your Care EveryWhere ID. This could be used by other organizations to access your [place] medical records  [id number]        Your Vitals Were     Blood Pressure Temperature Respirations  "Height Weight Pulse Oximetry    123/59 (BP Location: Right arm) 97.4  F (36.3  C) (Oral) 16 1.88 m (6' 2\") 148 kg (326 lb 4.5 oz) 96%    BMI (Body Mass Index)                   41.89 kg/m2            Primary Care Provider Office Phone # Fax #    CORY Toussaint [phone]      Equal Access to Services     JOSEF LORENZO : Hadii aad ku hadasho Soomaali, waaxda luqadaha, qaybta kaalmada adeegyada, waxay idiin hayaan adeeg kharasangeeta lalorenzo . So [facility] [phone].    ATENCIÓN: Si benla louis, tiene a rondon disposición servicios gratuitos de asistencia lingüística. Llame al [phone].    We comply with applicable federal civil rights laws and [place] laws. We do not discriminate on the basis of race, color, national origin, age, disability, sex, sexual orientation, or gender identity.            Thank you!     Thank you for choosing [facility] for your care. Our goal is always to provide you with excellent care. Hearing back from our patients is one way we can continue to improve our services. Please take a few minutes to complete the written survey that you may receive in the mail after you visit with us. Thank you!             Medication List: This is a list of all your medications and when to take them. Check marks below indicate your daily home schedule. Keep this list as a reference.      Medications           Morning Afternoon Evening Bedtime As Needed    blood glucose lancets standard   Commonly known as:  no brand specified   Use to test blood sugar 4 X  times daily or as directed.                                blood glucose monitoring meter device kit   Commonly known as:  no brand specified   Use to test blood sugar 4 X  times daily or as directed.                                blood glucose monitoring test strip   Commonly known as:  no brand specified   Use to test blood sugars 4 X  times daily or as directed                                calcium carbonate 1250 MG tablet "   Commonly known as:  OS-ERIN 500 mg [place]. Ca   Take 1 tablet by mouth daily                                citalopram 40 MG tablet   Commonly known as:  celeXA   Take 1 tablet (40 mg) by mouth daily                                furosemide 40 MG tablet   Commonly known as:  LASIX   Take 3 tablets twice a day.                                gabapentin 300 MG capsule   Commonly known as:  NEURONTIN   Take 1 capsule (300 mg) by mouth 3 times daily                                glipiZIDE 5 MG 24 hr tablet   Commonly known as:  glipiZIDE XL   Take 1 tablet (5 mg) by mouth 2 times daily                                insulin aspart 100 UNIT/ML injection   Commonly known as:  NovoLOG FLEXPEN   20 units before breakfast, 20 units before lunch, 20 units before dinner, 20 units before snacks                                insulin glargine 100 UNIT/ML injection   Commonly known as:  LANTUS SOLOSTAR   Inject 40 Units Subcutaneous every morning                                * insulin pen needle 31G X 8 MM   Commonly known as:  B-D U/F   USE  6 times daily / OR AS DIRECTED                                * insulin pen needle 31G X 8 MM   Commonly known as:  B-D U/F   Use 6 times daily or as directed                                magnesium oxide 400 (241.3 Mg) MG tablet   Commonly known as:  MAG-OX   Take 1 tablet (400 mg) by mouth daily                                ondansetron 4 MG tablet   Commonly known as:  ZOFRAN   Take 1 tablet (4 mg) by mouth every 8 hours as needed for nausea                                oxyCODONE IR 5 MG tablet   Commonly known as:  ROXICODONE   Take 1 tablet every 8 hours as needed                                ranitidine 150 MG tablet   Commonly known as:  ZANTAC   Take 1 tablet (150 mg) by mouth 2 times daily                                rifaximin 550 MG Tabs tablet   Commonly known as:  XIFAXAN   Take 1 tablet (550 mg) by mouth 2 times daily                                spironolactone 50  MG tablet   Commonly known as:  ALDACTONE   Takes 3 tablets (150 mg) every AM and take 2 tablets (100 mg) every PM.                                STATIN NOT PRESCRIBED (INTENTIONAL)   1 each daily Statin not prescribed intentionally due to Active liver disease                                VITAMIN B-12 PO   Take 1 tablet by mouth daily                                vitamin D3 2000 units Caps   Take 1 capsule by mouth daily                                * Notice:  This list has 2 medication(s) that are the same as other medications prescribed for you. Read the directions carefully, and ask your doctor or other care provider to review them with you.

## 2018-04-16 NOTE — PROGRESS NOTES
Patient Name: Lawrence Louie  Medical Record Number: [id number]  Today's Date: 4/16/2018    Procedure: Transvenous intrahepatic portosystemic shunt revision  Proceduralist: Dr. Hill Mcgowan, Dr. David Almaguer    Sedation start time: 0835  Sedation end time: 0940  Sedation medications administered: 2 mg versed, 100 mcg fentanyl  Total sedation time: 65 minutes    Procedure start time: 0850  Puncture time: 0851  Procedure end time: 0940    Report given to: 2A RN    Other Notes: Pt arrived to IR room 5 from . Pt denies any questions or concerns regarding procedure.  Pt consented for a possible paracentesis but paracentesis was decided against by providers after ultrasounding patients abdomen for fluid, plan to proceed with the TIPS.  Pt positioned supine and monitored per protocol. Pt tolerated procedure without any noted complications. Pt transferred back to .

## 2018-04-16 NOTE — DISCHARGE INSTRUCTIONS
[facility]  Going Home after TIPS venogram with pressure measurements                                                     After you go home:    Drink plenty of fluids.    Resume your normal diet    Do not scub the procedure site vigorously for 3 days    No lotion or powder to the puncture site for 3 days    DO NOT do any strenuous exercise or lifting greater than 10 pounds for at least 2 days following your procedure    HOB elevated to at least 30 degrees. Do not lay flat for at least 2 hours after you go home.  IF YOU WERE GIVEN SEDATION    No driving or alcoholic beverages today    Do not make any important or legal decisions today    We recommend an adult stay with you for the first 24 hours    CALL THE PHYSICIAN IF:    Monitor neck site for bleeding, swelling. If any bleeding or swelling immediately apply pressure and call the doctor.    If you notice any changes in your voice or breathing you should call your doctor immediately & come to the closest Emergency Department.  Do Not Drive Yourself.    You develop nausea or vomiting    You develop hives or a rash or any unexplained itching    ADDITIONAL INSTRUCTIONS:   -Support the puncture site for coughing, sneezing, or moving your bowels for the first 48 hours.   -No tub bath for 5 days post procedure, ok to shower next day    [facility] INTERVENTIONAL RADIOLOGY DEPARTMENT        Procedure Physicians: Dr Howell, Dr Davis                    Date of procedure:April 16, 2018        Telephone numbers:     [phone]  Monday-Friday 8:00 am to 4:30 pm                                              [phone]  After 4:30 pm Monday-Friday, Weekends & Holidays. Ask for the Interventional Radiologist on call.Someone is available  24 hours/day        [facility] toll free number: [phone]  Monday-Friday 8:00 am to 4:30 pm

## 2018-04-18 ENCOUNTER — PATIENT OUTREACH (OUTPATIENT)
Dept: INTERNAL MEDICINE | Facility: CLINIC | Age: 63
End: 2018-04-18

## 2018-04-18 NOTE — PROGRESS NOTES
[facility] home care lymphedema therapist reports that pt made a comment of suicide ideation during home care yesterday, no plan on suicide ,but expressed frustration on his current health not getting better.    Left a message to the pt's cell phone to call me back.

## 2018-04-19 ENCOUNTER — TELEPHONE (OUTPATIENT)
Dept: INTERVENTIONAL RADIOLOGY/VASCULAR | Facility: CLINIC | Age: 63
End: 2018-04-19

## 2018-04-19 NOTE — TELEPHONE ENCOUNTER
I called to find out how Lawrence was post procedure with Dr. Davis, I left a vm including my call back number.  Per Dr. Davis no revision of TIPS was done due to heart pressure measurements were high, the TIPS was ok.  Pt is to follow up with Cardiology.  WE will continue with surveillance of TIPS.  CARLO Burrell RN, BSN  Interventional Radiology Care Coordinator   Phone:  [phone]

## 2018-04-20 ENCOUNTER — RADIANT APPOINTMENT (OUTPATIENT)
Dept: ULTRASOUND IMAGING | Facility: CLINIC | Age: 63
End: 2018-04-20
Attending: RADIOLOGY
Payer: COMMERCIAL

## 2018-04-20 ENCOUNTER — OFFICE VISIT (OUTPATIENT)
Dept: INFUSION THERAPY | Facility: CLINIC | Age: 63
End: 2018-04-20
Attending: INTERNAL MEDICINE
Payer: COMMERCIAL

## 2018-04-20 VITALS
SYSTOLIC BLOOD PRESSURE: 109 MMHG | RESPIRATION RATE: 18 BRPM | OXYGEN SATURATION: 97 % | HEART RATE: 77 BPM | TEMPERATURE: 98 F | WEIGHT: 306.4 LBS | DIASTOLIC BLOOD PRESSURE: 44 MMHG | BODY MASS INDEX: 39.34 KG/M2

## 2018-04-20 DIAGNOSIS — Z95.828 S/P TIPS (TRANSJUGULAR INTRAHEPATIC PORTOSYSTEMIC SHUNT): ICD-10-CM

## 2018-04-20 DIAGNOSIS — K70.31 ALCOHOLIC CIRRHOSIS OF LIVER WITH ASCITES (H): Primary | ICD-10-CM

## 2018-04-20 PROCEDURE — 27210190 US PARACENTESIS

## 2018-04-20 PROCEDURE — 25000125 ZZHC RX 250: Mod: ZF | Performed by: INTERNAL MEDICINE

## 2018-04-20 PROCEDURE — 25000128 H RX IP 250 OP 636: Mod: ZF | Performed by: INTERNAL MEDICINE

## 2018-04-20 PROCEDURE — P9047 ALBUMIN (HUMAN), 25%, 50ML: HCPCS | Mod: ZF | Performed by: INTERNAL MEDICINE

## 2018-04-20 RX ORDER — ALBUMIN (HUMAN) 12.5 G/50ML
12.5 SOLUTION INTRAVENOUS 4 TIMES DAILY PRN
Status: DISCONTINUED | OUTPATIENT
Start: 2018-04-20 | End: 2018-04-20 | Stop reason: HOSPADM

## 2018-04-20 RX ORDER — ALBUMIN (HUMAN) 12.5 G/50ML
12.5 SOLUTION INTRAVENOUS 4 TIMES DAILY PRN
Status: CANCELLED
Start: 2018-04-20

## 2018-04-20 RX ORDER — DESVENLAFAXINE 100 MG/1
TABLET, EXTENDED RELEASE ORAL
Refills: 0 | COMMUNITY
Start: 2018-04-04 | End: 2018-05-16

## 2018-04-20 RX ORDER — FUROSEMIDE 20 MG
TABLET ORAL
Refills: 11 | COMMUNITY
Start: 2018-03-25 | End: 2018-05-24

## 2018-04-20 RX ADMIN — ALBUMIN HUMAN 37.5 G: 0.25 SOLUTION INTRAVENOUS at 12:35

## 2018-04-20 RX ADMIN — LIDOCAINE HYDROCHLORIDE 15 ML: 10 INJECTION, SOLUTION EPIDURAL; INFILTRATION; INTRACAUDAL; PERINEURAL at 13:17

## 2018-04-20 NOTE — MR AVS SNAPSHOT
After Visit Summary   4/20/2018    Lawrence Louie    MRN: [id number]           Patient Information     Date Of Birth          1955        Visit Information        Provider Department      4/20/2018 12:00 PM Provider, Uc Spec Inf Para; UC 39 ATC [facility] Procedure        Today's Diagnoses     Alcoholic cirrhosis of liver with ascites (H)    -  1       Follow-ups after your visit        Your next 10 appointments already scheduled     Apr 27, 2018 12:00 PM CDT   Paracentesis Visit with Uc Spec Inf Para Provider, UC 40 ATC   [facility] ([facility])    [address]  [address]  [place] [address]   [phone]            May 03, 2018  2:15 PM CDT   (Arrive by 2:00 PM)   Return Visit with Trav Hartman MD   [facility] D[facility]atology ([facility])    [address]  3rd Floor  [place] [address]   [phone]            May 04, 2018 12:00 PM CDT   Paracentesis Visit with Uc Spec Inf Para Provider, UC 39 ATC   [facility] ([facility])    [address]  [address]  [place] [address]   [phone]            May 11, 2018 12:00 PM CDT   Paracentesis Visit with Uc Spec Inf Para Provider, UC 40 ATC   [facility] ([facility])    [address]  [address]  [place] [address]   [phone]            May 18, 2018 12:00 PM CDT   Paracentesis Visit with Uc Spec Inf Para Provider, UC 39 ATC   [facility] ([facility])    [address]  [address]  [place] [address]   [phone]            May 25, 2018 12:00 PM CDT   Paracentesis Visit with Uc Spec Inf Para Provider   [facility] Special[facility] Procedure ([facility])    [address]  [address]  [facility] [address] [phone]              Who to contact     If you have questions or need follow up information about today's clinic visit or your schedule please contact [facility] directly at [phone].  Normal or non-critical lab and imaging results will be communicated to you by MyChart, letter or phone within 4 business days after the clinic has received the results. If you do not hear from us within 7 days, please contact the clinic through Nekted[employer] or phone. If you have a critical or abnormal lab result, we will notify you by phone as soon as possible.  Submit refill requests through [employer] or call your pharmacy and they will forward the refill request to us. Please allow 3 business days for your refill to be completed.          Additional Information About Your Visit        [employer] Information     [employer] gives you secure access to your electronic health record. If you see a primary care provider, you can also send messages to your care team and make appointments. If you have questions, please call your primary care clinic.  If you do not have a primary care provider, please call [phone] and they will assist you.        Care EveryWhere ID     This is your Care EveryWhere ID. This could be used by other organizations to access your [facility] medical records  [id number]        Your Vitals Were     Pulse Temperature Respirations Pulse Oximetry BMI (Body Mass Index)       77 98  F (36.7  C) (Oral) 18 97% 40.48 kg/m2        Blood Pressure from Last 3 Encounters:   04/20/18 109/44   04/16/18 118/72   04/13/18 120/47    Weight from Last 3 Encounters:   04/20/18 143 kg (315 lb 4.8 oz)   04/16/18 148 kg (326 lb 4.5 oz)   04/13/18 148 kg (326 lb 4.8 oz)              Today, you had the following     No orders found for display         Today's  Medication Changes          These changes are accurate as of 4/20/18  1:47 PM.  If you have any questions, ask your nurse or doctor.               These medicines have changed or have updated prescriptions.        Dose/Directions    gabapentin 300 MG capsule   Commonly known as:  NEURONTIN   This may have changed:  additional instructions   Used for:  Diabetic polyneuropathy associated with type 2 diabetes mellitus (H)        Dose:  300 mg   Take 1 capsule (300 mg) by mouth 3 times daily   Quantity:  90 capsule   Refills:  3                Primary Care Provider Office Phone # Fax #    Ary Murphy, CORY [phone]       [address] [place] [address] [address]  [facility] [address]        Equal Access to Services     [facility] : Hadii sil Del Toro, wateganda michi, qaybta kaalmada noel, silvia richardson . So [facility] [phone].    ATENCIÓN: Si habla español, tiene a rondon disposición servicios gratuitos de asistencia lingüística. [facility] [phone].    We comply with applicable federal civil rights laws and [place] laws. We do not discriminate on the basis of race, color, national origin, age, disability, sex, sexual orientation, or gender identity.            Thank you!     Thank you for choosing [facility] SPECIALTY AND PROCEDURE  for your care. Our goal is always to provide you with excellent care. Hearing back from our patients is one way we can continue to improve our services. Please take a few minutes to complete the written survey that you may receive in the mail after your visit with us. Thank you!             Your Updated Medication List - Protect others around you: Learn how to safely use, store and throw away your medicines at www.disposemymeds.org.          This list is accurate as of 4/20/18  1:47 PM.  Always use your most recent med list.                   Brand Name Dispense Instructions for use Diagnosis    blood glucose  lancets standard    no brand specified    1 Box    Use to test blood sugar 4 X  times daily or as directed.    Diabetes mellitus, type 2 (H)       blood glucose monitoring meter device kit    no brand specified    1 kit    Use to test blood sugar 4 X  times daily or as directed.    Type 2 diabetes mellitus with other specified complication (H)       blood glucose monitoring test strip    no brand specified    200 strip    Use to test blood sugars 4 X  times daily or as directed    Diabetes mellitus, type 2 (H)       Calcium carb-Vitamin D 500 mg [place]-200 units 500-200 MG-UNIT per tablet    OSCAL with D;Oyster Shell Calcium          calcium carbonate 1250 MG tablet    OS-ERIN 500 mg [place]. Ca     Take 1 tablet by mouth daily        citalopram 40 MG tablet    celeXA    30 tablet    Take 1 tablet (40 mg) by mouth daily    Recurrent major depressive disorder, remission status unspecified (H)       desvenlafaxine succinate 100 MG 24 hr tablet    PRISTIQ          * furosemide 20 MG tablet    LASIX          * furosemide 40 MG tablet    LASIX    540 tablet    Take 3 tablets twice a day.    Edema of both legs       gabapentin 300 MG capsule    NEURONTIN    90 capsule    Take 1 capsule (300 mg) by mouth 3 times daily    Diabetic polyneuropathy associated with type 2 diabetes mellitus (H)       glipiZIDE 5 MG 24 hr tablet    glipiZIDE XL    180 tablet    Take 1 tablet (5 mg) by mouth 2 times daily    Type 2 diabetes mellitus without complication, with long-term current use of insulin (H)       insulin aspart 100 UNIT/ML injection    NovoLOG FLEXPEN    30 mL    20 units before breakfast, 20 units before lunch, 20 units before dinner, 20 units before snacks    Type 2 diabetes mellitus without complication, with long-term current use of insulin (H)       insulin glargine 100 UNIT/ML injection    LANTUS SOLOSTAR    30 mL    Inject 40 Units Subcutaneous every morning    Type 2 diabetes mellitus with other oral complication,  unspecified long term insulin use status (H)       * insulin pen needle 31G X 8 MM    B-D U/F    300 each    USE  6 times daily / OR AS DIRECTED    Type 2 diabetes, HbA1c goal < 7% (H)       * insulin pen needle 31G X 8 MM    B-D U/F    600 each    Use 6 times daily or as directed    Type 2 diabetes, HbA1c goal < 7% (H)       magnesium oxide 400 (241.3 Mg) MG tablet    MAG-OX    90 tablet    Take 1 tablet (400 mg) by mouth daily    Cramp of limb       ondansetron 4 MG tablet    ZOFRAN    18 tablet    Take 1 tablet (4 mg) by mouth every 8 hours as needed for nausea    Alcoholic cirrhosis (H), Nausea       oxyCODONE IR 5 MG tablet    ROXICODONE    90 tablet    Take 1 tablet every 8 hours as needed    Alcoholic cirrhosis of liver with ascites (H)       ranitidine 150 MG tablet    ZANTAC    180 tablet    Take 1 tablet (150 mg) by mouth 2 times daily    Esophageal varices (H)       rifaximin 550 MG Tabs tablet    XIFAXAN    60 tablet    Take 1 tablet (550 mg) by mouth 2 times daily    Hepatic encephalopathy (H)       spironolactone 50 MG tablet    ALDACTONE    150 tablet    Takes 3 tablets (150 mg) every AM and take 2 tablets (100 mg) every PM.    Portal hypertension (H), Generalized edema       STATIN NOT PRESCRIBED (INTENTIONAL)     0 each    1 each daily Statin not prescribed intentionally due to Active liver disease    Hyperlipidemia LDL goal <100       VITAMIN B-12 PO      Take 1 tablet by mouth daily        vitamin D3 2000 units Caps     90 capsule    Take 1 capsule by mouth daily    Mild major depression (H)       * Notice:  This list has 4 medication(s) that are the same as other medications prescribed for you. Read the directions carefully, and ask your doctor or other care provider to review them with you.

## 2018-04-20 NOTE — PROGRESS NOTES
Paracentesis Nursing Note  Lawrence Louie [facility].    During today's appointment orders from Meghna Simmons MD were completed.    Progress Note:  Patient identification verified by name and date of birth.  Assessment completed.  Vitals monitored throughout appointment and recorded in Doc Flowsheets.  See proceduralist note in ultrasound.    Date of consent or authorization: 03/08/18  Invasive Procedure Safety Checklist was completed and sent for scanning.     Paracentesis performed by [facility]    The following labs were communicated to provider performing paracentesis:  Lab Results   Component Value Date    PLT 97 04/06/18 01/12/2018       Total amount of ascites fluid drained 3.1 liters.  Color of ascites fluid: yellow and clear.  Total amount of albumin given: 37.5 grams.    Patient tolerated procedure well.    Post procedure,denies pain or discomfort post paracentesis.      Discharge Plan:  Discharge instructions were reviewed with patient.  Patient/Representative verbalized understanding and all questions were answered.   Discharged from [facility] in stable condition.    Kailyn Jennings RN    Administrations This Visit     albumin human 25 % injection 12.5 g     Admin Date Action Dose Route Administered By             04/20/2018 New Bag 12.5 g Intravenous Kailyn Jennings RN                    lidocaine 1 % 20 mL     Admin Date Action Dose Route Administered By             04/20/2018 Given 15 mL Injection Kailyn Jennings RN                            /52  Pulse 72  Temp 98  F (36.7  C) (Oral)  Resp 18  Wt 143 kg (315 lb 4.8 oz)  SpO2 97%  BMI 40.48 kg/m2

## 2018-04-23 ENCOUNTER — MYC MEDICAL ADVICE (OUTPATIENT)
Dept: INTERNAL MEDICINE | Facility: CLINIC | Age: 63
End: 2018-04-23

## 2018-04-23 ENCOUNTER — TELEPHONE (OUTPATIENT)
Dept: FAMILY MEDICINE | Facility: CLINIC | Age: 63
End: 2018-04-23

## 2018-04-23 DIAGNOSIS — M79.671 RIGHT FOOT PAIN: Primary | ICD-10-CM

## 2018-04-23 DIAGNOSIS — R11.0 NAUSEA: ICD-10-CM

## 2018-04-23 DIAGNOSIS — K70.31 ALCOHOLIC CIRRHOSIS OF LIVER WITH ASCITES (H): ICD-10-CM

## 2018-04-23 NOTE — TELEPHONE ENCOUNTER
[facility]  [phone]        OT continuation Orders for lymphemia     3 x 4 weeks

## 2018-04-24 ENCOUNTER — MYC MEDICAL ADVICE (OUTPATIENT)
Dept: INTERNAL MEDICINE | Facility: CLINIC | Age: 63
End: 2018-04-24

## 2018-04-24 DIAGNOSIS — R60.0 LOCALIZED EDEMA: Primary | ICD-10-CM

## 2018-04-24 DIAGNOSIS — K70.31 ALCOHOLIC CIRRHOSIS OF LIVER WITH ASCITES (H): ICD-10-CM

## 2018-04-24 RX ORDER — OXYCODONE HYDROCHLORIDE 5 MG/1
TABLET ORAL
Qty: 180 TABLET | Refills: 0 | Status: SHIPPED | OUTPATIENT
Start: 2018-04-24 | End: 2018-05-29

## 2018-04-24 RX ORDER — ONDANSETRON 4 MG/1
4 TABLET, FILM COATED ORAL EVERY 8 HOURS PRN
Qty: 18 TABLET | Refills: 1 | Status: SHIPPED | OUTPATIENT
Start: 2018-04-24 | End: 2018-12-13

## 2018-04-24 RX ORDER — ONDANSETRON 4 MG/1
4 TABLET, FILM COATED ORAL EVERY 8 HOURS PRN
Qty: 18 TABLET | Refills: 1 | Status: SHIPPED | OUTPATIENT
Start: 2018-04-24 | End: 2018-04-24

## 2018-04-24 NOTE — PROGRESS NOTES
Spoke with wife, states that pt is not taking Zofran in daily basis, takes several times a week whenever he is having nausea.    Soon-Mi  -------------------------------------------------------      Soon-mi,   Could you call and ask Unruly how often he uses Ondansetron for nausea?  There is a possible drug interaction between that and his anti-depressant the the less he uses it the better.  It could cause electrical conduction problems in the heart.     Ary RAY, CNP

## 2018-04-24 NOTE — TELEPHONE ENCOUNTER
Message left with patient on moble phone number regarding the order for pneumatic compression device.  Was this ordered by home care of lymphedema clinic? Informed him that this request will be forwarded to ROBB Mcallister also.  Lopez Valadez LPN at 9:43 AM on 4/24/2018

## 2018-04-24 NOTE — TELEPHONE ENCOUNTER
Ary,    Please see [employer] message.  I spoke with wife for several things below.    1. She stated that pt had fever, 102 F for last 2 days and now 101 F today. Pt is having  severe pain on heels, cannot bear weight, so he cannot walk, hoping pt can have this device order from you today, also hoping he can have more pain control.  2. Also wondering what causes his fever and severe pain, wondering if he could have any type of test, for example MRI, etc.  3. Pt saw outside neurology and they suggested cortisone shot on heels, wants to know what you would think about this.  4. Pt mentioned about suicidal ideation (please see my previous note) last week during home care visit, I offered psychology referral, but pt declined.    Soon-Mi

## 2018-04-26 ENCOUNTER — TELEPHONE (OUTPATIENT)
Dept: FAMILY MEDICINE | Facility: CLINIC | Age: 63
End: 2018-04-26

## 2018-04-26 NOTE — TELEPHONE ENCOUNTER
Ary    Left  Foot x ray was normal on 4/6, but pt is still having pain and he is wondering if he could have additional test done.    Soon-Mi

## 2018-04-26 NOTE — TELEPHONE ENCOUNTER
Patient calling for an order for injections of cortizone in his foot.  He would like to get them tomorrow so please call him back

## 2018-04-27 ENCOUNTER — OFFICE VISIT (OUTPATIENT)
Dept: INFUSION THERAPY | Facility: CLINIC | Age: 63
End: 2018-04-27
Attending: INTERNAL MEDICINE
Payer: COMMERCIAL

## 2018-04-27 ENCOUNTER — RADIANT APPOINTMENT (OUTPATIENT)
Dept: ULTRASOUND IMAGING | Facility: CLINIC | Age: 63
End: 2018-04-27
Attending: INTERNAL MEDICINE
Payer: COMMERCIAL

## 2018-04-27 VITALS
SYSTOLIC BLOOD PRESSURE: 131 MMHG | OXYGEN SATURATION: 98 % | BODY MASS INDEX: 39.48 KG/M2 | WEIGHT: 307.5 LBS | DIASTOLIC BLOOD PRESSURE: 57 MMHG | HEART RATE: 70 BPM | TEMPERATURE: 97.9 F

## 2018-04-27 DIAGNOSIS — K70.31 ALCOHOLIC CIRRHOSIS OF LIVER WITH ASCITES (H): Primary | ICD-10-CM

## 2018-04-27 LAB
ALBUMIN SERPL-MCNC: 2.8 G/DL (ref 3.4–5)
ALP SERPL-CCNC: 136 U/L (ref 40–150)
ALT SERPL W P-5'-P-CCNC: 19 U/L (ref 0–70)
ANION GAP SERPL CALCULATED.3IONS-SCNC: 8 MMOL/L (ref 3–14)
AST SERPL W P-5'-P-CCNC: 34 U/L (ref 0–45)
BASOPHILS # BLD AUTO: 0 10E9/L (ref 0–0.2)
BASOPHILS NFR BLD AUTO: 0.8 %
BILIRUB DIRECT SERPL-MCNC: 0.6 MG/DL (ref 0–0.2)
BILIRUB SERPL-MCNC: 1.3 MG/DL (ref 0.2–1.3)
BUN SERPL-MCNC: 16 MG/DL (ref 7–30)
CALCIUM SERPL-MCNC: 8.2 MG/DL (ref 8.5–10.1)
CHLORIDE SERPL-SCNC: 101 MMOL/L (ref 94–109)
CO2 SERPL-SCNC: 25 MMOL/L (ref 20–32)
CREAT SERPL-MCNC: 0.83 MG/DL (ref 0.66–1.25)
DIFFERENTIAL METHOD BLD: ABNORMAL
EOSINOPHIL # BLD AUTO: 0.1 10E9/L (ref 0–0.7)
EOSINOPHIL NFR BLD AUTO: 3.4 %
ERYTHROCYTE [DISTWIDTH] IN BLOOD BY AUTOMATED COUNT: 19.4 % (ref 10–15)
GFR SERPL CREATININE-BSD FRML MDRD: >90 ML/MIN/1.7M2
GLUCOSE SERPL-MCNC: 98 MG/DL (ref 70–99)
HCT VFR BLD AUTO: 29.3 % (ref 40–53)
HGB BLD-MCNC: 9.5 G/DL (ref 13.3–17.7)
IMM GRANULOCYTES # BLD: 0 10E9/L (ref 0–0.4)
IMM GRANULOCYTES NFR BLD: 0.4 %
INR PPP: 1.34 (ref 0.86–1.14)
LYMPHOCYTES # BLD AUTO: 0.4 10E9/L (ref 0.8–5.3)
LYMPHOCYTES NFR BLD AUTO: 13.2 %
MCH RBC QN AUTO: 30.1 PG (ref 26.5–33)
MCHC RBC AUTO-ENTMCNC: 32.4 G/DL (ref 31.5–36.5)
MCV RBC AUTO: 93 FL (ref 78–100)
MONOCYTES # BLD AUTO: 0.5 10E9/L (ref 0–1.3)
MONOCYTES NFR BLD AUTO: 18.8 %
NEUTROPHILS # BLD AUTO: 1.7 10E9/L (ref 1.6–8.3)
NEUTROPHILS NFR BLD AUTO: 63.4 %
NRBC # BLD AUTO: 0 10*3/UL
NRBC BLD AUTO-RTO: 0 /100
PLATELET # BLD AUTO: 85 10E9/L (ref 150–450)
POTASSIUM SERPL-SCNC: 3.9 MMOL/L (ref 3.4–5.3)
PROT SERPL-MCNC: 6.8 G/DL (ref 6.8–8.8)
RBC # BLD AUTO: 3.16 10E12/L (ref 4.4–5.9)
SODIUM SERPL-SCNC: 134 MMOL/L (ref 133–144)
WBC # BLD AUTO: 2.7 10E9/L (ref 4–11)

## 2018-04-27 PROCEDURE — 27210190 US PARACENTESIS

## 2018-04-27 PROCEDURE — 85025 COMPLETE CBC W/AUTO DIFF WBC: CPT | Performed by: INTERNAL MEDICINE

## 2018-04-27 PROCEDURE — P9047 ALBUMIN (HUMAN), 25%, 50ML: HCPCS | Mod: ZF | Performed by: INTERNAL MEDICINE

## 2018-04-27 PROCEDURE — 25000125 ZZHC RX 250: Mod: ZF | Performed by: INTERNAL MEDICINE

## 2018-04-27 PROCEDURE — 80048 BASIC METABOLIC PNL TOTAL CA: CPT | Performed by: INTERNAL MEDICINE

## 2018-04-27 PROCEDURE — 25000128 H RX IP 250 OP 636: Mod: ZF | Performed by: INTERNAL MEDICINE

## 2018-04-27 PROCEDURE — 85610 PROTHROMBIN TIME: CPT | Performed by: INTERNAL MEDICINE

## 2018-04-27 PROCEDURE — 80076 HEPATIC FUNCTION PANEL: CPT | Performed by: INTERNAL MEDICINE

## 2018-04-27 RX ORDER — ALBUMIN (HUMAN) 12.5 G/50ML
12.5 SOLUTION INTRAVENOUS 4 TIMES DAILY PRN
Status: CANCELLED
Start: 2018-04-27

## 2018-04-27 RX ORDER — ALBUMIN (HUMAN) 12.5 G/50ML
12.5 SOLUTION INTRAVENOUS 4 TIMES DAILY PRN
Status: DISCONTINUED | OUTPATIENT
Start: 2018-04-27 | End: 2018-04-27 | Stop reason: HOSPADM

## 2018-04-27 RX ADMIN — LIDOCAINE HYDROCHLORIDE 15 ML: 10 INJECTION, SOLUTION EPIDURAL; INFILTRATION; INTRACAUDAL; PERINEURAL at 13:01

## 2018-04-27 RX ADMIN — ALBUMIN HUMAN 37.5 G: 0.25 SOLUTION INTRAVENOUS at 13:01

## 2018-04-27 NOTE — MR AVS SNAPSHOT
After Visit Summary   4/27/2018    Lawrence Louie    MRN: [id number]           Patient Information     Date Of Birth          1955        Visit Information        Provider Department      4/27/2018 12:00 PM Provider, Uc Spec Inf Para; [facility]        Today's Diagnoses     Alcoholic cirrhosis of liver with ascites (H)    -  1      Care Instructions      Discharge Instructions for Paracentesis  Paracentesis is a procedure to remove extra fluid from your belly (abdomen). This fluid buildup in the abdomen is called ascites. The procedure may have been done to take a sample of the fluid. Or, it may have been done to drain the extra fluid from your abdomen and help make you more comfortable.     Ascites is buildup of excess fluid in the abdomen.   Home care    If you have pain after the procedure, your healthcare provider can prescribe or recommend pain medicines. Take these exactly as directed. If you stopped taking other medicines before the procedure, ask your provider when you can start them again.    Take it easy for 24 hours after the procedure. Avoid physical activity until your provider says it s OK.    You will have a small bandage over the puncture site. Stitches (sutures), surgical staples, adhesive tapes, adhesive strips, or surgical glue may be used to close the incision. They also help stop bleeding and speed healing. You may take the bandage off in 24 hours.    Check the puncture site for the signs of infection listed below.  Follow-up care  Make a follow-up appointment with your healthcare provider as directed. During your follow-up visit, your provider will check your healing. Let your provider know how you are feeling. You can also discuss the cause of your ascites and if you need any further treatment.  When to seek medical advice  Call your healthcare provider if you have any of the following after the procedure:    A  fever of 100.4 F (38.0 C) or higher    Trouble breathing    Pain that doesn't go away even after taking pain medicine    Belly pain not caused by having the skin punctured    Bleeding from the puncture site    More than a small amount of fluid leaking from the puncture site    Swollen belly    Signs of infection at the puncture site. These include increased pain, redness, or swelling, warmth, or bad-smelling drainage.    Blood in your urine    Feeling dizzy or lightheaded, or fainting   Date Last Reviewed: 7/1/2016 2000-2017 [employer]. [address] [place] [address]. All rights reserved. This information is not intended as a substitute for professional medical care. Always follow your healthcare professional's instructions.                Follow-ups after your visit        Your next 10 appointments already scheduled     May 04, 2018 12:00 PM CDT   Paracentesis Visit with Uc Spec Inf Para Provider, UC 39 ATC   [facility] ([facility])    [address]  [address]  [facility] [address] [phone]            May 11, 2018 12:00 PM CDT   Paracentesis Visit with Uc Spec Inf Para Provider, UC 40 ATC   [facility] ([facility])    [address]  [address]  [facility] [address] [phone]            May 18, 2018 12:00 PM CDT   Paracentesis Visit with Uc Spec Inf Para Provider, UC 39 ATC   [facility] ([facility])    [address]  [address]  [facility] [id number] [phone]            May 25, 2018 12:00 PM CDT   Paracentesis Visit with Uc Spec Inf Para Provider, UC 40 ATC   [facility] ([facility])    [address]  [address]  [facility] [address]    [phone]            Jun 01, 2018 12:00 PM CDT   Paracentesis Visit with Uc Spec Inf Para Provider, UC 39 ATC   [facility] ([facility])    [address]  [address]  [facility] [address] [phone]              Who to contact     If you have questions or need follow up information about today's clinic visit or your schedule please contact [facility] directly at [phone].  Normal or non-critical lab and imaging results will be communicated to you by RIDERShart, letter or phone within 4 business days after the clinic has received the results. If you do not hear from us within 7 days, please contact the clinic through [employer]Sight or phone. If you have a critical or abnormal lab result, we will notify you by phone as soon as possible.  Submit refill requests through [employer]Sight or call your pharmacy and they will forward the refill request to us. Please allow 3 business days for your refill to be completed.          Additional Information About Your Visit        RIDERSharDocphin Information     [employer]ight gives you secure access to your electronic health record. If you see a primary care provider, you can also send messages to your care team and make appointments. If you have questions, please call your primary care clinic.  If you do not have a primary care provider, please call [phone] and they will assist you.        Care EveryWhere ID     This is your Care EveryWhere ID. This could be used by other organizations to access your [place] medical records  [id number]        Your Vitals Were     Pulse Temperature Pulse Oximetry BMI (Body Mass Index)          70 97.9  F (36.6  C) (Oral) 98% 39.48 kg/m2         Blood Pressure from Last 3 Encounters:   04/27/18 131/57   04/20/18 109/44   04/16/18 118/72    Weight from Last 3 Encounters:   04/27/18 139.5 kg (307 lb 8 oz)   04/20/18 139 kg (306 lb  6.4 oz)   04/16/18 148 kg (326 lb 4.5 oz)              We Performed the Following     Basic metabolic panel     CBC with platelets differential     Hepatic panel     INR     US Paracentesis          Today's Medication Changes          These changes are accurate as of 4/27/18  1:44 PM.  If you have any questions, ask your nurse or doctor.               These medicines have changed or have updated prescriptions.        Dose/Directions    gabapentin 300 MG capsule   Commonly known as:  NEURONTIN   This may have changed:  additional instructions   Used for:  Diabetic polyneuropathy associated with type 2 diabetes mellitus (H)        Dose:  300 mg   Take 1 capsule (300 mg) by mouth 3 times daily   Quantity:  90 capsule   Refills:  3                Primary Care Provider Office Phone # Fax #    Ary Murphy, APRN [phone]       [address] [place] [address] [address] [place] [address]        Equal Access to Services     JOSEF LORENZO : Jose Eduardo palmer Soanthony, waaxda luqpb, qaybta kaalmada noel, silvia richardson . So [facility] [phone].    ATENCIÓN: Si habla español, tiene a rondon disposición servicios gratuitos de asistencia lingüística. [facility] [phone].    We comply with applicable federal civil rights laws and [place] laws. We do not discriminate on the basis of race, color, national origin, age, disability, sex, sexual orientation, or gender identity.            Thank you!     Thank you for choosing [facility]  for your care. Our goal is always to provide you with excellent care. Hearing back from our patients is one way we can continue to improve our services. Please take a few minutes to complete the written survey that you may receive in the mail after your visit with us. Thank you!             Your Updated Medication List - Protect others around you: Learn how to safely use, store and throw away your medicines at  www.disposemymeds.org.          This list is accurate as of 4/27/18  1:44 PM.  Always use your most recent med list.                   Brand Name Dispense Instructions for use Diagnosis    blood glucose lancets standard    no brand specified    1 Box    Use to test blood sugar 4 X  times daily or as directed.    Diabetes mellitus, type 2 (H)       blood glucose monitoring meter device kit    no brand specified    1 kit    Use to test blood sugar 4 X  times daily or as directed.    Type 2 diabetes mellitus with other specified complication (H)       blood glucose monitoring test strip    no brand specified    200 strip    Use to test blood sugars 4 X  times daily or as directed    Diabetes mellitus, type 2 (H)       Calcium carb-Vitamin D 500 mg [place]-200 units 500-200 MG-UNIT per tablet    OSCAL with D;Oyster Shell Calcium          calcium carbonate 500 tablet    OS-ERIN 500 mg [place]. Ca     Take 1 tablet by mouth daily        citalopram 40 MG tablet    celeXA    30 tablet    Take 1 tablet (40 mg) by mouth daily    Recurrent major depressive disorder, remission status unspecified (H)       desvenlafaxine succinate 100 MG 24 hr tablet    PRISTIQ          * furosemide 20 MG tablet    LASIX          * furosemide 40 MG tablet    LASIX    540 tablet    Take 3 tablets twice a day.    Edema of both legs       gabapentin 300 MG capsule    NEURONTIN    90 capsule    Take 1 capsule (300 mg) by mouth 3 times daily    Diabetic polyneuropathy associated with type 2 diabetes mellitus (H)       glipiZIDE 5 MG 24 hr tablet    glipiZIDE XL    180 tablet    Take 1 tablet (5 mg) by mouth 2 times daily    Type 2 diabetes mellitus without complication, with long-term current use of insulin (H)       insulin aspart 100 UNIT/ML injection    NovoLOG FLEXPEN    30 mL    20 units before breakfast, 20 units before lunch, 20 units before dinner, 20 units before snacks    Type 2 diabetes mellitus without complication, with long-term current use of  insulin (H)       insulin glargine 100 UNIT/ML injection    LANTUS SOLOSTAR    30 mL    Inject 40 Units Subcutaneous every morning    Type 2 diabetes mellitus with other oral complication, unspecified long term insulin use status (H)       * insulin pen needle 31G X 8 MM    B-D U/F    300 each    USE  6 times daily / OR AS DIRECTED    Type 2 diabetes, HbA1c goal < 7% (H)       * insulin pen needle 31G X 8 MM    B-D U/F    600 each    Use 6 times daily or as directed    Type 2 diabetes, HbA1c goal < 7% (H)       magnesium oxide 400 (241.3 Mg) MG tablet    MAG-OX    90 tablet    Take 1 tablet (400 mg) by mouth daily    Cramp of limb       ondansetron 4 MG tablet    ZOFRAN    18 tablet    Take 1 tablet (4 mg) by mouth every 8 hours as needed for nausea    Alcoholic cirrhosis of liver with ascites (H), Nausea       order for DME     2 pump    Equipment being ordered:bilateral pneumatic leg massage for treatment of extreme bilateral lower leg edema.    Localized edema       oxyCODONE IR 5 MG tablet    ROXICODONE    180 tablet    Take 1-2 tablet every 8 hours as needed    Alcoholic cirrhosis of liver with ascites (H)       ranitidine 150 MG tablet    ZANTAC    180 tablet    Take 1 tablet (150 mg) by mouth 2 times daily    Esophageal varices (H)       rifaximin 550 MG Tabs tablet    XIFAXAN    60 tablet    Take 1 tablet (550 mg) by mouth 2 times daily    Hepatic encephalopathy (H)       spironolactone 50 MG tablet    ALDACTONE    150 tablet    Takes 3 tablets (150 mg) every AM and take 2 tablets (100 mg) every PM.    Portal hypertension (H), Generalized edema       STATIN NOT PRESCRIBED (INTENTIONAL)     0 each    1 each daily Statin not prescribed intentionally due to Active liver disease    Hyperlipidemia LDL goal <100       VITAMIN B-12 PO      Take 1 tablet by mouth daily        vitamin D3 2000 units Caps     90 capsule    Take 1 capsule by mouth daily    Mild major depression (H)       * Notice:  This list has 4  medication(s) that are the same as other medications prescribed for you. Read the directions carefully, and ask your doctor or other care provider to review them with you.

## 2018-04-27 NOTE — PROGRESS NOTES
Paracentesis Nursing Note  Lawrence Louie [facility].  During today's appointment orders from Meghna Simmons MD were completed.    Progress Note:  Patient identification verified by name and date of birth. Assessment completed.  Vitals monitored throughout appointment and recorded in Doc Flowsheets.  See proceduralist note in ultrasound.    Date of consent or authorization: 3/8/18.  Invasive Procedure Safety Checklist was completed and sent for scanning.     Paracentesis performed by Donal Kramer PA-C Radiology.    The following labs were communicated to provider performing paracentesis:  Lab Results   Component Value Date    PLT 97 04/06/2018       Total amount of ascites fluid drained: 2.6 liters.  Color of ascites fluid: yellow.  Total amount of albumin given: 37.5  grams.    Patient tolerated procedure well.    Post procedure,denies pain or discomfort post paracentesis.      Discharge Plan:  Discharge instructions were reviewed with patient.  Patient/Representative verbalized understanding and all questions were answered.   Discharged from [facility] in stable condition.    Maday Almonte RN    Administrations This Visit     albumin human 25 % injection 12.5 g     Admin Date Action Dose Route Administered By             04/27/2018 New Bag 37.5 g Intravenous Maday Almonte RN                    lidocaine 1 % 20 mL     Admin Date Action Dose Route Administered By             04/27/2018 Given by Other 15 mL Injection Maday Almonte, RADHA                        /66  Pulse 79  Temp 97.9  F (36.6  C) (Oral)  Wt 142.3 kg (313 lb 12.8 oz)  SpO2 98%  BMI 40.29 kg/m2

## 2018-04-27 NOTE — PATIENT INSTRUCTIONS
Discharge Instructions for Paracentesis  Paracentesis is a procedure to remove extra fluid from your belly (abdomen). This fluid buildup in the abdomen is called ascites. The procedure may have been done to take a sample of the fluid. Or, it may have been done to drain the extra fluid from your abdomen and help make you more comfortable.     Ascites is buildup of excess fluid in the abdomen.   Home care    If you have pain after the procedure, your healthcare provider can prescribe or recommend pain medicines. Take these exactly as directed. If you stopped taking other medicines before the procedure, ask your provider when you can start them again.    Take it easy for 24 hours after the procedure. Avoid physical activity until your provider says it s OK.    You will have a small bandage over the puncture site. Stitches (sutures), surgical staples, adhesive tapes, adhesive strips, or surgical glue may be used to close the incision. They also help stop bleeding and speed healing. You may take the bandage off in 24 hours.    Check the puncture site for the signs of infection listed below.  Follow-up care  Make a follow-up appointment with your healthcare provider as directed. During your follow-up visit, your provider will check your healing. Let your provider know how you are feeling. You can also discuss the cause of your ascites and if you need any further treatment.  When to seek medical advice  Call your healthcare provider if you have any of the following after the procedure:    A fever of 100.4 F (38.0 C) or higher    Trouble breathing    Pain that doesn't go away even after taking pain medicine    Belly pain not caused by having the skin punctured    Bleeding from the puncture site    More than a small amount of fluid leaking from the puncture site    Swollen belly    Signs of infection at the puncture site. These include increased pain, redness, or swelling, warmth, or bad-smelling drainage.    Blood in your  urine    Feeling dizzy or lightheaded, or fainting   Date Last Reviewed: 7/1/2016 2000-2017 [employer]. [address], [place] [address]. All rights reserved. This information is not intended as a substitute for professional medical care. Always follow your healthcare professional's instructions.

## 2018-05-01 ENCOUNTER — TRANSFERRED RECORDS (OUTPATIENT)
Dept: HEALTH INFORMATION MANAGEMENT | Facility: CLINIC | Age: 63
End: 2018-05-01

## 2018-05-01 DIAGNOSIS — F32.89 OTHER DEPRESSION: ICD-10-CM

## 2018-05-02 RX ORDER — DESVENLAFAXINE 100 MG/1
TABLET, EXTENDED RELEASE ORAL
Qty: 60 TABLET | Refills: 0 | OUTPATIENT
Start: 2018-05-02

## 2018-05-03 ENCOUNTER — TELEPHONE (OUTPATIENT)
Dept: INTERNAL MEDICINE | Facility: CLINIC | Age: 63
End: 2018-05-03

## 2018-05-03 DIAGNOSIS — E11.638: ICD-10-CM

## 2018-05-03 DIAGNOSIS — F32.89 OTHER DEPRESSION: ICD-10-CM

## 2018-05-03 NOTE — TELEPHONE ENCOUNTER
Left a detailed message to the pt regarding this matter.    Soon-Mi  ----------------------------------------------------------------------    Yes.  I have never done one and I don't think Steve does them.   It would be best if he waited until he had the MRI done.     Ary RAY, CNP

## 2018-05-03 NOTE — PROGRESS NOTES
Spoke to patient's wife who reports patient is generally feeling much better. The cramping he had been experiencing is now gone, he is noticing lower fluid amounts at paracentesis, per lymphedema nurse there has been a decrease of 1 inch in bilateral lower and upper leg measurements, and he will be starting on pneumatic compression to bilateral lower extremities this afternoon. She reports he is ambulating better, although he is still dealing with significant feet and leg pain, and plantar fasciitis in one heel that he recently received a cortisone injection for. He is on gabapentin and also taking oxycodone 5 mg about two times daily. Based on conversations with his PCP and with the pharmacist, patient's wife had adjusted his diuretics about 10 days ago, and he is currently taking the following: Furosemide 120 mg twice daily and Spironolactone 300 mg twice daily. She reports his appetite is poor, but he does take in significant fruits and vegetables. She also reports his mood has lightened and he is feeling more positive in general.

## 2018-05-04 ENCOUNTER — OFFICE VISIT (OUTPATIENT)
Dept: INFUSION THERAPY | Facility: CLINIC | Age: 63
End: 2018-05-04
Attending: INTERNAL MEDICINE
Payer: COMMERCIAL

## 2018-05-04 ENCOUNTER — RADIANT APPOINTMENT (OUTPATIENT)
Dept: ULTRASOUND IMAGING | Facility: CLINIC | Age: 63
End: 2018-05-04
Attending: INTERNAL MEDICINE
Payer: COMMERCIAL

## 2018-05-04 VITALS
HEART RATE: 70 BPM | OXYGEN SATURATION: 99 % | TEMPERATURE: 98 F | DIASTOLIC BLOOD PRESSURE: 51 MMHG | RESPIRATION RATE: 18 BRPM | SYSTOLIC BLOOD PRESSURE: 122 MMHG | BODY MASS INDEX: 36.98 KG/M2 | WEIGHT: 288 LBS

## 2018-05-04 DIAGNOSIS — K70.31 ALCOHOLIC CIRRHOSIS OF LIVER WITH ASCITES (H): Primary | ICD-10-CM

## 2018-05-04 DIAGNOSIS — E11.51 TYPE II DIABETES MELLITUS WITH PERIPHERAL CIRCULATORY DISORDER (H): ICD-10-CM

## 2018-05-04 LAB — HBA1C MFR BLD: NORMAL % (ref 0–6.4)

## 2018-05-04 PROCEDURE — 25000125 ZZHC RX 250: Mod: ZF | Performed by: INTERNAL MEDICINE

## 2018-05-04 PROCEDURE — 27210190 US PARACENTESIS

## 2018-05-04 PROCEDURE — 83036 HEMOGLOBIN GLYCOSYLATED A1C: CPT | Performed by: NURSE PRACTITIONER

## 2018-05-04 RX ORDER — ALBUMIN (HUMAN) 12.5 G/50ML
12.5 SOLUTION INTRAVENOUS 4 TIMES DAILY PRN
Status: DISCONTINUED | OUTPATIENT
Start: 2018-05-04 | End: 2018-05-04 | Stop reason: HOSPADM

## 2018-05-04 RX ORDER — ALBUMIN (HUMAN) 12.5 G/50ML
12.5 SOLUTION INTRAVENOUS 4 TIMES DAILY PRN
Status: CANCELLED
Start: 2018-05-04

## 2018-05-04 RX ADMIN — LIDOCAINE HYDROCHLORIDE 15 ML: 10 INJECTION, SOLUTION EPIDURAL; INFILTRATION; INTRACAUDAL; PERINEURAL at 13:27

## 2018-05-04 NOTE — MR AVS SNAPSHOT
After Visit Summary   5/4/2018    Lawrence Louie    MRN: [id number]           Patient Information     Date Of Birth          1955        Visit Information        Provider Department      5/4/2018 12:00 PM Provider, Uc Spec Inf Para; UC 39 ATC [facility]        Today's Diagnoses     Alcoholic cirrhosis of liver with ascites (H)    -  1    Type II diabetes mellitus with peripheral circulatory disorder (H)           Follow-ups after your visit        Your next 10 appointments already scheduled     May 11, 2018 12:00 PM CDT   Paracentesis Visit with Uc Spec Inf Para Provider, UC 40 ATC   [facility] ([facility])    [address]  [address]  [facility] [id number] [phone]            May 18, 2018 12:00 PM CDT   Paracentesis Visit with Uc Spec Inf Para Provider, UC 39 ATC   [facility] ([facility])    [address]  [address]  [facility] [place] [facility] [id number] [phone]            May 25, 2018 12:00 PM CDT   Paracentesis Visit with Uc Spec Inf Para Provider, UC 40 ATC   [facility] ([facility])    [address]  [address]  [facility] [place] [facility] [id number] [phone]            Jun 01, 2018 12:00 PM CDT   Paracentesis Visit with Uc Spec Inf Para Provider, UC 39 ATC   [facility] ([facility])    [address]  [address]  [facility] [place] [facility] [id number] [phone]            Jun 08, 2018 12:00 PM CDT   Paracentesis Visit with Uc Spec Inf Para Provider, UC 40 ATC   [facility] ([facility])    [address]  [address]  [facility] [id number] [phone]               Who to contact     If you have questions or need follow up information about today's clinic visit or your schedule please contact [facility] directly at [phone].  Normal or non-critical lab and imaging results will be communicated to you by [employer], letter or phone within 4 business days after the clinic has received the results. If you do not hear from us within 7 days, please contact the clinic through [employer] or phone. If you have a critical or abnormal lab result, we will notify you by phone as soon as possible.  Submit refill requests through [employer] or call your pharmacy and they will forward the refill request to us. Please allow 3 business days for your refill to be completed.          Additional Information About Your Visit        [employer] Information     [employer] gives you secure access to your electronic health record. If you see a primary care provider, you can also send messages to your care team and make appointments. If you have questions, please call your primary care clinic.  If you do not have a primary care provider, please call [phone] and they will assist you.        Care EveryWhere ID     This is your Care EveryWhere ID. This could be used by other organizations to access your [facility] medical records  [id number]        Your Vitals Were     Pulse Temperature Respirations Pulse Oximetry BMI (Body Mass Index)       70 98  F (36.7  C) (Oral) 18 99% 36.98 kg/m2        Blood Pressure from Last 3 Encounters:   05/04/18 122/51   04/27/18 131/57   04/20/18 109/44    Weight from Last 3 Encounters:   05/04/18 130.6 kg (288 lb)   04/27/18 139.5 kg (307 lb 8 oz)   04/20/18 139 kg (306 lb 6.4 oz)              We Performed the Following     Hemoglobin A1c     US Paracentesis          Today's Medication Changes          These changes are accurate as of 5/4/18  4:18 PM.  If you have any questions, ask your nurse or doctor.               These  medicines have changed or have updated prescriptions.        Dose/Directions    gabapentin 300 MG capsule   Commonly known as:  NEURONTIN   This may have changed:  additional instructions   Used for:  Diabetic polyneuropathy associated with type 2 diabetes mellitus (H)        Dose:  300 mg   Take 1 capsule (300 mg) by mouth 3 times daily   Quantity:  90 capsule   Refills:  3                Primary Care Provider Office Phone # Fax #    CORY Toussaint [phone]       [address] [place] [address] [address] [place] [address]        Equal Access to Services     JOSEF LORENZO : Hadii aad ku hadasho Soomaali, waaxda luqadaha, qaybta kaalmada adeegyada, waxay idiin haysaravanann daniela richardson . So [facility] [phone].    ATENCIÓN: Si habla español, tiene a rondon disposición servicios gratuitos de asistencia lingüística. [facility] [phone].    We comply with applicable federal civil rights laws and [place] laws. We do not discriminate on the basis of race, color, national origin, age, disability, sex, sexual orientation, or gender identity.            Thank you!     Thank you for choosing [facility]  for your care. Our goal is always to provide you with excellent care. Hearing back from our patients is one way we can continue to improve our services. Please take a few minutes to complete the written survey that you may receive in the mail after your visit with us. Thank you!             Your Updated Medication List - Protect others around you: Learn how to safely use, store and throw away your medicines at www.disposemymeds.org.          This list is accurate as of 5/4/18  4:18 PM.  Always use your most recent med list.                   Brand Name Dispense Instructions for use Diagnosis    blood glucose lancets standard    no brand specified    1 Box    Use to test blood sugar 4 X  times daily or as directed.    Diabetes mellitus, type 2 (H)       blood  glucose monitoring meter device kit    no brand specified    1 kit    Use to test blood sugar 4 X  times daily or as directed.    Type 2 diabetes mellitus with other specified complication (H)       blood glucose monitoring test strip    no brand specified    200 strip    Use to test blood sugars 4 X  times daily or as directed    Diabetes mellitus, type 2 (H)       Calcium carb-Vitamin D 500 mg [place]-200 units 500-200 MG-UNIT per tablet    OSCAL with D;Oyster Shell Calcium          calcium carbonate 500 tablet    OS-ERIN 500 mg [place]. Ca     Take 1 tablet by mouth daily        citalopram 40 MG tablet    celeXA    30 tablet    Take 1 tablet (40 mg) by mouth daily    Recurrent major depressive disorder, remission status unspecified (H)       desvenlafaxine succinate 100 MG 24 hr tablet    PRISTIQ          * furosemide 20 MG tablet    LASIX          * furosemide 40 MG tablet    LASIX    540 tablet    Take 3 tablets twice a day.    Edema of both legs       gabapentin 300 MG capsule    NEURONTIN    90 capsule    Take 1 capsule (300 mg) by mouth 3 times daily    Diabetic polyneuropathy associated with type 2 diabetes mellitus (H)       glipiZIDE 5 MG 24 hr tablet    glipiZIDE XL    180 tablet    Take 1 tablet (5 mg) by mouth 2 times daily    Type 2 diabetes mellitus without complication, with long-term current use of insulin (H)       insulin aspart 100 UNIT/ML injection    NovoLOG FLEXPEN    30 mL    20 units before breakfast, 20 units before lunch, 20 units before dinner, 20 units before snacks    Type 2 diabetes mellitus without complication, with long-term current use of insulin (H)       insulin glargine 100 UNIT/ML injection    LANTUS SOLOSTAR    30 mL    Inject 40 Units Subcutaneous every morning    Type 2 diabetes mellitus with other oral complication, unspecified long term insulin use status (H)       * insulin pen needle 31G X 8 MM    B-D U/F    300 each    USE  6 times daily / OR AS DIRECTED    Type 2 diabetes,  HbA1c goal < 7% (H)       * insulin pen needle 31G X 8 MM    B-D U/F    600 each    Use 6 times daily or as directed    Type 2 diabetes, HbA1c goal < 7% (H)       magnesium oxide 400 (241.3 Mg) MG tablet    MAG-OX    90 tablet    Take 1 tablet (400 mg) by mouth daily    Cramp of limb       ondansetron 4 MG tablet    ZOFRAN    18 tablet    Take 1 tablet (4 mg) by mouth every 8 hours as needed for nausea    Alcoholic cirrhosis of liver with ascites (H), Nausea       order for DME     2 pump    Equipment being ordered:bilateral pneumatic leg massage for treatment of extreme bilateral lower leg edema.    Localized edema       oxyCODONE IR 5 MG tablet    ROXICODONE    180 tablet    Take 1-2 tablet every 8 hours as needed    Alcoholic cirrhosis of liver with ascites (H)       ranitidine 150 MG tablet    ZANTAC    180 tablet    Take 1 tablet (150 mg) by mouth 2 times daily    Esophageal varices (H)       rifaximin 550 MG Tabs tablet    XIFAXAN    60 tablet    Take 1 tablet (550 mg) by mouth 2 times daily    Hepatic encephalopathy (H)       spironolactone 50 MG tablet    ALDACTONE    150 tablet    Takes 3 tablets (150 mg) every AM and take 2 tablets (100 mg) every PM.    Portal hypertension (H), Generalized edema       STATIN NOT PRESCRIBED (INTENTIONAL)     0 each    1 each daily Statin not prescribed intentionally due to Active liver disease    Hyperlipidemia LDL goal <100       VITAMIN B-12 PO      Take 1 tablet by mouth daily        vitamin D3 2000 units Caps     90 capsule    Take 1 capsule by mouth daily    Mild major depression (H)       * Notice:  This list has 4 medication(s) that are the same as other medications prescribed for you. Read the directions carefully, and ask your doctor or other care provider to review them with you.

## 2018-05-04 NOTE — PROGRESS NOTES
Paracentesis Nursing Note  Lawrence Louie presents [facility].    During today's appointment orders from Meghna Simmons MD were completed.    Progress Note:  Patient identification verified by name and date of birth.  Assessment completed.  Vitals monitored throughout appointment and recorded in Doc Flowsheets.  See proceduralist note in ultrasound.    Date of consent or authorization: 3/8/18.  Invasive Procedure Safety Checklist was completed and sent for scanning.     Paracentesis performed by OSKAR Torres.    The following labs were communicated to provider performing paracentesis:  Lab Results   Component Value Date    PLT 85 04/27/2018       Total amount of ascites fluid drained: 300cc  Color of ascites fluid: yellow.  Total amount of albumin given: none given.    Patient tolerated procedure well.    Post procedure,denies pain or discomfort post paracentesis.   Pt has had 19 lb weight loss since last week when pt was in infusion. RN attempted contacting primary care MD who manages diuretics Ary Murphy but was not in the office today. RN sent Guvera message to Dr. Simmons and Ary Murphy to address this. Paracentesis provider today Bren Toribio also made aware of weight loss. Pt's BP remained stable throughout.       Discharge Plan:  Discharge instructions were reviewed with patient.  Patient/Representative verbalized understanding and all questions were answered.   Discharged from [facility] in stable condition.    Zahra Murphy RN     Administrations This Visit     lidocaine 1 % 20 mL     Admin Date Action Dose Route Administered By             05/04/2018 Given 15 mL Injection Zahra Murphy RN

## 2018-05-05 RX ORDER — DESVENLAFAXINE 100 MG/1
TABLET, EXTENDED RELEASE ORAL
Qty: 60 TABLET | Refills: 0 | OUTPATIENT
Start: 2018-05-05

## 2018-05-06 DIAGNOSIS — K70.31 ALCOHOLIC CIRRHOSIS OF LIVER WITH ASCITES (H): Primary | ICD-10-CM

## 2018-05-07 ENCOUNTER — TELEPHONE (OUTPATIENT)
Dept: GASTROENTEROLOGY | Facility: CLINIC | Age: 63
End: 2018-05-07

## 2018-05-07 NOTE — TELEPHONE ENCOUNTER
[facility] Call Center    Phone Message    May a detailed message be left on voicemail: yes    Reason for Call: Other: Kip from [employer] would like further instructions and directions with wound care supplies     Action Taken: Message routed to:  [facility] please call her at [phone] ext. [phone]

## 2018-05-08 ENCOUNTER — TELEPHONE (OUTPATIENT)
Dept: INTERNAL MEDICINE | Facility: CLINIC | Age: 63
End: 2018-05-08

## 2018-05-08 NOTE — TELEPHONE ENCOUNTER
Pt and his wife were informed.    Soon-Mi  ---------------------------------------------        ----- Message from CORY Toussaint CNP sent at 5/6/2018 10:05 PM CDT -----  Regarding: labs  Soon-Mi,    Please let pt know I have ordered some labs for when he comes in for paracentesis this week. If he starts feeling light-headed or dehydrated he should call clinic. (he is taking high dose diuretics).     Thanks.     Ary RAY, CNP

## 2018-05-09 NOTE — TELEPHONE ENCOUNTER
"Vasile Madsenar:   Fax received from [facility] that reads, \"Product not on formulary; please send new Rx for Basaglar      OK to change to Basaglar ?    Soon-Mi  "

## 2018-05-10 DIAGNOSIS — F32.89 OTHER DEPRESSION: ICD-10-CM

## 2018-05-10 RX ORDER — INSULIN GLARGINE 100 [IU]/ML
40 INJECTION, SOLUTION SUBCUTANEOUS EVERY MORNING
Qty: 30 ML | Refills: 1 | Status: SHIPPED | OUTPATIENT
Start: 2018-05-10 | End: 2018-07-31

## 2018-05-10 NOTE — TELEPHONE ENCOUNTER
[facility] Call Center    Phone Message    May a detailed message be left on voicemail: yes    Reason for Call: Other: Please FU with Any [facility] Nurse about status of orders, [phone] . Orders can be faxed to [phone].     Action Taken: Message routed to:  [facility] ([facility] KAREEN

## 2018-05-10 NOTE — TELEPHONE ENCOUNTER
[facility] Call Center    Phone Message    May a detailed message be left on voicemail: yes    Reason for Call: Other: Any from  [facility] calling again in regard to wound care supplies. Pt is completely out of supplies to the point where they have to give him samples to use. Please fax orders to [phone] and call Any at [phone]     Action Taken: Message routed to:  [facility] ([facility] MARY MATHUR

## 2018-05-11 ENCOUNTER — OFFICE VISIT (OUTPATIENT)
Dept: INFUSION THERAPY | Facility: CLINIC | Age: 63
End: 2018-05-11
Attending: INTERNAL MEDICINE
Payer: COMMERCIAL

## 2018-05-11 ENCOUNTER — RADIANT APPOINTMENT (OUTPATIENT)
Dept: ULTRASOUND IMAGING | Facility: CLINIC | Age: 63
End: 2018-05-11
Attending: INTERNAL MEDICINE
Payer: COMMERCIAL

## 2018-05-11 ENCOUNTER — MEDICAL CORRESPONDENCE (OUTPATIENT)
Dept: HEALTH INFORMATION MANAGEMENT | Facility: CLINIC | Age: 63
End: 2018-05-11

## 2018-05-11 VITALS
BODY MASS INDEX: 36.05 KG/M2 | HEART RATE: 72 BPM | OXYGEN SATURATION: 98 % | TEMPERATURE: 97.4 F | DIASTOLIC BLOOD PRESSURE: 50 MMHG | SYSTOLIC BLOOD PRESSURE: 143 MMHG | WEIGHT: 280.8 LBS

## 2018-05-11 DIAGNOSIS — K70.31 ALCOHOLIC CIRRHOSIS OF LIVER WITH ASCITES (H): Primary | ICD-10-CM

## 2018-05-11 LAB
ANION GAP SERPL CALCULATED.3IONS-SCNC: 9 MMOL/L (ref 3–14)
BUN SERPL-MCNC: 19 MG/DL (ref 7–30)
CALCIUM SERPL-MCNC: 8.7 MG/DL (ref 8.5–10.1)
CHLORIDE SERPL-SCNC: 101 MMOL/L (ref 94–109)
CO2 SERPL-SCNC: 25 MMOL/L (ref 20–32)
CREAT SERPL-MCNC: 0.89 MG/DL (ref 0.66–1.25)
ERYTHROCYTE [DISTWIDTH] IN BLOOD BY AUTOMATED COUNT: 18.7 % (ref 10–15)
GFR SERPL CREATININE-BSD FRML MDRD: 86 ML/MIN/1.7M2
GLUCOSE SERPL-MCNC: 53 MG/DL (ref 70–99)
HCT VFR BLD AUTO: 29.7 % (ref 40–53)
HGB BLD-MCNC: 9.7 G/DL (ref 13.3–17.7)
MCH RBC QN AUTO: 30.5 PG (ref 26.5–33)
MCHC RBC AUTO-ENTMCNC: 32.7 G/DL (ref 31.5–36.5)
MCV RBC AUTO: 93 FL (ref 78–100)
PLATELET # BLD AUTO: 98 10E9/L (ref 150–450)
POTASSIUM SERPL-SCNC: 4 MMOL/L (ref 3.4–5.3)
RBC # BLD AUTO: 3.18 10E12/L (ref 4.4–5.9)
SODIUM SERPL-SCNC: 135 MMOL/L (ref 133–144)
WBC # BLD AUTO: 3.8 10E9/L (ref 4–11)

## 2018-05-11 PROCEDURE — 27210190 US ABDOMEN LIMITED

## 2018-05-11 PROCEDURE — 85027 COMPLETE CBC AUTOMATED: CPT | Performed by: NURSE PRACTITIONER

## 2018-05-11 PROCEDURE — 80048 BASIC METABOLIC PNL TOTAL CA: CPT | Performed by: NURSE PRACTITIONER

## 2018-05-11 RX ORDER — ALBUMIN (HUMAN) 12.5 G/50ML
12.5 SOLUTION INTRAVENOUS 4 TIMES DAILY PRN
Status: DISCONTINUED | OUTPATIENT
Start: 2018-05-11 | End: 2018-05-11 | Stop reason: HOSPADM

## 2018-05-11 RX ORDER — ALBUMIN (HUMAN) 12.5 G/50ML
12.5 SOLUTION INTRAVENOUS 4 TIMES DAILY PRN
Status: CANCELLED
Start: 2018-05-11

## 2018-05-11 NOTE — TELEPHONE ENCOUNTER
Was able to get Dr. Trevizo to sign [facility] medical orders and faxed back to [facility]. I contacted Any and informed her about this.

## 2018-05-11 NOTE — PROGRESS NOTES
Paracentesis Nursing Note  Lawrence Louie presents today to [facility] for [facility].    During today's appointment orders from Meghna Simmons MD were completed.    Progress Note:  Patient identification verified by name and date of birth.  Assessment completed. Pt had abdomen scanned for fluid. OSKAR Toribio decided there was not enough fluid to drain safely today.   Pt had labs drawn that were in open orders.   Pt will return next Friday in 1 week for paracentesis.     Discharge Plan:  Discharge instructions were reviewed with patient.  Patient/Representative verbalized understanding and all questions were answered.   Discharged from [facility] in stable condition.    Zahra Murphy RN        /50  Pulse 72  Temp 97.4  F (36.3  C) (Oral)  Wt 127.4 kg (280 lb 12.8 oz)  SpO2 98%  BMI 36.05 kg/m2

## 2018-05-11 NOTE — MR AVS SNAPSHOT
After Visit Summary   5/11/2018    Lawrence Louie    MRN: [id number]           Patient Information     Date Of Birth          1955        Visit Information        Provider Department      5/11/2018 12:00 PM Provider, Uc Spec Inf Para; UC 40 ATC [facility]        Today's Diagnoses     Alcoholic cirrhosis of liver with ascites (H)    -  1       Follow-ups after your visit        Your next 10 appointments already scheduled     May 18, 2018 12:00 PM CDT   Paracentesis Visit with Cristobal Spec Inf Para Provider, UC 39 ATC   [facility] ([facility] [place])    [address]  [address]  [place] [id number] [phone]            May 21, 2018  2:00 PM CDT   MR FOOT LEFT W/O CONTRAST with UUMR2   [facility] [place], MRI ([facility] [place])    [address] [id number] [phone]           Take your medicines as usual, unless your doctor tells you not to. Bring a list of your current medicines to your exam (including vitamins, minerals and over-the-counter drugs). Also bring the results of similar scans you may have had.  Please remove any body piercings and hair extensions before you arrive.  Follow your doctor s orders. If you do not, we may have to postpone your exam.  You may or may not receive IV contrast for this exam pending the discretion of the Radiologist.  You do not need to do anything special to prepare.  The MRI machine uses a strong magnet. Please wear clothes without metal (snaps, zippers). A sweatsuit works well, or we may give you a hospital gown.   **IMPORTANT** THE INSTRUCTIONS BELOW ARE ONLY FOR THOSE PATIENTS WHO HAVE BEEN PRESCRIBED SEDATION OR GENERAL ANESTHESIA DURING THEIR MRI PROCEDURE:  IF YOUR DOCTOR PRESCRIBED ORAL SEDATION (take medicine to help you relax during your exam):   You must  get the medicine from your doctor (oral medication) before you arrive. Bring the medicine to the exam. Do not take it at home. You ll be told when to take it upon arriving for your exam.   Arrive one hour early. Bring someone who can take you home after the test. Your medicine will make you sleepy. After the exam, you may not drive, take a bus or take a taxi by yourself.  IF YOUR DOCTOR PRESCRIBED IV SEDATION:   Arrive one hour early. Bring someone who can take you home after the test. Your medicine will make you sleepy. After the exam, you may not drive, take a bus or take a taxi by yourself.   No eating 6 hours before your exam. You may have clear liquids up until 4 hours before your exam. (Clear liquids include water, clear tea, black coffee and fruit juice without pulp.)  IF YOUR DOCTOR PRESCRIBED ANESTHESIA (be asleep for your exam):   Arrive 1 1/2 hours early. Bring someone who can take you home after the test. You may not drive, take a bus or take a taxi by yourself.   No eating 8 hours before your exam. You may have clear liquids up until 4 hours before your exam. (Clear liquids include water, clear tea, black coffee and fruit juice without pulp.)   You will spend four to five hours in the recovery room.  Please call the Imaging Department at your exam site with any questions.            May 25, 2018 12:00 PM CDT   Paracentesis Visit with Cristobal Spec Inf Para Provider, UC 40 ATC   [facility] ([facility])    [address]  [address]  [place] [address]   [phone]            Jun 01, 2018 12:00 PM CDT   Paracentesis Visit with Cristobal Spec Inf Para Provider, UC 39 ATC   [facility] ([facility])    [address]  [address]  [place] [address]   [phone]            Jun 08, 2018 12:00 PM CDT   Paracentesis Visit with Uc Spec Inf Para Provider,  UC 40 ATC   [facility] Procedure ([facility])    [address]  [address]  [place] [address] [phone]            Jun 12, 2018  1:00 PM CDT   (Arrive by 12:45 PM)   Return Visit with CORY Toussaint CNP   [facility] ([facility] [place])    [address]  [address]  [place] [address] [phone]              Who to contact     If you have questions or need follow up information about today's clinic visit or your schedule please contact [facility] directly at [phone].  Normal or non-critical lab and imaging results will be communicated to you by Anygmahart, letter or phone within 4 business days after the clinic has received the results. If you do not hear from us within 7 days, please contact the clinic through Anygmahart or phone. If you have a critical or abnormal lab result, we will notify you by phone as soon as possible.  Submit refill requests through crossvertise or call your pharmacy and they will forward the refill request to us. Please allow 3 business days for your refill to be completed.          Additional Information About Your Visit        AnygmaharDataRose Information     crossvertise gives you secure access to your electronic health record. If you see a primary care provider, you can also send messages to your care team and make appointments. If you have questions, please call your primary care clinic.  If you do not have a primary care provider, please call [phone] and they will assist you.        Care EveryWhere ID     This is your Care EveryWhere ID. This could be used by other organizations to access your [place] medical records  [id number]        Your Vitals Were     Pulse Temperature Pulse Oximetry BMI (Body Mass Index)          72 97.4  F (36.3  C) (Oral) 98% 36.05 kg/m2         Blood Pressure from Last 3 Encounters:    05/11/18 143/50   05/04/18 122/51   04/27/18 131/57    Weight from Last 3 Encounters:   05/11/18 127.4 kg (280 lb 12.8 oz)   05/04/18 130.6 kg (288 lb)   04/27/18 139.5 kg (307 lb 8 oz)              We Performed the Following     Basic metabolic panel     CBC with platelets     US Abdomen Limited          Today's Medication Changes          These changes are accurate as of 5/11/18  3:03 PM.  If you have any questions, ask your nurse or doctor.               These medicines have changed or have updated prescriptions.        Dose/Directions    gabapentin 300 MG capsule   Commonly known as:  NEURONTIN   This may have changed:  additional instructions   Used for:  Diabetic polyneuropathy associated with type 2 diabetes mellitus (H)        Dose:  300 mg   Take 1 capsule (300 mg) by mouth 3 times daily   Quantity:  90 capsule   Refills:  3                Primary Care Provider Office Phone # Fax #    Ary JACKSON Katherine, APRN [phone]       [address] [place] [address] [address] [place] [address]        Equal Access to Services     JOSEF LORENZO : Hadii sil kellyo Soanthony, waaxda luqadaha, qaybta kaalmada adesarahyadeysi, silvia richardson . So [facility] [phone].    ATENCIÓN: Si habla español, tiene a rondon disposición servicios gratuitos de asistencia lingüística. [facility] [phone].    We comply with applicable federal civil rights laws and [place] laws. We do not discriminate on the basis of race, color, national origin, age, disability, sex, sexual orientation, or gender identity.            Thank you!     Thank you for choosing [facility]  for your care. Our goal is always to provide you with excellent care. Hearing back from our patients is one way we can continue to improve our services. Please take a few minutes to complete the written survey that you may receive in the mail after your visit with us. Thank you!             Your Updated  Medication List - Protect others around you: Learn how to safely use, store and throw away your medicines at www.disposemymeds.org.          This list is accurate as of 5/11/18  3:03 PM.  Always use your most recent med list.                   Brand Name Dispense Instructions for use Diagnosis    BASAGLAR 100 UNIT/ML injection     30 mL    Inject 40 Units Subcutaneous every morning    Type 2 diabetes mellitus with other oral complication, unspecified long term insulin use status (H)       blood glucose lancets standard    no brand specified    1 Box    Use to test blood sugar 4 X  times daily or as directed.    Diabetes mellitus, type 2 (H)       blood glucose monitoring meter device kit    no brand specified    1 kit    Use to test blood sugar 4 X  times daily or as directed.    Type 2 diabetes mellitus with other specified complication (H)       blood glucose monitoring test strip    no brand specified    200 strip    Use to test blood sugars 4 X  times daily or as directed    Diabetes mellitus, type 2 (H)       Calcium carb-Vitamin D 500 mg [place]-200 units 500-200 MG-UNIT per tablet    OSCAL with D;Oyster Shell Calcium          calcium carbonate 500 MG tablet    OS-ERIN 500 mg [place]. Ca     Take 1 tablet by mouth daily        citalopram 40 MG tablet    celeXA    30 tablet    Take 1 tablet (40 mg) by mouth daily    Recurrent major depressive disorder, remission status unspecified (H)       desvenlafaxine succinate 100 MG 24 hr tablet    PRISTIQ          * furosemide 20 MG tablet    LASIX          * furosemide 40 MG tablet    LASIX    540 tablet    Take 3 tablets twice a day.    Edema of both legs       gabapentin 300 MG capsule    NEURONTIN    90 capsule    Take 1 capsule (300 mg) by mouth 3 times daily    Diabetic polyneuropathy associated with type 2 diabetes mellitus (H)       glipiZIDE 5 MG 24 hr tablet    glipiZIDE XL    180 tablet    Take 1 tablet (5 mg) by mouth 2 times daily    Type 2 diabetes mellitus  without complication, with long-term current use of insulin (H)       insulin aspart 100 UNIT/ML injection    NovoLOG FLEXPEN    30 mL    20 units before breakfast, 20 units before lunch, 20 units before dinner, 20 units before snacks    Type 2 diabetes mellitus without complication, with long-term current use of insulin (H)       * insulin pen needle 31G X 8 MM    B-D U/F    300 each    USE  6 times daily / OR AS DIRECTED    Type 2 diabetes, HbA1c goal < 7% (H)       * insulin pen needle 31G X 8 MM    B-D U/F    600 each    Use 6 times daily or as directed    Type 2 diabetes, HbA1c goal < 7% (H)       magnesium oxide 400 (241.3 Mg) MG tablet    MAG-OX    90 tablet    Take 1 tablet (400 mg) by mouth daily    Cramp of limb       ondansetron 4 MG tablet    ZOFRAN    18 tablet    Take 1 tablet (4 mg) by mouth every 8 hours as needed for nausea    Alcoholic cirrhosis of liver with ascites (H), Nausea       order for DME     2 pump    Equipment being ordered:bilateral pneumatic leg massage for treatment of extreme bilateral lower leg edema.    Localized edema       oxyCODONE IR 5 MG tablet    ROXICODONE    180 tablet    Take 1-2 tablet every 8 hours as needed    Alcoholic cirrhosis of liver with ascites (H)       ranitidine 150 MG tablet    ZANTAC    180 tablet    Take 1 tablet (150 mg) by mouth 2 times daily    Esophageal varices (H)       rifaximin 550 MG Tabs tablet    XIFAXAN    60 tablet    Take 1 tablet (550 mg) by mouth 2 times daily    Hepatic encephalopathy (H)       spironolactone 50 MG tablet    ALDACTONE    150 tablet    Takes 3 tablets (150 mg) every AM and take 2 tablets (100 mg) every PM.    Portal hypertension (H), Generalized edema       STATIN NOT PRESCRIBED (INTENTIONAL)     0 each    1 each daily Statin not prescribed intentionally due to Active liver disease    Hyperlipidemia LDL goal <100       VITAMIN B-12 PO      Take 1 tablet by mouth daily        vitamin D3 2000 units Caps     90 capsule    Take  1 capsule by mouth daily    Mild major depression (H)       * Notice:  This list has 4 medication(s) that are the same as other medications prescribed for you. Read the directions carefully, and ask your doctor or other care provider to review them with you.

## 2018-05-12 NOTE — TELEPHONE ENCOUNTER
desvenlafaxine succinate (PRISTIQ) 100 MG 24 hr tablet      Last Written Prescription Date:  Historical     Last Office Visit : 5/4/18  Future Office visit:  6/12/18    Routing refill request to provider for review/approval because:  Medication is reported/historical  No PHQ-9 on file in chart

## 2018-05-15 NOTE — TELEPHONE ENCOUNTER
[facility] Call Center    Phone Message    May a detailed message be left on voicemail: yes    Reason for Call: Medication Question or concern regarding medication   Prescription Clarification  Name of Medication: desvenlafaxine succinate (PRISTIQ) 100 MG 24 hr tablet  Prescribing Provider: Ary Murphy   Pharmacy: unknown   What on the order needs clarification? Pt states his pharmacy was unable to refill this med, and they had cancelled the request. Pt was wanting to know why he was being taken off this medication. Pt also stated he wanted to speak to Jennifer, as she had called him last week, though he could not remember what it was in regards to. Please advise.    Action Taken: Message routed to:  [facility]

## 2018-05-16 ENCOUNTER — MEDICAL CORRESPONDENCE (OUTPATIENT)
Dept: HEALTH INFORMATION MANAGEMENT | Facility: CLINIC | Age: 63
End: 2018-05-16

## 2018-05-16 ENCOUNTER — MYC REFILL (OUTPATIENT)
Dept: INTERNAL MEDICINE | Facility: CLINIC | Age: 63
End: 2018-05-16

## 2018-05-16 DIAGNOSIS — F32.0 MILD MAJOR DEPRESSION (H): Primary | ICD-10-CM

## 2018-05-16 DIAGNOSIS — F32.89 OTHER DEPRESSION: ICD-10-CM

## 2018-05-16 RX ORDER — DESVENLAFAXINE 100 MG/1
200 TABLET, EXTENDED RELEASE ORAL DAILY
Qty: 180 TABLET | Refills: 1 | Status: CANCELLED | OUTPATIENT
Start: 2018-05-16

## 2018-05-16 RX ORDER — DESVENLAFAXINE 100 MG/1
200 TABLET, EXTENDED RELEASE ORAL DAILY
Qty: 180 TABLET | Refills: 1 | Status: SHIPPED | OUTPATIENT
Start: 2018-05-16 | End: 2018-12-04

## 2018-05-16 RX ORDER — DESVENLAFAXINE 100 MG/1
100 TABLET, EXTENDED RELEASE ORAL DAILY
Qty: 90 TABLET | Refills: 3 | Status: SHIPPED | OUTPATIENT
Start: 2018-05-16 | End: 2018-05-24

## 2018-05-17 ENCOUNTER — TELEPHONE (OUTPATIENT)
Dept: INTERNAL MEDICINE | Facility: CLINIC | Age: 63
End: 2018-05-17

## 2018-05-17 NOTE — TELEPHONE ENCOUNTER
[facility]    Phone Message    May a detailed message be left on voicemail: yes    Reason for Call: Other: pt fell yesterday off the bed while he was sleeping, was on the ground for 2 hours, wife and children tried to help him up, ended up calling 911, pt denied head injury or any injury, RN did an assessment,,seems pt was okay.     Action Taken: Message routed to:  [facility] ([facility]): caesar

## 2018-05-18 ENCOUNTER — OFFICE VISIT (OUTPATIENT)
Dept: INFUSION THERAPY | Facility: CLINIC | Age: 63
End: 2018-05-18
Attending: INTERNAL MEDICINE
Payer: COMMERCIAL

## 2018-05-18 ENCOUNTER — RADIANT APPOINTMENT (OUTPATIENT)
Dept: ULTRASOUND IMAGING | Facility: CLINIC | Age: 63
End: 2018-05-18
Attending: INTERNAL MEDICINE
Payer: COMMERCIAL

## 2018-05-18 VITALS
SYSTOLIC BLOOD PRESSURE: 135 MMHG | BODY MASS INDEX: 35.31 KG/M2 | DIASTOLIC BLOOD PRESSURE: 61 MMHG | HEART RATE: 91 BPM | RESPIRATION RATE: 16 BRPM | TEMPERATURE: 98.3 F | WEIGHT: 275 LBS | OXYGEN SATURATION: 98 %

## 2018-05-18 DIAGNOSIS — K70.31 ALCOHOLIC CIRRHOSIS OF LIVER WITH ASCITES (H): Primary | ICD-10-CM

## 2018-05-18 PROCEDURE — 76705 ECHO EXAM OF ABDOMEN: CPT

## 2018-05-18 RX ORDER — ALBUMIN (HUMAN) 12.5 G/50ML
12.5 SOLUTION INTRAVENOUS 4 TIMES DAILY PRN
Status: DISCONTINUED | OUTPATIENT
Start: 2018-05-18 | End: 2018-05-18 | Stop reason: HOSPADM

## 2018-05-18 RX ORDER — ALBUMIN (HUMAN) 12.5 G/50ML
12.5 SOLUTION INTRAVENOUS 4 TIMES DAILY PRN
Status: CANCELLED
Start: 2018-05-18

## 2018-05-18 NOTE — PROGRESS NOTES
Paracentesis Nursing Note  Lawrence Louie presents [facility] for [facility].    During today's appointment orders from Meghna Simmons MD were completed.    Progress Note:  Patient identification verified by name and date of birth.  Assessment completed.  Pt had abdomen scanned by OSKAR Miranda and he decided there was not enough fluid to drain safely today.     Pt will return next Friday in 1 week for paracentesis.    The following labs were communicated to provider performing paracentesis:  Lab Results   Component Value Date    PLT 98 05/11/2018       Discharge Plan:  Declined AVS.  Discharged from [facility] in stable condition.    ARACELY SOLOMON, RN      /61  Pulse 91  Temp 98.3  F (36.8  C) (Oral)  Resp 16  SpO2 98%

## 2018-05-18 NOTE — PATIENT INSTRUCTIONS
Dear Lawrence Louie    Thank you for choosing [facility] ([facility]) for your Paracentesis.  The following information is a summary of our appointment as well as important reminders.      We look forward in seeing you on your next appointment here at [facility].  Please don t hesitate to call us at [phone] to reschedule any of your appointments or to speak with one of the [facility] registered nurses.  It was a pleasure taking care of you today.    Sincerely,    [facility]  [facility]  [address] [place] [address]  [id number]  Phone:  [phone]

## 2018-05-18 NOTE — MR AVS SNAPSHOT
After Visit Summary   5/18/2018    Lawrence Louie    MRN: [id number]           Patient Information     Date Of Birth          1955        Visit Information        Provider Department      5/18/2018 12:00 PM Provider, Cristobal Spec Inf Para; UC 39 ATC [facility] Specialty and Procedure        Today's Diagnoses     Alcoholic cirrhosis of liver with ascites (H)    -  1      Care Instructions    Dear Lawrence Louie    Thank you for choosing [facility] ([facility]) for your Paracentesis.  The following information is a summary of our appointment as well as important reminders.      We look forward in seeing you on your next appointment here at [facility].  Please don t hesitate to call us at [phone] to reschedule any of your appointments or to speak with one of the [facility] registered nurses.  It was a pleasure taking care of you today.    Sincerely,    [facility]  Specialty Infusion & Procedure Center  [address] [place]  [address]  Phone:  [phone]            Follow-ups after your visit        Your next 10 appointments already scheduled     May 21, 2018  2:00 PM CDT   MR FOOT LEFT W/O CONTRAST with UUMR2   [facility], [place], MRI ([facility] [place])    [address] [facility] [address] [phone]           Take your medicines as usual, unless your doctor tells you not to. Bring a list of your current medicines to your exam (including vitamins, minerals and over-the-counter drugs). Also bring the results of similar scans you may have had.  Please remove any body piercings and hair extensions before you arrive.  Follow your doctor s orders. If you do not, we may have to postpone your exam.  You may or may not receive IV contrast for this exam pending the discretion of the Radiologist.  You do not need to do anything  special to prepare.  The MRI machine uses a strong magnet. Please wear clothes without metal (snaps, zippers). A sweatsuit works well, or we may give you a hospital gown.   **IMPORTANT** THE INSTRUCTIONS BELOW ARE ONLY FOR THOSE PATIENTS WHO HAVE BEEN PRESCRIBED SEDATION OR GENERAL ANESTHESIA DURING THEIR MRI PROCEDURE:  IF YOUR DOCTOR PRESCRIBED ORAL SEDATION (take medicine to help you relax during your exam):   You must get the medicine from your doctor (oral medication) before you arrive. Bring the medicine to the exam. Do not take it at home. You ll be told when to take it upon arriving for your exam.   Arrive one hour early. Bring someone who can take you home after the test. Your medicine will make you sleepy. After the exam, you may not drive, take a bus or take a taxi by yourself.  IF YOUR DOCTOR PRESCRIBED IV SEDATION:   Arrive one hour early. Bring someone who can take you home after the test. Your medicine will make you sleepy. After the exam, you may not drive, take a bus or take a taxi by yourself.   No eating 6 hours before your exam. You may have clear liquids up until 4 hours before your exam. (Clear liquids include water, clear tea, black coffee and fruit juice without pulp.)  IF YOUR DOCTOR PRESCRIBED ANESTHESIA (be asleep for your exam):   Arrive 1 1/2 hours early. Bring someone who can take you home after the test. You may not drive, take a bus or take a taxi by yourself.   No eating 8 hours before your exam. You may have clear liquids up until 4 hours before your exam. (Clear liquids include water, clear tea, black coffee and fruit juice without pulp.)   You will spend four to five hours in the recovery room.  Please call the Imaging Department at your exam site with any questions.            May 25, 2018 12:00 PM CDT   Paracentesis Visit with Cristobal Spec Inf Para Provider, UC 40 ATC   [facility] Specialty and Procedure ([facility])    [address]  [address]  [facility] [address]   [phone]            Jun 01, 2018 12:00 PM CDT   Paracentesis Visit with Uc Spec Inf Para Provider, UC 39 ATC   [facility] Specialty and Procedure ([facility])    [address]  [address]  [facility] [address]   [phone]            Jun 08, 2018 12:00 PM CDT   Paracentesis Visit with Uc Spec Inf Para Provider, UC 40 ATC   [facility] Specialty and Procedure ([facility])    [address]  [address]  [facility] [address]   [phone]            Jun 12, 2018  1:00 PM CDT   (Arrive by 12:45 PM)   Return Visit with C[facility] ([facility])    [address]  4th Floor  [facility] [address]   [phone]            Campblel 15, 2018 12:00 PM CDT   Paracentesis Visit with Uc Spec Inf Para Provider, UC 39 ATC   [facility] Specialty and Procedure ([facility])    [address]  [address]  [facility] [address]   [phone]              Who to contact     If you have questions or need follow up information about today's clinic visit or your schedule please contact [facility] SPECIALTY AND PROCEDURE directly at [phone].  Normal or non-critical lab and imaging results will be communicated to you by MyChart, letter or phone within 4 business days after the clinic has received the results. If you do not hear from us within 7 days, please contact the clinic through MyChart or phone. If you have a critical or abnormal lab result, we will notify you by phone as soon as possible.  Submit refill requests through [employer] or call your pharmacy and they will forward the refill request to us. Please allow 3 business days for your refill to be completed.          Additional Information About Your  Visit        medineeringhar Information     [employer] gives you secure access to your electronic health record. If you see a primary care provider, you can also send messages to your care team and make appointments. If you have questions, please call your primary care clinic.  If you do not have a primary care provider, please call [phone] and they will assist you.        Care EveryWhere ID     This is your Care EveryWhere ID. This could be used by other organizations to access your [facility] medical records  [id number]        Your Vitals Were     Pulse Temperature Respirations Pulse Oximetry          91 98.3  F (36.8  C) (Oral) 16 98%         Blood Pressure from Last 3 Encounters:   05/18/18 135/61   05/11/18 143/50   05/04/18 122/51    Weight from Last 3 Encounters:   05/11/18 127.4 kg (280 lb 12.8 oz)   05/04/18 130.6 kg (288 lb)   04/27/18 139.5 kg (307 lb 8 oz)              We Performed the Following     US Abdomen Limited          Today's Medication Changes          These changes are accurate as of 5/18/18  2:07 PM.  If you have any questions, ask your nurse or doctor.               These medicines have changed or have updated prescriptions.        Dose/Directions    gabapentin 300 MG capsule   Commonly known as:  NEURONTIN   This may have changed:  additional instructions   Used for:  Diabetic polyneuropathy associated with type 2 diabetes mellitus (H)        Dose:  300 mg   Take 1 capsule (300 mg) by mouth 3 times daily   Quantity:  90 capsule   Refills:  3                Primary Care Provider Office Phone # Fax #    Ary JACKSON Katherine, APRN [phone]       [address] [place] [address] [address] [place] [address]        Equal Access to Services     [facility]: Hadii sil Del Toro, wateganda luqadaha, qaybta kaalmasilvia pena. So [facility] [phone].    ATENCIÓN: Si habla español, tiene a rondon disposición servicios gratuitos de asistencia lingüística.  Boaz al [phone].    We comply with applicable federal civil rights laws and [place] laws. We do not discriminate on the basis of race, color, national origin, age, disability, sex, sexual orientation, or gender identity.            Thank you!     Thank you for choosing [facility] SPECIALTY AND PROCEDURE  for your care. Our goal is always to provide you with excellent care. Hearing back from our patients is one way we can continue to improve our services. Please take a few minutes to complete the written survey that you may receive in the mail after your visit with us. Thank you!             Your Updated Medication List - Protect others around you: Learn how to safely use, store and throw away your medicines at www.disposemymeds.org.          This list is accurate as of 5/18/18  2:07 PM.  Always use your most recent med list.                   Brand Name Dispense Instructions for use Diagnosis    BASAGLAR 100 UNIT/ML injection     30 mL    Inject 40 Units Subcutaneous every morning    Type 2 diabetes mellitus with other oral complication, unspecified long term insulin use status (H)       blood glucose lancets standard    no brand specified    1 Box    Use to test blood sugar 4 X  times daily or as directed.    Diabetes mellitus, type 2 (H)       blood glucose monitoring meter device kit    no brand specified    1 kit    Use to test blood sugar 4 X  times daily or as directed.    Type 2 diabetes mellitus with other specified complication (H)       blood glucose monitoring test strip    no brand specified    200 strip    Use to test blood sugars 4 X  times daily or as directed    Diabetes mellitus, type 2 (H)       Calcium carb-Vitamin D 500 mg [place]-200 units 500-200 MG-UNIT per tablet    OSCAL with D;Oyster Shell Calcium          calcium carbonate 500 MG tablet    OS-ERIN 500 mg [place]. Ca     Take 1 tablet by mouth daily        citalopram 40 MG tablet    celeXA    30 tablet    Take 1  tablet (40 mg) by mouth daily    Recurrent major depressive disorder, remission status unspecified (H)       * desvenlafaxine succinate 100 MG 24 hr tablet    PRISTIQ    180 tablet    Take 2 tablets (200 mg) by mouth daily    Other depression       * desvenlafaxine succinate 100 MG 24 hr tablet    PRISTIQ    90 tablet    Take 1 tablet (100 mg) by mouth daily    Mild major depression (H)       * furosemide 20 MG tablet    LASIX          * furosemide 40 MG tablet    LASIX    540 tablet    Take 3 tablets twice a day.    Edema of both legs       gabapentin 300 MG capsule    NEURONTIN    90 capsule    Take 1 capsule (300 mg) by mouth 3 times daily    Diabetic polyneuropathy associated with type 2 diabetes mellitus (H)       glipiZIDE 5 MG 24 hr tablet    glipiZIDE XL    180 tablet    Take 1 tablet (5 mg) by mouth 2 times daily    Type 2 diabetes mellitus without complication, with long-term current use of insulin (H)       insulin aspart 100 UNIT/ML injection    NovoLOG FLEXPEN    30 mL    20 units before breakfast, 20 units before lunch, 20 units before dinner, 20 units before snacks    Type 2 diabetes mellitus without complication, with long-term current use of insulin (H)       * insulin pen needle 31G X 8 MM    B-D U/F    300 each    USE  6 times daily / OR AS DIRECTED    Type 2 diabetes, HbA1c goal < 7% (H)       * insulin pen needle 31G X 8 MM    B-D U/F    600 each    Use 6 times daily or as directed    Type 2 diabetes, HbA1c goal < 7% (H)       magnesium oxide 400 (241.3 Mg) MG tablet    MAG-OX    90 tablet    Take 1 tablet (400 mg) by mouth daily    Cramp of limb       ondansetron 4 MG tablet    ZOFRAN    18 tablet    Take 1 tablet (4 mg) by mouth every 8 hours as needed for nausea    Alcoholic cirrhosis of liver with ascites (H), Nausea       order for DME     2 pump    Equipment being ordered:bilateral pneumatic leg massage for treatment of extreme bilateral lower leg edema.    Localized edema       oxyCODONE  IR 5 MG tablet    ROXICODONE    180 tablet    Take 1-2 tablet every 8 hours as needed    Alcoholic cirrhosis of liver with ascites (H)       ranitidine 150 MG tablet    ZANTAC    180 tablet    Take 1 tablet (150 mg) by mouth 2 times daily    Esophageal varices (H)       rifaximin 550 MG Tabs tablet    XIFAXAN    60 tablet    Take 1 tablet (550 mg) by mouth 2 times daily    Hepatic encephalopathy (H)       spironolactone 50 MG tablet    ALDACTONE    150 tablet    Takes 3 tablets (150 mg) every AM and take 2 tablets (100 mg) every PM.    Portal hypertension (H), Generalized edema       STATIN NOT PRESCRIBED (INTENTIONAL)     0 each    1 each daily Statin not prescribed intentionally due to Active liver disease    Hyperlipidemia LDL goal <100       VITAMIN B-12 PO      Take 1 tablet by mouth daily        vitamin D3 2000 units Caps     90 capsule    Take 1 capsule by mouth daily    Mild major depression (H)       * Notice:  This list has 6 medication(s) that are the same as other medications prescribed for you. Read the directions carefully, and ask your doctor or other care provider to review them with you.

## 2018-05-21 ENCOUNTER — HOSPITAL ENCOUNTER (OUTPATIENT)
Dept: MRI IMAGING | Facility: CLINIC | Age: 63
Discharge: HOME | End: 2018-05-21
Attending: NURSE PRACTITIONER | Admitting: NURSE PRACTITIONER
Payer: COMMERCIAL

## 2018-05-21 ENCOUNTER — CARE COORDINATION (OUTPATIENT)
Dept: GASTROENTEROLOGY | Facility: CLINIC | Age: 63
End: 2018-05-21

## 2018-05-21 DIAGNOSIS — M79.671 RIGHT FOOT PAIN: ICD-10-CM

## 2018-05-21 PROCEDURE — 73718 MRI LOWER EXTREMITY W/O DYE: CPT | Mod: LT

## 2018-05-21 NOTE — PROGRESS NOTES
Spoke to patient's wife Flores and explained paracentesis concerns. She states she was told by one of the nurses in [facility] that they should hold on to their para appointments until a day or two before to be sure he doesn't need it. She will call to cancel this Friday's appointment on Wed or Thurs if it appears he does not need it. Explained that the paracentesis is really just for his comfort, and if he is not uncomfortable he does not need to get checked. She states he has lost significant weight in the past month, and the lymphedema is much improved in his LE, although still a significant amount there. He still has difficulty moving around and is weak. She tries to encourage him to eat increased protein and will try protein drinks since his appetite remains poor.

## 2018-05-23 ENCOUNTER — MYC MEDICAL ADVICE (OUTPATIENT)
Dept: INTERNAL MEDICINE | Facility: CLINIC | Age: 63
End: 2018-05-23

## 2018-05-24 ENCOUNTER — MEDICAL CORRESPONDENCE (OUTPATIENT)
Dept: HEALTH INFORMATION MANAGEMENT | Facility: CLINIC | Age: 63
End: 2018-05-24

## 2018-05-24 ENCOUNTER — TELEPHONE (OUTPATIENT)
Dept: INTERNAL MEDICINE | Facility: CLINIC | Age: 63
End: 2018-05-24

## 2018-05-24 ENCOUNTER — OFFICE VISIT (OUTPATIENT)
Dept: INTERNAL MEDICINE | Facility: CLINIC | Age: 63
End: 2018-05-24
Payer: COMMERCIAL

## 2018-05-24 VITALS
BODY MASS INDEX: 35.59 KG/M2 | WEIGHT: 277.2 LBS | HEART RATE: 67 BPM | TEMPERATURE: 98 F | SYSTOLIC BLOOD PRESSURE: 125 MMHG | DIASTOLIC BLOOD PRESSURE: 62 MMHG

## 2018-05-24 DIAGNOSIS — E11.610 TYPE 2 DIABETES MELLITUS WITH DIABETIC NEUROPATHIC ARTHROPATHY, WITHOUT LONG-TERM CURRENT USE OF INSULIN (H): Primary | ICD-10-CM

## 2018-05-24 DIAGNOSIS — B02.9 HERPES ZOSTER WITHOUT COMPLICATION: ICD-10-CM

## 2018-05-24 DIAGNOSIS — M72.2 PLANTAR FASCIITIS, LEFT: ICD-10-CM

## 2018-05-24 RX ORDER — DOXYCYCLINE 100 MG/1
100 CAPSULE ORAL 2 TIMES DAILY
COMMUNITY
Start: 2018-05-17 | End: 2018-09-25

## 2018-05-24 RX ORDER — FAMCICLOVIR 500 MG/1
500 TABLET ORAL 3 TIMES DAILY
Qty: 21 TABLET | Refills: 0 | Status: SHIPPED | OUTPATIENT
Start: 2018-05-24 | End: 2018-06-21

## 2018-05-24 ASSESSMENT — PAIN SCALES - GENERAL: PAINLEVEL: SEVERE PAIN (7)

## 2018-05-24 NOTE — MR AVS SNAPSHOT
After Visit Summary   5/24/2018    Lawrence Louie    MRN: [id number]           Patient Information     Date Of Birth          1955        Visit Information        Provider Department      5/24/2018 6:20 PM Ary Murphy, APRN CNP M [facility]        Today's Diagnoses     Type 2 diabetes mellitus with diabetic neuropathic arthropathy, without long-term current use of insulin (H)    -  1    Plantar fasciitis, left          Care Instructions      Shingles (Herpes Zoster)     Talk to your healthcare provider about the shingles vaccine.     Shingles is also called herpes zoster. It is a painful skin rash caused by the herpes zoster virus. This is the same virus that causes chickenpox. After a person has chickenpox, the virus remains inactive in the nerve cells. Years later, the virus can become active again and travel to the skin. Most people have shingles only once, but it is possible to have it more than once.  What are the risk factors for shingles?  Anyone who has ever had chickenpox can develop shingles. But your risk is greater if you:    Are 50 years of age or older    Have an illness that weakens your immune system, such as HIV/AIDS    Have cancer, especially Hodgkin disease or lymphoma    Take medicines that weaken your immune system  What are the symptoms of shingles?    The first sign of shingles is usually pain, burning, tingling, or itching on one part of your face or body. You may also feel as if you have the flu, with fever and chills.    A red rash with small blisters appears within a few days. The rash may appear as follows:   ? The blisters can occur anywhere, but they re most common on the back, chest, or abdomen.  ? They usually appear on only one side of the body, spreading along the nerve pathway where the virus was inactive.   ? The rash can also form around an eye, along one side of the face or neck, or in the mouth.  ? In a few people, usually those  with weakened immune systems, shingles appear on more than one part of the body at once.    After a few days, the blisters become dry and form a crust. The crust falls off in days to weeks. The blisters generally do not leave scars.  How is shingles treated?  For most people, shingles heals on its own in a few weeks. But treatment is recommended to help relieve pain, speed healing, and reduce the risk of complications. Antiviral medicines are prescribed within the first 72 hours of the appearance of the rash. To lessen symptoms:    Apply ice packs (wrapped in a thin towel) or cool compresses, or soak in a cool bath.    Use calamine lotion to calm itchy skin.    Ask your healthcare provider about over-the-counter pain relievers. If your pain is severe, your healthcare provider may prescribe stronger pain medicines.  What are the complications of shingles?  Shingles often goes away with no lasting effects. But some people have serious problems long after the blisters have healed:    Postherpetic neuralgia. This is the most common complication. It is severe nerve pain at the place where the rash used to be. It can last for months, or even years after you have had shingles. Medicines can be prescribed to help relieve the pain and improve quality of life.    Bacterial infection. Shingles blisters may become infected with bacteria. Antibiotic medicine is used to treat the infection.    Eye problems. A person with shingles on the face should see his or her healthcare provider right away. Shingles can cause serious problems with vision, and even blindness.  Very rarely shingles can also lead to pneumonia, hearing problems, brain inflammation, or even death.   When to seek medical care  Contact your healthcare provider if you experience any of the following:    Symptoms that don t go away with treatment    A rash or blisters near your eye    Increased drainage, fever, or rash after treatment, or severe pain that doesn t go  away   How can shingles be prevented?  You can only get shingles if you have had chickenpox in the past. Those who have never had chickenpox can get the virus from you. Although instead of developing shingles, the person may get chickenpox. Until your blisters form scabs, avoid contact with others, especially the following:    Pregnant women who have never had chickenpox or the vaccine    Infants who were born early (prematurely) or who had low weight at birth    People with weak immune system (for example, people receiving chemotherapy for cancer, people who have had organ transplants, or people with HIV infections)     The shingles vaccine  Shingles vaccines are available to help prevent shingles or make it less painful. Vaccination is recommended for adults 50 and older, even if you've had shingles in the past. Talk with your healthcare provider about the most appropriate time for you to get vaccinated, and which vaccine is best for you.   Date Last Reviewed: 10/1/2016    8218-7327 [employer]. [address] [place] [address]. All rights reserved. This information is not intended as a substitute for professional medical care. Always follow your healthcare professional's instructions.        Shingles (Herpes Zoster)     Talk to your healthcare provider about the shingles vaccine.     Shingles is also called herpes zoster. It is a painful skin rash caused by the herpes zoster virus. This is the same virus that causes chickenpox. After a person has chickenpox, the virus remains inactive in the nerve cells. Years later, the virus can become active again and travel to the skin. Most people have shingles only once, but it is possible to have it more than once.  What are the risk factors for shingles?  Anyone who has ever had chickenpox can develop shingles. But your risk is greater if you:    Are 50 years of age or older    Have an illness that weakens your immune system, such as  HIV/AIDS    Have cancer, especially Hodgkin disease or lymphoma    Take medicines that weaken your immune system  What are the symptoms of shingles?    The first sign of shingles is usually pain, burning, tingling, or itching on one part of your face or body. You may also feel as if you have the flu, with fever and chills.    A red rash with small blisters appears within a few days. The rash may appear as follows:   ? The blisters can occur anywhere, but they re most common on the back, chest, or abdomen.  ? They usually appear on only one side of the body, spreading along the nerve pathway where the virus was inactive.   ? The rash can also form around an eye, along one side of the face or neck, or in the mouth.  ? In a few people, usually those with weakened immune systems, shingles appear on more than one part of the body at once.    After a few days, the blisters become dry and form a crust. The crust falls off in days to weeks. The blisters generally do not leave scars.  How is shingles treated?  For most people, shingles heals on its own in a few weeks. But treatment is recommended to help relieve pain, speed healing, and reduce the risk of complications. Antiviral medicines are prescribed within the first 72 hours of the appearance of the rash. To lessen symptoms:    Apply ice packs (wrapped in a thin towel) or cool compresses, or soak in a cool bath.    Use calamine lotion to calm itchy skin.    Ask your healthcare provider about over-the-counter pain relievers. If your pain is severe, your healthcare provider may prescribe stronger pain medicines.  What are the complications of shingles?  Shingles often goes away with no lasting effects. But some people have serious problems long after the blisters have healed:    Postherpetic neuralgia. This is the most common complication. It is severe nerve pain at the place where the rash used to be. It can last for months, or even years after you have had shingles.  Medicines can be prescribed to help relieve the pain and improve quality of life.    Bacterial infection. Shingles blisters may become infected with bacteria. Antibiotic medicine is used to treat the infection.    Eye problems. A person with shingles on the face should see his or her healthcare provider right away. Shingles can cause serious problems with vision, and even blindness.  Very rarely shingles can also lead to pneumonia, hearing problems, brain inflammation, or even death.   When to seek medical care  Contact your healthcare provider if you experience any of the following:    Symptoms that don t go away with treatment    A rash or blisters near your eye    Increased drainage, fever, or rash after treatment, or severe pain that doesn t go away   How can shingles be prevented?  You can only get shingles if you have had chickenpox in the past. Those who have never had chickenpox can get the virus from you. Although instead of developing shingles, the person may get chickenpox. Until your blisters form scabs, avoid contact with others, especially the following:    Pregnant women who have never had chickenpox or the vaccine    Infants who were born early (prematurely) or who had low weight at birth    People with weak immune system (for example, people receiving chemotherapy for cancer, people who have had organ transplants, or people with HIV infections)     The shingles vaccine  Shingles vaccines are available to help prevent shingles or make it less painful. Vaccination is recommended for adults 50 and older, even if you've had shingles in the past. Talk with your healthcare provider about the most appropriate time for you to get vaccinated, and which vaccine is best for you.   Date Last Reviewed: 10/1/2016    2692-1918 [employer]. [address] [place] [address], [place] [address]. All rights reserved. This information is not intended as a substitute for professional medical care. Always follow your  healthcare professional's instructions.        Shingles (Herpes Zoster)     Talk to your healthcare provider about the shingles vaccine.     Shingles is also called herpes zoster. It is a painful skin rash caused by the herpes zoster virus. This is the same virus that causes chickenpox. After a person has chickenpox, the virus remains inactive in the nerve cells. Years later, the virus can become active again and travel to the skin. Most people have shingles only once, but it is possible to have it more than once.  What are the risk factors for shingles?  Anyone who has ever had chickenpox can develop shingles. But your risk is greater if you:    Are 50 years of age or older    Have an illness that weakens your immune system, such as HIV/AIDS    Have cancer, especially Hodgkin disease or lymphoma    Take medicines that weaken your immune system  What are the symptoms of shingles?    The first sign of shingles is usually pain, burning, tingling, or itching on one part of your face or body. You may also feel as if you have the flu, with fever and chills.    A red rash with small blisters appears within a few days. The rash may appear as follows:   ? The blisters can occur anywhere, but they re most common on the back, chest, or abdomen.  ? They usually appear on only one side of the body, spreading along the nerve pathway where the virus was inactive.   ? The rash can also form around an eye, along one side of the face or neck, or in the mouth.  ? In a few people, usually those with weakened immune systems, shingles appear on more than one part of the body at once.    After a few days, the blisters become dry and form a crust. The crust falls off in days to weeks. The blisters generally do not leave scars.  How is shingles treated?  For most people, shingles heals on its own in a few weeks. But treatment is recommended to help relieve pain, speed healing, and reduce the risk of complications. Antiviral medicines are  prescribed within the first 72 hours of the appearance of the rash. To lessen symptoms:    Apply ice packs (wrapped in a thin towel) or cool compresses, or soak in a cool bath.    Use calamine lotion to calm itchy skin.    Ask your healthcare provider about over-the-counter pain relievers. If your pain is severe, your healthcare provider may prescribe stronger pain medicines.  What are the complications of shingles?  Shingles often goes away with no lasting effects. But some people have serious problems long after the blisters have healed:    Postherpetic neuralgia. This is the most common complication. It is severe nerve pain at the place where the rash used to be. It can last for months, or even years after you have had shingles. Medicines can be prescribed to help relieve the pain and improve quality of life.    Bacterial infection. Shingles blisters may become infected with bacteria. Antibiotic medicine is used to treat the infection.    Eye problems. A person with shingles on the face should see his or her healthcare provider right away. Shingles can cause serious problems with vision, and even blindness.  Very rarely shingles can also lead to pneumonia, hearing problems, brain inflammation, or even death.   When to seek medical care  Contact your healthcare provider if you experience any of the following:    Symptoms that don t go away with treatment    A rash or blisters near your eye    Increased drainage, fever, or rash after treatment, or severe pain that doesn t go away   How can shingles be prevented?  You can only get shingles if you have had chickenpox in the past. Those who have never had chickenpox can get the virus from you. Although instead of developing shingles, the person may get chickenpox. Until your blisters form scabs, avoid contact with others, especially the following:    Pregnant women who have never had chickenpox or the vaccine    Infants who were born early (prematurely) or who had low  weight at birth    People with weak immune system (for example, people receiving chemotherapy for cancer, people who have had organ transplants, or people with HIV infections)     The shingles vaccine  Shingles vaccines are available to help prevent shingles or make it less painful. Vaccination is recommended for adults 50 and older, even if you've had shingles in the past. Talk with your healthcare provider about the most appropriate time for you to get vaccinated, and which vaccine is best for you.   Date Last Reviewed: 10/1/2016    3728-3351 [employer]. [address] [place] [address]. All rights reserved. This information is not intended as a substitute for professional medical care. Always follow your healthcare professional's instructions.                Follow-ups after your visit        Additional Services     ORTHOPEDICS ADULT REFERRAL       Your provider has referred you to:[facility]      Please be aware that coverage of these services is subject to the terms and limitations of your health insurance plan.  Call member services at your health plan with any benefit or coverage questions.      Please bring the following to your appointment:    >>   Any x-rays, CTs or MRIs which have been performed.  Contact the facility where they were done to arrange for  prior to your scheduled appointment.    >>   List of current medications   >>   This referral request   >>   Any documents/labs given to you for this referral                  Your next 10 appointments already scheduled     May 25, 2018 12:00 PM CDT   Paracentesis Visit with Uc Spec Inf Para Provider,  [address] [facility] ([facility])    [address]  [address]  [facility] [address] [phone]            Jun 01, 2018 12:00 PM CDT   Paracentesis Visit with Uc Spec Inf Para Provider,  39 ATC   [facility] ([facility])    [address]  [address]  [facility] [address] [phone]            Jun 08, 2018 12:00 PM CDT   Paracentesis Visit with Uc Spec Inf Para Provider, UC 40 ATC   [facility] ([facility])    [address]  [address]  [facility] [address] [phone]            Jun 12, 2018  1:00 PM CDT   (Arrive by 12:45 PM)   Return Visit with CORY Toussaint CNP   [facility] ([facility])    [address]  4th [address]  [facility] [id number] [phone]            Campbell 15, 2018 12:00 PM CDT   Paracentesis Visit with Uc Spec Inf Para Provider, UC 39 ATC   [facility] ([facility])    [address]  [address]  [facility] [id number] [phone]            Jun 22, 2018 12:00 PM CDT   Paracentesis Visit with Uc Spec Inf Para Provider   [facility] ([facility])    [address]  [address]  [facility] [id number] [phone]              Who to contact     Please call your clinic at [phone] to:    Ask questions about your health    Make or cancel appointments    Discuss your medicines    Learn about your test results    Speak to your doctor            Additional Information About Your Visit        [employer] Information     [employer] gives you secure access to your electronic health record. If you see a primary care provider, you can also send messages to your care team and make appointments. If you have questions, please call your primary care clinic.  If you do not have a primary care provider, please call [phone] and they will assist you.      [employer] is an electronic gateway that provides easy, online access to your medical  records. With [employer], you can request a clinic appointment, read your test results, renew a prescription or communicate with your care team.     To access your existing account, please contact your [facility] or call [phone] for assistance.        Care EveryWhere ID     This is your Care EveryWhere ID. This could be used by other organizations to access your [place] medical records  [id number]        Your Vitals Were     Pulse Temperature BMI (Body Mass Index)             67 98  F (36.7  C) (Oral) 35.59 kg/m2          Blood Pressure from Last 3 Encounters:   05/24/18 125/62   05/18/18 135/61   05/11/18 143/50    Weight from Last 3 Encounters:   05/24/18 125.7 kg (277 lb 3.2 oz)   05/18/18 124.7 kg (275 lb)   05/11/18 127.4 kg (280 lb 12.8 oz)              We Performed the Following     ORTHOPEDICS ADULT REFERRAL          Today's Medication Changes          These changes are accurate as of 5/24/18  6:55 PM.  If you have any questions, ask your nurse or doctor.               Start taking these medicines.        Dose/Directions    famciclovir 500 MG tablet   Commonly known as:  FAMVIR   Used for:  Type 2 diabetes mellitus with diabetic neuropathic arthropathy, without long-term current use of insulin (H)   Started by:  Ary Murphy APRN CNP        Dose:  500 mg   Take 1 tablet (500 mg) by mouth 3 times daily   Quantity:  21 tablet   Refills:  0         These medicines have changed or have updated prescriptions.        Dose/Directions    * insulin glargine 100 UNIT/ML injection   Commonly known as:  LANTUS   This may have changed:  Another medication with the same name was added. Make sure you understand how and when to take each.   Changed by:  Ary Murphy APRN CNP        Dose:  30 Units   Inject 30 Units Subcutaneous daily   Refills:  0       * BASAGLAR 100 UNIT/ML injection   This may have changed:  Another medication with the same name was added. Make sure you  understand how and when to take each.   Used for:  Type 2 diabetes mellitus with other oral complication, unspecified long term insulin use status (H)   Changed by:  Ary Murphy APRN CNP        Dose:  40 Units   Inject 40 Units Subcutaneous every morning   Quantity:  30 mL   Refills:  1       * insulin glargine 100 UNIT/ML injection   Commonly known as:  LANTUS   This may have changed:  You were already taking a medication with the same name, and this prescription was added. Make sure you understand how and when to take each.   Used for:  Type 2 diabetes mellitus with diabetic neuropathic arthropathy, without long-term current use of insulin (H)   Changed by:  Ary Murphy APRN CNP        Dose:  30 Units   Inject 30 Units Subcutaneous every morning   Quantity:  12 mL   Refills:  3       desvenlafaxine succinate 100 MG 24 hr tablet   Commonly known as:  PRISTIQ   This may have changed:  Another medication with the same name was removed. Continue taking this medication, and follow the directions you see here.   Used for:  Other depression   Changed by:  Ary Murphy APRN CNP        Dose:  200 mg   Take 2 tablets (200 mg) by mouth daily   Quantity:  180 tablet   Refills:  1       gabapentin 300 MG capsule   Commonly known as:  NEURONTIN   This may have changed:  additional instructions   Used for:  Diabetic polyneuropathy associated with type 2 diabetes mellitus (H)        Dose:  300 mg   Take 1 capsule (300 mg) by mouth 3 times daily   Quantity:  90 capsule   Refills:  3       insulin pen needle 31G X 8 MM   Commonly known as:  B-D U/F   This may have changed:  Another medication with the same name was removed. Continue taking this medication, and follow the directions you see here.   Used for:  Type 2 diabetes, HbA1c goal < 7% (H)   Changed by:  Ary Murphy APRN CNP        USE  6 times daily / OR AS DIRECTED   Quantity:  300 each   Refills:  3       * Notice:  This list has 3  medication(s) that are the same as other medications prescribed for you. Read the directions carefully, and ask your doctor or other care provider to review them with you.         Where to get your medicines      These medications were sent to [id number] - [place], [place] - [address]  [address] [place] [address]     Phone:  [phone]     famciclovir 500 MG tablet    insulin glargine 100 UNIT/ML injection                Primary Care Provider Office Phone # Fax #    Ary Murphy, APRN [phone]       [address] [place] [address] [address]  [facility] [address]        Equal Access to Services     [facility] : Hadii aad ku hadasho Sodonnaali, waaxda luqadaha, qaybta kaalmada adeegyada, silvia richardson . So [facility] [phone].    ATENCIÓN: Si habla español, tiene a rondon disposición servicios gratuitos de asistencia lingüística. [facility] [phone].    We comply with applicable federal civil rights laws and [place] laws. We do not discriminate on the basis of race, color, national origin, age, disability, sex, sexual orientation, or gender identity.            Thank you!     Thank you for choosing [facility]  for your care. Our goal is always to provide you with excellent care. Hearing back from our patients is one way we can continue to improve our services. Please take a few minutes to complete the written survey that you may receive in the mail after your visit with us. Thank you!             Your Updated Medication List - Protect others around you: Learn how to safely use, store and throw away your medicines at www.disposemymeds.org.          This list is accurate as of 5/24/18  6:55 PM.  Always use your most recent med list.                   Brand Name Dispense Instructions for use Diagnosis    * insulin glargine 100 UNIT/ML injection    LANTUS     Inject 30 Units Subcutaneous daily        * BASAGLAR 100 UNIT/ML injection     30 mL     Inject 40 Units Subcutaneous every morning    Type 2 diabetes mellitus with other oral complication, unspecified long term insulin use status (H)       * insulin glargine 100 UNIT/ML injection    LANTUS    12 mL    Inject 30 Units Subcutaneous every morning    Type 2 diabetes mellitus with diabetic neuropathic arthropathy, without long-term current use of insulin (H)       blood glucose lancets standard    no brand specified    1 Box    Use to test blood sugar 4 X  times daily or as directed.    Diabetes mellitus, type 2 (H)       blood glucose monitoring meter device kit    no brand specified    1 kit    Use to test blood sugar 4 X  times daily or as directed.    Type 2 diabetes mellitus with other specified complication (H)       blood glucose monitoring test strip    no brand specified    200 strip    Use to test blood sugars 4 X  times daily or as directed    Diabetes mellitus, type 2 (H)       Calcium carb-Vitamin D 500 mg [place]-200 units 500-200 MG-UNIT per tablet    OSCAL with D;Oyster Shell Calcium          calcium carbonate 500 MG tablet    OS-ERIN 500 mg [place]. Ca     Take 1 tablet by mouth daily        citalopram 40 MG tablet    celeXA    30 tablet    Take 1 tablet (40 mg) by mouth daily    Recurrent major depressive disorder, remission status unspecified (H)       desvenlafaxine succinate 100 MG 24 hr tablet    PRISTIQ    180 tablet    Take 2 tablets (200 mg) by mouth daily    Other depression       doxycycline 100 MG capsule    VIBRAMYCIN     Take 100 mg by mouth 2 times daily        famciclovir 500 MG tablet    FAMVIR    21 tablet    Take 1 tablet (500 mg) by mouth 3 times daily    Type 2 diabetes mellitus with diabetic neuropathic arthropathy, without long-term current use of insulin (H)       furosemide 40 MG tablet    LASIX    540 tablet    Take 3 tablets twice a day.    Edema of both legs       gabapentin 300 MG capsule    NEURONTIN    90 capsule    Take 1 capsule (300 mg) by mouth 3 times daily     Diabetic polyneuropathy associated with type 2 diabetes mellitus (H)       glipiZIDE 5 MG 24 hr tablet    glipiZIDE XL    180 tablet    Take 1 tablet (5 mg) by mouth 2 times daily    Type 2 diabetes mellitus without complication, with long-term current use of insulin (H)       insulin aspart 100 UNIT/ML injection    NovoLOG FLEXPEN    30 mL    20 units before breakfast, 20 units before lunch, 20 units before dinner, 20 units before snacks    Type 2 diabetes mellitus without complication, with long-term current use of insulin (H)       insulin pen needle 31G X 8 MM    B-D U/F    300 each    USE  6 times daily / OR AS DIRECTED    Type 2 diabetes, HbA1c goal < 7% (H)       magnesium oxide 400 (241.3 Mg) MG tablet    MAG-OX    90 tablet    Take 1 tablet (400 mg) by mouth daily    Cramp of limb       ondansetron 4 MG tablet    ZOFRAN    18 tablet    Take 1 tablet (4 mg) by mouth every 8 hours as needed for nausea    Alcoholic cirrhosis of liver with ascites (H), Nausea       order for DME     2 pump    Equipment being ordered:bilateral pneumatic leg massage for treatment of extreme bilateral lower leg edema.    Localized edema       oxyCODONE IR 5 MG tablet    ROXICODONE    180 tablet    Take 1-2 tablet every 8 hours as needed    Alcoholic cirrhosis of liver with ascites (H)       ranitidine 150 MG tablet    ZANTAC    180 tablet    Take 1 tablet (150 mg) by mouth 2 times daily    Esophageal varices (H)       rifaximin 550 MG Tabs tablet    XIFAXAN    60 tablet    Take 1 tablet (550 mg) by mouth 2 times daily    Hepatic encephalopathy (H)       spironolactone 50 MG tablet    ALDACTONE    150 tablet    Takes 3 tablets (150 mg) every AM and take 2 tablets (100 mg) every PM.    Portal hypertension (H), Generalized edema       STATIN NOT PRESCRIBED (INTENTIONAL)     0 each    1 each daily Statin not prescribed intentionally due to Active liver disease    Hyperlipidemia LDL goal <100       VITAMIN B-12 PO      Take 1 tablet by  mouth daily        vitamin D3 2000 units Caps     90 capsule    Take 1 capsule by mouth daily    Mild major depression (H)       * Notice:  This list has 3 medication(s) that are the same as other medications prescribed for you. Read the directions carefully, and ask your doctor or other care provider to review them with you.

## 2018-05-24 NOTE — NURSING NOTE
Chief Complaint   Patient presents with     Derm Problem     Rash on left abdomen for one week.      Recheck Medication     Requesting note to insurance company regarding insulin, requesting to stay on current insulin.        Natasha Alvarado LPN at 6:27 PM on May 24, 2018

## 2018-05-24 NOTE — TELEPHONE ENCOUNTER
[facility]    Phone Message    May a detailed message be left on voicemail: yes    Reason for Call: Other: She called back to inform us patient started a new mediation prescribed by his Neurologist, Doxicycline and there is an interaction noted in system they need to inform doc of.      Action Taken: Patient transferred to: [facility]

## 2018-05-24 NOTE — PATIENT INSTRUCTIONS
Shingles (Herpes Zoster)     Talk to your healthcare provider about the shingles vaccine.     Shingles is also called herpes zoster. It is a painful skin rash caused by the herpes zoster virus. This is the same virus that causes chickenpox. After a person has chickenpox, the virus remains inactive in the nerve cells. Years later, the virus can become active again and travel to the skin. Most people have shingles only once, but it is possible to have it more than once.  What are the risk factors for shingles?  Anyone who has ever had chickenpox can develop shingles. But your risk is greater if you:    Are 50 years of age or older    Have an illness that weakens your immune system, such as HIV/AIDS    Have cancer, especially Hodgkin disease or lymphoma    Take medicines that weaken your immune system  What are the symptoms of shingles?    The first sign of shingles is usually pain, burning, tingling, or itching on one part of your face or body. You may also feel as if you have the flu, with fever and chills.    A red rash with small blisters appears within a few days. The rash may appear as follows:   ? The blisters can occur anywhere, but they re most common on the back, chest, or abdomen.  ? They usually appear on only one side of the body, spreading along the nerve pathway where the virus was inactive.   ? The rash can also form around an eye, along one side of the face or neck, or in the mouth.  ? In a few people, usually those with weakened immune systems, shingles appear on more than one part of the body at once.    After a few days, the blisters become dry and form a crust. The crust falls off in days to weeks. The blisters generally do not leave scars.  How is shingles treated?  For most people, shingles heals on its own in a few weeks. But treatment is recommended to help relieve pain, speed healing, and reduce the risk of complications. Antiviral medicines are prescribed within the first 72 hours of the  appearance of the rash. To lessen symptoms:    Apply ice packs (wrapped in a thin towel) or cool compresses, or soak in a cool bath.    Use calamine lotion to calm itchy skin.    Ask your healthcare provider about over-the-counter pain relievers. If your pain is severe, your healthcare provider may prescribe stronger pain medicines.  What are the complications of shingles?  Shingles often goes away with no lasting effects. But some people have serious problems long after the blisters have healed:    Postherpetic neuralgia. This is the most common complication. It is severe nerve pain at the place where the rash used to be. It can last for months, or even years after you have had shingles. Medicines can be prescribed to help relieve the pain and improve quality of life.    Bacterial infection. Shingles blisters may become infected with bacteria. Antibiotic medicine is used to treat the infection.    Eye problems. A person with shingles on the face should see his or her healthcare provider right away. Shingles can cause serious problems with vision, and even blindness.  Very rarely shingles can also lead to pneumonia, hearing problems, brain inflammation, or even death.   When to seek medical care  Contact your healthcare provider if you experience any of the following:    Symptoms that don t go away with treatment    A rash or blisters near your eye    Increased drainage, fever, or rash after treatment, or severe pain that doesn t go away   How can shingles be prevented?  You can only get shingles if you have had chickenpox in the past. Those who have never had chickenpox can get the virus from you. Although instead of developing shingles, the person may get chickenpox. Until your blisters form scabs, avoid contact with others, especially the following:    Pregnant women who have never had chickenpox or the vaccine    Infants who were born early (prematurely) or who had low weight at birth    People with weak immune  system (for example, people receiving chemotherapy for cancer, people who have had organ transplants, or people with HIV infections)     The shingles vaccine  Shingles vaccines are available to help prevent shingles or make it less painful. Vaccination is recommended for adults 50 and older, even if you've had shingles in the past. Talk with your healthcare provider about the most appropriate time for you to get vaccinated, and which vaccine is best for you.   Date Last Reviewed: 10/1/2016    4052-0014 [employer]. [address] [place] [address], [place] [address]. All rights reserved. This information is not intended as a substitute for professional medical care. Always follow your healthcare professional's instructions.        Shingles (Herpes Zoster)     Talk to your healthcare provider about the shingles vaccine.     Shingles is also called herpes zoster. It is a painful skin rash caused by the herpes zoster virus. This is the same virus that causes chickenpox. After a person has chickenpox, the virus remains inactive in the nerve cells. Years later, the virus can become active again and travel to the skin. Most people have shingles only once, but it is possible to have it more than once.  What are the risk factors for shingles?  Anyone who has ever had chickenpox can develop shingles. But your risk is greater if you:    Are 50 years of age or older    Have an illness that weakens your immune system, such as HIV/AIDS    Have cancer, especially Hodgkin disease or lymphoma    Take medicines that weaken your immune system  What are the symptoms of shingles?    The first sign of shingles is usually pain, burning, tingling, or itching on one part of your face or body. You may also feel as if you have the flu, with fever and chills.    A red rash with small blisters appears within a few days. The rash may appear as follows:   ? The blisters can occur anywhere, but they re most common on the back, chest, or  abdomen.  ? They usually appear on only one side of the body, spreading along the nerve pathway where the virus was inactive.   ? The rash can also form around an eye, along one side of the face or neck, or in the mouth.  ? In a few people, usually those with weakened immune systems, shingles appear on more than one part of the body at once.    After a few days, the blisters become dry and form a crust. The crust falls off in days to weeks. The blisters generally do not leave scars.  How is shingles treated?  For most people, shingles heals on its own in a few weeks. But treatment is recommended to help relieve pain, speed healing, and reduce the risk of complications. Antiviral medicines are prescribed within the first 72 hours of the appearance of the rash. To lessen symptoms:    Apply ice packs (wrapped in a thin towel) or cool compresses, or soak in a cool bath.    Use calamine lotion to calm itchy skin.    Ask your healthcare provider about over-the-counter pain relievers. If your pain is severe, your healthcare provider may prescribe stronger pain medicines.  What are the complications of shingles?  Shingles often goes away with no lasting effects. But some people have serious problems long after the blisters have healed:    Postherpetic neuralgia. This is the most common complication. It is severe nerve pain at the place where the rash used to be. It can last for months, or even years after you have had shingles. Medicines can be prescribed to help relieve the pain and improve quality of life.    Bacterial infection. Shingles blisters may become infected with bacteria. Antibiotic medicine is used to treat the infection.    Eye problems. A person with shingles on the face should see his or her healthcare provider right away. Shingles can cause serious problems with vision, and even blindness.  Very rarely shingles can also lead to pneumonia, hearing problems, brain inflammation, or even death.   When to seek  medical care  Contact your healthcare provider if you experience any of the following:    Symptoms that don t go away with treatment    A rash or blisters near your eye    Increased drainage, fever, or rash after treatment, or severe pain that doesn t go away   How can shingles be prevented?  You can only get shingles if you have had chickenpox in the past. Those who have never had chickenpox can get the virus from you. Although instead of developing shingles, the person may get chickenpox. Until your blisters form scabs, avoid contact with others, especially the following:    Pregnant women who have never had chickenpox or the vaccine    Infants who were born early (prematurely) or who had low weight at birth    People with weak immune system (for example, people receiving chemotherapy for cancer, people who have had organ transplants, or people with HIV infections)     The shingles vaccine  Shingles vaccines are available to help prevent shingles or make it less painful. Vaccination is recommended for adults 50 and older, even if you've had shingles in the past. Talk with your healthcare provider about the most appropriate time for you to get vaccinated, and which vaccine is best for you.   Date Last Reviewed: 10/1/2016    0589-4114 [employer]. [address] [place] [address], [place] [address]. All rights reserved. This information is not intended as a substitute for professional medical care. Always follow your healthcare professional's instructions.        Shingles (Herpes Zoster)     Talk to your healthcare provider about the shingles vaccine.     Shingles is also called herpes zoster. It is a painful skin rash caused by the herpes zoster virus. This is the same virus that causes chickenpox. After a person has chickenpox, the virus remains inactive in the nerve cells. Years later, the virus can become active again and travel to the skin. Most people have shingles only once, but it is possible to have it  more than once.  What are the risk factors for shingles?  Anyone who has ever had chickenpox can develop shingles. But your risk is greater if you:    Are 50 years of age or older    Have an illness that weakens your immune system, such as HIV/AIDS    Have cancer, especially Hodgkin disease or lymphoma    Take medicines that weaken your immune system  What are the symptoms of shingles?    The first sign of shingles is usually pain, burning, tingling, or itching on one part of your face or body. You may also feel as if you have the flu, with fever and chills.    A red rash with small blisters appears within a few days. The rash may appear as follows:   ? The blisters can occur anywhere, but they re most common on the back, chest, or abdomen.  ? They usually appear on only one side of the body, spreading along the nerve pathway where the virus was inactive.   ? The rash can also form around an eye, along one side of the face or neck, or in the mouth.  ? In a few people, usually those with weakened immune systems, shingles appear on more than one part of the body at once.    After a few days, the blisters become dry and form a crust. The crust falls off in days to weeks. The blisters generally do not leave scars.  How is shingles treated?  For most people, shingles heals on its own in a few weeks. But treatment is recommended to help relieve pain, speed healing, and reduce the risk of complications. Antiviral medicines are prescribed within the first 72 hours of the appearance of the rash. To lessen symptoms:    Apply ice packs (wrapped in a thin towel) or cool compresses, or soak in a cool bath.    Use calamine lotion to calm itchy skin.    Ask your healthcare provider about over-the-counter pain relievers. If your pain is severe, your healthcare provider may prescribe stronger pain medicines.  What are the complications of shingles?  Shingles often goes away with no lasting effects. But some people have serious  problems long after the blisters have healed:    Postherpetic neuralgia. This is the most common complication. It is severe nerve pain at the place where the rash used to be. It can last for months, or even years after you have had shingles. Medicines can be prescribed to help relieve the pain and improve quality of life.    Bacterial infection. Shingles blisters may become infected with bacteria. Antibiotic medicine is used to treat the infection.    Eye problems. A person with shingles on the face should see his or her healthcare provider right away. Shingles can cause serious problems with vision, and even blindness.  Very rarely shingles can also lead to pneumonia, hearing problems, brain inflammation, or even death.   When to seek medical care  Contact your healthcare provider if you experience any of the following:    Symptoms that don t go away with treatment    A rash or blisters near your eye    Increased drainage, fever, or rash after treatment, or severe pain that doesn t go away   How can shingles be prevented?  You can only get shingles if you have had chickenpox in the past. Those who have never had chickenpox can get the virus from you. Although instead of developing shingles, the person may get chickenpox. Until your blisters form scabs, avoid contact with others, especially the following:    Pregnant women who have never had chickenpox or the vaccine    Infants who were born early (prematurely) or who had low weight at birth    People with weak immune system (for example, people receiving chemotherapy for cancer, people who have had organ transplants, or people with HIV infections)     The shingles vaccine  Shingles vaccines are available to help prevent shingles or make it less painful. Vaccination is recommended for adults 50 and older, even if you've had shingles in the past. Talk with your healthcare provider about the most appropriate time for you to get vaccinated, and which vaccine is best  for you.   Date Last Reviewed: 10/1/2016    6387-3253 [employer]. [address] [place] [address], [place] [address]. All rights reserved. This information is not intended as a substitute for professional medical care. Always follow your healthcare professional's instructions.

## 2018-05-24 NOTE — TELEPHONE ENCOUNTER
[facility]    Phone Message    May a detailed message be left on voicemail: yes    Reason for Call: Other: Requesting orders:  OT:  3 times per week for 6 weeks / Social Work:  1 visit // Please leave orders on cinfidential voicemail.      Action Taken: Message routed to:  [facility]

## 2018-05-24 NOTE — PROGRESS NOTES
[facility]  Primary Care Center   Ary Murphy, CORY CNP  05/24/2018      Chief Complaint:   Derm Problem     History of Present Illness:   Lawrence Louie is a 62 year old male with a history of alcoholic liver disease with ascites, liver cirrhosis, type II diabetes, portal hypertension, thrombocytopenia and leukopenia who presents for evaluation of a derm problem. He believes he has rash on his right abdomen that he believes is shingles. He says it developed about a week ago and has since been spreading. He states there is a burning sensation, and that he has not been picking at it.     Diabetes: He would like a refill on his Lantus Solostar because he does not like using Basiglar. He states that another provider would only prescribe him with Basilar which has frustrated him.    Plantar Fasciitis: He is frustrated with his left foot pain for which he has had an injection for, however it did not help. He does have orthopedics for his shoes, however that has also not helped. He notes that the pain has spread to the top of his foot, and has trouble walking or doing activities outside.     Review of Systems:   Pertinent items are noted in HPI, remainder of complete ROS is negative.      Active Medications:      BASAGLAR 100 UNIT/ML injection, Inject 40 Units Subcutaneous every morning, Disp: 30 mL, Rfl: 1     calcium carbonate (OS-ERIN 500 MG [place]. [place]) 1250 MG tablet, Take 1 tablet by mouth daily , Disp: , Rfl:      Cholecalciferol (VITAMIN D3) 2000 UNITS CAPS, Take 1 capsule by mouth daily, Disp: 90 capsule, Rfl: 3     citalopram (CELEXA) 40 MG tablet, Take 1 tablet (40 mg) by mouth daily, Disp: 30 tablet, Rfl: 3     Cyanocobalamin (VITAMIN B-12 PO), Take 1 tablet by mouth daily, Disp: , Rfl:      desvenlafaxine succinate (PRISTIQ) 100 MG 24 hr tablet, Take 2 tablets (200 mg) by mouth daily, Disp: 180 tablet, Rfl: 1     desvenlafaxine succinate (PRISTIQ) 100 MG 24 hr tablet, Take 1 tablet (100 mg) by mouth  daily, Disp: 90 tablet, Rfl: 3     furosemide (LASIX) 20 MG tablet, , Disp: , Rfl: 11     furosemide (LASIX) 40 MG tablet, Take 3 tablets twice a day., Disp: 540 tablet, Rfl: 1     gabapentin (NEURONTIN) 300 MG capsule, Take 1 capsule (300 mg) by mouth 3 times daily (Patient taking differently: Take 300 mg by mouth 3 times daily Currently taking one capsule at bedtime.), Disp: 90 capsule, Rfl: 3     glipiZIDE (GLIPIZIDE XL) 5 MG 24 hr tablet, Take 1 tablet (5 mg) by mouth 2 times daily, Disp: 180 tablet, Rfl: 1     insulin aspart (NOVOLOG FLEXPEN) 100 UNIT/ML injection, 20 units before breakfast, 20 units before lunch, 20 units before dinner, 20 units before snacks, Disp: 30 mL, Rfl: 3     magnesium oxide (MAG-OX) 400 (241.3 MG) MG tablet, Take 1 tablet (400 mg) by mouth daily, Disp: 90 tablet, Rfl: 3     ondansetron (ZOFRAN) 4 MG tablet, Take 1 tablet (4 mg) by mouth every 8 hours as needed for nausea, Disp: 18 tablet, Rfl: 1     oxyCODONE IR (ROXICODONE) 5 MG tablet, Take 1-2 tablet every 8 hours as needed, Disp: 180 tablet, Rfl: 0     [place] CALCIUM/D 500-200 MG-UNIT per tablet, , Disp: , Rfl: 3     ranitidine (ZANTAC) 150 MG tablet, Take 1 tablet (150 mg) by mouth 2 times daily, Disp: 180 tablet, Rfl: 3     rifaximin (XIFAXAN) 550 MG TABS tablet, Take 1 tablet (550 mg) by mouth 2 times daily, Disp: 60 tablet, Rfl: 11     spironolactone (ALDACTONE) 50 MG tablet, Takes 3 tablets (150 mg) every AM and take 2 tablets (100 mg) every PM., Disp: 150 tablet, Rfl: 11     STATIN NOT PRESCRIBED, INTENTIONAL,, 1 each daily Statin not prescribed intentionally due to Active liver disease, Disp: 0 each, Rfl: 0      Allergies:   Review of patient's allergies indicates no known allergies.      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  Recovering alcoholism in remission  Splenomegaly  Squamous cell  carcinoma  Thrombocytopenia  Varices, esophageal   Mild major depression  Plantar warts  Ascites  Portal hypertension  Type II diabetes mellitus  Corns and callosities  Multiple rib fractures  Esophageal varices  Tobacco use disorder  Hyperlipidemia   Prurigo nodularis  Hepatic encephalopathy  Lymphedema of both lower extremities   Dental caries  Colonic polyps     Past Surgical History:  Biopsy of skin lesion  Colonoscopy  EGD Combined x3  Excise lesion trunk  Genitourinary surgery, vasectomy  Herniorrhaphy umbilical     Family History:   Breast cancer - Mother  Liver cancer - Mother  Cardiovascular - Father  Cerebrovascular disease - Father  Rectal cancer - Father  Diabetes - Brother  Skin cancer - Sister  Breast cancer - Sister      Social History:   The patient was alone.  Smoking Status: Current Every Day Smoker, 0.3 PPD   Smokeless Tobacco: Never   Alcohol Use: No       Physical Exam:   /62  Pulse 67  Temp 98  F (36.7  C) (Oral)  Wt 125.7 kg (277 lb 3.2 oz)  BMI 35.59 kg/m2     Wt Readings from Last 1 Encounters:   05/24/18 125.7 kg (277 lb 3.2 oz)     Constitutional: no distress, comfortable, pleasant   Eyes: anicteric,  Cardiovascular: regular rate and rhythm, normal S1 and S2, no murmurs, rubs or gallops, peripheral pulses full and symmetric   Respiratory: clear to auscultation, no wheezes or crackles, normal breath sounds   Musculoskeletal: full range of motion, he still has 3 plus edema of lower extremities.  His left foot is tender to palpation with palpation of the talus and plantar fascia.   Skin: extensive distribution of red pustular lesions from anterior abdomen to paraspinal thoracic area following the T8 and T9 dermatome.No sign of infection as underlying skin appears normal.    Neurological:  normal speech, no tremor. A and O x 3, good historian.  Psychological: appropriate mood, good eye contact, normal affect   .      Assessment and Plan:  Type 2 diabetes mellitus with diabetic  neuropathic arthropathy, without long-term current use of insulin (H)  He requested a refill on his Lantus Solostar, which was given.  - insulin glargine (LANTUS SOLOSTAR) 100 UNIT/ML pen  Dispense: 12 mL; Refill: 3    Plantar fasciitis, left  He is frustrated with pain in his left foot, so we discussed a referral to the [facility].   - ORTHOPEDICS ADULT REFERRAL    Herpes zoster without complication  We discussed his Shingles, and he was prescribed the antiviral medication Famvir. He was informed that this would help the pain, prevent new lesion from forming, and shorten the duration for which it is present. We discussed getting the new Shingles vaccine once this is gone.   - famciclovir (FAMVIR) 500 MG tablet  Dispense: 21 tablet; Refill: 0    Scribe Disclosure:  We, Hui Vega and Moriah Wiley, are serving as the scribe to document services personally performed by Ary RAY CNP at this visit, based upon the provider's statements to me. All documentation has been reviewed by the aforementioned provider prior to being entered into the official medical record.     Portions of this medical record were completed by a scribe. UPON MY REVIEW AND AUTHENTICATION BY ELECTRONIC SIGNATURE, this confirms (a) I performed the applicable clinical services, and (b) the record is accurate.  Total time spent 25 minutes.  More than 50% of the time spent with Mr. Louie on counseling / coordinating his care.  Ary RAY CNP      .

## 2018-05-25 ENCOUNTER — TELEPHONE (OUTPATIENT)
Dept: INTERNAL MEDICINE | Facility: CLINIC | Age: 63
End: 2018-05-25

## 2018-05-25 NOTE — TELEPHONE ENCOUNTER
Called back with verbal auth of  Home care orders.  Also left a detailed message that she needs to contact the neurologist for drug interaction.

## 2018-05-25 NOTE — TELEPHONE ENCOUNTER
Prior Authorization Retail Medication Request    Medication/Dose: LANTUS SOLOSTAR  ICD code (if different than what is on RX):  Type 2 diabetes mellitus with diabetic neuropathic arthropathy, without long-term current use of insulin (H) [E11.610]  - Primary   Previously Tried and Failed:    Rationale:      Insurance Name:  [employer]  Insurance ID:  [id number]  BIN: [id number]      Pharmacy Information (if different than what is on RX)  Name:  [facility] # [id number]  Phone:  [phone]

## 2018-05-29 DIAGNOSIS — K70.31 ALCOHOLIC CIRRHOSIS OF LIVER WITH ASCITES (H): ICD-10-CM

## 2018-05-29 RX ORDER — OXYCODONE HYDROCHLORIDE 5 MG/1
TABLET ORAL
Qty: 180 TABLET | Refills: 0 | Status: SHIPPED | OUTPATIENT
Start: 2018-06-04 | End: 2018-06-26

## 2018-05-29 NOTE — TELEPHONE ENCOUNTER
Central Prior Authorization Team   Phone: [phone]      PA Initiation    Medication: insulin glargine (LANTUS SOLOSTAR) 100 UNIT/ML pen-PA Initiated  Insurance Company: [employer] - Phone [phone] Fax [phone]  Pharmacy Filling the Rx: [facility] #[id number] - [address]  Filling Pharmacy Phone: [phone]  Filling Pharmacy Fax: [phone]  Start Date: 5/29/2018

## 2018-05-29 NOTE — TELEPHONE ENCOUNTER
Prior Authorization Approval    Authorization Effective Date: 4/29/2018  Authorization Expiration Date: 5/29/2019  Medication: insulin glargine (LANTUS SOLOSTAR) 100 UNIT/ML pen-APPROVED  Approved Dose/Quantity:   Reference #: [id number]   Insurance Company: [employer] - Phone [phone] Fax [phone]  Expected CoPay:       CoPay Card Available:      Foundation Assistance Needed:    Which Pharmacy is filling the prescription (Not needed for infusion/clinic administered): [facility] #[id number] - [address]  Pharmacy Notified: Yes  Patient Notified: No    Pharmacy will notify patient when ready.

## 2018-05-29 NOTE — TELEPHONE ENCOUNTER
Reason For Call:   Chief Complaint   Patient presents with     Opioid Refill       Medication Name, Dose and Monthly Quantity:   oxyCODONE IR (ROXICODONE) 5 MG tablet, 180 tabs    Diagnosis requiring opiates:   Alcoholic cirrhosis of liver with ascites (H) [K70.31]     Problem List Updated:   no    Opioid Agreement On File - [facility] PAIN CONTRACT ID# [id number]:  no    Last Urine Drug Screen (at least once every 12 months) Date:   none    Unexpected Results:   na    [place]  Data Reviewed (at least once every 3 months) Date:   5/29/18  Unexpected Results:    no    Last Fill Date:   5/5/18 -- per  data    Due Date:   6/4/18    Last Visit with PCP:   5/24/18    Future Visits with PCP:   6/12/18    Processing:   Mail to pharmacy    Hipolito-Alize Ponce RN

## 2018-05-31 ENCOUNTER — TELEPHONE (OUTPATIENT)
Dept: INTERNAL MEDICINE | Facility: CLINIC | Age: 63
End: 2018-05-31

## 2018-05-31 NOTE — TELEPHONE ENCOUNTER
[facility] Call Center    Phone Message    May a detailed message be left on voicemail: yes    Reason for Call: Order(s): Home Care Orders: Other: Jose David, Weekly nursing for 6 weeks with 3 as needed. Phone: [phone], OK to leave a message,     Action Taken: Message routed to:  [facility] ([facility] KAREEN

## 2018-05-31 NOTE — TELEPHONE ENCOUNTER
[facility] Call Center    Phone Message    May a detailed message be left on voicemail: yes    Reason for Call: Other: Patient states he keeps getting a call from Radiology to schedule an appointment and he is unsure what he needs to schedule. Patient would like a call back from Ary Murphy to see if she ordered anything for the patient.     Action Taken: Message routed to:  [facility] ([facility] KAREEN

## 2018-06-01 ENCOUNTER — RADIANT APPOINTMENT (OUTPATIENT)
Dept: ULTRASOUND IMAGING | Facility: CLINIC | Age: 63
End: 2018-06-01
Attending: INTERNAL MEDICINE
Payer: COMMERCIAL

## 2018-06-01 ENCOUNTER — OFFICE VISIT (OUTPATIENT)
Dept: INFUSION THERAPY | Facility: CLINIC | Age: 63
End: 2018-06-01
Attending: INTERNAL MEDICINE
Payer: COMMERCIAL

## 2018-06-01 VITALS
BODY MASS INDEX: 35.56 KG/M2 | WEIGHT: 277 LBS | RESPIRATION RATE: 16 BRPM | OXYGEN SATURATION: 100 % | TEMPERATURE: 98.2 F

## 2018-06-01 DIAGNOSIS — K70.31 ALCOHOLIC CIRRHOSIS OF LIVER WITH ASCITES (H): Primary | ICD-10-CM

## 2018-06-01 PROCEDURE — 76705 ECHO EXAM OF ABDOMEN: CPT

## 2018-06-01 RX ORDER — ALBUMIN (HUMAN) 12.5 G/50ML
12.5 SOLUTION INTRAVENOUS 4 TIMES DAILY PRN
Status: CANCELLED
Start: 2018-06-01

## 2018-06-01 RX ORDER — ALBUMIN (HUMAN) 12.5 G/50ML
12.5 SOLUTION INTRAVENOUS 4 TIMES DAILY PRN
Status: DISCONTINUED | OUTPATIENT
Start: 2018-06-01 | End: 2018-06-01 | Stop reason: HOSPADM

## 2018-06-01 NOTE — MR AVS SNAPSHOT
After Visit Summary   6/1/2018    Lawrence Louie    MRN: [id number]           Patient Information     Date Of Birth          1955        Visit Information        Provider Department      6/1/2018 12:00 PM Provider, Cristobal Spec Inf Para; UC 39 ATC [facility]        Today's Diagnoses     Alcoholic cirrhosis of liver with ascites (H)    -  1       Follow-ups after your visit        Your next 10 appointments already scheduled     Jun 12, 2018  1:00 PM CDT   (Arrive by 12:45 PM)   Return Visit with CORY Toussaint CNP   [facility] ([facility])    [address]  [address]th [address]  [facility] [id number] [phone]            Jun 29, 2018 12:00 PM CDT   Paracentesis Visit with Uc Spec Inf Para Provider, UC 39 ATC   [facility] ([facility])    [address]  [address]  [place] [facility] [id number] [phone]            Jul 27, 2018 12:00 PM CDT   Paracentesis Visit with Uc Spec Inf Para Provider, UC 40 ATC   [facility] ([facility])    [address]  [address]  [facility] [id number] [phone]            Oct 26, 2018 11:00 AM CDT   (Arrive by 10:45 AM)   Return General Liver with Meghna Simmons MD   [facility] ([facility])    [address]  [address]  [facility] [id number] [phone]              Who to contact     If you have questions or need follow up information about today's clinic visit or your schedule please contact [facility] PROCEDURE directly at [phone].  Normal or non-critical lab and imaging results will be communicated to you by MyChart, letter or phone within 4 business days after the clinic has received the results.  If you do not hear from us within 7 days, please contact the clinic through [employer] or phone. If you have a critical or abnormal lab result, we will notify you by phone as soon as possible.  Submit refill requests through [employer] or call your pharmacy and they will forward the refill request to us. Please allow 3 business days for your refill to be completed.          Additional Information About Your Visit        [email or web address] Information     [employer] gives you secure access to your electronic health record. If you see a primary care provider, you can also send messages to your care team and make appointments. If you have questions, please call your primary care clinic.  If you do not have a primary care provider, please call [phone] and they will assist you.        Care EveryWhere ID     This is your Care EveryWhere ID. This could be used by other organizations to access your [facility] medical records  [id number]        Your Vitals Were     Temperature Respirations Pulse Oximetry BMI (Body Mass Index)          98.2  F (36.8  C) (Oral) 16 100% 35.56 kg/m2         Blood Pressure from Last 3 Encounters:   05/24/18 125/62   05/18/18 135/61   05/11/18 143/50    Weight from Last 3 Encounters:   06/01/18 125.6 kg (277 lb)   05/24/18 125.7 kg (277 lb 3.2 oz)   05/18/18 124.7 kg (275 lb)              We Performed the Following     US Abdomen Limited          Today's Medication Changes          These changes are accurate as of 6/1/18  1:43 PM.  If you have any questions, ask your nurse or doctor.               These medicines have changed or have updated prescriptions.        Dose/Directions    gabapentin 300 MG capsule   Commonly known as:  NEURONTIN   This may have changed:  additional instructions   Used for:  Diabetic polyneuropathy associated with type 2 diabetes mellitus (H)        Dose:  300 mg   Take 1 capsule (300 mg) by mouth 3 times daily   Quantity:  90 capsule   Refills:  3                Primary Care Provider  Office Phone # Fax #    CORY Toussaint [phone]       [address] [place] [address]  [facility] [address]        Equal Access to Services     JOSEF LORENZO : Hadii aad ku hadgardeniao Sodonnaali, waaxda luqadaha, qaybta kaalmada adeegyada, silvia galarza debra salas. So [facility] [phone].    ATENCIÓN: Si habla español, tiene a rondon disposición servicios gratuitos de asistencia lingüística. Llame al [phone].    We comply with applicable federal civil rights laws and [place] laws. We do not discriminate on the basis of race, color, national origin, age, disability, sex, sexual orientation, or gender identity.            Thank you!     Thank you for choosing [facility]  for your care. Our goal is always to provide you with excellent care. Hearing back from our patients is one way we can continue to improve our services. Please take a few minutes to complete the written survey that you may receive in the mail after your visit with us. Thank you!             Your Updated Medication List - Protect others around you: Learn how to safely use, store and throw away your medicines at www.disposemymeds.org.          This list is accurate as of 6/1/18  1:43 PM.  Always use your most recent med list.                   Brand Name Dispense Instructions for use Diagnosis    * insulin glargine 100 UNIT/ML injection    LANTUS     Inject 30 Units Subcutaneous daily        * BASAGLAR 100 UNIT/ML injection     30 mL    Inject 40 Units Subcutaneous every morning    Type 2 diabetes mellitus with other oral complication, unspecified long term insulin use status (H)       * insulin glargine 100 UNIT/ML injection    LANTUS    12 mL    Inject 30 Units Subcutaneous every morning    Type 2 diabetes mellitus with diabetic neuropathic arthropathy, without long-term current use of insulin (H)       blood glucose lancets standard    no brand specified    1 Box    Use  to test blood sugar 4 X  times daily or as directed.    Diabetes mellitus, type 2 (H)       blood glucose monitoring meter device kit    no brand specified    1 kit    Use to test blood sugar 4 X  times daily or as directed.    Type 2 diabetes mellitus with other specified complication (H)       blood glucose monitoring test strip    no brand specified    200 strip    Use to test blood sugars 4 X  times daily or as directed    Diabetes mellitus, type 2 (H)       Calcium carb-Vitamin D 500 mg [place]-200 units 500-200 MG-UNIT per tablet    OSCAL with D;Oyster Shell Calcium          calcium carbonate 500 MG tablet    OS-ERIN 500 mg [place]. Ca     Take 1 tablet by mouth daily        citalopram 40 MG tablet    celeXA    30 tablet    Take 1 tablet (40 mg) by mouth daily    Recurrent major depressive disorder, remission status unspecified (H)       desvenlafaxine succinate 100 MG 24 hr tablet    PRISTIQ    180 tablet    Take 2 tablets (200 mg) by mouth daily    Other depression       doxycycline 100 MG capsule    VIBRAMYCIN     Take 100 mg by mouth 2 times daily        famciclovir 500 MG tablet    FAMVIR    21 tablet    Take 1 tablet (500 mg) by mouth 3 times daily    Herpes zoster without complication       furosemide 40 MG tablet    LASIX    540 tablet    Take 3 tablets twice a day.    Edema of both legs       gabapentin 300 MG capsule    NEURONTIN    90 capsule    Take 1 capsule (300 mg) by mouth 3 times daily    Diabetic polyneuropathy associated with type 2 diabetes mellitus (H)       glipiZIDE 5 MG 24 hr tablet    glipiZIDE XL    180 tablet    Take 1 tablet (5 mg) by mouth 2 times daily    Type 2 diabetes mellitus without complication, with long-term current use of insulin (H)       insulin aspart 100 UNIT/ML injection    NovoLOG FLEXPEN    30 mL    20 units before breakfast, 20 units before lunch, 20 units before dinner, 20 units before snacks    Type 2 diabetes mellitus without complication, with long-term current use  of insulin (H)       insulin pen needle 31G X 8 MM    B-D U/F    300 each    USE  6 times daily / OR AS DIRECTED    Type 2 diabetes, HbA1c goal < 7% (H)       magnesium oxide 400 (241.3 Mg) MG tablet    MAG-OX    90 tablet    Take 1 tablet (400 mg) by mouth daily    Cramp of limb       ondansetron 4 MG tablet    ZOFRAN    18 tablet    Take 1 tablet (4 mg) by mouth every 8 hours as needed for nausea    Alcoholic cirrhosis of liver with ascites (H), Nausea       order for DME     2 pump    Equipment being ordered:bilateral pneumatic leg massage for treatment of extreme bilateral lower leg edema.    Localized edema       oxyCODONE IR 5 MG tablet   Start taking on:  6/4/2018    ROXICODONE    180 tablet    Take 1-2 tablet every 8 hours as needed    Alcoholic cirrhosis of liver with ascites (H)       ranitidine 150 MG tablet    ZANTAC    180 tablet    Take 1 tablet (150 mg) by mouth 2 times daily    Esophageal varices (H)       rifaximin 550 MG Tabs tablet    XIFAXAN    60 tablet    Take 1 tablet (550 mg) by mouth 2 times daily    Hepatic encephalopathy (H)       spironolactone 50 MG tablet    ALDACTONE    150 tablet    Takes 3 tablets (150 mg) every AM and take 2 tablets (100 mg) every PM.    Portal hypertension (H), Generalized edema       STATIN NOT PRESCRIBED (INTENTIONAL)     0 each    1 each daily Statin not prescribed intentionally due to Active liver disease    Hyperlipidemia LDL goal <100       VITAMIN B-12 PO      Take 1 tablet by mouth daily        vitamin D3 2000 units Caps     90 capsule    Take 1 capsule by mouth daily    Mild major depression (H)       * Notice:  This list has 3 medication(s) that are the same as other medications prescribed for you. Read the directions carefully, and ask your doctor or other care provider to review them with you.

## 2018-06-01 NOTE — TELEPHONE ENCOUNTER
Left a detailed message to home phone that the only thing I could find was DEXA, he needs to call them back to find out what they want to schedule or call me back with questions.    Soon-Mi

## 2018-06-01 NOTE — PROGRESS NOTES
Patient presented for a paracentesis procedure today and it was determined that there was not enough fluid to safely access for the procedure.

## 2018-06-07 NOTE — ADDENDUM NOTE
Encounter addended by: Any Guaman PT on: 1/5/2018 11:06 AM<BR>     Actions taken: Flowsheet accepted Julian with Dr. Christian's office spoke with Pt and they are trying to get pt set up in [place] or [place] to have this done.

## 2018-06-08 ENCOUNTER — MYC MEDICAL ADVICE (OUTPATIENT)
Dept: INTERNAL MEDICINE | Facility: CLINIC | Age: 63
End: 2018-06-08

## 2018-06-12 ENCOUNTER — MEDICAL CORRESPONDENCE (OUTPATIENT)
Dept: HEALTH INFORMATION MANAGEMENT | Facility: CLINIC | Age: 63
End: 2018-06-12

## 2018-06-12 ENCOUNTER — OFFICE VISIT (OUTPATIENT)
Dept: INTERNAL MEDICINE | Facility: CLINIC | Age: 63
End: 2018-06-12
Payer: COMMERCIAL

## 2018-06-12 VITALS
BODY MASS INDEX: 34.15 KG/M2 | WEIGHT: 266 LBS | SYSTOLIC BLOOD PRESSURE: 123 MMHG | HEART RATE: 74 BPM | DIASTOLIC BLOOD PRESSURE: 61 MMHG

## 2018-06-12 DIAGNOSIS — L98.9 LESION OF SKIN OF FOOT: ICD-10-CM

## 2018-06-12 DIAGNOSIS — Z13.5 SCREENING FOR DIABETIC RETINOPATHY: Primary | ICD-10-CM

## 2018-06-12 DIAGNOSIS — D64.9 ANEMIA, UNSPECIFIED TYPE: ICD-10-CM

## 2018-06-12 RX ORDER — CEPHALEXIN 500 MG/1
CAPSULE ORAL
Refills: 0 | COMMUNITY
Start: 2017-12-28 | End: 2019-01-11

## 2018-06-12 RX ORDER — LACTULOSE 10 G/15ML
10 SOLUTION ORAL
COMMUNITY
End: 2018-10-26

## 2018-06-12 RX ORDER — FERROUS SULFATE 325(65) MG
TABLET ORAL
Qty: 100 TABLET | Refills: 3 | Status: SHIPPED | OUTPATIENT
Start: 2018-06-12 | End: 2018-07-31

## 2018-06-12 ASSESSMENT — PAIN SCALES - GENERAL: PAINLEVEL: SEVERE PAIN (6)

## 2018-06-12 NOTE — MR AVS SNAPSHOT
After Visit Summary   6/12/2018    Lawrence Louie    MRN: [id number]           Patient Information     Date Of Birth          1955        Visit Information        Provider Department      6/12/2018 1:00 PM Ary Murphy APRN CNP [facility]        Today's Diagnoses     Screening for diabetic retinopathy    -  1    Anemia, unspecified type          Care Instructions    Primary Care Center: [phone]     Primary Care Center Medication Refill Request Information:  * Please contact your pharmacy regarding ANY request for medication refills.  ** [facility] Prescription Fax = [phone]  * Please allow 3 business days for routine medication refills.  * Please allow 5 business days for controlled substance medication refills.     Primary Care Center Test Result notification information:  *You will be notified with in 7-10 days of your appointment day regarding the results of your test.  If you are on MyChart you will be notified as soon as the provider has reviewed the results and signed off on them.            Follow-ups after your visit        Follow-up notes from your care team     Return in about 3 months (around 9/12/2018).      Your next 10 appointments already scheduled     Jun 29, 2018 12:00 PM CDT   Paracentesis Visit with Uc Spec Inf Para Provider, UC 39 ATC   [facility] ([facility])    [address] [place] [address]  [address] [place] [address] [id number] [phone]            Jul 27, 2018 12:00 PM CDT   Paracentesis Visit with  Spec Inf Para Provider, UC 40 ATC   [facility] ([facility])    [address] [place] [address]  [address] [place] [address] [id number] [phone]            Oct 26, 2018 11:00 AM CDT   (Arrive by 10:45 AM)   Return General Liver with Meghna Simmons MD   [facility] ([facility])    [address]  [address]  [facility] [address] [phone]              Future tests that were ordered for you today     Open Future Orders        Priority Expected Expires Ordered    Fecal cancer screen FIT Routine 6/12/2018 9/10/2018 6/12/2018            Who to contact     Please call your clinic at [phone] to:    Ask questions about your health    Make or cancel appointments    Discuss your medicines    Learn about your test results    Speak to your doctor            Additional Information About Your Visit        [employer] Information     [employer] gives you secure access to your electronic health record. If you see a primary care provider, you can also send messages to your care team and make appointments. If you have questions, please call your primary care clinic.  If you do not have a primary care provider, please call [phone] and they will assist you.      [employer] is an electronic gateway that provides easy, online access to your medical records. With [employer], you can request a clinic appointment, read your test results, renew a prescription or communicate with your care team.     To access your existing account, please contact your [facility] or call [phone] for assistance.        Care EveryWhere ID     This is your Care EveryWhere ID. This could be used by other organizations to access your [facility] medical records  [id number]        Your Vitals Were     Pulse BMI (Body Mass Index)                74 34.15 kg/m2           Blood Pressure from Last 3 Encounters:   06/12/18 123/61   05/24/18 125/62   05/18/18 135/61    Weight from Last 3 Encounters:   06/12/18 120.7 kg (266 lb)   06/01/18 125.6 kg (277 lb)   05/24/18 125.7 kg (277 lb 3.2 oz)                 Today's Medication Changes          These changes are accurate as of 6/12/18  1:40 PM.  If you have any questions, ask your nurse or doctor.               Start taking these  medicines.        Dose/Directions    ferrous sulfate 325 (65 Fe) MG tablet   Commonly known as:  IRON   Used for:  Screening for diabetic retinopathy   Started by:  Ary Murphy APRN CNP        Take 1 tablet with food daily.   Quantity:  100 tablet   Refills:  3         These medicines have changed or have updated prescriptions.        Dose/Directions    gabapentin 300 MG capsule   Commonly known as:  NEURONTIN   This may have changed:  additional instructions   Used for:  Diabetic polyneuropathy associated with type 2 diabetes mellitus (H)        Dose:  300 mg   Take 1 capsule (300 mg) by mouth 3 times daily   Quantity:  90 capsule   Refills:  3            Where to get your medicines      These medications were sent to [id number] - [place], [place] - [address]  [address] [place] [address]     Phone:  [phone]     ferrous sulfate 325 (65 Fe) MG tablet                Primary Care Provider Office Phone # Fax #    CORY Toussaint [phone]       [address] [place] [phone]  [facility] [id number]        Equal Access to Services     JOSEF LORENZO : Hadii aad ku hadasho Soomaali, waaxda luqadaha, qaybta kaalmada adeegyada, waxay idiin haymiguel richardson . So [facility] [phone].    ATENCIÓN: Si habla español, tiene a rondon disposición servicios gratuitos de asistencia lingüística. Ll[facility] [phone].    We comply with applicable federal civil rights laws and [place] laws. We do not discriminate on the basis of race, color, national origin, age, disability, sex, sexual orientation, or gender identity.            Thank you!     Thank you for choosing [facility]  for your care. Our goal is always to provide you with excellent care. Hearing back from our patients is one way we can continue to improve our services. Please take a few minutes to complete the written survey that you may receive in the mail after your visit with us. Thank you!              Your Updated Medication List - Protect others around you: Learn how to safely use, store and throw away your medicines at www.disposemymeds.org.          This list is accurate as of 6/12/18  1:40 PM.  Always use your most recent med list.                   Brand Name Dispense Instructions for use Diagnosis    * insulin glargine 100 UNIT/ML injection    LANTUS     Inject 30 Units Subcutaneous daily        * BASAGLAR 100 UNIT/ML injection     30 mL    Inject 40 Units Subcutaneous every morning    Type 2 diabetes mellitus with other oral complication, unspecified long term insulin use status (H)       * insulin glargine 100 UNIT/ML injection    LANTUS    12 mL    Inject 30 Units Subcutaneous every morning    Type 2 diabetes mellitus with diabetic neuropathic arthropathy, without long-term current use of insulin (H)       blood glucose lancets standard    no brand specified    1 Box    Use to test blood sugar 4 X  times daily or as directed.    Diabetes mellitus, type 2 (H)       blood glucose monitoring meter device kit    no brand specified    1 kit    Use to test blood sugar 4 X  times daily or as directed.    Type 2 diabetes mellitus with other specified complication (H)       blood glucose monitoring test strip    no brand specified    200 strip    Use to test blood sugars 4 X  times daily or as directed    Diabetes mellitus, type 2 (H)       Calcium carb-Vitamin D 500 mg [place]-200 units 500-200 MG-UNIT per tablet    OSCAL with D;Oyster Shell Calcium          calcium carbonate 500 MG tablet    OS-ERIN 500 mg [place]. Ca     Take 1 tablet by mouth daily        cephALEXin 500 MG capsule    KEFLEX          citalopram 40 MG tablet    celeXA    30 tablet    Take 1 tablet (40 mg) by mouth daily    Recurrent major depressive disorder, remission status unspecified (H)       desvenlafaxine succinate 100 MG 24 hr tablet    PRISTIQ    180 tablet    Take 2 tablets (200 mg) by mouth daily    Other depression       doxycycline  100 MG capsule    VIBRAMYCIN     Take 100 mg by mouth 2 times daily        famciclovir 500 MG tablet    FAMVIR    21 tablet    Take 1 tablet (500 mg) by mouth 3 times daily    Herpes zoster without complication       ferrous sulfate 325 (65 Fe) MG tablet    IRON    100 tablet    Take 1 tablet with food daily.    Screening for diabetic retinopathy       furosemide 40 MG tablet    LASIX    540 tablet    Take 3 tablets twice a day.    Edema of both legs       gabapentin 300 MG capsule    NEURONTIN    90 capsule    Take 1 capsule (300 mg) by mouth 3 times daily    Diabetic polyneuropathy associated with type 2 diabetes mellitus (H)       glipiZIDE 5 MG 24 hr tablet    glipiZIDE XL    180 tablet    Take 1 tablet (5 mg) by mouth 2 times daily    Type 2 diabetes mellitus without complication, with long-term current use of insulin (H)       insulin aspart 100 UNIT/ML injection    NovoLOG FLEXPEN    30 mL    20 units before breakfast, 20 units before lunch, 20 units before dinner, 20 units before snacks    Type 2 diabetes mellitus without complication, with long-term current use of insulin (H)       insulin pen needle 31G X 8 MM    B-D U/F    300 each    USE  6 times daily / OR AS DIRECTED    Type 2 diabetes, HbA1c goal < 7% (H)       lactulose 10 GM/15ML solution    CHRONULAC     Take 10 g by mouth        magnesium oxide 400 (241.3 Mg) MG tablet    MAG-OX    90 tablet    Take 1 tablet (400 mg) by mouth daily    Cramp of limb       ondansetron 4 MG tablet    ZOFRAN    18 tablet    Take 1 tablet (4 mg) by mouth every 8 hours as needed for nausea    Alcoholic cirrhosis of liver with ascites (H), Nausea       order for DME     2 pump    Equipment being ordered:bilateral pneumatic leg massage for treatment of extreme bilateral lower leg edema.    Localized edema       oxyCODONE IR 5 MG tablet    ROXICODONE    180 tablet    Take 1-2 tablet every 8 hours as needed    Alcoholic cirrhosis of liver with ascites (H)       ranitidine  150 MG tablet    ZANTAC    180 tablet    Take 1 tablet (150 mg) by mouth 2 times daily    Esophageal varices (H)       rifaximin 550 MG Tabs tablet    XIFAXAN    60 tablet    Take 1 tablet (550 mg) by mouth 2 times daily    Hepatic encephalopathy (H)       spironolactone 50 MG tablet    ALDACTONE    150 tablet    Takes 3 tablets (150 mg) every AM and take 2 tablets (100 mg) every PM.    Portal hypertension (H), Generalized edema       STATIN NOT PRESCRIBED (INTENTIONAL)     0 each    1 each daily Statin not prescribed intentionally due to Active liver disease    Hyperlipidemia LDL goal <100       VITAMIN B-12 PO      Take 1 tablet by mouth daily        vitamin D3 2000 units Caps     90 capsule    Take 1 capsule by mouth daily    Mild major depression (H)       * Notice:  This list has 3 medication(s) that are the same as other medications prescribed for you. Read the directions carefully, and ask your doctor or other care provider to review them with you.

## 2018-06-12 NOTE — PROGRESS NOTES
[facility]  Primary Care Center   Ary Murphy, CORY CNP  06/12/2018      Chief Complaint:   Diabetes       History of Present Illness:   Lawrence Louie is a 62 year old male with a history of liver cirrhosis s/p TIPS (10/2017), esophogeal varices, ascites, hepatic encephalopathy, HLD, tobacco use, DM II, MDD, thrombocytopenia p/w severe leg edema w/ weeping lesions who presents with his wife for evaluation of diabetes. He still has leg edema. He has a lack balance due to his feet and plans to see a podiatrist at [facility] orthopedics. The last paracentesis he had was last month, they do not need to do anything to unless there is discomfort. He still needs to urinate frequently. He has bilateral leg edema, there is less weeping than there has been in the past. There is a lesion on the top of his foot and if he bumps it there is significant breathing. They will be getting boots that do an automatic lymphatic massages to help reduce the edema.     We checked his hemoglobin results, these were low but stable. His wife shared children vitamins with iron, he has not taken a ferrous gluconate iron supplement.     After eating a tuna sandwich he had difficulty staying awake, his wife describes it as a stupor. After some time he returned to normal.     He has not regained his appetite and only eats because it is necessary.    Review of Systems:   Pertinent items are noted in HPI, remainder of complete ROS is negative.      Active Medications:      BASAGLAR 100 UNIT/ML injection, Inject 40 Units Subcutaneous every morning, Disp: 30 mL, Rfl: 1     calcium carbonate (OS-ERIN 500 MG [place]. [place]) 1250 MG tablet, Take 1 tablet by mouth daily , Disp: , Rfl:      cephALEXin (KEFLEX) 500 MG capsule, , Disp: , Rfl: 0     Cholecalciferol (VITAMIN D3) 2000 UNITS CAPS, Take 1 capsule by mouth daily, Disp: 90 capsule, Rfl: 3     citalopram (CELEXA) 40 MG tablet, Take 1 tablet (40 mg) by mouth daily, Disp: 30 tablet, Rfl: 3      Cyanocobalamin (VITAMIN B-12 PO), Take 1 tablet by mouth daily, Disp: , Rfl:      desvenlafaxine succinate (PRISTIQ) 100 MG 24 hr tablet, Take 2 tablets (200 mg) by mouth daily, Disp: 180 tablet, Rfl: 1     doxycycline (VIBRAMYCIN) 100 MG capsule, Take 100 mg by mouth 2 times daily, Disp: , Rfl:      famciclovir (FAMVIR) 500 MG tablet, Take 1 tablet (500 mg) by mouth 3 times daily, Disp: 21 tablet, Rfl: 0     furosemide (LASIX) 40 MG tablet, Take 3 tablets twice a day., Disp: 540 tablet, Rfl: 1     gabapentin (NEURONTIN) 300 MG capsule, Take 1 capsule (300 mg) by mouth 3 times daily (Patient taking differently: Take 300 mg by mouth 3 times daily Currently taking one capsule at bedtime.), Disp: 90 capsule, Rfl: 3     glipiZIDE (GLIPIZIDE XL) 5 MG 24 hr tablet, Take 1 tablet (5 mg) by mouth 2 times daily, Disp: 180 tablet, Rfl: 1     insulin aspart (NOVOLOG FLEXPEN) 100 UNIT/ML injection, 20 units before breakfast, 20 units before lunch, 20 units before dinner, 20 units before snacks, Disp: 30 mL, Rfl: 3     insulin glargine (LANTUS SOLOSTAR) 100 UNIT/ML pen, Inject 30 Units Subcutaneous daily, Disp: , Rfl:      insulin glargine (LANTUS SOLOSTAR) 100 UNIT/ML pen, Inject 30 Units Subcutaneous every morning, Disp: 12 mL, Rfl: 3     lactulose (CHRONULAC) 10 GM/15ML solution, Take 10 g by mouth, Disp: , Rfl:      magnesium oxide (MAG-OX) 400 (241.3 MG) MG tablet, Take 1 tablet (400 mg) by mouth daily, Disp: 90 tablet, Rfl: 3     ondansetron (ZOFRAN) 4 MG tablet, Take 1 tablet (4 mg) by mouth every 8 hours as needed for nausea, Disp: 18 tablet, Rfl: 1     oxyCODONE IR (ROXICODONE) 5 MG tablet, Take 1-2 tablet every 8 hours as needed, Disp: 180 tablet, Rfl: 0     [place] SHELL CALCIUM/D 500-200 MG-UNIT per tablet, , Disp: , Rfl: 3     ranitidine (ZANTAC) 150 MG tablet, Take 1 tablet (150 mg) by mouth 2 times daily, Disp: 180 tablet, Rfl: 3     rifaximin (XIFAXAN) 550 MG TABS tablet, Take 1 tablet (550 mg) by mouth 2 times  daily, Disp: 60 tablet, Rfl: 11     spironolactone (ALDACTONE) 50 MG tablet, Takes 3 tablets (150 mg) every AM and take 2 tablets (100 mg) every PM., Disp: 150 tablet, Rfl: 11     STATIN NOT PRESCRIBED, INTENTIONAL,, 1 each daily Statin not prescribed intentionally due to Active liver disease, Disp: 0 each, Rfl: 0      Allergies:   Review of patient's allergies indicates no known allergies.      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  Recovering alcoholic  Splenomegaly  Squamous cell carcinoma  Thrombocytopenia  Esophageal varices  Lymphedema bilateral lower extremities  alcoholic cirrhosis of liver with ascites  Colonic polyps  Morbid obesity  Esophageal reflux  Prurigo nodularis  Dental caries  Hyperlipidemia LDL goal <100  Tobacco use disorder  Rib fractures  Portal hypertension  Corns and callosities  Plantar warts  Thrombocytopenia  Mild major depression     Past Surgical History:  Biopsy of skin lesion  Colonoscopy  EGDx3  Excise lesion on trunk  Vasectomy  Herniorrhaphy umbilical    Family History:   Mother: breast cancer, liver cancer, cardiovascular  Father: Hypotension, rectal cancer  Patrnal grandfather: cardiovascular  Brother: diabetes  Sister: skin cancer, breast cancer  Other: coronary artery disease    Social History:     Current 0.3PPD smoker    Physical Exam:   /61  Pulse 74  Wt 120.7 kg (266 lb)  BMI 34.15 kg/m2   Wt Readings from Last 1 Encounters:   06/12/18 120.7 kg (266 lb)     Constitutional: no distress, comfortable, pleasant   Eyes: anicteric, conjunctiva pink  Ears, Nose and Throat: tympanic membranes clear, , throat clear.   Cardiovascular: regular rate and rhythm, normal S1 and S2, no murmurs, rubs or gallops.  Bilateral LE edema R > L with thickened sclerotic skin.  No actively draining lesions.   Respiratory: clear to auscultation, no wheezes or crackles, normal  breath sounds   Musculoskeletal:  Skin:See CV  Neurological:  normal speech, no tremor. A and O x 3, good historian.  Psychological: appropriate mood, good eye contact, normal affect   Legs: dorsum of right foot [id number] wart on the top of his foot treated today with three spplications of LN2 x 3-5 seconds each.     Assessment and Plan:    Anemia, unspecified type  Patient's hemoglobin has been stable but low for some time. His medication list was checked but could not determine if the source of his anemia was drug related. He agreed to start taking an iron supplement. We will also collect a stool test to determine whether or not blood loss is occurring in his GI tract.  - Fecal cancer screen FIT   - ferrous sulfate (IRON) 325 (65 Fe) MG tablet  Dispense: 100 tablet; Refill: 3     Follow-up: Return in about 3 months (around 9/12/2018).         Scribe Disclosure:   I, Don Dominguez, am serving as a scribe to document services personally performed by CORY Frye CNP at this visit, based upon the provider's statements to me. All documentation has been reviewed by the aforementioned provider prior to being entered into the official medical record.     Portions of this medical record were completed by a scribe. UPON MY REVIEW AND AUTHENTICATION BY ELECTRONIC SIGNATURE, this confirms (a) I performed the applicable clinical services, and (b) the record is accurate.

## 2018-06-12 NOTE — NURSING NOTE
Chief Complaint   Patient presents with     Diabetes     pt here for diabetes       Elaine Macias CMA at 12:57 PM on 6/12/2018.

## 2018-06-12 NOTE — PATIENT INSTRUCTIONS
[facility]: [phone]     [facility] Medication Refill Request Information:  * Please contact your pharmacy regarding ANY request for medication refills.  ** [facility] Prescription Fax = [phone]  * Please allow 3 business days for routine medication refills.  * Please allow 5 business days for controlled substance medication refills.     [facility] Test Result notification information:  *You will be notified with in 7-10 days of your appointment day regarding the results of your test.  If you are on MyChart you will be notified as soon as the provider has reviewed the results and signed off on them.

## 2018-06-13 NOTE — TELEPHONE ENCOUNTER
June 13, 2018 11:07 AM  Form regarding the patient's pneumatic compression device Rx was completed and faxed yesterday.

## 2018-06-14 ENCOUNTER — MEDICAL CORRESPONDENCE (OUTPATIENT)
Dept: HEALTH INFORMATION MANAGEMENT | Facility: CLINIC | Age: 63
End: 2018-06-14

## 2018-06-21 DIAGNOSIS — B02.9 HERPES ZOSTER WITHOUT COMPLICATION: ICD-10-CM

## 2018-06-25 RX ORDER — FAMCICLOVIR 500 MG/1
500 TABLET ORAL 3 TIMES DAILY
Qty: 21 TABLET | Refills: 0 | Status: SHIPPED | OUTPATIENT
Start: 2018-06-25 | End: 2018-07-31

## 2018-06-26 DIAGNOSIS — K70.31 ALCOHOLIC CIRRHOSIS OF LIVER WITH ASCITES (H): ICD-10-CM

## 2018-06-26 RX ORDER — OXYCODONE HYDROCHLORIDE 5 MG/1
TABLET ORAL
Qty: 180 TABLET | Refills: 0 | Status: SHIPPED | OUTPATIENT
Start: 2018-07-03 | End: 2018-07-31

## 2018-06-26 NOTE — TELEPHONE ENCOUNTER
Reason For Call:   Chief Complaint   Patient presents with     Opioid Refill     oxycodone       Medication Name, Dose and Monthly Quantity:   oxyCODONE IR (ROXICODONE) 5 MG tablet, 180 tabs    Diagnosis requiring opiates:   Alcoholic cirrhosis of liver with ascites (H) [K70.31]     Problem List Updated:   no    Opioid Agreement On File - [facility] PAIN CONTRACT ID# [id number]:  no    Last Urine Drug Screen (at least once every 12 months) Date:   none    Unexpected Results:   na    [place]  Data Reviewed (at least once every 3 months) Date:   5/29/18  Unexpected Results:    no    Last Fill Date:   6/4/18    Due Date:   7/3/18/18    Last Visit with PCP:   6/12/18    Future Visits with PCP:   none    Processing:   Mail to pharmacy    Hipolito-Alize Ponce RN

## 2018-06-29 ENCOUNTER — OFFICE VISIT (OUTPATIENT)
Dept: INFUSION THERAPY | Facility: CLINIC | Age: 63
End: 2018-06-29
Attending: INTERNAL MEDICINE
Payer: COMMERCIAL

## 2018-06-29 ENCOUNTER — RADIANT APPOINTMENT (OUTPATIENT)
Dept: ULTRASOUND IMAGING | Facility: CLINIC | Age: 63
End: 2018-06-29
Attending: INTERNAL MEDICINE
Payer: COMMERCIAL

## 2018-06-29 VITALS — BODY MASS INDEX: 36.08 KG/M2 | TEMPERATURE: 97.4 F | WEIGHT: 281 LBS | OXYGEN SATURATION: 97 %

## 2018-06-29 DIAGNOSIS — K70.31 ALCOHOLIC CIRRHOSIS OF LIVER WITH ASCITES (H): Primary | ICD-10-CM

## 2018-06-29 PROCEDURE — 76705 ECHO EXAM OF ABDOMEN: CPT

## 2018-06-29 RX ORDER — ALBUMIN (HUMAN) 12.5 G/50ML
12.5 SOLUTION INTRAVENOUS 4 TIMES DAILY PRN
Status: CANCELLED
Start: 2018-06-29

## 2018-06-29 RX ORDER — ALBUMIN (HUMAN) 12.5 G/50ML
12.5 SOLUTION INTRAVENOUS 4 TIMES DAILY PRN
Status: DISCONTINUED | OUTPATIENT
Start: 2018-06-29 | End: 2018-06-29 | Stop reason: HOSPADM

## 2018-06-29 NOTE — MR AVS SNAPSHOT
After Visit Summary   6/29/2018    Lawrence Louie    MRN: [id number]           Patient Information     Date Of Birth          1955        Visit Information        Provider Department      6/29/2018 12:00 PM Provider, Uc Spec Inf Para; UC 39 ATC [facility] [place] [facility]        Today's Diagnoses     Alcoholic cirrhosis of liver with ascites (H)    -  1      Care Instructions    Dear Lawrence Louie    Thank you for choosing [facility] ([facility]) for your paracentesis.  The following information is a summary of our appointment as well as important reminders.        We look forward in seeing you on your next appointment here at [facility].  Please don t hesitate to call us at [phone] to reschedule any of your appointments or to speak with one of the [facility] registered nurses.  It was a pleasure taking care of you today.    Sincerely,    [facility]  [facility]  [address] [place] [address]  [id number]  Phone:  [phone]            Follow-ups after your visit        Your next 10 appointments already scheduled     Jul 27, 2018 12:00 PM CDT   Paracentesis Visit with Cristobal Spec Inf Para Provider, UC 40 ATC   [facility] [place] [facility] ([facility])    [address]  [address]  [facility] [address] [phone]            Oct 26, 2018 11:00 AM CDT   (Arrive by 10:45 AM)   Return General Liver with Meghna Simmons MD   [facility] ([facility])    [address]  [address]  [facility] [address] [phone]              Who to contact     If you have questions or need follow up information about today's clinic visit or your schedule please contact [facility] [place] [facility] directly at  [phone].  Normal or non-critical lab and imaging results will be communicated to you by [employer], letter or phone within 4 business days after the clinic has received the results. If you do not hear from us within 7 days, please contact the clinic through [employer] or phone. If you have a critical or abnormal lab result, we will notify you by phone as soon as possible.  Submit refill requests through [employer] or call your pharmacy and they will forward the refill request to us. Please allow 3 business days for your refill to be completed.          Additional Information About Your Visit        [employer] Information     [employer] gives you secure access to your electronic health record. If you see a primary care provider, you can also send messages to your care team and make appointments. If you have questions, please call your primary care clinic.  If you do not have a primary care provider, please call [phone] and they will assist you.        Care EveryWhere ID     This is your Care EveryWhere ID. This could be used by other organizations to access your [facility] medical records  [id number]        Your Vitals Were     Temperature Pulse Oximetry BMI (Body Mass Index)             97.4  F (36.3  C) (Oral) 97% 36.08 kg/m2          Blood Pressure from Last 3 Encounters:   06/12/18 123/61   05/24/18 125/62   05/18/18 135/61    Weight from Last 3 Encounters:   06/29/18 127.5 kg (281 lb)   06/12/18 120.7 kg (266 lb)   06/01/18 125.6 kg (277 lb)              We Performed the Following     US Paracentesis          Today's Medication Changes          These changes are accurate as of 6/29/18 12:47 PM.  If you have any questions, ask your nurse or doctor.               These medicines have changed or have updated prescriptions.        Dose/Directions    gabapentin 300 MG capsule   Commonly known as:  NEURONTIN   This may have changed:  additional instructions   Used for:  Diabetic polyneuropathy associated with type 2  diabetes mellitus (H)        Dose:  300 mg   Take 1 capsule (300 mg) by mouth 3 times daily   Quantity:  90 capsule   Refills:  3                Primary Care Provider Office Phone # Fax #    CORY Toussaint [phone]       [address] [place] [address] [address] [place] [id number]        Equal Access to Services     JOSEF LORENZO : Hadii aad ku hadasho Soomaali, waaxda luqadaha, qaybta kaalmada adeegyada, waxay idiin hayaan adeeg kharash laKellyaan . So [facility] [phone].    ATENCIÓN: Si habla español, tiene a rondon disposición servicios gratuitos de asistencia lingüística. Llame al [phone].    We comply with applicable federal civil rights laws and [place] laws. We do not discriminate on the basis of race, color, national origin, age, disability, sex, sexual orientation, or gender identity.            Thank you!     Thank you for choosing [facility] SPECIAL[facility] PROCEDURE  for your care. Our goal is always to provide you with excellent care. Hearing back from our patients is one way we can continue to improve our services. Please take a few minutes to complete the written survey that you may receive in the mail after your visit with us. Thank you!             Your Updated Medication List - Protect others around you: Learn how to safely use, store and throw away your medicines at www.disposemymeds.org.          This list is accurate as of 6/29/18 12:47 PM.  Always use your most recent med list.                   Brand Name Dispense Instructions for use Diagnosis    * insulin glargine 100 UNIT/ML injection    LANTUS     Inject 30 Units Subcutaneous daily        * BASAGLAR 100 UNIT/ML injection     30 mL    Inject 40 Units Subcutaneous every morning    Type 2 diabetes mellitus with other oral complication, unspecified long term insulin use status (H)       * insulin glargine 100 UNIT/ML injection    LANTUS    12 mL    Inject 30 Units Subcutaneous every morning    Type 2 diabetes  mellitus with diabetic neuropathic arthropathy, without long-term current use of insulin (H)       blood glucose lancets standard    no brand specified    1 Box    Use to test blood sugar 4 X  times daily or as directed.    Diabetes mellitus, type 2 (H)       blood glucose monitoring meter device kit    no brand specified    1 kit    Use to test blood sugar 4 X  times daily or as directed.    Type 2 diabetes mellitus with other specified complication (H)       blood glucose monitoring test strip    no brand specified    200 strip    Use to test blood sugars 4 X  times daily or as directed    Diabetes mellitus, type 2 (H)       Calcium carb-Vitamin D 500 mg Resighini-200 units 500-200 MG-UNIT per tablet    OSCAL with D;Oyster Shell Calcium          calcium carbonate 500 MG tablet    OS-ERIN 500 mg Resighini. Ca     Take 1 tablet by mouth daily        cephALEXin 500 MG capsule    KEFLEX          citalopram 40 MG tablet    celeXA    30 tablet    Take 1 tablet (40 mg) by mouth daily    Recurrent major depressive disorder, remission status unspecified (H)       desvenlafaxine succinate 100 MG 24 hr tablet    PRISTIQ    180 tablet    Take 2 tablets (200 mg) by mouth daily    Other depression       doxycycline 100 MG capsule    VIBRAMYCIN     Take 100 mg by mouth 2 times daily        famciclovir 500 MG tablet    FAMVIR    21 tablet    Take 1 tablet (500 mg) by mouth 3 times daily    Herpes zoster without complication       ferrous sulfate 325 (65 Fe) MG tablet    IRON    100 tablet    Take 1 tablet with food daily.    Screening for diabetic retinopathy       furosemide 40 MG tablet    LASIX    540 tablet    Take 3 tablets twice a day.    Edema of both legs       gabapentin 300 MG capsule    NEURONTIN    90 capsule    Take 1 capsule (300 mg) by mouth 3 times daily    Diabetic polyneuropathy associated with type 2 diabetes mellitus (H)       glipiZIDE 5 MG 24 hr tablet    glipiZIDE XL    180 tablet    Take 1 tablet (5 mg) by mouth 2  times daily    Type 2 diabetes mellitus without complication, with long-term current use of insulin (H)       insulin aspart 100 UNIT/ML injection    NovoLOG FLEXPEN    30 mL    20 units before breakfast, 20 units before lunch, 20 units before dinner, 20 units before snacks    Type 2 diabetes mellitus without complication, with long-term current use of insulin (H)       insulin pen needle 31G X 8 MM    B-D U/F    300 each    USE  6 times daily / OR AS DIRECTED    Type 2 diabetes, HbA1c goal < 7% (H)       lactulose 10 GM/15ML solution    CHRONULAC     Take 10 g by mouth        magnesium oxide 400 (241.3 Mg) MG tablet    MAG-OX    90 tablet    Take 1 tablet (400 mg) by mouth daily    Cramp of limb       ondansetron 4 MG tablet    ZOFRAN    18 tablet    Take 1 tablet (4 mg) by mouth every 8 hours as needed for nausea    Alcoholic cirrhosis of liver with ascites (H), Nausea       order for DME     2 pump    Equipment being ordered:bilateral pneumatic leg massage for treatment of extreme bilateral lower leg edema.    Localized edema       oxyCODONE IR 5 MG tablet   Start taking on:  7/3/2018    ROXICODONE    180 tablet    Take 1-2 tablet every 8 hours as needed    Alcoholic cirrhosis of liver with ascites (H)       ranitidine 150 MG tablet    ZANTAC    180 tablet    Take 1 tablet (150 mg) by mouth 2 times daily    Esophageal varices (H)       rifaximin 550 MG Tabs tablet    XIFAXAN    60 tablet    Take 1 tablet (550 mg) by mouth 2 times daily    Hepatic encephalopathy (H)       spironolactone 50 MG tablet    ALDACTONE    150 tablet    Takes 3 tablets (150 mg) every AM and take 2 tablets (100 mg) every PM.    Portal hypertension (H), Generalized edema       STATIN NOT PRESCRIBED (INTENTIONAL)     0 each    1 each daily Statin not prescribed intentionally due to Active liver disease    Hyperlipidemia LDL goal <100       VITAMIN B-12 PO      Take 1 tablet by mouth daily        vitamin D3 2000 units Caps     90 capsule     Take 1 capsule by mouth daily    Mild major depression (H)       * Notice:  This list has 3 medication(s) that are the same as other medications prescribed for you. Read the directions carefully, and ask your doctor or other care provider to review them with you.

## 2018-06-29 NOTE — PROGRESS NOTES
Paracentesis Nursing Note  Lawrence Louie [facility].    During today's appointment orders from Meghna Simmons MD were completed.    Progress Note:  Patient identification verified by name and date of birth.  Assessment completed.  Vitals monitored throughout appointment and recorded in Doc Flowsheets.  See proceduralist note in ultrasound.    Unable to perform paracentesis due to not enough fluid seen. Pt declined platelet draw.    The following labs were communicated to provider performing paracentesis:  Lab Results   Component Value Date    PLT 98 05/11/2018         Discharge Plan:  Discharge instructions were reviewed with patient.  Patient/Representative verbalized understanding and all questions were answered.   [facility] in stable condition.    Oralia Duarte RN        Temp 97.4  F (36.3  C) (Oral)  Wt 127.5 kg (281 lb)  SpO2 97%  BMI 36.08 kg/m2

## 2018-06-29 NOTE — PATIENT INSTRUCTIONS
Dear Lawrence Louie    Thank you for choosing [facility] ([facility]) for your paracentesis.  The following information is a summary of our appointment as well as important reminders.        We look forward in seeing you on your next appointment here at [facility].  Please don t hesitate to call us at [phone] to reschedule any of your appointments or to speak with one of the [facility] registered nurses.  It was a pleasure taking care of you today.    Sincerely,    [facility]  Special[facility]  [address] [place] [address]  [address]  Phone:  [phone]

## 2018-07-02 ENCOUNTER — DOCUMENTATION ONLY (OUTPATIENT)
Dept: CARE COORDINATION | Facility: CLINIC | Age: 63
End: 2018-07-02

## 2018-07-02 NOTE — PROGRESS NOTES
[facility] now requests orders and shares plan of care/discharge summaries for some patients through [employer].  Please REPLY TO THIS MESSAGE OR ROUTE BACK TO THE AUTHOR in order to give authorization for orders when needed.  This is considered a verbal order, you will still receive a faxed copy of orders for signature.  Thank you for your assistance in improving collaboration for our patients.    ORDER    OT/Lymphedema therapist requesting continued orders for 1w1 to provide patient and caregiver education for long term edema management, complete MLD and gradient compression bandages for edema and skin integrity.     Thank you,   Aurelia Garcia OTR/L, CLT  [facility]  [phone]  [email or web address]

## 2018-07-05 ENCOUNTER — TELEPHONE (OUTPATIENT)
Dept: INTERNAL MEDICINE | Facility: CLINIC | Age: 63
End: 2018-07-05

## 2018-07-05 NOTE — TELEPHONE ENCOUNTER
Health Call Center    Phone Message    May a detailed message be left on voicemail: yes    Reason for Call: Other: Aurelia with [facility] called in.  She placed an epic request on 7/2 for verbal home care orders.  She wants to see patient today or tomorrow.  Please call with orders.  (her phone # is in epic encounter dated 7/2)     Action Taken: Message routed to:  [facility]

## 2018-07-17 ENCOUNTER — TELEPHONE (OUTPATIENT)
Dept: INTERNAL MEDICINE | Facility: CLINIC | Age: 63
End: 2018-07-17

## 2018-07-17 DIAGNOSIS — Z79.4 TYPE 2 DIABETES MELLITUS WITHOUT COMPLICATION, WITH LONG-TERM CURRENT USE OF INSULIN (H): ICD-10-CM

## 2018-07-17 DIAGNOSIS — E11.9 TYPE 2 DIABETES MELLITUS WITHOUT COMPLICATION, WITH LONG-TERM CURRENT USE OF INSULIN (H): ICD-10-CM

## 2018-07-18 DIAGNOSIS — K70.30 ALCOHOLIC CIRRHOSIS (H): Primary | ICD-10-CM

## 2018-07-19 ENCOUNTER — TELEPHONE (OUTPATIENT)
Dept: INTERNAL MEDICINE | Facility: CLINIC | Age: 63
End: 2018-07-19

## 2018-07-19 RX ORDER — GLIPIZIDE 5 MG/1
5 TABLET, FILM COATED, EXTENDED RELEASE ORAL DAILY
Qty: 180 TABLET | Refills: 0 | Status: SHIPPED | OUTPATIENT
Start: 2018-07-19 | End: 2019-02-01

## 2018-07-19 NOTE — TELEPHONE ENCOUNTER
Ary,    We received refill request for Glipizide ER.  Blood glucose and A1C had been low. Pt has an upcoming appt on 7/31.  Rx is pended for your review.    Soon-Mi

## 2018-07-19 NOTE — TELEPHONE ENCOUNTER
[facility]    Phone Message    May a detailed message be left on voicemail: yes    Reason for Call: Other: Mirella from [facility] would like to know what stage of lymphedema the PT is in to fill out some paperwork for the PT.  Mirella is also faxing over some forms for the PT.  She can be reached at [phone], fax number is [phone]     Action Taken: Message routed to:  [facility] KAREEN

## 2018-07-19 NOTE — TELEPHONE ENCOUNTER
GlipiZIDE ER Oral Tablet Extended Release 24 Hour 5 MG     Last Written Prescription Date: 11/28/2017  Last Fill Quantity: 180,   # refills:1  Last Office Visit : 6/12/2018  Future Office visit:  7/31/2018    Routing refill request to provider for review/approval because:  LDL   04/11/17   1131    LDL  79

## 2018-07-20 NOTE — TELEPHONE ENCOUNTER
[facility]    Phone Message    May a detailed message be left on voicemail: yes    Reason for Call: Other: Per call from Mirella the order has been approved     Action Taken: Message routed to:  [facility] ([facility] Primary Care

## 2018-07-20 NOTE — TELEPHONE ENCOUNTER
OYSTER SHELL CALCIUM/D 500-200 MG-UNIT per tablet   Last Written Prescription Date:  unknown  Last Fill Quantity: unknown,   # refills: unknown  Last Office Visit : 6/12/18  Future Office visit:  7/31/18    Routing   Because:  Historical.

## 2018-07-24 ENCOUNTER — MEDICAL CORRESPONDENCE (OUTPATIENT)
Dept: HEALTH INFORMATION MANAGEMENT | Facility: CLINIC | Age: 63
End: 2018-07-24

## 2018-07-30 ENCOUNTER — TELEPHONE (OUTPATIENT)
Dept: INTERNAL MEDICINE | Facility: CLINIC | Age: 63
End: 2018-07-30

## 2018-07-30 NOTE — TELEPHONE ENCOUNTER
[facility]    Phone Message    May a detailed message be left on voicemail: yes    Reason for Call: Other: Please follow up for Home care orders.     Action Taken: Message routed to:  [facility] basil maynard

## 2018-07-31 ENCOUNTER — OFFICE VISIT (OUTPATIENT)
Dept: INTERNAL MEDICINE | Facility: CLINIC | Age: 63
End: 2018-07-31
Payer: COMMERCIAL

## 2018-07-31 VITALS
BODY MASS INDEX: 36.63 KG/M2 | OXYGEN SATURATION: 98 % | DIASTOLIC BLOOD PRESSURE: 52 MMHG | WEIGHT: 285.3 LBS | HEART RATE: 74 BPM | SYSTOLIC BLOOD PRESSURE: 112 MMHG

## 2018-07-31 DIAGNOSIS — R30.0 DYSURIA: ICD-10-CM

## 2018-07-31 DIAGNOSIS — K76.6 PORTAL HYPERTENSION (H): ICD-10-CM

## 2018-07-31 DIAGNOSIS — I89.0 LYMPHEDEMA: Primary | ICD-10-CM

## 2018-07-31 DIAGNOSIS — I10 HYPERTENSION GOAL BP (BLOOD PRESSURE) < 130/80: ICD-10-CM

## 2018-07-31 DIAGNOSIS — E11.51 TYPE II DIABETES MELLITUS WITH PERIPHERAL CIRCULATORY DISORDER (H): ICD-10-CM

## 2018-07-31 DIAGNOSIS — K70.31 ALCOHOLIC CIRRHOSIS OF LIVER WITH ASCITES (H): ICD-10-CM

## 2018-07-31 LAB
ALBUMIN SERPL-MCNC: 2.6 G/DL (ref 3.4–5)
ALBUMIN UR-MCNC: NEGATIVE MG/DL
ALP SERPL-CCNC: 141 U/L (ref 40–150)
ALT SERPL W P-5'-P-CCNC: 23 U/L (ref 0–70)
AMMONIA PLAS-SCNC: 71 UMOL/L (ref 10–50)
ANION GAP SERPL CALCULATED.3IONS-SCNC: 8 MMOL/L (ref 3–14)
APPEARANCE UR: ABNORMAL
AST SERPL W P-5'-P-CCNC: 32 U/L (ref 0–45)
BACTERIA #/AREA URNS HPF: ABNORMAL /HPF
BILIRUB SERPL-MCNC: 1.1 MG/DL (ref 0.2–1.3)
BILIRUB UR QL STRIP: NEGATIVE
BUN SERPL-MCNC: 15 MG/DL (ref 7–30)
CALCIUM SERPL-MCNC: 8.1 MG/DL (ref 8.5–10.1)
CHLORIDE SERPL-SCNC: 104 MMOL/L (ref 94–109)
CO2 SERPL-SCNC: 24 MMOL/L (ref 20–32)
COLOR UR AUTO: ABNORMAL
CREAT SERPL-MCNC: 0.87 MG/DL (ref 0.66–1.25)
ERYTHROCYTE [DISTWIDTH] IN BLOOD BY AUTOMATED COUNT: 15.8 % (ref 10–15)
GFR SERPL CREATININE-BSD FRML MDRD: 88 ML/MIN/1.7M2
GLUCOSE SERPL-MCNC: 101 MG/DL (ref 70–99)
GLUCOSE UR STRIP-MCNC: NEGATIVE MG/DL
HCT VFR BLD AUTO: 29.3 % (ref 40–53)
HGB BLD-MCNC: 9.3 G/DL (ref 13.3–17.7)
HGB UR QL STRIP: NEGATIVE
HYALINE CASTS #/AREA URNS LPF: 4 /LPF (ref 0–2)
KETONES UR STRIP-MCNC: NEGATIVE MG/DL
LEUKOCYTE ESTERASE UR QL STRIP: ABNORMAL
MCH RBC QN AUTO: 30.6 PG (ref 26.5–33)
MCHC RBC AUTO-ENTMCNC: 31.7 G/DL (ref 31.5–36.5)
MCV RBC AUTO: 96 FL (ref 78–100)
MUCOUS THREADS #/AREA URNS LPF: PRESENT /LPF
NITRATE UR QL: NEGATIVE
PH UR STRIP: 6 PH (ref 5–7)
PLATELET # BLD AUTO: 77 10E9/L (ref 150–450)
POTASSIUM SERPL-SCNC: 4 MMOL/L (ref 3.4–5.3)
PROT SERPL-MCNC: 6.7 G/DL (ref 6.8–8.8)
RBC # BLD AUTO: 3.04 10E12/L (ref 4.4–5.9)
RBC #/AREA URNS AUTO: 1 /HPF (ref 0–2)
SODIUM SERPL-SCNC: 136 MMOL/L (ref 133–144)
SOURCE: ABNORMAL
SP GR UR STRIP: 1.02 (ref 1–1.03)
SPERM #/AREA URNS HPF: PRESENT /HPF
UROBILINOGEN UR STRIP-MCNC: 4 MG/DL (ref 0–2)
WBC # BLD AUTO: 2.9 10E9/L (ref 4–11)
WBC #/AREA URNS AUTO: 73 /HPF (ref 0–5)

## 2018-07-31 RX ORDER — OXYCODONE HYDROCHLORIDE 5 MG/1
TABLET ORAL
Qty: 180 TABLET | Refills: 0 | Status: SHIPPED | OUTPATIENT
Start: 2018-08-03 | End: 2018-08-23

## 2018-07-31 ASSESSMENT — PAIN SCALES - GENERAL: PAINLEVEL: MODERATE PAIN (5)

## 2018-07-31 NOTE — PROGRESS NOTES
"[facility]   Ary Murphy, APRN CNP  07/31/2018      Chief Complaint:   Orders; Edema; and Callouses    History of Present Illness:   Lawrence Louie is a 62 year old male with a history of liver cirrhosis s/p TIPS (10/2017), esophogeal varices, ascites, hepatic encephalopathy, HLD, tobacco use, DM II, MDD, thrombocytopenia p/w severe leg edema w/ weeping lesions  who presents for a follow up.     Edema:  The patient has increased lymphedema in his leg--he has gained about eight pounds--and is not getting his legs massaged and wrapped by a nurse any longer. He has been taking his Lasix, but only once per day rather than three times per day because it makes him urinate too much. He takes the first dose in the morning, around 1100 or 1200. The patient has a prescription for FlexiTouch, but is switching to BioTab compression garments instead, as this latter choice was recommended to him by a cardiologist who specialized in lymphedema. He is wondering if these can be written for in clinic today as well, just to make sure that the order for the BioTab pneumatic pump goes through, rather than the Flexitouch. Lawrence reports that he was feeling better for a while, but is not any longer as the fluid has built up significantly in his bilateral legs. He is able to get his feet up throughout the day.    His wife has found a naturopathic healer on line from [employer], who recommends a lot of natural diuretics and \"blood \" including burdock root. Lawrence' wife bought him Burdock root to start taking, which she states is a natural diuretic. Moreover, this naturopathic healer has a lot of exercises and massages for lymphedema that the patient and his wife are going to try.     Fatigue:  The patient has been very tired recently, and wondering why. His wife states that he has not been taking the lactulose--he has it at home and is on his medication list--but he has not been taking " "it. The patient states that this is \"because it tastes nasty.\"     Blood work:  His wife states that she and Lawrence have been thinking that he should have blood work done soon, as the patient has not been here for paracentesis for three months. He was told to return for paracentesis when he became uncomfortable--the last time they tried to do it, there was not enough fluid to take off, which is why they switched to this regimen from the once monthly. He has gained about eight pounds since he was last in, and the patient confirms that this is all fluid in his legs.     Calluses:  Lawrence has a callus on his foot that he would like to have \"zapped\" at the recommendation of the physical therapist. He also has a bad spot on his left heel, where he \"feels like he is walking on a nail\" and is wondering if \"this can be zapped\" too. His wife notes that his plantar fascia are also inflamed--his left foot is very tender to the touch. He has had an MRI on the foot, which returned unremarkable and now the patient is wondering what could be causing his pain.     Review of Systems:   Pertinent items are noted in HPI, remainder of complete ROS is negative.      Active Medications:      calcium carbonate (OS-ERIN 500 MG [place]. [place]) 1250 MG tablet, Take 1 tablet by mouth daily , Disp: , Rfl:      cephALEXin (KEFLEX) 500 MG capsule, , Disp: , Rfl: 0     citalopram (CELEXA) 40 MG tablet, Take 1 tablet (40 mg) by mouth daily, Disp: 30 tablet, Rfl: 3     desvenlafaxine succinate (PRISTIQ) 100 MG 24 hr tablet, Take 2 tablets (200 mg) by mouth daily, Disp: 180 tablet, Rfl: 1     doxycycline (VIBRAMYCIN) 100 MG capsule, Take 100 mg by mouth 2 times daily, Disp: , Rfl:      furosemide (LASIX) 40 MG tablet, Take 3 tablets twice a day., Disp: 540 tablet, Rfl: 1     glipiZIDE (GLUCOTROL XL) 5 MG 24 hr tablet, Take 1 tablet (5 mg) by mouth daily, Disp: 180 tablet, Rfl: 0     insulin aspart (NOVOLOG FLEXPEN) 100 UNIT/ML injection, 20 units before " breakfast, 20 units before lunch, 20 units before dinner, 20 units before snacks, Disp: 30 mL, Rfl: 3     insulin glargine (LANTUS SOLOSTAR) 100 UNIT/ML pen, Inject 30 Units Subcutaneous daily, Disp: , Rfl:      insulin glargine (LANTUS SOLOSTAR) 100 UNIT/ML pen, Inject 30 Units Subcutaneous every morning, Disp: 12 mL, Rfl: 3     magnesium oxide (MAG-OX) 400 (241.3 MG) MG tablet, Take 1 tablet (400 mg) by mouth daily, Disp: 90 tablet, Rfl: 3     ondansetron (ZOFRAN) 4 MG tablet, Take 1 tablet (4 mg) by mouth every 8 hours as needed for nausea, Disp: 18 tablet, Rfl: 1     oxyCODONE IR (ROXICODONE) 5 MG tablet, Take 1-2 tablet every 8 hours as needed, Disp: 180 tablet, Rfl: 0     OYSTER SHELL CALCIUM/D 500-200 MG-UNIT per tablet, Take 1 tablet by mouth daily, Disp: 90 tablet, Rfl: 3     ranitidine (ZANTAC) 150 MG tablet, Take 1 tablet (150 mg) by mouth 2 times daily, Disp: 180 tablet, Rfl: 3     rifaximin (XIFAXAN) 550 MG TABS tablet, Take 1 tablet (550 mg) by mouth 2 times daily, Disp: 60 tablet, Rfl: 11     spironolactone (ALDACTONE) 50 MG tablet, Takes 3 tablets (150 mg) every AM and take 2 tablets (100 mg) every PM., Disp: 150 tablet, Rfl: 11     STATIN NOT PRESCRIBED, INTENTIONAL,, 1 each daily Statin not prescribed intentionally due to Active liver disease, Disp: 0 each, Rfl: 0     Cholecalciferol (VITAMIN D3) 2000 UNITS CAPS, Take 1 capsule by mouth daily, Disp: 90 capsule, Rfl: 3     Cyanocobalamin (VITAMIN B-12 PO), Take 1 tablet by mouth daily, Disp: , Rfl:      lactulose (CHRONULAC) 10 GM/15ML solution, Take 10 g by mouth, Disp: , Rfl:      UNABLE TO FIND, MEDICATION NAME: [place] root, Disp: , Rfl:      Allergies:   Review of patient's allergies indicates no known allergies.      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  Splenomegaly   Thrombocytopenia  Varices,  esophageal   Mild major depression  Plantar warts  Corns and callosities  Advanced directives, counseling/discussion  Ascites   Hyperlipidemia    Multiple rib fractures   Prurigo nodularis  Dental caries  Tobacco use disorder  Morbid obesity  Esophageal reflux  Lymphedema of both lower extremities   Hepatic encephalopathy      Past Surgical History:  Biopsy of skin lesion  Colonoscopy  EGD Combined x3  Excise lesion trunk  Vasectomy  Herniorrhaphy umbilical     Family History:   Breast cancer - Mother, Sister  Liver cancer - Mother   Cardiovascular - Father  Cerebrovascular disease, hypotension - Father   Rectal cancer - Father   Cardiovascular - Paternal grandfather  Skin cancer - Sister      Social History:   The patient was accompanied to the appointment by: his wife.  Smoking Status: Current every day smoker 0.3 PPD   Smokeless Tobacco: Never   Alcohol Use: No    Marital Status:       Physical Exam:   /52  Pulse 74  Wt 129.4 kg (285 lb 4.8 oz)  SpO2 98%  BMI 36.63 kg/m2     Wt Readings from Last 1 Encounters:   07/31/18 129.4 kg (285 lb 4.8 oz)     Constitutional: no distress, comfortable, pleasant   Eyes: anicteric  Cardiovascular: regular rate and rhythm, normal S1 and S2, no murmurs, rubs or gallops, peripheral pulses full and symmetric   Respiratory: clear to auscultation, no wheezes or crackles, normal breath sounds   Gastrointestinal: positive bowel sounds, nontender, no hepatosplenomegaly, no masses   Musculoskeletal: full range of motion, no edema   Skin: no concerning lesions, no jaundice, temp normal   Neurological: cranial nerves intact, normal strength and sensation, reflexes at patella and biceps normal, normal gait, normal speech, no tremor. A and O x 3, good historian.  Psychological: appropriate mood, good eye contact, normal affect   Lymph: no axillary, cervical,  supraclavicular, infraclavicular or inguinal nodes.      Assessment and Plan:  Lymphedema  A prescription for the  BioTab pneumatic pump was written for the patient today, and this will be printed for the patient today to give to the Bio[employer] company, who has already measured the patient for sizing.   - order for DME  Dispense: 1 Piece; Refill: 0    Hypertension goal BP (blood pressure) < 130/80  We will order blood work at the request of the patient, as he has not had these checked for some time. These results will be communicated to him via [employer].  - Comprehensive metabolic panel    Portal hypertension (H)  We will order blood work at the request of the patient, as he has not had these checked for some time. These results will be communicated to him via [employer].  - CBC with platelets    Alcoholic cirrhosis of liver with ascites (H)  We will order blood work at the request of the patient, as he has not had these checked for some time. These results will be communicated to him via [employer]. His Oxycodone will be refilled, predated for August 3rd, as he gets this filled monthly. He needs a prior authorization, so he will drop the prescription off today so that it can be filled in a timely manner. He will take his Lasix three times per day when he is home and not going out, but will take it once a day when he has to go out of his house.   - Ammonia  - oxyCODONE IR (ROXICODONE) 5 MG tablet  Dispense: 180 tablet; Refill: 0    Type II diabetes mellitus with peripheral circulatory disorder (H)  The patient's calluses and concerning foot spots, including his left heel, were frozen with a Cryo gun today, at his request. The spots on his left heel may be plantar warts, and we will continue to monitor these and follow up the next time he is in clinic.    Dysuria  We will order urinalysis at the request of the patient, as he has not had these checked for some time. These results will be communicated to him via [employer].  - UA with Micro reflex to Culture     Results for ANTOINETTE REN (MRN [id number]) as of 8/2/2018 18:28   Ref. Range  7/31/2018 12:49 7/31/2018 13:08   Sodium Latest Ref Range: 133 - 144 mmol/L 136    Potassium Latest Ref Range: 3.4 - 5.3 mmol/L 4.0    Chloride Latest Ref Range: 94 - 109 mmol/L 104    Carbon Dioxide Latest Ref Range: 20 - 32 mmol/L 24    Urea Nitrogen Latest Ref Range: 7 - 30 mg/dL 15    Creatinine Latest Ref Range: 0.66 - 1.25 mg/dL 0.87    GFR Estimate Latest Ref Range: >60 mL/min/1.7m2 88    GFR Estimate If Black Latest Ref Range: >60 mL/min/1.7m2 >90    Calcium Latest Ref Range: 8.5 - 10.1 mg/dL 8.1 (L)    Anion Gap Latest Ref Range: 3 - 14 mmol/L 8    Albumin Latest Ref Range: 3.4 - 5.0 g/dL 2.6 (L)    Protein Total Latest Ref Range: 6.8 - 8.8 g/dL 6.7 (L)    Bilirubin Total Latest Ref Range: 0.2 - 1.3 mg/dL 1.1    Alkaline Phosphatase Latest Ref Range: 40 - 150 U/L 141    ALT Latest Ref Range: 0 - 70 U/L 23    AST Latest Ref Range: 0 - 45 U/L 32    Ammonia Latest Ref Range: 10 - 50 umol/L 71 (H)    Glucose Latest Ref Range: 70 - 99 mg/dL 101 (H)    WBC Latest Ref Range: 4.0 - 11.0 10e9/L 2.9 (L)    Hemoglobin Latest Ref Range: 13.3 - 17.7 g/dL 9.3 (L)    Hematocrit Latest Ref Range: 40.0 - 53.0 % 29.3 (L)    Platelet Count Latest Ref Range: 150 - 450 10e9/L 77 (L)    RBC Count Latest Ref Range: 4.4 - 5.9 10e12/L 3.04 (L)    MCV Latest Ref Range: 78 - 100 fl 96    MCH Latest Ref Range: 26.5 - 33.0 pg 30.6    MCHC Latest Ref Range: 31.5 - 36.5 g/dL 31.7    RDW Latest Ref Range: 10.0 - 15.0 % 15.8 (H)    Color Urine Unknown  Carnie   Appearance Urine Unknown  Slightly Cloudy   Glucose Urine Latest Ref Range: NEG^Negative mg/dL  Negative   Bilirubin Urine Latest Ref Range: NEG^Negative   Negative   Ketones Urine Latest Ref Range: NEG^Negative mg/dL  Negative   Specific Gravity Urine Latest Ref Range: 1.003 - 1.035   1.018   pH Urine Latest Ref Range: 5.0 - 7.0 pH  6.0   Protein Albumin Urine Latest Ref Range: NEG^Negative mg/dL  Negative   Urobilinogen mg/dL Latest Ref Range: 0.0 - 2.0 mg/dL  4.0 (H)   Nitrite  Urine Latest Ref Range: NEG^Negative   Negative   Blood Urine Latest Ref Range: NEG^Negative   Negative   Leukocyte Esterase Urine Latest Ref Range: NEG^Negative   Small (A)   Source Unknown  Midstream Urine   WBC Urine Latest Ref Range: 0 - 5 /HPF  73 (H)   RBC Urine Latest Ref Range: 0 - 2 /HPF  1   Bacteria Urine Latest Ref Range: NEG^Negative /HPF  Many (A)   sperm Latest Ref Range: NEG^Negative /HPF  Present (A)   Mucous Urine Latest Ref Range: NEG^Negative /LPF  Present (A)   Hyaline Casts Latest Ref Range: 0 - 2 /LPF  4 (H)   Specimen Description Unknown  Midstream Urine   Culture Micro Unknown  >100,000 colonies... (A)   URINE CULTURE AEROBIC BACTERIAL Unknown  Rpt (A)         Will treat for UTI.    Scribe Disclosure:   I, Hui Vega, am serving as a scribe to document services personally performed by CORY Frye CNP at this visit, based upon the provider's statements to me. All documentation has been reviewed by the aforementioned provider prior to being entered into the official medical record.     Portions of this medical record were completed by a scribe. UPON MY REVIEW AND AUTHENTICATION BY ELECTRONIC SIGNATURE, this confirms (a) I performed the applicable clinical services, and (b) the record is accurate.   Total time spent 25 minutes.  More than 50% of the time spent with Mr. Louie on counseling / coordinating his care.  Ary RAY, MAGDALENA

## 2018-08-02 DIAGNOSIS — R30.0 DYSURIA: Primary | ICD-10-CM

## 2018-08-02 LAB
BACTERIA SPEC CULT: ABNORMAL
Lab: ABNORMAL
SPECIMEN SOURCE: ABNORMAL

## 2018-08-02 RX ORDER — CIPROFLOXACIN 500 MG/1
500 TABLET, FILM COATED ORAL 2 TIMES DAILY
Qty: 14 TABLET | Refills: 0 | Status: SHIPPED | OUTPATIENT
Start: 2018-08-02 | End: 2018-08-23

## 2018-08-15 DIAGNOSIS — F32.0 MILD MAJOR DEPRESSION (H): ICD-10-CM

## 2018-08-16 ENCOUNTER — MYC MEDICAL ADVICE (OUTPATIENT)
Dept: INTERNAL MEDICINE | Facility: CLINIC | Age: 63
End: 2018-08-16

## 2018-08-16 RX ORDER — ACETAMINOPHEN 160 MG
1 TABLET,DISINTEGRATING ORAL DAILY
Qty: 90 CAPSULE | Refills: 3 | Status: SHIPPED | OUTPATIENT
Start: 2018-08-16 | End: 2019-06-12

## 2018-08-19 ENCOUNTER — MYC MEDICAL ADVICE (OUTPATIENT)
Dept: INTERNAL MEDICINE | Facility: CLINIC | Age: 63
End: 2018-08-19

## 2018-08-22 ENCOUNTER — MYC MEDICAL ADVICE (OUTPATIENT)
Dept: INTERNAL MEDICINE | Facility: CLINIC | Age: 63
End: 2018-08-22

## 2018-08-22 DIAGNOSIS — I89.0 LYMPHEDEMA OF BOTH LOWER EXTREMITIES: Primary | ICD-10-CM

## 2018-08-23 ENCOUNTER — TELEPHONE (OUTPATIENT)
Dept: INTERNAL MEDICINE | Facility: CLINIC | Age: 63
End: 2018-08-23

## 2018-08-23 ENCOUNTER — MEDICAL CORRESPONDENCE (OUTPATIENT)
Dept: HEALTH INFORMATION MANAGEMENT | Facility: CLINIC | Age: 63
End: 2018-08-23

## 2018-08-23 DIAGNOSIS — K70.31 ALCOHOLIC CIRRHOSIS OF LIVER WITH ASCITES (H): ICD-10-CM

## 2018-08-23 DIAGNOSIS — R30.0 DYSURIA: ICD-10-CM

## 2018-08-23 RX ORDER — CIPROFLOXACIN 500 MG/1
500 TABLET, FILM COATED ORAL 2 TIMES DAILY
Qty: 10 TABLET | Refills: 0 | Status: SHIPPED | OUTPATIENT
Start: 2018-08-23 | End: 2018-09-25

## 2018-08-23 RX ORDER — OXYCODONE HYDROCHLORIDE 5 MG/1
TABLET ORAL
Qty: 180 TABLET | Refills: 0 | Status: SHIPPED | OUTPATIENT
Start: 2018-09-02 | End: 2018-09-25

## 2018-08-23 NOTE — TELEPHONE ENCOUNTER
Spoke to patients wife, informed patient that an Rx was sent to pharmacy for 5 days of Cipro. Magdalena Mott LPN 8/23/2018 1:48 PM

## 2018-08-23 NOTE — TELEPHONE ENCOUNTER
[facility] Call Center    Phone Message    May a detailed message be left on voicemail: yes    Reason for Call: Patient's wife is calling stating she received a message stating patient had a urine test and was told he needs to set up an appointment for re evaluation. Ary Murphy is not available until 9/4. Patient doesn't want another provider. Please follow up with patient's wife what to do.     Action Taken: Message routed to:  [facility]

## 2018-08-23 NOTE — TELEPHONE ENCOUNTER
Addressed this message in another ZALORA message that was sent.  See message sent on 8/22/18. Magdalena Mott LPN 8/23/2018 9:24 AM

## 2018-08-23 NOTE — TELEPHONE ENCOUNTER
"Patients wife would like medical supplies, as well as a hospital bed for patient. DME pended for medical supplies. Please advise on hospital bed. Magdalena Mott LPN 8/23/2018 9:17 AM    Mychart message from Patient:  I would like you to fax a prescription for bandaging supplies to [facility].   The items are:                                       QUANTITY    SILVIO [id number] GAUZE ROLL.  4×4.1              #96             SILVIO [id number]  ABD PAD 5\"×9\"                       #180    3M 1533-1. TAPE MICRO PORE. 1×10yd.  #12     Thank you very much     [phone]     INSURANCE WILL COVER THESE.     SHIP TO: MARTIN REN                     [facility]                      [place] [id number]     Magdalena Mott LPN 8/23/2018 9:20 AM    "

## 2018-08-23 NOTE — TELEPHONE ENCOUNTER
Reason For Call:   Chief Complaint   Patient presents with     Opioid Refill       Medication Name, Dose and Monthly Quantity:   oxyCODONE IR (ROXICODONE) 5 MG tablet, 180 tabs    Diagnosis requiring opiates:   Alcoholic cirrhosis of liver with ascites (H) [K70.31]     Problem List Updated:   no    Opioid Agreement On File - [facility] PAIN CONTRACT ID# [id number]:  no    Last Urine Drug Screen (at least once every 12 months) Date:   none    Unexpected Results:   na    [place]  Data Reviewed (at least once every 3 months) Date:   5/29/18  Unexpected Results:    no    Last Fill Date:   8/3/18    Due Date:   9/2/18/18    Last Visit with PCP:   7/31/18    Future Visits with PCP:   none    Processing:   Mail to pharmacy    Hipolito-Alize Ponce RN

## 2018-08-23 NOTE — TELEPHONE ENCOUNTER
Health Call Center    Phone Message    May a detailed message be left on voicemail: yes    Reason for Call: Other: 2ND REQUEST TODAY: pt's wife, Flores, reported that Pt has been treated for a UTI, he took his Rx for the full 7 days, but he still has condition.  Flores is requesting to know IF he could be seen today by Ary Murphy OR go to lab and drop off urine sample?  Please call wife, Flores at [phone]. Thank you.     Action Taken: Message routed to:  [facility]

## 2018-08-23 NOTE — TELEPHONE ENCOUNTER
Spoke to patients wife and states that the patient states having burning occasionally when urinating but every time. Patients wife is wondering if Ary would prescribe another round of medication. Magdalena Mott LPN 8/23/2018 1:36 PM

## 2018-08-30 ENCOUNTER — TRANSFERRED RECORDS (OUTPATIENT)
Dept: HEALTH INFORMATION MANAGEMENT | Facility: CLINIC | Age: 63
End: 2018-08-30

## 2018-09-13 ENCOUNTER — OFFICE VISIT (OUTPATIENT)
Dept: INTERNAL MEDICINE | Facility: CLINIC | Age: 63
End: 2018-09-13
Payer: COMMERCIAL

## 2018-09-13 VITALS
SYSTOLIC BLOOD PRESSURE: 119 MMHG | HEART RATE: 73 BPM | BODY MASS INDEX: 36.49 KG/M2 | WEIGHT: 284.2 LBS | DIASTOLIC BLOOD PRESSURE: 64 MMHG | OXYGEN SATURATION: 99 %

## 2018-09-13 DIAGNOSIS — B07.0 PLANTAR WARTS: ICD-10-CM

## 2018-09-13 DIAGNOSIS — E11.51 TYPE II DIABETES MELLITUS WITH PERIPHERAL CIRCULATORY DISORDER (H): ICD-10-CM

## 2018-09-13 DIAGNOSIS — K70.31 ALCOHOLIC CIRRHOSIS OF LIVER WITH ASCITES (H): ICD-10-CM

## 2018-09-13 DIAGNOSIS — R30.0 DYSURIA: Primary | ICD-10-CM

## 2018-09-13 DIAGNOSIS — R30.0 DYSURIA: ICD-10-CM

## 2018-09-13 DIAGNOSIS — B07.0 VERRUCA PEDIS: ICD-10-CM

## 2018-09-13 LAB
ALBUMIN UR-MCNC: NEGATIVE MG/DL
APPEARANCE UR: CLEAR
BILIRUB UR QL STRIP: NEGATIVE
COLOR UR AUTO: YELLOW
CREAT UR-MCNC: 101 MG/DL
GLUCOSE UR STRIP-MCNC: NEGATIVE MG/DL
HGB UR QL STRIP: NEGATIVE
HYALINE CASTS #/AREA URNS LPF: 1 /LPF (ref 0–2)
KETONES UR STRIP-MCNC: NEGATIVE MG/DL
LEUKOCYTE ESTERASE UR QL STRIP: NEGATIVE
MICROALBUMIN UR-MCNC: 6 MG/L
MICROALBUMIN/CREAT UR: 5.56 MG/G CR (ref 0–17)
MUCOUS THREADS #/AREA URNS LPF: PRESENT /LPF
NITRATE UR QL: NEGATIVE
PH UR STRIP: 5 PH (ref 5–7)
RBC #/AREA URNS AUTO: 0 /HPF (ref 0–2)
SOURCE: ABNORMAL
SP GR UR STRIP: 1.02 (ref 1–1.03)
UROBILINOGEN UR STRIP-MCNC: 4 MG/DL (ref 0–2)
WBC #/AREA URNS AUTO: 1 /HPF (ref 0–5)

## 2018-09-13 ASSESSMENT — PAIN SCALES - GENERAL: PAINLEVEL: SEVERE PAIN (7)

## 2018-09-13 NOTE — MR AVS SNAPSHOT
After Visit Summary   9/13/2018    Lawrence Louie    MRN: [id number]           Patient Information     Date Of Birth          1955        Visit Information        Provider Department      9/13/2018 3:30 PM Ary Murphy, APRN CNP [facility]        Today's Diagnoses     Dysuria    -  1    Alcoholic cirrhosis of liver with ascites (H)          Care Instructions    Primary Care Center Medication Refill Request Information:  * Please contact your pharmacy regarding ANY request for medication refills.  ** [facility] Prescription Fax = [phone]  * Please allow 3 business days for routine medication refills.  * Please allow 5 business days for controlled substance medication refills.     Primary Care Center Test Result notification information:  *You will be notified with in 7-10 days of your appointment day regarding the results of your test.  If you are on MyChart you will be notified as soon as the provider has reviewed the results and signed off on them.    Primary Care Center: [phone]     Please go to the lab on the first floor before you leave today.                 Follow-ups after your visit        Your next 10 appointments already scheduled     Oct 26, 2018 11:00 AM CDT   (Arrive by 10:45 AM)   Return General Liver with Meghna Simmons MD   [facility] ([facility])    [address] [place] [address]  [address] [place] [id number] [phone]              Future tests that were ordered for you today     Open Future Orders        Priority Expected Expires Ordered    Ammonia Routine  9/14/2019 9/13/2018    Comprehensive metabolic panel Routine 9/13/2018 9/27/2018 9/13/2018    UA with Micro (No Culture) Routine 9/13/2018 9/13/2019 9/13/2018    Urine Culture Routine 9/13/2018 9/13/2019 9/13/2018            Who to contact     Please call your clinic at [phone] to:    Ask questions about your health    Make or cancel  appointments    Discuss your medicines    Learn about your test results    Speak to your doctor            Additional Information About Your Visit        [facility] Information     [facility] gives you secure access to your electronic health record. If you see a primary care provider, you can also send messages to your care team and make appointments. If you have questions, please call your primary care clinic.  If you do not have a primary care provider, please call [phone] and they will assist you.      [facility] is an electronic gateway that provides easy, online access to your medical records. With [facility], you can request a clinic appointment, read your test results, renew a prescription or communicate with your care team.     To access your existing account, please contact your [facility] or call [phone] for assistance.        Care EveryWhere ID     This is your Care EveryWhere ID. This could be used by other organizations to access your [place] medical records  [id number]        Your Vitals Were     Pulse Pulse Oximetry BMI (Body Mass Index)             73 99% 36.49 kg/m2          Blood Pressure from Last 3 Encounters:   09/13/18 119/64   07/31/18 112/52   06/12/18 123/61    Weight from Last 3 Encounters:   09/13/18 128.9 kg (284 lb 3.2 oz)   07/31/18 129.4 kg (285 lb 4.8 oz)   06/29/18 127.5 kg (281 lb)               Primary Care Provider Office Phone # Fax #    Ary Murphy, APRN [phone]       [address]  [facility] [address]        Equal Access to Services     JOSEF LORENZO : Hadii aad ku hadasho Soomaali, waaxda luqadaha, qaybta kaalmada adeegyada, silvia richardson . So [facility] [phone].    ATENCIÓN: Si habla español, tiene a rondon disposición servicios gratuitos de asistencia lingüística. Llame al [phone].    We comply with applicable federal civil rights laws and [place] laws. We do not discriminate  on the basis of race, color, national origin, age, disability, sex, sexual orientation, or gender identity.            Thank you!     Thank you for choosing [facility]  for your care. Our goal is always to provide you with excellent care. Hearing back from our patients is one way we can continue to improve our services. Please take a few minutes to complete the written survey that you may receive in the mail after your visit with us. Thank you!             Your Updated Medication List - Protect others around you: Learn how to safely use, store and throw away your medicines at www.disposemymeds.org.          This list is accurate as of 9/13/18  4:20 PM.  Always use your most recent med list.                   Brand Name Dispense Instructions for use Diagnosis    blood glucose lancets standard    no brand specified    1 Box    Use to test blood sugar 4 X  times daily or as directed.    Diabetes mellitus, type 2 (H)       blood glucose monitoring meter device kit    no brand specified    1 kit    Use to test blood sugar 4 X  times daily or as directed.    Type 2 diabetes mellitus with other specified complication (H)       blood glucose monitoring test strip    no brand specified    200 strip    Use to test blood sugars 4 X  times daily or as directed    Diabetes mellitus, type 2 (H)       calcium carbonate 500 mg {elemental} 500 MG tablet    OS-ERIN     Take 1 tablet by mouth daily        calcium carbonate 500 mg-vitamin D 200 units 500-200 MG-UNIT per tablet    OSCAL with D;OYSTER SHELL CALCIUM    90 tablet    Take 1 tablet by mouth daily    Alcoholic cirrhosis (H)       cephALEXin 500 MG capsule    KEFLEX          ciprofloxacin 500 MG tablet    CIPRO    10 tablet    Take 1 tablet (500 mg) by mouth 2 times daily    Dysuria       citalopram 40 MG tablet    celeXA    30 tablet    Take 1 tablet (40 mg) by mouth daily    Recurrent major depressive disorder, remission status unspecified (H)        desvenlafaxine succinate 100 MG 24 hr tablet    PRISTIQ    180 tablet    Take 2 tablets (200 mg) by mouth daily    Other depression       doxycycline 100 MG capsule    VIBRAMYCIN     Take 100 mg by mouth 2 times daily        furosemide 40 MG tablet    LASIX    540 tablet    Take 3 tablets twice a day.    Edema of both legs       glipiZIDE 5 MG 24 hr tablet    GLUCOTROL XL    180 tablet    Take 1 tablet (5 mg) by mouth daily    Type 2 diabetes mellitus without complication, with long-term current use of insulin (H)       insulin aspart 100 UNIT/ML injection    NovoLOG FLEXPEN    30 mL    20 units before breakfast, 20 units before lunch, 20 units before dinner, 20 units before snacks    Type 2 diabetes mellitus without complication, with long-term current use of insulin (H)       * insulin glargine 100 UNIT/ML injection    LANTUS     Inject 30 Units Subcutaneous daily        * insulin glargine 100 UNIT/ML injection    LANTUS    12 mL    Inject 30 Units Subcutaneous every morning    Type 2 diabetes mellitus with diabetic neuropathic arthropathy, without long-term current use of insulin (H)       insulin pen needle 31G X 8 MM    B-D U/F    300 each    USE  6 times daily / OR AS DIRECTED    Type 2 diabetes, HbA1c goal < 7% (H)       lactulose 10 GM/15ML solution    CHRONULAC     Take 10 g by mouth        magnesium oxide 400 (241.3 Mg) MG tablet    MAG-OX    90 tablet    Take 1 tablet (400 mg) by mouth daily    Cramp of limb       ondansetron 4 MG tablet    ZOFRAN    18 tablet    Take 1 tablet (4 mg) by mouth every 8 hours as needed for nausea    Alcoholic cirrhosis of liver with ascites (H), Nausea       order for DME     2 pump    Equipment being ordered:bilateral pneumatic leg massage for treatment of extreme bilateral lower leg edema.    Localized edema       order for DME     1 Piece    Equipment being ordered:BioTab compression pants pneumatic system    Lymphedema       oxyCODONE IR 5 MG tablet    ROXICODONE    180  tablet    Take 1-2 tablet every 8 hours as needed    Alcoholic cirrhosis of liver with ascites (H)       ranitidine 150 MG tablet    ZANTAC    180 tablet    Take 1 tablet (150 mg) by mouth 2 times daily    Esophageal varices (H)       rifaximin 550 MG Tabs tablet    XIFAXAN    60 tablet    Take 1 tablet (550 mg) by mouth 2 times daily    Hepatic encephalopathy (H)       spironolactone 50 MG tablet    ALDACTONE    150 tablet    Takes 3 tablets (150 mg) every AM and take 2 tablets (100 mg) every PM.    Portal hypertension (H), Generalized edema       STATIN NOT PRESCRIBED (INTENTIONAL)     0 each    1 each daily Statin not prescribed intentionally due to Active liver disease    Hyperlipidemia LDL goal <100       UNABLE TO FIND      MEDICATION NAME: Burdock root        VITAMIN B-12 PO      Take 1 tablet by mouth daily        vitamin D3 2000 units Caps     90 capsule    Take 1 capsule by mouth daily    Mild major depression (H)       * Notice:  This list has 2 medication(s) that are the same as other medications prescribed for you. Read the directions carefully, and ask your doctor or other care provider to review them with you.

## 2018-09-13 NOTE — PROGRESS NOTES
[facility]  Primary Care Center   Ary Murphy, APRN CNP  09/13/2018      Chief Complaint:   UTI and Derm Problem       History of Present Illness:   Lawrence Louie is a 62 year old male  accompanied by his wife.   Patient Active Problem List   Diagnosis     Mild major depression (H)     Thrombocytopenia (H)     Hypertension goal BP (blood pressure) < 130/80     Plantar warts     Corns and callosities     Family history of colon cancer     Type 2 diabetes, HbA1c goal < 7% (H)     Portal hypertension (H)     Alcoholic cirrhosis (H)     Advanced directives, counseling/discussion     Ascites     Esophageal varices (H)     Multiple rib fractures     Tobacco use disorder     Hyperlipidemia LDL goal <100     Dental caries     Prurigo nodularis     Esophageal reflux     Morbid obesity (H)     History of colonic polyps: tubular adenomas and serrated adenomas     Type II diabetes mellitus with peripheral circulatory disorder (H)     Alcoholic cirrhosis of liver with ascites (H)     Hepatic encephalopathy (H)     Lymphedema of both lower extremities     His wife would like to assess whether Lawrence's UTI has resolved. She reports that Lawrence has completed 2 courses of Cipro, but has still expressed discomfort and pain when urinating, as well as increased confusion. He is sleeping more than usual.  Lawrence's wife adds that Lawrence does not drink as much water as he should.     Lawrence's wife reports that Lawrence's heel pain has improved since he had LN2 tx to a lesion on his heel and they are requesting a repeat treatment today. katherin has a wart-like growth on the top of his right foot, which occasionally bleeds. He received LN2 tx for this and it shrunk the lesion.  Requesting another treatment with LN2 today.    Other concerns discussed:  Lawrence recently received a compression garment to manage the swelling in his legs caused by his fluid retention, which his wife reports has been working very well, despite that he has  only had them for a week. She adds that the compression garment does make him have to urinate more. Lawrence's wife also reports that his blood sugars have been good lately, but that there have been rare instances of low blood sugar.      Review of Systems:   Pertinent items are noted in HPI, remainder of complete ROS is negative.      Active Medications:   Current Outpatient Prescriptions:      blood glucose (NO BRAND SPECIFIED) lancets standard, Use to test blood sugar 4 X  times daily or as directed., Disp: 1 Box, Rfl: 3     blood glucose monitoring (NO BRAND SPECIFIED) meter device kit, Use to test blood sugar 4 X  times daily or as directed., Disp: 1 kit, Rfl: 0     blood glucose monitoring (NO BRAND SPECIFIED) test strip, Use to test blood sugars 4 X  times daily or as directed, Disp: 200 strip, Rfl: 3     calcium carbonate (OS-ERIN 500 MG [employer] [place] [place]) 1250 MG tablet, Take 1 tablet by mouth daily , Disp: , Rfl:      cephALEXin (KEFLEX) 500 MG capsule, , Disp: , Rfl: 0     Cholecalciferol (VITAMIN D3) 2000 units CAPS, Take 1 capsule by mouth daily, Disp: 90 capsule, Rfl: 3     citalopram (CELEXA) 40 MG tablet, Take 1 tablet (40 mg) by mouth daily, Disp: 30 tablet, Rfl: 3     Cyanocobalamin (VITAMIN B-12 PO), Take 1 tablet by mouth daily, Disp: , Rfl:      desvenlafaxine succinate (PRISTIQ) 100 MG 24 hr tablet, Take 2 tablets (200 mg) by mouth daily, Disp: 180 tablet, Rfl: 1     doxycycline (VIBRAMYCIN) 100 MG capsule, Take 100 mg by mouth 2 times daily, Disp: , Rfl:      furosemide (LASIX) 40 MG tablet, Take 3 tablets twice a day., Disp: 540 tablet, Rfl: 1     glipiZIDE (GLUCOTROL XL) 5 MG 24 hr tablet, Take 1 tablet (5 mg) by mouth daily, Disp: 180 tablet, Rfl: 0     insulin aspart (NOVOLOG FLEXPEN) 100 UNIT/ML injection, 20 units before breakfast, 20 units before lunch, 20 units before dinner, 20 units before snacks, Disp: 30 mL, Rfl: 3     insulin glargine (LANTUS SOLOSTAR) 100 UNIT/ML pen, Inject 30 Units  Subcutaneous daily, Disp: , Rfl:      insulin glargine (LANTUS SOLOSTAR) 100 UNIT/ML pen, Inject 30 Units Subcutaneous every morning, Disp: 12 mL, Rfl: 3     insulin pen needle (B-D U/F) 31G X 8 MM, USE  6 times daily / OR AS DIRECTED, Disp: 300 each, Rfl: 3     lactulose (CHRONULAC) 10 GM/15ML solution, Take 10 g by mouth, Disp: , Rfl:      magnesium oxide (MAG-OX) 400 (241.3 MG) MG tablet, Take 1 tablet (400 mg) by mouth daily, Disp: 90 tablet, Rfl: 3     ondansetron (ZOFRAN) 4 MG tablet, Take 1 tablet (4 mg) by mouth every 8 hours as needed for nausea, Disp: 18 tablet, Rfl: 1     order for DME, Equipment being ordered:BioTab compression pants pneumatic system, Disp: 1 Piece, Rfl: 0     order for DME, Equipment being ordered:bilateral pneumatic leg massage for treatment of extreme bilateral lower leg edema., Disp: 2 pump, Rfl: 0     oxyCODONE IR (ROXICODONE) 5 MG tablet, Take 1-2 tablet every 8 hours as needed, Disp: 180 tablet, Rfl: 0     OYSTER SHELL CALCIUM/D 500-200 MG-UNIT per tablet, Take 1 tablet by mouth daily, Disp: 90 tablet, Rfl: 3     ranitidine (ZANTAC) 150 MG tablet, Take 1 tablet (150 mg) by mouth 2 times daily, Disp: 180 tablet, Rfl: 3     rifaximin (XIFAXAN) 550 MG TABS tablet, Take 1 tablet (550 mg) by mouth 2 times daily, Disp: 60 tablet, Rfl: 11     spironolactone (ALDACTONE) 50 MG tablet, Takes 3 tablets (150 mg) every AM and take 2 tablets (100 mg) every PM., Disp: 150 tablet, Rfl: 11     STATIN NOT PRESCRIBED, INTENTIONAL,, 1 each daily Statin not prescribed intentionally due to Active liver disease, Disp: 0 each, Rfl: 0     UNABLE TO FIND, MEDICATION NAME: [place]ble root, Disp: , Rfl:      ciprofloxacin (CIPRO) 500 MG tablet, Take 1 tablet (500 mg) by mouth 2 times daily (Patient not taking: Reported on 9/13/2018), Disp: 10 tablet, Rfl: 0      Allergies:   Review of patient's allergies indicates no known allergies.      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  Alcoholic cirrhosis of liver with ascites  Hepatic encephelopathy  Hernia, umbilical  Kidney stones  Splenomegaly  History of colonic polyps, tubular adenomas and serrated adenomas  Squamous cell carcinoma  Prurigo nodularis  Major depression, mild  Plantar warts  Corns and callosities  Lymphedema of lower extremity, bilateral  Thrombocytopenia  Esophageal reflux  Esophageal varices  Multiple rib fractures  Dental caries     Past Surgical History:  Biopsy of skin lesion  Excise lesion trunk, 2012  Vasectomy  Herniorrhaphy umbilical, 2012    Family History:   Mother: Breast cancer, liver cancer  Father: Cardiovascular problem, cerebrovascular disease (low BP), rectal cancer  Brother: Diabetes  Sister: Skin cancer, breast cancer  Paternal grandfather: Cardiovascular problem      Social History:   Marital Status:   Presents to the clinic accompanied by his wife  Tobacco Use: Current every day cigarette smoker (0.3 packs/day), never used smokeless tobacco (comment: \"45 yr\")  Alcohol Use: None since December 2012 (before: 2-3 drinks/day)  PCP: Ary Murphy     Physical Exam:   /64 (BP Location: Right arm, Patient Position: Sitting)  Pulse 73  Wt 128.9 kg (284 lb 3.2 oz)  SpO2 99%  BMI 36.49 kg/m2   Wt Readings from Last 1 Encounters:   09/13/18 128.9 kg (284 lb 3.2 oz)     Constitutional: no distress, comfortable, pleasant   Eyes: anicteric.    Cardiovascular: regular rate and rhythm, normal S1 and S2, no murmurs, rubs or gallops, peripheral pulses full and symmetric His weight is not accurate as he is measured in his shoes and heavy leg wraps.  Respiratory: clear to auscultation, no wheezes or crackles, normal breath sounds   Gastrointestinal: positive bowel sounds, nontender, no hepatosplenomegaly, no " masses   Skin: His legs are wrapped. The bottom of his left heel displays a small firm lesion on the plantar heel.  This was treated with 3 applications of LN2 for 4 seconds.  The top of his right foot has a 2 mm raised firm flesh colored mass.  This was treated with 3 applications of LN2 for 4 seconds.  Neurological: normal speech, no tremor. A and O x 3.  Psychological: appropriate mood, good eye contact, normal affect      Assessment and Plan:  Dysuria  - UA with Micro (No Culture)  - Urine Culture    Alcoholic cirrhosis of liver with ascites (H)  - Ammonia  - Comprehensive metabolic panel         Scribe Disclosure:   I, Luz Ng, am serving as a scribe to document services personally performed by CORY Frye CNP at this visit, based upon the provider's statements to me. All documentation has been reviewed by the aforementioned provider prior to being entered into the official medical record.     Portions of this medical record were completed by a scribe. UPON MY REVIEW AND AUTHENTICATION BY ELECTRONIC SIGNATURE, this confirms (a) I performed the applicable clinical services, and (b) the record is accurate.   Total time spent 25 minutes.  More than 50% of the time spent with Mr. Louie on counseling / coordinating his care.        Ary RAY, MAGDALENA

## 2018-09-13 NOTE — NURSING NOTE
Chief Complaint   Patient presents with     UTI     pt feeling fatigued, concern for current infection- just finished 5 days of antibiotics      Derm Problem     pt here for freezing skin growths on feet      Rhonda Rivas at 3:44 PM on 9/13/2018.

## 2018-09-13 NOTE — PATIENT INSTRUCTIONS
[facility] Medication Refill Request Information:  * Please contact your pharmacy regarding ANY request for medication refills.  ** [facility] Prescription Fax = [phone]  * Please allow 3 business days for routine medication refills.  * Please allow 5 business days for controlled substance medication refills.     [facility] Test Result notification information:  *You will be notified with in 7-10 days of your appointment day regarding the results of your test.  If you are on MyChart you will be notified as soon as the provider has reviewed the results and signed off on them.    [facility]: [phone]     Please go to the lab on the first floor before you leave today.

## 2018-09-14 ENCOUNTER — MYC MEDICAL ADVICE (OUTPATIENT)
Dept: INTERNAL MEDICINE | Facility: CLINIC | Age: 63
End: 2018-09-14

## 2018-09-14 LAB
BACTERIA SPEC CULT: NO GROWTH
Lab: NORMAL
SPECIMEN SOURCE: NORMAL

## 2018-09-24 DIAGNOSIS — R25.2 CRAMP OF LIMB: ICD-10-CM

## 2018-09-24 DIAGNOSIS — F33.9 RECURRENT MAJOR DEPRESSIVE DISORDER, REMISSION STATUS UNSPECIFIED (H): ICD-10-CM

## 2018-09-25 DIAGNOSIS — K70.31 ALCOHOLIC CIRRHOSIS OF LIVER WITH ASCITES (H): ICD-10-CM

## 2018-09-25 RX ORDER — CITALOPRAM HYDROBROMIDE 40 MG/1
40 TABLET ORAL DAILY
Qty: 90 TABLET | Refills: 0 | Status: SHIPPED | OUTPATIENT
Start: 2018-09-25 | End: 2019-01-02

## 2018-09-25 RX ORDER — OXYCODONE HYDROCHLORIDE 5 MG/1
TABLET ORAL
Qty: 180 TABLET | Refills: 0 | Status: SHIPPED | OUTPATIENT
Start: 2018-10-02 | End: 2018-10-18

## 2018-09-25 NOTE — TELEPHONE ENCOUNTER
citalopram (CELEXA) 40 MG tablet  Last Written Prescription Date:  4/5/18  Last Fill Quantity: 30,   # refills: 3  Last Office Visit : 9/13/18  Future Office visit: none        magnesium oxide (MAG-OX) 400 (241.3 MG) MG tablet  Last Written Prescription Date:  8/25/17  Last Fill Quantity: 90,   # refills: 3        Routing  Because: celexa,  phq9   90 day to pharmacy.  magnesium oxide  Not on protocol

## 2018-09-25 NOTE — TELEPHONE ENCOUNTER
Reason For Call:   Chief Complaint   Patient presents with     Opioid Refill       Medication Name, Dose and Monthly Quantity:   oxyCODONE IR (ROXICODONE) 5 MG tablet, 180 tabs    Diagnosis requiring opiates:   Alcoholic cirrhosis of liver with ascites (H) [K70.31]     Problem List Updated:   no    Opioid Agreement On File - [facility] PAIN CONTRACT ID# [id number]:  no    Last Urine Drug Screen (at least once every 12 months) Date:   none    Unexpected Results:   na    [place]  Data Reviewed (at least once every 3 months) Date:   5/29/18  Unexpected Results:    no    Last Fill Date:   9/2/18    Due Date:   10/2/18    Last Visit with PCP:   9/13/18    Future Visits with PCP:   none    Processing:   Mail to pharmacy    Hipolito-Alize Ponce RN

## 2018-10-12 ENCOUNTER — DOCUMENTATION ONLY (OUTPATIENT)
Dept: INTERNAL MEDICINE | Facility: CLINIC | Age: 63
End: 2018-10-12

## 2018-10-12 DIAGNOSIS — K70.31 ALCOHOLIC CIRRHOSIS OF LIVER WITH ASCITES (H): Primary | ICD-10-CM

## 2018-10-18 DIAGNOSIS — K70.31 ALCOHOLIC CIRRHOSIS OF LIVER WITH ASCITES (H): ICD-10-CM

## 2018-10-18 RX ORDER — OXYCODONE HYDROCHLORIDE 5 MG/1
TABLET ORAL
Qty: 180 TABLET | Refills: 0 | Status: SHIPPED | OUTPATIENT
Start: 2018-11-01 | End: 2018-11-20

## 2018-10-23 DIAGNOSIS — E11.9 TYPE 2 DIABETES, HBA1C GOAL < 7% (H): ICD-10-CM

## 2018-10-25 ENCOUNTER — PRE VISIT (OUTPATIENT)
Dept: GASTROENTEROLOGY | Facility: CLINIC | Age: 63
End: 2018-10-25

## 2018-10-25 DIAGNOSIS — K70.31 ALCOHOLIC CIRRHOSIS OF LIVER WITH ASCITES (H): ICD-10-CM

## 2018-10-25 LAB
ALBUMIN SERPL-MCNC: 2.7 G/DL (ref 3.4–5)
ALP SERPL-CCNC: 164 U/L (ref 40–150)
ALT SERPL W P-5'-P-CCNC: 27 U/L (ref 0–70)
AMMONIA PLAS-SCNC: 54 UMOL/L (ref 10–50)
ANION GAP SERPL CALCULATED.3IONS-SCNC: 8 MMOL/L (ref 3–14)
AST SERPL W P-5'-P-CCNC: 38 U/L (ref 0–45)
BILIRUB DIRECT SERPL-MCNC: 0.5 MG/DL (ref 0–0.2)
BILIRUB SERPL-MCNC: 1.1 MG/DL (ref 0.2–1.3)
BUN SERPL-MCNC: 15 MG/DL (ref 7–30)
CALCIUM SERPL-MCNC: 8.3 MG/DL (ref 8.5–10.1)
CHLORIDE SERPL-SCNC: 105 MMOL/L (ref 94–109)
CO2 SERPL-SCNC: 27 MMOL/L (ref 20–32)
CREAT SERPL-MCNC: 0.98 MG/DL (ref 0.66–1.25)
ERYTHROCYTE [DISTWIDTH] IN BLOOD BY AUTOMATED COUNT: 18.5 % (ref 10–15)
GFR SERPL CREATININE-BSD FRML MDRD: 77 ML/MIN/1.7M2
GLUCOSE SERPL-MCNC: 41 MG/DL (ref 70–99)
HCT VFR BLD AUTO: 29.9 % (ref 40–53)
HGB BLD-MCNC: 9.5 G/DL (ref 13.3–17.7)
INR PPP: 1.14 (ref 0.86–1.14)
MCH RBC QN AUTO: 30.6 PG (ref 26.5–33)
MCHC RBC AUTO-ENTMCNC: 31.8 G/DL (ref 31.5–36.5)
MCV RBC AUTO: 97 FL (ref 78–100)
PLATELET # BLD AUTO: 102 10E9/L (ref 150–450)
POTASSIUM SERPL-SCNC: 4.3 MMOL/L (ref 3.4–5.3)
PROT SERPL-MCNC: 7 G/DL (ref 6.8–8.8)
RBC # BLD AUTO: 3.1 10E12/L (ref 4.4–5.9)
SODIUM SERPL-SCNC: 140 MMOL/L (ref 133–144)
WBC # BLD AUTO: 3.2 10E9/L (ref 4–11)

## 2018-10-25 PROCEDURE — 36415 COLL VENOUS BLD VENIPUNCTURE: CPT | Performed by: NURSE PRACTITIONER

## 2018-10-25 PROCEDURE — 85610 PROTHROMBIN TIME: CPT | Performed by: NURSE PRACTITIONER

## 2018-10-25 PROCEDURE — 80053 COMPREHEN METABOLIC PANEL: CPT | Performed by: NURSE PRACTITIONER

## 2018-10-25 PROCEDURE — 82140 ASSAY OF AMMONIA: CPT | Performed by: NURSE PRACTITIONER

## 2018-10-25 PROCEDURE — 85027 COMPLETE CBC AUTOMATED: CPT | Performed by: NURSE PRACTITIONER

## 2018-10-25 PROCEDURE — 82248 BILIRUBIN DIRECT: CPT | Performed by: NURSE PRACTITIONER

## 2018-10-25 NOTE — PROGRESS NOTES
Was the patient contacted by phone and reminded of the upcoming visit? Yes    Was the patient instructed to bring a current list of all medications to the appointment or instructed to bring in all medication bottles? Yes, patient verbalized understanding    Were ordered labs and tests completed prior to the appointment? No, but they are scheduled to be    Were the needed lab orders placed? Yes    Patient instructed to arrive early for check-in    Jenifer Banda CMA  10/25/2018 11:53 AM

## 2018-10-25 NOTE — TELEPHONE ENCOUNTER
Reason For Call:   Chief Complaint   Patient presents with     Opioid Refill     oxycodone       Medication Name, Dose and Monthly Quantity:   oxyCODONE IR (ROXICODONE) 5 MG tablet, 180 tabs    Diagnosis requiring opiates:   Alcoholic cirrhosis of liver with ascites (H) [K70.31]     Problem List Updated:   no    Opioid Agreement On File - [facility] PAIN CONTRACT ID# [id number]:  no    Last Urine Drug Screen (at least once every 12 months) Date:   none    Unexpected Results:   na    [place]  Data Reviewed (at least once every 3 months) Date:   5/29/18  Unexpected Results:    no    Last Fill Date:   10/2/18    Due Date:   11/1/18    Last Visit with PCP:   9/13/18    Future Visits with PCP:   none    Processing:   Mail to pharmacy    Hipolito-Alize Ponce RN

## 2018-10-26 ENCOUNTER — OFFICE VISIT (OUTPATIENT)
Dept: GASTROENTEROLOGY | Facility: CLINIC | Age: 63
End: 2018-10-26
Attending: INTERNAL MEDICINE
Payer: COMMERCIAL

## 2018-10-26 ENCOUNTER — TELEPHONE (OUTPATIENT)
Dept: GASTROENTEROLOGY | Facility: CLINIC | Age: 63
End: 2018-10-26

## 2018-10-26 VITALS
OXYGEN SATURATION: 99 % | SYSTOLIC BLOOD PRESSURE: 115 MMHG | WEIGHT: 277.8 LBS | TEMPERATURE: 98.4 F | HEART RATE: 78 BPM | BODY MASS INDEX: 35.67 KG/M2 | DIASTOLIC BLOOD PRESSURE: 68 MMHG

## 2018-10-26 DIAGNOSIS — K76.82 HEPATIC ENCEPHALOPATHY (H): ICD-10-CM

## 2018-10-26 DIAGNOSIS — Z95.828 S/P TIPS (TRANSJUGULAR INTRAHEPATIC PORTOSYSTEMIC SHUNT): ICD-10-CM

## 2018-10-26 DIAGNOSIS — Z23 NEED FOR PROPHYLACTIC VACCINATION AND INOCULATION AGAINST INFLUENZA: Primary | ICD-10-CM

## 2018-10-26 PROCEDURE — G0008 ADMIN INFLUENZA VIRUS VAC: HCPCS | Mod: ZF

## 2018-10-26 PROCEDURE — 90686 IIV4 VACC NO PRSV 0.5 ML IM: CPT | Mod: ZF | Performed by: INTERNAL MEDICINE

## 2018-10-26 PROCEDURE — 25000128 H RX IP 250 OP 636: Mod: ZF | Performed by: INTERNAL MEDICINE

## 2018-10-26 PROCEDURE — G0463 HOSPITAL OUTPT CLINIC VISIT: HCPCS | Mod: 25,ZF

## 2018-10-26 RX ORDER — LACTULOSE 10 G/15ML
20 SOLUTION ORAL 2 TIMES DAILY
Qty: 946 ML | Refills: 3 | Status: SHIPPED | OUTPATIENT
Start: 2018-10-26 | End: 2019-01-04

## 2018-10-26 RX ADMIN — INFLUENZA A VIRUS A/MICHIGAN/45/2015 X-275 (H1N1) ANTIGEN (FORMALDEHYDE INACTIVATED), INFLUENZA A VIRUS A/SINGAPORE/INFIMH-16-0019/2016 IVR-186 (H3N2) ANTIGEN (FORMALDEHYDE INACTIVATED), INFLUENZA B VIRUS B/PHUKET/3073/2013 ANTIGEN (FORMALDEHYDE INACTIVATED), AND INFLUENZA B VIRUS B/MARYLAND/15/2016 BX-69A ANTIGEN (FORMALDEHYDE INACTIVATED) 0.5 ML: 15; 15; 15; 15 INJECTION, SUSPENSION INTRAMUSCULAR at 11:45

## 2018-10-26 ASSESSMENT — PAIN SCALES - GENERAL: PAINLEVEL: EXTREME PAIN (8)

## 2018-10-26 NOTE — MR AVS SNAPSHOT
After Visit Summary   10/26/2018    Lawrence Louie    MRN: [id number]           Patient Information     Date Of Birth          1955        Visit Information        Provider Department      10/26/2018 11:00 AM Meghna Simmons MD [facility] Hepatology        Today's Diagnoses     Need for prophylactic vaccination and inoculation against influenza    -  1    Hepatic encephalopathy (H)        S/P TIPS (transjugular intrahepatic portosystemic shunt)           Follow-ups after your visit        Additional Services     GASTROENTEROLOGY ADULT REF PROCEDURE ONLY       Last Lab Result: Creatinine (mg/dL)       Date                     Value                 10/25/2018               0.98             ----------  Body mass index is 35.67 kg/(m^2).     Needed:  No  Language:  English    Patient will be contacted to schedule procedure.     Please be aware that coverage of these services is subject to the terms and limitations of your health insurance plan.  Call member services at your health plan with any benefit or coverage questions.  Any procedures must be performed at a [place] facility OR coordinated by your clinic's referral office.    Please bring the following with you to your appointment:    (1) Any X-Rays, CTs or MRIs which have been performed.  Contact the facility where they were done to arrange for  prior to your scheduled appointment.    (2) List of current medications   (3) This referral request   (4) Any documents/labs given to you for this referral                  Follow-up notes from your care team     Return in about 6 months (around 4/26/2019).      Future tests that were ordered for you today     Open Future Orders        Priority Expected Expires Ordered    US Abdomen Complete with TIPSS Doppler Routine  10/26/2019 10/26/2018            Who to contact     If you have questions or need follow up information about today's clinic visit or your schedule  please contact [facility] HEPATOLOG[facility] directly at [phone].  Normal or non-critical lab and imaging results will be communicated to you by MyChart, letter or phone within 4 business days after the clinic has received the results. If you do not hear from us within 7 days, please contact the clinic through [employer] or phone. If you have a critical or abnormal lab result, we will notify you by phone as soon as possible.  Submit refill requests through [employer] or call your pharmacy and they will forward the refill request to us. Please allow 3 business days for your refill to be completed.          Additional Information About Your Visit        [employer] Information     [employer] gives you secure access to your electronic health record. If you see a primary care provider, you can also send messages to your care team and make appointments. If you have questions, please call your primary care clinic.  If you do not have a primary care provider, please call [phone] and they will assist you.        Care EveryWhere ID     This is your Care EveryWhere ID. This could be used by other organizations to access your [facility] medical records  [id number]        Your Vitals Were     Pulse Temperature Pulse Oximetry BMI (Body Mass Index)          78 98.4  F (36.9  C) (Oral) 99% 35.67 kg/m2         Blood Pressure from Last 3 Encounters:   10/26/18 115/68   09/13/18 119/64   07/31/18 112/52    Weight from Last 3 Encounters:   10/26/18 126 kg (277 lb 12.8 oz)   09/13/18 128.9 kg (284 lb 3.2 oz)   07/31/18 129.4 kg (285 lb 4.8 oz)              We Performed the Following     GASTROENTEROLOGY ADULT REF PROCEDURE ONLY          Today's Medication Changes          These changes are accurate as of 10/26/18  4:26 PM.  If you have any questions, ask your nurse or doctor.               These medicines have changed or have updated prescriptions.        Dose/Directions    lactulose 10 GM/15ML solution   Commonly known as:  CHRONULAC   This may  have changed:    - how much to take  - when to take this   Used for:  Hepatic encephalopathy (H)   Changed by:  Meghna Simmons MD        Dose:  20 g   Take 30 mLs (20 g) by mouth 2 times daily   Quantity:  946 mL   Refills:  3            Where to get your medicines      These medications were sent to [id number] - [place], [place] - [address]  [facility] [place] [address]     Phone:  [phone]     lactulose 10 GM/15ML solution    rifaximin 550 MG Tabs tablet                Primary Care Provider Office Phone # Fax #    Ary Murphy, APRN [phone]       [address] [place] [address] [address]  [facility] [address]        Equal Access to Services     JOSEF LORENZO : Hadii aad ku hadasho Soomaali, waaxda luqadaha, qaybta kaalmada adeegyada, waxay idiin hayaan daniela richardson . So [facility] [phone].    ATENCIÓN: Si habla español, tiene a rondon disposición servicios gratuitos de asistencia lingüística. [facility] [phone].    We comply with applicable federal civil rights laws and [place] laws. We do not discriminate on the basis of race, color, national origin, age, disability, sex, sexual orientation, or gender identity.            Thank you!     Thank you for choosing [facility] HEPATOLOGY  for your care. Our goal is always to provide you with excellent care. Hearing back from our patients is one way we can continue to improve our services. Please take a few minutes to complete the written survey that you may receive in the mail after your visit with us. Thank you!             Your Updated Medication List - Protect others around you: Learn how to safely use, store and throw away your medicines at www.disposemymeds.org.          This list is accurate as of 10/26/18  4:26 PM.  Always use your most recent med list.                   Brand Name Dispense Instructions for use Diagnosis    blood glucose lancets standard    no brand specified    1 Box    Use to test blood  sugar 4 X  times daily or as directed.    Diabetes mellitus, type 2 (H)       blood glucose monitoring meter device kit    no brand specified    1 kit    Use to test blood sugar 4 X  times daily or as directed.    Type 2 diabetes mellitus with other specified complication (H)       blood glucose monitoring test strip    no brand specified    200 strip    Use to test blood sugars 4 X  times daily or as directed    Diabetes mellitus, type 2 (H)       calcium carbonate 500 mg (elemental) 500 MG tablet    OS-ERIN     Take 1 tablet by mouth daily        calcium carbonate 500 mg-vitamin D 200 units 500-200 MG-UNIT per tablet    OSCAL with D;OYSTER SHELL CALCIUM    90 tablet    Take 1 tablet by mouth daily    Alcoholic cirrhosis (H)       cephALEXin 500 MG capsule    KEFLEX          citalopram 40 MG tablet    celeXA    90 tablet    Take 1 tablet (40 mg) by mouth daily    Recurrent major depressive disorder, remission status unspecified (H)       desvenlafaxine succinate 100 MG 24 hr tablet    PRISTIQ    180 tablet    Take 2 tablets (200 mg) by mouth daily    Other depression       furosemide 40 MG tablet    LASIX    540 tablet    Take 3 tablets twice a day.    Edema of both legs       glipiZIDE 5 MG 24 hr tablet    GLUCOTROL XL    180 tablet    Take 1 tablet (5 mg) by mouth daily    Type 2 diabetes mellitus without complication, with long-term current use of insulin (H)       insulin aspart 100 UNIT/ML injection    NovoLOG FLEXPEN    30 mL    20 units before breakfast, 20 units before lunch, 20 units before dinner, 20 units before snacks    Type 2 diabetes mellitus without complication, with long-term current use of insulin (H)       * insulin glargine 100 UNIT/ML injection    LANTUS     Inject 30 Units Subcutaneous daily        * insulin glargine 100 UNIT/ML injection    LANTUS    12 mL    Inject 30 Units Subcutaneous every morning    Type 2 diabetes mellitus with diabetic neuropathic arthropathy, without long-term current  use of insulin (H)       * insulin pen needle 31G X 8 MM    B-D U/F    300 each    USE  6 times daily / OR AS DIRECTED    Type 2 diabetes, HbA1c goal < 7% (H)       * insulin pen needle 31G X 8 MM    ULTICARE SHORT    300 each    Use UP UO 6 times daily or as directed    Type 2 diabetes, HbA1c goal < 7% (H)       lactulose 10 GM/15ML solution    CHRONULAC    946 mL    Take 30 mLs (20 g) by mouth 2 times daily    Hepatic encephalopathy (H)       magnesium oxide 400 (241.3 Mg) MG tablet    MAG-OX    90 tablet    Take 1 tablet (400 mg) by mouth daily    Cramp of limb       ondansetron 4 MG tablet    ZOFRAN    18 tablet    Take 1 tablet (4 mg) by mouth every 8 hours as needed for nausea    Alcoholic cirrhosis of liver with ascites (H), Nausea       order for DME     2 pump    Equipment being ordered:bilateral pneumatic leg massage for treatment of extreme bilateral lower leg edema.    Localized edema       order for DME     1 Piece    Equipment being ordered:BioTab compression pants pneumatic system    Lymphedema       oxyCODONE IR 5 MG tablet   Start taking on:  11/1/2018    ROXICODONE    180 tablet    Take 1-2 tablet every 8 hours as needed    Alcoholic cirrhosis of liver with ascites (H)       ranitidine 150 MG tablet    ZANTAC    180 tablet    Take 1 tablet (150 mg) by mouth 2 times daily    Esophageal varices (H)       rifaximin 550 MG Tabs tablet    XIFAXAN    180 tablet    Take 1 tablet (550 mg) by mouth 2 times daily    Hepatic encephalopathy (H)       spironolactone 50 MG tablet    ALDACTONE    150 tablet    Takes 3 tablets (150 mg) every AM and take 2 tablets (100 mg) every PM.    Portal hypertension (H), Generalized edema       STATIN NOT PRESCRIBED (INTENTIONAL)     0 each    1 each daily Statin not prescribed intentionally due to Active liver disease    Hyperlipidemia LDL goal <100       UNABLE TO FIND      MEDICATION NAME: Burdock root        VITAMIN B-12 PO      Take 1 tablet by mouth daily        vitamin  D3 2000 units Caps     90 capsule    Take 1 capsule by mouth daily    Mild major depression (H)       * Notice:  This list has 4 medication(s) that are the same as other medications prescribed for you. Read the directions carefully, and ask your doctor or other care provider to review them with you.

## 2018-10-26 NOTE — LETTER
10/26/2018      RE: Lawrence Louie  [address] [facility] [address]       ASSESSMENT AND PLAN:  A 62-year-old man with alcoholic and SMITH cirrhosis.  He has been sober from alcohol for 5 years. S/P TIPS 12 months ago. This has improved his ascites markedly. No para needed, he is down 60 pounds.      Has TIPS US and IR visit coming up.      Ongoing, very long-standing issues with severe LE edema which is somewhat better Discussed that this is very long-standing and severe and will likely never be completely resolved, but would aim for continued improvement.     Diabetes under better control.      HE: lactulose and rifaximin. Discussed lactulose titration.    Due for HCC screening and TIPS US. Ordered    Due for EGD and colonoscopy. Ordered.    Proph: flu shot today.     Liver is overall stable. RTC 6 mo      Meghna Simmons MD  Hepatology/Liver Transplant  Medical Director, Liver Transplantation  [facility]  ===================================================================  SUBJECTIVE:  Mr. Louie is a 62-year-old man with SMITH and alcoholic cirrhosis He has been sober from alcohol since 2012. He underwent TIPS 10/27/17. He had a hospitalization shortly after for weakness, dehydration, n/v.  He had a TIPS revision in April. Dr. Davis said he needed to see a cardiologist. He did and heart checked out.         Ascites: Paracentesis is now not needed. He is down about 60 ounds.. Was weekly and 8-10L at a time. Taking gus 50 mg tabs, 3 BID Furosemide 40 mg tabs, taking 3 tabs twice a day. He had cramping but this is better. Taking magnesium and Ca.      Diet: does not add salt but he does not restrict sodium as much as he should, although is better.  .    His biggest complaint has always been profound LE edema, which he has had for many years and was initially worse after TIPS, now improving but still quite signficant.       He is now taking lactulose. He had some  episodes of confusion. With lactulose and rifaximin  things are under control . No confusion but he is forgetful. Has about 1 BM/day. He thinks he is taking the rifaximin.    He had a UTI twice and antibiotics. He had shingles recently     EGD 6/16/16 grade 1 varices  HCC screening April 2018      OTHER COMORBIDITIES:  He has diabetes, GERD, hyperlipidemia , morbid obesity      SOCIAL HISTORY:  He lives with his wife.  He has 3 kids in the [facility].  He is retired. Wife is here with him today.      PHYSICAL EXAMINATION:   /68  Pulse 78  Temp 98.4  F (36.9  C) (Oral)  Wt 126 kg (277 lb 12.8 oz)  SpO2 99%  BMI 35.67 kg/m2     GENERAL:  well-appearing, in no acute distress.   HEENT:  No icterus, no oral lesions. Very poor dentition. Multiple missing teeth, broken teeth.  LYMPH:  No supraclavicular or cervical lymphadenopathy.   CARDIOVASCULAR:  Regular rate and rhythm.   CHEST:  Lungs are clear.   ABDOMEN:  Bowel sounds are present.  He is morbidly obese.   EXTREMITIES:  Significant woody edema bilaterally with blistering and redness.   NEUROLOGIC:  Speech is fluent and clear.  No asterixis or tremor.       LABORATORY DATA:  Recent labs were reviewed. Overall stable.    Meghna Simmons MD

## 2018-10-26 NOTE — PROGRESS NOTES
ASSESSMENT AND PLAN:  A 62-year-old man with alcoholic and SMITH cirrhosis.  He has been sober from alcohol for 5 years. S/P TIPS 12 months ago. This has improved his ascites markedly. No para needed, he is down 60 pounds.      Has TIPS US and IR visit coming up.      Ongoing, very long-standing issues with severe LE edema which is somewhat better Discussed that this is very long-standing and severe and will likely never be completely resolved, but would aim for continued improvement.     Diabetes under better control.      HE: lactulose and rifaximin. Discussed lactulose titration.    Due for HCC screening and TIPS US. Ordered    Due for EGD and colonoscopy. Ordered.    Proph: flu shot today.     Liver is overall stable. RTC 6 mo      Meghna Simmons MD  Hepatology/Liver Transplant  Medical Director, Liver Transplantation  [facility]  ===================================================================  SUBJECTIVE:  Mr. Louie is a 62-year-old man with SMITH and alcoholic cirrhosis He has been sober from alcohol since 2012. He underwent TIPS 10/27/17. He had a hospitalization shortly after for weakness, dehydration, n/v.  He had a TIPS revision in April. Dr. Davis said he needed to see a cardiologist. He did and heart checked out.         Ascites: Paracentesis is now not needed. He is down about 60 ounds.. Was weekly and 8-10L at a time. Taking gus 50 mg tabs, 3 BID Furosemide 40 mg tabs, taking 3 tabs twice a day. He had cramping but this is better. Taking magnesium and Ca.      Diet: does not add salt but he does not restrict sodium as much as he should, although is better.  .    His biggest complaint has always been profound LE edema, which he has had for many years and was initially worse after TIPS, now improving but still quite signficant.       He is now taking lactulose. He had some episodes of confusion. With lactulose and rifaximin  things are under control . No confusion but he is  forgetful. Has about 1 BM/day. He thinks he is taking the rifaximin.    He had a UTI twice and antibiotics. He had shingles recently     EGD 6/16/16 grade 1 varices  HCC screening April 2018      OTHER COMORBIDITIES:  He has diabetes, GERD, hyperlipidemia , morbid obesity      SOCIAL HISTORY:  He lives with his wife.  He has 3 kids in the [facility].  He is retired. Wife is here with him today.      PHYSICAL EXAMINATION:   /68  Pulse 78  Temp 98.4  F (36.9  C) (Oral)  Wt 126 kg (277 lb 12.8 oz)  SpO2 99%  BMI 35.67 kg/m2     GENERAL:  well-appearing, in no acute distress.   HEENT:  No icterus, no oral lesions. Very poor dentition. Multiple missing teeth, broken teeth.  LYMPH:  No supraclavicular or cervical lymphadenopathy.   CARDIOVASCULAR:  Regular rate and rhythm.   CHEST:  Lungs are clear.   ABDOMEN:  Bowel sounds are present.  He is morbidly obese.   EXTREMITIES:  Significant woody edema bilaterally with blistering and redness.   NEUROLOGIC:  Speech is fluent and clear.  No asterixis or tremor.       LABORATORY DATA:  Recent labs were reviewed. Overall stable.

## 2018-10-29 ENCOUNTER — TELEPHONE (OUTPATIENT)
Dept: GASTROENTEROLOGY | Facility: CLINIC | Age: 63
End: 2018-10-29

## 2018-10-30 ENCOUNTER — HOSPITAL ENCOUNTER (OUTPATIENT)
Facility: CLINIC | Age: 63
End: 2018-10-30
Attending: INTERNAL MEDICINE | Admitting: INTERNAL MEDICINE
Payer: MEDICARE

## 2018-11-02 ENCOUNTER — RADIANT APPOINTMENT (OUTPATIENT)
Dept: ULTRASOUND IMAGING | Facility: CLINIC | Age: 63
End: 2018-11-02
Attending: INTERNAL MEDICINE
Payer: COMMERCIAL

## 2018-11-02 DIAGNOSIS — Z95.828 S/P TIPS (TRANSJUGULAR INTRAHEPATIC PORTOSYSTEMIC SHUNT): ICD-10-CM

## 2018-11-13 ENCOUNTER — TELEPHONE (OUTPATIENT)
Dept: INTERNAL MEDICINE | Facility: CLINIC | Age: 63
End: 2018-11-13

## 2018-11-13 DIAGNOSIS — T14.8XXA OPEN WOUND: Primary | ICD-10-CM

## 2018-11-13 NOTE — TELEPHONE ENCOUNTER
Pt will wounds/ulcer lower extremities. Wound care clinic.  Lorna Mohamud RN 11:33 AM on 11/13/2018.

## 2018-11-16 ENCOUNTER — OFFICE VISIT (OUTPATIENT)
Dept: WOUND CARE | Facility: CLINIC | Age: 63
End: 2018-11-16
Payer: COMMERCIAL

## 2018-11-16 VITALS — OXYGEN SATURATION: 100 % | HEART RATE: 77 BPM | SYSTOLIC BLOOD PRESSURE: 116 MMHG | DIASTOLIC BLOOD PRESSURE: 51 MMHG

## 2018-11-16 DIAGNOSIS — I87.2 STASIS DERMATITIS OF BOTH LEGS: Primary | ICD-10-CM

## 2018-11-16 DIAGNOSIS — M79.3 LIPODERMATOSCLEROSIS: ICD-10-CM

## 2018-11-16 DIAGNOSIS — I89.0 LYMPHEDEMA: ICD-10-CM

## 2018-11-16 RX ORDER — NYSTATIN AND TRIAMCINOLONE ACETONIDE 100000; 1 [USP'U]/G; MG/G
CREAM TOPICAL 2 TIMES DAILY PRN
Qty: 30 G | Refills: 3 | Status: ON HOLD | OUTPATIENT
Start: 2018-11-16 | End: 2020-07-30

## 2018-11-16 ASSESSMENT — PAIN SCALES - GENERAL: PAINLEVEL: SEVERE PAIN (6)

## 2018-11-16 NOTE — PROGRESS NOTES
Chief Complaint:   Chief Complaint   Patient presents with     Wound Check     New patient consultation. Bilateral lower leg wound.        Subjective: Lawrence is a 62 year old male who presents to the clinic today for evaluation of lymphedema with ulcerations. History of alcoholic cirrhosis and chronic lower extremity edema and lymphedema. Edema has worsened in the past year, causing bumps on both legs. He has seen lymphedema therapy in the past and they also came to his house. He knows how to wrap his legs and how to take care of the wounds when they form, but needs more supplies ordered. He is interested in seeing lymphedema again. Admits severe itching to legs, especially at night, which also leads to open wound formation. Denies purulent drainage, significant weeping, increase in leg pain or erythema.    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)      Splenomegaly      Squamous cell carcinoma      Thrombocytopenia (H)      Varices, esophageal (H)     Banded in 2011     Past Surgical History:   Procedure Laterality Date     BIOPSY OF SKIN LESION       COLONOSCOPY Left 6/16/2016    Procedure: COMBINED COLONOSCOPY, SINGLE OR MULTIPLE BIOPSY/POLYPECTOMY BY BIOPSY;  Surgeon: Brandy Barnett MD;  Location:  GI     ESOPHAGOSCOPY, GASTROSCOPY, DUODENOSCOPY (EGD), COMBINED  2/13/2013    Procedure: COMBINED ESOPHAGOSCOPY, GASTROSCOPY, DUODENOSCOPY (EGD);;  Surgeon: Tara Cook MD;  Location:  GI     ESOPHAGOSCOPY, GASTROSCOPY, DUODENOSCOPY (EGD), COMBINED  11/4/2013    Procedure: COMBINED ESOPHAGOSCOPY, GASTROSCOPY, DUODENOSCOPY (EGD);;  Surgeon: Lonny iDaz MD;  Location:  GI     ESOPHAGOSCOPY, GASTROSCOPY, DUODENOSCOPY (EGD), COMBINED Left 6/16/2016    Procedure: COMBINED  ESOPHAGOSCOPY, GASTROSCOPY, DUODENOSCOPY (EGD), BIOPSY SINGLE OR MULTIPLE;  Surgeon: Brandy Barnett MD;  Location: [facility]     EXCISE LESION TRUNK  9/24/2012    Procedure: EXCISE LESION TRUNK;;  Surgeon: Pepe Dominguez MD;  Location: [facility]     GENITOURINARY SURGERY      vasectomy     HERNIORRHAPHY UMBILICAL  9/24/2012    Procedure: HERNIORRHAPHY UMBILICAL;  UMBILICAL HERNIA REPAIR , EXCISION OF PERIUMBILICAL CYST;  Surgeon: Pepe Dominguez MD;  Location: [facility]     Family History   Problem Relation Age of Onset     Breast Cancer Mother      Liver Cancer Mother      Cardiovascular Father      Cerebrovascular Disease Father      very low blood pressure     Cancer Father      rectal cancer     Cardiovascular Paternal Grandfather      Diabetes Brother      Cancer Sister      skin cancer     C.A.D. Other      MI, 70's     Breast Cancer Sister      Thyroid Disease No family hx of      Lipids No family hx of      Anesthesia Reaction No family hx of      Social History   Substance Use Topics     Smoking status: Current Every Day Smoker     Packs/day: 0.30     Types: Cigarettes     Smokeless tobacco: Never Used      Comment: 45 yr     Alcohol use No      Comment: 2-3 per day , none since Dec 2012     No Known Allergies  Current Outpatient Rx   Medication Sig Dispense Refill     blood glucose (NO BRAND SPECIFIED) lancets standard Use to test blood sugar 4 X  times daily or as directed. 1 Box 3     blood glucose monitoring (NO BRAND SPECIFIED) meter device kit Use to test blood sugar 4 X  times daily or as directed. 1 kit 0     blood glucose monitoring (NO BRAND SPECIFIED) test strip Use to test blood sugars 4 X  times daily or as directed 200 strip 3     calcium carbonate (OS-ERIN 500 MG [place]. [place]) 1250 MG tablet Take 1 tablet by mouth daily        cephALEXin (KEFLEX) 500 MG capsule   0     Cholecalciferol (VITAMIN D3) 2000 units CAPS Take 1 capsule by mouth daily 90 capsule 3     citalopram (CELEXA) 40 MG tablet  Take 1 tablet (40 mg) by mouth daily 90 tablet 0     Cyanocobalamin (VITAMIN B-12 PO) Take 1 tablet by mouth daily       desvenlafaxine succinate (PRISTIQ) 100 MG 24 hr tablet Take 2 tablets (200 mg) by mouth daily 180 tablet 1     furosemide (LASIX) 40 MG tablet Take 3 tablets twice a day. 540 tablet 1     glipiZIDE (GLUCOTROL XL) 5 MG 24 hr tablet Take 1 tablet (5 mg) by mouth daily 180 tablet 0     insulin aspart (NOVOLOG FLEXPEN) 100 UNIT/ML injection 20 units before breakfast, 20 units before lunch, 20 units before dinner, 20 units before snacks 30 mL 3     insulin glargine (LANTUS SOLOSTAR) 100 UNIT/ML pen Inject 30 Units Subcutaneous daily       insulin glargine (LANTUS SOLOSTAR) 100 UNIT/ML pen Inject 30 Units Subcutaneous every morning 12 mL 3     insulin pen needle (B-D U/F) 31G X 8 MM USE  6 times daily / OR AS DIRECTED 300 each 3     insulin pen needle (ULTICARE SHORT) 31G X 8 MM Use UP UO 6 times daily or as directed 300 each 3     lactulose (CHRONULAC) 10 GM/15ML solution Take 30 mLs (20 g) by mouth 2 times daily 946 mL 3     magnesium oxide (MAG-OX) 400 (241.3 Mg) MG tablet Take 1 tablet (400 mg) by mouth daily 90 tablet 1     ondansetron (ZOFRAN) 4 MG tablet Take 1 tablet (4 mg) by mouth every 8 hours as needed for nausea 18 tablet 1     order for DME Equipment being ordered:BioTab compression pants pneumatic system 1 Piece 0     order for DME Equipment being ordered:bilateral pneumatic leg massage for treatment of extreme bilateral lower leg edema. 2 pump 0     oxyCODONE IR (ROXICODONE) 5 MG tablet Take 1-2 tablet every 8 hours as needed 180 tablet 0     OYSTER SHELL CALCIUM/D 500-200 MG-UNIT per tablet Take 1 tablet by mouth daily 90 tablet 3     ranitidine (ZANTAC) 150 MG tablet Take 1 tablet (150 mg) by mouth 2 times daily 180 tablet 3     rifaximin (XIFAXAN) 550 MG TABS tablet Take 1 tablet (550 mg) by mouth 2 times daily 180 tablet 3     spironolactone (ALDACTONE) 50 MG tablet Takes 3 tablets  (150 mg) every AM and take 2 tablets (100 mg) every PM. 150 tablet 11     STATIN NOT PRESCRIBED, INTENTIONAL, 1 each daily Statin not prescribed intentionally due to Active liver disease 0 each 0     UNABLE TO FIND MEDICATION NAME: Burdock root         Objective:   /51 (BP Location: Right arm, Patient Position: Chair, Cuff Size: Adult Large)  Pulse 77  SpO2 100%    General: Patient is well groomed, appears stated age, is alert, cooperative and in no acute distress.  Vascular: DP and PT pulses are non-palpable bilaterally. LE capillary refill time is <3 seconds. Absent pedal hair. Severe bilateral LE edema, non-pitting.  Derm: Severe lipodermatosclerosis present to bilateral LEs. Multiple open lesions present to various areas of bilateral LEs. All are to dermis only with mild serous drainage. No erythema or calor present. No purulent drainage or wound odor. Toenails are somewhat elongated bilaterally.    Previous Laboratory Studies:   Lab Results   Component Value Date    A1C Canceled, Test credited 05/04/2018    A1C Canceled, Test credited 04/06/2018    A1C Canceled, Test credited 01/04/2018    A1C 6.0 08/25/2017    A1C 6.4 03/21/2017    A1C 6.6 12/22/2016    A1C 6.7 09/27/2016    A1C 6.5 06/28/2016    A1C 6.5 12/22/2015    A1C 6.6 09/21/2015    A1C 7.4 05/12/2015    A1C 6.5 08/15/2014    A1C 6.8 05/27/2014    A1C 6.8 07/05/2013    A1C 7.0 04/02/2013    A1C 8.1 12/12/2012    A1C 9.3 08/14/2012    A1C 6.3 10/04/2010       Assessment:   - Lymphedema  - Stasis dermatitis  - Lipodermatosclerosis  - Venous ulcers to LEs  - Peripheral venous insufficiency    Plan:   - Pt seen and evaluated. Diagnosis and treatment options were discussed.   - No excisional debridement necessary today.   - Wound care instructions: 1. Clean wound with wound cleanser and pat dry. 2. Apply xeroform square to open lesions 3. Cover with bandage. Continue to wrap legs daily with your compression bandages. Perform dressing changes when you  change your compression bandages.  - Recommend leg pumps, increased walking if possible.  - Ordered supplies through [facility] to patient's home  - Lymphedema consult - patient needs follow up care. Already knows how to wrap his own legs.  - Prescribed mycolog for itchy areas, apply only as needed.   - Pt to return to clinic in 1 month

## 2018-11-16 NOTE — LETTER
11/16/2018       RE: Lawrence Louie  [facility] [facility] [id number]     Dear Colleague,    Thank you for referring your patient, Lawrence Louie, to the [facility] at [facility]. Please see a copy of my visit note below.    Chief Complaint:   Chief Complaint   Patient presents with     Wound Check     New patient consultation. Bilateral lower leg wound.        Subjective: Lawrence is a 62 year old male who presents to the clinic today for evaluation of lymphedema with ulcerations. History of alcoholic cirrhosis and chronic lower extremity edema and lymphedema. Edema has worsened in the past year, causing bumps on both legs. He has seen lymphedema therapy in the past and they also came to his house. He knows how to wrap his legs and how to take care of the wounds when they form, but needs more supplies ordered. He is interested in seeing lymphedema again. Admits severe itching to legs, especially at night, which also leads to open wound formation. Denies purulent drainage, significant weeping, increase in leg pain or erythema.    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)      Splenomegaly      Squamous cell carcinoma      Thrombocytopenia (H)      Varices, esophageal (H)     Banded in 2011     Past Surgical History:   Procedure Laterality Date     BIOPSY OF SKIN LESION       COLONOSCOPY Left 6/16/2016    Procedure: COMBINED COLONOSCOPY, SINGLE OR MULTIPLE BIOPSY/POLYPECTOMY BY BIOPSY;  Surgeon: Brandy Barnett MD;  Location:  GI     ESOPHAGOSCOPY, GASTROSCOPY, DUODENOSCOPY (EGD), COMBINED  2/13/2013    Procedure: COMBINED ESOPHAGOSCOPY, GASTROSCOPY, DUODENOSCOPY (EGD);;  Surgeon: Tara Cook MD;  Location:  GI      ESOPHAGOSCOPY, GASTROSCOPY, DUODENOSCOPY (EGD), COMBINED  11/4/2013    Procedure: COMBINED ESOPHAGOSCOPY, GASTROSCOPY, DUODENOSCOPY (EGD);;  Surgeon: Lonny Diaz MD;  Location:  GI     ESOPHAGOSCOPY, GASTROSCOPY, DUODENOSCOPY (EGD), COMBINED Left 6/16/2016    Procedure: COMBINED ESOPHAGOSCOPY, GASTROSCOPY, DUODENOSCOPY (EGD), BIOPSY SINGLE OR MULTIPLE;  Surgeon: Brandy Barnett MD;  Location:  GI     EXCISE LESION TRUNK  9/24/2012    Procedure: EXCISE LESION TRUNK;;  Surgeon: Pepe Dominguez MD;  Location: [facility]     GENITOURINARY SURGERY      vasectomy     HERNIORRHAPHY UMBILICAL  9/24/2012    Procedure: HERNIORRHAPHY UMBILICAL;  UMBILICAL HERNIA REPAIR , EXCISION OF PERIUMBILICAL CYST;  Surgeon: Pepe Dominguez MD;  Location: [facility]     Family History   Problem Relation Age of Onset     Breast Cancer Mother      Liver Cancer Mother      Cardiovascular Father      Cerebrovascular Disease Father      very low blood pressure     Cancer Father      rectal cancer     Cardiovascular Paternal Grandfather      Diabetes Brother      Cancer Sister      skin cancer     C.A.D. Other      MI, 70's     Breast Cancer Sister      Thyroid Disease No family hx of      Lipids No family hx of      Anesthesia Reaction No family hx of      Social History   Substance Use Topics     Smoking status: Current Every Day Smoker     Packs/day: 0.30     Types: Cigarettes     Smokeless tobacco: Never Used      Comment: 45 yr     Alcohol use No      Comment: 2-3 per day , none since Dec 2012     No Known Allergies  Current Outpatient Rx   Medication Sig Dispense Refill     blood glucose (NO BRAND SPECIFIED) lancets standard Use to test blood sugar 4 X  times daily or as directed. 1 Box 3     blood glucose monitoring (NO BRAND SPECIFIED) meter device kit Use to test blood sugar 4 X  times daily or as directed. 1 kit 0     blood glucose monitoring (NO BRAND SPECIFIED) test strip Use to test blood sugars 4 X  times daily or as  directed 200 strip 3     calcium carbonate (OS-ERIN 500 MG [place]. [place]) 1250 MG tablet Take 1 tablet by mouth daily        cephALEXin (KEFLEX) 500 MG capsule   0     Cholecalciferol (VITAMIN D3) 2000 units CAPS Take 1 capsule by mouth daily 90 capsule 3     citalopram (CELEXA) 40 MG tablet Take 1 tablet (40 mg) by mouth daily 90 tablet 0     Cyanocobalamin (VITAMIN B-12 PO) Take 1 tablet by mouth daily       desvenlafaxine succinate (PRISTIQ) 100 MG 24 hr tablet Take 2 tablets (200 mg) by mouth daily 180 tablet 1     furosemide (LASIX) 40 MG tablet Take 3 tablets twice a day. 540 tablet 1     glipiZIDE (GLUCOTROL XL) 5 MG 24 hr tablet Take 1 tablet (5 mg) by mouth daily 180 tablet 0     insulin aspart (NOVOLOG FLEXPEN) 100 UNIT/ML injection 20 units before breakfast, 20 units before lunch, 20 units before dinner, 20 units before snacks 30 mL 3     insulin glargine (LANTUS SOLOSTAR) 100 UNIT/ML pen Inject 30 Units Subcutaneous daily       insulin glargine (LANTUS SOLOSTAR) 100 UNIT/ML pen Inject 30 Units Subcutaneous every morning 12 mL 3     insulin pen needle (B-D U/F) 31G X 8 MM USE  6 times daily / OR AS DIRECTED 300 each 3     insulin pen needle (ULTICARE SHORT) 31G X 8 MM Use UP UO 6 times daily or as directed 300 each 3     lactulose (CHRONULAC) 10 GM/15ML solution Take 30 mLs (20 g) by mouth 2 times daily 946 mL 3     magnesium oxide (MAG-OX) 400 (241.3 Mg) MG tablet Take 1 tablet (400 mg) by mouth daily 90 tablet 1     ondansetron (ZOFRAN) 4 MG tablet Take 1 tablet (4 mg) by mouth every 8 hours as needed for nausea 18 tablet 1     order for DME Equipment being ordered:BioTab compression pants pneumatic system 1 Piece 0     order for DME Equipment being ordered:bilateral pneumatic leg massage for treatment of extreme bilateral lower leg edema. 2 pump 0     oxyCODONE IR (ROXICODONE) 5 MG tablet Take 1-2 tablet every 8 hours as needed 180 tablet 0     OYSTER SHELL CALCIUM/D 500-200 MG-UNIT per tablet Take 1 tablet  by mouth daily 90 tablet 3     ranitidine (ZANTAC) 150 MG tablet Take 1 tablet (150 mg) by mouth 2 times daily 180 tablet 3     rifaximin (XIFAXAN) 550 MG TABS tablet Take 1 tablet (550 mg) by mouth 2 times daily 180 tablet 3     spironolactone (ALDACTONE) 50 MG tablet Takes 3 tablets (150 mg) every AM and take 2 tablets (100 mg) every PM. 150 tablet 11     STATIN NOT PRESCRIBED, INTENTIONAL, 1 each daily Statin not prescribed intentionally due to Active liver disease 0 each 0     UNABLE TO FIND MEDICATION NAME: Burdock root         Objective:   /51 (BP Location: Right arm, Patient Position: Chair, Cuff Size: Adult Large)  Pulse 77  SpO2 100%    General: Patient is well groomed, appears stated age, is alert, cooperative and in no acute distress.  Vascular: DP and PT pulses are non-palpable bilaterally. LE capillary refill time is <3 seconds. Absent pedal hair. Severe bilateral LE edema, non-pitting.  Derm: Severe lipodermatosclerosis present to bilateral LEs. Multiple open lesions present to various areas of bilateral LEs. All are to dermis only with mild serous drainage. No erythema or calor present. No purulent drainage or wound odor. Toenails are somewhat elongated bilaterally.    Previous Laboratory Studies:   Lab Results   Component Value Date    A1C Canceled, Test credited 05/04/2018    A1C Canceled, Test credited 04/06/2018    A1C Canceled, Test credited 01/04/2018    A1C 6.0 08/25/2017    A1C 6.4 03/21/2017    A1C 6.6 12/22/2016    A1C 6.7 09/27/2016    A1C 6.5 06/28/2016    A1C 6.5 12/22/2015    A1C 6.6 09/21/2015    A1C 7.4 05/12/2015    A1C 6.5 08/15/2014    A1C 6.8 05/27/2014    A1C 6.8 07/05/2013    A1C 7.0 04/02/2013    A1C 8.1 12/12/2012    A1C 9.3 08/14/2012    A1C 6.3 10/04/2010       Assessment:   - Lymphedema  - Stasis dermatitis  - Lipodermatosclerosis  - Venous ulcers to LEs  - Peripheral venous insufficiency    Plan:   - Pt seen and evaluated. Diagnosis and treatment options were  discussed.   - No excisional debridement necessary today.   - Wound care instructions: 1. Clean wound with wound cleanser and pat dry. 2. Apply xeroform square to open lesions 3. Cover with bandage. Continue to wrap legs daily with your compression bandages. Perform dressing changes when you change your compression bandages.  - Recommend leg pumps, increased walking if possible.  - Ordered supplies through [facility] to patient's home  - Lymphedema consult - patient needs follow up care. Already knows how to wrap his own legs.  - Prescribed mycolog for itchy areas, apply only as needed.   - Pt to return to clinic in 1 month        Again, thank you for allowing me to participate in the care of your patient.      Sincerely,    Judit Cantu PA-C

## 2018-11-16 NOTE — NURSING NOTE
Chief Complaint   Patient presents with     Wound Check     New patient consultation. Bilateral lower leg wound.        Vitals:    11/16/18 1350   BP: 116/51   BP Location: Right arm   Patient Position: Chair   Cuff Size: Adult Large   Pulse: 77   SpO2: 100%       There is no height or weight on file to calculate BMI.      WOUND EVALUATION: Bilateral lower leg extremity.    Rashaad HELMS LPN

## 2018-11-16 NOTE — MR AVS SNAPSHOT
After Visit Summary   11/16/2018    Lawrence Louie    MRN: [id number]           Patient Information     Date Of Birth          1955        Visit Information        Provider Department      11/16/2018 1:30 PM Judit Cantu PA-C [facility]        Today's Diagnoses     Stasis dermatitis of both legs    -  1    Lymphedema        Lipodermatosclerosis        Chronic venous hypertension with ulcer, bilateral (H)           Follow-ups after your visit        Additional Services     LYMPHEDEMA THERAPY REFERRAL       If you have not heard from the scheduling office within 2 business days, please call [phone] for all locations, with the exception of [place], please call [phone] and [place], please call [phone].    Please be aware that coverage of these services is subject to the terms and limitations of your health insurance plan.  Call member services at your health plan with any benefit or coverage questions.                  Your next 10 appointments already scheduled     Dec 10, 2018  1:00 PM CST   Lymphedema Evaluation with Savannah Mtz OT   [facility] ([facility])    [address] [facility] [address] [phone]            Dec 13, 2018   Procedure with Meghna Simmons MD   [facility] [place], [facility] ([facility], [facility])    [address] [address] [phone]           The [facility] is located on the corner of [address] and [address] on the [facility]. It is easily accessible from virtually any point in the [place] area, via [address].            Dec 14, 2018  1:00 PM CST   (Arrive by 12:45 PM)   New Patient Visit with [facility] ([facility])    [address]  4th Floor  [facility] [address] [phone]            Apr  22, 2019  1:30 PM CDT   Lab with  LAB   [facility] ([facility])    [address]  1st Floor  [facility] [address] [phone]            Apr 22, 2019  2:30 PM CDT   (Arrive by 2:15 PM)   Return General Liver with Meghna Simmons MD   [facility] ([facility])    [address]  [address]  [facility] [address] [phone]              Future tests that were ordered for you today     Open Future Orders        Priority Expected Expires Ordered    LYMPHEDEMA THERAPY REFERRAL Routine  11/16/2019 11/16/2018            Who to contact     Please call your clinic at [phone] to:    Ask questions about your health    Make or cancel appointments    Discuss your medicines    Learn about your test results    Speak to your doctor            Additional Information About Your Visit        [employer] Information     [employer] gives you secure access to your electronic health record. If you see a primary care provider, you can also send messages to your care team and make appointments. If you have questions, please call your primary care clinic.  If you do not have a primary care provider, please call [phone] and they will assist you.      [employer] is an electronic gateway that provides easy, online access to your medical records. With [employer], you can request a clinic appointment, read your test results, renew a prescription or communicate with your care team.     To access your existing account, please contact your [facility] or call [phone] for assistance.        Care EveryWhere ID     This is your Care EveryWhere ID. This could be used by other organizations to access your [facility] medical records  [id number]        Your Vitals Were     Pulse Pulse Oximetry                77 100%           Blood Pressure from Last 3 Encounters:   11/16/18 116/51   10/26/18 115/68   09/13/18 119/64     Weight from Last 3 Encounters:   10/26/18 277 lb 12.8 oz   09/13/18 284 lb 3.2 oz   07/31/18 285 lb 4.8 oz                 Today's Medication Changes          These changes are accurate as of 11/16/18  2:59 PM.  If you have any questions, ask your nurse or doctor.               Start taking these medicines.        Dose/Directions    nystatin-triamcinolone cream   Commonly known as:  MYCOLOG II   Used for:  Stasis dermatitis of both legs   Started by:  Judit Cantu PA-C        Apply topically 2 times daily as needed (itching)   Quantity:  30 g   Refills:  3            Where to get your medicines      These medications were sent to [facility] [address] [place] [address] [address]  [address] [place], [place] [address]TH [facility] [address]     Phone:  [phone]     nystatin-triamcinolone cream                Primary Care Provider Office Phone # Fax #    Ary Murphy, APRN [phone]       [address] [place] [address] [address]  [facility] [address]        Equal Access to Services     MARY LOU LORENZO : Hadii sil ku hadasho Soomaali, waaxda luqadaha, qaybta kaalmada adeegyada, waxay idiin haymiguel richardson . So [facility] [phone].    ATENCIÓN: Si habla español, tiene a rondon disposición servicios gratuitos de asistencia lingüística. Llame al [phone].    We comply with applicable federal civil rights laws and [place] laws. We do not discriminate on the basis of race, color, national origin, age, disability, sex, sexual orientation, or gender identity.            Thank you!     Thank you for choosing [facility]  for your care. Our goal is always to provide you with excellent care. Hearing back from our patients is one way we can continue to improve our services. Please take a few minutes to complete the written survey that you may receive in the mail after your visit with us. Thank you!             Your Updated Medication List - Protect others around you: Learn how to safely use, store and throw away your  medicines at www.disposemymeds.org.          This list is accurate as of 11/16/18  2:59 PM.  Always use your most recent med list.                   Brand Name Dispense Instructions for use Diagnosis    blood glucose lancets standard    no brand specified    1 Box    Use to test blood sugar 4 X  times daily or as directed.    Diabetes mellitus, type 2 (H)       blood glucose monitoring meter device kit    no brand specified    1 kit    Use to test blood sugar 4 X  times daily or as directed.    Type 2 diabetes mellitus with other specified complication (H)       blood glucose monitoring test strip    no brand specified    200 strip    Use to test blood sugars 4 X  times daily or as directed    Diabetes mellitus, type 2 (H)       calcium carbonate 500 mg (elemental) 500 MG tablet    OS-ERIN     Take 1 tablet by mouth daily        calcium carbonate 500 mg-vitamin D 200 units 500-200 MG-UNIT per tablet    OSCAL with D;OYSTER SHELL CALCIUM    90 tablet    Take 1 tablet by mouth daily    Alcoholic cirrhosis (H)       cephALEXin 500 MG capsule    KEFLEX          citalopram 40 MG tablet    celeXA    90 tablet    Take 1 tablet (40 mg) by mouth daily    Recurrent major depressive disorder, remission status unspecified (H)       desvenlafaxine succinate 100 MG 24 hr tablet    PRISTIQ    180 tablet    Take 2 tablets (200 mg) by mouth daily    Other depression       furosemide 40 MG tablet    LASIX    540 tablet    Take 3 tablets twice a day.    Edema of both legs       glipiZIDE 5 MG 24 hr tablet    GLUCOTROL XL    180 tablet    Take 1 tablet (5 mg) by mouth daily    Type 2 diabetes mellitus without complication, with long-term current use of insulin (H)       insulin aspart 100 UNIT/ML injection    NovoLOG FLEXPEN    30 mL    20 units before breakfast, 20 units before lunch, 20 units before dinner, 20 units before snacks    Type 2 diabetes mellitus without complication, with long-term current use of insulin (H)       * insulin  glargine 100 UNIT/ML injection    LANTUS     Inject 30 Units Subcutaneous daily        * insulin glargine 100 UNIT/ML injection    LANTUS    12 mL    Inject 30 Units Subcutaneous every morning    Type 2 diabetes mellitus with diabetic neuropathic arthropathy, without long-term current use of insulin (H)       * insulin pen needle 31G X 8 MM miscellaneous    B-D U/F    300 each    USE  6 times daily / OR AS DIRECTED    Type 2 diabetes, HbA1c goal < 7% (H)       * insulin pen needle 31G X 8 MM miscellaneous    ULTICARE SHORT    300 each    Use UP UO 6 times daily or as directed    Type 2 diabetes, HbA1c goal < 7% (H)       lactulose 10 GM/15ML solution    CHRONULAC    946 mL    Take 30 mLs (20 g) by mouth 2 times daily    Hepatic encephalopathy (H)       magnesium oxide 400 (241.3 Mg) MG tablet    MAG-OX    90 tablet    Take 1 tablet (400 mg) by mouth daily    Cramp of limb       nystatin-triamcinolone cream    MYCOLOG II    30 g    Apply topically 2 times daily as needed (itching)    Stasis dermatitis of both legs       ondansetron 4 MG tablet    ZOFRAN    18 tablet    Take 1 tablet (4 mg) by mouth every 8 hours as needed for nausea    Alcoholic cirrhosis of liver with ascites (H), Nausea       order for DME     2 pump    Equipment being ordered:bilateral pneumatic leg massage for treatment of extreme bilateral lower leg edema.    Localized edema       order for DME     1 Piece    Equipment being ordered:BioTab compression pants pneumatic system    Lymphedema       oxyCODONE IR 5 MG tablet    ROXICODONE    180 tablet    Take 1-2 tablet every 8 hours as needed    Alcoholic cirrhosis of liver with ascites (H)       ranitidine 150 MG tablet    ZANTAC    180 tablet    Take 1 tablet (150 mg) by mouth 2 times daily    Esophageal varices (H)       rifaximin 550 MG Tabs tablet    XIFAXAN    180 tablet    Take 1 tablet (550 mg) by mouth 2 times daily    Hepatic encephalopathy (H)       spironolactone 50 MG tablet    ALDACTONE     150 tablet    Takes 3 tablets (150 mg) every AM and take 2 tablets (100 mg) every PM.    Portal hypertension (H), Generalized edema       STATIN NOT PRESCRIBED (INTENTIONAL)     0 each    1 each daily Statin not prescribed intentionally due to Active liver disease    Hyperlipidemia LDL goal <100       UNABLE TO FIND      MEDICATION NAME: Seble root        VITAMIN B-12 PO      Take 1 tablet by mouth daily        vitamin D3 2000 units Caps     90 capsule    Take 1 capsule by mouth daily    Mild major depression (H)       * Notice:  This list has 4 medication(s) that are the same as other medications prescribed for you. Read the directions carefully, and ask your doctor or other care provider to review them with you.

## 2018-11-19 ENCOUNTER — MYC MEDICAL ADVICE (OUTPATIENT)
Dept: WOUND CARE | Facility: CLINIC | Age: 63
End: 2018-11-19

## 2018-11-20 ENCOUNTER — MYC MEDICAL ADVICE (OUTPATIENT)
Dept: GASTROENTEROLOGY | Facility: CLINIC | Age: 63
End: 2018-11-20

## 2018-11-20 ENCOUNTER — TELEPHONE (OUTPATIENT)
Dept: WOUND CARE | Facility: CLINIC | Age: 63
End: 2018-11-20

## 2018-11-20 ENCOUNTER — CARE COORDINATION (OUTPATIENT)
Dept: GASTROENTEROLOGY | Facility: CLINIC | Age: 63
End: 2018-11-20

## 2018-11-20 DIAGNOSIS — K70.31 ALCOHOLIC CIRRHOSIS OF LIVER WITH ASCITES (H): ICD-10-CM

## 2018-11-20 DIAGNOSIS — I87.2 VENOUS STASIS DERMATITIS OF BOTH LOWER EXTREMITIES: Primary | ICD-10-CM

## 2018-11-20 RX ORDER — OXYCODONE HYDROCHLORIDE 5 MG/1
TABLET ORAL
Qty: 180 TABLET | Refills: 0 | Status: SHIPPED | OUTPATIENT
Start: 2018-12-01 | End: 2018-12-20

## 2018-11-20 NOTE — TELEPHONE ENCOUNTER
Reason For Call:   Chief Complaint   Patient presents with     Opioid Refill     oxycodone       Medication Name, Dose and Monthly Quantity:   oxyCODONE IR (ROXICODONE) 5 MG tablet, 180 tabs    Diagnosis requiring opiates:   Alcoholic cirrhosis of liver with ascites (H) [K70.31]     Problem List Updated:   no    Opioid Agreement On File - [facility] PAIN CONTRACT ID# [id number]:  no    Last Urine Drug Screen (at least once every 12 months) Date:   none    Unexpected Results:   na    [place]  Data Reviewed (at least once every 3 months) Date:   11/20/18  Unexpected Results:    no    Last Fill Date:   11/1/18    Due Date:   12/1/18    Last Visit with PCP:   9/13/18    Future Visits with PCP:   none    Processing:   Mail to pharmacy    Hipolito-Alize Ponce RN

## 2018-11-20 NOTE — PROGRESS NOTES
Patient's wife Raven reports patient is doing okay, but still working on improving his lower extremity wounds. He saw a wound care practitioner on 11/16, who prescribed some new topical medications and wraps. She wanted to be sure Dr. Simmons is okay with him using a steroid ointment. She is awaiting a call back from the wound clinic for further instructions and otherwise has no concerns for hepatology at this time.

## 2018-11-28 RX ORDER — TRIAMCINOLONE ACETONIDE 1 MG/G
CREAM TOPICAL 2 TIMES DAILY PRN
Qty: 30 G | Refills: 3 | Status: SHIPPED | OUTPATIENT
Start: 2018-11-28 | End: 2019-01-07

## 2018-11-28 RX ORDER — NYSTATIN 100000 U/G
CREAM TOPICAL 2 TIMES DAILY
Qty: 30 G | Refills: 11 | Status: SHIPPED | OUTPATIENT
Start: 2018-11-28 | End: 2019-01-07

## 2018-11-28 NOTE — TELEPHONE ENCOUNTER
Spoke with a rep from [employer] to find out way  the patients wound supplies haven't been delivered to the patient. The rep informed me that they did not have a prescription for this patient and asked if I would send the prescription again. I will contact [facility] again to confirm that they received the fax.    SMITH Stewart, EMT

## 2018-11-29 LAB — HBA1C MFR BLD: 4.5 % (ref 0–5.6)

## 2018-12-04 ENCOUNTER — OFFICE VISIT (OUTPATIENT)
Dept: INTERNAL MEDICINE | Facility: CLINIC | Age: 63
End: 2018-12-04
Payer: MEDICARE

## 2018-12-04 VITALS
HEART RATE: 73 BPM | SYSTOLIC BLOOD PRESSURE: 105 MMHG | DIASTOLIC BLOOD PRESSURE: 60 MMHG | BODY MASS INDEX: 40.03 KG/M2 | WEIGHT: 311.8 LBS

## 2018-12-04 DIAGNOSIS — E11.9 TYPE 2 DIABETES MELLITUS WITHOUT COMPLICATION, WITH LONG-TERM CURRENT USE OF INSULIN (H): ICD-10-CM

## 2018-12-04 DIAGNOSIS — K70.31 ALCOHOLIC CIRRHOSIS OF LIVER WITH ASCITES (H): ICD-10-CM

## 2018-12-04 DIAGNOSIS — F32.89 OTHER DEPRESSION: ICD-10-CM

## 2018-12-04 DIAGNOSIS — Z12.5 SCREENING FOR PROSTATE CANCER: ICD-10-CM

## 2018-12-04 DIAGNOSIS — E11.51 TYPE II DIABETES MELLITUS WITH PERIPHERAL CIRCULATORY DISORDER (H): ICD-10-CM

## 2018-12-04 DIAGNOSIS — I10 HYPERTENSION GOAL BP (BLOOD PRESSURE) < 130/80: ICD-10-CM

## 2018-12-04 DIAGNOSIS — D69.6 THROMBOCYTOPENIA (H): Primary | ICD-10-CM

## 2018-12-04 DIAGNOSIS — Z13.29 SCREENING FOR THYROID DISORDER: ICD-10-CM

## 2018-12-04 DIAGNOSIS — E78.5 HYPERLIPIDEMIA LDL GOAL <100: ICD-10-CM

## 2018-12-04 DIAGNOSIS — Z79.4 TYPE 2 DIABETES MELLITUS WITHOUT COMPLICATION, WITH LONG-TERM CURRENT USE OF INSULIN (H): ICD-10-CM

## 2018-12-04 RX ORDER — GABAPENTIN 300 MG/1
CAPSULE ORAL
Refills: 3 | COMMUNITY
Start: 2018-04-29 | End: 2019-08-18

## 2018-12-04 RX ORDER — LACTULOSE 10 G/15ML
SOLUTION ORAL; RECTAL
Refills: 11 | COMMUNITY
Start: 2018-10-28 | End: 2019-04-22

## 2018-12-04 ASSESSMENT — PAIN SCALES - GENERAL: PAINLEVEL: SEVERE PAIN (6)

## 2018-12-04 NOTE — PATIENT INSTRUCTIONS
[facility] Medication Refill Request Information:  * Please contact your pharmacy regarding ANY request for medication refills.  ** [facility] Prescription Fax = [phone]  * Please allow 3 business days for routine medication refills.  * Please allow 5 business days for controlled substance medication refills.     [facility] Test Result notification information:  *You will be notified with in 7-10 days of your appointment day regarding the results of your test.  If you are on MyChart you will be notified as soon as the provider has reviewed the results and signed off on them.    [facility]: [phone]       3 tablets of furosemide(lasix) a day    6 tablets of spironolactone(aldactone) a day    Weight on 12/10/2018.  I will check weight and make adjustments according to your weight.

## 2018-12-04 NOTE — MR AVS SNAPSHOT
After Visit Summary   12/4/2018    Lawrence Louie    MRN: [id number]           Patient Information     Date Of Birth          1955        Visit Information        Provider Department      12/4/2018 1:00 PM Ary Murphy, APRN CNP [facility]        Today's Diagnoses     Thrombocytopenia (H)    -  1    Hypertension goal BP (blood pressure) < 130/80        Alcoholic cirrhosis of liver with ascites (H)        Hyperlipidemia LDL goal <100        Type II diabetes mellitus with peripheral circulatory disorder (H)        Screening for thyroid disorder        Screening for prostate cancer          Care Instructions    Primary Care Center Medication Refill Request Information:  * Please contact your pharmacy regarding ANY request for medication refills.  ** [facility] Prescription Fax = [phone]  * Please allow 3 business days for routine medication refills.  * Please allow 5 business days for controlled substance medication refills.     Primary Care Center Test Result notification information:  *You will be notified with in 7-10 days of your appointment day regarding the results of your test.  If you are on MyChart you will be notified as soon as the provider has reviewed the results and signed off on them.    Primary Care Center: [phone]       3 tablets of furosemide(lasix) a day    6 tablets of spironolactone(aldactone) a day    Weight on 12/10/2018.  I will check weight and make adjustments according to your weight.           Follow-ups after your visit        Follow-up notes from your care team     Return in about 3 months (around 3/4/2019).      Your next 10 appointments already scheduled     Dec 10, 2018  1:00 PM CST   Lymphedema Evaluation with Savannah Mtz OT   [facility] ([facility])    [address] [facility] [address]   [phone]            Dec 13, 2018   Procedure with Meghna Simmons MD    [facility] [place], Endoscopy ([facility])    [address]  [facility] [address]   [phone]           The [facility] is located on the corner of [address] on the [facility]. It is easily accessible from virtually any point in the [facility] area, via [address] and [address].            Dec 14, 2018  1:00 PM CST   (Arrive by 12:45 PM)   New Patient Visit with Judit Cantu PA-C   [facility] ([facility])    [address]  4th Floor  [facility] [address]   [phone]            Apr 22, 2019  1:30 PM CDT   Lab with  LAB   [facility] ([facility])    [address]  1st Floor  [facility] [address]   [phone]            Apr 22, 2019  2:30 PM CDT   (Arrive by 2:15 PM)   Return General Liver with Meghna Simmons MD   [facility]at[facility] ([facility])    [address]  [address]  [place] [address] [phone]              Future tests that were ordered for you today     Open Future Orders        Priority Expected Expires Ordered    CBC with platelets Routine 12/10/2018 12/31/2018 12/4/2018    Lipid panel reflex to direct LDL Non-fasting Routine 12/10/2018 12/31/2018 12/4/2018    TSH with free T4 reflex **(6 MO) Routine 12/10/2018 12/31/2018 12/4/2018    PSA screen Routine 12/10/2018 12/31/2018 12/4/2018            Who to contact     Please call your clinic at [phone] to:    Ask questions about your health    Make or cancel appointments    Discuss your medicines    Learn about your test results    Speak to your doctor            Additional Information About Your Visit        MyChart Information     [employer] gives you secure access to your electronic health record. If you see a primary care provider, you can also send messages to your care team and make appointments. If you have  questions, please call your primary care clinic.  If you do not have a primary care provider, please call [phone] and they will assist you.      [employer] is an electronic gateway that provides easy, online access to your medical records. With [employer], you can request a clinic appointment, read your test results, renew a prescription or communicate with your care team.     To access your existing account, please contact your [facility] or call [phone] for assistance.        Care EveryWhere ID     This is your Care EveryWhere ID. This could be used by other organizations to access your [place] medical records  [id number]        Your Vitals Were     Pulse BMI (Body Mass Index)                73 40.03 kg/m2           Blood Pressure from Last 3 Encounters:   12/04/18 105/60   11/16/18 116/51   10/26/18 115/68    Weight from Last 3 Encounters:   12/04/18 141.4 kg (311 lb 12.8 oz)   10/26/18 126 kg (277 lb 12.8 oz)   09/13/18 128.9 kg (284 lb 3.2 oz)              We Performed the Following     Hemoglobin A1c        Primary Care Provider Office Phone # Fax #    Ary JACKSON Katherine, APRN [phone]       [address] [place] [address] [address] [place] [address]        Equal Access to Services     JOSEF LORENZO : Hadii isl ku hadasho Soomaali, waaxda luqadaha, qaybta kaalmada adeegyada, silvia leonin haymiguel salas. So [facility] [phone].    ATENCIÓN: Si habla español, tiene a rondon disposición servicios gratuitos de asistencia lingüística. [facility] [phone].    We comply with applicable federal civil rights laws and [place] laws. We do not discriminate on the basis of race, color, national origin, age, disability, sex, sexual orientation, or gender identity.            Thank you!     Thank you for choosing [facility]  for your care. Our goal is always to provide you with excellent care. Hearing back from our patients is one way we can continue to  improve our services. Please take a few minutes to complete the written survey that you may receive in the mail after your visit with us. Thank you!             Your Updated Medication List - Protect others around you: Learn how to safely use, store and throw away your medicines at www.disposemymeds.org.          This list is accurate as of 12/4/18  2:10 PM.  Always use your most recent med list.                   Brand Name Dispense Instructions for use Diagnosis    blood glucose lancets standard    NO BRAND SPECIFIED    1 Box    Use to test blood sugar 4 X  times daily or as directed.    Diabetes mellitus, type 2 (H)       blood glucose monitoring meter device kit    NO BRAND SPECIFIED    1 kit    Use to test blood sugar 4 X  times daily or as directed.    Type 2 diabetes mellitus with other specified complication (H)       blood glucose monitoring test strip    NO BRAND SPECIFIED    200 strip    Use to test blood sugars 4 X  times daily or as directed    Diabetes mellitus, type 2 (H)       calcium carbonate 500 MG tablet    OS-ERIN     Take 1 tablet by mouth daily        calcium carbonate-vitamin D 500-200 MG-UNIT tablet    OSCAL w/D    90 tablet    Take 1 tablet by mouth daily    Alcoholic cirrhosis (H)       cephALEXin 500 MG capsule    KEFLEX          childrens multivitamin w/ionr 60 MG chewable tablet      Take 1 chew tab by mouth daily        citalopram 40 MG tablet    celeXA    90 tablet    Take 1 tablet (40 mg) by mouth daily    Recurrent major depressive disorder, remission status unspecified (H)       desvenlafaxine 100 MG 24 hr tablet    PRISTIQ    180 tablet    Take 2 tablets (200 mg) by mouth daily    Other depression       furosemide 40 MG tablet    LASIX    540 tablet    Take 3 tablets twice a day.    Edema of both legs       gabapentin 300 MG capsule    NEURONTIN          glipiZIDE 5 MG 24 hr tablet    GLUCOTROL XL    180 tablet    Take 1 tablet (5 mg) by mouth daily    Type 2 diabetes mellitus  without complication, with long-term current use of insulin (H)       insulin aspart 100 UNIT/ML pen    NovoLOG FLEXPEN    30 mL    20 units before breakfast, 20 units before lunch, 20 units before dinner, 20 units before snacks    Type 2 diabetes mellitus without complication, with long-term current use of insulin (H)       * insulin glargine 100 UNIT/ML pen    LANTUS PEN     Inject 30 Units Subcutaneous daily        * insulin glargine 100 UNIT/ML pen    LANTUS PEN    12 mL    Inject 30 Units Subcutaneous every morning    Type 2 diabetes mellitus with diabetic neuropathic arthropathy, without long-term current use of insulin (H)       * insulin pen needle 31G X 8 MM miscellaneous    B-D U/F    300 each    USE  6 times daily / OR AS DIRECTED    Type 2 diabetes, HbA1c goal < 7% (H)       * insulin pen needle 31G X 8 MM miscellaneous    ULTICARE SHORT    300 each    Use UP UO 6 times daily or as directed    Type 2 diabetes, HbA1c goal < 7% (H)       lactulose 10 GM/15ML solution    CHRONULAC    946 mL    Take 30 mLs (20 g) by mouth 2 times daily    Hepatic encephalopathy (H)       lactulose encephalopathy 10 GM/15ML SOLUTION    CHRONULAC          magnesium oxide 400 (241.3 Mg) MG tablet    MAG-OX    90 tablet    Take 1 tablet (400 mg) by mouth daily    Cramp of limb       nystatin 319034 UNIT/GM external cream    MYCOSTATIN    30 g    Apply topically 2 times daily    Venous stasis dermatitis of both lower extremities       nystatin-triamcinolone 604440-4.1 UNIT/GM-% external cream    MYCOLOG II    30 g    Apply topically 2 times daily as needed (itching)    Stasis dermatitis of both legs       ondansetron 4 MG tablet    ZOFRAN    18 tablet    Take 1 tablet (4 mg) by mouth every 8 hours as needed for nausea    Alcoholic cirrhosis of liver with ascites (H), Nausea       order for DME     2 pump    Equipment being ordered:bilateral pneumatic leg massage for treatment of extreme bilateral lower leg edema.    Localized  edema       order for DME     1 Piece    Equipment being ordered:BioTab compression pants pneumatic system    Lymphedema       oxyCODONE 5 MG tablet    ROXICODONE    180 tablet    Take 1-2 tablet every 8 hours as needed    Alcoholic cirrhosis of liver with ascites (H)       ranitidine 150 MG tablet    ZANTAC    180 tablet    Take 1 tablet (150 mg) by mouth 2 times daily    Esophageal varices (H)       rifaximin 550 MG Tabs tablet    XIFAXAN    180 tablet    Take 1 tablet (550 mg) by mouth 2 times daily    Hepatic encephalopathy (H)       spironolactone 50 MG tablet    ALDACTONE    150 tablet    Takes 3 tablets (150 mg) every AM and take 2 tablets (100 mg) every PM.    Portal hypertension (H), Generalized edema       STATIN NOT PRESCRIBED    INTENTIONAL    0 each    1 each daily Statin not prescribed intentionally due to Active liver disease    Hyperlipidemia LDL goal <100       triamcinolone 0.1 % external cream    KENALOG    30 g    Apply topically 2 times daily as needed for irritation    Venous stasis dermatitis of both lower extremities       UNABLE TO FIND      MEDICATION NAME: [place]dock root        VITAMIN B-12 PO      Take 1 tablet by mouth daily        vitamin D3 2000 units Caps     90 capsule    Take 1 capsule by mouth daily    Mild major depression (H)       * Notice:  This list has 4 medication(s) that are the same as other medications prescribed for you. Read the directions carefully, and ask your doctor or other care provider to review them with you.

## 2018-12-04 NOTE — PROGRESS NOTES
HPI: Lawrence Louie is a 62 year old male who comes in for hospital follow.   He was admitted to [facility] for a right arm cellulitis and admitted for IV antibiotics from 11/26-11/29/2018. Source of infection was not determined but he responded well to IV antibiotics and was discharged on Keflex.   He has an appt with a wound specialist scheduled   HGB 8.1 11/29/18 on discharge.   He is concerned about 12 pound increase in weight but has not taken his diuretics since admission.    He cancelled his upcoming upper endoscopy and colonoscopy until after Jan 1.     Patient Active Problem List   Diagnosis     Mild major depression (H)     Thrombocytopenia (H)     Hypertension goal BP (blood pressure) < 130/80     Plantar warts     Corns and callosities     Family history of colon cancer     Type 2 diabetes, HbA1c goal < 7% (H)     Portal hypertension (H)     Alcoholic cirrhosis (H)     Advanced directives, counseling/discussion     Ascites     Esophageal varices (H)     Multiple rib fractures     Tobacco use disorder     Hyperlipidemia LDL goal <100     Dental caries     Prurigo nodularis     Esophageal reflux     Morbid obesity (H)     History of colonic polyps: tubular adenomas and serrated adenomas     Type II diabetes mellitus with peripheral circulatory disorder (H)     Alcoholic cirrhosis of liver with ascites (H)     Hepatic encephalopathy (H)     Lymphedema of both lower extremities         Current Outpatient Prescriptions   Medication Sig Dispense Refill     blood glucose (NO BRAND SPECIFIED) lancets standard Use to test blood sugar 4 X  times daily or as directed. 1 Box 3     blood glucose monitoring (NO BRAND SPECIFIED) meter device kit Use to test blood sugar 4 X  times daily or as directed. 1 kit 0     blood glucose monitoring (NO BRAND SPECIFIED) test strip Use to test blood sugars 4 X  times daily or as directed 200 strip 3     calcium carbonate (OS-ERIN 500 MG [place]) 1250 MG tablet Take 1 tablet  by mouth daily        cephALEXin (KEFLEX) 500 MG capsule   0     childrens multivitamin w/ionr (FLINTSTONES COMPLETE) 60 MG chewable tablet Take 1 chew tab by mouth daily       Cholecalciferol (VITAMIN D3) 2000 units CAPS Take 1 capsule by mouth daily 90 capsule 3     citalopram (CELEXA) 40 MG tablet Take 1 tablet (40 mg) by mouth daily 90 tablet 0     Cyanocobalamin (VITAMIN B-12 PO) Take 1 tablet by mouth daily       furosemide (LASIX) 40 MG tablet Take 3 tablets twice a day. 540 tablet 1     gabapentin (NEURONTIN) 300 MG capsule   3     glipiZIDE (GLUCOTROL XL) 5 MG 24 hr tablet Take 1 tablet (5 mg) by mouth daily 180 tablet 0     insulin glargine (LANTUS SOLOSTAR) 100 UNIT/ML pen Inject 30 Units Subcutaneous daily       insulin glargine (LANTUS SOLOSTAR) 100 UNIT/ML pen Inject 30 Units Subcutaneous every morning 12 mL 3     insulin pen needle (B-D U/F) 31G X 8 MM USE  6 times daily / OR AS DIRECTED 300 each 3     insulin pen needle (ULTICARE SHORT) 31G X 8 MM Use UP UO 6 times daily or as directed 300 each 3     lactulose (CHRONULAC) 10 GM/15ML solution Take 30 mLs (20 g) by mouth 2 times daily 946 mL 3     lactulose encephalopathy (CHRONULAC) 10 GM/15ML SOLUTION   11     magnesium oxide (MAG-OX) 400 (241.3 Mg) MG tablet Take 1 tablet (400 mg) by mouth daily 90 tablet 1     nystatin (MYCOSTATIN) 845184 UNIT/GM external cream Apply topically 2 times daily 30 g 11     nystatin-triamcinolone (MYCOLOG II) cream Apply topically 2 times daily as needed (itching) 30 g 3     ondansetron (ZOFRAN) 4 MG tablet Take 1 tablet (4 mg) by mouth every 8 hours as needed for nausea 18 tablet 1     order for DME Equipment being ordered:BioTab compression pants pneumatic system 1 Piece 0     order for DME Equipment being ordered:bilateral pneumatic leg massage for treatment of extreme bilateral lower leg edema. 2 pump 0     oxyCODONE IR (ROXICODONE) 5 MG tablet Take 1-2 tablet every 8 hours as needed 180 tablet 0     OYSTER SHELL  CALCIUM/D 500-200 MG-UNIT per tablet Take 1 tablet by mouth daily 90 tablet 3     ranitidine (ZANTAC) 150 MG tablet Take 1 tablet (150 mg) by mouth 2 times daily 180 tablet 3     rifaximin (XIFAXAN) 550 MG TABS tablet Take 1 tablet (550 mg) by mouth 2 times daily 180 tablet 3     spironolactone (ALDACTONE) 50 MG tablet Takes 3 tablets (150 mg) every AM and take 2 tablets (100 mg) every PM. 150 tablet 11     STATIN NOT PRESCRIBED, INTENTIONAL, 1 each daily Statin not prescribed intentionally due to Active liver disease 0 each 0     triamcinolone (KENALOG) 0.1 % external cream Apply topically 2 times daily as needed for irritation 30 g 3     UNABLE TO FIND MEDICATION NAME: [place] root       desvenlafaxine (PRISTIQ) 100 MG 24 hr tablet Take 2 tablets (200 mg) by mouth daily 180 tablet 0     NOVOLOG FLEXPEN 100 UNIT/ML soln INJECT 20 UNITS UNDER THE SKIN BEFORE BREAKFAST, 20 UNITS BEFORE LUNCH, 20 UNITS BEFORE DINNER, 20 UNITS BEFORE SNACKS.  30 mL 0         ALLERGIES: Review of patient's allergies indicates no known allergies.    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)      Splenomegaly      Squamous cell carcinoma      Thrombocytopenia (H)      Varices, esophageal (H)     Banded in 2011       PAST SURGICAL HX:   Past Surgical History:   Procedure Laterality Date     BIOPSY OF SKIN LESION       COLONOSCOPY Left 6/16/2016    Procedure: COMBINED COLONOSCOPY, SINGLE OR MULTIPLE BIOPSY/POLYPECTOMY BY BIOPSY;  Surgeon: Bradny Barnett MD;  Location:  GI     ESOPHAGOSCOPY, GASTROSCOPY, DUODENOSCOPY (EGD), COMBINED  2/13/2013    Procedure: COMBINED ESOPHAGOSCOPY, GASTROSCOPY, DUODENOSCOPY (EGD);;  Surgeon: Tara Cook MD;  Location:  GI     ESOPHAGOSCOPY, GASTROSCOPY,  DUODENOSCOPY (EGD), COMBINED  11/4/2013    Procedure: COMBINED ESOPHAGOSCOPY, GASTROSCOPY, DUODENOSCOPY (EGD);;  Surgeon: Lonny Diaz MD;  Location:  GI     ESOPHAGOSCOPY, GASTROSCOPY, DUODENOSCOPY (EGD), COMBINED Left 6/16/2016    Procedure: COMBINED ESOPHAGOSCOPY, GASTROSCOPY, DUODENOSCOPY (EGD), BIOPSY SINGLE OR MULTIPLE;  Surgeon: Brandy Barnett MD;  Location:  GI     EXCISE LESION TRUNK  9/24/2012    Procedure: EXCISE LESION TRUNK;;  Surgeon: Pepe Dominguez MD;  Location: [facility]     GENITOURINARY SURGERY      vasectomy     HERNIORRHAPHY UMBILICAL  9/24/2012    Procedure: HERNIORRHAPHY UMBILICAL;  UMBILICAL HERNIA REPAIR , EXCISION OF PERIUMBILICAL CYST;  Surgeon: Pepe Dominguez MD;  Location: [facility]       FAMILY HX:    Family History   Problem Relation Age of Onset     Breast Cancer Mother      Liver Cancer Mother      Cardiovascular Father      Cerebrovascular Disease Father      very low blood pressure     Cancer Father      rectal cancer     Cardiovascular Paternal Grandfather      Diabetes Brother      Cancer Sister      skin cancer     C.A.D. Other      MI, 70's     Breast Cancer Sister      Thyroid Disease No family hx of      Lipids No family hx of      Anesthesia Reaction No family hx of        ROS:  7 systems reviewed and no change    OBJECTIVE:  /60  Pulse 73  Wt 141.4 kg (311 lb 12.8 oz)  BMI 40.03 kg/m2   Wt Readings from Last 1 Encounters:   12/04/18 141.4 kg (311 lb 12.8 oz)     Constitutional: no distress, comfortable, pleasant   Eyes: anicteric  Cardiovascular: regular rate and rhythm, normal S1 and S2  Respiratory: clear to auscultation, no wheezes or crackles, normal breath sounds.   Musculoskeletal: bilateral lymphedema +++No current weeping wounds  Skin: left arm ecchymosis at IV sites in hand, distal arm and antecubital area.   Neurological: cranial nerves intact, normal strength and sensation, reflexes at patella and biceps normal, grossly normal gait, normal  speech, no tremor. A and O x 3,  good historian.  Psychological: appropriate mood, good eye contact, normal affect.   Lymph> bilateral lymphedema both legs to thighs.     ASSESSMENT/PLAN:  Increased weight due to non-compliance with diuretics.    The plan we have agreed on is that he will resume 120 mg of furosemide and 300 mg of spironolactone daily.  He will be weighed at his visit on 12/10/2018 and I will call him further plans.   RTC 3 mos. Or prn sooner.  Total time spent 25 minutes.  More than 50% of the time spent with Mr. Louie on counseling / coordinating his care.    Ary RAY, CNP

## 2018-12-04 NOTE — NURSING NOTE
Chief Complaint   Patient presents with     Hospital F/U     pt here following a hospital visit       Elaine Macias CMA at 1:19 PM on 12/4/2018.

## 2018-12-05 RX ORDER — INSULIN ASPART 100 [IU]/ML
INJECTION, SOLUTION INTRAVENOUS; SUBCUTANEOUS
Qty: 30 ML | Refills: 0 | Status: SHIPPED | OUTPATIENT
Start: 2018-12-05 | End: 2019-01-02

## 2018-12-05 RX ORDER — DESVENLAFAXINE 100 MG/1
200 TABLET, EXTENDED RELEASE ORAL DAILY
Qty: 180 TABLET | Refills: 0 | Status: SHIPPED | OUTPATIENT
Start: 2018-12-05 | End: 2019-03-17

## 2018-12-05 NOTE — TELEPHONE ENCOUNTER
Last Clinic Visit: 12/4/2018  [facility]  Pristiq refill: 90 day Refill sent to pharmacy. PHQ9 due  Novolog Flexpen: 90 day refill sent to pharmacy. Overdue LDL  FYI to Clinic CMA: overdue LDL, PHQ9

## 2018-12-06 ENCOUNTER — TELEPHONE (OUTPATIENT)
Dept: GASTROENTEROLOGY | Facility: CLINIC | Age: 63
End: 2018-12-06

## 2018-12-06 NOTE — TELEPHONE ENCOUNTER
VM with request pt contact Endoscopy Pre-assessment RN to review upcoming procedure information.    Giovanna Moya RN

## 2018-12-10 ENCOUNTER — HOSPITAL ENCOUNTER (OUTPATIENT)
Dept: OCCUPATIONAL THERAPY | Facility: CLINIC | Age: 63
Setting detail: THERAPIES SERIES
End: 2018-12-10
Attending: PHYSICIAN ASSISTANT
Payer: MEDICARE

## 2018-12-10 DIAGNOSIS — I89.0 LYMPHEDEMA: ICD-10-CM

## 2018-12-10 DIAGNOSIS — I87.2 STASIS DERMATITIS OF BOTH LEGS: ICD-10-CM

## 2018-12-10 DIAGNOSIS — M79.3 LIPODERMATOSCLEROSIS: ICD-10-CM

## 2018-12-10 PROCEDURE — 97535 SELF CARE MNGMENT TRAINING: CPT | Mod: GO | Performed by: OCCUPATIONAL THERAPIST

## 2018-12-10 PROCEDURE — G8987 SELF CARE CURRENT STATUS: HCPCS | Mod: GO,CK | Performed by: OCCUPATIONAL THERAPIST

## 2018-12-10 PROCEDURE — 97166 OT EVAL MOD COMPLEX 45 MIN: CPT | Mod: GO | Performed by: OCCUPATIONAL THERAPIST

## 2018-12-10 PROCEDURE — G8988 SELF CARE GOAL STATUS: HCPCS | Mod: GO,CI | Performed by: OCCUPATIONAL THERAPIST

## 2018-12-10 NOTE — PROGRESS NOTES
12/10/18 1334   Quick Adds   Quick Adds Certification   Rehab Discipline   Discipline OT   Type of Visit   Type of visit Initial Edema Evaluation       present No   General Information   Start of care 12/10/18   Referring physician Arti Cantu PA-C   Orders Evaluate and treat as indicated   Order date 11/16/18   Medical diagnosis PMHx includes: ETOH cirrhosis, edema with sores on legs, SCC, esophageal varices, obesity, lipodermatosclerosis, venous insufficiency.  Pt hospitalized at the end of November of 2018 with cellulitis of arm, all diuretics removed and BLEs increased. Pt presents with BLE edema and non or slow healing sores.   Onset of illness / date of surgery 11/16/18   Edema onset 11/16/18   Affected body parts LLE;RLE   Edema etiology Chronic Venous Insufficiency   Edema etiology comments likely due to liver involvement, decreased functional mobility   Pertinent history of current problem (PT: include personal factors and/or comorbidities that impact the POC; OT: include additional occupational profile info) Pt had home edema therapy about 1 year ago.  Uses pneumatic pump (BioTab) 1x/day and uses GCB? Every day.     Surgical / medical history reviewed Yes   Prior level of functional mobility modified IND iwth SEC   Prior treatment Compression pump;Elevation;Diuretics;Gradient compression bandaging   Community support Family / friend caregiver   Patient role / employment history Retired   Living environment (lives [facility] spouse)   Living environment comments cannot do stairs due to leg size and decreased balance, difficulty getting in/out of tub shower, difficulty getting in/out of car   Current assistive devices Standard cane   General observations extensive BLE edema from digits to lower abdomen, ill fitting shoes   System Outcome Measures   Outcome Measures Lymphedema   Lymphedema Life Impact Scale (score range 0-72). A higher score indicates greater impairment. 60   Subjective  "Report   Patient report of symptoms swelling in legs, itching sores on legs, \"bumps\" that keep growing   Patient / Family Goals   Patient / family goals statement to reduce size of legs, make the sores heal   Pain   Pain comments chronic pain in feet, discomfort and itching in skin of legs   Cognitive Status   Orientation Orientation to person, place and time   Level of consciousness Alert   Follows commands and answers questions 100% of the time   Edema Exam / Assessment   Skin condition comments Firm, severe lymphedema and congestion in BLEs including feet.  Creases present at all joints in toes and ankles.  Firm, fibrotic edema from bilateral ankles to knee crease.  Large papillomas present throughout BLEs from ankles to knee creases.  Pink coloring from digits to knee crease.     Ulceration comments No open, weeping wounds but two locations of unroofed skin.  Right, medial leg inferior to knee crease: 2cm x 3cm.  Left, anterior lower leg 1.8cm x1.8cm.     Girth Measurements   Girth Measurements Refer to separate girth measurement flowsheet   Range of Motion   ROM comments decreased ROM of ankle, knees and hips due to edematous tissue   Posture   Posture Forward head position   Activities of Daily Living   Activities of Daily Living Max A with LE dressing: shoes and socks   Bed Mobility   Bed mobility SBA   Transfers   Transfers SBA   Gait / Locomotion   Gait / Locomotion modified IND iwth SEC   Sensory   Sensory perception comments numbness in bilateral feet   Planned Edema Interventions   Planned edema interventions Manual lymph drainage;Gradient compression bandaging;Fit for compression garment;Exercises;Precautions to prevent infection / exacerbation;Education;Manual therapy;ADL training;Skin care / precautions;Home management program development   Clinical Impression   Criteria for skilled therapeutic intervention met Yes   Therapy diagnosis BLE edema/lymphedema with slow healing wounds   Influenced by the " following impairments / conditions Stage 3;Wounds / Ulcers   Assessment of Occupational Performance 3-5 Performance Deficits   Identified Performance Deficits extensive PMHx, unable to comfortably wear shoes, difficulty getting in/out of car and tub shower due to leg size, needs assistance with LE dressing, at risk for infection and progression of lymphedema.    Clinical Decision Making (Complexity) Moderate complexity   Treatment frequency (4x/wk x4wks, 1x/wk x2wks)   Treatment duration within 90 days; 3/10/19   Patient / family and/or staff in agreement with plan of care Yes   Risks and benefits of therapy have been explained Yes   Clinical impression comments Pt presents with severe lymphedema that is, currently, not managed by daily use of BioTab or use of compression bandages.  Pt stated recent hospitalization with stop of all diuretics resulted in an exacerbation of BLE lymphedema.  Pt will benefit from continued skilled OT intervention to address BLE edema to reduce volume, prevent skin injury, infection and to be fit for compression garments for long-term management of edema reduction.     Goals   Edema Eval Goals 1;2;3;4;5   Goal 1   Goal identifier home program   Goal description Pt will demonstrate IND with home program including: GCB to BLEs , self MLD and HEP to reduce edema to improve skin integrity, prevent infection and to be fit for compression garments for edema management at discharge.   Target date 03/10/19   Goal 2   Goal identifier volume   Goal description Pt will demonstrate a volume reduction of 300mL frome each RLE and LLE to improve skin integrity, prevent infection and to be [facility]   Target date 03/10/19   Goal 3   Goal identifier skin integrity   Goal description Pt will demonstrate improved skin integrity by no unroofed skin or weeping in BLEs and flattened papillomas.     Target date 03/10/19   Goal 4   Goal identifier self care    Goal description Due to reduced volume in BLEs pt will report IND with self care including better fit of shoes and increased ease with tub transfer.     Target date 03/10/19   Total Evaluation Time   OT Evelina, Moderate Complexity Minutes (75572) 35     Certification   Certification date from 12/10/18   Certification date to 03/10/19   Medical Diagnosis BLE lymphedema stage lll

## 2018-12-11 ENCOUNTER — MYC MEDICAL ADVICE (OUTPATIENT)
Dept: GASTROENTEROLOGY | Facility: CLINIC | Age: 63
End: 2018-12-11

## 2018-12-11 ENCOUNTER — TELEPHONE (OUTPATIENT)
Dept: GASTROENTEROLOGY | Facility: CLINIC | Age: 63
End: 2018-12-11

## 2018-12-11 DIAGNOSIS — K76.82 HEPATIC ENCEPHALOPATHY (H): ICD-10-CM

## 2018-12-11 NOTE — TELEPHONE ENCOUNTER
Prior Authorization Specialty Medication Request    Medication/Dose: Xifaxan  ICD code (if different than what is on RX):  K72.90  Previously Tried and Failed:  Lactulose      Rationale: Xifaxan helps to lower the toxin build up in the blood while lactulose works by cleansing the toxin build up in the liver. Adjunctive therapy.    Pharmacy Information (if different than what is on RX)  Name:  [facility]  Phone:  [phone]

## 2018-12-11 NOTE — PROGRESS NOTES
[facility]        OUTPATIENT OCCUPATIONAL THERAPY EDEMA EVALUATION  PLAN OF TREATMENT FOR OUTPATIENT REHABILITATION  (COMPLETE FOR INITIAL CLAIMS ONLY)  Patient's Last Name, First Name, Lawrence Walsh                           Provider s Name:   [facility] Medical Record No.  [id number]     Start of Care Date:  12/10/18   Onset Date:  11/16/18   Type:  OT   Medical Diagnosis:  BLE lymphedema stage lll   Therapy Diagnosis:  BLE edema/lymphedema with slow healing wounds Visits from SOC:  1                                     __________________________________________________________________________________   Plan of Treatment/Functional Goals:    Manual lymph drainage, Gradient compression bandaging, Fit for compression garment, Exercises, Precautions to prevent infection / exacerbation, Education, Manual therapy, ADL training, Skin care / precautions, Home management program development        GOALS  1. Goal description: Pt will demonstrate IND with home program including: GCB to BLEs , self MLD and HEP to reduce edema to improve skin integrity, prevent infection and to be fit for compression garments for edema management at discharge.       Target date: 03/10/19  2. Goal description: Pt will demonstrate a volume reduction of 300mL frome each RLE and LLE to improve skin integrity, prevent infection and to be fit for compression garments for edema management at [facility].       Target date: 03/10/19  3. Goal description: Pt will demonstrate improved skin integrity by no unroofed skin or weeping in BLEs and flattened papillomas.         Target date: 03/10/19  4. Goal description: Due to reduced volume in BLEs pt will report IND with self care including better fit of shoes and increased ease with tub transfer.         Target date: 03/10/19  5.            6.                7.             8.              Treatment frequency: (4x/wk x4wks, 1x/wk x2wks)   Treatment duration: within 90 days; 3/10/19    [facility] OT                                    I CERTIFY THE NEED FOR THESE SERVICES FURNISHED UNDER        THIS PLAN OF TREATMENT AND WHILE UNDER MY CARE     (Physician co-signature of this document indicates review and certification of the therapy plan).                   Certification date from: 12/10/18       Certification date to: 03/10/19           Referring physician: Judit Cantu PA-C   Initial Assessment  See Epic Evaluation- Start of care: 12/10/18

## 2018-12-12 NOTE — TELEPHONE ENCOUNTER
Called [facility] pharmacy, spoke to pharmacist who confirms patient just filled a 1mo supply on 12/04, no P/A was needed. It was processed through his Medicare Part D plan.

## 2018-12-13 ENCOUNTER — HOSPITAL ENCOUNTER (OUTPATIENT)
Dept: OCCUPATIONAL THERAPY | Facility: CLINIC | Age: 63
Setting detail: THERAPIES SERIES
End: 2018-12-13
Attending: PHYSICIAN ASSISTANT
Payer: MEDICARE

## 2018-12-13 PROCEDURE — 97140 MANUAL THERAPY 1/> REGIONS: CPT | Mod: GO | Performed by: OCCUPATIONAL THERAPIST

## 2018-12-14 ENCOUNTER — HOSPITAL ENCOUNTER (OUTPATIENT)
Dept: OCCUPATIONAL THERAPY | Facility: CLINIC | Age: 63
Setting detail: THERAPIES SERIES
End: 2018-12-14
Attending: PHYSICIAN ASSISTANT
Payer: MEDICARE

## 2018-12-14 PROCEDURE — 97140 MANUAL THERAPY 1/> REGIONS: CPT | Mod: GO | Performed by: OCCUPATIONAL THERAPIST

## 2018-12-17 ENCOUNTER — MYC MEDICAL ADVICE (OUTPATIENT)
Dept: INTERNAL MEDICINE | Facility: CLINIC | Age: 63
End: 2018-12-17

## 2018-12-17 ENCOUNTER — HOSPITAL ENCOUNTER (OUTPATIENT)
Dept: OCCUPATIONAL THERAPY | Facility: CLINIC | Age: 63
Setting detail: THERAPIES SERIES
End: 2018-12-17
Attending: PHYSICIAN ASSISTANT
Payer: MEDICARE

## 2018-12-17 DIAGNOSIS — I89.0 LYMPHEDEMA OF BOTH LOWER EXTREMITIES: Primary | ICD-10-CM

## 2018-12-17 PROCEDURE — 97140 MANUAL THERAPY 1/> REGIONS: CPT | Mod: GO | Performed by: OCCUPATIONAL THERAPIST

## 2018-12-18 NOTE — TELEPHONE ENCOUNTER
Ary,    Please see [employer] message. Referral is pended for your review. Please sign if appropriate. Thanks.    Soon-M    Order signed.    Ary RAY, MAGDALENA  i

## 2018-12-20 DIAGNOSIS — K70.31 ALCOHOLIC CIRRHOSIS OF LIVER WITH ASCITES (H): ICD-10-CM

## 2018-12-20 RX ORDER — OXYCODONE HYDROCHLORIDE 5 MG/1
TABLET ORAL
Qty: 180 TABLET | Refills: 0 | Status: SHIPPED | OUTPATIENT
Start: 2018-12-31 | End: 2019-01-22

## 2018-12-20 NOTE — TELEPHONE ENCOUNTER
Reason For Call:   Chief Complaint   Patient presents with     Opioid Refill     oxycodone       Medication Name, Dose and Monthly Quantity:   oxyCODONE IR (ROXICODONE) 5 MG tablet, 180 tabs    Diagnosis requiring opiates:   Alcoholic cirrhosis of liver with ascites (H) [K70.31]     Problem List Updated:   no    Opioid Agreement On File - [facility] PAIN CONTRACT ID# [id number]:  no    Last Urine Drug Screen (at least once every 12 months) Date:   none    Unexpected Results:   na    [place]  Data Reviewed (at least once every 3 months) Date:   11/20/18  Unexpected Results:    no    Last Fill Date:   12/1/18    Due Date:   12/31/18    Last Visit with PCP:   12/4/18    Future Visits with PCP:   none    Processing:   Mail to pharmacy    Hipolito-Alize Ponce RN

## 2018-12-29 NOTE — MR AVS SNAPSHOT
After Visit Summary   2/28/2018    Lawrence Louie    MRN: [id number]           Patient Information     Date Of Birth          1955        Visit Information        Provider Department      2/28/2018 2:00 PM Provider, Cristobal Spec Inf Para; CRISTOBAL 40 ATC [facility] Specialty [facility] Procedure        Today's Diagnoses     Alcoholic cirrhosis of liver with ascites (H)    -  1      Care Instructions    Dear Lawrence Louie    Thank you for choosing [facility] ([facility]) for your procedure.  The following information is a summary of our appointment as well as important reminders.          We look forward in seeing you on your next appointment here at [facility].  Please don t hesitate to call us at [phone] to reschedule any of your appointments or to speak with one of the [facility] registered nurses.  It was a pleasure taking care of you today.    Sincerely,    [facility]  Specialty Infusion & Procedure Center  [address] [place] [address]  [address]  Phone:  [phone] INSTRUCTIONS FOLLOWING ABDOMINAL PARACENTESIS    After you go home:    No strenuous activity for 24 hours    Resume your regular diet    Limit fluid intake for the first 48 hours to no more than 2 quarts per day.  There should be minimal drainage from the needle site.  If drainage does occur and soaks through the bandage, apply gentle pressure with your hand for 5 minutes.    Notify MD for the following:    Excessive drainage    Excessive swelling, redness or tenderness at the needle site    Fever greater than 101 degrees F    Dizziness or light-headedness when getting up or walking      IF THIS IS A MEDICAL EMERGENCY, CALL 911    If you have any questions or concerns    Contact the Hepatology Clinic:   [phone]    If you are a post-transplant patient, contact the Transplant Office:  [phone]    If this is after  hours, contact the hospital :  [phone] and as to have the GI resident on call paged                   I have received and understand my discharge instructions and I have all of my personal belongings.          ------------------------------------------------------        ---------------------------------------------------    Patient / Significant Other's Signature     Relationship              Follow-ups after your visit        Your next 10 appointments already scheduled     Mar 08, 2018 12:00 PM CST   Paracentesis Visit with Uc Spec Inf Para Provider, UC 40 ATC   [facility] Specialty and Procedure ([facility])    [address]  [address]  [facility] [id number] [phone]            Mar 14, 2018 12:00 PM CDT   Paracentesis Visit with Uc Spec Inf Para Provider, UC 40 ATC   [facility] Special[facility] Procedure ([facility])    [address]  [address]  [facility] [id number] [phone]            Mar 21, 2018 12:00 PM CDT   Paracentesis Visit with Uc Spec Inf Para Provider, UC 40 ATC   [facility] Specialty and Procedure ([facility] [place])    [address]  [address]  [place] [place] [facility] [id number] [phone]            Mar 30, 2018 12:00 PM CDT   Paracentesis Visit with Uc Spec Inf Para Provider, UC 40 ATC   [facility] Specialty [facility] Procedure ([facility])    [address]  [address]  [facility] [id number] [phone]            Apr 05, 2018  2:00 PM CDT   (Arrive by 1:45 PM)   Return Visit with CORY Toussaint CNP   [facility] ([facility] [place])    [address]  4th Floor  [place] [id number] [phone]            Apr 06, 2018 11:30 AM CDT   (Arrive by 11:15 AM)   Return General Liver with Meghna Cooper  MD [place]   [facility] ([facility])    [address]  [address]  [facility] [address] [phone]              Who to contact     If you have questions or need follow up information about today's clinic visit or your schedule please contact [facility] [place] SPECIAL[facility] PROCED[facility] directly at [phone].  Normal or non-critical lab and imaging results will be communicated to you by MyChart, letter or phone within 4 business days after the clinic has received the results. If you do not hear from us within 7 days, please contact the clinic through [employer] or phone. If you have a critical or abnormal lab result, we will notify you by phone as soon as possible.  Submit refill requests through [employer] or call your pharmacy and they will forward the refill request to us. Please allow 3 business days for your refill to be completed.          Additional Information About Your Visit        [employer] Information     [employer] gives you secure access to your electronic health record. If you see a primary care provider, you can also send messages to your care team and make appointments. If you have questions, please call your primary care clinic.  If you do not have a primary care provider, please call [phone] and they will assist you.        Care EveryWhere ID     This is your Care EveryWhere ID. This could be used by other organizations to access your [facility] medical records  [id number]        Your Vitals Were     Pulse Temperature Pulse Oximetry BMI (Body Mass Index)          82 98.3  F (36.8  C) 99% 44.78 kg/m2         Blood Pressure from Last 3 Encounters:   02/28/18 126/52   02/14/18 115/46   01/05/18 120/60    Weight from Last 3 Encounters:   02/28/18 (!) 154 kg (339 lb 6.4 oz)   02/14/18 (!) 142.7 kg (314 lb 9.6 oz)   01/05/18 (!) 156.9 kg (345 lb 12.8 oz)              We Performed the Following     US Paracentesis        Primary Care Provider  Office Phone # Fax #    CORY Toussaint [phone]       [address] [place] [address]  [facility] [address]        Equal Access to Services     JOSEF LORENZO : Hadii sil anthony robino Soomaali, waaxda luqadaha, qaybta kaalmada adeegyada, silvia galarza laKellymiguel salas. So [facility] [phone].    ATENCIÓN: Si habla español, tiene a rondon disposición servicios gratuitos de asistencia lingüística. Llame al [phone].    We comply with applicable federal civil rights laws and [place] laws. We do not discriminate on the basis of race, color, national origin, age, disability, sex, sexual orientation, or gender identity.            Thank you!     Thank you for choosing [facility] SPECIALTY AND PROCEDURE  for your care. Our goal is always to provide you with excellent care. Hearing back from our patients is one way we can continue to improve our services. Please take a few minutes to complete the written survey that you may receive in the mail after your visit with us. Thank you!             Your Updated Medication List - Protect others around you: Learn how to safely use, store and throw away your medicines at www.disposemymeds.org.          This list is accurate as of 2/28/18  3:13 PM.  Always use your most recent med list.                   Brand Name Dispense Instructions for use Diagnosis    aspirin 81 MG tablet     30 tablet    Take 1 tablet (81 mg) by mouth daily    Type 2 diabetes mellitus with other skin complications       blood glucose lancets standard    no brand specified    1 Box    Use to test blood sugar 4 X  times daily or as directed.    Diabetes mellitus, type 2 (H)       blood glucose monitoring meter device kit    no brand specified    1 kit    Use to test blood sugar 4 X  times daily or as directed.    Type 2 diabetes mellitus with other specified complication (H)       blood glucose monitoring test strip    no brand specified    200 strip    Use to  test blood sugars 4 X  times daily or as directed    Diabetes mellitus, type 2 (H)       calcium carbonate 1250 MG tablet    OS-ERIN 500 mg [place]     Take 1 tablet by mouth daily        cephALEXin 500 MG capsule    KEFLEX    40 capsule    Take 1 capsule (500 mg) by mouth 4 times daily    Cellulitis and abscess of leg       desvenlafaxine succinate 100 MG 24 hr tablet    PRISTIQ    180 tablet    Take 2 tablets (200 mg) by mouth daily    Other depression       FUROSEMIDE PO      Take 60 mg by mouth 2 times daily        * gabapentin 300 MG capsule    NEURONTIN    60 capsule    Take 1 capsule in the morning for 3 days, then 1 capsule twice a day for 3 days and let me know to what degree this impacts the pain.    Pain in both feet       * gabapentin 300 MG capsule    NEURONTIN    90 capsule    Take 1 capsule (300 mg) by mouth 3 times daily    Diabetic polyneuropathy associated with type 2 diabetes mellitus (H)       glipiZIDE 5 MG 24 hr tablet    glipiZIDE XL    180 tablet    Take 1 tablet (5 mg) by mouth 2 times daily    Type 2 diabetes mellitus without complication, with long-term current use of insulin (H)       insulin aspart 100 UNIT/ML injection    NovoLOG FLEXPEN    30 mL    20 units before breakfast, 20 units before lunch, 20 units before dinner, 20 units before snacks    Type 2 diabetes mellitus without complication, with long-term current use of insulin (H)       insulin glargine 100 UNIT/ML injection    LANTUS SOLOSTAR    30 mL    Inject 40 Units Subcutaneous every morning    Type 2 diabetes mellitus with other oral complication, unspecified long term insulin use status (H)       * insulin pen needle 31G X 8 MM    B-D U/F    300 each    USE  6 times daily / OR AS DIRECTED    Type 2 diabetes, HbA1c goal < 7% (H)       * insulin pen needle 31G X 8 MM    B-D U/F    600 each    Use 6 times daily or as directed    Type 2 diabetes, HbA1c goal < 7% (H)       lactulose 10 GM/15ML solution    CHRONULAC    2000 mL     Take 30 mLs (20 g) by mouth 3 times daily Adjust dosing as needed to achieve 3-4 bowel movements daily.    Hepatic encephalopathy (H)       magnesium oxide 400 (241.3 MG) MG tablet    MAG-OX    90 tablet    Take 1 tablet (400 mg) by mouth daily    Cramp of limb       ondansetron 4 MG tablet    ZOFRAN    18 tablet    Take 1 tablet (4 mg) by mouth every 8 hours as needed for nausea    Alcoholic cirrhosis (H), Nausea       oxyCODONE IR 5 MG tablet    ROXICODONE    60 tablet    Take 1 tablet (5 mg) by mouth every 6 hours as needed for moderate to severe pain maximum 6 tablet(s) per day    Alcoholic cirrhosis of liver with ascites (H)       ranitidine 150 MG tablet    ZANTAC    180 tablet    Take 1 tablet (150 mg) by mouth 2 times daily    Esophageal varices (H)       rifaximin 550 MG Tabs tablet    XIFAXAN    60 tablet    Take 1 tablet (550 mg) by mouth 2 times daily    Hepatic encephalopathy (H)       spironolactone 50 MG tablet    ALDACTONE    540 tablet    1 tablet (50 mg) 2 times daily Takes 3 tablets in am.and p.m.    Portal hypertension (H), Generalized edema       STATIN NOT PRESCRIBED (INTENTIONAL)     0 each    1 each daily Statin not prescribed intentionally due to Active liver disease    Hyperlipidemia LDL goal <100       VITAMIN B-12 PO      Take 1 tablet by mouth daily        vitamin D3 2000 UNITS Caps     90 capsule    Take 1 capsule by mouth daily    Mild major depression (H)       * Notice:  This list has 4 medication(s) that are the same as other medications prescribed for you. Read the directions carefully, and ask your doctor or other care provider to review them with you.       0

## 2019-01-02 DIAGNOSIS — R25.2 CRAMP OF LIMB: ICD-10-CM

## 2019-01-02 DIAGNOSIS — F33.9 RECURRENT MAJOR DEPRESSIVE DISORDER, REMISSION STATUS UNSPECIFIED (H): ICD-10-CM

## 2019-01-02 DIAGNOSIS — E11.9 TYPE 2 DIABETES MELLITUS WITHOUT COMPLICATION, WITH LONG-TERM CURRENT USE OF INSULIN (H): ICD-10-CM

## 2019-01-02 DIAGNOSIS — Z79.4 TYPE 2 DIABETES MELLITUS WITHOUT COMPLICATION, WITH LONG-TERM CURRENT USE OF INSULIN (H): ICD-10-CM

## 2019-01-02 DIAGNOSIS — R60.0 EDEMA OF BOTH LEGS: ICD-10-CM

## 2019-01-03 DIAGNOSIS — K76.82 HEPATIC ENCEPHALOPATHY (H): ICD-10-CM

## 2019-01-03 RX ORDER — INSULIN ASPART 100 [IU]/ML
INJECTION, SOLUTION INTRAVENOUS; SUBCUTANEOUS
Qty: 30 ML | Refills: 0 | Status: SHIPPED | OUTPATIENT
Start: 2019-01-03 | End: 2019-02-01

## 2019-01-03 RX ORDER — CITALOPRAM HYDROBROMIDE 40 MG/1
40 TABLET ORAL DAILY
Qty: 90 TABLET | Refills: 0 | Status: SHIPPED | OUTPATIENT
Start: 2019-01-03 | End: 2019-05-16

## 2019-01-03 NOTE — TELEPHONE ENCOUNTER
NOVOLOG FLEXPEN 100 UNIT/ML soln      Last Written Prescription Date:  12-5-18  Last Fill Quantity: 30 ml,   # refills: 0  Last Office Visit : 12-4-18  Future Office visit:  none  90 day refill sent to pharmacy  FYI to [facility]: overdue LDL, order in epic-needs lab appt.      citalopram (CELEXA) 40 MG tablet      Last Written Prescription Date:  9-25-18  Last Fill Quantity: 90,   # refills: 0  90 day RF sent to pharmacy.    FYI to [facility]: overdue PHQ9      furosemide (LASIX) 40 MG tablet      Take 3 tablets twice a day.  Last Written Prescription Date:  4-5-18  Last Fill Quantity: 540,   # refills: 1  Last clinic note:ASSESSMENT/PLAN:  Increased weight due to non-compliance with diuretics.    The plan we have agreed on is that he will resume 120 mg of furosemide and 300 mg of spironolactone daily.  He will be weighed at his visit on 12/10/2018 and I will call him further plans.     Routing refill request to provider for review/approval because:  Confirm plan for diuretic  Clarify dosage-

## 2019-01-04 ENCOUNTER — MYC REFILL (OUTPATIENT)
Dept: INTERNAL MEDICINE | Facility: CLINIC | Age: 64
End: 2019-01-04

## 2019-01-04 DIAGNOSIS — E11.9 TYPE 2 DIABETES, HBA1C GOAL < 7% (H): ICD-10-CM

## 2019-01-07 ENCOUNTER — MYC REFILL (OUTPATIENT)
Dept: WOUND CARE | Facility: CLINIC | Age: 64
End: 2019-01-07

## 2019-01-07 DIAGNOSIS — I87.2 VENOUS STASIS DERMATITIS OF BOTH LOWER EXTREMITIES: ICD-10-CM

## 2019-01-07 NOTE — TELEPHONE ENCOUNTER
I called Lawrence to determine his current weight. His wife stated he has lost weight, and will have patient weigh himself while I wait on the phone.    Patient reports weight as 291.2 lbs with shoes on. I will update PCP.    Yolanda Perez RN

## 2019-01-08 RX ORDER — FUROSEMIDE 40 MG
120 TABLET ORAL 2 TIMES DAILY
Qty: 180 TABLET | Refills: 0 | Status: SHIPPED | OUTPATIENT
Start: 2019-01-08 | End: 2019-01-11

## 2019-01-10 RX ORDER — NYSTATIN 100000 U/G
CREAM TOPICAL 2 TIMES DAILY
Qty: 30 G | Refills: 11 | Status: SHIPPED | OUTPATIENT
Start: 2019-01-10

## 2019-01-10 RX ORDER — TRIAMCINOLONE ACETONIDE 1 MG/G
CREAM TOPICAL 2 TIMES DAILY PRN
Qty: 30 G | Refills: 3 | Status: SHIPPED | OUTPATIENT
Start: 2019-01-10 | End: 2019-07-30

## 2019-01-11 DIAGNOSIS — R60.0 EDEMA OF BOTH LEGS: ICD-10-CM

## 2019-01-11 RX ORDER — FUROSEMIDE 40 MG
TABLET ORAL
Qty: 150 TABLET | Refills: 3 | Status: SHIPPED | OUTPATIENT
Start: 2019-01-11 | End: 2019-05-27

## 2019-01-14 ENCOUNTER — CARE COORDINATION (OUTPATIENT)
Dept: GASTROENTEROLOGY | Facility: CLINIC | Age: 64
End: 2019-01-14

## 2019-01-14 DIAGNOSIS — K70.31 ALCOHOLIC CIRRHOSIS OF LIVER WITH ASCITES (H): Primary | ICD-10-CM

## 2019-01-14 NOTE — PROGRESS NOTES
Patient's wife called to report patient was able to get prescription for Xifaxan re-filled with an affordable co-pay. She also states he is doing much better recently, able to do more for himself as he has become more mobile. He is using Biotab pants for 1 hour daily, and the edema in his feet, legs, and lower torso have gone down significantly. He follows closely with his PCP. Reviewed upcoming appointment with Dr. Simmons in April, and recommendations from last office visit for EGD and colonoscopy. Raven will discuss with patient and schedule if he agrees.

## 2019-01-17 NOTE — TELEPHONE ENCOUNTER
I spoke to and updated patient's wife on 1/14/19 that Ary decreased the dosing of the lasix, taking 80mg in the morning and 120mg in the afternoon. Flores stated that the patient is actually only taking 120 mg once daily, so he will continue taking that dose. She reported he does not tolerate taking the medication twice daily. Flores stated Lawrence will continue 120 mg once daily.    PCP was updated regarding the patient's report. Patient is to follow up sooner as needed.    Yolanda Perez RN

## 2019-01-22 DIAGNOSIS — K70.31 ALCOHOLIC CIRRHOSIS OF LIVER WITH ASCITES (H): ICD-10-CM

## 2019-01-22 RX ORDER — OXYCODONE HYDROCHLORIDE 5 MG/1
TABLET ORAL
Qty: 180 TABLET | Refills: 0 | Status: SHIPPED | OUTPATIENT
Start: 2019-01-30 | End: 2019-02-19

## 2019-01-22 NOTE — TELEPHONE ENCOUNTER
Reason For Call:   Chief Complaint   Patient presents with     Opioid Refill       Medication Name, Dose and Monthly Quantity:   oxyCODONE IR (ROXICODONE) 5 MG tablet, 180 tabs    Diagnosis requiring opiates:   Alcoholic cirrhosis of liver with ascites (H) [K70.31]     Problem List Updated:   no    Opioid Agreement On File - [facility] PAIN CONTRACT ID# [id number]:  no    Last Urine Drug Screen (at least once every 12 months) Date:   none    Unexpected Results:   na    [place]  Data Reviewed (at least once every 3 months) Date:   1/22/19    Unexpected Results:    no    Last Fill Date:   12/31/18    Due Date:   1/30/19    Last Visit with PCP:   12/4/18    Future Visits with PCP:   none    Processing:   Mail to pharmacy    Hipolito-Alize Ponce RN

## 2019-02-14 ENCOUNTER — TELEPHONE (OUTPATIENT)
Dept: GASTROENTEROLOGY | Facility: CLINIC | Age: 64
End: 2019-02-14

## 2019-02-14 NOTE — TELEPHONE ENCOUNTER
Prior Authorization Specialty Medication Request    Medication/Dose: Xifaxan 550 mg two times daily  ICD code (if different than what is on RX):  K72.90  Previously Tried and Failed:  Lactulose alone    Rationale: Xifaxan helps to lower the toxin build up in the blood while lactulose works by cleansing the toxin build up in the liver. Adjunctive therapy.  http://onlinelibrary.pineda.com/doi/10.1111/radha.67546/full     [facility]   [address] [address]   [place], [place]  [address]   fax [phone]   Ph. [phone]

## 2019-02-16 NOTE — TELEPHONE ENCOUNTER
Central Prior Authorization Team   Phone: [phone]      PA Initiation    Medication: rifaximin (XIFAXAN) 550 MG TABS tablet-PA Initiated  Insurance Company: [employer] - Phone [phone] Fax [phone]  Pharmacy Filling the Rx: [address]  Filling Pharmacy Phone: [phone]  Filling Pharmacy Fax: [phone]  Start Date: 2/16/2019

## 2019-02-18 NOTE — TELEPHONE ENCOUNTER
Prior Authorization Approval    Authorization Effective Date: 2/17/2019  Authorization Expiration Date: 2/16/2021  Medication: rifaximin (XIFAXAN) 550 MG TABS tablet-APPROVED  Approved Dose/Quantity:   Reference #: [id number]   Insurance Company: [employer] - Phone [phone] Fax [phone]  Expected CoPay:       CoPay Card Available:      Foundation Assistance Needed:    Which Pharmacy is filling the prescription (Not needed for infusion/clinic administered): [facility] [address] [place] [address]  Pharmacy Notified: Yes  Patient Notified: No-Pharmacy will notify patient when ready.

## 2019-02-19 DIAGNOSIS — K70.31 ALCOHOLIC CIRRHOSIS OF LIVER WITH ASCITES (H): ICD-10-CM

## 2019-02-19 RX ORDER — OXYCODONE HYDROCHLORIDE 5 MG/1
TABLET ORAL
Qty: 180 TABLET | Refills: 0 | Status: SHIPPED | OUTPATIENT
Start: 2019-03-01 | End: 2019-03-26

## 2019-02-19 NOTE — TELEPHONE ENCOUNTER
Reason For Call:   Chief Complaint   Patient presents with     Opioid Refill       Medication Name, Dose and Monthly Quantity:   oxyCODONE IR (ROXICODONE) 5 MG tablet, 180 tabs    Diagnosis requiring opiates:   Alcoholic cirrhosis of liver with ascites (H) [K70.31]     Problem List Updated:   no    Opioid Agreement On File - [facility] PAIN CONTRACT ID# [id number]:  no    Last Urine Drug Screen (at least once every 12 months) Date:   none    Unexpected Results:   na    [place]  Data Reviewed (at least once every 3 months) Date:   1/22/19    Unexpected Results:    no    Last Fill Date:   1/30/19    Due Date:   3/1/19    Last Visit with PCP:   12/4/18    Future Visits with PCP:   none    Processing:   Mail to pharmacy    Hipolito-Alize Ponce RN

## 2019-03-17 ENCOUNTER — MYC REFILL (OUTPATIENT)
Dept: INTERNAL MEDICINE | Facility: CLINIC | Age: 64
End: 2019-03-17

## 2019-03-17 DIAGNOSIS — F32.89 OTHER DEPRESSION: ICD-10-CM

## 2019-03-18 RX ORDER — DESVENLAFAXINE 100 MG/1
200 TABLET, EXTENDED RELEASE ORAL DAILY
Qty: 180 TABLET | Refills: 0 | Status: SHIPPED | OUTPATIENT
Start: 2019-03-18 | End: 2019-06-12

## 2019-03-18 NOTE — TELEPHONE ENCOUNTER
Last Clinic Visit: 12/4/2018  [facility]    NO PHQ-9 on record - clinic notified

## 2019-03-19 RX ORDER — DESVENLAFAXINE 100 MG/1
200 TABLET, EXTENDED RELEASE ORAL DAILY
Qty: 180 TABLET | Refills: 0 | OUTPATIENT
Start: 2019-03-19

## 2019-03-25 ENCOUNTER — MYC MEDICAL ADVICE (OUTPATIENT)
Dept: INTERNAL MEDICINE | Facility: CLINIC | Age: 64
End: 2019-03-25

## 2019-03-26 ENCOUNTER — TELEPHONE (OUTPATIENT)
Dept: INTERNAL MEDICINE | Facility: CLINIC | Age: 64
End: 2019-03-26

## 2019-03-26 DIAGNOSIS — K70.31 ALCOHOLIC CIRRHOSIS OF LIVER WITH ASCITES (H): ICD-10-CM

## 2019-03-26 RX ORDER — OXYCODONE HYDROCHLORIDE 5 MG/1
TABLET ORAL
Qty: 180 TABLET | Refills: 0 | Status: SHIPPED | OUTPATIENT
Start: 2019-03-31 | End: 2019-05-01

## 2019-03-26 NOTE — TELEPHONE ENCOUNTER
Reason For Call:   Chief Complaint   Patient presents with     Opioid Refill       Medication Name, Dose and Monthly Quantity:   oxyCODONE IR (ROXICODONE) 5 MG tablet, 180 tabs    Diagnosis requiring opiates:   Alcoholic cirrhosis of liver with ascites (H) [K70.31]     Problem List Updated:   no    Opioid Agreement On File - [facility] PAIN CONTRACT ID# [id number]:  no    Last Urine Drug Screen (at least once every 12 months) Date:   none    Unexpected Results:   na    [place]  Data Reviewed (at least once every 3 months) Date:   3/26/19    Unexpected Results:    no    Last Fill Date:   3/1/19    Due Date:   3/31/19    Last Visit with PCP:   12/4/18    Future Visits with PCP:   none    Processing:   Mail to pharmacy    Hipolito-Alize Ponce RN

## 2019-03-26 NOTE — TELEPHONE ENCOUNTER
Prior Authorization Retail Medication Request    Medication/Dose: desvenlafaxine (PRISTIQ) 100 MG 24 hr tablet  ICD code (if different than what is on RX):    Previously Tried and Failed:    Rationale:      Insurance Name:    Insurance ID:        Pharmacy Information (if different than what is on RX)  Name:    Phone:

## 2019-03-26 NOTE — TELEPHONE ENCOUNTER
Central Prior Authorization Team   Phone: [phone]      PA Initiation    Medication: desvenlafaxine (PRISTIQ) 100 MG 24 hr tablet-PA Pending  Insurance Company: [employer] - Phone [phone] Fax [phone]  Pharmacy Filling the Rx: [address]  Filling Pharmacy Phone: [phone]  Filling Pharmacy Fax: [phone]  Start Date: 3/26/2019

## 2019-03-27 NOTE — TELEPHONE ENCOUNTER
Prior Authorization Approval    Authorization Effective Date: 3/26/2019  Authorization Expiration Date: 3/26/2020  Medication: desvenlafaxine (PRISTIQ) 100 MG 24 hr tablet- APPROVED  Approved Dose/Quantity:   Reference #: [facility]   Insurance Company: [employer] - Phone [phone] Fax [phone]  Expected CoPay:       CoPay Card Available:      Foundation Assistance Needed:    Which Pharmacy is filling the prescription (Not needed for infusion/clinic administered): [facility] [address] [place] [address]  Pharmacy Notified: Yes  Patient Notified: No-patient already picked up med.

## 2019-04-10 ENCOUNTER — MYC MEDICAL ADVICE (OUTPATIENT)
Dept: INTERNAL MEDICINE | Facility: CLINIC | Age: 64
End: 2019-04-10

## 2019-04-10 ENCOUNTER — TRANSFERRED RECORDS (OUTPATIENT)
Dept: HEALTH INFORMATION MANAGEMENT | Facility: CLINIC | Age: 64
End: 2019-04-10

## 2019-04-11 NOTE — TELEPHONE ENCOUNTER
Spoke with pt's wife Raven.  Patient now doing well 12 hours after admission to [facility] after treated with antibiotics for sepsis.  Anticipating discharge soon.  We discussed his diuretics  And he has been re-instructed to take all his diuretics.    Note: he is a Full Code in our system.  Wife will have him complete new Advanced Directive.    Ary RAY, CNP

## 2019-04-16 ENCOUNTER — MYC MEDICAL ADVICE (OUTPATIENT)
Dept: INTERNAL MEDICINE | Facility: CLINIC | Age: 64
End: 2019-04-16

## 2019-04-16 NOTE — TELEPHONE ENCOUNTER
Called to relay that Ary will follow the patient and co-signer is Dr. Trevizo. Magdalena Mott LPN 4/16/2019 3:18 PM

## 2019-04-16 NOTE — TELEPHONE ENCOUNTER
[facility] Call Center    Phone Message    May a detailed message be left on voicemail: yes    Reason for Call: Other: maryann from [facility] wanted to know if Ary blanco will follow the patient and whos the over seeing MD that can sign off on Home care orders please call maryann asap to address concerns      Action Taken: Message routed to:  [facility] ([facility] pcc

## 2019-04-22 ENCOUNTER — OFFICE VISIT (OUTPATIENT)
Dept: GASTROENTEROLOGY | Facility: CLINIC | Age: 64
End: 2019-04-22
Attending: INTERNAL MEDICINE
Payer: COMMERCIAL

## 2019-04-22 VITALS
HEART RATE: 72 BPM | HEIGHT: 74 IN | SYSTOLIC BLOOD PRESSURE: 116 MMHG | OXYGEN SATURATION: 97 % | BODY MASS INDEX: 38.4 KG/M2 | DIASTOLIC BLOOD PRESSURE: 59 MMHG | WEIGHT: 299.2 LBS | TEMPERATURE: 98.3 F | RESPIRATION RATE: 18 BRPM

## 2019-04-22 DIAGNOSIS — K70.31 ALCOHOLIC CIRRHOSIS OF LIVER WITH ASCITES (H): ICD-10-CM

## 2019-04-22 DIAGNOSIS — K70.31 ALCOHOLIC CIRRHOSIS OF LIVER WITH ASCITES (H): Primary | ICD-10-CM

## 2019-04-22 LAB
ALBUMIN SERPL-MCNC: 2.7 G/DL (ref 3.4–5)
ALP SERPL-CCNC: 166 U/L (ref 40–150)
ALT SERPL W P-5'-P-CCNC: 15 U/L (ref 0–70)
ANION GAP SERPL CALCULATED.3IONS-SCNC: 8 MMOL/L (ref 3–14)
AST SERPL W P-5'-P-CCNC: 32 U/L (ref 0–45)
BILIRUB DIRECT SERPL-MCNC: 0.4 MG/DL (ref 0–0.2)
BILIRUB SERPL-MCNC: 1 MG/DL (ref 0.2–1.3)
BUN SERPL-MCNC: 20 MG/DL (ref 7–30)
CALCIUM SERPL-MCNC: 8.4 MG/DL (ref 8.5–10.1)
CHLORIDE SERPL-SCNC: 101 MMOL/L (ref 94–109)
CO2 SERPL-SCNC: 26 MMOL/L (ref 20–32)
CREAT SERPL-MCNC: 0.92 MG/DL (ref 0.66–1.25)
ERYTHROCYTE [DISTWIDTH] IN BLOOD BY AUTOMATED COUNT: 17.2 % (ref 10–15)
GFR SERPL CREATININE-BSD FRML MDRD: 86 ML/MIN/{1.73_M2}
GLUCOSE SERPL-MCNC: 141 MG/DL (ref 70–99)
HCT VFR BLD AUTO: 29 % (ref 40–53)
HGB BLD-MCNC: 9.1 G/DL (ref 13.3–17.7)
INR PPP: 1.27 (ref 0.86–1.14)
MCH RBC QN AUTO: 29 PG (ref 26.5–33)
MCHC RBC AUTO-ENTMCNC: 31.4 G/DL (ref 31.5–36.5)
MCV RBC AUTO: 92 FL (ref 78–100)
PLATELET # BLD AUTO: 89 10E9/L (ref 150–450)
POTASSIUM SERPL-SCNC: 4.1 MMOL/L (ref 3.4–5.3)
PROT SERPL-MCNC: 6.6 G/DL (ref 6.8–8.8)
RBC # BLD AUTO: 3.14 10E12/L (ref 4.4–5.9)
SODIUM SERPL-SCNC: 136 MMOL/L (ref 133–144)
WBC # BLD AUTO: 3.8 10E9/L (ref 4–11)

## 2019-04-22 PROCEDURE — 80048 BASIC METABOLIC PNL TOTAL CA: CPT | Performed by: INTERNAL MEDICINE

## 2019-04-22 PROCEDURE — 80076 HEPATIC FUNCTION PANEL: CPT | Performed by: INTERNAL MEDICINE

## 2019-04-22 PROCEDURE — 85610 PROTHROMBIN TIME: CPT | Performed by: INTERNAL MEDICINE

## 2019-04-22 PROCEDURE — 85027 COMPLETE CBC AUTOMATED: CPT | Performed by: INTERNAL MEDICINE

## 2019-04-22 PROCEDURE — 36415 COLL VENOUS BLD VENIPUNCTURE: CPT | Performed by: INTERNAL MEDICINE

## 2019-04-22 PROCEDURE — G0463 HOSPITAL OUTPT CLINIC VISIT: HCPCS | Mod: ZF

## 2019-04-22 ASSESSMENT — PAIN SCALES - GENERAL: PAINLEVEL: NO PAIN (0)

## 2019-04-22 ASSESSMENT — MIFFLIN-ST. JEOR: SCORE: 2211.91

## 2019-04-22 NOTE — PROGRESS NOTES
ASSESSMENT AND PLAN:  A 62-year-old man with alcoholic and SMITH cirrhosis.  He has been sober from alcohol for 5 years. S/P TIPS 12 months ago. This has improved his ascites markedly. No para needed, he is down 60 pounds.       Ongoing, very long-standing issues with severe LE edema. Discussed that this is very long-standing and severe and will likely never be completely resolved, but would aim for continued improvement.     Diabetes under better control. He hasn't smoked since the hospital.       HE: lactulose and rifaximin. Discussed lactulose titration.     Due for HCC screening and TIPS US. Ordered    Poor dentition: recommended he see dentist.     Due for EGD and colonoscopy. Ordered. They were canceled due to hospitalization. Will reschedule.     Liver is overall stable. RTC 6 mo      Meghna Simmons MD  Hepatology/Liver Transplant  Medical Director, Liver Transplantation  [facility]  ===================================================================  SUBJECTIVE:  Mr. Louie is a 63-year-old man with SMITH and alcoholic cirrhosis. He has been sober since 2012. TIPS 10/27/17.    Hospitalized at [facility] 4/10-4/16/1 for sepsis, cellulitis with MSSA bacteremia, GREG.Creat peak was 1.34. Creat now back to normal. Liver tests normal. On IV Abx for 2 weeks, still have a week to go.      Ascites: Paracentesis is now not needed. He is down about 60 pounds. Was weekly and 8-10L at a time. Taking gus 100 mg tabs, 3 tabs daily,  Furosemide 40 mg tabs, taking 3 tabs a day. He had cramping but this is better. Taking magnesium and Ca.      Diet: does not add salt but he does not restrict sodium as much as he should, although is better.    His biggest complaint has always been profound LE edema, which he has had for many years and was initially worse after TIPS, now improving but still quite signficant.       He is now taking lactulose. He had some episodes of confusion. With lactulose and rifaximin things  "are under control. No confusion but he is forgetful. Has about 1 BM/day. He thinks he is taking the rifaximin.    EGD 6/16/16 grade 1 varices  HCC screening April 2018      OTHER COMORBIDITIES:  He has diabetes, GERD, hyperlipidemia, morbid obesity      SOCIAL HISTORY:  He lives with his wife.  He has 3 kids in the [employer].  He is retired. Wife is here with him today.      PHYSICAL EXAMINATION:   /59 (BP Location: Right arm, Patient Position: Sitting, Cuff Size: Adult Regular)   Pulse 72   Temp 98.3  F (36.8  C) (Oral)   Resp 18   Ht 1.88 m (6' 2\")   Wt 135.7 kg (299 lb 3.2 oz)   SpO2 97%   BMI 38.42 kg/m    GENERAL:  well-appearing, in no acute distress.   HEENT:  No icterus, no oral lesions. Very poor dentition. Multiple missing teeth, broken teeth.  LYMPH:  No supraclavicular or cervical lymphadenopathy.   CARDIOVASCULAR:  Regular rate and rhythm.   CHEST:  Lungs are clear.   ABDOMEN:  Bowel sounds are present.  He is morbidly obese.   EXTREMITIES:  Significant woody edema bilaterally   NEUROLOGIC:  Speech is fluent and clear.  No asterixis or tremor.       LABORATORY DATA:  Recent labs were reviewed. Overall stable.      "

## 2019-04-22 NOTE — NURSING NOTE
"Chief Complaint   Patient presents with     RECHECK     alcohol induced cirrhosis       Vital signs:  Temp: 98.3  F (36.8  C) Temp src: Oral BP: 116/59 Pulse: 72   Resp: 18 SpO2: 97 %     Height: 188 cm (6' 2\") Weight: 135.7 kg (299 lb 3.2 oz)  Estimated body mass index is 38.42 kg/m  as calculated from the following:    Height as of this encounter: 1.88 m (6' 2\").    Weight as of this encounter: 135.7 kg (299 lb 3.2 oz).          Jenifer Banda [facility]  4/22/2019 2:29 PM      "

## 2019-04-22 NOTE — LETTER
4/22/2019       RE: Lawrence Louie  [facility] [facility] [address]     Dear Colleague,    Thank you for referring your patient, Lawrence Louie, to the [facility] HEPATOLOGY at [facility]. Please see a copy of my visit note below.    ASSESSMENT AND PLAN:  A 62-year-old man with alcoholic and SMITH cirrhosis.  He has been sober from alcohol for 5 years. S/P TIPS 12 months ago. This has improved his ascites markedly. No para needed, he is down 60 pounds.       Ongoing, very long-standing issues with severe LE edema. Discussed that this is very long-standing and severe and will likely never be completely resolved, but would aim for continued improvement.     Diabetes under better control. He hasn't smoked since the hospital.       HE: lactulose and rifaximin. Discussed lactulose titration.     Due for HCC screening and TIPS US. Ordered    Poor dentition: recommended he see dentist.     Due for EGD and colonoscopy. Ordered. They were canceled due to hospitalization. Will reschedule.     Liver is overall stable. RTC 6 mo      Meghna Simmons MD  Hepatology/Liver Transplant  Medical Director, Liver Transplantation  [facility]  ===================================================================  SUBJECTIVE:  Mr. Louie is a 63-year-old man with SMITH and alcoholic cirrhosis. He has been sober since 2012. TIPS 10/27/17.    Hospitalized at [facility] 4/10-4/16/1 for sepsis, cellulitis with MSSA bacteremia, GREG.Creat peak was 1.34. Creat now back to normal. Liver tests normal. On IV Abx for 2 weeks, still have a week to go.      Ascites: Paracentesis is now not needed. He is down about 60 pounds. Was weekly and 8-10L at a time. Taking gus 100 mg tabs, 3  tabs daily,  Furosemide 40 mg tabs, taking 3 tabs a day. He had cramping but this is better. Taking magnesium and Ca.      Diet: does not add salt but he does not restrict sodium as much as he  "should, although is better.    His biggest complaint has always been profound LE edema, which he has had for many years and was initially worse after TIPS, now improving but still quite signficant.       He is now taking lactulose. He had some episodes of confusion. With lactulose and rifaximin things are under control. No confusion but he is forgetful. Has about 1 BM/day. He thinks he is taking the rifaximin.    EGD 6/16/16 grade 1 varices  HCC screening April 2018      OTHER COMORBIDITIES:  He has diabetes, GERD, hyperlipidemia, morbid obesity      SOCIAL HISTORY:  He lives with his wife.  He has 3 kids in the [employer].  He is retired. Wife is here with him today.      PHYSICAL EXAMINATION:   /59 (BP Location: Right arm, Patient Position: Sitting, Cuff Size: Adult Regular)   Pulse 72   Temp 98.3  F (36.8  C) (Oral)   Resp 18   Ht 1.88 m (6' 2\")   Wt 135.7 kg (299 lb 3.2 oz)   SpO2 97%   BMI 38.42 kg/m     GENERAL:  well-appearing, in no acute distress.   HEENT:  No icterus, no oral lesions. Very poor dentition. Multiple missing teeth, broken teeth.  LYMPH:  No supraclavicular or cervical lymphadenopathy.   CARDIOVASCULAR:  Regular rate and rhythm.   CHEST:  Lungs are clear.   ABDOMEN:  Bowel sounds are present.  He is morbidly obese.   EXTREMITIES:  Significant woody edema bilaterally   NEUROLOGIC:  Speech is fluent and clear.  No asterixis or tremor.       LABORATORY DATA:  Recent labs were reviewed. Overall stable.        Again, thank you for allowing me to participate in the care of your patient.      Sincerely,    Meghna Simmons MD      "

## 2019-04-22 NOTE — LETTER
4/22/2019      RE: Lawrence Louie  [address] [facility] [address]       ASSESSMENT AND PLAN:  A 62-year-old man with alcoholic and SMITH cirrhosis.  He has been sober from alcohol for 5 years. S/P TIPS 12 months ago. This has improved his ascites markedly. No para needed, he is down 60 pounds.       Ongoing, very long-standing issues with severe LE edema. Discussed that this is very long-standing and severe and will likely never be completely resolved, but would aim for continued improvement.     Diabetes under better control. He hasn't smoked since the hospital.       HE: lactulose and rifaximin. Discussed lactulose titration.     Due for HCC screening and TIPS US. Ordered    Poor dentition: recommended he see dentist.     Due for EGD and colonoscopy. Ordered. They were canceled due to hospitalization. Will reschedule.     Liver is overall stable. RTC 6 mo      Meghna Simmons MD  Hepatology/Liver Transplant  Medical Director, Liver Transplantation  [facility]  ===================================================================  SUBJECTIVE:  Mr. Louie is a 63-year-old man with SMITH and alcoholic cirrhosis. He has been sober since 2012. TIPS 10/27/17.    Hospitalized at [facility] 4/10-4/16/1 for sepsis, cellulitis with MSSA bacteremia, GREG.Creat peak was 1.34. Creat now back to normal. Liver tests normal. On IV Abx for 2 weeks, still have a week to go.      Ascites: Paracentesis is now not needed. He is down about 60 pounds. Was weekly and 8-10L at a time. Taking gus 100 mg tabs, 3  tabs daily,  Furosemide 40 mg tabs, taking 3 tabs a day. He had cramping but this is better. Taking magnesium and Ca.      Diet: does not add salt but he does not restrict sodium as much as he should, although is better.    His biggest complaint has always been profound LE edema, which he has had for many years and was initially worse after TIPS, now improving but still quite signficant.  "      He is now taking lactulose. He had some episodes of confusion. With lactulose and rifaximin things are under control. No confusion but he is forgetful. Has about 1 BM/day. He thinks he is taking the rifaximin.    EGD 6/16/16 grade 1 varices  HCC screening April 2018      OTHER COMORBIDITIES:  He has diabetes, GERD, hyperlipidemia, morbid obesity      SOCIAL HISTORY:  He lives with his wife.  He has 3 kids in the [facility].  He is retired. Wife is here with him today.      PHYSICAL EXAMINATION:   /59 (BP Location: Right arm, Patient Position: Sitting, Cuff Size: Adult Regular)   Pulse 72   Temp 98.3  F (36.8  C) (Oral)   Resp 18   Ht 1.88 m (6' 2\")   Wt 135.7 kg (299 lb 3.2 oz)   SpO2 97%   BMI 38.42 kg/m     GENERAL:  well-appearing, in no acute distress.   HEENT:  No icterus, no oral lesions. Very poor dentition. Multiple missing teeth, broken teeth.  LYMPH:  No supraclavicular or cervical lymphadenopathy.   CARDIOVASCULAR:  Regular rate and rhythm.   CHEST:  Lungs are clear.   ABDOMEN:  Bowel sounds are present.  He is morbidly obese.   EXTREMITIES:  Significant woody edema bilaterally   NEUROLOGIC:  Speech is fluent and clear.  No asterixis or tremor.       LABORATORY DATA:  Recent labs were reviewed. Overall stable.        Meghna Simmons MD      "

## 2019-04-23 ENCOUNTER — PATIENT OUTREACH (OUTPATIENT)
Dept: GASTROENTEROLOGY | Facility: CLINIC | Age: 64
End: 2019-04-23

## 2019-04-23 ENCOUNTER — OFFICE VISIT (OUTPATIENT)
Dept: INTERNAL MEDICINE | Facility: CLINIC | Age: 64
End: 2019-04-23
Payer: COMMERCIAL

## 2019-04-23 VITALS
DIASTOLIC BLOOD PRESSURE: 59 MMHG | SYSTOLIC BLOOD PRESSURE: 104 MMHG | BODY MASS INDEX: 38.56 KG/M2 | WEIGHT: 300.3 LBS | HEART RATE: 71 BPM

## 2019-04-23 DIAGNOSIS — L29.9 ITCHING: ICD-10-CM

## 2019-04-23 DIAGNOSIS — E11.621 TYPE 2 DIABETES MELLITUS WITH FOOT ULCER, WITHOUT LONG-TERM CURRENT USE OF INSULIN (H): ICD-10-CM

## 2019-04-23 DIAGNOSIS — Z13.29 SCREENING FOR THYROID DISORDER: ICD-10-CM

## 2019-04-23 DIAGNOSIS — G47.9 SLEEP DISORDER: ICD-10-CM

## 2019-04-23 DIAGNOSIS — Z23 NEED FOR 23-POLYVALENT PNEUMOCOCCAL POLYSACCHARIDE VACCINE: ICD-10-CM

## 2019-04-23 DIAGNOSIS — Z13.6 SCREENING FOR AAA (ABDOMINAL AORTIC ANEURYSM): ICD-10-CM

## 2019-04-23 DIAGNOSIS — Z87.891 HISTORY OF SMOKING: ICD-10-CM

## 2019-04-23 DIAGNOSIS — L97.509 TYPE 2 DIABETES MELLITUS WITH FOOT ULCER, WITHOUT LONG-TERM CURRENT USE OF INSULIN (H): ICD-10-CM

## 2019-04-23 DIAGNOSIS — E78.5 HYPERLIPIDEMIA LDL GOAL <100: Primary | ICD-10-CM

## 2019-04-23 DIAGNOSIS — K70.31 ALCOHOLIC CIRRHOSIS OF LIVER WITH ASCITES (H): Primary | ICD-10-CM

## 2019-04-23 RX ORDER — URSODIOL 300 MG/1
900 CAPSULE ORAL 2 TIMES DAILY
Qty: 168 CAPSULE | Refills: 11 | Status: SHIPPED | OUTPATIENT
Start: 2019-04-23 | End: 2019-06-21

## 2019-04-23 ASSESSMENT — PAIN SCALES - GENERAL: PAINLEVEL: SEVERE PAIN (7)

## 2019-04-23 NOTE — PATIENT INSTRUCTIONS
[facility] Medication Refill Request Information:  * Please contact your pharmacy regarding ANY request for medication refills.  ** [facility] Prescription Fax = [phone]  * Please allow 3 business days for routine medication refills.  * Please allow 5 business days for controlled substance medication refills.     [facility] Test Result notification information:  *You will be notified with in 7-10 days of your appointment day regarding the results of your test.  If you are on MyChart you will be notified as soon as the provider has reviewed the results and signed off on them.    [facility]: [phone]

## 2019-04-23 NOTE — PROGRESS NOTES
"[facility] [place]   Ary Murphy, CORY CNP  04/23/2019      Chief Complaint:   Hospital F/U     History of Present Illness:   Lawrence Louie is a 63 year old male with a history of alcoholic cirrhosis (portal hypertension with esophageal varices, ascites, hepatic encephalopathy, previous TIPS), alcohol dependence (abstinent for years), diabetes mellitus, hypertension, depression, lymphedema who presents for a hospital follow-up visit.     ED Follow-Up  Patient presented to the [facility] ED on 4/10 by ambulance uptunded, with fevers, and tremors. Patient was diagnosed with sepsis in association with lower extremity cellulitis, further confirmed by positive blood culture for MSSA. An echocardiogram done revealed no evidence of SBE; Chest x-ray and echocardiogram were unremarkable as well. An x-ray of the abdomen revealed fluid levels in non-dilated bowel loops of questionable significance (see below). The patient was started on parenteral cefazolin for a 2-week treatment course through a PICC line placed before discharge. Hypertension, acute respiratory failure, and acute kidney injury, secondary to sepsis, were stabilized before discharge. Patient continued medications started pre-admit for alcoholic cirrhosis, chronic anemia, thrombocyotpenia, depression, and diabetes mellitus. He was further advised to follow-up with infectious disease and with his PCP for further care and treatment.     Patient feels \"run down\" at today's visit and adds that he is not sleeping well.This is due to frequent urination during the night due to diuretics and pruritis.  He notes it is \"a real problem\" and often falls asleep during the daytime. He further describes his frustration with his daily regimen, as he often has to stay home to keep up with his medications. He currently receives antibiotics in his PICC line and has the dressings changed 1x a week.  He endorses \"a little\" nausea.      Diabetes Mellitus " "  Patient no longer takes pre-meal insulin and does take Lantus 10 units instead. He has noticed recent changes in his diabetes management; MAking his goal less restrictive. He was advised to keep his blood glucose around less than 145,  and not use more insulin if his blood glucose was around 145 at bedtime.      Lower Extremity Edema  Patient's legs have improved, as they are less red, swollen, and bumpy. Most of the bumps are below the knee, which pop and drain once in a while, and leaves open sores. Patient notes that he and his wife bandage the open sores. He is unsure if antibiotics are helping the edema, but he has been wearing compression pants for 1 hour a day. He does know he should be wearing them 1 hour twice a day, but notes he \"cannot squeeze them into his schedule more than once a day\" Patient denies any shortness of breath unless overexerting.     Other concerns discussed:  1. Itching - Patient has not picked up his Ursodiol prescription for his itching yet.      Review of Systems:   Pertinent items are noted in HPI, remainder of complete ROS is negative.      Active Medications:      childrens multivitamin w/ionr (FLINTSTONES COMPLETE) 60 MG chewable tablet, Take 1 chew tab by mouth daily, Disp: , Rfl:      Cholecalciferol (VITAMIN D3) 2000 units CAPS, Take 1 capsule by mouth daily, Disp: 90 capsule, Rfl: 3     citalopram (CELEXA) 40 MG tablet, Take 1 tablet (40 mg) by mouth daily, Disp: 90 tablet, Rfl: 0     Cyanocobalamin (VITAMIN B-12 PO), Take 1 tablet by mouth daily, Disp: , Rfl:      desvenlafaxine (PRISTIQ) 100 MG 24 hr tablet, Take 2 tablets (200 mg) by mouth daily, Disp: 180 tablet, Rfl: 0     furosemide (LASIX) 40 MG tablet, TAKE twoTABLETS BY MOUTH IN A.M. And THREE TABS IN AFTERNOON. (Patient taking differently: Take 120 mg by mouth daily ), Disp: 150 tablet, Rfl: 3     glipiZIDE (GLUCOTROL XL) 5 MG 24 hr tablet, Take 1 tablet (5 mg) by mouth daily, Disp: 90 tablet, Rfl: 0     lactulose " (CHRONULAC) 10 GM/15ML solution, Take 30 mLs (20 g) by mouth 2 times daily Titrate as needed to achieve 3-5 bowel movements daily., Disp: 1892 mL, Rfl: 3     LANTUS SOLOSTAR 100 UNIT/ML soln, Inject 30 Units under the skin every morning (Patient taking differently: Inject 10 Units under the skin every morning), Disp: 12 mL, Rfl: 2     magnesium oxide (MAG-OX) 400 (241.3 Mg) MG tablet, Take 1 tablet (400 mg) by mouth daily, Disp: 90 tablet, Rfl: 1     NOVOLOG FLEXPEN 100 UNIT/ML soln, INJECT 20 UNITS UNDER THE SKIN BEFORE BREAKFAST, 20 UNITS BEFORE LUNCH, 20 UNITS BEFORE DINNER, and 20 UNITS BEFORE SNACKS., Disp: 30 mL, Rfl: 2     nystatin (MYCOSTATIN) 680515 UNIT/GM external cream, Apply topically 2 times daily, Disp: 30 g, Rfl: 11     nystatin-triamcinolone (MYCOLOG II) cream, Apply topically 2 times daily as needed (itching), Disp: 30 g, Rfl: 3     ondansetron (ZOFRAN) 4 MG tablet, Take 1 tablet (4 mg) by mouth every 8 hours as needed for nausea, Disp: 18 tablet, Rfl: 2     oxyCODONE (ROXICODONE) 5 MG tablet, Take 1-2 tablet every 8 hours as needed, Disp: 180 tablet, Rfl: 0     OYSTER SHELL CALCIUM/D 500-200 MG-UNIT per tablet, Take 1 tablet by mouth daily, Disp: 90 tablet, Rfl: 3     ranitidine (ZANTAC) 150 MG tablet, Take 1 tablet (150 mg) by mouth 2 times daily, Disp: 180 tablet, Rfl: 3     rifaximin (XIFAXAN) 550 MG TABS tablet, Take 1 tablet (550 mg) by mouth 2 times daily, Disp: 180 tablet, Rfl: 3     spironolactone (ALDACTONE) 50 MG tablet, Takes 3 tablets (150 mg) every AM and take 2 tablets (100 mg) every PM. (Patient taking differently: Take 300 mg by mouth daily ), Disp: 150 tablet, Rfl: 11     STATIN NOT PRESCRIBED, INTENTIONAL,, 1 each daily Statin not prescribed intentionally due to Active liver disease, Disp: 0 each, Rfl: 0     triamcinolone (KENALOG) 0.1 % external cream, Apply topically 2 times daily as needed for irritation, Disp: 30 g, Rfl: 3     gabapentin (NEURONTIN) 300 MG capsule, , Disp: ,  Rfl: 3     UNABLE TO FIND, MEDICATION NAME: Burdock root, Disp: , Rfl:      ursodiol (ACTIGALL) 300 MG capsule, Take 3 capsules (900 mg) by mouth 2 times daily (Patient not taking: Reported on 4/23/2019), Disp: 168 capsule, Rfl: 11      Allergies:   Patient has no known allergies.      Past Medical History:  Diabetes mellitus   Hernia, umbilical   Elevated LFTs   Hypertension  Kidney stones   Leukopenia   Liver cirrhosis secondary to SMITH  Splenomegaly   Squamous cell carcinoma   Thrombocytopenia   Esophageal varices   Major depression   Plantar warts   Ascites   Multiple rib fractures   Dental caries   Pruirogo nodularis   Esophgaeal reflux   Morbid obesity   Hepatic encephalopathy   Lymphedema of both lower extremities      Past Surgical History:  Biopsy of skin lesion   Colonoscopy, left, 6/16/16  Esophagoscopy, gastroscopy, duodenoscopy, combined x3, 2/13/13, 11/4/13, 6/16/16  Excise lesion trunk, 9/24/12  Vasectomy  Herniorrhaphy umbilical     Family History:   Mother - breast cancer, liver cancer   Father - cardiovascular, cerebrovascular disease , rectal cancer  Paternal grandfather - cardiovascular   Brother - diabetes   Sister - skin cancer, breast cancer   Other - coronary artery disease     Immunizations:  DTAP (<7y), 1/18/88  HEPA, 1/2/13, 2/18/13, 10/30/13  HepB, 1/2/13, 2/18/13, 10/30/13  Influenza (IIV3) PF, 10/04/2010, 12/18/2012, 09/08/2014, 09/26/2015, 09/27/2016  Influenza Intranasal Vaccine 4 valent, 10/12/17  Influenza Vaccine IM 3 yrs+ 4 valent IIV4, 10/26/18  Pneumo Conj 13-V (2010&after), 5/12/15  Pneumococcal 23 valent, 9/25/09, 10/1/10, 1/2/13  TD (Adult 7+), 9/8/97  TDAP (Adacel), 1/2/13  Twinrix A/B, 1/2/13  Zoster vaccine, live, 12/22/16     Social History:   The patient was alone.  Smoking Status: Former smoker, 0.30 PPD  Smokeless Tobacco: Never used   Alcohol Use: No  Marital Status:   Employment status: Retired.   Home: Lives with his wife.      Physical Exam:   /59 (BP  Location: Right arm, Patient Position: Sitting, Cuff Size: Adult Large)   Pulse 71   Wt 136.2 kg (300 lb 4.8 oz)   BMI 38.56 kg/m       Wt Readings from Last 1 Encounters:   04/22/19 135.7 kg (299 lb 3.2 oz)     Constitutional: no distress, comfortable, pleasant   Eyes: anicteric, conjunctiva pink.  Cardiovascular: regular rate and rhythm, normal S1 and S2, gr 2/6 murmurs  Respiratory: clear to auscultation, no wheezes or crackles, normal breath sounds   Musculoskeletal: full range of motion, profound edema both LEs.  Skin: He has  normal speech, no tremor. A and O x 3, good historian.  Psychological: appropriate mood, good eye contact, normal affect.  He is discouraged regarding his lifestyle and denies any denny.       Recent Labs  Labs done on 4/22  Component      Latest Ref Rng & Units 4/22/2019   Sodium      133 - 144 mmol/L 136   Potassium      3.4 - 5.3 mmol/L 4.1   Chloride      94 - 109 mmol/L 101   Carbon Dioxide      20 - 32 mmol/L 26   Anion Gap      3 - 14 mmol/L 8   Glucose      70 - 99 mg/dL 141 (H)   Urea Nitrogen      7 - 30 mg/dL 20   Creatinine      0.66 - 1.25 mg/dL 0.92   GFR Estimate      >60 mL/min/1.73:m2 86   GFR Estimate If Black      >60 mL/min/1.73:m2 >90   Calcium      8.5 - 10.1 mg/dL 8.4 (L)   WBC      4.0 - 11.0 10e9/L 3.8 (L)   RBC Count      4.4 - 5.9 10e12/L 3.14 (L)   Hemoglobin      13.3 - 17.7 g/dL 9.1 (L)   Hematocrit      40.0 - 53.0 % 29.0 (L)   MCV      78 - 100 fl 92   MCH      26.5 - 33.0 pg 29.0   MCHC      31.5 - 36.5 g/dL 31.4 (L)   RDW      10.0 - 15.0 % 17.2 (H)   Platelet Count      150 - 450 10e9/L 89 (L)   Bilirubin Direct      0.0 - 0.2 mg/dL 0.4 (H)   Bilirubin Total      0.2 - 1.3 mg/dL 1.0   Albumin      3.4 - 5.0 g/dL 2.7 (L)   Protein Total      6.8 - 8.8 g/dL 6.6 (L)   Alkaline Phosphatase      40 - 150 U/L 166 (H)   ALT      0 - 70 U/L 15   AST      0 - 45 U/L 32   INR      0.86 - 1.14 1.27 (H)     Recent Imaging     From chest x-ray note on 4/10 at [facility]   IMPRESSION: This image is taken with low lung volumes and is distorted by motion.    The heart is enlarged.    There is diffuse pulmonary vascular congestion. No gross pleural effusions. No mediastinal shift. No pneumothorax.    REPORT SIGNED BY DR. Tamra Nickerson    From EKG note on 4/10 at [facility]    Interpretive Statements  SINUS TACHYCARDIA WITH IRREGULAR RATE  INCOMPLETE RIGHT BUNDLE BRANCH BLOCK [90+ ms QRS DURATION, TERMINAL R IN  V1/V2, 4  40+ ms S IN I/aVL/V4/V5/V6]  NONSPECIFIC ST & T-WAVE ABNORMALITY  ABNORMAL RHYTHM ECG  Compared to ECG 12/12/2012 02:42:19  significant artifac limits interpretation, no clear STEMI  Electronically Signed On 4- 13:31:28 CDT by Anival Frey MD     From echocardiogram note on 4/11 at [facility]   Interpretation Summary    * The left ventricle is normal size. The left ventricular systolic function  is normal, estimated LVEF 55-60%.    * Left ventricular wall motion is normal.    * There is mild aortic stenosis. Aortic valve systolic mean gradient is 16  mmHg.    * Normal right ventricular systolic function.    * Moderate pulmonary hypertension, estimated right ventricular systolic  pressure is  51 mmHg.    * No flaca evidence of endocarditis by transthoracic echo. Recommend MAREN if  high clinical suspicion.    * There is no pericardial effusion.    * No prior study.    From x-ray abdomen on 4/13 at [facility]  IMPRESSION:     A few short fluid levels in non-dilated bowel loops of questionable significance. No dilated bowel. No evidence of bowel necrosis or perforation.    REPORT SIGNED BY DR. Bernabe Caputo    Assessment and Plan:  Screening for AAA (abdominal aortic aneurysm)  Patient has a history of smoking and is due for a screening.   - Abdominal Aortic Aneurysm Screening/Tracking  - US Aorta Medicare AAA Screening    Hyperlipidemia LDL goal <100  Recheck labs today.   - Lipid panel reflex to direct LDL  Fasting    Type 2 diabetes mellitus with foot ulcer, without long-term current use of insulin (H)  Recheck labs today.   - Hemoglobin A1c    Need for 23-polyvalent pneumococcal polysaccharide vaccine  Patient has not had a pneumonia vaccination for over 5 years. Given today.   - Pneumococcal vaccine 23 valent PPSV23  (Pneumovax) [37587]    Screening for thyroid disorder  Recheck labs today.   - TSH WITH FREE T4 REFLEX - (Today)    Sleep disorder  Patient struggles with sleeping at night due to his medications which cause urinary frequency.  A condom catheter was advised to reduce waking during the night. Patient does not have a preferred medical supply company.   - order for DME  Dispense: 1 Device; Refill: 3    Advance Directive  Patient has a full code status and is considering changing it after his most recent hospitalization. He was advised to get a written and notarized advance directive.     Itching   Patient was advised to  his prescription of Ursodiol for itching.     Follow-up: Return in about 2 months (around 6/23/2019).      Scribe Disclosure:  I, Liset Stallings, am serving as a scribe to document services personally performed by CORY Frye CNP at this visit, based upon the provider's statements to me. All documentation has been reviewed by the aforementioned provider prior to being entered into the official medical record.     Portions of this medical record were completed by a scribe. UPON MY REVIEW AND AUTHENTICATION BY ELECTRONIC SIGNATURE, this confirms (a) I performed the applicable clinical services, and (b) the record is accurate.  Total time spent 25 minutes.  More than 50% of the time spent with Mr. Louie on counseling / coordinating his care.    Ary RAY CNP

## 2019-04-23 NOTE — NURSING NOTE
Chief Complaint   Patient presents with     Hospital F/U     pt here following a hospital visit       Elaine Macias CMA at 1:09 PM on 4/23/2019.

## 2019-04-23 NOTE — PROGRESS NOTES
Met with patient and wife along with Dr. Simmons. Patient no longer in need of syed, significant reduction in lower extremity edema, although continues to be severe. He works with a lymphedema specialist and his wife works with him to bandage the open areas as needed. Taking lactulose and rifaximin as ordered. Did not yet complete EGD and colonoscopy, and also due for doppler ultrasound. Recently was hospitalized at an OSH for sepsis. Was discharged with PICC line for IV antibiotics, and has about 1 week left. Patient complains of severe itching, and will be started on Ursodiol twice daily. Per wife, will focus on getting the required imaging scheduled once he is done with the IV antibiotics and PICC line.

## 2019-04-24 ENCOUNTER — TELEPHONE (OUTPATIENT)
Dept: INTERNAL MEDICINE | Facility: CLINIC | Age: 64
End: 2019-04-24

## 2019-04-24 NOTE — TELEPHONE ENCOUNTER
Faxed DME order (condom catheter) to [facility]. Left a detailed message to home care nurseVanessa to teach the pt how to use condom catheter at next visit.

## 2019-05-01 ENCOUNTER — TELEPHONE (OUTPATIENT)
Dept: PHARMACY | Facility: CLINIC | Age: 64
End: 2019-05-01

## 2019-05-01 DIAGNOSIS — K70.31 ALCOHOLIC CIRRHOSIS OF LIVER WITH ASCITES (H): ICD-10-CM

## 2019-05-01 NOTE — TELEPHONE ENCOUNTER
I called Lawrence today to set up time to review his medications and find a treatment strategy for tobacco abuse per his PCP's request. LVM asking him to call back as soon as he is able.

## 2019-05-02 NOTE — TELEPHONE ENCOUNTER
19  Reason For Call:   Chief Complaint   Patient presents with     Medication Refill     Controlled Medication Request       Medication Name, Dose and Monthly Quantity:   oxyCODONE IR (ROXICODONE) 5 MG tablet, 180 tabs    Diagnosis requiring opiates:   Alcoholic cirrhosis of liver with ascites (H) [K70.31]     Problem List Updated:   no    Opioid Agreement On File - [facility] PAIN CONTRACT ID# [id number]:  no    Last Urine Drug Screen (at least once every 12 months) Date:   none    Unexpected Results:   na    [place]  Data Reviewed (at least once every 3 months) Date:   05/02/19    Unexpected Results:    no    Last Fill Date:   03/31/19    Due Date:   05/01/19    Last Visit with PCP:   04/23/19    Future Visits with PCP:   none    Processing:   Mail to pharmacy    CHRISTINA SOL CMA

## 2019-05-03 ENCOUNTER — MEDICAL CORRESPONDENCE (OUTPATIENT)
Dept: HEALTH INFORMATION MANAGEMENT | Facility: CLINIC | Age: 64
End: 2019-05-03

## 2019-05-03 NOTE — TELEPHONE ENCOUNTER
I don't understand why this wasn't filled at recent visit.  Patient was hospitalized for 7 days with somnolence, sepsis, history of cirrhosis, is prescribed high dose narcotics. Would prefer Ary review/refill this if appropriate given safety concerns. He should have enough until next Monday/Tuesday.  Thanks,  Sunshine Trevizo MD  Internal Medicine

## 2019-05-05 ENCOUNTER — MYC REFILL (OUTPATIENT)
Dept: INTERNAL MEDICINE | Facility: CLINIC | Age: 64
End: 2019-05-05

## 2019-05-05 DIAGNOSIS — K70.31 ALCOHOLIC CIRRHOSIS OF LIVER WITH ASCITES (H): ICD-10-CM

## 2019-05-05 RX ORDER — OXYCODONE HYDROCHLORIDE 5 MG/1
TABLET ORAL
Qty: 180 TABLET | Refills: 0 | Status: CANCELLED | OUTPATIENT
Start: 2019-05-05

## 2019-05-07 ENCOUNTER — ANCILLARY PROCEDURE (OUTPATIENT)
Dept: ULTRASOUND IMAGING | Facility: CLINIC | Age: 64
End: 2019-05-07
Attending: NURSE PRACTITIONER
Payer: COMMERCIAL

## 2019-05-07 ENCOUNTER — ANCILLARY PROCEDURE (OUTPATIENT)
Dept: ULTRASOUND IMAGING | Facility: CLINIC | Age: 64
End: 2019-05-07
Attending: INTERNAL MEDICINE
Payer: COMMERCIAL

## 2019-05-07 DIAGNOSIS — Z13.6 SCREENING FOR AAA (ABDOMINAL AORTIC ANEURYSM): ICD-10-CM

## 2019-05-07 DIAGNOSIS — K70.31 ALCOHOLIC CIRRHOSIS OF LIVER WITH ASCITES (H): ICD-10-CM

## 2019-05-07 DIAGNOSIS — Z87.891 HISTORY OF SMOKING: ICD-10-CM

## 2019-05-07 RX ORDER — OXYCODONE HYDROCHLORIDE 5 MG/1
TABLET ORAL
Qty: 180 TABLET | Refills: 0 | Status: SHIPPED | OUTPATIENT
Start: 2019-05-07 | End: 2019-07-04

## 2019-05-07 NOTE — TELEPHONE ENCOUNTER
RX for Roxicodone sent to [facility] via [employer].    Tracking number [id number].    CHRISTINA SOL at 9:25 AM on 5/7/2019.

## 2019-05-16 DIAGNOSIS — Z79.4 TYPE 2 DIABETES MELLITUS WITHOUT COMPLICATION, WITH LONG-TERM CURRENT USE OF INSULIN (H): ICD-10-CM

## 2019-05-16 DIAGNOSIS — E11.9 TYPE 2 DIABETES MELLITUS WITHOUT COMPLICATION, WITH LONG-TERM CURRENT USE OF INSULIN (H): ICD-10-CM

## 2019-05-16 DIAGNOSIS — F33.9 RECURRENT MAJOR DEPRESSIVE DISORDER, REMISSION STATUS UNSPECIFIED (H): ICD-10-CM

## 2019-05-16 DIAGNOSIS — E11.610 TYPE 2 DIABETES MELLITUS WITH DIABETIC NEUROPATHIC ARTHROPATHY, WITHOUT LONG-TERM CURRENT USE OF INSULIN (H): ICD-10-CM

## 2019-05-17 RX ORDER — INSULIN GLARGINE 100 [IU]/ML
INJECTION, SOLUTION SUBCUTANEOUS
Qty: 9 ML | Refills: 2 | Status: SHIPPED | OUTPATIENT
Start: 2019-05-17 | End: 2019-12-24

## 2019-05-17 RX ORDER — CITALOPRAM HYDROBROMIDE 40 MG/1
40 TABLET ORAL DAILY
Qty: 90 TABLET | Refills: 0 | Status: SHIPPED | OUTPATIENT
Start: 2019-05-17 | End: 2019-08-14

## 2019-05-17 RX ORDER — GLIPIZIDE 5 MG/1
5 TABLET, FILM COATED, EXTENDED RELEASE ORAL DAILY
Qty: 90 TABLET | Refills: 0 | Status: SHIPPED | OUTPATIENT
Start: 2019-05-17 | End: 2019-11-18

## 2019-05-17 NOTE — TELEPHONE ENCOUNTER
Last Clinic Visit: 4/23/2019 [facility]  Next Clinic and Lab appt 6-28-19  Overdue lab: LDL- (order/lab appt 6-28-19)  Overdue PHQ9- FYI to Clinic CMA  90 day RF sent to pharm

## 2019-05-20 ENCOUNTER — MEDICAL CORRESPONDENCE (OUTPATIENT)
Dept: HEALTH INFORMATION MANAGEMENT | Facility: CLINIC | Age: 64
End: 2019-05-20

## 2019-05-27 DIAGNOSIS — R60.0 EDEMA OF BOTH LEGS: ICD-10-CM

## 2019-05-28 RX ORDER — FUROSEMIDE 40 MG
TABLET ORAL
Qty: 150 TABLET | Refills: 2 | Status: ON HOLD | OUTPATIENT
Start: 2019-05-28 | End: 2019-08-17

## 2019-05-30 ENCOUNTER — MEDICAL CORRESPONDENCE (OUTPATIENT)
Dept: HEALTH INFORMATION MANAGEMENT | Facility: CLINIC | Age: 64
End: 2019-05-30

## 2019-05-30 ENCOUNTER — MYC MEDICAL ADVICE (OUTPATIENT)
Dept: INTERNAL MEDICINE | Facility: CLINIC | Age: 64
End: 2019-05-30

## 2019-05-30 ENCOUNTER — DOCUMENTATION ONLY (OUTPATIENT)
Dept: CARE COORDINATION | Facility: CLINIC | Age: 64
End: 2019-05-30

## 2019-05-31 ENCOUNTER — MEDICAL CORRESPONDENCE (OUTPATIENT)
Dept: HEALTH INFORMATION MANAGEMENT | Facility: CLINIC | Age: 64
End: 2019-05-31

## 2019-06-12 ENCOUNTER — MYC MEDICAL ADVICE (OUTPATIENT)
Dept: INTERNAL MEDICINE | Facility: CLINIC | Age: 64
End: 2019-06-12

## 2019-06-12 DIAGNOSIS — K70.31 ALCOHOLIC CIRRHOSIS OF LIVER WITH ASCITES (H): ICD-10-CM

## 2019-06-12 DIAGNOSIS — F32.89 OTHER DEPRESSION: ICD-10-CM

## 2019-06-12 DIAGNOSIS — K76.82 HEPATIC ENCEPHALOPATHY (H): ICD-10-CM

## 2019-06-12 DIAGNOSIS — R11.0 NAUSEA: ICD-10-CM

## 2019-06-12 DIAGNOSIS — F32.0 MILD MAJOR DEPRESSION (H): ICD-10-CM

## 2019-06-12 DIAGNOSIS — I89.0 LYMPHEDEMA OF BOTH LOWER EXTREMITIES: Primary | ICD-10-CM

## 2019-06-12 RX ORDER — ACETAMINOPHEN 160 MG
1 TABLET,DISINTEGRATING ORAL DAILY
Qty: 100 CAPSULE | Refills: 0 | Status: SHIPPED | OUTPATIENT
Start: 2019-06-12 | End: 2020-04-03

## 2019-06-12 RX ORDER — ONDANSETRON 4 MG/1
TABLET, FILM COATED ORAL
Qty: 18 TABLET | Refills: 1 | Status: SHIPPED | OUTPATIENT
Start: 2019-06-12 | End: 2020-01-02

## 2019-06-12 RX ORDER — DESVENLAFAXINE 100 MG/1
TABLET, EXTENDED RELEASE ORAL
Qty: 60 TABLET | Refills: 0 | Status: SHIPPED | OUTPATIENT
Start: 2019-06-12 | End: 2019-08-14

## 2019-06-12 NOTE — TELEPHONE ENCOUNTER
Cholecalciferol (VITAMIN D3) 2000 units CAPS.      Last Written Prescription Date:  8/16/18  Last Fill Quantity: 90,   # refills: 3  Last Office Visit : 4/23/19  Future Office visit:  6/28/19  90 day to pharmacy.     desvenlafaxine (PRISTIQ) 100 MG 24 hr tablet      Last Written Prescription Date:  3/18/19  Last Fill Quantity: 180,   # refills: 0  30 day to pharmacy.       ondansetron (ZOFRAN) 4 MG tablet.      Last Written Prescription Date:  12/14/18  Last Fill Quantity: 18,   # refills: 2  Refill to pharmacy.     Patient instructed to keep scheduled appointment on 6/25/19.     FYI to [facility] - PHQ9 needed.

## 2019-06-13 ENCOUNTER — TELEPHONE (OUTPATIENT)
Dept: INTERNAL MEDICINE | Facility: CLINIC | Age: 64
End: 2019-06-13

## 2019-06-13 RX ORDER — LACTULOSE 10 G/15ML
SOLUTION ORAL; RECTAL
Qty: 1892 ML | Refills: 2 | Status: SHIPPED | OUTPATIENT
Start: 2019-06-13 | End: 2020-01-31

## 2019-06-13 NOTE — TELEPHONE ENCOUNTER
[facility] Call Center    Phone Message    May a detailed message be left on voicemail: yes    Reason for Call: Symptoms or Concerns     If patient has red-flag symptoms, warm transfer to triage line    Current symptom or concern: Perla called to let know PCP and [facility] know that they won t be able to do the home care under insurance for patient but would be able to do it under private pay which is their legacy program. Please call Located within [facility] to discuss.          Action Taken: Message routed to:  [facility] PCC

## 2019-06-14 NOTE — TELEPHONE ENCOUNTER
Spoke with Perla, states that they can only provide one time teaching visit and provide the instruction to a teachable party under the current insurance.  I called pt/wife, Flores and informed this. They will find out the in network home care service and call me.

## 2019-06-19 ENCOUNTER — MYC REFILL (OUTPATIENT)
Dept: GASTROENTEROLOGY | Facility: CLINIC | Age: 64
End: 2019-06-19

## 2019-06-19 DIAGNOSIS — L29.9 ITCHING: ICD-10-CM

## 2019-06-19 DIAGNOSIS — K70.31 ALCOHOLIC CIRRHOSIS OF LIVER WITH ASCITES (H): ICD-10-CM

## 2019-06-19 DIAGNOSIS — K76.6 PORTAL HYPERTENSION (H): ICD-10-CM

## 2019-06-19 DIAGNOSIS — R60.1 GENERALIZED EDEMA: ICD-10-CM

## 2019-06-19 RX ORDER — URSODIOL 300 MG/1
900 CAPSULE ORAL 2 TIMES DAILY
Qty: 168 CAPSULE | Refills: 11 | Status: CANCELLED | OUTPATIENT
Start: 2019-06-19

## 2019-06-19 NOTE — TELEPHONE ENCOUNTER
[facility] Call [facility]    Phone Message    May a detailed message be left on voicemail: no    Reason for Call: Other: .  pt is requesting a call back for an update on his refill request. States he only has 2 days supply left. I notified pt refills can take 72 business hours to complete.    Action Taken: Message routed to:  [facility] caesar

## 2019-06-20 ENCOUNTER — MYC REFILL (OUTPATIENT)
Dept: GASTROENTEROLOGY | Facility: CLINIC | Age: 64
End: 2019-06-20

## 2019-06-20 DIAGNOSIS — R60.1 GENERALIZED EDEMA: ICD-10-CM

## 2019-06-20 DIAGNOSIS — K76.6 PORTAL HYPERTENSION (H): ICD-10-CM

## 2019-06-20 DIAGNOSIS — K70.31 ALCOHOLIC CIRRHOSIS OF LIVER WITH ASCITES (H): Primary | ICD-10-CM

## 2019-06-21 ENCOUNTER — TELEPHONE (OUTPATIENT)
Dept: GASTROENTEROLOGY | Facility: CLINIC | Age: 64
End: 2019-06-21

## 2019-06-21 DIAGNOSIS — L29.9 ITCHING: ICD-10-CM

## 2019-06-21 DIAGNOSIS — K70.31 ALCOHOLIC CIRRHOSIS OF LIVER WITH ASCITES (H): ICD-10-CM

## 2019-06-21 RX ORDER — SPIRONOLACTONE 50 MG/1
150 TABLET, FILM COATED ORAL 2 TIMES DAILY
Qty: 180 TABLET | Refills: 11 | Status: SHIPPED | OUTPATIENT
Start: 2019-06-21 | End: 2019-11-25

## 2019-06-21 RX ORDER — URSODIOL 300 MG/1
900 CAPSULE ORAL 2 TIMES DAILY
Qty: 180 CAPSULE | Refills: 11 | Status: SHIPPED | OUTPATIENT
Start: 2019-06-21 | End: 2020-03-16

## 2019-06-21 NOTE — TELEPHONE ENCOUNTER
Health Call Center    Phone Message    May a detailed message be left on voicemail: yes    Reason for Call: Other: Patient's wife Is returning a Cristina's call regarding the dosage of patient's spironolactone (ALDACTONE) 50 MG tablet . She states that he is currently taking a dosage of 3 tabs so 150mg twice a day. She further states that she can be reached on her mobile at [phone] if there are any further questions.        Action Taken: Message routed to:  [facility] hepatology

## 2019-06-21 NOTE — TELEPHONE ENCOUNTER
Prescription changed to reflect how pt is currently taking spironolactone. Spironolactone 50 mg tablets, 3 tablets (150 mg) twice daily. Prescription sent to pt's preferred pharmacy.    Asymptomatic anemia. Normocytic, normochromatic, elevated RDW. Likely 2/2 MDS. No overt signs of bleed. Unlikely hemolysis, T.bili/alk phos nl.  - s/p 1U PRBCs, irradiated only  - transfusion goal Hgb>7, no need for post-transfusion CBC  - active T&S

## 2019-06-21 NOTE — TELEPHONE ENCOUNTER
Pt's wife reported that pt is taking spironolactone differently than charted. Per Raven, pt is taking Spironolactone 50 mg tablets, 3 tablets (150 mg) twice daily. Prescription refill sent to pt's preferred pharmacy.

## 2019-06-21 NOTE — TELEPHONE ENCOUNTER
Prior Authorization Retail Medication Request    Medication/Dose: Ursodiol 900 mg twice per day   ICD code (if different than what is on RX):  K70.31, L29.9  Previously Tried and Failed:    Rationale:      Insurance Name:    Insurance ID:        Pharmacy Information (if different than what is on RX)  Name:    Phone:

## 2019-06-24 RX ORDER — SPIRONOLACTONE 50 MG/1
TABLET, FILM COATED ORAL
Qty: 150 TABLET | Refills: 10 | OUTPATIENT
Start: 2019-06-24

## 2019-06-24 RX ORDER — SPIRONOLACTONE 50 MG/1
TABLET, FILM COATED ORAL
Qty: 150 TABLET | Refills: 11 | OUTPATIENT
Start: 2019-06-24

## 2019-06-24 NOTE — TELEPHONE ENCOUNTER
Central Prior Authorization Team   Phone: [phone]    PA Initiation    Medication: ursodiol (ACTIGALL) 300 MG capsule  Insurance Company: [employer] - Phone [phone] Fax [phone]  Pharmacy Filling the Rx: [address]  Filling Pharmacy Phone: [phone]  Filling Pharmacy Fax:    Start Date: 6/24/2019

## 2019-06-25 ENCOUNTER — TELEPHONE (OUTPATIENT)
Dept: INTERNAL MEDICINE | Facility: CLINIC | Age: 64
End: 2019-06-25

## 2019-06-25 DIAGNOSIS — I89.0 LYMPHEDEMA OF BOTH LOWER EXTREMITIES: Primary | ICD-10-CM

## 2019-06-25 NOTE — TELEPHONE ENCOUNTER
[facility] did not initiate the home care referral on 6/18.  Home care referral placed again as 6/18 was too old per [facility].      Spoke with home care nurse and verbal auth of home care order given.  2:38 pm

## 2019-06-25 NOTE — TELEPHONE ENCOUNTER
Prior Authorization Approval    Authorization Effective Date: 6/25/2019  Authorization Expiration Date: 12/31/2019  Medication: ursodiol (ACTIGALL) 300 MG capsule  Approved Dose/Quantity: 180  Reference #:     Insurance Company: [employer] - Phone [phone] Fax [phone]  Expected CoPay:       CoPay Card Available:      Foundation Assistance Needed:    Which Pharmacy is filling the prescription (Not needed for infusion/clinic administered): [facility] [address] [place] [address]  Pharmacy Notified: Yes - via voicemail  Patient Notified:

## 2019-06-27 ENCOUNTER — CARE COORDINATION (OUTPATIENT)
Dept: INTERNAL MEDICINE | Facility: CLINIC | Age: 64
End: 2019-06-27

## 2019-06-28 ENCOUNTER — CARE COORDINATION (OUTPATIENT)
Dept: INTERNAL MEDICINE | Facility: CLINIC | Age: 64
End: 2019-06-28

## 2019-07-01 ENCOUNTER — MEDICAL CORRESPONDENCE (OUTPATIENT)
Dept: HEALTH INFORMATION MANAGEMENT | Facility: CLINIC | Age: 64
End: 2019-07-01

## 2019-07-01 ENCOUNTER — PATIENT OUTREACH (OUTPATIENT)
Dept: GASTROENTEROLOGY | Facility: CLINIC | Age: 64
End: 2019-07-01

## 2019-07-01 ENCOUNTER — DOCUMENTATION ONLY (OUTPATIENT)
Dept: CARE COORDINATION | Facility: CLINIC | Age: 64
End: 2019-07-01

## 2019-07-01 NOTE — PROGRESS NOTES
Patient continues to deal with lower extremity swelling and wraps legs daily. He is doing well overall, per wife Raven. She has had a recent back surgery and is not able to care for him. Today they are getting home care set up to assist during this time. They have received the approval for the ursodiol and are pleased with this. She does not have any concerns at this time, but has my contact information and will call as needed.

## 2019-07-02 NOTE — PROGRESS NOTES
[facility] now requests orders and shares plan of care/discharge summaries for some patients through [employer].  Please REPLY TO THIS MESSAGE OR ROUTE BACK TO THE AUTHOR in order to give authorization for orders when needed.  This is considered a verbal order, you will still receive a faxed copy of orders for signature.  Thank you for your assistance in improving collaboration for our patients.    ORDER  Nursing 2x/week for 1 week, 3x/week for 1 week, 3 PRN    Wound care to BLE: Clean BLE with soap and water, cover.    Lymphedema to eval and treat    Aziza Kemp RN [facility]  [phone]  [email or web address]

## 2019-07-04 DIAGNOSIS — K70.31 ALCOHOLIC CIRRHOSIS OF LIVER WITH ASCITES (H): ICD-10-CM

## 2019-07-05 RX ORDER — OXYCODONE HYDROCHLORIDE 5 MG/1
TABLET ORAL
Qty: 180 TABLET | Refills: 0 | Status: SHIPPED | OUTPATIENT
Start: 2019-07-05 | End: 2019-08-06

## 2019-07-05 NOTE — TELEPHONE ENCOUNTER
Reason For Call:   Chief Complaint   Patient presents with     Medication Refill       Medication Name, Dose and Monthly Quantity:   oxyCODONE IR (ROXICODONE) 5 MG tablet, 180 tabs    Diagnosis requiring opiates:   Alcoholic cirrhosis of liver with ascites (H) [K70.31]     Problem List Updated:   no    Opioid Agreement On File - [facility] PAIN CONTRACT ID# [id number]:  no    Last Urine Drug Screen (at least once every 12 months) Date:   none    Unexpected Results:   na    [place]  Data Reviewed (at least once every 3 months) Date:   07/05/2019    Unexpected Results:    no    Last Fill Date:   05/08/2019  Due Date:   07/05/2019    Last Visit with PCP:   04/23/19    Future Visits with PCP:   none    Processing:   Mail to pharmacy    CHRISTINA SOL CMA

## 2019-07-10 ENCOUNTER — MEDICAL CORRESPONDENCE (OUTPATIENT)
Dept: HEALTH INFORMATION MANAGEMENT | Facility: CLINIC | Age: 64
End: 2019-07-10

## 2019-07-15 ENCOUNTER — MEDICAL CORRESPONDENCE (OUTPATIENT)
Dept: HEALTH INFORMATION MANAGEMENT | Facility: CLINIC | Age: 64
End: 2019-07-15

## 2019-07-15 ENCOUNTER — TELEPHONE (OUTPATIENT)
Dept: INTERNAL MEDICINE | Facility: CLINIC | Age: 64
End: 2019-07-15

## 2019-07-15 NOTE — TELEPHONE ENCOUNTER
[facility] Call Center    Phone Message    May a detailed message be left on voicemail: yes    Reason for Call: Order(s): Home Care Orders: Occupational Therapy (OT): Occupational therapy for Lymphedema treatment 3 times a week for 6 weeks.  Requesting Verbal ok      Action Taken: Message routed to:  [facility] gene emerson

## 2019-07-17 ENCOUNTER — MYC MEDICAL ADVICE (OUTPATIENT)
Dept: GASTROENTEROLOGY | Facility: CLINIC | Age: 64
End: 2019-07-17

## 2019-07-18 ENCOUNTER — TELEPHONE (OUTPATIENT)
Dept: INTERNAL MEDICINE | Facility: CLINIC | Age: 64
End: 2019-07-18

## 2019-07-18 NOTE — TELEPHONE ENCOUNTER
Patient's wife called to discuss the possibility of hospice for the patient. She states his lower extremity swelling, severe itching, and generalized pain are giving him such poor quality of life that he would like to explore options for keeping him comfortable. She has also discussed this with patient's  home care lymphedema therapist, who she states was going to follow up with this through [facility]. Reviewed hospice vs palliative care, wife states either would be great as long as he is able to get some relief from his suffering. Message left with patient's PCP office to reach out to patient to discuss, and place hospice referral if deemed appropriate.

## 2019-07-18 NOTE — TELEPHONE ENCOUNTER
[facility]    Phone Message    May a detailed message be left on voicemail: no    Reason for Call: Order(s): Other:   Reason for requested: Pt is interested in exploring hospice care  Date needed: N/A  Provider name: Ary Murphy NP  Comments: Please call Pt's wife Flores      Action Taken: Message routed to:  [facility] PR[facility]

## 2019-07-22 ENCOUNTER — TELEPHONE (OUTPATIENT)
Dept: INTERNAL MEDICINE | Facility: CLINIC | Age: 64
End: 2019-07-22

## 2019-07-22 NOTE — TELEPHONE ENCOUNTER
[facility]    Phone Message    May a detailed message be left on voicemail: yes    Reason for Call: Order(s): Home Care Orders: Skilled Nursinx a wk for 5 wks, 3 prn. Also wound care-bilat extremities, Clean w/soapy water and dry, apply abdomen patch at draining site secure with curlix (sp?) and tape. Please call Dixon at [phone]. Thanks.    Action Taken: Message routed to:  [facility] med`

## 2019-07-23 ENCOUNTER — TELEPHONE (OUTPATIENT)
Dept: INTERNAL MEDICINE | Facility: CLINIC | Age: 64
End: 2019-07-23

## 2019-07-23 NOTE — TELEPHONE ENCOUNTER
[facility] Call Center    Phone Message    May a detailed message be left on voicemail: yes    Reason for Call: Symptoms or Concerns       Current symptom or concern: Home care nursing FYI:     Dixon reports that patient has complained of confusion, slurred speech over the past 4 to five days.  Patient reports that episodes occur in the evening.  Patient is concerned that he is taking too much medication.      Symptoms have been present for:  4-5 day(s)    Has patient previously been seen for this? No    By : Ary Murphy    Date: Last seen in clinic 4.23.19    Are there any new or worsening symptoms? No      Action Taken: Message routed to:  [facility]): [facility] primary

## 2019-07-23 NOTE — TELEPHONE ENCOUNTER
Spoke to Home care nurse who states that the patient is concerned that he has not been feeling himself lately. The patient states that he has been lethargic and having slurred speech in the evenings. Patient is still taking lactulose. Patient is concerned that he may be taking too much medication. Patient has an appointment on 7/30/19. Advise home care nurse that the patient could come in to see another provider sooner than 7/30/19 or go to urgent care or ER. Will send to provider for any recommendations. Magdalena Mott LPN 7/23/2019 2:29 PM

## 2019-07-25 ENCOUNTER — MEDICAL CORRESPONDENCE (OUTPATIENT)
Dept: HEALTH INFORMATION MANAGEMENT | Facility: CLINIC | Age: 64
End: 2019-07-25

## 2019-07-25 DIAGNOSIS — R41.0 CONFUSION: Primary | ICD-10-CM

## 2019-07-25 NOTE — TELEPHONE ENCOUNTER
Left a message for the home care nurse to relay the following message:    Ary Murphy APRN CNP  You 44 minutes ago (12:10 PM)     Cindy,     Could you call her and have her ascertain how much oxycodone he is taking and when?  His liver doesn't work well so may have to cut back on that.  Also I will place lab orders for him to have drawn prior to his appt on the 30th.     Thanks.     Ary      Asked home care nurse to call clinic back with information.  Magdalena Mott LPN 7/25/2019 12:57 PM

## 2019-07-29 ENCOUNTER — TELEPHONE (OUTPATIENT)
Dept: INTERNAL MEDICINE | Facility: CLINIC | Age: 64
End: 2019-07-29

## 2019-07-29 NOTE — TELEPHONE ENCOUNTER
[facility]    Phone Message    May a detailed message be left on voicemail: yes    Reason for Call: Form or Letter   Type or form/letter needing completion: form dated from 7/15  Provider: Overkamp  Date form needed: asap  Once completed: Fax form to: [phone]    PLEASE SEE CORRESPONDENCE FROM 7/15  this form was sent in with  name crossed out, and then Katherine scribbled over it, then scratched out again with  on top of that. They are asking we re-sign it neatly and re-send.    Action Taken: Message routed to:  [facility]

## 2019-07-30 ENCOUNTER — OFFICE VISIT (OUTPATIENT)
Dept: INTERNAL MEDICINE | Facility: CLINIC | Age: 64
End: 2019-07-30
Payer: COMMERCIAL

## 2019-07-30 ENCOUNTER — MEDICAL CORRESPONDENCE (OUTPATIENT)
Dept: HEALTH INFORMATION MANAGEMENT | Facility: CLINIC | Age: 64
End: 2019-07-30

## 2019-07-30 VITALS
HEART RATE: 76 BPM | SYSTOLIC BLOOD PRESSURE: 120 MMHG | TEMPERATURE: 98.4 F | OXYGEN SATURATION: 100 % | DIASTOLIC BLOOD PRESSURE: 64 MMHG

## 2019-07-30 DIAGNOSIS — M79.671 PAIN IN BOTH FEET: Primary | ICD-10-CM

## 2019-07-30 DIAGNOSIS — M79.672 PAIN IN BOTH FEET: Primary | ICD-10-CM

## 2019-07-30 DIAGNOSIS — I87.2 VENOUS STASIS DERMATITIS OF BOTH LOWER EXTREMITIES: ICD-10-CM

## 2019-07-30 RX ORDER — HYDROXYZINE HYDROCHLORIDE 25 MG/1
25 TABLET, FILM COATED ORAL 3 TIMES DAILY PRN
Qty: 90 TABLET | Refills: 3 | Status: SHIPPED | OUTPATIENT
Start: 2019-07-30 | End: 2020-03-01

## 2019-07-30 RX ORDER — LIDOCAINE 4 G/G
1 PATCH TOPICAL EVERY 24 HOURS
Qty: 30 PATCH | Refills: 1 | Status: SHIPPED | OUTPATIENT
Start: 2019-07-30 | End: 2019-08-18

## 2019-07-30 RX ORDER — TRIAMCINOLONE ACETONIDE 1 MG/G
CREAM TOPICAL 2 TIMES DAILY PRN
Qty: 453.6 G | Refills: 3 | Status: ON HOLD | OUTPATIENT
Start: 2019-07-30 | End: 2020-07-30

## 2019-07-30 RX ORDER — BUMETANIDE 1 MG/1
1 TABLET ORAL DAILY
Qty: 60 TABLET | Refills: 11 | Status: SHIPPED | OUTPATIENT
Start: 2019-07-30 | End: 2019-11-18

## 2019-07-30 NOTE — PATIENT INSTRUCTIONS
[facility] Medication Refill Request Information:  * Please contact your pharmacy regarding ANY request for medication refills.  ** [facility] Prescription Fax = [phone]  * Please allow 3 business days for routine medication refills.  * Please allow 5 business days for controlled substance medication refills.     [facility] Test Result notification information:  *You will be notified with in 7-10 days of your appointment day regarding the results of your test.  If you are on MyChart you will be notified as soon as the provider has reviewed the results and signed off on them.    [facility]: [phone]

## 2019-07-30 NOTE — NURSING NOTE
Chief Complaint   Patient presents with     Recheck Medication     pt here to discuss medications       Elaine Macias CMA at 5:08 PM on 7/30/2019.

## 2019-07-30 NOTE — PROGRESS NOTES
"[facility]   Ary Murphy, CORY CNP  07/30/2019      Chief Complaint:   Recheck Medication and to discuss possible hospice       History of Present Illness:   Lawrence Louie is a 63 year old male with a history of diabetes mellitus type 2, hypertension, hyperlipidemia, mild major depression, and hepatic encephalopathy due to alcoholic liver disease who presents alone for recheck medication. He is currently in a wheelchair and is holding a cane.    Lymphedema: He reports the swelling in his legs has gotten worse. He is currently does not need further paracentesis.He has sever bilateral LE lymphedema for which he has diuretic and compression suit therapy. His legs continually weep.  His wife is recovering from spinal surgery and cannot manage to apply the compression suit.  He is unable to apply it without assistance.  He does have home health people that come to his house to wrap his legs but do not apply the compression suit.  He notes his legs are constantly wet due to leaking, secondary to his diuretics. He notes fluid will drain through open wounds on his legs and through his skin.He would like a new medication for his leg pruritis which is constant and insufferable. He also notes urinary frequency and urgency, reporting he will need to get up to use the bathroom every 15-20 minutes. He feels his diuretics have been taking longer to become effective, noting he feels as though they now take up to an hour to work. Because of his lymphedema and urinary frequency , he is not able to get outside much because of the soreness he experiences in his legs and feet with walking. In the past, Lidocaine patches have worked to alleviate some of his heel pain when applying them to the bottoms of his feet. At night, leg pain wakes him up and sometimes gets to the point where he \"wants to rip off [his] skin\". He would like a prescription for a new pair of shoes as the ones he has now are getting worn " out. Of note, he and his wife have been thinking about long term hospice care because he is not getting any better and he feels there is nothing more they can do to improve his quality of life. They have constant bills which they are unable to pay.     Pruritic: He reports a constant, intense itching sensation in his legs. He has tried treating this with numerous soaps and medications but nothing has provided him with relief. Applying hydrogen peroxide is the only thing that has provided him with temporary relief.He has not seen a lymphedema specialist for awhile. As of now, he wears pads and compression socks to help cover his open wounds on his legs.       Review of Systems:   Pertinent items are noted in HPI or as in patient entered ROS below, remainder of complete ROS is negative.       PHQ9: 20  GAD7: 11    Active Medications:      childrens multivitamin w/ionr (FLINTSTONES COMPLETE) 60 MG chewable tablet, Take 1 chew tab by mouth daily, Disp: , Rfl:      Cholecalciferol (VITAMIN D3) 2000 units CAPS, TAKE 1 CAPSULE BY MOUTH DAILY, Disp: 100 capsule, Rfl: 0     citalopram (CELEXA) 40 MG tablet, Take 1 tablet (40 mg) by mouth daily, Disp: 90 tablet, Rfl: 0     Cyanocobalamin (VITAMIN B-12 PO), Take 1 tablet by mouth daily, Disp: , Rfl:      desvenlafaxine (PRISTIQ) 100 MG 24 hr tablet, TAKE TWO TABLETS BY MOUTH DAILY , Disp: 60 tablet, Rfl: 0     furosemide (LASIX) 40 MG tablet, TAKE 2 TABLETS BY MOUTH IN MORNING AND 3 TABLETS IN AFTERNOON., Disp: 150 tablet, Rfl: 2     gabapentin (NEURONTIN) 300 MG capsule, , Disp: , Rfl: 3     glipiZIDE (GLUCOTROL XL) 5 MG 24 hr tablet, Take 1 tablet (5 mg) by mouth daily Please keep clinic and lab appt 6-28-19 for further refills, Disp: 90 tablet, Rfl: 0     insulin pen needle (B-D U/F) 31G X 8 MM, USE  6 times daily / OR AS DIRECTED, Disp: 300 each, Rfl: 3     insulin pen needle (ULTICARE SHORT) 31G X 8 MM miscellaneous, Use UP to 6 times daily or as directed, Disp: 300 each,  Rfl: 3     lactulose encephalopathy (CHRONULAC) 10 GM/15ML SOLUTION, Take 30 mL's by mouth 2 times daily. Titrate as needed to achieve 3-5 bowel movements daily., Disp: 1892 mL, Rfl: 2     LANTUS SOLOSTAR 100 UNIT/ML soln, Inject 30 Units under the skin every morning, Disp: 9 mL, Rfl: 2     LANTUS SOLOSTAR 100 UNIT/ML soln, Inject 30 Units under the skin every morning (Patient taking differently: Inject 10 Units under the skin every morning), Disp: 12 mL, Rfl: 2     lidocaine (XYLOCAINE) 5 % external ointment, Apply topically as needed for moderate pain Apply to wounds twice daily as needed., Disp: 50 g, Rfl: 1     magnesium oxide (MAG-OX) 400 (241.3 Mg) MG tablet, Take 1 tablet (400 mg) by mouth daily, Disp: 90 tablet, Rfl: 1     NOVOLOG FLEXPEN 100 UNIT/ML soln, INJECT 20 UNITS UNDER THE SKIN BEFORE BREAKFAST, 20 UNITS BEFORE LUNCH, 20 UNITS BEFORE DINNER, and 20 UNITS BEFORE SNACKS., Disp: 30 mL, Rfl: 2     nystatin (MYCOSTATIN) 482249 UNIT/GM external cream, Apply topically 2 times daily, Disp: 30 g, Rfl: 11     nystatin-triamcinolone (MYCOLOG II) cream, Apply topically 2 times daily as needed (itching), Disp: 30 g, Rfl: 3     ondansetron (ZOFRAN) 4 MG tablet, Take 1 tablet by mouth every 8 hours as needed for nausea, Disp: 18 tablet, Rfl: 1     oxyCODONE (ROXICODONE) 5 MG tablet, TAKE 1 TO 2 TABLETS BY MOUTH EVERY 8 HOURS AS NEEDED, Disp: 180 tablet, Rfl: 0     OYSTER SHELL CALCIUM/D 500-200 MG-UNIT per tablet, Take 1 tablet by mouth daily, Disp: 90 tablet, Rfl: 3     ranitidine (ZANTAC) 150 MG tablet, Take 1 tablet (150 mg) by mouth 2 times daily, Disp: 180 tablet, Rfl: 3     rifaximin (XIFAXAN) 550 MG TABS tablet, Take 1 tablet (550 mg) by mouth 2 times daily, Disp: 180 tablet, Rfl: 3     spironolactone (ALDACTONE) 50 MG tablet, Take 3 tablets (150 mg) by mouth 2 times daily, Disp: 180 tablet, Rfl: 11     STATIN NOT PRESCRIBED, INTENTIONAL,, 1 each daily Statin not prescribed intentionally due to Active liver  disease, Disp: 0 each, Rfl: 0     triamcinolone (KENALOG) 0.1 % external cream, Apply topically 2 times daily as needed for irritation, Disp: 30 g, Rfl: 3     UNABLE TO FIND, MEDICATION NAME: Burdock root, Disp: , Rfl:      ursodiol (ACTIGALL) 300 MG capsule, Take 3 capsules (900 mg) by mouth 2 times daily, Disp: 180 capsule, Rfl: 11      Allergies:   Patient has no known allergies.      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  Splenomegaly  Squamous cell carcinoma   Thrombocytopenia  Esophageal varices  Mild major depression  Plantar warts  Corns and callosities  Alcoholic cirrhosis  Ascites  Hyperlipidemia    Dental caries  Prurigo nodularis   Gastroesophageal reflux disease   Morbid obesity   Hepatic encephalopathy   Lymphedema of both lower extremities      Past Surgical History:  Biopsy skin lesion   Excise lesion - 9/24/12  Genitourinary surgery, vasectomy   Herniorrhaphy umbilical    Family History:   Breast cancer - mother, sister  Liver cancer - mother  Cardiovascular - father, paternal grandfather   Cerebrovascular disease - father (very low blood pressure)  Rectal cancer - father  Diabetes - brother  Skin cancer - sister  Coronary artery disease - other     Immunizations:  Immunization History   Administered Date(s) Administered     DTAP (<7y) 01/18/1988     HEPA 01/02/2013, 02/18/2013, 10/30/2013     HepB 01/02/2013, 02/18/2013, 10/30/2013     Influenza (IIV3) PF 10/04/2010, 12/18/2012, 09/08/2014, 09/26/2015, 09/27/2016     Influenza Intranasal Vaccine 4 valent 10/12/2017     Influenza Vaccine IM 3yrs+ 4 Valent IIV4 10/26/2018     Pneumo Conj 13-V (2010&after) 05/12/2015     Pneumococcal 23 valent 09/25/2009, 10/01/2010, 01/02/2013, 04/23/2019     TD (ADULT, 7+) 09/08/1997     TDAP Vaccine (Adacel) 01/02/2013     Twinrix A/B 01/02/2013     Zoster vaccine, live 12/22/2016       Social  History:   The patient is , a former smoker (quit 4/10/19), and does not consume alcohol (none since December 2012).        Physical Exam:   /64 (BP Location: Right arm, Patient Position: Sitting, Cuff Size: Adult Regular)   Pulse 76   Temp 98.4  F (36.9  C) (Oral)   SpO2 100%      Wt Readings from Last 1 Encounters:   04/23/19 136.2 kg (300 lb 4.8 oz)     Constitutional: no distress, comfortable, pleasant   Eyes: anicteric, conjunctiva pink.  Ears, Nose and Throat:several missing teeth  Cardiovascular: regular rate and rhythm, normal S1 and S2, no murmurs, rubs or gallops, peripheral pulses full and symmetric   Respiratory: clear to auscultation, no wheezes or crackles, normal breath sounds   Musculoskeletal:   Skin: Severe lipodermatosclerosis present to bilateral LEs. Multiple open lesions present to various areas of bilateral LEs. All are to dermis only with mild serous drainage. No erythema or calor present. No purulent drainage or wound odor. Dressings are wet.    Neurological:  normal speech. A and O x 3, good historian.  Psychological: appropriate mood, good eye contact, normal affect       Assessment and Plan:  Pain in both feet  He has bilateral foot pain. Order placed for new pair of shoes. He will use Lidocaine patches on the bottoms of his feet to provide pain relief as this has been effective in the past.  - order for DME  Dispense: 2 Units; Refill: 0  - Lidocaine (LIDOCARE) 4 % Patch  Dispense: 30 patch; Refill: 1    Venous stasis dermatitis of both lower extremities  He has constant intense itching in his legs secondary to lymphedema. I suggested he try a condom catheter and a subsequent increase his diuretics. He declines at this time. Prescriptions provided for various anti-itch medications for him to try. I will have someone from hospice come out to his home to speak with him and his wife about hospice care.   - triamcinolone (KENALOG) 0.1 % external cream  Dispense: 453.6 g;  Refill: 3  - bumetanide (BUMEX) 1 MG tablet  Dispense: 60 tablet; Refill: 11  - BETADINE/PHISOHEX SOLUTION  - HOSPICE REFERRAL  - hydrOXYzine (ATARAX) 25 MG tablet  Dispense: 90 tablet; Refill: 3     He will return after speaking to Home Health.       Total time spent 40 minutes.  More than 50% of the time spent with Mr. Louie on counseling / coordinating his care.    Ary RAY, CNP         Scribe Disclosure:   We, Anastasia Ann and Genevieve Jones, are serving as scribes to document services personally performed by CORY Frye CNP at this visit, based upon the provider's statements to us. All documentation has been reviewed by the aforementioned provider prior to being entered into the official medical record.     Portions of this medical record were completed by a scribe. UPON MY REVIEW AND AUTHENTICATION BY ELECTRONIC SIGNATURE, this confirms (a) I performed the applicable clinical services, and (b) the record is accurate.

## 2019-07-31 ENCOUNTER — DOCUMENTATION ONLY (OUTPATIENT)
Dept: CARE COORDINATION | Facility: CLINIC | Age: 64
End: 2019-07-31

## 2019-07-31 NOTE — PROGRESS NOTES
[facility] now requests orders and shares plan of care/discharge summaries for some patients through [facility].  Please REPLY TO THIS MESSAGE OR ROUTE BACK TO THE AUTHOR in order to give authorization for orders when needed.  This is considered a verbal order, you will still receive a faxed copy of orders for signature.  Thank you for your assistance in improving collaboration for our patients.    ORDER    Client with new cream to be applied to his legs 3x/day triamcinolone. In the directions it says do NOT apply wraps or bandages over the ointment unless otherwise directed. Are we able to bandage his legs with the use of this ointment?    Sudeep Pulido OTRL, CLT  [phone]  [email or web address]

## 2019-08-02 ENCOUNTER — TELEPHONE (OUTPATIENT)
Dept: INTERNAL MEDICINE | Facility: CLINIC | Age: 64
End: 2019-08-02

## 2019-08-02 NOTE — TELEPHONE ENCOUNTER
[facility]    Phone Message    May a detailed message be left on voicemail: yes    Reason for Call: Other: Dixon from  Home Care would like to speak to Ary to discuss pt's care plan.      Action Taken: Message routed to:  [facility]

## 2019-08-05 NOTE — TELEPHONE ENCOUNTER
Left a message for Dixon to call clinic back. Will need to know what she would like to discuss with clinic regarding patients care plan. Magdalena Mott LPN 8/5/2019 8:33 AM

## 2019-08-06 ENCOUNTER — MYC REFILL (OUTPATIENT)
Dept: CARE COORDINATION | Facility: CLINIC | Age: 64
End: 2019-08-06

## 2019-08-06 DIAGNOSIS — K70.31 ALCOHOLIC CIRRHOSIS OF LIVER WITH ASCITES (H): ICD-10-CM

## 2019-08-06 RX ORDER — OXYCODONE HYDROCHLORIDE 5 MG/1
TABLET ORAL
Qty: 180 TABLET | Refills: 0 | Status: ON HOLD | OUTPATIENT
Start: 2019-08-06 | End: 2019-08-22

## 2019-08-06 NOTE — TELEPHONE ENCOUNTER
Have not received a call back from nurse/patient. Will close encounter. Magdalena Mott LPN 8/6/2019 11:03 AM

## 2019-08-06 NOTE — TELEPHONE ENCOUNTER
Reason For Call:   Chief Complaint   Patient presents with     Refill Request       Medication Name, Dose and Monthly Quantity:   oxyCODONE IR (ROXICODONE) 5 MG tablet, 180 tabs    Diagnosis requiring opiates:   Alcoholic cirrhosis of liver with ascites (H) [K70.31]     Problem List Updated:   no    Opioid Agreement On File - [facility] PAIN CONTRACT ID# [id number]:  no    Last Urine Drug Screen (at least once every 12 months) Date:   none    Unexpected Results:   na    [place]  Data Reviewed (at least once every 3 months) Date:   08/06/2019    Unexpected Results:    no    Last Fill Date:   07/05/2019  Due Date:   08/06/2019    Last Visit with PCP:   04/23/19    Future Visits with PCP:   None    Processing:   To pharmacy    CHRISTINA SOL CMA

## 2019-08-07 ENCOUNTER — MEDICAL CORRESPONDENCE (OUTPATIENT)
Dept: HEALTH INFORMATION MANAGEMENT | Facility: CLINIC | Age: 64
End: 2019-08-07

## 2019-08-08 ENCOUNTER — TELEPHONE (OUTPATIENT)
Dept: INTERNAL MEDICINE | Facility: CLINIC | Age: 64
End: 2019-08-08

## 2019-08-08 ENCOUNTER — DOCUMENTATION ONLY (OUTPATIENT)
Dept: CARE COORDINATION | Facility: CLINIC | Age: 64
End: 2019-08-08

## 2019-08-08 NOTE — TELEPHONE ENCOUNTER
Health Call Center    Phone Message    May a detailed message be left on voicemail: no    Reason for Call: Medication Question or concern regarding medication   Prescription Clarification  Name of Medication: citalopram (CELEXA) 40 MG tablet   hydrOXYzine (ATARAX) 25 MG tablet   ondansetron (ZOFRAN) 4 MG tablet   bumetanide (BUMEX) 1 MG tablet   furosemide (LASIX) 40 MG tablet  Prescribing Provider: Ary Murphy NP   Pharmacy: [facility]   What on the order needs clarification? Pairs of these are duplicate therapies and they need to clarify if Pt is supposed to have all of these. Celexa and Atarax has an interaction that can cause prolonged, potentially life threatening cardiac arrhythmias. Please call Jocelyne back at [facility]          Action Taken: Message routed to:  [facility]

## 2019-08-08 NOTE — PROGRESS NOTES
[facility] now requests orders and shares plan of care/discharge summaries for some patients through [facility].  Please REPLY TO THIS MESSAGE OR ROUTE BACK TO THE AUTHOR in order to give authorization for orders when needed.  This is considered a verbal order, you will still receive a faxed copy of orders for signature.  Thank you for your assistance in improving collaboration for our patients.    DISCHARGE SUMMARY  Pt has been seen by lymphedema therapy starting 7/11/19. Throughout the last 4 weeks he has had limited tolerance to compression wraps or any alternative bandaging to assist with the edema in the BLE. We are unable to fit him with compression garments due to insurance limitation and he is unable to afford new garments. He has a compression pump at home that he is unable to get in and out of over the last several months due to limits with his mobility. The compression wraps we are applying to his legs are below sufficient compression to provide any therapeutic support. At this time we will be discharging from home care lymphedema therapy. We will await future orders if patient medical status changes. SN will continue with wound care at this time as there are no skilled therapy needs.     Sudeep Pulido OTRL, CLT  [phone]  [email or web address]

## 2019-08-09 ENCOUNTER — PATIENT OUTREACH (OUTPATIENT)
Dept: GASTROENTEROLOGY | Facility: CLINIC | Age: 64
End: 2019-08-09

## 2019-08-09 NOTE — TELEPHONE ENCOUNTER
Relayed to Jocelyne that provider is out of clinic and will be back on 8/13/19 and clinic will follow up after that. Magdalena Mott, NAE 8/9/2019 11:51 AM    Some of those meds are PRN, like the Atarax, etc  Otherwise, yes.     Ary Murphy APRN, CNP

## 2019-08-09 NOTE — PROGRESS NOTES
Patient's wife called to update patient's status. A hospice evaluation was completed and they conferred with Dr. Simmons. Raven states that per Dr. Simmons, the patient is not now a hospice candidate. However, his PCP does feel he is, along with the patient and wife. Raven states the patient has begun declining food at times, he continues to itch severely all over his body, legs are extremely swollen, with weeping open sores. They are being wrapped by home care, as Raven is still unable to fully participate in his cares due to her recent back surgery. She states the patient has commented to her that he does not know how much longer he can take this health situation he is in. He gets very little sleep, urinating, stooling, and itching keeps him awake. She states he sleeps maybe about 2 hours at night, and naps periodically throughout the day. The plan is to have a hospice doctor come to the home to evaluate patient for hospice care. Raven states both she and the patient feel this is the best plan for the patient at this time. Raven will continue to keep this writer updated on patient's plan of care.

## 2019-08-12 NOTE — TELEPHONE ENCOUNTER
Have not received a call back from patient. Will close encounter. Magdalena Mott LPN 8/12/2019 8:32 AM

## 2019-08-14 DIAGNOSIS — F33.9 RECURRENT MAJOR DEPRESSIVE DISORDER, REMISSION STATUS UNSPECIFIED (H): ICD-10-CM

## 2019-08-14 DIAGNOSIS — F32.89 OTHER DEPRESSION: ICD-10-CM

## 2019-08-17 ENCOUNTER — OFFICE VISIT (OUTPATIENT)
Dept: URGENT CARE | Facility: URGENT CARE | Age: 64
End: 2019-08-17
Payer: COMMERCIAL

## 2019-08-17 VITALS
DIASTOLIC BLOOD PRESSURE: 50 MMHG | HEIGHT: 74 IN | TEMPERATURE: 98.3 F | BODY MASS INDEX: 38.54 KG/M2 | WEIGHT: 300.3 LBS | HEART RATE: 68 BPM | SYSTOLIC BLOOD PRESSURE: 116 MMHG | OXYGEN SATURATION: 99 %

## 2019-08-17 DIAGNOSIS — R60.0 EDEMA OF BOTH LEGS: ICD-10-CM

## 2019-08-17 DIAGNOSIS — M79.671 RIGHT FOOT PAIN: Primary | ICD-10-CM

## 2019-08-17 PROCEDURE — 99214 OFFICE O/P EST MOD 30 MIN: CPT | Performed by: PHYSICIAN ASSISTANT

## 2019-08-17 ASSESSMENT — PAIN SCALES - GENERAL: PAINLEVEL: WORST PAIN (10)

## 2019-08-17 ASSESSMENT — MIFFLIN-ST. JEOR: SCORE: 2216.9

## 2019-08-18 ENCOUNTER — HOSPITAL ENCOUNTER (INPATIENT)
Facility: CLINIC | Age: 64
LOS: 4 days | Discharge: HOME HEALTH | DRG: 554 | End: 2019-08-22
Attending: EMERGENCY MEDICINE | Admitting: HOSPITALIST
Payer: COMMERCIAL

## 2019-08-18 ENCOUNTER — APPOINTMENT (OUTPATIENT)
Dept: GENERAL RADIOLOGY | Facility: CLINIC | Age: 64
DRG: 554 | End: 2019-08-18
Attending: EMERGENCY MEDICINE
Payer: COMMERCIAL

## 2019-08-18 DIAGNOSIS — M79.671 RIGHT FOOT PAIN: ICD-10-CM

## 2019-08-18 DIAGNOSIS — I89.0 LYMPHEDEMA: ICD-10-CM

## 2019-08-18 DIAGNOSIS — M10.9 ACUTE GOUTY ARTHRITIS: Primary | ICD-10-CM

## 2019-08-18 DIAGNOSIS — K76.82 HEPATIC ENCEPHALOPATHY (H): ICD-10-CM

## 2019-08-18 LAB
ALBUMIN SERPL-MCNC: 2.4 G/DL (ref 3.4–5)
ALP SERPL-CCNC: 188 U/L (ref 40–150)
ALT SERPL W P-5'-P-CCNC: 20 U/L (ref 0–70)
AMMONIA PLAS-SCNC: 80 UMOL/L (ref 10–50)
ANION GAP SERPL CALCULATED.3IONS-SCNC: 7 MMOL/L (ref 3–14)
AST SERPL W P-5'-P-CCNC: 30 U/L (ref 0–45)
BASOPHILS # BLD AUTO: 0 10E9/L (ref 0–0.2)
BASOPHILS NFR BLD AUTO: 0.2 %
BILIRUB DIRECT SERPL-MCNC: 0.6 MG/DL (ref 0–0.2)
BILIRUB SERPL-MCNC: 1.1 MG/DL (ref 0.2–1.3)
BUN SERPL-MCNC: 18 MG/DL (ref 7–30)
CALCIUM SERPL-MCNC: 8.4 MG/DL (ref 8.5–10.1)
CHLORIDE SERPL-SCNC: 103 MMOL/L (ref 94–109)
CO2 SERPL-SCNC: 23 MMOL/L (ref 20–32)
CREAT SERPL-MCNC: 0.73 MG/DL (ref 0.66–1.25)
CRP SERPL-MCNC: 77.1 MG/L (ref 0–8)
DIFFERENTIAL METHOD BLD: ABNORMAL
EOSINOPHIL # BLD AUTO: 0.1 10E9/L (ref 0–0.7)
EOSINOPHIL NFR BLD AUTO: 0.9 %
ERYTHROCYTE [DISTWIDTH] IN BLOOD BY AUTOMATED COUNT: 16.8 % (ref 10–15)
GFR SERPL CREATININE-BSD FRML MDRD: >90 ML/MIN/{1.73_M2}
GLUCOSE BLDC GLUCOMTR-MCNC: 86 MG/DL (ref 70–99)
GLUCOSE SERPL-MCNC: 87 MG/DL (ref 70–99)
HBA1C MFR BLD: 5.3 % (ref 0–5.6)
HCT VFR BLD AUTO: 27.8 % (ref 40–53)
HGB BLD-MCNC: 9.3 G/DL (ref 13.3–17.7)
IMM GRANULOCYTES # BLD: 0 10E9/L (ref 0–0.4)
IMM GRANULOCYTES NFR BLD: 0.3 %
LACTATE BLD-SCNC: 1.7 MMOL/L (ref 0.7–2)
LYMPHOCYTES # BLD AUTO: 0.4 10E9/L (ref 0.8–5.3)
LYMPHOCYTES NFR BLD AUTO: 6.4 %
MCH RBC QN AUTO: 29.9 PG (ref 26.5–33)
MCHC RBC AUTO-ENTMCNC: 33.5 G/DL (ref 31.5–36.5)
MCV RBC AUTO: 89 FL (ref 78–100)
MONOCYTES # BLD AUTO: 1 10E9/L (ref 0–1.3)
MONOCYTES NFR BLD AUTO: 15.6 %
NEUTROPHILS # BLD AUTO: 4.9 10E9/L (ref 1.6–8.3)
NEUTROPHILS NFR BLD AUTO: 76.6 %
NRBC # BLD AUTO: 0 10*3/UL
NRBC BLD AUTO-RTO: 0 /100
PLATELET # BLD AUTO: 124 10E9/L (ref 150–450)
POTASSIUM SERPL-SCNC: 4 MMOL/L (ref 3.4–5.3)
PROT SERPL-MCNC: 6.9 G/DL (ref 6.8–8.8)
RBC # BLD AUTO: 3.11 10E12/L (ref 4.4–5.9)
SODIUM SERPL-SCNC: 133 MMOL/L (ref 133–144)
WBC # BLD AUTO: 6.4 10E9/L (ref 4–11)

## 2019-08-18 PROCEDURE — 80076 HEPATIC FUNCTION PANEL: CPT | Performed by: EMERGENCY MEDICINE

## 2019-08-18 PROCEDURE — 83036 HEMOGLOBIN GLYCOSYLATED A1C: CPT | Performed by: EMERGENCY MEDICINE

## 2019-08-18 PROCEDURE — 25000128 H RX IP 250 OP 636: Performed by: EMERGENCY MEDICINE

## 2019-08-18 PROCEDURE — 85025 COMPLETE CBC W/AUTO DIFF WBC: CPT | Performed by: EMERGENCY MEDICINE

## 2019-08-18 PROCEDURE — 25800030 ZZH RX IP 258 OP 636: Performed by: EMERGENCY MEDICINE

## 2019-08-18 PROCEDURE — 25000132 ZZH RX MED GY IP 250 OP 250 PS 637: Performed by: EMERGENCY MEDICINE

## 2019-08-18 PROCEDURE — 99223 1ST HOSP IP/OBS HIGH 75: CPT | Mod: AI | Performed by: HOSPITALIST

## 2019-08-18 PROCEDURE — 99285 EMERGENCY DEPT VISIT HI MDM: CPT

## 2019-08-18 PROCEDURE — 87040 BLOOD CULTURE FOR BACTERIA: CPT | Performed by: EMERGENCY MEDICINE

## 2019-08-18 PROCEDURE — 96365 THER/PROPH/DIAG IV INF INIT: CPT

## 2019-08-18 PROCEDURE — 82140 ASSAY OF AMMONIA: CPT | Performed by: EMERGENCY MEDICINE

## 2019-08-18 PROCEDURE — 86140 C-REACTIVE PROTEIN: CPT | Performed by: EMERGENCY MEDICINE

## 2019-08-18 PROCEDURE — 80048 BASIC METABOLIC PNL TOTAL CA: CPT | Performed by: EMERGENCY MEDICINE

## 2019-08-18 PROCEDURE — 96361 HYDRATE IV INFUSION ADD-ON: CPT

## 2019-08-18 PROCEDURE — 96375 TX/PRO/DX INJ NEW DRUG ADDON: CPT

## 2019-08-18 PROCEDURE — 00000146 ZZHCL STATISTIC GLUCOSE BY METER IP

## 2019-08-18 PROCEDURE — 73650 X-RAY EXAM OF HEEL: CPT | Mod: RT

## 2019-08-18 PROCEDURE — 12000000 ZZH R&B MED SURG/OB

## 2019-08-18 PROCEDURE — 73630 X-RAY EXAM OF FOOT: CPT | Mod: RT

## 2019-08-18 PROCEDURE — 83605 ASSAY OF LACTIC ACID: CPT | Performed by: EMERGENCY MEDICINE

## 2019-08-18 RX ORDER — POTASSIUM CHLORIDE 7.45 MG/ML
10 INJECTION INTRAVENOUS
Status: DISCONTINUED | OUTPATIENT
Start: 2019-08-18 | End: 2019-08-22 | Stop reason: HOSPADM

## 2019-08-18 RX ORDER — POTASSIUM CL/LIDO/0.9 % NACL 10MEQ/0.1L
10 INTRAVENOUS SOLUTION, PIGGYBACK (ML) INTRAVENOUS
Status: DISCONTINUED | OUTPATIENT
Start: 2019-08-18 | End: 2019-08-22 | Stop reason: HOSPADM

## 2019-08-18 RX ORDER — HYDROXYZINE HYDROCHLORIDE 25 MG/1
25 TABLET, FILM COATED ORAL 3 TIMES DAILY PRN
Status: DISCONTINUED | OUTPATIENT
Start: 2019-08-18 | End: 2019-08-22 | Stop reason: HOSPADM

## 2019-08-18 RX ORDER — ONDANSETRON 4 MG/1
4 TABLET, ORALLY DISINTEGRATING ORAL EVERY 6 HOURS PRN
Status: DISCONTINUED | OUTPATIENT
Start: 2019-08-18 | End: 2019-08-22 | Stop reason: HOSPADM

## 2019-08-18 RX ORDER — LIDOCAINE 40 MG/G
CREAM TOPICAL
Status: DISCONTINUED | OUTPATIENT
Start: 2019-08-18 | End: 2019-08-22 | Stop reason: HOSPADM

## 2019-08-18 RX ORDER — POTASSIUM CHLORIDE 29.8 MG/ML
20 INJECTION INTRAVENOUS
Status: DISCONTINUED | OUTPATIENT
Start: 2019-08-18 | End: 2019-08-22 | Stop reason: HOSPADM

## 2019-08-18 RX ORDER — SODIUM CHLORIDE 9 MG/ML
1000 INJECTION, SOLUTION INTRAVENOUS CONTINUOUS
Status: DISCONTINUED | OUTPATIENT
Start: 2019-08-18 | End: 2019-08-18

## 2019-08-18 RX ORDER — NALOXONE HYDROCHLORIDE 0.4 MG/ML
.1-.4 INJECTION, SOLUTION INTRAMUSCULAR; INTRAVENOUS; SUBCUTANEOUS
Status: DISCONTINUED | OUTPATIENT
Start: 2019-08-18 | End: 2019-08-22 | Stop reason: HOSPADM

## 2019-08-18 RX ORDER — CITALOPRAM HYDROBROMIDE 20 MG/1
40 TABLET ORAL DAILY
Status: DISCONTINUED | OUTPATIENT
Start: 2019-08-19 | End: 2019-08-22 | Stop reason: HOSPADM

## 2019-08-18 RX ORDER — MAGNESIUM OXIDE 400 MG/1
400 TABLET ORAL DAILY
Status: DISCONTINUED | OUTPATIENT
Start: 2019-08-19 | End: 2019-08-22 | Stop reason: HOSPADM

## 2019-08-18 RX ORDER — POTASSIUM CHLORIDE 1.5 G/1.58G
20-40 POWDER, FOR SOLUTION ORAL
Status: DISCONTINUED | OUTPATIENT
Start: 2019-08-18 | End: 2019-08-22 | Stop reason: HOSPADM

## 2019-08-18 RX ORDER — ONDANSETRON 2 MG/ML
4 INJECTION INTRAMUSCULAR; INTRAVENOUS EVERY 6 HOURS PRN
Status: DISCONTINUED | OUTPATIENT
Start: 2019-08-18 | End: 2019-08-22 | Stop reason: HOSPADM

## 2019-08-18 RX ORDER — DESVENLAFAXINE 50 MG/1
200 TABLET, FILM COATED, EXTENDED RELEASE ORAL DAILY
Status: DISCONTINUED | OUTPATIENT
Start: 2019-08-19 | End: 2019-08-22 | Stop reason: HOSPADM

## 2019-08-18 RX ORDER — DESVENLAFAXINE 100 MG/1
200 TABLET, EXTENDED RELEASE ORAL DAILY
Qty: 180 TABLET | Refills: 0 | Status: SHIPPED | OUTPATIENT
Start: 2019-08-18 | End: 2020-01-31

## 2019-08-18 RX ORDER — AMOXICILLIN 250 MG
1 CAPSULE ORAL 2 TIMES DAILY PRN
Status: DISCONTINUED | OUTPATIENT
Start: 2019-08-18 | End: 2019-08-22 | Stop reason: HOSPADM

## 2019-08-18 RX ORDER — PIPERACILLIN SODIUM, TAZOBACTAM SODIUM 4; .5 G/20ML; G/20ML
4.5 INJECTION, POWDER, LYOPHILIZED, FOR SOLUTION INTRAVENOUS ONCE
Status: COMPLETED | OUTPATIENT
Start: 2019-08-18 | End: 2019-08-18

## 2019-08-18 RX ORDER — IBUPROFEN 600 MG/1
600 TABLET, FILM COATED ORAL EVERY 6 HOURS PRN
Status: DISCONTINUED | OUTPATIENT
Start: 2019-08-18 | End: 2019-08-22 | Stop reason: HOSPADM

## 2019-08-18 RX ORDER — LACTULOSE 10 G/15ML
30 SOLUTION ORAL 3 TIMES DAILY
Status: DISCONTINUED | OUTPATIENT
Start: 2019-08-19 | End: 2019-08-22 | Stop reason: HOSPADM

## 2019-08-18 RX ORDER — URSODIOL 300 MG/1
900 CAPSULE ORAL 2 TIMES DAILY
Status: DISCONTINUED | OUTPATIENT
Start: 2019-08-18 | End: 2019-08-22 | Stop reason: HOSPADM

## 2019-08-18 RX ORDER — LACTULOSE 10 G/15ML
30 SOLUTION ORAL ONCE
Status: COMPLETED | OUTPATIENT
Start: 2019-08-18 | End: 2019-08-18

## 2019-08-18 RX ORDER — AMOXICILLIN 250 MG
2 CAPSULE ORAL 2 TIMES DAILY PRN
Status: DISCONTINUED | OUTPATIENT
Start: 2019-08-18 | End: 2019-08-22 | Stop reason: HOSPADM

## 2019-08-18 RX ORDER — POTASSIUM CHLORIDE 1500 MG/1
20-40 TABLET, EXTENDED RELEASE ORAL
Status: DISCONTINUED | OUTPATIENT
Start: 2019-08-18 | End: 2019-08-22 | Stop reason: HOSPADM

## 2019-08-18 RX ORDER — CITALOPRAM HYDROBROMIDE 40 MG/1
40 TABLET ORAL DAILY
Qty: 90 TABLET | Refills: 0 | Status: SHIPPED | OUTPATIENT
Start: 2019-08-18 | End: 2019-10-01 | Stop reason: DRUGHIGH

## 2019-08-18 RX ORDER — OXYCODONE HYDROCHLORIDE 5 MG/1
5-10 TABLET ORAL EVERY 8 HOURS PRN
Status: DISCONTINUED | OUTPATIENT
Start: 2019-08-18 | End: 2019-08-22 | Stop reason: HOSPADM

## 2019-08-18 RX ORDER — MAGNESIUM SULFATE HEPTAHYDRATE 40 MG/ML
4 INJECTION, SOLUTION INTRAVENOUS EVERY 4 HOURS PRN
Status: DISCONTINUED | OUTPATIENT
Start: 2019-08-18 | End: 2019-08-22 | Stop reason: HOSPADM

## 2019-08-18 RX ORDER — PIPERACILLIN SODIUM, TAZOBACTAM SODIUM 3; .375 G/15ML; G/15ML
3.38 INJECTION, POWDER, LYOPHILIZED, FOR SOLUTION INTRAVENOUS EVERY 6 HOURS
Status: DISCONTINUED | OUTPATIENT
Start: 2019-08-19 | End: 2019-08-19

## 2019-08-18 RX ORDER — DEXTROSE MONOHYDRATE 25 G/50ML
25-50 INJECTION, SOLUTION INTRAVENOUS
Status: DISCONTINUED | OUTPATIENT
Start: 2019-08-18 | End: 2019-08-22 | Stop reason: HOSPADM

## 2019-08-18 RX ORDER — NICOTINE POLACRILEX 4 MG
15-30 LOZENGE BUCCAL
Status: DISCONTINUED | OUTPATIENT
Start: 2019-08-18 | End: 2019-08-22 | Stop reason: HOSPADM

## 2019-08-18 RX ADMIN — SODIUM CHLORIDE 1000 ML: 9 INJECTION, SOLUTION INTRAVENOUS at 20:13

## 2019-08-18 RX ADMIN — VANCOMYCIN HYDROCHLORIDE 2500 MG: 5 INJECTION, POWDER, LYOPHILIZED, FOR SOLUTION INTRAVENOUS at 21:40

## 2019-08-18 RX ADMIN — SODIUM CHLORIDE 500 ML: 9 INJECTION, SOLUTION INTRAVENOUS at 19:14

## 2019-08-18 RX ADMIN — LACTULOSE 30 ML: 20 SOLUTION ORAL at 20:41

## 2019-08-18 RX ADMIN — PIPERACILLIN SODIUM AND TAZOBACTAM SODIUM 4.5 G: 4; .5 INJECTION, POWDER, LYOPHILIZED, FOR SOLUTION INTRAVENOUS at 20:10

## 2019-08-18 ASSESSMENT — ENCOUNTER SYMPTOMS
JOINT SWELLING: 1
ARTHRALGIAS: 1

## 2019-08-18 ASSESSMENT — MIFFLIN-ST. JEOR: SCORE: 2274.51

## 2019-08-18 NOTE — ED PROVIDER NOTES
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 He has significant edema to feet, and with the right heel pain has caused trouble with ambulation. He reports his pain level is 9 out of 10. His spouse says he drinks a lot of water, but hasn't been eating as much.  Yesterday he presented to Urgent Care, but they said they weren't able to do much for them here and referred him to the emergency department. Additionally, he was recently admitted in April of 2019 for cellulitis of his lower extremities with  MSSA bacteremia. He does have a home health care nurse who comes and wraps his bilateral lower extremities, otherwise care is done by his spouse. His spouse says his diabetes has been well controlled. There has been discussion of hospice care, but nothing in place. He does smoke, but doesn't drink anymore. He has had no drug use. He denies fever. He has not taken his lactulose for the last couple days because he could not walk to the bathroom.      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  Smoking Status: Former Smoker  Smokeless Tobacco: Never Used  Alcohol Use: No   Drug Use: None  Marital Status:   [2]  Home: lives with wife       Review of Systems   Musculoskeletal: Positive for arthralgias (right heel), gait problem and joint swelling (bilateral feet).   All other systems reviewed and are negative.      Physical Exam     Patient Vitals for the past 24 hrs:   BP Temp Temp src Pulse Resp SpO2 Height Weight   08/18/19 1758 (!) 144/73 98.8  F (37.1  C) Oral 78 16 95 % 1.829 m (6') 145.2 kg (320 lb)        Physical Exam  Constitutional:  Patient can't remember president or year. Patient is sleepy.      Appears well-developed and well-nourished.   HENT:   Head:    Normocephalic and atraumatic.   Right Ear:   Tympanic membrane and external ear normal.   Left Ear:   Tympanic membrane and external ear normal.   Mouth/Throat:   Oropharynx is clear and moist.      Mucous membranes are normal.   Eyes:    Scleral icterus. EOM are normal.      Pupils are equal, round, and reactive to light.   Neck:    Normal range of motion. Neck supple.   Cardiovascular:  2/6 systolic murmur. Normal rate, S1 normal and S2 normal.      No gallop and no friction rub.   Pulmonary/Chest:  Breath sounds normal. No respiratory distress.      No wheezes. No rhonchi. No rales.   Abdominal:   Soft. No hepatosplenomegaly. No tenderness.      No rebound and no CVA tenderness.   Musculoskeletal:  Significant tenderness of right calcaneus without erythema, warmth, or abscess. . Normal range of motion.   Neurological:   Alert and oriented to person, place, and time. Normal strength.      GCS eye subscore is 4. GCS verbal subscore is 5.      GCS motor subscore is 6.   Skin:    Jaundice. Spider telangiectasias and neurodermatitis.  Skin is warm and dry.   Psychiatric:   Normal mood and affect.      Speech is normal and behavior is normal.      Judgment and thought content normal.       Cognition and memory are normal.                      Emergency Department Course     Imaging:  Radiology findings were communicated with the patient and family who voiced understanding of the findings.    Foot XR, G/E 3 views, right:  IMPRESSION: No acute fracture or dislocation. Anatomic alignment. No  bony destructive lesion identified  reading per radiology.      XR Calcaneus Right G/E 2 views  IMPRESSION: No acute fracture or dislocation is identified. Anatomic  alignment  Report per radiology      Laboratory:  Laboratory findings were communicated with the patient and family who voiced understanding of the findings.    CBC: WBC 6.4, HGB 9.3 (L),  (L)   BMP: Calcium 8.4 (L) o/w WNL (Creatinine 0.73)   Lactic Acid: 1.7   CRP inflammation: 77.1 (H)  Hepatic Panel: Bilirubin Direct 0.6 (H), Bilirubin Total 1.1, Albumin 2.4 (L), Protein Total 6.9, Alk phos 188 (H), ALT 20, AST 30    Blood cultures: Pending       Interventions:  1914 NS Bolus 500mL IV   Medications   0.9% sodium chloride BOLUS (0 mLs Intravenous Stopped 8/18/19 2008)     Followed by   sodium chloride 0.9% infusion (has no administration in time range)   piperacillin-tazobactam (ZOSYN) 4.5 g vial to attach to  mL bag (4.5 g Intravenous New Bag 8/18/19 2010)   vancomycin (VANCOCIN) 2,500 mg in sodium chloride 0.9 % 500 mL intermittent infusion (has no administration in time range)   lactulose (CHRONULAC) solution 30 mL (has no administration in time range)        Emergency Department Course:  Nursing notes and vitals reviewed.  1850: I performed an exam of the patient as documented above.    IV was inserted and blood was drawn for laboratory testing, results above.    The patient was sent for a XR Foot, XR Calcaneus while in the emergency department, results above.      1930 Patient rechecked and updated.      2005 I spoke with Dr. Hyde of the Hospitalist service regarding patient's presentation, findings, and plan of care.     I  discussed the treatment plan with the patient. They expressed understanding of this plan and consented to admission. I discussed the patient with Dr. Hyde, who will admit the patient to a monitored bed for further evaluation and treatment.   I personally reviewed the laboratory and imaging results with the Patient and spouse and answered all related questions prior to admission.     Impression & Plan      Medical Decision Making:  The patient's 63-year-old male with history of diabetes, and cirrhosis with a history of hepatic encephalopathy, and lymphedema who comes in with right heel pain for last 3 days.  He denies any recent trauma.  He has a history of MSSA septicemia in April thought to be due to lower extremity cellulitis.  X-ray did not show any obvious bony breakdown.  He does have exquisite tenderness of the right heel without any erythema warmth or obvious abscess.  He does have significant skin breakdown on his legs.  He has been treated for possible skin or bone infection with Zosyn and Vanco and will need further evaluation by presumably podiatry and further imaging.    The patient also presents somewhat somnolent and confused and has hepatic encephalopathy.  The wife notes he has not taken his lactulose last couple days as he did not want to walk to the bathroom.  He was given lactulose here.    He will be admitted to Dr. Hyde.       Diagnosis:    ICD-10-CM    1. Right foot pain M79.671    2. Hepatic encephalopathy (H) K72.90    3. Lymphedema I89.0        Disposition:  Admitted under the supervision of Dr. Hyde.       Scribe Disclosure:  Eleanor WANG, am serving as a scribe at 6:00 PM on 8/18/2019 to document services personally performed by Mirella Aviles MD based on my observations and the provider's statements to me.    Eleanor Villagran  8/18/2019    EMERGENCY DEPARTMENT       Mirella Aviles MD  08/18/19 8716

## 2019-08-18 NOTE — TELEPHONE ENCOUNTER
Last Clinic Visit: 7/30/2019  [facility]    Test(s) past due-PHQ-9.  Alia refill 90 days and FYI to [facility].

## 2019-08-18 NOTE — ED TRIAGE NOTES
Patient reports severe heel pain causing him to be unable to walk.  He has lymphedema and legs wraps.

## 2019-08-18 NOTE — LETTER
Transition Communication Hand-off for Care Transitions to Next Level of Care Provider    Name: Lawrence Louie  : 1955  MRN #: [id number]  Primary Care Provider: Ary Murphy  Primary Care MD Name: Ary Murphy PARAS  Primary Clinic: [address] [place] [address] [id number]  [facility] [address]  Primary Care Clinic Name: [facility]  Reason for Hospitalization:  Hepatic encephalopathy (H) [K72.90]  Lymphedema [I89.0]  Right foot pain [M79.671]  Admit Date/Time: 2019  5:53 PM  Discharge Date: 2019  Payor Source: Payor: HU[employer] / Plan: HU[employer] MEDICARE ADVANTAGE / Product Type: Medicare /     Readmission Assessment Measure (TIARRA) Risk Score/category: Elevated    Reason for Communication Hand-off Referral: Fragility    Discharge Plan: Home with homecare.  RN, OT, PT to manage lymph wraps.       Concern for non-adherence with plan of care:   N  Discharge Needs Assessment:  Needs      Most Recent Value   Equipment Currently Used at Home  cane, straight   # of Referrals Placed by [facility]  Homecare, Scheduled Follow-up appointments, Communication hand-offs to next level of Care Providers          Follow-up specialty is recommended: No    Follow-up plan:    Future Appointments   Date Time Provider Department Center   2019  1:30 PM Leslie Pozo OT [facility]   2019  3:00 PM [facility]   2019  2:10 PM Astrid Huynh MD [facility]   10/28/2019  1:00 PM  LAB [facility]   10/28/2019  2:00 PM Meghna Simmons MD [facility]       Any outstanding tests or procedures:  NA      Referrals     Future Labs/Procedures    Home care nursing referral     Comments:    RN skilled nursing visit. RN to assess vital signs and weight, respiratory and cardiac status and pain level and activity tolerance.  RN to teach medication management.  Resume RN Services.  Your provider has ordered home care nursing services. If you have  not been contacted within 2 days of your discharge please call [facility] [phone].    Home Care OT Referral for Hospital Discharge     Comments:    OT to eval and treat    Your provider has ordered home care - occupational therapy. If you have not been contacted within 2 days of your discharge please call the department phone number listed on the top of this document.    Home Care PT Referral for Hospital Discharge     Comments:    PT to eval and treat  Manage lymph wraps/edema care   Your provider has ordered home care - physical therapy. If you have not been contacted within 2 days of your discharge please call the department phone number listed on the top of this document.            Key Recommendations:  TCU was recommended, pt declined.  Home with homecare, added PT, OT services.      Astrid Cole RN, BSN    AVS/Discharge Summary is the source of truth; this is a helpful guide for improved communication of patient story

## 2019-08-18 NOTE — PROGRESS NOTES
"SUBJECTIVE:  Chief Complaint   Patient presents with     Urgent Care     Musculoskeletal Problem     right foot pain x 3 days. throbbing pain, no injuries. Pt is diabetic. Pt also complains of sores on lower extremeites (currently wrapped and bandaged). Foot pain is the cheif complaint.      Lawrence Louie is a 63 year old male presents with a chief complaint of right heel pain.  Both legs are swollend and \"breaking open\".  REcent sepsis, which was not detected due to inability to mount white blood cell response. Pt came to  rather than ER as he assumes they will try to keep him over night, and the copay is $90 at the ER and $30 at .  Pain is severe 8-9/10.     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 1 Box 3     blood glucose monitoring (NO BRAND SPECIFIED) meter device kit Use to test blood sugar 4 X  times daily or as directed. 1 kit 0     blood glucose monitoring (NO BRAND SPECIFIED) test strip Use to test blood sugars 4 X  times daily or as directed 200 strip 3     bumetanide (BUMEX) 1 MG tablet Take 1 tablet (1 mg) by mouth daily 60 tablet 11     childrens multivitamin w/ionr (FLINTSTONES COMPLETE) 60 MG chewable tablet Take 1 chew tab by mouth daily       Cholecalciferol (VITAMIN D3) 2000 units CAPS TAKE 1 CAPSULE BY MOUTH DAILY 100 capsule 0     citalopram (CELEXA) 40 MG tablet Take 1 tablet (40 mg) by mouth daily 90 tablet 0     Cyanocobalamin (VITAMIN B-12 PO) Take 1 tablet by mouth daily       " desvenlafaxine (PRISTIQ) 100 MG 24 hr tablet TAKE TWO TABLETS BY MOUTH DAILY  60 tablet 0     furosemide (LASIX) 40 MG tablet TAKE 2 TABLETS BY MOUTH IN MORNING AND 3 TABLETS IN AFTERNOON. 150 tablet 2     gabapentin (NEURONTIN) 300 MG capsule   3     glipiZIDE (GLUCOTROL XL) 5 MG 24 hr tablet Take 1 tablet (5 mg) by mouth daily Please keep clinic and lab appt 6-28-19 for further refills 90 tablet 0     hydrOXYzine (ATARAX) 25 MG tablet Take 1 tablet (25 mg) by mouth 3 times daily as needed for itching 90 tablet 3     insulin pen needle (B-D U/F) 31G X 8 MM USE  6 times daily / OR AS DIRECTED 300 each 3     insulin pen needle (ULTICARE SHORT) 31G X 8 MM miscellaneous Use UP to 6 times daily or as directed 300 each 3     lactulose encephalopathy (CHRONULAC) 10 GM/15ML SOLUTION Take 30 mL's by mouth 2 times daily. Titrate as needed to achieve 3-5 bowel movements daily. 1892 mL 2     LANTUS SOLOSTAR 100 UNIT/ML soln Inject 30 Units under the skin every morning 9 mL 2     LANTUS SOLOSTAR 100 UNIT/ML soln Inject 30 Units under the skin every morning (Patient taking differently: Inject 10 Units under the skin every morning) 12 mL 2     Lidocaine (LIDOCARE) 4 % Patch Place 1 patch onto the skin every 24 hours To prevent lidocaine toxicity, patient should be patch free for 12 hrs daily. 30 patch 1     lidocaine (XYLOCAINE) 5 % external ointment Apply topically as needed for moderate pain Apply to wounds twice daily as needed. 50 g 1     magnesium oxide (MAG-OX) 400 (241.3 Mg) MG tablet Take 1 tablet (400 mg) by mouth daily 90 tablet 1     NOVOLOG FLEXPEN 100 UNIT/ML soln INJECT 20 UNITS UNDER THE SKIN BEFORE BREAKFAST, 20 UNITS BEFORE LUNCH, 20 UNITS BEFORE DINNER, and 20 UNITS BEFORE SNACKS. 30 mL 2     nystatin (MYCOSTATIN) 719368 UNIT/GM external cream Apply topically 2 times daily 30 g 11     nystatin-triamcinolone (MYCOLOG II) cream Apply topically 2 times daily as needed (itching) 30 g 3     ondansetron (ZOFRAN) 4 MG  "tablet Take 1 tablet by mouth every 8 hours as needed for nausea 18 tablet 1     order for DME Equipment being ordered:Orthopedic shoes 2 Units 0     order for DME Equipment being ordered: Condom catheter 1 Device 3     order for DME Equipment being ordered:BioTab compression pants pneumatic system 1 Piece 0     order for DME Equipment being ordered:bilateral pneumatic leg massage for treatment of extreme bilateral lower leg edema. 2 pump 0     oxyCODONE (ROXICODONE) 5 MG tablet TAKE 1 TO 2 TABLETS BY MOUTH EVERY 8 HOURS AS NEEDED 180 tablet 0     OYSTER SHELL CALCIUM/D 500-200 MG-UNIT per tablet Take 1 tablet by mouth daily 90 tablet 3     ranitidine (ZANTAC) 150 MG tablet Take 1 tablet (150 mg) by mouth 2 times daily 180 tablet 3     rifaximin (XIFAXAN) 550 MG TABS tablet Take 1 tablet (550 mg) by mouth 2 times daily 180 tablet 3     spironolactone (ALDACTONE) 50 MG tablet Take 3 tablets (150 mg) by mouth 2 times daily 180 tablet 11     STATIN NOT PRESCRIBED, INTENTIONAL, 1 each daily Statin not prescribed intentionally due to Active liver disease 0 each 0     triamcinolone (KENALOG) 0.1 % external cream Apply topically 2 times daily as needed for irritation 453.6 g 3     UNABLE TO FIND MEDICATION NAME: [place] root       ursodiol (ACTIGALL) 300 MG capsule Take 3 capsules (900 mg) by mouth 2 times daily 180 capsule 11     Social History     Tobacco Use     Smoking status: Former Smoker     Packs/day: 0.30     Types: Cigarettes     Last attempt to quit: 4/10/2019     Years since quittin.3     Smokeless tobacco: Never Used     Tobacco comment: 45 yr   Substance Use Topics     Alcohol use: No     Alcohol/week: 0.0 oz     Comment: 2-3 per day , none since Dec 2012       ROS:  Review of systems negative except as stated above.    EXAM:   /50   Pulse 68   Temp 98.3  F (36.8  C) (Tympanic)   Ht 1.88 m (6' 2\")   Wt 136.2 kg (300 lb 4.8 oz)   SpO2 99%   BMI 38.56 kg/m    Gen: IN wheel chair, large lower " extremities wrapped in ace bandaging with discharge evident  Extremity: breaks in tissue evident in exposed areas of lower extremities  CHEST: clear to auscultation  CV: regular rate and rhythm      ASSESSMENT:   (M79.671) Right foot pain  (primary encounter diagnosis)  Comment: without injury, history of sepsis, diabetes, venous stasis, inability to mount WBC response  Plan: Directed to ER in vehicle driven by adult daughter

## 2019-08-19 ENCOUNTER — APPOINTMENT (OUTPATIENT)
Dept: OCCUPATIONAL THERAPY | Facility: CLINIC | Age: 64
DRG: 554 | End: 2019-08-19
Attending: HOSPITALIST
Payer: COMMERCIAL

## 2019-08-19 ENCOUNTER — APPOINTMENT (OUTPATIENT)
Dept: MRI IMAGING | Facility: CLINIC | Age: 64
DRG: 554 | End: 2019-08-19
Attending: HOSPITALIST
Payer: COMMERCIAL

## 2019-08-19 ENCOUNTER — ANESTHESIA (OUTPATIENT)
Dept: MEDSURG UNIT | Facility: CLINIC | Age: 64
DRG: 554 | End: 2019-08-19
Payer: COMMERCIAL

## 2019-08-19 ENCOUNTER — ANESTHESIA EVENT (OUTPATIENT)
Dept: MEDSURG UNIT | Facility: CLINIC | Age: 64
DRG: 554 | End: 2019-08-19
Payer: COMMERCIAL

## 2019-08-19 LAB
ALBUMIN SERPL-MCNC: 2 G/DL (ref 3.4–5)
ALP SERPL-CCNC: 139 U/L (ref 40–150)
ALT SERPL W P-5'-P-CCNC: 15 U/L (ref 0–70)
AMMONIA PLAS-SCNC: 63 UMOL/L (ref 10–50)
ANION GAP SERPL CALCULATED.3IONS-SCNC: 7 MMOL/L (ref 3–14)
AST SERPL W P-5'-P-CCNC: 25 U/L (ref 0–45)
BILIRUB SERPL-MCNC: 1.3 MG/DL (ref 0.2–1.3)
BUN SERPL-MCNC: 16 MG/DL (ref 7–30)
CALCIUM SERPL-MCNC: 8 MG/DL (ref 8.5–10.1)
CHLORIDE SERPL-SCNC: 107 MMOL/L (ref 94–109)
CO2 SERPL-SCNC: 22 MMOL/L (ref 20–32)
CREAT SERPL-MCNC: 0.88 MG/DL (ref 0.66–1.25)
ERYTHROCYTE [DISTWIDTH] IN BLOOD BY AUTOMATED COUNT: 16.9 % (ref 10–15)
GFR SERPL CREATININE-BSD FRML MDRD: 90 ML/MIN/{1.73_M2}
GLUCOSE BLDC GLUCOMTR-MCNC: 119 MG/DL (ref 70–99)
GLUCOSE BLDC GLUCOMTR-MCNC: 158 MG/DL (ref 70–99)
GLUCOSE BLDC GLUCOMTR-MCNC: 99 MG/DL (ref 70–99)
GLUCOSE SERPL-MCNC: 86 MG/DL (ref 70–99)
HCT VFR BLD AUTO: 24 % (ref 40–53)
HGB BLD-MCNC: 8 G/DL (ref 13.3–17.7)
MAGNESIUM SERPL-MCNC: 1.9 MG/DL (ref 1.6–2.3)
MCH RBC QN AUTO: 30 PG (ref 26.5–33)
MCHC RBC AUTO-ENTMCNC: 33.3 G/DL (ref 31.5–36.5)
MCV RBC AUTO: 90 FL (ref 78–100)
PLATELET # BLD AUTO: 79 10E9/L (ref 150–450)
POTASSIUM SERPL-SCNC: 3.8 MMOL/L (ref 3.4–5.3)
PROT SERPL-MCNC: 5.8 G/DL (ref 6.8–8.8)
RBC # BLD AUTO: 2.67 10E12/L (ref 4.4–5.9)
SODIUM SERPL-SCNC: 136 MMOL/L (ref 133–144)
URATE SERPL-MCNC: 7.6 MG/DL (ref 3.5–7.2)
WBC # BLD AUTO: 4.9 10E9/L (ref 4–11)

## 2019-08-19 PROCEDURE — 83735 ASSAY OF MAGNESIUM: CPT | Performed by: HOSPITALIST

## 2019-08-19 PROCEDURE — 25000131 ZZH RX MED GY IP 250 OP 636 PS 637: Performed by: INTERNAL MEDICINE

## 2019-08-19 PROCEDURE — 85027 COMPLETE CBC AUTOMATED: CPT | Performed by: HOSPITALIST

## 2019-08-19 PROCEDURE — 97535 SELF CARE MNGMENT TRAINING: CPT | Mod: GO | Performed by: OCCUPATIONAL THERAPIST

## 2019-08-19 PROCEDURE — 80053 COMPREHEN METABOLIC PANEL: CPT | Performed by: HOSPITALIST

## 2019-08-19 PROCEDURE — 73721 MRI JNT OF LWR EXTRE W/O DYE: CPT | Mod: RT

## 2019-08-19 PROCEDURE — 25000128 H RX IP 250 OP 636: Performed by: HOSPITALIST

## 2019-08-19 PROCEDURE — 84550 ASSAY OF BLOOD/URIC ACID: CPT | Performed by: HOSPITALIST

## 2019-08-19 PROCEDURE — 25000131 ZZH RX MED GY IP 250 OP 636 PS 637: Performed by: HOSPITALIST

## 2019-08-19 PROCEDURE — 00000146 ZZHCL STATISTIC GLUCOSE BY METER IP

## 2019-08-19 PROCEDURE — 97166 OT EVAL MOD COMPLEX 45 MIN: CPT | Mod: GO | Performed by: OCCUPATIONAL THERAPIST

## 2019-08-19 PROCEDURE — 25000128 H RX IP 250 OP 636: Performed by: INTERNAL MEDICINE

## 2019-08-19 PROCEDURE — 99233 SBSQ HOSP IP/OBS HIGH 50: CPT | Performed by: INTERNAL MEDICINE

## 2019-08-19 PROCEDURE — 82140 ASSAY OF AMMONIA: CPT | Performed by: HOSPITALIST

## 2019-08-19 PROCEDURE — 37000011 ZZH ANESTHESIA WARD SERVICE: Performed by: NURSE ANESTHETIST, CERTIFIED REGISTERED

## 2019-08-19 PROCEDURE — 25800030 ZZH RX IP 258 OP 636: Performed by: HOSPITALIST

## 2019-08-19 PROCEDURE — 36415 COLL VENOUS BLD VENIPUNCTURE: CPT | Performed by: HOSPITALIST

## 2019-08-19 PROCEDURE — 97140 MANUAL THERAPY 1/> REGIONS: CPT | Mod: GO | Performed by: OCCUPATIONAL THERAPIST

## 2019-08-19 PROCEDURE — 12000000 ZZH R&B MED SURG/OB

## 2019-08-19 PROCEDURE — 25000132 ZZH RX MED GY IP 250 OP 250 PS 637: Performed by: HOSPITALIST

## 2019-08-19 PROCEDURE — 40000671 ZZH STATISTIC ANESTHESIA CASE

## 2019-08-19 PROCEDURE — 99222 1ST HOSP IP/OBS MODERATE 55: CPT | Performed by: PODIATRIST

## 2019-08-19 RX ORDER — CEFAZOLIN SODIUM 2 G/100ML
2 INJECTION, SOLUTION INTRAVENOUS EVERY 8 HOURS
Status: DISCONTINUED | OUTPATIENT
Start: 2019-08-19 | End: 2019-08-21

## 2019-08-19 RX ADMIN — CITALOPRAM HYDROBROMIDE 40 MG: 20 TABLET ORAL at 08:58

## 2019-08-19 RX ADMIN — URSODIOL 900 MG: 300 CAPSULE ORAL at 20:11

## 2019-08-19 RX ADMIN — CEFAZOLIN SODIUM 2 G: 2 INJECTION, SOLUTION INTRAVENOUS at 23:32

## 2019-08-19 RX ADMIN — RIFAXIMIN 550 MG: 550 TABLET ORAL at 08:57

## 2019-08-19 RX ADMIN — RANITIDINE 150 MG: 150 TABLET ORAL at 08:58

## 2019-08-19 RX ADMIN — VANCOMYCIN HYDROCHLORIDE 2000 MG: 1 INJECTION, POWDER, LYOPHILIZED, FOR SOLUTION INTRAVENOUS at 10:19

## 2019-08-19 RX ADMIN — DESVENLAFAXINE SUCCINATE 200 MG: 50 TABLET, EXTENDED RELEASE ORAL at 08:58

## 2019-08-19 RX ADMIN — URSODIOL 900 MG: 300 CAPSULE ORAL at 08:57

## 2019-08-19 RX ADMIN — RANITIDINE 150 MG: 150 TABLET ORAL at 00:26

## 2019-08-19 RX ADMIN — OXYCODONE HYDROCHLORIDE 5 MG: 5 TABLET ORAL at 02:46

## 2019-08-19 RX ADMIN — PIPERACILLIN SODIUM AND TAZOBACTAM SODIUM 3.38 G: 3; .375 INJECTION, POWDER, LYOPHILIZED, FOR SOLUTION INTRAVENOUS at 01:31

## 2019-08-19 RX ADMIN — PIPERACILLIN SODIUM AND TAZOBACTAM SODIUM 3.38 G: 3; .375 INJECTION, POWDER, LYOPHILIZED, FOR SOLUTION INTRAVENOUS at 08:57

## 2019-08-19 RX ADMIN — LACTULOSE 30 ML: 20 SOLUTION ORAL at 12:30

## 2019-08-19 RX ADMIN — URSODIOL 900 MG: 300 CAPSULE ORAL at 00:26

## 2019-08-19 RX ADMIN — CEFAZOLIN SODIUM 2 G: 2 INJECTION, SOLUTION INTRAVENOUS at 14:16

## 2019-08-19 RX ADMIN — RANITIDINE 150 MG: 150 TABLET ORAL at 20:11

## 2019-08-19 RX ADMIN — RIFAXIMIN 550 MG: 550 TABLET ORAL at 00:26

## 2019-08-19 RX ADMIN — Medication 400 MG: at 08:57

## 2019-08-19 RX ADMIN — RIFAXIMIN 550 MG: 550 TABLET ORAL at 20:11

## 2019-08-19 RX ADMIN — LACTULOSE 30 ML: 20 SOLUTION ORAL at 17:27

## 2019-08-19 RX ADMIN — INSULIN GLARGINE 10 UNITS: 100 INJECTION, SOLUTION SUBCUTANEOUS at 10:56

## 2019-08-19 ASSESSMENT — ACTIVITIES OF DAILY LIVING (ADL)
DRESS: 0-->INDEPENDENT
RETIRED_EATING: 0-->INDEPENDENT
ADLS_ACUITY_SCORE: 13
SWALLOWING: 0-->SWALLOWS FOODS/LIQUIDS WITHOUT DIFFICULTY
WHICH_OF_THE_ABOVE_FUNCTIONAL_RISKS_HAD_A_RECENT_ONSET_OR_CHANGE?: AMBULATION
ADLS_ACUITY_SCORE: 13
BATHING: 0-->INDEPENDENT
ADLS_ACUITY_SCORE: 13
COGNITION: 0 - NO COGNITION ISSUES REPORTED
ADLS_ACUITY_SCORE: 13
TOILETING: 1-->ASSISTIVE EQUIPMENT
RETIRED_COMMUNICATION: 0-->UNDERSTANDS/COMMUNICATES WITHOUT DIFFICULTY

## 2019-08-19 ASSESSMENT — MIFFLIN-ST. JEOR: SCORE: 2179.26

## 2019-08-19 NOTE — PLAN OF CARE
PT:  Attempted to see patient for PT this PM. Patient initially agreeable to working with PT after providing encouragement. Patient then falling asleep during subjective questioning regarding PLOF. Patient needing multiple cues to stay awake, falling asleep after each question. Patient snoring as therapist exiting the room. RN aware.

## 2019-08-19 NOTE — H&P
[facility]    History and Physical  Hospitalist       Date of Admission:  8/18/2019    Assessment & Plan   Lawrence Louie is a 63 year old male who presents with hepatic encephalopathy and right foot pain    Right heel pain  Pain makes it difficult for him to walk. Initial Xray without findings of fracture or osteomyelitis. WBC 6.4. Lactic acid 1.7, CRP 77.1, Has hx of normal WBC with sepsis in the past  - Admit inpatient  - Started on vanc and zosyn considering recent hx with MSSA bacteremia  - Follow up blood cx  - Podiatry consult for evaluation of right heel  - MRI right heel.  - Pain seems out of proportion to clinical findings--question if possible septic arthritis?  - Repeat CRP in AM  - Oxycodone PRN pain    Acute Hepatic encephalopathy due to medication noncompliance  Alcoholic cirrhosis  Portal hypertension with a history of ascites, esophageal varices  Underwent TIPS procedure in the past. PTA lactulose and rifaximin  - He has not taken his lactulose in 2 days due to persistent right heel pain making it difficult to ambulate to the bathroom without pain. Ammonia 80, slightly more somnolent on admit.  - Resume Lactulose 30mg TID (to start with-he did receive dose in ED),   - Hold PTA lasix, bumex and spironolactone at this time, resume once plan for right heel in place.  - AM ammonia level     Hx Cellulitis of lower extremities with MSSA bacteremia  - Admit 4/10/19 at [facility]. Blood cx with MSSA, completed abx tx with cefazolin for 2 weeks.    Severe venous stasis dermatitis  - Consult lymphedema for lower extremity wraps Does not appear acutely infected.    Chronic anemia  Chronic thrombocytopenia  - secondary to cirrhosis with splenomegaly. Baseline Hgb near 9, Platelets near 100. He is near baseline, continue to monitor.     Diabetes mellitus  PTA glipizide 5mg, lantus 30 units in AM, novolog 20 units with meals and snacks.  - Continue lantus 25 units in AM, 5 units aspart  scheduled with meals and sliding scale insulin for now. He had issues with hypoglycemia during his last hospitalization. And had BG 87 on admit  - ACHS BG checks    Depression  - Continue PTA pristiq and celexa.    DVT Prophylaxis: Pneumatic Compression Devices  Code Status: DNR / DNI  Expected discharge: 2 - 3 days, recommended to prior living arrangement vs TCU once definitive plan for right heel in place    Oralia Hyde DO    Primary Care Physician   Ary Murphy    Chief Complaint   Right heel pain    History is obtained from the patient, wife and previous records    History of Present Illness   Lawrence Louie is a 63 year old male who presents with right heel pain and encephalopathy. He developed heel pain 3 days ago without any acute trauma to the area. The area became painful and he stopped ambulating on it. Due to the need to ambulate to the bathroom with lactulose, he stopped taking his lactulose and slowly became more encephalopathic. He has had decreased intake over the past 2 days. His pain is 9/10 and worse with any palpation of the area. Denies shortness of breath, chest pain, abdominal pain, nausea, vomiting, fevers. Does report chills.    Past Medical History    I have reviewed this patient's medical history and updated it with pertinent information if needed.   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  Past Surgical History:   Procedure Laterality Date     BIOPSY OF SKIN LESION       COLONOSCOPY Left 6/16/2016    Procedure: COMBINED  COLONOSCOPY, SINGLE OR MULTIPLE BIOPSY/POLYPECTOMY BY BIOPSY;  Surgeon: Brandy Barnett MD;  Location: [facility]     ESOPHAGOSCOPY, GASTROSCOPY, DUODENOSCOPY (EGD), COMBINED  2/13/2013    Procedure: COMBINED ESOPHAGOSCOPY, GASTROSCOPY, DUODENOSCOPY (EGD);;  Surgeon: Tara Cook MD;  Location: [facility]     ESOPHAGOSCOPY, GASTROSCOPY, DUODENOSCOPY (EGD), COMBINED  11/4/2013    Procedure: COMBINED ESOPHAGOSCOPY, GASTROSCOPY, DUODENOSCOPY (EGD);;  Surgeon: Lonny Diaz MD;  Location: [facility]     ESOPHAGOSCOPY, GASTROSCOPY, DUODENOSCOPY (EGD), COMBINED Left 6/16/2016    Procedure: COMBINED ESOPHAGOSCOPY, GASTROSCOPY, DUODENOSCOPY (EGD), BIOPSY SINGLE OR MULTIPLE;  Surgeon: Brandy Barnett MD;  Location: [facility]     EXCISE LESION TRUNK  9/24/2012    Procedure: EXCISE LESION TRUNK;;  Surgeon: Pepe Dominguez MD;  Location: [facility]     GENITOURINARY SURGERY      vasectomy     HERNIORRHAPHY UMBILICAL  9/24/2012    Procedure: HERNIORRHAPHY UMBILICAL;  UMBILICAL HERNIA REPAIR , EXCISION OF PERIUMBILICAL CYST;  Surgeon: Pepe Dominguez MD;  Location: [facility]       Prior to Admission Medications   Prior to Admission Medications   Prescriptions Last Dose Informant Patient Reported? Taking?   Cholecalciferol (VITAMIN D3) 2000 units CAPS 8/17/2019 at Unknown time Spouse/Significant Other No Yes   Sig: TAKE 1 CAPSULE BY MOUTH DAILY   Cyanocobalamin (VITAMIN B-12 PO) 8/17/2019 at Unknown time Spouse/Significant Other Yes Yes   Sig: Take 1 tablet by mouth daily   LANTUS SOLOSTAR 100 UNIT/ML soln 8/17/2019 at AM Spouse/Significant Other No Yes   Sig: Inject 30 Units under the skin every morning   NOVOLOG FLEXPEN 100 UNIT/ML soln 8/17/2019 at Unknown time Spouse/Significant Other No Yes   Sig: INJECT 20 UNITS UNDER THE SKIN BEFORE BREAKFAST, 20 UNITS BEFORE LUNCH, 20 UNITS BEFORE DINNER, and 20 UNITS BEFORE SNACKS.   OYSTER SHELL CALCIUM/D 500-200 MG-UNIT per tablet 8/17/2019 at Unknown time  Spouse/Significant Other No Yes   Sig: Take 1 tablet by mouth daily   STATIN NOT PRESCRIBED, INTENTIONAL,   No No   Si each daily Statin not prescribed intentionally due to Active liver disease   UNABLE TO FIND 2019 at Unknown time  Yes Yes   Sig: MEDICATION NAME: Burdock root   blood glucose (NO BRAND SPECIFIED) lancets standard   No No   Sig: Use to test blood sugar 4 X  times daily or as directed.   blood glucose monitoring (NO BRAND SPECIFIED) meter device kit   No No   Sig: Use to test blood sugar 4 X  times daily or as directed.   blood glucose monitoring (NO BRAND SPECIFIED) test strip   No No   Sig: Use to test blood sugars 4 X  times daily or as directed   bumetanide (BUMEX) 1 MG tablet 2019 Spouse/Significant Other No Yes   Sig: Take 1 tablet (1 mg) by mouth daily   childrens multivitamin w/ionr (FLINTSTONES COMPLETE) 60 MG chewable tablet 2019 at Unknown time Spouse/Significant Other Yes Yes   Sig: Take 1 chew tab by mouth daily   citalopram (CELEXA) 40 MG tablet 2019 at Unknown time Spouse/Significant Other No Yes   Sig: Take 1 tablet (40 mg) by mouth daily   desvenlafaxine (PRISTIQ) 100 MG 24 hr tablet 2019 at Unknown time Spouse/Significant Other No Yes   Sig: Take 2 tablets (200 mg) by mouth daily   furosemide (LASIX) 40 MG tablet 2019 Spouse/Significant Other No Yes   Sig: TAKE 2 TABLETS BY MOUTH IN MORNING AND 3 TABLETS IN AFTERNOON.   glipiZIDE (GLUCOTROL XL) 5 MG 24 hr tablet 2019 at Unknown time Spouse/Significant Other No Yes   Sig: Take 1 tablet (5 mg) by mouth daily Please keep clinic and lab appt 19 for further refills   hydrOXYzine (ATARAX) 25 MG tablet  at PRN Spouse/Significant Other No Yes   Sig: Take 1 tablet (25 mg) by mouth 3 times daily as needed for itching   insulin pen needle (B-D U/F) 31G X 8 MM   No No   Sig: USE  6 times daily / OR AS DIRECTED   insulin pen needle (ULTICARE SHORT) 31G X 8 MM miscellaneous   No No   Sig: Use UP to 6  times daily or as directed   lactulose encephalopathy (CHRONULAC) 10 GM/15ML SOLUTION 8/16/2019 Spouse/Significant Other No Yes   Sig: Take 30 mL's by mouth 2 times daily. Titrate as needed to achieve 3-5 bowel movements daily.   lidocaine (XYLOCAINE) 5 % external ointment  at PRN Spouse/Significant Other No Yes   Sig: Apply topically as needed for moderate pain Apply to wounds twice daily as needed.   magnesium oxide (MAG-OX) 400 (241.3 Mg) MG tablet 8/17/2019 at Unknown time Spouse/Significant Other No Yes   Sig: Take 1 tablet (400 mg) by mouth daily   nystatin (MYCOSTATIN) 168132 UNIT/GM external cream 8/17/2019 at PM Spouse/Significant Other No Yes   Sig: Apply topically 2 times daily   nystatin-triamcinolone (MYCOLOG II) cream 8/17/2019 at Unknown time Spouse/Significant Other No Yes   Sig: Apply topically 2 times daily as needed (itching)   ondansetron (ZOFRAN) 4 MG tablet  at PRN Spouse/Significant Other No Yes   Sig: Take 1 tablet by mouth every 8 hours as needed for nausea   order for DME   No No   Sig: Equipment being ordered:bilateral pneumatic leg massage for treatment of extreme bilateral lower leg edema.   order for DME   No No   Sig: Equipment being ordered:BioTab compression pants pneumatic system   order for DME   No No   Sig: Equipment being ordered: Condom catheter   order for DME   No No   Sig: Equipment being ordered:Orthopedic shoes   oxyCODONE (ROXICODONE) 5 MG tablet 8/18/2019 at PM Spouse/Significant Other No Yes   Sig: TAKE 1 TO 2 TABLETS BY MOUTH EVERY 8 HOURS AS NEEDED   ranitidine (ZANTAC) 150 MG tablet 8/17/2019 at PM Spouse/Significant Other No Yes   Sig: Take 1 tablet (150 mg) by mouth 2 times daily   rifaximin (XIFAXAN) 550 MG TABS tablet 8/17/2019 at PM Spouse/Significant Other No Yes   Sig: Take 1 tablet (550 mg) by mouth 2 times daily   spironolactone (ALDACTONE) 50 MG tablet 8/17/2019 at PM Spouse/Significant Other No Yes   Sig: Take 3 tablets (150 mg) by mouth 2 times daily    triamcinolone (KENALOG) 0.1 % external cream  at PRN Spouse/Significant Other No No   Sig: Apply topically 2 times daily as needed for irritation   ursodiol (ACTIGALL) 300 MG capsule 8/17/2019 at PM Spouse/Significant Other No Yes   Sig: Take 3 capsules (900 mg) by mouth 2 times daily      Facility-Administered Medications: None     Allergies   No Known Allergies    Social History   I have reviewed this patient's social history and updated it with pertinent information if needed. Lawrence Louie  reports that he quit smoking about 4 months ago. His smoking use included cigarettes. He smoked 0.30 packs per day. He has never used smokeless tobacco. He reports that he does not drink alcohol or use drugs.    Family History   I have reviewed this patient's family history and updated it with pertinent information if needed.   Family History   Problem Relation Age of Onset     Breast Cancer Mother      Liver Cancer Mother      Cardiovascular Father      Cerebrovascular Disease Father         very low blood pressure     Cancer Father         rectal cancer     Cardiovascular Paternal Grandfather      Diabetes Brother      Cancer Sister         skin cancer     C.A.D. Other         MI, 70's     Breast Cancer Sister      Thyroid Disease No family hx of      Lipids No family hx of      Anesthesia Reaction No family hx of        Review of Systems   The 10 point Review of Systems is negative other than noted in the HPI    Physical Exam   Temp: 99  F (37.2  C) Temp src: Oral BP: 110/47 Pulse: 74   Resp: 16 SpO2: 96 % O2 Device: Nasal cannula Oxygen Delivery: 2 LPM  Vital Signs with Ranges  Temp:  [98.8  F (37.1  C)-99  F (37.2  C)] 99  F (37.2  C)  Pulse:  [73-82] 74  Resp:  [16-18] 16  BP: (109-144)/(45-73) 110/47  SpO2:  [93 %-100 %] 96 %  320 lbs 0 oz    Constitutional: Drowsy, cooperative, no acute distress  Eyes: Sclera icteric, pupils reactive, round  HEENT: Moist mucous membranes, normal dentition.  Respiratory: Clear  to auscultation bilaterally, no crackles or wheezing.  Cardiovascular: Regular rate and rhythm, normal S1 and S2, and +2/6 systolic murmur  GI: Soft, non-distended, non-tender, normal bowel sounds.  Lymph/Hematologic: No anterior cervical or supraclavicular adenopathy.  Skin: No rashes, no cyanosis, bilateral lower extremity chronic skin changes  Musculoskeletal: Exquisite tenderness of the right heel, calcaneal area, otherwise nontender, No warmth, skin opening or abscess,  Neurologic: Cranial nerves 2-12 intact, normal strength and sensation.  Psychiatric: No obvious anxiety or depression, responds appropriately    Data   Data reviewed today:  I personally reviewed Xray right foot and calcaneus no fracture or dislocation.  Recent Labs   Lab 08/18/19  1815   WBC 6.4   HGB 9.3*   MCV 89   *      POTASSIUM 4.0   CHLORIDE 103   CO2 23   BUN 18   CR 0.73   ANIONGAP 7   ERIN 8.4*   GLC 87   ALBUMIN 2.4*   PROTTOTAL 6.9   BILITOTAL 1.1   ALKPHOS 188*   ALT 20   AST 30       Recent Results (from the past 24 hour(s))   Foot  XR, G/E 3 views, right    Narrative    RIGHT FOOT THREE OR MORE VIEWS    8/18/2019 6:35 PM     HISTORY: Pain.    COMPARISON: None.      Impression    IMPRESSION: No acute fracture or dislocation. Anatomic alignment. No  bony destructive lesion identified.    LILO ATKINS MD   XR Calcaneus Right G/E 2 Views    Narrative    RIGHT CALCANEUS TWO OR MORE VIEWS    8/18/2019 6:35 PM     HISTORY: Pain.    COMPARISON: None.      Impression    IMPRESSION: No acute fracture or dislocation is identified. Anatomic  alignment.     LILO ATKINS MD

## 2019-08-19 NOTE — PROGRESS NOTES
"   08/19/19 1118   General Information   Discipline OT   Onset of Edema   (4 years ago)   Affected Body Part(s) Left LE;Right LE   Edema Etiology Unknown   Etiology Comments \"severe venous stasis dermatitis\"   Pertinent history of current problem (PT: include personal factors and/or comorbidities that impact the POC; OT: include additional occupational profile info) Lawrence Louie is a 63 year old male who presents with hepatic encephalopathy and right foot pain, Portal hypertension with a history of ascites, esophageal varices, Chronic anemia. Has been having too much R heel pain to ambulate. Per MD note, no signs of infection but was started on vancomycin in the event he does have an infection   Edema Precautions Other (see comments)   Edema Precaution Comments Chronic wounds on BLE; at home manages with clean dressings over wounds, under wraps. RN and MD OK'd BLE wraps with current status   Pain   Patient currently in pain Yes, see Vital Signs flowsheet   Pain comments pain only when walking/weight-bearing on R foot   Edema Examination / Assessment   Skin Condition Lipodermatosclerosis   Skin Condition Comments Fibrosis and ulcers (not currently weeping) BLE   Ulcerations Yes   Ulcer Comments Several small ulcerations on BLE   Stemmer Sign Positive   ROM   Range of Motion (WFL) other (describe)   Description of Range of Motion Deficits Impaired at B ankles due to tissue changes from lymphedema   Strength   Strength (WFL) other (describe)   Description of Strength Deficits difficult functional moiblity due to BLE weakness and painful B heels   Sensation   Sensation (WFL) other (describe)   Description of Sensation Deficits sensation intact to light touch, but diminished   Assessment/Plan   Patient presents with Stage 3 Lymphedema   Assessment Pt with long history of lymphedema, was wrapping BLE at home with help of wife, but recently has not been caring for himself well at home due to heel pain, difficulty " walking. Was not taking medicine that made him use the bathroom due to inability to ambulate. He currently has stage 3 lymphedema with fibrosis and ulcerations that are not infected appearing at this time.   Assessment of Occupational Performance 3-5 Performance Deficits   Identified Performance Deficits ADL, IADL, mobility   Clinical Decision Making (Complexity) Moderate complexity   Planned Edema Interventions ADL training;Manual therapy;Education;Precautions to prevent infection/exacerbation;Exercises;Gradient compression bandaging   Treatment Frequency Daily   Treatment Duration 1 week   Patient, Family and/or Staff in agreement with plan of care. Yes   Risks and benefits of treatment have been explained. Yes   Total Evaluation Time   Total Evaluation Time (Minutes) 10

## 2019-08-19 NOTE — PLAN OF CARE
Discharge Planner OT   Patient plan for discharge: Home  Current status: OT Eval completed for Lymph Therapy. History of longstanding BLE lymphedema, now stage 3 with elephantitis and fibrosis, ulcers on BLE. Discussed with RN and MD who did not think there was any current infection in BLE and OK to wrap. Pt presents with some confusion; spoke with RN who thinks it is due to ammonia levels and had not yet taken lactulose. Pt requires A x 2 for transfers from chair and commode and Max A for changing soiled socks after using commode. Min A sit to supine. Washed and dried lower legs. Applied lotion. Applied sections of ABD pads over ulcerations. Completed quick wrap using TG Soft tube bandage layer from base of toes to just below the knee, then an 8 cm SS bandage from base of toes to ankle, and a 12 cm SS bandage from ankle to just below the knee. Bandages applied with appropriate stretch and spacing to allow gradient compression.  Applied bilaterally. Please leave bandages on for 23 hours as tolerated by patient. Lymph therapist to return *1330 on Tuesday. Please remove if patient demonstrates cold hands or feet, blue toes or fingers, numbness or tingling, shortness of breath, skin irritation or inability to tolerate wraps, or if they become soiled or wet. Keep legs elevated if wraps need to be removed.Coordinated with nursing regarding wearing schedule, and completing LE cares for the following day prior to therapy arrival. Updated white board with schedule and quick wrap instruction sheet if wraps become loose or are otherwise not tolerated.   Barriers to return to prior living situation: Impaired mobility, level of assist with ADL and mobility, falls risk, impaired cognition  Recommendations for discharge: TCU  Rationale for recommendations: Pt will need continued inpatient therapies at TCU due to not safe for discharge home at this time. Can continue lymph therapy at TCU as well.        Entered by: Corazon Quintanilla  Jacqueline 08/19/2019 12:58 PM

## 2019-08-19 NOTE — PROGRESS NOTES
.RECEIVING UNIT ED HANDOFF REVIEW    ED Nurse Handoff Report was reviewed by: Terri Rodriguez on August 18, 2019 at 8:45 PM

## 2019-08-19 NOTE — PLAN OF CARE
A&O, very lethargic. VSS on 2L o2. CMS intact. PRN oxy for pain. BLE edema+4. Red,blistering. Foot pain. Mod carb diet. Assist 1. Went down for MRI.

## 2019-08-19 NOTE — CONSULTS
[facility]    Infectious Disease Consultation     Date of Admission:  8/18/2019  Date of Consult (When I saw the patient): 08/19/19    Assessment & Plan   Lawrence Louie is a 63 year old male who was admitted on 8/18/2019.     Impression:  1. 63 y.o male with liver cirrhosis, recovering alcoholic in remission, SMITH.   2. Diabetes.   3. Bilateral chronic venous statis with woody indurated edema with superficial ulcers.   4. History of MSSA bacteremia in April this year, ? Cellulitis as source.   5. Admitted now with encephalopathy as was not taking lactulose, because of heel pain on the right.   6. Started on vancomycin and zosyn.     Recommendations:   On exam the LE do not appear to be actively infected. No fever, no leucocytosis, no warmth to the area.   No heel ulcers or lesions.   MRI negative for osteo.  Stay of ancef given the history and follow up on the cultures.       Ewelina Tyson MD    Reason for Consult   Reason for consult: I was asked by Dr. Bradley  to evaluate this patient for right heel pain.    Primary Care Physician   Ary Murphy    Chief Complaint   Right heel pain     History is obtained from the patient and medical records    History of Present Illness   Lawrence Louie is a 63 year old male who was admitted with right heel pain and encephalopathy. He developed heel pain 3 days ago without any acute trauma to the area. The area became painful and he stopped ambulating on it. Due to the need to ambulate to the bathroom with lactulose, he stopped taking his lactulose and slowly became more encephalopathic.    Past Medical History   I have reviewed this patient's medical history and updated it with pertinent information if needed.   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)      Recovering alcoholic in  remission (H)      Splenomegaly      Squamous cell carcinoma      Thrombocytopenia (H)      Varices, esophageal (H)     Banded in 2011       Past Surgical History   I have reviewed this patient's surgical history and updated it with pertinent information if needed.  Past Surgical History:   Procedure Laterality Date     BIOPSY OF SKIN LESION       COLONOSCOPY Left 6/16/2016    Procedure: COMBINED COLONOSCOPY, SINGLE OR MULTIPLE BIOPSY/POLYPECTOMY BY BIOPSY;  Surgeon: Brandy Barnett MD;  Location: [facility]     ESOPHAGOSCOPY, GASTROSCOPY, DUODENOSCOPY (EGD), COMBINED  2/13/2013    Procedure: COMBINED ESOPHAGOSCOPY, GASTROSCOPY, DUODENOSCOPY (EGD);;  Surgeon: Tara Cook MD;  Location: [facility]     ESOPHAGOSCOPY, GASTROSCOPY, DUODENOSCOPY (EGD), COMBINED  11/4/2013    Procedure: COMBINED ESOPHAGOSCOPY, GASTROSCOPY, DUODENOSCOPY (EGD);;  Surgeon: Lonny Diaz MD;  [facility]     ESOPHAGOSCOPY, GASTROSCOPY, DUODENOSCOPY (EGD), COMBINED Left 6/16/2016    Procedure: COMBINED ESOPHAGOSCOPY, GASTROSCOPY, DUODENOSCOPY (EGD), BIOPSY SINGLE OR MULTIPLE;  Surgeon: Brandy Barnett MD;  [facility]: [facility]     EXCISE LESION TRUNK  9/24/2012    Procedure: EXCISE LESION TRUNK;;  Surgeon: Pepe Dominguez MD;  Location: [facility]     GENITOURINARY SURGERY      vasectomy     HERNIORRHAPHY UMBILICAL  9/24/2012    Procedure: HERNIORRHAPHY UMBILICAL;  UMBILICAL HERNIA REPAIR , EXCISION OF PERIUMBILICAL CYST;  Surgeon: Pepe Dominguez MD;  Location: [facility]       Prior to Admission Medications   Prior to Admission Medications   Prescriptions Last Dose Informant Patient Reported? Taking?   Cholecalciferol (VITAMIN D3) 2000 units CAPS 8/17/2019 at Unknown time Spouse/Significant Other No Yes   Sig: TAKE 1 CAPSULE BY MOUTH DAILY   Cyanocobalamin (VITAMIN B-12 PO) 8/17/2019 at Unknown time Spouse/Significant Other Yes Yes   Sig: Take 1 tablet by mouth daily   LANTUS SOLOSTAR 100 UNIT/ML soln 8/17/2019 at AM  Spouse/Significant Other No Yes   Sig: Inject 30 Units under the skin every morning   NOVOLOG FLEXPEN 100 UNIT/ML soln 2019 at Unknown time Spouse/Significant Other No Yes   Sig: INJECT 20 UNITS UNDER THE SKIN BEFORE BREAKFAST, 20 UNITS BEFORE LUNCH, 20 UNITS BEFORE DINNER, and 20 UNITS BEFORE SNACKS.   OYSTER SHELL CALCIUM/D 500-200 MG-UNIT per tablet 2019 at Unknown time Spouse/Significant Other No Yes   Sig: Take 1 tablet by mouth daily   STATIN NOT PRESCRIBED, INTENTIONAL,   No No   Si each daily Statin not prescribed intentionally due to Active liver disease   UNABLE TO FIND 2019 at Unknown time  Yes Yes   Sig: MEDICATION NAME: Seble root   blood glucose (NO BRAND SPECIFIED) lancets standard   No No   Sig: Use to test blood sugar 4 X  times daily or as directed.   blood glucose monitoring (NO BRAND SPECIFIED) meter device kit   No No   Sig: Use to test blood sugar 4 X  times daily or as directed.   blood glucose monitoring (NO BRAND SPECIFIED) test strip   No No   Sig: Use to test blood sugars 4 X  times daily or as directed   bumetanide (BUMEX) 1 MG tablet 2019 Spouse/Significant Other No Yes   Sig: Take 1 tablet (1 mg) by mouth daily   childrens multivitamin w/ionr (FLINTSTONES COMPLETE) 60 MG chewable tablet 2019 at Unknown time Spouse/Significant Other Yes Yes   Sig: Take 1 chew tab by mouth daily   citalopram (CELEXA) 40 MG tablet 2019 at Unknown time Spouse/Significant Other No Yes   Sig: Take 1 tablet (40 mg) by mouth daily   desvenlafaxine (PRISTIQ) 100 MG 24 hr tablet 2019 at Unknown time Spouse/Significant Other No Yes   Sig: Take 2 tablets (200 mg) by mouth daily   furosemide (LASIX) 40 MG tablet 2019 Spouse/Significant Other No Yes   Sig: TAKE 2 TABLETS BY MOUTH IN MORNING AND 3 TABLETS IN AFTERNOON.   glipiZIDE (GLUCOTROL XL) 5 MG 24 hr tablet 2019 at Unknown time Spouse/Significant Other No Yes   Sig: Take 1 tablet (5 mg) by mouth daily Please  keep clinic and lab appt 6-28-19 for further refills   hydrOXYzine (ATARAX) 25 MG tablet  at PRN Spouse/Significant Other No Yes   Sig: Take 1 tablet (25 mg) by mouth 3 times daily as needed for itching   insulin pen needle (B-D U/F) 31G X 8 MM   No No   Sig: USE  6 times daily / OR AS DIRECTED   insulin pen needle (ULTICARE SHORT) 31G X 8 MM miscellaneous   No No   Sig: Use UP to 6 times daily or as directed   lactulose encephalopathy (CHRONULAC) 10 GM/15ML SOLUTION 8/16/2019 Spouse/Significant Other No Yes   Sig: Take 30 mL's by mouth 2 times daily. Titrate as needed to achieve 3-5 bowel movements daily.   lidocaine (XYLOCAINE) 5 % external ointment  at PRN Spouse/Significant Other No Yes   Sig: Apply topically as needed for moderate pain Apply to wounds twice daily as needed.   magnesium oxide (MAG-OX) 400 (241.3 Mg) MG tablet 8/17/2019 at Unknown time Spouse/Significant Other No Yes   Sig: Take 1 tablet (400 mg) by mouth daily   nystatin (MYCOSTATIN) 008575 UNIT/GM external cream 8/17/2019 at PM Spouse/Significant Other No Yes   Sig: Apply topically 2 times daily   nystatin-triamcinolone (MYCOLOG II) cream 8/17/2019 at Unknown time Spouse/Significant Other No Yes   Sig: Apply topically 2 times daily as needed (itching)   ondansetron (ZOFRAN) 4 MG tablet  at PRN Spouse/Significant Other No Yes   Sig: Take 1 tablet by mouth every 8 hours as needed for nausea   order for DME   No No   Sig: Equipment being ordered:bilateral pneumatic leg massage for treatment of extreme bilateral lower leg edema.   order for DME   No No   Sig: Equipment being ordered:BioTab compression pants pneumatic system   order for DME   No No   Sig: Equipment being ordered: Condom catheter   order for DME   No No   Sig: Equipment being ordered:Orthopedic shoes   oxyCODONE (ROXICODONE) 5 MG tablet 8/18/2019 at PM Spouse/Significant Other No Yes   Sig: TAKE 1 TO 2 TABLETS BY MOUTH EVERY 8 HOURS AS NEEDED   ranitidine (ZANTAC) 150 MG tablet  8/17/2019 at PM Spouse/Significant Other No Yes   Sig: Take 1 tablet (150 mg) by mouth 2 times daily   rifaximin (XIFAXAN) 550 MG TABS tablet 8/17/2019 at PM Spouse/Significant Other No Yes   Sig: Take 1 tablet (550 mg) by mouth 2 times daily   spironolactone (ALDACTONE) 50 MG tablet 8/17/2019 at PM Spouse/Significant Other No Yes   Sig: Take 3 tablets (150 mg) by mouth 2 times daily   triamcinolone (KENALOG) 0.1 % external cream  at PRN Spouse/Significant Other No No   Sig: Apply topically 2 times daily as needed for irritation   ursodiol (ACTIGALL) 300 MG capsule 8/17/2019 at PM Spouse/Significant Other No Yes   Sig: Take 3 capsules (900 mg) by mouth 2 times daily      Facility-Administered Medications: None     Allergies   No Known Allergies    Immunization History   Immunization History   Administered Date(s) Administered     DTAP (<7y) 01/18/1988     HEPA 01/02/2013, 02/18/2013, 10/30/2013     HepB 01/02/2013, 02/18/2013, 10/30/2013     Influenza (IIV3) PF 10/04/2010, 12/18/2012, 09/08/2014, 09/26/2015, 09/27/2016     Influenza Intranasal Vaccine 4 valent 10/12/2017     Influenza Vaccine IM 3yrs+ 4 Valent IIV4 10/26/2018     Pneumo Conj 13-V (2010&after) 05/12/2015     Pneumococcal 23 valent 09/25/2009, 10/01/2010, 01/02/2013, 04/23/2019     TD (ADULT, 7+) 09/08/1997     TDAP Vaccine (Adacel) 01/02/2013     Twinrix A/B 01/02/2013     Zoster vaccine, live 12/22/2016       Social History   I have reviewed this patient's social history and updated it with pertinent information if needed. Lawrence Louie  reports that he quit smoking about 4 months ago. His smoking use included cigarettes. He smoked 0.30 packs per day. He has never used smokeless tobacco. He reports that he does not drink alcohol or use drugs.    Family History   I have reviewed this patient's family history and updated it with pertinent information if needed.   Family History   Problem Relation Age of Onset     Breast Cancer Mother      Liver  Cancer Mother      Cardiovascular Father      Cerebrovascular Disease Father         very low blood pressure     Cancer Father         rectal cancer     Cardiovascular Paternal Grandfather      Diabetes Brother      Cancer Sister         skin cancer     C.A.D. Other         MI, 70's     Breast Cancer Sister      Thyroid Disease No family hx of      Lipids No family hx of      Anesthesia Reaction No family hx of        Review of Systems   The 10 point Review of Systems is negative other than noted in the HPI or here.     Physical Exam   Temp: 98.8  F (37.1  C) Temp src: Oral BP: 100/46 Pulse: 68   Resp: 16 SpO2: 95 % O2 Device: None (Room air) Oxygen Delivery: 2 LPM  Vital Signs with Ranges  Temp:  [98.8  F (37.1  C)-99  F (37.2  C)] 98.8  F (37.1  C)  Pulse:  [68-82] 68  Resp:  [16] 16  BP: (100-144)/(46-73) 100/46  SpO2:  [93 %-100 %] 95 %  299 lbs 0 oz  Body mass index is 40.55 kg/m .    GENERAL APPEARANCE:  alert and no distress  EYES: Eyes grossly normal to inspection, PERRL and conjunctivae and sclerae normal  HENT: ear canals and TM's normal and nose and mouth without ulcers or lesions  NECK: no adenopathy, no asymmetry, masses, or scars and thyroid normal to palpation  RESP: lungs clear to auscultation - no rales, rhonchi or wheezes  CV: regular rates and rhythm, normal S1 S2, no S3 or S4 and no murmur, click or rub  LYMPHATICS: normal ant/post cervical and supraclavicular nodes  ABDOMEN: soft, nontender, without hepatosplenomegaly or masses and bowel sounds normal  MS: woody chronic appearing venous stasis changes, open sores bilateral   SKIN: no suspicious lesions or rashes      Data   Lab Results   Component Value Date    WBC 4.9 08/19/2019    HGB 8.0 (L) 08/19/2019    HCT 24.0 (L) 08/19/2019    PLT 79 (L) 08/19/2019     08/19/2019    POTASSIUM 3.8 08/19/2019    CHLORIDE 107 08/19/2019    CO2 22 08/19/2019    BUN 16 08/19/2019    CR 0.88 08/19/2019    GLC 86 08/19/2019    NTBNPI 120 03/04/2014     TROPONIN 0.01 03/04/2014    AST 25 08/19/2019    ALT 15 08/19/2019    ALKPHOS 139 08/19/2019    BILITOTAL 1.3 08/19/2019    ANGELICA 63 (H) 08/19/2019    INR 1.27 (H) 04/22/2019     Recent Labs   Lab 08/18/19 2006 08/18/19 1916   CULT No growth after 7 hours No growth after 7 hours     Recent Labs   Lab Test 08/18/19 2006 08/18/19 1916 09/13/18  1732 07/31/18  1308 11/05/17  0321 11/04/17  2034   CULT No growth after 7 hours No growth after 7 hours No growth >100,000 colonies/mL  Escherichia coli  * No growth <10,000 colonies/mL  urogenital jagdish  Susceptibility testing not routinely done         Amount of time performed on this consult: 45 minutes. This includes face to face assessment and care coordination with the primary team.

## 2019-08-19 NOTE — PROGRESS NOTES
[facility]    HOSPITALIST PROGRESS NOTE :   --------------------------------------------------    Date of Admission:  8/18/2019    Cumulative Summary: Lawrence Louie is a 63 year old male with past medical history significant for diabetes mellitus, essential hypertension, liver cirrhosis secondary to Cervantes, recovering alcoholic in remission, splenomegaly, squamous cell carcinoma,esophageal varices was admitted on August 18 th with significant right heel pain which was making him difficult to walk long with worsening of his hepatic encephalopathy due to medication noncompliance and was admitted for further evaluation and management.    Assessment & Plan     Active Problems:  Right heel pain : MRI is concerning for cellulitis , no osteo although WBC is normal , have significantly elevated CRP and significant point tenderness on palpation of heel.Unfortunately patient also had significant chronic venous insufficiency changes with lymphedema in his bilateral lower extremities.  MRI is showing diffusely fairly prominent subcutaneous edema consistent with history of cellulitis but no signs of osteomyelitis.  Hx Cellulitis of lower extremities with MSSA bacteremia: was admitted on 4/10/19 at [facility]. Blood cx with MSSA, completed abx tx with cefazolin for 2 weeks.  Severe venous stasis dermatitis:     -- continue on broad spectrum antibiotics will ask ID to weigh in as he did require 2 weeks of IV antibiotics in April when he was admitted at [facility]   -- will follow up on Podiatry evaluation   -- MRI results were reviewed with patient which is negative for osteomyelitis.  --Follow up on blood cultures.  --Will repeat CRP in morning.  --Pain medication as needed.  --We'll also ask physical and occupational therapy to evaluate patient as unfortunately patient is not able to understand and put weight on his right foot.  -- Consult lymphedema for lower extremity wraps Does not appear  acutely infected.    Acute Hepatic encephalopathy due to medication noncompliance  Alcoholic cirrhosis  Portal hypertension with a history of ascites, esophageal varices  Underwent TIPS procedure in the past. PTA lactulose and rifaximin  - He has not taken his lactulose in 2 days due to persistent right heel pain making it difficult to ambulate to the bathroom without pain. Ammonia 80, slightly more somnolent on admit.seems to be improved this morning     -- Resumed Lactulose 30mg TID  -- Hold PTA lasix, bumex and spironolactone at this time, resume once plan for right heel in place, probably can be started tomorrow   -- AM ammonia level was reviewed , improving     Chronic anemia  Chronic thrombocytopenia:  secondary to cirrhosis with splenomegaly. Baseline Hgb near 9, Platelets near 100. He is near baseline  --  continue to monitor.    Diabetes mellitus: PTA glipizide 5mg, lantus 30 units in AM, novolog 20 units with meals and snacks. He had issues with hypoglycemia during his last hospitalization. And had BG 87 on admit and then low this am     -- Decrease Lantus to 10 units this morning   -- Due to low blood sugars , will change fix meal coverage to 1 unit for 15 gm's of carb  -- continue on medium dose sliding scale     Depression  -- Continue PTA pristiq and celexa.    Diet: Moderate Consistent CHO Diet    Payne Catheter: not present  DVT Prophylaxis: Pneumatic Compression Devices  Code Status: DNR/DNI    The patient's care was discussed with the Bedside Nurse and Patient.    Disposition Plan   Expected discharge: 1-2 days , recommended to prior living arrangement once clinically improved , will post PT and OT also due to patient unable to put weight on his right heel     Luzmaria Bradley MD, FACP  Text Page (7am - 6pm)    ----------------------------------------------------------------------------------------------------------------------    Interval History   Patient care was assumed this morning , seen and  examined , sitting in chair , eating breakfast     -Data reviewed today: I reviewed all new labs and imaging results over the last 24 hours.    I personally reviewed no images or EKG's today.MRI results were reviewed , negative for osteo , showing cellulitis     Physical Exam   Temp: 98.8  F (37.1  C) Temp src: Oral BP: 100/46 Pulse: 68   Resp: 16 SpO2: 95 % O2 Device: None (Room air) Oxygen Delivery: 2 LPM  Vitals:    08/18/19 1758 08/19/19 0500   Weight: 145.2 kg (320 lb) 135.6 kg (299 lb)     Vital Signs with Ranges  Temp:  [98.8  F (37.1  C)-99  F (37.2  C)] 98.8  F (37.1  C)  Pulse:  [68-82] 68  Resp:  [16] 16  BP: (100-144)/(46-73) 100/46  SpO2:  [93 %-100 %] 95 %  I/O last 3 completed shifts:  In: -   Out: 250 [Urine:250]    GENERAL: Alert , awake and oriented. NAD. Conversational, appropriate.   HEENT: Normocephalic. EOMI. No icterus or injection. Nares normal.   LUNGS: Clear to auscultation. No dyspnea at rest.   HEART: Regular rate. Extremities perfused.   ABDOMEN: Soft, nontender, and nondistended. Positive bowel sounds.   EXTREMITIES: No LE edema noted.   NEUROLOGIC: Moves extremities x4 on command. No acute focal neurologic abnormalities noted.     Medications       citalopram  40 mg Oral Daily     desvenlafaxine  200 mg Oral Daily     insulin aspart   Subcutaneous TID w/meals     insulin aspart  1-7 Units Subcutaneous TID AC     insulin aspart  1-5 Units Subcutaneous At Bedtime     insulin glargine  10 Units Subcutaneous QAM AC     lactulose  30 mL Oral TID     magnesium oxide  400 mg Oral Daily     piperacillin-tazobactam  3.375 g Intravenous Q6H     ranitidine  150 mg Oral BID     rifaximin  550 mg Oral BID     sodium chloride (PF)  3 mL Intracatheter Q8H     ursodiol  900 mg Oral BID     vancomycin (VANCOCIN) IV  2,000 mg Intravenous Q12H       Data   Recent Labs   Lab 08/19/19  0631 08/18/19  1815   WBC 4.9 6.4   HGB 8.0* 9.3*   MCV 90 89   PLT 79* 124*    133   POTASSIUM 3.8 4.0   CHLORIDE  107 103   CO2 22 23   BUN 16 18   CR 0.88 0.73   ANIONGAP 7 7   ERIN 8.0* 8.4*   GLC 86 87   ALBUMIN 2.0* 2.4*   PROTTOTAL 5.8* 6.9   BILITOTAL 1.3 1.1   ALKPHOS 139 188*   ALT 15 20   AST 25 30       Imaging:   Recent Results (from the past 24 hour(s))   Foot  XR, G/E 3 views, right    Narrative    RIGHT FOOT THREE OR MORE VIEWS    8/18/2019 6:35 PM     HISTORY: Pain.    COMPARISON: None.      Impression    IMPRESSION: No acute fracture or dislocation. Anatomic alignment. No  bony destructive lesion identified.    LILO ATKINS MD   XR Calcaneus Right G/E 2 Views    Narrative    RIGHT CALCANEUS TWO OR MORE VIEWS    8/18/2019 6:35 PM     HISTORY: Pain.    COMPARISON: None.      Impression    IMPRESSION: No acute fracture or dislocation is identified. Anatomic  alignment.     LILO ATKINS MD   MR Ankle Right w/o Contrast    Narrative    MR ANKLE RIGHT WITHOUT CONTRAST August 19, 2019 5:53 AM     HISTORY: Right heel pain, rule out osteomyelitis.    TECHNIQUE: Sagittal and coronal T1 and inversion recovery, and   transverse proton density and T2 weighted images.    COMPARISON: X-ray from 8/18/2019.    FINDINGS:  Plantar Fascia: Mild thickening and surrounding soft tissue edema in  the plantar fascia near its insertion site on the calcaneal tuberosity  suspicious for mild plantar fasciitis.     Osseous and Cartilaginous Structures: No bone marrow edema or evidence  of osteomyelitis. Talar dome is intact. No evidence of fracture.     Posterior Tibial and Flexor Tendons: No tear or tendinosis of the  posterior tibial tendon, flexor digitorum longus tendon, or flexor  hallucis longus tendon.     Peroneal Tendons: No tear, tendinosis, or apparent longitudinal  splitting of the peroneus brevis tendon or peroneus longus tendon. No  tendon subluxation.     Achilles Tendon: No tear or tendinosis.     Extensor Tendons: No tear tendinosis of the anterior tibial tendon  extensor hallux longus tendons or extensor digitorum longus  tendons.     Lateral Ligaments: The anterior talofibular ligament appears intact.  The calcaneofibular, posterior talofibular, and anterior and posterior  tibiofibular ligaments appear intact.     Medial Deltoid Ligamentous Complex: Intact.     Joint space: No tibiotalar or subtalar joint effusion.    Additional Findings: No retrocalcaneal bursitis. No mass within the  tarsal tunnel. The sinus Tarsi is unremarkable. Diffuse fairly  prominent subcutaneous edema consistent with a history of cellulitis.      Impression    IMPRESSION:    1. No evidence of osteomyelitis or bone marrow edema.  2. Diffuse fairly prominent subcutaneous edema consistent with a  history of cellulitis.

## 2019-08-19 NOTE — PHARMACY-ADMISSION MEDICATION HISTORY
Admission medication history interview status for the 8/18/2019  admission is complete. See EPIC admission navigator for prior to admission medications     Medication history source reliability:Moderate    Actions taken by pharmacist (provider contacted, etc): PTA med list updated per pt's wife, pt has not been taking his lactulose or diuretics for a few days due to immobility.      Additional medication history information not noted on PTA med list :  Meds Deleted: Gabapentin, Lidocaine Patch    Medication reconciliation/reorder completed by provider prior to medication history? No    Time spent in this activity: 25 minutes    Prior to Admission medications    Medication Sig Last Dose Taking? Auth Provider   bumetanide (BUMEX) 1 MG tablet Take 1 tablet (1 mg) by mouth daily 8/16/2019 Yes Ary Murphy APRN CNP   childrens multivitamin w/ionr (FLINTSTONES COMPLETE) 60 MG chewable tablet Take 1 chew tab by mouth daily 8/17/2019 at Unknown time Yes Reported, Patient   Cholecalciferol (VITAMIN D3) 2000 units CAPS TAKE 1 CAPSULE BY MOUTH DAILY 8/17/2019 at Unknown time Yes Ary Murphy APRN CNP   citalopram (CELEXA) 40 MG tablet Take 1 tablet (40 mg) by mouth daily 8/17/2019 at Unknown time Yes Ary Murphy APRN CNP   Cyanocobalamin (VITAMIN B-12 PO) Take 1 tablet by mouth daily 8/17/2019 at Unknown time Yes Reported, Patient   desvenlafaxine (PRISTIQ) 100 MG 24 hr tablet Take 2 tablets (200 mg) by mouth daily 8/17/2019 at Unknown time Yes Ary Murphy APRN CNP   furosemide (LASIX) 40 MG tablet TAKE 2 TABLETS BY MOUTH IN MORNING AND 3 TABLETS IN AFTERNOON. 8/16/2019 Yes Ary Murphy APRN CNP   glipiZIDE (GLUCOTROL XL) 5 MG 24 hr tablet Take 1 tablet (5 mg) by mouth daily Please keep clinic and lab appt 6-28-19 for further refills 8/17/2019 at Unknown time Yes Ary Murphy APRN CNP   hydrOXYzine (ATARAX) 25 MG tablet Take 1 tablet (25 mg) by mouth 3 times daily as needed for  itching  at PRN Yes Ary Murphy APRN CNP   lactulose encephalopathy (CHRONULAC) 10 GM/15ML SOLUTION Take 30 mL's by mouth 2 times daily. Titrate as needed to achieve 3-5 bowel movements daily. 8/16/2019 Yes Meghna Simmons MD LANTUS SOLOSTAR 100 UNIT/ML soln Inject 30 Units under the skin every morning 8/17/2019 at AM Yes Ary Murphy APRN CNP   lidocaine (XYLOCAINE) 5 % external ointment Apply topically as needed for moderate pain Apply to wounds twice daily as needed.  at PRN Yes Ary Murphy APRN CNP   magnesium oxide (MAG-OX) 400 (241.3 Mg) MG tablet Take 1 tablet (400 mg) by mouth daily 8/17/2019 at Unknown time Yes Ary Murphy APRN CNP   NOVOLOG FLEXPEN 100 UNIT/ML soln INJECT 20 UNITS UNDER THE SKIN BEFORE BREAKFAST, 20 UNITS BEFORE LUNCH, 20 UNITS BEFORE DINNER, and 20 UNITS BEFORE SNACKS. 8/17/2019 at Unknown time Yes Ary Murphy APRN CNP   nystatin (MYCOSTATIN) 690912 UNIT/GM external cream Apply topically 2 times daily 8/17/2019 at PM Yes Judit Cantu PA-C   nystatin-triamcinolone (MYCOLOG II) cream Apply topically 2 times daily as needed (itching) 8/17/2019 at Unknown time Yes Judit Cantu PA-C   ondansetron (ZOFRAN) 4 MG tablet Take 1 tablet by mouth every 8 hours as needed for nausea  at PRN Yes Ary Murphy APRN CNP   oxyCODONE (ROXICODONE) 5 MG tablet TAKE 1 TO 2 TABLETS BY MOUTH EVERY 8 HOURS AS NEEDED 8/18/2019 at PM Yes Adamaris Arias MD   OYSTER SHELL CALCIUM/D 500-200 MG-UNIT per tablet Take 1 tablet by mouth daily 8/17/2019 at Unknown time Yes Ary Murphy APRN CNP   ranitidine (ZANTAC) 150 MG tablet Take 1 tablet (150 mg) by mouth 2 times daily 8/17/2019 at PM Yes Ary Murphy APRN CNP   rifaximin (XIFAXAN) 550 MG TABS tablet Take 1 tablet (550 mg) by mouth 2 times daily 8/17/2019 at PM Yes Meghna Simmons MD   spironolactone (ALDACTONE) 50 MG tablet Take 3 tablets (150 mg) by mouth 2 times  daily 8/17/2019 at PM Yes Meghna Simmons MD   UNABLE TO FIND MEDICATION NAME: Burdock root 8/17/2019 at Unknown time Yes Reported, Patient   ursodiol (ACTIGALL) 300 MG capsule Take 3 capsules (900 mg) by mouth 2 times daily 8/17/2019 at PM Yes Meghna Simmons MD   blood glucose (NO BRAND SPECIFIED) lancets standard Use to test blood sugar 4 X  times daily or as directed.   Ary Murphy APRN CNP   blood glucose monitoring (NO BRAND SPECIFIED) meter device kit Use to test blood sugar 4 X  times daily or as directed.   Ary Murphy APRN CNP   blood glucose monitoring (NO BRAND SPECIFIED) test strip Use to test blood sugars 4 X  times daily or as directed   Ary Murphy APRN CNP   insulin pen needle (B-D U/F) 31G X 8 MM USE  6 times daily / OR AS DIRECTED   Ary Murphy APRN CNP   insulin pen needle (ULTICARE SHORT) 31G X 8 MM miscellaneous Use UP to 6 times daily or as directed   Ary Murphy APRN CNP   order for DME Equipment being ordered:Orthopedic shoes   Ary Murphy APRN CNP   order for DME Equipment being ordered: Condom catheter   Ary Murphy APRN CNP   order for DME Equipment being ordered:BioTab compression pants pneumatic system   Ary Murphy APRN CNP   order for DME Equipment being ordered:bilateral pneumatic leg massage for treatment of extreme bilateral lower leg edema.   Ary Murphy APRN CNP   STATIN NOT PRESCRIBED, INTENTIONAL, 1 each daily Statin not prescribed intentionally due to Active liver disease   Ary Murphy APRN CNP   triamcinolone (KENALOG) 0.1 % external cream Apply topically 2 times daily as needed for irritation  at PRN  Ary Murphy APRN CNP

## 2019-08-19 NOTE — TELEPHONE ENCOUNTER
Left a message for Jocelyne at home care, to relay the patient is on most medications PRN, However the patient is currently admitted so those may change and we would not be able to attest to those changes until the patient comes in for an office visit with PCP. Magdalena Mott LPN 8/19/2019 8:23 AM

## 2019-08-19 NOTE — CONSULTS
PATIENT HISTORY:  Lawrence Louie is a 63 year old male who was admitted for right heel pain.      I was asked to see Lawrence Louie  by  for right heel pain.    Patient seen at bedside. Notes the pain to right heel was a rapid onset. Does have lymphedema to both legs. Pain is 10/10. Unable to weight on right heel.  Denies injury to foot.     Review of Systems:  Patient denies fever, chills, rash, wound, stiffness,  numbness, weakness, heart burn, blood in stool, chest pain with activity, calf pain when walking, shortness of breath with activity, chronic cough, easy bleeding/bruising,  excessive thirst, fatigue, depression, anxiety.  Patient admits to limping, swelling.     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)      Splenomegaly      Squamous cell carcinoma      Thrombocytopenia (H)      Varices, esophageal (H)     Banded in 2011        PAST SURGICAL HISTORY:   Past Surgical History:   Procedure Laterality Date     BIOPSY OF SKIN LESION       COLONOSCOPY Left 6/16/2016    Procedure: COMBINED COLONOSCOPY, SINGLE OR MULTIPLE BIOPSY/POLYPECTOMY BY BIOPSY;  Surgeon: Brandy Barnett MD;  Location:  GI     ESOPHAGOSCOPY, GASTROSCOPY, DUODENOSCOPY (EGD), COMBINED  2/13/2013    Procedure: COMBINED ESOPHAGOSCOPY, GASTROSCOPY, DUODENOSCOPY (EGD);;  Surgeon: Tara Cook MD;  Location:  GI     ESOPHAGOSCOPY, GASTROSCOPY, DUODENOSCOPY (EGD), COMBINED  11/4/2013    Procedure: COMBINED ESOPHAGOSCOPY, GASTROSCOPY, DUODENOSCOPY (EGD);;  Surgeon: Lonny Diaz MD;  Location:  GI     ESOPHAGOSCOPY, GASTROSCOPY, DUODENOSCOPY (EGD), COMBINED Left 6/16/2016    Procedure: COMBINED ESOPHAGOSCOPY, GASTROSCOPY, DUODENOSCOPY (EGD), BIOPSY SINGLE OR MULTIPLE;   Surgeon: Brandy Barnett MD;  Location: [facility]     EXCISE LESION TRUNK  9/24/2012    Procedure: EXCISE LESION TRUNK;;  Surgeon: Pepe Dominguez MD;  Location: [facility]     GENITOURINARY SURGERY      vasectomy     HERNIORRHAPHY UMBILICAL  9/24/2012    Procedure: HERNIORRHAPHY UMBILICAL;  UMBILICAL HERNIA REPAIR , EXCISION OF PERIUMBILICAL CYST;  Surgeon: Pepe Dominguez MD;  Location: [facility]        MEDICATIONS:   Current Facility-Administered Medications:      ceFAZolin (ANCEF) intermittent infusion 2 g in 100 mL dextrose PRE-MIX, 2 g, Intravenous, Q8H, Ewelina Tyson MD     citalopram (celeXA) tablet 40 mg, 40 mg, Oral, Daily, Oralia Hyde DO, 40 mg at 08/19/19 0858     desvenlafaxine (PRISTIQ) 24 hr tablet 200 mg, 200 mg, Oral, Daily, Oralia Hyde DO, 200 mg at 08/19/19 0858     glucose gel 15-30 g, 15-30 g, Oral, Q15 Min PRN **OR** dextrose 50 % injection 25-50 mL, 25-50 mL, Intravenous, Q15 Min PRN **OR** glucagon injection 1 mg, 1 mg, Subcutaneous, Q15 Min PRN, Oralia Hyde DO     hydrOXYzine (ATARAX) tablet 25 mg, 25 mg, Oral, TID PRN, Oralia Hyde DO     ibuprofen (ADVIL/MOTRIN) tablet 600 mg, 600 mg, Oral, Q6H PRN, Oralia Hyde DO     insulin aspart (NovoLOG) inj (RAPID ACTING), , Subcutaneous, TID w/meals, Luzmaria Bradley MD, 3 Units at 08/19/19 1056     insulin aspart (NovoLOG) inj (RAPID ACTING), 1-7 Units, Subcutaneous, TID ACBarrett Danielle Elizabeth, DO     insulin aspart (NovoLOG) inj (RAPID ACTING), 1-5 Units, Subcutaneous, At Bedtime, Oralia Hyde DO     insulin glargine (LANTUS PEN) injection 10 Units, 10 Units, Subcutaneous, QAM ACKirk Saima, MD, 10 Units at 08/19/19 1056     lactulose (CHRONULAC) solution 30 mL, 30 mL, Oral, TID, Oralia Hyde DO, 30 mL at 08/19/19 1230     lidocaine (LMX4) cream, , Topical, Q1H PRN, Oralia Hyde DO     lidocaine 1 % 0.1-1 mL, 0.1-1 mL, Other,  Q1H PRN, Oralia Hydebeth, DO     magnesium oxide (MAG-OX) tablet 400 mg, 400 mg, Oral, Daily, Oralia Hyde, DO, 400 mg at 08/19/19 0857     magnesium sulfate 4 g in 100 mL sterile water (premade), 4 g, Intravenous, Q4H PRN, GreenOraliabeth, DO     melatonin tablet 1 mg, 1 mg, Oral, At Bedtime PRN, Oralia Hyde, DO     naloxone (NARCAN) injection 0.1-0.4 mg, 0.1-0.4 mg, Intravenous, Q2 Min PRN, Oralia Hyde, DO     ondansetron (ZOFRAN-ODT) ODT tab 4 mg, 4 mg, Oral, Q6H PRN **OR** ondansetron (ZOFRAN) injection 4 mg, 4 mg, Intravenous, Q6H PRN, Oralia Hyde, DO     oxyCODONE (ROXICODONE) tablet 5-10 mg, 5-10 mg, Oral, Q8H PRN, Oralia Hyde, DO, 5 mg at 08/19/19 0246     potassium chloride (KLOR-CON) Packet 20-40 mEq, 20-40 mEq, Oral or Feeding Tube, Q2H PRN, Oralia Hyde, DO     potassium chloride 10 mEq in 100 mL intermittent infusion with 10 mg lidocaine, 10 mEq, Intravenous, Q1H PRN, Oralia Hydebeth, DO     potassium chloride 10 mEq in 100 mL sterile water intermittent infusion (premix), 10 mEq, Intravenous, Q1H PRN, Oralia Hydebeth, DO     potassium chloride 20 mEq in 50 mL intermittent infusion, 20 mEq, Intravenous, Q1H PRN, GreenOraliabeth, DO     potassium chloride ER (K-DUR/KLOR-CON M) CR tablet 20-40 mEq, 20-40 mEq, Oral, Q2H PRN, Oralia Hydebeth, DO     ranitidine (ZANTAC) tablet 150 mg, 150 mg, Oral, BID, Oralia Hydebeth, DO, 150 mg at 08/19/19 0858     rifaximin (XIFAXAN) tablet 550 mg, 550 mg, Oral, BID, Oralia Hyde DO, 550 mg at 08/19/19 0857     senna-docusate (SENOKOT-S/PERICOLACE) 8.6-50 MG per tablet 1 tablet, 1 tablet, Oral, BID PRN **OR** senna-docusate (SENOKOT-S/PERICOLACE) 8.6-50 MG per tablet 2 tablet, 2 tablet, Oral, BID PRN, Oralia Hyde DO     sodium chloride (PF) 0.9% PF flush 3 mL, 3 mL, Intracatheter, q1 min prn,  Oralia Hyde DO     sodium chloride (PF) 0.9% PF flush 3 mL, 3 mL, Intracatheter, Q8H, Oralia Hyde DO, 3 mL at 19 0913     ursodiol (ACTIGALL) capsule 900 mg, 900 mg, Oral, BID, Oralia Hyde DO, 900 mg at 19 0857     ALLERGIES:  No Known Allergies     SOCIAL HISTORY:   Social History     Socioeconomic History     Marital status:      Spouse name: Not on file     Number of children: Not on file     Years of education: Not on file     Highest education level: Not on file   Occupational History     Not on file   Social Needs     Financial resource strain: Not on file     Food insecurity:     Worry: Not on file     Inability: Not on file     Transportation needs:     Medical: Not on file     Non-medical: Not on file   Tobacco Use     Smoking status: Former Smoker     Packs/day: 0.30     Types: Cigarettes     Last attempt to quit: 4/10/2019     Years since quittin.3     Smokeless tobacco: Never Used     Tobacco comment: 43 yr   Substance and Sexual Activity     Alcohol use: No     Alcohol/week: 0.0 oz     Comment: 2-3 per day , none since Dec 2012     Drug use: No     Sexual activity: Yes     Partners: Female     Birth control/protection: Surgical   Lifestyle     Physical activity:     Days per week: Not on file     Minutes per session: Not on file     Stress: Not on file   Relationships     Social connections:     Talks on phone: Not on file     Gets together: Not on file     Attends Christianity service: Not on file     Active member of club or organization: Not on file     Attends meetings of clubs or organizations: Not on file     Relationship status: Not on file     Intimate partner violence:     Fear of current or ex partner: Not on file     Emotionally abused: Not on file     Physically abused: Not on file     Forced sexual activity: Not on file   Other Topics Concern     Parent/sibling w/ CABG, MI or angioplasty before 65F 55M? Not Asked       Service No     Blood Transfusions No     Caffeine Concern No     Comment: very little coffee, likes beer     Occupational Exposure No     Hobby Hazards No     Sleep Concern Yes     Comment: bed at 3:30 AM, up at 1:00 PM     Stress Concern Yes     Weight Concern Yes     Special Diet No     Back Care No     Exercise No     Bike Helmet No     Seat Belt Yes     Self-Exams No   Social History Narrative    . 3 grown daughters. He has been sober since 2012.        FAMILY HISTORY:   Family History   Problem Relation Age of Onset     Breast Cancer Mother      Liver Cancer Mother      Cardiovascular Father      Cerebrovascular Disease Father         very low blood pressure     Cancer Father         rectal cancer     Cardiovascular Paternal Grandfather      Diabetes Brother      Cancer Sister         skin cancer     C.A.D. Other         MI, 70's     Breast Cancer Sister      Thyroid Disease No family hx of      Lipids No family hx of      Anesthesia Reaction No family hx of         EXAM:Vitals: /46 (BP Location: Left arm)   Pulse 68   Temp 98.8  F (37.1  C) (Oral)   Resp 16   Ht 1.829 m (6')   Wt 135.6 kg (299 lb)   SpO2 95%   BMI 40.55 kg/m    BMI= Body mass index is 40.55 kg/m .    LABS:    WBC   Date Value Ref Range Status   08/19/2019 4.9 4.0 - 11.0 10e9/L Final     HA1C: 7.6    C-reactive protein: 77.1    General appearance: Patient is alert and fully cooperative with history & exam.  No sign of distress is noted during the visit.      Psychiatric: Affect is pleasant & appropriate.  Patient appears motivated to improve health.       Respiratory: Breathing is regular & unlabored while sitting.      HEENT: Hearing is intact to spoken word.  Speech is clear.  No gross evidence of visual impairment that would impact ambulation.       Dermatologic: no open lesions noted to right foot or ankle. Redness to the right heel and anterior ankle.      Vascular: DP & PT pulses are intact & regular bilaterally.  significant edema both lower extremities.  CFT and skin temperature is normal to both lower extremities.       Neurologic: Lower extremity sensation is hypersensitive to light touch currently.       Musculoskeletal: Patient is ambulatory without assistive device or brace.  Decrease arch height. Hypersensitive to light touch right heel and ankle.     IMAGING: right foot xray -  No acute fracture or dislocation. Anatomic alignment. No bony destructive lesion identified.    MrI - No evidence of osteomyelitis or bone marrow edema. Diffuse fairly prominent subcutaneous edema consistent with a history of cellulitis.     ASSESSMENT: 63 yr old diabetic male with neuropathy with lymphedema and possible gout/psuedogout vs reactive arthritis right foot.      PLAN:  Reviewed patient's chart in epic.  -wbc is low but inflammatory markers are elevated which is why I think this may not be an infection  -will order uric acid lab.   -no wound and mRI negative for pathology so no surgery indicated.   -will order tall cast boot for right foot to see if this can help patient put weight on foot without so much pain.   -if uric acid positive, would treat for gout with indomethacin or possible prednisone.  -if negative, would recommend rheumatology work up for systemic arthritis    -Addendum:  Uric acid level is high.  Likely gout.    -Podiatry will sign off. No surgery indicated.     Marli Bess DPM, Podiatry/Foot and Ankle Surgery    12:57 PM

## 2019-08-19 NOTE — ED NOTES
"[facility]  ED Nurse Handoff Report    ED Chief complaint: Foot Injury      ED Diagnosis:   Final diagnoses:   None       Code Status: not discussed by ED RN    Allergies: No Known Allergies    Activity level - Baseline/Home:  Independent  Activity Level - Current:   Total Care- pt unable to walk due to foot pain    Patient's Preferred language: english   Needed?: No    Isolation: No  Infection: Not Applicable  Bariatric?: No    Vital Signs:   Vitals:    08/18/19 1758   BP: (!) 144/73   Pulse: 78   Resp: 16   Temp: 98.8  F (37.1  C)   TempSrc: Oral   SpO2: 95%   Weight: 145.2 kg (320 lb)   Height: 1.829 m (6')       Cardiac Rhythm: ,        Pain level:      Is this patient confused?: Yes- ammonia level high, pt lethargic, not answering all questions  Does this patient have a guardian?  No         If yes, is there guardianship documents in the Epic \"Code/ACP\" activity?  N/A         Guardian Notified?  N/A  Speed - Suicide Severity Rating Scale Completed?  Yes  If yes, what color did the patient score?  White    Patient Report: Initial Complaint: foot pain  Focused Assessment: pt has multiple medical issues, comes in due to foot pain for the past several days. Patient has not been taking his diuretics or lactulose for the past 2 days because he hasn't been able to ambulate. Pt's ammonia level high because he hasn't taken his lactulose. Pt has lymphedema. He has a hx of sepsis and his wife states that when he was septic in the past his white count wasn't elevated. Pt's sats in upper 80s on RA while he is sleeping  Tests Performed: labs, xray  Abnormal Results:   Labs Ordered and Resulted from Time of ED Arrival Up to the Time of Departure from the ED   CBC WITH PLATELETS DIFFERENTIAL - Abnormal; Notable for the following components:       Result Value    RBC Count 3.11 (*)     Hemoglobin 9.3 (*)     Hematocrit 27.8 (*)     RDW 16.8 (*)     Platelet Count 124 (*)     Absolute Lymphocytes " 0.4 (*)     All other components within normal limits   BASIC METABOLIC PANEL - Abnormal; Notable for the following components:    Calcium 8.4 (*)     All other components within normal limits   CRP INFLAMMATION - Abnormal; Notable for the following components:    CRP Inflammation 77.1 (*)     All other components within normal limits   AMMONIA - Abnormal; Notable for the following components:    Ammonia 80 (*)     All other components within normal limits   HEPATIC PANEL - Abnormal; Notable for the following components:    Bilirubin Direct 0.6 (*)     Albumin 2.4 (*)     Alkaline Phosphatase 188 (*)     All other components within normal limits   LACTIC ACID WHOLE BLOOD   BLOOD CULTURE   BLOOD CULTURE       Treatments provided: fluids, antibiotics, lactulose    Family Comments: wife at bedside    OBS brochure/video discussed/provided to patient/family: N/A              Name of person given brochure if not patient:               Relationship to patient:     ED Medications:   Medications   0.9% sodium chloride BOLUS (0 mLs Intravenous Stopped 8/18/19 2008)     Followed by   sodium chloride 0.9% infusion (1,000 mLs Intravenous New Bag 8/18/19 2013)   piperacillin-tazobactam (ZOSYN) 4.5 g vial to attach to  mL bag (4.5 g Intravenous New Bag 8/18/19 2010)   vancomycin (VANCOCIN) 2,500 mg in sodium chloride 0.9 % 500 mL intermittent infusion (has no administration in time range)   lactulose (CHRONULAC) solution 30 mL (has no administration in time range)       Drips infusing?:  Yes    For the majority of the shift this patient was Green.   Interventions performed were .    Severe Sepsis OR Septic Shock Diagnosis Present: No    To be done/followed up on inpatient unit:  start vanco if not started in the ED    ED NURSE PHONE NUMBER: [phone]

## 2019-08-20 ENCOUNTER — APPOINTMENT (OUTPATIENT)
Dept: OCCUPATIONAL THERAPY | Facility: CLINIC | Age: 64
DRG: 554 | End: 2019-08-20
Attending: INTERNAL MEDICINE
Payer: COMMERCIAL

## 2019-08-20 ENCOUNTER — APPOINTMENT (OUTPATIENT)
Dept: PHYSICAL THERAPY | Facility: CLINIC | Age: 64
DRG: 554 | End: 2019-08-20
Attending: HOSPITALIST
Payer: COMMERCIAL

## 2019-08-20 LAB
CRP SERPL-MCNC: 143 MG/L (ref 0–8)
GLUCOSE BLDC GLUCOMTR-MCNC: 134 MG/DL (ref 70–99)
GLUCOSE BLDC GLUCOMTR-MCNC: 146 MG/DL (ref 70–99)
GLUCOSE BLDC GLUCOMTR-MCNC: 182 MG/DL (ref 70–99)
GLUCOSE BLDC GLUCOMTR-MCNC: 203 MG/DL (ref 70–99)
GLUCOSE BLDC GLUCOMTR-MCNC: 213 MG/DL (ref 70–99)
HGB BLD-MCNC: 8.4 G/DL (ref 13.3–17.7)
PLATELET # BLD AUTO: 78 10E9/L (ref 150–450)

## 2019-08-20 PROCEDURE — 25000131 ZZH RX MED GY IP 250 OP 636 PS 637: Performed by: HOSPITALIST

## 2019-08-20 PROCEDURE — 00000146 ZZHCL STATISTIC GLUCOSE BY METER IP

## 2019-08-20 PROCEDURE — 12000000 ZZH R&B MED SURG/OB

## 2019-08-20 PROCEDURE — 85018 HEMOGLOBIN: CPT | Performed by: HOSPITALIST

## 2019-08-20 PROCEDURE — 97162 PT EVAL MOD COMPLEX 30 MIN: CPT | Mod: GP

## 2019-08-20 PROCEDURE — 97530 THERAPEUTIC ACTIVITIES: CPT | Mod: GP

## 2019-08-20 PROCEDURE — 99233 SBSQ HOSP IP/OBS HIGH 50: CPT | Performed by: INTERNAL MEDICINE

## 2019-08-20 PROCEDURE — 25000131 ZZH RX MED GY IP 250 OP 636 PS 637: Performed by: INTERNAL MEDICINE

## 2019-08-20 PROCEDURE — L4360 PNEUMAT WALKING BOOT PRE CST: HCPCS

## 2019-08-20 PROCEDURE — 25000132 ZZH RX MED GY IP 250 OP 250 PS 637: Performed by: INTERNAL MEDICINE

## 2019-08-20 PROCEDURE — 97116 GAIT TRAINING THERAPY: CPT | Mod: GP

## 2019-08-20 PROCEDURE — 25000132 ZZH RX MED GY IP 250 OP 250 PS 637: Performed by: HOSPITALIST

## 2019-08-20 PROCEDURE — 25000128 H RX IP 250 OP 636: Performed by: INTERNAL MEDICINE

## 2019-08-20 PROCEDURE — 86140 C-REACTIVE PROTEIN: CPT | Performed by: HOSPITALIST

## 2019-08-20 PROCEDURE — 97140 MANUAL THERAPY 1/> REGIONS: CPT | Mod: GO

## 2019-08-20 PROCEDURE — 36415 COLL VENOUS BLD VENIPUNCTURE: CPT | Performed by: HOSPITALIST

## 2019-08-20 PROCEDURE — 97535 SELF CARE MNGMENT TRAINING: CPT | Mod: GO

## 2019-08-20 PROCEDURE — 85049 AUTOMATED PLATELET COUNT: CPT | Performed by: HOSPITALIST

## 2019-08-20 RX ORDER — PREDNISONE 20 MG/1
40 TABLET ORAL DAILY
Status: DISCONTINUED | OUTPATIENT
Start: 2019-08-20 | End: 2019-08-21

## 2019-08-20 RX ORDER — SPIRONOLACTONE 25 MG/1
50 TABLET ORAL DAILY
Status: DISCONTINUED | OUTPATIENT
Start: 2019-08-20 | End: 2019-08-22 | Stop reason: HOSPADM

## 2019-08-20 RX ORDER — FUROSEMIDE 40 MG
TABLET ORAL
Qty: 150 TABLET | Refills: 11 | Status: SHIPPED | OUTPATIENT
Start: 2019-08-20 | End: 2019-11-25

## 2019-08-20 RX ADMIN — PREDNISONE 40 MG: 20 TABLET ORAL at 09:10

## 2019-08-20 RX ADMIN — INSULIN ASPART 2 UNITS: 100 INJECTION, SOLUTION INTRAVENOUS; SUBCUTANEOUS at 14:45

## 2019-08-20 RX ADMIN — LACTULOSE 30 ML: 20 SOLUTION ORAL at 21:29

## 2019-08-20 RX ADMIN — RANITIDINE 150 MG: 150 TABLET ORAL at 09:10

## 2019-08-20 RX ADMIN — RIFAXIMIN 550 MG: 550 TABLET ORAL at 09:10

## 2019-08-20 RX ADMIN — DESVENLAFAXINE SUCCINATE 200 MG: 50 TABLET, EXTENDED RELEASE ORAL at 09:09

## 2019-08-20 RX ADMIN — URSODIOL 900 MG: 300 CAPSULE ORAL at 09:09

## 2019-08-20 RX ADMIN — CEFAZOLIN SODIUM 2 G: 2 INJECTION, SOLUTION INTRAVENOUS at 23:44

## 2019-08-20 RX ADMIN — RIFAXIMIN 550 MG: 550 TABLET ORAL at 21:29

## 2019-08-20 RX ADMIN — Medication 400 MG: at 09:10

## 2019-08-20 RX ADMIN — URSODIOL 900 MG: 300 CAPSULE ORAL at 21:28

## 2019-08-20 RX ADMIN — SPIRONOLACTONE 50 MG: 25 TABLET ORAL at 12:34

## 2019-08-20 RX ADMIN — LACTULOSE 30 ML: 20 SOLUTION ORAL at 16:42

## 2019-08-20 RX ADMIN — CEFAZOLIN SODIUM 2 G: 2 INJECTION, SOLUTION INTRAVENOUS at 14:00

## 2019-08-20 RX ADMIN — INSULIN GLARGINE 12 UNITS: 100 INJECTION, SOLUTION SUBCUTANEOUS at 10:04

## 2019-08-20 RX ADMIN — RANITIDINE 150 MG: 150 TABLET ORAL at 21:29

## 2019-08-20 RX ADMIN — CITALOPRAM HYDROBROMIDE 40 MG: 20 TABLET ORAL at 09:10

## 2019-08-20 RX ADMIN — INSULIN ASPART 2 UNITS: 100 INJECTION, SOLUTION INTRAVENOUS; SUBCUTANEOUS at 18:53

## 2019-08-20 RX ADMIN — CEFAZOLIN SODIUM 2 G: 2 INJECTION, SOLUTION INTRAVENOUS at 06:31

## 2019-08-20 ASSESSMENT — ACTIVITIES OF DAILY LIVING (ADL)
ADLS_ACUITY_SCORE: 13
ADLS_ACUITY_SCORE: 15
ADLS_ACUITY_SCORE: 13
ADLS_ACUITY_SCORE: 15
ADLS_ACUITY_SCORE: 15
ADLS_ACUITY_SCORE: 13

## 2019-08-20 NOTE — PLAN OF CARE
Discharge Planner PT   Patient plan for discharge: not stated  Current status:     Eval complete, treatment indicated. CAM boot on RLE for pain management as pt presents with R heel pain rating 7/10 pain with mobility thsi AM. Pt very talkative during session, frustrated with current situation and not knowing what is causing heel pain. Required redirection to task during session. Adjusted FWW to appropriate ht. STS x 5 during session from varying surface heights. Cues for proper hand placement, good carryover, progressed to SBA.     Pt ambualted in room total ~ 50' with one seated rest break, CGA and use of FWW. Pt required cues for upright posture, fwd gaze, VC for safe proximity to walker and to maintain BLE within GUS of walker with tunrs, good improvement with practice, improved stability by end of session. no overt LBO       Rm set up for pt to sit up in WC end of session as pt's breakfast arrived. SPT bed > chair with FWW and SBA. Pt left seated in WC with alarm on and needs in reach       Barriers to return to prior living situation: A x 1, impaired tolerance to OOB activity, slow gait speed  Recommendations for discharge: TCU  Rationale for recommendations: Pt would benefit from TCU stay to improve strength, balance, transfers, gait as pt currently below baseline          Entered by: Omayra Kaminski 08/20/2019 9:50 AM

## 2019-08-20 NOTE — PLAN OF CARE
A&Ox2-3, less lethargic and more alert later in shift.  VSS.  Up with 1-2, belt and walker to commode.  Denied pain.  Lymphedema wraps on.  4 stools, refused HS lactulose, note left for MD to address number of stools would like pt to have daily.  Podiatry consulted.  IV antibiotics.   and 158.

## 2019-08-20 NOTE — PROGRESS NOTES
[facility]    Infectious Disease Progress Note    Date of Service (when I saw the patient): 08/20/2019     Assessment & Plan   Lawrence Louie is a 63 year old male who was admitted on 8/18/2019.     Impression:  1. 63 y.o male with liver cirrhosis, recovering alcoholic in remission, SMITH.   2. Diabetes.   3. Bilateral chronic venous statis with woody indurated edema with superficial ulcers.   4. History of MSSA bacteremia in April this year, ? Cellulitis as source.   5. Admitted now with encephalopathy as was not taking lactulose, because of heel pain on the right.      Recommendations:   On exam the LE do not appear to be actively infected. No fever, no leucocytosis, no warmth to the area.   No heel ulcers or lesions.   MRI negative for osteo.  Stays on ancef given the history of recent bacteremia, and follow up on the cultures, if blood cultures stay negative stop soon.        Ewelina Tyson MD    Interval History   Stays afegrile   No positive blood cultures so far   Lymphedema wraps on    Physical Exam   Temp: 98.2  F (36.8  C) Temp src: Oral BP: 114/49 Pulse: 62   Resp: 16 SpO2: 94 % O2 Device: None (Room air)    Vitals:    08/18/19 1758 08/19/19 0500   Weight: 145.2 kg (320 lb) 135.6 kg (299 lb)     Vital Signs with Ranges  Temp:  [98  F (36.7  C)-98.3  F (36.8  C)] 98.2  F (36.8  C)  Pulse:  [62-73] 62  Resp:  [16] 16  BP: ()/(44-53) 114/49  SpO2:  [94 %-98 %] 94 %    Constitutional: Awake, alert, cooperative, no apparent distress  Lungs: Clear to auscultation bilaterally, no crackles or wheezing  Cardiovascular: Regular rate and rhythm, normal S1 and S2, and no murmur noted  Abdomen: Normal bowel sounds, soft, non-distended, non-tender  Skin: No rashes, no cyanosis, no edema  Other:    Medications       ceFAZolin  2 g Intravenous Q8H     citalopram  40 mg Oral Daily     desvenlafaxine  200 mg Oral Daily     insulin aspart   Subcutaneous TID w/meals     insulin aspart  1-7 Units  Subcutaneous TID AC     insulin aspart  1-5 Units Subcutaneous At Bedtime     insulin glargine  12 Units Subcutaneous QAM AC     lactulose  30 mL Oral TID     magnesium oxide  400 mg Oral Daily     predniSONE  40 mg Oral Daily     ranitidine  150 mg Oral BID     rifaximin  550 mg Oral BID     sodium chloride (PF)  3 mL Intracatheter Q8H     ursodiol  900 mg Oral BID       Data   All microbiology laboratory data reviewed.  Recent Labs   Lab Test 08/20/19 0625 08/19/19  0631 08/18/19 1815 04/22/19  1343   WBC  --  4.9 6.4 3.8*   HGB 8.4* 8.0* 9.3* 9.1*   HCT  --  24.0* 27.8* 29.0*   MCV  --  90 89 92   PLT 78* 79* 124* 89*     Recent Labs   Lab Test 08/19/19  0631 08/18/19 1815 04/22/19  1343   CR 0.88 0.73 0.92     No lab results found.  Recent Labs   Lab Test 08/18/19 2006 08/18/19  1916 09/13/18  1732 07/31/18  1308 11/05/17  0321 11/04/17  2034   CULT No growth after 2 days No growth after 2 days No growth >100,000 colonies/mL  Escherichia coli  * No growth <10,000 colonies/mL  urogenital jagdish  Susceptibility testing not routinely done         Attestation:  Total time on the floor involved in the patient's care: 35 minutes. Total time spent in counseling/care coordination: >50%

## 2019-08-20 NOTE — PLAN OF CARE
Discharge Planner OT   Patient plan for discharge: home  Current status: Lymph wraps re-rolled. BLE's cleansed and lotion applied to BLE's to maintain skin integrity prior to application of ABD pads over ulcerations. Then applied TG soft from base of toes to just under knees prior to application of short stretch bandages from base of toes to just below the knee. Pt tolerated well. Pt did not tolerate appropriate stretch, so applied slightly looser than standard stretch.    Pt dependent to don/doff socks over bandages. Pt completed toileting in stance at EOB using urinal w/ min A from wife.    Nursing: please leave bandages on for 23 hours as tolerated by the patient. Please remove at 12:30 pm and lymph therapist to return at 1:30 pm on Wednesday.      Please remove if patient demonstrates cold hands or feet, blue toes or fingers, numbness or tingling, shortness of breath, skin irritation or inability to tolerate wraps, or if they become soiled or wet. Keep legs elevated if wraps need to be removed.Coordinated with nursing regarding wearing schedule, and completing LE cares for the following day prior to therapy arrival. Updated white board with schedule and quick wrap instruction sheet if wraps become loose or are otherwise not tolerated    Barriers to return to prior living situation:  Impaired mobility, level of assist with ADL and mobility, falls risk, impaired cognition  Recommendations for discharge: TCU  Rationale for recommendations:  Pt will need continued inpatient therapies at TCU due to not safe for discharge home at this time. Can continue lymph therapy at TCU as well.        Entered by: Leslie Pozo 08/20/2019 2:40 PM

## 2019-08-20 NOTE — PLAN OF CARE
Patient had no stool, vitals are stable, lethargic but can respond to staff, iv abx transfused, lymphedema wrap on,blood glucose 149 at 0200, voids per urinal, staff will continue to monitor.

## 2019-08-20 NOTE — PROGRESS NOTES
[facility]  Patient is currently open to home care services with [facility]. The patient is currently receiving RN services. [facility]  and team have been notified of patient admission. [facility] liaison will continue to follow patient during stay. If appropriate provide orders to resume home care at time of discharge.

## 2019-08-20 NOTE — PROGRESS NOTES
[facility]    HOSPITALIST PROGRESS NOTE :   --------------------------------------------------    Date of Admission:  8/18/2019    Cumulative Summary: Lawrence Louie is a 63 year old male with past medical history significant for diabetes mellitus, essential hypertension, liver cirrhosis secondary to [place], recovering alcoholic in remission, splenomegaly, squamous cell carcinoma,esophageal varices was admitted on August 18 th with significant right heel pain which was making him difficult to walk long with worsening of his hepatic encephalopathy due to medication noncompliance and was admitted for further evaluation and management.    Assessment & Plan     Active Problems:  Right heel pain : Most likely secondary to gout as currently there is low probability of infection.  MRI is concerning for subcutaneous edema consistent with history of cellulitis but no osteomyelitis .WBC is normal , have significantly elevated CRP which has an upward trend and significant point tenderness on palpation of heel.Unfortunately patient also had significant chronic venous insufficiency changes with lymphedema in his bilateral lower extremities.  Patient was evaluated by infectious disease and podiatry, ID is concurring with the low probability of infection, podiatry is concerned about gout due to elevated uric acid level and has signed off.  Patient has also been evaluated by physical therapy this morning who are recommending transitional care unit.  Hx Cellulitis of lower extremities with MSSA bacteremia: was admitted on 4/10/19 at [facility]. Blood cx with MSSA, completed abx tx with cefazolin for 2 weeks.  Severe venous stasis dermatitis:     --Long discussion with patient regarding podiatry and physical therapy recommendation.  Patient at this time would like to return home rather than TCU as his wife has also recently got back surgery and is home alone.  --Patient has been seen by orthotics this morning  and has received ProCare CAM boot for the right foot.  --Start patient on prednisone 40 mg p.o. daily for 5days, will avoid NSAIDs at this point considering his comorbidities.  --Discussed with patient that he will benefit from seeing a rheumatologist as an outpatient to have further work-up for gout as at this point we are not able to aspirate any fluid to evaluate for crystals, but considering his clinical presentation and elevated uric acid level most likely his symptoms are secondary to acute gouty attack.  --Will follow up on infectious disease recommendation regarding IV antibiotics.  --Patient is very eager to go home, I discussed with him that I highly recommend him to stay today to see if prednisone is helping his severe foot pain so he can be discharged on oral prednisone.  We also need ID clearance if patient is ready to go home at this point either today or tomorrow.  --Continue lymphedema wraps for lower extremities.  --Agree that patient will benefit from home health care and home RN visit after the discharge.  --As above labs were reviewed, patient has upward trend in his CRP and has been started on prednisone.    Acute Hepatic encephalopathy due to medication noncompliance  Alcoholic cirrhosis  Portal hypertension with a history of ascites, esophageal varices  Underwent TIPS procedure in the past. PTA lactulose and rifaximin  - He has not taken his lactulose in 2 days due to persistent right heel pain making it difficult to ambulate to the bathroom without pain. Ammonia 80, slightly more somnolent on admit.seems to be improved this morning     -- Resumed Lactulose 30mg TID  -- His PTA lasix, bumex and spironolactone were on hold , start patient back on Aldactone 50 mg po daily , has been taking high doses of 150 mg BID .  -- Start patient on Bumex 1 mg from tomorrow   -- I do not see Lasix in his PTA dose , he probably does not need to be on Lasix and Bumex both   -- AM ammonia level was reviewed ,  improving     Chronic anemia  Chronic thrombocytopenia:  secondary to cirrhosis with splenomegaly. Baseline Hgb near 9, Platelets near 100. He is near baseline  -- Continue to monitor.    Diabetes mellitus: PTA glipizide 5mg, lantus 30 units in AM, novolog 20 units with meals and snacks. He had issues with hypoglycemia during his last hospitalization. And had BG 87 on admit and then low this am     -- Increase Lantus to 12 units this morning , expecting his blood sugars to go high secondary to Prednisone   -- Continue meal coverage 1 unit for 15 gm's of carb  -- Continue on medium dose sliding scale     Depression  -- Continue PTA pristiq and celexa.    Diet: Moderate Consistent CHO Diet    Payne Catheter: not present  DVT Prophylaxis: Pneumatic Compression Devices  Code Status: DNR/DNI    The patient's care was discussed with Patient .    Disposition Plan   Expected discharge:tomorrow to possibly home , I do not think he is ready to go home today , will f/U on ID recommendation regarding antibiotics , he does not seem to be agreeable to go to TCU at this time but open to the idea of HHC with RADHA Bradley MD, FACP  Text Page (7am - 6pm)    ----------------------------------------------------------------------------------------------------------------------    Interval History   Patient was seen and examined this morning, sitting in chair, eating breakfast.  Patient is wearing the boot which was fitted this morning.  Patient told me that he is still feels the pain we discussed about possible gout and starting patient on prednisone.  I offered him to stay today as he is a still receiving antibiotic and have further recommendations from the ID and also seeing if prednisone is helping him out.  I am also expecting patient blood sugars to go high as he is going to be on prednisone patient is going to think about that if he is agreeable to stay tonight.  Patient does not seem to be open to the idea of TCU despite  long conversation with patient regarding the importance of benefits of going to TCU at this point considering significant decreased mobility due to right heel pain.    -Data reviewed today: I reviewed all new labs and imaging results over the last 24 hours.    I personally reviewed no images or EKG's today.MRI results were reviewed , negative for osteo , showing cellulitis     Physical Exam   Temp: 98.2  F (36.8  C) Temp src: Oral BP: 114/49 Pulse: 62   Resp: 16 SpO2: 94 % O2 Device: None (Room air)    Vitals:    08/18/19 1758 08/19/19 0500   Weight: 145.2 kg (320 lb) 135.6 kg (299 lb)     Vital Signs with Ranges  Temp:  [98  F (36.7  C)-98.3  F (36.8  C)] 98.2  F (36.8  C)  Pulse:  [62-73] 62  Resp:  [16] 16  BP: ()/(44-53) 114/49  SpO2:  [94 %-98 %] 94 %  I/O last 3 completed shifts:  In: 240 [P.O.:240]  Out: -     GENERAL: Alert , awake and oriented. NAD. Conversational, appropriate, sitting in wheel chair , looks slightly de shelved   HEENT: Normocephalic. EOMI. No icterus or injection. Nares normal.   LUNGS: Clear to auscultation. No dyspnea at rest.   HEART: Regular rate. Extremities perfused.   ABDOMEN: Soft, nontender, and nondistended. Positive bowel sounds.   EXTREMITIES: B/L lower extremities with chronic venous insuffiencey changes , both legs looks erythematous but no warmth or tenderness , currently he is wearing right foot orthotic boot.  NEUROLOGIC: Moves extremities x4 on command. No acute focal neurologic abnormalities noted.     Medications       ceFAZolin  2 g Intravenous Q8H     citalopram  40 mg Oral Daily     desvenlafaxine  200 mg Oral Daily     insulin aspart   Subcutaneous TID w/meals     insulin aspart  1-7 Units Subcutaneous TID AC     insulin aspart  1-5 Units Subcutaneous At Bedtime     insulin glargine  12 Units Subcutaneous QAM AC     lactulose  30 mL Oral TID     magnesium oxide  400 mg Oral Daily     predniSONE  40 mg Oral Daily     ranitidine  150 mg Oral BID     rifaximin   550 mg Oral BID     sodium chloride (PF)  3 mL Intracatheter Q8H     ursodiol  900 mg Oral BID       Data   Recent Labs   Lab 08/20/19  0625 08/19/19  0631 08/18/19  1815   WBC  --  4.9 6.4   HGB 8.4* 8.0* 9.3*   MCV  --  90 89   PLT 78* 79* 124*   NA  --  136 133   POTASSIUM  --  3.8 4.0   CHLORIDE  --  107 103   CO2  --  22 23   BUN  --  16 18   CR  --  0.88 0.73   ANIONGAP  --  7 7   ERIN  --  8.0* 8.4*   GLC  --  86 87   ALBUMIN  --  2.0* 2.4*   PROTTOTAL  --  5.8* 6.9   BILITOTAL  --  1.3 1.1   ALKPHOS  --  139 188*   ALT  --  15 20   AST  --  25 30       Imaging:   No results found for this or any previous visit (from the past 24 hour(s)).

## 2019-08-20 NOTE — PROGRESS NOTES
"08/20/19, [place] [address] [facility] 5517/5517-01, male, Height: 6' 0\", Weight: 299 lbs 0 oz, Ordered by: Dr Bess, Dx:    Right foot pain  Hepatic encephalopathy (H)  Lymphedema, MRN #: [id number].    Subjective:  Patient was seen today at 5517/9017-01 for the evaluation/fit/delivery of a Procare CAM Boot (Maxtrax Air Walker XL Ref #[id number]) on the right foot. Patient appears pleasant.   Health History & Progression:   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)      Splenomegaly      Squamous cell carcinoma      Thrombocytopenia (H)      Varices, esophageal (H)     Banded in 2011   .   Past Surgical History:   Procedure Laterality Date     BIOPSY OF SKIN LESION       COLONOSCOPY Left 6/16/2016    Procedure: COMBINED COLONOSCOPY, SINGLE OR MULTIPLE BIOPSY/POLYPECTOMY BY BIOPSY;  Surgeon: Brandy Barnett MD;  Location:  GI     ESOPHAGOSCOPY, GASTROSCOPY, DUODENOSCOPY (EGD), COMBINED  2/13/2013    Procedure: COMBINED ESOPHAGOSCOPY, GASTROSCOPY, DUODENOSCOPY (EGD);;  Surgeon: Tara Cook MD;  Location:  GI     ESOPHAGOSCOPY, GASTROSCOPY, DUODENOSCOPY (EGD), COMBINED  11/4/2013    Procedure: COMBINED ESOPHAGOSCOPY, GASTROSCOPY, DUODENOSCOPY (EGD);;  Surgeon: Lonny Diaz MD;  Location:  GI     ESOPHAGOSCOPY, GASTROSCOPY, DUODENOSCOPY (EGD), COMBINED Left 6/16/2016    Procedure: COMBINED ESOPHAGOSCOPY, GASTROSCOPY, DUODENOSCOPY (EGD), BIOPSY SINGLE OR MULTIPLE;  Surgeon: Brandy Barnett MD;  Location:  GI     EXCISE LESION TRUNK  9/24/2012    Procedure: EXCISE LESION TRUNK;;  Surgeon: Pepe Dominguez MD;  Location: [facility]     GENITOURINARY SURGERY      vasectomy     HERNIORRHAPHY UMBILICAL  9/24/2012    Procedure: HERNIORRHAPHY " UMBILICAL;  UMBILICAL HERNIA REPAIR , EXCISION OF PERIUMBILICAL CYST;  Surgeon: Pepe Dominguez MD;  Location: [facility]  .  Patient's history of utilizing a CAM Boot: no history of utilizing orthoses for ailment being addressed today.      Objective:        Appearance of limb & vasculature: Swollen/Edema    Assessment:  Patient can benefit from the CAM Boot with pneumatic valve because it will provide support of the foot, ankle, and tibia through pneumatic bladder total contact and control of ankle movement/tibial progression during gait. Patient s ailment recovery is expected to be managed well with the utilization of the brace.   Upon fitting the brace the patient vocalized satisfaction with the fit and feel of the orthosis. The trimlines and circumferences of the brace presented satisfactory around patient s foot, ankle and tibia to properly immobilize the ankle and foot.   Patient signed the delivery ticket and was given the [employer] receipt information sheet.    Goal:   A pneumatic walking boot is functionally and medically necessary for reduction of pain in foot. A pneumatic walker is used for variable volume total contact to reduce motion at the ankle, hind foot, mid foot and forefoot.    P:  Patient was given the verbal and written instructions on how to don/doff/care for the brace. Patient will utilize the orthosis for the duration of rehabilitation/PT in the hospital and at home activities upon discharge for the duration of treatment of patient ailment or until use of orthosis is no longer necessary or duration of the treatment or unless otherwise specified by the patient's provider. Will follow-up PRN.    Electronically signed by Lane WINSLOW

## 2019-08-20 NOTE — PLAN OF CARE
Patient is alert and oriented times 3.  cms - baseline numbness in LE. Heel pain, declines pain meds. LE lymph wrapped, +3 edema.  Patient is up with SBA and cane.  pt voiding per BR - declined urinal this evening. Lactulose given this evening (refused this am). No BM today.   , covered per orders. VSS on RA.

## 2019-08-20 NOTE — PROGRESS NOTES
08/20/19 0940   Quick Adds   Type of Visit Initial PT Evaluation   Living Environment   Lives With spouse   Living Arrangements house   Living Environment Comment Ramp to enter, does not need to do stairs. Has stairs to basement, does not use    Self-Care   Usual Activity Tolerance moderate   Current Activity Tolerance fair   Regular Exercise No   Equipment Currently Used at Home cane, straight   Activity/Exercise/Self-Care Comment Pt uses cane for household and community ambulation    Functional Level Prior   Ambulation 1-->assistive equipment   Transferring 1-->assistive equipment   Toileting 1-->assistive equipment   Bathing 1-->assistive equipment   Prior Functional Level Comment Mod I with use of SEC    General Information   Onset of Illness/Injury or Date of Surgery - Date 08/18/19   Referring Physician Oralia Hyde, DO   Pertinent History of Current Problem (include personal factors and/or comorbidities that impact the POC) Lawrence Louie is a 63 year old male who presents with hepatic encephalopathy and right foot pain, Portal hypertension with a history of ascites, esophageal varices, Chronic anemia. Has been having too much R heel pain to ambulate. Per MD note, no signs of infection but was started on vancomycin in the event he does have an infection   Precautions/Limitations fall precautions   Cognitive Status Examination   Orientation orientation to person, place and time   Pain Assessment   Patient Currently in Pain Yes, see Vital Sign flowsheet  (7/10 R heel pain)   Integumentary/Edema   Integumentary/Edema Comments Lymph wraps on BLE    Posture    Posture Kyphosis   Range of Motion (ROM)   ROM Comment BLE WFL. CAM boot on RLE    Strength   Strength Comments BLE > 3/5 strength based on functional mobiltiy    Bed Mobility   Bed Mobility Comments Not assessed    Transfer Skills   Transfer Comments STS with FWW and close CGA from elevated bed height   Gait   Gait Comments Pt ambulates  "with FWW, CAM on RLE, CGA, downward gaze, unsafe proximity to walker, slow sanjiv   Balance   Balance Comments Fair-good dynamic balance with use of FWW    Sensory Examination   Sensory Perception Comments Pt has neuropathy in BLE    General Therapy Interventions   Planned Therapy Interventions balance training;bed mobility training;gait training;neuromuscular re-education;strengthening;transfer training   Clinical Impression   Criteria for Skilled Therapeutic Intervention yes, treatment indicated   PT Diagnosis impaired IND with functional mobility    Influenced by the following impairments R heel pain, weakness, impaired endurance, impaired dynamic balance from baseline   Functional limitations due to impairments Impaired IND with functional mobility    Clinical Presentation Evolving/Changing   Clinical Presentation Rationale co morbidities, clinical judgement    Clinical Decision Making (Complexity) Moderate complexity   Therapy Frequency Daily   Predicted Duration of Therapy Intervention (days/wks) 1 week   Anticipated Discharge Disposition Transitional Care Facility   Risk & Benefits of therapy have been explained Yes   Patient, Family & other staff in agreement with plan of care Yes   [facility] AM-PAC  \"6 Clicks\" V.2 Basic Mobility Inpatient Short Form   1. Turning from your back to your side while in a flat bed without using bedrails? 3 - A Little   2. Moving from lying on your back to sitting on the side of a flat bed without using bedrails? 3 - A Little   3. Moving to and from a bed to a chair (including a wheelchair)? 3 - A Little   4. Standing up from a chair using your arms (e.g., wheelchair, or bedside chair)? 3 - A Little   5. To walk in hospital room? 3 - A Little   6. Climbing 3-5 steps with a railing? 2 - A Lot   Basic Mobility Raw Score (Score out of 24.Lower scores equate to lower levels of function) 17   Total Evaluation Time   Total Evaluation Time (Minutes) 18     "

## 2019-08-21 ENCOUNTER — APPOINTMENT (OUTPATIENT)
Dept: OCCUPATIONAL THERAPY | Facility: CLINIC | Age: 64
DRG: 554 | End: 2019-08-21
Payer: COMMERCIAL

## 2019-08-21 LAB
ANION GAP SERPL CALCULATED.3IONS-SCNC: 6 MMOL/L (ref 3–14)
BUN SERPL-MCNC: 16 MG/DL (ref 7–30)
CALCIUM SERPL-MCNC: 8.4 MG/DL (ref 8.5–10.1)
CHLORIDE SERPL-SCNC: 110 MMOL/L (ref 94–109)
CO2 SERPL-SCNC: 21 MMOL/L (ref 20–32)
CREAT SERPL-MCNC: 0.69 MG/DL (ref 0.66–1.25)
CRP SERPL-MCNC: 103 MG/L (ref 0–8)
GFR SERPL CREATININE-BSD FRML MDRD: >90 ML/MIN/{1.73_M2}
GLUCOSE BLDC GLUCOMTR-MCNC: 160 MG/DL (ref 70–99)
GLUCOSE BLDC GLUCOMTR-MCNC: 175 MG/DL (ref 70–99)
GLUCOSE BLDC GLUCOMTR-MCNC: 219 MG/DL (ref 70–99)
GLUCOSE BLDC GLUCOMTR-MCNC: 248 MG/DL (ref 70–99)
GLUCOSE SERPL-MCNC: 130 MG/DL (ref 70–99)
POTASSIUM SERPL-SCNC: 4.2 MMOL/L (ref 3.4–5.3)
SODIUM SERPL-SCNC: 137 MMOL/L (ref 133–144)

## 2019-08-21 PROCEDURE — 25000128 H RX IP 250 OP 636: Performed by: INTERNAL MEDICINE

## 2019-08-21 PROCEDURE — 97140 MANUAL THERAPY 1/> REGIONS: CPT | Mod: GO | Performed by: OCCUPATIONAL THERAPIST

## 2019-08-21 PROCEDURE — 25000132 ZZH RX MED GY IP 250 OP 250 PS 637: Performed by: INTERNAL MEDICINE

## 2019-08-21 PROCEDURE — 25000131 ZZH RX MED GY IP 250 OP 636 PS 637: Performed by: INTERNAL MEDICINE

## 2019-08-21 PROCEDURE — 00000146 ZZHCL STATISTIC GLUCOSE BY METER IP

## 2019-08-21 PROCEDURE — 80048 BASIC METABOLIC PNL TOTAL CA: CPT | Performed by: INTERNAL MEDICINE

## 2019-08-21 PROCEDURE — 36415 COLL VENOUS BLD VENIPUNCTURE: CPT | Performed by: INTERNAL MEDICINE

## 2019-08-21 PROCEDURE — 12000000 ZZH R&B MED SURG/OB

## 2019-08-21 PROCEDURE — 99233 SBSQ HOSP IP/OBS HIGH 50: CPT | Performed by: INTERNAL MEDICINE

## 2019-08-21 PROCEDURE — 25000132 ZZH RX MED GY IP 250 OP 250 PS 637: Performed by: HOSPITALIST

## 2019-08-21 PROCEDURE — 97535 SELF CARE MNGMENT TRAINING: CPT | Mod: GO | Performed by: OCCUPATIONAL THERAPIST

## 2019-08-21 PROCEDURE — 86140 C-REACTIVE PROTEIN: CPT | Performed by: INTERNAL MEDICINE

## 2019-08-21 RX ORDER — METHYLPREDNISOLONE SODIUM SUCCINATE 40 MG/ML
40 INJECTION, POWDER, LYOPHILIZED, FOR SOLUTION INTRAMUSCULAR; INTRAVENOUS EVERY 8 HOURS
Status: DISCONTINUED | OUTPATIENT
Start: 2019-08-21 | End: 2019-08-22 | Stop reason: HOSPADM

## 2019-08-21 RX ADMIN — INSULIN GLARGINE 14 UNITS: 100 INJECTION, SOLUTION SUBCUTANEOUS at 09:18

## 2019-08-21 RX ADMIN — CITALOPRAM HYDROBROMIDE 40 MG: 20 TABLET ORAL at 08:37

## 2019-08-21 RX ADMIN — LACTULOSE 30 ML: 20 SOLUTION ORAL at 08:38

## 2019-08-21 RX ADMIN — RANITIDINE 150 MG: 150 TABLET ORAL at 22:03

## 2019-08-21 RX ADMIN — Medication 400 MG: at 08:37

## 2019-08-21 RX ADMIN — URSODIOL 900 MG: 300 CAPSULE ORAL at 08:37

## 2019-08-21 RX ADMIN — RIFAXIMIN 550 MG: 550 TABLET ORAL at 22:03

## 2019-08-21 RX ADMIN — METHYLPREDNISOLONE SODIUM SUCCINATE 40 MG: 40 INJECTION, POWDER, FOR SOLUTION INTRAMUSCULAR; INTRAVENOUS at 17:07

## 2019-08-21 RX ADMIN — SPIRONOLACTONE 50 MG: 25 TABLET ORAL at 08:37

## 2019-08-21 RX ADMIN — RIFAXIMIN 550 MG: 550 TABLET ORAL at 08:37

## 2019-08-21 RX ADMIN — LACTULOSE 30 ML: 20 SOLUTION ORAL at 22:03

## 2019-08-21 RX ADMIN — URSODIOL 900 MG: 300 CAPSULE ORAL at 22:03

## 2019-08-21 RX ADMIN — DESVENLAFAXINE SUCCINATE 200 MG: 50 TABLET, EXTENDED RELEASE ORAL at 08:37

## 2019-08-21 RX ADMIN — INSULIN ASPART 2 UNITS: 100 INJECTION, SOLUTION INTRAVENOUS; SUBCUTANEOUS at 18:28

## 2019-08-21 RX ADMIN — CEFAZOLIN SODIUM 2 G: 2 INJECTION, SOLUTION INTRAVENOUS at 06:33

## 2019-08-21 RX ADMIN — INSULIN ASPART 1 UNITS: 100 INJECTION, SOLUTION INTRAVENOUS; SUBCUTANEOUS at 13:22

## 2019-08-21 RX ADMIN — RANITIDINE 150 MG: 150 TABLET ORAL at 08:37

## 2019-08-21 RX ADMIN — LACTULOSE 30 ML: 20 SOLUTION ORAL at 17:06

## 2019-08-21 RX ADMIN — PREDNISONE 40 MG: 20 TABLET ORAL at 08:37

## 2019-08-21 ASSESSMENT — ACTIVITIES OF DAILY LIVING (ADL)
ADLS_ACUITY_SCORE: 17
ADLS_ACUITY_SCORE: 15
ADLS_ACUITY_SCORE: 17

## 2019-08-21 ASSESSMENT — MIFFLIN-ST. JEOR: SCORE: 2183.79

## 2019-08-21 NOTE — PLAN OF CARE
Pt A/O. VSS on RA. CMS intact ex baseline numbness in LE. BLE lymph wrapped. +3 edema. IV infiltrated; Resource RN will put new IV in.  Denies pain. Ax1 WW. Mod-carb diet. ; 162; No coverage this shift. Nursing will continue to monitor.

## 2019-08-21 NOTE — PROGRESS NOTES
[facility]    HOSPITALIST PROGRESS NOTE :   --------------------------------------------------    Date of Admission:  8/18/2019    Cumulative Summary: Lawrence Louie is a 63 year old male with past medical history significant for diabetes mellitus, essential hypertension, liver cirrhosis secondary to Cerv[place], recovering alcoholic in remission, splenomegaly, squamous cell carcinoma,esophageal varices was admitted on August 18 th with significant right heel pain which was making him difficult to walk long with worsening of his hepatic encephalopathy due to medication noncompliance and was admitted for further evaluation and management.  Patient was evaluated by podiatry, underwent MRI which was negative for osteomyelitis, patient was initially started on broad-spectrum antibiotics and was also evaluated by ID.  Due to low probability of infection ID is planning to discontinue antibiotics once cultures are negative.  Due to significant tenderness and elevated uric acid level, podiatry is concerned about patient having gouty attack.  Patient has been a started on oral prednisone.    Assessment & Plan     Active Problems:  Right heel pain : Most likely secondary to gout as currently there is low probability of infection.  MRI is concerning for subcutaneous edema consistent with history of cellulitis but no osteomyelitis .WBC is normal , have significantly elevated CRP which has an upward trend and significant point tenderness on palpation of heel.Unfortunately patient also had significant chronic venous insufficiency changes with lymphedema in his bilateral lower extremities.  Patient was evaluated by infectious disease and podiatry, ID is concurring with the low probability of infection, podiatry is concerned about gout due to elevated uric acid level and has signed off.  Patient has also been evaluated by physical therapy who are recommending transitional care unit at this time patient does not seem  to be interested in going to TCU.  Patient was seen and examined this morning, continues to have significant discomfort in his right heel, does not think that oral prednisone has decreased the pain more than 10 to 15%.  Hx Cellulitis of lower extremities with MSSA bacteremia: was admitted on 4/10/19 at [facility]. Blood cx with MSSA, completed abx tx with cefazolin for 2 weeks.  Severe venous stasis dermatitis:     -- Change oral steroids to IV as patient continues to have significant discomfort , if patient does not have any improvement in next 24 hours despite IV solumedrol, will ask Ortho or podiatry to revaluate patient for heel pain.  -- CRP is decreasing , recheck tomorrow   -- Highly appreciate ID help, cultures remain negative , ID is recommending to stop Ancef.  --Long discussion with patient regarding podiatry and physical therapy recommendation.  Patient at this time would like to return home rather than TCU as his wife has also recently got back surgery and is home alone.  --Patient has been seen by orthotics and has received [facility] CAM boot for the right foot.  --Discussed with patient that he will benefit from seeing a rheumatologist as an outpatient to have further work-up for gout as at this point we are not able to aspirate any fluid to evaluate for crystals, but considering his clinical presentation and elevated uric acid level most likely his symptoms are secondary to acute gouty attack.  --Continue lymphedema wraps for lower extremities.  --Agree that patient will benefit from home health care and home RN visit after the discharge.    Acute Hepatic encephalopathy due to medication noncompliance  Alcoholic cirrhosis  Portal hypertension with a history of ascites, esophageal varices  Underwent TIPS procedure in the past. PTA lactulose and rifaximin  - He has not taken his lactulose in 2 days before admission to the hospital due to persistent right heel pain making it difficult to ambulate to  the bathroom without pain. Ammonia 80, patient was somnolent on admission, during his hospitalization he takes lactulose on and off, seemed more somnolent this morning as compared to yesterday.    -- Resumed Lactulose 30mg TID  -- His PTA lasix, bumex and spironolactone were on hold , started patient back on Aldactone 50 mg po daily , has been taking high doses of 150 mg BID , not sure if patient needs to be on this unusually high doses of Aldactone.  -- Start patient on Bumex 1 mg from tomorrow , will continue to hold today.  -- I do not see Lasix in his PTA dose , he probably does not need to be on Lasix and Bumex both     Chronic anemia  Chronic thrombocytopenia:  secondary to cirrhosis with splenomegaly. Baseline Hgb near 9, Platelets near 100. He is near baseline  -- Continue to monitor.    Diabetes mellitus: PTA glipizide 5mg, lantus 30 units in AM, novolog 20 units with meals and snacks. He had issues with hypoglycemia during his last hospitalization. And had BG 87 on admit and then low this am     -- Increase Lantus to 14 units this morning , expecting his blood sugars to go high secondary to IV Solu-Medrol  -- Continue meal coverage 1 unit for 15 gm's of carb  -- Continue on medium dose sliding scale     Depression  -- Continue PTA pristiq and celexa.    Diet: Moderate Consistent CHO Diet    Payne Catheter: not present  DVT Prophylaxis: Pneumatic Compression Devices  Code Status: DNR/DNI    The patient's care was discussed with Patient .    Disposition Plan      Expected discharge: Possible discharge tomorrow if his heel pain is improving and he can be switched to oral prednisone.  As above PT has recommended TCU but he would like to go home, will order home health care with RN at the time of discharge.  He will also benefit from outpatient rheumatology referral.    Luzamria Bradley MD, FACP  Text Page (7am -  6pm)    ----------------------------------------------------------------------------------------------------------------------    Interval History    Patient was seen and examined this morning, lying in bed, seems to be little more somnolent as compared to the last few days but is able to answer questions appropriately when wakes up.  Once again patient told me that he continues to have significant right heel discomfort and it is hardly 10 to 15% better on oral prednisone.  We discussed about changing his steroids to IV and evaluate him in the next 24 hours if he is responding to steroids.  I encouraged him to be compliant with his lactulose administration to avoid him becoming encephalopathic.    -Data reviewed today: I reviewed all new labs and imaging results over the last 24 hours.    I personally reviewed no images or EKG's today.MRI results were reviewed , negative for osteo , showing cellulitis     Physical Exam   Temp: 98  F (36.7  C) Temp src: Oral BP: 121/52 Pulse: 60   Resp: 16 SpO2: 95 % O2 Device: None (Room air)    Vitals:    08/18/19 1758 08/19/19 0500 08/21/19 0700   Weight: 145.2 kg (320 lb) 135.6 kg (299 lb) 136.1 kg (300 lb)     Vital Signs with Ranges  Temp:  [97.1  F (36.2  C)-98  F (36.7  C)] 98  F (36.7  C)  Pulse:  [60-75] 60  Resp:  [16] 16  BP: (115-121)/(50-58) 121/52  SpO2:  [95 %-97 %] 95 %  I/O last 3 completed shifts:  In: -   Out: 300 [Urine:300]    GENERAL: Alert , awake and oriented. NAD. Conversational, appropriate, lying in bed   HEENT: Normocephalic. EOMI. No icterus or injection. Nares normal.   LUNGS: Clear to auscultation. No dyspnea at rest.   HEART: Regular rate. Extremities perfused.   ABDOMEN: Soft, nontender, and nondistended. Positive bowel sounds.   EXTREMITIES: B/L lower extremities with chronic venous insuffiencey changes , both legs looks erythematous but no warmth or tenderness , both extremities in lymphedema wraps.  NEUROLOGIC: Moves extremities x4 on command. No  acute focal neurologic abnormalities noted.     Medications       citalopram  40 mg Oral Daily     desvenlafaxine  200 mg Oral Daily     insulin aspart   Subcutaneous TID w/meals     insulin aspart  1-7 Units Subcutaneous TID AC     insulin aspart  1-5 Units Subcutaneous At Bedtime     insulin glargine  14 Units Subcutaneous QAM AC     lactulose  30 mL Oral TID     magnesium oxide  400 mg Oral Daily     predniSONE  40 mg Oral Daily     ranitidine  150 mg Oral BID     rifaximin  550 mg Oral BID     sodium chloride (PF)  3 mL Intracatheter Q8H     spironolactone  50 mg Oral Daily     ursodiol  900 mg Oral BID       Data   Recent Labs   Lab 08/21/19  0629 08/20/19  0625 08/19/19  0631 08/18/19  1815   WBC  --   --  4.9 6.4   HGB  --  8.4* 8.0* 9.3*   MCV  --   --  90 89   PLT  --  78* 79* 124*     --  136 133   POTASSIUM 4.2  --  3.8 4.0   CHLORIDE 110*  --  107 103   CO2 21  --  22 23   BUN 16  --  16 18   CR 0.69  --  0.88 0.73   ANIONGAP 6  --  7 7   ERIN 8.4*  --  8.0* 8.4*   *  --  86 87   ALBUMIN  --   --  2.0* 2.4*   PROTTOTAL  --   --  5.8* 6.9   BILITOTAL  --   --  1.3 1.1   ALKPHOS  --   --  139 188*   ALT  --   --  15 20   AST  --   --  25 30       Imaging:   No results found for this or any previous visit (from the past 24 hour(s)).

## 2019-08-21 NOTE — PLAN OF CARE
A&O x2-4. Varies. VSS on RA. CMS intact.  Denies pain.  Voiding adequate amount in BR.  . Insulin per order.  BLE edema, lymph wrap in place.  IV SL. Possible discharge home tomorrow with home care. Continue to monitor.

## 2019-08-21 NOTE — PLAN OF CARE
PT:  Attempted to see patient this AM for therapy. Patient declining working with therapy this AM, states his breakfast just arrived and he would like to eat first. Does offer that he would be more agreeable this PM.  PM attempt: Pt on phone, attempted brief introduction and to setup a check-back time, but pt aggressively ways PT away.

## 2019-08-21 NOTE — PLAN OF CARE
CMS intact.  Denies pain.  Voiding adequate amount in BR.  BG check, insulin per order.  BLE +3 edema, lymph wrap in place.  IV SL.  Continue to monitor.

## 2019-08-21 NOTE — PLAN OF CARE
Discharge Planner OT   Patient plan for discharge: Home  Current status: Pt completed transfer w/c to bed with CGA and WW. Reports B heel pain, and that CAM boot did not help to relieve pain so is now wearing shoes. Needs Max A to doff shoes. SBA for bed mobility. Lymph wraps removed, therapist returned later to re-wrap them. Legs washed and lotioned and abd pads applied over ulcer areas prior to TG soft and then short stretch bandages from base of toes to just below knees. LLE ulcers draining a bit now; notified RN who looked at them and covered ulcers on LLE.  Pt reports tolerating them well since yesterday and that they are comfortable now. Occassionally off -topic during conversation, but redirectable.   Barriers to return to prior living situation: impaired mobility, level of assist with ADL/IADL, falls risk, possible mild cognitive impairment.  Recommendations for discharge: TCU  Rationale for recommendations: If patient declines TCU, would recommend home PT for lymph wraps/edema care and to maximize functional mobility, safety, and ADL/IADL (I). It would be a considerable taxing effort to leave the home at this time.       Entered by: Corazon Phillips 08/21/2019 2:56 PM

## 2019-08-21 NOTE — PROGRESS NOTES
[facility]    Infectious Disease Progress Note    Date of Service (when I saw the patient): 08/21/2019     Assessment & Plan   Lawrence Louie is a 63 year old male who was admitted on 8/18/2019.     Impression:  1. 63 y.o male with liver cirrhosis, recovering alcoholic in remission, SMITH.   2. Diabetes.   3. Bilateral chronic venous statis with woody indurated edema with superficial ulcers.   4. History of MSSA bacteremia in April this year, ? Cellulitis as source.   5. Admitted now with encephalopathy as was not taking lactulose, because of heel pain on the right. the heel pain further diagnosed as gout.      Recommendations:   On exam the LE do not appear to be actively infected. No fever, no leucocytosis, no warmth to the area.   Gout causing the heel pain, on medications.   No heel ulcers or lesions.   MRI negative for osteo.  blood cultures continue to be negative, stop Ancef.        Ewelina Tyson MD    Interval History   Stays afegrile   No positive blood cultures so far   Lymphedema wraps on    Physical Exam   Temp: 98  F (36.7  C) Temp src: Oral BP: 121/52 Pulse: 60   Resp: 16 SpO2: 95 % O2 Device: None (Room air)    Vitals:    08/18/19 1758 08/19/19 0500 08/21/19 0700   Weight: 145.2 kg (320 lb) 135.6 kg (299 lb) 136.1 kg (300 lb)     Vital Signs with Ranges  Temp:  [97.1  F (36.2  C)-98  F (36.7  C)] 98  F (36.7  C)  Pulse:  [60-75] 60  Resp:  [16] 16  BP: (115-121)/(50-58) 121/52  SpO2:  [95 %-97 %] 95 %    Constitutional: Awake, alert, cooperative, no apparent distress  Lungs: Clear to auscultation bilaterally, no crackles or wheezing  Cardiovascular: Regular rate and rhythm, normal S1 and S2, and no murmur noted  Abdomen: Normal bowel sounds, soft, non-distended, non-tender  Skin: No rashes, no cyanosis, no edema  Other:    Medications       ceFAZolin  2 g Intravenous Q8H     citalopram  40 mg Oral Daily     desvenlafaxine  200 mg Oral Daily     insulin aspart   Subcutaneous TID  w/meals     insulin aspart  1-7 Units Subcutaneous TID AC     insulin aspart  1-5 Units Subcutaneous At Bedtime     insulin glargine  14 Units Subcutaneous QAM AC     lactulose  30 mL Oral TID     magnesium oxide  400 mg Oral Daily     predniSONE  40 mg Oral Daily     ranitidine  150 mg Oral BID     rifaximin  550 mg Oral BID     sodium chloride (PF)  3 mL Intracatheter Q8H     spironolactone  50 mg Oral Daily     ursodiol  900 mg Oral BID       Data   All microbiology laboratory data reviewed.  Recent Labs   Lab Test 08/20/19  0625 08/19/19  0631 08/18/19  1815 04/22/19  1343   WBC  --  4.9 6.4 3.8*   HGB 8.4* 8.0* 9.3* 9.1*   HCT  --  24.0* 27.8* 29.0*   MCV  --  90 89 92   PLT 78* 79* 124* 89*     Recent Labs   Lab Test 08/21/19  0629 08/19/19  0631 08/18/19  1815   CR 0.69 0.88 0.73     No lab results found.  Recent Labs   Lab Test 08/18/19 2006 08/18/19  1916 09/13/18  1732 07/31/18  1308 11/05/17  0321 11/04/17  2034   CULT No growth after 3 days No growth after 3 days No growth >100,000 colonies/mL  Escherichia coli  * No growth <10,000 colonies/mL  urogenital jagdish  Susceptibility testing not routinely done         Attestation:  Total time on the floor involved in the patient's care: 35 minutes. Total time spent in counseling/care coordination: >50%

## 2019-08-22 ENCOUNTER — APPOINTMENT (OUTPATIENT)
Dept: OCCUPATIONAL THERAPY | Facility: CLINIC | Age: 64
DRG: 554 | End: 2019-08-22
Payer: COMMERCIAL

## 2019-08-22 VITALS
WEIGHT: 291.8 LBS | HEIGHT: 72 IN | TEMPERATURE: 97.9 F | BODY MASS INDEX: 39.52 KG/M2 | SYSTOLIC BLOOD PRESSURE: 111 MMHG | HEART RATE: 58 BPM | RESPIRATION RATE: 16 BRPM | DIASTOLIC BLOOD PRESSURE: 48 MMHG | OXYGEN SATURATION: 98 %

## 2019-08-22 LAB
CRP SERPL-MCNC: 60.1 MG/L (ref 0–8)
GLUCOSE BLDC GLUCOMTR-MCNC: 192 MG/DL (ref 70–99)
GLUCOSE BLDC GLUCOMTR-MCNC: 203 MG/DL (ref 70–99)
GLUCOSE BLDC GLUCOMTR-MCNC: 209 MG/DL (ref 70–99)

## 2019-08-22 PROCEDURE — 36415 COLL VENOUS BLD VENIPUNCTURE: CPT | Performed by: INTERNAL MEDICINE

## 2019-08-22 PROCEDURE — 86140 C-REACTIVE PROTEIN: CPT | Performed by: INTERNAL MEDICINE

## 2019-08-22 PROCEDURE — 99238 HOSP IP/OBS DSCHRG MGMT 30/<: CPT | Performed by: INTERNAL MEDICINE

## 2019-08-22 PROCEDURE — 97140 MANUAL THERAPY 1/> REGIONS: CPT | Mod: GO

## 2019-08-22 PROCEDURE — 25000128 H RX IP 250 OP 636: Performed by: INTERNAL MEDICINE

## 2019-08-22 PROCEDURE — 25000132 ZZH RX MED GY IP 250 OP 250 PS 637: Performed by: HOSPITALIST

## 2019-08-22 PROCEDURE — 25000132 ZZH RX MED GY IP 250 OP 250 PS 637: Performed by: INTERNAL MEDICINE

## 2019-08-22 PROCEDURE — 25000131 ZZH RX MED GY IP 250 OP 636 PS 637: Performed by: INTERNAL MEDICINE

## 2019-08-22 PROCEDURE — 00000146 ZZHCL STATISTIC GLUCOSE BY METER IP

## 2019-08-22 RX ORDER — PREDNISONE 20 MG/1
40 TABLET ORAL DAILY
Qty: 14 TABLET | Refills: 0 | Status: SHIPPED | OUTPATIENT
Start: 2019-08-22 | End: 2019-08-29

## 2019-08-22 RX ADMIN — DESVENLAFAXINE SUCCINATE 200 MG: 50 TABLET, EXTENDED RELEASE ORAL at 09:25

## 2019-08-22 RX ADMIN — METHYLPREDNISOLONE SODIUM SUCCINATE 40 MG: 40 INJECTION, POWDER, FOR SOLUTION INTRAMUSCULAR; INTRAVENOUS at 09:26

## 2019-08-22 RX ADMIN — SPIRONOLACTONE 50 MG: 25 TABLET ORAL at 09:24

## 2019-08-22 RX ADMIN — LACTULOSE 30 ML: 20 SOLUTION ORAL at 09:26

## 2019-08-22 RX ADMIN — Medication 400 MG: at 09:25

## 2019-08-22 RX ADMIN — CITALOPRAM HYDROBROMIDE 40 MG: 20 TABLET ORAL at 09:25

## 2019-08-22 RX ADMIN — URSODIOL 900 MG: 300 CAPSULE ORAL at 09:26

## 2019-08-22 RX ADMIN — INSULIN ASPART 2 UNITS: 100 INJECTION, SOLUTION INTRAVENOUS; SUBCUTANEOUS at 09:09

## 2019-08-22 RX ADMIN — INSULIN GLARGINE 18 UNITS: 100 INJECTION, SOLUTION SUBCUTANEOUS at 09:09

## 2019-08-22 RX ADMIN — RANITIDINE 150 MG: 150 TABLET ORAL at 09:25

## 2019-08-22 RX ADMIN — RIFAXIMIN 550 MG: 550 TABLET ORAL at 09:25

## 2019-08-22 RX ADMIN — METHYLPREDNISOLONE SODIUM SUCCINATE 40 MG: 40 INJECTION, POWDER, FOR SOLUTION INTRAMUSCULAR; INTRAVENOUS at 00:27

## 2019-08-22 ASSESSMENT — ACTIVITIES OF DAILY LIVING (ADL)
ADLS_ACUITY_SCORE: 17

## 2019-08-22 ASSESSMENT — MIFFLIN-ST. JEOR: SCORE: 2146.6

## 2019-08-22 NOTE — PLAN OF CARE
PT-  Pt is getting ready to discharge home soon.  TCU recommended but pt is going home with home care services.     PT goals not met.

## 2019-08-22 NOTE — PROGRESS NOTES
Reviewed discharge instructions with pt and his wife.  Also reviewed medication list and explained about the Prednisone for 7 days.  Pt refused 1600 Lactulose until he got home. Discharged with wife and 1 filled Rx of Prednisone.  Pt will resume home care for lymphedema of julieta legs.

## 2019-08-22 NOTE — PLAN OF CARE
Discharge Planner OT   Patient plan for discharge: home today w/ home care  Current status: Pt's lymph wraps removed and BLE's cleansed. Lymph wraps re-rolled. Lotion applied to BLE's to maintain skin integrity, especially over wounds. Then applied ABD pads to BLE wounds. OT applied TG soft and short stretch bandages from base of toes to just below knees. Pt tolerated well.  Barriers to return to prior living situation: impaired mobility, level of assist with ADL/IADL, falls risk, possible mild cognitive impairment.  Recommendations for discharge: TCU  Rationale for recommendations: If patient declines TCU, would recommend home PT/OT for lymph wraps/edema care and to maximize functional mobility, safety, and ADL/IADL (I). It would be a considerable taxing effort to leave the home at this time.       Entered by: Leslie Pozo 08/22/2019 2:05 PM

## 2019-08-22 NOTE — PROGRESS NOTES
Homecare notified of discharge to resume services.  Wife will be here to transport patient home after she gets off of work this afternoon.

## 2019-08-22 NOTE — PLAN OF CARE
Pt is A&O. VSS on RA, afebrile. Up with SBA of 1. Lymphedema wraps on BLE. Voiding adequately in the BR. IV SL. Progressing well per POC.

## 2019-08-22 NOTE — DISCHARGE SUMMARY
[facility]  Hospitalist Discharge Summary       Date of Admission:  8/18/2019  Date of Discharge:  8/22/2019  Discharging Provider: Luzmaria Bradley MD    Discharge Diagnoses   Right heel pain, secondary to gout.  No history of cellulitis of lower extremities with MSSA bacteremia.  Severe venous stasis dermatitis on bilateral lower extremities.  Bilateral lower extremities chronic lymphedema.  Acute hepatic encephalopathy due to medication noncompliance, resolved.  History of alcoholic cirrhosis.  Portal hypertension with history of ascites and esophageal varices.  Chronic anemia.  Chronic thrombocytopenia secondary to cirrhosis with the splenomegaly.  Diabetes mellitus.  History of depression.    Follow-ups Needed After Discharge   Follow-up Appointments     Follow-up and recommended labs and tests      Follow up with primary care provider, Ary Murphy, within 7 days   to evaluate medication change, for hospital follow- up and to follow up on   results.  The following labs/tests are recommended: CRP and CBc.           Unresulted Labs Ordered in the Past 30 Days of this Admission     Date and Time Order Name Status Description    8/18/2019 2005 Blood culture Preliminary     8/18/2019 1855 Blood culture Preliminary       These results will be followed up by PCP    Discharge Disposition   Discharged to home  Condition at discharge: Stable    Hospital Course   Cumulative Summary: Lawrence Louie is a 63 year old male with past medical history significant for diabetes mellitus, essential hypertension, liver cirrhosis secondary to [place], recovering alcoholic in remission, splenomegaly, squamous cell carcinoma,esophageal varices was admitted on August 18 th with significant right heel pain which was making him difficult to walk long with worsening of his hepatic encephalopathy due to medication noncompliance and was admitted for further evaluation and management.  Patient was evaluated by podiatry,  underwent MRI which was negative for osteomyelitis, patient was initially started on broad-spectrum antibiotics and was also evaluated by ID.  Due to low probability of infection ID is planning to discontinue antibiotics once cultures are negative.  Due to significant tenderness and elevated uric acid level, podiatry is concerned about patient having gouty attack.  Patient has been started on oral prednisone.  Here are further details regarding his hospitalization:       Active Problems:  Right heel pain : Most likely secondary to acute gout .  MRI showed subcutaneous edema consistent with history of cellulitis but no osteomyelitis .WBC was in normal range , had significantly elevated CRP which has an upward trend and significant point tenderness on palpation of heel on admission.Unfortunately patient also had significant chronic venous insufficiency changes with lymphedema in his bilateral lower extremities.  Patient was evaluated by infectious disease and podiatry, ID is concurring with the low probability of infection, podiatry is concerned about gout due to elevated uric acid level and has signed off.  Patient was initially started on antibiotics which were stopped once MRI was done and cultures came back negative.  Patient has also been evaluated by physical therapy who are recommending transitional care unit at this time patient does not seem to be interested in going to TCU.  Hx Cellulitis of lower extremities with MSSA bacteremia: was admitted on 4/10/19 at [facility]. Blood cx with MSSA, completed abx tx with cefazolin for 2 weeks.  Severe venous stasis dermatitis:     Unfortunately at this time patient did not have any significant fluid on the MRI which can be obtained to make further more the specific diagnosis of gout.  Patient was a started on oral prednisone, NSAID was avoided due to his underlying alcoholic cirrhosis and history of esophageal varices.  Patient has minimal response to oral  prednisone initially and then his steroids were changed to IV Solu-Medrol every 8 hours, patient seems to have significant discomfort this morning although he is still has some discomfort in the right heel pain but he thinks that he would be able to tolerate the pain now and can continue to take oral prednisone after the discharge.  We also discussed about him following with primary care physician and then probably also getting referral to rheumatology for further evaluation for his possible diagnosis of gout.  Patient was also seen by occupational therapy for lymphedema wraps for his lower extremities, we did have long conversation about continuing therapy after the discharge, unfortunately patient refused transitional care unit.  Patient is agreeable to home health care home RN and home occupational therapy to help with lymphedema wrap he is also agreeable to go to for some physical therapy with home PT.  At this time patient is opting to go home as his wife also had recent back surgery and he does not want to leave her alone.  Patient was discharged on oral prednisone 40 mg daily for 7 days, I have advised him to take it after food.  He is also given follow-up with his primary care physician.        Acute Hepatic encephalopathy due to medication noncompliance  Alcoholic cirrhosis  Portal hypertension with a history of ascites, esophageal varices: Underwent TIPS procedure in the past. PTA lactulose and rifaximin.  He did not take his lactulose 2 days before admission to the hospital due to persistent severe right heel pain making it difficult to ambulate to the bathroom.  On admission his ammonia was elevated around 80 and patient was somnolent. Patient responded well to the resumption of lactulose which he seems to be taking twice a day and refusing 1 dose every day as it is ordered 3 times a day.  Patient will be discharged on his home dose of lactulose.  Patient is also noticed to be on significantly high dose  of his spironolactone at home of 150 mg p.o. twice daily, he also seems to be on 2 diuretics Lasix and Bumex at home which makes it actually total of 3 diuretics if spironolactone is also counted. At this time I will send my discharge summary to primary care physician to evaluate if he can be maintained on 2 diuretics and may be Bumex can be stopped and Aldactone dose can be decreased.     Chronic anemia  Chronic thrombocytopenia:  secondary to cirrhosis with splenomegaly. Baseline Hgb near 9, Platelets near 100. He is near baseline  -- Continue to monitor.     Diabetes mellitus: PTA glipizide 5mg, lantus 30 units in AM, novolog 20 units with meals and snacks. He had issues with hypoglycemia during his last hospitalization. And had BG 87 on admit and then low this am      --Patient required no dose of Lantus as compared to his PTA dose, his glipizide was held as patient was receiving different meal coverage with sliding scale insulin and carb count.  Patient will be discharged on his PTA dose.     Depression  -- Continue PTA pristiq and celexa.    Patient was seen and examined on the day of discharge , he is feeling well, does not have any complaints , I did review the discharge medications and instructions with the patient and plan for him to follow up with the PCP after the hospitalization .patient was in agreement , he is discharged in stable condition back to his home.  I did discharge patient with home RN, home physical therapy and occupational therapy who should be helpful with his lymphedema wrap.  Patient does have follow-up appointment with his primary care physician.    Consultations This Hospital Stay   PHARMACY TO DOSE [facility]  PODIATRY IP CONSULT  PHYSICAL THERAPY ADULT IP CONSULT  LYMPHEDEMA THERAPY IP CONSULT  PHARMACY TO DOSE [facility]  INFECTIOUS DISEASES IP CONSULT  OCCUPATIONAL THERAPY ADULT IP CONSULT  ORTHOSIS EXTREMITY LOWER REFERRAL IP CONSULT  SOCIAL WORK IP CONSULT    Code Status   Full  Code    Time Spent on this Encounter   I, Luzmaria Bradley MD, personally saw the patient today and spent less than or equal to 30 minutes discharging this patient.       Luzmaria Bradley MD  [facility]  ______________________________________________________________________    Physical Exam   Vital Signs: Temp: 97.6  F (36.4  C) Temp src: Oral BP: 106/50 Pulse: 50   Resp: 16 SpO2: 97 % O2 Device: None (Room air)    Weight: 291 lbs 12.8 oz    Physical Exam   Constitutional: He is oriented to person, place, and time. He appears well-developed and well-nourished.   Elderly gentleman sitting in chair    HENT:   Head: Normocephalic and atraumatic.   Eyes: Pupils are equal, round, and reactive to light. EOM are normal.   Neck: Normal range of motion. Neck supple. No thyromegaly present.   Cardiovascular: Normal rate, regular rhythm and normal heart sounds.   Pulmonary/Chest: Effort normal and breath sounds normal. No respiratory distress.   Abdominal: Soft. Bowel sounds are normal. He exhibits no distension.   Musculoskeletal: Normal range of motion. He exhibits no edema or tenderness.   Neurological: He is alert and oriented to person, place, and time.   Skin: Skin is warm and dry.   B/L lower extremities chronic venous dermatitis changes along with chronic lymphedema , good pulses , right heel pain is improved as compared to before.   Psychiatric: He has a normal mood and affect. His behavior is normal.   Nursing note and vitals reviewed.       Primary Care Physician   Ary Murphy    Discharge Orders      Home care nursing referral      Home Care PT Referral for Hospital Discharge      Home Care OT Referral for Hospital Discharge      Reason for your hospital stay    You were admitted to the hospital secondary to gouty attack, you are discharged on prednisone.     Follow-up and recommended labs and tests    Follow up with primary care provider, Ary Murphy, within 7 days to evaluate medication  change, for hospital follow- up and to follow up on results.  The following labs/tests are recommended: CRP and CBc.     Activity    Your activity upon discharge: activity as tolerated     Discharge Instructions    Please finish the course of prednisone , take it after food , please discuss with your PCP if you will benefit from seeing the rheumatologist as an out patient to have further workup for gout.     MD face to face encounter    Documentation of Face to Face and Certification for Home Health Services    I certify that patient: Lawrence Louie is under my care and that I, or a nurse practitioner or physician's assistant working with me, had a face-to-face encounter that meets the physician face-to-face encounter requirements with this patient on: 8/22/2019.    This encounter with the patient was in whole, or in part, for the following medical condition, which is the primary reason for home health care: Acute illness myopathy and debilitation    I certify that, based on my findings, the following services are medically necessary home health services: Nursing, Occupational Therapy and Physical Therapy.    My clinical findings support the need for the above services because: Nurse is needed: To assess vitals after changes in medications or other medical regimen., Occupational Therapy Services are needed to assess and treat cognitive ability and address ADL safety due to impairment in cognition. and Physical Therapy Services are needed to assess and treat the following functional impairments: Acute debilitation    Further, I certify that my clinical findings support that this patient is homebound (i.e. absences from home require considerable and taxing effort and are for medical reasons or Scientologist services or infrequently or of short duration when for other reasons) because: Leaving home is medically contraindicated for the following reason(s): Dyspnea on exertion that makes it so they cannot leave their  home for needed services without clinical deterioration.    Based on the above findings. I certify that this patient is confined to the home and needs intermittent skilled nursing care, physical therapy and/or speech therapy.  The patient is under my care, and I have initiated the establishment of the plan of care.  This patient will be followed by a physician who will periodically review the plan of care.  Physician/Provider to provide follow up care: Ary Murphy    Attending hospital physician (the Medicare certified PEC[facility] provider): Luzmaria Bradley MD, FACP  Physician Signature: See electronic signature associated with these discharge orders.  Date: 8/22/2019     Full Code     Diet    Follow this diet upon discharge: Orders Placed This Encounter      Moderate Consistent CHO Diet       Significant Results and Procedures   Results for orders placed or performed during the hospital encounter of 08/18/19   Foot  XR, G/E 3 views, right    Narrative    RIGHT FOOT THREE OR MORE VIEWS    8/18/2019 6:35 PM     HISTORY: Pain.    COMPARISON: None.      Impression    IMPRESSION: No acute fracture or dislocation. Anatomic alignment. No  bony destructive lesion identified.    LILO ATKINS MD   XR Calcaneus Right G/E 2 Views    Narrative    RIGHT CALCANEUS TWO OR MORE VIEWS    8/18/2019 6:35 PM     HISTORY: Pain.    COMPARISON: None.      Impression    IMPRESSION: No acute fracture or dislocation is identified. Anatomic  alignment.     LILO ATKINS MD   MR Ankle Right w/o Contrast    Narrative    MR ANKLE RIGHT WITHOUT CONTRAST August 19, 2019 5:53 AM     HISTORY: Right heel pain, rule out osteomyelitis.    TECHNIQUE: Sagittal and coronal T1 and inversion recovery, and   transverse proton density and T2 weighted images.    COMPARISON: X-ray from 8/18/2019.    FINDINGS:  Plantar Fascia: Mild thickening and surrounding soft tissue edema in  the plantar fascia near its insertion site on the calcaneal tuberosity  suspicious for  mild plantar fasciitis.     Osseous and Cartilaginous Structures: No bone marrow edema or evidence  of osteomyelitis. Talar dome is intact. No evidence of fracture.     Posterior Tibial and Flexor Tendons: No tear or tendinosis of the  posterior tibial tendon, flexor digitorum longus tendon, or flexor  hallucis longus tendon.     Peroneal Tendons: No tear, tendinosis, or apparent longitudinal  splitting of the peroneus brevis tendon or peroneus longus tendon. No  tendon subluxation.     Achilles Tendon: No tear or tendinosis.     Extensor Tendons: No tear tendinosis of the anterior tibial tendon  extensor hallux longus tendons or extensor digitorum longus tendons.     Lateral Ligaments: The anterior talofibular ligament appears intact.  The calcaneofibular, posterior talofibular, and anterior and posterior  tibiofibular ligaments appear intact.     Medial Deltoid Ligamentous Complex: Intact.     Joint space: No tibiotalar or subtalar joint effusion.    Additional Findings: No retrocalcaneal bursitis. No mass within the  tarsal tunnel. The sinus Tarsi is unremarkable. Diffuse fairly  prominent subcutaneous edema consistent with a history of cellulitis.      Impression    IMPRESSION:    1. No evidence of osteomyelitis or bone marrow edema.  2. Diffuse fairly prominent subcutaneous edema consistent with a  history of cellulitis.    ROSA ELENA NO MD     *Note: Due to a large number of results and/or encounters for the requested time period, some results have not been displayed. A complete set of results can be found in Results Review.       Discharge Medications   Current Discharge Medication List      START taking these medications    Details   predniSONE (DELTASONE) 20 MG tablet Take 2 tablets (40 mg) by mouth daily for 7 days  Qty: 14 tablet, Refills: 0    Associated Diagnoses: Right foot pain; Hepatic encephalopathy (H); Acute gouty arthritis         CONTINUE these medications which have NOT CHANGED    Details    bumetanide (BUMEX) 1 MG tablet Take 1 tablet (1 mg) by mouth daily  Qty: 60 tablet, Refills: 11    Associated Diagnoses: Venous stasis dermatitis of both lower extremities      childrens multivitamin w/ionr (FLINTSTONES COMPLETE) 60 MG chewable tablet Take 1 chew tab by mouth daily      Cholecalciferol (VITAMIN D3) 2000 units CAPS TAKE 1 CAPSULE BY MOUTH DAILY  Qty: 100 capsule, Refills: 0    Associated Diagnoses: Mild major depression (H)      citalopram (CELEXA) 40 MG tablet Take 1 tablet (40 mg) by mouth daily  Qty: 90 tablet, Refills: 0    Associated Diagnoses: Recurrent major depressive disorder, remission status unspecified (H)      Cyanocobalamin (VITAMIN B-12 PO) Take 1 tablet by mouth daily      desvenlafaxine (PRISTIQ) 100 MG 24 hr tablet Take 2 tablets (200 mg) by mouth daily  Qty: 180 tablet, Refills: 0    Associated Diagnoses: Other depression      glipiZIDE (GLUCOTROL XL) 5 MG 24 hr tablet Take 1 tablet (5 mg) by mouth daily Please keep clinic and lab appt 6-28-19 for further refills  Qty: 90 tablet, Refills: 0    Associated Diagnoses: Type 2 diabetes mellitus without complication, with long-term current use of insulin (H)      hydrOXYzine (ATARAX) 25 MG tablet Take 1 tablet (25 mg) by mouth 3 times daily as needed for itching  Qty: 90 tablet, Refills: 3    Associated Diagnoses: Venous stasis dermatitis of both lower extremities      lactulose encephalopathy (CHRONULAC) 10 GM/15ML SOLUTION Take 30 mL's by mouth 2 times daily. Titrate as needed to achieve 3-5 bowel movements daily.  Qty: 1892 mL, Refills: 2    Associated Diagnoses: Hepatic encephalopathy (H)      LANTUS SOLOSTAR 100 UNIT/ML soln Inject 30 Units under the skin every morning  Qty: 9 mL, Refills: 2    Comments: Please keep clinic and lab appt 6-28-19 for further refills  Associated Diagnoses: Type 2 diabetes mellitus with diabetic neuropathic arthropathy, without long-term current use of insulin (H)      lidocaine (XYLOCAINE) 5 %  external ointment Apply topically as needed for moderate pain Apply to wounds twice daily as needed.  Qty: 50 g, Refills: 1    Associated Diagnoses: Open wound      magnesium oxide (MAG-OX) 400 (241.3 Mg) MG tablet Take 1 tablet (400 mg) by mouth daily  Qty: 90 tablet, Refills: 1    Associated Diagnoses: Cramp of limb      NOVOLOG FLEXPEN 100 UNIT/ML soln INJECT 20 UNITS UNDER THE SKIN BEFORE BREAKFAST, 20 UNITS BEFORE LUNCH, 20 UNITS BEFORE DINNER, and 20 UNITS BEFORE SNACKS.  Qty: 30 mL, Refills: 2    Comments: Lab due  Associated Diagnoses: Type 2 diabetes mellitus without complication, with long-term current use of insulin (H)      nystatin (MYCOSTATIN) 687021 UNIT/GM external cream Apply topically 2 times daily  Qty: 30 g, Refills: 11    Associated Diagnoses: Venous stasis dermatitis of both lower extremities      nystatin-triamcinolone (MYCOLOG II) cream Apply topically 2 times daily as needed (itching)  Qty: 30 g, Refills: 3    Associated Diagnoses: Stasis dermatitis of both legs      ondansetron (ZOFRAN) 4 MG tablet Take 1 tablet by mouth every 8 hours as needed for nausea  Qty: 18 tablet, Refills: 1    Comments: Please keep scheduled appointment on 6/28/19  Associated Diagnoses: Alcoholic cirrhosis of liver with ascites (H); Nausea      OYSTER SHELL CALCIUM/D 500-200 MG-UNIT per tablet Take 1 tablet by mouth daily  Qty: 90 tablet, Refills: 3    Associated Diagnoses: Alcoholic cirrhosis (H)      ranitidine (ZANTAC) 150 MG tablet Take 1 tablet (150 mg) by mouth 2 times daily  Qty: 180 tablet, Refills: 3    Associated Diagnoses: Esophageal varices (H)      rifaximin (XIFAXAN) 550 MG TABS tablet Take 1 tablet (550 mg) by mouth 2 times daily  Qty: 180 tablet, Refills: 3    Associated Diagnoses: Hepatic encephalopathy (H)      spironolactone (ALDACTONE) 50 MG tablet Take 3 tablets (150 mg) by mouth 2 times daily  Qty: 180 tablet, Refills: 11    Associated Diagnoses: Portal hypertension (H); Generalized edema;  Alcoholic cirrhosis of liver with ascites (H)      UNABLE TO FIND MEDICATION NAME: Seble root      ursodiol (ACTIGALL) 300 MG capsule Take 3 capsules (900 mg) by mouth 2 times daily  Qty: 180 capsule, Refills: 11    Associated Diagnoses: Alcoholic cirrhosis of liver with ascites (H); Itching      blood glucose (NO BRAND SPECIFIED) lancets standard Use to test blood sugar 4 X  times daily or as directed.  Qty: 1 Box, Refills: 3    Associated Diagnoses: Diabetes mellitus, type 2 (H)      blood glucose monitoring (NO BRAND SPECIFIED) meter device kit Use to test blood sugar 4 X  times daily or as directed.  Qty: 1 kit, Refills: 0    Associated Diagnoses: Type 2 diabetes mellitus with other specified complication (H)      blood glucose monitoring (NO BRAND SPECIFIED) test strip Use to test blood sugars 4 X  times daily or as directed  Qty: 200 strip, Refills: 3    Associated Diagnoses: Diabetes mellitus, type 2 (H)      furosemide (LASIX) 40 MG tablet Take 2 tablets (80 mg) by mouth every morning AND 3 tablets (120 mg) every evening.  Qty: 150 tablet, Refills: 11    Comments: PHARMACY MAY DISPENSE AS 3 MONTH SUPPLY IF PREFERRED.  Associated Diagnoses: Edema of both legs      !! insulin pen needle (B-D U/F) 31G X 8 MM USE  6 times daily / OR AS DIRECTED  Qty: 300 each, Refills: 3    Associated Diagnoses: Type 2 diabetes, HbA1c goal < 7% (H)      !! insulin pen needle (ULTICARE SHORT) 31G X 8 MM miscellaneous Use UP to 6 times daily or as directed  Qty: 300 each, Refills: 3    Associated Diagnoses: Type 2 diabetes, HbA1c goal < 7% (H)      !! order for DME Equipment being ordered:Orthopedic shoes  Qty: 2 Units, Refills: 0    Associated Diagnoses: Pain in both feet      !! order for DME Equipment being ordered: Condom catheter  Qty: 1 Device, Refills: 3    Associated Diagnoses: Sleep disorder      !! order for DME Equipment being ordered:BioTab compression pants pneumatic system  Qty: 1 Piece, Refills: 0    Associated  Diagnoses: Lymphedema      !! order for DME Equipment being ordered:bilateral pneumatic leg massage for treatment of extreme bilateral lower leg edema.  Qty: 2 pump, Refills: 0    Associated Diagnoses: Localized edema      STATIN NOT PRESCRIBED, INTENTIONAL, 1 each daily Statin not prescribed intentionally due to Active liver disease  Qty: 0 each, Refills: 0    Associated Diagnoses: Hyperlipidemia LDL goal <100      triamcinolone (KENALOG) 0.1 % external cream Apply topically 2 times daily as needed for irritation  Qty: 453.6 g, Refills: 3    Associated Diagnoses: Venous stasis dermatitis of both lower extremities       !! - Potential duplicate medications found. Please discuss with provider.      STOP taking these medications       oxyCODONE (ROXICODONE) 5 MG tablet Comments:   Reason for Stopping:             Allergies   No Known Allergies

## 2019-08-24 ENCOUNTER — DOCUMENTATION ONLY (OUTPATIENT)
Dept: CARE COORDINATION | Facility: CLINIC | Age: 64
End: 2019-08-24

## 2019-08-24 LAB
BACTERIA SPEC CULT: NO GROWTH
BACTERIA SPEC CULT: NO GROWTH
Lab: NORMAL
Lab: NORMAL
SPECIMEN SOURCE: NORMAL
SPECIMEN SOURCE: NORMAL

## 2019-08-24 NOTE — PROGRESS NOTES
.Dear Dr. Murphy  Medicare Home Health regulations requires [facility] to provide an initial assessment visit either within 48 hours of the patient's return home, or on the physician ordered Start of Care date.    There will be a delay in the Initial Assessment for Lawrence Louie; MRN [id number]  We anticipate the Start of care will be 8/26/19      Sincerely [facility]  Brandy Chavira RN   [phone]

## 2019-08-26 ENCOUNTER — CARE COORDINATION (OUTPATIENT)
Dept: INTERNAL MEDICINE | Facility: CLINIC | Age: 64
End: 2019-08-26

## 2019-08-26 ENCOUNTER — MYC MEDICAL ADVICE (OUTPATIENT)
Dept: INTERNAL MEDICINE | Facility: CLINIC | Age: 64
End: 2019-08-26

## 2019-08-26 ENCOUNTER — TELEPHONE (OUTPATIENT)
Dept: INTERNAL MEDICINE | Facility: CLINIC | Age: 64
End: 2019-08-26

## 2019-08-26 DIAGNOSIS — E11.42 DIABETIC POLYNEUROPATHY ASSOCIATED WITH TYPE 2 DIABETES MELLITUS (H): Primary | ICD-10-CM

## 2019-08-26 DIAGNOSIS — M10.9 GOUT: Primary | ICD-10-CM

## 2019-08-26 NOTE — TELEPHONE ENCOUNTER
Spoke with nurse Keys, who told me that the patient was not taking Gabapentin anymore as it was making him very sleepy. The patient has also been reporting uncontrolled pain even with the oxycodone 4x a day. I discussed with her that if his pain is not being managed with that much medication that he would have to at that point come in for a pain evaluation appointment.     They prefer to try a lower dose of the Gabapentin to see if that helps first. Home care nurse Massimo and family requesting to try a lower dose Gabapentin that will hopefully work without making the patient as sleepy. I let her know that I will put in the request to the doctor and send it to the pharmacy if they deem it appropriate. The home care nurse at this point had no further questions or concerns, and was satisfied with her phone call with me.     Rhonda Rivas LPN, EMT at 3:16 PM on 8/26/2019.

## 2019-08-26 NOTE — TELEPHONE ENCOUNTER
[facility]    Phone Message    May a detailed message be left on voicemail: yes    Reason for Call: Order(s): Home Care Orders: Skilled Nursing: Resumption of care. One skilled nursing visit in 5 days for assessment, medication reconciliation, and skilled intervention preferential.  Home safety evaluation.  Evaluate for physical and occupational therapy needs.     Action Taken: Message routed to:  [facility] gene emerson

## 2019-08-26 NOTE — TELEPHONE ENCOUNTER
[facility]    Phone Message    May a detailed message be left on voicemail: yes, [facility]     Reason for Call: Medication Question or concern regarding medication   Prescription Clarification  Name of Medication: Gabapentin   Prescribing Provider: MARGARITA CALABRESE   Pharmacy:n/a   What on the order needs clarification? Nasmath states Pt has not been taking the medication due to Pt becomes very drowsy on the medication and Pt is concerned about falling. Nasmath also states Pt uses Oxycodone every 4 hours, and Pt states the medication is not helping with the pain Pt is having very well .        Action Taken: Message routed to:  [facility]

## 2019-08-26 NOTE — TELEPHONE ENCOUNTER
Attempted to call home care nurse and number that was left was for central home care office. Office will page home care nurse. Magdalena Mott LPN 8/26/2019 2:08 PM

## 2019-08-26 NOTE — TELEPHONE ENCOUNTER
Called and left message for patient to call us back.   Rhonda Rivas LPN, EMT at 2:02 PM on 8/26/2019.

## 2019-08-26 NOTE — TELEPHONE ENCOUNTER
[facility] and verbally approved orders for Home Care Orders: Skilled Nursing: Resumption of care. One skilled nursing visit in 5 days for assessment, medication reconciliation, and skilled intervention preferential.  Home safety evaluation.  Evaluate for physical and occupational therapy needs.     Rhonda Rivas LPN, EMT at 3:13 PM on 8/26/2019.

## 2019-08-27 ENCOUNTER — TELEPHONE (OUTPATIENT)
Dept: INTERNAL MEDICINE | Facility: CLINIC | Age: 64
End: 2019-08-27

## 2019-08-27 NOTE — TELEPHONE ENCOUNTER
[facility] Call [facility]    Phone Message    May a detailed message be left on voicemail: yes    Reason for Call: Medication concern  Name of Medication: Furosemide, Bumex, Spironolactone     Concern?: Please call Home care to discuss medications listed above, patient is not taking medications as prescribed.        Order(s): Home Care Orders: Skilled Nursing: One week one for recertification and wound care.     Action Taken: Message routed to:  [facility] primary    Verbal order given and documented. Magdalena Mott LPN 8/28/2019 8:12 AM

## 2019-08-28 NOTE — TELEPHONE ENCOUNTER
Left a message for Renetta that PCP would like a Uric acid level drawn ASAP. Magdalena Mott LPN 8/28/2019 8:08 AM

## 2019-08-28 NOTE — TELEPHONE ENCOUNTER
[facility]Dr. [facility]vizo, , for  Skilled Nursing: One week one for recertification and wound care. Magdalena Mott LPN 8/28/2019 8:13 AM

## 2019-08-29 ENCOUNTER — NURSE TRIAGE (OUTPATIENT)
Dept: NURSING | Facility: CLINIC | Age: 64
End: 2019-08-29

## 2019-08-29 ENCOUNTER — TELEPHONE (OUTPATIENT)
Dept: INTERNAL MEDICINE | Facility: CLINIC | Age: 64
End: 2019-08-29

## 2019-08-29 NOTE — TELEPHONE ENCOUNTER
"I called this patient and spoke to his wife. It turns out that this medication Gabapentin 300MG was prescribed by Ary Murphy in Feb. 2018. The patient trialed the medication but had to stop taking it because it made him \"comatose\" and unable to function. During a recent hospitalization, it was mentioned to the wife that it would be helpful for him at a lower dose for his heel pain. At that time the wife sent in the message requesting to try the lower dose of Gabapentin. The patient is not currently taking this medication and has not taken it since the one time in 2018. They are requesting a trial of lower dose Gabapentin for heel pain based on recommendation from staff during a recent hospitalization.     I told the patient's wife that this new information cleared up a lot of questions and that I would call her back after consulting with Ary. The wife was satisfied with this and had no further questions or concerns.     Rhonda Rivas LPN, EMT at 9:43 AM on 8/29/2019.     "

## 2019-08-29 NOTE — TELEPHONE ENCOUNTER
[facility]    Phone Message    May a detailed message be left on voicemail: yes    Reason for Call: Medication Refill Request    Has the patient contacted the pharmacy for the refill? Yes   Name of medication being requested: The pt is requesting a refill of the antibiotic he got in the hospital for the knee and leg infection. He and his wife said his infection is coming back.  Provider who prescribed the medication: ?  Pharmacy: [facility] [address] in [place]  Date medication is needed: soon   Please call home # to discuss. Thanks.      Action Taken: Message routed to:  [facility]

## 2019-08-29 NOTE — TELEPHONE ENCOUNTER
"Wife of Patient calls. Patient present and gives verbal permission to talk with Caller.  States Patient was discharged from [facility] \"1 week ago for a Right foot infection.\"   Caller states Patient now has Right leg pain and swelling.    Currently \"since yesterday Patient has severe constant Right leg pain.\"  States the pain is a \"12\" on a 1-10 pain scale.  Patient has not been able to take pain medication due to \"liver disease.\"  States Patient also has new increased swelling in Right leg.   Describes \"pitting\" and \"red streaks in thigh area.\"  Patient denies difficulty breathing. No shortness of breath.  Denies chest pain.    Protocol-  Leg Pain- Adult  Care advice reviewed.   Disposition-  See Physician within 4 hours.  Caller states understanding of the recommended disposition.   Caller concerned that Patient unable to get into a car.   This RN discussed transportation options with Caller.  This RN advised to call 911 if unable to find transportation.  Advised to call back if further questions or concerns.     DREA OspinaN RN  [facility]     Reason for Disposition    [1] SEVERE pain (e.g., excruciating, unable to do any normal activities) AND [2] not improved after 2 hours of pain medicine    Additional Information    Negative: Looks like a broken bone or dislocated joint (e.g., crooked or deformed)    Negative: Sounds like a life-threatening emergency to the triager    Negative: Chest pain    Negative: Difficulty breathing    Negative: Entire foot is cool or blue in comparison to other side    Negative: Unable to walk    Negative: [1] Red area or streak AND [2] fever    Negative: [1] Swollen joint AND [2] fever    Negative: [1] Cast on leg or ankle AND [2] now increased pain    Negative: Patient sounds very sick or weak to the triager    Protocols used: LEG PAIN-A-AH    "

## 2019-08-30 ENCOUNTER — MEDICAL CORRESPONDENCE (OUTPATIENT)
Dept: HEALTH INFORMATION MANAGEMENT | Facility: CLINIC | Age: 64
End: 2019-08-30

## 2019-08-30 ENCOUNTER — TELEPHONE (OUTPATIENT)
Dept: INTERNAL MEDICINE | Facility: CLINIC | Age: 64
End: 2019-08-30

## 2019-08-30 NOTE — TELEPHONE ENCOUNTER
"[facility] Call Center    Phone Message    May a detailed message be left on voicemail: yes    Reason for Call: Order:  Dixon reported that she is not sure who ordered the blood draw for the pt. Dixon thought the order was dated yesterday, 8/29/19.  I see an order in chart from Ary Garciadigna from 8/28/19. Dixon needs to discuss this order with a nurse, asap, on behalf of the pt. Dixon does not know what the lab is for, either. Dixon mentioned that the lab order was done on the same order for \"resumption of care\" and I think Dixon missed the lab draw order. Please call her back asap. Thank you.    Action Taken: Message sent to: [facility]  "

## 2019-08-30 NOTE — TELEPHONE ENCOUNTER
[facility]    Phone Message    May a detailed message be left on voicemail: yes    Reason for Call: Order(s): Home Care Orders: Skilled Nursing:  requesting skilled nursing visits for assessment, wound care, blood draw teaching, for 2x a week for 9 weeks and 4 as needed. Please advise.    Action Taken: Message routed to:  [facility]     Verbal order given and documented. Magdalena Mott LPN 8/30/2019 12:21 PM

## 2019-08-30 NOTE — TELEPHONE ENCOUNTER
Verbal orders given to Renetta from [facility], per Ary Murphy/Dr. Trevizo, for  skilled nursing visits for assessment, wound care, blood draw teaching, for 2x a week for 9 weeks and 4 as needed.Magdalena Mott LPN 8/30/2019 12:22 PM

## 2019-08-31 ENCOUNTER — MYC MEDICAL ADVICE (OUTPATIENT)
Dept: INTERNAL MEDICINE | Facility: CLINIC | Age: 64
End: 2019-08-31

## 2019-09-01 ENCOUNTER — APPOINTMENT (OUTPATIENT)
Dept: LAB | Facility: CLINIC | Age: 64
End: 2019-09-01
Attending: INTERNAL MEDICINE
Payer: COMMERCIAL

## 2019-09-03 NOTE — TELEPHONE ENCOUNTER
Did not receive a call back from this home care nurse, spoke to another home care nurse. See telephone encounter from end of week. Magdalena Mott LPN 9/3/2019 11:11 AM

## 2019-09-04 ENCOUNTER — MEDICAL CORRESPONDENCE (OUTPATIENT)
Dept: HEALTH INFORMATION MANAGEMENT | Facility: CLINIC | Age: 64
End: 2019-09-04

## 2019-09-04 RX ORDER — GABAPENTIN 300 MG/1
300 CAPSULE ORAL AT BEDTIME
Qty: 30 CAPSULE | Refills: 3 | Status: SHIPPED | OUTPATIENT
Start: 2019-09-04 | End: 2020-07-20

## 2019-09-04 NOTE — TELEPHONE ENCOUNTER
New Rx of gabapentin 300 mg at [facility] was sent. Left a detailed message to the pt regarding this recommendation.

## 2019-09-05 ENCOUNTER — MEDICAL CORRESPONDENCE (OUTPATIENT)
Dept: HEALTH INFORMATION MANAGEMENT | Facility: CLINIC | Age: 64
End: 2019-09-05

## 2019-09-16 ENCOUNTER — MEDICAL CORRESPONDENCE (OUTPATIENT)
Dept: HEALTH INFORMATION MANAGEMENT | Facility: CLINIC | Age: 64
End: 2019-09-16

## 2019-09-17 ENCOUNTER — MEDICAL CORRESPONDENCE (OUTPATIENT)
Dept: HEALTH INFORMATION MANAGEMENT | Facility: CLINIC | Age: 64
End: 2019-09-17

## 2019-09-25 ENCOUNTER — MEDICAL CORRESPONDENCE (OUTPATIENT)
Dept: HEALTH INFORMATION MANAGEMENT | Facility: CLINIC | Age: 64
End: 2019-09-25

## 2019-09-30 ENCOUNTER — TELEPHONE (OUTPATIENT)
Dept: INTERNAL MEDICINE | Facility: CLINIC | Age: 64
End: 2019-09-30

## 2019-09-30 ENCOUNTER — DOCUMENTATION ONLY (OUTPATIENT)
Dept: INTERNAL MEDICINE | Facility: CLINIC | Age: 64
End: 2019-09-30

## 2019-09-30 DIAGNOSIS — F33.9 RECURRENT MAJOR DEPRESSIVE DISORDER, REMISSION STATUS UNSPECIFIED (H): ICD-10-CM

## 2019-09-30 NOTE — TELEPHONE ENCOUNTER
[facility] Call Center    Phone Message    May a detailed message be left on voicemail: yes    Reason for Call: Medication Question or concern regarding medication   Prescription Clarification  Name of Medication: Celexa  Prescribing Provider: Ary Murphy   Pharmacy: [address]     What on the order needs clarification? Flores called and stated while patient was in transitional care they did decrease his dosage of Celexa to 20MGs so they have been cutting the pills in half, she is wondering if she can get a script for 20MGs instead.    Please call her to discuss          Action Taken: Message routed to:  [facility]

## 2019-09-30 NOTE — PROGRESS NOTES
[facility] now requests orders and shares plan of care/discharge summaries for some patients through [employer].  Please REPLY TO THIS MESSAGE OR ROUTE BACK TO THE AUTHOR in order to give authorization for orders when needed.  This is considered a verbal order, you will still receive a faxed copy of orders for signature.  Thank you for your assistance in improving collaboration for our patients.    ORDERS REQUESTED    Skilled nursing 2x/week x 1 week, 3x/week x 3 weeks, 1x/week x 1 week    [facility] 2x/week x 4 weeks, 1x/week x 1 week    ST, OT, PT and Lymphedema OT to eval and treat.    SW to assess for community resources/ advanced care planning    Wound care as ordered, to be done by skilled nurse or caregiver following education:    1. right heel ulcer: daily or 3x/week minimum. Cleanse with saline or wound cleanser, pat dry. Pack wound with medihoney moistened packing strips. Apply foam, ABD or gauze dressing and wrap with kerlex and secure with tape.    2. dermatitis to bilateral inner thighs: 2x/day and PRN. Cleanse with saline or wound cleanser, pat dry. Apply barrier cream. Avoid tight fitting garments and clean incontinent immediately.    3. Ulcers to BLE: daily and as needed. Clean with sarwat or wound cleanser and pat dry. Apply adaptic gauze and cover with ABD, foam or gauze. Warp with kerelx and secure with tape.      FYI - patient is taking both Celexa and Desvenlafaxine, multiple antidepressants. Patient also reports 2 falls on Friday 9/27 I bedroom and bathroom when ambulating unassisted. Bruise to right hip noted. Patient denies injury or trauma to head. EMS called both times for assistance up.    Please call with questions or concerns.  Any Donaldson, RN  [phone]

## 2019-10-01 RX ORDER — CITALOPRAM HYDROBROMIDE 20 MG/1
20 TABLET ORAL DAILY
Qty: 90 TABLET | Refills: 0 | Status: SHIPPED | OUTPATIENT
Start: 2019-10-01 | End: 2020-01-31

## 2019-10-01 NOTE — TELEPHONE ENCOUNTER
I tried to call amrik, but no answer, no voice mail set up.  I reviewed the chart. Pt was admitted in [facility] on 9/2 due to Cellulitis of right foot, was discharged on 9/7/19 to transitional care [facility]. Based on the information below. I will send the new Rx of celexa 20 mg to the pharmacy.

## 2019-10-02 ENCOUNTER — DOCUMENTATION ONLY (OUTPATIENT)
Dept: CARE COORDINATION | Facility: CLINIC | Age: 64
End: 2019-10-02

## 2019-10-02 NOTE — PROGRESS NOTES
Swallow eval completed today, Pt's oropharyngeal swallow is intact.    At Pt's appointment next week, please assess for the following needs:    -possible endoscope to investigate sore area in R side of throat, at base of tongue.    -mental health needs: Pt would like to establish mental health care and look at depression meds. A home care mental health RN visit is also being set up for the short term.    Thank you,    Anna Coreas MA, CCC-SLP  Speech-Language Pathologist  [facility]  [email or web address]  [phone]

## 2019-10-03 ENCOUNTER — TELEPHONE (OUTPATIENT)
Dept: INTERNAL MEDICINE | Facility: CLINIC | Age: 64
End: 2019-10-03

## 2019-10-03 NOTE — TELEPHONE ENCOUNTER
[facility]    Phone Message    May a detailed message be left on voicemail: yes    Reason for Call: Order(s): Home Care Orders: Other: .home physical therapy - 1 time a week for 3 weeks - for instruction and home exercise program including core strengthening, fall prevention plan    Action Taken: Message routed to:  [facility]     Verbal order given and documented. Magdalena Mott LPN 10/3/2019 2:47 PM

## 2019-10-03 NOTE — TELEPHONE ENCOUNTER
Verbal orders given to A[facility] from [facility], per Ary Murphy, for home physical therapy - 1 time a week for 3 weeks - for instruction and home exercise program including core strengthening, fall prevention plan. Magdalena Mott LPN 10/3/2019 2:47 PM

## 2019-10-08 ENCOUNTER — MEDICAL CORRESPONDENCE (OUTPATIENT)
Dept: HEALTH INFORMATION MANAGEMENT | Facility: CLINIC | Age: 64
End: 2019-10-08

## 2019-10-09 ENCOUNTER — PATIENT OUTREACH (OUTPATIENT)
Dept: GASTROENTEROLOGY | Facility: CLINIC | Age: 64
End: 2019-10-09

## 2019-10-09 NOTE — PROGRESS NOTES
"Briefly spoke to patient's wife Raven before call got disconnected. She states patient was taken by ambulance to [facility] for sepsis, and that he is in the ICU there since 10/7/19. He continues to struggle with bilateral lower extremity open wounds that she states are infected again. She states he continues to want to be in hospice and no longer be treated when these issues arise, and to \"just let it run its course.\" Raven states patient has also had a bad experience with an employee at [facility] during this admission that they are dealing with. She was starting to talk further about enrolling in hospice, but the phone call disconnected and unable to reach her back. Raven had acknowledged knowing this writer's call back number. Will await her return call, and will reach out again next week.      "

## 2019-10-15 DIAGNOSIS — K70.31 ALCOHOLIC CIRRHOSIS OF LIVER WITH ASCITES (H): Primary | ICD-10-CM

## 2019-10-15 NOTE — PROGRESS NOTES
Patient's wife called to update that [facility] will be discharging today to home with hospice care. She states their daughter is now on board with this plan, and has been reassured that patient does not need to remain on hospice should his condition improve. Raven states the patient is coming home on a stretcher, as he has not been out of bed at all this hospitalization. She does note, however, that his leg edema is the least she has seen them in years, and the sores are healing. She does not think they will attend the appointment with Dr. Simmons on 10/28. This writer will check in with them over the next week, and remove from schedule if needed.

## 2019-10-16 ENCOUNTER — DOCUMENTATION ONLY (OUTPATIENT)
Dept: OTHER | Facility: CLINIC | Age: 64
End: 2019-10-16

## 2019-10-17 ENCOUNTER — MEDICAL CORRESPONDENCE (OUTPATIENT)
Dept: HEALTH INFORMATION MANAGEMENT | Facility: CLINIC | Age: 64
End: 2019-10-17

## 2019-10-22 ENCOUNTER — DOCUMENTATION ONLY (OUTPATIENT)
Facility: CLINIC | Age: 64
End: 2019-10-22

## 2019-10-23 NOTE — PROGRESS NOTES
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  He was admitted to the hospital on October 7 for altered mental status, found to have sepsis with a possible source in his right heel versus multiple areas of skin breakdown on the sacrum, scrotum, lower extremities.  He was treated with IV antibiotics but not thought to make significant improvement and was discharged home with hospice.  Patient's altered mental status improved with lactulose and antibiotic therapy.  Discharge summary indicates patient had MSSA bacteremia review of labs shows negative cultures in the blood and peritoneal fluid.  His ammonia was mildly elevated at 36 at the time of admission.  He had multiple he had slow steady improvement in his condition since he was discharged from the hospital, but still has a number of issues.  Hepatic encephalopathy: Patient's current medications include lactulose and rifaximin.  Patient states he is not taking lactulose regularly as it causes diarrhea and abdominal cramping.  He has been much more clear over the past week.   Multiple lower extremity wounds, sacral pressure ulcer, right heel ulcer, severe intertrigo in the groin: Patient was concerned that he was developing another infection due to low-grade elevated temperatures on Sunday of 99 degrees and increase in pain and tingling in the right heel.  He is on a pressure relieving mattress.  Lower extremity wounds were worsened by severe edema which has now lessened.  Most of his leg wounds have healed, the sacral pressure ulcer is much better, he is uncertain about the right heel.  No further fever or chills, no foul-smelling drainage.  The patient would want  antibiotics if he had another infection.  Intertrigo much improved with good mikhail-care, nystatin plus lidocaine cream and nystatin powder. Admitted to [facility] in August with celulitis of LE, right heel pain thought secondary to gout, treated with oral steroids  Ascites and lower extremity edema secondary to liver failure.  Patient has been self adjusting his diuretics, currently on Lasix 80 mg a day and spinal lactone 100 mg/day.  Previously on double this dose.  This appears to be working well.  He does have dizziness at times, increased with transferring.  He is a type II diabetic, on glipizide, NovoLog and Lantus.  He has been told to tightly control his blood sugar in the past and is not comfortable with less checks and higher readings.  He has stopped glipizide and Lantus, just using NovoLog to manage high sugars at this time.  Generalized weakness.  He has been in bed since the beginning of October, up with an easy stand to transfer to a commode but otherwise bedbound.  He would like to get stronger.  He is not able to bear weight on the right heel due to pain.  He reports multiple medication side effects including delirium with oxycodone and lorazepam.  Wife used Seroquel with good effective for anxiety last night.  PMH: Type 2 diabetes, thrombocytopenia, morbid obesity, hypertension, hyperlipidemia, GERD.  PSH: Status post umbilical herniorrhaphy, vasectomy, TIPS procedure.  Family history: Mother with breast cancer, father with rectal cancer, sister with skin cancer.  Social history: Lives with his wife in a private home and [place], adult daughter very involved.  12 point review systems negative except as per HPI.    Objective  Pulse 98 and regular, respirations 20  Awake, alert man with massive ascites, significant lymphedema, morbid obesity lying in hospital bed.  Eyes: Nonicteric.  Oropharynx: Moist.  Neck: Supple, no LAD, thyromegaly, mass.  Chest: Decreased breath sounds at the bases otherwise  clear to auscultation bilaterally.  Heart: RRR with 2/6 systolic ejection murmur heard throughout the chest.  Abdomen: Distended, soft, nontender.  : Scrotal edema, improving intertrigo.  Extremities: Severe lymphedema with nodular skin changes and scattered open areas across both lower legs.  No significant erythema.  Right heel with closed ulcer, boggy, tender to palpation.  No redness.  Unable to palpate pedal pulses.  Neuro: Awake, alert, able to make decisions.  Equal  strengths.  Poor truncal tone, unable to shift his weight in bed.  Assessment: Patient is a 63-year-old man with end-stage liver disease status post TIPS, not a transplant candidate with recent episode of sepsis, second episode in the last 6 months.  He also has severe lymphedema, ascites, multiple wounds in various stages of healing which are likely source of sepsis.  He has significant polypharmacy, intolerance to certain medications including opioids and benzodiazepines.  There is some family discord about the best plan of care for the patient including whether or not hospice is the right plan of care.  Plan: I spent a total of 85 minutes with the patient and his family discussing his current medical state, numerous symptoms, answer questions about medications, discussed what to expect as time progresses, DNR/DNI status.  Hepatic encephalopathy: Okay to hold lactulose if not needed.  Did not discuss at the time of visit the rifaximin is not covered by hospice, only covered of lactulose is not helping.  Will follow-up via hospice nurse in the next week.  End-stage liver disease with ascites, lymphedema: Continue current diuretic.  Advised them to notify us if they change dosing.  Multiple wounds in various stages of healing including sacral pressure ulcer, multiple wounds related to lymphedema, right heel ulcer, covered but likely not fully healed.  No signs of current infection.  Continue pressure relieving mattress, float heels.  OT  eval to help with safe transferring.  Continue to use Lidoderm patch on the heel and cover with Mepilex.  Type 2 diabetes: Agree with stopping glipizide and Lantus.  Discussed with patient that we do not want tight control of his blood sugar because of the risks of hypoglycemia causing further encephalopathy.  Polypharmacy: Overtime work to gradually decrease medications further  Goals of care, plan of care and decision making.  Patient is very clear that his wife is his primary decision-maker.  Patient has been intermittently confused and wife is signed a pulsed indicating DNR/DNI status, comfort care, oral antibiotics okay.  Patient's daughter likely to have complicated grief given her enmeshment and caregiving.  Did discuss that if his condition stabilizes he could be discharged from hospice--MELD score is10  Beverley Mendez MD  Associate Medical Director  [facility]

## 2019-11-06 ENCOUNTER — DOCUMENTATION ONLY (OUTPATIENT)
Dept: OTHER | Facility: CLINIC | Age: 64
End: 2019-11-06

## 2019-11-07 ENCOUNTER — MEDICAL CORRESPONDENCE (OUTPATIENT)
Dept: HEALTH INFORMATION MANAGEMENT | Facility: CLINIC | Age: 64
End: 2019-11-07

## 2019-11-13 ENCOUNTER — MEDICAL CORRESPONDENCE (OUTPATIENT)
Dept: HEALTH INFORMATION MANAGEMENT | Facility: CLINIC | Age: 64
End: 2019-11-13

## 2019-11-15 ENCOUNTER — CARE COORDINATION (OUTPATIENT)
Dept: GASTROENTEROLOGY | Facility: CLINIC | Age: 64
End: 2019-11-15

## 2019-11-15 DIAGNOSIS — K70.31 ALCOHOLIC CIRRHOSIS OF LIVER WITH ASCITES (H): Primary | ICD-10-CM

## 2019-11-15 NOTE — PROGRESS NOTES
Patient was discharged from [facility] on 10/15 and admitted into home hospice. Since this time he has improved dramatically. Per wife Raven, he has lost >100 lbs in the past month, and is now at 187 lbs. He has advanced from minimally using a walker to now independent with only a cane.   Raven is calling today to report 4 day history of right upper quadrant pain that is intermittent. He describes it as a stabbing pain that comes out from under the right rib cage. He takes oxycodone for it with minimal relief. They have taken him off hospice care today, because of his improvement and because they would like to know if there is a treatable cause for this pain. Otherwise, patient is feeling fatigue, regularly taking oxycodone for this new pain along as for generalized pain, poor sleep, good appetite and taking in adequate water, voiding and stooling in good amounts. Raven is requesting a follow up in clinic, and also wonders if he can get a set of labs and abdominal ultrasound in the meantime. His most recent US in care everywhere was on 10/8/19 which also reviewed TIPS flow and velocities. Most recent lab work was last done on 10/14/19 at [facility].

## 2019-11-18 ENCOUNTER — OFFICE VISIT (OUTPATIENT)
Dept: GASTROENTEROLOGY | Facility: CLINIC | Age: 64
End: 2019-11-18
Attending: PHYSICIAN ASSISTANT
Payer: COMMERCIAL

## 2019-11-18 VITALS
BODY MASS INDEX: 31.19 KG/M2 | HEART RATE: 88 BPM | RESPIRATION RATE: 18 BRPM | WEIGHT: 230.3 LBS | TEMPERATURE: 97.5 F | SYSTOLIC BLOOD PRESSURE: 107 MMHG | OXYGEN SATURATION: 100 % | DIASTOLIC BLOOD PRESSURE: 63 MMHG | HEIGHT: 72 IN

## 2019-11-18 DIAGNOSIS — R10.11 RUQ ABDOMINAL PAIN: Primary | ICD-10-CM

## 2019-11-18 DIAGNOSIS — K76.6 PORTAL HYPERTENSION (H): ICD-10-CM

## 2019-11-18 DIAGNOSIS — R60.0 EDEMA OF BOTH LEGS: ICD-10-CM

## 2019-11-18 DIAGNOSIS — R60.1 GENERALIZED EDEMA: ICD-10-CM

## 2019-11-18 DIAGNOSIS — K70.31 ALCOHOLIC CIRRHOSIS OF LIVER WITH ASCITES (H): ICD-10-CM

## 2019-11-18 LAB
ALBUMIN SERPL-MCNC: 2.1 G/DL (ref 3.4–5)
ALP SERPL-CCNC: 216 U/L (ref 40–150)
ALT SERPL W P-5'-P-CCNC: 16 U/L (ref 0–70)
ANION GAP SERPL CALCULATED.3IONS-SCNC: 10 MMOL/L (ref 3–14)
AST SERPL W P-5'-P-CCNC: 25 U/L (ref 0–45)
BILIRUB DIRECT SERPL-MCNC: 0.6 MG/DL (ref 0–0.2)
BILIRUB SERPL-MCNC: 1.2 MG/DL (ref 0.2–1.3)
BUN SERPL-MCNC: 25 MG/DL (ref 7–30)
CALCIUM SERPL-MCNC: 8.7 MG/DL (ref 8.5–10.1)
CHLORIDE SERPL-SCNC: 94 MMOL/L (ref 94–109)
CO2 SERPL-SCNC: 23 MMOL/L (ref 20–32)
CREAT SERPL-MCNC: 1.12 MG/DL (ref 0.66–1.25)
ERYTHROCYTE [DISTWIDTH] IN BLOOD BY AUTOMATED COUNT: 17.6 % (ref 10–15)
GFR SERPL CREATININE-BSD FRML MDRD: 68 ML/MIN/{1.73_M2}
GLUCOSE SERPL-MCNC: 206 MG/DL (ref 70–99)
HCT VFR BLD AUTO: 33.2 % (ref 40–53)
HGB BLD-MCNC: 10.9 G/DL (ref 13.3–17.7)
INR PPP: 1.34 (ref 0.86–1.14)
MCH RBC QN AUTO: 30.5 PG (ref 26.5–33)
MCHC RBC AUTO-ENTMCNC: 32.8 G/DL (ref 31.5–36.5)
MCV RBC AUTO: 93 FL (ref 78–100)
PLATELET # BLD AUTO: 128 10E9/L (ref 150–450)
POTASSIUM SERPL-SCNC: 4.7 MMOL/L (ref 3.4–5.3)
PROT SERPL-MCNC: 6.8 G/DL (ref 6.8–8.8)
RBC # BLD AUTO: 3.57 10E12/L (ref 4.4–5.9)
SODIUM SERPL-SCNC: 127 MMOL/L (ref 133–144)
WBC # BLD AUTO: 9.1 10E9/L (ref 4–11)

## 2019-11-18 PROCEDURE — 85610 PROTHROMBIN TIME: CPT | Performed by: PHYSICIAN ASSISTANT

## 2019-11-18 PROCEDURE — 36415 COLL VENOUS BLD VENIPUNCTURE: CPT | Performed by: PHYSICIAN ASSISTANT

## 2019-11-18 PROCEDURE — 80048 BASIC METABOLIC PNL TOTAL CA: CPT | Performed by: PHYSICIAN ASSISTANT

## 2019-11-18 PROCEDURE — G0463 HOSPITAL OUTPT CLINIC VISIT: HCPCS | Mod: ZF

## 2019-11-18 PROCEDURE — 80076 HEPATIC FUNCTION PANEL: CPT | Performed by: PHYSICIAN ASSISTANT

## 2019-11-18 PROCEDURE — 85027 COMPLETE CBC AUTOMATED: CPT | Performed by: PHYSICIAN ASSISTANT

## 2019-11-18 RX ORDER — CALCIUM CARBONATE 500(1250)
1 TABLET ORAL DAILY
COMMUNITY
End: 2019-11-18

## 2019-11-18 RX ORDER — QUETIAPINE FUMARATE 25 MG/1
50 TABLET, FILM COATED ORAL AT BEDTIME
COMMUNITY
End: 2020-06-17

## 2019-11-18 ASSESSMENT — PAIN SCALES - GENERAL: PAINLEVEL: SEVERE PAIN (7)

## 2019-11-18 ASSESSMENT — MIFFLIN-ST. JEOR: SCORE: 1867.63

## 2019-11-18 NOTE — LETTER
11/18/2019       RE: Lawrence Louie  [facility] [facility] [address]     Dear Colleague,    Thank you for referring your patient, Lawrence Louie, to the [facility] HEPATOLOGY at [facility]. Please see a copy of my visit note below.    Hepatology Follow-up Clinic note  Lawrence Louie   Date of Birth 1955  Date of Service 11/18/2019    Reason for follow-up: hospital follow-up/acute pain          Assessment/plan:   Lawrence Louie is a 63 year old male with history of ETOH cirrhosis complicated by history of ascites s/p TIPS. His hospice status was recently rescindly due to improvement in status and to evaluate new acute RUQ abdominal pain. He was on high dose diuretics, no labs since recent admission for past month. His sodium today was 127. His diuretics were decreased over the past week due to concerns for dehydration and he has worsening lower extremity edema again. MELD-Na 11. Recent imaging showing patent TIPS.     # Acute RUQ abdominal pain  - US abdomen with dopplers to assess vasculature. Patient instructed to go to ER if pain becomes unbearable.   - Follow up imaging     # Hyponatremia:   - Reduce fluids (8 cups of fluids)  - Continue 100 mg spironolactone (recently reduced)   - Continue 40 mg lasix (recently reduced)   - Repeat BMP next week      # History of HE  - Continue lactulose (INCREASE dose to achieve 2-3 bowel movements a day while using oxycodone regularly)  - Continue Xifaxan 550 mg twice daily     # Follow-up in clinic in 3 months with Dr. Simmons or sooner as needed    Yovany Lopez PA-C   [facility] Hepatology clinic    -----------------------------------------------------       HPI:   Lawrence Louie is a 63 year old male presenting for follow-up. He is accompanied by his wife Raven and daughter.     Cirrhosis  - ETOH and SMITH  Complicated by:  Ascites s/p TIPS on April 2018  Hx of HE   EGD:  6/16/2016  HCC: 5/7/2019    Patient last saw Dr. Simmons on 4/22/2019.Patient was placed on hospice due to deconditioning and significant anasarca. He was aggressively diuresed during recent hospitalization and lost nearly 100 lbs, mostly fluid. He was then able to ambulate independently again with a cane.     His diuretics were reduced recently due to concern with dehydration.    Previously taking Lasix 80 mg in morning and 120 mg in evening.  He is currently taking 40 mg lasix within the past week.   Previously taking 150 mg spironolactone twice daily. Reduced to 100 mg spironolactone daily within the past week.      Due to improvement in his functional status and desire to evaluate RUQ pain, his hospice status was canceled. He started experiencing some RUQ pain over the past week. He describes a stabbing pain, that comes in waves. It seems to be worse when he he lays flat. He is taking oxycodone regularly for generalized pain. Abdominal pain is not improved.     His appetite is good. He is watching sodium in diet. He currently drinks around 64 ounces a day, nothing significant. He has started retaining a significant amount of fluid again.      Patient denies jaundice. Patient also denies melena, hematochezia or hematemesis. Patient denies fevers, sweats or chills.    No recent alcohol use. Sober for 5 years.   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)      Splenomegaly      Squamous cell carcinoma      Thrombocytopenia (H)      Varices, esophageal (H)     Past Surgical History:   Procedure Laterality Date     BIOPSY OF SKIN LESION       COLONOSCOPY Left 6/16/2016    Procedure: COMBINED COLONOSCOPY, SINGLE OR MULTIPLE BIOPSY/POLYPECTOMY BY BIOPSY;  Surgeon: Brandy Barnett,  MD;  Location: [facility]     ESOPHAGOSCOPY, GASTROSCOPY, DUODENOSCOPY (EGD), COMBINED  2/13/2013    Procedure: COMBINED ESOPHAGOSCOPY, GASTROSCOPY, DUODENOSCOPY (EGD);;  Surgeon: Tara Cook MD;  Location: [facility]     ESOPHAGOSCOPY, GASTROSCOPY, DUODENOSCOPY (EGD), COMBINED  11/4/2013    Procedure: COMBINED ESOPHAGOSCOPY, GASTROSCOPY, DUODENOSCOPY (EGD);;  Surgeon: Lonny Diaz MD;  Location: [facility]     ESOPHAGOSCOPY, GASTROSCOPY, DUODENOSCOPY (EGD), COMBINED Left 6/16/2016    Procedure: COMBINED ESOPHAGOSCOPY, GASTROSCOPY, DUODENOSCOPY (EGD), BIOPSY SINGLE OR MULTIPLE;  Surgeon: Brandy Barnett MD;  Location: [facility]     EXCISE LESION TRUNK  9/24/2012    Procedure: EXCISE LESION TRUNK;;  Surgeon: Pepe Dominguez MD;  Location: [facility]     GENITOURINARY SURGERY      vasectomy     HERNIORRHAPHY UMBILICAL  9/24/2012    Procedure: HERNIORRHAPHY UMBILICAL;  UMBILICAL HERNIA REPAIR , EXCISION OF PERIUMBILICAL CYST;  Surgeon: Pepe Dominguez MD;  Location: [facility]                 Medications:     Current Outpatient Medications   Medication     blood glucose (NO BRAND SPECIFIED) lancets standard     blood glucose monitoring (NO BRAND SPECIFIED) meter device kit     blood glucose monitoring (NO BRAND SPECIFIED) test strip     citalopram (CELEXA) 20 MG tablet     CRANBERRY PO     desvenlafaxine (PRISTIQ) 100 MG 24 hr tablet     desvenlafaxine fumarate 100 MG 24 hr tablet     furosemide (LASIX) 40 MG tablet     hydrOXYzine (ATARAX) 25 MG tablet     insulin pen needle (B-D U/F) 31G X 8 MM     insulin pen needle (ULTICARE SHORT) 31G X 8 MM miscellaneous     lactulose encephalopathy (CHRONULAC) 10 GM/15ML SOLUTION     LANTUS SOLOSTAR 100 UNIT/ML soln     lidocaine (XYLOCAINE) 5 % external ointment     magnesium oxide (MAG-OX) 400 (241.3 Mg) MG tablet     NOVOLOG FLEXPEN 100 UNIT/ML soln     nystatin (MYCOSTATIN) 166230 UNIT/GM external cream     nystatin-triamcinolone (MYCOLOG II) cream     ondansetron (ZOFRAN)  4 MG tablet     oxyCODONE (ROXICODONE) 5 MG tablet     OYSTER SHELL CALCIUM/D 500-200 MG-UNIT per tablet     QUEtiapine (SEROQUEL) 25 MG tablet     ranitidine (ZANTAC) 150 MG tablet     rifaximin (XIFAXAN) 550 MG TABS tablet     spironolactone (ALDACTONE) 50 MG tablet     triamcinolone (KENALOG) 0.1 % external cream     VITAMIN E PO     bumetanide (BUMEX) 1 MG tablet     childrens multivitamin w/ionr (FLINTSTONES COMPLETE) 60 MG chewable tablet     Cholecalciferol (VITAMIN D3) 2000 units CAPS     Cyanocobalamin (VITAMIN B-12 PO)     gabapentin (NEURONTIN) 300 MG capsule     glipiZIDE (GLUCOTROL XL) 5 MG 24 hr tablet     order for DME     order for DME     order for DME     order for DME     STATIN NOT PRESCRIBED, INTENTIONAL,     UNABLE TO FIND     ursodiol (ACTIGALL) 300 MG capsule     No current facility-administered medications for this visit.             Allergies:   No Known Allergies         Review of Systems:   10 points ROS was obtained and highlighted in the HPI, otherwise negative.          Physical Exam:   VS:  /63 (BP Location: Right arm, Patient Position: Sitting, Cuff Size: Adult Regular)   Pulse 88   Temp 97.5  F (36.4  C) (Oral)   Resp 18   Ht 1.829 m (6')   Wt 104.5 kg (230 lb 4.8 oz)   SpO2 100%   BMI 31.23 kg/m         Gen: A&Ox3, NAD, appears chronically ill, peripheral muscle wasting.   HEENT: non-icteric  CV: RRR, no overt murmurs  Lung: CTA Bilatererally, no wheezing or crackles.   Lym- no palpable lymphadenopathy  Abd: soft, NT, ND, difficult to assess as patient unable to lay flat   Ext: intact pulses, bilateral pitting edema   Skin: No rash, no palmar erythema, telangiectasias or jaundice  Neuro: grossly intact, no asterixis   Psych: appropriate mood and affects           Data:   Reviewed in person and significant for:    Lab Results   Component Value Date     11/18/2019      Lab Results   Component Value Date    POTASSIUM 4.7 11/18/2019     Lab Results   Component Value  Date    CHLORIDE 94 11/18/2019     Lab Results   Component Value Date    CO2 23 11/18/2019     Lab Results   Component Value Date    BUN 25 11/18/2019     Lab Results   Component Value Date    CR 1.12 11/18/2019       Lab Results   Component Value Date    WBC 9.1 11/18/2019     Lab Results   Component Value Date    HGB 10.9 11/18/2019     Lab Results   Component Value Date    HCT 33.2 11/18/2019     Lab Results   Component Value Date    MCV 93 11/18/2019     Lab Results   Component Value Date     11/18/2019       Lab Results   Component Value Date    AST 25 11/18/2019     Lab Results   Component Value Date    ALT 16 11/18/2019     Lab Results   Component Value Date    BILICONJ 0.0 03/12/2014      Lab Results   Component Value Date    BILITOTAL 1.2 11/18/2019       Lab Results   Component Value Date    ALBUMIN 2.1 11/18/2019     Lab Results   Component Value Date    PROTTOTAL 6.8 11/18/2019      Lab Results   Component Value Date    ALKPHOS 216 11/18/2019       Lab Results   Component Value Date    INR 1.34 11/18/2019     US ABDOMEN LIMITED: - noted technically difficult due to habitus, diffuse edema and immobility   10/8/2019:   1.  Normal flow and velocity demonstrated in the TIPS shunt.  2.  Antegrade flow in the portal and hepatic veins. No venous thrombus demonstrated.        Again, thank you for allowing me to participate in the care of your patient.      Sincerely,    Yovany Lopez PA-C

## 2019-11-18 NOTE — LETTER
11/18/2019      RE: Lawrence Louie  [address] [facility] [address]       Hepatology Follow-up Clinic note  Lawrence Louie   Date of Birth 1955  Date of Service 11/18/2019    Reason for follow-up: hospital follow-up/acute pain          Assessment/plan:   Lawrence Louie is a 63 year old male with history of ETOH cirrhosis complicated by history of ascites s/p TIPS. His hospice status was recently rescindly due to improvement in status and to evaluate new acute RUQ abdominal pain. He was on high dose diuretics, no labs since recent admission for past month. His sodium today was 127. His diuretics were decreased over the past week due to concerns for dehydration and he has worsening lower extremity edema again. MELD-Na 11. Recent imaging showing patent TIPS.     # Acute RUQ abdominal pain  - US abdomen with dopplers to assess vasculature. Patient instructed to go to ER if pain becomes unbearable.   - Follow up imaging     # Hyponatremia:   - Reduce fluids (8 cups of fluids)  - Continue 100 mg spironolactone (recently reduced)   - Continue 40 mg lasix (recently reduced)   - Repeat BMP next week      # History of HE  - Continue lactulose (INCREASE dose to achieve 2-3 bowel movements a day while using oxycodone regularly)  - Continue Xifaxan 550 mg twice daily     # Follow-up in clinic in 3 months with Dr. Simmons or sooner as needed    Yovany Lopez PA-C   [facility] Hepatology clinic    -----------------------------------------------------       HPI:   Lawrence Louie is a 63 year old male presenting for follow-up. He is accompanied by his wife Raven and daughter.     Cirrhosis  - ETOH and SMITH  Complicated by:  Ascites s/p TIPS on April 2018  Hx of HE   EGD: 6/16/2016  HCC: 5/7/2019    Patient last saw Dr. Simmons on 4/22/2019.Patient was placed on hospice due to deconditioning and significant anasarca. He was aggressively diuresed during recent hospitalization  and lost nearly 100 lbs, mostly fluid. He was then able to ambulate independently again with a cane.     His diuretics were reduced recently due to concern with dehydration.    Previously taking Lasix 80 mg in morning and 120 mg in evening.  He is currently taking 40 mg lasix within the past week.   Previously taking 150 mg spironolactone twice daily. Reduced to 100 mg spironolactone daily within the past week.      Due to improvement in his functional status and desire to evaluate RUQ pain, his hospice status was canceled. He started experiencing some RUQ pain over the past week. He describes a stabbing pain, that comes in waves. It seems to be worse when he he lays flat. He is taking oxycodone regularly for generalized pain. Abdominal pain is not improved.     His appetite is good. He is watching sodium in diet. He currently drinks around 64 ounces a day, nothing significant. He has started retaining a significant amount of fluid again.      Patient denies jaundice. Patient also denies melena, hematochezia or hematemesis. Patient denies fevers, sweats or chills.    No recent alcohol use. Sober for 5 years.   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)      Splenomegaly      Squamous cell carcinoma      Thrombocytopenia (H)      Varices, esophageal (H)     Past Surgical History:   Procedure Laterality Date     BIOPSY OF SKIN LESION       COLONOSCOPY Left 6/16/2016    Procedure: COMBINED COLONOSCOPY, SINGLE OR MULTIPLE BIOPSY/POLYPECTOMY BY BIOPSY;  Surgeon: Brandy Barnett MD;  Location:  GI     ESOPHAGOSCOPY, GASTROSCOPY, DUODENOSCOPY (EGD), COMBINED  2/13/2013    Procedure: COMBINED ESOPHAGOSCOPY, GASTROSCOPY, DUODENOSCOPY (EGD);;  Surgeon: Tara Cook MD;   Location: [facility]     ESOPHAGOSCOPY, GASTROSCOPY, DUODENOSCOPY (EGD), COMBINED  11/4/2013    Procedure: COMBINED ESOPHAGOSCOPY, GASTROSCOPY, DUODENOSCOPY (EGD);;  Surgeon: Lonny Diaz MD;  [facility]     ESOPHAGOSCOPY, GASTROSCOPY, DUODENOSCOPY (EGD), COMBINED Left 6/16/2016    Procedure: COMBINED ESOPHAGOSCOPY, GASTROSCOPY, DUODENOSCOPY (EGD), BIOPSY SINGLE OR MULTIPLE;  Surgeon: Brandy Barnett MD;  Location: [facility]     EXCISE LESION TRUNK  9/24/2012    Procedure: EXCISE LESION TRUNK;;  Surgeon: Pepe Dominguez MD;  Location: [facility]     GENITOURINARY SURGERY      vasectomy     HERNIORRHAPHY UMBILICAL  9/24/2012    Procedure: HERNIORRHAPHY UMBILICAL;  UMBILICAL HERNIA REPAIR , EXCISION OF PERIUMBILICAL CYST;  Surgeon: Pepe Dominguez MD;  Location: [facility]                 Medications:     Current Outpatient Medications   Medication     blood glucose (NO BRAND SPECIFIED) lancets standard     blood glucose monitoring (NO BRAND SPECIFIED) meter device kit     blood glucose monitoring (NO BRAND SPECIFIED) test strip     citalopram (CELEXA) 20 MG tablet     CRANBERRY PO     desvenlafaxine (PRISTIQ) 100 MG 24 hr tablet     desvenlafaxine fumarate 100 MG 24 hr tablet     furosemide (LASIX) 40 MG tablet     hydrOXYzine (ATARAX) 25 MG tablet     insulin pen needle (B-D U/F) 31G X 8 MM     insulin pen needle (ULTICARE SHORT) 31G X 8 MM miscellaneous     lactulose encephalopathy (CHRONULAC) 10 GM/15ML SOLUTION     LANTUS SOLOSTAR 100 UNIT/ML soln     lidocaine (XYLOCAINE) 5 % external ointment     magnesium oxide (MAG-OX) 400 (241.3 Mg) MG tablet     NOVOLOG FLEXPEN 100 UNIT/ML soln     nystatin (MYCOSTATIN) 621088 UNIT/GM external cream     nystatin-triamcinolone (MYCOLOG II) cream     ondansetron (ZOFRAN) 4 MG tablet     oxyCODONE (ROXICODONE) 5 MG tablet     OYSTER SHELL CALCIUM/D 500-200 MG-UNIT per tablet     QUEtiapine (SEROQUEL) 25 MG tablet     ranitidine (ZANTAC) 150 MG tablet     rifaximin (XIFAXAN)  550 MG TABS tablet     spironolactone (ALDACTONE) 50 MG tablet     triamcinolone (KENALOG) 0.1 % external cream     VITAMIN E PO     bumetanide (BUMEX) 1 MG tablet     childrens multivitamin w/ionr (FLINTSTONES COMPLETE) 60 MG chewable tablet     Cholecalciferol (VITAMIN D3) 2000 units CAPS     Cyanocobalamin (VITAMIN B-12 PO)     gabapentin (NEURONTIN) 300 MG capsule     glipiZIDE (GLUCOTROL XL) 5 MG 24 hr tablet     order for DME     order for DME     order for DME     order for DME     STATIN NOT PRESCRIBED, INTENTIONAL,     UNABLE TO FIND     ursodiol (ACTIGALL) 300 MG capsule     No current facility-administered medications for this visit.             Allergies:   No Known Allergies         Review of Systems:   10 points ROS was obtained and highlighted in the HPI, otherwise negative.          Physical Exam:   VS:  /63 (BP Location: Right arm, Patient Position: Sitting, Cuff Size: Adult Regular)   Pulse 88   Temp 97.5  F (36.4  C) (Oral)   Resp 18   Ht 1.829 m (6')   Wt 104.5 kg (230 lb 4.8 oz)   SpO2 100%   BMI 31.23 kg/m         Gen: A&Ox3, NAD, appears chronically ill, peripheral muscle wasting.   HEENT: non-icteric  CV: RRR, no overt murmurs  Lung: CTA Bilatererally, no wheezing or crackles.   Lym- no palpable lymphadenopathy  Abd: soft, NT, ND, difficult to assess as patient unable to lay flat   Ext: intact pulses, bilateral pitting edema   Skin: No rash, no palmar erythema, telangiectasias or jaundice  Neuro: grossly intact, no asterixis   Psych: appropriate mood and affects           Data:   Reviewed in person and significant for:    Lab Results   Component Value Date     11/18/2019      Lab Results   Component Value Date    POTASSIUM 4.7 11/18/2019     Lab Results   Component Value Date    CHLORIDE 94 11/18/2019     Lab Results   Component Value Date    CO2 23 11/18/2019     Lab Results   Component Value Date    BUN 25 11/18/2019     Lab Results   Component Value Date    CR 1.12  11/18/2019       Lab Results   Component Value Date    WBC 9.1 11/18/2019     Lab Results   Component Value Date    HGB 10.9 11/18/2019     Lab Results   Component Value Date    HCT 33.2 11/18/2019     Lab Results   Component Value Date    MCV 93 11/18/2019     Lab Results   Component Value Date     11/18/2019       Lab Results   Component Value Date    AST 25 11/18/2019     Lab Results   Component Value Date    ALT 16 11/18/2019     Lab Results   Component Value Date    BILICONJ 0.0 03/12/2014      Lab Results   Component Value Date    BILITOTAL 1.2 11/18/2019       Lab Results   Component Value Date    ALBUMIN 2.1 11/18/2019     Lab Results   Component Value Date    PROTTOTAL 6.8 11/18/2019      Lab Results   Component Value Date    ALKPHOS 216 11/18/2019       Lab Results   Component Value Date    INR 1.34 11/18/2019     US ABDOMEN LIMITED: - noted technically difficult due to habitus, diffuse edema and immobility   10/8/2019:   1.  Normal flow and velocity demonstrated in the TIPS shunt.  2.  Antegrade flow in the portal and hepatic veins. No venous thrombus demonstrated.        Yovany Lopez PA-C

## 2019-11-18 NOTE — NURSING NOTE
Chief Complaint   Patient presents with     Hospital F/U     alcohol induced cirrhosis       Vital signs:  Temp: 97.5  F (36.4  C) Temp src: Oral BP: 107/63 Pulse: 88   Resp: 18 SpO2: 100 %     Height: 182.9 cm (6') Weight: 104.5 kg (230 lb 4.8 oz)  Estimated body mass index is 31.23 kg/m  as calculated from the following:    Height as of this encounter: 1.829 m (6').    Weight as of this encounter: 104.5 kg (230 lb 4.8 oz).          Jenifer Banda, [facility]  11/18/2019 3:22 PM

## 2019-11-18 NOTE — PROGRESS NOTES
Hepatology Follow-up Clinic note  Lawrence Louie   Date of Birth 1955  Date of Service 11/18/2019    Reason for follow-up: hospital follow-up/acute pain          Assessment/plan:   Lawrence Louie is a 63 year old male with history of ETOH cirrhosis complicated by history of ascites s/p TIPS. His hospice status was recently rescindly due to improvement in status and to evaluate new acute RUQ abdominal pain. He was on high dose diuretics, no labs since recent admission for past month. His sodium today was 127. His diuretics were decreased over the past week due to concerns for dehydration and he has worsening lower extremity edema again. MELD-Na 11. Recent imaging showing patent TIPS.     # Acute RUQ abdominal pain  - US abdomen with dopplers to assess vasculature. Patient instructed to go to ER if pain becomes unbearable.   - Follow up imaging     # Hyponatremia:   - Reduce fluids (8 cups of fluids)  - Continue 100 mg spironolactone (recently reduced)   - Continue 40 mg lasix (recently reduced)   - Repeat BMP next week      # History of HE  - Continue lactulose (INCREASE dose to achieve 2-3 bowel movements a day while using oxycodone regularly)  - Continue Xifaxan 550 mg twice daily     # Follow-up in clinic in 3 months with Dr. Simmons or sooner as needed    Yovany Lopez PA-C   [facility] Hepatology clinic    -----------------------------------------------------       HPI:   Lawrence Louie is a 63 year old male presenting for follow-up. He is accompanied by his wife Raven and daughter.     Cirrhosis  - ETOH and SMITH  Complicated by:  Ascites s/p TIPS on April 2018  Hx of HE   EGD: 6/16/2016  HCC: 5/7/2019    Patient last saw Dr. Simmons on 4/22/2019.Patient was placed on hospice due to deconditioning and significant anasarca. He was aggressively diuresed during recent hospitalization and lost nearly 100 lbs, mostly fluid. He was then able to ambulate independently again with a  cane.     His diuretics were reduced recently due to concern with dehydration.    Previously taking Lasix 80 mg in morning and 120 mg in evening.  He is currently taking 40 mg lasix within the past week.   Previously taking 150 mg spironolactone twice daily. Reduced to 100 mg spironolactone daily within the past week.      Due to improvement in his functional status and desire to evaluate RUQ pain, his hospice status was canceled. He started experiencing some RUQ pain over the past week. He describes a stabbing pain, that comes in waves. It seems to be worse when he he lays flat. He is taking oxycodone regularly for generalized pain. Abdominal pain is not improved.     His appetite is good. He is watching sodium in diet. He currently drinks around 64 ounces a day, nothing significant. He has started retaining a significant amount of fluid again.      Patient denies jaundice. Patient also denies melena, hematochezia or hematemesis. Patient denies fevers, sweats or chills.    No recent alcohol use. Sober for 5 years.   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)      Splenomegaly      Squamous cell carcinoma      Thrombocytopenia (H)      Varices, esophageal (H)     Past Surgical History:   Procedure Laterality Date     BIOPSY OF SKIN LESION       COLONOSCOPY Left 6/16/2016    Procedure: COMBINED COLONOSCOPY, SINGLE OR MULTIPLE BIOPSY/POLYPECTOMY BY BIOPSY;  Surgeon: Brandy Barnett MD;  Location:  GI     ESOPHAGOSCOPY, GASTROSCOPY, DUODENOSCOPY (EGD), COMBINED  2/13/2013    Procedure: COMBINED ESOPHAGOSCOPY, GASTROSCOPY, DUODENOSCOPY (EGD);;  Surgeon: Tara Cook MD;  Location:  GI     ESOPHAGOSCOPY, GASTROSCOPY, DUODENOSCOPY (EGD), COMBINED  11/4/2013     Procedure: COMBINED ESOPHAGOSCOPY, GASTROSCOPY, DUODENOSCOPY (EGD);;  Surgeon: Lonny Diaz MD;  [facility]     ESOPHAGOSCOPY, GASTROSCOPY, DUODENOSCOPY (EGD), COMBINED Left 6/16/2016    Procedure: COMBINED ESOPHAGOSCOPY, GASTROSCOPY, DUODENOSCOPY (EGD), BIOPSY SINGLE OR MULTIPLE;  Surgeon: Brandy Barnett MD;  [facility]     EXCISE LESION TRUNK  9/24/2012    Procedure: EXCISE LESION TRUNK;;  Surgeon: Pepe Dominguez MD;  Location: [facility]     GENITOURINARY SURGERY      vasectomy     HERNIORRHAPHY UMBILICAL  9/24/2012    Procedure: HERNIORRHAPHY UMBILICAL;  UMBILICAL HERNIA REPAIR , EXCISION OF PERIUMBILICAL CYST;  Surgeon: Pepe Dominguez MD;  Location: [facility]                 Medications:     Current Outpatient Medications   Medication     blood glucose (NO BRAND SPECIFIED) lancets standard     blood glucose monitoring (NO BRAND SPECIFIED) meter device kit     blood glucose monitoring (NO BRAND SPECIFIED) test strip     citalopram (CELEXA) 20 MG tablet     CRANBERRY PO     desvenlafaxine (PRISTIQ) 100 MG 24 hr tablet     desvenlafaxine fumarate 100 MG 24 hr tablet     furosemide (LASIX) 40 MG tablet     hydrOXYzine (ATARAX) 25 MG tablet     insulin pen needle (B-D U/F) 31G X 8 MM     insulin pen needle (ULTICARE SHORT) 31G X 8 MM miscellaneous     lactulose encephalopathy (CHRONULAC) 10 GM/15ML SOLUTION     LANTUS SOLOSTAR 100 UNIT/ML soln     lidocaine (XYLOCAINE) 5 % external ointment     magnesium oxide (MAG-OX) 400 (241.3 Mg) MG tablet     NOVOLOG FLEXPEN 100 UNIT/ML soln     nystatin (MYCOSTATIN) 862271 UNIT/GM external cream     nystatin-triamcinolone (MYCOLOG II) cream     ondansetron (ZOFRAN) 4 MG tablet     oxyCODONE (ROXICODONE) 5 MG tablet     OYSTER SHELL CALCIUM/D 500-200 MG-UNIT per tablet     QUEtiapine (SEROQUEL) 25 MG tablet     ranitidine (ZANTAC) 150 MG tablet     rifaximin (XIFAXAN) 550 MG TABS tablet     spironolactone (ALDACTONE) 50 MG tablet     triamcinolone (KENALOG)  0.1 % external cream     VITAMIN E PO     bumetanide (BUMEX) 1 MG tablet     childrens multivitamin w/ionr (FLINTSTONES COMPLETE) 60 MG chewable tablet     Cholecalciferol (VITAMIN D3) 2000 units CAPS     Cyanocobalamin (VITAMIN B-12 PO)     gabapentin (NEURONTIN) 300 MG capsule     glipiZIDE (GLUCOTROL XL) 5 MG 24 hr tablet     order for DME     order for DME     order for DME     order for DME     STATIN NOT PRESCRIBED, INTENTIONAL,     UNABLE TO FIND     ursodiol (ACTIGALL) 300 MG capsule     No current facility-administered medications for this visit.             Allergies:   No Known Allergies         Review of Systems:   10 points ROS was obtained and highlighted in the HPI, otherwise negative.          Physical Exam:   VS:  /63 (BP Location: Right arm, Patient Position: Sitting, Cuff Size: Adult Regular)   Pulse 88   Temp 97.5  F (36.4  C) (Oral)   Resp 18   Ht 1.829 m (6')   Wt 104.5 kg (230 lb 4.8 oz)   SpO2 100%   BMI 31.23 kg/m        Gen: A&Ox3, NAD, appears chronically ill, peripheral muscle wasting.   HEENT: non-icteric  CV: RRR, no overt murmurs  Lung: CTA Bilatererally, no wheezing or crackles.   Lym- no palpable lymphadenopathy  Abd: soft, NT, ND, difficult to assess as patient unable to lay flat   Ext: intact pulses, bilateral pitting edema   Skin: No rash, no palmar erythema, telangiectasias or jaundice  Neuro: grossly intact, no asterixis   Psych: appropriate mood and affects           Data:   Reviewed in person and significant for:    Lab Results   Component Value Date     11/18/2019      Lab Results   Component Value Date    POTASSIUM 4.7 11/18/2019     Lab Results   Component Value Date    CHLORIDE 94 11/18/2019     Lab Results   Component Value Date    CO2 23 11/18/2019     Lab Results   Component Value Date    BUN 25 11/18/2019     Lab Results   Component Value Date    CR 1.12 11/18/2019       Lab Results   Component Value Date    WBC 9.1 11/18/2019     Lab Results    Component Value Date    HGB 10.9 11/18/2019     Lab Results   Component Value Date    HCT 33.2 11/18/2019     Lab Results   Component Value Date    MCV 93 11/18/2019     Lab Results   Component Value Date     11/18/2019       Lab Results   Component Value Date    AST 25 11/18/2019     Lab Results   Component Value Date    ALT 16 11/18/2019     Lab Results   Component Value Date    BILICONJ 0.0 03/12/2014      Lab Results   Component Value Date    BILITOTAL 1.2 11/18/2019       Lab Results   Component Value Date    ALBUMIN 2.1 11/18/2019     Lab Results   Component Value Date    PROTTOTAL 6.8 11/18/2019      Lab Results   Component Value Date    ALKPHOS 216 11/18/2019       Lab Results   Component Value Date    INR 1.34 11/18/2019     US ABDOMEN LIMITED: - noted technically difficult due to habitus, diffuse edema and immobility   10/8/2019:   1.  Normal flow and velocity demonstrated in the TIPS shunt.  2.  Antegrade flow in the portal and hepatic veins. No venous thrombus demonstrated.

## 2019-11-25 RX ORDER — SPIRONOLACTONE 50 MG/1
100 TABLET, FILM COATED ORAL DAILY
Qty: 30 TABLET | Refills: 0
Start: 2019-11-25 | End: 2020-03-01

## 2019-11-25 RX ORDER — FUROSEMIDE 40 MG
TABLET ORAL
Qty: 30 TABLET | Refills: 0
Start: 2019-11-25 | End: 2020-07-20

## 2019-11-29 ENCOUNTER — MEDICAL CORRESPONDENCE (OUTPATIENT)
Dept: HEALTH INFORMATION MANAGEMENT | Facility: CLINIC | Age: 64
End: 2019-11-29

## 2019-12-02 ENCOUNTER — TELEPHONE (OUTPATIENT)
Dept: INTERNAL MEDICINE | Facility: CLINIC | Age: 64
End: 2019-12-02

## 2019-12-02 NOTE — TELEPHONE ENCOUNTER
[facility]    Phone Message    May a detailed message be left on voicemail: yes    Reason for Call: Order(s): Home Care Orders: Physical Therapy (PT): Verbal orders: Physical Therapy 1x a week for 1 week, 2x a week for 3 weeks    Action Taken: Message routed to:  [facility]     Verbal order given and documented. Magdalena Mott LPN 12/2/2019 10:27 AM

## 2019-12-02 NOTE — TELEPHONE ENCOUNTER
[facility] Call [facility]    Phone Message    May a detailed message be left on voicemail: yes    Reason for Call: Order(s): Home Care Orders: Occupational Therapy (OT): Home health OT 3x/week for 3 weeks, 2x/week for 3 weeks, then 1x/week for 1 week. Please call back.    Action Taken: Message routed to:  [facility]     Verbal order given and documented. Magdalena Mott LPN 12/2/2019 8:23 AM

## 2019-12-02 NOTE — TELEPHONE ENCOUNTER
Verbal orders given to Carol from [facility], per Ary Murphy/Dr Trevizo, for   Order(s): Home Care Orders: Physical Therapy (PT): Verbal orders: Physical Therapy 1x a week for 1 week, 2x a week for 3 weeks. Magdalena Mott LPN 12/2/2019 10:27 AM

## 2019-12-03 ENCOUNTER — TELEPHONE (OUTPATIENT)
Dept: INTERNAL MEDICINE | Facility: CLINIC | Age: 64
End: 2019-12-03

## 2019-12-03 NOTE — TELEPHONE ENCOUNTER
[facility]    Phone Message    May a detailed message be left on voicemail: yes    Reason for Call: Order(s): Home Care Orders: Other: The Home care needs an order saying that they can code venous stasis ulcer bilateral lower extremities as the patient's primary Diagnosis please call to address concerns.    Action Taken: Message routed to:  [facility] caesar

## 2019-12-04 PROBLEM — I83.029 VENOUS STASIS ULCERS OF BOTH LOWER EXTREMITIES (H): Status: ACTIVE | Noted: 2019-12-04

## 2019-12-04 PROBLEM — L97.919 VENOUS STASIS ULCERS OF BOTH LOWER EXTREMITIES (H): Status: ACTIVE | Noted: 2019-12-04

## 2019-12-04 PROBLEM — L97.929 VENOUS STASIS ULCERS OF BOTH LOWER EXTREMITIES (H): Status: ACTIVE | Noted: 2019-12-04

## 2019-12-04 PROBLEM — I83.019 VENOUS STASIS ULCERS OF BOTH LOWER EXTREMITIES (H): Status: ACTIVE | Noted: 2019-12-04

## 2019-12-04 NOTE — TELEPHONE ENCOUNTER
Left a message for Emmanuelle to relay that the diagnosis was ok. Magdalena Mott LPN 12/4/2019 2:12 PM

## 2019-12-04 NOTE — TELEPHONE ENCOUNTER
Spoke to home care nurse who states that for them to do wound care, they would need a diagnosis to proceed with wound care. They would like the diagnosis of venous stasis ulcer bilateral of lower extremities. Will need verbal from provider. Magdalena Mott LPN 12/4/2019 9:30 AM

## 2019-12-09 ENCOUNTER — MEDICAL CORRESPONDENCE (OUTPATIENT)
Dept: HEALTH INFORMATION MANAGEMENT | Facility: CLINIC | Age: 64
End: 2019-12-09

## 2019-12-16 ENCOUNTER — MEDICAL CORRESPONDENCE (OUTPATIENT)
Dept: HEALTH INFORMATION MANAGEMENT | Facility: CLINIC | Age: 64
End: 2019-12-16

## 2019-12-23 DIAGNOSIS — Z79.4 TYPE 2 DIABETES MELLITUS WITHOUT COMPLICATION, WITH LONG-TERM CURRENT USE OF INSULIN (H): ICD-10-CM

## 2019-12-23 DIAGNOSIS — E11.610 TYPE 2 DIABETES MELLITUS WITH DIABETIC NEUROPATHIC ARTHROPATHY, WITHOUT LONG-TERM CURRENT USE OF INSULIN (H): ICD-10-CM

## 2019-12-23 DIAGNOSIS — E11.9 TYPE 2 DIABETES MELLITUS WITHOUT COMPLICATION, WITH LONG-TERM CURRENT USE OF INSULIN (H): ICD-10-CM

## 2019-12-24 RX ORDER — INSULIN ASPART 100 [IU]/ML
INJECTION, SOLUTION INTRAVENOUS; SUBCUTANEOUS
Qty: 30 ML | Refills: 2 | Status: SHIPPED | OUTPATIENT
Start: 2019-12-24 | End: 2020-07-20

## 2019-12-24 NOTE — TELEPHONE ENCOUNTER
Last Clinic Visit: 7/30/2019  [facility]  Overdue lab: LDL, micro albumin- FYI to clinic CMA  90 day RF's to pharm

## 2019-12-26 ENCOUNTER — TELEPHONE (OUTPATIENT)
Dept: INTERNAL MEDICINE | Facility: CLINIC | Age: 64
End: 2019-12-26

## 2019-12-26 NOTE — TELEPHONE ENCOUNTER
Wilmer Johnson, verbal approval for orders as written below via  Tiana Roberts Paramedic on 12/26/2019 at 9:07 AM

## 2019-12-26 NOTE — TELEPHONE ENCOUNTER
[facility]    Phone Message    May a detailed message be left on voicemail: no    Reason for Call: VM left on cancellation voicemail line for Home Care Orders:     Order(s): Home Care Orders: Occupational Therapy (OT): Put patient on a hold from OT starting 12/24 until 1/2/20. Would like to continue with lymphedema resuming 1/2 or afterwards     Action Taken: Message routed to:  [facility] Primary

## 2020-01-01 ENCOUNTER — PATIENT OUTREACH (OUTPATIENT)
Dept: GASTROENTEROLOGY | Facility: CLINIC | Age: 65
End: 2020-01-01

## 2020-01-01 ENCOUNTER — VIRTUAL VISIT (OUTPATIENT)
Dept: INTERNAL MEDICINE | Facility: CLINIC | Age: 65
End: 2020-01-01
Payer: COMMERCIAL

## 2020-01-01 ENCOUNTER — VIRTUAL VISIT (OUTPATIENT)
Dept: INFECTIOUS DISEASES | Facility: CLINIC | Age: 65
End: 2020-01-01
Attending: STUDENT IN AN ORGANIZED HEALTH CARE EDUCATION/TRAINING PROGRAM
Payer: COMMERCIAL

## 2020-01-01 ENCOUNTER — TELEPHONE (OUTPATIENT)
Dept: INTERNAL MEDICINE | Facility: CLINIC | Age: 65
End: 2020-01-01

## 2020-01-01 ENCOUNTER — TELEPHONE (OUTPATIENT)
Dept: VASCULAR SURGERY | Facility: CLINIC | Age: 65
End: 2020-01-01

## 2020-01-01 ENCOUNTER — MEDICAL CORRESPONDENCE (OUTPATIENT)
Dept: HEALTH INFORMATION MANAGEMENT | Facility: CLINIC | Age: 65
End: 2020-01-01

## 2020-01-01 ENCOUNTER — TELEPHONE (OUTPATIENT)
Dept: INFECTIOUS DISEASES | Facility: CLINIC | Age: 65
End: 2020-01-01

## 2020-01-01 ENCOUNTER — TRANSFERRED RECORDS (OUTPATIENT)
Dept: HEALTH INFORMATION MANAGEMENT | Facility: CLINIC | Age: 65
End: 2020-01-01

## 2020-01-01 ENCOUNTER — DOCUMENTATION ONLY (OUTPATIENT)
Dept: CARE COORDINATION | Facility: CLINIC | Age: 65
End: 2020-01-01

## 2020-01-01 ENCOUNTER — MYC MEDICAL ADVICE (OUTPATIENT)
Dept: INFECTIOUS DISEASES | Facility: CLINIC | Age: 65
End: 2020-01-01

## 2020-01-01 ENCOUNTER — VIRTUAL VISIT (OUTPATIENT)
Dept: GASTROENTEROLOGY | Facility: CLINIC | Age: 65
End: 2020-01-01
Attending: PHYSICIAN ASSISTANT
Payer: COMMERCIAL

## 2020-01-01 ENCOUNTER — VIRTUAL VISIT (OUTPATIENT)
Dept: RADIOLOGY | Facility: CLINIC | Age: 65
End: 2020-01-01
Attending: RADIOLOGY
Payer: COMMERCIAL

## 2020-01-01 ENCOUNTER — HOSPITAL ENCOUNTER (OUTPATIENT)
Dept: MRI IMAGING | Facility: CLINIC | Age: 65
End: 2020-12-08
Attending: RADIOLOGY
Payer: COMMERCIAL

## 2020-01-01 ENCOUNTER — HEALTH MAINTENANCE LETTER (OUTPATIENT)
Age: 65
End: 2020-01-01

## 2020-01-01 ENCOUNTER — MYC MEDICAL ADVICE (OUTPATIENT)
Dept: INTERNAL MEDICINE | Facility: CLINIC | Age: 65
End: 2020-01-01

## 2020-01-01 ENCOUNTER — TELEPHONE (OUTPATIENT)
Dept: ONCOLOGY | Facility: CLINIC | Age: 65
End: 2020-01-01

## 2020-01-01 ENCOUNTER — TELEPHONE (OUTPATIENT)
Dept: GASTROENTEROLOGY | Facility: CLINIC | Age: 65
End: 2020-01-01

## 2020-01-01 DIAGNOSIS — U07.1 INFECTION DUE TO 2019 NOVEL CORONAVIRUS: ICD-10-CM

## 2020-01-01 DIAGNOSIS — K70.31 ALCOHOLIC CIRRHOSIS OF LIVER WITH ASCITES (H): ICD-10-CM

## 2020-01-01 DIAGNOSIS — R40.0 DROWSY: ICD-10-CM

## 2020-01-01 DIAGNOSIS — R73.09 ELEVATED GLUCOSE: ICD-10-CM

## 2020-01-01 DIAGNOSIS — Z95.828 S/P TIPS (TRANSJUGULAR INTRAHEPATIC PORTOSYSTEMIC SHUNT): ICD-10-CM

## 2020-01-01 DIAGNOSIS — C22.0 HCC (HEPATOCELLULAR CARCINOMA) (H): ICD-10-CM

## 2020-01-01 DIAGNOSIS — F33.9 RECURRENT MAJOR DEPRESSIVE DISORDER, REMISSION STATUS UNSPECIFIED (H): ICD-10-CM

## 2020-01-01 DIAGNOSIS — C22.0 HCC (HEPATOCELLULAR CARCINOMA) (H): Primary | ICD-10-CM

## 2020-01-01 DIAGNOSIS — I87.2 VENOUS STASIS DERMATITIS OF BOTH LOWER EXTREMITIES: ICD-10-CM

## 2020-01-01 DIAGNOSIS — R50.9 FEVER, UNSPECIFIED FEVER CAUSE: Primary | ICD-10-CM

## 2020-01-01 DIAGNOSIS — K72.10 END-STAGE LIVER DISEASE (H): Primary | ICD-10-CM

## 2020-01-01 DIAGNOSIS — C22.8 PRIMARY MALIGNANT NEOPLASM OF LIVER (H): Primary | ICD-10-CM

## 2020-01-01 DIAGNOSIS — K76.82 HEPATIC ENCEPHALOPATHY (H): ICD-10-CM

## 2020-01-01 DIAGNOSIS — E44.0 MODERATE PROTEIN-CALORIE MALNUTRITION (H): ICD-10-CM

## 2020-01-01 DIAGNOSIS — D64.9 ANEMIA: ICD-10-CM

## 2020-01-01 DIAGNOSIS — R19.7 DIARRHEA OF PRESUMED INFECTIOUS ORIGIN: ICD-10-CM

## 2020-01-01 DIAGNOSIS — D64.9 ANEMIA: Primary | ICD-10-CM

## 2020-01-01 DIAGNOSIS — R19.7 DIARRHEA OF PRESUMED INFECTIOUS ORIGIN: Primary | ICD-10-CM

## 2020-01-01 LAB
C COLI+JEJUNI+LARI FUSA STL QL NAA+PROBE: NOT DETECTED
C DIFF TOX B STL QL: NEGATIVE
EC STX1 GENE STL QL NAA+PROBE: NOT DETECTED
EC STX2 GENE STL QL NAA+PROBE: NOT DETECTED
ENTERIC PATHOGEN COMMENT: NORMAL
NOROV GI+II ORF1-ORF2 JNC STL QL NAA+PR: NOT DETECTED
O+P STL CONC: NORMAL
O+P STL MICRO: NORMAL
O+P STL MICRO: NORMAL
RVA NSP5 STL QL NAA+PROBE: NOT DETECTED
SALMONELLA SP RPOD STL QL NAA+PROBE: NOT DETECTED
SHIGELLA SP+EIEC IPAH STL QL NAA+PROBE: NOT DETECTED
SPECIMEN SOURCE: NORMAL
V CHOL+PARA RFBL+TRKH+TNAA STL QL NAA+PR: NOT DETECTED
Y ENTERO RECN STL QL NAA+PROBE: NOT DETECTED

## 2020-01-01 PROCEDURE — 87177 OVA AND PARASITES SMEARS: CPT | Performed by: STUDENT IN AN ORGANIZED HEALTH CARE EDUCATION/TRAINING PROGRAM

## 2020-01-01 PROCEDURE — 99214 OFFICE O/P EST MOD 30 MIN: CPT | Mod: 95 | Performed by: STUDENT IN AN ORGANIZED HEALTH CARE EDUCATION/TRAINING PROGRAM

## 2020-01-01 PROCEDURE — A9585 GADOBUTROL INJECTION: HCPCS | Performed by: RADIOLOGY

## 2020-01-01 PROCEDURE — 72158 MRI LUMBAR SPINE W/O & W/DYE: CPT | Mod: 26 | Performed by: RADIOLOGY

## 2020-01-01 PROCEDURE — 99212 OFFICE O/P EST SF 10 MIN: CPT | Mod: 95 | Performed by: RADIOLOGY

## 2020-01-01 PROCEDURE — 74183 MRI ABD W/O CNTR FLWD CNTR: CPT | Mod: 26 | Performed by: RADIOLOGY

## 2020-01-01 PROCEDURE — 74183 MRI ABD W/O CNTR FLWD CNTR: CPT

## 2020-01-01 PROCEDURE — 87209 SMEAR COMPLEX STAIN: CPT | Performed by: STUDENT IN AN ORGANIZED HEALTH CARE EDUCATION/TRAINING PROGRAM

## 2020-01-01 PROCEDURE — 87493 C DIFF AMPLIFIED PROBE: CPT | Performed by: STUDENT IN AN ORGANIZED HEALTH CARE EDUCATION/TRAINING PROGRAM

## 2020-01-01 PROCEDURE — 72158 MRI LUMBAR SPINE W/O & W/DYE: CPT

## 2020-01-01 PROCEDURE — 87206 SMEAR FLUORESCENT/ACID STAI: CPT | Performed by: STUDENT IN AN ORGANIZED HEALTH CARE EDUCATION/TRAINING PROGRAM

## 2020-01-01 PROCEDURE — 87506 IADNA-DNA/RNA PROBE TQ 6-11: CPT | Performed by: STUDENT IN AN ORGANIZED HEALTH CARE EDUCATION/TRAINING PROGRAM

## 2020-01-01 PROCEDURE — 99215 OFFICE O/P EST HI 40 MIN: CPT | Mod: 95 | Performed by: NURSE PRACTITIONER

## 2020-01-01 PROCEDURE — 255N000002 HC RX 255 OP 636: Performed by: RADIOLOGY

## 2020-01-01 PROCEDURE — 99215 OFFICE O/P EST HI 40 MIN: CPT | Mod: 95 | Performed by: PHYSICIAN ASSISTANT

## 2020-01-01 RX ORDER — LACTOSE-REDUCED FOOD
1 LIQUID (ML) ORAL DAILY
Qty: 30 EACH | Refills: 11 | Status: SHIPPED | OUTPATIENT
Start: 2020-01-01

## 2020-01-01 RX ORDER — GADOBUTROL 604.72 MG/ML
10 INJECTION INTRAVENOUS ONCE
Status: COMPLETED | OUTPATIENT
Start: 2020-01-01 | End: 2020-01-01

## 2020-01-01 RX ORDER — OXYCODONE HYDROCHLORIDE 5 MG/1
TABLET ORAL
Qty: 180 TABLET | Refills: 0 | Status: SHIPPED | OUTPATIENT
Start: 2020-01-01 | End: 2020-01-01

## 2020-01-01 RX ORDER — QUETIAPINE FUMARATE 25 MG/1
TABLET, FILM COATED ORAL
Qty: 60 TABLET | Refills: 3 | Status: SHIPPED | OUTPATIENT
Start: 2020-01-01 | End: 2021-01-01

## 2020-01-01 RX ORDER — HYDROXYZINE HYDROCHLORIDE 25 MG/1
25 TABLET, FILM COATED ORAL 3 TIMES DAILY PRN
Qty: 90 TABLET | Refills: 3 | Status: SHIPPED | OUTPATIENT
Start: 2020-01-01 | End: 2021-01-01

## 2020-01-01 RX ORDER — OXYCODONE HYDROCHLORIDE 5 MG/1
TABLET ORAL
Qty: 180 TABLET | Refills: 0 | Status: SHIPPED | OUTPATIENT
Start: 2020-01-01 | End: 2021-01-01

## 2020-01-01 RX ORDER — CITALOPRAM HYDROBROMIDE 20 MG/1
20 TABLET ORAL DAILY
Qty: 90 TABLET | Refills: 3 | Status: SHIPPED | OUTPATIENT
Start: 2020-01-01

## 2020-01-01 RX ORDER — LIDOCAINE 4 G/G
1 PATCH TOPICAL EVERY 24 HOURS
Qty: 30 PATCH | Refills: 3 | Status: SHIPPED | OUTPATIENT
Start: 2020-01-01

## 2020-01-01 RX ORDER — QUETIAPINE FUMARATE 50 MG/1
TABLET, FILM COATED ORAL
Qty: 60 TABLET | Refills: 3 | Status: SHIPPED | OUTPATIENT
Start: 2020-01-01 | End: 2021-01-01

## 2020-01-01 RX ADMIN — GADOBUTROL 10 ML: 604.72 INJECTION INTRAVENOUS at 13:14

## 2020-01-01 ASSESSMENT — PAIN SCALES - GENERAL
PAINLEVEL: SEVERE PAIN (7)
PAINLEVEL: NO PAIN (0)

## 2020-01-02 ENCOUNTER — MYC MEDICAL ADVICE (OUTPATIENT)
Dept: INTERNAL MEDICINE | Facility: CLINIC | Age: 65
End: 2020-01-02

## 2020-01-02 ENCOUNTER — MYC REFILL (OUTPATIENT)
Dept: INTERNAL MEDICINE | Facility: CLINIC | Age: 65
End: 2020-01-02

## 2020-01-02 DIAGNOSIS — K70.31 ALCOHOLIC CIRRHOSIS OF LIVER WITH ASCITES (H): ICD-10-CM

## 2020-01-02 DIAGNOSIS — R11.0 NAUSEA: ICD-10-CM

## 2020-01-02 RX ORDER — OXYCODONE HYDROCHLORIDE 5 MG/1
TABLET ORAL
Qty: 180 TABLET | Refills: 0 | Status: CANCELLED | OUTPATIENT
Start: 2020-01-02

## 2020-01-02 RX ORDER — OXYCODONE HYDROCHLORIDE 5 MG/1
TABLET ORAL
Qty: 180 TABLET | Refills: 0 | Status: SHIPPED | OUTPATIENT
Start: 2020-01-02 | End: 2020-01-31

## 2020-01-02 RX ORDER — ONDANSETRON 4 MG/1
4 TABLET, FILM COATED ORAL EVERY 8 HOURS PRN
Qty: 18 TABLET | Refills: 1 | Status: SHIPPED | OUTPATIENT
Start: 2020-01-02 | End: 2020-03-01

## 2020-01-02 NOTE — TELEPHONE ENCOUNTER
Reason For Call:       Chief Complaint   Patient presents with     Medication Refill         Medication Name, Dose and Monthly Quantity:   oxyCODONE IR (ROXICODONE) 5 MG tablet, 180 tabs     Diagnosis requiring opiates:   Alcoholic cirrhosis of liver with ascites (H) [K70.31]      Problem List Updated:   no     Opioid Agreement On File - [facility] PAIN CONTRACT ID# [id number]:  no     Last Urine Drug Screen (at least once every 12 months) Date:   none     Unexpected Results:   na     [place]  Data Reviewed (at least once every 3 months) Date:   01/02/2020     Unexpected Results:    no     Last Fill Date:   11/6/2019 -- from a different provider  Due Date:        Last Visit with PCP:   07/30/19     Future Visits with PCP:   none     Processing:   E prescribe to pharmacy

## 2020-01-09 ENCOUNTER — MEDICAL CORRESPONDENCE (OUTPATIENT)
Dept: HEALTH INFORMATION MANAGEMENT | Facility: CLINIC | Age: 65
End: 2020-01-09

## 2020-01-16 ENCOUNTER — MEDICAL CORRESPONDENCE (OUTPATIENT)
Dept: HEALTH INFORMATION MANAGEMENT | Facility: CLINIC | Age: 65
End: 2020-01-16

## 2020-01-21 ENCOUNTER — MEDICAL CORRESPONDENCE (OUTPATIENT)
Dept: HEALTH INFORMATION MANAGEMENT | Facility: CLINIC | Age: 65
End: 2020-01-21

## 2020-01-24 ENCOUNTER — MEDICAL CORRESPONDENCE (OUTPATIENT)
Dept: HEALTH INFORMATION MANAGEMENT | Facility: CLINIC | Age: 65
End: 2020-01-24

## 2020-01-27 ENCOUNTER — MEDICAL CORRESPONDENCE (OUTPATIENT)
Dept: HEALTH INFORMATION MANAGEMENT | Facility: CLINIC | Age: 65
End: 2020-01-27

## 2020-01-28 ENCOUNTER — MEDICAL CORRESPONDENCE (OUTPATIENT)
Dept: HEALTH INFORMATION MANAGEMENT | Facility: CLINIC | Age: 65
End: 2020-01-28

## 2020-01-28 ENCOUNTER — TELEPHONE (OUTPATIENT)
Dept: INTERNAL MEDICINE | Facility: CLINIC | Age: 65
End: 2020-01-28

## 2020-01-28 NOTE — TELEPHONE ENCOUNTER
Verbal orders given to Emmanuelle from [facility], per Ary Murphy, for Skilled Nursinx a week for 4 weeks for wound care and disease education, discontinuing the home Health Aid per the patients request. Magdalena Mott LPN 2020 11:11 AM

## 2020-01-28 NOTE — TELEPHONE ENCOUNTER
[facility] Call Center    Phone Message    May a detailed message be left on voicemail: yes    Reason for Call: Order(s): Home Care Orders: Skilled Nursinx a week for 4 weeks for wound care and disease education, discontinuing the home [facility] pet the patients request, please call with the verbal orders thank you!       Action Taken: Message routed to:  [facility]     Verbal order given and documented. Magdalena Mott LPN 2020 11:09 AM

## 2020-01-31 ENCOUNTER — TELEPHONE (OUTPATIENT)
Dept: INTERNAL MEDICINE | Facility: CLINIC | Age: 65
End: 2020-01-31

## 2020-01-31 ENCOUNTER — TRANSFERRED RECORDS (OUTPATIENT)
Dept: HEALTH INFORMATION MANAGEMENT | Facility: CLINIC | Age: 65
End: 2020-01-31

## 2020-01-31 ENCOUNTER — MYC REFILL (OUTPATIENT)
Dept: GASTROENTEROLOGY | Facility: CLINIC | Age: 65
End: 2020-01-31

## 2020-01-31 ENCOUNTER — MYC REFILL (OUTPATIENT)
Dept: INTERNAL MEDICINE | Facility: CLINIC | Age: 65
End: 2020-01-31

## 2020-01-31 DIAGNOSIS — F33.9 RECURRENT MAJOR DEPRESSIVE DISORDER, REMISSION STATUS UNSPECIFIED (H): ICD-10-CM

## 2020-01-31 DIAGNOSIS — F32.89 OTHER DEPRESSION: ICD-10-CM

## 2020-01-31 DIAGNOSIS — I87.2 VENOUS STASIS DERMATITIS OF BOTH LOWER EXTREMITIES: ICD-10-CM

## 2020-01-31 DIAGNOSIS — K70.31 ALCOHOLIC CIRRHOSIS OF LIVER WITH ASCITES (H): ICD-10-CM

## 2020-01-31 DIAGNOSIS — K76.82 HEPATIC ENCEPHALOPATHY (H): ICD-10-CM

## 2020-01-31 RX ORDER — CITALOPRAM HYDROBROMIDE 20 MG/1
20 TABLET ORAL DAILY
Qty: 90 TABLET | Refills: 1 | Status: SHIPPED | OUTPATIENT
Start: 2020-01-31 | End: 2020-07-20

## 2020-01-31 RX ORDER — OXYCODONE HYDROCHLORIDE 5 MG/1
TABLET ORAL
Qty: 180 TABLET | Refills: 0 | Status: SHIPPED | OUTPATIENT
Start: 2020-02-01 | End: 2020-03-01

## 2020-01-31 RX ORDER — HYDROXYZINE HYDROCHLORIDE 25 MG/1
25 TABLET, FILM COATED ORAL 3 TIMES DAILY PRN
Qty: 90 TABLET | Refills: 3 | Status: CANCELLED | OUTPATIENT
Start: 2020-01-31

## 2020-01-31 RX ORDER — DESVENLAFAXINE 100 MG/1
200 TABLET, EXTENDED RELEASE ORAL DAILY
Qty: 180 TABLET | Refills: 1 | Status: SHIPPED | OUTPATIENT
Start: 2020-01-31 | End: 2020-07-20

## 2020-01-31 RX ORDER — LACTULOSE 10 G/15ML
30 SOLUTION ORAL; RECTAL 2 TIMES DAILY
Qty: 1892 ML | Refills: 2 | Status: SHIPPED | OUTPATIENT
Start: 2020-01-31 | End: 2020-04-28

## 2020-01-31 NOTE — TELEPHONE ENCOUNTER
desvenlafaxine (PRISTIQ) 100 MG 24 hr tablet   Last Written Prescription Date:  8-18-19  Last Fill Quantity: 180,   # refills: 0  Last Office Visit : 7-30-19  Future Office visit:  none    Routing refill request to provider for review/approval because:  Overdue PHQ9- previous navid RF given  - this is a second refill request for medication with out or overdue labs. With last request pt was given 90 day Sadie notified.    citalopram (CELEXA) 20 MG tablet      Last Written Prescription Date:  10-1-19  Last Fill Quantity: 90,   # refills: 0  Routing refill request to provider for review/approval because:  Past due PHQ9  - this is a second refill request for medication with out or overdue labs. With last request pt was given 90 day Sadie notified.  Last fill dosage change.    Scheduling has been notified to contact the pt for appointment.        hydrOXYzine (ATARAX) 25 MG tabletRF available, my chart request    PCC :oxyCODONE (ROXICODONE) 5 MG tablet please address controlled med

## 2020-01-31 NOTE — TELEPHONE ENCOUNTER
[facility]    Phone Message    May a detailed message be left on voicemail: yes    Reason for Call: Symptoms or Concerns     If patient has red-flag symptoms, warm transfer to triage line    Current symptom or concern: Bilateral Swollen Scrotum    Symptoms have been present for:  1 day(s)    Has patient previously been seen for this? No    By : patient cant remember    Date: patient cant remember     Are there any new or worsening symptoms? Yes: its getting bigger please call peter to address concern or call patient thank you.      Action Taken: Message routed to:  [facility] caesar

## 2020-01-31 NOTE — TELEPHONE ENCOUNTER
Spoke to Randy who states that the patients scrotum is pretty swollen up. Patient told Randy that is started yesterday and continues to swell. Advised that the patient goes to ER to be evaluated. Magdalena Mott LPN 1/31/2020 2:35 PM

## 2020-01-31 NOTE — TELEPHONE ENCOUNTER
site reviewed today. Oxycodone last filled 1/2/20. Will send all refills to PCP for review.    Yolanda Alejandre RN ([facility])

## 2020-02-03 ENCOUNTER — MEDICAL CORRESPONDENCE (OUTPATIENT)
Dept: HEALTH INFORMATION MANAGEMENT | Facility: CLINIC | Age: 65
End: 2020-02-03

## 2020-02-07 ENCOUNTER — MEDICAL CORRESPONDENCE (OUTPATIENT)
Dept: HEALTH INFORMATION MANAGEMENT | Facility: CLINIC | Age: 65
End: 2020-02-07

## 2020-02-10 ENCOUNTER — PATIENT OUTREACH (OUTPATIENT)
Dept: GASTROENTEROLOGY | Facility: CLINIC | Age: 65
End: 2020-02-10

## 2020-02-10 DIAGNOSIS — K70.31 ALCOHOLIC CIRRHOSIS OF LIVER WITH ASCITES (H): Primary | ICD-10-CM

## 2020-02-11 ENCOUNTER — TELEPHONE (OUTPATIENT)
Dept: INTERNAL MEDICINE | Facility: CLINIC | Age: 65
End: 2020-02-11

## 2020-02-11 NOTE — TELEPHONE ENCOUNTER
Called [facility] verbal approval for home care orders as written below, I approved PT, did not mention the pt name as the VM did not appear to be secure or belonging to a home care agency, it just said [facility] [place]. . Tiana Roberts Paramedic on 2/11/2020 at 4:11 PM

## 2020-02-11 NOTE — TELEPHONE ENCOUNTER
[facility]    Phone Message    May a detailed message be left on voicemail: yes     Reason for Call: Order(s): Home Care Orders: Physical Therapy (PT): . 1 by 1 , 2 by 1 , 1 by 3. gait training, transfers, functional strength    Action Taken: Message routed to:  [facility]    Travel Screening: Not Applicable

## 2020-02-14 ENCOUNTER — TELEPHONE (OUTPATIENT)
Dept: INTERNAL MEDICINE | Facility: CLINIC | Age: 65
End: 2020-02-14

## 2020-02-14 NOTE — TELEPHONE ENCOUNTER
Verbal orders given to Emmanuelle from [facility], per Ary Ahn/, for Order(s): Home Care Orders: Occupational Therapy (OT): 1x a week for 1 week to Eval and Treat  and Skilled Nursinx a week for 1 week, 2x a week for 6 weeks, wound care disease education. Magdalena Mott LPN 2020 11:35 AM

## 2020-02-14 NOTE — TELEPHONE ENCOUNTER
[facility]    Phone Message    May a detailed message be left on voicemail: yes     Reason for Call: Order(s): Home Care Orders: Occupational Therapy (OT): 1x a week for 1 week to Eval and Treat  and Skilled Nursinx a week for 1 week, 2x a week for 6 weeks, wound care disease education      Action Taken: Message routed to:  [facility]    Travel Screening: Not Applicable          Verbal order given and documented. Magdalena Mott LPN 2020 11:34 AM

## 2020-02-17 ENCOUNTER — TELEPHONE (OUTPATIENT)
Dept: GASTROENTEROLOGY | Facility: CLINIC | Age: 65
End: 2020-02-17

## 2020-02-17 NOTE — TELEPHONE ENCOUNTER
Patient contacted and reminded of upcoming appointment.  Patient confirmed they will be attending.  Patient instructed to bring updated medications list to appointment.    Idania Oreilly on 2/17/2020 at 2:22 PM

## 2020-02-24 ENCOUNTER — MYC MEDICAL ADVICE (OUTPATIENT)
Dept: GASTROENTEROLOGY | Facility: CLINIC | Age: 65
End: 2020-02-24

## 2020-02-25 ENCOUNTER — TELEPHONE (OUTPATIENT)
Dept: INTERNAL MEDICINE | Facility: CLINIC | Age: 65
End: 2020-02-25

## 2020-02-25 NOTE — TELEPHONE ENCOUNTER
Verbal orders given to Emmanuelle from [facility], per Ary Murphy/Dr. Trevizo, for Verbal orders for a . Magdalena Mott LPN 2/25/2020 3:03 PM

## 2020-02-25 NOTE — TELEPHONE ENCOUNTER
[facility]    Phone Message    May a detailed message be left on voicemail: yes     Reason for Call: Order(s): Home Care Orders: Other: .     Verbal orders for a     Action Taken: Message routed to:  [facility]    Travel Screening: Not Applicable

## 2020-02-29 DIAGNOSIS — I87.2 VENOUS STASIS DERMATITIS OF BOTH LOWER EXTREMITIES: ICD-10-CM

## 2020-03-01 ENCOUNTER — HEALTH MAINTENANCE LETTER (OUTPATIENT)
Age: 65
End: 2020-03-01

## 2020-03-01 ENCOUNTER — MYC REFILL (OUTPATIENT)
Dept: INTERNAL MEDICINE | Facility: CLINIC | Age: 65
End: 2020-03-01

## 2020-03-01 DIAGNOSIS — K70.31 ALCOHOLIC CIRRHOSIS OF LIVER WITH ASCITES (H): ICD-10-CM

## 2020-03-01 DIAGNOSIS — I87.2 VENOUS STASIS DERMATITIS OF BOTH LOWER EXTREMITIES: ICD-10-CM

## 2020-03-01 DIAGNOSIS — R11.0 NAUSEA: ICD-10-CM

## 2020-03-02 ENCOUNTER — PATIENT OUTREACH (OUTPATIENT)
Dept: GASTROENTEROLOGY | Facility: CLINIC | Age: 65
End: 2020-03-02

## 2020-03-02 NOTE — TELEPHONE ENCOUNTER
Reason For Call:   Chief Complaint   Patient presents with     Refill Request       Medication Name, Dose and Monthly Quantity:   oxyCODONE IR (ROXICODONE) 5 MG tablet, 180 tabs    Diagnosis requiring opiates:   Alcoholic cirrhosis of liver with ascites (H) [K70.31]     Problem List Updated:   no    Opioid Agreement On File - [facility] PAIN CONTRACT ID# [id number]:  no    Last Urine Drug Screen (at least once every 12 months) Date:   none    Unexpected Results:   na    [place]  Data Reviewed (at least once every 3 months) Date:   07/05/2019    Unexpected Results:    no    Last Fill Date:   02/01/2020  Due Date:   03/02/2020    Last Visit with PCP:   04/23/19    Future Visits with PCP:   none    Processing:   Mail to pharmacy    CHRISTINA SOL CMA

## 2020-03-03 ENCOUNTER — MEDICAL CORRESPONDENCE (OUTPATIENT)
Dept: HEALTH INFORMATION MANAGEMENT | Facility: CLINIC | Age: 65
End: 2020-03-03

## 2020-03-03 RX ORDER — HYDROXYZINE HYDROCHLORIDE 25 MG/1
25 TABLET, FILM COATED ORAL 3 TIMES DAILY PRN
Qty: 90 TABLET | Refills: 3 | Status: SHIPPED | OUTPATIENT
Start: 2020-03-03 | End: 2020-07-20

## 2020-03-03 RX ORDER — HYDROXYZINE HYDROCHLORIDE 25 MG/1
TABLET, FILM COATED ORAL
Qty: 90 TABLET | Refills: 2 | OUTPATIENT
Start: 2020-03-03

## 2020-03-03 RX ORDER — OXYCODONE HYDROCHLORIDE 5 MG/1
TABLET ORAL
Qty: 180 TABLET | Refills: 0 | Status: SHIPPED | OUTPATIENT
Start: 2020-03-03 | End: 2020-04-04

## 2020-03-03 RX ORDER — ONDANSETRON 4 MG/1
4 TABLET, FILM COATED ORAL EVERY 8 HOURS PRN
Qty: 18 TABLET | Refills: 1 | Status: SHIPPED | OUTPATIENT
Start: 2020-03-03 | End: 2020-04-04

## 2020-03-04 NOTE — PROGRESS NOTES
Patient is managing okay, although per Raven there may be the start of an infection in his leg wounds. This is being monitored by his home care. His fluid retention continues to be a struggle, as he is not always mindful of sodium restrictions despite Raven's best efforts. He occasionally is hospitalized, last in January, and gets diuresed well. Raven states the patient is well-stocked with rifaximin, but will get the application when next in clinic. Scheduled 3/12 at 11:15 with Yovany Lopez PA-C.

## 2020-03-08 DIAGNOSIS — K76.82 HEPATIC ENCEPHALOPATHY (H): ICD-10-CM

## 2020-03-09 ENCOUNTER — MEDICAL CORRESPONDENCE (OUTPATIENT)
Dept: HEALTH INFORMATION MANAGEMENT | Facility: CLINIC | Age: 65
End: 2020-03-09

## 2020-03-09 RX ORDER — RIFAXIMIN 550 MG/1
TABLET ORAL
Qty: 60 TABLET | Refills: 11 | Status: SHIPPED | OUTPATIENT
Start: 2020-03-09 | End: 2020-07-20

## 2020-03-11 ENCOUNTER — TELEPHONE (OUTPATIENT)
Dept: GASTROENTEROLOGY | Facility: CLINIC | Age: 65
End: 2020-03-11

## 2020-03-11 NOTE — TELEPHONE ENCOUNTER
Patient contacted and reminded of upcoming appointment.  Patient confirmed they will be attending.  Patient instructed to bring updated medications list to appointment.    Idania Oreilly on 3/11/2020 at 1:34 PM

## 2020-03-12 ENCOUNTER — OFFICE VISIT (OUTPATIENT)
Dept: GASTROENTEROLOGY | Facility: CLINIC | Age: 65
End: 2020-03-12
Attending: PHYSICIAN ASSISTANT
Payer: COMMERCIAL

## 2020-03-12 VITALS
WEIGHT: 248.9 LBS | SYSTOLIC BLOOD PRESSURE: 117 MMHG | BODY MASS INDEX: 33.71 KG/M2 | HEART RATE: 92 BPM | DIASTOLIC BLOOD PRESSURE: 73 MMHG | OXYGEN SATURATION: 99 % | TEMPERATURE: 97.6 F | HEIGHT: 72 IN

## 2020-03-12 DIAGNOSIS — R60.0 EDEMA OF BOTH LEGS: ICD-10-CM

## 2020-03-12 DIAGNOSIS — E44.0 MODERATE PROTEIN-CALORIE MALNUTRITION (H): ICD-10-CM

## 2020-03-12 DIAGNOSIS — E78.5 HYPERLIPIDEMIA LDL GOAL <100: ICD-10-CM

## 2020-03-12 DIAGNOSIS — Z13.29 SCREENING FOR THYROID DISORDER: ICD-10-CM

## 2020-03-12 DIAGNOSIS — E11.621 TYPE 2 DIABETES MELLITUS WITH FOOT ULCER, WITHOUT LONG-TERM CURRENT USE OF INSULIN (H): ICD-10-CM

## 2020-03-12 DIAGNOSIS — R41.0 CONFUSION: ICD-10-CM

## 2020-03-12 DIAGNOSIS — Z79.4 TYPE 2 DIABETES MELLITUS WITHOUT COMPLICATION, WITH LONG-TERM CURRENT USE OF INSULIN (H): ICD-10-CM

## 2020-03-12 DIAGNOSIS — K70.30 ALCOHOLIC CIRRHOSIS OF LIVER WITHOUT ASCITES (H): ICD-10-CM

## 2020-03-12 DIAGNOSIS — E11.610 TYPE 2 DIABETES MELLITUS WITH DIABETIC NEUROPATHIC ARTHROPATHY, WITHOUT LONG-TERM CURRENT USE OF INSULIN (H): ICD-10-CM

## 2020-03-12 DIAGNOSIS — L97.509 TYPE 2 DIABETES MELLITUS WITH FOOT ULCER, WITHOUT LONG-TERM CURRENT USE OF INSULIN (H): ICD-10-CM

## 2020-03-12 DIAGNOSIS — K70.31 ALCOHOLIC CIRRHOSIS OF LIVER WITH ASCITES (H): ICD-10-CM

## 2020-03-12 DIAGNOSIS — E11.9 TYPE 2 DIABETES MELLITUS WITHOUT COMPLICATION, WITH LONG-TERM CURRENT USE OF INSULIN (H): ICD-10-CM

## 2020-03-12 DIAGNOSIS — Z95.828 S/P TIPS (TRANSJUGULAR INTRAHEPATIC PORTOSYSTEMIC SHUNT): Primary | ICD-10-CM

## 2020-03-12 LAB
ALBUMIN SERPL-MCNC: 1.9 G/DL (ref 3.4–5)
ALBUMIN UR-MCNC: NEGATIVE MG/DL
ALP SERPL-CCNC: 178 U/L (ref 40–150)
ALT SERPL W P-5'-P-CCNC: 10 U/L (ref 0–70)
AMYLASE SERPL-CCNC: 36 U/L (ref 30–110)
ANION GAP SERPL CALCULATED.3IONS-SCNC: 7 MMOL/L (ref 3–14)
APPEARANCE UR: CLEAR
AST SERPL W P-5'-P-CCNC: 19 U/L (ref 0–45)
BILIRUB DIRECT SERPL-MCNC: 0.5 MG/DL (ref 0–0.2)
BILIRUB SERPL-MCNC: 1 MG/DL (ref 0.2–1.3)
BILIRUB UR QL STRIP: NEGATIVE
BUN SERPL-MCNC: 17 MG/DL (ref 7–30)
CALCIUM SERPL-MCNC: 8.1 MG/DL (ref 8.5–10.1)
CHLORIDE SERPL-SCNC: 101 MMOL/L (ref 94–109)
CHOLEST SERPL-MCNC: 96 MG/DL
CO2 SERPL-SCNC: 26 MMOL/L (ref 20–32)
COLOR UR AUTO: YELLOW
CREAT SERPL-MCNC: 0.91 MG/DL (ref 0.66–1.25)
CREAT UR-MCNC: 86 MG/DL
ERYTHROCYTE [DISTWIDTH] IN BLOOD BY AUTOMATED COUNT: 16.3 % (ref 10–15)
GFR SERPL CREATININE-BSD FRML MDRD: 88 ML/MIN/{1.73_M2}
GLUCOSE SERPL-MCNC: 116 MG/DL (ref 70–99)
GLUCOSE UR STRIP-MCNC: NEGATIVE MG/DL
HBA1C MFR BLD: 6.3 % (ref 0–5.6)
HCT VFR BLD AUTO: 24.2 % (ref 40–53)
HDLC SERPL-MCNC: 45 MG/DL
HGB BLD-MCNC: 7.6 G/DL (ref 13.3–17.7)
HGB UR QL STRIP: NEGATIVE
HYALINE CASTS #/AREA URNS LPF: 14 /LPF (ref 0–2)
INR PPP: 1.28 (ref 0.86–1.14)
KETONES UR STRIP-MCNC: NEGATIVE MG/DL
LDLC SERPL CALC-MCNC: 40 MG/DL
LEUKOCYTE ESTERASE UR QL STRIP: NEGATIVE
MCH RBC QN AUTO: 27.4 PG (ref 26.5–33)
MCHC RBC AUTO-ENTMCNC: 31.4 G/DL (ref 31.5–36.5)
MCV RBC AUTO: 87 FL (ref 78–100)
MICROALBUMIN UR-MCNC: 5 MG/L
MICROALBUMIN/CREAT UR: 6.15 MG/G CR (ref 0–17)
MUCOUS THREADS #/AREA URNS LPF: PRESENT /LPF
NITRATE UR QL: NEGATIVE
NONHDLC SERPL-MCNC: 51 MG/DL
PH UR STRIP: 5 PH (ref 5–7)
PLATELET # BLD AUTO: 118 10E9/L (ref 150–450)
POTASSIUM SERPL-SCNC: 3.7 MMOL/L (ref 3.4–5.3)
PROT SERPL-MCNC: 6.4 G/DL (ref 6.8–8.8)
RBC # BLD AUTO: 2.77 10E12/L (ref 4.4–5.9)
RBC #/AREA URNS AUTO: 1 /HPF (ref 0–2)
SODIUM SERPL-SCNC: 133 MMOL/L (ref 133–144)
SOURCE: ABNORMAL
SP GR UR STRIP: 1.01 (ref 1–1.03)
SQUAMOUS #/AREA URNS AUTO: <1 /HPF (ref 0–1)
TRIGL SERPL-MCNC: 57 MG/DL
TSH SERPL DL<=0.005 MIU/L-ACNC: 2.69 MU/L (ref 0.4–4)
UROBILINOGEN UR STRIP-MCNC: 0 MG/DL (ref 0–2)
WBC # BLD AUTO: 4.6 10E9/L (ref 4–11)
WBC #/AREA URNS AUTO: 1 /HPF (ref 0–5)

## 2020-03-12 PROCEDURE — 85610 PROTHROMBIN TIME: CPT | Performed by: PHYSICIAN ASSISTANT

## 2020-03-12 PROCEDURE — 85027 COMPLETE CBC AUTOMATED: CPT | Performed by: PHYSICIAN ASSISTANT

## 2020-03-12 PROCEDURE — 80076 HEPATIC FUNCTION PANEL: CPT | Performed by: PHYSICIAN ASSISTANT

## 2020-03-12 PROCEDURE — 36415 COLL VENOUS BLD VENIPUNCTURE: CPT | Performed by: PHYSICIAN ASSISTANT

## 2020-03-12 PROCEDURE — 81001 URINALYSIS AUTO W/SCOPE: CPT | Performed by: NURSE PRACTITIONER

## 2020-03-12 PROCEDURE — 80048 BASIC METABOLIC PNL TOTAL CA: CPT | Performed by: PHYSICIAN ASSISTANT

## 2020-03-12 PROCEDURE — G0463 HOSPITAL OUTPT CLINIC VISIT: HCPCS | Mod: ZF

## 2020-03-12 ASSESSMENT — PAIN SCALES - GENERAL: PAINLEVEL: NO PAIN (0)

## 2020-03-12 ASSESSMENT — MIFFLIN-ST. JEOR: SCORE: 1947

## 2020-03-12 NOTE — LETTER
3/12/2020       RE: Lawrence Louie  [address] [place] [address]     Dear Colleague,    Thank you for referring your patient, Lawrence Louie, to the [facility] HEPATOLOGY at [facility]. Please see a copy of my visit note below.    Hepatology Follow-up Clinic note  Lawrence Louie   Date of Birth 1955  Date of Service 3/12/2020    Reason for follow-up: ETOH cirrhosis          Assessment/plan:   Lawrence Louie is a 64 year old male with ETOH cirrhosis complicated by ascites s/p TIPS and hepatic encephalopathy. His HE is well controlled at this time. He has had multiple hospitalizations for significant lower extremity edema/scrotal edema, which has required agressive diuresis. It has remained relatively stable over the past month. They are currently being very strict with low sodium diet.  No labs available at the time of visit. He is up to date with HCC    # Labs today: BMP, CBC, hepatic panel, INR  # Moderate Malnutrition  # MILLY   - Improve nutrition (high protein diet  gm) , okay to have up to 2000 mg sodium  - Continue 40 mg Lasix and 100 mg Spironolactone  - Okay to take extra 40 mg Lasix 2-3 times a week as needed    # HCC screening / TIPS patency  - US Abdomen with TIPS doppler due in May 2020    Follow-up in clinic in 3 months with Dr. Simmons or sooner as needed    Yovany Lopez PA-C   [facility] Hepatology clinic    -----------------------------------------------------       HPI:   Lawrence Louie is a 64 year old male presenting for follow-up. He is accompanied by his wife and daughter who provide much of the history.     Cirrhosis  - ETOH and SMITH  Complicated by:  Ascites s/p TIPS on April 2018  Hx of HE   EGD: 6/16/2016  HCC: 11.24.2019     Patient was last seen by me on 11/18/2019. Shortly after visit, patient had a fall and suffered multiple lumbar spine factors. Patient was hospitalized for scrotal edema at  [facility] at the end of January. He had aggressive diuresis, and was down 6.8 L by discharge.     His daughter and wife help manage his medications. Patient will also occasionally take an extra diueretHis daughters states they limit sodium intake to 700 mg daily. He doesn't feel like eat any protein sources without them having too much sodium. He likes hot dogs, bologna.     He is having regular bowel movements. No episodes of confusion. He takes lactulose and rifaximin regularly. Patient's family has many questions about wound care and lymphedema for legs. He is currently getting home care.     Patient denies jaundice, abdominal distension.  Patient also denies melena, hematochezia or hematemesis. Patient denies  fevers, sweats or chills.    No recent alcohol intake.     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)      Splenomegaly      Squamous cell carcinoma      Thrombocytopenia (H)      Varices, esophageal (H)     Past Surgical History:   Procedure Laterality Date     BIOPSY OF SKIN LESION       COLONOSCOPY Left 6/16/2016    Procedure: COMBINED COLONOSCOPY, SINGLE OR MULTIPLE BIOPSY/POLYPECTOMY BY BIOPSY;  Surgeon: Brandy Barnett MD;  Location:  GI     ESOPHAGOSCOPY, GASTROSCOPY, DUODENOSCOPY (EGD), COMBINED  2/13/2013    Procedure: COMBINED ESOPHAGOSCOPY, GASTROSCOPY, DUODENOSCOPY (EGD);;  Surgeon: Tara Cook MD;  Location:  GI     ESOPHAGOSCOPY, GASTROSCOPY, DUODENOSCOPY (EGD), COMBINED  11/4/2013    Procedure: COMBINED ESOPHAGOSCOPY, GASTROSCOPY, DUODENOSCOPY (EGD);;  Surgeon: Lonny Diaz MD;  Location:  GI     ESOPHAGOSCOPY, GASTROSCOPY, DUODENOSCOPY (EGD), COMBINED Left 6/16/2016    Procedure: COMBINED ESOPHAGOSCOPY, GASTROSCOPY,  DUODENOSCOPY (EGD), BIOPSY SINGLE OR MULTIPLE;  Surgeon: Brandy Barnett MD;  Location: [facility]     EXCISE LESION TRUNK  9/24/2012    Procedure: EXCISE LESION TRUNK;;  Surgeon: Pepe Dominguez MD;  Location: [facility]     GENITOURINARY SURGERY      vasectomy     HERNIORRHAPHY UMBILICAL  9/24/2012    Procedure: HERNIORRHAPHY UMBILICAL;  UMBILICAL HERNIA REPAIR , EXCISION OF PERIUMBILICAL CYST;  Surgeon: Pepe Dominguez MD;  Location: [facility]                 Medications:     Current Outpatient Medications   Medication     blood glucose (NO BRAND SPECIFIED) lancets standard     blood glucose monitoring (NO BRAND SPECIFIED) meter device kit     blood glucose monitoring (NO BRAND SPECIFIED) test strip     childrens multivitamin w/ionr (FLINTSTONES COMPLETE) 60 MG chewable tablet     Cholecalciferol (VITAMIN D3) 2000 units CAPS     citalopram (CELEXA) 20 MG tablet     CRANBERRY PO     Cyanocobalamin (VITAMIN B-12 PO)     desvenlafaxine (PRISTIQ) 100 MG 24 hr tablet     desvenlafaxine fumarate 100 MG 24 hr tablet     furosemide (LASIX) 40 MG tablet     gabapentin (NEURONTIN) 300 MG capsule     hydrOXYzine (ATARAX) 25 MG tablet     insulin glargine (LANTUS SOLOSTAR) 100 UNIT/ML pen     insulin pen needle (B-D U/F) 31G X 8 MM     insulin pen needle (ULTICARE SHORT) 31G X 8 MM miscellaneous     lactulose encephalopathy (CHRONULAC) 10 GM/15ML SOLUTION     lidocaine (XYLOCAINE) 5 % external ointment     magnesium oxide (MAG-OX) 400 (241.3 Mg) MG tablet     NOVOLOG FLEXPEN 100 UNIT/ML soln     nystatin (MYCOSTATIN) 490462 UNIT/GM external cream     nystatin-triamcinolone (MYCOLOG II) cream     ondansetron (ZOFRAN) 4 MG tablet     order for DME     order for DME     order for DME     order for DME     oxyCODONE (ROXICODONE) 5 MG tablet     OYSTER SHELL CALCIUM/D 500-200 MG-UNIT per tablet     QUEtiapine (SEROQUEL) 25 MG tablet     ranitidine (ZANTAC) 150 MG tablet     spironolactone (ALDACTONE) 50 MG tablet     STATIN NOT  PRESCRIBED, INTENTIONAL,     triamcinolone (KENALOG) 0.1 % external cream     UNABLE TO FIND     ursodiol (ACTIGALL) 300 MG capsule     VITAMIN E PO     XIFAXAN 550 MG TABS tablet     No current facility-administered medications for this visit.             Allergies:   No Known Allergies         Review of Systems:   10 points ROS was obtained and highlighted in the HPI, otherwise negative.          Physical Exam:   VS: /73   Pulse 92   Temp 97.6  F (36.4  C) (Oral)   Ht 1.829 m (6')   Wt 112.9 kg (248 lb 14.4 oz)   SpO2 99%   BMI 33.76 kg/m      Gen: A&Ox3, NAD, appears chronically ill   HEENT: non-icteric, significantly kyphotic   CV: RRR, no overt murmurs  Lung: CTA Bilatererally, no wheezing or crackles.   Lym- no palpable lymphadenopathy  Abd: soft, NT, ND,   Ext: moderate bilateral pitting to upper legs, compression wrappings on. In wheelchair  Skin: no palmar erythema, telangiectasias or jaundice  Neuro: grossly intact, no asterixis   Psych: appropriate mood and affects           Data:   Reviewed in person and significant for:    Lab Results   Component Value Date     11/18/2019      Lab Results   Component Value Date    POTASSIUM 4.7 11/18/2019     Lab Results   Component Value Date    CHLORIDE 94 11/18/2019     Lab Results   Component Value Date    CO2 23 11/18/2019     Lab Results   Component Value Date    BUN 25 11/18/2019     Lab Results   Component Value Date    CR 1.12 11/18/2019       Lab Results   Component Value Date    WBC 9.1 11/18/2019     Lab Results   Component Value Date    HGB 10.9 11/18/2019     Lab Results   Component Value Date    HCT 33.2 11/18/2019     Lab Results   Component Value Date    MCV 93 11/18/2019     Lab Results   Component Value Date     11/18/2019       Lab Results   Component Value Date    AST 25 11/18/2019     Lab Results   Component Value Date    ALT 16 11/18/2019     Lab Results   Component Value Date    BILICONJ 0.0 03/12/2014      Lab Results    Component Value Date    BILITOTAL 1.2 11/18/2019       Lab Results   Component Value Date    ALBUMIN 2.1 11/18/2019     Lab Results   Component Value Date    PROTTOTAL 6.8 11/18/2019      Lab Results   Component Value Date    ALKPHOS 216 11/18/2019       Lab Results   Component Value Date    INR 1.34 11/18/2019     CT ABDOMEN PELVIS:   11/24/2019:   1.   Acute fractures of the right L1, L2,, L3, and L4 spinous processes.  2.   Indwelling TIPS shunt.  3.  Cholelithiasis.    Again, thank you for allowing me to participate in the care of your patient.      Sincerely,    Yovany Lopez PA-C

## 2020-03-12 NOTE — LETTER
3/12/2020      RE: Lawrence Louie  [address] [place] [address]       Hepatology Follow-up Clinic note  Lawrence Louie   Date of Birth 1955  Date of Service 3/12/2020    Reason for follow-up: ETOH cirrhosis          Assessment/plan:   Lawrence Louie is a 64 year old male with ETOH cirrhosis complicated by ascites s/p TIPS and hepatic encephalopathy. His HE is well controlled at this time. He has had multiple hospitalizations for significant lower extremity edema/scrotal edema, which has required agressive diuresis. It has remained relatively stable over the past month. They are currently being very strict with low sodium diet.  No labs available at the time of visit. He is up to date with HCC    # Labs today: BMP, CBC, hepatic panel, INR  # Moderate Malnutrition  # MILLY   - Improve nutrition (high protein diet  gm) , okay to have up to 2000 mg sodium  - Continue 40 mg Lasix and 100 mg Spironolactone  - Okay to take extra 40 mg Lasix 2-3 times a week as needed    # HCC screening / TIPS patency  - US Abdomen with TIPS doppler due in May 2020    Follow-up in clinic in 3 months with Dr. Simmons or sooner as needed    Yovany Lopez PA-C   [facility] Hepatology clinic    -----------------------------------------------------       HPI:   Lawrence Louie is a 64 year old male presenting for follow-up. He is accompanied by his wife and daughter who provide much of the history.     Cirrhosis  - ETOH and SMITH  Complicated by:  Ascites s/p TIPS on April 2018  Hx of HE   EGD: 6/16/2016  HCC: 11.24.2019     Patient was last seen by me on 11/18/2019. Shortly after visit, patient had a fall and suffered multiple lumbar spine factors. Patient was hospitalized for scrotal edema at [facility] at the end of January. He had aggressive diuresis, and was down 6.8 L by discharge.     His daughter and wife help manage his medications. Patient will also occasionally take an extra  Reyna daughters states they limit sodium intake to 700 mg daily. He doesn't feel like eat any protein sources without them having too much sodium. He likes hot dogs, bologna.     He is having regular bowel movements. No episodes of confusion. He takes lactulose and rifaximin regularly. Patient's family has many questions about wound care and lymphedema for legs. He is currently getting home care.     Patient denies jaundice, abdominal distension.  Patient also denies melena, hematochezia or hematemesis. Patient denies  fevers, sweats or chills.    No recent alcohol intake.     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)      Splenomegaly      Squamous cell carcinoma      Thrombocytopenia (H)      Varices, esophageal (H)     Past Surgical History:   Procedure Laterality Date     BIOPSY OF SKIN LESION       COLONOSCOPY Left 6/16/2016    Procedure: COMBINED COLONOSCOPY, SINGLE OR MULTIPLE BIOPSY/POLYPECTOMY BY BIOPSY;  Surgeon: Brandy Barnett MD;  Location:  GI     ESOPHAGOSCOPY, GASTROSCOPY, DUODENOSCOPY (EGD), COMBINED  2/13/2013    Procedure: COMBINED ESOPHAGOSCOPY, GASTROSCOPY, DUODENOSCOPY (EGD);;  Surgeon: Tara Cook MD;  Location:  GI     ESOPHAGOSCOPY, GASTROSCOPY, DUODENOSCOPY (EGD), COMBINED  11/4/2013    Procedure: COMBINED ESOPHAGOSCOPY, GASTROSCOPY, DUODENOSCOPY (EGD);;  Surgeon: Lonny Diaz MD;  Location:  GI     ESOPHAGOSCOPY, GASTROSCOPY, DUODENOSCOPY (EGD), COMBINED Left 6/16/2016    Procedure: COMBINED ESOPHAGOSCOPY, GASTROSCOPY, DUODENOSCOPY (EGD), BIOPSY SINGLE OR MULTIPLE;  Surgeon: Brandy Barnett MD;  Location:  GI     EXCISE LESION TRUNK  9/24/2012    Procedure: EXCISE LESION TRUNK;;  Surgeon: Pepe Dominguez MD;   Location: [facility]     GENITOURINARY SURGERY      vasectomy     HERNIORRHAPHY UMBILICAL  9/24/2012    Procedure: HERNIORRHAPHY UMBILICAL;  UMBILICAL HERNIA REPAIR , EXCISION OF PERIUMBILICAL CYST;  Surgeon: Pepe Dominguez MD;  Location: [facility]                 Medications:     Current Outpatient Medications   Medication     blood glucose (NO BRAND SPECIFIED) lancets standard     blood glucose monitoring (NO BRAND SPECIFIED) meter device kit     blood glucose monitoring (NO BRAND SPECIFIED) test strip     childrens multivitamin w/ionr (FLINTSTONES COMPLETE) 60 MG chewable tablet     Cholecalciferol (VITAMIN D3) 2000 units CAPS     citalopram (CELEXA) 20 MG tablet     CRANBERRY PO     Cyanocobalamin (VITAMIN B-12 PO)     desvenlafaxine (PRISTIQ) 100 MG 24 hr tablet     desvenlafaxine fumarate 100 MG 24 hr tablet     furosemide (LASIX) 40 MG tablet     gabapentin (NEURONTIN) 300 MG capsule     hydrOXYzine (ATARAX) 25 MG tablet     insulin glargine (LANTUS SOLOSTAR) 100 UNIT/ML pen     insulin pen needle (B-D U/F) 31G X 8 MM     insulin pen needle (ULTICARE SHORT) 31G X 8 MM miscellaneous     lactulose encephalopathy (CHRONULAC) 10 GM/15ML SOLUTION     lidocaine (XYLOCAINE) 5 % external ointment     magnesium oxide (MAG-OX) 400 (241.3 Mg) MG tablet     NOVOLOG FLEXPEN 100 UNIT/ML soln     nystatin (MYCOSTATIN) 006108 UNIT/GM external cream     nystatin-triamcinolone (MYCOLOG II) cream     ondansetron (ZOFRAN) 4 MG tablet     order for DME     order for DME     order for DME     order for DME     oxyCODONE (ROXICODONE) 5 MG tablet     OYSTER SHELL CALCIUM/D 500-200 MG-UNIT per tablet     QUEtiapine (SEROQUEL) 25 MG tablet     ranitidine (ZANTAC) 150 MG tablet     spironolactone (ALDACTONE) 50 MG tablet     STATIN NOT PRESCRIBED, INTENTIONAL,     triamcinolone (KENALOG) 0.1 % external cream     UNABLE TO FIND     ursodiol (ACTIGALL) 300 MG capsule     VITAMIN E PO     XIFAXAN 550 MG TABS tablet     No current  facility-administered medications for this visit.             Allergies:   No Known Allergies         Review of Systems:   10 points ROS was obtained and highlighted in the HPI, otherwise negative.          Physical Exam:   VS: /73   Pulse 92   Temp 97.6  F (36.4  C) (Oral)   Ht 1.829 m (6')   Wt 112.9 kg (248 lb 14.4 oz)   SpO2 99%   BMI 33.76 kg/m      Gen: A&Ox3, NAD, appears chronically ill   HEENT: non-icteric, significantly kyphotic   CV: RRR, no overt murmurs  Lung: CTA Bilatererally, no wheezing or crackles.   Lym- no palpable lymphadenopathy  Abd: soft, NT, ND,   Ext: moderate bilateral pitting to upper legs, compression wrappings on. In wheelchair  Skin: no palmar erythema, telangiectasias or jaundice  Neuro: grossly intact, no asterixis   Psych: appropriate mood and affects           Data:   Reviewed in person and significant for:    Lab Results   Component Value Date     11/18/2019      Lab Results   Component Value Date    POTASSIUM 4.7 11/18/2019     Lab Results   Component Value Date    CHLORIDE 94 11/18/2019     Lab Results   Component Value Date    CO2 23 11/18/2019     Lab Results   Component Value Date    BUN 25 11/18/2019     Lab Results   Component Value Date    CR 1.12 11/18/2019       Lab Results   Component Value Date    WBC 9.1 11/18/2019     Lab Results   Component Value Date    HGB 10.9 11/18/2019     Lab Results   Component Value Date    HCT 33.2 11/18/2019     Lab Results   Component Value Date    MCV 93 11/18/2019     Lab Results   Component Value Date     11/18/2019       Lab Results   Component Value Date    AST 25 11/18/2019     Lab Results   Component Value Date    ALT 16 11/18/2019     Lab Results   Component Value Date    BILICONJ 0.0 03/12/2014      Lab Results   Component Value Date    BILITOTAL 1.2 11/18/2019       Lab Results   Component Value Date    ALBUMIN 2.1 11/18/2019     Lab Results   Component Value Date    PROTTOTAL 6.8 11/18/2019      Lab  Results   Component Value Date    ALKPHOS 216 11/18/2019       Lab Results   Component Value Date    INR 1.34 11/18/2019     CT ABDOMEN PELVIS:   11/24/2019:   1.   Acute fractures of the right L1, L2,, L3, and L4 spinous processes.  2.   Indwelling TIPS shunt.  3.  Cholelithiasis.    Yovany Lopez PA-C

## 2020-03-12 NOTE — PROGRESS NOTES
Hepatology Follow-up Clinic note  Lawrence Louie   Date of Birth 1955  Date of Service 3/12/2020    Reason for follow-up: ETOH cirrhosis          Assessment/plan:   Lawrence Louie is a 64 year old male with ETOH cirrhosis complicated by ascites s/p TIPS and hepatic encephalopathy. His HE is well controlled at this time. He has had multiple hospitalizations for significant lower extremity edema/scrotal edema, which has required agressive diuresis. It has remained relatively stable over the past month. They are currently being very strict with low sodium diet.  No labs available at the time of visit. He is up to date with HCC    # Labs today: BMP, CBC, hepatic panel, INR  # Moderate Malnutrition  # MILLY   - Improve nutrition (high protein diet  gm) , okay to have up to 2000 mg sodium  - Continue 40 mg Lasix and 100 mg Spironolactone  - Okay to take extra 40 mg Lasix 2-3 times a week as needed    # HCC screening / TIPS patency  - US Abdomen with TIPS doppler due in May 2020    Follow-up in clinic in 3 months with Dr. Simmons or sooner as needed    Yovany Lopez PA-C   [facility] Hepatology clinic    -----------------------------------------------------       HPI:   Lawrence Louie is a 64 year old male presenting for follow-up. He is accompanied by his wife and daughter who provide much of the history.     Cirrhosis  - ETOH and SMITH  Complicated by:  Ascites s/p TIPS on April 2018  Hx of HE   EGD: 6/16/2016  HCC: 11.24.2019     Patient was last seen by me on 11/18/2019. Shortly after visit, patient had a fall and suffered multiple lumbar spine factors. Patient was hospitalized for scrotal edema at [facility] at the end of January. He had aggressive diuresis, and was down 6.8 L by discharge.     His daughter and wife help manage his medications. Patient will also occasionally take an extra diueretHis daughters states they limit sodium intake to 700 mg daily. He doesn't feel like  eat any protein sources without them having too much sodium. He likes hot dogs, bologna.     He is having regular bowel movements. No episodes of confusion. He takes lactulose and rifaximin regularly. Patient's family has many questions about wound care and lymphedema for legs. He is currently getting home care.     Patient denies jaundice, abdominal distension.  Patient also denies melena, hematochezia or hematemesis. Patient denies  fevers, sweats or chills.    No recent alcohol intake.     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)      Splenomegaly      Squamous cell carcinoma      Thrombocytopenia (H)      Varices, esophageal (H)     Past Surgical History:   Procedure Laterality Date     BIOPSY OF SKIN LESION       COLONOSCOPY Left 6/16/2016    Procedure: COMBINED COLONOSCOPY, SINGLE OR MULTIPLE BIOPSY/POLYPECTOMY BY BIOPSY;  Surgeon: Brandy Barnett MD;  Location:  GI     ESOPHAGOSCOPY, GASTROSCOPY, DUODENOSCOPY (EGD), COMBINED  2/13/2013    Procedure: COMBINED ESOPHAGOSCOPY, GASTROSCOPY, DUODENOSCOPY (EGD);;  Surgeon: Tara Cook MD;  Location:  GI     ESOPHAGOSCOPY, GASTROSCOPY, DUODENOSCOPY (EGD), COMBINED  11/4/2013    Procedure: COMBINED ESOPHAGOSCOPY, GASTROSCOPY, DUODENOSCOPY (EGD);;  Surgeon: Lonny Diaz MD;  Location:  GI     ESOPHAGOSCOPY, GASTROSCOPY, DUODENOSCOPY (EGD), COMBINED Left 6/16/2016    Procedure: COMBINED ESOPHAGOSCOPY, GASTROSCOPY, DUODENOSCOPY (EGD), BIOPSY SINGLE OR MULTIPLE;  Surgeon: Brandy Barnett MD;  Location:  GI     EXCISE LESION TRUNK  9/24/2012    Procedure: EXCISE LESION TRUNK;;  Surgeon: Pepe Dominguez MD;  Location:  SD     GENITOURINARY SURGERY      vasectomy     HERNIORRHAPHY UMBILICAL   9/24/2012    Procedure: HERNIORRHAPHY UMBILICAL;  UMBILICAL HERNIA REPAIR , EXCISION OF PERIUMBILICAL CYST;  Surgeon: Pepe Dominguez MD;  Location: [facility]                 Medications:     Current Outpatient Medications   Medication     blood glucose (NO BRAND SPECIFIED) lancets standard     blood glucose monitoring (NO BRAND SPECIFIED) meter device kit     blood glucose monitoring (NO BRAND SPECIFIED) test strip     childrens multivitamin w/ionr (FLINTSTONES COMPLETE) 60 MG chewable tablet     Cholecalciferol (VITAMIN D3) 2000 units CAPS     citalopram (CELEXA) 20 MG tablet     CRANBERRY PO     Cyanocobalamin (VITAMIN B-12 PO)     desvenlafaxine (PRISTIQ) 100 MG 24 hr tablet     desvenlafaxine fumarate 100 MG 24 hr tablet     furosemide (LASIX) 40 MG tablet     gabapentin (NEURONTIN) 300 MG capsule     hydrOXYzine (ATARAX) 25 MG tablet     insulin glargine (LANTUS SOLOSTAR) 100 UNIT/ML pen     insulin pen needle (B-D U/F) 31G X 8 MM     insulin pen needle (ULTICARE SHORT) 31G X 8 MM miscellaneous     lactulose encephalopathy (CHRONULAC) 10 GM/15ML SOLUTION     lidocaine (XYLOCAINE) 5 % external ointment     magnesium oxide (MAG-OX) 400 (241.3 Mg) MG tablet     NOVOLOG FLEXPEN 100 UNIT/ML soln     nystatin (MYCOSTATIN) 930101 UNIT/GM external cream     nystatin-triamcinolone (MYCOLOG II) cream     ondansetron (ZOFRAN) 4 MG tablet     order for DME     order for DME     order for DME     order for DME     oxyCODONE (ROXICODONE) 5 MG tablet     OYSTER SHELL CALCIUM/D 500-200 MG-UNIT per tablet     QUEtiapine (SEROQUEL) 25 MG tablet     ranitidine (ZANTAC) 150 MG tablet     spironolactone (ALDACTONE) 50 MG tablet     STATIN NOT PRESCRIBED, INTENTIONAL,     triamcinolone (KENALOG) 0.1 % external cream     UNABLE TO FIND     ursodiol (ACTIGALL) 300 MG capsule     VITAMIN E PO     XIFAXAN 550 MG TABS tablet     No current facility-administered medications for this visit.             Allergies:   No Known Allergies          Review of Systems:   10 points ROS was obtained and highlighted in the HPI, otherwise negative.          Physical Exam:   VS: /73   Pulse 92   Temp 97.6  F (36.4  C) (Oral)   Ht 1.829 m (6')   Wt 112.9 kg (248 lb 14.4 oz)   SpO2 99%   BMI 33.76 kg/m      Gen: A&Ox3, NAD, appears chronically ill   HEENT: non-icteric, significantly kyphotic   CV: RRR, no overt murmurs  Lung: CTA Bilatererally, no wheezing or crackles.   Lym- no palpable lymphadenopathy  Abd: soft, NT, ND,   Ext: moderate bilateral pitting to upper legs, compression wrappings on. In wheelchair  Skin: no palmar erythema, telangiectasias or jaundice  Neuro: grossly intact, no asterixis   Psych: appropriate mood and affects           Data:   Reviewed in person and significant for:    Lab Results   Component Value Date     11/18/2019      Lab Results   Component Value Date    POTASSIUM 4.7 11/18/2019     Lab Results   Component Value Date    CHLORIDE 94 11/18/2019     Lab Results   Component Value Date    CO2 23 11/18/2019     Lab Results   Component Value Date    BUN 25 11/18/2019     Lab Results   Component Value Date    CR 1.12 11/18/2019       Lab Results   Component Value Date    WBC 9.1 11/18/2019     Lab Results   Component Value Date    HGB 10.9 11/18/2019     Lab Results   Component Value Date    HCT 33.2 11/18/2019     Lab Results   Component Value Date    MCV 93 11/18/2019     Lab Results   Component Value Date     11/18/2019       Lab Results   Component Value Date    AST 25 11/18/2019     Lab Results   Component Value Date    ALT 16 11/18/2019     Lab Results   Component Value Date    BILICONJ 0.0 03/12/2014      Lab Results   Component Value Date    BILITOTAL 1.2 11/18/2019       Lab Results   Component Value Date    ALBUMIN 2.1 11/18/2019     Lab Results   Component Value Date    PROTTOTAL 6.8 11/18/2019      Lab Results   Component Value Date    ALKPHOS 216 11/18/2019       Lab Results   Component Value Date    INR  1.34 11/18/2019     CT ABDOMEN PELVIS:   11/24/2019:   1.   Acute fractures of the right L1, L2,, L3, and L4 spinous processes.  2.   Indwelling TIPS shunt.  3.  Cholelithiasis.

## 2020-03-16 ENCOUNTER — PATIENT OUTREACH (OUTPATIENT)
Dept: GASTROENTEROLOGY | Facility: CLINIC | Age: 65
End: 2020-03-16

## 2020-03-16 DIAGNOSIS — L29.9 ITCHING: ICD-10-CM

## 2020-03-16 DIAGNOSIS — E11.42 DIABETIC POLYNEUROPATHY ASSOCIATED WITH TYPE 2 DIABETES MELLITUS (H): ICD-10-CM

## 2020-03-16 DIAGNOSIS — K70.31 ALCOHOLIC CIRRHOSIS OF LIVER WITH ASCITES (H): ICD-10-CM

## 2020-03-16 RX ORDER — URSODIOL 300 MG/1
900 CAPSULE ORAL 2 TIMES DAILY
Qty: 180 CAPSULE | Refills: 11 | Status: SHIPPED | OUTPATIENT
Start: 2020-03-16 | End: 2021-01-01

## 2020-03-16 NOTE — PROGRESS NOTES
Called and spoke to patient's wife to discuss lab results per Yovany Lopez PA-C:  Based on your labs, I think it would be reasonable for your to take an extra dose of Lasix 40 mg (2-3 times a week) in addition to your regular doses.   Your protein (albumin) which helps keep fluid in the correct places (instead of your legs)   Your nutrition needs to improve (higher protein!), aim for 80 grams of protein a day.   Your MELD score is 9. Your liver function is quite good at this time!   Raven verbalized understanding and has no further questions at this time.

## 2020-03-17 ENCOUNTER — MEDICAL CORRESPONDENCE (OUTPATIENT)
Dept: HEALTH INFORMATION MANAGEMENT | Facility: CLINIC | Age: 65
End: 2020-03-17

## 2020-03-17 ENCOUNTER — TELEPHONE (OUTPATIENT)
Dept: INTERNAL MEDICINE | Facility: CLINIC | Age: 65
End: 2020-03-17

## 2020-03-17 NOTE — TELEPHONE ENCOUNTER
[facility] Call Center    Phone Message    May a detailed message be left on voicemail: yes     Reason for Call: Pt requesting to cease orders for 2 weeks due the Covid-19 Corona virus. Pt wants to limit visitors. Ceasing orders homecare for 17-31st    Action Taken: Message routed to:  [facility] care    Travel Screening: Not Applicable

## 2020-03-18 ENCOUNTER — TELEPHONE (OUTPATIENT)
Dept: GASTROENTEROLOGY | Facility: CLINIC | Age: 65
End: 2020-03-18

## 2020-03-18 NOTE — TELEPHONE ENCOUNTER
Prior Authorization Retail Medication Request    Medication/Dose: Ursodiol   ICD code (if different than what is on RX):  K70.31  L29.9  Previously Tried and Failed:  Lower dose of ursodiol, has required taper up of dose to this current dose, which is effective in controlling the itching.      Insurance Name:    Insurance ID:        Pharmacy Information (if different than what is on RX)  Name:    Phone:

## 2020-03-19 NOTE — TELEPHONE ENCOUNTER
Prior Authorization Approval    Authorization Effective Date: 3/19/2020  Authorization Expiration Date: 12/31/2020  Medication: ursodiol (ACTIGALL) 300 MG capsule   Approved Dose/Quantity:   Reference #:     Insurance Company: [employer] - Phone [phone] Fax [phone]  Expected CoPay:       CoPay Card Available:      Foundation Assistance Needed:    Which Pharmacy is filling the prescription (Not needed for infusion/clinic administered): [facility] [address] [place] [address]  Pharmacy Notified: Yes - via voicemail  Patient Notified:

## 2020-03-19 NOTE — TELEPHONE ENCOUNTER
Central Prior Authorization Team   Phone: [phone]    PA Initiation    Medication: ursodiol (ACTIGALL) 300 MG capsule   Insurance Company: [employer] - Phone [phone] Fax [phone]  Pharmacy Filling the Rx: [address]  Filling Pharmacy Phone: [phone]  Filling Pharmacy Fax:    Start Date: 3/19/2020

## 2020-03-28 ENCOUNTER — MEDICAL CORRESPONDENCE (OUTPATIENT)
Dept: HEALTH INFORMATION MANAGEMENT | Facility: CLINIC | Age: 65
End: 2020-03-28

## 2020-03-31 ENCOUNTER — TELEPHONE (OUTPATIENT)
Dept: INTERNAL MEDICINE | Facility: CLINIC | Age: 65
End: 2020-03-31

## 2020-03-31 ENCOUNTER — MYC MEDICAL ADVICE (OUTPATIENT)
Dept: GASTROENTEROLOGY | Facility: CLINIC | Age: 65
End: 2020-03-31

## 2020-03-31 NOTE — TELEPHONE ENCOUNTER
Verbal orders given to Elizabeth from [facility], per Ary Murphy/Dr. Trevizo, for Order(s): Home Care Orders: Occupational Therapy (OT): 1 time a week for 1 week, followed by 2 times a week for 3 weeks, started on 3/30/2020- related diagnosis is sepsis. Magdalena Mott LPN 3/31/2020 9:31 AM

## 2020-03-31 NOTE — TELEPHONE ENCOUNTER
[facility] Call Center    Phone Message    May a detailed message be left on voicemail: yes     Reason for Call: Order(s): Home Care Orders: Occupational Therapy (OT): 1 time a week for 1 week, followed by 2 times a week for 3 weeks, started on 3/30/2020- related diagnosis is sepsis, please call roxanna at [phone] thanks    Action Taken: Message routed to:  [facility] pcc    Travel Screening: Not Applicable

## 2020-04-01 ENCOUNTER — TELEPHONE (OUTPATIENT)
Dept: INTERNAL MEDICINE | Facility: CLINIC | Age: 65
End: 2020-04-01

## 2020-04-01 DIAGNOSIS — F32.0 MILD MAJOR DEPRESSION (H): ICD-10-CM

## 2020-04-01 NOTE — TELEPHONE ENCOUNTER
At this time the patient does not need additional assistance to obtain a home hospital bed. The family is working on it, and are in the process of obtaining a new PCP that is close to their home, possibly through [facility]. They will work with that person to obtain the needed medical equipment, but may require our assistance if trouble gaining access to healthcare during the covid crisis. They plan to continue the patient's liver care through [facility], and will update this writer as they have new information.

## 2020-04-01 NOTE — TELEPHONE ENCOUNTER
[facility] Call Center    Phone Message    May a detailed message be left on voicemail: yes     Reason for Call: Order(s): Home Care Orders: Physical Therapy (PT): [place] calling for physical therapy orders, please call to provide thank you.     Action Taken: Message routed to:  [facility]    Travel Screening: Not Applicable

## 2020-04-01 NOTE — TELEPHONE ENCOUNTER
Verbal orders given to Lad[place] from [facility], per Ary Murphy, for Order(s): Home Care Orders: Physical Therapy (PT): [place] calling for physical therapy orders. Magdalena Mott LPN 4/1/2020 1:20 PM

## 2020-04-03 ENCOUNTER — TELEPHONE (OUTPATIENT)
Dept: INTERNAL MEDICINE | Facility: CLINIC | Age: 65
End: 2020-04-03

## 2020-04-03 RX ORDER — ACETAMINOPHEN 160 MG
1 TABLET,DISINTEGRATING ORAL DAILY
Qty: 90 CAPSULE | Refills: 0 | Status: SHIPPED | OUTPATIENT
Start: 2020-04-03 | End: 2020-07-07

## 2020-04-03 NOTE — TELEPHONE ENCOUNTER
[facility] Call Center    Phone Message    May a detailed message be left on voicemail: yes     Reason for Call: Order(s): Home Care Orders: Skilled Nursing:    Emmanuelle requesting for verbal order for :   Skilled nursing 3 visit per 1 wk  2 visit per 7 wk  1 visit per 1 wk  For wound care assesment, picc line care, and lab draws.     And 3 prn visit for picc line trouble shooting.    Action Taken: Other: pcc    Travel Screening: Not Applicable

## 2020-04-04 ENCOUNTER — MYC REFILL (OUTPATIENT)
Dept: INTERNAL MEDICINE | Facility: CLINIC | Age: 65
End: 2020-04-04

## 2020-04-04 DIAGNOSIS — K70.31 ALCOHOLIC CIRRHOSIS OF LIVER WITH ASCITES (H): ICD-10-CM

## 2020-04-04 DIAGNOSIS — R11.0 NAUSEA: ICD-10-CM

## 2020-04-06 ENCOUNTER — TELEPHONE (OUTPATIENT)
Dept: INTERNAL MEDICINE | Facility: CLINIC | Age: 65
End: 2020-04-06

## 2020-04-06 DIAGNOSIS — R30.0 DYSURIA: Primary | ICD-10-CM

## 2020-04-06 RX ORDER — OXYCODONE HYDROCHLORIDE 5 MG/1
TABLET ORAL
Qty: 180 TABLET | Refills: 0 | Status: SHIPPED | OUTPATIENT
Start: 2020-04-06 | End: 2020-05-14 | Stop reason: ALTCHOICE

## 2020-04-06 RX ORDER — ONDANSETRON 4 MG/1
4 TABLET, FILM COATED ORAL EVERY 8 HOURS PRN
Qty: 18 TABLET | Refills: 1 | Status: SHIPPED | OUTPATIENT
Start: 2020-04-06 | End: 2020-07-20

## 2020-04-06 NOTE — TELEPHONE ENCOUNTER
Reason For Call:   Chief Complaint   Patient presents with     Refill Request       Medication Name, Dose and Monthly Quantity:   oxyCODONE IR (ROXICODONE) 5 MG tablet, 180 tabs    Diagnosis requiring opiates:   Alcoholic cirrhosis of liver with ascites (H) [K70.31]     Problem List Updated:   no    Opioid Agreement On File - [facility] PAIN CONTRACT ID# [id number]:  no    Last Urine Drug Screen (at least once every 12 months) Date:   none    Unexpected Results:   na    [place]  Data Reviewed (at least once every 3 months) Date:   04/06/2020    Unexpected Results:    no    Last Fill Date:   03/03/2020  Due Date:   04/06/2020    Last Visit with PCP:   04/23/19    Future Visits with PCP:   none    Processing:   Mail to pharmacy    CHRISTINA SOL CMA

## 2020-04-06 NOTE — TELEPHONE ENCOUNTER
[facility] Call Center    Phone Message    May a detailed message be left on voicemail: yes , Emmanuelle is wanting to get a call back at confidential line and left a detailed message on voicemail.     Reason for Call: Order(s): Home Care Orders: Other: U.A for possible UTI for burning and low back and side pain for past couples days     Action Taken: Message routed to:  [facility]    Travel Screening: Not Applicable

## 2020-04-06 NOTE — TELEPHONE ENCOUNTER
Called Emmanuelle: Cesar Murphy CNP, gave verbal order for UA for possible UTI. Our fax number was given to send the test results to us at [phone].      Sigifredo Andrade CMA ([facility]) at 1:43 PM on 4/6/2020

## 2020-04-21 ENCOUNTER — VIRTUAL VISIT (OUTPATIENT)
Dept: INTERNAL MEDICINE | Facility: CLINIC | Age: 65
End: 2020-04-21
Payer: COMMERCIAL

## 2020-04-21 DIAGNOSIS — S12.600A: Primary | ICD-10-CM

## 2020-04-21 DIAGNOSIS — E11.9 TYPE 2 DIABETES, HBA1C GOAL < 7% (H): ICD-10-CM

## 2020-04-21 DIAGNOSIS — G47.9 SLEEP DISORDER: ICD-10-CM

## 2020-04-21 RX ORDER — QUETIAPINE FUMARATE 100 MG/1
100 TABLET, FILM COATED ORAL AT BEDTIME
Qty: 90 TABLET | Refills: 3 | Status: SHIPPED | OUTPATIENT
Start: 2020-04-21 | End: 2020-06-17

## 2020-04-21 RX ORDER — MORPHINE SULFATE 15 MG/1
15 TABLET, FILM COATED, EXTENDED RELEASE ORAL EVERY 12 HOURS
Qty: 60 TABLET | Refills: 0 | Status: SHIPPED | OUTPATIENT
Start: 2020-04-21 | End: 2020-04-27 | Stop reason: DRUGHIGH

## 2020-04-21 NOTE — NURSING NOTE
Chief Complaint   Patient presents with     Back Pain     Pt's wife reports back pain      ESTHER Mcclellan at 12:36 PM sign on 4/21/2020

## 2020-04-22 ENCOUNTER — MEDICAL CORRESPONDENCE (OUTPATIENT)
Dept: HEALTH INFORMATION MANAGEMENT | Facility: CLINIC | Age: 65
End: 2020-04-22

## 2020-04-22 NOTE — PATIENT INSTRUCTIONS
Lawrence was seen today for back pain.    Diagnoses and all orders for this visit:    Closed fracture of seventh cervical vertebra, unspecified fracture morphology, initial encounter (H)  -     morphine (MS CONTIN) 15 MG CR tablet; Take 1 tablet (15 mg) by mouth every 12 hours maximum 2 tablet(s) per day  -     ORTHOPEDICS ADULT REFERRAL    Type 2 diabetes, HbA1c goal < 7% (H)  -     blood glucose monitoring (NO BRAND SPECIFIED) meter device kit; Use to test blood sugar 4 times daily or as directed. Before meals and snack at HS.  -     blood glucose (NO BRAND SPECIFIED) test strip; Use to test blood sugar 4 times daily or as directed.    Sleep disorder  -     QUEtiapine (SEROQUEL) 100 MG tablet; Take 1 tablet (100 mg) by mouth At Bedtime    All CT and MRI scan results to be mailed to patient.    Schedule appt with me in June.     Ary RAY, CNP

## 2020-04-22 NOTE — PROGRESS NOTES
"Lawrence Louie is a 64 year old male who is being evaluated via a billable video visit.      The patient has been notified of following:     \"This video visit will be conducted via a call between you and your physician/provider. We have found that certain health care needs can be provided without the need for an in-person physical exam.  This service lets us provide the care you need with a video conversation.  If a prescription is necessary we can send it directly to your pharmacy.  If lab work is needed we can place an order for that and you can then stop by our lab to have the test done at a later time.    Video visits are billed at different rates depending on your insurance coverage.  Please reach out to your insurance provider with any questions.    If during the course of the call the physician/provider feels a video visit is not appropriate, you will not be charged for this service.\"    Patient has given verbal consent for Video visit? Yes    How would you like to obtain your AVS? Harvey    Patient would like the video invitation sent by: Send to e-mail at: [email or web address]    Will anyone else be joining your video visit? No        Subjective     Lawrence Louie is a 64 year old male who presents to clinic today for the following health issues:    HPI  Pt. Unruly Louie, his wife Raven, and his daughter participated in this video visit.    Unruly's chief complaint today is increased back pain. He was seen in [facility] after a fall at home. Assessment at the time of that admission:  Closed fracture of spinous process of lumbar vertebra with routine healing, L1-4  Calculus of gallbladder without biliary obstruction  Epigastric pain, episodic  Closed fracture of transverse process of lumbar vertebra with routine healingm L1-2  Compression fracture of T3 vertebra with routine healing  Compression fracture of T11 vertebra with routine healing  Closed fracture of endplate " "C7    IMPRESSION/PLAN:  Neuro: Hx of elevated ammonia in setting of ESLD: PTA lactulose. Check ammonia if any concern related to mentation. Currently alert and oriented.  L1-2 TP fractures: currently asymptomatic. NS consulted. Pain control. PT.  T3 and T11 compression fractures, felt to be subacute/chronic on admission: Currently asymptomatic. NS consulted. Therapies.   C7 endplate fracture: NS following. No brace indicated. PT.  Acute Pain Management: Well controlled. Has prn ibuprofen and oxycodone ordered. Avoid NSAIDS with his hx of CKD. Unfortunately, APAP contraindicated given hepatic disease.     Hospitalized again 3/21/20 for sepsis.      His back pain is continually slowly accelerating.  He takes oxycodone 1-2 tabs every 8 hours as needed. He is having alot of pain and poor sleep. States the oxycodone only helps for 1-2 hours.   He is inquiring if there is a brace or surgical intervention which might help reduce the pain. Also inquiring if a brace would be helpful. Pt states he has some masses on his  Right back lateral to the spine.  One is the \"size of a ping pong ball and is painful. They never heard about results of his CT scans at [facility] in November or his MRI in March and are wondering what follow-up would be.    He used to take Seroquel at night for sleep in the TCU but that rx has run out.  Requesting a refill.     He is also on Pristique and Celexa, and Atarax.  Daughter wants to know if there is medication for hi obsessive skin picking.     His diabetes monitor is obsolete and his test strips are  by a few years.  He doesn't usually check his BS in the a.m. but when he does it is around 160.  He usually has a cookie and a soda before bedtime.  During the day his sugars run around 150 with the old test strips.  He was given a sliding scale in the hospital for his and at his last hospital discharge his Lantus was reduced to 5 units in a.m with SSI before meals and snacks.  He does " not think 5 units of Lantus I sufficient but family states he is used to treating low sugars with eating snacks but was previously admitted to hospital with very low blood glucose so they argue about this.    Dr. Rodriguez, Infectious Disease just ordered Cipro for his leg wounds today.     He would like a refill of a compounded cream he was using: FV-Lido3%nystatin cream/Znox20% to use topically to abdominal fold and mikhail  Area bid and after incontinent episodes    His Occupational Therapist wants to obtain orders for Farrow Wraps for his legs and will contact me for orders.        Review of Systems   No new symptoms      Objective    There were no vitals taken for this visit.  Estimated body mass index is 33.76 kg/m  as calculated from the following:    Height as of 3/12/20: 1.829 m (6').    Weight as of 3/12/20: 112.9 kg (248 lb 14.4 oz).  Physical Exam   GENERAL: healthy, alert and no distress. Sitting in a chair.   EYES: Eyes grossly normal to inspection on video.  RESP: no audible wheeze, cough, or visible cyanosis. He does appear to breath heavily.    Able to speak fully in complete sentences.  NEURO: mentation intact and speech normal  PSYCH: mentation appears normal, affect normal        Lawrence was seen today for back pain.    Diagnoses and all orders for this visit:    Closed fracture of seventh cervical vertebra, unspecified fracture morphology, initial encounter (H)  -     morphine (MS CONTIN) 15 MG CR tablet; Take 1 tablet (15 mg) by mouth every 12 hours maximum 2 tablet(s) per day.  If pain cannot be managed on the above we can supplement with immediate acting oxycodone.  -     ORTHOPEDICS ADULT REFERRAL    Type 2 diabetes, HbA1c goal < 7% (H)  -     blood glucose monitoring (NO BRAND SPECIFIED) meter device kit; Use to test blood sugar 4 times daily or as directed. Before meals and snack at HS.  -     blood glucose (NO BRAND SPECIFIED) test strip; Use to test blood sugar 4 times daily or as  directed.  Continue on sliding scale and 5 units of Lantus daily.  Advised to start checking glucose before meals and before snack at night.   Insulin doses can be adjusted according to glucose readings. If glucose control is still an issue in June will refer to Endocrine for consultation.     Sleep disorder  -     QUEtiapine (SEROQUEL) 100 MG tablet; Take 1 tablet (100 mg) by mouth At Bedtime    Fungal skin dermatitis  Will attempt to refill compounded FV-Lido3%nystatincream/Znox20% topical cream.    Bilateral Lower Extremity Edema  WIll order Farrow wraps upon notification from Occupational Therapist.    Video-Visit Details    Type of service:  Video Visit    Video End Time (time video stopped):40 minutes    Originating Location (pt. Location): Home  Distant Location (provider location):  [facility]     Mode of Communication:  Video Conference via Hermes RAY CNP

## 2020-04-25 ENCOUNTER — MYC MEDICAL ADVICE (OUTPATIENT)
Dept: INTERNAL MEDICINE | Facility: CLINIC | Age: 65
End: 2020-04-25

## 2020-04-25 DIAGNOSIS — G89.29 CHRONIC BILATERAL THORACIC BACK PAIN: Primary | ICD-10-CM

## 2020-04-25 DIAGNOSIS — M54.6 CHRONIC BILATERAL THORACIC BACK PAIN: Primary | ICD-10-CM

## 2020-04-27 ENCOUNTER — TELEPHONE (OUTPATIENT)
Dept: INTERNAL MEDICINE | Facility: CLINIC | Age: 65
End: 2020-04-27

## 2020-04-27 RX ORDER — MORPHINE SULFATE 30 MG/1
30 TABLET, FILM COATED, EXTENDED RELEASE ORAL EVERY 12 HOURS
Qty: 60 TABLET | Refills: 0 | Status: SHIPPED | OUTPATIENT
Start: 2020-04-27 | End: 2020-05-14 | Stop reason: ALTCHOICE

## 2020-04-27 NOTE — TELEPHONE ENCOUNTER
[facility] Call Center    Phone Message    May a detailed message be left on voicemail: yes     Reason for Call: Order(s): Home Care Orders: Occupational Therapy (OT): Requesting extended frequency, 2x a week for 3 weeks, and 1x a week for 1 week, with April 26 as effective date. Please call to give verbal authorization.     Action Taken: Message routed to:  [facility]    Travel Screening: Not Applicable

## 2020-04-27 NOTE — TELEPHONE ENCOUNTER
I called and left message verbally approving the requested orders.   Rhonda Rivas, EMT at 11:19 AM on 4/27/2020.

## 2020-04-27 NOTE — TELEPHONE ENCOUNTER
[facility] Call Center    Phone Message    May a detailed message be left on voicemail: yes     Reason for Call: Other: pharmacy calling requesting a return call regarding morphine script sent to them today, please call to discuss thank you.      Action Taken: Message routed to:  [facility]    Travel Screening: Not Applicable

## 2020-04-28 ENCOUNTER — MYC MEDICAL ADVICE (OUTPATIENT)
Dept: INTERNAL MEDICINE | Facility: CLINIC | Age: 65
End: 2020-04-28

## 2020-04-28 ENCOUNTER — MEDICAL CORRESPONDENCE (OUTPATIENT)
Dept: HEALTH INFORMATION MANAGEMENT | Facility: CLINIC | Age: 65
End: 2020-04-28

## 2020-04-28 ENCOUNTER — TELEPHONE (OUTPATIENT)
Dept: INTERNAL MEDICINE | Facility: CLINIC | Age: 65
End: 2020-04-28

## 2020-04-28 DIAGNOSIS — K76.82 HEPATIC ENCEPHALOPATHY (H): ICD-10-CM

## 2020-04-28 RX ORDER — LACTULOSE 10 G/15ML
SOLUTION ORAL; RECTAL
Qty: 1892 ML | Refills: 11 | Status: SHIPPED | OUTPATIENT
Start: 2020-04-28 | End: 2021-01-01

## 2020-04-28 NOTE — TELEPHONE ENCOUNTER
Called Emmanuelle from home care:  Per Ary Murphy CNP, gave verbal orders to Emmanuelle for skilled nursing 3 times a week for 4 weeks and social work 1 time for 30.      Sigifredo Andrade CMA ([facility]) at 4:17 PM on 4/28/2020

## 2020-04-28 NOTE — TELEPHONE ENCOUNTER
[facility]    Phone Message    May a detailed message be left on voicemail: yes     Reason for Call: Order(s): Home Care Orders: Other: [facility] from  home care is requesting skilled nursing 3 times a week for 4 weeks and social work 1 time for 30, please call west at [phone] thanks    Action Taken: Message routed to:  [facility]    Travel Screening: Not Applicable

## 2020-04-28 NOTE — TELEPHONE ENCOUNTER
Astrid from [facility] is returning a call from Rev[facility], clinic unavailable via skype, please call pharmacy back, thank you

## 2020-04-28 NOTE — TELEPHONE ENCOUNTER
After waiting on hold for 22 minutes,  I left a message for pharmacy to return call.    CHRISTINA SOL CMA at 11:18 AM on 4/28/2020.

## 2020-05-05 ENCOUNTER — MEDICAL CORRESPONDENCE (OUTPATIENT)
Dept: HEALTH INFORMATION MANAGEMENT | Facility: CLINIC | Age: 65
End: 2020-05-05

## 2020-05-06 ENCOUNTER — TELEPHONE (OUTPATIENT)
Dept: INTERNAL MEDICINE | Facility: CLINIC | Age: 65
End: 2020-05-06

## 2020-05-06 NOTE — TELEPHONE ENCOUNTER
[facility]    Phone Message    May a detailed message be left on voicemail: yes     Reason for Call: Order(s): Home Care Orders: Physical Therapy (PT): Verbal orders to continue plan of care 2x a week for next week and 1 time a week for 2 following weeks     Action Taken: Message routed to:  [facility] primary care     Travel Screening: Not Applicable

## 2020-05-07 DIAGNOSIS — I83.002 VENOUS STASIS ULCER OF CALF, UNSPECIFIED LATERALITY, UNSPECIFIED ULCER STAGE, UNSPECIFIED WHETHER VARICOSE VEINS PRESENT (H): Primary | ICD-10-CM

## 2020-05-07 DIAGNOSIS — L97.201 VENOUS STASIS ULCER OF CALF LIMITED TO BREAKDOWN OF SKIN WITH VARICOSE VEINS, UNSPECIFIED LATERALITY (H): Primary | ICD-10-CM

## 2020-05-07 DIAGNOSIS — I83.002 VENOUS STASIS ULCER OF CALF LIMITED TO BREAKDOWN OF SKIN WITH VARICOSE VEINS, UNSPECIFIED LATERALITY (H): Primary | ICD-10-CM

## 2020-05-07 DIAGNOSIS — L97.209 VENOUS STASIS ULCER OF CALF, UNSPECIFIED LATERALITY, UNSPECIFIED ULCER STAGE, UNSPECIFIED WHETHER VARICOSE VEINS PRESENT (H): Primary | ICD-10-CM

## 2020-05-07 NOTE — TELEPHONE ENCOUNTER
I called and left VM verbally approving the requested HC orders.  Rhonda Rivas, EMT at 8:46 AM on 5/7/2020.

## 2020-05-11 ENCOUNTER — PATIENT OUTREACH (OUTPATIENT)
Dept: GASTROENTEROLOGY | Facility: CLINIC | Age: 65
End: 2020-05-11

## 2020-05-12 ENCOUNTER — TELEPHONE (OUTPATIENT)
Dept: INTERNAL MEDICINE | Facility: CLINIC | Age: 65
End: 2020-05-12

## 2020-05-12 NOTE — TELEPHONE ENCOUNTER
[facility] Call [facility]    Phone Message    May a detailed message be left on voicemail: yes     Reason for Call: Order(s): Home Care Orders: Occupational Therapy (OT): To treat 3 times a week for 2 weeks starting 05/11/2020 for lymphedema verbal orders     Action Taken: Message routed to:  [facility] pcc     Travel Screening: Not Applicable

## 2020-05-12 NOTE — TELEPHONE ENCOUNTER
Verbal orders given to Elizabeth from [facility], per Ary Murphy, for Home Care Orders: Occupational Therapy (OT): To treat 3 times a week for 2 weeks starting 05/11/2020 for lymphedema verbal orders. Magdalena Mott LPN 5/12/2020 12:18 PM'

## 2020-05-14 ENCOUNTER — MYC MEDICAL ADVICE (OUTPATIENT)
Dept: INTERNAL MEDICINE | Facility: CLINIC | Age: 65
End: 2020-05-14

## 2020-05-14 ENCOUNTER — TELEPHONE (OUTPATIENT)
Dept: INTERNAL MEDICINE | Facility: CLINIC | Age: 65
End: 2020-05-14

## 2020-05-14 ENCOUNTER — MYC REFILL (OUTPATIENT)
Dept: INTERNAL MEDICINE | Facility: CLINIC | Age: 65
End: 2020-05-14

## 2020-05-14 ENCOUNTER — VIRTUAL VISIT (OUTPATIENT)
Dept: INTERNAL MEDICINE | Facility: CLINIC | Age: 65
End: 2020-05-14
Payer: COMMERCIAL

## 2020-05-14 DIAGNOSIS — D50.8 OTHER IRON DEFICIENCY ANEMIA: Primary | ICD-10-CM

## 2020-05-14 DIAGNOSIS — E11.00 TYPE 2 DIABETES MELLITUS WITH HYPEROSMOLARITY WITHOUT COMA, WITHOUT LONG-TERM CURRENT USE OF INSULIN (H): ICD-10-CM

## 2020-05-14 DIAGNOSIS — E11.9 TYPE 2 DIABETES, HBA1C GOAL < 7% (H): ICD-10-CM

## 2020-05-14 DIAGNOSIS — E11.9: ICD-10-CM

## 2020-05-14 DIAGNOSIS — S22.000G COMPRESSION FRACTURE OF THORACIC VERTEBRA WITH DELAYED HEALING, UNSPECIFIED THORACIC VERTEBRAL LEVEL, SUBSEQUENT ENCOUNTER: ICD-10-CM

## 2020-05-14 DIAGNOSIS — E11.9 DIABETES MELLITUS, TYPE 2 (H): ICD-10-CM

## 2020-05-14 DIAGNOSIS — K70.31 ALCOHOLIC CIRRHOSIS OF LIVER WITH ASCITES (H): ICD-10-CM

## 2020-05-14 RX ORDER — FENTANYL 25 UG/1
1 PATCH TRANSDERMAL
Qty: 3 PATCH | Refills: 0 | Status: ON HOLD | OUTPATIENT
Start: 2020-05-14 | End: 2020-07-30

## 2020-05-14 RX ORDER — METHYLPREDNISOLONE SODIUM SUCCINATE 125 MG/2ML
INJECTION, POWDER, FOR SOLUTION INTRAMUSCULAR; INTRAVENOUS
Qty: 300 EACH | Refills: 3 | Status: SHIPPED | OUTPATIENT
Start: 2020-05-14 | End: 2021-01-01

## 2020-05-14 RX ORDER — OXYCODONE HYDROCHLORIDE 5 MG/1
TABLET ORAL
Qty: 180 TABLET | Refills: 0 | Status: CANCELLED | OUTPATIENT
Start: 2020-05-14

## 2020-05-14 ASSESSMENT — PAIN SCALES - GENERAL: PAINLEVEL: SEVERE PAIN (6)

## 2020-05-14 NOTE — TELEPHONE ENCOUNTER
I called and verbally approved requested home care orders per Ary approval.   Rhonda Rivas, EMT at 11:42 AM on 5/14/2020.

## 2020-05-14 NOTE — PATIENT INSTRUCTIONS
Assessment/Plan:  1. Other iron deficiency anemia  - CBC with platelets; Future, through home health.    2. Compression fracture of thoracic vertebra with delayed healing, unspecified thoracic vertebral level, subsequent encounter  - fentaNYL (DURAGESIC) 25 mcg/hr 72 hr patch; Place 1 patch onto the skin every 72 hours remove old patch.  Dispense: 3 patch; Refill: 0.  If this is successful in managing pain will give one months supply.   Wife also plans to call for appointment with Dr. Donal Mckeon in Ortho.    3. Alcoholic cirrhosis of liver with ascites (H)  - Comprehensive metabolic panel; Future    4. Type 2 diabetes mellitus with hyperosmolarity without coma, without long-term current use of insulin (H)  - Hemoglobin A1c; Future    I will have our office communicate with [facility] to have the above labs drawn [phone].    Will also ask my nurse to verify that orders were sent to [facility] [employer] for medium size Pena Wraps.

## 2020-05-14 NOTE — TELEPHONE ENCOUNTER
[facility]    Phone Message    May a detailed message be left on voicemail: yes     Reason for Call: Order(s): Home Care Orders: Skilled Nursing:     Change to wound care.   Frequency 3x weekly for 3 weeks.     Updated wound orders:   For all lower extremety open areas.  To use calcium alginate.  Cover with a metilex transfer.           Action Taken: Message routed to:  [facility]    Travel Screening: Not Applicable                                                                    ;

## 2020-05-14 NOTE — NURSING NOTE
Chief Complaint   Patient presents with     Recheck Medication     Discuss pain and depression medication. Wife prefers to go over med with proivder.      RUSSELL Cardoza 11:35 AM  5/14/2020

## 2020-05-14 NOTE — NURSING NOTE
DME order for Eden Ruiz was faxed to [facility].  I called Emmanuelle, home care nurse at [facility] ([phone]) regarding the lab orders. She will have lab drawn during the next home visit, then she will bring the specimen to [facility]. They will fax us the results.    Hipolito-Alize Ponce RNCC  Primary Care Clinic  Phone [phone]  Fax     [phone]

## 2020-05-14 NOTE — PROGRESS NOTES
"Lawrence Louie is a 64 year old male who is being evaluated via a billable telephone visit.      The patient has been notified of following:     \"This telephone visit will be conducted via a call between you and your physician/provider. We have found that certain health care needs can be provided without the need for a physical exam.  This service lets us provide the care you need with a short phone conversation.  If a prescription is necessary we can send it directly to your pharmacy.  If lab work is needed we can place an order for that and you can then stop by our lab to have the test done at a later time.    Telephone visits are billed at different rates depending on your insurance coverage. During this emergency period, for some insurers they may be billed the same as an in-person visit.  Please reach out to your insurance provider with any questions.    If during the course of the call the physician/provider feels a telephone visit is not appropriate, you will not be charged for this service.\"    Patient has given verbal consent for Telephone visit?  Yes    What phone number would you like to be contacted at? [phone]    How would you like to obtain your AVS? MyChart    Subjective     Lawrence Louie is a 64 year old male who presents to clinic today for the following health issues:    HPI   Phone visit conducted with patient's wife, Raven.  Want to discuss pain management.  P had breakthrough pain on MS Contin 15 mg but was light headed and could not mentate clearly so never filled the increased dose of MSContin 30 mg.  Also sustained 2 falls while on the MS 15 mg.  So he went back to 10 mg of roxycodone every 4 hours which was difficult to remember to keep him on schedule.  Asking if there is a patch he could take.  The lidocaine patches did not decrease his pain much.  His wife also mentions that he is cognitively declining.  Inquiring whether he couls have his hemoglobin checked. He has a " visiting nurse, Emmanuelle,through [facility] who makes house visits.     Also inquiring whether Farrel wrap orders were sent to [facility].          Review of Systems   The patient:  Has not recently been exposed to someone with influenza.   Has not been in close contact with any known high risk individuals.     Has not traveled internationally or to the areas where COVID-19 (Coronavirus) is widespread in the last 14 days before the start of symptoms.     Has not had a close contact with a known laboratory-confirmed COVID-19 patient within 14 days of symptom onset. Has not had a close contact with a suspected COVID-19 patient within 14 days of symptom onset.     Is not a healthcare worker and does not work in a healthcare facility.          Objective   I only spoke with patients wife during this visit.        Assessment/Plan:  1. Other iron deficiency anemia  - CBC with platelets; Future, through home health.    2. Compression fracture of thoracic vertebra with delayed healing, unspecified thoracic vertebral level, subsequent encounter  - fentaNYL (DURAGESIC) 25 mcg/hr 72 hr patch; Place 1 patch onto the skin every 72 hours remove old patch.  Dispense: 3 patch; Refill: 0.  If this is successful in managing pain will give one months supply.   Wife also plans to call for appointment with Dr. Donal Mckeon in Ortho.    3. Alcoholic cirrhosis of liver with ascites (H)  - Comprehensive metabolic panel; Future    4. Type 2 diabetes mellitus with hyperosmolarity without coma, without long-term current use of insulin (H)  - Hemoglobin A1c; Future    Phone call duration:  15 minutes.    Ary RAY, CNP

## 2020-05-15 RX ORDER — PEN NEEDLE, DIABETIC 31 GX5/16"
NEEDLE, DISPOSABLE MISCELLANEOUS
Qty: 300 EACH | Refills: 3 | Status: SHIPPED | OUTPATIENT
Start: 2020-05-15

## 2020-05-15 NOTE — TELEPHONE ENCOUNTER
Ary Murphy APRN CNP  You 15 hours ago (5:08 PM)       I discontinued his roxicodone.  He does need refill of his insuline needles and blood testing renu RAY, MAGDALENA     Message text

## 2020-05-17 ENCOUNTER — MYC MEDICAL ADVICE (OUTPATIENT)
Dept: INTERNAL MEDICINE | Facility: CLINIC | Age: 65
End: 2020-05-17

## 2020-05-17 DIAGNOSIS — S22.000G COMPRESSION FRACTURE OF THORACIC VERTEBRA WITH DELAYED HEALING, UNSPECIFIED THORACIC VERTEBRAL LEVEL, SUBSEQUENT ENCOUNTER: Primary | ICD-10-CM

## 2020-05-18 RX ORDER — FENTANYL 12.5 UG/1
2 PATCH TRANSDERMAL
Qty: 4 PATCH | Refills: 0 | Status: ON HOLD | OUTPATIENT
Start: 2020-05-18 | End: 2020-07-30

## 2020-05-19 ENCOUNTER — MEDICAL CORRESPONDENCE (OUTPATIENT)
Dept: HEALTH INFORMATION MANAGEMENT | Facility: CLINIC | Age: 65
End: 2020-05-19

## 2020-05-19 ENCOUNTER — MYC MEDICAL ADVICE (OUTPATIENT)
Dept: INFECTIOUS DISEASES | Facility: CLINIC | Age: 65
End: 2020-05-19

## 2020-05-19 ENCOUNTER — TELEPHONE (OUTPATIENT)
Dept: INFECTIOUS DISEASES | Facility: CLINIC | Age: 65
End: 2020-05-19

## 2020-05-19 ENCOUNTER — MYC MEDICAL ADVICE (OUTPATIENT)
Dept: INTERNAL MEDICINE | Facility: CLINIC | Age: 65
End: 2020-05-19

## 2020-05-19 DIAGNOSIS — L02.419 CELLULITIS AND ABSCESS OF LEG: Primary | ICD-10-CM

## 2020-05-19 DIAGNOSIS — L03.119 CELLULITIS AND ABSCESS OF LEG: Primary | ICD-10-CM

## 2020-05-19 NOTE — TELEPHONE ENCOUNTER
[facility] Call [facility]    Phone Message    May a detailed message be left on voicemail: yes     Reason for Call: Appointment Intake    Referring Provider Name: Self  Diagnosis and/or Symptoms: Cellulitis of lower extremity, unspecified laterality (Primary Dx);   Venous stasis ulcers of both lower extremities (HCC)    Pt is MyChart active and stated they can be reached that way as well as by phone    Action Taken: Message routed to:  [facility] ID    Travel Screening: Not Applicable

## 2020-05-20 ENCOUNTER — TELEPHONE (OUTPATIENT)
Dept: INFECTIOUS DISEASES | Facility: CLINIC | Age: 65
End: 2020-05-20

## 2020-05-20 RX ORDER — CIPROFLOXACIN 750 MG/1
750 TABLET, FILM COATED ORAL 2 TIMES DAILY
Qty: 20 TABLET | Refills: 0 | Status: SHIPPED | OUTPATIENT
Start: 2020-05-20 | End: 2020-06-16

## 2020-05-20 NOTE — TELEPHONE ENCOUNTER
RECORDS RECEIVED FROM: [facility]   DATE RECEIVED: 5/21/2020    NOTES (Gather within 2 years) STATUS DETAILS   OFFICE NOTE from referring provider   Internal    OFFICE NOTE from other specialist N/A    DISCHARGE SUMMARY from hospital Care Everywhere 3/21/2020 Sepsis [facility]- [facility]- 9/2/2019 Cellulitis of right foot        DISCHARGE REPORT from the ER N/A    LABS (any labs) Internal Labs last updated on 5/14/2020    MEDICATION LIST Internal EPIC   IMAGING  (NEED IMAGES AND REPORTS)     Osteomyelitis: Foot imaging  Internal Epic    Xray Calacaneus 8/18/2019     MRI Ankle 8/19/2019     Xray Foot 8/18/2019    Liver Abscess: Abdominal imaging N/A    Other (anything related to diagnoses N/A       Action    Action Taken 5/20/2020 11:43am     I called pt Unruly - his wife Flores answered the phone. She mentioned that there aren't many images to request. Unruly has open wounds on his legs and spine.     Records from [facility] are in [place].

## 2020-05-20 NOTE — TELEPHONE ENCOUNTER
[facility] Call [facility]    Phone Message    May a detailed message be left on voicemail: yes     Reason for Call: Other: Per Judith is wanting to get a call back in regards to Order from [facility]. Judith states Current provider is not able to write a letter for Patient due to limited amount of doctors in the clinic. Judith is wanting to know if provider armida is seeing at the clinic is able to write a letter to [facility] for a letter of nessecity for Patient. real advise      Action Taken: Message routed to:  [facility] i.d    Travel Screening: Not Applicable

## 2020-05-20 NOTE — TELEPHONE ENCOUNTER
"Patient's wife Raven states the patient is not comfortable with home care services at this time due to her not being home every day when she works, and they have several dogs to deal with. She states they are still awaiting a call back from the lymphedema therapist with insurance coverage information, and he has an appointment with derm for next Wednesday. She states his lower extremity skin is \"falling off\" because of the swelling. Discussed need for lymphedema treatment and importance of getting started with this. She plans to call for coverage information and will discuss with her  the need for in home evaluation with home care in order to discuss with them what they can offer as well as what the patient would like in terms of home care. Asked that she call this writer as needed for assistance. Also called and left message with home care nurse Mallorie regarding the conversation.  "
Mallorie (RN w/ [facility]) reportin pt accepting home care. Verbal order given and accepted. Sent to RADHA Waldrop.  
[facility] RN calling to inform Dr. Simmons that patient is refusing home care.   Attempt x 3 from Home Care to contact patient, with no success. Pt returned call and said he doesn't want home care services.   Questions or concerns can be called to Mallorie at [phone].  Paulette Vale LPN     
Statement Selected

## 2020-05-21 ENCOUNTER — PRE VISIT (OUTPATIENT)
Dept: INFECTIOUS DISEASES | Facility: CLINIC | Age: 65
End: 2020-05-21

## 2020-05-21 ENCOUNTER — APPOINTMENT (OUTPATIENT)
Dept: LAB | Facility: CLINIC | Age: 65
End: 2020-05-21
Payer: COMMERCIAL

## 2020-05-21 ENCOUNTER — ANCILLARY PROCEDURE (OUTPATIENT)
Dept: GENERAL RADIOLOGY | Facility: CLINIC | Age: 65
End: 2020-05-21
Attending: STUDENT IN AN ORGANIZED HEALTH CARE EDUCATION/TRAINING PROGRAM
Payer: COMMERCIAL

## 2020-05-21 ENCOUNTER — DOCUMENTATION ONLY (OUTPATIENT)
Dept: CARE COORDINATION | Facility: CLINIC | Age: 65
End: 2020-05-21

## 2020-05-21 ENCOUNTER — OFFICE VISIT (OUTPATIENT)
Dept: INFECTIOUS DISEASES | Facility: CLINIC | Age: 65
End: 2020-05-21
Attending: INTERNAL MEDICINE
Payer: COMMERCIAL

## 2020-05-21 VITALS
OXYGEN SATURATION: 99 % | WEIGHT: 235.1 LBS | DIASTOLIC BLOOD PRESSURE: 66 MMHG | SYSTOLIC BLOOD PRESSURE: 119 MMHG | HEART RATE: 73 BPM | TEMPERATURE: 98.6 F | HEIGHT: 72 IN | BODY MASS INDEX: 31.84 KG/M2

## 2020-05-21 DIAGNOSIS — L03.115 CELLULITIS OF RIGHT LOWER EXTREMITY: ICD-10-CM

## 2020-05-21 DIAGNOSIS — L97.511 SKIN ULCER OF RIGHT FOOT, LIMITED TO BREAKDOWN OF SKIN (H): Primary | ICD-10-CM

## 2020-05-21 LAB
CRP SERPL-MCNC: 18.9 MG/L (ref 0–8)
GRAM STN SPEC: ABNORMAL
GRAM STN SPEC: ABNORMAL
SPECIMEN SOURCE: ABNORMAL

## 2020-05-21 PROCEDURE — 36415 COLL VENOUS BLD VENIPUNCTURE: CPT | Performed by: STUDENT IN AN ORGANIZED HEALTH CARE EDUCATION/TRAINING PROGRAM

## 2020-05-21 PROCEDURE — 87186 SC STD MICRODIL/AGAR DIL: CPT | Performed by: STUDENT IN AN ORGANIZED HEALTH CARE EDUCATION/TRAINING PROGRAM

## 2020-05-21 PROCEDURE — 87077 CULTURE AEROBIC IDENTIFY: CPT | Performed by: STUDENT IN AN ORGANIZED HEALTH CARE EDUCATION/TRAINING PROGRAM

## 2020-05-21 PROCEDURE — G0463 HOSPITAL OUTPT CLINIC VISIT: HCPCS | Mod: ZF

## 2020-05-21 PROCEDURE — 87205 SMEAR GRAM STAIN: CPT | Performed by: STUDENT IN AN ORGANIZED HEALTH CARE EDUCATION/TRAINING PROGRAM

## 2020-05-21 PROCEDURE — 86140 C-REACTIVE PROTEIN: CPT | Performed by: STUDENT IN AN ORGANIZED HEALTH CARE EDUCATION/TRAINING PROGRAM

## 2020-05-21 PROCEDURE — 87070 CULTURE OTHR SPECIMN AEROBIC: CPT | Performed by: STUDENT IN AN ORGANIZED HEALTH CARE EDUCATION/TRAINING PROGRAM

## 2020-05-21 RX ORDER — LEVOFLOXACIN 750 MG/1
750 TABLET, FILM COATED ORAL DAILY
Qty: 10 TABLET | Refills: 0 | Status: ON HOLD | OUTPATIENT
Start: 2020-05-21 | End: 2020-07-31

## 2020-05-21 RX ORDER — METRONIDAZOLE 500 MG/1
500 TABLET ORAL 3 TIMES DAILY
Qty: 30 TABLET | Refills: 0 | Status: ON HOLD | OUTPATIENT
Start: 2020-05-21 | End: 2020-07-30

## 2020-05-21 ASSESSMENT — ENCOUNTER SYMPTOMS
MYALGIAS: 1
SYNCOPE: 0
DIFFICULTY URINATING: 0
EXERCISE INTOLERANCE: 0
POOR WOUND HEALING: 1
BRUISES/BLEEDS EASILY: 1
DISTURBANCES IN COORDINATION: 1
LOSS OF CONSCIOUSNESS: 0
PALPITATIONS: 1
DOUBLE VISION: 0
SLEEP DISTURBANCES DUE TO BREATHING: 0
NUMBNESS: 1
MUSCLE CRAMPS: 1
TREMORS: 0
DIZZINESS: 1
STIFFNESS: 1
HYPERTENSION: 0
FLANK PAIN: 0
NAIL CHANGES: 0
NECK PAIN: 1
SEIZURES: 0
LEG PAIN: 1
BACK PAIN: 1
PANIC: 0
HEADACHES: 0
WEAKNESS: 1
EYE WATERING: 0
EYE REDNESS: 0
NERVOUS/ANXIOUS: 1
SKIN CHANGES: 0
EYE PAIN: 0
DYSURIA: 1
MUSCLE WEAKNESS: 1
INSOMNIA: 1
ARTHRALGIAS: 1
DECREASED CONCENTRATION: 1
JOINT SWELLING: 0
SWOLLEN GLANDS: 0
ORTHOPNEA: 0
HEMATURIA: 0
DEPRESSION: 1
SPEECH CHANGE: 0
EYE IRRITATION: 0
MEMORY LOSS: 1
PARALYSIS: 0
HYPOTENSION: 1
TINGLING: 1
LIGHT-HEADEDNESS: 0

## 2020-05-21 ASSESSMENT — PAIN SCALES - GENERAL: PAINLEVEL: SEVERE PAIN (7)

## 2020-05-21 ASSESSMENT — MIFFLIN-ST. JEOR: SCORE: 1884.41

## 2020-05-21 NOTE — PROGRESS NOTES
[facility]  Infectious Disease Clinic Note:  New Patient/Follow Up***     Patient:  Lawrence Louie, Date of birth 1955, Medical record number [id number]  Date of Visit:  05/21/2020  Consult requested by   for evaluation of .         Assessment and Recommendations:   Recommendations:  - Foot XR 3 views to rule out Right heel osteomyelitis  - WIll switch Angelika to Levo for better Staph coverage  - Will add Flagyl    Assessment:    Infectious Disease issues include:  -   - PCP prophylaxis:  - Serostatus:  - Immunization status:  - Gamma globulin status:  - Isolation status:  Good hand hygiene.    Follow up scheduled for ***.    Patient discussed with   ***.                 History of the Infectious Disease lllness:         Transplants:  N/A; Postoperative day:  .  Coordinator No matching coordinators    Review of Systems:  CONSTITUTIONAL:  No fevers or chills. No night sweats.  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)      Splenomegaly      Squamous cell carcinoma      Thrombocytopenia (H)      Varices, esophageal (H)     Banded in 2011       Past Surgical History:   Procedure Laterality Date     BIOPSY OF SKIN LESION       COLONOSCOPY Left 6/16/2016    Procedure: COMBINED COLONOSCOPY, SINGLE OR MULTIPLE BIOPSY/POLYPECTOMY BY BIOPSY;  Surgeon: Brandy Barnett,  MD;  Location:  GI     ESOPHAGOSCOPY, GASTROSCOPY, DUODENOSCOPY (EGD), COMBINED  2013    Procedure: COMBINED ESOPHAGOSCOPY, GASTROSCOPY, DUODENOSCOPY (EGD);;  Surgeon: Tara Cook MD;  Location:  GI     ESOPHAGOSCOPY, GASTROSCOPY, DUODENOSCOPY (EGD), COMBINED  2013    Procedure: COMBINED ESOPHAGOSCOPY, GASTROSCOPY, DUODENOSCOPY (EGD);;  Surgeon: Lonny Diaz MD;  Location:  GI     ESOPHAGOSCOPY, GASTROSCOPY, DUODENOSCOPY (EGD), COMBINED Left 2016    Procedure: COMBINED ESOPHAGOSCOPY, GASTROSCOPY, DUODENOSCOPY (EGD), BIOPSY SINGLE OR MULTIPLE;  Surgeon: Brandy Barnett MD;  Location:  GI     EXCISE LESION TRUNK  2012    Procedure: EXCISE LESION TRUNK;;  Surgeon: Pepe Dominguez MD;  Location: [facility]     GENITOURINARY SURGERY      vasectomy     HERNIORRHAPHY UMBILICAL  2012    Procedure: HERNIORRHAPHY UMBILICAL;  UMBILICAL HERNIA REPAIR , EXCISION OF PERIUMBILICAL CYST;  Surgeon: Pepe Dominguez MD;  Location: [facility]       Family History   Problem Relation Age of Onset     Breast Cancer Mother      Liver Cancer Mother      Cardiovascular Father      Cerebrovascular Disease Father         very low blood pressure     Cancer Father         rectal cancer     Cardiovascular Paternal Grandfather      Diabetes Brother      Cancer Sister         skin cancer     C.A.D. Other         MI, 70's     Breast Cancer Sister      Thyroid Disease No family hx of      Lipids No family hx of      Anesthesia Reaction No family hx of        Social History     Social History Narrative    . 3 grown daughters. He has been sober since .     Social History     Tobacco Use     Smoking status: Former Smoker     Packs/day: 0.30     Types: Cigarettes     Last attempt to quit: 4/10/2019     Years since quittin.1     Smokeless tobacco: Never Used     Tobacco comment: 45 yr   Substance Use Topics     Alcohol use: No     Alcohol/week: 0.0 standard drinks     Comment: 2-3 per day  , none since Dec 2012     Drug use: No       Immunization History   Administered Date(s) Administered     DTAP (<7y) 01/18/1988     HEPA 01/02/2013, 02/18/2013, 10/30/2013     HepB 01/02/2013, 02/18/2013, 10/30/2013     Influenza (IIV3) PF 10/04/2010, 12/18/2012, 09/08/2014, 09/26/2015, 09/27/2016     Influenza Intranasal Vaccine 4 valent 10/12/2017     Influenza Vaccine IM > 6 months Valent IIV4 10/26/2018     Pneumo Conj 13-V (2010&after) 05/12/2015     Pneumococcal 23 valent 09/25/2009, 10/01/2010, 01/02/2013, 04/23/2019     TD (ADULT, 7+) 09/08/1997     TDAP Vaccine (Adacel) 01/02/2013     Twinrix A/B 01/02/2013     Zoster vaccine, live 12/22/2016       Patient Active Problem List   Diagnosis     Mild major depression (H)     Thrombocytopenia (H)     Hypertension goal BP (blood pressure) < 130/80     Plantar warts     Corns and callosities     Family history of colon cancer     Type 2 diabetes, HbA1c goal < 7% (H)     Portal hypertension (H)     Alcoholic cirrhosis (H)     Ascites     Esophageal varices (H)     Multiple rib fractures     Tobacco use disorder     Hyperlipidemia LDL goal <100     Dental caries     Prurigo nodularis     Esophageal reflux     Morbid obesity (H)     History of colonic polyps: tubular adenomas and serrated adenomas     Type II diabetes mellitus with peripheral circulatory disorder (H)     Alcoholic cirrhosis of liver with ascites (H)     Hepatic encephalopathy (H)     Lymphedema of both lower extremities     Venous stasis ulcers of both lower extremities (H)       Current Outpatient Medications   Medication Sig     blood glucose (NO BRAND SPECIFIED) lancets standard Use to test blood sugar 4 X  times daily or as directed.     blood glucose (NO BRAND SPECIFIED) test strip Use to test blood sugars 4 X  times daily or as directed     blood glucose (NO BRAND SPECIFIED) test strip Use to test blood sugar 4 times daily or as directed.     blood glucose monitoring (NO BRAND SPECIFIED) meter  device kit Use to test blood sugar 4 times daily or as directed. Before meals and snack at HS.     blood glucose monitoring (NO BRAND SPECIFIED) meter device kit Use to test blood sugar 4 X  times daily or as directed.     childrens multivitamin w/ionr (FLINTSTONES COMPLETE) 60 MG chewable tablet Take 1 chew tab by mouth daily     Cholecalciferol (VITAMIN D3) 50 MCG (2000 UT) CAPS Take 1 capsule by mouth daily     ciprofloxacin (CIPRO) 750 MG tablet Take 1 tablet (750 mg) by mouth 2 times daily     citalopram (CELEXA) 20 MG tablet Take 1 tablet (20 mg) by mouth daily     CRANBERRY PO Take 1 capsule by mouth 3 times daily as needed     Cyanocobalamin (VITAMIN B-12 PO) Take 1 tablet by mouth daily     desvenlafaxine (PRISTIQ) 100 MG 24 hr tablet Take 2 tablets (200 mg) by mouth daily     desvenlafaxine fumarate 100 MG 24 hr tablet Take 100 mg by mouth daily     fentaNYL (DURAGESIC) 12 mcg/hr 72 hr patch Place 2 patches onto the skin every 72 hours remove old patch.     fentaNYL (DURAGESIC) 25 mcg/hr 72 hr patch Place 1 patch onto the skin every 72 hours remove old patch.     furosemide (LASIX) 40 MG tablet Take 40 mg once daily     gabapentin (NEURONTIN) 300 MG capsule Take 1 capsule (300 mg) by mouth At Bedtime     hydrOXYzine (ATARAX) 25 MG tablet Take 1 tablet (25 mg) by mouth 3 times daily as needed for itching     insulin glargine (LANTUS SOLOSTAR) 100 UNIT/ML pen Inject 30 units under the skin every morning     insulin pen needle (B-D U/F) 31G X 8 MM miscellaneous USE  6 times daily / OR AS DIRECTED     insulin pen needle (ULTICARE SHORT) 31G X 8 MM miscellaneous Use UP to 6 times daily or as directed     lactulose encephalopathy (CHRONULAC) 10 GM/15ML SOLUTION TAKE 30 MLS BY MOUTH 2 TIMES DAILY  TITRATE AS NEEDED TO ACHIEVE 3-5 BOWEL MOVEMENTS DAILY.     lidocaine (XYLOCAINE) 5 % external ointment Apply topically as needed for moderate pain Apply to wounds twice daily as needed.     magnesium oxide (MAG-OX) 400  (241.3 Mg) MG tablet Take 1 tablet (400 mg) by mouth daily     NOVOLOG FLEXPEN 100 UNIT/ML soln INJECT 20 UNITS UNDER THE SKIN BEFORE BREAKFAST, 20 UNITS BEFORE LUNCH, 20 UNITS BEFORE DINNER, and 20 UNITS BEFORE SNACKS.      nystatin (MYCOSTATIN) 061565 UNIT/GM external cream Apply topically 2 times daily     nystatin-triamcinolone (MYCOLOG II) cream Apply topically 2 times daily as needed (itching)     ondansetron (ZOFRAN) 4 MG tablet Take 1 tablet (4 mg) by mouth every 8 hours as needed for nausea     order for DME Equipment being ordered:Orthopedic shoes     order for DME Equipment being ordered: Condom catheter     order for DME Equipment being ordered:BioTab compression pants pneumatic system     order for DME Equipment being ordered:bilateral pneumatic leg massage for treatment of extreme bilateral lower leg edema.     OYSTER SHELL CALCIUM/D 500-200 MG-UNIT per tablet Take 1 tablet by mouth daily     QUEtiapine (SEROQUEL) 100 MG tablet Take 1 tablet (100 mg) by mouth At Bedtime     QUEtiapine (SEROQUEL) 25 MG tablet Take 50 mg by mouth At Bedtime     ranitidine (ZANTAC) 150 MG tablet Take 1 tablet (150 mg) by mouth 2 times daily     spironolactone (ALDACTONE) 50 MG tablet Take 2 tablets (100 mg) by mouth daily     STATIN NOT PRESCRIBED, INTENTIONAL, 1 each daily Statin not prescribed intentionally due to Active liver disease     triamcinolone (KENALOG) 0.1 % external cream Apply topically 2 times daily as needed for irritation     UNABLE TO FIND MEDICATION NAME: Burdock root     ursodiol (ACTIGALL) 300 MG capsule Take 3 capsules (900 mg) by mouth 2 times daily     VITAMIN E PO Take 1 capsule by mouth daily     XIFAXAN 550 MG TABS tablet TAKE 1 TABLET BY MOUTH TWO TIMES DAILY     No current facility-administered medications for this visit.        No Known Allergies           Physical Exam:   Vitals were reviewed.  All vitals stable  /66   Pulse 73   Temp 98.6  F (37  C) (Oral)   Ht 1.829 m (6')   Wt  106.6 kg (235 lb 1.6 oz)   SpO2 99%   BMI 31.89 kg/m      Exam:  GENERAL:  well-developed, well-nourished, alert, oriented, in no acute distress.  HEENT:  Head is normocephalic, atraumatic   EYES:  Eyes have anicteric sclerae.    ENT:  Oropharynx is moist without exudates or ulcers.  NECK:  Supple.  LUNGS:  Clear to auscultation.  CARDIOVASCULAR:  Regular rate and rhythm with no murmurs, gallops or rubs.  ABDOMEN:  Normal bowel sounds, soft, nontender.  SKIN:  No acute rashes.  Line is in place without any surrounding erythema.  NEUROLOGIC:  Grossly nonfocal.         Laboratory Data:       Inflammatory Markers    Recent Labs   Lab Test 08/22/19  0634 08/21/19  0629 08/20/19  0625 08/18/19  1815 12/22/16  1722 12/22/15  1419   CRP 60.1* 103.0* 143.0* 77.1* 9.3* 8.6*       Metabolic Studies       Recent Labs   Lab Test 03/12/20  1238 11/18/19  1433 08/21/19  0629 08/19/19  0631 08/18/19  1916 08/18/19  1815 04/22/19  1343  11/06/17  0744    127* 137 136  --  133 136   < > 144   POTASSIUM 3.7 4.7 4.2 3.8  --  4.0 4.1   < > 3.7   CHLORIDE 101 94 110* 107  --  103 101   < > 113*   CO2 26 23 21 22  --  23 26   < > 24   ANIONGAP 7 10 6 7  --  7 8   < > 8   BUN 17 25 16 16  --  18 20   < > 10   CR 0.91 1.12 0.69 0.88  --  0.73 0.92   < > 0.71   GFRESTIMATED 88 68 >90 90  --  >90 86   < > >90   * 206* 130* 86  --  87 141*   < > 93   A1C 6.3*  --   --   --   --  5.3  --    < >  --    ERIN 8.1* 8.7 8.4* 8.0*  --  8.4* 8.4*   < > 7.9*   MAG  --   --   --  1.9  --   --   --   --  1.9   LACT  --   --   --   --  1.7  --   --   --   --     < > = values in this interval not displayed.       Hematology Studies      Recent Labs   Lab Test 03/12/20  1238 11/18/19  1433 08/20/19  0625 08/19/19  0631 08/18/19  1815 04/22/19  1343 10/25/18  1405  04/27/18  1235  11/04/17  2035  10/28/14  1847  03/04/14  1814 02/19/14  1609   WBC 4.6 9.1  --  4.9 6.4 3.8* 3.2*   < > 2.7*   < > 5.0   < > 7.1   < > 6.4 6.4   ANEU  --   --   --    --  4.9  --   --   --  1.7  --  3.3  --  3.8  --  3.3 3.3   ALYM  --   --   --   --  0.4*  --   --   --  0.4*  --  0.8  --  2.0  --  2.1 1.9   RAFAELA  --   --   --   --  1.0  --   --   --  0.5  --  0.7  --  1.2  --  0.8 1.0   AEOS  --   --   --   --  0.1  --   --   --  0.1  --  0.1  --  0.1  --  0.2 0.2   HGB 7.6* 10.9* 8.4* 8.0* 9.3* 9.1* 9.5*   < > 9.5*   < > 12.3*   < > 14.7   < > 14.9 14.8   HCT 24.2* 33.2*  --  24.0* 27.8* 29.0* 29.9*   < > 29.3*   < > 38.5*   < > 41.9   < > 42.2 41.4   * 128* 78* 79* 124* 89* 102*   < > 85*   < > 77*   < > 122*   < > 87* 100*    < > = values in this interval not displayed.       Clotting Studies    Recent Labs   Lab Test 03/12/20  1238 11/18/19  1433 04/22/19  1343 10/25/18  1405  09/24/12  1202 09/13/12  1411   INR 1.28* 1.34* 1.27* 1.14   < > Duplicate request  Charge credited  1.17* 1.16*   PTT  --   --   --   --   --  29 30    < > = values in this interval not displayed.       Urine Studies     Recent Labs   Lab Test 03/12/20  1329 09/13/18  1731 07/31/18  1308 11/04/17  2034 11/21/16  1517   URINEPH 5.0 5.0 6.0 7.0 8.0*   NITRITE Negative Negative Negative Negative Negative   LEUKEST Negative Negative Small* Negative Negative   WBCU 1 1 73* 1 1       CSF testing   No lab results found.    Invalid input(s): CADAM, EVPCR, ENTPCR, ENTEROVIRUS    Microbiology:  Last 6 Culture results with specimen source  Culture Micro   Date Value Ref Range Status   08/18/2019 No growth  Final   08/18/2019 No growth  Final   09/13/2018 No growth  Final   07/31/2018 >100,000 colonies/mL  Escherichia coli   (A)  Final   11/05/2017 No growth  Final   11/04/2017   Final    <10,000 colonies/mL  urogenital jagdish  Susceptibility testing not routinely done      Specimen Description   Date Value Ref Range Status   08/18/2019 Blood Right Hand  Final   08/18/2019 Blood Right Hand  Final   09/13/2018 Midstream Urine  Final   07/31/2018 Midstream Urine  Final   11/05/2017 Ascites Fluid  Final    11/05/2017 Ascites Fluid  Final          Hepatitis B Testing     Recent Labs   Lab Test 09/13/12  1411   HBSAB 0.0   HBCAB Negative   HEPBANG Negative        Hepatitis C Antibody   Date Value Ref Range Status   09/13/2012 Negative NEG Final         Imaging:  Results for orders placed or performed during the hospital encounter of 08/18/19   Foot  XR, G/E 3 views, right    Narrative    RIGHT FOOT THREE OR MORE VIEWS    8/18/2019 6:35 PM     HISTORY: Pain.    COMPARISON: None.      Impression    IMPRESSION: No acute fracture or dislocation. Anatomic alignment. No  bony destructive lesion identified.    LILO ATKINS MD   XR Calcaneus Right G/E 2 Views    Narrative    RIGHT CALCANEUS TWO OR MORE VIEWS    8/18/2019 6:35 PM     HISTORY: Pain.    COMPARISON: None.      Impression    IMPRESSION: No acute fracture or dislocation is identified. Anatomic  alignment.     LILO ATKINS MD   MR Ankle Right w/o Contrast    Narrative    MR ANKLE RIGHT WITHOUT CONTRAST August 19, 2019 5:53 AM     HISTORY: Right heel pain, rule out osteomyelitis.    TECHNIQUE: Sagittal and coronal T1 and inversion recovery, and   transverse proton density and T2 weighted images.    COMPARISON: X-ray from 8/18/2019.    FINDINGS:  Plantar Fascia: Mild thickening and surrounding soft tissue edema in  the plantar fascia near its insertion site on the calcaneal tuberosity  suspicious for mild plantar fasciitis.     Osseous and Cartilaginous Structures: No bone marrow edema or evidence  of osteomyelitis. Talar dome is intact. No evidence of fracture.     Posterior Tibial and Flexor Tendons: No tear or tendinosis of the  posterior tibial tendon, flexor digitorum longus tendon, or flexor  hallucis longus tendon.     Peroneal Tendons: No tear, tendinosis, or apparent longitudinal  splitting of the peroneus brevis tendon or peroneus longus tendon. No  tendon subluxation.     Achilles Tendon: No tear or tendinosis.     Extensor Tendons: No tear tendinosis of the  anterior tibial tendon  extensor hallux longus tendons or extensor digitorum longus tendons.     Lateral Ligaments: The anterior talofibular ligament appears intact.  The calcaneofibular, posterior talofibular, and anterior and posterior  tibiofibular ligaments appear intact.     Medial Deltoid Ligamentous Complex: Intact.     Joint space: No tibiotalar or subtalar joint effusion.    Additional Findings: No retrocalcaneal bursitis. No mass within the  tarsal tunnel. The sinus Tarsi is unremarkable. Diffuse fairly  prominent subcutaneous edema consistent with a history of cellulitis.      Impression    IMPRESSION:    1. No evidence of osteomyelitis or bone marrow edema.  2. Diffuse fairly prominent subcutaneous edema consistent with a  history of cellulitis.    ROSA ELENA NO MD     *Note: Due to a large number of results and/or encounters for the requested time period, some results have not been displayed. A complete set of results can be found in Results Review.           Answers for HPI/ROS submitted by the patient on 5/21/2020   General Symptoms: No  Skin Symptoms: Yes  HENT Symptoms: No  EYE SYMPTOMS: Yes  HEART SYMPTOMS: Yes  LUNG SYMPTOMS: No  INTESTINAL SYMPTOMS: No  URINARY SYMPTOMS: Yes  REPRODUCTIVE SYMPTOMS: No  SKELETAL SYMPTOMS: Yes  BLOOD SYMPTOMS: Yes  NERVOUS SYSTEM SYMPTOMS: Yes  MENTAL HEALTH SYMPTOMS: Yes  Changes in hair: No  Changes in moles/birth marks: No  Itching: Yes  Rashes: No  Changes in nails: No  Acne: No  Change in facial hair: No  Warts: No  Non-healing sores: Yes  Scarring: No  Flaking of skin: Yes  Color changes of hands/feet in cold : Yes  Sun sensitivity: Yes  Skin thickening: Yes  Eye pain: No  Vision loss: Yes  Dry eyes: Yes  Watery eyes: No  Eye bulging: No  Double vision: No  Flashing of lights: No  Spots: No  Floaters: No  Redness: No  Crossed eyes: No  Yellowing of eyes: No  Eye irritation: No  Chest pain or pressure: No  Fast or irregular heartbeat: Yes  Pain in legs with  walking: Yes  Trouble breathing while lying down: No  Fingers or toes appear blue: No  High blood pressure: No  Low blood pressure: Yes  Fainting: No  Murmurs: Yes  Pacemaker: No  Varicose veins: No  Edema or swelling: Yes  Wake up at night with shortness of breath: No  Light-headedness: No  Exercise intolerance: No  Trouble holding urine or incontinence: Yes  Pain or burning: Yes  Trouble starting or stopping: Yes  Increased frequency of urination: Yes  Blood in urine: No  Decreased frequency of urination: No  Frequent nighttime urination: Yes  Flank pain: No  Difficulty emptying bladder: No  Back pain: Yes  Muscle aches: Yes  Neck pain: Yes  Swollen joints: No  Joint pain: Yes  Bone pain: No  Muscle cramps: Yes  Muscle weakness: Yes  Joint stiffness: Yes  Bone fracture: Yes  Anemia: Yes  Swollen glands: No  Easy bleeding or bruising: Yes  Trouble with coordination: Yes  Dizziness or trouble with balance: Yes  Fainting or black-out spells: No  Memory loss: Yes  Headache: No  Seizures: No  Speech problems: No  Tingling: Yes  Tremor: No  Weakness: Yes  Difficulty walking: Yes  Paralysis: No  Numbness: Yes  Nervous or Anxious: Yes  Depression: Yes  Trouble sleeping: Yes  Trouble thinking or concentrating: Yes  Mood changes: Yes  Panic attacks: No

## 2020-05-23 NOTE — PROGRESS NOTES
[facility]  Infectious Disease Clinic Note:  New Patient     Patient:  Lawrence Louie, Date of birth 1955, Medical record number [id number]  Date of Visit:  05/21/2020  Consult requested by patient himself.         Assessment and Recommendations:   Recommendations:    Patient has chronic right heel ulcer/wound for years which was closed on presentation and tender.  Potentially could be a source of infection.  - We will obtain x-ray of right foot to rule out osteomyelitis of right heel.  If patient found to have osteomyelitis then it will extend the duration of treatment up to at least weeks.  - For now we will switch ciprofloxacin to levofloxacin for better staph coverage.  We will plan for 10 days of treatment for now pending x-ray results (given he has no osteomyelitis on Xray).  - Will also add Flagyl to provide anaerobic coverage given foul-smelling right heel ulcer.  -During physical examination patient's right heel wound opened up and we were able to obtain cultures.  -We will follow right heel wound cultures and change antibiotics accordingly.  -We will refer patient for wound care.  -We will also refer to podiatry.  - Given patient's frequent episodes of cellulitis and how quickly he becomes septic and requires hospitalization, we will consider  giving Cefadroxil to have on hand to treat once patient feels like he is about to have another episode of cellulitis in order to try to prevent quick decompensation (Cefadroxil will been our antibiotic choice if he grows MSSA).  - Will obtain CRP to have a baseline.    Assessment:  Patient is a 64-year-old gentleman with history of lower extremity lymphedema, recurrent cellulitis, MSSA bacteremia, ESLD, DM 2.  Complaints of recurrent cellulitis complicated by bacteremia.    #Recurrent MSSA bacteremia, source thought to be bilateral lower extremities cellulitis  4/13/2019 blood culture-MSSA  10/7/2019 blood culture with-MSSA and  Aeromonas veronii  Recent hospitalization in March 2020 for MSSA bacteremia.  Blood cultures 3/21/2020 -MSSA, 3/22-negative  TTE 3/23/2020 negative for vegetations.  Patient treated with IV cefazolin 3/22-4/4.    # Recurrent cellulitis of lower extremities in setting of chronic lymphedema and venous stasis ulcers  Wound cultures 9/2019 pansensitive Pseudomonas.  Recent MSSA bacteremia in 3/20 thought to be due to MSSA cellulitis.  Patient with multiple open wounds on bilateral anterior shins~0.5- 2.0 cm in diameter, none of which looks actively infected.  Patient recently finished treatment with ciprofloxacin 750 mg twice daily for 10 days prescribed by ID, as there was suspicion for possible pseudomonal infection given blue-green discoloration of open wounds in April 2020.    # Chronic right heel wound/ulcer  Patient had right heel wound/ulcer for more than 15 years  9/8/2019-heel  ulcer scant Bacteroides pyogenes, MSSA  - Patient's wound opened up during physical examination.  Wound cultures obtained.    #End-stage liver disease, hepatic encephalopathy  Patient taking spironolactone, furosemide, lactulose as needed    #DM2, well controlled  Lantus 5 units daily  Last hemoglobin A1c-5.5 3/2020    - Isolation status:  Good hand hygiene.    Follow up scheduled in 4 weeks  Patient discussed with Dr. Elaina Cavazos.  Janey Chapin MD, ID fellow, pager [phone]    Attestation:    I have seen and evaluated the patient and have discussed his/her care with the fellow. I agree with the documented findings and plan. I have reviewed today's vital signs, medications, labs and imaging.    Elaina Cavazos MD  Division of Infectious Diseases and International Medicine  Pager: [phone]            History of the Infectious Disease lllness:     Patient is 64-year-old male with history of ESLD, S/P TIPS, DM 2 on Lantus, chronic lymphedema and venous stasis ulcers, recurrent cellulitis and MSSA bacteremia.  Patient seen in  ID clinic with his daughter.  He was recently hospitalized at the end of March at [facility] for MSSA bacteremia (blood culture 3/21/2020 with MSSA), source felt to be lower extremity cellulitis.  Patient was treated with 2 weeks of IV cefazolin 3/22-4/4.  TTE 3/23 was negative for vegetations.  In the past patient had episode of MSSA bacteremia in 10/2019.  Per patient's daughter, usually patient has signs and symptoms of lower extremity cellulitis and then progressively becomes septic and requires hospitalization.  Usually he does not have time to make it to physician's appointments due to quick deterioration.  Today they are seen in ID clinic asking if something could be done to prevent frequent bacteremia hospitalizations.  In mid April 2019 patient seen by ID physician who felt that his bilateral lower extremity wounds  are due to pseudomonal infection because of bluish-green discoloration of his wounds.  He was prescribed 10-day course of about 10 days ago.  Today none of his open wounds look infected.  The day before patient was notified that his PCP sent prescription for another 10-day course of ciprofloxacin to take until he will be seen in person.  Patient has history of chronic right heel ulcer/wound >15 years. In 9/2019 it grew MSSA.  Today he is complaining of fatigue generalized weakness for which he is using walker.  He has home care nurse, who also takes care of his lymphedema.  He has no podiatry as had bad experience in the past.  Today patient denies fever, chills, SOB, night sweats, CP, cough, abdominal pain, nausea, vomiting. He does not look sick.  His last bloodwork notable for WBC-3, Hb-7.8, Plt-98 on.        Review of Systems:  CONSTITUTIONAL:  No fevers or chills. No night sweats.  Positive fatigue  EYES: negative for icterus  ENT:  negative for hearing loss, tinnitus or sore throat  RESPIRATORY:  negative for cough, sputum, dyspnea  CARDIOVASCULAR:  negative for chest pain,  palpitations  GASTROINTESTINAL:  negative for nausea, vomiting, diarrhea or constipation  GENITOURINARY:  negative for dysuria  HEME:  No easy bruising  INTEGUMENT: Venous stasis ulcers chronic, complicated by frequent recurrent cellulitis  NEURO:  Negative for headache    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)      Splenomegaly      Squamous cell carcinoma      Thrombocytopenia (H)      Varices, esophageal (H)     Banded in 2011       Past Surgical History:   Procedure Laterality Date     BIOPSY OF SKIN LESION       COLONOSCOPY Left 6/16/2016    Procedure: COMBINED COLONOSCOPY, SINGLE OR MULTIPLE BIOPSY/POLYPECTOMY BY BIOPSY;  Surgeon: Brandy Barnett MD;  Location:  GI     ESOPHAGOSCOPY, GASTROSCOPY, DUODENOSCOPY (EGD), COMBINED  2/13/2013    Procedure: COMBINED ESOPHAGOSCOPY, GASTROSCOPY, DUODENOSCOPY (EGD);;  Surgeon: Tara Cook MD;  Location:  GI     ESOPHAGOSCOPY, GASTROSCOPY, DUODENOSCOPY (EGD), COMBINED  11/4/2013    Procedure: COMBINED ESOPHAGOSCOPY, GASTROSCOPY, DUODENOSCOPY (EGD);;  Surgeon: Lonny Diaz MD;  Location:  GI     ESOPHAGOSCOPY, GASTROSCOPY, DUODENOSCOPY (EGD), COMBINED Left 6/16/2016    Procedure: COMBINED ESOPHAGOSCOPY, GASTROSCOPY, DUODENOSCOPY (EGD), BIOPSY SINGLE OR MULTIPLE;  Surgeon: Brandy Barnett MD;  Location:  GI     EXCISE LESION TRUNK  9/24/2012    Procedure: EXCISE LESION TRUNK;;  Surgeon: Pepe Dominguez MD;  Location: [facility]     GENITOURINARY SURGERY      vasectomy     HERNIORRHAPHY UMBILICAL  9/24/2012    Procedure: HERNIORRHAPHY UMBILICAL;  UMBILICAL HERNIA REPAIR , EXCISION OF PERIUMBILICAL CYST;  Surgeon: Pepe Dominguez MD;  Location: [facility]       Family History   Problem Relation Age of Onset     Breast Cancer Mother       Liver Cancer Mother      Cardiovascular Father      Cerebrovascular Disease Father         very low blood pressure     Cancer Father         rectal cancer     Cardiovascular Paternal Grandfather      Diabetes Brother      Cancer Sister         skin cancer     C.A.D. Other         MI, 70's     Breast Cancer Sister      Thyroid Disease No family hx of      Lipids No family hx of      Anesthesia Reaction No family hx of        Social History     Social History Narrative    . 3 grown daughters. He has been sober since .     Social History     Tobacco Use     Smoking status: Former Smoker     Packs/day: 0.30     Types: Cigarettes     Last attempt to quit: 4/10/2019     Years since quittin.1     Smokeless tobacco: Never Used     Tobacco comment: 45 yr   Substance Use Topics     Alcohol use: No     Alcohol/week: 0.0 standard drinks     Comment: 2-3 per day , none since Dec 2012     Drug use: No       Immunization History   Administered Date(s) Administered     DTAP (<7y) 1988     HEPA 2013, 2013, 10/30/2013     HepB 2013, 2013, 10/30/2013     Influenza (IIV3) PF 10/04/2010, 2012, 2014, 2015, 2016     Influenza Intranasal Vaccine 4 valent 10/12/2017     Influenza Vaccine IM > 6 months Valent IIV4 10/26/2018     Pneumo Conj 13-V (2010&after) 2015     Pneumococcal 23 valent 2009, 10/01/2010, 2013, 2019     TD (ADULT, 7+) 1997     TDAP Vaccine (Adacel) 2013     Twinrix A/B 2013     Zoster vaccine, live 2016       Patient Active Problem List   Diagnosis     Mild major depression (H)     Thrombocytopenia (H)     Hypertension goal BP (blood pressure) < 130/80     Plantar warts     Corns and callosities     Family history of colon cancer     Type 2 diabetes, HbA1c goal < 7% (H)     Portal hypertension (H)     Alcoholic cirrhosis (H)     Ascites     Esophageal varices (H)     Multiple rib fractures     Tobacco  use disorder     Hyperlipidemia LDL goal <100     Dental caries     Prurigo nodularis     Esophageal reflux     Morbid obesity (H)     History of colonic polyps: tubular adenomas and serrated adenomas     Type II diabetes mellitus with peripheral circulatory disorder (H)     Alcoholic cirrhosis of liver with ascites (H)     Hepatic encephalopathy (H)     Lymphedema of both lower extremities     Venous stasis ulcers of both lower extremities (H)       Current Outpatient Medications   Medication Sig     blood glucose (NO BRAND SPECIFIED) lancets standard Use to test blood sugar 4 X  times daily or as directed.     blood glucose (NO BRAND SPECIFIED) test strip Use to test blood sugars 4 X  times daily or as directed     blood glucose (NO BRAND SPECIFIED) test strip Use to test blood sugar 4 times daily or as directed.     blood glucose monitoring (NO BRAND SPECIFIED) meter device kit Use to test blood sugar 4 times daily or as directed. Before meals and snack at HS.     blood glucose monitoring (NO BRAND SPECIFIED) meter device kit Use to test blood sugar 4 X  times daily or as directed.     childrens multivitamin w/ionr (FLINTSTONES COMPLETE) 60 MG chewable tablet Take 1 chew tab by mouth daily     Cholecalciferol (VITAMIN D3) 50 MCG (2000 UT) CAPS Take 1 capsule by mouth daily     ciprofloxacin (CIPRO) 750 MG tablet Take 1 tablet (750 mg) by mouth 2 times daily     citalopram (CELEXA) 20 MG tablet Take 1 tablet (20 mg) by mouth daily     CRANBERRY PO Take 1 capsule by mouth 3 times daily as needed     Cyanocobalamin (VITAMIN B-12 PO) Take 1 tablet by mouth daily     desvenlafaxine (PRISTIQ) 100 MG 24 hr tablet Take 2 tablets (200 mg) by mouth daily     desvenlafaxine fumarate 100 MG 24 hr tablet Take 100 mg by mouth daily     fentaNYL (DURAGESIC) 12 mcg/hr 72 hr patch Place 2 patches onto the skin every 72 hours remove old patch.     fentaNYL (DURAGESIC) 25 mcg/hr 72 hr patch Place 1 patch onto the skin every 72  hours remove old patch.     furosemide (LASIX) 40 MG tablet Take 40 mg once daily     gabapentin (NEURONTIN) 300 MG capsule Take 1 capsule (300 mg) by mouth At Bedtime     hydrOXYzine (ATARAX) 25 MG tablet Take 1 tablet (25 mg) by mouth 3 times daily as needed for itching     insulin glargine (LANTUS SOLOSTAR) 100 UNIT/ML pen Inject 30 units under the skin every morning     insulin pen needle (B-D U/F) 31G X 8 MM miscellaneous USE  6 times daily / OR AS DIRECTED     insulin pen needle (ULTICARE SHORT) 31G X 8 MM miscellaneous Use UP to 6 times daily or as directed     lactulose encephalopathy (CHRONULAC) 10 GM/15ML SOLUTION TAKE 30 MLS BY MOUTH 2 TIMES DAILY  TITRATE AS NEEDED TO ACHIEVE 3-5 BOWEL MOVEMENTS DAILY.     lidocaine (XYLOCAINE) 5 % external ointment Apply topically as needed for moderate pain Apply to wounds twice daily as needed.     magnesium oxide (MAG-OX) 400 (241.3 Mg) MG tablet Take 1 tablet (400 mg) by mouth daily     NOVOLOG FLEXPEN 100 UNIT/ML soln INJECT 20 UNITS UNDER THE SKIN BEFORE BREAKFAST, 20 UNITS BEFORE LUNCH, 20 UNITS BEFORE DINNER, and 20 UNITS BEFORE SNACKS.      nystatin (MYCOSTATIN) 392785 UNIT/GM external cream Apply topically 2 times daily     nystatin-triamcinolone (MYCOLOG II) cream Apply topically 2 times daily as needed (itching)     ondansetron (ZOFRAN) 4 MG tablet Take 1 tablet (4 mg) by mouth every 8 hours as needed for nausea     order for DME Equipment being ordered:Orthopedic shoes     order for DME Equipment being ordered: Condom catheter     order for DME Equipment being ordered:BioTab compression pants pneumatic system     order for DME Equipment being ordered:bilateral pneumatic leg massage for treatment of extreme bilateral lower leg edema.     OYSTER SHELL CALCIUM/D 500-200 MG-UNIT per tablet Take 1 tablet by mouth daily     QUEtiapine (SEROQUEL) 100 MG tablet Take 1 tablet (100 mg) by mouth At Bedtime     QUEtiapine (SEROQUEL) 25 MG tablet Take 50 mg by mouth At  Bedtime     ranitidine (ZANTAC) 150 MG tablet Take 1 tablet (150 mg) by mouth 2 times daily     spironolactone (ALDACTONE) 50 MG tablet Take 2 tablets (100 mg) by mouth daily     STATIN NOT PRESCRIBED, INTENTIONAL, 1 each daily Statin not prescribed intentionally due to Active liver disease     triamcinolone (KENALOG) 0.1 % external cream Apply topically 2 times daily as needed for irritation     UNABLE TO FIND MEDICATION NAME: Seble root     ursodiol (ACTIGALL) 300 MG capsule Take 3 capsules (900 mg) by mouth 2 times daily     VITAMIN E PO Take 1 capsule by mouth daily     XIFAXAN 550 MG TABS tablet TAKE 1 TABLET BY MOUTH TWO TIMES DAILY     No current facility-administered medications for this visit.        No Known Allergies           Physical Exam:   Vitals were reviewed.  All vitals stable  /66   Pulse 73   Temp 98.6  F (37  C) (Oral)   Ht 1.829 m (6')   Wt 106.6 kg (235 lb 1.6 oz)   SpO2 99%   BMI 31.89 kg/m      Exam:  GENERAL:  well-developed, obese, alert, oriented, in no acute distress.  In a wheelchair.  HEENT:  Head is normocephalic, atraumatic   EYES:  Eyes have anicteric sclerae.    ENT:  Oropharynx is moist without exudates or ulcers.  NECK:  Supple.  LUNGS:  Clear to auscultation.  CARDIOVASCULAR:  Regular rate and rhythm with no gallops or rubs. Ejection murmur 2/6 in 2nd intercostal.  ABDOMEN:  Normal bowel sounds, soft, nontender.  SKIN: Bilateral lower extremities with chronic lymphedema multiple up to 10-12 0.5-2.0 cm open ulcers which do not appear infected.  Right heel with ~2.0 cm tender wound which opened and started to drain during our examination.  Some foul-smelling odor appreciated.  NEUROLOGIC:  Grossly nonfocal.         Laboratory Data:     3/21/2020 blood culture-MSSA  3/22/2020 blood culture-negative  3/22/2020 MRSA nares negative  9/5/2019 heel wound-MSSA  9/8/2019-heel  ulcer scant Bacteroides pyogenes      Inflammatory Markers    Recent Labs   Lab Test 08/22/19  0634  08/21/19  0629 08/20/19  0625 08/18/19 1815 12/22/16  1722 12/22/15  1419   CRP 60.1* 103.0* 143.0* 77.1* 9.3* 8.6*       Metabolic Studies       Recent Labs   Lab Test 03/12/20  1238 11/18/19  1433 08/21/19  0629 08/19/19  0631 08/18/19  1916 08/18/19 1815 04/22/19  1343  11/06/17  0744    127* 137 136  --  133 136   < > 144   POTASSIUM 3.7 4.7 4.2 3.8  --  4.0 4.1   < > 3.7   CHLORIDE 101 94 110* 107  --  103 101   < > 113*   CO2 26 23 21 22  --  23 26   < > 24   ANIONGAP 7 10 6 7  --  7 8   < > 8   BUN 17 25 16 16  --  18 20   < > 10   CR 0.91 1.12 0.69 0.88  --  0.73 0.92   < > 0.71   GFRESTIMATED 88 68 >90 90  --  >90 86   < > >90   * 206* 130* 86  --  87 141*   < > 93   A1C 6.3*  --   --   --   --  5.3  --    < >  --    ERIN 8.1* 8.7 8.4* 8.0*  --  8.4* 8.4*   < > 7.9*   MAG  --   --   --  1.9  --   --   --   --  1.9   LACT  --   --   --   --  1.7  --   --   --   --     < > = values in this interval not displayed.       Hematology Studies      Recent Labs   Lab Test 03/12/20  1238 11/18/19  1433 08/20/19  0625 08/19/19  0631 08/18/19 1815 04/22/19  1343 10/25/18  1405  04/27/18  1235  11/04/17  2035  10/28/14  1847  03/04/14 1814 02/19/14  1609   WBC 4.6 9.1  --  4.9 6.4 3.8* 3.2*   < > 2.7*   < > 5.0   < > 7.1   < > 6.4 6.4   ANEU  --   --   --   --  4.9  --   --   --  1.7  --  3.3  --  3.8  --  3.3 3.3   ALYM  --   --   --   --  0.4*  --   --   --  0.4*  --  0.8  --  2.0  --  2.1 1.9   RAFAELA  --   --   --   --  1.0  --   --   --  0.5  --  0.7  --  1.2  --  0.8 1.0   AEOS  --   --   --   --  0.1  --   --   --  0.1  --  0.1  --  0.1  --  0.2 0.2   HGB 7.6* 10.9* 8.4* 8.0* 9.3* 9.1* 9.5*   < > 9.5*   < > 12.3*   < > 14.7   < > 14.9 14.8   HCT 24.2* 33.2*  --  24.0* 27.8* 29.0* 29.9*   < > 29.3*   < > 38.5*   < > 41.9   < > 42.2 41.4   * 128* 78* 79* 124* 89* 102*   < > 85*   < > 77*   < > 122*   < > 87* 100*    < > = values in this interval not displayed.       Clotting Studies    Recent  Labs   Lab Test 03/12/20  1238 11/18/19  1433 04/22/19  1343 10/25/18  1405  09/24/12  1202 09/13/12  1411   INR 1.28* 1.34* 1.27* 1.14   < > Duplicate request  Charge credited  1.17* 1.16*   PTT  --   --   --   --   --  29 30    < > = values in this interval not displayed.       Urine Studies     Recent Labs   Lab Test 03/12/20  1329 09/13/18  1731 07/31/18  1308 11/04/17  2034 11/21/16  1517   URINEPH 5.0 5.0 6.0 7.0 8.0*   NITRITE Negative Negative Negative Negative Negative   LEUKEST Negative Negative Small* Negative Negative   WBCU 1 1 73* 1 1         Microbiology:  Last 6 Culture results with specimen source  Culture Micro   Date Value Ref Range Status   08/18/2019 No growth  Final   08/18/2019 No growth  Final   09/13/2018 No growth  Final   07/31/2018 >100,000 colonies/mL  Escherichia coli   (A)  Final   11/05/2017 No growth  Final   11/04/2017   Final    <10,000 colonies/mL  urogenital jagdish  Susceptibility testing not routinely done      Specimen Description   Date Value Ref Range Status   08/18/2019 Blood Right Hand  Final   08/18/2019 Blood Right Hand  Final   09/13/2018 Midstream Urine  Final   07/31/2018 Midstream Urine  Final   11/05/2017 Ascites Fluid  Final   11/05/2017 Ascites Fluid  Final          Hepatitis B Testing     Recent Labs   Lab Test 09/13/12  1411   HBSAB 0.0   HBCAB Negative   HEPBANG Negative        Hepatitis C Antibody   Date Value Ref Range Status   09/13/2012 Negative NEG Final         Imaging:   CXR 03/25/20  1.  The left arm PICC has its tip in the proximal right atrium, approximately 2 cm distal to the cavoatrial junction.  2.  Hazy opacities in both lung bases are essentially unchanged.  3.  Mild cardiomegaly.    MRI lumbar spine 03/23/20  1.  The left arm PICC has its tip in the proximal right atrium, approximately 2 cm distal to the cavoatrial junction.  2.  Hazy opacities in both lung bases are essentially unchanged.  3.  Mild cardiomegaly.    ECHO  03/23/20    Interpretation Summary    * The left ventricle is normal size.    * The left ventricular systolic function is normal, estimated LVEF 60-65%.    * No regional wall motion abnormalities.    * There is moderate aortic stenosis.    * No pulmonary hypertension, estimated right ventricular systolic pressure  is 30 mmHg.    * Compared to prior study dated 02/01/2020 there has been no change.

## 2020-05-24 LAB
BACTERIA SPEC CULT: ABNORMAL
Lab: ABNORMAL
SPECIMEN SOURCE: ABNORMAL

## 2020-05-25 ENCOUNTER — MYC MEDICAL ADVICE (OUTPATIENT)
Dept: INTERNAL MEDICINE | Facility: CLINIC | Age: 65
End: 2020-05-25

## 2020-05-26 DIAGNOSIS — K70.31 ALCOHOLIC CIRRHOSIS OF LIVER WITH ASCITES (H): ICD-10-CM

## 2020-05-26 RX ORDER — OXYCODONE HYDROCHLORIDE 5 MG/1
TABLET ORAL
Qty: 180 TABLET | Refills: 0 | Status: SHIPPED | OUTPATIENT
Start: 2020-05-26 | End: 2020-06-16

## 2020-05-26 NOTE — TELEPHONE ENCOUNTER
I called wife Raven to let her know that Ary did call in the oxycodone to their pharmacy for Unruly, and to STOP all other narcotics and use only the oxycodone that was prescribed. I was very explicit about the dangers of mixing narcotic medications, wife verbalized understanding that they would follow directions. No other current questions or concerns, they will discuss ongoing pain management at upcoming phone visit.   Rhonda Rivas, EMT at 2:51 PM on 5/26/2020.

## 2020-05-27 ENCOUNTER — TELEPHONE (OUTPATIENT)
Dept: INFECTIOUS DISEASES | Facility: CLINIC | Age: 65
End: 2020-05-27

## 2020-05-27 ENCOUNTER — VIRTUAL VISIT (OUTPATIENT)
Dept: INFECTIOUS DISEASES | Facility: CLINIC | Age: 65
End: 2020-05-27
Attending: INTERNAL MEDICINE
Payer: COMMERCIAL

## 2020-05-27 ENCOUNTER — MYC MEDICAL ADVICE (OUTPATIENT)
Dept: INFECTIOUS DISEASES | Facility: CLINIC | Age: 65
End: 2020-05-27

## 2020-05-27 ENCOUNTER — MEDICAL CORRESPONDENCE (OUTPATIENT)
Dept: HEALTH INFORMATION MANAGEMENT | Facility: CLINIC | Age: 65
End: 2020-05-27

## 2020-05-27 DIAGNOSIS — Z87.898 HISTORY OF BACTEREMIA: ICD-10-CM

## 2020-05-27 DIAGNOSIS — I89.0 CHRONIC ACQUIRED LYMPHEDEMA: ICD-10-CM

## 2020-05-27 DIAGNOSIS — L97.419 CHRONIC HEEL ULCER, RIGHT, WITH UNSPECIFIED SEVERITY (H): ICD-10-CM

## 2020-05-27 DIAGNOSIS — L03.115 CELLULITIS OF RIGHT LOWER EXTREMITY: Primary | ICD-10-CM

## 2020-05-27 DIAGNOSIS — Z79.2 ENCOUNTER FOR LONG-TERM (CURRENT) USE OF ANTIBIOTICS: ICD-10-CM

## 2020-05-27 RX ORDER — DOXYCYCLINE 100 MG/1
100 CAPSULE ORAL 2 TIMES DAILY
Qty: 20 CAPSULE | Refills: 0 | Status: SHIPPED | OUTPATIENT
Start: 2020-05-27 | End: 2020-05-28

## 2020-05-27 ASSESSMENT — PAIN SCALES - GENERAL: PAINLEVEL: EXTREME PAIN (8)

## 2020-05-27 NOTE — TELEPHONE ENCOUNTER
[facility] Call Center    Phone Message    May a detailed message be left on voicemail: no     Reason for Call: Patient's daughter called Kristi states pt's wound on the bottom of right foot is doing better but the top of foot and ankle is red and painful to the touch. Per Kristi please call pt's spouse Flores at . Pt was seen on  05/21/20 and prescribed levaquin and flagyl by Dr Chapin. Thank you     Action Taken: Message routed to:  [facility] ([facility]): id    Travel Screening: Not Applicable

## 2020-05-27 NOTE — PROGRESS NOTES
"Lawrence Louie is a 64 year old male who is being evaluated via a billable video visit.      The patient has been notified of following:     \"This video visit will be conducted via a call between you and your physician/provider. We have found that certain health care needs can be provided without the need for an in-person physical exam.  This service lets us provide the care you need with a video conversation.  If a prescription is necessary we can send it directly to your pharmacy.  If lab work is needed we can place an order for that and you can then stop by our lab to have the test done at a later time.    Video visits are billed at different rates depending on your insurance coverage.  Please reach out to your insurance provider with any questions.    If during the course of the call the physician/provider feels a video visit is not appropriate, you will not be charged for this service.\"    Patient has given verbal consent for Video visit? Yes    How would you like to obtain your AVS? Yaoharbarbara    Patient would like the video invitation sent by: Send to e-mail at: [email or web address]    Will anyone else be joining your video visit? Yes, daughter and wife.        Video-Visit Details    Type of service:  Video Visit    Video Start Time: 17:35  Video End Time:  17:59    Originating Location (pt. Location): Home    Distant Location (provider location):  [facility] AND INFECTIOUS DISEASES     Platform used for Video Visit: Akosua Chapin MD  [facility]  Infectious Disease Clinic Note:  Follow up      Patient:  Lawrence Louie, Date of birth 1955, Medical record number [id number]  Date of Visit:  05/31/2020  Consult requested by patient himself.         Assessment and Recommendations:     Recommendations:    Patient now with the concern of possible new right ankle cellulitis. It is hard to assess whether the redness around the right " ankle comes from possible infected right shin ulcer ot from right heel. Wound Cx of right heel grew pansensitive MSSA.    - Will add Doxycycline 100 mg BID for 10 days for better antistaphylococcal coverage. Although Keflex or Cefadroxil will be more narrow, however given possible new area of cellulitis around patient's ankle, Doxycycline chosen for better bioavailability.  - Recommended to continue Levaquin for previous concern of possible pseudomonal infection due to blue-green discoloration of the ulcers on anterior shin. Continue Metronidazole to finish 10 days of treatement for anaerobic coverage as foul-smelling odor was noticed from R heel wound.  - Follow up with podiatry.  - Recommend to go to ER if patient develops fever, fatigue, increased redness and pain in his legs.  - Follow up in 1 week.    Assessment:  Patient is a 64-year-old gentleman with history of lower extremity lymphedema, recurrent cellulitis, MSSA bacteremia, ESLD, DM 2.  Complaints of recurrent cellulitis complicated by bacteremia.    # Recurrent MSSA bacteremia, source thought to be bilateral lower extremities cellulitis  4/13/2019 blood culture-MSSA  10/7/2019 blood culture with-MSSA and Aeromonas veronii  Recent hospitalization in March 2020 for MSSA bacteremia.  Blood cultures 3/21/2020 -MSSA, 3/22-negative  TTE 3/23/2020 negative for vegetations.  Patient treated with IV cefazolin 3/22-4/4.    # Recurrent cellulitis of lower extremities in setting of chronic lymphedema and venous stasis ulcers. Concern for new area of R ankle cellulitis.  Wound cultures 9/2019 pansensitive Pseudomonas.  Recent MSSA bacteremia in 3/20 thought to be due to MSSA cellulitis.  Patient with multiple open wounds on bilateral anterior shins~0.5- 2.0 cm in diameter, none of which looks actively infected.  Patient recently finished treatment with ciprofloxacin 750 mg twice daily for 10 days prescribed by ID, as there was suspicion for possible pseudomonal  infection given blue-green discoloration of open wounds in April 2020.  Currently on Levaquin (switched from Cipro for better MSSA coverage) and Flagyl started 5/22 for 10 days.  - Will add Doxycycline 100 mg BID for MSSA coverage due to concern of R ankle cellulitis.    # Chronic right heel wound/ulcer  Patient had right heel wound/ulcer for more than 15 years  9/8/2019-heel  ulcer scant Bacteroides pyogenes, MSSA  5/21 Patient's wound opened up during physical examination.  Wound cultures 5/21/20 with heavy growth MSSA.  - B/l foot XRay with no signs of osteomyelitis 5/21/20.    # End-stage liver disease, hepatic encephalopathy  Patient taking spironolactone, furosemide, lactulose as needed    # DM2, well controlled  Lantus 5 units daily  Last hemoglobin A1c-5.5 3/2020    - Isolation status:  Good hand hygiene.    Follow up scheduled in 1 week.    Patient discussed with Dr. Amadou Leon.  Janey Chapin MD, ID fellow, pager [phone].        Interval history:     Today patient and his daughter are concerned about R ankle cellulitis. He developed new area of redness around right ankle. Few pictures sent via My Chart but it is hard to assess the redness.  Unruly also complains of pain in his both legs, with no pain at rest, but increased pain when boots are rubbing against his skin or with movements.  He denies fever, he has chills at baseline. He checked his temperature few hours prior to video visit. He does not feel like if he is getting septic (pt usually develops cellulitis, then becomes very fatigued and deteriorates very quickly requiring hospitalization), so he is well aware about his symptoms.  His daughter Kristi and wife Flores are at his site. We tried to evaluate the right ankle via computer camera. No significant redness noted in right ankle, however it looks more pinkish compare to the left one. Both ankles are equally puffy. Unable to access both legs, as patient has compression wraps on both  legs.    Right heel wound culture obtained during previous visit 05/21/20 with heavy growth pansensitive MSSA.  3 Views b/l feet xray with no signs of osteomyelitis.           History of the Infectious Disease lllness:     Patient is 64-year-old male with history of ESLD, S/P TIPS, DM 2 on Lantus, chronic lymphedema and venous stasis ulcers, recurrent cellulitis and MSSA bacteremia.  Patient seen in ID clinic with his daughter.  He was recently hospitalized at the end of March at [facility] for MSSA bacteremia (blood culture 3/21/2020 with MSSA), source felt to be lower extremity cellulitis.  Patient was treated with 2 weeks of IV cefazolin 3/22-4/4.  TTE 3/23 was negative for vegetations.  In the past patient had episode of MSSA bacteremia in 10/2019.  Per patient's daughter, usually patient has signs and symptoms of lower extremity cellulitis and then progressively becomes septic and requires hospitalization.  Usually he does not have time to make it to physician's appointments due to quick deterioration.  Today they are seen in ID clinic asking if something could be done to prevent frequent bacteremia hospitalizations.  In mid April 2019 patient seen by ID physician who felt that his bilateral lower extremity wounds  are due to pseudomonal infection because of bluish-green discoloration of his wounds.  He was prescribed 10-day course of about 10 days ago.  Today none of his open wounds look infected.  The day before patient was notified that his PCP sent prescription for another 10-day course of ciprofloxacin to take until he will be seen in person.  Patient has history of chronic right heel ulcer/wound >15 years. In 9/2019 it grew MSSA.  Today he is complaining of fatigue generalized weakness for which he is using walker.  He has home care nurse, who also takes care of his lymphedema.  He has no podiatry as had bad experience in the past.  Today patient denies fever, chills, SOB, night sweats, CP,  cough, abdominal pain, nausea, vomiting. He does not look sick.  His last bloodwork notable for WBC-3, Hb-7.8, Plt-98 on.        Review of Systems:  CONSTITUTIONAL:  No fevers or chills. No night sweats.  Positive fatigue  EYES: negative for icterus  ENT:  negative for hearing loss, tinnitus or sore throat  RESPIRATORY:  negative for cough, sputum, dyspnea  CARDIOVASCULAR:  negative for chest pain, palpitations  GASTROINTESTINAL:  negative for nausea, vomiting, diarrhea or constipation  GENITOURINARY:  negative for dysuria  HEME:  No easy bruising  INTEGUMENT: Venous stasis ulcers chronic, complicated by frequent recurrent cellulitis  NEURO:  Negative for headache    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)      Splenomegaly      Squamous cell carcinoma      Thrombocytopenia (H)      Varices, esophageal (H)     Banded in 2011       Past Surgical History:   Procedure Laterality Date     BIOPSY OF SKIN LESION       COLONOSCOPY Left 6/16/2016    Procedure: COMBINED COLONOSCOPY, SINGLE OR MULTIPLE BIOPSY/POLYPECTOMY BY BIOPSY;  Surgeon: Brandy Barnett MD;  Location: [facility]     ESOPHAGOSCOPY, GASTROSCOPY, DUODENOSCOPY (EGD), COMBINED  2/13/2013    Procedure: COMBINED ESOPHAGOSCOPY, GASTROSCOPY, DUODENOSCOPY (EGD);;  Surgeon: Tara Cook MD;  Location: [facility]     ESOPHAGOSCOPY, GASTROSCOPY, DUODENOSCOPY (EGD), COMBINED  11/4/2013    Procedure: COMBINED ESOPHAGOSCOPY, GASTROSCOPY, DUODENOSCOPY (EGD);;  Surgeon: Lonny Diaz MD;  Location:  [facility]     ESOPHAGOSCOPY, GASTROSCOPY, DUODENOSCOPY (EGD), COMBINED Left 6/16/2016    Procedure: COMBINED ESOPHAGOSCOPY, GASTROSCOPY, DUODENOSCOPY (EGD), BIOPSY SINGLE OR MULTIPLE;  Surgeon: Brandy Barnett MD;  Location: [facility]     EXCISE LESION  TRUNK  2012    Procedure: EXCISE LESION TRUNK;;  Surgeon: Pepe Dominguez MD;  Location: [facility]     GENITOURINARY SURGERY      vasectomy     HERNIORRHAPHY UMBILICAL  2012    Procedure: HERNIORRHAPHY UMBILICAL;  UMBILICAL HERNIA REPAIR , EXCISION OF PERIUMBILICAL CYST;  Surgeon: Pepe Dominguez MD;  Location: [facility]       Family History   Problem Relation Age of Onset     Breast Cancer Mother      Liver Cancer Mother      Cardiovascular Father      Cerebrovascular Disease Father         very low blood pressure     Cancer Father         rectal cancer     Cardiovascular Paternal Grandfather      Diabetes Brother      Cancer Sister         skin cancer     C.A.D. Other         MI, 70's     Breast Cancer Sister      Thyroid Disease No family hx of      Lipids No family hx of      Anesthesia Reaction No family hx of        Social History     Social History Narrative    . 3 grown daughters. He has been sober since .     Social History     Tobacco Use     Smoking status: Current Every Day Smoker     Packs/day: 0.30     Types: Cigarettes     Last attempt to quit: 4/10/2019     Years since quittin.1     Smokeless tobacco: Never Used     Tobacco comment: about 4 cigarettes per day   Substance Use Topics     Alcohol use: No     Alcohol/week: 0.0 standard drinks     Comment: 2-3 per day , none since Dec 2012     Drug use: No       Immunization History   Administered Date(s) Administered     DTAP (<7y) 1988     HEPA 2013, 2013, 10/30/2013     HepB 2013, 2013, 10/30/2013     Influenza (IIV3) PF 10/04/2010, 2012, 2014, 2015, 2016     Influenza Intranasal Vaccine 4 valent 10/12/2017     Influenza Vaccine IM > 6 months Valent IIV4 10/26/2018     Pneumo Conj 13-V (2010&after) 2015     Pneumococcal 23 valent 2009, 10/01/2010, 2013, 2019     TD (ADULT, 7+) 1997     TDAP Vaccine (Adacel) 2013     Twinrix A/B 2013      Zoster vaccine, live 12/22/2016       Patient Active Problem List   Diagnosis     Mild major depression (H)     Thrombocytopenia (H)     Hypertension goal BP (blood pressure) < 130/80     Plantar warts     Corns and callosities     Family history of colon cancer     Type 2 diabetes, HbA1c goal < 7% (H)     Portal hypertension (H)     Alcoholic cirrhosis (H)     Ascites     Esophageal varices (H)     Multiple rib fractures     Tobacco use disorder     Hyperlipidemia LDL goal <100     Dental caries     Prurigo nodularis     Esophageal reflux     Morbid obesity (H)     History of colonic polyps: tubular adenomas and serrated adenomas     Type II diabetes mellitus with peripheral circulatory disorder (H)     Alcoholic cirrhosis of liver with ascites (H)     Hepatic encephalopathy (H)     Lymphedema of both lower extremities     Venous stasis ulcers of both lower extremities (H)       Current Outpatient Medications   Medication Sig     blood glucose (NO BRAND SPECIFIED) lancets standard Use to test blood sugar 4 X  times daily or as directed.     blood glucose (NO BRAND SPECIFIED) test strip Use to test blood sugars 4 X  times daily or as directed     blood glucose (NO BRAND SPECIFIED) test strip Use to test blood sugar 4 times daily or as directed.     blood glucose monitoring (NO BRAND SPECIFIED) meter device kit Use to test blood sugar 4 times daily or as directed. Before meals and snack at HS.     blood glucose monitoring (NO BRAND SPECIFIED) meter device kit Use to test blood sugar 4 X  times daily or as directed.     childrens multivitamin w/ionr (FLINTSTONES COMPLETE) 60 MG chewable tablet Take 1 chew tab by mouth daily     Cholecalciferol (VITAMIN D3) 50 MCG (2000 UT) CAPS Take 1 capsule by mouth daily     ciprofloxacin (CIPRO) 750 MG tablet Take 1 tablet (750 mg) by mouth 2 times daily     citalopram (CELEXA) 20 MG tablet Take 1 tablet (20 mg) by mouth daily     CRANBERRY PO Take 1 capsule by mouth 3 times daily  as needed     Cyanocobalamin (VITAMIN B-12 PO) Take 1 tablet by mouth daily     desvenlafaxine (PRISTIQ) 100 MG 24 hr tablet Take 2 tablets (200 mg) by mouth daily     desvenlafaxine fumarate 100 MG 24 hr tablet Take 100 mg by mouth daily     fentaNYL (DURAGESIC) 12 mcg/hr 72 hr patch Place 2 patches onto the skin every 72 hours remove old patch.     fentaNYL (DURAGESIC) 25 mcg/hr 72 hr patch Place 1 patch onto the skin every 72 hours remove old patch.     furosemide (LASIX) 40 MG tablet Take 40 mg once daily     gabapentin (NEURONTIN) 300 MG capsule Take 1 capsule (300 mg) by mouth At Bedtime     hydrOXYzine (ATARAX) 25 MG tablet Take 1 tablet (25 mg) by mouth 3 times daily as needed for itching     insulin pen needle (B-D U/F) 31G X 8 MM miscellaneous USE  6 times daily / OR AS DIRECTED     insulin pen needle (ULTICARE SHORT) 31G X 8 MM miscellaneous Use UP to 6 times daily or as directed     lactulose encephalopathy (CHRONULAC) 10 GM/15ML SOLUTION TAKE 30 MLS BY MOUTH 2 TIMES DAILY  TITRATE AS NEEDED TO ACHIEVE 3-5 BOWEL MOVEMENTS DAILY.     levofloxacin (LEVAQUIN) 750 MG tablet Take 1 tablet (750 mg) by mouth daily for 10 days     lidocaine (XYLOCAINE) 5 % external ointment Apply topically as needed for moderate pain Apply to wounds twice daily as needed.     magnesium oxide (MAG-OX) 400 (241.3 Mg) MG tablet Take 1 tablet (400 mg) by mouth daily     metroNIDAZOLE (FLAGYL) 500 MG tablet Take 1 tablet (500 mg) by mouth 3 times daily for 10 days     NOVOLOG FLEXPEN 100 UNIT/ML soln INJECT 20 UNITS UNDER THE SKIN BEFORE BREAKFAST, 20 UNITS BEFORE LUNCH, 20 UNITS BEFORE DINNER, and 20 UNITS BEFORE SNACKS.      nystatin (MYCOSTATIN) 719408 UNIT/GM external cream Apply topically 2 times daily     nystatin-triamcinolone (MYCOLOG II) cream Apply topically 2 times daily as needed (itching)     ondansetron (ZOFRAN) 4 MG tablet Take 1 tablet (4 mg) by mouth every 8 hours as needed for nausea     order for DME Equipment  being ordered:Orthopedic shoes     order for DME Equipment being ordered: Condom catheter     order for DME Equipment being ordered:BioTab compression pants pneumatic system     order for DME Equipment being ordered:bilateral pneumatic leg massage for treatment of extreme bilateral lower leg edema.     oxyCODONE (ROXICODONE) 5 MG tablet TAKE 1 TO 2 TABLETS BY MOUTH EVERY 8 HOURS AS NEEDED.  No other narcotics are to be used with this medication.     OYSTER SHELL CALCIUM/D 500-200 MG-UNIT per tablet Take 1 tablet by mouth daily     QUEtiapine (SEROQUEL) 100 MG tablet Take 1 tablet (100 mg) by mouth At Bedtime     QUEtiapine (SEROQUEL) 25 MG tablet Take 50 mg by mouth At Bedtime     ranitidine (ZANTAC) 150 MG tablet Take 1 tablet (150 mg) by mouth 2 times daily     spironolactone (ALDACTONE) 50 MG tablet Take 2 tablets (100 mg) by mouth daily     STATIN NOT PRESCRIBED, INTENTIONAL, 1 each daily Statin not prescribed intentionally due to Active liver disease     triamcinolone (KENALOG) 0.1 % external cream Apply topically 2 times daily as needed for irritation     UNABLE TO FIND MEDICATION NAME: Seble root     ursodiol (ACTIGALL) 300 MG capsule Take 3 capsules (900 mg) by mouth 2 times daily     VITAMIN E PO Take 1 capsule by mouth daily     XIFAXAN 550 MG TABS tablet TAKE 1 TABLET BY MOUTH TWO TIMES DAILY     doxycycline hyclate (VIBRAMYCIN) 100 MG capsule Take 1 capsule (100 mg) by mouth 2 times daily     insulin glargine (LANTUS SOLOSTAR) 100 UNIT/ML pen Inject 5 units under the skin every morning.     No current facility-administered medications for this visit.        No Known Allergies           Physical Exam:   Vitals were reviewed.  All vitals stable  There were no vitals taken for this visit.    Exam:  GENERAL:  well-developed, obese, alert, oriented, in no acute distress. SItting comfortably in the chair.  HEENT:  Head is normocephalic  EYES:  Eyes have anicteric sclerae.    ENT: Normal hearing  NECK:   Supple.  LUNGS:  Speaks with full sentences, no increased WOB, no cough during conversation.  SKIN: Bilateral lower extremities with chronic lymphedema. No significant redness noted in right ankle, however it looks more pinkish compare to the left one. Both ankles are equally puffy. Unable to access both legs, as patient has compression wraps on both legs.    NEUROLOGIC:  Grossly nonfocal.         Laboratory Data:     05/21/20 Wound culture Right heel- heavy growth MSSA, pansensitive.  3/21/2020 blood culture-MSSA  3/22/2020 blood culture-negative  3/22/2020 MRSA nares negative  9/5/2019 heel wound-MSSA  9/8/2019-heel  ulcer scant Bacteroides pyogenes      Inflammatory Markers    Recent Labs   Lab Test 05/21/20  1647 08/22/19  0634 08/21/19  0629 08/20/19  0625 08/18/19  1815 12/22/16  1722 12/22/15  1419   CRP 18.9* 60.1* 103.0* 143.0* 77.1* 9.3* 8.6*       Metabolic Studies       Recent Labs   Lab Test 03/12/20  1238 11/18/19  1433 08/21/19  0629 08/19/19  0631 08/18/19  1916 08/18/19  1815 04/22/19  1343  11/06/17  0744    127* 137 136  --  133 136   < > 144   POTASSIUM 3.7 4.7 4.2 3.8  --  4.0 4.1   < > 3.7   CHLORIDE 101 94 110* 107  --  103 101   < > 113*   CO2 26 23 21 22  --  23 26   < > 24   ANIONGAP 7 10 6 7  --  7 8   < > 8   BUN 17 25 16 16  --  18 20   < > 10   CR 0.91 1.12 0.69 0.88  --  0.73 0.92   < > 0.71   GFRESTIMATED 88 68 >90 90  --  >90 86   < > >90   * 206* 130* 86  --  87 141*   < > 93   A1C 6.3*  --   --   --   --  5.3  --    < >  --    ERIN 8.1* 8.7 8.4* 8.0*  --  8.4* 8.4*   < > 7.9*   MAG  --   --   --  1.9  --   --   --   --  1.9   LACT  --   --   --   --  1.7  --   --   --   --     < > = values in this interval not displayed.       Hematology Studies      Recent Labs   Lab Test 03/12/20  1238 11/18/19  1433 08/20/19  0625 08/19/19  0631 08/18/19  1815 04/22/19  1343 10/25/18  1405  04/27/18  1235  11/04/17  2035  10/28/14  1847  03/04/14  1814 02/19/14  1609   WBC 4.6 9.1   --  4.9 6.4 3.8* 3.2*   < > 2.7*   < > 5.0   < > 7.1   < > 6.4 6.4   ANEU  --   --   --   --  4.9  --   --   --  1.7  --  3.3  --  3.8  --  3.3 3.3   ALYM  --   --   --   --  0.4*  --   --   --  0.4*  --  0.8  --  2.0  --  2.1 1.9   RAFAELA  --   --   --   --  1.0  --   --   --  0.5  --  0.7  --  1.2  --  0.8 1.0   AEOS  --   --   --   --  0.1  --   --   --  0.1  --  0.1  --  0.1  --  0.2 0.2   HGB 7.6* 10.9* 8.4* 8.0* 9.3* 9.1* 9.5*   < > 9.5*   < > 12.3*   < > 14.7   < > 14.9 14.8   HCT 24.2* 33.2*  --  24.0* 27.8* 29.0* 29.9*   < > 29.3*   < > 38.5*   < > 41.9   < > 42.2 41.4   * 128* 78* 79* 124* 89* 102*   < > 85*   < > 77*   < > 122*   < > 87* 100*    < > = values in this interval not displayed.       Clotting Studies    Recent Labs   Lab Test 03/12/20  1238 11/18/19  1433 04/22/19  1343 10/25/18  1405  09/24/12  1202 09/13/12  1411   INR 1.28* 1.34* 1.27* 1.14   < > Duplicate request  Charge credited  1.17* 1.16*   PTT  --   --   --   --   --  29 30    < > = values in this interval not displayed.       Urine Studies     Recent Labs   Lab Test 03/12/20  1329 09/13/18  1731 07/31/18  1308 11/04/17  2034 11/21/16  1517   URINEPH 5.0 5.0 6.0 7.0 8.0*   NITRITE Negative Negative Negative Negative Negative   LEUKEST Negative Negative Small* Negative Negative   WBCU 1 1 73* 1 1         Microbiology:  Last 6 Culture results with specimen source  Culture Micro   Date Value Ref Range Status   05/21/2020 Heavy growth  Staphylococcus aureus   (A)  Final   08/18/2019 No growth  Final   08/18/2019 No growth  Final   09/13/2018 No growth  Final   07/31/2018 >100,000 colonies/mL  Escherichia coli   (A)  Final   11/05/2017 No growth  Final    Specimen Description   Date Value Ref Range Status   05/21/2020 Right Foot  Final   05/21/2020 Right Foot  Final   08/18/2019 Blood Right Hand  Final   08/18/2019 Blood Right Hand  Final   09/13/2018 Midstream Urine  Final   07/31/2018 Midstream Urine  Final          Hepatitis B  Testing     Recent Labs   Lab Test 09/13/12  1411   HBSAB 0.0   HBCAB Negative   HEPBANG Negative        Hepatitis C Antibody   Date Value Ref Range Status   09/13/2012 Negative NEG Final         Imaging:    Xray feet bilateral 3 views 05/21/20                                                                   IMPRESSION: No acute osseous abnormality. No radiographic evidence of osteomyelitis.        CXR 03/25/20  1.  The left arm PICC has its tip in the proximal right atrium, approximately 2 cm distal to the cavoatrial junction.  2.  Hazy opacities in both lung bases are essentially unchanged.  3.  Mild cardiomegaly.    MRI lumbar spine 03/23/20  1.  The left arm PICC has its tip in the proximal right atrium, approximately 2 cm distal to the cavoatrial junction.  2.  Hazy opacities in both lung bases are essentially unchanged.  3.  Mild cardiomegaly.    ECHO 03/23/20    Interpretation Summary    * The left ventricle is normal size.    * The left ventricular systolic function is normal, estimated LVEF 60-65%.    * No regional wall motion abnormalities.    * There is moderate aortic stenosis.    * No pulmonary hypertension, estimated right ventricular systolic pressure  is 30 mmHg.    * Compared to prior study dated 02/01/2020 there has been no change.

## 2020-05-27 NOTE — TELEPHONE ENCOUNTER
Called pt's wife who reports top of pt's foot is sore and red along with his ankle (not bright hot burning red) but pt refusing to put in shoes due to pain. This in contrast to the sore on pt's heel of same foot which is almost healed closed and doing very well. Dr Chapin paged and has agreed to see pt at [facility] for today. Pt's wife will upload pictures of pt's foot and ankle to [employer].  Oralia Duncan RN

## 2020-05-27 NOTE — LETTER
"5/27/2020       RE: Lawrence Louie  [address] [place] [address]     Dear Colleague,    Thank you for referring your patient, Lawrence Louei, to the [facility] AND INFECTIOUS DISEASES at [facility]. Please see a copy of my visit note below.    Lawrence Louie is a 64 year old male who is being evaluated via a billable video visit.      The patient has been notified of following:     \"This video visit will be conducted via a call between you and your physician/provider. We have found that certain health care needs can be provided without the need for an in-person physical exam.  This service lets us provide the care you need with a video conversation.  If a prescription is necessary we can send it directly to your pharmacy.  If lab work is needed we can place an order for that and you can then stop by our lab to have the test done at a later time.    Video visits are billed at different rates depending on your insurance coverage.  Please reach out to your insurance provider with any questions.    If during the course of the call the physician/provider feels a video visit is not appropriate, you will not be charged for this service.\"    Patient has given verbal consent for Video visit? Yes    How would you like to obtain your AVS? MyChart    Patient would like the video invitation sent by: Send to e-mail at: [email or web address]    Will anyone else be joining your video visit? Yes, daughter and wife.        Video-Visit Details    Type of service:  Video Visit    Video Start Time: 17:35  Video End Time: 17:34    Originating Location (pt. Location): Home    [facility]     Platform used for Video Visit: Akosua Chapin MD  [facility]  Infectious Disease Clinic Note:  Follow up      Patient:  Lawrence Louie, " Date of birth 1955, Medical record number [id number]  Date of Visit:  05/31/2020  Consult requested by patient himself.         Assessment and Recommendations:     Recommendations:    Patient now with the concern of possible new right ankle cellulitis. It is hard to assess whether the redness around the right ankle comes from possible infected right shin ulcer ot from right heel. Wound Cx of right heel grew pansensitive MSSA.    - Will add Doxycycline 100 mg BID for 10 days for better antistaphylococcal coverage. Although Keflex or Cefadroxil will be more narrow, however given possible new area of cellulitis around patient's ankle, Doxycycline chosen for better bioavailability.  - Recommended to continue Levaquin for previous concern of possible pseudomonal infection due to blue-green discoloration of the ulcers on anterior shin. Continue Metronidazole to finish 10 days of treatement for anaerobic coverage as foul-smelling odor was noticed from R heel wound.  - Follow up with podiatry.  - Recommend to go to ER if patient develops fever, fatigue, increased redness and pain in his legs.  - Follow up in 1 week.    Assessment:  Patient is a 64-year-old gentleman with history of lower extremity lymphedema, recurrent cellulitis, MSSA bacteremia, ESLD, DM 2.  Complaints of recurrent cellulitis complicated by bacteremia.    # Recurrent MSSA bacteremia, source thought to be bilateral lower extremities cellulitis  4/13/2019 blood culture-MSSA  10/7/2019 blood culture with-MSSA and Aeromonas veronii  Recent hospitalization in March 2020 for MSSA bacteremia.  Blood cultures 3/21/2020 -MSSA, 3/22-negative  TTE 3/23/2020 negative for vegetations.  Patient treated with IV cefazolin 3/22-4/4.    # Recurrent cellulitis of lower extremities in setting of chronic lymphedema and venous stasis ulcers. Concern for new area of R ankle cellulitis.  Wound cultures 9/2019 pansensitive Pseudomonas.  Recent MSSA bacteremia in 3/20  thought to be due to MSSA cellulitis.  Patient with multiple open wounds on bilateral anterior shins~0.5- 2.0 cm in diameter, none of which looks actively infected.  Patient recently finished treatment with ciprofloxacin 750 mg twice daily for 10 days prescribed by ID, as there was suspicion for possible pseudomonal infection given blue-green discoloration of open wounds in April 2020.  Currently on Levaquin (switched from Cipro for better MSSA coverage) and Flagyl started 5/22 for 10 days.  - Will add Doxycycline 100 mg BID for MSSA coverage due to concern of R ankle cellulitis.    # Chronic right heel wound/ulcer  Patient had right heel wound/ulcer for more than 15 years  9/8/2019-heel  ulcer scant Bacteroides pyogenes, MSSA  5/21 Patient's wound opened up during physical examination.  Wound cultures 5/21/20 with heavy growth MSSA.  - B/l foot XRay with no signs of osteomyelitis 5/21/20.    # End-stage liver disease, hepatic encephalopathy  Patient taking spironolactone, furosemide, lactulose as needed    # DM2, well controlled  Lantus 5 units daily  Last hemoglobin A1c-5.5 3/2020    - Isolation status:  Good hand hygiene.    Follow up scheduled in 1 week.    Patient discussed with Dr. Amadou Leon.  Janey Chapin MD, ID fellow, pager [phone].        Interval history:     Today patient and his daughter are concerned about R ankle cellulitis. He developed new area of redness around right ankle. Few pictures sent via My Chart but it is hard to assess the redness.  Unruly also complains of pain in his both legs, with no pain at rest, but increased pain when boots are rubbing against his skin or with movements.  He denies fever, he has chills at baseline. He checked his temperature few hours prior to video visit. He does not feel like if he is getting septic (pt usually develops cellulitis, then becomes very fatigued and deteriorates very quickly requiring hospitalization), so he is well aware about his  symptoms.  His daughter Kristi and wife Flores are at his site. We tried to evaluate the right ankle via computer camera. No significant redness noted in right ankle, however it looks more pinkish compare to the left one. Both ankles are equally puffy. Unable to access both legs, as patient has compression wraps on both legs.    Right heel wound culture obtained during previous visit 05/21/20 with heavy growth pansensitive MSSA.  3 Views b/l feet xray with no signs of osteomyelitis.           History of the Infectious Disease lllness:     Patient is 64-year-old male with history of ESLD, S/P TIPS, DM 2 on Lantus, chronic lymphedema and venous stasis ulcers, recurrent cellulitis and MSSA bacteremia.  Patient seen in ID clinic with his daughter.  He was recently hospitalized at the end of March at [facility] for MSSA bacteremia (blood culture 3/21/2020 with MSSA), source felt to be lower extremity cellulitis.  Patient was treated with 2 weeks of IV cefazolin 3/22-4/4.  TTE 3/23 was negative for vegetations.  In the past patient had episode of MSSA bacteremia in 10/2019.  Per patient's daughter, usually patient has signs and symptoms of lower extremity cellulitis and then progressively becomes septic and requires hospitalization.  Usually he does not have time to make it to physician's appointments due to quick deterioration.  Today they are seen in ID clinic asking if something could be done to prevent frequent bacteremia hospitalizations.  In mid April 2019 patient seen by ID physician who felt that his bilateral lower extremity wounds  are due to pseudomonal infection because of bluish-green discoloration of his wounds.  He was prescribed 10-day course of about 10 days ago.  Today none of his open wounds look infected.  The day before patient was notified that his PCP sent prescription for another 10-day course of ciprofloxacin to take until he will be seen in person.  Patient has history of chronic  right heel ulcer/wound >15 years. In 9/2019 it grew MSSA.  Today he is complaining of fatigue generalized weakness for which he is using walker.  He has home care nurse, who also takes care of his lymphedema.  He has no podiatry as had bad experience in the past.  Today patient denies fever, chills, SOB, night sweats, CP, cough, abdominal pain, nausea, vomiting. He does not look sick.  His last bloodwork notable for WBC-3, Hb-7.8, Plt-98 on.        Review of Systems:  CONSTITUTIONAL:  No fevers or chills. No night sweats.  Positive fatigue  EYES: negative for icterus  ENT:  negative for hearing loss, tinnitus or sore throat  RESPIRATORY:  negative for cough, sputum, dyspnea  CARDIOVASCULAR:  negative for chest pain, palpitations  GASTROINTESTINAL:  negative for nausea, vomiting, diarrhea or constipation  GENITOURINARY:  negative for dysuria  HEME:  No easy bruising  INTEGUMENT: Venous stasis ulcers chronic, complicated by frequent recurrent cellulitis  NEURO:  Negative for headache    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)      Splenomegaly      Squamous cell carcinoma      Thrombocytopenia (H)      Varices, esophageal (H)     Banded in 2011       Past Surgical History:   Procedure Laterality Date     BIOPSY OF SKIN LESION       COLONOSCOPY Left 6/16/2016    Procedure: COMBINED COLONOSCOPY, SINGLE OR MULTIPLE BIOPSY/POLYPECTOMY BY BIOPSY;  Surgeon: Brandy Barnett MD;  Location:  GI     ESOPHAGOSCOPY, GASTROSCOPY, DUODENOSCOPY (EGD), COMBINED  2/13/2013    Procedure: COMBINED ESOPHAGOSCOPY, GASTROSCOPY, DUODENOSCOPY (EGD);;  Surgeon: Tara Cook MD;  Location:  GI     ESOPHAGOSCOPY, GASTROSCOPY, DUODENOSCOPY (EGD), COMBINED  11/4/2013    Procedure: COMBINED ESOPHAGOSCOPY,  GASTROSCOPY, DUODENOSCOPY (EGD);;  Surgeon: Lonny Diaz MD;  Location:  GI     ESOPHAGOSCOPY, GASTROSCOPY, DUODENOSCOPY (EGD), COMBINED Left 2016    Procedure: COMBINED ESOPHAGOSCOPY, GASTROSCOPY, DUODENOSCOPY (EGD), BIOPSY SINGLE OR MULTIPLE;  Surgeon: Brandy Barnett MD;  Location:  GI     EXCISE LESION TRUNK  2012    Procedure: EXCISE LESION TRUNK;;  Surgeon: Pepe Dominguez MD;  Location: [facility]     GENITOURINARY SURGERY      vasectomy     HERNIORRHAPHY UMBILICAL  2012    Procedure: HERNIORRHAPHY UMBILICAL;  UMBILICAL HERNIA REPAIR , EXCISION OF PERIUMBILICAL CYST;  Surgeon: Pepe Dominguez MD;  Location: [facility]       Family History   Problem Relation Age of Onset     Breast Cancer Mother      Liver Cancer Mother      Cardiovascular Father      Cerebrovascular Disease Father         very low blood pressure     Cancer Father         rectal cancer     Cardiovascular Paternal Grandfather      Diabetes Brother      Cancer Sister         skin cancer     C.A.D. Other         MI, 70's     Breast Cancer Sister      Thyroid Disease No family hx of      Lipids No family hx of      Anesthesia Reaction No family hx of        Social History     Social History Narrative    . 3 grown daughters. He has been sober since .     Social History     Tobacco Use     Smoking status: Current Every Day Smoker     Packs/day: 0.30     Types: Cigarettes     Last attempt to quit: 4/10/2019     Years since quittin.1     Smokeless tobacco: Never Used     Tobacco comment: about 4 cigarettes per day   Substance Use Topics     Alcohol use: No     Alcohol/week: 0.0 standard drinks     Comment: 2-3 per day , none since Dec 2012     Drug use: No       Immunization History   Administered Date(s) Administered     DTAP (<7y) 1988     HEPA 2013, 2013, 10/30/2013     HepB 2013, 2013, 10/30/2013     Influenza (IIV3) PF 10/04/2010, 2012, 2014, 2015, 2016      Influenza Intranasal Vaccine 4 valent 10/12/2017     Influenza Vaccine IM > 6 months Valent IIV4 10/26/2018     Pneumo Conj 13-V (2010&after) 05/12/2015     Pneumococcal 23 valent 09/25/2009, 10/01/2010, 01/02/2013, 04/23/2019     TD (ADULT, 7+) 09/08/1997     TDAP Vaccine (Adacel) 01/02/2013     Twinrix A/B 01/02/2013     Zoster vaccine, live 12/22/2016       Patient Active Problem List   Diagnosis     Mild major depression (H)     Thrombocytopenia (H)     Hypertension goal BP (blood pressure) < 130/80     Plantar warts     Corns and callosities     Family history of colon cancer     Type 2 diabetes, HbA1c goal < 7% (H)     Portal hypertension (H)     Alcoholic cirrhosis (H)     Ascites     Esophageal varices (H)     Multiple rib fractures     Tobacco use disorder     Hyperlipidemia LDL goal <100     Dental caries     Prurigo nodularis     Esophageal reflux     Morbid obesity (H)     History of colonic polyps: tubular adenomas and serrated adenomas     Type II diabetes mellitus with peripheral circulatory disorder (H)     Alcoholic cirrhosis of liver with ascites (H)     Hepatic encephalopathy (H)     Lymphedema of both lower extremities     Venous stasis ulcers of both lower extremities (H)       Current Outpatient Medications   Medication Sig     blood glucose (NO BRAND SPECIFIED) lancets standard Use to test blood sugar 4 X  times daily or as directed.     blood glucose (NO BRAND SPECIFIED) test strip Use to test blood sugars 4 X  times daily or as directed     blood glucose (NO BRAND SPECIFIED) test strip Use to test blood sugar 4 times daily or as directed.     blood glucose monitoring (NO BRAND SPECIFIED) meter device kit Use to test blood sugar 4 times daily or as directed. Before meals and snack at HS.     blood glucose monitoring (NO BRAND SPECIFIED) meter device kit Use to test blood sugar 4 X  times daily or as directed.     childrens multivitamin w/ionr (FLINTSTONES COMPLETE) 60 MG chewable tablet  Take 1 chew tab by mouth daily     Cholecalciferol (VITAMIN D3) 50 MCG (2000 UT) CAPS Take 1 capsule by mouth daily     ciprofloxacin (CIPRO) 750 MG tablet Take 1 tablet (750 mg) by mouth 2 times daily     citalopram (CELEXA) 20 MG tablet Take 1 tablet (20 mg) by mouth daily     CRANBERRY PO Take 1 capsule by mouth 3 times daily as needed     Cyanocobalamin (VITAMIN B-12 PO) Take 1 tablet by mouth daily     desvenlafaxine (PRISTIQ) 100 MG 24 hr tablet Take 2 tablets (200 mg) by mouth daily     desvenlafaxine fumarate 100 MG 24 hr tablet Take 100 mg by mouth daily     fentaNYL (DURAGESIC) 12 mcg/hr 72 hr patch Place 2 patches onto the skin every 72 hours remove old patch.     fentaNYL (DURAGESIC) 25 mcg/hr 72 hr patch Place 1 patch onto the skin every 72 hours remove old patch.     furosemide (LASIX) 40 MG tablet Take 40 mg once daily     gabapentin (NEURONTIN) 300 MG capsule Take 1 capsule (300 mg) by mouth At Bedtime     hydrOXYzine (ATARAX) 25 MG tablet Take 1 tablet (25 mg) by mouth 3 times daily as needed for itching     insulin pen needle (B-D U/F) 31G X 8 MM miscellaneous USE  6 times daily / OR AS DIRECTED     insulin pen needle (ULTICARE SHORT) 31G X 8 MM miscellaneous Use UP to 6 times daily or as directed     lactulose encephalopathy (CHRONULAC) 10 GM/15ML SOLUTION TAKE 30 MLS BY MOUTH 2 TIMES DAILY  TITRATE AS NEEDED TO ACHIEVE 3-5 BOWEL MOVEMENTS DAILY.     levofloxacin (LEVAQUIN) 750 MG tablet Take 1 tablet (750 mg) by mouth daily for 10 days     lidocaine (XYLOCAINE) 5 % external ointment Apply topically as needed for moderate pain Apply to wounds twice daily as needed.     magnesium oxide (MAG-OX) 400 (241.3 Mg) MG tablet Take 1 tablet (400 mg) by mouth daily     metroNIDAZOLE (FLAGYL) 500 MG tablet Take 1 tablet (500 mg) by mouth 3 times daily for 10 days     NOVOLOG FLEXPEN 100 UNIT/ML soln INJECT 20 UNITS UNDER THE SKIN BEFORE BREAKFAST, 20 UNITS BEFORE LUNCH, 20 UNITS BEFORE DINNER, and 20 UNITS  BEFORE SNACKS.      nystatin (MYCOSTATIN) 094030 UNIT/GM external cream Apply topically 2 times daily     nystatin-triamcinolone (MYCOLOG II) cream Apply topically 2 times daily as needed (itching)     ondansetron (ZOFRAN) 4 MG tablet Take 1 tablet (4 mg) by mouth every 8 hours as needed for nausea     order for DME Equipment being ordered:Orthopedic shoes     order for DME Equipment being ordered: Condom catheter     order for DME Equipment being ordered:BioTab compression pants pneumatic system     order for DME Equipment being ordered:bilateral pneumatic leg massage for treatment of extreme bilateral lower leg edema.     oxyCODONE (ROXICODONE) 5 MG tablet TAKE 1 TO 2 TABLETS BY MOUTH EVERY 8 HOURS AS NEEDED.  No other narcotics are to be used with this medication.     OYSTER SHELL CALCIUM/D 500-200 MG-UNIT per tablet Take 1 tablet by mouth daily     QUEtiapine (SEROQUEL) 100 MG tablet Take 1 tablet (100 mg) by mouth At Bedtime     QUEtiapine (SEROQUEL) 25 MG tablet Take 50 mg by mouth At Bedtime     ranitidine (ZANTAC) 150 MG tablet Take 1 tablet (150 mg) by mouth 2 times daily     spironolactone (ALDACTONE) 50 MG tablet Take 2 tablets (100 mg) by mouth daily     STATIN NOT PRESCRIBED, INTENTIONAL, 1 each daily Statin not prescribed intentionally due to Active liver disease     triamcinolone (KENALOG) 0.1 % external cream Apply topically 2 times daily as needed for irritation     UNABLE TO FIND MEDICATION NAME: Burdock root     ursodiol (ACTIGALL) 300 MG capsule Take 3 capsules (900 mg) by mouth 2 times daily     VITAMIN E PO Take 1 capsule by mouth daily     XIFAXAN 550 MG TABS tablet TAKE 1 TABLET BY MOUTH TWO TIMES DAILY     doxycycline hyclate (VIBRAMYCIN) 100 MG capsule Take 1 capsule (100 mg) by mouth 2 times daily     insulin glargine (LANTUS SOLOSTAR) 100 UNIT/ML pen Inject 5 units under the skin every morning.     No current facility-administered medications for this visit.        No Known  Allergies           Physical Exam:   Vitals were reviewed.  All vitals stable  There were no vitals taken for this visit.    Exam:  GENERAL:  well-developed, obese, alert, oriented, in no acute distress. SItting comfortably in the chair.  HEENT:  Head is normocephalic  EYES:  Eyes have anicteric sclerae.    ENT: Normal hearing  NECK:  Supple.  LUNGS:  Speaks with full sentences, no increased WOB, no cough during conversation.  SKIN: Bilateral lower extremities with chronic lymphedema. No significant redness noted in right ankle, however it looks more pinkish compare to the left one. Both ankles are equally puffy. Unable to access both legs, as patient has compression wraps on both legs.    NEUROLOGIC:  Grossly nonfocal.         Laboratory Data:     05/21/20 Wound culture Right heel- heavy growth MSSA, pansensitive.  3/21/2020 blood culture-MSSA  3/22/2020 blood culture-negative  3/22/2020 MRSA nares negative  9/5/2019 heel wound-MSSA  9/8/2019-heel  ulcer scant Bacteroides pyogenes      Inflammatory Markers    Recent Labs   Lab Test 05/21/20  1647 08/22/19  0634 08/21/19  0629 08/20/19  0625 08/18/19  1815 12/22/16  1722 12/22/15  1419   CRP 18.9* 60.1* 103.0* 143.0* 77.1* 9.3* 8.6*       Metabolic Studies       Recent Labs   Lab Test 03/12/20  1238 11/18/19  1433 08/21/19  0629 08/19/19  0631 08/18/19  1916 08/18/19  1815 04/22/19  1343  11/06/17  0744    127* 137 136  --  133 136   < > 144   POTASSIUM 3.7 4.7 4.2 3.8  --  4.0 4.1   < > 3.7   CHLORIDE 101 94 110* 107  --  103 101   < > 113*   CO2 26 23 21 22  --  23 26   < > 24   ANIONGAP 7 10 6 7  --  7 8   < > 8   BUN 17 25 16 16  --  18 20   < > 10   CR 0.91 1.12 0.69 0.88  --  0.73 0.92   < > 0.71   GFRESTIMATED 88 68 >90 90  --  >90 86   < > >90   * 206* 130* 86  --  87 141*   < > 93   A1C 6.3*  --   --   --   --  5.3  --    < >  --    ERIN 8.1* 8.7 8.4* 8.0*  --  8.4* 8.4*   < > 7.9*   MAG  --   --   --  1.9  --   --   --   --  1.9   LACT  --   --    --   --  1.7  --   --   --   --     < > = values in this interval not displayed.       Hematology Studies      Recent Labs   Lab Test 03/12/20  1238 11/18/19  1433 08/20/19  0625 08/19/19  0631 08/18/19 1815 04/22/19  1343 10/25/18  1405  04/27/18  1235  11/04/17  2035  10/28/14  1847  03/04/14 1814 02/19/14  1609   WBC 4.6 9.1  --  4.9 6.4 3.8* 3.2*   < > 2.7*   < > 5.0   < > 7.1   < > 6.4 6.4   ANEU  --   --   --   --  4.9  --   --   --  1.7  --  3.3  --  3.8  --  3.3 3.3   ALYM  --   --   --   --  0.4*  --   --   --  0.4*  --  0.8  --  2.0  --  2.1 1.9   RAFAELA  --   --   --   --  1.0  --   --   --  0.5  --  0.7  --  1.2  --  0.8 1.0   AEOS  --   --   --   --  0.1  --   --   --  0.1  --  0.1  --  0.1  --  0.2 0.2   HGB 7.6* 10.9* 8.4* 8.0* 9.3* 9.1* 9.5*   < > 9.5*   < > 12.3*   < > 14.7   < > 14.9 14.8   HCT 24.2* 33.2*  --  24.0* 27.8* 29.0* 29.9*   < > 29.3*   < > 38.5*   < > 41.9   < > 42.2 41.4   * 128* 78* 79* 124* 89* 102*   < > 85*   < > 77*   < > 122*   < > 87* 100*    < > = values in this interval not displayed.       Clotting Studies    Recent Labs   Lab Test 03/12/20  1238 11/18/19 1433 04/22/19  1343 10/25/18  1405  09/24/12  1202 09/13/12  1411   INR 1.28* 1.34* 1.27* 1.14   < > Duplicate request  Charge credited  1.17* 1.16*   PTT  --   --   --   --   --  29 30    < > = values in this interval not displayed.       Urine Studies     Recent Labs   Lab Test 03/12/20  1329 09/13/18  1731 07/31/18  1308 11/04/17  2034 11/21/16  1517   URINEPH 5.0 5.0 6.0 7.0 8.0*   NITRITE Negative Negative Negative Negative Negative   LEUKEST Negative Negative Small* Negative Negative   WBCU 1 1 73* 1 1         Microbiology:  Last 6 Culture results with specimen source  Culture Micro   Date Value Ref Range Status   05/21/2020 Heavy growth  Staphylococcus aureus   (A)  Final   08/18/2019 No growth  Final   08/18/2019 No growth  Final   09/13/2018 No growth  Final   07/31/2018 >100,000 colonies/mL  Escherichia  coli   (A)  Final   11/05/2017 No growth  Final    Specimen Description   Date Value Ref Range Status   05/21/2020 Right Foot  Final   05/21/2020 Right Foot  Final   08/18/2019 Blood Right Hand  Final   08/18/2019 Blood Right Hand  Final   09/13/2018 Midstream Urine  Final   07/31/2018 Midstream Urine  Final          Hepatitis B Testing     Recent Labs   Lab Test 09/13/12  1411   HBSAB 0.0   HBCAB Negative   HEPBANG Negative        Hepatitis C Antibody   Date Value Ref Range Status   09/13/2012 Negative NEG Final         Imaging:    Xray feet bilateral 3 views 05/21/20                                                                   IMPRESSION: No acute osseous abnormality. No radiographic evidence of osteomyelitis.        CXR 03/25/20  1.  The left arm PICC has its tip in the proximal right atrium, approximately 2 cm distal to the cavoatrial junction.  2.  Hazy opacities in both lung bases are essentially unchanged.  3.  Mild cardiomegaly.    MRI lumbar spine 03/23/20  1.  The left arm PICC has its tip in the proximal right atrium, approximately 2 cm distal to the cavoatrial junction.  2.  Hazy opacities in both lung bases are essentially unchanged.  3.  Mild cardiomegaly.    ECHO 03/23/20    Interpretation Summary    * The left ventricle is normal size.    * The left ventricular systolic function is normal, estimated LVEF 60-65%.    * No regional wall motion abnormalities.    * There is moderate aortic stenosis.    * No pulmonary hypertension, estimated right ventricular systolic pressure  is 30 mmHg.    * Compared to prior study dated 02/01/2020 there has been no change.                                                                         Infectious Disease Clinic Staff Note: Mr. Louie had a Video Virtual Visit today and I participated in and discussed his case with Dr. Chapin, ID Fellow -- I agree with her interval history, assessment and plan in this outpatient ID Virtual Progress note. This note  reflects my observations and opinions and the plan outlined fully reflects my approach. I have reviewed the available history, radiology, laboratory results, and reports with the Fellow.    Again, thank you for allowing me to participate in the care of your patient.      Sincerely,    Janey Chapin MD

## 2020-05-28 ENCOUNTER — VIRTUAL VISIT (OUTPATIENT)
Dept: INTERNAL MEDICINE | Facility: CLINIC | Age: 65
End: 2020-05-28
Payer: COMMERCIAL

## 2020-05-28 DIAGNOSIS — D50.8 OTHER IRON DEFICIENCY ANEMIA: ICD-10-CM

## 2020-05-28 DIAGNOSIS — K70.31 ALCOHOLIC CIRRHOSIS OF LIVER WITH ASCITES (H): ICD-10-CM

## 2020-05-28 DIAGNOSIS — E11.610 TYPE 2 DIABETES MELLITUS WITH DIABETIC NEUROPATHIC ARTHROPATHY, WITHOUT LONG-TERM CURRENT USE OF INSULIN (H): ICD-10-CM

## 2020-05-28 DIAGNOSIS — L03.115 CELLULITIS OF RIGHT LOWER EXTREMITY: ICD-10-CM

## 2020-05-28 DIAGNOSIS — K76.82 HEPATIC ENCEPHALOPATHY (H): ICD-10-CM

## 2020-05-28 DIAGNOSIS — I10 HYPERTENSION GOAL BP (BLOOD PRESSURE) < 130/80: Primary | ICD-10-CM

## 2020-05-28 RX ORDER — DOXYCYCLINE 100 MG/1
100 CAPSULE ORAL 2 TIMES DAILY
Qty: 20 CAPSULE | Refills: 0 | Status: ON HOLD | OUTPATIENT
Start: 2020-05-28 | End: 2020-07-30

## 2020-05-28 NOTE — TELEPHONE ENCOUNTER
Resending doxy script via e-scribe. Original script was printed out unintentionally, and not given to pt.  Any Kaminski RN

## 2020-05-29 NOTE — PROGRESS NOTES
"Lawrence Louie is a 64 year old male who is being evaluated via a billable telephone visit.      The patient has been notified of following:     \"This telephone visit will be conducted via a call between you and your physician/provider. We have found that certain health care needs can be provided without the need for a physical exam.  This service lets us provide the care you need with a short phone conversation.  If a prescription is necessary we can send it directly to your pharmacy.  If lab work is needed we can place an order for that and you can then stop by our lab to have the test done at a later time.    Telephone visits are billed at different rates depending on your insurance coverage. During this emergency period, for some insurers they may be billed the same as an in-person visit.  Please reach out to your insurance provider with any questions.    If during the course of the call the physician/provider feels a telephone visit is not appropriate, you will not be charged for this service.\"    Patient has given verbal consent for Telephone visit?  Yes    What phone number would you like to be contacted at? Mobile    Ths visit is being held with his daughter Reva who comes in daily to provide cares (along with some input from the patient's wife Raven)    How would you like to obtain your AVS? Yaohart    Subjective     Lawrence Louie is a 64 year old male whose daughter Reva presents via phone visit today for the following health issues:    HPI  Unruly Louie was discharged from [facility] after an admission for sepsis.  He will be discharged on 03/26/20 to home. with home care.     DISCHARGE DIAGNOSES:  Active Problems:  Sepsis (HCC)  Bacteremia  MSSA (methicillin susceptible Staphylococcus aureus) infection  Advanced care planning/counseling discussion  Severe sepsis with lactic acidosis.   MSSA pyelonephritis due to bacteremia  ESLD with Cirrhosis with anemia/ " TCP  Encephalopathy  DM  Deconditioning  Paroxysmal A fib  Moderate aortic stenosis  Chronic lymphedema with open wounds / ulcers on both lower extr  depression/ anxiety    Since then his daughter Rosio has been coming in daily to help Unruly's ex-wife with cares.  Patient Active Problem List   Diagnosis     Mild major depression (H)     Thrombocytopenia (H)     Hypertension goal BP (blood pressure) < 130/80     Plantar warts     Corns and callosities     Family history of colon cancer     Type 2 diabetes, HbA1c goal < 7% (H)     Portal hypertension (H)     Alcoholic cirrhosis (H)     Ascites     Esophageal varices (H)     Multiple rib fractures     Tobacco use disorder     Hyperlipidemia LDL goal <100     Dental caries     Prurigo nodularis     Esophageal reflux     Morbid obesity (H)     History of colonic polyps: tubular adenomas and serrated adenomas     Type II diabetes mellitus with peripheral circulatory disorder (H)     Alcoholic cirrhosis of liver with ascites (H)     Hepatic encephalopathy (H)     Lymphedema of both lower extremities     Venous stasis ulcers of both lower extremities (H)     Current Outpatient Medications   Medication     blood glucose (NO BRAND SPECIFIED) lancets standard     blood glucose (NO BRAND SPECIFIED) test strip     blood glucose (NO BRAND SPECIFIED) test strip     blood glucose monitoring (NO BRAND SPECIFIED) meter device kit     blood glucose monitoring (NO BRAND SPECIFIED) meter device kit     childrens multivitamin w/ionr (FLINTSTONES COMPLETE) 60 MG chewable tablet     Cholecalciferol (VITAMIN D3) 50 MCG (2000 UT) CAPS     ciprofloxacin (CIPRO) 750 MG tablet     citalopram (CELEXA) 20 MG tablet     CRANBERRY PO     Cyanocobalamin (VITAMIN B-12 PO)     desvenlafaxine (PRISTIQ) 100 MG 24 hr tablet     desvenlafaxine fumarate 100 MG 24 hr tablet     fentaNYL (DURAGESIC) 12 mcg/hr 72 hr patch     fentaNYL (DURAGESIC) 25 mcg/hr 72 hr patch     furosemide (LASIX) 40 MG tablet      gabapentin (NEURONTIN) 300 MG capsule     hydrOXYzine (ATARAX) 25 MG tablet     insulin glargine (LANTUS SOLOSTAR) 100 UNIT/ML pen     insulin pen needle (B-D U/F) 31G X 8 MM miscellaneous     insulin pen needle (ULTICARE SHORT) 31G X 8 MM miscellaneous     lactulose encephalopathy (CHRONULAC) 10 GM/15ML SOLUTION     levofloxacin (LEVAQUIN) 750 MG tablet     lidocaine (XYLOCAINE) 5 % external ointment     magnesium oxide (MAG-OX) 400 (241.3 Mg) MG tablet     metroNIDAZOLE (FLAGYL) 500 MG tablet     NOVOLOG FLEXPEN 100 UNIT/ML soln     nystatin (MYCOSTATIN) 753442 UNIT/GM external cream     nystatin-triamcinolone (MYCOLOG II) cream     ondansetron (ZOFRAN) 4 MG tablet     order for DME     order for DME     order for DME     order for DME     oxyCODONE (ROXICODONE) 5 MG tablet     OYSTER SHELL CALCIUM/D 500-200 MG-UNIT per tablet     QUEtiapine (SEROQUEL) 100 MG tablet     QUEtiapine (SEROQUEL) 25 MG tablet     ranitidine (ZANTAC) 150 MG tablet     spironolactone (ALDACTONE) 50 MG tablet     STATIN NOT PRESCRIBED, INTENTIONAL,     triamcinolone (KENALOG) 0.1 % external cream     UNABLE TO FIND     ursodiol (ACTIGALL) 300 MG capsule     VITAMIN E PO     XIFAXAN 550 MG TABS tablet     doxycycline hyclate (VIBRAMYCIN) 100 MG capsule     No current facility-administered medications for this visit.      Reva does not have a medical background but has been very good with Unruly's [facility] and has several questions.   His pain control has been frustrating .  He has acute thoracic back pain after a fall for which lidocaine patches were ineffective. He was then prescribed oxycodone but because they had difficulty maintaining a dosing schedule he shaheen have pain in between doses. Then a long acting MScontin 15 mg was tried but  It did not last long enough. They then wanted to try a patch so fentanyl  12 mg patch was prescribed and again not strong enough so dose increased to 2 patches.  Because it was thought he would not need his  "hydroxyzine which he takes for pruritis it was held with the fentanyl. 1.5 hour after the second patch was applied he had a major panic attack. 2 hours after removing the patches he calmed down. His mentation was affected by the higher doses of medications and he would have some visual hallucinations as well which have resolved.  Reva asked him to try again with the hydroxyzine but patient declined further fentanyl patches.  She has kept these patches in a Ziploc bag and the higher dose fentanyl was picked up from the pharmacy but the box was never opened.   They have reverted to the 5 mg oxycodone tablets.  He usually wakes with high level of ain in the morning so often takes 2 tabs in the morning, one in the afternoon and 1-2 at bedtime.   He has been taking his lactulose regularly.  He redeveloped redness and tenderness over blistering areas on his foot for which the ID MD at [facility] prescribed  Metronidazole and levofloxacin which helped almost immediately. He previously was cultured for staph aureus and his coverage this time was also for pseudomonas. He is now on doxycyline 10,660 mg (actual weight) mg BID.  He is wearing ace wraps and now elevating his legs more and the edema is improving.  His Pena wraps have not arrive yet.     His blood glucoses have been very low while on antibiotics.  HS blood sugars run between 160-180. She has checked his BS after meals and it can be 120. She has stopped his Lantus for now and will continue to monitor.  They are trying to provide nutritious foods lower in fat and calories.    He comlaints of \"zinging\" pains in his feet which he describes as \"shocks\".  Because Reva thought it was not safe to give him gabapentin while he is on oxycodone she has held the gabapentin.    The family is still wondering if his anemia is stable or not. On 5/15/20 his hgb was 7.8 which is where he has been previously.       Review of Systems      CONSTITUTIONAL: no fatigue, no " unexpected change in weight  SKIN: he has prurgo nodularis lesion and pruritis.  His leg lesions appear to be doing better with elevation and antibiotics.  EYES: his vision is getting worse but unable to take him for eye ey=xam.  No acute changes  RESP: no significant cough,  shortness of breath with exertion.  CV: no chest pain, no palpitations, peripheral edema  GI: no nausea, no vomiting, no constipation, no diarrhea  : no frequency, no dysuria, no hematuria  MS: he is having back MS pain especially. Aggravated by rising from sitting and changing positions.  NEURO:weakness, occassionaldizziness, no syncope, no headaches. No recent falls.  ENDOCRINE: no temperature intolerance.  PSYCHIATRIC: He might be a little clearer than when on stronger pain meds.        Objective   Reported vitals:  There were no vitals taken for this visit.     This visit was done with pts care provider.        Assessment/Plan:    Lawrence was seen today for recheck medication.    Diagnoses and all orders for this visit:    Hypertension goal BP (blood pressure) < 130/80  -     Comprehensive metabolic panel; Future    Type 2 diabetes mellitus with diabetic neuropathic arthropathy, without long-term current use of insulin (H)  -     insulin glargine (LANTUS SOLOSTAR) 100 UNIT/ML pen; Inject 5 units under the skin every morning.  -     Hemoglobin A1c; Future    Alcoholic cirrhosis of liver with ascites (H)  -     Folate; Future  -     Vitamin B12; Future    Hepatic encephalopathy (H)  -     Ammonia; Future    Other iron deficiency anemia  -     CBC with platelets; Future  -     Iron and iron binding capacity; Future      Pain Control        -   Med interaction check: gabapentin and oxycodone can be given.  I will notify Reva, his caregiver and his wife via My Chart.        -   Will continue with the Oxycodone dosing the way it is now as he seems to tolerate this.  They will attempt to give meds on time to avoid exacerbation of pain.     Labs  to be drawn by home care nurse    Phone call duration:  30 minutes    Ary Murphy

## 2020-05-29 NOTE — PATIENT INSTRUCTIONS
Lawrence was seen today for recheck medication.    Diagnoses and all orders for this visit:    Hypertension goal BP (blood pressure) < 130/80  -     Comprehensive metabolic panel; Future    Type 2 diabetes mellitus with diabetic neuropathic arthropathy, without long-term current use of insulin (H)  -     insulin glargine (LANTUS SOLOSTAR) 100 UNIT/ML pen; Inject 5 units under the skin every morning.  -     Hemoglobin A1c; Future    Alcoholic cirrhosis of liver with ascites (H)  -     Folate; Future  -     Vitamin B12; Future    Hepatic encephalopathy (H)  -     Ammonia; Future    Other iron deficiency anemia  -     CBC with platelets; Future  -     Iron and iron binding capacity; Future    Pain Control        -   Med interaction check: gabapentin and oxycodone can be given.  I will notify Reva, his caregiver and his wife via My Chart.        -   Will continue with the Oxycodone dosing the way it is now as he seems to tolerate this.  They will attempt to give meds on time to avoid exacerbation of pain.     Labs to be drawn by home care nurse

## 2020-06-01 ENCOUNTER — TELEPHONE (OUTPATIENT)
Dept: INFECTIOUS DISEASES | Facility: CLINIC | Age: 65
End: 2020-06-01

## 2020-06-02 NOTE — PROGRESS NOTES
[facility] Staff Note: Mr. Louie had a Video Virtual Visit today and I participated in and discussed his case with Dr. Chapin, ID Fellow -- I agree with her interval history, assessment and plan in this outpatient ID Virtual Progress note. This note reflects my observations and opinions and the plan outlined fully reflects my approach. I have reviewed the available history, radiology, laboratory results, and reports with the Fellow.

## 2020-06-05 ENCOUNTER — VIRTUAL VISIT (OUTPATIENT)
Dept: INFECTIOUS DISEASES | Facility: CLINIC | Age: 65
End: 2020-06-05
Attending: STUDENT IN AN ORGANIZED HEALTH CARE EDUCATION/TRAINING PROGRAM
Payer: COMMERCIAL

## 2020-06-05 DIAGNOSIS — L97.919 VENOUS STASIS ULCERS OF BOTH LOWER EXTREMITIES (H): ICD-10-CM

## 2020-06-05 DIAGNOSIS — L97.929 VENOUS STASIS ULCERS OF BOTH LOWER EXTREMITIES (H): ICD-10-CM

## 2020-06-05 DIAGNOSIS — R50.9 FEVER, UNSPECIFIED FEVER CAUSE: ICD-10-CM

## 2020-06-05 DIAGNOSIS — Z87.898 HISTORY OF BACTEREMIA: ICD-10-CM

## 2020-06-05 DIAGNOSIS — I83.029 VENOUS STASIS ULCERS OF BOTH LOWER EXTREMITIES (H): ICD-10-CM

## 2020-06-05 DIAGNOSIS — L03.317 CELLULITIS OF LEFT BUTTOCK: Primary | ICD-10-CM

## 2020-06-05 DIAGNOSIS — I89.0 CHRONIC ACQUIRED LYMPHEDEMA: ICD-10-CM

## 2020-06-05 DIAGNOSIS — I83.019 VENOUS STASIS ULCERS OF BOTH LOWER EXTREMITIES (H): ICD-10-CM

## 2020-06-05 RX ORDER — CEFADROXIL 500 MG/1
500 CAPSULE ORAL 2 TIMES DAILY
Qty: 28 CAPSULE | Refills: 0 | Status: ON HOLD | OUTPATIENT
Start: 2020-06-05 | End: 2020-07-30

## 2020-06-05 RX ORDER — CIPROFLOXACIN 500 MG/1
500 TABLET, FILM COATED ORAL 2 TIMES DAILY
Qty: 28 TABLET | Refills: 0 | Status: ON HOLD | OUTPATIENT
Start: 2020-06-05 | End: 2020-07-30

## 2020-06-05 NOTE — LETTER
"6/5/2020       RE: Lawrence Louie  [address] [place] [address]     Dear Colleague,    Thank you for referring your patient, Lawrence Louie, to the [facility] AND INFECTIOUS DISEASES at [facility]. Please see a copy of my visit note below.    Lawrence Louie is a 64 year old male who is being evaluated via a billable video visit.      The patient has been notified of following:     \"This video visit will be conducted via a call between you and your physician/provider. We have found that certain health care needs can be provided without the need for an in-person physical exam.  This service lets us provide the care you need with a video conversation.  If a prescription is necessary we can send it directly to your pharmacy.  If lab work is needed we can place an order for that and you can then stop by our lab to have the test done at a later time.    Video visits are billed at different rates depending on your insurance coverage.  Please reach out to your insurance provider with any questions.    If during the course of the call the physician/provider feels a video visit is not appropriate, you will not be charged for this service.\"    Patient has given verbal consent for Video visit? Yes    How would you like to obtain your AVS? MyChart    Patient would like the video invitation sent by: Send to e-mail at: [email or web address]    Will anyone else be joining your video visit? No        Video-Visit Details    Type of service:  Video Visit    Video Start Time: 4.05 pm  Video End Time: 4.45 pm    Originating Location (pt. Location): Home    Distant Location (provider location):  [facility] [employer] AND INFECTIOUS DISEASES     Platform used for Video Visit: Akosua Bird MD       [facility]  Infectious Disease Clinic Note:  Follow up      Patient:  Lawrence Louie, Date of birth " 1955, Medical record number [id number]  Date of Visit:  06/05/2020  Consult requested by patient himself.         Assessment and Recommendations:     Recommendations:  - continue doxy till done (tomorrow)  - then start cefadroxil 500 mg po bid x 14 days  - continue cipro 500 mg bid x 14 days  - Follow up with podiatry and wound care  - Recommend to go to ER if patient continues to be febrile, as he may require iv abxs.     June 11, 4.30 pm follow up video visit     Assessment:  Patient is a 64-year-old gentleman with history of lower extremity lymphedema, recurrent cellulitis, MSSA bacteremia, ESLD, DM 2.  Complaints of recurrent cellulitis complicated by bacteremia.    # Recurrent MSSA bacteremia, source thought to be bilateral lower extremities cellulitis  4/13/2019 blood culture-MSSA  10/7/2019 blood culture with-MSSA and Aeromonas veronii  Recent hospitalization in March 2020 for MSSA bacteremia.  Blood cultures 3/21/2020 -MSSA, 3/22-negative  TTE 3/23/2020 negative for vegetations.  Patient treated with IV cefazolin 3/22-4/4.    # Recurrent cellulitis of lower extremities in setting of chronic lymphedema and venous stasis ulcers. Concern for new area of R ankle cellulitis.  Wound cultures 9/2019 pansensitive Pseudomonas.  Recent MSSA bacteremia in 3/20 thought to be due to MSSA cellulitis.  Patient with multiple open wounds on bilateral anterior shins~0.5- 2.0 cm in diameter, none of which looks actively infected.    Possible left buttock cellulitis with fever to 103, 100.3. will treat with cefadroxil and cipro x 14 days. Due to the amount of edema, he may require iv abxs. Therefore if fevers persist to got to ER.     # Chronic right heel wound/ulcer  Patient had right heel wound/ulcer for more than 15 years  9/8/2019-heel  ulcer scant Bacteroides pyogenes, MSSA  5/21 Patient's wound opened up during physical examination.  Wound cultures 5/21/20 with heavy growth MSSA.  - B/l foot XRay with no signs of  osteomyelitis 5/21/20.    # End-stage liver disease, hepatic encephalopathy  Patient taking spironolactone, furosemide, lactulose as needed    # DM2, well controlled  Lantus 5 units daily  Last hemoglobin A1c-5.5 3/2020          Interval history:     First time seeing patient. Seen by my colleague earlier.     Patient has ESLD  S/P TIPS, DM 2 on Lantus, chronic LE edema and stasis ulcers, recurrent cellulitis and MSSA bacteremia.    Recently seen by my ID colleague 5/21 and 5/27. On 5/21 he was already on cipro+flagyl due to possible pseudomonal infections of the wound due to blue green discharge. Since patient was concerned about possible right ankle cellulitis development, doxy was added on 5/27.     A few days ago, patient got done with cipro+flagyl and he has doxy left until tomorrow.     Last night he developed a fever to 103. Daughter wanted to take him to the ER but he refused. So she added cipro that she had to his doxy regimen. Fevers today to 100.3. But patient states he feels well. Daughter thinks she noticed a redness in his left hip region. I was able to see that on video - there is a circumscribed erythematous patch in left buttock, unable to further characterize. The right buttock has a bruise in the same area. Daughter also thinks he is more confused for the past 2 days compared to baseline. Daughter notes that right heel ulcer has closed up. The other ulcers in the leg do not appear infected. Patient has a wound care nurse who comes regularly. He was receiving lymphedema treatment but now the bandages are being changed which should not require manual lymphedema treatment anymore.            History of the Infectious Disease lllness:     Patient is 64-year-old male with history of ESLD, S/P TIPS, DM 2 on Lantus, chronic lymphedema and venous stasis ulcers, recurrent cellulitis and MSSA bacteremia.  Patient seen in ID clinic with his daughter.  He was recently hospitalized at the end of March at [place]  [facility] for MSSA bacteremia (blood culture 3/21/2020 with MSSA), source felt to be lower extremity cellulitis.  Patient was treated with 2 weeks of IV cefazolin 3/22-4/4.  TTE 3/23 was negative for vegetations.  In the past patient had episode of MSSA bacteremia in 10/2019.  Per patient's daughter, usually patient has signs and symptoms of lower extremity cellulitis and then progressively becomes septic and requires hospitalization.  Usually he does not have time to make it to physician's appointments due to quick deterioration.  Today they are seen in ID clinic asking if something could be done to prevent frequent bacteremia hospitalizations.  In mid April 2019 patient seen by ID physician who felt that his bilateral lower extremity wounds  are due to pseudomonal infection because of bluish-green discoloration of his wounds.  He was prescribed 10-day course of about 10 days ago.  Today none of his open wounds look infected.  The day before patient was notified that his PCP sent prescription for another 10-day course of ciprofloxacin to take until he will be seen in person.  Patient has history of chronic right heel ulcer/wound >15 years. In 9/2019 it grew MSSA.  Today he is complaining of fatigue generalized weakness for which he is using walker.  He has home care nurse, who also takes care of his lymphedema.  He has no podiatry as had bad experience in the past.  Today patient denies fever, chills, SOB, night sweats, CP, cough, abdominal pain, nausea, vomiting. He does not look sick.  His last bloodwork notable for WBC-3, Hb-7.8, Plt-98 on.        Review of Systems:  CONSTITUTIONAL:  No fevers or chills. No night sweats.  Positive fatigue  EYES: negative for icterus  ENT:  negative for hearing loss, tinnitus or sore throat  RESPIRATORY:  negative for cough, sputum, dyspnea  CARDIOVASCULAR:  negative for chest pain, palpitations  GASTROINTESTINAL:  negative for nausea, vomiting, diarrhea or  constipation  GENITOURINARY:  negative for dysuria  HEME:  No easy bruising  INTEGUMENT: Venous stasis ulcers chronic, complicated by frequent recurrent cellulitis  NEURO:  Negative for headache    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)      Splenomegaly      Squamous cell carcinoma      Thrombocytopenia (H)      Varices, esophageal (H)     Banded in 2011       Past Surgical History:   Procedure Laterality Date     BIOPSY OF SKIN LESION       COLONOSCOPY Left 6/16/2016    Procedure: COMBINED COLONOSCOPY, SINGLE OR MULTIPLE BIOPSY/POLYPECTOMY BY BIOPSY;  Surgeon: Brandy Barnett MD;  Location:  GI     ESOPHAGOSCOPY, GASTROSCOPY, DUODENOSCOPY (EGD), COMBINED  2/13/2013    Procedure: COMBINED ESOPHAGOSCOPY, GASTROSCOPY, DUODENOSCOPY (EGD);;  Surgeon: Tara Cook MD;  Location:  GI     ESOPHAGOSCOPY, GASTROSCOPY, DUODENOSCOPY (EGD), COMBINED  11/4/2013    Procedure: COMBINED ESOPHAGOSCOPY, GASTROSCOPY, DUODENOSCOPY (EGD);;  Surgeon: Lonny Diaz MD;  Location:  GI     ESOPHAGOSCOPY, GASTROSCOPY, DUODENOSCOPY (EGD), COMBINED Left 6/16/2016    Procedure: COMBINED ESOPHAGOSCOPY, GASTROSCOPY, DUODENOSCOPY (EGD), BIOPSY SINGLE OR MULTIPLE;  Surgeon: Brandy Barnett MD;  Location:  GI     EXCISE LESION TRUNK  9/24/2012    Procedure: EXCISE LESION TRUNK;;  Surgeon: Pepe Dominguez MD;  Location: [facility]     GENITOURINARY SURGERY      vasectomy     HERNIORRHAPHY UMBILICAL  9/24/2012    Procedure: HERNIORRHAPHY UMBILICAL;  UMBILICAL HERNIA REPAIR , EXCISION OF PERIUMBILICAL CYST;  Surgeon: Pepe Dominguez MD;  Location: [facility]       Family History   Problem Relation Age of Onset     Breast Cancer Mother      Liver Cancer Mother      Cardiovascular Father      Cerebrovascular  Disease Father         very low blood pressure     Cancer Father         rectal cancer     Cardiovascular Paternal Grandfather      Diabetes Brother      Cancer Sister         skin cancer     C.A.D. Other         MI, 70's     Breast Cancer Sister      Thyroid Disease No family hx of      Lipids No family hx of      Anesthesia Reaction No family hx of        Social History     Social History Narrative    . 3 grown daughters. He has been sober since .     Social History     Tobacco Use     Smoking status: Current Every Day Smoker     Packs/day: 0.30     Types: Cigarettes     Last attempt to quit: 4/10/2019     Years since quittin.1     Smokeless tobacco: Never Used     Tobacco comment: about 4 cigarettes per day   Substance Use Topics     Alcohol use: No     Alcohol/week: 0.0 standard drinks     Comment: 2-3 per day , none since Dec 2012     Drug use: No       Immunization History   Administered Date(s) Administered     DTAP (<7y) 1988     HEPA 2013, 2013, 10/30/2013     HepB 2013, 2013, 10/30/2013     Influenza (IIV3) PF 10/04/2010, 2012, 2014, 2015, 2016     Influenza Intranasal Vaccine 4 valent 10/12/2017     Influenza Vaccine IM > 6 months Valent IIV4 10/26/2018     Pneumo Conj 13-V (2010&after) 2015     Pneumococcal 23 valent 2009, 10/01/2010, 2013, 2019     TD (ADULT, 7+) 1997     TDAP Vaccine (Adacel) 2013     Twinrix A/B 2013     Zoster vaccine, live 2016       Patient Active Problem List   Diagnosis     Mild major depression (H)     Thrombocytopenia (H)     Hypertension goal BP (blood pressure) < 130/80     Plantar warts     Corns and callosities     Family history of colon cancer     Type 2 diabetes, HbA1c goal < 7% (H)     Portal hypertension (H)     Alcoholic cirrhosis (H)     Ascites     Esophageal varices (H)     Multiple rib fractures     Tobacco use disorder     Hyperlipidemia LDL goal  <100     Dental caries     Prurigo nodularis     Esophageal reflux     Morbid obesity (H)     History of colonic polyps: tubular adenomas and serrated adenomas     Type II diabetes mellitus with peripheral circulatory disorder (H)     Alcoholic cirrhosis of liver with ascites (H)     Hepatic encephalopathy (H)     Lymphedema of both lower extremities     Venous stasis ulcers of both lower extremities (H)       Current Outpatient Medications   Medication Sig     blood glucose (NO BRAND SPECIFIED) lancets standard Use to test blood sugar 4 X  times daily or as directed.     blood glucose (NO BRAND SPECIFIED) test strip Use to test blood sugars 4 X  times daily or as directed     blood glucose (NO BRAND SPECIFIED) test strip Use to test blood sugar 4 times daily or as directed.     blood glucose monitoring (NO BRAND SPECIFIED) meter device kit Use to test blood sugar 4 times daily or as directed. Before meals and snack at HS.     blood glucose monitoring (NO BRAND SPECIFIED) meter device kit Use to test blood sugar 4 X  times daily or as directed.     cefadroxil (DURICEF) 500 MG capsule Take 1 capsule (500 mg) by mouth 2 times daily     childrens multivitamin w/ionr (FLINTSTONES COMPLETE) 60 MG chewable tablet Take 1 chew tab by mouth daily     Cholecalciferol (VITAMIN D3) 50 MCG (2000 UT) CAPS Take 1 capsule by mouth daily     ciprofloxacin (CIPRO) 500 MG tablet Take 1 tablet (500 mg) by mouth 2 times daily     ciprofloxacin (CIPRO) 750 MG tablet Take 1 tablet (750 mg) by mouth 2 times daily     citalopram (CELEXA) 20 MG tablet Take 1 tablet (20 mg) by mouth daily     CRANBERRY PO Take 1 capsule by mouth 3 times daily as needed     Cyanocobalamin (VITAMIN B-12 PO) Take 1 tablet by mouth daily     desvenlafaxine (PRISTIQ) 100 MG 24 hr tablet Take 2 tablets (200 mg) by mouth daily     desvenlafaxine fumarate 100 MG 24 hr tablet Take 100 mg by mouth daily     doxycycline hyclate (VIBRAMYCIN) 100 MG capsule Take 1 capsule  (100 mg) by mouth 2 times daily     fentaNYL (DURAGESIC) 12 mcg/hr 72 hr patch Place 2 patches onto the skin every 72 hours remove old patch.     fentaNYL (DURAGESIC) 25 mcg/hr 72 hr patch Place 1 patch onto the skin every 72 hours remove old patch.     furosemide (LASIX) 40 MG tablet Take 40 mg once daily     gabapentin (NEURONTIN) 300 MG capsule Take 1 capsule (300 mg) by mouth At Bedtime     hydrOXYzine (ATARAX) 25 MG tablet Take 1 tablet (25 mg) by mouth 3 times daily as needed for itching     insulin glargine (LANTUS SOLOSTAR) 100 UNIT/ML pen Inject 5 units under the skin every morning.     insulin pen needle (B-D U/F) 31G X 8 MM miscellaneous USE  6 times daily / OR AS DIRECTED     insulin pen needle (ULTICARE SHORT) 31G X 8 MM miscellaneous Use UP to 6 times daily or as directed     lactulose encephalopathy (CHRONULAC) 10 GM/15ML SOLUTION TAKE 30 MLS BY MOUTH 2 TIMES DAILY  TITRATE AS NEEDED TO ACHIEVE 3-5 BOWEL MOVEMENTS DAILY.     lidocaine (XYLOCAINE) 5 % external ointment Apply topically as needed for moderate pain Apply to wounds twice daily as needed.     magnesium oxide (MAG-OX) 400 (241.3 Mg) MG tablet Take 1 tablet (400 mg) by mouth daily     NOVOLOG FLEXPEN 100 UNIT/ML soln INJECT 20 UNITS UNDER THE SKIN BEFORE BREAKFAST, 20 UNITS BEFORE LUNCH, 20 UNITS BEFORE DINNER, and 20 UNITS BEFORE SNACKS.      nystatin (MYCOSTATIN) 876268 UNIT/GM external cream Apply topically 2 times daily     nystatin-triamcinolone (MYCOLOG II) cream Apply topically 2 times daily as needed (itching)     ondansetron (ZOFRAN) 4 MG tablet Take 1 tablet (4 mg) by mouth every 8 hours as needed for nausea     order for DME Equipment being ordered:Orthopedic shoes     order for DME Equipment being ordered: Condom catheter     order for DME Equipment being ordered:BioTab compression pants pneumatic system     order for DME Equipment being ordered:bilateral pneumatic leg massage for treatment of extreme bilateral lower leg edema.      oxyCODONE (ROXICODONE) 5 MG tablet TAKE 1 TO 2 TABLETS BY MOUTH EVERY 8 HOURS AS NEEDED.  No other narcotics are to be used with this medication.     OYSTER SHELL CALCIUM/D 500-200 MG-UNIT per tablet Take 1 tablet by mouth daily     QUEtiapine (SEROQUEL) 100 MG tablet Take 1 tablet (100 mg) by mouth At Bedtime     QUEtiapine (SEROQUEL) 25 MG tablet Take 50 mg by mouth At Bedtime     ranitidine (ZANTAC) 150 MG tablet Take 1 tablet (150 mg) by mouth 2 times daily     spironolactone (ALDACTONE) 50 MG tablet Take 2 tablets (100 mg) by mouth daily     STATIN NOT PRESCRIBED, INTENTIONAL, 1 each daily Statin not prescribed intentionally due to Active liver disease     triamcinolone (KENALOG) 0.1 % external cream Apply topically 2 times daily as needed for irritation     UNABLE TO FIND MEDICATION NAME: Burdock root     ursodiol (ACTIGALL) 300 MG capsule Take 3 capsules (900 mg) by mouth 2 times daily     VITAMIN E PO Take 1 capsule by mouth daily     XIFAXAN 550 MG TABS tablet TAKE 1 TABLET BY MOUTH TWO TIMES DAILY     No current facility-administered medications for this visit.        No Known Allergies           Physical Exam:     Limited exam due to video visit   I was able to see on video - there is a circumscribed erythematous patch in left buttock, unable to further characterize. The right buttock has a bruise in the same area.  Was able to review photos taken on 5/21 - bilateral lower extremity edema with peau d orange appearance with many small ulcers in bilateral legs that did not appear infected.          Laboratory Data:     05/21/20 Wound culture Right heel- heavy growth MSSA, pansensitive.  3/21/2020 blood culture-MSSA  3/22/2020 blood culture-negative  3/22/2020 MRSA nares negative  9/5/2019 heel wound-MSSA  9/8/2019-heel  ulcer scant Bacteroides pyogenes      Inflammatory Markers    Recent Labs   Lab Test 05/21/20  1647 08/22/19  0634 08/21/19  0629 08/20/19  0625 08/18/19  1815 12/22/16  1722  12/22/15  1419   CRP 18.9* 60.1* 103.0* 143.0* 77.1* 9.3* 8.6*       Metabolic Studies       Recent Labs   Lab Test 03/12/20  1238 11/18/19  1433 08/21/19  0629 08/19/19  0631 08/18/19  1916 08/18/19  1815 04/22/19  1343  11/06/17  0744    127* 137 136  --  133 136   < > 144   POTASSIUM 3.7 4.7 4.2 3.8  --  4.0 4.1   < > 3.7   CHLORIDE 101 94 110* 107  --  103 101   < > 113*   CO2 26 23 21 22  --  23 26   < > 24   ANIONGAP 7 10 6 7  --  7 8   < > 8   BUN 17 25 16 16  --  18 20   < > 10   CR 0.91 1.12 0.69 0.88  --  0.73 0.92   < > 0.71   GFRESTIMATED 88 68 >90 90  --  >90 86   < > >90   * 206* 130* 86  --  87 141*   < > 93   A1C 6.3*  --   --   --   --  5.3  --    < >  --    ERIN 8.1* 8.7 8.4* 8.0*  --  8.4* 8.4*   < > 7.9*   MAG  --   --   --  1.9  --   --   --   --  1.9   LACT  --   --   --   --  1.7  --   --   --   --     < > = values in this interval not displayed.       Hematology Studies      Recent Labs   Lab Test 03/12/20  1238 11/18/19  1433 08/20/19  0625 08/19/19  0631 08/18/19  1815 04/22/19  1343 10/25/18  1405  04/27/18  1235  11/04/17  2035  10/28/14  1847  03/04/14 1814 02/19/14  1609   WBC 4.6 9.1  --  4.9 6.4 3.8* 3.2*   < > 2.7*   < > 5.0   < > 7.1   < > 6.4 6.4   ANEU  --   --   --   --  4.9  --   --   --  1.7  --  3.3  --  3.8  --  3.3 3.3   ALYM  --   --   --   --  0.4*  --   --   --  0.4*  --  0.8  --  2.0  --  2.1 1.9   RAFAELA  --   --   --   --  1.0  --   --   --  0.5  --  0.7  --  1.2  --  0.8 1.0   AEOS  --   --   --   --  0.1  --   --   --  0.1  --  0.1  --  0.1  --  0.2 0.2   HGB 7.6* 10.9* 8.4* 8.0* 9.3* 9.1* 9.5*   < > 9.5*   < > 12.3*   < > 14.7   < > 14.9 14.8   HCT 24.2* 33.2*  --  24.0* 27.8* 29.0* 29.9*   < > 29.3*   < > 38.5*   < > 41.9   < > 42.2 41.4   * 128* 78* 79* 124* 89* 102*   < > 85*   < > 77*   < > 122*   < > 87* 100*    < > = values in this interval not displayed.       Clotting Studies    Recent Labs   Lab Test 03/12/20  1238 11/18/19  4703  04/22/19  1343 10/25/18  1405  09/24/12  1202 09/13/12  1411   INR 1.28* 1.34* 1.27* 1.14   < > Duplicate request  Charge credited  1.17* 1.16*   PTT  --   --   --   --   --  29 30    < > = values in this interval not displayed.       Urine Studies     Recent Labs   Lab Test 03/12/20  1329 09/13/18  1731 07/31/18  1308 11/04/17  2034 11/21/16  1517   URINEPH 5.0 5.0 6.0 7.0 8.0*   NITRITE Negative Negative Negative Negative Negative   LEUKEST Negative Negative Small* Negative Negative   WBCU 1 1 73* 1 1         Microbiology:  Last 6 Culture results with specimen source  Culture Micro   Date Value Ref Range Status   05/21/2020 Heavy growth  Staphylococcus aureus   (A)  Final   08/18/2019 No growth  Final   08/18/2019 No growth  Final   09/13/2018 No growth  Final   07/31/2018 >100,000 colonies/mL  Escherichia coli   (A)  Final   11/05/2017 No growth  Final    Specimen Description   Date Value Ref Range Status   05/21/2020 Right Foot  Final   05/21/2020 Right Foot  Final   08/18/2019 Blood Right Hand  Final   08/18/2019 Blood Right Hand  Final   09/13/2018 Midstream Urine  Final   07/31/2018 Midstream Urine  Final            Imaging:    Xray feet bilateral 3 views 05/21/20                                                                   IMPRESSION: No acute osseous abnormality. No radiographic evidence of osteomyelitis.        CXR 03/25/20  1.  The left arm PICC has its tip in the proximal right atrium, approximately 2 cm distal to the cavoatrial junction.  2.  Hazy opacities in both lung bases are essentially unchanged.  3.  Mild cardiomegaly.    MRI lumbar spine 03/23/20  1.  The left arm PICC has its tip in the proximal right atrium, approximately 2 cm distal to the cavoatrial junction.  2.  Hazy opacities in both lung bases are essentially unchanged.  3.  Mild cardiomegaly.    ECHO 03/23/20    Interpretation Summary    * The left ventricle is normal size.    * The left ventricular systolic function is normal,  estimated LVEF 60-65%.    * No regional wall motion abnormalities.    * There is moderate aortic stenosis.    * No pulmonary hypertension, estimated right ventricular systolic pressure  is 30 mmHg.    * Compared to prior study dated 02/01/2020 there has been no change.      Again, thank you for allowing me to participate in the care of your patient.      Sincerely,    Sandee Bird MD

## 2020-06-05 NOTE — PROGRESS NOTES
"Lawrence Louie is a 64 year old male who is being evaluated via a billable video visit.      The patient has been notified of following:     \"This video visit will be conducted via a call between you and your physician/provider. We have found that certain health care needs can be provided without the need for an in-person physical exam.  This service lets us provide the care you need with a video conversation.  If a prescription is necessary we can send it directly to your pharmacy.  If lab work is needed we can place an order for that and you can then stop by our lab to have the test done at a later time.    Video visits are billed at different rates depending on your insurance coverage.  Please reach out to your insurance provider with any questions.    If during the course of the call the physician/provider feels a video visit is not appropriate, you will not be charged for this service.\"    Patient has given verbal consent for Video visit? Yes    How would you like to obtain your AVS? Yaoharbarbara    Patient would like the video invitation sent by: Send to e-mail at: [email or web address]    Will anyone else be joining your video visit? No        Video-Visit Details    Type of service:  Video Visit    Video Start Time: 4.05 pm  Video End Time: 4.45 pm    Originating Location (pt. Location): Home    Distant Location (provider location):  [facility] AND INFECTIOUS DISEASES     Platform used for Video Visit: Akosua Bird MD       [facility]  Infectious Disease Clinic Note:  Follow up      Patient:  Lawrence Louie, Date of birth 1955, Medical record number [id number]  Date of Visit:  06/05/2020  Consult requested by patient himself.         Assessment and Recommendations:     Recommendations:  - continue doxy till done (tomorrow)  - then start cefadroxil 500 mg po bid x 14 days  - continue cipro 500 mg bid x 14 days  - Follow up with " podiatry and wound care  - Recommend to go to ER if patient continues to be febrile, as he may require iv abxs.     June 11, 4.30 pm follow up video visit     Assessment:  Patient is a 64-year-old gentleman with history of lower extremity lymphedema, recurrent cellulitis, MSSA bacteremia, ESLD, DM 2.  Complaints of recurrent cellulitis complicated by bacteremia.    # Recurrent MSSA bacteremia, source thought to be bilateral lower extremities cellulitis  4/13/2019 blood culture-MSSA  10/7/2019 blood culture with-MSSA and Aeromonas veronii  Recent hospitalization in March 2020 for MSSA bacteremia.  Blood cultures 3/21/2020 -MSSA, 3/22-negative  TTE 3/23/2020 negative for vegetations.  Patient treated with IV cefazolin 3/22-4/4.    # Recurrent cellulitis of lower extremities in setting of chronic lymphedema and venous stasis ulcers. Concern for new area of R ankle cellulitis.  Wound cultures 9/2019 pansensitive Pseudomonas.  Recent MSSA bacteremia in 3/20 thought to be due to MSSA cellulitis.  Patient with multiple open wounds on bilateral anterior shins~0.5- 2.0 cm in diameter, none of which looks actively infected.    Possible left buttock cellulitis with fever to 103, 100.3. will treat with cefadroxil and cipro x 14 days. Due to the amount of edema, he may require iv abxs. Therefore if fevers persist to got to ER.     # Chronic right heel wound/ulcer  Patient had right heel wound/ulcer for more than 15 years  9/8/2019-heel  ulcer scant Bacteroides pyogenes, MSSA  5/21 Patient's wound opened up during physical examination.  Wound cultures 5/21/20 with heavy growth MSSA.  - B/l foot XRay with no signs of osteomyelitis 5/21/20.    # End-stage liver disease, hepatic encephalopathy  Patient taking spironolactone, furosemide, lactulose as needed    # DM2, well controlled  Lantus 5 units daily  Last hemoglobin A1c-5.5 3/2020          Interval history:     First time seeing patient. Seen by my colleague earlier.     Patient  has ESLD  S/P TIPS, DM 2 on Lantus, chronic LE edema and stasis ulcers, recurrent cellulitis and MSSA bacteremia.    Recently seen by my ID colleague 5/21 and 5/27. On 5/21 he was already on cipro+flagyl due to possible pseudomonal infections of the wound due to blue green discharge. Since patient was concerned about possible right ankle cellulitis development, doxy was added on 5/27.     A few days ago, patient got done with cipro+flagyl and he has doxy left until tomorrow.     Last night he developed a fever to 103. Daughter wanted to take him to the ER but he refused. So she added cipro that she had to his doxy regimen. Fevers today to 100.3. But patient states he feels well. Daughter thinks she noticed a redness in his left hip region. I was able to see that on video - there is a circumscribed erythematous patch in left buttock, unable to further characterize. The right buttock has a bruise in the same area. Daughter also thinks he is more confused for the past 2 days compared to baseline. Daughter notes that right heel ulcer has closed up. The other ulcers in the leg do not appear infected. Patient has a wound care nurse who comes regularly. He was receiving lymphedema treatment but now the bandages are being changed which should not require manual lymphedema treatment anymore.            History of the Infectious Disease lllness:     Patient is 64-year-old male with history of ESLD, S/P TIPS, DM 2 on Lantus, chronic lymphedema and venous stasis ulcers, recurrent cellulitis and MSSA bacteremia.  Patient seen in ID clinic with his daughter.  He was recently hospitalized at the end of March at [facility]note 3[facility] for MSSA bacteremia (blood culture 3/21/2020 with MSSA), source felt to be lower extremity cellulitis.  Patient was treated with 2 weeks of IV cefazolin 3/22-4/4.  TTE 3/23 was negative for vegetations.  In the past patient had episode of MSSA bacteremia in 10/2019.  Per patient's daughter, usually  patient has signs and symptoms of lower extremity cellulitis and then progressively becomes septic and requires hospitalization.  Usually he does not have time to make it to physician's appointments due to quick deterioration.  Today they are seen in ID clinic asking if something could be done to prevent frequent bacteremia hospitalizations.  In mid April 2019 patient seen by ID physician who felt that his bilateral lower extremity wounds  are due to pseudomonal infection because of bluish-green discoloration of his wounds.  He was prescribed 10-day course of about 10 days ago.  Today none of his open wounds look infected.  The day before patient was notified that his PCP sent prescription for another 10-day course of ciprofloxacin to take until he will be seen in person.  Patient has history of chronic right heel ulcer/wound >15 years. In 9/2019 it grew MSSA.  Today he is complaining of fatigue generalized weakness for which he is using walker.  He has home care nurse, who also takes care of his lymphedema.  He has no podiatry as had bad experience in the past.  Today patient denies fever, chills, SOB, night sweats, CP, cough, abdominal pain, nausea, vomiting. He does not look sick.  His last bloodwork notable for WBC-3, Hb-7.8, Plt-98 on.        Review of Systems:  CONSTITUTIONAL:  No fevers or chills. No night sweats.  Positive fatigue  EYES: negative for icterus  ENT:  negative for hearing loss, tinnitus or sore throat  RESPIRATORY:  negative for cough, sputum, dyspnea  CARDIOVASCULAR:  negative for chest pain, palpitations  GASTROINTESTINAL:  negative for nausea, vomiting, diarrhea or constipation  GENITOURINARY:  negative for dysuria  HEME:  No easy bruising  INTEGUMENT: Venous stasis ulcers chronic, complicated by frequent recurrent cellulitis  NEURO:  Negative for headache    Past Medical History:   Diagnosis Date     Diabetes mellitus (H)      Elevated LFTs      Hernia, umbilical      Hypertension       Kidney stones      Leukopenia      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)      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)      Splenomegaly      Squamous cell carcinoma      Thrombocytopenia (H)      Varices, esophageal (H)     Banded in 2011       Past Surgical History:   Procedure Laterality Date     BIOPSY OF SKIN LESION       COLONOSCOPY Left 6/16/2016    Procedure: COMBINED COLONOSCOPY, SINGLE OR MULTIPLE BIOPSY/POLYPECTOMY BY BIOPSY;  Surgeon: Brandy Barnett MD;  [facility]     ESOPHAGOSCOPY, GASTROSCOPY, DUODENOSCOPY (EGD), COMBINED  2/13/2013    Procedure: COMBINED ESOPHAGOSCOPY, GASTROSCOPY, DUODENOSCOPY (EGD);;  Surgeon: Tara Cook MD;  Location: [facility]     ESOPHAGOSCOPY, GASTROSCOPY, DUODENOSCOPY (EGD), COMBINED  11/4/2013    Procedure: COMBINED ESOPHAGOSCOPY, GASTROSCOPY, DUODENOSCOPY (EGD);;  Surgeon: Lonny Diaz MD;  Location:  [facility]     ESOPHAGOSCOPY, GASTROSCOPY, DUODENOSCOPY (EGD), COMBINED Left 6/16/2016    Procedure: COMBINED ESOPHAGOSCOPY, GASTROSCOPY, DUODENOSCOPY (EGD), BIOPSY SINGLE OR MULTIPLE;  Surgeon: Brandy Barnett MD;  Location:  GI     EXCISE LESION TRUNK  9/24/2012    Procedure: EXCISE LESION TRUNK;;  Surgeon: Pepe Dominguez MD;  Location: [facility]     GENITOURINARY SURGERY      vasectomy     HERNIORRHAPHY UMBILICAL  9/24/2012    Procedure: HERNIORRHAPHY UMBILICAL;  UMBILICAL HERNIA REPAIR , EXCISION OF PERIUMBILICAL CYST;  Surgeon: Pepe Dominguez MD;  Location: [facility]       Family History   Problem Relation Age of Onset     Breast Cancer Mother      Liver Cancer Mother      Cardiovascular Father      Cerebrovascular Disease Father         very low blood pressure     Cancer Father         rectal cancer     Cardiovascular Paternal Grandfather      Diabetes Brother      Cancer Sister         skin cancer     C.A.D. Other         MI, 70's     Breast Cancer Sister      Thyroid Disease No family hx of      Lipids No family hx of      Anesthesia  Reaction No family hx of        Social History     Social History Narrative    . 3 grown daughters. He has been sober since .     Social History     Tobacco Use     Smoking status: Current Every Day Smoker     Packs/day: 0.30     Types: Cigarettes     Last attempt to quit: 4/10/2019     Years since quittin.1     Smokeless tobacco: Never Used     Tobacco comment: about 4 cigarettes per day   Substance Use Topics     Alcohol use: No     Alcohol/week: 0.0 standard drinks     Comment: 2-3 per day , none since Dec 2012     Drug use: No       Immunization History   Administered Date(s) Administered     DTAP (<7y) 1988     HEPA 2013, 2013, 10/30/2013     HepB 2013, 2013, 10/30/2013     Influenza (IIV3) PF 10/04/2010, 2012, 2014, 2015, 2016     Influenza Intranasal Vaccine 4 valent 10/12/2017     Influenza Vaccine IM > 6 months Valent IIV4 10/26/2018     Pneumo Conj 13-V (2010&after) 2015     Pneumococcal 23 valent 2009, 10/01/2010, 2013, 2019     TD (ADULT, 7+) 1997     TDAP Vaccine (Adacel) 2013     Twinrix A/B 2013     Zoster vaccine, live 2016       Patient Active Problem List   Diagnosis     Mild major depression (H)     Thrombocytopenia (H)     Hypertension goal BP (blood pressure) < 130/80     Plantar warts     Corns and callosities     Family history of colon cancer     Type 2 diabetes, HbA1c goal < 7% (H)     Portal hypertension (H)     Alcoholic cirrhosis (H)     Ascites     Esophageal varices (H)     Multiple rib fractures     Tobacco use disorder     Hyperlipidemia LDL goal <100     Dental caries     Prurigo nodularis     Esophageal reflux     Morbid obesity (H)     History of colonic polyps: tubular adenomas and serrated adenomas     Type II diabetes mellitus with peripheral circulatory disorder (H)     Alcoholic cirrhosis of liver with ascites (H)     Hepatic encephalopathy (H)     Lymphedema of  both lower extremities     Venous stasis ulcers of both lower extremities (H)       Current Outpatient Medications   Medication Sig     blood glucose (NO BRAND SPECIFIED) lancets standard Use to test blood sugar 4 X  times daily or as directed.     blood glucose (NO BRAND SPECIFIED) test strip Use to test blood sugars 4 X  times daily or as directed     blood glucose (NO BRAND SPECIFIED) test strip Use to test blood sugar 4 times daily or as directed.     blood glucose monitoring (NO BRAND SPECIFIED) meter device kit Use to test blood sugar 4 times daily or as directed. Before meals and snack at HS.     blood glucose monitoring (NO BRAND SPECIFIED) meter device kit Use to test blood sugar 4 X  times daily or as directed.     cefadroxil (DURICEF) 500 MG capsule Take 1 capsule (500 mg) by mouth 2 times daily     childrens multivitamin w/ionr (FLINTSTONES COMPLETE) 60 MG chewable tablet Take 1 chew tab by mouth daily     Cholecalciferol (VITAMIN D3) 50 MCG (2000 UT) CAPS Take 1 capsule by mouth daily     ciprofloxacin (CIPRO) 500 MG tablet Take 1 tablet (500 mg) by mouth 2 times daily     ciprofloxacin (CIPRO) 750 MG tablet Take 1 tablet (750 mg) by mouth 2 times daily     citalopram (CELEXA) 20 MG tablet Take 1 tablet (20 mg) by mouth daily     CRANBERRY PO Take 1 capsule by mouth 3 times daily as needed     Cyanocobalamin (VITAMIN B-12 PO) Take 1 tablet by mouth daily     desvenlafaxine (PRISTIQ) 100 MG 24 hr tablet Take 2 tablets (200 mg) by mouth daily     desvenlafaxine fumarate 100 MG 24 hr tablet Take 100 mg by mouth daily     doxycycline hyclate (VIBRAMYCIN) 100 MG capsule Take 1 capsule (100 mg) by mouth 2 times daily     fentaNYL (DURAGESIC) 12 mcg/hr 72 hr patch Place 2 patches onto the skin every 72 hours remove old patch.     fentaNYL (DURAGESIC) 25 mcg/hr 72 hr patch Place 1 patch onto the skin every 72 hours remove old patch.     furosemide (LASIX) 40 MG tablet Take 40 mg once daily     gabapentin  (NEURONTIN) 300 MG capsule Take 1 capsule (300 mg) by mouth At Bedtime     hydrOXYzine (ATARAX) 25 MG tablet Take 1 tablet (25 mg) by mouth 3 times daily as needed for itching     insulin glargine (LANTUS SOLOSTAR) 100 UNIT/ML pen Inject 5 units under the skin every morning.     insulin pen needle (B-D U/F) 31G X 8 MM miscellaneous USE  6 times daily / OR AS DIRECTED     insulin pen needle (ULTICARE SHORT) 31G X 8 MM miscellaneous Use UP to 6 times daily or as directed     lactulose encephalopathy (CHRONULAC) 10 GM/15ML SOLUTION TAKE 30 MLS BY MOUTH 2 TIMES DAILY  TITRATE AS NEEDED TO ACHIEVE 3-5 BOWEL MOVEMENTS DAILY.     lidocaine (XYLOCAINE) 5 % external ointment Apply topically as needed for moderate pain Apply to wounds twice daily as needed.     magnesium oxide (MAG-OX) 400 (241.3 Mg) MG tablet Take 1 tablet (400 mg) by mouth daily     NOVOLOG FLEXPEN 100 UNIT/ML soln INJECT 20 UNITS UNDER THE SKIN BEFORE BREAKFAST, 20 UNITS BEFORE LUNCH, 20 UNITS BEFORE DINNER, and 20 UNITS BEFORE SNACKS.      nystatin (MYCOSTATIN) 745214 UNIT/GM external cream Apply topically 2 times daily     nystatin-triamcinolone (MYCOLOG II) cream Apply topically 2 times daily as needed (itching)     ondansetron (ZOFRAN) 4 MG tablet Take 1 tablet (4 mg) by mouth every 8 hours as needed for nausea     order for DME Equipment being ordered:Orthopedic shoes     order for DME Equipment being ordered: Condom catheter     order for DME Equipment being ordered:BioTab compression pants pneumatic system     order for DME Equipment being ordered:bilateral pneumatic leg massage for treatment of extreme bilateral lower leg edema.     oxyCODONE (ROXICODONE) 5 MG tablet TAKE 1 TO 2 TABLETS BY MOUTH EVERY 8 HOURS AS NEEDED.  No other narcotics are to be used with this medication.     OYSTER SHELL CALCIUM/D 500-200 MG-UNIT per tablet Take 1 tablet by mouth daily     QUEtiapine (SEROQUEL) 100 MG tablet Take 1 tablet (100 mg) by mouth At Bedtime      QUEtiapine (SEROQUEL) 25 MG tablet Take 50 mg by mouth At Bedtime     ranitidine (ZANTAC) 150 MG tablet Take 1 tablet (150 mg) by mouth 2 times daily     spironolactone (ALDACTONE) 50 MG tablet Take 2 tablets (100 mg) by mouth daily     STATIN NOT PRESCRIBED, INTENTIONAL, 1 each daily Statin not prescribed intentionally due to Active liver disease     triamcinolone (KENALOG) 0.1 % external cream Apply topically 2 times daily as needed for irritation     UNABLE TO FIND MEDICATION NAME: Seble root     ursodiol (ACTIGALL) 300 MG capsule Take 3 capsules (900 mg) by mouth 2 times daily     VITAMIN E PO Take 1 capsule by mouth daily     XIFAXAN 550 MG TABS tablet TAKE 1 TABLET BY MOUTH TWO TIMES DAILY     No current facility-administered medications for this visit.        No Known Allergies           Physical Exam:     Limited exam due to video visit   I was able to see on video - there is a circumscribed erythematous patch in left buttock, unable to further characterize. The right buttock has a bruise in the same area.  Was able to review photos taken on 5/21 - bilateral lower extremity edema with peau d orange appearance with many small ulcers in bilateral legs that did not appear infected.          Laboratory Data:     05/21/20 Wound culture Right heel- heavy growth MSSA, pansensitive.  3/21/2020 blood culture-MSSA  3/22/2020 blood culture-negative  3/22/2020 MRSA nares negative  9/5/2019 heel wound-MSSA  9/8/2019-heel  ulcer scant Bacteroides pyogenes      Inflammatory Markers    Recent Labs   Lab Test 05/21/20  1647 08/22/19  0634 08/21/19  0629 08/20/19  0625 08/18/19  1815 12/22/16  1722 12/22/15  1419   CRP 18.9* 60.1* 103.0* 143.0* 77.1* 9.3* 8.6*       Metabolic Studies       Recent Labs   Lab Test 03/12/20  1238 11/18/19  1433 08/21/19  0629 08/19/19  0631 08/18/19  1916 08/18/19  1815 04/22/19  1343  11/06/17  0744    127* 137 136  --  133 136   < > 144   POTASSIUM 3.7 4.7 4.2 3.8  --  4.0 4.1   < >  3.7   CHLORIDE 101 94 110* 107  --  103 101   < > 113*   CO2 26 23 21 22  --  23 26   < > 24   ANIONGAP 7 10 6 7  --  7 8   < > 8   BUN 17 25 16 16  --  18 20   < > 10   CR 0.91 1.12 0.69 0.88  --  0.73 0.92   < > 0.71   GFRESTIMATED 88 68 >90 90  --  >90 86   < > >90   * 206* 130* 86  --  87 141*   < > 93   A1C 6.3*  --   --   --   --  5.3  --    < >  --    ERIN 8.1* 8.7 8.4* 8.0*  --  8.4* 8.4*   < > 7.9*   MAG  --   --   --  1.9  --   --   --   --  1.9   LACT  --   --   --   --  1.7  --   --   --   --     < > = values in this interval not displayed.       Hematology Studies      Recent Labs   Lab Test 03/12/20  1238 11/18/19  1433 08/20/19  0625 08/19/19  0631 08/18/19  1815 04/22/19  1343 10/25/18  1405  04/27/18  1235  11/04/17  2035  10/28/14  1847  03/04/14  1814 02/19/14  1609   WBC 4.6 9.1  --  4.9 6.4 3.8* 3.2*   < > 2.7*   < > 5.0   < > 7.1   < > 6.4 6.4   ANEU  --   --   --   --  4.9  --   --   --  1.7  --  3.3  --  3.8  --  3.3 3.3   ALYM  --   --   --   --  0.4*  --   --   --  0.4*  --  0.8  --  2.0  --  2.1 1.9   RAFAELA  --   --   --   --  1.0  --   --   --  0.5  --  0.7  --  1.2  --  0.8 1.0   AEOS  --   --   --   --  0.1  --   --   --  0.1  --  0.1  --  0.1  --  0.2 0.2   HGB 7.6* 10.9* 8.4* 8.0* 9.3* 9.1* 9.5*   < > 9.5*   < > 12.3*   < > 14.7   < > 14.9 14.8   HCT 24.2* 33.2*  --  24.0* 27.8* 29.0* 29.9*   < > 29.3*   < > 38.5*   < > 41.9   < > 42.2 41.4   * 128* 78* 79* 124* 89* 102*   < > 85*   < > 77*   < > 122*   < > 87* 100*    < > = values in this interval not displayed.       Clotting Studies    Recent Labs   Lab Test 03/12/20  1238 11/18/19  1433 04/22/19  1343 10/25/18  1405  09/24/12  1202 09/13/12  1411   INR 1.28* 1.34* 1.27* 1.14   < > Duplicate request  Charge credited  1.17* 1.16*   PTT  --   --   --   --   --  29 30    < > = values in this interval not displayed.       Urine Studies     Recent Labs   Lab Test 03/12/20  1329 09/13/18  1731 07/31/18  1308 11/04/17 2034  11/21/16  1517   URINEPH 5.0 5.0 6.0 7.0 8.0*   NITRITE Negative Negative Negative Negative Negative   LEUKEST Negative Negative Small* Negative Negative   WBCU 1 1 73* 1 1         Microbiology:  Last 6 Culture results with specimen source  Culture Micro   Date Value Ref Range Status   05/21/2020 Heavy growth  Staphylococcus aureus   (A)  Final   08/18/2019 No growth  Final   08/18/2019 No growth  Final   09/13/2018 No growth  Final   07/31/2018 >100,000 colonies/mL  Escherichia coli   (A)  Final   11/05/2017 No growth  Final    Specimen Description   Date Value Ref Range Status   05/21/2020 Right Foot  Final   05/21/2020 Right Foot  Final   08/18/2019 Blood Right Hand  Final   08/18/2019 Blood Right Hand  Final   09/13/2018 Midstream Urine  Final   07/31/2018 Midstream Urine  Final            Imaging:    Xray feet bilateral 3 views 05/21/20                                                                   IMPRESSION: No acute osseous abnormality. No radiographic evidence of osteomyelitis.        CXR 03/25/20  1.  The left arm PICC has its tip in the proximal right atrium, approximately 2 cm distal to the cavoatrial junction.  2.  Hazy opacities in both lung bases are essentially unchanged.  3.  Mild cardiomegaly.    MRI lumbar spine 03/23/20  1.  The left arm PICC has its tip in the proximal right atrium, approximately 2 cm distal to the cavoatrial junction.  2.  Hazy opacities in both lung bases are essentially unchanged.  3.  Mild cardiomegaly.    ECHO 03/23/20    Interpretation Summary    * The left ventricle is normal size.    * The left ventricular systolic function is normal, estimated LVEF 60-65%.    * No regional wall motion abnormalities.    * There is moderate aortic stenosis.    * No pulmonary hypertension, estimated right ventricular systolic pressure  is 30 mmHg.    * Compared to prior study dated 02/01/2020 there has been no change.

## 2020-06-09 ENCOUNTER — TELEPHONE (OUTPATIENT)
Dept: WOUND CARE | Facility: CLINIC | Age: 65
End: 2020-06-09

## 2020-06-11 ENCOUNTER — MEDICAL CORRESPONDENCE (OUTPATIENT)
Dept: HEALTH INFORMATION MANAGEMENT | Facility: CLINIC | Age: 65
End: 2020-06-11

## 2020-06-12 ENCOUNTER — PATIENT OUTREACH (OUTPATIENT)
Dept: GASTROENTEROLOGY | Facility: CLINIC | Age: 65
End: 2020-06-12

## 2020-06-12 DIAGNOSIS — K70.30 ALCOHOLIC CIRRHOSIS OF LIVER WITHOUT ASCITES (H): Primary | ICD-10-CM

## 2020-06-12 NOTE — PROGRESS NOTES
Patient's spouse Raven left message stating patient is hospitalized at [facility] with recurring infections in his lower extremities. She states they noted a possible liver cancer on a recent scan, and the patient would like further imaging and follow up to occur at the [facility]. She also requests video visit with either Yovany or Dr. Simmons. Returned call to discuss, there was no answer, message left including call back number.

## 2020-06-15 ENCOUNTER — TELEPHONE (OUTPATIENT)
Dept: INTERNAL MEDICINE | Facility: CLINIC | Age: 65
End: 2020-06-15

## 2020-06-15 DIAGNOSIS — K70.31 ALCOHOLIC CIRRHOSIS OF LIVER WITH ASCITES (H): ICD-10-CM

## 2020-06-15 NOTE — TELEPHONE ENCOUNTER
[facility] Call Center    Phone Message    May a detailed message be left on voicemail: yes     Reason for Call: Order(s): Home Care Orders: Skilled Nursing:  Resumption of care 2x/wk for 3 weeks, 1x/wk for 3 weeks. 2 PRNfor  Labs or wound issues. Also, FYI refusing all therapy.     Action Taken: Message routed to:  [facility]    Travel Screening: Not Applicable

## 2020-06-15 NOTE — PROGRESS NOTES
Plan per Dr. Simmons: video visit as discussed with Yovany Lopez PA-C on Wed. 6/17 at 2 pm. Draw AFP, obtain imaging from [facility] to review at tumor conf on Friday 6/19, no plan for liver biopsy until review completed.

## 2020-06-16 ENCOUNTER — TELEPHONE (OUTPATIENT)
Dept: INTERNAL MEDICINE | Facility: CLINIC | Age: 65
End: 2020-06-16

## 2020-06-16 ENCOUNTER — VIRTUAL VISIT (OUTPATIENT)
Dept: INTERNAL MEDICINE | Facility: CLINIC | Age: 65
End: 2020-06-16
Payer: COMMERCIAL

## 2020-06-16 ENCOUNTER — TELEPHONE (OUTPATIENT)
Dept: GASTROENTEROLOGY | Facility: CLINIC | Age: 65
End: 2020-06-16

## 2020-06-16 DIAGNOSIS — R16.0 LIVER MASS: Primary | ICD-10-CM

## 2020-06-16 NOTE — NURSING NOTE
Chief Complaint   Patient presents with     Hospital F/U     Pt is following up from hospital.      Sahara Mims LPN at 9:44 AM on 6/16/2020.

## 2020-06-16 NOTE — TELEPHONE ENCOUNTER
[facility] Call Center    Phone Message    May a detailed message be left on voicemail: yes     Reason for Call: Other: Per Bren is wanting to get a call back in regards to letting Kingston Murphy know that she was not able to get a second set of blood culture. Bren states was able to get one set but not the second set, Please advise.       Action Taken: Message routed to:  [facility] pcc    Travel Screening: Not Applicable

## 2020-06-16 NOTE — TELEPHONE ENCOUNTER
Reason For Call:        Chief Complaint   Patient presents with     Refill Request                 Medication Name, Dose and Monthly Quantity:     Roxicodone 5 mg     Diagnosis requiring opiates:        Problem List Updated:   Yes     Opioid Agreement On File - [facility] PAIN CONTRACT ID# [id number]:       Last Urine Drug Screen (at least once every 12 months) Date:     Unexpected Results:        [facility] Data Reviewed (at least once every 3 months) Date:   06/16/2020     Unexpected Results:         Last Fill Date:    05/26/2020    Next Fill Date:   06/25/2020     Last Visit with PCP:        Future Visits with PCP:      Processing:           CHRISTINA SOL CMA at 11:53 AM on 6/16/2020.

## 2020-06-16 NOTE — TELEPHONE ENCOUNTER
Verbal orders given to Bren from [facility], per Ary Murphy, for Home Care Orders: Skilled Nursing:  Resumption of care 2x/wk for 3 weeks, 1x/wk for 3 weeks. 2 PRNfor  Labs or wound issues. Also, FYI refusing all therapy. Left message with home care nurse to call clinic back to leave clarification as to what therapy the patient is refusing. Magdalena Mott LPN 6/16/2020 9:15 AM

## 2020-06-16 NOTE — TELEPHONE ENCOUNTER
[facility] Call Center    Phone Message    May a detailed message be left on voicemail: yes     Reason for Call: Order(s): Home Care Orders: Other:  Unruly's wife mentioned to Bren that Dr Simmons wants an ASP Pannel done.   Bren is going today at noon to see Unruly and would like to get this pannel done when she is there.   Please call with orders.     Action Taken: Message routed to:  [facility] Hepatology    Travel Screening: Not Applicable

## 2020-06-16 NOTE — TELEPHONE ENCOUNTER
Per Bren with [facility] states Patient is declining and states does not want them. Bren states Patient was Declining the home physical therapy.

## 2020-06-17 ENCOUNTER — VIRTUAL VISIT (OUTPATIENT)
Dept: GASTROENTEROLOGY | Facility: CLINIC | Age: 65
End: 2020-06-17
Attending: PHYSICIAN ASSISTANT
Payer: COMMERCIAL

## 2020-06-17 VITALS — SYSTOLIC BLOOD PRESSURE: 110 MMHG | DIASTOLIC BLOOD PRESSURE: 70 MMHG

## 2020-06-17 DIAGNOSIS — R60.0 EDEMA OF BOTH LEGS: ICD-10-CM

## 2020-06-17 DIAGNOSIS — Z95.828 S/P TIPS (TRANSJUGULAR INTRAHEPATIC PORTOSYSTEMIC SHUNT): ICD-10-CM

## 2020-06-17 DIAGNOSIS — E44.0 MODERATE PROTEIN-CALORIE MALNUTRITION (H): ICD-10-CM

## 2020-06-17 DIAGNOSIS — C22.0 HCC (HEPATOCELLULAR CARCINOMA) (H): ICD-10-CM

## 2020-06-17 DIAGNOSIS — K70.30 ALCOHOLIC CIRRHOSIS OF LIVER WITHOUT ASCITES (H): Primary | ICD-10-CM

## 2020-06-17 DIAGNOSIS — K76.9 LESION OF LIVER: ICD-10-CM

## 2020-06-17 RX ORDER — OXYCODONE HYDROCHLORIDE 5 MG/1
TABLET ORAL
Qty: 180 TABLET | Refills: 0 | Status: SHIPPED | OUTPATIENT
Start: 2020-06-25 | End: 2020-07-20

## 2020-06-17 RX ORDER — QUETIAPINE FUMARATE 50 MG/1
50 TABLET, FILM COATED ORAL AT BEDTIME
COMMUNITY
End: 2021-01-01

## 2020-06-17 NOTE — LETTER
"    6/17/2020         RE: Lawrence Louie  [address]  [place] [address]        Dear Colleague,    Thank you for referring your patient, Lawrence Louie, to the [facility] HEPATOLOGY. Please see a copy of my visit note below.    Lawrence Louie is a 64 year old male who is being evaluated via a billable telephone visit.      The patient has been notified of following:     \"This telephone visit will be conducted via a call between you and your physician/provider. We have found that certain health care needs can be provided without the need for a physical exam.  This service lets us provide the care you need with a short phone conversation.  If a prescription is necessary we can send it directly to your pharmacy.  If lab work is needed we can place an order for that and you can then stop by our lab to have the test done at a later time.    Telephone visits are billed at different rates depending on your insurance coverage. During this emergency period, for some insurers they may be billed the same as an in-person visit.  Please reach out to your insurance provider with any questions.    If during the course of the call the physician/provider feels a telephone visit is not appropriate, you will not be charged for this service.\"    Patient has given verbal consent for Telephone visit?  Yes    What phone number would you like to be contacted at? [phone]    How would you like to obtain your AVS? Mail a copy    Phone call duration: 51 minutes    Yovany Lopez PA-C          Hepatology Follow-up Clinic note  Lawrence Louie   Date of Birth 1955  Date of Service 6/17/2020    Reason for follow-up: ETOH cirrhosis          Assessment/plan:   Lawrence Louie is a 64 year old male with ETOH cirrhosis complicated by ascites s/p TIPS and hepatic encephalopathy and diffuse anasarca and MILLY that has led to multiple hospitalizations and limited his mobility and his functional status. His HE seems " fairly well managed at this time. MELD was 15 during recent admission. Recent hospitalization for sepsis showing MRI interdeterminate for HCC. His TIPS appeared patent.     # Indeterminate liver lesion on recent imaging:  - Discuss at Multi-disciplinary Liver Tumor Conference. Will follow up with recommendations. ADDENDUM on 6/19/2020: Recommend Triphasic CT Abdomen to assess and diagnosis liver lesion. This was discussed with patient.   - AFP is pending ADDENDUM: AFP 80   # History of HE  - Continue lactulose: reminded importance of dosing to 3-5 BM daily  - Continue Rifaximin  # History of MILLY / Ascites:  - Continue 100 mg spironolactone  - Continue 40 mg  - 2000 mg sodium, high protein   # Increase physical activity as able to maintain strength    # Follow-up in clinic with Dr. Simmons in 6 months or sooner as needed    Yovany Lopez PA-C   [facility] Hepatology clinic    -----------------------------------------------------       HPI:   Lawrence Louie is a 64 year old male presenting for follow-up. The majority of the visits is provided by his daughter, Zahra.     Cirrhosis  - ETOH and SMITH  Complicated by:  Ascites s/p TIPS on April 2018  Hx of HE   EGD: 6/16/2016  HCC: 6/11/2020    Patient was last seen by me on 3/12/2020. He was recently hospitalized for recurrent sepsis, pseumonas stutzeri bacteremia, bilateral LE leg, as well as treatment for MSSA sepsis.  Sodium 135, Cr. 1.1, Tbili 1.2, INR 1.6       Since discharge, he has not any further temps or abdominal pain. He appetite ebbs and flows. He ate well last night. Lower extremity is controlled and stable this time. He is taking 100 mg spironolactone and 40 mg lasix. He has pharoh (sp) wraps for his legs and he will hopefully be starting lymphedema treatment again. They are still watching his sodium intake closely.     Daughter Zahra states that his strength has over all seemed to be improved as the fluid has come out of his legs. He went  for walks in the hospital.     His confusion is fairly well controlled. He is not forth coming with the amount of bowel movements a day but he typically has 1-2. He takes at least 15 mL every morning.    Patient denies jaundice,.  Patient also denies melena, hematochezia or hematemesis. Patient denies fevers, sweats or chills.    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)      Splenomegaly      Squamous cell carcinoma      Thrombocytopenia (H)      Varices, esophageal (H)     Past Surgical History:   Procedure Laterality Date     BIOPSY OF SKIN LESION       COLONOSCOPY Left 6/16/2016    Procedure: COMBINED COLONOSCOPY, SINGLE OR MULTIPLE BIOPSY/POLYPECTOMY BY BIOPSY;  Surgeon: Brandy Barnett MD;  Location:  GI     ESOPHAGOSCOPY, GASTROSCOPY, DUODENOSCOPY (EGD), COMBINED  2/13/2013    Procedure: COMBINED ESOPHAGOSCOPY, GASTROSCOPY, DUODENOSCOPY (EGD);;  Surgeon: Tara Cook MD;  Location:  GI     ESOPHAGOSCOPY, GASTROSCOPY, DUODENOSCOPY (EGD), COMBINED  11/4/2013    Procedure: COMBINED ESOPHAGOSCOPY, GASTROSCOPY, DUODENOSCOPY (EGD);;  Surgeon: Lonny Diaz MD;  Location:  GI     ESOPHAGOSCOPY, GASTROSCOPY, DUODENOSCOPY (EGD), COMBINED Left 6/16/2016    Procedure: COMBINED ESOPHAGOSCOPY, GASTROSCOPY, DUODENOSCOPY (EGD), BIOPSY SINGLE OR MULTIPLE;  Surgeon: Brandy Barnett MD;  Location:  GI     EXCISE LESION TRUNK  9/24/2012    Procedure: EXCISE LESION TRUNK;;  Surgeon: Pepe Dominguez MD;  Location: [facility]     GENITOURINARY SURGERY      vasectomy     HERNIORRHAPHY UMBILICAL  9/24/2012    Procedure: HERNIORRHAPHY UMBILICAL;  UMBILICAL HERNIA REPAIR , EXCISION OF PERIUMBILICAL CYST;  Surgeon: Pepe Dominguez MD;  Location: [facility]                  Medications:     Current Outpatient Medications   Medication     blood glucose (NO BRAND SPECIFIED) lancets standard     blood glucose (NO BRAND SPECIFIED) test strip     blood glucose (NO BRAND SPECIFIED) test strip     blood glucose monitoring (NO BRAND SPECIFIED) meter device kit     blood glucose monitoring (NO BRAND SPECIFIED) meter device kit     cefadroxil (DURICEF) 500 MG capsule     childrens multivitamin w/ionr (FLINTSTONES COMPLETE) 60 MG chewable tablet     Cholecalciferol (VITAMIN D3) 50 MCG (2000 UT) CAPS     ciprofloxacin (CIPRO) 500 MG tablet     citalopram (CELEXA) 20 MG tablet     CRANBERRY PO     Cyanocobalamin (VITAMIN B-12 PO)     desvenlafaxine (PRISTIQ) 100 MG 24 hr tablet     desvenlafaxine fumarate 100 MG 24 hr tablet     doxycycline hyclate (VIBRAMYCIN) 100 MG capsule     fentaNYL (DURAGESIC) 12 mcg/hr 72 hr patch     fentaNYL (DURAGESIC) 25 mcg/hr 72 hr patch     furosemide (LASIX) 40 MG tablet     gabapentin (NEURONTIN) 300 MG capsule     hydrOXYzine (ATARAX) 25 MG tablet     insulin glargine (LANTUS SOLOSTAR) 100 UNIT/ML pen     insulin pen needle (B-D U/F) 31G X 8 MM miscellaneous     insulin pen needle (ULTICARE SHORT) 31G X 8 MM miscellaneous     lactulose encephalopathy (CHRONULAC) 10 GM/15ML SOLUTION     lidocaine (XYLOCAINE) 5 % external ointment     magnesium oxide (MAG-OX) 400 (241.3 Mg) MG tablet     NOVOLOG FLEXPEN 100 UNIT/ML soln     nystatin (MYCOSTATIN) 624677 UNIT/GM external cream     nystatin-triamcinolone (MYCOLOG II) cream     ondansetron (ZOFRAN) 4 MG tablet     order for DME     order for DME     order for DME     order for DME     oxyCODONE (ROXICODONE) 5 MG tablet     OYSTER SHELL CALCIUM/D 500-200 MG-UNIT per tablet     QUEtiapine (SEROQUEL) 100 MG tablet     QUEtiapine (SEROQUEL) 25 MG tablet     ranitidine (ZANTAC) 150 MG tablet     spironolactone (ALDACTONE) 50 MG tablet     STATIN NOT PRESCRIBED, INTENTIONAL,     triamcinolone (KENALOG) 0.1 % external cream      UNABLE TO FIND     ursodiol (ACTIGALL) 300 MG capsule     VITAMIN E PO     XIFAXAN 550 MG TABS tablet     No current facility-administered medications for this visit.             Allergies:   No Known Allergies         Review of Systems:   10 points ROS was obtained and highlighted in the HPI, otherwise negative.          Physical Exam:     PSYCH: Alert and oriented times 3; coherent speech, normal   rate and volume, able to articulate logical thoughts, able   to abstract reason, no tangential thoughts, no hallucinations   or delusions  His affect is flat.   RESP: No cough, no audible wheezing, able to talk in full sentences  Remainder of exam unable to be completed due to telephone visits           Data:   Reviewed in person and significant for:    Lab Results   Component Value Date     03/12/2020      Lab Results   Component Value Date    POTASSIUM 3.7 03/12/2020     Lab Results   Component Value Date    CHLORIDE 101 03/12/2020     Lab Results   Component Value Date    CO2 26 03/12/2020     Lab Results   Component Value Date    BUN 17 03/12/2020     Lab Results   Component Value Date    CR 0.91 03/12/2020       Lab Results   Component Value Date    WBC 4.6 03/12/2020     Lab Results   Component Value Date    HGB 7.6 03/12/2020     Lab Results   Component Value Date    HCT 24.2 03/12/2020     Lab Results   Component Value Date    MCV 87 03/12/2020     Lab Results   Component Value Date     03/12/2020       Lab Results   Component Value Date    AST 19 03/12/2020     Lab Results   Component Value Date    ALT 10 03/12/2020     Lab Results   Component Value Date    BILICONJ 0.0 03/12/2014      Lab Results   Component Value Date    BILITOTAL 1.0 03/12/2020       Lab Results   Component Value Date    ALBUMIN 1.9 03/12/2020     Lab Results   Component Value Date    PROTTOTAL 6.4 03/12/2020      Lab Results   Component Value Date    ALKPHOS 178 03/12/2020       Lab Results   Component Value Date    INR  1.28 03/12/2020     MRI Liver W AND WO CON  6/11/2020: :  1. Significant respiratory motion artifact degrades evaluation of the liver. Enhancing mass in the inferior right hepatic lobe measuring 37 mm with restricted diffusion and enhancing mass in the medial left hepatic lobe measuring 22 mm with restricted diffusion suspicious for hepatocellular carcinoma. A smaller area of restricted diffusion is identified in the posterior right hepatic lobe, corresponding to an area of hypodensity seen on the prior CT, this area is also suspicious for malignancy although it is difficult to confidently identify this mass on postcontrast imaging probably due to the degree of respiratory motion.  2. Hepatic cirrhosis with splenomegaly and abdominal ascites unchanged compared to the CT from one day earlier. Diffuse mesenteric edema and anasarca is also unchanged.  3. Contracted gallbladder with internal stones. No biliary duct dilatation.  6/11/2020      CT ABDOMEN & PELVIS W ORAL W IV  1.  Ascites, cirrhosis, splenomegaly and collateral vessels.  2.  There are several ill-defined hypodense areas within the liver. Recommend multiphase MRI of the liver to evaluate for possible malignancy.  3.  TIPS stent present with patent portal vein, SMV and splenic vein.  4.  Small bilateral effusions with bibasilar atelectasis.  5.  Atherosclerotic vascular disease.  6.  No evidence of abscess, free air or obstruction.  7.  Atherosclerotic vascular disease.  8.  Diffuse subcutaneous edema.  9.  Progression of L1 compression fracture.    Again, thank you for allowing me to participate in the care of your patient.        Sincerely,        Yovany Lopez PA-C

## 2020-06-17 NOTE — PROGRESS NOTES
"Lawrence Louie is a 64 year old male who is being evaluated via a billable telephone visit.      The patient has been notified of following:     \"This telephone visit will be conducted via a call between you and your physician/provider. We have found that certain health care needs can be provided without the need for a physical exam.  This service lets us provide the care you need with a short phone conversation.  If a prescription is necessary we can send it directly to your pharmacy.  If lab work is needed we can place an order for that and you can then stop by our lab to have the test done at a later time.    Telephone visits are billed at different rates depending on your insurance coverage. During this emergency period, for some insurers they may be billed the same as an in-person visit.  Please reach out to your insurance provider with any questions.    If during the course of the call the physician/provider feels a telephone visit is not appropriate, you will not be charged for this service.\"    Patient has given verbal consent for Telephone visit?  Yes    What phone number would you like to be contacted at? [phone]    How would you like to obtain your AVS? Mail a copy    Phone call duration: 51 minutes    Yovany Lopez PA-C        "

## 2020-06-17 NOTE — PROGRESS NOTES
"Lawrence Louie is a 64 year old male who is being evaluated via a billable telephone visit.  I spoke with both Unruly's wife Raven, and his daughter Kristi and the patient himself, Unruly.     The patient has been notified of following:     \"This telephone visit will be conducted via a call between you and your physician/provider. We have found that certain health care needs can be provided without the need for a physical exam.  This service lets us provide the care you need with a short phone conversation.  If a prescription is necessary we can send it directly to your pharmacy.  If lab work is needed we can place an order for that and you can then stop by our lab to have the test done at a later time.    Telephone visits are billed at different rates depending on your insurance coverage. During this emergency period, for some insurers they may be billed the same as an in-person visit.  Please reach out to your insurance provider with any questions.    If during the course of the call the physician/provider feels a telephone visit is not appropriate, you will not be charged for this service.\"    Patient has given verbal consent for Telephone visit?  Yes    What phone number would you like to be contacted at? Cell phone numbers      How would you like to obtain your AVS? MyChart    Subjective     aLwrence Louie is a 64 year old male who presents via phone visit today for the following health issues:    HPI    Pt was recently hospitalized at [facility] COURSE:  This is a 64 y.o. male with a history of HTN, HLD, HFpEF with moderate AS, PAF, T2DM, alcoholic liver cirrhosis s/p TIPS, esophageal varices, chronic anemia and thrombocytopenia, LE lymphedema with chronic ulcerations, recent admission to [facility] [place] 3/21-3/26/20 for MSSA bacteremia d/t open LE wounds with hematogenous spread to the kidneys causing pyelonephritis, TTE negative for vegetations, d/c'd with PICC on IV cefazolin with PICC removed 3 " days prior to this admission. He presented with return of fever and generalized weakness.     Pseudomonas stutzeri bacteremia, recurrent sepsis: Possibly from leg wounds vs other. Recent MSSA sepsis. Met sepsis criteria with leukocytosis (14 --> 2.9), lactic acidosis (resolved), tachypnea and GREG (resolved). Started on IV Vanco and ceftriaxone initially. ID consulted. Switched to Zosyn 6/9. CT A/P without abscess, free air or obstruction but did show diffuse subcutanoeus edema, ascites, cirrhosis, splenomegaly and several ill defined hypodense areas within the liver (see below). BCx2 (from 6/7) ultimately grew Pseudomonas stutzeri sensitive to cefepime, Gent, Zosyn, Tobramycin with resistance to cipro. Completed the ID-recommended 5 days of IV Zosyn. Repeat BC 6/11 with NGTD. Repeat BC ordered for 6/15. Needs liver lesion biopsy. Outpt f/u with ID and Heme/Onc. Wound cares provided and should continue at home.     Liver lesions: Several ill-defined hypodense areas within the liver on CT. Liver MRI confirmed these lesions to be suspicious for hepatocellular carcinoma / malignancy although difficult study d/t significant respiratory motion. Could not rule out abscess / infectious with 100% certainty. Needs outpt liver lesion biopsy with anaerobic/aerobic culture and pathology, ordered on d/c - NEEDS TO BE FOLLOWED UP ON BY ID AND ONCOLOGY. Referral to [facility] also provided.     GREG, metabolic acidosis: Resolved. Cr 1.1 at time of d/c. Suspect prerenal and r/t chronic diuretic use. Cr stable. Held diuretics initially, resumed PTA spironolactone and Lasix 6/9, tolerated well.    ESLD s/p TIPS, hepatic encephalopathy, history of ascites, esophageal varices: Intermittent confusion at baseline. Increased lactulose to BID 6/9-6/10 for increased confusion (ammonia 17) and resumed Lasix and spironolactone on 6/9. Encephalopathy cleared. Continued PTA rifaxamin, lactulose (resumed once daily dosing on  d/c).    Chronic coagulopathy, anemia, thrombocytopenia: D/t cirrhosis and anemia of chronic disease. INR 1.6. Hgb jonnie 6.7, transfused 1 unit PRBCs on 6/8, Hgb improved to 9.2. Baseline Hgb ~7-8. Plts at baseline.    HFpEF: Echo 3/2020 with LVEF 60-65%, moderate AS and grade I diastolic dysfunction. Admit CXR with mild vascular congestion although appeared intravascular dry and given IVFs for GREG and lactic acidosis. CT AP with ascites, small bilateral pleural effusions and diffuse subcu edema. Remained on RA throughout stay. Clinically appeared relatively euvolemic. Continued on PTA Lasix and spironolactone once GREG resolved. Encouraged a low sodium diet and daily weights.    T2DM: Reportedly has not been on insulin since April 2020 d/t hypoglycemia. Sugars here well controlled not receiving any antihyperglycemics.      Hyponatremia: Suspect hypovolemic. Resolved with IVFs.    Hypokalemia, hypomagnesemia: Replaced during stay.     Depression, anxiety: PTA citalopram, hydroxyzine, Seroquel, Pristiq    Since he has been home he is doing ok.  He now has to follow-up from liver findings. He he had a fever of 99.5 on Sunday and started on antibiotics. He is currently on antibiotics. While hospitalized at [facility] he had a transfusion and felt better.  They are very concerned about the liver masses etiology. He has a follow-up virtual appt. scheduled with hepatology.     Current Outpatient Medications   Medication     blood glucose (NO BRAND SPECIFIED) lancets standard     blood glucose (NO BRAND SPECIFIED) test strip     blood glucose (NO BRAND SPECIFIED) test strip     blood glucose monitoring (NO BRAND SPECIFIED) meter device kit     blood glucose monitoring (NO BRAND SPECIFIED) meter device kit     cefadroxil (DURICEF) 500 MG capsule     childrens multivitamin w/ionr (FLINTSTONES COMPLETE) 60 MG chewable tablet     Cholecalciferol (VITAMIN D3) 50 MCG (2000 UT) CAPS     ciprofloxacin (CIPRO) 500 MG tablet      citalopram (CELEXA) 20 MG tablet     CRANBERRY PO     Cyanocobalamin (VITAMIN B-12 PO)     desvenlafaxine (PRISTIQ) 100 MG 24 hr tablet     desvenlafaxine fumarate 100 MG 24 hr tablet     doxycycline hyclate (VIBRAMYCIN) 100 MG capsule     fentaNYL (DURAGESIC) 12 mcg/hr 72 hr patch     fentaNYL (DURAGESIC) 25 mcg/hr 72 hr patch     furosemide (LASIX) 40 MG tablet     gabapentin (NEURONTIN) 300 MG capsule     hydrOXYzine (ATARAX) 25 MG tablet     insulin glargine (LANTUS SOLOSTAR) 100 UNIT/ML pen     insulin pen needle (B-D U/F) 31G X 8 MM miscellaneous     insulin pen needle (ULTICARE SHORT) 31G X 8 MM miscellaneous     lactulose encephalopathy (CHRONULAC) 10 GM/15ML SOLUTION     lidocaine (XYLOCAINE) 5 % external ointment     magnesium oxide (MAG-OX) 400 (241.3 Mg) MG tablet     NOVOLOG FLEXPEN 100 UNIT/ML soln     nystatin (MYCOSTATIN) 383843 UNIT/GM external cream     nystatin-triamcinolone (MYCOLOG II) cream     ondansetron (ZOFRAN) 4 MG tablet     OYSTER SHELL CALCIUM/D 500-200 MG-UNIT per tablet     ranitidine (ZANTAC) 150 MG tablet     spironolactone (ALDACTONE) 50 MG tablet     STATIN NOT PRESCRIBED, INTENTIONAL,     triamcinolone (KENALOG) 0.1 % external cream     UNABLE TO FIND     ursodiol (ACTIGALL) 300 MG capsule     VITAMIN E PO     XIFAXAN 550 MG TABS tablet     [START ON 6/25/2020] oxyCODONE (ROXICODONE) 5 MG tablet     QUEtiapine (SEROQUEL) 50 MG tablet          Review of Systems   No new complaints today.       Objective   Reported vitals:  There were no vitals taken for this visit.     PSYCH: Alert and oriented times 3; coherent speech, normal   rate and volume   His affect is normal  RESP: No cough, no audible wheezing, able to talk in full sentences  Remainder of exam unable to be completed due to telephone visits      Assessment/Plan:  New diagnosis of liver masses, etiology unknown.  He has a follow-up appt scheduled with Hepatology tomorrow by a virtual appt.     Phone call duration:  30   Minutes, the bulk of the appt was spent speaking with pt's daughter Kristi who cares for him daily. Also 30 minutes were spent [facility].     Ary RAY, CNP

## 2020-06-17 NOTE — PROGRESS NOTES
Hepatology Follow-up Clinic note  Lawrence Louie   Date of Birth 1955  Date of Service 6/17/2020    Reason for follow-up: ETOH cirrhosis          Assessment/plan:   Lawrence Louie is a 64 year old male with ETOH cirrhosis complicated by ascites s/p TIPS and hepatic encephalopathy and diffuse anasarca and MILLY that has led to multiple hospitalizations and limited his mobility and his functional status. His HE seems fairly well managed at this time. MELD was 15 during recent admission. Recent hospitalization for sepsis showing MRI interdeterminate for HCC. His TIPS appeared patent.     # Indeterminate liver lesion on recent imaging:  - Discuss at Multi-disciplinary Liver Tumor Conference. Will follow up with recommendations. ADDENDUM on 6/19/2020: Recommend Triphasic CT Abdomen to assess and diagnosis liver lesion. This was discussed with patient.   - AFP is pending ADDENDUM: AFP 80   # History of HE  - Continue lactulose: reminded importance of dosing to 3-5 BM daily  - Continue Rifaximin  # History of MILLY / Ascites:  - Continue 100 mg spironolactone  - Continue 40 mg  - 2000 mg sodium, high protein   # Increase physical activity as able to maintain strength    # Follow-up in clinic with Dr. Simmons in 6 months or sooner as needed    Yovany Lopez PA-C   [facility]    -----------------------------------------------------       HPI:   Lawrence Louie is a 64 year old male presenting for follow-up. The majority of the visits is provided by his daughter, Zahra.     Cirrhosis  - ETOH and SMITH  Complicated by:  Ascites s/p TIPS on April 2018  Hx of HE   EGD: 6/16/2016  HCC: 6/11/2020    Patient was last seen by me on 3/12/2020. He was recently hospitalized for recurrent sepsis, pseumonas stutzeri bacteremia, bilateral LE leg, as well as treatment for MSSA sepsis.  Sodium 135, Cr. 1.1, Tbili 1.2, INR 1.6       Since discharge, he has not any further temps or abdominal pain.  He appetite ebbs and flows. He ate well last night. Lower extremity is controlled and stable this time. He is taking 100 mg spironolactone and 40 mg lasix. He has pharoh (sp) wraps for his legs and he will hopefully be starting lymphedema treatment again. They are still watching his sodium intake closely.     Daughter Zahra states that his strength has over all seemed to be improved as the fluid has come out of his legs. He went for walks in the hospital.     His confusion is fairly well controlled. He is not forth coming with the amount of bowel movements a day but he typically has 1-2. He takes at least 15 mL every morning.    Patient denies jaundice,.  Patient also denies melena, hematochezia or hematemesis. Patient denies fevers, sweats or chills.    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)      Splenomegaly      Squamous cell carcinoma      Thrombocytopenia (H)      Varices, esophageal (H)     Past Surgical History:   Procedure Laterality Date     BIOPSY OF SKIN LESION       COLONOSCOPY Left 6/16/2016    Procedure: COMBINED COLONOSCOPY, SINGLE OR MULTIPLE BIOPSY/POLYPECTOMY BY BIOPSY;  Surgeon: Brandy Barnett MD;  Location:  GI     ESOPHAGOSCOPY, GASTROSCOPY, DUODENOSCOPY (EGD), COMBINED  2/13/2013    Procedure: COMBINED ESOPHAGOSCOPY, GASTROSCOPY, DUODENOSCOPY (EGD);;  Surgeon: Tara Cook MD;  Location:  GI     ESOPHAGOSCOPY, GASTROSCOPY, DUODENOSCOPY (EGD), COMBINED  11/4/2013    Procedure: COMBINED ESOPHAGOSCOPY, GASTROSCOPY, DUODENOSCOPY (EGD);;  Surgeon: Lonny Diaz MD;  Location:  GI     ESOPHAGOSCOPY, GASTROSCOPY, DUODENOSCOPY (EGD), COMBINED Left 6/16/2016    Procedure: COMBINED ESOPHAGOSCOPY, GASTROSCOPY, DUODENOSCOPY (EGD), BIOPSY SINGLE  OR MULTIPLE;  Surgeon: Brandy Barnett MD;  Location: [facility]     EXCISE LESION TRUNK  9/24/2012    Procedure: EXCISE LESION TRUNK;;  Surgeon: Pepe Dominguez MD;  Location: [facility]     GENITOURINARY SURGERY      vasectomy     HERNIORRHAPHY UMBILICAL  9/24/2012    Procedure: HERNIORRHAPHY UMBILICAL;  UMBILICAL HERNIA REPAIR , EXCISION OF PERIUMBILICAL CYST;  Surgeon: Pepe Dominguez MD;  Location: [facility]                 Medications:     Current Outpatient Medications   Medication     blood glucose (NO BRAND SPECIFIED) lancets standard     blood glucose (NO BRAND SPECIFIED) test strip     blood glucose (NO BRAND SPECIFIED) test strip     blood glucose monitoring (NO BRAND SPECIFIED) meter device kit     blood glucose monitoring (NO BRAND SPECIFIED) meter device kit     cefadroxil (DURICEF) 500 MG capsule     childrens multivitamin w/ionr (FLINTSTONES COMPLETE) 60 MG chewable tablet     Cholecalciferol (VITAMIN D3) 50 MCG (2000 UT) CAPS     ciprofloxacin (CIPRO) 500 MG tablet     citalopram (CELEXA) 20 MG tablet     CRANBERRY PO     Cyanocobalamin (VITAMIN B-12 PO)     desvenlafaxine (PRISTIQ) 100 MG 24 hr tablet     desvenlafaxine fumarate 100 MG 24 hr tablet     doxycycline hyclate (VIBRAMYCIN) 100 MG capsule     fentaNYL (DURAGESIC) 12 mcg/hr 72 hr patch     fentaNYL (DURAGESIC) 25 mcg/hr 72 hr patch     furosemide (LASIX) 40 MG tablet     gabapentin (NEURONTIN) 300 MG capsule     hydrOXYzine (ATARAX) 25 MG tablet     insulin glargine (LANTUS SOLOSTAR) 100 UNIT/ML pen     insulin pen needle (B-D U/F) 31G X 8 MM miscellaneous     insulin pen needle (ULTICARE SHORT) 31G X 8 MM miscellaneous     lactulose encephalopathy (CHRONULAC) 10 GM/15ML SOLUTION     lidocaine (XYLOCAINE) 5 % external ointment     magnesium oxide (MAG-OX) 400 (241.3 Mg) MG tablet     NOVOLOG FLEXPEN 100 UNIT/ML soln     nystatin (MYCOSTATIN) 631303 UNIT/GM external cream     nystatin-triamcinolone (MYCOLOG II) cream     ondansetron  (ZOFRAN) 4 MG tablet     order for DME     order for DME     order for DME     order for DME     oxyCODONE (ROXICODONE) 5 MG tablet     OYSTER SHELL CALCIUM/D 500-200 MG-UNIT per tablet     QUEtiapine (SEROQUEL) 100 MG tablet     QUEtiapine (SEROQUEL) 25 MG tablet     ranitidine (ZANTAC) 150 MG tablet     spironolactone (ALDACTONE) 50 MG tablet     STATIN NOT PRESCRIBED, INTENTIONAL,     triamcinolone (KENALOG) 0.1 % external cream     UNABLE TO FIND     ursodiol (ACTIGALL) 300 MG capsule     VITAMIN E PO     XIFAXAN 550 MG TABS tablet     No current facility-administered medications for this visit.             Allergies:   No Known Allergies         Review of Systems:   10 points ROS was obtained and highlighted in the HPI, otherwise negative.          Physical Exam:     PSYCH: Alert and oriented times 3; coherent speech, normal   rate and volume, able to articulate logical thoughts, able   to abstract reason, no tangential thoughts, no hallucinations   or delusions  His affect is flat.   RESP: No cough, no audible wheezing, able to talk in full sentences  Remainder of exam unable to be completed due to telephone visits           Data:   Reviewed in person and significant for:    Lab Results   Component Value Date     03/12/2020      Lab Results   Component Value Date    POTASSIUM 3.7 03/12/2020     Lab Results   Component Value Date    CHLORIDE 101 03/12/2020     Lab Results   Component Value Date    CO2 26 03/12/2020     Lab Results   Component Value Date    BUN 17 03/12/2020     Lab Results   Component Value Date    CR 0.91 03/12/2020       Lab Results   Component Value Date    WBC 4.6 03/12/2020     Lab Results   Component Value Date    HGB 7.6 03/12/2020     Lab Results   Component Value Date    HCT 24.2 03/12/2020     Lab Results   Component Value Date    MCV 87 03/12/2020     Lab Results   Component Value Date     03/12/2020       Lab Results   Component Value Date    AST 19 03/12/2020     Lab  Results   Component Value Date    ALT 10 03/12/2020     Lab Results   Component Value Date    BILICONJ 0.0 03/12/2014      Lab Results   Component Value Date    BILITOTAL 1.0 03/12/2020       Lab Results   Component Value Date    ALBUMIN 1.9 03/12/2020     Lab Results   Component Value Date    PROTTOTAL 6.4 03/12/2020      Lab Results   Component Value Date    ALKPHOS 178 03/12/2020       Lab Results   Component Value Date    INR 1.28 03/12/2020     MRI Liver W AND WO CON  6/11/2020: :  1. Significant respiratory motion artifact degrades evaluation of the liver. Enhancing mass in the inferior right hepatic lobe measuring 37 mm with restricted diffusion and enhancing mass in the medial left hepatic lobe measuring 22 mm with restricted diffusion suspicious for hepatocellular carcinoma. A smaller area of restricted diffusion is identified in the posterior right hepatic lobe, corresponding to an area of hypodensity seen on the prior CT, this area is also suspicious for malignancy although it is difficult to confidently identify this mass on postcontrast imaging probably due to the degree of respiratory motion.  2. Hepatic cirrhosis with splenomegaly and abdominal ascites unchanged compared to the CT from one day earlier. Diffuse mesenteric edema and anasarca is also unchanged.  3. Contracted gallbladder with internal stones. No biliary duct dilatation.  6/11/2020      CT ABDOMEN & PELVIS W ORAL W IV  1.  Ascites, cirrhosis, splenomegaly and collateral vessels.  2.  There are several ill-defined hypodense areas within the liver. Recommend multiphase MRI of the liver to evaluate for possible malignancy.  3.  TIPS stent present with patent portal vein, SMV and splenic vein.  4.  Small bilateral effusions with bibasilar atelectasis.  5.  Atherosclerotic vascular disease.  6.  No evidence of abscess, free air or obstruction.  7.  Atherosclerotic vascular disease.  8.  Diffuse subcutaneous edema.  9.  Progression of L1  compression fracture.

## 2020-06-18 ENCOUNTER — TELEPHONE (OUTPATIENT)
Dept: INTERNAL MEDICINE | Facility: CLINIC | Age: 65
End: 2020-06-18

## 2020-06-18 NOTE — TELEPHONE ENCOUNTER
Spoke to Unruly's wife and she said he is getting home care for his wound and they will call if they need us.

## 2020-06-19 ENCOUNTER — TELEPHONE (OUTPATIENT)
Dept: GASTROENTEROLOGY | Facility: CLINIC | Age: 65
End: 2020-06-19

## 2020-06-19 NOTE — TELEPHONE ENCOUNTER
Left a detailed message to Bren, home care nurse that we did not order this blood culture, so she should contact the provider who ordered.

## 2020-06-22 ENCOUNTER — PATIENT OUTREACH (OUTPATIENT)
Dept: GASTROENTEROLOGY | Facility: CLINIC | Age: 65
End: 2020-06-22

## 2020-06-22 NOTE — PROGRESS NOTES
Patient's wife Raven rescheduled CT to Friday afternoon, to be able to get it done in [place], which will be much easier for them both. She states the patient has been asking questions regarding his future, and she is not sure if he will do any treatment or not, based on the side effects. Encouraged Raven to reach out as needed or as questions arise.

## 2020-06-25 ENCOUNTER — MEDICAL CORRESPONDENCE (OUTPATIENT)
Dept: HEALTH INFORMATION MANAGEMENT | Facility: CLINIC | Age: 65
End: 2020-06-25

## 2020-06-25 ENCOUNTER — TELEPHONE (OUTPATIENT)
Dept: INTERNAL MEDICINE | Facility: CLINIC | Age: 65
End: 2020-06-25

## 2020-06-25 NOTE — TELEPHONE ENCOUNTER
[facility]    Phone Message    May a detailed message be left on voicemail: yes     Reason for Call: Order(s): Home Care Orders: Skilled Nursing: Update current orders from 2 time a week for 1 week and 1 time a week for 3 weeks to 3 times a week for 4 weeks for wound care management starting next week. Roosevelt would like a call back with verbal approval. Please advise.    Action Taken: Message routed to:  [facility]    Travel Screening: Not Applicable

## 2020-06-25 NOTE — TELEPHONE ENCOUNTER
Verbal orders given to Roosevelt from [facility], per Ary Murphy, for   Order(s): Home Care Orders: Skilled Nursing: Update current orders from 2 time a week for 1 week and 1 time a week for 3 weeks to 3 times a week for 4 weeks for wound care management starting next week.Magdalena Mott LPN 6/25/2020 4:37 PM

## 2020-06-26 ENCOUNTER — ANCILLARY PROCEDURE (OUTPATIENT)
Dept: CT IMAGING | Facility: CLINIC | Age: 65
End: 2020-06-26
Attending: PHYSICIAN ASSISTANT
Payer: COMMERCIAL

## 2020-06-26 DIAGNOSIS — K70.30 ALCOHOLIC CIRRHOSIS OF LIVER WITHOUT ASCITES (H): ICD-10-CM

## 2020-06-26 DIAGNOSIS — K76.9 LESION OF LIVER: ICD-10-CM

## 2020-06-26 DIAGNOSIS — Z95.828 S/P TIPS (TRANSJUGULAR INTRAHEPATIC PORTOSYSTEMIC SHUNT): ICD-10-CM

## 2020-06-26 LAB
CREAT BLD-MCNC: 0.7 MG/DL (ref 0.66–1.25)
GFR SERPL CREATININE-BSD FRML MDRD: >90 ML/MIN/{1.73_M2}

## 2020-06-26 PROCEDURE — 74178 CT ABD&PLV WO CNTR FLWD CNTR: CPT | Performed by: RADIOLOGY

## 2020-06-26 RX ORDER — IOPAMIDOL 755 MG/ML
135 INJECTION, SOLUTION INTRAVASCULAR ONCE
Status: COMPLETED | OUTPATIENT
Start: 2020-06-26 | End: 2020-06-26

## 2020-06-26 RX ADMIN — IOPAMIDOL 135 ML: 755 INJECTION, SOLUTION INTRAVASCULAR at 16:05

## 2020-07-02 ENCOUNTER — MEDICAL CORRESPONDENCE (OUTPATIENT)
Dept: HEALTH INFORMATION MANAGEMENT | Facility: CLINIC | Age: 65
End: 2020-07-02

## 2020-07-03 DIAGNOSIS — F32.0 MILD MAJOR DEPRESSION (H): ICD-10-CM

## 2020-07-07 ENCOUNTER — PATIENT OUTREACH (OUTPATIENT)
Dept: GASTROENTEROLOGY | Facility: CLINIC | Age: 65
End: 2020-07-07

## 2020-07-07 ENCOUNTER — VIRTUAL VISIT (OUTPATIENT)
Dept: RADIOLOGY | Facility: CLINIC | Age: 65
End: 2020-07-07
Attending: RADIOLOGY
Payer: COMMERCIAL

## 2020-07-07 DIAGNOSIS — C22.0 HCC (HEPATOCELLULAR CARCINOMA) (H): Primary | ICD-10-CM

## 2020-07-07 PROCEDURE — 40001009 ZZH VIDEO/TELEPHONE VISIT; NO CHARGE

## 2020-07-07 RX ORDER — CHOLECALCIFEROL (VITAMIN D3) 10 MCG
CAPSULE ORAL
Qty: 90 CAPSULE | Refills: 2 | Status: SHIPPED | OUTPATIENT
Start: 2020-07-07

## 2020-07-07 NOTE — PROGRESS NOTES
"Lawrence Louie is a 64 year old male who is being evaluated via a billable video visit.      The patient has been notified of following:     \"This video visit will be conducted via a call between you and your physician/provider. We have found that certain health care needs can be provided without the need for an in-person physical exam.  This service lets us provide the care you need with a video conversation.  If a prescription is necessary we can send it directly to your pharmacy.  If lab work is needed we can place an order for that and you can then stop by our lab to have the test done at a later time.    Video visits are billed at different rates depending on your insurance coverage.  Please reach out to your insurance provider with any questions.    If during the course of the call the physician/provider feels a video visit is not appropriate, you will not be charged for this service.\"    Patient has given verbal consent for Video visit? Yes    How would you like to obtain your AVS? Mail a copy     Patient would like the video invitation sent by: Text to cell phone: [phone]     Will anyone else be joining your video visit? No         I have reviewed and updated the patient's allergies and medication list.    Concerns: No new concerns.   Refills: None needed.     No vitals taken at home.      Reva Bradley CMA        Video-Visit Details    Type of service:  Video Visit    Video Start Time: 2:35pm  Video End Time: 3.05pm    Originating Location (pt. Location): Home    Distant Location (provider location):  [facility]     Platform used for Video Visit: Nikole Tyson MD        Interventional Radiology Clinic Visit    Date of this visit: 7/7/2020    Lawrence Louie  is referred by KEI Salvador for treatment recommendations. The patient requires evaluation for diagnosis of multifocal HCC.     Primary Physician: Ary Murphy        History Of Present " Illness:    Lawrence Louie is a 64 year old male who presents with diagnosis of unresectable multifocal HCC on a background of EtOH cirrhosis. He has h/o ETOH cirrhosis complicated by ascites s/p TIPS and hepatic encephalopathy and diffuse anasarca and MILLY and multiple hospitalizations due to sepsis secondary to leg ulcers. His recent MRI 6/11/2020 showed multiple suspicious lesions in the right lobe of liver. CT liver performed on 6/26/2020 showed multifocal HCC with infiltrative pattern in right lobe in Seg 6 & 7 measuring about 4.2 and 4.1 cm respectively with suspicious right branch of poral vein and Right hepatic vein involvement. Another lesion in Seg 4a measuring 3.8cm.   AFP 80.    Review of Systems:    General: Negative for recent fever.  Respiratory: Negative for shortness of breath.    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)      Splenomegaly      Squamous cell carcinoma      Thrombocytopenia (H)      Varices, esophageal (H)     Banded in 2011     Past Surgical History:   Procedure Laterality Date     BIOPSY OF SKIN LESION       COLONOSCOPY Left 6/16/2016    Procedure: COMBINED COLONOSCOPY, SINGLE OR MULTIPLE BIOPSY/POLYPECTOMY BY BIOPSY;  Surgeon: Brandy Barnett MD;  Location:  GI     ESOPHAGOSCOPY, GASTROSCOPY, DUODENOSCOPY (EGD), COMBINED  2/13/2013    Procedure: COMBINED ESOPHAGOSCOPY, GASTROSCOPY, DUODENOSCOPY (EGD);;  Surgeon: Tara Cook MD;  Location:  GI     ESOPHAGOSCOPY, GASTROSCOPY, DUODENOSCOPY (EGD), COMBINED  11/4/2013    Procedure: COMBINED ESOPHAGOSCOPY, GASTROSCOPY, DUODENOSCOPY (EGD);;  Surgeon: Lonny Diaz MD;  Location:  GI     ESOPHAGOSCOPY, GASTROSCOPY, DUODENOSCOPY (EGD), COMBINED Left 6/16/2016     Procedure: COMBINED ESOPHAGOSCOPY, GASTROSCOPY, DUODENOSCOPY (EGD), BIOPSY SINGLE OR MULTIPLE;  Surgeon: Brandy Barnett MD;  Location: [facility]     EXCISE LESION TRUNK  9/24/2012    Procedure: EXCISE LESION TRUNK;;  Surgeon: Pepe Dominguez MD;  Location: [facility]     GENITOURINARY SURGERY      vasectomy     HERNIORRHAPHY UMBILICAL  9/24/2012    Procedure: HERNIORRHAPHY UMBILICAL;  UMBILICAL HERNIA REPAIR , EXCISION OF PERIUMBILICAL CYST;  Surgeon: Pepe Dominguez MD;  Location: [facility]       Current Medications:    Current Outpatient Medications   Medication Sig Dispense Refill     blood glucose (NO BRAND SPECIFIED) lancets standard Use to test blood sugar 4 X  times daily or as directed. 1 Box 3     blood glucose (NO BRAND SPECIFIED) test strip Use to test blood sugars 4 X  times daily or as directed 200 strip 3     blood glucose (NO BRAND SPECIFIED) test strip Use to test blood sugar 4 times daily or as directed. 120 strip 3     blood glucose monitoring (NO BRAND SPECIFIED) meter device kit Use to test blood sugar 4 times daily or as directed. Before meals and snack at HS. 1 kit 1     blood glucose monitoring (NO BRAND SPECIFIED) meter device kit Use to test blood sugar 4 X  times daily or as directed. 1 kit 0     cefadroxil (DURICEF) 500 MG capsule Take 1 capsule (500 mg) by mouth 2 times daily 28 capsule 0     childrens multivitamin w/ionr (FLINTSTONES COMPLETE) 60 MG chewable tablet Take 1 chew tab by mouth daily       Cholecalciferol (VITAMIN D3) 50 MCG (2000 UT) CAPS Take 1 capsule by mouth daily 90 capsule 0     ciprofloxacin (CIPRO) 500 MG tablet Take 1 tablet (500 mg) by mouth 2 times daily 28 tablet 0     citalopram (CELEXA) 20 MG tablet Take 1 tablet (20 mg) by mouth daily 90 tablet 1     CRANBERRY PO Take 1 capsule by mouth 3 times daily as needed       Cyanocobalamin (VITAMIN B-12 PO) Take 1 tablet by mouth daily       desvenlafaxine (PRISTIQ) 100 MG 24 hr tablet Take 2 tablets (200 mg) by  mouth daily 180 tablet 1     desvenlafaxine fumarate 100 MG 24 hr tablet Take 100 mg by mouth daily       doxycycline hyclate (VIBRAMYCIN) 100 MG capsule Take 1 capsule (100 mg) by mouth 2 times daily 20 capsule 0     fentaNYL (DURAGESIC) 12 mcg/hr 72 hr patch Place 2 patches onto the skin every 72 hours remove old patch. 4 patch 0     fentaNYL (DURAGESIC) 25 mcg/hr 72 hr patch Place 1 patch onto the skin every 72 hours remove old patch. 3 patch 0     furosemide (LASIX) 40 MG tablet Take 40 mg once daily 30 tablet 0     gabapentin (NEURONTIN) 300 MG capsule Take 1 capsule (300 mg) by mouth At Bedtime 30 capsule 3     hydrOXYzine (ATARAX) 25 MG tablet Take 1 tablet (25 mg) by mouth 3 times daily as needed for itching 90 tablet 3     insulin glargine (LANTUS SOLOSTAR) 100 UNIT/ML pen Inject 5 units under the skin every morning. 9 mL 2     insulin pen needle (B-D U/F) 31G X 8 MM miscellaneous USE  6 times daily / OR AS DIRECTED 300 each 3     insulin pen needle (ULTICARE SHORT) 31G X 8 MM miscellaneous Use UP to 6 times daily or as directed 300 each 3     lactulose encephalopathy (CHRONULAC) 10 GM/15ML SOLUTION TAKE 30 MLS BY MOUTH 2 TIMES DAILY  TITRATE AS NEEDED TO ACHIEVE 3-5 BOWEL MOVEMENTS DAILY. 1892 mL 11     lidocaine (XYLOCAINE) 5 % external ointment Apply topically as needed for moderate pain Apply to wounds twice daily as needed. 50 g 1     magnesium oxide (MAG-OX) 400 (241.3 Mg) MG tablet Take 1 tablet (400 mg) by mouth daily 90 tablet 1     NOVOLOG FLEXPEN 100 UNIT/ML soln INJECT 20 UNITS UNDER THE SKIN BEFORE BREAKFAST, 20 UNITS BEFORE LUNCH, 20 UNITS BEFORE DINNER, and 20 UNITS BEFORE SNACKS.  30 mL 2     nystatin (MYCOSTATIN) 341489 UNIT/GM external cream Apply topically 2 times daily 30 g 11     nystatin-triamcinolone (MYCOLOG II) cream Apply topically 2 times daily as needed (itching) 30 g 3     ondansetron (ZOFRAN) 4 MG tablet Take 1 tablet (4 mg) by mouth every 8 hours as needed for nausea 18 tablet  1     order for DME Equipment being ordered:Orthopedic shoes 2 Units 0     order for DME Equipment being ordered: Condom catheter 1 Device 3     order for DME Equipment being ordered:BioTab compression pants pneumatic system 1 Piece 0     order for DME Equipment being ordered:bilateral pneumatic leg massage for treatment of extreme bilateral lower leg edema. 2 pump 0     oxyCODONE (ROXICODONE) 5 MG tablet TAKE 1 TO 2 TABLETS BY MOUTH EVERY 8 HOURS AS NEEDED.  No other narcotics are to be used with this medication.  180 tablet 0     OYSTER SHELL CALCIUM/D 500-200 MG-UNIT per tablet Take 1 tablet by mouth daily 90 tablet 3     QUEtiapine (SEROQUEL) 50 MG tablet Take 50 mg by mouth At Bedtime       ranitidine (ZANTAC) 150 MG tablet Take 1 tablet (150 mg) by mouth 2 times daily 180 tablet 3     spironolactone (ALDACTONE) 50 MG tablet Take 2 tablets (100 mg) by mouth daily 180 tablet 3     STATIN NOT PRESCRIBED, INTENTIONAL, 1 each daily Statin not prescribed intentionally due to Active liver disease 0 each 0     triamcinolone (KENALOG) 0.1 % external cream Apply topically 2 times daily as needed for irritation 453.6 g 3     UNABLE TO FIND MEDICATION NAME: Burdock root       ursodiol (ACTIGALL) 300 MG capsule Take 3 capsules (900 mg) by mouth 2 times daily 180 capsule 11     VITAMIN E PO Take 1 capsule by mouth daily       XIFAXAN 550 MG TABS tablet TAKE 1 TABLET BY MOUTH TWO TIMES DAILY 60 tablet 11       Allergies:    Patient has no known allergies.    Family History:    Family History   Problem Relation Age of Onset     Breast Cancer Mother      Liver Cancer Mother      Cardiovascular Father      Cerebrovascular Disease Father         very low blood pressure     Cancer Father         rectal cancer     Cardiovascular Paternal Grandfather      Diabetes Brother      Cancer Sister         skin cancer     C.A.D. Other         MI, 70's     Breast Cancer Sister      Thyroid Disease No family hx of      Lipids No family hx of       Anesthesia Reaction No family hx of        Social History:    Smoker, Alcohol use.    Social History     Socioeconomic History     Marital status:      Spouse name: Not on file     Number of children: Not on file     Years of education: Not on file     Highest education level: Not on file   Occupational History     Not on file   Social Needs     Financial resource strain: Not on file     Food insecurity     Worry: Not on file     Inability: Not on file     Transportation needs     Medical: Not on file     Non-medical: Not on file   Tobacco Use     Smoking status: Current Every Day Smoker     Packs/day: 0.30     Types: Cigarettes     Last attempt to quit: 4/10/2019     Years since quittin.2     Smokeless tobacco: Never Used     Tobacco comment: about 4 cigarettes per day   Substance and Sexual Activity     Alcohol use: No     Alcohol/week: 0.0 standard drinks     Comment: 2-3 per day , none since Dec 2012     Drug use: No     Sexual activity: Yes     Partners: Female     Birth control/protection: Surgical   Lifestyle     Physical activity     Days per week: Not on file     Minutes per session: Not on file     Stress: Not on file   Relationships     Social connections     Talks on phone: Not on file     Gets together: Not on file     Attends Christianity service: Not on file     Active member of club or organization: Not on file     Attends meetings of clubs or organizations: Not on file     Relationship status: Not on file     Intimate partner violence     Fear of current or ex partner: Not on file     Emotionally abused: Not on file     Physically abused: Not on file     Forced sexual activity: Not on file   Other Topics Concern     Parent/sibling w/ CABG, MI or angioplasty before 65F 55M? Not Asked      Service No     Blood Transfusions No     Caffeine Concern No     Comment: very little coffee, likes beer     Occupational Exposure No     Hobby Hazards No     Sleep Concern Yes     Comment: bed  at 3:30 AM, up at 1:00 PM     Stress Concern Yes     Weight Concern Yes     Special Diet No     Back Care No     Exercise No     Bike Helmet No     Seat Belt Yes     Self-Exams No   Social History Narrative    . 3 grown daughters. He has been sober since 2012.       Physical Exam:    There were no vitals taken for this visit.     GENERAL APPEARANCE: healthy, alert and no distress  PSYCHIATRIC: mentation appears normal and affect normal.    Laboratory Studies:    Lab Results   Component Value Date    HGB 7.6 03/12/2020     Lab Results   Component Value Date     03/12/2020     Lab Results   Component Value Date    WBC 4.6 03/12/2020       Lab Results   Component Value Date    INR 1.28 03/12/2020       Lab Results   Component Value Date    PROTTOTAL 6.4 03/12/2020      Lab Results   Component Value Date    ALBUMIN 1.9 03/12/2020     Lab Results   Component Value Date    BILITOTAL 1.0 03/12/2020     Lab Results   Component Value Date    BILICONJ 0.0 03/12/2014      Lab Results   Component Value Date    ALKPHOS 178 03/12/2020     Lab Results   Component Value Date    AST 19 03/12/2020     Lab Results   Component Value Date    ALT 10 03/12/2020       Lab Results   Component Value Date    CR 0.91 03/12/2020     Lab Results   Component Value Date    BUN 17 03/12/2020       Alpha Fetoprotein   Date Value Ref Range Status   09/21/2015 5.2 0 - 8 ug/L Final       Imaging:     I reviewed the CT from 6/26/20 and MRI without contrast from 6/11/20 - Shows multifocal HCC with infiltrative pattern in right lobe in Seg 6 & 7 measuring about 4.2 and 4.1 cm respectively with suspicious right branch of poral vein and Right hepatic vein involvement. Another lesion in Seg 4a measuring 3.8cm.       ASSESSMENT:      Lawrence Louie is a 64 year old male with diagnosis of unresectable multifocal HCC on a background of EtOH cirrhosis. He has h/o ETOH cirrhosis complicated by ascites s/p TIPS and hepatic encephalopathy and  diffuse anasarca and MILLY and multiple hospitalizations due to sepsis secondary to leg ulcers. His recent MRI 6/11/2020 showed multiple suspicious lesions in the right lobe of liver. CT liver performed on 6/26/2020 showed multifocal HCC with infiltrative pattern in right lobe in Seg 6 & 7 measuring about 4.2 and 4.1 cm respectively with suspicious right branch of poral vein and Right hepatic vein involvement. Another lesion in Seg 4a measuring 3.8cm.   AFP 80.    Options offered:  - Chemoembolization - We will treat Seg 6 lesion first, then Seg 7 lesion and finally Seg 4a lesion. Step by step approach is offered as we we are unable to acces his liver reserve and how well he will tolerate the procedure.  - Y-90  - Ablation - If the tumor recurs or there is progression of disease this may be an option.      All the concerns regarding the procedure, side effects, complications, increased risk with TIPS and cirrhosis, encephalopathy were answered. He was prognosticated regarding the treatment and understood the complications and that the prognosis is unpredictable and lesions me recur or new lesions may appear.    Child-Duenas score: Class B (7)  ECOG performance status: 2    I believe the patient is a candidate for a transarterial chemoembolization procedure.     I reviewed with Mr Louie the imaging and discussed the findings. I discussed with Mr Louie that the goal of the procedure is disease control with a survival benefit rather than a cure given the nature of the disease, but that sometimes lesions will be cured in this manner. Alternatives were reviewed, including surgery, but the patient is not a surgical candidate.    I explained that the chemoembolization is performed under conscious sedation. We discussed what will happen before, during and after the procedure; what to expect in the post procedure recovery period; and what the follow-up will be. We discussed the risks of the procedure which include, but  are not limited to, vascular injury causing bleeding or occlusion of blood vessels, groin hematoma, infection, liver failure, and non-target embolization. I explained that sometimes more than one treatment session is needed to gain control of the tumors, particularly with larger tumors. Risks are increased the greater the deviation from normal lab values, especially albumin and total bilirubin, and also the larger the area we must treat. We also discussed the post-embolization syndrome which is likely and consists of varying degrees of pain, nausea/vomiting, malaise, fever, and elevated white blood cell counts for up to 2 weeks following the procedure. The patient also understands that new tumors may develop elsewhere given the nature of the disease. Many patients go home the next day, but occasionally circumstances are such that a longer admission may be required. I also informed the patient that I will be assisted during the procedure by a fellow and/or resident, and that sometimes a medical student may observe. All of the patient's questions were answered and the patient agreed to proceed.     PLAN:    - Bone scan and CT chest in coming weeks.  - Lab investigations - LFT, CBC, BMP, INR.  - Consult transplant team and Oncology Referral.  - We will schedule the patient for the chemoembolization procedure.  - Will address the MILLY at a later date.      A total of 30 minutes was spent in care for the patient, of which >50% was spent in counseling and co-ordination of care.    It was a pleasure seeing the patient.     Luis Armando Bearden M.D.  Department of Vascular and Interventional Radiology  [place], [place].    I have seen the patient with the resident and agree with the note.      CC  Patient Care Team:  Ary Murphy APRN CNP as PCP - General (Nurse Practitioner)  Junie Reddy, RN (Diabetes Education)  Ary Murphy APRN CNP as Referring Physician (Nurse Practitioner)  [place]  Meghna Cooper MD as MD (Internal Medicine)  David Davis MD as Resident (Radiology)  Meghna Simmons MD as MD (Internal Medicine)  Marlen Pardo MD as MD (Internal Medicine)  Miranda Paiz, RN as Specialty Care Coordinator (Hepatology)  Janey Chapin MD as Fellow (Student in organized health care education/training program)  Ary Murphy, APRN CNP as Assigned PCP  KEI YBARRA

## 2020-07-07 NOTE — LETTER
7/7/2020         RE: Lawrence Louie  [address] [place] [address]        Dear Colleague,    Thank you for referring your patient, Lawrence Louie, to the [facility]. Please see a copy of my visit note below.    Lawrence Louie is a 64 year old male who is being evaluated via a billable video visit.          I have reviewed and updated the patient's allergies and medication list.    Concerns: No new concerns.   Refills: None needed.     No vitals taken at home.      Reva Bradley CMA        Video-Visit Details    Type of service:  Video Visit    Video Start Time: 2:35pm  Video End Time: 3.05pm    Originating Location (pt. Location): Home    Distant Location (provider location):  [facility]     Platform used for Video Visit: Nikole Tyson MD        Interventional Radiology Clinic Visit    Date of this visit: 7/7/2020    Lawrence Louie  is referred by KEI Salvador for treatment recommendations. The patient requires evaluation for diagnosis of multifocal HCC.     Primary Physician: Ary Murphy        History Of Present Illness:    Lawrence Louie is a 64 year old male who presents with diagnosis of unresectable multifocal HCC on a background of EtOH cirrhosis. He has h/o ETOH cirrhosis complicated by ascites s/p TIPS and hepatic encephalopathy and diffuse anasarca and MILLY and multiple hospitalizations due to sepsis secondary to leg ulcers. His recent MRI 6/11/2020 showed multiple suspicious lesions in the right lobe of liver. CT liver performed on 6/26/2020 showed multifocal HCC with infiltrative pattern in right lobe in Seg 6 & 7 measuring about 4.2 and 4.1 cm respectively with suspicious right branch of poral vein and Right hepatic vein involvement. Another lesion in Seg 4a measuring 3.8cm.   AFP 80.    Review of Systems:    General: Negative for recent fever.  Respiratory: Negative for shortness of  breath.    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)      Splenomegaly      Squamous cell carcinoma      Thrombocytopenia (H)      Varices, esophageal (H)     Banded in 2011     Past Surgical History:   Procedure Laterality Date     BIOPSY OF SKIN LESION       COLONOSCOPY Left 6/16/2016    Procedure: COMBINED COLONOSCOPY, SINGLE OR MULTIPLE BIOPSY/POLYPECTOMY BY BIOPSY;  Surgeon: Brandy Barnett MD;  Location:  GI     ESOPHAGOSCOPY, GASTROSCOPY, DUODENOSCOPY (EGD), COMBINED  2/13/2013    Procedure: COMBINED ESOPHAGOSCOPY, GASTROSCOPY, DUODENOSCOPY (EGD);;  Surgeon: Tara Cook MD;  Location:  GI     ESOPHAGOSCOPY, GASTROSCOPY, DUODENOSCOPY (EGD), COMBINED  11/4/2013    Procedure: COMBINED ESOPHAGOSCOPY, GASTROSCOPY, DUODENOSCOPY (EGD);;  Surgeon: oLnny Diaz MD;  Location:  GI     ESOPHAGOSCOPY, GASTROSCOPY, DUODENOSCOPY (EGD), COMBINED Left 6/16/2016    Procedure: COMBINED ESOPHAGOSCOPY, GASTROSCOPY, DUODENOSCOPY (EGD), BIOPSY SINGLE OR MULTIPLE;  Surgeon: Brandy Barnett MD;  Location:  GI     EXCISE LESION TRUNK  9/24/2012    Procedure: EXCISE LESION TRUNK;;  Surgeon: Pepe Dominguez MD;  Location: [facility]     GENITOURINARY SURGERY      vasectomy     HERNIORRHAPHY UMBILICAL  9/24/2012    Procedure: HERNIORRHAPHY UMBILICAL;  UMBILICAL HERNIA REPAIR , EXCISION OF PERIUMBILICAL CYST;  Surgeon: Pepe Dominguez MD;  Location: [facility]       Current Medications:    Current Outpatient Medications   Medication Sig Dispense Refill     blood glucose (NO BRAND SPECIFIED) lancets standard Use to test blood sugar 4 X  times daily or as directed. 1 Box 3     blood glucose (NO BRAND SPECIFIED) test strip Use to test blood sugars 4 X  times daily  or as directed 200 strip 3     blood glucose (NO BRAND SPECIFIED) test strip Use to test blood sugar 4 times daily or as directed. 120 strip 3     blood glucose monitoring (NO BRAND SPECIFIED) meter device kit Use to test blood sugar 4 times daily or as directed. Before meals and snack at HS. 1 kit 1     blood glucose monitoring (NO BRAND SPECIFIED) meter device kit Use to test blood sugar 4 X  times daily or as directed. 1 kit 0     cefadroxil (DURICEF) 500 MG capsule Take 1 capsule (500 mg) by mouth 2 times daily 28 capsule 0     childrens multivitamin w/ionr (FLINTSTONES COMPLETE) 60 MG chewable tablet Take 1 chew tab by mouth daily       Cholecalciferol (VITAMIN D3) 50 MCG (2000 UT) CAPS Take 1 capsule by mouth daily 90 capsule 0     ciprofloxacin (CIPRO) 500 MG tablet Take 1 tablet (500 mg) by mouth 2 times daily 28 tablet 0     citalopram (CELEXA) 20 MG tablet Take 1 tablet (20 mg) by mouth daily 90 tablet 1     CRANBERRY PO Take 1 capsule by mouth 3 times daily as needed       Cyanocobalamin (VITAMIN B-12 PO) Take 1 tablet by mouth daily       desvenlafaxine (PRISTIQ) 100 MG 24 hr tablet Take 2 tablets (200 mg) by mouth daily 180 tablet 1     desvenlafaxine fumarate 100 MG 24 hr tablet Take 100 mg by mouth daily       doxycycline hyclate (VIBRAMYCIN) 100 MG capsule Take 1 capsule (100 mg) by mouth 2 times daily 20 capsule 0     fentaNYL (DURAGESIC) 12 mcg/hr 72 hr patch Place 2 patches onto the skin every 72 hours remove old patch. 4 patch 0     fentaNYL (DURAGESIC) 25 mcg/hr 72 hr patch Place 1 patch onto the skin every 72 hours remove old patch. 3 patch 0     furosemide (LASIX) 40 MG tablet Take 40 mg once daily 30 tablet 0     gabapentin (NEURONTIN) 300 MG capsule Take 1 capsule (300 mg) by mouth At Bedtime 30 capsule 3     hydrOXYzine (ATARAX) 25 MG tablet Take 1 tablet (25 mg) by mouth 3 times daily as needed for itching 90 tablet 3     insulin glargine (LANTUS SOLOSTAR) 100 UNIT/ML pen Inject 5 units  under the skin every morning. 9 mL 2     insulin pen needle (B-D U/F) 31G X 8 MM miscellaneous USE  6 times daily / OR AS DIRECTED 300 each 3     insulin pen needle (ULTICARE SHORT) 31G X 8 MM miscellaneous Use UP to 6 times daily or as directed 300 each 3     lactulose encephalopathy (CHRONULAC) 10 GM/15ML SOLUTION TAKE 30 MLS BY MOUTH 2 TIMES DAILY  TITRATE AS NEEDED TO ACHIEVE 3-5 BOWEL MOVEMENTS DAILY. 1892 mL 11     lidocaine (XYLOCAINE) 5 % external ointment Apply topically as needed for moderate pain Apply to wounds twice daily as needed. 50 g 1     magnesium oxide (MAG-OX) 400 (241.3 Mg) MG tablet Take 1 tablet (400 mg) by mouth daily 90 tablet 1     NOVOLOG FLEXPEN 100 UNIT/ML soln INJECT 20 UNITS UNDER THE SKIN BEFORE BREAKFAST, 20 UNITS BEFORE LUNCH, 20 UNITS BEFORE DINNER, and 20 UNITS BEFORE SNACKS.  30 mL 2     nystatin (MYCOSTATIN) 095338 UNIT/GM external cream Apply topically 2 times daily 30 g 11     nystatin-triamcinolone (MYCOLOG II) cream Apply topically 2 times daily as needed (itching) 30 g 3     ondansetron (ZOFRAN) 4 MG tablet Take 1 tablet (4 mg) by mouth every 8 hours as needed for nausea 18 tablet 1     order for DME Equipment being ordered:Orthopedic shoes 2 Units 0     order for DME Equipment being ordered: Condom catheter 1 Device 3     order for DME Equipment being ordered:BioTab compression pants pneumatic system 1 Piece 0     order for DME Equipment being ordered:bilateral pneumatic leg massage for treatment of extreme bilateral lower leg edema. 2 pump 0     oxyCODONE (ROXICODONE) 5 MG tablet TAKE 1 TO 2 TABLETS BY MOUTH EVERY 8 HOURS AS NEEDED.  No other narcotics are to be used with this medication.  180 tablet 0     OYSTER SHELL CALCIUM/D 500-200 MG-UNIT per tablet Take 1 tablet by mouth daily 90 tablet 3     QUEtiapine (SEROQUEL) 50 MG tablet Take 50 mg by mouth At Bedtime       ranitidine (ZANTAC) 150 MG tablet Take 1 tablet (150 mg) by mouth 2 times daily 180 tablet 3      spironolactone (ALDACTONE) 50 MG tablet Take 2 tablets (100 mg) by mouth daily 180 tablet 3     STATIN NOT PRESCRIBED, INTENTIONAL, 1 each daily Statin not prescribed intentionally due to Active liver disease 0 each 0     triamcinolone (KENALOG) 0.1 % external cream Apply topically 2 times daily as needed for irritation 453.6 g 3     UNABLE TO FIND MEDICATION NAME: Seble root       ursodiol (ACTIGALL) 300 MG capsule Take 3 capsules (900 mg) by mouth 2 times daily 180 capsule 11     VITAMIN E PO Take 1 capsule by mouth daily       XIFAXAN 550 MG TABS tablet TAKE 1 TABLET BY MOUTH TWO TIMES DAILY 60 tablet 11       Allergies:    Patient has no known allergies.    Family History:    Family History   Problem Relation Age of Onset     Breast Cancer Mother      Liver Cancer Mother      Cardiovascular Father      Cerebrovascular Disease Father         very low blood pressure     Cancer Father         rectal cancer     Cardiovascular Paternal Grandfather      Diabetes Brother      Cancer Sister         skin cancer     C.A.D. Other         MI, 70's     Breast Cancer Sister      Thyroid Disease No family hx of      Lipids No family hx of      Anesthesia Reaction No family hx of        Social History:    Smoker, Alcohol use.    Social History     Socioeconomic History     Marital status:      Spouse name: Not on file     Number of children: Not on file     Years of education: Not on file     Highest education level: Not on file   Occupational History     Not on file   Social Needs     Financial resource strain: Not on file     Food insecurity     Worry: Not on file     Inability: Not on file     Transportation needs     Medical: Not on file     Non-medical: Not on file   Tobacco Use     Smoking status: Current Every Day Smoker     Packs/day: 0.30     Types: Cigarettes     Last attempt to quit: 4/10/2019     Years since quittin.2     Smokeless tobacco: Never Used     Tobacco comment: about 4 cigarettes per day    Substance and Sexual Activity     Alcohol use: No     Alcohol/week: 0.0 standard drinks     Comment: 2-3 per day , none since Dec 2012     Drug use: No     Sexual activity: Yes     Partners: Female     Birth control/protection: Surgical   Lifestyle     Physical activity     Days per week: Not on file     Minutes per session: Not on file     Stress: Not on file   Relationships     Social connections     Talks on phone: Not on file     Gets together: Not on file     Attends Yazdanism service: Not on file     Active member of club or organization: Not on file     Attends meetings of clubs or organizations: Not on file     Relationship status: Not on file     Intimate partner violence     Fear of current or ex partner: Not on file     Emotionally abused: Not on file     Physically abused: Not on file     Forced sexual activity: Not on file   Other Topics Concern     Parent/sibling w/ CABG, MI or angioplasty before 65F 55M? Not Asked      Service No     Blood Transfusions No     Caffeine Concern No     Comment: very little coffee, likes beer     Occupational Exposure No     Hobby Hazards No     Sleep Concern Yes     Comment: bed at 3:30 AM, up at 1:00 PM     Stress Concern Yes     Weight Concern Yes     Special Diet No     Back Care No     Exercise No     Bike Helmet No     Seat Belt Yes     Self-Exams No   Social History Narrative    . 3 grown daughters. He has been sober since 2012.       Physical Exam:    There were no vitals taken for this visit.     GENERAL APPEARANCE: healthy, alert and no distress  PSYCHIATRIC: mentation appears normal and affect normal.    Laboratory Studies:    Lab Results   Component Value Date    HGB 7.6 03/12/2020     Lab Results   Component Value Date     03/12/2020     Lab Results   Component Value Date    WBC 4.6 03/12/2020       Lab Results   Component Value Date    INR 1.28 03/12/2020       Lab Results   Component Value Date    PROTTOTAL 6.4 03/12/2020      Lab  Results   Component Value Date    ALBUMIN 1.9 03/12/2020     Lab Results   Component Value Date    BILITOTAL 1.0 03/12/2020     Lab Results   Component Value Date    BILICONJ 0.0 03/12/2014      Lab Results   Component Value Date    ALKPHOS 178 03/12/2020     Lab Results   Component Value Date    AST 19 03/12/2020     Lab Results   Component Value Date    ALT 10 03/12/2020       Lab Results   Component Value Date    CR 0.91 03/12/2020     Lab Results   Component Value Date    BUN 17 03/12/2020       Alpha Fetoprotein   Date Value Ref Range Status   09/21/2015 5.2 0 - 8 ug/L Final       Imaging:     I reviewed the CT from 6/26/20 and MRI without contrast from 6/11/20 - Shows multifocal HCC with infiltrative pattern in right lobe in Seg 6 & 7 measuring about 4.2 and 4.1 cm respectively with suspicious right branch of poral vein and Right hepatic vein involvement. Another lesion in Seg 4a measuring 3.8cm.       ASSESSMENT:      Lawrence Louie is a 64 year old male with diagnosis of unresectable multifocal HCC on a background of EtOH cirrhosis. He has h/o ETOH cirrhosis complicated by ascites s/p TIPS and hepatic encephalopathy and diffuse anasarca and MILLY and multiple hospitalizations due to sepsis secondary to leg ulcers. His recent MRI 6/11/2020 showed multiple suspicious lesions in the right lobe of liver. CT liver performed on 6/26/2020 showed multifocal HCC with infiltrative pattern in right lobe in Seg 6 & 7 measuring about 4.2 and 4.1 cm respectively with suspicious right branch of poral vein and Right hepatic vein involvement. Another lesion in Seg 4a measuring 3.8cm.   AFP 80.    Options offered:  - Chemoembolization - We will treat Seg 6 lesion first, then Seg 7 lesion and finally Seg 4a lesion. Step by step approach is offered as we we are unable to acces his liver reserve and how well he will tolerate the procedure.  - Y-90  - Ablation - If the tumor recurs or there is progression of disease this may  be an option.      All the concerns regarding the procedure, side effects, complications, increased risk with TIPS and cirrhosis, encephalopathy were answered. He was prognosticated regarding the treatment and understood the complications and that the prognosis is unpredictable and lesions me recur or new lesions may appear.    Child-Duensa score: Class B (7)  ECOG performance status: 2    I believe the patient is a candidate for a transarterial chemoembolization procedure.     I reviewed with Mr Louie the imaging and discussed the findings. I discussed with Mr Louie that the goal of the procedure is disease control with a survival benefit rather than a cure given the nature of the disease, but that sometimes lesions will be cured in this manner. Alternatives were reviewed, including surgery, but the patient is not a surgical candidate.    I explained that the chemoembolization is performed under conscious sedation. We discussed what will happen before, during and after the procedure; what to expect in the post procedure recovery period; and what the follow-up will be. We discussed the risks of the procedure which include, but are not limited to, vascular injury causing bleeding or occlusion of blood vessels, groin hematoma, infection, liver failure, and non-target embolization. I explained that sometimes more than one treatment session is needed to gain control of the tumors, particularly with larger tumors. Risks are increased the greater the deviation from normal lab values, especially albumin and total bilirubin, and also the larger the area we must treat. We also discussed the post-embolization syndrome which is likely and consists of varying degrees of pain, nausea/vomiting, malaise, fever, and elevated white blood cell counts for up to 2 weeks following the procedure. The patient also understands that new tumors may develop elsewhere given the nature of the disease. Many patients go home the next day,  but occasionally circumstances are such that a longer admission may be required. I also informed the patient that I will be assisted during the procedure by a fellow and/or resident, and that sometimes a medical student may observe. All of the patient's questions were answered and the patient agreed to proceed.     PLAN:    - Bone scan and CT chest in coming weeks.  - Lab investigations - LFT, CBC, BMP, INR.  - Consult transplant team and Oncology Referral.  - We will schedule the patient for the chemoembolization procedure.  - Will address the MILLY at a later date.      A total of 30 minutes was spent in care for the patient, of which >50% was spent in counseling and co-ordination of care.    It was a pleasure seeing the patient.     Luis Armando Bearden M.D.  Department of Vascular and Interventional Radiology  [place], [place].    I have seen the patient with the resident and agree with the note.      CC  Patient Care Team:  Ary Murphy APRN CNP as PCP - General (Nurse Practitioner)  Junie Reddy, RN (Diabetes Education)  Ary Murphy APRN CNP as Referring Physician (Nurse Practitioner)  Meghna Simmons MD as MD (Internal Medicine)  David Davis MD as Resident (Radiology)  Meghna Simmons MD as MD (Internal Medicine)  Marlen Pardo MD as MD (Internal Medicine)  Miranda Paiz, RN as Specialty Care Coordinator (Hepatology)  Janey Chapin MD as Fellow (Student in organized health care education/training program)  Ary Murphy APRN CNP as Assigned PCP  KEI YBRARA

## 2020-07-07 NOTE — PROGRESS NOTES
Patient's spouse Raven called to request a replacement medication for ranitidine, which patient has been on for about 8 years. They are stopping this medication due to the information about it regarding a link to cancers. However, the patient does report heartburn symptoms when he is not taking this. She requests input from hepatology, rather than from PCP, due to liver concerns with medications.   Recommendation per Yovany SCHMITT-C: Pepcid or generic Famotidine 10 mg twice daily as needed; if symptoms persist after 2 to 4 weeks, increase to 20 mg twice daily for 2 weeks.  Raven verbalized understanding.

## 2020-07-08 ENCOUNTER — VIRTUAL VISIT (OUTPATIENT)
Dept: INFECTIOUS DISEASES | Facility: CLINIC | Age: 65
End: 2020-07-08
Attending: STUDENT IN AN ORGANIZED HEALTH CARE EDUCATION/TRAINING PROGRAM
Payer: COMMERCIAL

## 2020-07-08 ENCOUNTER — TELEPHONE (OUTPATIENT)
Dept: VASCULAR SURGERY | Facility: CLINIC | Age: 65
End: 2020-07-08

## 2020-07-08 ENCOUNTER — TELEPHONE (OUTPATIENT)
Dept: INTERNAL MEDICINE | Facility: CLINIC | Age: 65
End: 2020-07-08

## 2020-07-08 DIAGNOSIS — L03.90 RECURRENT CELLULITIS: ICD-10-CM

## 2020-07-08 DIAGNOSIS — Z11.59 ENCOUNTER FOR SCREENING FOR OTHER VIRAL DISEASES: Primary | ICD-10-CM

## 2020-07-08 DIAGNOSIS — L97.929 VENOUS STASIS ULCERS OF BOTH LOWER EXTREMITIES (H): ICD-10-CM

## 2020-07-08 DIAGNOSIS — C22.0 HCC (HEPATOCELLULAR CARCINOMA) (H): ICD-10-CM

## 2020-07-08 DIAGNOSIS — H10.33 ACUTE CONJUNCTIVITIS OF BOTH EYES: Primary | ICD-10-CM

## 2020-07-08 DIAGNOSIS — I83.029 VENOUS STASIS ULCERS OF BOTH LOWER EXTREMITIES (H): ICD-10-CM

## 2020-07-08 DIAGNOSIS — I83.019 VENOUS STASIS ULCERS OF BOTH LOWER EXTREMITIES (H): ICD-10-CM

## 2020-07-08 DIAGNOSIS — L97.919 VENOUS STASIS ULCERS OF BOTH LOWER EXTREMITIES (H): ICD-10-CM

## 2020-07-08 DIAGNOSIS — L97.419 CHRONIC HEEL ULCER, RIGHT, WITH UNSPECIFIED SEVERITY (H): ICD-10-CM

## 2020-07-08 DIAGNOSIS — Z87.898 HISTORY OF BACTEREMIA: Primary | ICD-10-CM

## 2020-07-08 NOTE — TELEPHONE ENCOUNTER
[facility] Call Center    Phone Message    May a detailed message be left on voicemail: yes     Reason for Call: Order(s): Home Care Orders: Occupational Therapy (OT): 2 visits to teach adrian lema - Okay to leave a voicemail.     Action Taken: Message routed to:  [facility] ([facility] P[facility]    Travel Screening: Not Applicable

## 2020-07-08 NOTE — TELEPHONE ENCOUNTER
Called wife Raven back to inform her that we have pt scheduled for hs cancer treatment with Dr. Hilton.     Informed her that we have him scheduled for Thurs 7/30.  Check into [facility] at 1130am for a 1pm start time    Informed her of prep and talked about insulin use.    She states that they did stop eating high fructose corn syrup and so he's not had to use the insulin much more anymore    She also states that if they do need to they use the Novolog. 'I informed her that if this is the case then she is to have him hold it the day of the procedure.     She verbalized understanding.     We also talked about getting the bone/chest ct scan completed.   She mentioned MRI as well in which I will f/up with Dr. Starr about this.     She agrees to plan.     *consulted with Dr. Hilton regarding the MRI in which he states that we will get an MRI at the 1 mos f/up s/p treatment.     Will send mychart to wife about this.     Sunshine TUCKER RN, BSN  Interventional Radiology Nurse Coordinator   Phone: [phone]

## 2020-07-08 NOTE — TELEPHONE ENCOUNTER
[facility] per Ary Murphy, for Order(s): Home Care Orders: Occupational Therapy (OT): 2 visits to teach adrian lema. Magdalena Mott LPN 7/8/2020 8:37 AM

## 2020-07-08 NOTE — LETTER
7/8/2020       RE: Lawrence Louie  [address] [place] [address]     Dear Colleague,    Thank you for referring your patient, Lawrence Louie, to the [facility] AND INFECTIOUS DISEASES at [facility]. Please see a copy of my visit note below.    Lawrence Louie is a 64 year old male who is being evaluated via a billable video visit.          Video-Visit Details    Type of service:  Video Visit    Video Start Time: 4.17 pm  Video End Time: 4.57 pm    Originating Location (pt. Location): Home    Distant Location (provider location):  [facility] [place] AND INFECTIOUS DISEASES     Platform used for Video Visit: Akosua Bird MD       [facility]  Infectious Disease Clinic Note:  Follow up      Patient:  Lawrence Louie, Date of birth 1955, Medical record number [id number]  Date of Visit:  07/08/2020  Consult requested by patient himself.         Assessment and Recommendations:     Recommendations:  To check Blood cultures while off abxs - to coordinate with [facility] - [phone], fax [phone]  CT right foot due to persistent pain and recurrent wound in the right heel   Continue Chlorhexidine body wash   Daughter knows to schedule follow up with me if issues arise     Assessment:  Patient is a 64-year-old gentleman with history of lower extremity lymphedema, recurrent cellulitis, MSSA bacteremia, ESLD, DM 2.  Complaints of recurrent cellulitis complicated by bacteremia.    #  Pseudomonas stutzeri bacteremia: June 2020: source not clear,, treated with 5 days of iv zosyn. Doing well 2-3 weeks after completion of abxs. Since source not found, will check blood cxs to make sure there is no recurrence. Could have been from biliary tree although unusual pathogen for this source, as recent liver malignancy diagnosed.     # Recurrent MSSA bacteremia, source thought to be  bilateral lower extremities cellulitis  4/13/2019 blood culture-MSSA  10/7/2019 blood culture with-MSSA and Aeromonas veronii  Recent hospitalization in March 2020 for MSSA bacteremia.  Blood cultures 3/21/2020 -MSSA, 3/22-negative  TTE 3/23/2020 negative for vegetations.  Patient treated with IV cefazolin 3/22-4/4.    # Recurrent cellulitis of lower extremities in setting of chronic lymphedema and venous stasis ulcers. .  Patient with multiple open wounds on bilateral anterior shins~0.5- 2.0 cm in diameter, none of which looks actively infected.  Recommend chlorhexidine was buttocks down for a few months     # Chronic right heel wound/ulcer  Patient had right heel wound/ulcer for more than 15 years  9/8/2019-heel  ulcer scant Bacteroides pyogenes, MSSA  5/21 Patient's wound opened up during physical examination.  Wound cultures 5/21/20 with heavy growth MSSA.  - B/l foot XRay with no signs of osteomyelitis 5/21/20.  But patient has pain even when the wound is healed and recurrent wounds. It was thought to be due to pressure but the left foot never develops a pressure ulcer.   Will evaluate with CT per daughters request     # End-stage liver disease, hepatic encephalopathy  Patient taking spironolactone, furosemide, lactulose as needed    # DM2, well controlled  Lantus 5 units daily  Last hemoglobin A1c-5.5 3/2020    Interval history:  Last seen 6/5  At that time had fevers with possible buttock cellulitis. Had started him on cipro and cefadroxil. But he contd to have fevrers so he was taken to the ER at [facility]. By the time they reached the hospital he was septic. Blood cxs grew pseudomonas stutzeri resistant to cipro. The source was not clear. The TIPS was considered as a source and he had CT abdomen done but that showed liver lesions suspicious for malignancy. This has been evaluated by [facility] team and they have decided to pursue chemoembolization without biopsy.   Meanwhile for the bacteremia he was  treated with 5 days of iv zosyn. 2 days after completion of abxs, blood cx 6/16 with no growth.   He has been home now for 2-3 weeks and no fevers. Wound look ok. Right heel wound opened up with blusih green discharge. Daughter notes that he always complains of pain in right heel even when it is healed. She is wondering if there is underlying infection, she is also worried that the TIPS may be infected as stutzeri source was not found.   On discharge he was givien hibiclens and we discussed how to use it. I recommended application from buttocks down and wash for a few months to decrease recurrent cellulitis           Interval history: 6/5/20     First time seeing patient. Seen by my colleague earlier.     Patient has ESLD  S/P TIPS, DM 2 on Lantus, chronic LE edema and stasis ulcers, recurrent cellulitis and MSSA bacteremia.    Recently seen by my ID colleague 5/21 and 5/27. On 5/21 he was already on cipro+flagyl due to possible pseudomonal infections of the wound due to blue green discharge. Since patient was concerned about possible right ankle cellulitis development, doxy was added on 5/27.     A few days ago, patient got done with cipro+flagyl and he has doxy left until tomorrow.     Last night he developed a fever to 103. Daughter wanted to take him to the ER but he refused. So she added cipro that she had to his doxy regimen. Fevers today to 100.3. But patient states he feels well. Daughter thinks she noticed a redness in his left hip region. I was able to see that on video - there is a circumscribed erythematous patch in left buttock, unable to further characterize. The right buttock has a bruise in the same area. Daughter also thinks he is more confused for the past 2 days compared to baseline. Daughter notes that right heel ulcer has closed up. The other ulcers in the leg do not appear infected. Patient has a wound care nurse who comes regularly. He was receiving lymphedema treatment but now the bandages  are being changed which should not require manual lymphedema treatment anymore.            History of the Infectious Disease lllness:     Patient is 64-year-old male with history of ESLD, S/P TIPS, DM 2 on Lantus, chronic lymphedema and venous stasis ulcers, recurrent cellulitis and MSSA bacteremia.  Patient seen in ID clinic with his daughter.  He was recently hospitalized at the end of March at [facility] for MSSA bacteremia (blood culture 3/21/2020 with MSSA), source felt to be lower extremity cellulitis.  Patient was treated with 2 weeks of IV cefazolin 3/22-4/4.  TTE 3/23 was negative for vegetations.  In the past patient had episode of MSSA bacteremia in 10/2019.  Per patient's daughter, usually patient has signs and symptoms of lower extremity cellulitis and then progressively becomes septic and requires hospitalization.  Usually he does not have time to make it to physician's appointments due to quick deterioration.  Today they are seen in ID clinic asking if something could be done to prevent frequent bacteremia hospitalizations.  In mid April 2019 patient seen by ID physician who felt that his bilateral lower extremity wounds  are due to pseudomonal infection because of bluish-green discoloration of his wounds.  He was prescribed 10-day course of about 10 days ago.  Today none of his open wounds look infected.  The day before patient was notified that his PCP sent prescription for another 10-day course of ciprofloxacin to take until he will be seen in person.  Patient has history of chronic right heel ulcer/wound >15 years. In 9/2019 it grew MSSA.  Today he is complaining of fatigue generalized weakness for which he is using walker.  He has home care nurse, who also takes care of his lymphedema.  He has no podiatry as had bad experience in the past.  Today patient denies fever, chills, SOB, night sweats, CP, cough, abdominal pain, nausea, vomiting. He does not look sick.  His last bloodwork  notable for WBC-3, Hb-7.8, Plt-98 on.        Review of Systems:  10 systems reviewed. 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)      Splenomegaly      Squamous cell carcinoma      Thrombocytopenia (H)      Varices, esophageal (H)     Banded in 2011       Past Surgical History:   Procedure Laterality Date     BIOPSY OF SKIN LESION       COLONOSCOPY Left 6/16/2016    Procedure: COMBINED COLONOSCOPY, SINGLE OR MULTIPLE BIOPSY/POLYPECTOMY BY BIOPSY;  Surgeon: Brandy Barnett MD;  Location:  GI     ESOPHAGOSCOPY, GASTROSCOPY, DUODENOSCOPY (EGD), COMBINED  2/13/2013    Procedure: COMBINED ESOPHAGOSCOPY, GASTROSCOPY, DUODENOSCOPY (EGD);;  Surgeon: Tara Cook MD;  Location:  GI     ESOPHAGOSCOPY, GASTROSCOPY, DUODENOSCOPY (EGD), COMBINED  11/4/2013    Procedure: COMBINED ESOPHAGOSCOPY, GASTROSCOPY, DUODENOSCOPY (EGD);;  Surgeon: Lonny Diaz MD;  Location:  GI     ESOPHAGOSCOPY, GASTROSCOPY, DUODENOSCOPY (EGD), COMBINED Left 6/16/2016    Procedure: COMBINED ESOPHAGOSCOPY, GASTROSCOPY, DUODENOSCOPY (EGD), BIOPSY SINGLE OR MULTIPLE;  Surgeon: Brandy Barnett MD;  Location:  GI     EXCISE LESION TRUNK  9/24/2012    Procedure: EXCISE LESION TRUNK;;  Surgeon: Pepe Dominguez MD;  Location: [facility]     GENITOURINARY SURGERY      vasectomy     HERNIORRHAPHY UMBILICAL  9/24/2012    Procedure: HERNIORRHAPHY UMBILICAL;  UMBILICAL HERNIA REPAIR , EXCISION OF PERIUMBILICAL CYST;  Surgeon: Pepe Dominguez MD;  Location: [facility]       Family History   Problem Relation Age of Onset     Breast Cancer Mother      Liver Cancer Mother      Cardiovascular Father      Cerebrovascular Disease Father         very low blood pressure     Cancer Father          rectal cancer     Cardiovascular Paternal Grandfather      Diabetes Brother      Cancer Sister         skin cancer     C.A.D. Other         MI, 70's     Breast Cancer Sister      Thyroid Disease No family hx of      Lipids No family hx of      Anesthesia Reaction No family hx of        Social History     Social History Narrative    . 3 grown daughters. He has been sober since .     Social History     Tobacco Use     Smoking status: Current Every Day Smoker     Packs/day: 0.30     Types: Cigarettes     Last attempt to quit: 4/10/2019     Years since quittin.2     Smokeless tobacco: Never Used     Tobacco comment: about 4 cigarettes per day   Substance Use Topics     Alcohol use: No     Alcohol/week: 0.0 standard drinks     Comment: 2-3 per day , none since Dec 2012     Drug use: No       Immunization History   Administered Date(s) Administered     DTAP (<7y) 1988     HEPA 2013, 2013, 10/30/2013     HepB 2013, 2013, 10/30/2013     Influenza (IIV3) PF 10/04/2010, 2012, 2014, 2015, 2016     Influenza Intranasal Vaccine 4 valent 10/12/2017     Influenza Vaccine IM > 6 months Valent IIV4 10/26/2018     Pneumo Conj 13-V (2010&after) 2015     Pneumococcal 23 valent 2009, 10/01/2010, 2013, 2019     TD (ADULT, 7+) 1997     TDAP Vaccine (Adacel) 2013     Twinrix A/B 2013     Zoster vaccine, live 2016       Patient Active Problem List   Diagnosis     Mild major depression (H)     Thrombocytopenia (H)     Hypertension goal BP (blood pressure) < 130/80     Plantar warts     Corns and callosities     Family history of colon cancer     Type 2 diabetes, HbA1c goal < 7% (H)     Portal hypertension (H)     Alcoholic cirrhosis (H)     Ascites     Esophageal varices (H)     Multiple rib fractures     Tobacco use disorder     Hyperlipidemia LDL goal <100     Dental caries     Prurigo nodularis     Esophageal reflux      Morbid obesity (H)     History of colonic polyps: tubular adenomas and serrated adenomas     Type II diabetes mellitus with peripheral circulatory disorder (H)     Alcoholic cirrhosis of liver with ascites (H)     Hepatic encephalopathy (H)     Lymphedema of both lower extremities     Venous stasis ulcers of both lower extremities (H)       Current Outpatient Medications   Medication Sig     blood glucose (NO BRAND SPECIFIED) lancets standard Use to test blood sugar 4 X  times daily or as directed.     blood glucose (NO BRAND SPECIFIED) test strip Use to test blood sugars 4 X  times daily or as directed     blood glucose (NO BRAND SPECIFIED) test strip Use to test blood sugar 4 times daily or as directed.     blood glucose monitoring (NO BRAND SPECIFIED) meter device kit Use to test blood sugar 4 times daily or as directed. Before meals and snack at HS.     blood glucose monitoring (NO BRAND SPECIFIED) meter device kit Use to test blood sugar 4 X  times daily or as directed.     childrens multivitamin w/ionr (FLINTSTONES COMPLETE) 60 MG chewable tablet Take 1 chew tab by mouth daily     citalopram (CELEXA) 20 MG tablet Take 1 tablet (20 mg) by mouth daily     CRANBERRY PO Take 1 capsule by mouth 3 times daily as needed     Cyanocobalamin (VITAMIN B-12 PO) Take 1 tablet by mouth daily     desvenlafaxine (PRISTIQ) 100 MG 24 hr tablet Take 2 tablets (200 mg) by mouth daily     EQL VITAMIN D3 50 MCG (2000 UT) CAPS Take 1 capsule by mouth daily     furosemide (LASIX) 40 MG tablet Take 40 mg once daily     gabapentin (NEURONTIN) 300 MG capsule Take 1 capsule (300 mg) by mouth At Bedtime     hydrOXYzine (ATARAX) 25 MG tablet Take 1 tablet (25 mg) by mouth 3 times daily as needed for itching     insulin glargine (LANTUS SOLOSTAR) 100 UNIT/ML pen Inject 5 units under the skin every morning.     insulin pen needle (B-D U/F) 31G X 8 MM miscellaneous USE  6 times daily / OR AS DIRECTED     insulin pen needle (ULTICARE SHORT)  31G X 8 MM miscellaneous Use UP to 6 times daily or as directed     lactulose encephalopathy (CHRONULAC) 10 GM/15ML SOLUTION TAKE 30 MLS BY MOUTH 2 TIMES DAILY  TITRATE AS NEEDED TO ACHIEVE 3-5 BOWEL MOVEMENTS DAILY.     lidocaine (XYLOCAINE) 5 % external ointment Apply topically as needed for moderate pain Apply to wounds twice daily as needed.     magnesium oxide (MAG-OX) 400 (241.3 Mg) MG tablet Take 1 tablet (400 mg) by mouth daily     NOVOLOG FLEXPEN 100 UNIT/ML soln INJECT 20 UNITS UNDER THE SKIN BEFORE BREAKFAST, 20 UNITS BEFORE LUNCH, 20 UNITS BEFORE DINNER, and 20 UNITS BEFORE SNACKS.      nystatin (MYCOSTATIN) 220152 UNIT/GM external cream Apply topically 2 times daily     nystatin-triamcinolone (MYCOLOG II) cream Apply topically 2 times daily as needed (itching)     ondansetron (ZOFRAN) 4 MG tablet Take 1 tablet (4 mg) by mouth every 8 hours as needed for nausea     order for DME Equipment being ordered:Orthopedic shoes     order for DME Equipment being ordered: Condom catheter     order for DME Equipment being ordered:BioTab compression pants pneumatic system     order for DME Equipment being ordered:bilateral pneumatic leg massage for treatment of extreme bilateral lower leg edema.     oxyCODONE (ROXICODONE) 5 MG tablet TAKE 1 TO 2 TABLETS BY MOUTH EVERY 8 HOURS AS NEEDED.  No other narcotics are to be used with this medication.      OYSTER SHELL CALCIUM/D 500-200 MG-UNIT per tablet Take 1 tablet by mouth daily     QUEtiapine (SEROQUEL) 50 MG tablet Take 50 mg by mouth At Bedtime     spironolactone (ALDACTONE) 50 MG tablet Take 2 tablets (100 mg) by mouth daily     STATIN NOT PRESCRIBED, INTENTIONAL, 1 each daily Statin not prescribed intentionally due to Active liver disease     ursodiol (ACTIGALL) 300 MG capsule Take 3 capsules (900 mg) by mouth 2 times daily     VITAMIN E PO Take 1 capsule by mouth daily     XIFAXAN 550 MG TABS tablet TAKE 1 TABLET BY MOUTH TWO TIMES DAILY     cefadroxil (DURICEF)  500 MG capsule Take 1 capsule (500 mg) by mouth 2 times daily (Patient not taking: Reported on 7/8/2020)     ciprofloxacin (CIPRO) 500 MG tablet Take 1 tablet (500 mg) by mouth 2 times daily (Patient not taking: Reported on 7/8/2020)     desvenlafaxine fumarate 100 MG 24 hr tablet Take 100 mg by mouth daily     doxycycline hyclate (VIBRAMYCIN) 100 MG capsule Take 1 capsule (100 mg) by mouth 2 times daily (Patient not taking: Reported on 7/8/2020)     fentaNYL (DURAGESIC) 12 mcg/hr 72 hr patch Place 2 patches onto the skin every 72 hours remove old patch. (Patient not taking: Reported on 7/8/2020)     fentaNYL (DURAGESIC) 25 mcg/hr 72 hr patch Place 1 patch onto the skin every 72 hours remove old patch. (Patient not taking: Reported on 7/8/2020)     ranitidine (ZANTAC) 150 MG tablet Take 1 tablet (150 mg) by mouth 2 times daily (Patient not taking: Reported on 7/8/2020)     triamcinolone (KENALOG) 0.1 % external cream Apply topically 2 times daily as needed for irritation (Patient not taking: Reported on 7/8/2020)     UNABLE TO FIND MEDICATION NAME: Burdock root     No current facility-administered medications for this visit.        No Known Allergies           Physical Exam:     Limited exam due to video visit  Right heel with a very small wound about 2 cm with mild bluish green discharge.   Was able to review photos taken yesterday - bilateral lower extremity edema with peau d orange appearance with many small ulcers in bilateral legs that did not appear infected.          Laboratory Data:   6/11 and 6/16 blood cx neg   6/7/20 Blood cx Pseudomonas stutzeri R to cipro   05/21/20 Wound culture Right heel- heavy growth MSSA, pansensitive.  3/21/2020 blood culture-MSSA  3/22/2020 MRSA nares negative  10/2019 blood cx MSSA  9/5/2019 heel wound-MSSA  9/8/2019-heel  ulcer scant Bacteroides pyogenes  4/2019 blood cx MSSA      Inflammatory Markers    Recent Labs   Lab Test 05/21/20  1647 08/22/19  0634 08/21/19  0629  08/20/19  0625 08/18/19 1815 12/22/16  1722 12/22/15  1419   CRP 18.9* 60.1* 103.0* 143.0* 77.1* 9.3* 8.6*       Metabolic Studies       Recent Labs   Lab Test 06/26/20  1558 03/12/20  1238 11/18/19  1433 08/21/19  0629 08/19/19  0631 08/18/19  1916 08/18/19 1815 04/22/19  1343  11/06/17  0744   NA  --  133 127* 137 136  --  133 136   < > 144   POTASSIUM  --  3.7 4.7 4.2 3.8  --  4.0 4.1   < > 3.7   CHLORIDE  --  101 94 110* 107  --  103 101   < > 113*   CO2  --  26 23 21 22  --  23 26   < > 24   ANIONGAP  --  7 10 6 7  --  7 8   < > 8   BUN  --  17 25 16 16  --  18 20   < > 10   CR  --  0.91 1.12 0.69 0.88  --  0.73 0.92   < > 0.71   GFRESTIMATED >90 88 68 >90 90  --  >90 86   < > >90   GLC  --  116* 206* 130* 86  --  87 141*   < > 93   A1C  --  6.3*  --   --   --   --  5.3  --    < >  --    ERIN  --  8.1* 8.7 8.4* 8.0*  --  8.4* 8.4*   < > 7.9*   MAG  --   --   --   --  1.9  --   --   --   --  1.9   LACT  --   --   --   --   --  1.7  --   --   --   --     < > = values in this interval not displayed.       Hematology Studies      Recent Labs   Lab Test 03/12/20  1238 11/18/19  1433 08/20/19 0625 08/19/19  0631 08/18/19 1815 04/22/19  1343 10/25/18  1405  04/27/18  1235  11/04/17  2035  10/28/14  1847  03/04/14 1814 02/19/14  1609   WBC 4.6 9.1  --  4.9 6.4 3.8* 3.2*   < > 2.7*   < > 5.0   < > 7.1   < > 6.4 6.4   ANEU  --   --   --   --  4.9  --   --   --  1.7  --  3.3  --  3.8  --  3.3 3.3   ALYM  --   --   --   --  0.4*  --   --   --  0.4*  --  0.8  --  2.0  --  2.1 1.9   RAFAELA  --   --   --   --  1.0  --   --   --  0.5  --  0.7  --  1.2  --  0.8 1.0   AEOS  --   --   --   --  0.1  --   --   --  0.1  --  0.1  --  0.1  --  0.2 0.2   HGB 7.6* 10.9* 8.4* 8.0* 9.3* 9.1* 9.5*   < > 9.5*   < > 12.3*   < > 14.7   < > 14.9 14.8   HCT 24.2* 33.2*  --  24.0* 27.8* 29.0* 29.9*   < > 29.3*   < > 38.5*   < > 41.9   < > 42.2 41.4   * 128* 78* 79* 124* 89* 102*   < > 85*   < > 77*   < > 122*   < > 87* 100*    < > =  values in this interval not displayed.       Imaging:  MR abdomen 6/11/20  IMPRESSION:  1. Significant respiratory motion artifact degrades evaluation of the liver. Enhancing mass in the inferior right hepatic lobe measuring 37 mm with restricted diffusion and enhancing mass in the medial left hepatic lobe measuring 22 mm with restricted diffusion suspicious for hepatocellular carcinoma. A smaller area of restricted diffusion is identified in the posterior right hepatic lobe, corresponding to an area of hypodensity seen on the prior CT, this area is also suspicious for malignancy although it is difficult to confidently identify this mass on postcontrast imaging probably due to the degree of respiratory motion.  2. Hepatic cirrhosis with splenomegaly and abdominal ascites unchanged compared to the CT from one day earlier. Diffuse mesenteric edema and anasarca is also unchanged.  3. Contracted gallbladder with internal stones. No biliary duct dilatation.    CT abdomen pelvis 6/10/20  IMPRESSION:   1.  Ascites, cirrhosis, splenomegaly and collateral vessels.  2.  There are several ill-defined hypodense areas within the liver. Recommend multiphase MRI of the liver to evaluate for possible malignancy.  3.  TIPS stent present with patent portal vein, SMV and splenic vein.  4.  Small bilateral effusions with bibasilar atelectasis.  5.  Atherosclerotic vascular disease.  6.  No evidence of abscess, free air or obstruction.  7.  Atherosclerotic vascular disease.  8.  Diffuse subcutaneous edema.  9.  Progression of L1 compression fracture.    Xray feet bilateral 3 views 05/21/20                                                                   IMPRESSION: No acute osseous abnormality. No radiographic evidence of osteomyelitis.        CXR 03/25/20  1.  The left arm PICC has its tip in the proximal right atrium, approximately 2 cm distal to the cavoatrial junction.  2.  Hazy opacities in both lung bases are essentially  unchanged.  3.  Mild cardiomegaly.    MRI lumbar spine 03/23/20  1.  The left arm PICC has its tip in the proximal right atrium, approximately 2 cm distal to the cavoatrial junction.  2.  Hazy opacities in both lung bases are essentially unchanged.  3.  Mild cardiomegaly.    ECHO 03/23/20    Interpretation Summary    * The left ventricle is normal size.    * The left ventricular systolic function is normal, estimated LVEF 60-65%.    * No regional wall motion abnormalities.    * There is moderate aortic stenosis.    * No pulmonary hypertension, estimated right ventricular systolic pressure  is 30 mmHg.    * Compared to prior study dated 02/01/2020 there has been no change.            Again, thank you for allowing me to participate in the care of your patient.      Sincerely,    Sandee Bird MD

## 2020-07-08 NOTE — PROGRESS NOTES
"Lawrence Louie is a 64 year old male who is being evaluated via a billable video visit.      The patient has been notified of following:     \"This video visit will be conducted via a call between you and your physician/provider. We have found that certain health care needs can be provided without the need for an in-person physical exam.  This service lets us provide the care you need with a video conversation.  If a prescription is necessary we can send it directly to your pharmacy.  If lab work is needed we can place an order for that and you can then stop by our lab to have the test done at a later time.    Video visits are billed at different rates depending on your insurance coverage.  Please reach out to your insurance provider with any questions.    If during the course of the call the physician/provider feels a video visit is not appropriate, you will not be charged for this service.\"    Patient has given verbal consent for Video visit? Yes  How would you like to obtain your AVS? Hui  Patient would like the video invitation sent by: Send to e-mail at: [email or web address]  Will anyone else be joining your video visit? No  {If patient encounters technical issues they should call [phone] :[id number]}      Video-Visit Details    Type of service:  Video Visit    Video Start Time: {video visit start/end time:152948}  Video End Time: {video visit start/end time:152948}    Originating Location (pt. Location): {video visit patient location:900943::\"Home\"}    Distant Location (provider location):  [facility] AND INFECTIOUS DISEASES     Platform used for Video Visit: {Virtual Visit Platforms:[id number]::\"[employer]\"}    {signature options:353195}        "

## 2020-07-08 NOTE — PROGRESS NOTES
"Lawrence Louie is a 64 year old male who is being evaluated via a billable video visit.      The patient has been notified of following:     \"This video visit will be conducted via a call between you and your physician/provider. We have found that certain health care needs can be provided without the need for an in-person physical exam.  This service lets us provide the care you need with a video conversation.  If a prescription is necessary we can send it directly to your pharmacy.  If lab work is needed we can place an order for that and you can then stop by our lab to have the test done at a later time.    Video visits are billed at different rates depending on your insurance coverage.  Please reach out to your insurance provider with any questions.    If during the course of the call the physician/provider feels a video visit is not appropriate, you will not be charged for this service.\"    Patient has given verbal consent for Video visit? Yes  How would you like to obtain your AVS? Hui  Patient would like the video invitation sent by: Send to e-mail at: [email or web address]  Will anyone else be joining your video visit? No  {If patient encounters technical issues they should call [phone] :[id number]}      Video-Visit Details    Type of service:  Video Visit    Video Start Time: {video visit start/end time:152948}  Video End Time: {video visit start/end time:152948}    Originating Location (pt. Location): {video visit patient location:837996::\"Home\"}    Distant Location (provider location):  [facility] AND INFECTI[facility] DISEAS[facility]     Platform used for Video Visit: {Virtual Visit Platforms:889970::\"[employer]\"}    {signature options:763631}     [facility] - [phone], fax [phone]  Blood cultures   CT right foot   Continue Chlorhexidine body wash     4.17 pm to 4.57 pm       [place]"

## 2020-07-09 ENCOUNTER — MYC MEDICAL ADVICE (OUTPATIENT)
Dept: INTERNAL MEDICINE | Facility: CLINIC | Age: 65
End: 2020-07-09

## 2020-07-09 ENCOUNTER — TELEPHONE (OUTPATIENT)
Dept: INFECTIOUS DISEASES | Facility: CLINIC | Age: 65
End: 2020-07-09

## 2020-07-09 DIAGNOSIS — C22.0 HCC (HEPATOCELLULAR CARCINOMA) (H): Primary | ICD-10-CM

## 2020-07-09 DIAGNOSIS — K21.00 GASTROESOPHAGEAL REFLUX DISEASE WITH ESOPHAGITIS: Primary | ICD-10-CM

## 2020-07-09 NOTE — TELEPHONE ENCOUNTER
----- Message from Sandee Bird MD sent at 7/8/2020  8:12 PM CDT -----  Hi,   I have placed blood cx orders, Can you please coordinate with patients home care to be drawn at home and results faxed back.  Thanks  Sandee Montana [facility] care - [phone], fax [phone]

## 2020-07-09 NOTE — PROGRESS NOTES
"Lawrence Louie is a 64 year old male who is being evaluated via a billable video visit.      The patient has been notified of following:     \"This video visit will be conducted via a call between you and your physician/provider. We have found that certain health care needs can be provided without the need for an in-person physical exam.  This service lets us provide the care you need with a video conversation.  If a prescription is necessary we can send it directly to your pharmacy.  If lab work is needed we can place an order for that and you can then stop by our lab to have the test done at a later time.    Video visits are billed at different rates depending on your insurance coverage.  Please reach out to your insurance provider with any questions.    If during the course of the call the physician/provider feels a video visit is not appropriate, you will not be charged for this service.\"    Patient has given verbal consent for Video visit? Yes  How would you like to obtain your AVS? Yaohart  Patient would like the video invitation sent by: Send to e-mail at: [email or web address]  Will anyone else be joining your video visit? No        Video-Visit Details    Type of service:  Video Visit    Video Start Time: 4.17 pm  Video End Time: 4.57 pm    Originating Location (pt. Location): Home    Distant Location (provider location):  [facility] AND INFECTIOUS DISEASES     Platform used for Video Visit: Akosua Bird MD       [facility]  Infectious Disease Clinic Note:  Follow up      Patient:  Lawrence Louie, Date of birth 1955, Medical record number [id number]  Date of Visit:  07/08/2020  Consult requested by patient himself.         Assessment and Recommendations:     Recommendations:  To check Blood cultures while off abxs - to coordinate with [facility] - [phone], fax [phone]  CT right foot due to persistent pain " and recurrent wound in the right heel   Continue Chlorhexidine body wash   Daughter knows to schedule follow up with me if issues arise     Assessment:  Patient is a 64-year-old gentleman with history of lower extremity lymphedema, recurrent cellulitis, MSSA bacteremia, ESLD, DM 2.  Complaints of recurrent cellulitis complicated by bacteremia.    #  Pseudomonas stutzeri bacteremia: June 2020: source not clear,, treated with 5 days of iv zosyn. Doing well 2-3 weeks after completion of abxs. Since source not found, will check blood cxs to make sure there is no recurrence. Could have been from biliary tree although unusual pathogen for this source, as recent liver malignancy diagnosed.     # Recurrent MSSA bacteremia, source thought to be bilateral lower extremities cellulitis  4/13/2019 blood culture-MSSA  10/7/2019 blood culture with-MSSA and Aeromonas veronii  Recent hospitalization in March 2020 for MSSA bacteremia.  Blood cultures 3/21/2020 -MSSA, 3/22-negative  TTE 3/23/2020 negative for vegetations.  Patient treated with IV cefazolin 3/22-4/4.    # Recurrent cellulitis of lower extremities in setting of chronic lymphedema and venous stasis ulcers. .  Patient with multiple open wounds on bilateral anterior shins~0.5- 2.0 cm in diameter, none of which looks actively infected.  Recommend chlorhexidine was buttocks down for a few months     # Chronic right heel wound/ulcer  Patient had right heel wound/ulcer for more than 15 years  9/8/2019-heel  ulcer scant Bacteroides pyogenes, MSSA  5/21 Patient's wound opened up during physical examination.  Wound cultures 5/21/20 with heavy growth MSSA.  - B/l foot XRay with no signs of osteomyelitis 5/21/20.  But patient has pain even when the wound is healed and recurrent wounds. It was thought to be due to pressure but the left foot never develops a pressure ulcer.   Will evaluate with CT per daughters request     # End-stage liver disease, hepatic encephalopathy  Patient  taking spironolactone, furosemide, lactulose as needed    # DM2, well controlled  Lantus 5 units daily  Last hemoglobin A1c-5.5 3/2020    Interval history:  Last seen 6/5  At that time had fevers with possible buttock cellulitis. Had started him on cipro and cefadroxil. But he contd to have fevrers so he was taken to the ER at [facility]. By the time they reached the hospital he was septic. Blood cxs grew pseudomonas stutzeri resistant to cipro. The source was not clear. The TIPS was considered as a source and he had CT abdomen done but that showed liver lesions suspicious for malignancy. This has been [facility] by [facility] and they have decided to pursue chemoembolization without biopsy.   Meanwhile for the bacteremia he was treated with 5 days of iv zosyn. 2 days after completion of abxs, blood cx 6/16 with no growth.   He has been home now for 2-3 weeks and no fevers. Wound look ok. Right heel wound opened up with blusih green discharge. Daughter notes that he always complains of pain in right heel even when it is healed. She is wondering if there is underlying infection, she is also worried that the TIPS may be infected as stutzeri source was not found.   On discharge he was givien hibiclens and we discussed how to use it. I recommended application from buttocks down and wash for a few months to decrease recurrent cellulitis           Interval history: 6/5/20     First time seeing patient. Seen by my colleague earlier.     Patient has ESLD  S/P TIPS, DM 2 on Lantus, chronic LE edema and stasis ulcers, recurrent cellulitis and MSSA bacteremia.    Recently seen by my ID colleague 5/21 and 5/27. On 5/21 he was already on cipro+flagyl due to possible pseudomonal infections of the wound due to blue green discharge. Since patient was concerned about possible right ankle cellulitis development, doxy was added on 5/27.     A few days ago, patient got done with cipro+flagyl and he has doxy left until tomorrow.      Last night he developed a fever to 103. Daughter wanted to take him to the ER but he refused. So she added cipro that she had to his doxy regimen. Fevers today to 100.3. But patient states he feels well. Daughter thinks she noticed a redness in his left hip region. I was able to see that on video - there is a circumscribed erythematous patch in left buttock, unable to further characterize. The right buttock has a bruise in the same area. Daughter also thinks he is more confused for the past 2 days compared to baseline. Daughter notes that right heel ulcer has closed up. The other ulcers in the leg do not appear infected. Patient has a wound care nurse who comes regularly. He was receiving lymphedema treatment but now the bandages are being changed which should not require manual lymphedema treatment anymore.            History of the Infectious Disease lllness:     Patient is 64-year-old male with history of ESLD, S/P TIPS, DM 2 on Lantus, chronic lymphedema and venous stasis ulcers, recurrent cellulitis and MSSA bacteremia.  Patient seen in ID clinic with his daughter.  He was recently hospitalized at the end of March at [facility] for MSSA bacteremia (blood culture 3/21/2020 with MSSA), source felt to be lower extremity cellulitis.  Patient was treated with 2 weeks of IV cefazolin 3/22-4/4.  TTE 3/23 was negative for vegetations.  In the past patient had episode of MSSA bacteremia in 10/2019.  Per patient's daughter, usually patient has signs and symptoms of lower extremity cellulitis and then progressively becomes septic and requires hospitalization.  Usually he does not have time to make it to physician's appointments due to quick deterioration.  Today they are seen in ID clinic asking if something could be done to prevent frequent bacteremia hospitalizations.  In mid April 2019 patient seen by ID physician who felt that his bilateral lower extremity wounds  are due to pseudomonal infection  because of bluish-green discoloration of his wounds.  He was prescribed 10-day course of about 10 days ago.  Today none of his open wounds look infected.  The day before patient was notified that his PCP sent prescription for another 10-day course of ciprofloxacin to take until he will be seen in person.  Patient has history of chronic right heel ulcer/wound >15 years. In 9/2019 it grew MSSA.  Today he is complaining of fatigue generalized weakness for which he is using walker.  He has home care nurse, who also takes care of his lymphedema.  He has no podiatry as had bad experience in the past.  Today patient denies fever, chills, SOB, night sweats, CP, cough, abdominal pain, nausea, vomiting. He does not look sick.  His last bloodwork notable for WBC-3, Hb-7.8, Plt-98 on.        Review of Systems:  10 systems reviewed. 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)      Splenomegaly      Squamous cell carcinoma      Thrombocytopenia (H)      Varices, esophageal (H)     Banded in 2011       Past Surgical History:   Procedure Laterality Date     BIOPSY OF SKIN LESION       COLONOSCOPY Left 6/16/2016    Procedure: COMBINED COLONOSCOPY, SINGLE OR MULTIPLE BIOPSY/POLYPECTOMY BY BIOPSY;  Surgeon: Brandy Barnett MD;  Location:  GI     ESOPHAGOSCOPY, GASTROSCOPY, DUODENOSCOPY (EGD), COMBINED  2/13/2013    Procedure: COMBINED ESOPHAGOSCOPY, GASTROSCOPY, DUODENOSCOPY (EGD);;  Surgeon: Tara Cook MD;  Location:  GI     ESOPHAGOSCOPY, GASTROSCOPY, DUODENOSCOPY (EGD), COMBINED  11/4/2013    Procedure: COMBINED ESOPHAGOSCOPY, GASTROSCOPY, DUODENOSCOPY (EGD);;  Surgeon: Lonny Diaz MD;  Location:  GI     ESOPHAGOSCOPY, GASTROSCOPY, DUODENOSCOPY (EGD),  COMBINED Left 2016    Procedure: COMBINED ESOPHAGOSCOPY, GASTROSCOPY, DUODENOSCOPY (EGD), BIOPSY SINGLE OR MULTIPLE;  Surgeon: Brandy Barnett MD;  [facility]     EXCISE LESION TRUNK  2012    Procedure: EXCISE LESION TRUNK;;  Surgeon: Pepe Dominguez MD;  Location: [facility]     GENITOURINARY SURGERY      vasectomy     HERNIORRHAPHY UMBILICAL  2012    Procedure: HERNIORRHAPHY UMBILICAL;  UMBILICAL HERNIA REPAIR , EXCISION OF PERIUMBILICAL CYST;  Surgeon: Pepe Dominguez MD;  Location: [facility]       Family History   Problem Relation Age of Onset     Breast Cancer Mother      Liver Cancer Mother      Cardiovascular Father      Cerebrovascular Disease Father         very low blood pressure     Cancer Father         rectal cancer     Cardiovascular Paternal Grandfather      Diabetes Brother      Cancer Sister         skin cancer     C.A.D. Other         MI, 70's     Breast Cancer Sister      Thyroid Disease No family hx of      Lipids No family hx of      Anesthesia Reaction No family hx of        Social History     Social History Narrative    . 3 grown daughters. He has been sober since .     Social History     Tobacco Use     Smoking status: Current Every Day Smoker     Packs/day: 0.30     Types: Cigarettes     Last attempt to quit: 4/10/2019     Years since quittin.2     Smokeless tobacco: Never Used     Tobacco comment: about 4 cigarettes per day   Substance Use Topics     Alcohol use: No     Alcohol/week: 0.0 standard drinks     Comment: 2-3 per day , none since Dec 2012     Drug use: No       Immunization History   Administered Date(s) Administered     DTAP (<7y) 1988     HEPA 2013, 2013, 10/30/2013     HepB 2013, 2013, 10/30/2013     Influenza (IIV3) PF 10/04/2010, 2012, 2014, 2015, 2016     Influenza Intranasal Vaccine 4 valent 10/12/2017     Influenza Vaccine IM > 6 months Valent IIV4 10/26/2018     Pneumo Conj 13-V  (2010&after) 05/12/2015     Pneumococcal 23 valent 09/25/2009, 10/01/2010, 01/02/2013, 04/23/2019     TD (ADULT, 7+) 09/08/1997     TDAP Vaccine (Adacel) 01/02/2013     Twinrix A/B 01/02/2013     Zoster vaccine, live 12/22/2016       Patient Active Problem List   Diagnosis     Mild major depression (H)     Thrombocytopenia (H)     Hypertension goal BP (blood pressure) < 130/80     Plantar warts     Corns and callosities     Family history of colon cancer     Type 2 diabetes, HbA1c goal < 7% (H)     Portal hypertension (H)     Alcoholic cirrhosis (H)     Ascites     Esophageal varices (H)     Multiple rib fractures     Tobacco use disorder     Hyperlipidemia LDL goal <100     Dental caries     Prurigo nodularis     Esophageal reflux     Morbid obesity (H)     History of colonic polyps: tubular adenomas and serrated adenomas     Type II diabetes mellitus with peripheral circulatory disorder (H)     Alcoholic cirrhosis of liver with ascites (H)     Hepatic encephalopathy (H)     Lymphedema of both lower extremities     Venous stasis ulcers of both lower extremities (H)       Current Outpatient Medications   Medication Sig     blood glucose (NO BRAND SPECIFIED) lancets standard Use to test blood sugar 4 X  times daily or as directed.     blood glucose (NO BRAND SPECIFIED) test strip Use to test blood sugars 4 X  times daily or as directed     blood glucose (NO BRAND SPECIFIED) test strip Use to test blood sugar 4 times daily or as directed.     blood glucose monitoring (NO BRAND SPECIFIED) meter device kit Use to test blood sugar 4 times daily or as directed. Before meals and snack at HS.     blood glucose monitoring (NO BRAND SPECIFIED) meter device kit Use to test blood sugar 4 X  times daily or as directed.     childrens multivitamin w/ionr (FLINTSTONES COMPLETE) 60 MG chewable tablet Take 1 chew tab by mouth daily     citalopram (CELEXA) 20 MG tablet Take 1 tablet (20 mg) by mouth daily     CRANBERRY PO Take 1  capsule by mouth 3 times daily as needed     Cyanocobalamin (VITAMIN B-12 PO) Take 1 tablet by mouth daily     desvenlafaxine (PRISTIQ) 100 MG 24 hr tablet Take 2 tablets (200 mg) by mouth daily     EQL VITAMIN D3 50 MCG (2000 UT) CAPS Take 1 capsule by mouth daily     furosemide (LASIX) 40 MG tablet Take 40 mg once daily     gabapentin (NEURONTIN) 300 MG capsule Take 1 capsule (300 mg) by mouth At Bedtime     hydrOXYzine (ATARAX) 25 MG tablet Take 1 tablet (25 mg) by mouth 3 times daily as needed for itching     insulin glargine (LANTUS SOLOSTAR) 100 UNIT/ML pen Inject 5 units under the skin every morning.     insulin pen needle (B-D U/F) 31G X 8 MM miscellaneous USE  6 times daily / OR AS DIRECTED     insulin pen needle (ULTICARE SHORT) 31G X 8 MM miscellaneous Use UP to 6 times daily or as directed     lactulose encephalopathy (CHRONULAC) 10 GM/15ML SOLUTION TAKE 30 MLS BY MOUTH 2 TIMES DAILY  TITRATE AS NEEDED TO ACHIEVE 3-5 BOWEL MOVEMENTS DAILY.     lidocaine (XYLOCAINE) 5 % external ointment Apply topically as needed for moderate pain Apply to wounds twice daily as needed.     magnesium oxide (MAG-OX) 400 (241.3 Mg) MG tablet Take 1 tablet (400 mg) by mouth daily     NOVOLOG FLEXPEN 100 UNIT/ML soln INJECT 20 UNITS UNDER THE SKIN BEFORE BREAKFAST, 20 UNITS BEFORE LUNCH, 20 UNITS BEFORE DINNER, and 20 UNITS BEFORE SNACKS.      nystatin (MYCOSTATIN) 043903 UNIT/GM external cream Apply topically 2 times daily     nystatin-triamcinolone (MYCOLOG II) cream Apply topically 2 times daily as needed (itching)     ondansetron (ZOFRAN) 4 MG tablet Take 1 tablet (4 mg) by mouth every 8 hours as needed for nausea     order for DME Equipment being ordered:Orthopedic shoes     order for DME Equipment being ordered: Condom catheter     order for DME Equipment being ordered:BioTab compression pants pneumatic system     order for DME Equipment being ordered:bilateral pneumatic leg massage for treatment of extreme bilateral  lower leg edema.     oxyCODONE (ROXICODONE) 5 MG tablet TAKE 1 TO 2 TABLETS BY MOUTH EVERY 8 HOURS AS NEEDED.  No other narcotics are to be used with this medication.      OYSTER SHELL CALCIUM/D 500-200 MG-UNIT per tablet Take 1 tablet by mouth daily     QUEtiapine (SEROQUEL) 50 MG tablet Take 50 mg by mouth At Bedtime     spironolactone (ALDACTONE) 50 MG tablet Take 2 tablets (100 mg) by mouth daily     STATIN NOT PRESCRIBED, INTENTIONAL, 1 each daily Statin not prescribed intentionally due to Active liver disease     ursodiol (ACTIGALL) 300 MG capsule Take 3 capsules (900 mg) by mouth 2 times daily     VITAMIN E PO Take 1 capsule by mouth daily     XIFAXAN 550 MG TABS tablet TAKE 1 TABLET BY MOUTH TWO TIMES DAILY     cefadroxil (DURICEF) 500 MG capsule Take 1 capsule (500 mg) by mouth 2 times daily (Patient not taking: Reported on 7/8/2020)     ciprofloxacin (CIPRO) 500 MG tablet Take 1 tablet (500 mg) by mouth 2 times daily (Patient not taking: Reported on 7/8/2020)     desvenlafaxine fumarate 100 MG 24 hr tablet Take 100 mg by mouth daily     doxycycline hyclate (VIBRAMYCIN) 100 MG capsule Take 1 capsule (100 mg) by mouth 2 times daily (Patient not taking: Reported on 7/8/2020)     fentaNYL (DURAGESIC) 12 mcg/hr 72 hr patch Place 2 patches onto the skin every 72 hours remove old patch. (Patient not taking: Reported on 7/8/2020)     fentaNYL (DURAGESIC) 25 mcg/hr 72 hr patch Place 1 patch onto the skin every 72 hours remove old patch. (Patient not taking: Reported on 7/8/2020)     ranitidine (ZANTAC) 150 MG tablet Take 1 tablet (150 mg) by mouth 2 times daily (Patient not taking: Reported on 7/8/2020)     triamcinolone (KENALOG) 0.1 % external cream Apply topically 2 times daily as needed for irritation (Patient not taking: Reported on 7/8/2020)     UNABLE TO FIND MEDICATION NAME: Burdock root     No current facility-administered medications for this visit.        No Known Allergies           Physical Exam:      Limited exam due to video visit  Right heel with a very small wound about 2 cm with mild bluish green discharge.   Was able to review photos taken yesterday - bilateral lower extremity edema with peau d orange appearance with many small ulcers in bilateral legs that did not appear infected.          Laboratory Data:   6/11 and 6/16 blood cx neg   6/7/20 Blood cx Pseudomonas stutzeri R to cipro   05/21/20 Wound culture Right heel- heavy growth MSSA, pansensitive.  3/21/2020 blood culture-MSSA  3/22/2020 MRSA nares negative  10/2019 blood cx MSSA  9/5/2019 heel wound-MSSA  9/8/2019-heel  ulcer scant Bacteroides pyogenes  4/2019 blood cx MSSA      Inflammatory Markers    Recent Labs   Lab Test 05/21/20  1647 08/22/19  0634 08/21/19  0629 08/20/19  0625 08/18/19  1815 12/22/16  1722 12/22/15  1419   CRP 18.9* 60.1* 103.0* 143.0* 77.1* 9.3* 8.6*       Metabolic Studies       Recent Labs   Lab Test 06/26/20  1558 03/12/20  1238 11/18/19  1433 08/21/19  0629 08/19/19  0631 08/18/19  1916 08/18/19  1815 04/22/19  1343  11/06/17  0744   NA  --  133 127* 137 136  --  133 136   < > 144   POTASSIUM  --  3.7 4.7 4.2 3.8  --  4.0 4.1   < > 3.7   CHLORIDE  --  101 94 110* 107  --  103 101   < > 113*   CO2  --  26 23 21 22  --  23 26   < > 24   ANIONGAP  --  7 10 6 7  --  7 8   < > 8   BUN  --  17 25 16 16  --  18 20   < > 10   CR  --  0.91 1.12 0.69 0.88  --  0.73 0.92   < > 0.71   GFRESTIMATED >90 88 68 >90 90  --  >90 86   < > >90   GLC  --  116* 206* 130* 86  --  87 141*   < > 93   A1C  --  6.3*  --   --   --   --  5.3  --    < >  --    ERIN  --  8.1* 8.7 8.4* 8.0*  --  8.4* 8.4*   < > 7.9*   MAG  --   --   --   --  1.9  --   --   --   --  1.9   LACT  --   --   --   --   --  1.7  --   --   --   --     < > = values in this interval not displayed.       Hematology Studies      Recent Labs   Lab Test 03/12/20  1238 11/18/19  1433 08/20/19  0625 08/19/19  0631 08/18/19  1815 04/22/19  1343 10/25/18  1405  04/27/18  1235   11/04/17 2035  10/28/14  1847  03/04/14  1814 02/19/14  1609   WBC 4.6 9.1  --  4.9 6.4 3.8* 3.2*   < > 2.7*   < > 5.0   < > 7.1   < > 6.4 6.4   ANEU  --   --   --   --  4.9  --   --   --  1.7  --  3.3  --  3.8  --  3.3 3.3   ALYM  --   --   --   --  0.4*  --   --   --  0.4*  --  0.8  --  2.0  --  2.1 1.9   RAFAELA  --   --   --   --  1.0  --   --   --  0.5  --  0.7  --  1.2  --  0.8 1.0   AEOS  --   --   --   --  0.1  --   --   --  0.1  --  0.1  --  0.1  --  0.2 0.2   HGB 7.6* 10.9* 8.4* 8.0* 9.3* 9.1* 9.5*   < > 9.5*   < > 12.3*   < > 14.7   < > 14.9 14.8   HCT 24.2* 33.2*  --  24.0* 27.8* 29.0* 29.9*   < > 29.3*   < > 38.5*   < > 41.9   < > 42.2 41.4   * 128* 78* 79* 124* 89* 102*   < > 85*   < > 77*   < > 122*   < > 87* 100*    < > = values in this interval not displayed.       Imaging:  MR abdomen 6/11/20  IMPRESSION:  1. Significant respiratory motion artifact degrades evaluation of the liver. Enhancing mass in the inferior right hepatic lobe measuring 37 mm with restricted diffusion and enhancing mass in the medial left hepatic lobe measuring 22 mm with restricted diffusion suspicious for hepatocellular carcinoma. A smaller area of restricted diffusion is identified in the posterior right hepatic lobe, corresponding to an area of hypodensity seen on the prior CT, this area is also suspicious for malignancy although it is difficult to confidently identify this mass on postcontrast imaging probably due to the degree of respiratory motion.  2. Hepatic cirrhosis with splenomegaly and abdominal ascites unchanged compared to the CT from one day earlier. Diffuse mesenteric edema and anasarca is also unchanged.  3. Contracted gallbladder with internal stones. No biliary duct dilatation.    CT abdomen pelvis 6/10/20  IMPRESSION:   1.  Ascites, cirrhosis, splenomegaly and collateral vessels.  2.  There are several ill-defined hypodense areas within the liver. Recommend multiphase MRI of the liver to evaluate for  possible malignancy.  3.  TIPS stent present with patent portal vein, SMV and splenic vein.  4.  Small bilateral effusions with bibasilar atelectasis.  5.  Atherosclerotic vascular disease.  6.  No evidence of abscess, free air or obstruction.  7.  Atherosclerotic vascular disease.  8.  Diffuse subcutaneous edema.  9.  Progression of L1 compression fracture.    Xray feet bilateral 3 views 05/21/20                                                                   IMPRESSION: No acute osseous abnormality. No radiographic evidence of osteomyelitis.        CXR 03/25/20  1.  The left arm PICC has its tip in the proximal right atrium, approximately 2 cm distal to the cavoatrial junction.  2.  Hazy opacities in both lung bases are essentially unchanged.  3.  Mild cardiomegaly.    MRI lumbar spine 03/23/20  1.  The left arm PICC has its tip in the proximal right atrium, approximately 2 cm distal to the cavoatrial junction.  2.  Hazy opacities in both lung bases are essentially unchanged.  3.  Mild cardiomegaly.    ECHO 03/23/20    Interpretation Summary    * The left ventricle is normal size.    * The left ventricular systolic function is normal, estimated LVEF 60-65%.    * No regional wall motion abnormalities.    * There is moderate aortic stenosis.    * No pulmonary hypertension, estimated right ventricular systolic pressure  is 30 mmHg.    * Compared to prior study dated 02/01/2020 there has been no change.

## 2020-07-10 ENCOUNTER — TELEPHONE (OUTPATIENT)
Dept: VASCULAR SURGERY | Facility: CLINIC | Age: 65
End: 2020-07-10

## 2020-07-10 ENCOUNTER — TEAM CONFERENCE (OUTPATIENT)
Dept: GASTROENTEROLOGY | Facility: CLINIC | Age: 65
End: 2020-07-10

## 2020-07-10 ENCOUNTER — TELEPHONE (OUTPATIENT)
Dept: INFECTIOUS DISEASES | Facility: CLINIC | Age: 65
End: 2020-07-10

## 2020-07-10 DIAGNOSIS — C22.0 HCC (HEPATOCELLULAR CARCINOMA) (H): Primary | ICD-10-CM

## 2020-07-10 RX ORDER — PANTOPRAZOLE SODIUM 20 MG/1
40 TABLET, DELAYED RELEASE ORAL DAILY
Qty: 90 TABLET | Refills: 3 | Status: ON HOLD | OUTPATIENT
Start: 2020-07-10 | End: 2020-07-30

## 2020-07-10 NOTE — TELEPHONE ENCOUNTER
Spoke w/home care nurse manager, Fernanda. Home care nurse will be going out to draw blood cultures later today. Requested that results be faxed to Dr. Bird in clinic.  Any Kaminski RN

## 2020-07-10 NOTE — TELEPHONE ENCOUNTER
ONCOLOGY INTAKE: Records Information      APPT INFORMATION: 7/21/20 - [place] - [facility]  Referring provider:  Lida  Referring provider s clinic:    Reason for visit/diagnosis:  HCC  Has patient been notified of appointment date and time?: Yes     RECORDS INFORMATION:  Were the records received with the referral (via [employer])? Internal referral    Has patient been seen for any external appt for this diagnosis? No    If yes, where? NA    Has patient had any imaging or procedures outside of [facility] for this condition? No      If Yes, where? NA    ADDITIONAL INFORMATION:  Scheduled via [facility] - per Pa  Confirmed with pt's wife Raven

## 2020-07-10 NOTE — TELEPHONE ENCOUNTER
Multi-Disciplinary Liver Tumor Conference:   7/10/2020:     History of  MRI with lesions concerning for HCC, but indeterminate on 6/11/2020, poor quality due to respiratory motion artifact    AFP 80:   Follow up Triphasic Liver CT Scan on 6/26/20:   Multi-focal lesions: 3-4 lesions consistent with OPTN-5  - 2nd radiologist review (Dr. Conley) did not see convincing evidence for vascular invasion.   - Patient is currently outside [place].     Team Recommendations:   Complete staging with:   - CT Chest   - Spine MRI. Discontinue bone scan due to recent lumbar fracture.    - Oncology referral (already placed)   - Follow up with IR as indicated for liver direct therapy     Yovany Lopez PA-C

## 2020-07-10 NOTE — TELEPHONE ENCOUNTER
RECORDS STATUS - ALL OTHER DIAGNOSIS      RECORDS RECEIVED FROM: [facility]   DATE RECEIVED: 7/21/2020    NOTES STATUS DETAILS   OFFICE NOTE from referring provider Complete [facility]   OFFICE NOTE from medical oncologist N/A Gastro Notes in EPIC   DISCHARGE SUMMARY from hospital N/A    DISCHARGE REPORT from the ER     OPERATIVE REPORT Complete [facility]-    Abdominal 11/4/2017   Upper GI Endoscopy 4/28/2016  Colonoscopy 4/28/2016  Upper GI Endoscopy 11/16/2015, 10/5/2015   More in [facility]     MEDICATION LIST Complete [facility]   CLINICAL TRIAL TREATMENTS TO DATE     LABS     PATHOLOGY REPORTS Complete Internal Surgical Pathology   6/16/2016   LARGE INTESTINE, TRANSVERSE COLON POLYP, POLYPECTOMY:   - Sessile serrated adenoma    ANYTHING RELATED TO DIAGNOSIS Complete Labs last updated on 7/15/2020    GENONOMIC TESTING     TYPE:     IMAGING (NEED IMAGES & REPORT)     CT SCANS Complete CT Liver 6/26/2020    MRI     NM Bone Scan Whole Complete 7/23/2020   MAMMO     ULTRASOUND Complete US Abdomen Limited 5/7/2019, 6/29/2018, 6/1/2018, 5/18/2018  US Abdomen Complete 11/2/2018   PET       Action    Action Taken 7/16/2020 9:45am     I faxed a request for imaging to [facility]     11:18am   I called internal film dept to get help resolving two images from [facility]

## 2020-07-10 NOTE — TELEPHONE ENCOUNTER
Called and spoke to wife Raven.    Informed her that we did discuss pt at liver tumor conference in which there was questions regarding pt's CT scan and L1 area.    Informed her that the group consensus wanted pt to have a dedicated MRI of the lumbar to see what is going on.  Informed her that Dr Hilton did place orders and our plan is to  Switch out the bone scan for the MRI that they have prescheduled for 7/17.    She verbalized understanding.     *called pt back.  Informed her of the dates/time in which she will have to call and r/s the appts as some times won't work.     Gave wife the rescheduling phone number.     Sunshine TUCKER RN, BSN  Interventional Radiology Nurse Coordinator   Phone: [phone]

## 2020-07-10 NOTE — TELEPHONE ENCOUNTER
----- Message from Sandee Bird MD sent at 7/8/2020  8:12 PM CDT -----  Hi,   I have placed blood cx orders, Can you please coordinate with patients home care to be drawn at home and results faxed back.  Thanks  Sandee [facility] care - [phone], fax [phone]

## 2020-07-13 ENCOUNTER — TELEPHONE (OUTPATIENT)
Dept: INTERNAL MEDICINE | Facility: CLINIC | Age: 65
End: 2020-07-13

## 2020-07-13 ENCOUNTER — MEDICAL CORRESPONDENCE (OUTPATIENT)
Dept: HEALTH INFORMATION MANAGEMENT | Facility: CLINIC | Age: 65
End: 2020-07-13

## 2020-07-13 ENCOUNTER — VIRTUAL VISIT (OUTPATIENT)
Dept: GASTROENTEROLOGY | Facility: CLINIC | Age: 65
End: 2020-07-13
Attending: PHYSICIAN ASSISTANT
Payer: COMMERCIAL

## 2020-07-13 DIAGNOSIS — D64.9 ANEMIA, UNSPECIFIED TYPE: ICD-10-CM

## 2020-07-13 DIAGNOSIS — Z95.828 S/P TIPS (TRANSJUGULAR INTRAHEPATIC PORTOSYSTEMIC SHUNT): ICD-10-CM

## 2020-07-13 DIAGNOSIS — K76.82 HEPATIC ENCEPHALOPATHY (H): Primary | ICD-10-CM

## 2020-07-13 DIAGNOSIS — K70.31 ALCOHOLIC CIRRHOSIS OF LIVER WITH ASCITES (H): ICD-10-CM

## 2020-07-13 ASSESSMENT — PAIN SCALES - GENERAL: PAINLEVEL: SEVERE PAIN (6)

## 2020-07-13 NOTE — PROGRESS NOTES
"Lawrence Louie is a 64 year old male who is being evaluated via a billable video visit.      The patient has been notified of following:     \"This video visit will be conducted via a call between you and your physician/provider. We have found that certain health care needs can be provided without the need for an in-person physical exam.  This service lets us provide the care you need with a video conversation.  If a prescription is necessary we can send it directly to your pharmacy.  If lab work is needed we can place an order for that and you can then stop by our lab to have the test done at a later time.    Video visits are billed at different rates depending on your insurance coverage.  Please reach out to your insurance provider with any questions.    If during the course of the call the physician/provider feels a video visit is not appropriate, you will not be charged for this service.\"    Patient has given verbal consent for Video visit? Yes  How would you like to obtain your AVS? MyChart    Will anyone else be joining your video visit? No        Video-Visit Details    Type of service:  Video Visit    Video Start Time: 3:30 pm  Video End Time: 4:35 pm     Originating Location (pt. Location): Home    Distant Location (provider location):  [facility] HEPATOLOGY     Platform used for Video Visit: Akosua Lopez PA-C    "

## 2020-07-13 NOTE — PROGRESS NOTES
Hepatology Follow-up Clinic note  Lawrence Louie   Date of Birth 1955  Date of Service 7/13/2020    Reason for follow-up:          Assessment/plan:   Lawrence Louie is a 64 year old male with history of ETOH cirrhosis complicated by history of ascites s/p TIPS (now improved), hepatic encephalopathy and portal hypertension and newly diagnosed multifocal HCC. Staging with lumbar MRI (evaluating previous lumbar fractures) and Chest CT is still pending.     He has been seen interventional radiology who discussed liver directed therapy. Family had many questions regarding treatment, side effects and his transplant status. Discussed that he is outside [place] criteria for transplant. His functional status has slowly improved as he was previously debilitated due to significant anasarca. He is TIPS is patent and does not need variceal screening with patent TIPS.     # History of hepatic encephalopathy:   - Continue lactulose - dose to 3-5 bowel movements a day. Per family request, trial of kristulose/cephulac    - Continue Rifaximin twice daily     # History of anasarca   - Continue  - Continue     # HCC, outside [place].   - Follow up with IR as recommend  - Scheduled to see Oncology next week     # Follow-up in clinic with Dr. Simmons in 3 months or sooner as needed    Yovany Lopez PA-C   [facility] Hepatology clinic    -----------------------------------------------------       HPI:   Lawrence Louie is a 64 year old male presenting for follow-up.     Cirrhosis  - ETOH and SMITH  Complicated by:  Ascites s/p TIPS on April 2018  Hx of HE   EGD: 6/16/2016  HCC: 6/11/2020    HCC:   Diagnosed: 6/26/2020  CT ABDOMEN:     AFP: 80  Treatment:       Patient was last seen by me on 6/17/2020. CT Abdomen confirming three/possibly four HCC lesions and is considered outside [place]. He is still awaiting staging with MRI of lumbar spine and CT Chest.         Patient denies jaundice, lower extremity edema,  abdominal distension or confusion.  Patient also denies melena, hematochezia or hematemesis.    Patient denies weight loss, fevers, sweats or chills.      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)      Splenomegaly      Squamous cell carcinoma      Thrombocytopenia (H)      Varices, esophageal (H)     Past Surgical History:   Procedure Laterality Date     BIOPSY OF SKIN LESION       COLONOSCOPY Left 6/16/2016    Procedure: COMBINED COLONOSCOPY, SINGLE OR MULTIPLE BIOPSY/POLYPECTOMY BY BIOPSY;  Surgeon: Brandy Barnett MD;  Location:  GI     ESOPHAGOSCOPY, GASTROSCOPY, DUODENOSCOPY (EGD), COMBINED  2/13/2013    Procedure: COMBINED ESOPHAGOSCOPY, GASTROSCOPY, DUODENOSCOPY (EGD);;  Surgeon: Tara Cook MD;  [facility]     ESOPHAGOSCOPY, GASTROSCOPY, DUODENOSCOPY (EGD), COMBINED  11/4/2013    Procedure: COMBINED ESOPHAGOSCOPY, GASTROSCOPY, DUODENOSCOPY (EGD);;  Surgeon: Lonny Diaz MD;  [facility]     ESOPHAGOSCOPY, GASTROSCOPY, DUODENOSCOPY (EGD), COMBINED Left 6/16/2016    Procedure: COMBINED ESOPHAGOSCOPY, GASTROSCOPY, DUODENOSCOPY (EGD), BIOPSY SINGLE OR MULTIPLE;  Surgeon: Brandy Barnett MD;  Location:  GI     EXCISE LESION TRUNK  9/24/2012    Procedure: EXCISE LESION TRUNK;;  Surgeon: Pepe Dominguez MD;  Location: [facility]     GENITOURINARY SURGERY      vasectomy     HERNIORRHAPHY UMBILICAL  9/24/2012    Procedure: HERNIORRHAPHY UMBILICAL;  UMBILICAL HERNIA REPAIR , EXCISION OF PERIUMBILICAL CYST;  Surgeon: Pepe Dominguez MD;  Location: [facility]                 Medications:     Current Outpatient Medications   Medication     blood glucose (NO BRAND SPECIFIED) lancets standard     blood glucose (NO BRAND SPECIFIED) test strip     blood  glucose (NO BRAND SPECIFIED) test strip     blood glucose monitoring (NO BRAND SPECIFIED) meter device kit     blood glucose monitoring (NO BRAND SPECIFIED) meter device kit     cefadroxil (DURICEF) 500 MG capsule     childrens multivitamin w/ionr (FLINTSTONES COMPLETE) 60 MG chewable tablet     ciprofloxacin (CIPRO) 500 MG tablet     citalopram (CELEXA) 20 MG tablet     CRANBERRY PO     Cyanocobalamin (VITAMIN B-12 PO)     desvenlafaxine (PRISTIQ) 100 MG 24 hr tablet     desvenlafaxine fumarate 100 MG 24 hr tablet     doxycycline hyclate (VIBRAMYCIN) 100 MG capsule     EQL VITAMIN D3 50 MCG (2000 UT) CAPS     fentaNYL (DURAGESIC) 12 mcg/hr 72 hr patch     fentaNYL (DURAGESIC) 25 mcg/hr 72 hr patch     furosemide (LASIX) 40 MG tablet     gabapentin (NEURONTIN) 300 MG capsule     hydrOXYzine (ATARAX) 25 MG tablet     insulin glargine (LANTUS SOLOSTAR) 100 UNIT/ML pen     insulin pen needle (B-D U/F) 31G X 8 MM miscellaneous     insulin pen needle (ULTICARE SHORT) 31G X 8 MM miscellaneous     lactulose encephalopathy (CHRONULAC) 10 GM/15ML SOLUTION     lidocaine (XYLOCAINE) 5 % external ointment     magnesium oxide (MAG-OX) 400 (241.3 Mg) MG tablet     NOVOLOG FLEXPEN 100 UNIT/ML soln     nystatin (MYCOSTATIN) 497186 UNIT/GM external cream     nystatin-triamcinolone (MYCOLOG II) cream     ondansetron (ZOFRAN) 4 MG tablet     order for DME     order for DME     order for DME     order for DME     oxyCODONE (ROXICODONE) 5 MG tablet     OYSTER SHELL CALCIUM/D 500-200 MG-UNIT per tablet     pantoprazole (PROTONIX) 20 MG EC tablet     QUEtiapine (SEROQUEL) 50 MG tablet     ranitidine (ZANTAC) 150 MG tablet     spironolactone (ALDACTONE) 50 MG tablet     STATIN NOT PRESCRIBED, INTENTIONAL,     triamcinolone (KENALOG) 0.1 % external cream     UNABLE TO FIND     ursodiol (ACTIGALL) 300 MG capsule     VITAMIN E PO     XIFAXAN 550 MG TABS tablet     No current facility-administered medications for this visit.              Allergies:   No Known Allergies         Review of Systems:   10 points ROS was obtained and highlighted in the HPI, otherwise negative.          Physical Exam:     GENERAL: healthy, alert and no distress  EYES: Eyes grossly normal to inspection, conjunctivae and sclerae normal  RESP: no audible wheeze, cough, or visible cyanosis.  No visible retractions or increased work of breathing.  Able to speak fully in complete sentences  NEURO: Cranial nerves grossly intact, mentation intact and speech normal  PSYCH: mentation appears normal, affect normal/bright, judgement and insight intact, normal speech and appearance well-groomed         Data:   Reviewed in person and significant for:    Lab Results   Component Value Date     03/12/2020      Lab Results   Component Value Date    POTASSIUM 3.7 03/12/2020     Lab Results   Component Value Date    CHLORIDE 101 03/12/2020     Lab Results   Component Value Date    CO2 26 03/12/2020     Lab Results   Component Value Date    BUN 17 03/12/2020     Lab Results   Component Value Date    CR 0.91 03/12/2020       Lab Results   Component Value Date    WBC 4.6 03/12/2020     Lab Results   Component Value Date    HGB 7.6 03/12/2020     Lab Results   Component Value Date    HCT 24.2 03/12/2020     Lab Results   Component Value Date    MCV 87 03/12/2020     Lab Results   Component Value Date     03/12/2020       Lab Results   Component Value Date    AST 19 03/12/2020     Lab Results   Component Value Date    ALT 10 03/12/2020     Lab Results   Component Value Date    BILICONJ 0.0 03/12/2014      Lab Results   Component Value Date    BILITOTAL 1.0 03/12/2020       Lab Results   Component Value Date    ALBUMIN 1.9 03/12/2020     Lab Results   Component Value Date    PROTTOTAL 6.4 03/12/2020      Lab Results   Component Value Date    ALKPHOS 178 03/12/2020       Lab Results   Component Value Date    INR 1.28 03/12/2020     CT LIVER WO AND W CONTRAST:   6/26/2020:   1. Cirrhosis  and evidence of portal hypertension. TIPS appears patent.  2. Findings consistent with multifocal HCC. Largest burden of disease  is within the right hepatic lobe where tumor appears to be more  infiltrative with suspected vascular involvement as above although  somewhat difficult to evaluate given the presence of the TIPS with  atretic intrahepatic portal veins and nonenhancing hepatic veins.   3. Posterior left rib fractures demonstrated in various states of  callus formation. Correlation with any trauma to this region. Lytic  lesion with compression fracture and L1 unchanged from recent CT  concerning for possible pathologic fracture. Outside MRI spine  3/23/2020 report available on the electronic medical record. History  of trauma on that exam.  4. Based on this exam only, the patient is not [place] criteria.

## 2020-07-13 NOTE — LETTER
"    7/13/2020         RE: Lawrence Louie  [address] [place] [address]        Dear Colleague,    Thank you for referring your patient, Lawrence Louie, to the [facility] HEPATOLOGY. Please see a copy of my visit note below.    Lawrence Loiue is a 64 year old male who is being evaluated via a billable video visit.      The patient has been notified of following:     \"This video visit will be conducted via a call between you and your physician/provider. We have found that certain health care needs can be provided without the need for an in-person physical exam.  This service lets us provide the care you need with a video conversation.  If a prescription is necessary we can send it directly to your pharmacy.  If lab work is needed we can place an order for that and you can then stop by our lab to have the test done at a later time.    Video visits are billed at different rates depending on your insurance coverage.  Please reach out to your insurance provider with any questions.    If during the course of the call the physician/provider feels a video visit is not appropriate, you will not be charged for this service.\"    Patient has given verbal consent for Video visit? Yes  How would you like to obtain your AVS? MyChart    Will anyone else be joining your video visit? No        Video-Visit Details    Type of service:  Video Visit    Video Start Time: 3:30 pm  Video End Time: 4:35 pm     Originating Location (pt. Location): Home    Distant Location (provider location):  [facility] HEPATOLOGY     Platform used for Video Visit: Akosua Lopez PA-C      Hepatology Follow-up Clinic note  Lawrence Louie   Date of Birth 1955  Date of Service 7/13/2020    Reason for follow-up: ETOH cirrhosis . HCC          Assessment/plan:   Lawrence Louie is a 64 year old male with history of ETOH cirrhosis complicated by history of ascites s/p TIPS (now improved), hepatic encephalopathy and " portal hypertension and newly diagnosed multifocal HCC. Staging with lumbar MRI (evaluating previous lumbar fractures) and Chest CT need to be scheduled. He has been seen interventional radiology who discussed liver directed therapy with chemoembolization, with step wise treatment of lesions. Family had many questions regarding treatment, side effects and his transplant status. Discussed that he is outside [place] criteria for transplant. His functional status has slowly improved as he was previously debilitated due to significant anasarca. He is TIPS is patent and does not need variceal screening with patent TIPS. MELD-Na was 15 one month ago.     # History of hepatic encephalopathy:   - Continue lactulose - dose to 3-5 bowel movements a day. Per family request, trial of kristulose/cephulac    - Continue Rifaximin twice daily     # History of anasarca   - Continue 100 mg spironolactone  - Continue 40 mg furosemide     # HCC, outside [place].   - Follow up with IR as recommended  - Complete staging   - Scheduled to see Oncology next week     # Follow-up in clinic with Dr. Simmons in 3 months or sooner as needed    Yovany Lopez PA-C   [facility] Hepatology clinic    -----------------------------------------------------       HPI:   Lawrence Louie is a 64 year old male presenting for follow-up. He is accompanied by his daughter and wife.     Cirrhosis  - ETOH and SMITH  Complicated by:  Ascites s/p TIPS on April 2018  Hx of HE   EGD: 6/16/2016  HCC: 6/26/2020 - See below     HCC:   Diagnosed: 6/26/2020  CT ABDOMEN:   Lesion 1: inferior right hepatic lobe segment 6 is not well marginated, approximately 4.2 cm  Lesion 2: 4.1 cm area of arterial enhancement in segment 7  Lesion 3: Exophytic arterial enhancing nodule in segment 4A measuring 3.8 cm   AFP: 80    Patient was last seen by me on 6/17/2020. No recent hospitalizations or ER visits. CT Abdomen confirming three/possibly four HCC lesions and is  "considered outside [place]. He is still awaiting staging with MRI of lumbar spine and CT Chest. ID has also ordered a CT of his foot scheduled as sore right foot still looks \"angry.\" History of Pseudomonas stutzeri bacteremia in June 2020 with unclear etiology.     He doesn't have much of an appetite, but eats when meals are prepared for him. His family continues to be very diligent following a low sodium diet. He is losing muscle mass. He does have at least one boost shake throughout the day.     He is moving around much better now that the fluid in his legs is gone. No recent episodes of HE. He continues to take Rifaximin twice daily. He also takes lactulose, but will vary the times of day that he takes due to fecal urgency. He does not wish to take it before home care therapies come over.      Patient denies jaundice.  Patient also denies melena, hematochezia or hematemesis. Patient denies fevers, sweats or chills.    No recent alcohol.     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)      Splenomegaly      Squamous cell carcinoma      Thrombocytopenia (H)      Varices, esophageal (H)     Past Surgical History:   Procedure Laterality Date     BIOPSY OF SKIN LESION       COLONOSCOPY Left 6/16/2016    Procedure: COMBINED COLONOSCOPY, SINGLE OR MULTIPLE BIOPSY/POLYPECTOMY BY BIOPSY;  Surgeon: Brandy Barnett MD;  Location:  GI     ESOPHAGOSCOPY, GASTROSCOPY, DUODENOSCOPY (EGD), COMBINED  2/13/2013    Procedure: COMBINED ESOPHAGOSCOPY, GASTROSCOPY, DUODENOSCOPY (EGD);;  Surgeon: Tara Cook MD;  Location:  GI     ESOPHAGOSCOPY, GASTROSCOPY, DUODENOSCOPY (EGD), COMBINED  11/4/2013    Procedure: COMBINED ESOPHAGOSCOPY, GASTROSCOPY, DUODENOSCOPY (EGD);;  Surgeon: " Lonny Diaz MD;  Location:  GI     ESOPHAGOSCOPY, GASTROSCOPY, DUODENOSCOPY (EGD), COMBINED Left 6/16/2016    Procedure: COMBINED ESOPHAGOSCOPY, GASTROSCOPY, DUODENOSCOPY (EGD), BIOPSY SINGLE OR MULTIPLE;  Surgeon: Brandy Barnett MD;  Location:  GI     EXCISE LESION TRUNK  9/24/2012    Procedure: EXCISE LESION TRUNK;;  Surgeon: Pepe Dominguez MD;  Location: [facility]     GENITOURINARY SURGERY      vasectomy     HERNIORRHAPHY UMBILICAL  9/24/2012    Procedure: HERNIORRHAPHY UMBILICAL;  UMBILICAL HERNIA REPAIR , EXCISION OF PERIUMBILICAL CYST;  Surgeon: Pepe Dominguez MD;  Location: [facility]                 Medications:     Current Outpatient Medications   Medication     blood glucose (NO BRAND SPECIFIED) lancets standard     blood glucose (NO BRAND SPECIFIED) test strip     blood glucose (NO BRAND SPECIFIED) test strip     blood glucose monitoring (NO BRAND SPECIFIED) meter device kit     blood glucose monitoring (NO BRAND SPECIFIED) meter device kit     cefadroxil (DURICEF) 500 MG capsule     childrens multivitamin w/ionr (FLINTSTONES COMPLETE) 60 MG chewable tablet     ciprofloxacin (CIPRO) 500 MG tablet     citalopram (CELEXA) 20 MG tablet     CRANBERRY PO     Cyanocobalamin (VITAMIN B-12 PO)     desvenlafaxine (PRISTIQ) 100 MG 24 hr tablet     desvenlafaxine fumarate 100 MG 24 hr tablet     doxycycline hyclate (VIBRAMYCIN) 100 MG capsule     EQL VITAMIN D3 50 MCG (2000 UT) CAPS     fentaNYL (DURAGESIC) 12 mcg/hr 72 hr patch     fentaNYL (DURAGESIC) 25 mcg/hr 72 hr patch     furosemide (LASIX) 40 MG tablet     gabapentin (NEURONTIN) 300 MG capsule     hydrOXYzine (ATARAX) 25 MG tablet     insulin glargine (LANTUS SOLOSTAR) 100 UNIT/ML pen     insulin pen needle (B-D U/F) 31G X 8 MM miscellaneous     insulin pen needle (ULTICARE SHORT) 31G X 8 MM miscellaneous     lactulose encephalopathy (CHRONULAC) 10 GM/15ML SOLUTION     lidocaine (XYLOCAINE) 5 % external ointment     magnesium oxide (MAG-OX) 400  (241.3 Mg) MG tablet     NOVOLOG FLEXPEN 100 UNIT/ML soln     nystatin (MYCOSTATIN) 691251 UNIT/GM external cream     nystatin-triamcinolone (MYCOLOG II) cream     ondansetron (ZOFRAN) 4 MG tablet     order for DME     order for DME     order for DME     order for DME     oxyCODONE (ROXICODONE) 5 MG tablet     OYSTER SHELL CALCIUM/D 500-200 MG-UNIT per tablet     pantoprazole (PROTONIX) 20 MG EC tablet     QUEtiapine (SEROQUEL) 50 MG tablet     ranitidine (ZANTAC) 150 MG tablet     spironolactone (ALDACTONE) 50 MG tablet     STATIN NOT PRESCRIBED, INTENTIONAL,     triamcinolone (KENALOG) 0.1 % external cream     UNABLE TO FIND     ursodiol (ACTIGALL) 300 MG capsule     VITAMIN E PO     XIFAXAN 550 MG TABS tablet     No current facility-administered medications for this visit.             Allergies:   No Known Allergies         Review of Systems:   10 points ROS was obtained and highlighted in the HPI, otherwise negative.          Physical Exam:     GENERAL: healthy, alert and no distress  EYES: Eyes grossly normal to inspection, conjunctivae and sclerae normal  RESP: no audible wheeze, cough, or visible cyanosis.  No visible retractions or increased work of breathing.  Able to speak fully in complete sentences  NEURO: Cranial nerves grossly intact, mentation intact and speech normal  PSYCH: mentation appears normal, affect normal/bright, judgement and insight intact, normal speech   MUSC: able to get up out of chair by himself, ambulates unassisted          Data:   Reviewed in person and significant for:    Lab Results   Component Value Date     03/12/2020      Lab Results   Component Value Date    POTASSIUM 3.7 03/12/2020     Lab Results   Component Value Date    CHLORIDE 101 03/12/2020     Lab Results   Component Value Date    CO2 26 03/12/2020     Lab Results   Component Value Date    BUN 17 03/12/2020     Lab Results   Component Value Date    CR 0.91 03/12/2020       Lab Results   Component Value Date     WBC 4.6 03/12/2020     Lab Results   Component Value Date    HGB 7.6 03/12/2020     Lab Results   Component Value Date    HCT 24.2 03/12/2020     Lab Results   Component Value Date    MCV 87 03/12/2020     Lab Results   Component Value Date     03/12/2020       Lab Results   Component Value Date    AST 19 03/12/2020     Lab Results   Component Value Date    ALT 10 03/12/2020     Lab Results   Component Value Date    BILICONJ 0.0 03/12/2014      Lab Results   Component Value Date    BILITOTAL 1.0 03/12/2020       Lab Results   Component Value Date    ALBUMIN 1.9 03/12/2020     Lab Results   Component Value Date    PROTTOTAL 6.4 03/12/2020      Lab Results   Component Value Date    ALKPHOS 178 03/12/2020       Lab Results   Component Value Date    INR 1.28 03/12/2020     CT LIVER WO AND W CONTRAST:   6/26/2020:   1. Cirrhosis and evidence of portal hypertension. TIPS appears patent.  2. Findings consistent with multifocal HCC. Largest burden of disease  is within the right hepatic lobe where tumor appears to be more  infiltrative with suspected vascular involvement as above although  somewhat difficult to evaluate given the presence of the TIPS with  atretic intrahepatic portal veins and nonenhancing hepatic veins.   3. Posterior left rib fractures demonstrated in various states of  callus formation. Correlation with any trauma to this region. Lytic  lesion with compression fracture and L1 unchanged from recent CT  concerning for possible pathologic fracture. Outside MRI spine  3/23/2020 report available on the electronic medical record. History  of trauma on that exam.  4. Based on this exam only, the patient is not [place] criteria.    Again, thank you for allowing me to participate in the care of your patient.        Sincerely,        Yovany Lopez PA-C

## 2020-07-13 NOTE — TELEPHONE ENCOUNTER
Spoke to forms staff on-site who states that form is in providers in-box for completion. She will have Dr. Trevizo sign it as she is doc of the day.    Jodi Vera, Toya  7/13/2020 1:24 PM

## 2020-07-13 NOTE — TELEPHONE ENCOUNTER
[facility] Call Center    Phone Message    May a detailed message be left on voicemail: yes     Reason for Call: Order(s): Other:   Reason for requested: Follow up on order for farraow leg piece. Faxed to clinic on 07/06/2020.  Date needed: ASAP - Fax: [phone]  Provider name: Ary Murphy       Action Taken: Message routed to:  [facility] P[facility]    Travel Screening: Not Applicable

## 2020-07-14 ENCOUNTER — TEAM CONFERENCE (OUTPATIENT)
Dept: VASCULAR SURGERY | Facility: CLINIC | Age: 65
End: 2020-07-14

## 2020-07-14 NOTE — PROGRESS NOTES
Hepatology Follow-up Clinic note  Lawrence Louie   Date of Birth 1955  Date of Service 7/13/2020    Reason for follow-up: ETOH cirrhosis . HCC          Assessment/plan:   Lawrence Louie is a 64 year old male with history of ETOH cirrhosis complicated by history of ascites s/p TIPS (now improved), hepatic encephalopathy and portal hypertension and newly diagnosed multifocal HCC. Staging with lumbar MRI (evaluating previous lumbar fractures) and Chest CT need to be scheduled. He has been seen interventional radiology who discussed liver directed therapy with chemoembolization, with step wise treatment of lesions. Family had many questions regarding treatment, side effects and his transplant status. Discussed that he is outside [place] criteria for transplant. His functional status has slowly improved as he was previously debilitated due to significant anasarca. He is TIPS is patent and does not need variceal screening with patent TIPS. MELD-Na was 15 one month ago.     # History of hepatic encephalopathy:   - Continue lactulose - dose to 3-5 bowel movements a day. Per family request, trial of kristulose/cephulac    - Continue Rifaximin twice daily     # History of anasarca   - Continue 100 mg spironolactone  - Continue 40 mg furosemide     # HCC, outside [place].   - Follow up with IR as recommended  - Complete staging   - Scheduled to see Oncology next week     # Follow-up in clinic with Dr. Simmons in 3 months or sooner as needed    Yovany Lopez PA-C   [facility] Hepatology clinic    -----------------------------------------------------       HPI:   Lawrence Louie is a 64 year old male presenting for follow-up. He is accompanied by his daughter and wife.     Cirrhosis  - ETOH and SMITH  Complicated by:  Ascites s/p TIPS on April 2018  Hx of HE   EGD: 6/16/2016  HCC: 6/26/2020 - See below     HCC:   Diagnosed: 6/26/2020  CT ABDOMEN:   Lesion 1: inferior right hepatic lobe segment 6 is  "not well marginated, approximately 4.2 cm  Lesion 2: 4.1 cm area of arterial enhancement in segment 7  Lesion 3: Exophytic arterial enhancing nodule in segment 4A measuring 3.8 cm   AFP: 80    Patient was last seen by me on 6/17/2020. No recent hospitalizations or ER visits. CT Abdomen confirming three/possibly four HCC lesions and is considered outside [place]. He is still awaiting staging with MRI of lumbar spine and CT Chest. ID has also ordered a CT of his foot scheduled as sore right foot still looks \"angry.\" History of Pseudomonas stutzeri bacteremia in June 2020 with unclear etiology.     He doesn't have much of an appetite, but eats when meals are prepared for him. His family continues to be very diligent following a low sodium diet. He is losing muscle mass. He does have at least one boost shake throughout the day.     He is moving around much better now that the fluid in his legs is gone. No recent episodes of HE. He continues to take Rifaximin twice daily. He also takes lactulose, but will vary the times of day that he takes due to fecal urgency. He does not wish to take it before home care therapies come over.      Patient denies jaundice.  Patient also denies melena, hematochezia or hematemesis. Patient denies fevers, sweats or chills.    No recent alcohol.     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)      Splenomegaly      Squamous cell carcinoma      Thrombocytopenia (H)      Varices, esophageal (H)     Past Surgical History:   Procedure Laterality Date     BIOPSY OF SKIN LESION       COLONOSCOPY Left 6/16/2016    Procedure: COMBINED COLONOSCOPY, SINGLE OR MULTIPLE BIOPSY/POLYPECTOMY BY BIOPSY;  Surgeon: Brandy Barnett MD;  Location:  GI     ESOPHAGOSCOPY, " GASTROSCOPY, DUODENOSCOPY (EGD), COMBINED  2/13/2013    Procedure: COMBINED ESOPHAGOSCOPY, GASTROSCOPY, DUODENOSCOPY (EGD);;  Surgeon: Tara Cook MD;  Location: [facility]     ESOPHAGOSCOPY, GASTROSCOPY, DUODENOSCOPY (EGD), COMBINED  11/4/2013    Procedure: COMBINED ESOPHAGOSCOPY, GASTROSCOPY, DUODENOSCOPY (EGD);;  Surgeon: Lonny Diaz MD;  Location: [facility]     ESOPHAGOSCOPY, GASTROSCOPY, DUODENOSCOPY (EGD), COMBINED Left 6/16/2016    Procedure: COMBINED ESOPHAGOSCOPY, GASTROSCOPY, DUODENOSCOPY (EGD), BIOPSY SINGLE OR MULTIPLE;  Surgeon: Brandy Barnett MD;  Location: [facility]     EXCISE LESION TRUNK  9/24/2012    Procedure: EXCISE LESION TRUNK;;  Surgeon: Pepe Dominguez MD;  Location: [facility]     GENITOURINARY SURGERY      vasectomy     HERNIORRHAPHY UMBILICAL  9/24/2012    Procedure: HERNIORRHAPHY UMBILICAL;  UMBILICAL HERNIA REPAIR , EXCISION OF PERIUMBILICAL CYST;  Surgeon: Pepe Dominguez MD;  Location: [facility]                 Medications:     Current Outpatient Medications   Medication     blood glucose (NO BRAND SPECIFIED) lancets standard     blood glucose (NO BRAND SPECIFIED) test strip     blood glucose (NO BRAND SPECIFIED) test strip     blood glucose monitoring (NO BRAND SPECIFIED) meter device kit     blood glucose monitoring (NO BRAND SPECIFIED) meter device kit     cefadroxil (DURICEF) 500 MG capsule     childrens multivitamin w/ionr (FLINTSTONES COMPLETE) 60 MG chewable tablet     ciprofloxacin (CIPRO) 500 MG tablet     citalopram (CELEXA) 20 MG tablet     CRANBERRY PO     Cyanocobalamin (VITAMIN B-12 PO)     desvenlafaxine (PRISTIQ) 100 MG 24 hr tablet     desvenlafaxine fumarate 100 MG 24 hr tablet     doxycycline hyclate (VIBRAMYCIN) 100 MG capsule     EQL VITAMIN D3 50 MCG (2000 UT) CAPS     fentaNYL (DURAGESIC) 12 mcg/hr 72 hr patch     fentaNYL (DURAGESIC) 25 mcg/hr 72 hr patch     furosemide (LASIX) 40 MG tablet     gabapentin (NEURONTIN) 300 MG capsule     hydrOXYzine  (ATARAX) 25 MG tablet     insulin glargine (LANTUS SOLOSTAR) 100 UNIT/ML pen     insulin pen needle (B-D U/F) 31G X 8 MM miscellaneous     insulin pen needle (ULTICARE SHORT) 31G X 8 MM miscellaneous     lactulose encephalopathy (CHRONULAC) 10 GM/15ML SOLUTION     lidocaine (XYLOCAINE) 5 % external ointment     magnesium oxide (MAG-OX) 400 (241.3 Mg) MG tablet     NOVOLOG FLEXPEN 100 UNIT/ML soln     nystatin (MYCOSTATIN) 650946 UNIT/GM external cream     nystatin-triamcinolone (MYCOLOG II) cream     ondansetron (ZOFRAN) 4 MG tablet     order for DME     order for DME     order for DME     order for DME     oxyCODONE (ROXICODONE) 5 MG tablet     OYSTER SHELL CALCIUM/D 500-200 MG-UNIT per tablet     pantoprazole (PROTONIX) 20 MG EC tablet     QUEtiapine (SEROQUEL) 50 MG tablet     ranitidine (ZANTAC) 150 MG tablet     spironolactone (ALDACTONE) 50 MG tablet     STATIN NOT PRESCRIBED, INTENTIONAL,     triamcinolone (KENALOG) 0.1 % external cream     UNABLE TO FIND     ursodiol (ACTIGALL) 300 MG capsule     VITAMIN E PO     XIFAXAN 550 MG TABS tablet     No current facility-administered medications for this visit.             Allergies:   No Known Allergies         Review of Systems:   10 points ROS was obtained and highlighted in the HPI, otherwise negative.          Physical Exam:     GENERAL: healthy, alert and no distress  EYES: Eyes grossly normal to inspection, conjunctivae and sclerae normal  RESP: no audible wheeze, cough, or visible cyanosis.  No visible retractions or increased work of breathing.  Able to speak fully in complete sentences  NEURO: Cranial nerves grossly intact, mentation intact and speech normal  PSYCH: mentation appears normal, affect normal/bright, judgement and insight intact, normal speech   MUSC: able to get up out of chair by himself, ambulates unassisted          Data:   Reviewed in person and significant for:    Lab Results   Component Value Date     03/12/2020      Lab Results    Component Value Date    POTASSIUM 3.7 03/12/2020     Lab Results   Component Value Date    CHLORIDE 101 03/12/2020     Lab Results   Component Value Date    CO2 26 03/12/2020     Lab Results   Component Value Date    BUN 17 03/12/2020     Lab Results   Component Value Date    CR 0.91 03/12/2020       Lab Results   Component Value Date    WBC 4.6 03/12/2020     Lab Results   Component Value Date    HGB 7.6 03/12/2020     Lab Results   Component Value Date    HCT 24.2 03/12/2020     Lab Results   Component Value Date    MCV 87 03/12/2020     Lab Results   Component Value Date     03/12/2020       Lab Results   Component Value Date    AST 19 03/12/2020     Lab Results   Component Value Date    ALT 10 03/12/2020     Lab Results   Component Value Date    BILICONJ 0.0 03/12/2014      Lab Results   Component Value Date    BILITOTAL 1.0 03/12/2020       Lab Results   Component Value Date    ALBUMIN 1.9 03/12/2020     Lab Results   Component Value Date    PROTTOTAL 6.4 03/12/2020      Lab Results   Component Value Date    ALKPHOS 178 03/12/2020       Lab Results   Component Value Date    INR 1.28 03/12/2020     CT LIVER WO AND W CONTRAST:   6/26/2020:   1. Cirrhosis and evidence of portal hypertension. TIPS appears patent.  2. Findings consistent with multifocal HCC. Largest burden of disease  is within the right hepatic lobe where tumor appears to be more  infiltrative with suspected vascular involvement as above although  somewhat difficult to evaluate given the presence of the TIPS with  atretic intrahepatic portal veins and nonenhancing hepatic veins.   3. Posterior left rib fractures demonstrated in various states of  callus formation. Correlation with any trauma to this region. Lytic  lesion with compression fracture and L1 unchanged from recent CT  concerning for possible pathologic fracture. Outside MRI spine  3/23/2020 report available on the electronic medical record. History  of trauma on that exam.  4.  Based on this exam only, the patient is not [place] criteria.

## 2020-07-14 NOTE — TELEPHONE ENCOUNTER
Wife called and informed me that MRI Lumbar/Spine are booked out to Middle of August.   She states that they did call around and there is a [facility] in [place] that has a larger scan.  She would like for the orders to be faxed over so that they can schedule for Mon 7/20.     *orders faxed today  [facility]  [phone]  FAX: [phone]      Wife will call today to f/up and make an appt.    Sunshine TUCKER RN, BSN  Interventional Radiology Nurse Coordinator   Phone: [phone]

## 2020-07-15 LAB
CULTURE, BLOOD - QUEST: NORMAL
CULTURE, BLOOD - QUEST: NORMAL

## 2020-07-17 ENCOUNTER — TRANSFERRED RECORDS (OUTPATIENT)
Dept: HEALTH INFORMATION MANAGEMENT | Facility: CLINIC | Age: 65
End: 2020-07-17

## 2020-07-20 ENCOUNTER — MYC REFILL (OUTPATIENT)
Dept: GASTROENTEROLOGY | Facility: CLINIC | Age: 65
End: 2020-07-20

## 2020-07-20 ENCOUNTER — MYC REFILL (OUTPATIENT)
Dept: INTERNAL MEDICINE | Facility: CLINIC | Age: 65
End: 2020-07-20

## 2020-07-20 DIAGNOSIS — I87.2 VENOUS STASIS DERMATITIS OF BOTH LOWER EXTREMITIES: ICD-10-CM

## 2020-07-20 DIAGNOSIS — F33.9 RECURRENT MAJOR DEPRESSIVE DISORDER, REMISSION STATUS UNSPECIFIED (H): ICD-10-CM

## 2020-07-20 DIAGNOSIS — R11.0 NAUSEA: ICD-10-CM

## 2020-07-20 DIAGNOSIS — F32.89 OTHER DEPRESSION: ICD-10-CM

## 2020-07-20 DIAGNOSIS — Z79.4 TYPE 2 DIABETES MELLITUS WITHOUT COMPLICATION, WITH LONG-TERM CURRENT USE OF INSULIN (H): ICD-10-CM

## 2020-07-20 DIAGNOSIS — E11.9 TYPE 2 DIABETES MELLITUS WITHOUT COMPLICATION, WITH LONG-TERM CURRENT USE OF INSULIN (H): ICD-10-CM

## 2020-07-20 DIAGNOSIS — E11.42 DIABETIC POLYNEUROPATHY ASSOCIATED WITH TYPE 2 DIABETES MELLITUS (H): ICD-10-CM

## 2020-07-20 DIAGNOSIS — E11.9 TYPE 2 DIABETES, HBA1C GOAL < 7% (H): ICD-10-CM

## 2020-07-20 DIAGNOSIS — K76.82 HEPATIC ENCEPHALOPATHY (H): ICD-10-CM

## 2020-07-20 DIAGNOSIS — K70.31 ALCOHOLIC CIRRHOSIS OF LIVER WITH ASCITES (H): ICD-10-CM

## 2020-07-21 ENCOUNTER — PRE VISIT (OUTPATIENT)
Dept: ONCOLOGY | Facility: CLINIC | Age: 65
End: 2020-07-21

## 2020-07-21 ENCOUNTER — VIRTUAL VISIT (OUTPATIENT)
Dept: ONCOLOGY | Facility: CLINIC | Age: 65
End: 2020-07-21
Attending: RADIOLOGY
Payer: COMMERCIAL

## 2020-07-21 VITALS — HEIGHT: 72 IN | BODY MASS INDEX: 29.66 KG/M2 | WEIGHT: 219 LBS

## 2020-07-21 DIAGNOSIS — D64.9 ANEMIA, UNSPECIFIED TYPE: Primary | ICD-10-CM

## 2020-07-21 DIAGNOSIS — C22.0 HCC (HEPATOCELLULAR CARCINOMA) (H): ICD-10-CM

## 2020-07-21 PROCEDURE — 99205 OFFICE O/P NEW HI 60 MIN: CPT | Mod: 95 | Performed by: INTERNAL MEDICINE

## 2020-07-21 RX ORDER — HYDROXYZINE HYDROCHLORIDE 25 MG/1
25 TABLET, FILM COATED ORAL 3 TIMES DAILY PRN
Qty: 90 TABLET | Refills: 3 | Status: SHIPPED | OUTPATIENT
Start: 2020-07-21 | End: 2020-01-01

## 2020-07-21 ASSESSMENT — PAIN SCALES - GENERAL: PAINLEVEL: EXTREME PAIN (9)

## 2020-07-21 ASSESSMENT — MIFFLIN-ST. JEOR: SCORE: 1811.38

## 2020-07-21 NOTE — PATIENT INSTRUCTIONS
Please schedule CT Chest- preferably on 7/23/2020 when he is coming for Bone Scan. Labs the same day.    Follow with Dr Hilton.    We will determine the follow up

## 2020-07-21 NOTE — TELEPHONE ENCOUNTER
gabapentin (NEURONTIN) 300 MG capsule   Last Written Prescription Date:  9/4/2019  Last Fill Quantity: 30,   # refills: 3  Last Office Visit : 6/16/2020  Future Office visit:  Nond  Routing refill request to provider for review/approval because:  Drug not on the [facility], [facility] or  Health refill protocol or controlled substance    oxyCODONE (ROXICODONE) 5 MG tablet   Last Written Prescription Date:  6/25/2020  Last Fill Quantity: 180,   # refills: 3  Last Office Visit : 6/16/2020  Future Office visit:  None  Routing refill request to provider for review/approval because:  Drug not on the [facility], [facility] or  Health refill protocol or controlled substance    desvenlafaxine (PRISTIQ) 100 MG 24 hr tablet   Last Written Prescription Date:  1/31/2020  Last Fill Quantity: 180,   # refills: 1  Last Office Visit : 6/16/2020  Future Office visit:  None  Routing refill request to provider for review/approval because:  No PHQ-9 on file??   Is this not needed for this med?  Or, is the clinic not doing PHQ-9's anymore?         insulin aspart (NOVOLOG FLEXPEN) 100 UNIT/ML pen   Last Written Prescription Date:  12/24/2019  Last Fill Quantity: 30,   # refills: 2  Last Office Visit : 6/16/2020  Future Office visit:  None  Routing refill request to provider for review/approval because:  Ok to increase refills to current order due to continuous refill request for Pt care?    citalopram (CELEXA) 20 MG tablet   Last Written Prescription Date:  1/31/2020  Last Fill Quantity: 90,   # refills: 1  Last Office Visit : 6/16/2020  Future Office visit:  None  Routing refill request to provider for review/approval because:  Non PHQ-9 on file??   Is this not needed for this med?  Or, is the clinic not doing PHQ-9's anymore?     ondansetron (ZOFRAN) 4 MG tablet   Last Written Prescription Date:  4/6/2020  Last Fill Quantity: 18,   # refills: 1  Last Office Visit : 6/16/2020  Future Office visit:  None  Routing refill request to provider for review/approval  because:  Ok to increase tab count and refills due to continuous refill request for Pt care?    Nessa Martinez RN  Central Triage Red Flags/Med Refills        blood glucose (NO BRAND SPECIFIED) test strip   Last Written Prescription Date:  5/15/2020  Last Fill Quantity: 200,   # refills: 3  Last Office Visit : 6/16/2020  Future Office visit:  None  200 each, 3 Refills sent to pharm 7/21/2020    hydrOXYzine (ATARAX) 25 MG tablet   Last Written Prescription Date:  3/3/2020  Last Fill Quantity: 90,   # refills: 3  Last Office Visit : 6/16/2020  Future Office visit:  None  90 Tabs, 3 Refills sent to pharm 7/21/2020

## 2020-07-21 NOTE — PROGRESS NOTES
"Lawrence Louie is a 64 year old male who is being evaluated via a billable video visit.      The patient has been notified of following:     \"This video visit will be conducted via a call between you and your physician/provider. We have found that certain health care needs can be provided without the need for an in-person physical exam.  This service lets us provide the care you need with a video conversation.  If a prescription is necessary we can send it directly to your pharmacy.  If lab work is needed we can place an order for that and you can then stop by our lab to have the test done at a later time.    Video visits are billed at different rates depending on your insurance coverage.  Please reach out to your insurance provider with any questions.    If during the course of the call the physician/provider feels a video visit is not appropriate, you will not be charged for this service.\"    Patient has given verbal consent for Video visit? Yes  How would you like to obtain your AVS? MyChart  If you are dropped from the video visit, the video invite should be resent to: Text to cell phone: [phone]  Will anyone else be joining your video visit? Yes, wife Raven     "

## 2020-07-21 NOTE — PROGRESS NOTES
Oncology initial visit:  Date on this visit: 7/21/2020    Lawrence Louie  is referred by Dr.Jafar Hilton for an oncology consultation. He requires evaluation for new diagnosis of HCC.    Primary Physician: Ary Murphy       Nursing Notes:   Cate Perez LPN  7/21/2020  1:49 PM  Signed  SYMPTOM QUESTIONNAIRE    Pain: 9/10- back    Nausea/Vomiting: nausea occasionally- will lay down and sleep to relieve nausea    Mouth Sores: no    Shortness of Breath: no    Smoking: yes    Fever or Chills: no    Hard Stools: yes, d/t pain meds- takes Lactilose    Soft Stools: no    Weight Loss: yes- 15 pounds in the last couple months    Weakness: yes    Burning, numbness or tingling in hands or feet: tingling in fingers    Problems with skin or swelling: persistent ulcers     Memory Loss: no    Anxiety or Depression: yes    Trouble Sleeping: no    Cate Perez LPN on 7/21/2020 at 1:48 PM    History Of Present Illness:      This is a video visit. Patient's wife, Raven and daughter,Kristi are also present. Mr. Louie is a 64 year old male who presents with new diagnosis of HCC.    He has hx of etoh cirrhosis diagnosed in 2012, hx of esophageal varices bleeding, hepatic encephalopathy, ascites, s/p TIPS procedure in 2017, and hx of leg swellings/ulcer/several episodes of infections from it.  He has chronic back pain from previous falls/vertebral fractures. Continues to have b/l leg swellings/blisters, but it has been somewhat better. No new swellings. He uses a walker because of chronic balance issues for several years. No recent fevers. He occasionally notices tingling/numbness in fingers. Denies recent abnormal bleeding. He does bruise easily. Has some dyspnea on exertion. He has low energy and rests most of the time. Can walk a little but for the most part does not do much physical activities. He does have memory issues especially short term memory issues and has some cognition/confusion at times.     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)      Splenomegaly      Squamous cell carcinoma      Thrombocytopenia (H)      Varices, esophageal (H)     Banded in 2011     Past Surgical History:   Procedure Laterality Date     BIOPSY OF SKIN LESION       COLONOSCOPY Left 6/16/2016    Procedure: COMBINED COLONOSCOPY, SINGLE OR MULTIPLE BIOPSY/POLYPECTOMY BY BIOPSY;  Surgeon: Brandy Barnett MD;  Location:  GI     ESOPHAGOSCOPY, GASTROSCOPY, DUODENOSCOPY (EGD), COMBINED  2/13/2013    Procedure: COMBINED ESOPHAGOSCOPY, GASTROSCOPY, DUODENOSCOPY (EGD);;  Surgeon: Tara Cook MD;  Location:  GI     ESOPHAGOSCOPY, GASTROSCOPY, DUODENOSCOPY (EGD), COMBINED  11/4/2013    Procedure: COMBINED ESOPHAGOSCOPY, GASTROSCOPY, DUODENOSCOPY (EGD);;  Surgeon: Lonny Diaz MD;  Location:  GI     ESOPHAGOSCOPY, GASTROSCOPY, DUODENOSCOPY (EGD), COMBINED Left 6/16/2016    Procedure: COMBINED ESOPHAGOSCOPY, GASTROSCOPY, DUODENOSCOPY (EGD), BIOPSY SINGLE OR MULTIPLE;  Surgeon: Brandy Barnett MD;  Location:  GI     EXCISE LESION TRUNK  9/24/2012    Procedure: EXCISE LESION TRUNK;;  Surgeon: Pepe Dominguez MD;  Location: [facility]     GENITOURINARY SURGERY      vasectomy     HERNIORRHAPHY UMBILICAL  9/24/2012    Procedure: HERNIORRHAPHY UMBILICAL;  UMBILICAL HERNIA REPAIR , EXCISION OF PERIUMBILICAL CYST;  Surgeon: Pepe Dominguez MD;  Location: [facility]     Cancer History:   As above    Allergies:  Allergies as of 07/21/2020     (No Known Allergies)     Current Medications:  Current Outpatient Medications   Medication Sig Dispense Refill     blood glucose (NO BRAND SPECIFIED) lancets standard Use to test blood  sugar 4 X  times daily or as directed. 1 Box 3     blood glucose (NO BRAND SPECIFIED) test strip Use to test blood sugars 4 X  times daily or as directed 200 strip 3     blood glucose (NO BRAND SPECIFIED) test strip Use to test blood sugar 4 times daily or as directed. 120 strip 3     blood glucose monitoring (NO BRAND SPECIFIED) meter device kit Use to test blood sugar 4 times daily or as directed. Before meals and snack at HS. 1 kit 1     blood glucose monitoring (NO BRAND SPECIFIED) meter device kit Use to test blood sugar 4 X  times daily or as directed. 1 kit 0     childrens multivitamin w/ionr (FLINTSTONES COMPLETE) 60 MG chewable tablet Take 1 chew tab by mouth daily       citalopram (CELEXA) 20 MG tablet Take 1 tablet (20 mg) by mouth daily 90 tablet 1     CRANBERRY PO Take 1 capsule by mouth 3 times daily as needed       Cyanocobalamin (VITAMIN B-12 PO) Take 1 tablet by mouth daily       desvenlafaxine (PRISTIQ) 100 MG 24 hr tablet Take 2 tablets (200 mg) by mouth daily 180 tablet 1     desvenlafaxine fumarate 100 MG 24 hr tablet Take 100 mg by mouth daily       EQL VITAMIN D3 50 MCG (2000 UT) CAPS Take 1 capsule by mouth daily 90 capsule 2     furosemide (LASIX) 40 MG tablet Take 40 mg once daily 90 tablet 3     insulin glargine (LANTUS SOLOSTAR) 100 UNIT/ML pen Inject 5 units under the skin every morning. 9 mL 2     insulin pen needle (B-D U/F) 31G X 8 MM miscellaneous USE  6 times daily / OR AS DIRECTED 300 each 3     insulin pen needle (ULTICARE SHORT) 31G X 8 MM miscellaneous Use UP to 6 times daily or as directed 300 each 3     lactulose (CEPHULAC) 20 GM packet Take 1 packet (20 g) by mouth 2 times daily 60 packet 0     lactulose encephalopathy (CHRONULAC) 10 GM/15ML SOLUTION TAKE 30 MLS BY MOUTH 2 TIMES DAILY  TITRATE AS NEEDED TO ACHIEVE 3-5 BOWEL MOVEMENTS DAILY. 1892 mL 11     lidocaine (XYLOCAINE) 5 % external ointment Apply topically as needed for moderate pain Apply to wounds twice daily as  needed. 50 g 1     magnesium oxide (MAG-OX) 400 (241.3 Mg) MG tablet Take 1 tablet (400 mg) by mouth daily 90 tablet 1     NOVOLOG FLEXPEN 100 UNIT/ML soln INJECT 20 UNITS UNDER THE SKIN BEFORE BREAKFAST, 20 UNITS BEFORE LUNCH, 20 UNITS BEFORE DINNER, and 20 UNITS BEFORE SNACKS.  30 mL 2     nystatin (MYCOSTATIN) 122989 UNIT/GM external cream Apply topically 2 times daily 30 g 11     ondansetron (ZOFRAN) 4 MG tablet Take 1 tablet (4 mg) by mouth every 8 hours as needed for nausea 18 tablet 1     order for DME Equipment being ordered:Orthopedic shoes 2 Units 0     oxyCODONE (ROXICODONE) 5 MG tablet TAKE 1 TO 2 TABLETS BY MOUTH EVERY 8 HOURS AS NEEDED.  No other narcotics are to be used with this medication.  180 tablet 0     OYSTER SHELL CALCIUM/D 500-200 MG-UNIT per tablet Take 1 tablet by mouth daily 90 tablet 3     QUEtiapine (SEROQUEL) 50 MG tablet Take 50 mg by mouth At Bedtime       rifaximin (XIFAXAN) 550 MG TABS tablet Take 1 tablet (550 mg) by mouth 2 times daily 60 tablet 11     spironolactone (ALDACTONE) 50 MG tablet Take 2 tablets (100 mg) by mouth daily 180 tablet 3     UNABLE TO FIND MEDICATION NAME: Burdock root       ursodiol (ACTIGALL) 300 MG capsule Take 3 capsules (900 mg) by mouth 2 times daily 180 capsule 11     VITAMIN E PO Take 1 capsule by mouth daily       cefadroxil (DURICEF) 500 MG capsule Take 1 capsule (500 mg) by mouth 2 times daily (Patient not taking: Reported on 7/8/2020) 28 capsule 0     ciprofloxacin (CIPRO) 500 MG tablet Take 1 tablet (500 mg) by mouth 2 times daily (Patient not taking: Reported on 7/8/2020) 28 tablet 0     doxycycline hyclate (VIBRAMYCIN) 100 MG capsule Take 1 capsule (100 mg) by mouth 2 times daily (Patient not taking: Reported on 7/8/2020) 20 capsule 0     fentaNYL (DURAGESIC) 12 mcg/hr 72 hr patch Place 2 patches onto the skin every 72 hours remove old patch. (Patient not taking: Reported on 7/8/2020) 4 patch 0     fentaNYL (DURAGESIC) 25 mcg/hr 72 hr patch  Place 1 patch onto the skin every 72 hours remove old patch. (Patient not taking: Reported on 7/8/2020) 3 patch 0     gabapentin (NEURONTIN) 300 MG capsule Take 1 capsule (300 mg) by mouth At Bedtime (Patient not taking: Reported on 7/13/2020) 30 capsule 3     hydrOXYzine (ATARAX) 25 MG tablet Take 1 tablet (25 mg) by mouth 3 times daily as needed for itching 90 tablet 3     nystatin-triamcinolone (MYCOLOG II) cream Apply topically 2 times daily as needed (itching) (Patient not taking: Reported on 7/13/2020) 30 g 3     order for DME Equipment being ordered: Condom catheter (Patient not taking: Reported on 7/13/2020) 1 Device 3     order for DME Equipment being ordered:BioTab compression pants pneumatic system (Patient not taking: Reported on 7/13/2020) 1 Piece 0     order for DME Equipment being ordered:bilateral pneumatic leg massage for treatment of extreme bilateral lower leg edema. (Patient not taking: Reported on 7/13/2020) 2 pump 0     pantoprazole (PROTONIX) 20 MG EC tablet Take 2 tablets (40 mg) by mouth daily (Patient not taking: Reported on 7/13/2020) 90 tablet 3     ranitidine (ZANTAC) 150 MG tablet Take 1 tablet (150 mg) by mouth 2 times daily (Patient not taking: Reported on 7/8/2020) 180 tablet 3     STATIN NOT PRESCRIBED, INTENTIONAL, 1 each daily Statin not prescribed intentionally due to Active liver disease (Patient not taking: Reported on 7/13/2020) 0 each 0     triamcinolone (KENALOG) 0.1 % external cream Apply topically 2 times daily as needed for irritation (Patient not taking: Reported on 7/8/2020) 453.6 g 3      Family History:  Family History   Problem Relation Age of Onset     Breast Cancer Mother      Liver Cancer Mother      Cardiovascular Father      Cerebrovascular Disease Father         very low blood pressure     Cancer Father         rectal cancer     Cardiovascular Paternal Grandfather      Diabetes Brother      Cancer Sister         skin cancer     C.A.D. Other         MI, 70's      Breast Cancer Sister      Thyroid Disease No family hx of      Lipids No family hx of      Anesthesia Reaction No family hx of      Sister had breast cancer.  Mom had breast cancer.  Dad had colon cancer.  3 children- all healthy      Social History:  Social History     Socioeconomic History     Marital status:      Spouse name: Not on file     Number of children: Not on file     Years of education: Not on file     Highest education level: Not on file   Occupational History     Not on file   Social Needs     Financial resource strain: Not on file     Food insecurity     Worry: Not on file     Inability: Not on file     Transportation needs     Medical: Not on file     Non-medical: Not on file   Tobacco Use     Smoking status: Current Every Day Smoker     Packs/day: 0.30     Types: Cigarettes     Last attempt to quit: 4/10/2019     Years since quittin.2     Smokeless tobacco: Never Used     Tobacco comment: about 4 cigarettes per day   Substance and Sexual Activity     Alcohol use: No     Alcohol/week: 0.0 standard drinks     Comment: 2-3 per day , none since Dec 2012     Drug use: No     Sexual activity: Yes     Partners: Female     Birth control/protection: Surgical   Lifestyle     Physical activity     Days per week: Not on file     Minutes per session: Not on file     Stress: Not on file   Relationships     Social connections     Talks on phone: Not on file     Gets together: Not on file     Attends Orthodoxy service: Not on file     Active member of club or organization: Not on file     Attends meetings of clubs or organizations: Not on file     Relationship status: Not on file     Intimate partner violence     Fear of current or ex partner: Not on file     Emotionally abused: Not on file     Physically abused: Not on file     Forced sexual activity: Not on file   Other Topics Concern     Parent/sibling w/ CABG, MI or angioplasty before 65F 55M? Not Asked      Service No     Blood  Transfusions No     Caffeine Concern No     Comment: very little coffee, likes beer     Occupational Exposure No     Hobby Hazards No     Sleep Concern Yes     Comment: bed at 3:30 AM, up at 1:00 PM     Stress Concern Yes     Weight Concern Yes     Special Diet No     Back Care No     Exercise No     Bike Helmet No     Seat Belt Yes     Self-Exams No   Social History Narrative    . 3 grown daughters. He has been sober since 2012.   used to be a heavy alcohol drinker but quit in Dec 2012.  Continues to smoke few cigarettes daily and previously used to have a pack a day. Lives alone but his wife and daughter live close by and they come to visit frequently.     Physical Exam:  Ht 1.829 m (6')   Wt 99.3 kg (219 lb)   BMI 29.70 kg/m       Wt Readings from Last 4 Encounters:   07/21/20 99.3 kg (219 lb)   05/21/20 106.6 kg (235 lb 1.6 oz)   03/12/20 112.9 kg (248 lb 14.4 oz)   11/18/19 104.5 kg (230 lb 4.8 oz)       Constitutional.  Does not seem to be in any acute distress.  Eyes.  No redness or discharge noted.  Respiratory.  Speaking in full sentences.  Breathing seems comfortable without any accessory use of muscles.    Skin.  Visualized his skin does not show any obvious rashes.  Musculoskeletal.  Range of motion for visualized areas is intact.  Neurological.  Alert and oriented x3.  Psychiatric.  He is a little tangential in his thought but overall decision making capacity is intact.      The rest of a comprehensive physical examination is deferred due to Public Health Emergency video visit restrictions.    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    I recommend CT Chest and Bone Scan    We will get the images/report of recent MRI of spine.    We will check baseline labs including AFP.    Plan to do liver directed therapy by Dr Hilton if no evidence of metastatic disease. He plans to do it as a staged procedure.      Cytopenias- likely related to cirrhosis, portal HTN and splenomegaly. Will check smear, iron studies/EPO/B12/folate.    Smoking. We discussed importance of smoking cessation.     Discussion regarding health care directive.  We discussed that it is important that he completes health care directive which would help in making sure that his wishes are followed about his treatment care in case he is not able to make a decision for himself.    We will determine the follow up accordingly.    All of his and his family's questions were answered to their satisfaction and they are agreeable and comfortable with the plan.    Video Start time. 2:03 PM  Video Stop time. 3:05 PM

## 2020-07-21 NOTE — NURSING NOTE
SYMPTOM QUESTIONNAIRE    Pain: 9/10- back    Nausea/Vomiting: nausea occasionally- will lay down and sleep to relieve nausea    Mouth Sores: no    Shortness of Breath: no    Smoking: yes    Fever or Chills: no    Hard Stools: yes, d/t pain meds- takes Lactilose    Soft Stools: no    Weight Loss: yes- 15 pounds in the last couple months    Weakness: yes    Burning, numbness or tingling in hands or feet: tingling in fingers    Problems with skin or swelling: persistent ulcers     Memory Loss: no    Anxiety or Depression: yes    Trouble Sleeping: no    Cate Perez LPN on 7/21/2020 at 1:48 PM

## 2020-07-21 NOTE — LETTER
"    7/21/2020         RE: Lawrence Louie  [address]  [place] [address]        Dear Colleague,    Thank you for referring your patient, Lawrence Louie, to the [facility]. Please see a copy of my visit note below.    Lawrence Louie is a 64 year old male who is being evaluated via a billable video visit.      The patient has been notified of following:     \"This video visit will be conducted via a call between you and your physician/provider. We have found that certain health care needs can be provided without the need for an in-person physical exam.  This service lets us provide the care you need with a video conversation.  If a prescription is necessary we can send it directly to your pharmacy.  If lab work is needed we can place an order for that and you can then stop by our lab to have the test done at a later time.    Video visits are billed at different rates depending on your insurance coverage.  Please reach out to your insurance provider with any questions.    If during the course of the call the physician/provider feels a video visit is not appropriate, you will not be charged for this service.\"    Patient has given verbal consent for Video visit? Yes  How would you like to obtain your AVS? MyChart  If you are dropped from the video visit, the video invite should be resent to: Text to cell phone: [phone]  Will anyone else be joining your video visit? Yes, wife Raven       Oncology initial visit:  Date on this visit: 7/21/2020    Lawrence Louie  is referred by Dr.Jafar Hilton for an oncology consultation. He requires evaluation for new diagnosis of HCC.    Primary Physician: Ary Murphy       Nursing Notes:   Cate Perez LPN  7/21/2020  1:49 PM  Signed  SYMPTOM QUESTIONNAIRE    Pain: 9/10- back    Nausea/Vomiting: nausea occasionally- will lay down and sleep to relieve nausea    Mouth Sores: no    Shortness of Breath: no    Smoking: " yes    Fever or Chills: no    Hard Stools: yes, d/t pain meds- takes Lactilose    Soft Stools: no    Weight Loss: yes- 15 pounds in the last couple months    Weakness: yes    Burning, numbness or tingling in hands or feet: tingling in fingers    Problems with skin or swelling: persistent ulcers     Memory Loss: no    Anxiety or Depression: yes    Trouble Sleeping: no    April ChrisNAE on 7/21/2020 at 1:48 PM    History Of Present Illness:      This is a video visit. Patient's wife, Raven and daughter,Kristi are also present. Mr. Louie is a 64 year old male who presents with new diagnosis of HCC.    He has hx of etoh cirrhosis diagnosed in 2012, hx of esophageal varices bleeding, hepatic encephalopathy, ascites, s/p TIPS procedure in 2017, and hx of leg swellings/ulcer/several episodes of infections from it.  He has chronic back pain from previous falls/vertebral fractures. Continues to have b/l leg swellings/blisters, but it has been somewhat better. No new swellings. He uses a walker because of chronic balance issues for several years. No recent fevers. He occasionally notices tingling/numbness in fingers. Denies recent abnormal bleeding. He does bruise easily. Has some dyspnea on exertion. He has low energy and rests most of the time. Can walk a little but for the most part does not do much physical activities. He does have memory issues especially short term memory issues and has some cognition/confusion at times.     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)      Splenomegaly      Squamous cell carcinoma      Thrombocytopenia (H)      Varices, esophageal (H)     Banded in 2011     Past Surgical History:   Procedure  Laterality Date     BIOPSY OF SKIN LESION       COLONOSCOPY Left 6/16/2016    Procedure: COMBINED COLONOSCOPY, SINGLE OR MULTIPLE BIOPSY/POLYPECTOMY BY BIOPSY;  Surgeon: Brandy Barnett MD;  [facility]     ESOPHAGOSCOPY, GASTROSCOPY, DUODENOSCOPY (EGD), COMBINED  2/13/2013    Procedure: COMBINED ESOPHAGOSCOPY, GASTROSCOPY, DUODENOSCOPY (EGD);;  Surgeon: Tara Cook MD;  Location: [facility]     ESOPHAGOSCOPY, GASTROSCOPY, DUODENOSCOPY (EGD), COMBINED  11/4/2013    Procedure: COMBINED ESOPHAGOSCOPY, GASTROSCOPY, DUODENOSCOPY (EGD);;  Surgeon: Lonny Diaz MD;  [facility]: [facility]     ESOPHAGOSCOPY, GASTROSCOPY, DUODENOSCOPY (EGD), COMBINED Left 6/16/2016    Procedure: COMBINED ESOPHAGOSCOPY, GASTROSCOPY, DUODENOSCOPY (EGD), BIOPSY SINGLE OR MULTIPLE;  Surgeon: Brandy Barnett MD;  Location:  [facility]     EXCISE LESION TRUNK  9/24/2012    Procedure: EXCISE LESION TRUNK;;  Surgeon: Pepe Dominguez MD;  Location: [facility]     GENITOURINARY SURGERY      vasectomy     HERNIORRHAPHY UMBILICAL  9/24/2012    Procedure: HERNIORRHAPHY UMBILICAL;  UMBILICAL HERNIA REPAIR , EXCISION OF PERIUMBILICAL CYST;  Surgeon: Pepe Dominguez MD;  Location: [facility]     Cancer History:   As above    Allergies:  Allergies as of 07/21/2020     (No Known Allergies)     Current Medications:  Current Outpatient Medications   Medication Sig Dispense Refill     blood glucose (NO BRAND SPECIFIED) lancets standard Use to test blood sugar 4 X  times daily or as directed. 1 Box 3     blood glucose (NO BRAND SPECIFIED) test strip Use to test blood sugars 4 X  times daily or as directed 200 strip 3     blood glucose (NO BRAND SPECIFIED) test strip Use to test blood sugar 4 times daily or as directed. 120 strip 3     blood glucose monitoring (NO BRAND SPECIFIED) meter device kit Use to test blood sugar 4 times daily or as directed. Before meals and snack at HS. 1 kit 1     blood glucose monitoring (NO BRAND SPECIFIED) meter  device kit Use to test blood sugar 4 X  times daily or as directed. 1 kit 0     childrens multivitamin w/ionr (FLINTSTONES COMPLETE) 60 MG chewable tablet Take 1 chew tab by mouth daily       citalopram (CELEXA) 20 MG tablet Take 1 tablet (20 mg) by mouth daily 90 tablet 1     CRANBERRY PO Take 1 capsule by mouth 3 times daily as needed       Cyanocobalamin (VITAMIN B-12 PO) Take 1 tablet by mouth daily       desvenlafaxine (PRISTIQ) 100 MG 24 hr tablet Take 2 tablets (200 mg) by mouth daily 180 tablet 1     desvenlafaxine fumarate 100 MG 24 hr tablet Take 100 mg by mouth daily       EQL VITAMIN D3 50 MCG (2000 UT) CAPS Take 1 capsule by mouth daily 90 capsule 2     furosemide (LASIX) 40 MG tablet Take 40 mg once daily 90 tablet 3     insulin glargine (LANTUS SOLOSTAR) 100 UNIT/ML pen Inject 5 units under the skin every morning. 9 mL 2     insulin pen needle (B-D U/F) 31G X 8 MM miscellaneous USE  6 times daily / OR AS DIRECTED 300 each 3     insulin pen needle (ULTICARE SHORT) 31G X 8 MM miscellaneous Use UP to 6 times daily or as directed 300 each 3     lactulose (CEPHULAC) 20 GM packet Take 1 packet (20 g) by mouth 2 times daily 60 packet 0     lactulose encephalopathy (CHRONULAC) 10 GM/15ML SOLUTION TAKE 30 MLS BY MOUTH 2 TIMES DAILY  TITRATE AS NEEDED TO ACHIEVE 3-5 BOWEL MOVEMENTS DAILY. 1892 mL 11     lidocaine (XYLOCAINE) 5 % external ointment Apply topically as needed for moderate pain Apply to wounds twice daily as needed. 50 g 1     magnesium oxide (MAG-OX) 400 (241.3 Mg) MG tablet Take 1 tablet (400 mg) by mouth daily 90 tablet 1     NOVOLOG FLEXPEN 100 UNIT/ML soln INJECT 20 UNITS UNDER THE SKIN BEFORE BREAKFAST, 20 UNITS BEFORE LUNCH, 20 UNITS BEFORE DINNER, and 20 UNITS BEFORE SNACKS.  30 mL 2     nystatin (MYCOSTATIN) 769339 UNIT/GM external cream Apply topically 2 times daily 30 g 11     ondansetron (ZOFRAN) 4 MG tablet Take 1 tablet (4 mg) by mouth every 8 hours as needed for nausea 18 tablet 1      order for DME Equipment being ordered:Orthopedic shoes 2 Units 0     oxyCODONE (ROXICODONE) 5 MG tablet TAKE 1 TO 2 TABLETS BY MOUTH EVERY 8 HOURS AS NEEDED.  No other narcotics are to be used with this medication.  180 tablet 0     OYSTER SHELL CALCIUM/D 500-200 MG-UNIT per tablet Take 1 tablet by mouth daily 90 tablet 3     QUEtiapine (SEROQUEL) 50 MG tablet Take 50 mg by mouth At Bedtime       rifaximin (XIFAXAN) 550 MG TABS tablet Take 1 tablet (550 mg) by mouth 2 times daily 60 tablet 11     spironolactone (ALDACTONE) 50 MG tablet Take 2 tablets (100 mg) by mouth daily 180 tablet 3     UNABLE TO FIND MEDICATION NAME: Burdock root       ursodiol (ACTIGALL) 300 MG capsule Take 3 capsules (900 mg) by mouth 2 times daily 180 capsule 11     VITAMIN E PO Take 1 capsule by mouth daily       cefadroxil (DURICEF) 500 MG capsule Take 1 capsule (500 mg) by mouth 2 times daily (Patient not taking: Reported on 7/8/2020) 28 capsule 0     ciprofloxacin (CIPRO) 500 MG tablet Take 1 tablet (500 mg) by mouth 2 times daily (Patient not taking: Reported on 7/8/2020) 28 tablet 0     doxycycline hyclate (VIBRAMYCIN) 100 MG capsule Take 1 capsule (100 mg) by mouth 2 times daily (Patient not taking: Reported on 7/8/2020) 20 capsule 0     fentaNYL (DURAGESIC) 12 mcg/hr 72 hr patch Place 2 patches onto the skin every 72 hours remove old patch. (Patient not taking: Reported on 7/8/2020) 4 patch 0     fentaNYL (DURAGESIC) 25 mcg/hr 72 hr patch Place 1 patch onto the skin every 72 hours remove old patch. (Patient not taking: Reported on 7/8/2020) 3 patch 0     gabapentin (NEURONTIN) 300 MG capsule Take 1 capsule (300 mg) by mouth At Bedtime (Patient not taking: Reported on 7/13/2020) 30 capsule 3     hydrOXYzine (ATARAX) 25 MG tablet Take 1 tablet (25 mg) by mouth 3 times daily as needed for itching 90 tablet 3     nystatin-triamcinolone (MYCOLOG II) cream Apply topically 2 times daily as needed (itching) (Patient not taking: Reported on  7/13/2020) 30 g 3     order for DME Equipment being ordered: Condom catheter (Patient not taking: Reported on 7/13/2020) 1 Device 3     order for DME Equipment being ordered:BioTab compression pants pneumatic system (Patient not taking: Reported on 7/13/2020) 1 Piece 0     order for DME Equipment being ordered:bilateral pneumatic leg massage for treatment of extreme bilateral lower leg edema. (Patient not taking: Reported on 7/13/2020) 2 pump 0     pantoprazole (PROTONIX) 20 MG EC tablet Take 2 tablets (40 mg) by mouth daily (Patient not taking: Reported on 7/13/2020) 90 tablet 3     ranitidine (ZANTAC) 150 MG tablet Take 1 tablet (150 mg) by mouth 2 times daily (Patient not taking: Reported on 7/8/2020) 180 tablet 3     STATIN NOT PRESCRIBED, INTENTIONAL, 1 each daily Statin not prescribed intentionally due to Active liver disease (Patient not taking: Reported on 7/13/2020) 0 each 0     triamcinolone (KENALOG) 0.1 % external cream Apply topically 2 times daily as needed for irritation (Patient not taking: Reported on 7/8/2020) 453.6 g 3      Family History:  Family History   Problem Relation Age of Onset     Breast Cancer Mother      Liver Cancer Mother      Cardiovascular Father      Cerebrovascular Disease Father         very low blood pressure     Cancer Father         rectal cancer     Cardiovascular Paternal Grandfather      Diabetes Brother      Cancer Sister         skin cancer     C.A.D. Other         MI, 70's     Breast Cancer Sister      Thyroid Disease No family hx of      Lipids No family hx of      Anesthesia Reaction No family hx of      Sister had breast cancer.  Mom had breast cancer.  Dad had colon cancer.  3 children- all healthy      Social History:  Social History     Socioeconomic History     Marital status:      Spouse name: Not on file     Number of children: Not on file     Years of education: Not on file     Highest education level: Not on file   Occupational History     Not on file    Social Needs     Financial resource strain: Not on file     Food insecurity     Worry: Not on file     Inability: Not on file     Transportation needs     Medical: Not on file     Non-medical: Not on file   Tobacco Use     Smoking status: Current Every Day Smoker     Packs/day: 0.30     Types: Cigarettes     Last attempt to quit: 4/10/2019     Years since quittin.2     Smokeless tobacco: Never Used     Tobacco comment: about 4 cigarettes per day   Substance and Sexual Activity     Alcohol use: No     Alcohol/week: 0.0 standard drinks     Comment: 2-3 per day , none since Dec 2012     Drug use: No     Sexual activity: Yes     Partners: Female     Birth control/protection: Surgical   Lifestyle     Physical activity     Days per week: Not on file     Minutes per session: Not on file     Stress: Not on file   Relationships     Social connections     Talks on phone: Not on file     Gets together: Not on file     Attends [employer] service: Not on file     Active member of club or organization: Not on file     Attends meetings of clubs or organizations: Not on file     Relationship status: Not on file     Intimate partner violence     Fear of current or ex partner: Not on file     Emotionally abused: Not on file     Physically abused: Not on file     Forced sexual activity: Not on file   Other Topics Concern     Parent/sibling w/ CABG, MI or angioplasty before 65F 55M? Not Asked      Service No     Blood Transfusions No     Caffeine Concern No     Comment: very little coffee, likes beer     Occupational Exposure No     Hobby Hazards No     Sleep Concern Yes     Comment: bed at 3:30 AM, up at 1:00 PM     Stress Concern Yes     Weight Concern Yes     Special Diet No     Back Care No     Exercise No     Bike Helmet No     Seat Belt Yes     Self-Exams No   Social History Narrative    . 3 grown daughters. He has been sober since .   used to be a heavy alcohol drinker but quit in Dec 2012.  Continues to  smoke few cigarettes daily and previously used to have a pack a day. Lives alone but his wife and daughter live close by and they come to visit frequently.     Physical Exam:  Ht 1.829 m (6')   Wt 99.3 kg (219 lb)   BMI 29.70 kg/m       Wt Readings from Last 4 Encounters:   07/21/20 99.3 kg (219 lb)   05/21/20 106.6 kg (235 lb 1.6 oz)   03/12/20 112.9 kg (248 lb 14.4 oz)   11/18/19 104.5 kg (230 lb 4.8 oz)       Constitutional.  Does not seem to be in any acute distress.  Eyes.  No redness or discharge noted.  Respiratory.  Speaking in full sentences.  Breathing seems comfortable without any accessory use of muscles.    Skin.  Visualized his skin does not show any obvious rashes.  Musculoskeletal.  Range of motion for visualized areas is intact.  Neurological.  Alert and oriented x3.  Psychiatric.  He is a little tangential in his thought but overall decision making capacity is intact.      The rest of a comprehensive physical examination is deferred due to Public Health Emergency video visit restrictions.    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    I recommend CT Chest and Bone Scan    We will get the images/report of recent MRI of spine.    We will check baseline labs including AFP.    Plan to do liver directed therapy by Dr Hilton if no evidence of metastatic disease. He plans to do it as a staged procedure.     Cytopenias- likely related to cirrhosis, portal HTN and splenomegaly. Will check smear, iron studies/EPO/B12/folate.    Smoking. We discussed importance of smoking cessation.     Discussion regarding health care directive.  We discussed that it is important that he completes health care directive which would help in making sure that his wishes are followed about his treatment care in case he is not able to make a decision for himself.    We will determine the follow up accordingly.    All of his and his family's  questions were answered to their satisfaction and they are agreeable and comfortable with the plan.    Video Start time. 2:03 PM  Video Stop time. 3:05 PM        Again, thank you for allowing me to participate in the care of your patient.        Sincerely,        Olga Willis MD     Statement Selected

## 2020-07-22 RX ORDER — GABAPENTIN 300 MG/1
300 CAPSULE ORAL AT BEDTIME
Qty: 90 CAPSULE | Refills: 1 | Status: SHIPPED | OUTPATIENT
Start: 2020-07-22 | End: 2021-01-01

## 2020-07-22 RX ORDER — INSULIN ASPART 100 [IU]/ML
INJECTION, SOLUTION INTRAVENOUS; SUBCUTANEOUS
Qty: 30 ML | Refills: 1 | Status: SHIPPED | OUTPATIENT
Start: 2020-07-22 | End: 2020-08-24

## 2020-07-22 RX ORDER — ONDANSETRON 4 MG/1
4 TABLET, FILM COATED ORAL EVERY 8 HOURS PRN
Qty: 30 TABLET | Refills: 1 | Status: ON HOLD | OUTPATIENT
Start: 2020-07-22 | End: 2020-10-02

## 2020-07-22 RX ORDER — CITALOPRAM HYDROBROMIDE 20 MG/1
20 TABLET ORAL DAILY
Qty: 90 TABLET | Refills: 1 | Status: SHIPPED | OUTPATIENT
Start: 2020-07-22 | End: 2020-01-01

## 2020-07-22 RX ORDER — DESVENLAFAXINE 100 MG/1
200 TABLET, EXTENDED RELEASE ORAL DAILY
Qty: 180 TABLET | Refills: 1 | Status: SHIPPED | OUTPATIENT
Start: 2020-07-22

## 2020-07-22 RX ORDER — OXYCODONE HYDROCHLORIDE 5 MG/1
5-10 TABLET ORAL EVERY 8 HOURS PRN
Qty: 180 TABLET | Refills: 0 | Status: ON HOLD | OUTPATIENT
Start: 2020-07-26 | End: 2020-07-31

## 2020-07-22 NOTE — TELEPHONE ENCOUNTER
Medication Name, Dose and Monthly Quantity:      Roxicodone 5 mg      Diagnosis requiring opiates:         Problem List Updated:   Yes     Opioid Agreement On File - [facility] PAIN CONTRACT ID# [id number]:        Last Urine Drug Screen (at least once every 12 months) Date:      Unexpected Results:         [place]  Data Reviewed (at least once every 3 months) Date:   07/22/2020      Unexpected Results:     no     Last Fill Date:    06/26/2020     Next Fill Date:   07/25/2020     Last Visit with PCP:    6/16/20     Future Visits with PCP:      Processing:

## 2020-07-23 ENCOUNTER — HOSPITAL ENCOUNTER (OUTPATIENT)
Dept: NUCLEAR MEDICINE | Facility: CLINIC | Age: 65
Setting detail: NUCLEAR MEDICINE
Discharge: HOME | End: 2020-07-23
Attending: RADIOLOGY | Admitting: RADIOLOGY
Payer: COMMERCIAL

## 2020-07-23 ENCOUNTER — HOSPITAL ENCOUNTER (OUTPATIENT)
Dept: NUCLEAR MEDICINE | Facility: CLINIC | Age: 65
Setting detail: NUCLEAR MEDICINE
End: 2020-07-23
Attending: RADIOLOGY
Payer: COMMERCIAL

## 2020-07-23 ENCOUNTER — MEDICAL CORRESPONDENCE (OUTPATIENT)
Dept: HEALTH INFORMATION MANAGEMENT | Facility: CLINIC | Age: 65
End: 2020-07-23

## 2020-07-23 ENCOUNTER — HOSPITAL ENCOUNTER (OUTPATIENT)
Dept: CT IMAGING | Facility: CLINIC | Age: 65
Discharge: HOME | End: 2020-07-23
Attending: INTERNAL MEDICINE | Admitting: INTERNAL MEDICINE
Payer: COMMERCIAL

## 2020-07-23 DIAGNOSIS — C22.0 HCC (HEPATOCELLULAR CARCINOMA) (H): ICD-10-CM

## 2020-07-23 PROCEDURE — A9503 TC99M MEDRONATE: HCPCS | Performed by: RADIOLOGY

## 2020-07-23 PROCEDURE — 71250 CT THORAX DX C-: CPT

## 2020-07-23 PROCEDURE — 78306 BONE IMAGING WHOLE BODY: CPT

## 2020-07-23 PROCEDURE — 34300033 ZZH RX 343: Performed by: RADIOLOGY

## 2020-07-23 RX ORDER — TC 99M MEDRONATE 20 MG/10ML
20-30 INJECTION, POWDER, LYOPHILIZED, FOR SOLUTION INTRAVENOUS ONCE
Status: COMPLETED | OUTPATIENT
Start: 2020-07-23 | End: 2020-07-23

## 2020-07-23 RX ADMIN — TC 99M MEDRONATE 24.9 MCI.: 20 INJECTION, POWDER, LYOPHILIZED, FOR SOLUTION INTRAVENOUS at 12:04

## 2020-07-24 ENCOUNTER — DOCUMENTATION ONLY (OUTPATIENT)
Dept: FAMILY MEDICINE | Facility: CLINIC | Age: 65
End: 2020-07-24

## 2020-07-24 DIAGNOSIS — L97.919 VENOUS STASIS ULCERS OF BOTH LOWER EXTREMITIES (H): Primary | ICD-10-CM

## 2020-07-24 DIAGNOSIS — L97.929 VENOUS STASIS ULCERS OF BOTH LOWER EXTREMITIES (H): Primary | ICD-10-CM

## 2020-07-24 DIAGNOSIS — I83.019 VENOUS STASIS ULCERS OF BOTH LOWER EXTREMITIES (H): Primary | ICD-10-CM

## 2020-07-24 DIAGNOSIS — K76.6 PORTAL HYPERTENSION (H): ICD-10-CM

## 2020-07-24 DIAGNOSIS — I83.029 VENOUS STASIS ULCERS OF BOTH LOWER EXTREMITIES (H): Primary | ICD-10-CM

## 2020-07-24 RX ORDER — DESVENLAFAXINE 100 MG/1
200 TABLET, EXTENDED RELEASE ORAL DAILY
Qty: 180 TABLET | Refills: 0 | OUTPATIENT
Start: 2020-07-24

## 2020-07-26 ENCOUNTER — MEDICAL CORRESPONDENCE (OUTPATIENT)
Dept: HEALTH INFORMATION MANAGEMENT | Facility: CLINIC | Age: 65
End: 2020-07-26

## 2020-07-27 ENCOUNTER — PATIENT OUTREACH (OUTPATIENT)
Dept: ONCOLOGY | Facility: CLINIC | Age: 65
End: 2020-07-27

## 2020-07-27 DIAGNOSIS — Z11.59 ENCOUNTER FOR SCREENING FOR OTHER VIRAL DISEASES: ICD-10-CM

## 2020-07-27 PROCEDURE — U0003 INFECTIOUS AGENT DETECTION BY NUCLEIC ACID (DNA OR RNA); SEVERE ACUTE RESPIRATORY SYNDROME CORONAVIRUS 2 (SARS-COV-2) (CORONAVIRUS DISEASE [COVID-19]), AMPLIFIED PROBE TECHNIQUE, MAKING USE OF HIGH THROUGHPUT TECHNOLOGIES AS DESCRIBED BY CMS-2020-01-R: HCPCS | Performed by: RADIOLOGY

## 2020-07-27 NOTE — TELEPHONE ENCOUNTER
Patient's spouse calling for interpretation of CT and MRI scans done last week.  He is going for chemoembolization this Thursday, 7/30 and would like the results prior to the appointment. Message sent to .

## 2020-07-28 ENCOUNTER — PATIENT OUTREACH (OUTPATIENT)
Dept: ONCOLOGY | Facility: CLINIC | Age: 65
End: 2020-07-28

## 2020-07-28 ENCOUNTER — TELEPHONE (OUTPATIENT)
Dept: INTERVENTIONAL RADIOLOGY/VASCULAR | Facility: CLINIC | Age: 65
End: 2020-07-28

## 2020-07-28 ENCOUNTER — TELEPHONE (OUTPATIENT)
Dept: VASCULAR SURGERY | Facility: CLINIC | Age: 65
End: 2020-07-28

## 2020-07-28 LAB
SARS-COV-2 RNA SPEC QL NAA+PROBE: NOT DETECTED
SPECIMEN SOURCE: NORMAL

## 2020-07-28 NOTE — TELEPHONE ENCOUNTER
Called and spoke to Wife    Informed her that I will be discussing the imaging with Dr. Hilton today in regards to the MRI that they had completed at [facility]    Informed her that I do understand they have the procedure TACE on for Thursday 7/30 and that I will connect with them before days end.    She verbalized understanding.     Sunshine TUCKER RN, BSN  Interventional Radiology Nurse Coordinator   Phone: [phone]

## 2020-07-28 NOTE — PROGRESS NOTES
Informed patient per :   CT and Bone Scan did not show evidence of cancer spread so this is good news.   Agree with proceeding with liver directed therapy as planned.  Reviewed with patient/spouse address of [place]; they have been there before for CT scan.

## 2020-07-28 NOTE — TELEPHONE ENCOUNTER
Called wife Flores.    Informed her that I did consult with . He idid review with another colleague and it appears to be a fracture only. NO met cancer is noted however there is recommendation to f.up in 3 mos of the same area.     WE will plan to move forward with treatment of TACE on Thursday.     She is also wondering about s/s of TACE post treatment in which I've informed her.     She was also curious of the Bone and Chest CT scan in which I did review with her also.       Informed her that we will inform the Hepatology team also of the MRI.     She did mention that she did send a msg to their PCP regarding requesting the medication called Haldol for his HE as he's had this in the past.     We went over his lactulose to continue this medication to help with the HE.     Wife states that she will wait for Ary and I also mentioned to have Grete know as well. Wife states that she will connect with Miranda GARCIA to inquire about HE as they want to be prepared incase he has HE after treatment.     Sunshine TUCKER RN, BSN  Interventional Radiology Nurse Coordinator   Phone: [phone]

## 2020-07-30 ENCOUNTER — APPOINTMENT (OUTPATIENT)
Dept: INTERVENTIONAL RADIOLOGY/VASCULAR | Facility: CLINIC | Age: 65
End: 2020-07-30
Attending: RADIOLOGY
Payer: COMMERCIAL

## 2020-07-30 ENCOUNTER — HOSPITAL ENCOUNTER (OUTPATIENT)
Facility: CLINIC | Age: 65
Setting detail: OBSERVATION
Discharge: HOME | End: 2020-07-31
Attending: RADIOLOGY | Admitting: RADIOLOGY
Payer: COMMERCIAL

## 2020-07-30 ENCOUNTER — APPOINTMENT (OUTPATIENT)
Dept: MEDSURG UNIT | Facility: CLINIC | Age: 65
End: 2020-07-30
Attending: RADIOLOGY
Payer: COMMERCIAL

## 2020-07-30 ENCOUNTER — MEDICAL CORRESPONDENCE (OUTPATIENT)
Dept: HEALTH INFORMATION MANAGEMENT | Facility: CLINIC | Age: 65
End: 2020-07-30

## 2020-07-30 DIAGNOSIS — C22.0 HCC (HEPATOCELLULAR CARCINOMA) (H): ICD-10-CM

## 2020-07-30 DIAGNOSIS — K70.31 ALCOHOLIC CIRRHOSIS OF LIVER WITH ASCITES (H): ICD-10-CM

## 2020-07-30 PROBLEM — R52 INADEQUATE PAIN CONTROL: Status: ACTIVE | Noted: 2020-07-30

## 2020-07-30 PROBLEM — Z98.890 POST-OPERATIVE STATE: Status: ACTIVE | Noted: 2020-07-30

## 2020-07-30 LAB
AFP SERPL-MCNC: 80.2 UG/L (ref 0–8)
ALBUMIN SERPL-MCNC: 2.1 G/DL (ref 3.4–5)
ALP SERPL-CCNC: 187 U/L (ref 40–150)
ALT SERPL W P-5'-P-CCNC: 17 U/L (ref 0–70)
AST SERPL W P-5'-P-CCNC: 32 U/L (ref 0–45)
BILIRUB DIRECT SERPL-MCNC: 0.4 MG/DL (ref 0–0.2)
BILIRUB SERPL-MCNC: 0.8 MG/DL (ref 0.2–1.3)
ERYTHROCYTE [DISTWIDTH] IN BLOOD BY AUTOMATED COUNT: 17.9 % (ref 10–15)
GLUCOSE BLDC GLUCOMTR-MCNC: 138 MG/DL (ref 70–99)
GLUCOSE BLDC GLUCOMTR-MCNC: 255 MG/DL (ref 70–99)
GLUCOSE BLDC GLUCOMTR-MCNC: 279 MG/DL (ref 70–99)
GLUCOSE BLDC GLUCOMTR-MCNC: 99 MG/DL (ref 70–99)
HCT VFR BLD AUTO: 23.9 % (ref 40–53)
HGB BLD-MCNC: 7.5 G/DL (ref 13.3–17.7)
INR PPP: 1.42 (ref 0.86–1.14)
LACTATE BLD-SCNC: 1.4 MMOL/L (ref 0.7–2)
MCH RBC QN AUTO: 28.8 PG (ref 26.5–33)
MCHC RBC AUTO-ENTMCNC: 31.4 G/DL (ref 31.5–36.5)
MCV RBC AUTO: 92 FL (ref 78–100)
PLATELET # BLD AUTO: 85 10E9/L (ref 150–450)
PROT SERPL-MCNC: 6.2 G/DL (ref 6.8–8.8)
RBC # BLD AUTO: 2.6 10E12/L (ref 4.4–5.9)
WBC # BLD AUTO: 3 10E9/L (ref 4–11)

## 2020-07-30 PROCEDURE — 25000125 ZZHC RX 250: Performed by: RADIOLOGY

## 2020-07-30 PROCEDURE — 85610 PROTHROMBIN TIME: CPT | Performed by: STUDENT IN AN ORGANIZED HEALTH CARE EDUCATION/TRAINING PROGRAM

## 2020-07-30 PROCEDURE — 75726 ARTERY X-RAYS ABDOMEN: CPT

## 2020-07-30 PROCEDURE — C1760 CLOSURE DEV, VASC: HCPCS

## 2020-07-30 PROCEDURE — 99207 ZZC APP CREDIT; MD BILLING SHARED VISIT: CPT | Performed by: NURSE PRACTITIONER

## 2020-07-30 PROCEDURE — 25500064 ZZH RX 255 OP 636: Performed by: RADIOLOGY

## 2020-07-30 PROCEDURE — 00000146 ZZHCL STATISTIC GLUCOSE BY METER IP

## 2020-07-30 PROCEDURE — 25000128 H RX IP 250 OP 636: Performed by: NURSE PRACTITIONER

## 2020-07-30 PROCEDURE — 25000132 ZZH RX MED GY IP 250 OP 250 PS 637: Performed by: NURSE PRACTITIONER

## 2020-07-30 PROCEDURE — 82105 ALPHA-FETOPROTEIN SERUM: CPT | Performed by: STUDENT IN AN ORGANIZED HEALTH CARE EDUCATION/TRAINING PROGRAM

## 2020-07-30 PROCEDURE — 25000128 H RX IP 250 OP 636: Performed by: RADIOLOGY

## 2020-07-30 PROCEDURE — 25000125 ZZHC RX 250: Performed by: STUDENT IN AN ORGANIZED HEALTH CARE EDUCATION/TRAINING PROGRAM

## 2020-07-30 PROCEDURE — 27210804 ZZH SHEATH CR3

## 2020-07-30 PROCEDURE — 40000168 ZZH STATISTIC PP CARE STAGE 3

## 2020-07-30 PROCEDURE — 25800030 ZZH RX IP 258 OP 636: Performed by: RADIOLOGY

## 2020-07-30 PROCEDURE — 37243 VASC EMBOLIZE/OCCLUDE ORGAN: CPT

## 2020-07-30 PROCEDURE — C1769 GUIDE WIRE: HCPCS

## 2020-07-30 PROCEDURE — 99226 ZZC SUBSEQUENT OBSERVATION CARE,LEVEL III: CPT | Performed by: INTERNAL MEDICINE

## 2020-07-30 PROCEDURE — 36415 COLL VENOUS BLD VENIPUNCTURE: CPT | Performed by: RADIOLOGY

## 2020-07-30 PROCEDURE — C1887 CATHETER, GUIDING: HCPCS

## 2020-07-30 PROCEDURE — 27210889 ZZH ACCESSORY CR8

## 2020-07-30 PROCEDURE — 99153 MOD SED SAME PHYS/QHP EA: CPT

## 2020-07-30 PROCEDURE — 85027 COMPLETE CBC AUTOMATED: CPT | Performed by: STUDENT IN AN ORGANIZED HEALTH CARE EDUCATION/TRAINING PROGRAM

## 2020-07-30 PROCEDURE — 99152 MOD SED SAME PHYS/QHP 5/>YRS: CPT

## 2020-07-30 PROCEDURE — 27210738 ZZH ACCESSORY CR2

## 2020-07-30 PROCEDURE — 27210780 ZZH KIT CR3

## 2020-07-30 PROCEDURE — 36247 INS CATH ABD/L-EXT ART 3RD: CPT

## 2020-07-30 PROCEDURE — 80076 HEPATIC FUNCTION PANEL: CPT | Performed by: STUDENT IN AN ORGANIZED HEALTH CARE EDUCATION/TRAINING PROGRAM

## 2020-07-30 PROCEDURE — 27210908 ZZH NEEDLE CR4

## 2020-07-30 PROCEDURE — 40000065 ZZH STATISTIC EKG NON-CHARGEABLE

## 2020-07-30 PROCEDURE — 27210732 ZZH ACCESSORY CR1

## 2020-07-30 PROCEDURE — 25800030 ZZH RX IP 258 OP 636: Performed by: NURSE PRACTITIONER

## 2020-07-30 PROCEDURE — 99207 ZZC CDG-HISTORY COMP: MEETS EXP. PROBLEM FOCUSED - DOWN CODED LACK OF HPI: CPT | Performed by: INTERNAL MEDICINE

## 2020-07-30 PROCEDURE — 93010 ELECTROCARDIOGRAM REPORT: CPT | Mod: 59 | Performed by: INTERNAL MEDICINE

## 2020-07-30 PROCEDURE — 25000128 H RX IP 250 OP 636: Performed by: STUDENT IN AN ORGANIZED HEALTH CARE EDUCATION/TRAINING PROGRAM

## 2020-07-30 PROCEDURE — G0378 HOSPITAL OBSERVATION PER HR: HCPCS

## 2020-07-30 PROCEDURE — 96420 CHEMO IA PUSH TECNIQUE: CPT

## 2020-07-30 PROCEDURE — 83605 ASSAY OF LACTIC ACID: CPT | Performed by: RADIOLOGY

## 2020-07-30 PROCEDURE — 27810149 ZZH COIL/EMBOLIC DEVICE CR12

## 2020-07-30 RX ORDER — SODIUM CHLORIDE 9 MG/ML
INJECTION, SOLUTION INTRAVENOUS CONTINUOUS
Status: DISCONTINUED | OUTPATIENT
Start: 2020-07-30 | End: 2020-07-30 | Stop reason: HOSPADM

## 2020-07-30 RX ORDER — ONDANSETRON 4 MG/1
4 TABLET, ORALLY DISINTEGRATING ORAL EVERY 6 HOURS PRN
Status: DISCONTINUED | OUTPATIENT
Start: 2020-07-30 | End: 2020-07-30

## 2020-07-30 RX ORDER — DEXTROSE MONOHYDRATE 25 G/50ML
25-50 INJECTION, SOLUTION INTRAVENOUS
Status: DISCONTINUED | OUTPATIENT
Start: 2020-07-30 | End: 2020-07-30

## 2020-07-30 RX ORDER — DEXTROSE MONOHYDRATE 25 G/50ML
25-50 INJECTION, SOLUTION INTRAVENOUS
Status: DISCONTINUED | OUTPATIENT
Start: 2020-07-30 | End: 2020-07-31 | Stop reason: HOSPADM

## 2020-07-30 RX ORDER — ONDANSETRON 4 MG/1
4 TABLET, ORALLY DISINTEGRATING ORAL EVERY 6 HOURS PRN
Status: DISCONTINUED | OUTPATIENT
Start: 2020-07-30 | End: 2020-07-31 | Stop reason: HOSPADM

## 2020-07-30 RX ORDER — DOCUSATE SODIUM 100 MG/1
100 CAPSULE, LIQUID FILLED ORAL 2 TIMES DAILY
Status: DISCONTINUED | OUTPATIENT
Start: 2020-07-30 | End: 2020-07-30

## 2020-07-30 RX ORDER — FUROSEMIDE 40 MG
40 TABLET ORAL DAILY
Status: DISCONTINUED | OUTPATIENT
Start: 2020-07-31 | End: 2020-07-31 | Stop reason: HOSPADM

## 2020-07-30 RX ORDER — OXYCODONE HYDROCHLORIDE 5 MG/1
5-10 TABLET ORAL EVERY 6 HOURS PRN
Status: DISCONTINUED | OUTPATIENT
Start: 2020-07-30 | End: 2020-07-31

## 2020-07-30 RX ORDER — DOCUSATE SODIUM 100 MG/1
100 CAPSULE, LIQUID FILLED ORAL 2 TIMES DAILY
Status: DISCONTINUED | OUTPATIENT
Start: 2020-07-30 | End: 2020-07-31 | Stop reason: HOSPADM

## 2020-07-30 RX ORDER — NALOXONE HYDROCHLORIDE 0.4 MG/ML
.1-.4 INJECTION, SOLUTION INTRAMUSCULAR; INTRAVENOUS; SUBCUTANEOUS
Status: DISCONTINUED | OUTPATIENT
Start: 2020-07-30 | End: 2020-07-30 | Stop reason: HOSPADM

## 2020-07-30 RX ORDER — URSODIOL 300 MG/1
900 CAPSULE ORAL 2 TIMES DAILY
Status: DISCONTINUED | OUTPATIENT
Start: 2020-07-30 | End: 2020-07-31 | Stop reason: HOSPADM

## 2020-07-30 RX ORDER — NICOTINE POLACRILEX 4 MG
15-30 LOZENGE BUCCAL
Status: DISCONTINUED | OUTPATIENT
Start: 2020-07-30 | End: 2020-07-31 | Stop reason: HOSPADM

## 2020-07-30 RX ORDER — LACTULOSE 10 G/15ML
20 SOLUTION ORAL 2 TIMES DAILY
Status: DISCONTINUED | OUTPATIENT
Start: 2020-07-30 | End: 2020-07-31 | Stop reason: HOSPADM

## 2020-07-30 RX ORDER — ONDANSETRON 2 MG/ML
4 INJECTION INTRAMUSCULAR; INTRAVENOUS EVERY 6 HOURS PRN
Status: DISCONTINUED | OUTPATIENT
Start: 2020-07-30 | End: 2020-07-30

## 2020-07-30 RX ORDER — HEPARIN SODIUM 200 [USP'U]/100ML
1 INJECTION, SOLUTION INTRAVENOUS CONTINUOUS PRN
Status: DISCONTINUED | OUTPATIENT
Start: 2020-07-30 | End: 2020-07-30 | Stop reason: HOSPADM

## 2020-07-30 RX ORDER — IODIXANOL 320 MG/ML
150 INJECTION, SOLUTION INTRAVASCULAR ONCE
Status: COMPLETED | OUTPATIENT
Start: 2020-07-30 | End: 2020-07-30

## 2020-07-30 RX ORDER — FENTANYL CITRATE 50 UG/ML
25-50 INJECTION, SOLUTION INTRAMUSCULAR; INTRAVENOUS EVERY 5 MIN PRN
Status: DISCONTINUED | OUTPATIENT
Start: 2020-07-30 | End: 2020-07-30 | Stop reason: HOSPADM

## 2020-07-30 RX ORDER — NICOTINE POLACRILEX 4 MG
15-30 LOZENGE BUCCAL
Status: DISCONTINUED | OUTPATIENT
Start: 2020-07-30 | End: 2020-07-30 | Stop reason: HOSPADM

## 2020-07-30 RX ORDER — METOCLOPRAMIDE 5 MG/1
5 TABLET ORAL EVERY 6 HOURS PRN
Status: DISCONTINUED | OUTPATIENT
Start: 2020-07-30 | End: 2020-07-31 | Stop reason: HOSPADM

## 2020-07-30 RX ORDER — HYDROMORPHONE HYDROCHLORIDE 1 MG/ML
0.5 INJECTION, SOLUTION INTRAMUSCULAR; INTRAVENOUS; SUBCUTANEOUS
Status: DISCONTINUED | OUTPATIENT
Start: 2020-07-30 | End: 2020-07-31 | Stop reason: HOSPADM

## 2020-07-30 RX ORDER — ACETAMINOPHEN 650 MG/1
650 SUPPOSITORY RECTAL EVERY 4 HOURS PRN
Status: DISCONTINUED | OUTPATIENT
Start: 2020-07-30 | End: 2020-07-31 | Stop reason: HOSPADM

## 2020-07-30 RX ORDER — METOCLOPRAMIDE HYDROCHLORIDE 5 MG/ML
5 INJECTION INTRAMUSCULAR; INTRAVENOUS EVERY 6 HOURS PRN
Status: DISCONTINUED | OUTPATIENT
Start: 2020-07-30 | End: 2020-07-31 | Stop reason: HOSPADM

## 2020-07-30 RX ORDER — DEXTROSE MONOHYDRATE 25 G/50ML
25-50 INJECTION, SOLUTION INTRAVENOUS
Status: DISCONTINUED | OUTPATIENT
Start: 2020-07-30 | End: 2020-07-30 | Stop reason: HOSPADM

## 2020-07-30 RX ORDER — PROCHLORPERAZINE 25 MG
12.5 SUPPOSITORY, RECTAL RECTAL EVERY 12 HOURS PRN
Status: DISCONTINUED | OUTPATIENT
Start: 2020-07-30 | End: 2020-07-31 | Stop reason: HOSPADM

## 2020-07-30 RX ORDER — LIDOCAINE 40 MG/G
CREAM TOPICAL
Status: DISCONTINUED | OUTPATIENT
Start: 2020-07-30 | End: 2020-07-30 | Stop reason: HOSPADM

## 2020-07-30 RX ORDER — GABAPENTIN 300 MG/1
300 CAPSULE ORAL AT BEDTIME
Status: DISCONTINUED | OUTPATIENT
Start: 2020-07-30 | End: 2020-07-31 | Stop reason: HOSPADM

## 2020-07-30 RX ORDER — ACETAMINOPHEN 325 MG/1
650 TABLET ORAL EVERY 4 HOURS PRN
Status: DISCONTINUED | OUTPATIENT
Start: 2020-07-30 | End: 2020-07-31 | Stop reason: HOSPADM

## 2020-07-30 RX ORDER — ONDANSETRON 2 MG/ML
4 INJECTION INTRAMUSCULAR; INTRAVENOUS EVERY 6 HOURS PRN
Status: DISCONTINUED | OUTPATIENT
Start: 2020-07-30 | End: 2020-07-31 | Stop reason: HOSPADM

## 2020-07-30 RX ORDER — SODIUM CHLORIDE 9 MG/ML
INJECTION, SOLUTION INTRAVENOUS CONTINUOUS
Status: DISCONTINUED | OUTPATIENT
Start: 2020-07-30 | End: 2020-07-31 | Stop reason: HOSPADM

## 2020-07-30 RX ORDER — NICOTINE POLACRILEX 4 MG
15-30 LOZENGE BUCCAL
Status: DISCONTINUED | OUTPATIENT
Start: 2020-07-30 | End: 2020-07-30

## 2020-07-30 RX ORDER — HYDROMORPHONE HYDROCHLORIDE 1 MG/ML
0.5 INJECTION, SOLUTION INTRAMUSCULAR; INTRAVENOUS; SUBCUTANEOUS ONCE
Status: COMPLETED | OUTPATIENT
Start: 2020-07-30 | End: 2020-07-30

## 2020-07-30 RX ORDER — ONDANSETRON 2 MG/ML
4 INJECTION INTRAMUSCULAR; INTRAVENOUS ONCE
Status: COMPLETED | OUTPATIENT
Start: 2020-07-30 | End: 2020-07-30

## 2020-07-30 RX ORDER — AMPICILLIN AND SULBACTAM 2; 1 G/1; G/1
3 INJECTION, POWDER, FOR SOLUTION INTRAMUSCULAR; INTRAVENOUS
Status: COMPLETED | OUTPATIENT
Start: 2020-07-30 | End: 2020-07-30

## 2020-07-30 RX ORDER — HYDROXYZINE HYDROCHLORIDE 25 MG/1
25 TABLET, FILM COATED ORAL 3 TIMES DAILY PRN
Status: DISCONTINUED | OUTPATIENT
Start: 2020-07-30 | End: 2020-07-31 | Stop reason: HOSPADM

## 2020-07-30 RX ORDER — CITALOPRAM HYDROBROMIDE 20 MG/1
20 TABLET ORAL DAILY
Status: DISCONTINUED | OUTPATIENT
Start: 2020-07-31 | End: 2020-07-31 | Stop reason: HOSPADM

## 2020-07-30 RX ORDER — SCOLOPAMINE TRANSDERMAL SYSTEM 1 MG/1
1 PATCH, EXTENDED RELEASE TRANSDERMAL ONCE
Status: DISCONTINUED | OUTPATIENT
Start: 2020-07-30 | End: 2020-07-31 | Stop reason: HOSPADM

## 2020-07-30 RX ORDER — SPIRONOLACTONE 25 MG/1
100 TABLET ORAL DAILY
Status: DISCONTINUED | OUTPATIENT
Start: 2020-07-31 | End: 2020-07-31 | Stop reason: HOSPADM

## 2020-07-30 RX ORDER — NALOXONE HYDROCHLORIDE 0.4 MG/ML
.1-.4 INJECTION, SOLUTION INTRAMUSCULAR; INTRAVENOUS; SUBCUTANEOUS
Status: DISCONTINUED | OUTPATIENT
Start: 2020-07-30 | End: 2020-07-31 | Stop reason: HOSPADM

## 2020-07-30 RX ORDER — FLUMAZENIL 0.1 MG/ML
0.2 INJECTION, SOLUTION INTRAVENOUS
Status: DISCONTINUED | OUTPATIENT
Start: 2020-07-30 | End: 2020-07-30 | Stop reason: HOSPADM

## 2020-07-30 RX ORDER — HYDROMORPHONE HYDROCHLORIDE 1 MG/ML
0.5 INJECTION, SOLUTION INTRAMUSCULAR; INTRAVENOUS; SUBCUTANEOUS EVERY 4 HOURS PRN
Status: DISCONTINUED | OUTPATIENT
Start: 2020-07-30 | End: 2020-07-30

## 2020-07-30 RX ORDER — LIDOCAINE 40 MG/G
CREAM TOPICAL
Status: DISCONTINUED | OUTPATIENT
Start: 2020-07-30 | End: 2020-07-31 | Stop reason: HOSPADM

## 2020-07-30 RX ORDER — PROCHLORPERAZINE MALEATE 5 MG
5 TABLET ORAL EVERY 6 HOURS PRN
Status: DISCONTINUED | OUTPATIENT
Start: 2020-07-30 | End: 2020-07-31 | Stop reason: HOSPADM

## 2020-07-30 RX ORDER — NALOXONE HYDROCHLORIDE 0.4 MG/ML
.1-.4 INJECTION, SOLUTION INTRAMUSCULAR; INTRAVENOUS; SUBCUTANEOUS
Status: DISCONTINUED | OUTPATIENT
Start: 2020-07-30 | End: 2020-07-30

## 2020-07-30 RX ORDER — QUETIAPINE FUMARATE 25 MG/1
50 TABLET, FILM COATED ORAL AT BEDTIME
Status: DISCONTINUED | OUTPATIENT
Start: 2020-07-30 | End: 2020-07-31 | Stop reason: HOSPADM

## 2020-07-30 RX ADMIN — FENTANYL CITRATE 50 MCG: 50 INJECTION, SOLUTION INTRAMUSCULAR; INTRAVENOUS at 16:00

## 2020-07-30 RX ADMIN — MIDAZOLAM 0.5 MG: 1 INJECTION INTRAMUSCULAR; INTRAVENOUS at 15:38

## 2020-07-30 RX ADMIN — AMPICILLIN SODIUM AND SULBACTAM SODIUM 3 G: 2; 1 INJECTION, POWDER, FOR SOLUTION INTRAMUSCULAR; INTRAVENOUS at 14:06

## 2020-07-30 RX ADMIN — MIDAZOLAM 0.5 MG: 1 INJECTION INTRAMUSCULAR; INTRAVENOUS at 15:09

## 2020-07-30 RX ADMIN — FENTANYL CITRATE 25 MCG: 50 INJECTION, SOLUTION INTRAMUSCULAR; INTRAVENOUS at 15:38

## 2020-07-30 RX ADMIN — HEPARIN SODIUM 2 BAG: 200 INJECTION, SOLUTION INTRAVENOUS at 15:10

## 2020-07-30 RX ADMIN — MITOMYCIN: 5 INJECTION, POWDER, LYOPHILIZED, FOR SOLUTION INTRAVENOUS at 15:31

## 2020-07-30 RX ADMIN — PROCHLORPERAZINE EDISYLATE 5 MG: 5 INJECTION INTRAMUSCULAR; INTRAVENOUS at 23:35

## 2020-07-30 RX ADMIN — OXYCODONE HYDROCHLORIDE 10 MG: 5 TABLET ORAL at 21:53

## 2020-07-30 RX ADMIN — FENTANYL CITRATE 25 MCG: 50 INJECTION, SOLUTION INTRAMUSCULAR; INTRAVENOUS at 15:09

## 2020-07-30 RX ADMIN — ONDANSETRON 4 MG: 2 INJECTION INTRAMUSCULAR; INTRAVENOUS at 14:51

## 2020-07-30 RX ADMIN — MIDAZOLAM 0.5 MG: 1 INJECTION INTRAMUSCULAR; INTRAVENOUS at 16:00

## 2020-07-30 RX ADMIN — SODIUM CHLORIDE: 9 INJECTION, SOLUTION INTRAVENOUS at 23:35

## 2020-07-30 RX ADMIN — QUETIAPINE FUMARATE 50 MG: 25 TABLET ORAL at 22:55

## 2020-07-30 RX ADMIN — RIFAXIMIN 550 MG: 550 TABLET ORAL at 22:55

## 2020-07-30 RX ADMIN — SODIUM CHLORIDE: 9 INJECTION, SOLUTION INTRAVENOUS at 14:07

## 2020-07-30 RX ADMIN — SCOPALAMINE 1 PATCH: 1 PATCH, EXTENDED RELEASE TRANSDERMAL at 14:04

## 2020-07-30 RX ADMIN — FENTANYL CITRATE 50 MCG: 50 INJECTION, SOLUTION INTRAMUSCULAR; INTRAVENOUS at 15:58

## 2020-07-30 RX ADMIN — GABAPENTIN 300 MG: 300 CAPSULE ORAL at 22:55

## 2020-07-30 RX ADMIN — URSODIOL 900 MG: 300 CAPSULE ORAL at 22:55

## 2020-07-30 RX ADMIN — HYDROXYZINE HYDROCHLORIDE 25 MG: 25 TABLET ORAL at 22:56

## 2020-07-30 RX ADMIN — IODIXANOL 100 ML: 320 INJECTION, SOLUTION INTRAVASCULAR at 14:45

## 2020-07-30 RX ADMIN — LIDOCAINE HYDROCHLORIDE 10 ML: 10 INJECTION, SOLUTION EPIDURAL; INFILTRATION; INTRACAUDAL; PERINEURAL at 15:42

## 2020-07-30 RX ADMIN — HYDROMORPHONE HYDROCHLORIDE 0.5 MG: 1 INJECTION, SOLUTION INTRAMUSCULAR; INTRAVENOUS; SUBCUTANEOUS at 16:05

## 2020-07-30 RX ADMIN — HYDROCORTISONE SODIUM SUCCINATE 100 MG: 100 INJECTION, POWDER, FOR SOLUTION INTRAMUSCULAR; INTRAVENOUS at 14:06

## 2020-07-30 ASSESSMENT — PAIN DESCRIPTION - DESCRIPTORS: DESCRIPTORS: SORE

## 2020-07-30 NOTE — H&P
[facility], [place]    History and Physical - Hospitalist Service       Date of Admission:  7/30/2020    Assessment & Plan   Lawrence Louie is a 64 year old man admitted on 7/30/2020. 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    1) HCC s/p chemoembolization  - Currently denies any significant pain, will have oxycodone, hydromorphone available if pain returns.  Would prefer to stick with oral agents if possible  - Follow up CT scan in am per IR    2) History of Cervantes cirrhosis - Complicated by varices, hepatic encephalopathy and s/p TIPS  -Continue home Lasix, spironolactone  -Continue home lactulose, rifaximin    3) History of diabetes  - Lantus insulin 5 units every morning, 10 units insulin with meals (down from 20 as outpatient), sliding scale insulin    4) Lymphedema  - Please wrap legs with soft guaze and secure in place.  If prolonged stay, will need to consult OT for lymphedema management.     Diet: Advance Diet as Tolerated: Clear Liquid Diet    DVT Prophylaxis: Pneumatic Compression Devices  Payne Catheter: not present  Code Status: Full         Disposition Plan   Expected discharge: Tomorrow, recommended to prior living arrangement once pain controlled on oral agents.  Entered: CORY Dickens CNP 07/30/2020, 6:23 PM     The patient's care was discussed with the Attending Physician, Dr. Etienne.    CORY Dickens CNP  [facility], [place]    ______________________________________________________________________    Chief Complaint   Pain post embolization    History is obtained from the patient    History of Present Illness   Lawrence Louie is a 64 year old man admitted on 7/30/2020. 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    The patient reports he is doing well at present.  He denies significant pain but acknowledges he had a fair amount of pain medication post-op.    He reports he has been in his usual state of health.  No recent illness or other concerns    Review of Systems    The 10 point Review of Systems is negative other than noted in the HPI or here.     Past Medical History    I have reviewed this patient's medical history and updated it with pertinent information if needed.   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  Past Surgical History:   Procedure Laterality Date     BIOPSY OF SKIN LESION       COLONOSCOPY Left 6/16/2016    Procedure: COMBINED COLONOSCOPY, SINGLE OR MULTIPLE BIOPSY/POLYPECTOMY BY BIOPSY;  Surgeon: Brandy Barnett MD;  Location: [facility]     ESOPHAGOSCOPY, GASTROSCOPY, DUODENOSCOPY (EGD), COMBINED  2/13/2013    Procedure: COMBINED ESOPHAGOSCOPY, GASTROSCOPY, DUODENOSCOPY (EGD);;  Surgeon: Tara Cook MD;  Location: [facility]     ESOPHAGOSCOPY, GASTROSCOPY, DUODENOSCOPY (EGD), COMBINED  11/4/2013    Procedure: COMBINED ESOPHAGOSCOPY, GASTROSCOPY, DUODENOSCOPY (EGD);;  Surgeon: Lonny Diaz MD;  Location:  [facility]     ESOPHAGOSCOPY, GASTROSCOPY, DUODENOSCOPY (EGD), COMBINED Left 6/16/2016    Procedure: COMBINED ESOPHAGOSCOPY, GASTROSCOPY, DUODENOSCOPY (EGD), BIOPSY SINGLE OR MULTIPLE;  Surgeon: Brandy Barnett MD;  Location:  GI     EXCISE  LESION TRUNK  9/24/2012    Procedure: EXCISE LESION TRUNK;;  Surgeon: Pepe Dominguez MD;  Location: [facility]     GENITOURINARY SURGERY      vasectomy     HERNIORRHAPHY UMBILICAL  9/24/2012    Procedure: HERNIORRHAPHY UMBILICAL;  UMBILICAL HERNIA REPAIR , EXCISION OF PERIUMBILICAL CYST;  Surgeon: Pepe Dominguez MD;  Location: [facility]       Social History   I have reviewed this patient's social history and updated it with pertinent information if needed.      Family History   I have reviewed this patient's family history and updated it with pertinent information if needed.  Family History   Problem Relation Age of Onset     Breast Cancer Mother      Liver Cancer Mother      Cardiovascular Father      Cerebrovascular Disease Father         very low blood pressure     Cancer Father         rectal cancer     Cardiovascular Paternal Grandfather      Diabetes Brother      Cancer Sister         skin cancer     C.A.D. Other         MI, 70's     Breast Cancer Sister      Thyroid Disease No family hx of      Lipids No family hx of      Anesthesia Reaction No family hx of        Prior to Admission Medications   Prior to Admission Medications   Prescriptions Last Dose Informant Patient Reported? Taking?   Cyanocobalamin (VITAMIN B-12 PO) 7/29/2020 at 0800 Spouse/Significant Other Yes Yes   Sig: Take 1 tablet by mouth daily   EQL VITAMIN D3 50 MCG (2000 UT) CAPS 7/29/2020 at Unknown time  No Yes   Sig: Take 1 capsule by mouth daily   OYSTER SHELL CALCIUM/D 500-200 MG-UNIT per tablet 7/29/2020 at Unknown time Spouse/Significant Other No Yes   Sig: Take 1 tablet by mouth daily   QUEtiapine (SEROQUEL) 50 MG tablet 7/29/2020 at Unknown time  Yes Yes   Sig: Take 50 mg by mouth At Bedtime   UNABLE TO FIND   Yes No   Sig: MEDICATION NAME: Burdock root   blood glucose (NO BRAND SPECIFIED) lancets standard   No No   Sig: Use to test blood sugar 4 X  times daily or as directed.   blood glucose (NO BRAND SPECIFIED) test strip   No No   Sig:  Use to test blood sugars 4 X  times daily or as directed   blood glucose (NO BRAND SPECIFIED) test strip   No No   Sig: Use to test blood sugar 4 times daily or as directed.   blood glucose monitoring (NO BRAND SPECIFIED) meter device kit   No No   Sig: Use to test blood sugar 4 X  times daily or as directed.   blood glucose monitoring (NO BRAND SPECIFIED) meter device kit   No No   Sig: Use to test blood sugar 4 times daily or as directed. Before meals and snack at HS.   childrens multivitamin w/ionr (FLINTSTONES COMPLETE) 60 MG chewable tablet  Spouse/Significant Other Yes No   Sig: Take 1 chew tab by mouth daily   citalopram (CELEXA) 20 MG tablet 7/29/2020 at 0800  No Yes   Sig: Take 1 tablet (20 mg) by mouth daily   desvenlafaxine (PRISTIQ) 100 MG 24 hr tablet 7/29/2020 at 0800  No Yes   Sig: Take 2 tablets (200 mg) by mouth daily   furosemide (LASIX) 40 MG tablet 7/29/2020 at Unknown time  No Yes   Sig: Take 40 mg once daily   gabapentin (NEURONTIN) 300 MG capsule Past Week at Unknown time  No Yes   Sig: Take 1 capsule (300 mg) by mouth At Bedtime   hydrOXYzine (ATARAX) 25 MG tablet 7/29/2020 at Unknown time  No Yes   Sig: Take 1 tablet (25 mg) by mouth 3 times daily as needed for itching   insulin aspart (NOVOLOG FLEXPEN) 100 UNIT/ML pen 7/29/2020 at Unknown time  No Yes   Sig: Inject 20 Units under the skin before breakfast, 20 Units before lunch, 20 units before dinner and 20 units before snacks.   insulin glargine (LANTUS SOLOSTAR) 100 UNIT/ML pen Unknown at Unknown time  No No   Sig: Inject 5 units under the skin every morning.   insulin pen needle (B-D U/F) 31G X 8 MM miscellaneous   No No   Sig: USE  6 times daily / OR AS DIRECTED   insulin pen needle (ULTICARE SHORT) 31G X 8 MM miscellaneous   No No   Sig: Use UP to 6 times daily or as directed   lactulose (CEPHULAC) 20 GM packet 7/29/2020 at Unknown time  No Yes   Sig: Take 1 packet (20 g) by mouth 2 times daily   lactulose encephalopathy (CHRONULAC)  10 GM/15ML SOLUTION Unknown at Unknown time  No No   Sig: TAKE 30 MLS BY MOUTH 2 TIMES DAILY  TITRATE AS NEEDED TO ACHIEVE 3-5 BOWEL MOVEMENTS DAILY.   levofloxacin (LEVAQUIN) 750 MG tablet   No No   Sig: Take 1 tablet (750 mg) by mouth daily for 10 days   magnesium oxide (MAG-OX) 400 (241.3 Mg) MG tablet 7/29/2020 at Unknown time Spouse/Significant Other No Yes   Sig: Take 1 tablet (400 mg) by mouth daily   nystatin (MYCOSTATIN) 169763 UNIT/GM external cream More than a month at Unknown time Spouse/Significant Other No No   Sig: Apply topically 2 times daily   ondansetron (ZOFRAN) 4 MG tablet 7/29/2020 at Unknown time  No Yes   Sig: Take 1 tablet (4 mg) by mouth every 8 hours as needed for nausea   order for DME   No No   Sig: Equipment being ordered:bilateral pneumatic leg massage for treatment of extreme bilateral lower leg edema.   Patient not taking: Reported on 7/13/2020   order for DME   No No   Sig: Equipment being ordered:BioTab compression pants pneumatic system   Patient not taking: Reported on 7/13/2020   order for DME   No No   Sig: Equipment being ordered: Condom catheter   Patient not taking: Reported on 7/13/2020   order for DME   No No   Sig: Equipment being ordered:Orthopedic shoes   oxyCODONE (ROXICODONE) 5 MG tablet 7/29/2020 at Unknown time  No Yes   Sig: Take 1-2 tablets (5-10 mg) by mouth every 8 hours as needed for severe pain   rifaximin (XIFAXAN) 550 MG TABS tablet 7/29/2020 at Unknown time  No Yes   Sig: Take 1 tablet (550 mg) by mouth 2 times daily   spironolactone (ALDACTONE) 50 MG tablet 7/29/2020 at Unknown time  No Yes   Sig: Take 2 tablets (100 mg) by mouth daily   ursodiol (ACTIGALL) 300 MG capsule 7/29/2020 at Unknown time  No Yes   Sig: Take 3 capsules (900 mg) by mouth 2 times daily      Facility-Administered Medications: None     Allergies   No Known Allergies    Physical Exam   Vital Signs: Temp: 98.3  F (36.8  C) Temp src: Oral BP: 120/48 Pulse: 71 Heart Rate: 76 Resp: 22  SpO2: 98 % O2 Device: Nasal cannula Oxygen Delivery: 3 LPM     Physical Exam   Constitutional:   Well nourished, well developed, resting comfortably   Head: Normocephalic and atraumatic.   Eyes: Conjunctivae are normal. Pupils are equal, round, and reactive to light.  Pharynx has no erythema or exudate, mucous membranes are moist  Cardiovascular: Regular rate and rhythm without murmurs or gallops  Pulmonary/Chest: Clear to auscultation bilaterally, with no wheezes or retractions. No respiratory distress.  GI: Soft with good bowel sounds.  Non-tender, non-distended, with no guarding, no rebound, no peritoneal signs.   Musculoskeletal:  Significant BLE lymphedema   Skin: Skin is warm and dry. No rash noted.   Neurological: Alert and oriented to person, place, and time. Nonfocal exam  Psychiatric:  Normal mood and affect.      Data   Data reviewed today: I reviewed all medications, new labs and imaging results over the last 24 hours. I personally reviewed his labs from today and past clinic notes.

## 2020-07-30 NOTE — PROGRESS NOTES
Patient returned to 2A post TACE.  VSS.  Report of significant pain during procedure, patient sleeping since returned to 2A.  Holding off on po food and fluids for now.  Right groin site C/D/I, no hematoma.  Right pedal pulse positive via doppler.  Myxn deployed at 1600 so able to get up at 1900.

## 2020-07-30 NOTE — PRE-PROCEDURE
GENERAL PRE-PROCEDURE:   Date/Time:  7/30/2020 1:43 PM    Written consent obtained?: Yes    Risks and benefits: Risks, benefits and alternatives were discussed    Consent given by:  Patient  Patient states understanding of procedure being performed: Yes    Patient's understanding of procedure matches consent: Yes    Procedure consent matches procedure scheduled: Yes    Expected level of sedation:  Moderate  Appropriately NPO:  Yes  ASA Class:  Class 3- Severe systemic disease, definite functional limitations  Mallampati  :  Grade 2- soft palate, base of uvula, tonsillar pillars, and portion of posterior pharyngeal wall visible  Lungs:  Lungs clear with good breath sounds bilaterally  Heart:  Normal heart sounds and rate  History & Physical reviewed:  History and physical reviewed and no updates needed  Statement of review:  I have reviewed the lab findings, diagnostic data, medications, and the plan for sedation

## 2020-07-30 NOTE — PROGRESS NOTES
Patient arrived on 2A for chemo embolization.  IV placed - unable to draw labs from IV, IR aware.  Groin prepped, pedal pulses marked - doppler used bilaterally.  Consent done, prep complete.

## 2020-07-30 NOTE — PROCEDURES
[facility], [place]    Procedure: IR Procedure Note    Date/Time: 7/30/2020 4:12 PM  Performed by: Lonny Kingston MD  Authorized by: Lonny Kingston MD     UNIVERSAL PROTOCOL   Site Marked: NA  Prior Images Obtained and Reviewed:  Yes  Required items: Required blood products, implants, devices and special equipment available    Patient identity confirmed:  Verbally with patient, arm band, provided demographic data and hospital-assigned identification number  Patient was reevaluated immediately before administering moderate or deep sedation or anesthesia  Confirmation Checklist:  Patient's identity using two indicators, relevant allergies, procedure was appropriate and matched the consent or emergent situation and correct equipment/implants were available  Time out: Immediately prior to the procedure a time out was called    Universal Protocol: the Joint Commission Universal Protocol was followed    Preparation: Patient was prepped and draped in usual sterile fashion           ANESTHESIA    Anesthesia: Local infiltration  Local Anesthetic:  Lidocaine 1% without epinephrine      SEDATION    Patient Sedated: Yes    Sedation Type:  Moderate (conscious) sedation  Sedation:  Fentanyl and midazolam  Vital signs: Vital signs monitored during sedation    See dictated procedure note for full details.  Findings: cTACE to segment 6    Specimens: none    Complications: None    Condition: Stable    Plan: 3 hrs bedrest  Need to get good pain control, may need to admit    PROCEDURE   Patient Tolerance:  Patient tolerated the procedure well with no immediate complications    Length of time physician/provider present for 1:1 monitoring during sedation: 60

## 2020-07-30 NOTE — IR NOTE
Patient Name: Lawrence Louie  Medical Record Number: [id number]  Today's Date: 7/30/2020    Procedure: Transarterial chemoembolization  Proceduralist: Dr. Hilton and Dr. Kingston    Procedure Start: 1502  Procedure end: 1600  Sedation medications administered: 150 mcg fentanyl and 1.5 mg versed, 0.5 dilaudid    Report given to: Aintra  : CELINE    Other Notes: Pt arrived to IR room 1 from . Consent reviewed. Pt denies any questions or concerns regarding procedure. Pt positioned supine and monitored per protocol. Pt tolerated procedure without any noted complications. Pt transferred back to 2A.

## 2020-07-31 ENCOUNTER — APPOINTMENT (OUTPATIENT)
Dept: CT IMAGING | Facility: CLINIC | Age: 65
End: 2020-07-31
Attending: NURSE PRACTITIONER
Payer: COMMERCIAL

## 2020-07-31 VITALS
TEMPERATURE: 98.5 F | HEART RATE: 64 BPM | OXYGEN SATURATION: 100 % | SYSTOLIC BLOOD PRESSURE: 101 MMHG | RESPIRATION RATE: 16 BRPM | DIASTOLIC BLOOD PRESSURE: 49 MMHG

## 2020-07-31 LAB
GLUCOSE BLDC GLUCOMTR-MCNC: 110 MG/DL (ref 70–99)
GLUCOSE BLDC GLUCOMTR-MCNC: 119 MG/DL (ref 70–99)
GLUCOSE BLDC GLUCOMTR-MCNC: 137 MG/DL (ref 70–99)
INTERPRETATION ECG - MUSE: NORMAL

## 2020-07-31 PROCEDURE — 99207 ZZC CDG-CODE CATEGORY CHANGED: CPT | Performed by: ORTHOPAEDIC SURGERY

## 2020-07-31 PROCEDURE — G0378 HOSPITAL OBSERVATION PER HR: HCPCS

## 2020-07-31 PROCEDURE — 00000146 ZZHCL STATISTIC GLUCOSE BY METER IP

## 2020-07-31 PROCEDURE — 74150 CT ABDOMEN W/O CONTRAST: CPT

## 2020-07-31 PROCEDURE — 99217 ZZC OBSERVATION CARE DISCHARGE: CPT | Performed by: ORTHOPAEDIC SURGERY

## 2020-07-31 PROCEDURE — 25000132 ZZH RX MED GY IP 250 OP 250 PS 637: Performed by: NURSE PRACTITIONER

## 2020-07-31 RX ORDER — OXYCODONE HYDROCHLORIDE 5 MG/1
5-10 TABLET ORAL EVERY 8 HOURS PRN
Qty: 12 TABLET | Refills: 0 | Status: SHIPPED | OUTPATIENT
Start: 2020-07-31 | End: 2020-09-09

## 2020-07-31 RX ORDER — OXYCODONE HYDROCHLORIDE 5 MG/1
5-15 TABLET ORAL EVERY 6 HOURS PRN
Status: DISCONTINUED | OUTPATIENT
Start: 2020-07-31 | End: 2020-07-31

## 2020-07-31 RX ORDER — OXYCODONE HYDROCHLORIDE 5 MG/1
10-15 TABLET ORAL EVERY 6 HOURS PRN
Status: DISCONTINUED | OUTPATIENT
Start: 2020-07-31 | End: 2020-07-31 | Stop reason: HOSPADM

## 2020-07-31 RX ADMIN — SPIRONOLACTONE 100 MG: 25 TABLET ORAL at 07:52

## 2020-07-31 RX ADMIN — RIFAXIMIN 550 MG: 550 TABLET ORAL at 07:52

## 2020-07-31 RX ADMIN — FUROSEMIDE 40 MG: 40 TABLET ORAL at 07:52

## 2020-07-31 RX ADMIN — CITALOPRAM HYDROBROMIDE 20 MG: 20 TABLET, FILM COATED ORAL at 07:52

## 2020-07-31 RX ADMIN — LACTULOSE 20 G: 20 SOLUTION ORAL at 08:08

## 2020-07-31 RX ADMIN — URSODIOL 900 MG: 300 CAPSULE ORAL at 07:52

## 2020-07-31 NOTE — PLAN OF CARE
Pain controlled on oral medications. No  Patient reported abdominal pain, refusing dilaudid, took oxycodone.

## 2020-07-31 NOTE — PROGRESS NOTES
IR PROGRESS NOTE 7/31/2020    S: Pt seen at bedside. Somnolent but easy to wake. Doesn't seem confused or anxious relative to yesterday pre-treatment. States pain is present, but tolerable with oxycodone.    O:    /49 (BP Location: Right arm)   Pulse 64   Temp 98.5  F (36.9  C) (Oral)   Resp 16   SpO2 100%     Groin site CDI    A/P:  CT shows excellent lipiodol staining coverage of the segment 6 tumor.   Pt is ok for discharge today, as long as pain remains tolerable with PO meds.    Lonny Kingston MD

## 2020-07-31 NOTE — PLAN OF CARE
Pain controlled on oral medications. No  Patient sleepy but responsive, /56   Pulse 69   Temp 98.5  F (36.9  C) (Oral)   Resp 16   SpO2 94%

## 2020-07-31 NOTE — PLAN OF CARE
Pt is stable and discharged home at 1500. Discharge education provided and pt verbalized understanding. PIV removed. No meds to . Escorted home by wife. All belongings sent with pt.

## 2020-07-31 NOTE — PROVIDER NOTIFICATION
Page out to MD requesting if patient's oxycodone dose can be increased. He reports pain and is refusing to take dilaudid because it makes him to vomit.

## 2020-07-31 NOTE — DISCHARGE SUMMARY
[facility] - Internal Medicine Discharge Summary   Date of Service: 7/31/2020                                Lawrence Louie MRN# [id number]   YOB: 1955 Age: 64 year old      Date of Admission:  7/30/2020  Date of Discharge:  7/31/2020  3:05 PM  Discharging Physician: Tee Farnsworth MD (Contact: [phone])  Discharging Service:  Internal Medicine  Hospitalization Status: Observational    Discharge Diagnosis:   1. HCC s/p chemoembolization    Procedures:   Procedure: Transarterial chemoembolization  Proceduralist: Dr. Hilton and Dr. Kingston  HPI:   Lawrence Louie is a 64 year old man admitted on 7/30/2020. He has a history of DM, hypertension, cirrhosis secondary to SMITH complicated by varices and hepatic encephalopathy now s/p TIPS who is admitted following chemoembolization of HCC.     The patient reports he is doing well at present.  He denies significant pain but acknowledges he had a fair amount of pain medication post-op.     He reports he has been in his usual state of health.  No recent illness or other concerns    Hospital course:   patiwnt with mild to moderate pain int he AM, well managed with oral medications. IR reviewed a post-procedural CT scna which showed adequate delivery of drug to cancer lesion. He was discharged hme, no changes in meds or management    Discharge Disposition:   Home with family    Discharge Exam:  General: Alert, interactive, NAD  HEENT: AT/NC, sclera anicteric, PERRL, EOMI, OP clear with MMM  Neck: Supple, no JVD or cervical LAD  Resp: CTAB, no crackles or wheezes  Cardiac: RRR, NS1,S2, No m/r/g  Abdomen: Soft, mild tenderness in the RUQ, distended. +BS.  No HSM or masses, no rebound or guarding.  Extremities: Sig bilateral LE edema, in lymphedema wraps  Skin: Warm and dry, no jaundice or rash  Neuro: Alert & oriented x 3, Cns 2-12 intact, moves all extremities equally           Discharge Medications:     Discharge Medication List as of  7/31/2020 11:26 AM      CONTINUE these medications which have CHANGED    Details   oxyCODONE (ROXICODONE) 5 MG tablet Take 1-2 tablets (5-10 mg) by mouth every 8 hours as needed for severe pain, Disp-12 tablet,R-0, Local Print         CONTINUE these medications which have NOT CHANGED    Details   citalopram (CELEXA) 20 MG tablet Take 1 tablet (20 mg) by mouth daily, Disp-90 tablet,R-1, E-Prescribe      Cyanocobalamin (VITAMIN B-12 PO) Take 1 tablet by mouth daily, Historical      desvenlafaxine (PRISTIQ) 100 MG 24 hr tablet Take 2 tablets (200 mg) by mouth daily, Disp-180 tablet,R-1, E-Prescribe      EQL VITAMIN D3 50 MCG (2000 UT) CAPS Take 1 capsule by mouth daily, Disp-90 capsule,R-2, E-Prescribe      furosemide (LASIX) 40 MG tablet Take 40 mg once daily, Disp-90 tablet,R-3, E-Prescribe      gabapentin (NEURONTIN) 300 MG capsule Take 1 capsule (300 mg) by mouth At Bedtime, Disp-90 capsule,R-1, E-Prescribe      hydrOXYzine (ATARAX) 25 MG tablet Take 1 tablet (25 mg) by mouth 3 times daily as needed for itching, Disp-90 tablet,R-3, E-Prescribe      insulin aspart (NOVOLOG FLEXPEN) 100 UNIT/ML pen Inject 20 Units under the skin before breakfast, 20 Units before lunch, 20 units before dinner and 20 units before snacks., Disp-30 mL,R-1, E-Prescribe      lactulose (CEPHULAC) 20 GM packet Take 1 packet (20 g) by mouth 2 times daily, Disp-60 packet,R-0, E-Prescribe      magnesium oxide (MAG-OX) 400 (241.3 Mg) MG tablet Take 1 tablet (400 mg) by mouth daily, Disp-90 tablet, R-1, E-Prescribe      ondansetron (ZOFRAN) 4 MG tablet Take 1 tablet (4 mg) by mouth every 8 hours as needed for nausea, Disp-30 tablet,R-1, E-Prescribe      OYSTER SHELL CALCIUM/D 500-200 MG-UNIT per tablet Take 1 tablet by mouth daily, Disp-90 tablet, R-3, COLLIN, E-Prescribe      QUEtiapine (SEROQUEL) 50 MG tablet Take 50 mg by mouth At Bedtime, Historical      rifaximin (XIFAXAN) 550 MG TABS tablet Take 1 tablet (550 mg) by mouth 2 times daily,  Disp-60 tablet,R-11, E-Prescribe      spironolactone (ALDACTONE) 50 MG tablet Take 2 tablets (100 mg) by mouth daily, Disp-180 tablet,R-3, E-Prescribe      ursodiol (ACTIGALL) 300 MG capsule Take 3 capsules (900 mg) by mouth 2 times daily, Disp-180 capsule,R-11, E-Prescribe      blood glucose (NO BRAND SPECIFIED) lancets standard Use to test blood sugar 4 X  times daily or as directed.Disp-1 Box, [id number]      !! blood glucose (NO BRAND SPECIFIED) test strip Use to test blood sugar 4 times daily or as directed., Disp-200 strip,R-3, E-Prescribe      !! blood glucose (NO BRAND SPECIFIED) test strip Use to test blood sugars 4 X  times daily or as directed, Disp-200 strip,R-3, E-Prescribe      !! blood glucose monitoring (NO BRAND SPECIFIED) meter device kit Use to test blood sugar 4 times daily or as directed. Before meals and snack at HS.Disp-1 kit,[id number]      !! blood glucose monitoring (NO BRAND SPECIFIED) meter device kit Use to test blood sugar 4 X  times daily or as directed.Disp-1 kit, [id number]      childrens multivitamin w/ionr (FLINTSTONES COMPLETE) 60 MG chewable tablet Take 1 chew tab by mouth daily, Historical      insulin glargine (LANTUS SOLOSTAR) 100 UNIT/ML pen Inject 5 units under the skin every morning., Disp-9 mL,R-2, No Print OutIf Lantus is not covered by insurance, may substitute Basaglar at same dose and frequency.        !! insulin pen needle (B-D U/F) 31G X 8 MM miscellaneous USE  6 times daily / OR AS DIRECTEDDisp-300 each,[id number]      !! insulin pen needle (ULTICARE SHORT) 31G X 8 MM miscellaneous Use UP to 6 times daily or as directedDisp-300 each,[id number]-[id number]-Fylryybhm      lactulose encephalopathy (CHRONULAC) 10 GM/15ML SOLUTION TAKE 30 MLS BY MOUTH 2 TIMES DAILY  TITRATE AS NEEDED TO ACHIEVE 3-5 BOWEL MOVEMENTS DAILY., Disp-1892 mL,R-11, E-Prescribe      nystatin (MYCOSTATIN) 677988 UNIT/GM external cream Apply topically 2 times dailyDisp-30 g, [id number]      !!  order for DME Equipment being ordered:Orthopedic shoesDisp-2 Units, R-0, Local Print      !! order for DME Equipment being ordered: Condom catheterDisp-1 Device, R-3, Local Print      !! order for DME Equipment being ordered:BioTab compression pants pneumatic systemDisp-1 Piece, R-0, Local Print      !! order for DME Equipment being ordered:bilateral pneumatic leg massage for treatment of extreme bilateral lower leg edema.Disp-2 pump, R-0, Local Print      UNABLE TO FIND MEDICATION NAME: Mauroock root, Historical       !! - Potential duplicate medications found. Please discuss with provider.      STOP taking these medications       levofloxacin (LEVAQUIN) 750 MG tablet Comments:   Reason for Stopping:                    Discharge Instructions and Follow-Up:     Discharge Procedure Orders   Reason for your hospital stay   Order Comments: You were hospitalized for observation after a chemoembolization procedure. Your labs in the morning and follow-up CT were re-assuring.     Adult [facility] Follow-up and recommended labs and tests   Order Comments: Follow up with primary care provider, Ary Murphy as needed for medication refills or other concerns    Follow up with your oncologist regarding the timing of any additional treatments     Appointments on [place] and/or [facility] (with [facility] or [facility] provider or service). Call [phone] if you haven't heard regarding these appointments within 7 days of discharge.     Activity   Order Comments: No restrictions, as tolerated     Order Specific Question Answer Comments   Is discharge order? Yes      When to contact your care team   Order Comments: Please return to the emergency department of you develop increasing pain, severe nausea and vomiting with inability to tolerate food and liquids, difficulty breathing, or new fevers.     Diet   Order Comments: We would recommend a diet low in sodium, less than 2 grams daily     Order Specific Question Answer Comments    Is discharge order? Yes         Less than 30 minutes was spent in direct patient counseling and coordination of care. Please do not hesitate to contact me with any additional questions.     Dash Farnsworth MD

## 2020-07-31 NOTE — PROGRESS NOTES
Transferred to: 6D at 1900  Status at time of transfer: vitally stable on 3L nasal cannula, remains intermittently sleepy  Belongings: sent with patient  Chart and medications: sent with patient  Family notified: wife, Raven updated via phone

## 2020-08-01 ENCOUNTER — PATIENT OUTREACH (OUTPATIENT)
Dept: CARE COORDINATION | Facility: CLINIC | Age: 65
End: 2020-08-01

## 2020-08-03 ENCOUNTER — TELEPHONE (OUTPATIENT)
Dept: VASCULAR SURGERY | Facility: CLINIC | Age: 65
End: 2020-08-03

## 2020-08-03 ENCOUNTER — TELEPHONE (OUTPATIENT)
Dept: INTERNAL MEDICINE | Facility: CLINIC | Age: 65
End: 2020-08-03

## 2020-08-03 NOTE — TELEPHONE ENCOUNTER
Called and left a msg for pt and wife to f/up on them s/pTACE with Dr Hilton    Inquired as to how pt is doing and asked that they return my call to further assess.     Sunshine TUCKER RN, BSN  Interventional Radiology Nurse Coordinator   Phone: [phone]

## 2020-08-03 NOTE — TELEPHONE ENCOUNTER
[facility] Call Center    Phone Message    May a detailed message be left on voicemail: yes     Reason for Call: Order(s): Home Care Orders: Skilled Nursing: 3x/wk for 8 wks 2x/wk for 1 wk, wound care, barrell wrap, ace wraps, bilat lower extremity    Action Taken: Message routed to:  [facility] PCC    Travel Screening: Not Applicable

## 2020-08-04 NOTE — TELEPHONE ENCOUNTER
Verbal orders given to [place] from [facility], per Ary Murphy/Dr. Trevizo, for Home Care Orders: Skilled Nursing: 3x/wk for 8 wks 2x/wk for 1 wk, wound care, barrell wrap, ace wraps, bilat lower extremity. Magdalena Mott LPN 8/4/2020 7:28 AM

## 2020-08-05 ENCOUNTER — EXTERNAL ORDER RESULTS (OUTPATIENT)
Dept: INFECTIOUS DISEASES | Facility: CLINIC | Age: 65
End: 2020-08-05

## 2020-08-05 DIAGNOSIS — Z87.898 HISTORY OF BACTEREMIA: ICD-10-CM

## 2020-08-06 DIAGNOSIS — C22.0 HCC (HEPATOCELLULAR CARCINOMA) (H): Primary | ICD-10-CM

## 2020-08-07 ENCOUNTER — PATIENT OUTREACH (OUTPATIENT)
Dept: GASTROENTEROLOGY | Facility: CLINIC | Age: 65
End: 2020-08-07

## 2020-08-07 NOTE — PROGRESS NOTES
Patient's spouse Raven states the patient had a fairly rough time recovering from the first TACE treatment. He had significant pain requiring pain medication, which constipated him, worsening HE for several days. He is just now clearing as pain medication can be decreased and he is stooling more regularly. He states he wants to proceed with the next treatment, not yet scheduled. They meet with his PCP next week, and are receiving home care.

## 2020-08-10 ENCOUNTER — TELEPHONE (OUTPATIENT)
Dept: INFECTIOUS DISEASES | Facility: CLINIC | Age: 65
End: 2020-08-10

## 2020-08-10 DIAGNOSIS — L97.419 CHRONIC HEEL ULCER, RIGHT, WITH UNSPECIFIED SEVERITY (H): Primary | ICD-10-CM

## 2020-08-10 NOTE — TELEPHONE ENCOUNTER
[facility] Call Center    Phone Message    May a detailed message be left on voicemail: yes     Reason for Call: Other: Pts daughter called stating the left leg wound is an issue again since last Thursday/ friday and she wants a call back to discuss next steps. Offered pt an appointment on 8/17/20 but pts daughter stated that was too long and she was worried he would become septic again. Please review and follow up    Action Taken: Message routed to:  [facility] ID    Travel Screening: Not Applicable

## 2020-08-11 ENCOUNTER — VIRTUAL VISIT (OUTPATIENT)
Dept: INTERNAL MEDICINE | Facility: CLINIC | Age: 65
End: 2020-08-11
Payer: COMMERCIAL

## 2020-08-11 DIAGNOSIS — E11.610 TYPE 2 DIABETES MELLITUS WITH DIABETIC NEUROPATHIC ARTHROPATHY, WITHOUT LONG-TERM CURRENT USE OF INSULIN (H): ICD-10-CM

## 2020-08-11 DIAGNOSIS — I89.0 LYMPHEDEMA OF BOTH LOWER EXTREMITIES: ICD-10-CM

## 2020-08-11 DIAGNOSIS — C22.0 HCC (HEPATOCELLULAR CARCINOMA) (H): ICD-10-CM

## 2020-08-11 DIAGNOSIS — K70.30 ALCOHOLIC CIRRHOSIS, UNSPECIFIED WHETHER ASCITES PRESENT (H): Primary | ICD-10-CM

## 2020-08-11 NOTE — PROGRESS NOTES
"Video Visit Technology for this patient: [employer] not working, patient has smart device, please try [employer] Video with patient [phone]    Lawrence Louie is a 64 year old male who is being evaluated via a billable video visit.      The patient has been notified of following:     \"This video visit will be conducted via a call between you and your physician/provider. We have found that certain health care needs can be provided without the need for an in-person physical exam.  This service lets us provide the care you need with a video conversation.  If a prescription is necessary we can send it directly to your pharmacy.  If lab work is needed we can place an order for that and you can then stop by our lab to have the test done at a later time.    Video visits are billed at different rates depending on your insurance coverage.  Please reach out to your insurance provider with any questions.    If during the course of the call the physician/provider feels a video visit is not appropriate, you will not be charged for this service.\"    Patient has given verbal consent for Video visit? Yes  How would you like to obtain your AVS? MyChart  If you are dropped from the video visit, the video invite should be resent to: Text to cell phone: [phone]  Will anyone else be joining your video visit? Yes: wife (same number) . How would they like to receive their invitation? Text to cell phone: [phone]        This was changed to a phone visit as pt's wife's phone does not accommodate video visits.    Subjective     Lawrence Louie is a 64 year old male with historyo f ETOH cirrhosis complicated by history of ascites s/p TIPS (now improved), hepatic encephalopathy and portal hypertension and multifocal HCC. He underwent TACE of segment 6 lesion on 7/30/2020. The visit today is joined by his wife, Raven, and daughter and daily care assistant Kristi.  Most of the visit was through Kristi with Raven and Unruly in the same " room and contributing to the discussion.     The family was very unhappy with Unruly's pain management after his TACE procedure and not sure they will repeat it her at the . Kristi thought he was dehydrated the day after the procedure and held his diuretics for 2 days,and pushed fluids, which aggravated his leg edema until they were able to keep legs elevated.  His leg wounds were weeping a green/blue discharge and so Kristi started him on Cipro which was available at home.  It is somewhat better.  Infectious disease clinic was called but could not get him an appt. Until 1 week so there is a call into them for an earlier appt. Kristi's understanding was when there is a blue discharge it is from pseudomonas.He had swelling and redness on his anterior foot about 8 inches inferior to the draining lesion, which has now improved since on the antibiotics.  Hoping to hear from Dr. Bird this week yet.     They have been receiving supplies for leg wound care through [facility] but Kristi needs more supplies for wound care in between their visits.  The Pena wraps need to be hand washed and so he is only wearing them half the time.  Wondering if there is any possibility of getting another pair so he can wear them 24/7.  Also requesting foot wrap as his foot becomes quite swollen.     He has been coughing up thick yellow sputum which is often green tinged.  Inquiring whether he could take guaifenesin product to loosen secretions.     Blood sugars are fluctuating during his infections and Kristi and her father argue about his insulin doses constantly.  He ia quite resistant to her managing his blood sugars but she worries about higher readings due to infection risks.  She reports high readings as high as 210 in a.m.  Kristi has determined that 5 units of Novolog will decrease his glucose reading by 40 points. Asking if he would qualify for a continuous glucose monitoring device.     He has a used hospital bed but the  mattress is very worn.  Requesting a mattress replacement which would support him being able to sit on the side of the bed to eat or do other things.      Asking to review MRI report together which was done.     HPI  Patient Active Problem List   Diagnosis     Mild major depression (H)     Thrombocytopenia (H)     Hypertension goal BP (blood pressure) < 130/80     Plantar warts     Corns and callosities     Family history of colon cancer     Type 2 diabetes, HbA1c goal < 7% (H)     Portal hypertension (H)     Alcoholic cirrhosis (H)     Ascites     Esophageal varices (H)     Multiple rib fractures     Tobacco use disorder     Hyperlipidemia LDL goal <100     Dental caries     Prurigo nodularis     Esophageal reflux     Morbid obesity (H)     History of colonic polyps: tubular adenomas and serrated adenomas     Type II diabetes mellitus with peripheral circulatory disorder (H)     Alcoholic cirrhosis of liver with ascites (H)     Hepatic encephalopathy (H)     Lymphedema of both lower extremities     Venous stasis ulcers of both lower extremities (H)     Inadequate pain control     Post-operative state     HCC (hepatocellular carcinoma) (H)          Review of Systems   See above.    Physical Exam     Phone  visit :General: Healthy, alert and no distress  RESP: No audible wheeze, cough.   NEURO:   Mentation and speech appropriate for age.  PSYCH: Mentation appears normal, affect normal/bright.      Lawrence was seen today for recheck medication.    Diagnoses and all orders for this visit:    Alcoholic cirrhosis, unspecified whether ascites present (H)  -     Wheelchair Order for DME - ONLY FOR DME  -     MISCELLANEOUS DME SUPPLY OR ACCESSORY, NOT OTHERWISE SPECIFIED    Type 2 diabetes mellitus with diabetic neuropathic arthropathy, without long-term current use of insulin (H)  -     insulin glargine (LANTUS SOLOSTAR) 100 UNIT/ML pen; Inject 5 units under the skin every morning.  -     ENDOCRINOLOGY ADULT REFERRAL shirin  for continual glucose monitoring.    HCC (hepatocellular carcinoma) (H)  -     Wheelchair Order for DME - ONLY FOR DME  -     MISCELLANEOUS DME SUPPLY OR ACCESSORY, NOT OTHERWISE SPECIFIED hospital mattress    Lymphedema of both lower extremities  -     MISCELLANEOUS DME SUPPLY OR ACCESSORY, NOT OTHERWISE SPECIFIED wound dressing supplies  -     MISCELLANEOUS DME SUPPLY OR ACCESSORY, NOT OTHERWISE SPECIFIED extra set of Farrel wraps with foot pieces.    Productive cough       -     Activity and sitting up, changing positions, keeping hydrated encouraged. Monitor for fever.  He can use [id number] mg bid.     Nutritional Consult       -     Home Health recommending nutritional supplementation due to chronic medical condtions.    Video-Visit Details    Type of service:  Phone visit 47 minutes    Originating Location (pt. Location): home    Distant Location (provider location):  [facility]     Platform used for Video Visit: [employer]    No follow-ups on file.       CORY Frye CNP

## 2020-08-11 NOTE — NURSING NOTE
Chief Complaint   Patient presents with     Recheck Medication     Pt (per pt's wife) would like to discuss medications and durable medical care      Argenis Ng, EMT at 2:16 PM sign on 8/11/2020

## 2020-08-12 ENCOUNTER — VIRTUAL VISIT (OUTPATIENT)
Dept: GASTROENTEROLOGY | Facility: CLINIC | Age: 65
End: 2020-08-12
Attending: PHYSICIAN ASSISTANT
Payer: COMMERCIAL

## 2020-08-12 DIAGNOSIS — K70.31 ALCOHOLIC CIRRHOSIS OF LIVER WITH ASCITES (H): ICD-10-CM

## 2020-08-12 DIAGNOSIS — R60.0 EDEMA OF BOTH LEGS: Primary | ICD-10-CM

## 2020-08-12 DIAGNOSIS — K76.82 HEPATIC ENCEPHALOPATHY (H): ICD-10-CM

## 2020-08-12 DIAGNOSIS — E44.0 MODERATE PROTEIN-CALORIE MALNUTRITION (H): ICD-10-CM

## 2020-08-12 DIAGNOSIS — C22.0 HCC (HEPATOCELLULAR CARCINOMA) (H): ICD-10-CM

## 2020-08-12 DIAGNOSIS — Z95.828 S/P TIPS (TRANSJUGULAR INTRAHEPATIC PORTOSYSTEMIC SHUNT): ICD-10-CM

## 2020-08-12 RX ORDER — CIPROFLOXACIN 500 MG/1
TABLET, FILM COATED ORAL
Status: ON HOLD | COMMUNITY
Start: 2020-06-05 | End: 2020-10-02

## 2020-08-12 ASSESSMENT — PAIN SCALES - GENERAL: PAINLEVEL: SEVERE PAIN (6)

## 2020-08-12 NOTE — LETTER
"    8/12/2020         RE: Lawrence Louie  [address] [place] [address]        Dear Colleague,    Thank you for referring your patient, Lawrence Louie, to the [facility] HEPATOLOG[facility]. Please see a copy of my visit note below.    Lawrence Louie is a 64 year old male who is being evaluated via a billable video visit.      The patient has been notified of following:     \"This video visit will be conducted via a call between you and your physician/provider. We have found that certain health care needs can be provided without the need for an in-person physical exam.  This service lets us provide the care you need with a video conversation.  If a prescription is necessary we can send it directly to your pharmacy.  If lab work is needed we can place an order for that and you can then stop by our lab to have the test done at a later time.    Video visits are billed at different rates depending on your insurance coverage.  Please reach out to your insurance provider with any questions.    If during the course of the call the physician/provider feels a video visit is not appropriate, you will not be charged for this service.\"    Patient has given verbal consent for Video visit? YES  How would you like to obtain your AVS? MY Chart   If you are dropped from the video visit, the video invite should be resent to: Email  Will anyone else be joining your video visit? Daughter Judy and wife Raven        Video-Visit Details    Type of service:  Video Visit    Video Start Time: 1:18 pm   Video End Time: 1:53    Originating Location (pt. Location): Home    Distant Location (provider location):  [facility]PAT[facility]     Platform used for Video Visit: Akosua Lopez PA-C          Hepatology Follow-up Clinic note  Lawrence Louie   Date of Birth 1955  Date of Service 8/12/2020    Reason for follow-up: ETOH cirrhosis          Assessment/plan:   Lawrence Louie is a 64 year old male with history " of ETOH cirrhosis complicated by history of ascites s/p TIPS (now improved), hepatic encephalopathy and portal hypertension and multifocal HCC. He underwent TACE of segment 6 lesion on 7/30/2020. He had pain problems after procedure, but this is currently improving. His HE is fairly well controlled. His lower extremity edema is stable right now.  Family is very involved in his care. Encouraged him and family to continue working on his overall strength and nutrition.     # Follow up with IR as previously scheduled for continued liver directed therapy. Will reach out to IR to discuss patient/family concerns about pain management.   - Abdominal MRI ordered for September 2020    # History of MILLY, stable:   - Continue 100 mg spironolactone and 40 mg Lasix  - Continue 2000 mg sodium, high protein diet     # History of HE:   - Continue Lactulose. Okay to add MiraLax as well when his narcotic dose is higher.   - Continue Rifaximin     # Follow up with Oncology as scheduled     - Follow up with Dr. Simmons in 3 months       Yovany Lopez PA-C   [facility] Hepatology clinic    -----------------------------------------------------       HPI:   Lawrence Louie is a 64 year old male presenting for follow-up. Daughter Kristi provides much of the history today. His wife Raven is also present.     Cirrhosis  - ETOH and SMITH  Complicated by:  Ascites s/p TIPS on April 2018  Hx of HE   EGD: 6/16/2016  HCC: 6/26/2020 - See below      HCC:   Diagnosed: 6/26/2020  CT ABDOMEN:   Lesion 1: inferior right hepatic lobe segment 6 is not well marginated, approximately 4.2 cm  Lesion 2: 4.1 cm area of arterial enhancement in segment 7  Lesion 3: Exophytic arterial enhancing nodule in segment 4A measuring 3.8 cm   AFP: 80    Patient was last seen by me on 7/13/2020. He underwent TACE of large lesion in segment 6. Family was concerned about cellullits again, so Cipro was started for a few days. Erythema improved. They are trying to  get a follow up with ID.     Patient and family following up early to discuss recovery from TACE. Patient states he was in excruciating pain during the procedure. Post-procedurally, he was also very uncomfortable.     It took a few days for his pain to be controlled at home. His daughter was concerned about dehydration, so she held diuretics for a few days which then worsened his lower extremity edema. Also he became constipated with the pain medications which causes some episodes of confusion / saying nonsensical things. Family continues to adjust doses of lactulose and ensure that he is have several bowel movements a day.  His appetite is slowly improving again.     Patient denies jaundice. Patient also denies melena, hematochezia or hematemesis. Patient denies  fevers, sweats or chills.    No recent alcohol.       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)      Splenomegaly      Squamous cell carcinoma      Thrombocytopenia (H)      Varices, esophageal (H)     Past Surgical History:   Procedure Laterality Date     BIOPSY OF SKIN LESION       COLONOSCOPY Left 6/16/2016    Procedure: COMBINED COLONOSCOPY, SINGLE OR MULTIPLE BIOPSY/POLYPECTOMY BY BIOPSY;  Surgeon: Brandy Barnett MD;  Location:  GI     ESOPHAGOSCOPY, GASTROSCOPY, DUODENOSCOPY (EGD), COMBINED  2/13/2013    Procedure: COMBINED ESOPHAGOSCOPY, GASTROSCOPY, DUODENOSCOPY (EGD);;  Surgeon: Tara Cook MD;  Location:  GI     ESOPHAGOSCOPY, GASTROSCOPY, DUODENOSCOPY (EGD), COMBINED  11/4/2013    Procedure: COMBINED ESOPHAGOSCOPY, GASTROSCOPY, DUODENOSCOPY (EGD);;  Surgeon: Lonny Diaz MD;  Location:  GI     ESOPHAGOSCOPY, GASTROSCOPY, DUODENOSCOPY (EGD), COMBINED Left 6/16/2016    Procedure:  COMBINED ESOPHAGOSCOPY, GASTROSCOPY, DUODENOSCOPY (EGD), BIOPSY SINGLE OR MULTIPLE;  Surgeon: Brandy Barnett MD;  Location: [facility]     EXCISE LESION TRUNK  9/24/2012    Procedure: EXCISE LESION TRUNK;;  Surgeon: Pepe Dominguez MD;  Location: [facility]     GENITOURINARY SURGERY      vasectomy     HERNIORRHAPHY UMBILICAL  9/24/2012    Procedure: HERNIORRHAPHY UMBILICAL;  UMBILICAL HERNIA REPAIR , EXCISION OF PERIUMBILICAL CYST;  Surgeon: Pepe Dominguez MD;  Location: [facility]                 Medications:     Current Outpatient Medications   Medication     blood glucose (NO BRAND SPECIFIED) lancets standard     blood glucose (NO BRAND SPECIFIED) test strip     blood glucose (NO BRAND SPECIFIED) test strip     blood glucose monitoring (NO BRAND SPECIFIED) meter device kit     blood glucose monitoring (NO BRAND SPECIFIED) meter device kit     childrens multivitamin w/ionr (FLINTSTONES COMPLETE) 60 MG chewable tablet     citalopram (CELEXA) 20 MG tablet     Cyanocobalamin (VITAMIN B-12 PO)     desvenlafaxine (PRISTIQ) 100 MG 24 hr tablet     EQL VITAMIN D3 50 MCG (2000 UT) CAPS     furosemide (LASIX) 40 MG tablet     gabapentin (NEURONTIN) 300 MG capsule     hydrOXYzine (ATARAX) 25 MG tablet     insulin aspart (NOVOLOG FLEXPEN) 100 UNIT/ML pen     insulin glargine (LANTUS SOLOSTAR) 100 UNIT/ML pen     insulin pen needle (B-D U/F) 31G X 8 MM miscellaneous     insulin pen needle (ULTICARE SHORT) 31G X 8 MM miscellaneous     lactulose (CEPHULAC) 20 GM packet     lactulose encephalopathy (CHRONULAC) 10 GM/15ML SOLUTION     magnesium oxide (MAG-OX) 400 (241.3 Mg) MG tablet     nystatin (MYCOSTATIN) 497584 UNIT/GM external cream     ondansetron (ZOFRAN) 4 MG tablet     order for DME     order for DME     order for DME     order for DME     oxyCODONE (ROXICODONE) 5 MG tablet     OYSTER SHELL CALCIUM/D 500-200 MG-UNIT per tablet     QUEtiapine (SEROQUEL) 50 MG tablet     rifaximin (XIFAXAN) 550 MG TABS tablet      spironolactone (ALDACTONE) 50 MG tablet     UNABLE TO FIND     ursodiol (ACTIGALL) 300 MG capsule     No current facility-administered medications for this visit.             Allergies:   No Known Allergies         Review of Systems:   10 points ROS was obtained and highlighted in the HPI, otherwise negative.          Physical Exam:     GENERAL: healthy, alert and no distress. Kyphotic cervical spine and upper extremity appears underweight.   EYES: Eyes grossly normal to inspection, conjunctivae and sclerae normal  RESP: no audible wheeze, cough, or visible cyanosis.  No visible retractions or increased work of breathing.  Able to speak fully in complete sentences  NEURO: Cranial nerves grossly intact, mentation intact and speech normal. Drowsy during parts of appointment.   PSYCH: mentation appears normal, affect normal/bright, judgement and insight intact, normal speech and appearance well-groomed           Data:   Reviewed in person and significant for:    Lab Results   Component Value Date     03/12/2020      Lab Results   Component Value Date    POTASSIUM 3.7 03/12/2020     Lab Results   Component Value Date    CHLORIDE 101 03/12/2020     Lab Results   Component Value Date    CO2 26 03/12/2020     Lab Results   Component Value Date    BUN 17 03/12/2020     Lab Results   Component Value Date    CR 0.91 03/12/2020       Lab Results   Component Value Date    WBC 3.0 07/30/2020     Lab Results   Component Value Date    HGB 7.5 07/30/2020     Lab Results   Component Value Date    HCT 23.9 07/30/2020     Lab Results   Component Value Date    MCV 92 07/30/2020     Lab Results   Component Value Date    PLT 85 07/30/2020       Lab Results   Component Value Date    AST 32 07/30/2020     Lab Results   Component Value Date    ALT 17 07/30/2020     Lab Results   Component Value Date    BILICONJ 0.0 03/12/2014      Lab Results   Component Value Date    BILITOTAL 0.8 07/30/2020       Lab Results   Component Value Date     ALBUMIN 2.1 07/30/2020     Lab Results   Component Value Date    PROTTOTAL 6.2 07/30/2020      Lab Results   Component Value Date    ALKPHOS 187 07/30/2020       Lab Results   Component Value Date    INR 1.42 07/30/2020     CT ABDOMEN WO CONSTRAST  7/31/2020:   IMPRESSION:   1.  Post procedure changes in segment 6 cTACE completely obscuring the  known hepatic segment 6 arterial enhancing lesion. Multiple additional  hypodensities throughout the liver are not significantly changed  compared to recent abdominal CT and MRI.  2.  Cirrhotic configuration to the liver with TIPS in place. Unable to  evaluate TIPS patency due to lack of intravenous contrast.  3.  Redemonstration of left-sided rib fractures with lytic andsclerotic superior endplate deformity of L1 which is unchanged compared to recent priors. Outside MRI spine report on 3/23/2020  reports prior history of trauma.    Again, thank you for allowing me to participate in the care of your patient.        Sincerely,        Yovany Lopez PA-C

## 2020-08-12 NOTE — PROGRESS NOTES
Hepatology Follow-up Clinic note  Lawrence Louie   Date of Birth 1955  Date of Service 8/12/2020    Reason for follow-up: ETOH cirrhosis          Assessment/plan:   Lawrence Louie is a 64 year old male with history of ETOH cirrhosis complicated by history of ascites s/p TIPS (now improved), hepatic encephalopathy and portal hypertension and multifocal HCC. He underwent TACE of segment 6 lesion on 7/30/2020. He had pain problems after procedure, but this is currently improving. His HE is fairly well controlled. His lower extremity edema is stable right now.  Family is very involved in his care. Encouraged him and family to continue working on his overall strength and nutrition.     # Follow up with IR as previously scheduled for continued liver directed therapy. Will reach out to IR to discuss patient/family concerns about pain management.   - Abdominal MRI ordered for September 2020    # History of MILLY stable:   - Continue 100 mg spironolactone and 40 mg Lasix  - Continue 2000 mg sodium, high protein diet     # History of HE:   - Continue Lactulose. Okay to add MiraLax as well when his narcotic dose is higher.   - Continue Rifaximin     # Follow up with Oncology as scheduled     - Follow up with Dr. Simmons in 3 months       Yovany Lopez PA-C   [facility] Hepatology clinic    -----------------------------------------------------       HPI:   Lawrence Louie is a 64 year old male presenting for follow-up. Daughter Kristi provides much of the history today. His wife Raven is also present.     Cirrhosis  - ETOH and SMITH  Complicated by:  Ascites s/p TIPS on April 2018  Hx of HE   EGD: 6/16/2016  HCC: 6/26/2020 - See below      HCC:   Diagnosed: 6/26/2020  CT ABDOMEN:   Lesion 1: inferior right hepatic lobe segment 6 is not well marginated, approximately 4.2 cm  Lesion 2: 4.1 cm area of arterial enhancement in segment 7  Lesion 3: Exophytic arterial enhancing nodule in segment 4A  measuring 3.8 cm   AFP: 80    Patient was last seen by me on 7/13/2020. He underwent TACE of large lesion in segment 6. Family was concerned about cellullits again, so Cipro was started for a few days. Erythema improved. They are trying to get a follow up with ID.     Patient and family following up early to discuss recovery from TACE. Patient states he was in excruciating pain during the procedure. Post-procedurally, he was also very uncomfortable.     It took a few days for his pain to be controlled at home. His daughter was concerned about dehydration, so she held diuretics for a few days which then worsened his lower extremity edema. Also he became constipated with the pain medications which causes some episodes of confusion / saying nonsensical things. Family continues to adjust doses of lactulose and ensure that he is have several bowel movements a day.  His appetite is slowly improving again.     Patient denies jaundice. Patient also denies melena, hematochezia or hematemesis. Patient denies  fevers, sweats or chills.    No recent alcohol.       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)      Splenomegaly      Squamous cell carcinoma      Thrombocytopenia (H)      Varices, esophageal (H)     Past Surgical History:   Procedure Laterality Date     BIOPSY OF SKIN LESION       COLONOSCOPY Left 6/16/2016    Procedure: COMBINED COLONOSCOPY, SINGLE OR MULTIPLE BIOPSY/POLYPECTOMY BY BIOPSY;  Surgeon: Brandy Barnett MD;  Location:  GI     ESOPHAGOSCOPY, GASTROSCOPY, DUODENOSCOPY (EGD), COMBINED  2/13/2013    Procedure: COMBINED ESOPHAGOSCOPY, GASTROSCOPY, DUODENOSCOPY (EGD);;  Surgeon: Tara Cook MD;  Location:  GI     ESOPHAGOSCOPY, GASTROSCOPY, DUODENOSCOPY  (EGD), COMBINED  11/4/2013    Procedure: COMBINED ESOPHAGOSCOPY, GASTROSCOPY, DUODENOSCOPY (EGD);;  Surgeon: Lonny Diaz MD;  [facility]     ESOPHAGOSCOPY, GASTROSCOPY, DUODENOSCOPY (EGD), COMBINED Left 6/16/2016    Procedure: COMBINED ESOPHAGOSCOPY, GASTROSCOPY, DUODENOSCOPY (EGD), BIOPSY SINGLE OR MULTIPLE;  Surgeon: Brandy Barnett MD;  Location: [facility]     EXCISE LESION TRUNK  9/24/2012    Procedure: EXCISE LESION TRUNK;;  Surgeon: Pepe Dominguez MD;  Location: [facility]     GENITOURINARY SURGERY      vasectomy     HERNIORRHAPHY UMBILICAL  9/24/2012    Procedure: HERNIORRHAPHY UMBILICAL;  UMBILICAL HERNIA REPAIR , EXCISION OF PERIUMBILICAL CYST;  Surgeon: Pepe Dominguez MD;  Location: [facility]                 Medications:     Current Outpatient Medications   Medication     blood glucose (NO BRAND SPECIFIED) lancets standard     blood glucose (NO BRAND SPECIFIED) test strip     blood glucose (NO BRAND SPECIFIED) test strip     blood glucose monitoring (NO BRAND SPECIFIED) meter device kit     blood glucose monitoring (NO BRAND SPECIFIED) meter device kit     childrens multivitamin w/ionr (FLINTSTONES COMPLETE) 60 MG chewable tablet     citalopram (CELEXA) 20 MG tablet     Cyanocobalamin (VITAMIN B-12 PO)     desvenlafaxine (PRISTIQ) 100 MG 24 hr tablet     EQL VITAMIN D3 50 MCG (2000 UT) CAPS     furosemide (LASIX) 40 MG tablet     gabapentin (NEURONTIN) 300 MG capsule     hydrOXYzine (ATARAX) 25 MG tablet     insulin aspart (NOVOLOG FLEXPEN) 100 UNIT/ML pen     insulin glargine (LANTUS SOLOSTAR) 100 UNIT/ML pen     insulin pen needle (B-D U/F) 31G X 8 MM miscellaneous     insulin pen needle (ULTICARE SHORT) 31G X 8 MM miscellaneous     lactulose (CEPHULAC) 20 GM packet     lactulose encephalopathy (CHRONULAC) 10 GM/15ML SOLUTION     magnesium oxide (MAG-OX) 400 (241.3 Mg) MG tablet     nystatin (MYCOSTATIN) [id number] UNIT/GM external cream     ondansetron (ZOFRAN) 4 MG tablet     order for DME      order for DME     order for DME     order for DME     oxyCODONE (ROXICODONE) 5 MG tablet     OYSTER SHELL CALCIUM/D 500-200 MG-UNIT per tablet     QUEtiapine (SEROQUEL) 50 MG tablet     rifaximin (XIFAXAN) 550 MG TABS tablet     spironolactone (ALDACTONE) 50 MG tablet     UNABLE TO FIND     ursodiol (ACTIGALL) 300 MG capsule     No current facility-administered medications for this visit.             Allergies:   No Known Allergies         Review of Systems:   10 points ROS was obtained and highlighted in the HPI, otherwise negative.          Physical Exam:     GENERAL: healthy, alert and no distress. Kyphotic cervical spine and upper extremity appears underweight.   EYES: Eyes grossly normal to inspection, conjunctivae and sclerae normal  RESP: no audible wheeze, cough, or visible cyanosis.  No visible retractions or increased work of breathing.  Able to speak fully in complete sentences  NEURO: Cranial nerves grossly intact, mentation intact and speech normal. Drowsy during parts of appointment.   PSYCH: mentation appears normal, affect normal/bright, judgement and insight intact, normal speech and appearance well-groomed           Data:   Reviewed in person and significant for:    Lab Results   Component Value Date     03/12/2020      Lab Results   Component Value Date    POTASSIUM 3.7 03/12/2020     Lab Results   Component Value Date    CHLORIDE 101 03/12/2020     Lab Results   Component Value Date    CO2 26 03/12/2020     Lab Results   Component Value Date    BUN 17 03/12/2020     Lab Results   Component Value Date    CR 0.91 03/12/2020       Lab Results   Component Value Date    WBC 3.0 07/30/2020     Lab Results   Component Value Date    HGB 7.5 07/30/2020     Lab Results   Component Value Date    HCT 23.9 07/30/2020     Lab Results   Component Value Date    MCV 92 07/30/2020     Lab Results   Component Value Date    PLT 85 07/30/2020       Lab Results   Component Value Date    AST 32 07/30/2020     Lab  Results   Component Value Date    ALT 17 07/30/2020     Lab Results   Component Value Date    BILICONJ 0.0 03/12/2014      Lab Results   Component Value Date    BILITOTAL 0.8 07/30/2020       Lab Results   Component Value Date    ALBUMIN 2.1 07/30/2020     Lab Results   Component Value Date    PROTTOTAL 6.2 07/30/2020      Lab Results   Component Value Date    ALKPHOS 187 07/30/2020       Lab Results   Component Value Date    INR 1.42 07/30/2020     CT ABDOMEN WO CONSTRAST  7/31/2020:   IMPRESSION:   1.  Post procedure changes in segment 6 cTACE completely obscuring the  known hepatic segment 6 arterial enhancing lesion. Multiple additional  hypodensities throughout the liver are not significantly changed  compared to recent abdominal CT and MRI.  2.  Cirrhotic configuration to the liver with TIPS in place. Unable to  evaluate TIPS patency due to lack of intravenous contrast.  3.  Redemonstration of left-sided rib fractures with lytic andsclerotic superior endplate deformity of L1 which is unchanged compared to recent priors. Outside MRI spine report on 3/23/2020  reports prior history of trauma.

## 2020-08-12 NOTE — TELEPHONE ENCOUNTER
Sandee Bird MD  You 5 minutes ago (3:33 PM)        Yes, wound aerobic culture would be great . Also daughter can send pictures of the wound.   Lately he had pseudomonas stutzeri bacteremia which was not sensitive to any oral abxs. If that organism is causing the infection again then even if he gets a little sicker, going to the ED would be the right thing to do.   If you can pls relay the above to daughter and coordinate it will be great. Once the cxs atre faxed back we can treat with appropriate abxs, but it will probably take 5 days. Just so daughter knows.   Thank you very much AB Any

## 2020-08-12 NOTE — TELEPHONE ENCOUNTER
Spoke with pt's spouse, Flores. She said that pt has been on cipro since last Friday, and pt's wound seems to be responding well to that. It still appears blue/green, but has improved. She will send pictures to us via [employer]. She understands that culture will take some time to come back and that if pt shows any signs of worsening, they will bring him to the ED immediately. Spoke w/Roosevelt, home care nurse with [facility] (phone # [phone]). A nurse is going to pt's home today for visit but might not be able to collect aerobic wound culture b/c they have to  the supplies at [facility] for specimen collection. Asked them to do today if possible but if not, to collect on Friday (pt's next home care visit per Roosevelt). Faxed order per his request to fax # [phone].  Any Kaminski RN

## 2020-08-12 NOTE — PROGRESS NOTES
"Lawrence Louie is a 64 year old male who is being evaluated via a billable video visit.      The patient has been notified of following:     \"This video visit will be conducted via a call between you and your physician/provider. We have found that certain health care needs can be provided without the need for an in-person physical exam.  This service lets us provide the care you need with a video conversation.  If a prescription is necessary we can send it directly to your pharmacy.  If lab work is needed we can place an order for that and you can then stop by our lab to have the test done at a later time.    Video visits are billed at different rates depending on your insurance coverage.  Please reach out to your insurance provider with any questions.    If during the course of the call the physician/provider feels a video visit is not appropriate, you will not be charged for this service.\"    Patient has given verbal consent for Video visit? YES  How would you like to obtain your AVS? MY Chart   If you are dropped from the video visit, the video invite should be resent to: Email  Will anyone else be joining your video visit? Daughter Judy and wife Raven        Video-Visit Details    Type of service:  Video Visit    Video Start Time: 1:18 pm   Video End Time: 1:53    Originating Location (pt. Location): Home    Distant Location (provider location):  [facility] HEPATOLOGY     Platform used for Video Visit: Akosua Lopez PA-C        "

## 2020-08-13 ENCOUNTER — MEDICAL CORRESPONDENCE (OUTPATIENT)
Dept: HEALTH INFORMATION MANAGEMENT | Facility: CLINIC | Age: 65
End: 2020-08-13

## 2020-08-13 NOTE — PATIENT INSTRUCTIONS
Alcoholic cirrhosis, unspecified whether ascites present (H)  -     Wheelchair Order for DME - ONLY FOR DME  -     MISCELLANEOUS DME SUPPLY OR ACCESSORY, NOT OTHERWISE SPECIFIED    Type 2 diabetes mellitus with diabetic neuropathic arthropathy, without long-term current use of insulin (H)  -     insulin glargine (LANTUS SOLOSTAR) 100 UNIT/ML pen; Inject 5 units under the skin every morning.  -     ENDOCRINOLOGY ADULT REFERRAL eval for continual glucose monitoring.    HCC (hepatocellular carcinoma) (H)  -     Wheelchair Order for DME - ONLY FOR DME  -     MISCELLANEOUS DME SUPPLY OR ACCESSORY, NOT OTHERWISE SPECIFIED [facility] mattre    Lymphedema of both lower extremities  -     MISCELLANEOUS DME SUPPLY OR ACCESSORY, NOT OTHERWISE SPECIFIED wound dressing supplies  -     MISCELLANEOUS DME SUPPLY OR ACCESSORY, NOT OTHERWISE SPECIFIED extra set of Farrel wraps with foot pieces.    Productive cough       -     Activity and sitting up, changing positions, keeping hydrated encouraged. Monitor for fever.  He can use [id number] mg bid.     Nutritional Consult       -     Home Health recommending nutritional supplementation due to chronic medical condtions.

## 2020-08-13 NOTE — TELEPHONE ENCOUNTER
RECORDS RECEIVED FROM: internal/CE   DATE RECEIVED: 8.24.20    NOTES (FOR ALL VISITS) STATUS DETAILS   OFFICE NOTES from referring provider Internal  Katherine Mcallister    OFFICE NOTES from other specialist na    ED NOTES nan    OPERATIVE REPORT  (thyroid, pituitary, adrenal, parathyroid) a    MEDICATION LIST Internal     IMAGING      DEXASCAN na    MRI (BRAIN) na    XR (Chest) CE [facility]- 6.7.20, 3.25.20, 3.21.20, 1.31.20, 10.8.19, 10.7.19, 4.15.19, 4.10.19,    CT (HEAD/NECK/CHEST/ABDOMEN) CE [facility]- abd- 6.10.20, 11.24.19,   Head- 11.24.19, 10.7.19   NUCLEAR  na    ULTRASOUND (HEAD/NECK) na    LABS     DIABETES: HBGA1C, CREATININE, FASTING LIPIDS, MICROALBUMIN URINE, POTASSIUM, TSH, T4    THYROID: TSH, T4, CBC, THYRODLONULIN, TOTAL T3, FREE T4, CALCITONIN, CEA Internal/CE CREATININE 6.13.20  HBGA1C 3.12.20  LIPIDS 3.12.20   MICROALBUMIN  2016  POTASSIUM 6.13.20  TSH/T4- 3.12.20                 Action 8.13.20 sv    Action Taken Image request sent to [facility] for all  CXR and CT images   --- images received and resolved in PACS

## 2020-08-14 ENCOUNTER — TRANSFERRED RECORDS (OUTPATIENT)
Dept: HEALTH INFORMATION MANAGEMENT | Facility: CLINIC | Age: 65
End: 2020-08-14

## 2020-08-24 ENCOUNTER — VIRTUAL VISIT (OUTPATIENT)
Dept: ENDOCRINOLOGY | Facility: CLINIC | Age: 65
End: 2020-08-24
Attending: NURSE PRACTITIONER
Payer: COMMERCIAL

## 2020-08-24 ENCOUNTER — MYC MEDICAL ADVICE (OUTPATIENT)
Dept: ENDOCRINOLOGY | Facility: CLINIC | Age: 65
End: 2020-08-24

## 2020-08-24 ENCOUNTER — TELEPHONE (OUTPATIENT)
Dept: INFECTIOUS DISEASES | Facility: CLINIC | Age: 65
End: 2020-08-24
Payer: COMMERCIAL

## 2020-08-24 ENCOUNTER — PRE VISIT (OUTPATIENT)
Dept: ENDOCRINOLOGY | Facility: CLINIC | Age: 65
End: 2020-08-24

## 2020-08-24 DIAGNOSIS — Z79.4 TYPE 2 DIABETES MELLITUS WITHOUT COMPLICATION, WITH LONG-TERM CURRENT USE OF INSULIN (H): ICD-10-CM

## 2020-08-24 DIAGNOSIS — E11.9 TYPE 2 DIABETES MELLITUS WITHOUT COMPLICATION, WITH LONG-TERM CURRENT USE OF INSULIN (H): ICD-10-CM

## 2020-08-24 DIAGNOSIS — E11.610 TYPE 2 DIABETES MELLITUS WITH DIABETIC NEUROPATHIC ARTHROPATHY, WITHOUT LONG-TERM CURRENT USE OF INSULIN (H): ICD-10-CM

## 2020-08-24 RX ORDER — INSULIN ASPART 100 [IU]/ML
INJECTION, SOLUTION INTRAVENOUS; SUBCUTANEOUS
Qty: 30 ML | Refills: 11 | Status: SHIPPED | OUTPATIENT
Start: 2020-08-24 | End: 2021-01-01

## 2020-08-24 NOTE — LETTER
"8/24/2020       RE: Lawrence Louie  [address] [place] [address]     Dear Colleague,    Thank you for referring your patient, Lawrence Louie, to the [facility] ENDOCRINOLOGY at [facility]. Please see a copy of my visit note below.    Lawrence Louie is a 64 year old male who is being evaluated via a billable video visit.      The patient has been notified of following:     \"This video visit will be conducted via a call between you and your physician/provider. We have found that certain health care needs can be provided without the need for an in-person physical exam.  This service lets us provide the care you need with a video conversation.  If a prescription is necessary we can send it directly to your pharmacy.  If lab work is needed we can place an order for that and you can then stop by our lab to have the test done at a later time.    Video visits are billed at different rates depending on your insurance coverage.  Please reach out to your insurance provider with any questions.    If during the course of the call the physician/provider feels a video visit is not appropriate, you will not be charged for this service.\"    Patient has given verbal consent for Video visit? Yes  How would you like to obtain your AVS? MyChart  Will anyone else be joining your video visit? Yes    Reason for visit/consult Type 2 DM    Primary care provider: Ary Murphy    HPI:  65 yo male w/ h/o ETOH cirrhosis w/ ascites s/p TIPS (now improved), hepatic encephalopathy, portal hypertension, multifocal HCC, HTN seen for eval/mgmt of T2DM. He was diagnosed in 12/2012 when he presented to ER for bleeding varices. He has been on insulin since diagnosis. He is taking Lantus 5-12 units in am and Novolog sliding scale below. He is checking his sugars sporadically.  120-149 2 unit(s) units  150-199 3 units  200-249 6 units  250-299 9 units  300-349 12 units  350+ 15 units    At " "hs  200-249 2 units  250-299 4 units  300-349 6 units  350+ 7 units    They figured out at one point that 5 units lowers it 40 points. He had an infection and so his sugars have been hard to manage. Now this would be too much.    The other day, he was 228, and he took 20 units and got to 97 with food and sugar in his system and then it finally came up to 153. The lowest he's had was 97. The highest was 263 on 8/9. Fasting today was 181 and they are not able to say if he took Lantus insulin or not. On 8/14 fasting was 138, took unknown amount of Lantus on 8/13.    He is not taking any mealtime insulin since they have not had \"formal training\" yet.  He has an infection now in his legs and is on ciprofloxacin now.    His daughter and wife are helpful and provide much of the history. The daughter days the patient often fights about how to manage his blood sugars.    There was no glucose data downloaded for examiner to review. All of the data above was provided by daughter during video visit.       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)      Splenomegaly      Squamous cell carcinoma      Thrombocytopenia (H)      Varices, esophageal (H)     Banded in 2011     Past Surgical History:   Procedure Laterality Date     BIOPSY OF SKIN LESION       COLONOSCOPY Left 6/16/2016    Procedure: COMBINED COLONOSCOPY, SINGLE OR MULTIPLE BIOPSY/POLYPECTOMY BY BIOPSY;  Surgeon: Brandy Barnett MD;  Location:  GI     ESOPHAGOSCOPY, GASTROSCOPY, DUODENOSCOPY (EGD), COMBINED  2/13/2013    Procedure: COMBINED ESOPHAGOSCOPY, GASTROSCOPY, DUODENOSCOPY (EGD);;  Surgeon: Tara Cook MD;  Location:  GI     ESOPHAGOSCOPY, " GASTROSCOPY, DUODENOSCOPY (EGD), COMBINED  11/4/2013    Procedure: COMBINED ESOPHAGOSCOPY, GASTROSCOPY, DUODENOSCOPY (EGD);;  Surgeon: Lonny Diaz MD;  Location: [facility]     ESOPHAGOSCOPY, GASTROSCOPY, DUODENOSCOPY (EGD), COMBINED Left 6/16/2016    Procedure: COMBINED ESOPHAGOSCOPY, GASTROSCOPY, DUODENOSCOPY (EGD), BIOPSY SINGLE OR MULTIPLE;  Surgeon: Brandy Barnett MD;  Location: [facility] GI     EXCISE LESION TRUNK  9/24/2012    Procedure: EXCISE LESION TRUNK;;  Surgeon: Pepe Dominguez MD;  Location: [facility]     GENITOURINARY SURGERY      vasectomy     HERNIORRHAPHY UMBILICAL  9/24/2012    Procedure: HERNIORRHAPHY UMBILICAL;  UMBILICAL HERNIA REPAIR , EXCISION OF PERIUMBILICAL CYST;  Surgeon: Pepe Dominguez MD;  Location: [facility]     IR CHEMO EMBOLIZATION  7/30/2020       Allergies:  No Known Allergies    Current Medications   Current Outpatient Medications   Medication     blood glucose (NO BRAND SPECIFIED) lancets standard     blood glucose (NO BRAND SPECIFIED) test strip     blood glucose (NO BRAND SPECIFIED) test strip     blood glucose monitoring (NO BRAND SPECIFIED) meter device kit     blood glucose monitoring (NO BRAND SPECIFIED) meter device kit     childrens multivitamin w/ionr (FLINTSTONES COMPLETE) 60 MG chewable tablet     ciprofloxacin (CIPRO) 500 MG tablet     citalopram (CELEXA) 20 MG tablet     Cyanocobalamin (VITAMIN B-12 PO)     desvenlafaxine (PRISTIQ) 100 MG 24 hr tablet     EQL VITAMIN D3 50 MCG (2000 UT) CAPS     furosemide (LASIX) 40 MG tablet     gabapentin (NEURONTIN) 300 MG capsule     hydrOXYzine (ATARAX) 25 MG tablet     insulin aspart (NOVOLOG FLEXPEN) 100 UNIT/ML pen     insulin glargine (LANTUS SOLOSTAR) 100 UNIT/ML pen     insulin pen needle (B-D U/F) 31G X 8 MM miscellaneous     insulin pen needle (ULTICARE SHORT) 31G X 8 MM miscellaneous     lactulose (CEPHULAC) 20 GM packet     lactulose encephalopathy (CHRONULAC) 10 GM/15ML SOLUTION     magnesium oxide (MAG-OX) 400 (241.3  Mg) MG tablet     nystatin (MYCOSTATIN) 674033 UNIT/GM external cream     ondansetron (ZOFRAN) 4 MG tablet     order for DME     order for DME     order for DME     order for DME     oxyCODONE (ROXICODONE) 5 MG tablet     OYSTER SHELL CALCIUM/D 500-200 MG-UNIT per tablet     QUEtiapine (SEROQUEL) 50 MG tablet     rifaximin (XIFAXAN) 550 MG TABS tablet     spironolactone (ALDACTONE) 50 MG tablet     UNABLE TO FIND     ursodiol (ACTIGALL) 300 MG capsule     No current facility-administered medications for this visit.        Family History:  Family History   Problem Relation Age of Onset     Breast Cancer Mother      Liver Cancer Mother      Cardiovascular Father      Cerebrovascular Disease Father         very low blood pressure     Cancer Father         rectal cancer     Cardiovascular Paternal Grandfather      Diabetes Brother      Cancer Sister         skin cancer     C.A.D. Other         MI, 70's     Breast Cancer Sister      Thyroid Disease No family hx of      Lipids No family hx of      Anesthesia Reaction No family hx of        Social History:  Social History     Tobacco Use     Smoking status: Current Every Day Smoker     Packs/day: 0.30     Types: Cigarettes     Last attempt to quit: 4/10/2019     Years since quittin.3     Smokeless tobacco: Never Used     Tobacco comment: about 4 cigarettes per day   Substance Use Topics     Alcohol use: No     Alcohol/week: 0.0 standard drinks     Comment: 2-3 per day , none since Dec 2012       ROS:  negative except as mentioned in HPI    Exam  There were no vitals taken for this virtual visit  Gen: well appearing, nad, pleasant and conversant. Daughter and wife were present during visit  HEENT: EOMI  Neuro: A&O in general    Labs/Imaging    Lab Results   Component Value Date    A1C 6.3 2020    A1C 5.3 2019    A1C 4.5 2018    A1C Canceled, Test credited 2018    A1C Canceled, Test credited 2018     Creatinine   Date Value Ref Range Status    03/12/2020 0.91 0.66 - 1.25 mg/dL Final     ENDO DIABETES Latest Ref Rng & Units 3/12/2020 7/30/2020   HEMOGLOBIN A1C 0 - 5.6 % 6.3 (H)    HEMOGLOBIN A1C, POC 4.3 - 6.0 %     HGB 13.3 - 17.7 g/dL 7.6 (L) 7.5 (L)   GLUCOSE 70 - 99 mg/dL 116 (H)    CHOLESTEROL <200 mg/dL 96    LDL CHOLESTEROL, CALCULATED <100 mg/dL 40    HDL CHOLESTEROL >39 mg/dL 45    VLDL-CHOLESTEROL 0 - 30 mg/dL     NON HDL CHOLESTEROL <130 mg/dL 51    TRIGLYCERIDES <150 mg/dL 57    ALBUMIN URINE MG/L mg/L 5    ALBUMIN URINE MG/G CR 0 - 17 mg/g Cr 6.15    CREATININE 0.66 - 1.25 mg/dL 0.91    POTASSIUM 3.4 - 5.3 mmol/L 3.7    ALT 0 - 70 U/L 10 17   AST 0 - 45 U/L 19 32   WBC 4.0 - 11.0 10e9/L 4.6 3.0 (L)   RBC 4.4 - 5.9 10e12/L 2.77 (L) 2.60 (L)   HGB 13.3 - 17.7 g/dL 7.6 (L) 7.5 (L)   HCT 40.0 - 53.0 % 24.2 (L) 23.9 (L)   MCV 78 - 100 fl 87 92   MCH 26.5 - 33.0 pg 27.4 28.8   MCHC 31.5 - 36.5 g/dL 31.4 (L) 31.4 (L)   RDW 10.0 - 15.0 % 16.3 (H) 17.9 (H)    - 450 10e9/L 118 (L) 85 (L)     Assessment and Plan    Type 2 DM w/ suboptimal control and paucity of glucose data and unreliable A1c due to anemia secondary to cirrhosis. He and his family could benefit from diabetes education.    Labs: Frutoseamine, A1c    Patient Instructions:    Nice to talk with you today in virtual clinic    Please go to any [facility] lab. Follow standard safety precautions for COVID-19, practice social isolation, hand hygiene, do not touch your face, nose, or mouth, wear mask if have to go out in public to be respectful of community.  Please contact us to schedule at any of our [facility] lab locations. Call [phone] ([phone]), select option 1.    Please check your blood sugars when you are fasting in the morning, and before a meal and at bedtime and log carefully into a notebook and have them available at all of your visits.    Fasting goals are .  2 hours after eating goals are: <180.    For now, ok to continue Lantus 5 units daily since  we don't have any fasting data to make changes.    If your fasting sugars are lower or higher than the range, you will need to make an adjustment on your long acting basal insulin (Lantus).  If your sugars after eating are lower or higher than the range, then you will need to make an adjustment on your meal time insulin.  Decrease or increase Lantus or basal or long-acting insulin by 5 units twice weekly until fasting blood sugars are in  range.  Start Novolog insulin at meals: 1 units per 10 grams of carbohydrates.  Use low sodium meal replacements such as Lean Cuisines and/or Healthy Choice meals and this will make it easier to count # of carbohydrates in grams and dose Novolog insulin accordingly. For example: if your meal has 30 grams of carbohydrates, you would take 3 units of Novolog to cover that + any correction if your glucose is >140 starting out the meal.  Because you are starting meal time Novolog insulin, your correction scale has changed slightly. Please Start Novolog insulin correction scale before meals:    MEDIUM Insulin Resistance or Correction Factor of 1 for 50 mg/dL   Do Not give Correction Insulin if Pre-Meal BG less than 140.   For Pre-Meal  - 189 give 1 unit.   For Pre-Meal  - 239 give 2 units.   For Pre-Meal  - 289 give 3 units.   For Pre-Meal  - 339 give 4 units.   For Pre-Meal - 399 give 5 units.   For Pre-Meal -449 give 6 units  For Pre-Meal BG greater than or equal to 450 give 7 units.   To be given with prandial insulin, and based on pre-meal blood glucose.    Notify provider if glucose greater than or equal to 350 mg/dL after administration of correction dose.     MEDIUM Insulin Resistance or Correction Factor of 1 for 50 mg/dL -  Do Not give Bedtime Correction Insulin if BG less than 200.   For  - 249 give 1 units.   For  - 299 give 2 units.   For  - 349 give 3 units.   For  -399 give 4 units.   For BG greater than or  equal to 400 give 5 units.  Notify provider if glucose greater than or equal to 350 mg/dL after administration of correction dose.    For questions about blood sugars, please call [phone] during the day or [phone] anytime & ask the  to page diabetes/endocrine on-call.    RTC: Diabetes Education in 1-2 weeks, Diabetes Team PA in 1 months, me in 6 months    Best Wishes,  Dr. Elina Partida MD, MPH  Endocrinologist      Time: 60 min spent on chart review, evaluation, management, counseling, education, & motivational interviewing with greater than 50% of the total time was spent on counseling and coordinating care      Video-Visit Details    Type of service:  Video Visit    Video Start Time: Start: 08/24/2020 03:03 pm   Stop: 08/24/2020 03:33 pm    Originating Location (pt. Location): Home    Distant Location (provider location):  [facility]     Platform used for Video Visit: [email or web address]

## 2020-08-24 NOTE — TELEPHONE ENCOUNTER
Spoke to daughter Kristi. She thinks the cx was done from all the wounds and not just from the wound of concern per home care nurse and grew a bunch of different bacteria and she is freaking out.     But it seems the wound is getting better, no more discharge. Another wound has a break and has liquid weeping out but no signs of infection.     He is almost done with cipro course.     Discussed that since the wound is better, to complete the cipro course and stop and see how he does. Daughter was relieved and agreed.     Phone time 7 mins    Sandee Bird  , [facility]  Division of Infectious Disease, Department of Internal Medicine  Pager [phone]        [facility] Call Center    Phone Message    May a detailed message be left on voicemail: yes     Reason for Call: Other: Pts daughter called concerned with pts wound culture results. pts daughter is concerned he will get sepsis - added she might take him into the ER tonight. Pts daughter wasnt sure if they could get antibiotics for this issue. Please follow up     Action Taken: Message routed to:  [facility] ([facility] ID    Travel Screening: Not Applicable

## 2020-08-24 NOTE — PROGRESS NOTES
"Lawrence Louei is a 64 year old male who is being evaluated via a billable video visit.      The patient has been notified of following:     \"This video visit will be conducted via a call between you and your physician/provider. We have found that certain health care needs can be provided without the need for an in-person physical exam.  This service lets us provide the care you need with a video conversation.  If a prescription is necessary we can send it directly to your pharmacy.  If lab work is needed we can place an order for that and you can then stop by our lab to have the test done at a later time.    Video visits are billed at different rates depending on your insurance coverage.  Please reach out to your insurance provider with any questions.    If during the course of the call the physician/provider feels a video visit is not appropriate, you will not be charged for this service.\"    Patient has given verbal consent for Video visit? Yes  How would you like to obtain your AVS? MyChart  Will anyone else be joining your video visit? Yes    Reason for visit/consult Type 2 DM    Primary care provider: Ary Murphy    HPI:  63 yo male w/ h/o ETOH cirrhosis w/ ascites s/p TIPS (now improved), hepatic encephalopathy, portal hypertension, multifocal HCC, HTN seen for eval/mgmt of T2DM. He was diagnosed in 12/2012 when he presented to ER for bleeding varices. He has been on insulin since diagnosis. He is taking Lantus 5-12 units in am and Novolog sliding scale below. He is checking his sugars sporadically.  120-149 2 unit(s) units  150-199 3 units  200-249 6 units  250-299 9 units  300-349 12 units  350+ 15 units    At hs  200-249 2 units  250-299 4 units  300-349 6 units  350+ 7 units    They figured out at one point that 5 units lowers it 40 points. He had an infection and so his sugars have been hard to manage. Now this would be too much.    The other day, he was 228, and he took 20 units and got to " "97 with food and sugar in his system and then it finally came up to 153. The lowest he's had was 97. The highest was 263 on 8/9. Fasting today was 181 and they are not able to say if he took Lantus insulin or not. On 8/14 fasting was 138, took unknown amount of Lantus on 8/13.    He is not taking any mealtime insulin since they have not had \"formal training\" yet.  He has an infection now in his legs and is on ciprofloxacin now.    His daughter and wife are helpful and provide much of the history. The daughter days the patient often fights about how to manage his blood sugars.    There was no glucose data downloaded for examiner to review. All of the data above was provided by daughter during video visit.       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)      Splenomegaly      Squamous cell carcinoma      Thrombocytopenia (H)      Varices, esophageal (H)     Banded in 2011     Past Surgical History:   Procedure Laterality Date     BIOPSY OF SKIN LESION       COLONOSCOPY Left 6/16/2016    Procedure: COMBINED COLONOSCOPY, SINGLE OR MULTIPLE BIOPSY/POLYPECTOMY BY BIOPSY;  Surgeon: Brandy Barnett MD;  Location:  GI     ESOPHAGOSCOPY, GASTROSCOPY, DUODENOSCOPY (EGD), COMBINED  2/13/2013    Procedure: COMBINED ESOPHAGOSCOPY, GASTROSCOPY, DUODENOSCOPY (EGD);;  Surgeon: Tara Cook MD;  Location:  GI     ESOPHAGOSCOPY, GASTROSCOPY, DUODENOSCOPY (EGD), COMBINED  11/4/2013    Procedure: COMBINED ESOPHAGOSCOPY, GASTROSCOPY, DUODENOSCOPY (EGD);;  Surgeon: Lonny Diaz MD;  Location:  GI     ESOPHAGOSCOPY, GASTROSCOPY, DUODENOSCOPY (EGD), COMBINED Left 6/16/2016    Procedure: COMBINED ESOPHAGOSCOPY, " GASTROSCOPY, DUODENOSCOPY (EGD), BIOPSY SINGLE OR MULTIPLE;  Surgeon: Brandy Barnett MD;  Location: [facility]     EXCISE LESION TRUNK  9/24/2012    Procedure: EXCISE LESION TRUNK;;  Surgeon: Pepe Dominguez MD;  Location: [facility]     GENITOURINARY SURGERY      vasectomy     HERNIORRHAPHY UMBILICAL  9/24/2012    Procedure: HERNIORRHAPHY UMBILICAL;  UMBILICAL HERNIA REPAIR , EXCISION OF PERIUMBILICAL CYST;  Surgeon: Pepe Dominguez MD;  Location: [facility]     IR CHEMO EMBOLIZATION  7/30/2020       Allergies:  No Known Allergies    Current Medications   Current Outpatient Medications   Medication     blood glucose (NO BRAND SPECIFIED) lancets standard     blood glucose (NO BRAND SPECIFIED) test strip     blood glucose (NO BRAND SPECIFIED) test strip     blood glucose monitoring (NO BRAND SPECIFIED) meter device kit     blood glucose monitoring (NO BRAND SPECIFIED) meter device kit     childrens multivitamin w/ionr (FLINTSTONES COMPLETE) 60 MG chewable tablet     ciprofloxacin (CIPRO) 500 MG tablet     citalopram (CELEXA) 20 MG tablet     Cyanocobalamin (VITAMIN B-12 PO)     desvenlafaxine (PRISTIQ) 100 MG 24 hr tablet     EQL VITAMIN D3 50 MCG (2000 UT) CAPS     furosemide (LASIX) 40 MG tablet     gabapentin (NEURONTIN) 300 MG capsule     hydrOXYzine (ATARAX) 25 MG tablet     insulin aspart (NOVOLOG FLEXPEN) 100 UNIT/ML pen     insulin glargine (LANTUS SOLOSTAR) 100 UNIT/ML pen     insulin pen needle (B-D U/F) 31G X 8 MM miscellaneous     insulin pen needle (ULTICARE SHORT) 31G X 8 MM miscellaneous     lactulose (CEPHULAC) 20 GM packet     lactulose encephalopathy (CHRONULAC) 10 GM/15ML SOLUTION     magnesium oxide (MAG-OX) 400 (241.3 Mg) MG tablet     nystatin (MYCOSTATIN) 253894 UNIT/GM external cream     ondansetron (ZOFRAN) 4 MG tablet     order for DME     order for DME     order for DME     order for DME     oxyCODONE (ROXICODONE) 5 MG tablet     OYSTER SHELL CALCIUM/D 500-200 MG-UNIT per tablet      QUEtiapine (SEROQUEL) 50 MG tablet     rifaximin (XIFAXAN) 550 MG TABS tablet     spironolactone (ALDACTONE) 50 MG tablet     UNABLE TO FIND     ursodiol (ACTIGALL) 300 MG capsule     No current facility-administered medications for this visit.        Family History:  Family History   Problem Relation Age of Onset     Breast Cancer Mother      Liver Cancer Mother      Cardiovascular Father      Cerebrovascular Disease Father         very low blood pressure     Cancer Father         rectal cancer     Cardiovascular Paternal Grandfather      Diabetes Brother      Cancer Sister         skin cancer     C.A.D. Other         MI, 70's     Breast Cancer Sister      Thyroid Disease No family hx of      Lipids No family hx of      Anesthesia Reaction No family hx of        Social History:  Social History     Tobacco Use     Smoking status: Current Every Day Smoker     Packs/day: 0.30     Types: Cigarettes     Last attempt to quit: 4/10/2019     Years since quittin.3     Smokeless tobacco: Never Used     Tobacco comment: about 4 cigarettes per day   Substance Use Topics     Alcohol use: No     Alcohol/week: 0.0 standard drinks     Comment: 2-3 per day , none since Dec 2012       ROS:  negative except as mentioned in HPI    Exam  There were no vitals taken for this virtual visit  Gen: well appearing, nad, pleasant and conversant. Daughter and wife were present during visit  HEENT: EOMI  Neuro: A&O in general    Labs/Imaging    Lab Results   Component Value Date    A1C 6.3 2020    A1C 5.3 2019    A1C 4.5 2018    A1C Canceled, Test credited 2018    A1C Canceled, Test credited 2018     Creatinine   Date Value Ref Range Status   2020 0.91 0.66 - 1.25 mg/dL Final     ENDO DIABETES Latest Ref Rng & Units 3/12/2020 2020   HEMOGLOBIN A1C 0 - 5.6 % 6.3 (H)    HEMOGLOBIN A1C, POC 4.3 - 6.0 %     HGB 13.3 - 17.7 g/dL 7.6 (L) 7.5 (L)   GLUCOSE 70 - 99 mg/dL 116 (H)    CHOLESTEROL <200 mg/dL 96     LDL CHOLESTEROL, CALCULATED <100 mg/dL 40    HDL CHOLESTEROL >39 mg/dL 45    VLDL-CHOLESTEROL 0 - 30 mg/dL     NON HDL CHOLESTEROL <130 mg/dL 51    TRIGLYCERIDES <150 mg/dL 57    ALBUMIN URINE MG/L mg/L 5    ALBUMIN URINE MG/G CR 0 - 17 mg/g Cr 6.15    CREATININE 0.66 - 1.25 mg/dL 0.91    POTASSIUM 3.4 - 5.3 mmol/L 3.7    ALT 0 - 70 U/L 10 17   AST 0 - 45 U/L 19 32   WBC 4.0 - 11.0 10e9/L 4.6 3.0 (L)   RBC 4.4 - 5.9 10e12/L 2.77 (L) 2.60 (L)   HGB 13.3 - 17.7 g/dL 7.6 (L) 7.5 (L)   HCT 40.0 - 53.0 % 24.2 (L) 23.9 (L)   MCV 78 - 100 fl 87 92   MCH 26.5 - 33.0 pg 27.4 28.8   MCHC 31.5 - 36.5 g/dL 31.4 (L) 31.4 (L)   RDW 10.0 - 15.0 % 16.3 (H) 17.9 (H)    - 450 10e9/L 118 (L) 85 (L)     Assessment and Plan    Type 2 DM w/ suboptimal control and paucity of glucose data and unreliable A1c due to anemia secondary to cirrhosis. He and his family could benefit from diabetes education.    Labs: Frutoseamine, A1c    Patient Instructions:    Nice to talk with you today in virtual clinic    Please go to any [place] [facility] lab. Follow standard safety precautions for COVID-19, practice social isolation, hand hygiene, do not touch your face, nose, or mouth, wear mask if have to go out in public to be respectful of community.  Please contact us to schedule at any of our [facility] lab locations. Call [phone] ([phone]), select option 1.    Please check your blood sugars when you are fasting in the morning, and before a meal and at bedtime and log carefully into a notebook and have them available at all of your visits.    Fasting goals are .  2 hours after eating goals are: <180.    For now, ok to continue Lantus 5 units daily since we don't have any fasting data to make changes.    If your fasting sugars are lower or higher than the range, you will need to make an adjustment on your long acting basal insulin (Lantus).  If your sugars after eating are lower or higher than the range, then you will need to  make an adjustment on your meal time insulin.  Decrease or increase Lantus or basal or long-acting insulin by 5 units twice weekly until fasting blood sugars are in  range.  Start Novolog insulin at meals: 1 units per 10 grams of carbohydrates.  Use low sodium meal replacements such as Lean Cuisines and/or Healthy Choice meals and this will make it easier to count # of carbohydrates in grams and dose Novolog insulin accordingly. For example: if your meal has 30 grams of carbohydrates, you would take 3 units of Novolog to cover that + any correction if your glucose is >140 starting out the meal.  Because you are starting meal time Novolog insulin, your correction scale has changed slightly. Please Start Novolog insulin correction scale before meals:    MEDIUM Insulin Resistance or Correction Factor of 1 for 50 mg/dL   Do Not give Correction Insulin if Pre-Meal BG less than 140.   For Pre-Meal  - 189 give 1 unit.   For Pre-Meal  - 239 give 2 units.   For Pre-Meal  - 289 give 3 units.   For Pre-Meal  - 339 give 4 units.   For Pre-Meal - 399 give 5 units.   For Pre-Meal -449 give 6 units  For Pre-Meal BG greater than or equal to 450 give 7 units.   To be given with prandial insulin, and based on pre-meal blood glucose.    Notify provider if glucose greater than or equal to 350 mg/dL after administration of correction dose.     MEDIUM Insulin Resistance or Correction Factor of 1 for 50 mg/dL -  Do Not give Bedtime Correction Insulin if BG less than 200.   For  - 249 give 1 units.   For  - 299 give 2 units.   For  - 349 give 3 units.   For  -399 give 4 units.   For BG greater than or equal to 400 give 5 units.  Notify provider if glucose greater than or equal to 350 mg/dL after administration of correction dose.    For questions about blood sugars, please call [phone] during the day or [phone] anytime & ask the  to page  diabetes/endocrine on-call.    RTC: Diabetes Education in 1-2 weeks, Diabetes Team PA in 1 months, me in 6 months    Best FranciscoesDr. Elina MD, MPH  Endocrinologist      Time: 60 min spent on chart review, evaluation, management, counseling, education, & motivational interviewing with greater than 50% of the total time was spent on counseling and coordinating care      Video-Visit Details    Type of service:  Video Visit    Video Start Time: Start: 08/24/2020 03:03 pm   Stop: 08/24/2020 03:33 pm    Originating Location (pt. Location): Home    Distant Location (provider location):   [email or web address]     Platform used for Video Visit: Reissued

## 2020-08-24 NOTE — PATIENT INSTRUCTIONS
Nice to talk with you today in virtual clinic    Please go to any [facility] lab. Follow standard safety precautions for COVID-19, practice social isolation, hand hygiene, do not touch your face, nose, or mouth, wear mask if have to go out in public to be respectful of community.  Please contact us to schedule at any of our [facility] lab locations. Call [phone] ([phone]), select option 1.    Please check your blood sugars when you are fasting in the morning, and before a meal and at bedtime and log carefully into a notebook and have them available at all of your visits.    Fasting goals are .  2 hours after eating goals are: <180.    For now, ok to continue Lantus 5 units daily since we don't have any fasting data to make changes.    If your fasting sugars are lower or higher than the range, you will need to make an adjustment on your long acting basal insulin (Lantus).  If your sugars after eating are lower or higher than the range, then you will need to make an adjustment on your meal time insulin.  Decrease or increase Lantus or basal or long-acting insulin by 5 units twice weekly until fasting blood sugars are in  range.  Start Novolog insulin at meals: 1 units per 10 grams of carbohydrates.  Use low sodium meal replacements such as Lean Cuisines and/or Healthy Choice meals and this will make it easier to count # of carbohydrates in grams and dose Novolog insulin accordingly. For example: if your meal has 30 grams of carbohydrates, you would take 3 units of Novolog to cover that + any correction if your glucose is >140 starting out the meal.  Because you are starting meal time Novolog insulin, your correction scale has changed slightly. Please Start Novolog insulin correction scale before meals:    MEDIUM Insulin Resistance or Correction Factor of 1 for 50 mg/dL   Do Not give Correction Insulin if Pre-Meal BG less than 140.   For Pre-Meal  - 189 give 1 unit.   For Pre-Meal   - 239 give 2 units.   For Pre-Meal  - 289 give 3 units.   For Pre-Meal  - 339 give 4 units.   For Pre-Meal - 399 give 5 units.   For Pre-Meal -449 give 6 units  For Pre-Meal BG greater than or equal to 450 give 7 units.   To be given with prandial insulin, and based on pre-meal blood glucose.    Notify provider if glucose greater than or equal to 350 mg/dL after administration of correction dose.     MEDIUM Insulin Resistance or Correction Factor of 1 for 50 mg/dL -  Do Not give Bedtime Correction Insulin if BG less than 200.   For  - 249 give 1 units.   For  - 299 give 2 units.   For  - 349 give 3 units.   For  -399 give 4 units.   For BG greater than or equal to 400 give 5 units.  Notify provider if glucose greater than or equal to 350 mg/dL after administration of correction dose.    For questions about blood sugars, please call [phone] during the day or [phone] anytime & ask the  to page diabetes/endocrine on-call.    RTC: Diabetes Education in 1-2 weeks, Diabetes Team PA in 1 months, me in 6 months      Best Wishes,  Dr. Elina Partida MD, MPH  Endocrinologist

## 2020-08-25 ENCOUNTER — TELEPHONE (OUTPATIENT)
Dept: ENDOCRINOLOGY | Facility: CLINIC | Age: 65
End: 2020-08-25

## 2020-08-26 NOTE — TELEPHONE ENCOUNTER
Called the patient's spouse to set up followup appointments.  They would like lab orders sent to [facility] at  [phone].    The nurse will be there at 3:30 on 8/26/2020. Sending this to the navigator tea, to complete. Please fax before then.    Thank you      Paulina Loaiza

## 2020-08-27 NOTE — PROGRESS NOTES
Telephone call to the client's home for annual health risk assessment and PCA assessment.  An  was not needed.    Current situation/living environment  Lawrence is a 64 year old male who is new to [facility] for 7/1/2020. He sees Ary Murphy NP for his primary medical care at the [facility]. Unruly lives in a single family home, and his ex-wife Flores, and daughter Kristi, live nearby and are there daily to assist him. He also has [facility] 3x/week for wound care on his lower extremities. Today, we met by phone to complete a health risk assessment, which determines his budget under the elderly waiver program, as well as a PCA assessment. Plan is to get Raven and Kristi hired as PCA's, with a back-up person in case one of them is not able to do the cares. They are limiting the amount of visitors to the house due to COVID and his risk level.    Activities of daily living (ADL)/instrumental activities of daily living (IADL) and functional issues  Client needs help with the following ADL's: dressing, grooming, bathing, bed mobility, transfers, walking and toileting  Client needs help with the following IADL's: shopping, cooking, housekeeping, laundry, managing finances/bills and transportation  Client states he is unable to perform the above due to HCC, DM-II, lower extremity wounds and lymphedema needs.    Health concerns for today  Unruly has home care coming 3x/week for wound care and to wrap his legs. A concern here is the family does not have enough supplies to use on the off days that they are not there wrapping his legs. Per the family, Unruly also can be non-complaint, as the kurlex is quite painful when on his legs. Hx of repetative hospital stays due to leg wounds and infections. Will soon have a TACE procedure for his HCC.  Has patient fallen 2 or more times in the last year? No  Has patient fallen with injury in the last year? No    Cognition/mental health  Family reports that during his last chemo  treatment, his encephalopathy was much worse. He has partial intermittent periods of disorientation. Family also reports that Unruly can be resistive to care, tells family to go away. When liver function is bad, gets agitated and frustrated. Also gets frustrated when he is not able to do as much for himself as he use to be able to do.     STARS/Med Adherence  Client is non-compliant with the following quality measures: Medicare Wellness Visit  Comments: education efforts    Client's Plan of Care consists of:  Personal care assistance (PCA) (11 hours per day from [facility]), SNV (3 visits per week from [facility]) and Specialized supplies and equipment (Wounds supplies from the home care agency--I also ordered incontinence supplies, chux pads, w/c, [facility] mattress).

## 2020-08-27 NOTE — PROGRESS NOTES
Telephone call to the client's home for annual health risk assessment and PCA assessment.  An  was not needed.    Current situation/living environment  Lawrence is a 64 year old male who is new to [facility] for 7/1/2020. He sees Ary Murphy NP for his primary medical care at the [facility]. Unruly lives in a single family home, and his ex-wife Flores, and daughter Kristi, live nearby and are there daily to assist him. He also has [facility] 3x/week for wound care on his lower extremities. Today, we met by phone to complete a health risk assessment, which determines his budget under the elderly waiver program, as well as a PCA assessment. Plan is to get Raven and Kristi hired as PCA's, with a back-up person in case one of them is not able to do the cares. They are limiting the amount of visitors to the house due to COVID and his risk level.    Activities of daily living (ADL)/instrumental activities of daily living (IADL) and functional issues  Client needs help with the following ADL's: dressing, grooming, bathing, bed mobility, transfers, walking and toileting  Client needs help with the following IADL's: shopping, cooking, housekeeping, laundry, managing finances/bills and transportation  Client states he is unable to perform the above due to HCC, DM-II, lower extremity wounds and lymphedema needs.    Health concerns for today  Unruly has home care coming 3x/week for wound care and to wrap his legs. A concern here is the family does not have enough supplies to use on the off days that they are not there wrapping his legs. Per the family, Unruly also can be non-complaint, as the kurlex is quite painful when on his legs. Hx of repetative hospital stays due to leg wounds and infections. Will soon have a TACE procedure for his HCC.  Has patient fallen 2 or more times in the last year? No  Has patient fallen with injury in the last year? No    Cognition/mental health  Family reports that during his last chemo  treatment, his encephalopathy was much worse. He has partial intermittent periods of disorientation. Family also reports that Unruly can be resistive to care, tells family to go away. When liver function is bad, gets agitated and frustrated. Also gets frustrated when he is not able to do as much for himself as he use to be able to do.     STARS/Med Adherence  Client is non-compliant with the following quality measures: Medicare Wellness Visit  Comments: education efforts    Client's Plan of Care consists of:  Personal care assistance (PCA) (11 hours per day from [facility]), SNV (3 visits per week from [facility]) and Specialized supplies and equipment (Wounds supplies from the home care agency--I also ordered incontinence supplies, chux pads, w/c, [facility] mattress).    Cat John, RUCHI  [phone]

## 2020-08-28 ENCOUNTER — TRANSFERRED RECORDS (OUTPATIENT)
Dept: HEALTH INFORMATION MANAGEMENT | Facility: CLINIC | Age: 65
End: 2020-08-28

## 2020-08-31 ENCOUNTER — MYC REFILL (OUTPATIENT)
Dept: INTERNAL MEDICINE | Facility: CLINIC | Age: 65
End: 2020-08-31

## 2020-08-31 ENCOUNTER — TELEPHONE (OUTPATIENT)
Dept: INTERNAL MEDICINE | Facility: CLINIC | Age: 65
End: 2020-08-31

## 2020-08-31 DIAGNOSIS — E11.42 DIABETIC POLYNEUROPATHY ASSOCIATED WITH TYPE 2 DIABETES MELLITUS (H): ICD-10-CM

## 2020-08-31 DIAGNOSIS — I89.0 LYMPH EDEMA: Primary | ICD-10-CM

## 2020-08-31 NOTE — TELEPHONE ENCOUNTER
Lymph edema therapy referral placed. I sent the [facility] message to the pt for the information.    Soon-RADHA Herrmann  -------------------------------------------------------------        ----- Message from CORY Toussaint CNP sent at 8/28/2020  5:00 PM CDT -----  Regarding: RE: Lymphedema Question  I think going into clinic would be difficult but if they want to do that we can order it.  I am asking my nurse to help with the referral.     Ary RAY CNP  ----- Message -----  From: Cat John  Sent: 8/28/2020   3:03 PM CDT  To: CORY Toussaint CNP  Subject: Lymphedema Question                              Chung Mcallister,    Thank you for putting in the orders for some of the DME that Unruly needs at home.    I have been in contact with his ex-spouse and daughter quite often, and Kristi, his dtr, was wondering about either a lymphedema clinic or trying to re-instate the OT that came out from his current home care company. She feels that the wounds are being addressed with the RN's with home care, but would like the lymphedema to be addressed. She is willing to take him into a clinic for lymphedema if needed. I really and not very familiar with lymphedema needs with patients.    Thank you and reach out if any questions or concerns.    Cat John, [facility] [place]  [phone]

## 2020-09-02 ENCOUNTER — VIRTUAL VISIT (OUTPATIENT)
Dept: INFECTIOUS DISEASES | Facility: CLINIC | Age: 65
End: 2020-09-02
Attending: STUDENT IN AN ORGANIZED HEALTH CARE EDUCATION/TRAINING PROGRAM
Payer: COMMERCIAL

## 2020-09-02 DIAGNOSIS — L97.912 ULCERS OF BOTH LOWER EXTREMITIES WITH FAT LAYER EXPOSED (H): ICD-10-CM

## 2020-09-02 DIAGNOSIS — L97.922 ULCERS OF BOTH LOWER EXTREMITIES WITH FAT LAYER EXPOSED (H): ICD-10-CM

## 2020-09-02 DIAGNOSIS — L03.90 RECURRENT CELLULITIS: Primary | ICD-10-CM

## 2020-09-02 DIAGNOSIS — R60.0 BILATERAL LOWER EXTREMITY EDEMA: ICD-10-CM

## 2020-09-02 RX ORDER — SULFAMETHOXAZOLE/TRIMETHOPRIM 800-160 MG
2 TABLET ORAL 2 TIMES DAILY
Qty: 56 TABLET | Refills: 0 | Status: SHIPPED | OUTPATIENT
Start: 2020-09-02 | End: 2020-01-01

## 2020-09-02 NOTE — PROGRESS NOTES
"Lawrence Louie is a 64 year old male who is being evaluated via a billable video visit.      The patient has been notified of following:     \"This video visit will be conducted via a call between you and your physician/provider. We have found that certain health care needs can be provided without the need for an in-person physical exam.  This service lets us provide the care you need with a video conversation.  If a prescription is necessary we can send it directly to your pharmacy.  If lab work is needed we can place an order for that and you can then stop by our lab to have the test done at a later time.    Video visits are billed at different rates depending on your insurance coverage.  Please reach out to your insurance provider with any questions.    If during the course of the call the physician/provider feels a video visit is not appropriate, you will not be charged for this service.\"    Patient has given verbal consent for Video visit? Yes  How would you like to obtain your AVS? MyChart  If you are dropped from the video visit, the video invite should be resent to: Send to e-mail at: [email or web address]  Will anyone else be joining your video visit? No        Video-Visit Details    Type of service:  Video Visit    Video Start Time: 3.10 pm  Video End Time: 3:37 PM    Originating Location (pt. Location): Home    Distant Location (provider location):  [facility] AND INFECTIOUS DISEASES     Platform used for Video Visit: Akosua Bird MD      [facility]  Infectious Disease Clinic Note:  Follow up      Patient:  Lawrence Louie, Date of birth 1955, Medical record number [id number]  Date of Visit:  09/02/2020  Consult requested by patient himself.         Assessment and Recommendations:     Recommendations:  -Continue wound care, compression stockings, leg elevation, hibiclens body wash  -Try topical lidoacine, benadryl, " lactocalamine to see if that helps with itching  -If cellulitis develops, then send me pics, obtain aerobic and anaerobic wound cxs prior to starting abxs if possible and then start bactrim.   - Bactrim standing prescription for 2 weeks at 2 ds bid with plenty of fluids sent to pharmacy.    Return visit 3 months    Assessment:  Patient is a 64-year-old gentleman with history of lower extremity lymphedema, recurrent cellulitis, MSSA bacteremia, ESLD, DM 2.  Complaints of recurrent cellulitis complicated by bacteremia.    #  Pseudomonas stutzeri bacteremia: June 2020: source not clear,, treated with 5 days of iv zosyn. Doing well 2-3 weeks after completion of abxs. Since source not found, will check blood cxs to make sure there is no recurrence. Could have been from biliary tree although unusual pathogen for this source, as recent liver malignancy diagnosed.     # Recurrent MSSA bacteremia, source thought to be bilateral lower extremities cellulitis  4/13/2019 blood culture-MSSA  10/7/2019 blood culture with-MSSA and Aeromonas veronii  Recent hospitalization in March 2020 for MSSA bacteremia.  Blood cultures 3/21/2020 -MSSA, 3/22-negative  TTE 3/23/2020 negative for vegetations.  Patient treated with IV cefazolin 3/22-4/4.    # Recurrent cellulitis of lower extremities in setting of chronic lymphedema and venous stasis ulcers. .  Patient with multiple open wounds on bilateral anterior shins~0.5- 2.0 cm in diameter, none of which looks actively infected. Recommend chlorhexidine wash buttocks down for a few months.     # Chronic right heel wound/ulcer  Patient had right heel wound/ulcer for more than 15 years  9/8/2019-heel  ulcer scant Bacteroides pyogenes, MSSA  5/21 Patient's wound opened up during physical examination.  Wound cultures 5/21/20 with heavy growth MSSA.  - B/l foot XRay with no signs of osteomyelitis 5/21/20.  But patient has pain even when the wound is healed and recurrent wounds. It was thought to be  due to pressure but the left foot never develops a pressure ulcer. This is better with using foam on the wound.     # End-stage liver disease, hepatic encephalopathy  Patient taking spironolactone, furosemide, lactulose as needed    # DM2, well controlled  Lantus 5 units daily  Last hemoglobin A1c-5.5 3/2020    #HCC, undergoing chemoembolization therapy           Interval history:    Last seen 7/8  Since then developed cellultiis around a wound with blue green discharge, took cipro, blue green discharge resolved. cxs as below- SA, steno and pseudomonas stutzeri. cxs seems to be taken from multiple wounds. The blue green discharge likely from stutzeri but it was R to cipro and still responded to it.     Today he reports he feels well. He had chemoembolization and had nausea and pain with it but feels well now. He reports a lot of itching especially at night which makes the wounds worse. He is on ursodiol for the itching. . He wears compression stockings and has wound care visiting regularly.            History of the Infectious Disease lllness:     Patient is 64-year-old male with history of ESLD, S/P TIPS, DM 2 on Lantus, chronic lymphedema and venous stasis ulcers, recurrent cellulitis and MSSA bacteremia.  Patient seen in ID clinic with his daughter.  He was recently hospitalized at the end of March at [facility] for MSSA bacteremia (blood culture 3/21/2020 with MSSA), source felt to be lower extremity cellulitis.  Patient was treated with 2 weeks of IV cefazolin 3/22-4/4.  TTE 3/23 was negative for vegetations.  In the past patient had episode of MSSA bacteremia in 10/2019.  Per patient's daughter, usually patient has signs and symptoms of lower extremity cellulitis and then progressively becomes septic and requires hospitalization.  Usually he does not have time to make it to physician's appointments due to quick deterioration.  Today they are seen in ID clinic asking if something could be done to  prevent frequent bacteremia hospitalizations.  In mid April 2019 patient seen by ID physician who felt that his bilateral lower extremity wounds  are due to pseudomonal infection because of bluish-green discoloration of his wounds.  He was prescribed 10-day course of about 10 days ago.  Today none of his open wounds look infected.  The day before patient was notified that his PCP sent prescription for another 10-day course of ciprofloxacin to take until he will be seen in person.  Patient has history of chronic right heel ulcer/wound >15 years. In 9/2019 it grew MSSA.  Today he is complaining of fatigue generalized weakness for which he is using walker.  He has home care nurse, who also takes care of his lymphedema.  He has no podiatry as had bad experience in the past.  Today patient denies fever, chills, SOB, night sweats, CP, cough, abdominal pain, nausea, vomiting. He does not look sick.  His last bloodwork notable for WBC-3, Hb-7.8, Plt-98 on.        Review of Systems:  10 systems reviewed. 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)      Splenomegaly      Squamous cell carcinoma      Thrombocytopenia (H)      Varices, esophageal (H)     Banded in 2011       Past Surgical History:   Procedure Laterality Date     BIOPSY OF SKIN LESION       COLONOSCOPY Left 6/16/2016    Procedure: COMBINED COLONOSCOPY, SINGLE OR MULTIPLE BIOPSY/POLYPECTOMY BY BIOPSY;  Surgeon: Brandy Barnett MD;  Location:  GI     ESOPHAGOSCOPY, GASTROSCOPY, DUODENOSCOPY (EGD), COMBINED  2/13/2013    Procedure: COMBINED ESOPHAGOSCOPY, GASTROSCOPY, DUODENOSCOPY (EGD);;  Surgeon: Tara Cook MD;  Location:  GI     ESOPHAGOSCOPY, GASTROSCOPY, DUODENOSCOPY (EGD), COMBINED   2013    Procedure: COMBINED ESOPHAGOSCOPY, GASTROSCOPY, DUODENOSCOPY (EGD);;  Surgeon: Lonny Diaz MD;  Location:  GI     ESOPHAGOSCOPY, GASTROSCOPY, DUODENOSCOPY (EGD), COMBINED Left 2016    Procedure: COMBINED ESOPHAGOSCOPY, GASTROSCOPY, DUODENOSCOPY (EGD), BIOPSY SINGLE OR MULTIPLE;  Surgeon: Brandy Barnett MD;  Location:  GI     EXCISE LESION TRUNK  2012    Procedure: EXCISE LESION TRUNK;;  Surgeon: Pepe Domignuez MD;  Location: [facility]     GENITOURINARY SURGERY      vasectomy     HERNIORRHAPHY UMBILICAL  2012    Procedure: HERNIORRHAPHY UMBILICAL;  UMBILICAL HERNIA REPAIR , EXCISION OF PERIUMBILICAL CYST;  Surgeon: Pepe Dominguez MD;  Location: [facility]     IR CHEMO EMBOLIZATION  2020       Family History   Problem Relation Age of Onset     Breast Cancer Mother      Liver Cancer Mother      Cardiovascular Father      Cerebrovascular Disease Father         very low blood pressure     Cancer Father         rectal cancer     Cardiovascular Paternal Grandfather      Diabetes Brother      Cancer Sister         skin cancer     C.A.D. Other         MI, 70's     Breast Cancer Sister      Thyroid Disease No family hx of      Lipids No family hx of      Anesthesia Reaction No family hx of        Social History     Social History Narrative    . 3 grown daughters. He has been sober since .     Social History     Tobacco Use     Smoking status: Current Every Day Smoker     Packs/day: 0.30     Types: Cigarettes     Last attempt to quit: 4/10/2019     Years since quittin.4     Smokeless tobacco: Never Used     Tobacco comment: about 4 cigarettes per day   Substance Use Topics     Alcohol use: No     Alcohol/week: 0.0 standard drinks     Comment: 2-3 per day , none since Dec 2012     Drug use: No       Immunization History   Administered Date(s) Administered     DTAP (<7y) 1988     HEPA 2013, 2013, 10/30/2013     HepB 2013, 2013, 10/30/2013      Influenza (IIV3) PF 10/04/2010, 12/18/2012, 09/08/2014, 09/26/2015, 09/27/2016     Influenza Intranasal Vaccine 4 valent 10/12/2017     Influenza Vaccine IM > 6 months Valent IIV4 10/26/2018     Pneumo Conj 13-V (2010&after) 05/12/2015     Pneumococcal 23 valent 09/25/2009, 10/01/2010, 01/02/2013, 04/23/2019     TD (ADULT, 7+) 09/08/1997     TDAP Vaccine (Adacel) 01/02/2013     Twinrix A/B 01/02/2013     Zoster vaccine, live 12/22/2016       Patient Active Problem List   Diagnosis     Mild major depression (H)     Thrombocytopenia (H)     Hypertension goal BP (blood pressure) < 130/80     Plantar warts     Corns and callosities     Family history of colon cancer     Type 2 diabetes, HbA1c goal < 7% (H)     Portal hypertension (H)     Alcoholic cirrhosis (H)     Ascites     Esophageal varices (H)     Multiple rib fractures     Tobacco use disorder     Hyperlipidemia LDL goal <100     Dental caries     Prurigo nodularis     Esophageal reflux     Morbid obesity (H)     History of colonic polyps: tubular adenomas and serrated adenomas     Type II diabetes mellitus with peripheral circulatory disorder (H)     Alcoholic cirrhosis of liver with ascites (H)     Hepatic encephalopathy (H)     Lymphedema of both lower extremities     Venous stasis ulcers of both lower extremities (H)     Inadequate pain control     Post-operative state     HCC (hepatocellular carcinoma) (H)       Current Outpatient Medications   Medication Sig     sulfamethoxazole-trimethoprim (BACTRIM DS) 800-160 MG tablet Take 2 tablets by mouth 2 times daily     blood glucose (NO BRAND SPECIFIED) lancets standard Use to test blood sugar 4 X  times daily or as directed.     blood glucose (NO BRAND SPECIFIED) test strip Use to test blood sugar 4 times daily or as directed.     blood glucose (NO BRAND SPECIFIED) test strip Use to test blood sugars 4 X  times daily or as directed     blood glucose monitoring (NO BRAND SPECIFIED) meter device kit Use to  test blood sugar 4 times daily or as directed. Before meals and snack at HS.     blood glucose monitoring (NO BRAND SPECIFIED) meter device kit Use to test blood sugar 4 X  times daily or as directed.     childrens multivitamin w/ionr (FLINTSTONES COMPLETE) 60 MG chewable tablet Take 1 chew tab by mouth daily     ciprofloxacin (CIPRO) 500 MG tablet      citalopram (CELEXA) 20 MG tablet Take 1 tablet (20 mg) by mouth daily     Cyanocobalamin (VITAMIN B-12 PO) Take 1 tablet by mouth daily     desvenlafaxine (PRISTIQ) 100 MG 24 hr tablet Take 2 tablets (200 mg) by mouth daily     EQL VITAMIN D3 50 MCG (2000 UT) CAPS Take 1 capsule by mouth daily     furosemide (LASIX) 40 MG tablet Take 40 mg once daily     gabapentin (NEURONTIN) 300 MG capsule Take 1 capsule (300 mg) by mouth At Bedtime     hydrOXYzine (ATARAX) 25 MG tablet Take 1 tablet (25 mg) by mouth 3 times daily as needed for itching     insulin aspart (NOVOLOG FLEXPEN) 100 UNIT/ML pen 1 unit per 10 grams of carbohydrates + 1 per 50>140. Max daily dose 100 units.     insulin glargine (LANTUS SOLOSTAR) 100 UNIT/ML pen 5 units daily in am. Increase by 5 units 2x weekly until fasting sugars are <130. Max daily dose 100 units.     insulin pen needle (B-D U/F) 31G X 8 MM miscellaneous USE  6 times daily / OR AS DIRECTED     insulin pen needle (ULTICARE SHORT) 31G X 8 MM miscellaneous Use UP to 6 times daily or as directed     lactulose (CEPHULAC) 20 GM packet Take 1 packet (20 g) by mouth 2 times daily (Patient not taking: Reported on 8/12/2020)     lactulose encephalopathy (CHRONULAC) 10 GM/15ML SOLUTION TAKE 30 MLS BY MOUTH 2 TIMES DAILY  TITRATE AS NEEDED TO ACHIEVE 3-5 BOWEL MOVEMENTS DAILY.     magnesium oxide (MAG-OX) 400 (241.3 Mg) MG tablet Take 1 tablet (400 mg) by mouth daily     nystatin (MYCOSTATIN) 537868 UNIT/GM external cream Apply topically 2 times daily     ondansetron (ZOFRAN) 4 MG tablet Take 1 tablet (4 mg) by mouth every 8 hours as needed for  nausea     order for DME Equipment being ordered:Orthopedic shoes     order for DME Equipment being ordered: Condom catheter (Patient not taking: Reported on 7/13/2020)     order for DME Equipment being ordered:BioTab compression pants pneumatic system (Patient not taking: Reported on 7/13/2020)     order for DME Equipment being ordered:bilateral pneumatic leg massage for treatment of extreme bilateral lower leg edema. (Patient not taking: Reported on 7/13/2020)     oxyCODONE (ROXICODONE) 5 MG tablet Take 1-2 tablets (5-10 mg) by mouth every 8 hours as needed for severe pain     OYSTER SHELL CALCIUM/D 500-200 MG-UNIT per tablet Take 1 tablet by mouth daily     QUEtiapine (SEROQUEL) 50 MG tablet Take 50 mg by mouth At Bedtime     rifaximin (XIFAXAN) 550 MG TABS tablet Take 1 tablet (550 mg) by mouth 2 times daily     spironolactone (ALDACTONE) 50 MG tablet Take 2 tablets (100 mg) by mouth daily     UNABLE TO FIND MEDICATION NAME: Burdock root     ursodiol (ACTIGALL) 300 MG capsule Take 3 capsules (900 mg) by mouth 2 times daily     No current facility-administered medications for this visit.        No Known Allergies           Physical Exam:     Limited exam due to video visit  He has bilateral LE edema with chronic erythema below knees with multiple round wounds that do not appear infected         Laboratory Data:   8/14/20 multiple wound cx   Organism Antibiotic Method Susceptibility   Staphylococcus aureus Clindamycin CLAYTON <=0.50: Sensitive    Doxycycline CLAYTON <=0.50: Sensitive    Erythromycin CLAYTON <=0.50: Sensitive    Oxacillin CLAYTON <=0.25: Sensitive    Tetracycline CLAYTON <=0.50: Sensitive    Trimethoprim/Sulfamethoxazole CLAYTON <=1/19: Sensitive    Vancomycin CLAYTON 1.00: Sensitive   Stenotrophomonas maltophilia Ceftazidime CLAYTON <=2.00: Sensitive    Levofloxacin CLAYTON 4.00: Intermediate    Trimethoprim/Sulfamethoxazole CLAYTON <=0.5/9.5: Sensitive   Pseudomonas stutzeri Cefepime CLAYTON <=1.00: Sensitive    Ciprofloxacin CLAYTON >2.00:  Resistant    Gentamicin CLAYTON <=2.00: Sensitive    Piperacillin/Tazobactam CLAYTON <=2/4: Sensitive    Tobramycin CLAYTON <=2.00: Sensitive       6/11 and 6/16 blood cx neg   6/7/20 Blood cx Pseudomonas stutzeri R to cipro   05/21/20 Wound culture Right heel- heavy growth MSSA, pansensitive.  3/21/2020 blood culture-MSSA  3/22/2020 MRSA nares negative  10/2019 blood cx MSSA  9/5/2019 heel wound-MSSA  9/8/2019-heel  ulcer scant Bacteroides pyogenes  4/2019 blood cx MSSA      Inflammatory Markers    Recent Labs   Lab Test 05/21/20  1647 08/22/19  0634 08/21/19  0629 08/20/19  0625 08/18/19  1815 12/22/16  1722 12/22/15  1419   CRP 18.9* 60.1* 103.0* 143.0* 77.1* 9.3* 8.6*       Metabolic Studies       Recent Labs   Lab Test 06/26/20  1558 03/12/20  1238 11/18/19  1433 08/21/19  0629 08/19/19  0631 08/18/19  1916 08/18/19  1815 04/22/19  1343  11/06/17  0744   NA  --  133 127* 137 136  --  133 136   < > 144   POTASSIUM  --  3.7 4.7 4.2 3.8  --  4.0 4.1   < > 3.7   CHLORIDE  --  101 94 110* 107  --  103 101   < > 113*   CO2  --  26 23 21 22  --  23 26   < > 24   ANIONGAP  --  7 10 6 7  --  7 8   < > 8   BUN  --  17 25 16 16  --  18 20   < > 10   CR  --  0.91 1.12 0.69 0.88  --  0.73 0.92   < > 0.71   GFRESTIMATED >90 88 68 >90 90  --  >90 86   < > >90   GLC  --  116* 206* 130* 86  --  87 141*   < > 93   A1C  --  6.3*  --   --   --   --  5.3  --    < >  --    ERIN  --  8.1* 8.7 8.4* 8.0*  --  8.4* 8.4*   < > 7.9*   MAG  --   --   --   --  1.9  --   --   --   --  1.9   LACT  --   --   --   --   --  1.7  --   --   --   --     < > = values in this interval not displayed.       Hematology Studies      Recent Labs   Lab Test 07/30/20  1517 03/12/20  1238 11/18/19  1433 08/20/19  0625 08/19/19  0631 08/18/19  1815 04/22/19  1343  04/27/18  1235  11/04/17  2035  10/28/14  1847  03/04/14  1814 02/19/14  1609   WBC 3.0* 4.6 9.1  --  4.9 6.4 3.8*   < > 2.7*   < > 5.0   < > 7.1   < > 6.4 6.4   ANEU  --   --   --   --   --  4.9  --   --  1.7   --  3.3  --  3.8  --  3.3 3.3   ALYM  --   --   --   --   --  0.4*  --   --  0.4*  --  0.8  --  2.0  --  2.1 1.9   RAFAELA  --   --   --   --   --  1.0  --   --  0.5  --  0.7  --  1.2  --  0.8 1.0   AEOS  --   --   --   --   --  0.1  --   --  0.1  --  0.1  --  0.1  --  0.2 0.2   HGB 7.5* 7.6* 10.9* 8.4* 8.0* 9.3* 9.1*   < > 9.5*   < > 12.3*   < > 14.7   < > 14.9 14.8   HCT 23.9* 24.2* 33.2*  --  24.0* 27.8* 29.0*   < > 29.3*   < > 38.5*   < > 41.9   < > 42.2 41.4   PLT 85* 118* 128* 78* 79* 124* 89*   < > 85*   < > 77*   < > 122*   < > 87* 100*    < > = values in this interval not displayed.       Imaging:  MR abdomen 6/11/20  IMPRESSION:  1. Significant respiratory motion artifact degrades evaluation of the liver. Enhancing mass in the inferior right hepatic lobe measuring 37 mm with restricted diffusion and enhancing mass in the medial left hepatic lobe measuring 22 mm with restricted diffusion suspicious for hepatocellular carcinoma. A smaller area of restricted diffusion is identified in the posterior right hepatic lobe, corresponding to an area of hypodensity seen on the prior CT, this area is also suspicious for malignancy although it is difficult to confidently identify this mass on postcontrast imaging probably due to the degree of respiratory motion.  2. Hepatic cirrhosis with splenomegaly and abdominal ascites unchanged compared to the CT from one day earlier. Diffuse mesenteric edema and anasarca is also unchanged.  3. Contracted gallbladder with internal stones. No biliary duct dilatation.    CT abdomen pelvis 6/10/20  IMPRESSION:   1.  Ascites, cirrhosis, splenomegaly and collateral vessels.  2.  There are several ill-defined hypodense areas within the liver. Recommend multiphase MRI of the liver to evaluate for possible malignancy.  3.  TIPS stent present with patent portal vein, SMV and splenic vein.  4.  Small bilateral effusions with bibasilar atelectasis.  5.  Atherosclerotic vascular disease.  6.  No  evidence of abscess, free air or obstruction.  7.  Atherosclerotic vascular disease.  8.  Diffuse subcutaneous edema.  9.  Progression of L1 compression fracture.    Xray feet bilateral 3 views 05/21/20                                                                   IMPRESSION: No acute osseous abnormality. No radiographic evidence of osteomyelitis.        CXR 03/25/20  1.  The left arm PICC has its tip in the proximal right atrium, approximately 2 cm distal to the cavoatrial junction.  2.  Hazy opacities in both lung bases are essentially unchanged.  3.  Mild cardiomegaly.    MRI lumbar spine 03/23/20  1.  The left arm PICC has its tip in the proximal right atrium, approximately 2 cm distal to the cavoatrial junction.  2.  Hazy opacities in both lung bases are essentially unchanged.  3.  Mild cardiomegaly.    ECHO 03/23/20    Interpretation Summary    * The left ventricle is normal size.    * The left ventricular systolic function is normal, estimated LVEF 60-65%.    * No regional wall motion abnormalities.    * There is moderate aortic stenosis.    * No pulmonary hypertension, estimated right ventricular systolic pressure  is 30 mmHg.    * Compared to prior study dated 02/01/2020 there has been no change.

## 2020-09-02 NOTE — LETTER
"9/2/2020       RE: Lawrence Louie  [address] [place] [address]     Dear Colleague,    Thank you for referring your patient, Lawrence Louie, to the [facility] AND INFECTIOUS DISEASES at [facility]. Please see a copy of my visit note below.    Lawrence Louie is a 64 year old male who is being evaluated via a billable video visit.      The patient has been notified of following:     \"This video visit will be conducted via a call between you and your physician/provider. We have found that certain health care needs can be provided without the need for an in-person physical exam.  This service lets us provide the care you need with a video conversation.  If a prescription is necessary we can send it directly to your pharmacy.  If lab work is needed we can place an order for that and you can then stop by our lab to have the test done at a later time.    Video visits are billed at different rates depending on your insurance coverage.  Please reach out to your insurance provider with any questions.    If during the course of the call the physician/provider feels a video visit is not appropriate, you will not be charged for this service.\"    Patient has given verbal consent for Video visit? Yes  How would you like to obtain your AVS? MyChart  If you are dropped from the video visit, the video invite should be resent to: Send to e-mail at: [email or web address]  Will anyone else be joining your video visit? No        Video-Visit Details    Type of service:  Video Visit    Video Start Time: 3.10 pm  Video End Time: 3:37 PM    Originating Location (pt. Location): Home    [facility]     Platform used for Video Visit: Akosua Bird MD      [facility]  Infectious Disease Clinic Note:  Follow up      Patient:  Lawrence SLATER" Liban, Date of birth 1955, Medical record number [id number]  Date of Visit:  09/02/2020  Consult requested by patient himself.         Assessment and Recommendations:     Recommendations:  -Continue wound care, compression stockings, leg elevation, hibiclens body wash  -Try topical lidoacine, benadryl, lactocalamine to see if that helps with itching  -If cellulitis develops, then send me pics, obtain aerobic and anaerobic wound cxs prior to starting abxs if possible and then start bactrim.   - Bactrim standing prescription for 2 weeks at 2 ds bid with plenty of fluids sent to pharmacy.    Return visit 3 months    Assessment:  Patient is a 64-year-old gentleman with history of lower extremity lymphedema, recurrent cellulitis, MSSA bacteremia, ESLD, DM 2.  Complaints of recurrent cellulitis complicated by bacteremia.    #  Pseudomonas stutzeri bacteremia: June 2020: source not clear,, treated with 5 days of iv zosyn. Doing well 2-3 weeks after completion of abxs. Since source not found, will check blood cxs to make sure there is no recurrence. Could have been from biliary tree although unusual pathogen for this source, as recent liver malignancy diagnosed.     # Recurrent MSSA bacteremia, source thought to be bilateral lower extremities cellulitis  4/13/2019 blood culture-MSSA  10/7/2019 blood culture with-MSSA and Aeromonas veronii  Recent hospitalization in March 2020 for MSSA bacteremia.  Blood cultures 3/21/2020 -MSSA, 3/22-negative  TTE 3/23/2020 negative for vegetations.  Patient treated with IV cefazolin 3/22-4/4.    # Recurrent cellulitis of lower extremities in setting of chronic lymphedema and venous stasis ulcers. .  Patient with multiple open wounds on bilateral anterior shins~0.5- 2.0 cm in diameter, none of which looks actively infected. Recommend chlorhexidine wash buttocks down for a few months.     # Chronic right heel wound/ulcer  Patient had right heel wound/ulcer for more than 15  years  9/8/2019-heel  ulcer scant Bacteroides pyogenes, MSSA  5/21 Patient's wound opened up during physical examination.  Wound cultures 5/21/20 with heavy growth MSSA.  - B/l foot XRay with no signs of osteomyelitis 5/21/20.  But patient has pain even when the wound is healed and recurrent wounds. It was thought to be due to pressure but the left foot never develops a pressure ulcer. This is better with using foam on the wound.     # End-stage liver disease, hepatic encephalopathy  Patient taking spironolactone, furosemide, lactulose as needed    # DM2, well controlled  Lantus 5 units daily  Last hemoglobin A1c-5.5 3/2020    #HCC, undergoing chemoembolization therapy           Interval history:    Last seen 7/8  Since then developed cellultiis around a wound with blue green discharge, took cipro, blue green discharge resolved. cxs as below- SA, steno and pseudomonas stutzeri. cxs seems to be taken from multiple wounds. The blue green discharge likely from stutzeri but it was R to cipro and still responded to it.     Today he reports he feels well. He had chemoembolization and had nausea and pain with it but feels well now. He reports a lot of itching especially at night which makes the wounds worse. He is on ursodiol for the itching. . He wears compression stockings and has wound care visiting regularly.            History of the Infectious Disease lllness:     Patient is 64-year-old male with history of ESLD, S/P TIPS, DM 2 on Lantus, chronic lymphedema and venous stasis ulcers, recurrent cellulitis and MSSA bacteremia.  Patient seen in ID clinic with his daughter.  He was recently hospitalized at the end of March at [facility] for MSSA bacteremia (blood culture 3/21/2020 with MSSA), source felt to be lower extremity cellulitis.  Patient was treated with 2 weeks of IV cefazolin 3/22-4/4.  TTE 3/23 was negative for vegetations.  In the past patient had episode of MSSA bacteremia in 10/2019.  Per  patient's daughter, usually patient has signs and symptoms of lower extremity cellulitis and then progressively becomes septic and requires hospitalization.  Usually he does not have time to make it to physician's appointments due to quick deterioration.  Today they are seen in ID clinic asking if something could be done to prevent frequent bacteremia hospitalizations.  In mid April 2019 patient seen by ID physician who felt that his bilateral lower extremity wounds  are due to pseudomonal infection because of bluish-green discoloration of his wounds.  He was prescribed 10-day course of about 10 days ago.  Today none of his open wounds look infected.  The day before patient was notified that his PCP sent prescription for another 10-day course of ciprofloxacin to take until he will be seen in person.  Patient has history of chronic right heel ulcer/wound >15 years. In 9/2019 it grew MSSA.  Today he is complaining of fatigue generalized weakness for which he is using walker.  He has home care nurse, who also takes care of his lymphedema.  He has no podiatry as had bad experience in the past.  Today patient denies fever, chills, SOB, night sweats, CP, cough, abdominal pain, nausea, vomiting. He does not look sick.  His last bloodwork notable for WBC-3, Hb-7.8, Plt-98 on.        Review of Systems:  10 systems reviewed. 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)      Splenomegaly      Squamous cell carcinoma      Thrombocytopenia (H)      Varices, esophageal (H)     Banded in 2011       Past Surgical History:   Procedure Laterality Date     BIOPSY OF SKIN LESION       COLONOSCOPY Left 6/16/2016    Procedure: COMBINED COLONOSCOPY, SINGLE OR MULTIPLE BIOPSY/POLYPECTOMY BY BIOPSY;   Surgeon: Brandy Barnett MD;  Location:  GI     ESOPHAGOSCOPY, GASTROSCOPY, DUODENOSCOPY (EGD), COMBINED  2013    Procedure: COMBINED ESOPHAGOSCOPY, GASTROSCOPY, DUODENOSCOPY (EGD);;  Surgeon: Tara Cook MD;  Location:  GI     ESOPHAGOSCOPY, GASTROSCOPY, DUODENOSCOPY (EGD), COMBINED  2013    Procedure: COMBINED ESOPHAGOSCOPY, GASTROSCOPY, DUODENOSCOPY (EGD);;  Surgeon: Lonny Diaz MD;  Location:  GI     ESOPHAGOSCOPY, GASTROSCOPY, DUODENOSCOPY (EGD), COMBINED Left 2016    Procedure: COMBINED ESOPHAGOSCOPY, GASTROSCOPY, DUODENOSCOPY (EGD), BIOPSY SINGLE OR MULTIPLE;  Surgeon: Brandy Barnett MD;  Location:  GI     EXCISE LESION TRUNK  2012    Procedure: EXCISE LESION TRUNK;;  Surgeon: Pepe Dominguez MD;  Location: [facility]     GENITOURINARY SURGERY      vasectomy     HERNIORRHAPHY UMBILICAL  2012    Procedure: HERNIORRHAPHY UMBILICAL;  UMBILICAL HERNIA REPAIR , EXCISION OF PERIUMBILICAL CYST;  Surgeon: Pepe Dominguez MD;  Location: [facility]     IR CHEMO EMBOLIZATION  2020       Family History   Problem Relation Age of Onset     Breast Cancer Mother      Liver Cancer Mother      Cardiovascular Father      Cerebrovascular Disease Father         very low blood pressure     Cancer Father         rectal cancer     Cardiovascular Paternal Grandfather      Diabetes Brother      Cancer Sister         skin cancer     C.A.D. Other         MI, 70's     Breast Cancer Sister      Thyroid Disease No family hx of      Lipids No family hx of      Anesthesia Reaction No family hx of        Social History     Social History Narrative    . 3 grown daughters. He has been sober since .     Social History     Tobacco Use     Smoking status: Current Every Day Smoker     Packs/day: 0.30     Types: Cigarettes     Last attempt to quit: 4/10/2019     Years since quittin.4     Smokeless tobacco: Never Used     Tobacco comment: about 4 cigarettes per  day   Substance Use Topics     Alcohol use: No     Alcohol/week: 0.0 standard drinks     Comment: 2-3 per day , none since Dec 2012     Drug use: No       Immunization History   Administered Date(s) Administered     DTAP (<7y) 01/18/1988     HEPA 01/02/2013, 02/18/2013, 10/30/2013     HepB 01/02/2013, 02/18/2013, 10/30/2013     Influenza (IIV3) PF 10/04/2010, 12/18/2012, 09/08/2014, 09/26/2015, 09/27/2016     Influenza Intranasal Vaccine 4 valent 10/12/2017     Influenza Vaccine IM > 6 months Valent IIV4 10/26/2018     Pneumo Conj 13-V (2010&after) 05/12/2015     Pneumococcal 23 valent 09/25/2009, 10/01/2010, 01/02/2013, 04/23/2019     TD (ADULT, 7+) 09/08/1997     TDAP Vaccine (Adacel) 01/02/2013     Twinrix A/B 01/02/2013     Zoster vaccine, live 12/22/2016       Patient Active Problem List   Diagnosis     Mild major depression (H)     Thrombocytopenia (H)     Hypertension goal BP (blood pressure) < 130/80     Plantar warts     Corns and callosities     Family history of colon cancer     Type 2 diabetes, HbA1c goal < 7% (H)     Portal hypertension (H)     Alcoholic cirrhosis (H)     Ascites     Esophageal varices (H)     Multiple rib fractures     Tobacco use disorder     Hyperlipidemia LDL goal <100     Dental caries     Prurigo nodularis     Esophageal reflux     Morbid obesity (H)     History of colonic polyps: tubular adenomas and serrated adenomas     Type II diabetes mellitus with peripheral circulatory disorder (H)     Alcoholic cirrhosis of liver with ascites (H)     Hepatic encephalopathy (H)     Lymphedema of both lower extremities     Venous stasis ulcers of both lower extremities (H)     Inadequate pain control     Post-operative state     HCC (hepatocellular carcinoma) (H)       Current Outpatient Medications   Medication Sig     sulfamethoxazole-trimethoprim (BACTRIM DS) 800-160 MG tablet Take 2 tablets by mouth 2 times daily     blood glucose (NO BRAND SPECIFIED) lancets standard Use to test blood  sugar 4 X  times daily or as directed.     blood glucose (NO BRAND SPECIFIED) test strip Use to test blood sugar 4 times daily or as directed.     blood glucose (NO BRAND SPECIFIED) test strip Use to test blood sugars 4 X  times daily or as directed     blood glucose monitoring (NO BRAND SPECIFIED) meter device kit Use to test blood sugar 4 times daily or as directed. Before meals and snack at HS.     blood glucose monitoring (NO BRAND SPECIFIED) meter device kit Use to test blood sugar 4 X  times daily or as directed.     childrens multivitamin w/ionr (FLINTSTONES COMPLETE) 60 MG chewable tablet Take 1 chew tab by mouth daily     ciprofloxacin (CIPRO) 500 MG tablet      citalopram (CELEXA) 20 MG tablet Take 1 tablet (20 mg) by mouth daily     Cyanocobalamin (VITAMIN B-12 PO) Take 1 tablet by mouth daily     desvenlafaxine (PRISTIQ) 100 MG 24 hr tablet Take 2 tablets (200 mg) by mouth daily     EQL VITAMIN D3 50 MCG (2000 UT) CAPS Take 1 capsule by mouth daily     furosemide (LASIX) 40 MG tablet Take 40 mg once daily     gabapentin (NEURONTIN) 300 MG capsule Take 1 capsule (300 mg) by mouth At Bedtime     hydrOXYzine (ATARAX) 25 MG tablet Take 1 tablet (25 mg) by mouth 3 times daily as needed for itching     insulin aspart (NOVOLOG FLEXPEN) 100 UNIT/ML pen 1 unit per 10 grams of carbohydrates + 1 per 50>140. Max daily dose 100 units.     insulin glargine (LANTUS SOLOSTAR) 100 UNIT/ML pen 5 units daily in am. Increase by 5 units 2x weekly until fasting sugars are <130. Max daily dose 100 units.     insulin pen needle (B-D U/F) 31G X 8 MM miscellaneous USE  6 times daily / OR AS DIRECTED     insulin pen needle (ULTICARE SHORT) 31G X 8 MM miscellaneous Use UP to 6 times daily or as directed     lactulose (CEPHULAC) 20 GM packet Take 1 packet (20 g) by mouth 2 times daily (Patient not taking: Reported on 8/12/2020)     lactulose encephalopathy (CHRONULAC) 10 GM/15ML SOLUTION TAKE 30 MLS BY MOUTH 2 TIMES DAILY  TITRATE  AS NEEDED TO ACHIEVE 3-5 BOWEL MOVEMENTS DAILY.     magnesium oxide (MAG-OX) 400 (241.3 Mg) MG tablet Take 1 tablet (400 mg) by mouth daily     nystatin (MYCOSTATIN) 485030 UNIT/GM external cream Apply topically 2 times daily     ondansetron (ZOFRAN) 4 MG tablet Take 1 tablet (4 mg) by mouth every 8 hours as needed for nausea     order for DME Equipment being ordered:Orthopedic shoes     order for DME Equipment being ordered: Condom catheter (Patient not taking: Reported on 7/13/2020)     order for DME Equipment being ordered:BioTab compression pants pneumatic system (Patient not taking: Reported on 7/13/2020)     order for DME Equipment being ordered:bilateral pneumatic leg massage for treatment of extreme bilateral lower leg edema. (Patient not taking: Reported on 7/13/2020)     oxyCODONE (ROXICODONE) 5 MG tablet Take 1-2 tablets (5-10 mg) by mouth every 8 hours as needed for severe pain     OYSTER SHELL CALCIUM/D 500-200 MG-UNIT per tablet Take 1 tablet by mouth daily     QUEtiapine (SEROQUEL) 50 MG tablet Take 50 mg by mouth At Bedtime     rifaximin (XIFAXAN) 550 MG TABS tablet Take 1 tablet (550 mg) by mouth 2 times daily     spironolactone (ALDACTONE) 50 MG tablet Take 2 tablets (100 mg) by mouth daily     UNABLE TO FIND MEDICATION NAME: [place] root     ursodiol (ACTIGALL) 300 MG capsule Take 3 capsules (900 mg) by mouth 2 times daily     No current facility-administered medications for this visit.        No Known Allergies           Physical Exam:     Limited exam due to video visit  He has bilateral LE edema with chronic erythema below knees with multiple round wounds that do not appear infected         Laboratory Data:   8/14/20 multiple wound cx   Organism Antibiotic Method Susceptibility   Staphylococcus aureus Clindamycin CLAYTON <=0.50: Sensitive    Doxycycline CLAYTON <=0.50: Sensitive    Erythromycin CLAYTON <=0.50: Sensitive    Oxacillin CLAYTON <=0.25: Sensitive    Tetracycline CLAYTON <=0.50: Sensitive     Trimethoprim/Sulfamethoxazole CLAYTON <=1/19: Sensitive    Vancomycin CLAYTON 1.00: Sensitive   Stenotrophomonas maltophilia Ceftazidime CLAYTON <=2.00: Sensitive    Levofloxacin CLAYTON 4.00: Intermediate    Trimethoprim/Sulfamethoxazole CLAYTON <=0.5/9.5: Sensitive   Pseudomonas stutzeri Cefepime CLAYTON <=1.00: Sensitive    Ciprofloxacin CLAYTON >2.00: Resistant    Gentamicin CLAYTON <=2.00: Sensitive    Piperacillin/Tazobactam CLAYTON <=2/4: Sensitive    Tobramycin CLATYON <=2.00: Sensitive       6/11 and 6/16 blood cx neg   6/7/20 Blood cx Pseudomonas stutzeri R to cipro   05/21/20 Wound culture Right heel- heavy growth MSSA, pansensitive.  3/21/2020 blood culture-MSSA  3/22/2020 MRSA nares negative  10/2019 blood cx MSSA  9/5/2019 heel wound-MSSA  9/8/2019-heel  ulcer scant Bacteroides pyogenes  4/2019 blood cx MSSA      Inflammatory Markers    Recent Labs   Lab Test 05/21/20  1647 08/22/19  0634 08/21/19  0629 08/20/19  0625 08/18/19  1815 12/22/16  1722 12/22/15  1419   CRP 18.9* 60.1* 103.0* 143.0* 77.1* 9.3* 8.6*       Metabolic Studies       Recent Labs   Lab Test 06/26/20  1558 03/12/20  1238 11/18/19  1433 08/21/19  0629 08/19/19  0631 08/18/19  1916 08/18/19  1815 04/22/19  1343  11/06/17  0744   NA  --  133 127* 137 136  --  133 136   < > 144   POTASSIUM  --  3.7 4.7 4.2 3.8  --  4.0 4.1   < > 3.7   CHLORIDE  --  101 94 110* 107  --  103 101   < > 113*   CO2  --  26 23 21 22  --  23 26   < > 24   ANIONGAP  --  7 10 6 7  --  7 8   < > 8   BUN  --  17 25 16 16  --  18 20   < > 10   CR  --  0.91 1.12 0.69 0.88  --  0.73 0.92   < > 0.71   GFRESTIMATED >90 88 68 >90 90  --  >90 86   < > >90   GLC  --  116* 206* 130* 86  --  87 141*   < > 93   A1C  --  6.3*  --   --   --   --  5.3  --    < >  --    ERIN  --  8.1* 8.7 8.4* 8.0*  --  8.4* 8.4*   < > 7.9*   MAG  --   --   --   --  1.9  --   --   --   --  1.9   LACT  --   --   --   --   --  1.7  --   --   --   --     < > = values in this interval not displayed.       Hematology Studies      Recent  Labs   Lab Test 07/30/20  1517 03/12/20  1238 11/18/19  1433 08/20/19  0625 08/19/19  0631 08/18/19  1815 04/22/19  1343  04/27/18  1235  11/04/17  2035  10/28/14  1847  03/04/14  1814 02/19/14  1609   WBC 3.0* 4.6 9.1  --  4.9 6.4 3.8*   < > 2.7*   < > 5.0   < > 7.1   < > 6.4 6.4   ANEU  --   --   --   --   --  4.9  --   --  1.7  --  3.3  --  3.8  --  3.3 3.3   ALYM  --   --   --   --   --  0.4*  --   --  0.4*  --  0.8  --  2.0  --  2.1 1.9   RAFAELA  --   --   --   --   --  1.0  --   --  0.5  --  0.7  --  1.2  --  0.8 1.0   AEOS  --   --   --   --   --  0.1  --   --  0.1  --  0.1  --  0.1  --  0.2 0.2   HGB 7.5* 7.6* 10.9* 8.4* 8.0* 9.3* 9.1*   < > 9.5*   < > 12.3*   < > 14.7   < > 14.9 14.8   HCT 23.9* 24.2* 33.2*  --  24.0* 27.8* 29.0*   < > 29.3*   < > 38.5*   < > 41.9   < > 42.2 41.4   PLT 85* 118* 128* 78* 79* 124* 89*   < > 85*   < > 77*   < > 122*   < > 87* 100*    < > = values in this interval not displayed.       Imaging:  MR abdomen 6/11/20  IMPRESSION:  1. Significant respiratory motion artifact degrades evaluation of the liver. Enhancing mass in the inferior right hepatic lobe measuring 37 mm with restricted diffusion and enhancing mass in the medial left hepatic lobe measuring 22 mm with restricted diffusion suspicious for hepatocellular carcinoma. A smaller area of restricted diffusion is identified in the posterior right hepatic lobe, corresponding to an area of hypodensity seen on the prior CT, this area is also suspicious for malignancy although it is difficult to confidently identify this mass on postcontrast imaging probably due to the degree of respiratory motion.  2. Hepatic cirrhosis with splenomegaly and abdominal ascites unchanged compared to the CT from one day earlier. Diffuse mesenteric edema and anasarca is also unchanged.  3. Contracted gallbladder with internal stones. No biliary duct dilatation.    CT abdomen pelvis 6/10/20  IMPRESSION:   1.  Ascites, cirrhosis, splenomegaly and  collateral vessels.  2.  There are several ill-defined hypodense areas within the liver. Recommend multiphase MRI of the liver to evaluate for possible malignancy.  3.  TIPS stent present with patent portal vein, SMV and splenic vein.  4.  Small bilateral effusions with bibasilar atelectasis.  5.  Atherosclerotic vascular disease.  6.  No evidence of abscess, free air or obstruction.  7.  Atherosclerotic vascular disease.  8.  Diffuse subcutaneous edema.  9.  Progression of L1 compression fracture.    Xray feet bilateral 3 views 05/21/20                                                                   IMPRESSION: No acute osseous abnormality. No radiographic evidence of osteomyelitis.        CXR 03/25/20  1.  The left arm PICC has its tip in the proximal right atrium, approximately 2 cm distal to the cavoatrial junction.  2.  Hazy opacities in both lung bases are essentially unchanged.  3.  Mild cardiomegaly.    MRI lumbar spine 03/23/20  1.  The left arm PICC has its tip in the proximal right atrium, approximately 2 cm distal to the cavoatrial junction.  2.  Hazy opacities in both lung bases are essentially unchanged.  3.  Mild cardiomegaly.    ECHO 03/23/20    Interpretation Summary    * The left ventricle is normal size.    * The left ventricular systolic function is normal, estimated LVEF 60-65%.    * No regional wall motion abnormalities.    * There is moderate aortic stenosis.    * No pulmonary hypertension, estimated right ventricular systolic pressure  is 30 mmHg.    * Compared to prior study dated 02/01/2020 there has been no change.            Sandee Bird MD

## 2020-09-03 ENCOUNTER — TELEPHONE (OUTPATIENT)
Dept: VASCULAR SURGERY | Facility: CLINIC | Age: 65
End: 2020-09-03

## 2020-09-03 NOTE — TELEPHONE ENCOUNTER
Returning wife's phone call to f/up on her request regarding cataract surgery.    She states that she did go ahead and set up for his cataract surgery on 9/24.     She states that pt cannot see anything at all and so the other eye is 2 weeks after 9/24.     I informed her that they should go ahead and schedule the treatment for his eyes.    She states that pt became dehydrated and they had to hold his diuretics.   She states that they would like some adjustments made due to this happened last time    Also she states that if he does need more treatment then we would also plan for treatment the following week as well.     Informed her that she can keep the appts as is and that should we need to adjust then we will.    She agrees to plan     Sunshine TUCKER RN, BSN  Interventional Radiology/Vascular  Nurse Coordinator   Phone: [phone]  Fax: [phone]

## 2020-09-04 RX ORDER — GABAPENTIN 300 MG/1
300 CAPSULE ORAL AT BEDTIME
Qty: 90 CAPSULE | Refills: 1 | OUTPATIENT
Start: 2020-09-04

## 2020-09-04 NOTE — TELEPHONE ENCOUNTER
Gabapentin : last filled on 7/22/20 with 90 day supply per  data. It is too early to refill. Next refill date will be10/20/20.

## 2020-09-08 ENCOUNTER — MYC REFILL (OUTPATIENT)
Dept: INTERNAL MEDICINE | Facility: CLINIC | Age: 65
End: 2020-09-08

## 2020-09-08 DIAGNOSIS — K70.31 ALCOHOLIC CIRRHOSIS OF LIVER WITH ASCITES (H): ICD-10-CM

## 2020-09-08 DIAGNOSIS — E11.9 DIABETES MELLITUS, TYPE 2 (H): ICD-10-CM

## 2020-09-09 RX ORDER — OXYCODONE HYDROCHLORIDE 5 MG/1
TABLET ORAL
Qty: 180 TABLET | Refills: 0 | Status: SHIPPED | OUTPATIENT
Start: 2020-09-09 | End: 2020-09-16

## 2020-09-09 NOTE — TELEPHONE ENCOUNTER
Reason For Call:        Chief Complaint   Patient presents with     Refill Request                 Medication Name, Dose and Monthly Quantity:     Roxicodone 5 mg     Diagnosis requiring opiates:        Problem List Updated:   Yes     Opioid Agreement On File - [facility] PAIN CONTRACT ID# [id number]:       Last Urine Drug Screen (at least once every 12 months) Date:     Unexpected Results:        [facility] Data Reviewed (at least once every 3 months) Date:   09/09/2020     Unexpected Results:         Last Fill Date:    07/26/2020    Next Fill Date:   09/09/2020     Last Visit with PCP:        Future Visits with PCP:      Processing:           CHRISTINA SOL CMA at 9:17 AM on 9/9/2020.

## 2020-09-10 ENCOUNTER — TRANSFERRED RECORDS (OUTPATIENT)
Dept: HEALTH INFORMATION MANAGEMENT | Facility: CLINIC | Age: 65
End: 2020-09-10

## 2020-09-11 ENCOUNTER — ANCILLARY PROCEDURE (OUTPATIENT)
Dept: MRI IMAGING | Facility: CLINIC | Age: 65
End: 2020-09-11
Attending: RADIOLOGY
Payer: COMMERCIAL

## 2020-09-11 DIAGNOSIS — C22.0 HCC (HEPATOCELLULAR CARCINOMA) (H): ICD-10-CM

## 2020-09-11 LAB
AFP SERPL-MCNC: 82.3 UG/L (ref 0–8)
ALBUMIN SERPL-MCNC: 2.4 G/DL (ref 3.4–5)
ALP SERPL-CCNC: 197 U/L (ref 40–150)
ALT SERPL W P-5'-P-CCNC: 20 U/L (ref 0–70)
ANION GAP SERPL CALCULATED.3IONS-SCNC: 6 MMOL/L (ref 3–14)
AST SERPL W P-5'-P-CCNC: 30 U/L (ref 0–45)
BILIRUB DIRECT SERPL-MCNC: 0.5 MG/DL (ref 0–0.2)
BILIRUB SERPL-MCNC: 0.7 MG/DL (ref 0.2–1.3)
BUN SERPL-MCNC: 20 MG/DL (ref 7–30)
CALCIUM SERPL-MCNC: 8.6 MG/DL (ref 8.5–10.1)
CHLORIDE SERPL-SCNC: 105 MMOL/L (ref 94–109)
CO2 SERPL-SCNC: 26 MMOL/L (ref 20–32)
CREAT BLD-MCNC: 0.8 MG/DL (ref 0.66–1.25)
CREAT SERPL-MCNC: 0.91 MG/DL (ref 0.66–1.25)
ERYTHROCYTE [DISTWIDTH] IN BLOOD BY AUTOMATED COUNT: 15.7 % (ref 10–15)
GFR SERPL CREATININE-BSD FRML MDRD: 88 ML/MIN/{1.73_M2}
GFR SERPL CREATININE-BSD FRML MDRD: >90 ML/MIN/{1.73_M2}
GLUCOSE SERPL-MCNC: 132 MG/DL (ref 70–99)
HCT VFR BLD AUTO: 25.7 % (ref 40–53)
HGB BLD-MCNC: 7.9 G/DL (ref 13.3–17.7)
INR PPP: 1.26 (ref 0.86–1.14)
MCH RBC QN AUTO: 27.5 PG (ref 26.5–33)
MCHC RBC AUTO-ENTMCNC: 30.7 G/DL (ref 31.5–36.5)
MCV RBC AUTO: 90 FL (ref 78–100)
PLATELET # BLD AUTO: 94 10E9/L (ref 150–450)
POTASSIUM SERPL-SCNC: 3.8 MMOL/L (ref 3.4–5.3)
PROT SERPL-MCNC: 6.9 G/DL (ref 6.8–8.8)
RBC # BLD AUTO: 2.87 10E12/L (ref 4.4–5.9)
SODIUM SERPL-SCNC: 137 MMOL/L (ref 133–144)
WBC # BLD AUTO: 2.5 10E9/L (ref 4–11)

## 2020-09-11 PROCEDURE — 82105 ALPHA-FETOPROTEIN SERUM: CPT | Performed by: RADIOLOGY

## 2020-09-11 PROCEDURE — A9585 GADOBUTROL INJECTION: HCPCS | Performed by: RADIOLOGY

## 2020-09-11 PROCEDURE — 80048 BASIC METABOLIC PNL TOTAL CA: CPT | Performed by: RADIOLOGY

## 2020-09-11 PROCEDURE — 74183 MRI ABD W/O CNTR FLWD CNTR: CPT | Performed by: RADIOLOGY

## 2020-09-11 PROCEDURE — 36415 COLL VENOUS BLD VENIPUNCTURE: CPT | Performed by: RADIOLOGY

## 2020-09-11 PROCEDURE — 85027 COMPLETE CBC AUTOMATED: CPT | Performed by: RADIOLOGY

## 2020-09-11 PROCEDURE — 80076 HEPATIC FUNCTION PANEL: CPT | Performed by: RADIOLOGY

## 2020-09-11 PROCEDURE — 82565 ASSAY OF CREATININE: CPT | Performed by: RADIOLOGY

## 2020-09-11 PROCEDURE — 85610 PROTHROMBIN TIME: CPT | Performed by: RADIOLOGY

## 2020-09-11 RX ORDER — GADOBUTROL 604.72 MG/ML
10 INJECTION INTRAVENOUS ONCE
Status: COMPLETED | OUTPATIENT
Start: 2020-09-11 | End: 2020-09-11

## 2020-09-11 RX ADMIN — GADOBUTROL 10 ML: 604.72 INJECTION INTRAVENOUS at 14:55

## 2020-09-14 ENCOUNTER — PATIENT OUTREACH (OUTPATIENT)
Dept: GASTROENTEROLOGY | Facility: CLINIC | Age: 65
End: 2020-09-14

## 2020-09-14 NOTE — PROGRESS NOTES
Spoke to patient's spouse/care giver Raven. She states patient is stable at the moment. He has had recurrent infections of the lower extremities and at the moment seem ok. He has [facility] home care nursing that comes out to the house 3 times a week for wound care. She and patient's daughter have applied for and gotten PCA coverage for taking care of patient's daily needs. She states they are both getting tired, but will continue on for now. Patient had imaging and will see Dr. Hilton next week to discuss further treatments to the liver cancer. Meanwhile, the patient has significant cataracts in both eyes, and will require cataract surgery in the coming weeks. Raven has no further concerns to discuss at this time.

## 2020-09-16 ENCOUNTER — TELEPHONE (OUTPATIENT)
Dept: INTERNAL MEDICINE | Facility: CLINIC | Age: 65
End: 2020-09-16

## 2020-09-16 ENCOUNTER — TELEPHONE (OUTPATIENT)
Dept: GASTROENTEROLOGY | Facility: CLINIC | Age: 65
End: 2020-09-16

## 2020-09-16 DIAGNOSIS — K70.31 ALCOHOLIC CIRRHOSIS OF LIVER WITH ASCITES (H): ICD-10-CM

## 2020-09-16 RX ORDER — OXYCODONE HYDROCHLORIDE 5 MG/1
TABLET ORAL
Qty: 180 TABLET | Refills: 0 | Status: SHIPPED | OUTPATIENT
Start: 2020-09-16 | End: 2020-01-01

## 2020-09-16 NOTE — TELEPHONE ENCOUNTER
[facility] Call [facility]    Phone Message    May a detailed message be left on voicemail: yes     Reason for Call: Other: Yecenia calling to give an update on patients health status, please call to discuss thank you.      Action Taken: Message routed to:  [facility]    Travel Screening: Not Applicable

## 2020-09-16 NOTE — TELEPHONE ENCOUNTER
Patient's wife Raven called saying they are still having an issue in getting the patient's oxycodone prescription filled. It sounds as though the pharmacy is needing an additional prescription for the remainder of the months supply, as they initially only dispensed 7 days worth. Hepatology is unable to assist in narcotic prescriptions. Will route to patient's PCP who wrote the original prescription to further assist.

## 2020-09-17 ENCOUNTER — TELEPHONE (OUTPATIENT)
Dept: INFECTIOUS DISEASES | Facility: CLINIC | Age: 65
End: 2020-09-17

## 2020-09-18 NOTE — TELEPHONE ENCOUNTER
Spoke with home care nurse, Yecenia.  Pt has been having scrotal swelling since Monday (9/14) when she visited the pt and she advised him to get assessed. Yecenia visited the pt on Wednesday (9/16) and was told by his wife that the scrotal size is about the size of cantalop and Yecenia advised him to go to ED. I do not see any ED visit in [facility]. Yecenia is going to visit him in 10 min today,will advise to go to ED over the weekend.

## 2020-09-22 ENCOUNTER — VIRTUAL VISIT (OUTPATIENT)
Dept: RADIOLOGY | Facility: CLINIC | Age: 65
End: 2020-09-22
Attending: RADIOLOGY
Payer: COMMERCIAL

## 2020-09-22 ENCOUNTER — VIRTUAL VISIT (OUTPATIENT)
Dept: INTERNAL MEDICINE | Facility: CLINIC | Age: 65
End: 2020-09-22
Payer: COMMERCIAL

## 2020-09-22 ENCOUNTER — TELEPHONE (OUTPATIENT)
Dept: VASCULAR SURGERY | Facility: CLINIC | Age: 65
End: 2020-09-22

## 2020-09-22 DIAGNOSIS — R60.0 EDEMA OF BOTH LEGS: ICD-10-CM

## 2020-09-22 DIAGNOSIS — C22.0 HCC (HEPATOCELLULAR CARCINOMA) (H): ICD-10-CM

## 2020-09-22 DIAGNOSIS — K21.00 GASTROESOPHAGEAL REFLUX DISEASE WITH ESOPHAGITIS: Primary | ICD-10-CM

## 2020-09-22 DIAGNOSIS — C22.0 HCC (HEPATOCELLULAR CARCINOMA) (H): Primary | ICD-10-CM

## 2020-09-22 DIAGNOSIS — C22.8 PRIMARY MALIGNANT NEOPLASM OF LIVER (H): Primary | ICD-10-CM

## 2020-09-22 PROCEDURE — 40001009 ZZH VIDEO/TELEPHONE VISIT; NO CHARGE

## 2020-09-22 RX ORDER — FUROSEMIDE 40 MG
TABLET ORAL
Qty: 180 TABLET | Refills: 3 | Status: SHIPPED | OUTPATIENT
Start: 2020-09-22 | End: 2021-01-01

## 2020-09-22 RX ORDER — FAMOTIDINE 40 MG/1
40 TABLET, FILM COATED ORAL DAILY
Qty: 30 TABLET | Refills: 11 | Status: SHIPPED | OUTPATIENT
Start: 2020-09-22 | End: 2021-01-01

## 2020-09-22 NOTE — PROGRESS NOTES
"Video Visit Technology for this patient: Akosua not working, patient has smart device, please try Doximity Video with patient     Doximity Text: [phone]    Lawrence Louie is a 64 year old male who is being evaluated via a billable video visit.      The patient has been notified of following:     \"This video visit will be conducted via a call between you and your physician/provider. We have found that certain health care needs can be provided without the need for an in-person physical exam.  This service lets us provide the care you need with a video conversation.  If a prescription is necessary we can send it directly to your pharmacy.  If lab work is needed we can place an order for that and you can then stop by our lab to have the test done at a later time.    Video visits are billed at different rates depending on your insurance coverage.  Please reach out to your insurance provider with any questions.    If during the course of the call the physician/provider feels a video visit is not appropriate, you will not be charged for this service.\"    Patient has given verbal consent for Video visit? Yes  How would you like to obtain your AVS? MyChart  If you are dropped from the video visit, the video invite should be resent to: Text to cell phone: [phone]  Will anyone else be joining your video visit? Yes pt's daughter      Subjective     Lawrence Louie is a 64 year old male who presents today via video visit for the following health issues:    Nehemias Louie is a 64 year old male with history of ETOH cirrhosis complicated by history of ascites s/p TIPS (now improved), hepatic encephalopathy and portal hypertension and multifocal HCC. He underwent TACE of segment 6 lesion on 7/30/2020. He had pain problems after procedure, but this is currently improving. His HE is fairly well controlled. His lower extremity edema is stable right now.  Family is very involved in his care.    Hid daughter " Reva wants to know if Unruly is due for colonoscopy and upper endoscopy.    He is planning another chemo embolization 10/1/20 and they are anxious because of all the pain he had the previous treatment. They have discussed this with the doctors and will use different pain treatment.     Unruly has unrelenting pruritis which is not alleviated by atarax. Askinf if a higher dose of lidocaine can be used on his skin.     He has severe scrotal edema of unknown etiology and scrotom is so sore he can barely walk.     He is trying to get a wheelchair for home. He is unsteady walking and neither daughter nor wife feel he can safely ambulate from room to room.  Also need  for clinic visits. He has a constant level of pain due to hepatocellular carcinoma and is unable to propel himself in the wheelchair. He does have daily care providers who can propel him in his wheelchair. His home has adequate room for a wheelchair to maneuver from room to room. A wheelchair will greatly aid in his daily ADLs and toileting and he is very willing to use the wheelchair in the home. Needs face to face visit which is why we are having this visit.      His wife is trying to replace his orthopedic shoes.  He wants [employer] lymphedema oes and she will ask social  Workers to assist through elderly waiver.    Both wife and daughter state he becomes very confused and sometimes uncooperative.        Seasonal allergies are being medicated with Benadryl and atarax.     Current Outpatient Medications   Medication     blood glucose (NO BRAND SPECIFIED) lancets standard     blood glucose (NO BRAND SPECIFIED) test strip     blood glucose (NO BRAND SPECIFIED) test strip     blood glucose monitoring (NO BRAND SPECIFIED) meter device kit     blood glucose monitoring (NO BRAND SPECIFIED) meter device kit     childrens multivitamin w/ionr (FLINTSTONES COMPLETE) 60 MG chewable tablet     citalopram (CELEXA) 20 MG tablet     Cyanocobalamin (VITAMIN B-12 PO)      desvenlafaxine (PRISTIQ) 100 MG 24 hr tablet     EQL VITAMIN D3 50 MCG (2000 UT) CAPS     famotidine (PEPCID) 40 MG tablet     furosemide (LASIX) 40 MG tablet     gabapentin (NEURONTIN) 300 MG capsule     hydrOXYzine (ATARAX) 25 MG tablet     insulin aspart (NOVOLOG FLEXPEN) 100 UNIT/ML pen     insulin glargine (LANTUS SOLOSTAR) 100 UNIT/ML pen     insulin pen needle (B-D U/F) 31G X 8 MM miscellaneous     insulin pen needle (ULTICARE SHORT) 31G X 8 MM miscellaneous     lactulose (CEPHULAC) 20 GM packet     lactulose encephalopathy (CHRONULAC) 10 GM/15ML SOLUTION     magnesium oxide (MAG-OX) 400 (241.3 Mg) MG tablet     nystatin (MYCOSTATIN) 075430 UNIT/GM external cream     ondansetron (ZOFRAN) 4 MG tablet     oxyCODONE (ROXICODONE) 5 MG tablet     OYSTER SHELL CALCIUM/D 500-200 MG-UNIT per tablet     QUEtiapine (SEROQUEL) 50 MG tablet     rifaximin (XIFAXAN) 550 MG TABS tablet     spironolactone (ALDACTONE) 50 MG tablet     sulfamethoxazole-trimethoprim (BACTRIM DS) 800-160 MG tablet     ursodiol (ACTIGALL) 300 MG capsule       Review of Systems   Constitutional, HEENT, cardiovascular, pulmonary, gi and gu systems are negative, except as otherwise noted.      Objective       Physical Exam     GENERAL: Healthy, alert and no distress  RESP: No audible wheeze, cough, or visible cyanosis.  No visible retractions or increased work of breathing.    NEURO:  Mentation and speech appropriate for age.  PSYCH: Mentation appears normal, affect normal/bright, judgement and insight intact, normal speech.      Lawrence was seen today for dme and edema and pruritis.    Diagnoses and all orders for this visit:    Gastroesophageal reflux disease with esophagitis  -     famotidine (PEPCID) 40 MG tablet; Take 1 tablet (40 mg) by mouth daily    Diabetes mellitus, type 2 (H)    Edema of both legs  -     furosemide (LASIX) 40 MG tablet; Take 40 mg twice daily. This is an increase in his Lasix.  His daughter gives him potassium supplement  nightly.    -     Comprehensive metabolic panel; Future  -     CBC with platelets; Future              Will ask home health to draw electrolytes in 10 days.     Scrotal edema       -     Recommend elevation.  The increased Lasix dose might help as well.     Pruritis       -     Increase Atarax to tid.       -     Will request [facility] to make him more lidocaine/Nystatin/ZnOx cream with a higher concentration of lidocaine.     Esophageal varices       -     I will message Dr. Simmons regarding need for f/u with her and whether UE and colon cancer screening is appropriate.    Video-Visit Details    Type of service:  Video Visit    Video Start Time:12:33  End Time: 12:55    Originating Location (pt. Location): Home    Distant Location (provider location):  [facility]     Platform used for Video Visit: Nikole RAY CNP

## 2020-09-22 NOTE — TELEPHONE ENCOUNTER
Called and left wife a msg in regards to f/up on Dr. Hilton's consult    Informed her that I am reaching out to talk about dates for Y90.    Informed her to return my call and left direct line.     Sunshine TUCKER RN, BSN  Interventional Radiology/Vascular  Nurse Coordinator   Phone: [phone]  Fax: [phone]

## 2020-09-22 NOTE — NURSING NOTE
Chief Complaint   Patient presents with     Dme     pt needs a wheelchair.      Mass     pt has a swollen scrotum.      Sahara Mims LPN at 2:08 PM on 9/22/2020.

## 2020-09-22 NOTE — LETTER
"    9/22/2020         RE: Lawrence Louie  [address] [place] [address]        Dear Colleague,    Thank you for referring your patient, Lawrence Louie, to the [facility]. Please see a copy of my visit note below.    Lawrence Louie is a 64 year old male who is being evaluated via a billable video visit.      The patient has been notified of following:     \"This video visit will be conducted via a call between you and your physician/provider. We have found that certain health care needs can be provided without the need for an in-person physical exam.  This service lets us provide the care you need with a video conversation.  If a prescription is necessary we can send it directly to your pharmacy.  If lab work is needed we can place an order for that and you can then stop by our lab to have the test done at a later time.    Video visits are billed at different rates depending on your insurance coverage.  Please reach out to your insurance provider with any questions.    If during the course of the call the physician/provider feels a video visit is not appropriate, you will not be charged for this service.\"    Patient has given verbal consent for Video visit? Yes    How would you like to obtain your AVS? MyChart    If you are dropped from the video visit, the video invite should be resent to: Send to e-mail at: [email or web address]    Will anyone else be joining your video visit? No         I have reviewed and updated the patient's allergies and medication list.    Concerns: Would like to discuss psychiatry referral.   Refills: None needed.          Reva Bradley CMA        Video-Visit Details    Type of service:  Video Visit    Video Start Time: 1:17  Video End Time: 1:39    Originating Location (pt. Location): Home    Distant Location (provider location):  [facility]     Platform used for Video Visit: Doximity      Clinic visit:    Lawrence NOHEMY " Liban is a 64 year old male who presents with diagnosis of unresectable multifocal HCC on a background of EtOH cirrhosis. He has h/o ETOH cirrhosis complicated by ascites s/p TIPS and hepatic encephalopathy and diffuse anasarca and MILLY and multiple hospitalizations due to sepsis secondary to leg ulcers. His segment 6 lesion was treated with cTACE a month ago. The patient is doing well. He had a lot of pain during and post procedure. It seems he doesn't do well with Fentanyl. The daughter is asking to try something else for the next procedure. The patient had also lots of nausea that wouldn't improve with Zofran. They are requesting more drugs in case he undergo another procedure. His appetite is good and his ECOG is 1.  The patient had an episode of dehydration post procedure.    I have looked at the labs and the MRI. The MR showed no that the treated lesion is not enhancing. The segment 4 lesion is measuring about 4 cm.  Lab Results   Component Value Date    WBC 2.5 09/11/2020     Lab Results   Component Value Date    RBC 2.87 09/11/2020     Lab Results   Component Value Date    HGB 7.9 09/11/2020     Lab Results   Component Value Date    HCT 25.7 09/11/2020     No components found for: MCT  Lab Results   Component Value Date    MCV 90 09/11/2020     Lab Results   Component Value Date    MCH 27.5 09/11/2020     Lab Results   Component Value Date    MCHC 30.7 09/11/2020     Lab Results   Component Value Date    RDW 15.7 09/11/2020     Lab Results   Component Value Date    PLT 94 09/11/2020     Lab Results   Component Value Date    AST 30 09/11/2020     Lab Results   Component Value Date    ALT 20 09/11/2020     Lab Results   Component Value Date    BILICONJ 0.0 03/12/2014      Lab Results   Component Value Date    BILITOTAL 0.7 09/11/2020     Lab Results   Component Value Date    ALBUMIN 2.4 09/11/2020     Lab Results   Component Value Date    PROTTOTAL 6.9 09/11/2020      Lab Results   Component Value Date     ALKPHOS 197 09/11/2020     AFP: 83    Current Outpatient Medications   Medication     blood glucose (NO BRAND SPECIFIED) lancets standard     blood glucose (NO BRAND SPECIFIED) test strip     blood glucose (NO BRAND SPECIFIED) test strip     blood glucose monitoring (NO BRAND SPECIFIED) meter device kit     blood glucose monitoring (NO BRAND SPECIFIED) meter device kit     childrens multivitamin w/ionr (FLINTSTONES COMPLETE) 60 MG chewable tablet     ciprofloxacin (CIPRO) 500 MG tablet     citalopram (CELEXA) 20 MG tablet     Cyanocobalamin (VITAMIN B-12 PO)     desvenlafaxine (PRISTIQ) 100 MG 24 hr tablet     EQL VITAMIN D3 50 MCG (2000 UT) CAPS     furosemide (LASIX) 40 MG tablet     gabapentin (NEURONTIN) 300 MG capsule     hydrOXYzine (ATARAX) 25 MG tablet     insulin aspart (NOVOLOG FLEXPEN) 100 UNIT/ML pen     insulin glargine (LANTUS SOLOSTAR) 100 UNIT/ML pen     insulin pen needle (B-D U/F) 31G X 8 MM miscellaneous     insulin pen needle (ULTICARE SHORT) 31G X 8 MM miscellaneous     lactulose (CEPHULAC) 20 GM packet     lactulose encephalopathy (CHRONULAC) 10 GM/15ML SOLUTION     magnesium oxide (MAG-OX) 400 (241.3 Mg) MG tablet     nystatin (MYCOSTATIN) 030542 UNIT/GM external cream     ondansetron (ZOFRAN) 4 MG tablet     order for DME     order for DME     order for DME     order for DME     oxyCODONE (ROXICODONE) 5 MG tablet     OYSTER SHELL CALCIUM/D 500-200 MG-UNIT per tablet     QUEtiapine (SEROQUEL) 50 MG tablet     rifaximin (XIFAXAN) 550 MG TABS tablet     spironolactone (ALDACTONE) 50 MG tablet     sulfamethoxazole-trimethoprim (BACTRIM DS) 800-160 MG tablet     UNABLE TO FIND     ursodiol (ACTIGALL) 300 MG capsule     No current facility-administered medications for this visit.      Impression and plan:    Good tumor response post cTACE of segment 6 lesion. The patient could benefit from a new cTACE for the segment 4 lesion. They are ready to proceed but we need to make sure that they receive  alternative treatment for pain and nausea. The patient should also be hydrated during and post procedure. We need to give some Lidocaine after cTACE in to the feeding artery to manage the pain.  Patient is ready to proceed.     Again, thank you for allowing me to participate in the care of your patient.        Sincerely,        Yady Hilton MD

## 2020-09-22 NOTE — PROGRESS NOTES
"Lawrence Louie is a 64 year old male who is being evaluated via a billable video visit.      The patient has been notified of following:     \"This video visit will be conducted via a call between you and your physician/provider. We have found that certain health care needs can be provided without the need for an in-person physical exam.  This service lets us provide the care you need with a video conversation.  If a prescription is necessary we can send it directly to your pharmacy.  If lab work is needed we can place an order for that and you can then stop by our lab to have the test done at a later time.    Video visits are billed at different rates depending on your insurance coverage.  Please reach out to your insurance provider with any questions.    If during the course of the call the physician/provider feels a video visit is not appropriate, you will not be charged for this service.\"    Patient has given verbal consent for Video visit? Yes    How would you like to obtain your AVS? MyChart    If you are dropped from the video visit, the video invite should be resent to: Send to e-mail at: [email or web address]    Will anyone else be joining your video visit? No         I have reviewed and updated the patient's allergies and medication list.    Concerns: Would like to discuss psychiatry referral.   Refills: None needed.          Reva Bradley CMA        Video-Visit Details    Type of service:  Video Visit    Video Start Time: 1:17  Video End Time: 1:39    Originating Location (pt. Location): Home    Distant Location (provider location):  [facility]     Platform used for Video Visit: Doximity      Clinic visit:    Lawrence Louie is a 64 year old male who presents with diagnosis of unresectable multifocal HCC on a background of EtOH cirrhosis. He has h/o ETOH cirrhosis complicated by ascites s/p TIPS and hepatic encephalopathy and diffuse anasarca and MILLY and multiple " hospitalizations due to sepsis secondary to leg ulcers. His segment 6 lesion was treated with cTACE a month ago. The patient is doing well. He had a lot of pain during and post procedure. It seems he doesn't do well with Fentanyl. The daughter is asking to try something else for the next procedure. The patient had also lots of nausea that wouldn't improve with Zofran. They are requesting more drugs in case he undergo another procedure. His appetite is good and his ECOG is 1.  The patient had an episode of dehydration post procedure.    I have looked at the labs and the MRI. The MR showed no that the treated lesion is not enhancing. The segment 4 lesion is measuring about 4 cm.  Lab Results   Component Value Date    WBC 2.5 09/11/2020     Lab Results   Component Value Date    RBC 2.87 09/11/2020     Lab Results   Component Value Date    HGB 7.9 09/11/2020     Lab Results   Component Value Date    HCT 25.7 09/11/2020     No components found for: MCT  Lab Results   Component Value Date    MCV 90 09/11/2020     Lab Results   Component Value Date    MCH 27.5 09/11/2020     Lab Results   Component Value Date    MCHC 30.7 09/11/2020     Lab Results   Component Value Date    RDW 15.7 09/11/2020     Lab Results   Component Value Date    PLT 94 09/11/2020     Lab Results   Component Value Date    AST 30 09/11/2020     Lab Results   Component Value Date    ALT 20 09/11/2020     Lab Results   Component Value Date    BILICONJ 0.0 03/12/2014      Lab Results   Component Value Date    BILITOTAL 0.7 09/11/2020     Lab Results   Component Value Date    ALBUMIN 2.4 09/11/2020     Lab Results   Component Value Date    PROTTOTAL 6.9 09/11/2020      Lab Results   Component Value Date    ALKPHOS 197 09/11/2020     AFP: 83    Current Outpatient Medications   Medication     blood glucose (NO BRAND SPECIFIED) lancets standard     blood glucose (NO BRAND SPECIFIED) test strip     blood glucose (NO BRAND SPECIFIED) test strip     blood  glucose monitoring (NO BRAND SPECIFIED) meter device kit     blood glucose monitoring (NO BRAND SPECIFIED) meter device kit     childrens multivitamin w/ionr (FLINTSTONES COMPLETE) 60 MG chewable tablet     ciprofloxacin (CIPRO) 500 MG tablet     citalopram (CELEXA) 20 MG tablet     Cyanocobalamin (VITAMIN B-12 PO)     desvenlafaxine (PRISTIQ) 100 MG 24 hr tablet     EQL VITAMIN D3 50 MCG (2000 UT) CAPS     furosemide (LASIX) 40 MG tablet     gabapentin (NEURONTIN) 300 MG capsule     hydrOXYzine (ATARAX) 25 MG tablet     insulin aspart (NOVOLOG FLEXPEN) 100 UNIT/ML pen     insulin glargine (LANTUS SOLOSTAR) 100 UNIT/ML pen     insulin pen needle (B-D U/F) 31G X 8 MM miscellaneous     insulin pen needle (ULTICARE SHORT) 31G X 8 MM miscellaneous     lactulose (CEPHULAC) 20 GM packet     lactulose encephalopathy (CHRONULAC) 10 GM/15ML SOLUTION     magnesium oxide (MAG-OX) 400 (241.3 Mg) MG tablet     nystatin (MYCOSTATIN) 447457 UNIT/GM external cream     ondansetron (ZOFRAN) 4 MG tablet     order for DME     order for DME     order for DME     order for DME     oxyCODONE (ROXICODONE) 5 MG tablet     OYSTER SHELL CALCIUM/D 500-200 MG-UNIT per tablet     QUEtiapine (SEROQUEL) 50 MG tablet     rifaximin (XIFAXAN) 550 MG TABS tablet     spironolactone (ALDACTONE) 50 MG tablet     sulfamethoxazole-trimethoprim (BACTRIM DS) 800-160 MG tablet     UNABLE TO FIND     ursodiol (ACTIGALL) 300 MG capsule     No current facility-administered medications for this visit.      Impression and plan:    Good tumor response post cTACE of segment 6 lesion. The patient could benefit from a new cTACE for the segment 4 lesion. They are ready to proceed but we need to make sure that they receive alternative treatment for pain and nausea. The patient should also be hydrated during and post procedure. We need to give some Lidocaine after cTACE in to the feeding artery to manage the pain.  Patient is ready to proceed.

## 2020-09-22 NOTE — Clinical Note
Bret Simmons,    Mr. Louie's daughter wants to know if her dad should be scoped to check his varices. And do you think he should continue colon cancer screening?  Thanks.    Ary RAY, CNP  
Soon-Mi,  Can you call [facility] and see if they can make him more Lidocaine/nystatin/ZnOx with a stronger strength of lidocaine?
Normal

## 2020-09-23 ENCOUNTER — TELEPHONE (OUTPATIENT)
Dept: VASCULAR SURGERY | Facility: CLINIC | Age: 65
End: 2020-09-23

## 2020-09-23 DIAGNOSIS — C22.0 HCC (HEPATOCELLULAR CARCINOMA) (H): Primary | ICD-10-CM

## 2020-09-23 DIAGNOSIS — Z11.59 ENCOUNTER FOR SCREENING FOR OTHER VIRAL DISEASES: Primary | ICD-10-CM

## 2020-09-24 ENCOUNTER — MEDICAL CORRESPONDENCE (OUTPATIENT)
Dept: HEALTH INFORMATION MANAGEMENT | Facility: CLINIC | Age: 65
End: 2020-09-24

## 2020-09-24 ENCOUNTER — PATIENT OUTREACH (OUTPATIENT)
Dept: CARE COORDINATION | Facility: CLINIC | Age: 65
End: 2020-09-24

## 2020-09-24 NOTE — TELEPHONE ENCOUNTER
Called pt and spoke to wife regarding pt's TACE procedure.     Informed her that we have pt scheduled for Thursday 10/1.     Informed her that they are to check into [facility] at 930am for an 11am.    All prep informed and went over insulin instructions.    Informed wife also that pt is to get covid 19 testing.    They will call and get this scheduled.     Also informed her that we will plan to send pt home due to covid 19,     She also agrees to plan. They will call with any other questions     Sunshine TUCKER RN, BSN  Interventional Radiology/Vascular  Nurse Coordinator   Phone: [phone]  Fax: [phone]

## 2020-09-24 NOTE — PROGRESS NOTES
Oncology Distress Screening Follow-up  Clinical Social Work  [facility]    Identified Concern and Score From Distress Screening:   3. How concerned are you about feeling depressed or very sad?   7Abnormal              4. How concerned are you about feeling anxious or very scared?   7Abnormal                     Date of Distress Screenin2020      Data: Lawrence Louie is a 64 year old male who is being evaluated for diagnosis of unresectable multifocal HCC on a background of EtOH cirrhosis.      Intervention/Education Provided:: Spoke with Patient's wife as patient was unavailable. Patient's wife stated that patient had expressed interest in counseling services and was wondering if  could assist with this.  assessed patient's needs and preferences for counseling services.  provided patient's wife with the behavior health access number ([phone]) where patient could speak with a specialists about what they were seeking in counseling services and what they wanted their counselor to have experience or training in; the specialist could then assist patient in find a counselor that matches their needs and search by location and insurance coverage. Patient's wife thanked  for the call and resource. Patient's wife stated they had no further concerns at this time and took down 's contact information for further follow up as needed.       Follow-up Required:  will remain available as needed.         Mariel COLEMAN, SAMANTHA  - Oncology  Phone : [phone]  Pager: [phone]

## 2020-09-28 ENCOUNTER — TELEPHONE (OUTPATIENT)
Dept: INTERNAL MEDICINE | Facility: CLINIC | Age: 65
End: 2020-09-28

## 2020-09-28 NOTE — TELEPHONE ENCOUNTER
[facility] Call Center    Phone Message    May a detailed message be left on voicemail: yes     Reason for Call: Other: . FYI - pt fell once early Thursday morning and Sunday morning.  On both occasions he fell walking into kitchen getting a glass of water. Wife called 911. No injury reported on Sunday. Abrasion on forearm and busing on back from Thursday fall. Pt states pain from Thursday fall has resolved. No new pain from either fall.    Action Taken: Message routed to:  [facility]    Travel Screening: Not Applicable

## 2020-09-28 NOTE — TELEPHONE ENCOUNTER
[facility] Call Center    Phone Message    May a detailed message be left on voicemail: yes     Reason for Call: Order(s): Home Care Orders: Skilled Nursinx a week for 1 week. 3x a week for 8 weeks. For wound care for complex case management and Bilateral.Lower Extremety     Action Taken: Message routed to:  [facility] ([facility] PCC    Travel Screening: Not Applicable

## 2020-09-28 NOTE — TELEPHONE ENCOUNTER
Called [facility] and left a secure VM:  Per Ary Jones CNP, gave verbal orders to [facility] for Order(s): Home Care Orders: Skilled Nursinx a week for 1 week. 3x a week for 8 weeks. For wound care for complex case management and Bilateral.Lower Extremety      Sigifredo Andrade CMA (AAMA) at 4:06 PM on 2020

## 2020-09-29 ENCOUNTER — TELEPHONE (OUTPATIENT)
Dept: INTERVENTIONAL RADIOLOGY/VASCULAR | Facility: CLINIC | Age: 65
End: 2020-09-29

## 2020-10-01 ENCOUNTER — APPOINTMENT (OUTPATIENT)
Dept: MEDSURG UNIT | Facility: CLINIC | Age: 65
End: 2020-10-01
Attending: RADIOLOGY
Payer: COMMERCIAL

## 2020-10-01 ENCOUNTER — MEDICAL CORRESPONDENCE (OUTPATIENT)
Dept: HEALTH INFORMATION MANAGEMENT | Facility: CLINIC | Age: 65
End: 2020-10-01

## 2020-10-01 ENCOUNTER — APPOINTMENT (OUTPATIENT)
Dept: INTERVENTIONAL RADIOLOGY/VASCULAR | Facility: CLINIC | Age: 65
End: 2020-10-01
Attending: RADIOLOGY
Payer: COMMERCIAL

## 2020-10-01 ENCOUNTER — APPOINTMENT (OUTPATIENT)
Dept: CT IMAGING | Facility: CLINIC | Age: 65
End: 2020-10-01
Attending: RADIOLOGY
Payer: COMMERCIAL

## 2020-10-01 ENCOUNTER — HOSPITAL ENCOUNTER (OUTPATIENT)
Facility: CLINIC | Age: 65
Discharge: HOME | End: 2020-10-02
Attending: RADIOLOGY | Admitting: RADIOLOGY
Payer: COMMERCIAL

## 2020-10-01 DIAGNOSIS — Z98.890 POST-OPERATIVE STATE: Primary | ICD-10-CM

## 2020-10-01 DIAGNOSIS — C22.0 HCC (HEPATOCELLULAR CARCINOMA) (H): ICD-10-CM

## 2020-10-01 LAB
ALBUMIN SERPL-MCNC: 2.3 G/DL (ref 3.4–5)
ALP SERPL-CCNC: 207 U/L (ref 40–150)
ALT SERPL W P-5'-P-CCNC: 23 U/L (ref 0–70)
ANION GAP SERPL CALCULATED.3IONS-SCNC: 7 MMOL/L (ref 3–14)
APTT PPP: 32 SEC (ref 22–37)
AST SERPL W P-5'-P-CCNC: 36 U/L (ref 0–45)
BILIRUB DIRECT SERPL-MCNC: 0.5 MG/DL (ref 0–0.2)
BILIRUB SERPL-MCNC: 0.7 MG/DL (ref 0.2–1.3)
BUN SERPL-MCNC: 23 MG/DL (ref 7–30)
CALCIUM SERPL-MCNC: 8.4 MG/DL (ref 8.5–10.1)
CHLORIDE SERPL-SCNC: 102 MMOL/L (ref 94–109)
CO2 SERPL-SCNC: 26 MMOL/L (ref 20–32)
CREAT SERPL-MCNC: 0.92 MG/DL (ref 0.66–1.25)
ERYTHROCYTE [DISTWIDTH] IN BLOOD BY AUTOMATED COUNT: 16.1 % (ref 10–15)
GFR SERPL CREATININE-BSD FRML MDRD: 86 ML/MIN/{1.73_M2}
GLUCOSE BLDC GLUCOMTR-MCNC: 108 MG/DL (ref 70–99)
GLUCOSE BLDC GLUCOMTR-MCNC: 137 MG/DL (ref 70–99)
GLUCOSE BLDC GLUCOMTR-MCNC: 157 MG/DL (ref 70–99)
GLUCOSE BLDC GLUCOMTR-MCNC: 167 MG/DL (ref 70–99)
GLUCOSE SERPL-MCNC: 110 MG/DL (ref 70–99)
HCT VFR BLD AUTO: 24.6 % (ref 40–53)
HGB BLD-MCNC: 7.6 G/DL (ref 13.3–17.7)
INR PPP: 1.24 (ref 0.86–1.14)
MCH RBC QN AUTO: 27.4 PG (ref 26.5–33)
MCHC RBC AUTO-ENTMCNC: 30.9 G/DL (ref 31.5–36.5)
MCV RBC AUTO: 89 FL (ref 78–100)
PLATELET # BLD AUTO: 72 10E9/L (ref 150–450)
POTASSIUM SERPL-SCNC: 3.8 MMOL/L (ref 3.4–5.3)
PROT SERPL-MCNC: 6.8 G/DL (ref 6.8–8.8)
RBC # BLD AUTO: 2.77 10E12/L (ref 4.4–5.9)
SODIUM SERPL-SCNC: 135 MMOL/L (ref 133–144)
WBC # BLD AUTO: 2.5 10E9/L (ref 4–11)

## 2020-10-01 PROCEDURE — 36248 INS CATH ABD/L-EXT ART ADDL: CPT

## 2020-10-01 PROCEDURE — 85610 PROTHROMBIN TIME: CPT | Performed by: RADIOLOGY

## 2020-10-01 PROCEDURE — 272N000566 HC SHEATH CR3

## 2020-10-01 PROCEDURE — 250N000011 HC RX IP 250 OP 636: Performed by: RADIOLOGY

## 2020-10-01 PROCEDURE — 250N000011 HC RX IP 250 OP 636: Performed by: STUDENT IN AN ORGANIZED HEALTH CARE EDUCATION/TRAINING PROGRAM

## 2020-10-01 PROCEDURE — 36247 INS CATH ABD/L-EXT ART 3RD: CPT | Mod: XS | Performed by: RADIOLOGY

## 2020-10-01 PROCEDURE — 250N000009 HC RX 250: Performed by: RADIOLOGY

## 2020-10-01 PROCEDURE — 272N000631 HC COIL/EMBOLIC DEVICE CR12

## 2020-10-01 PROCEDURE — 74150 CT ABDOMEN W/O CONTRAST: CPT | Mod: 26 | Performed by: RADIOLOGY

## 2020-10-01 PROCEDURE — 258N000003 HC RX IP 258 OP 636: Performed by: RADIOLOGY

## 2020-10-01 PROCEDURE — 206N000001 HC R&B BMT UMMC

## 2020-10-01 PROCEDURE — C1769 GUIDE WIRE: HCPCS

## 2020-10-01 PROCEDURE — 37243 VASC EMBOLIZE/OCCLUDE ORGAN: CPT

## 2020-10-01 PROCEDURE — 75726 ARTERY X-RAYS ABDOMEN: CPT | Mod: XU

## 2020-10-01 PROCEDURE — C1760 CLOSURE DEV, VASC: HCPCS

## 2020-10-01 PROCEDURE — 999N001017 HC STATISTIC GLUCOSE BY METER IP

## 2020-10-01 PROCEDURE — 75774 ARTERY X-RAY EACH VESSEL: CPT | Mod: 26 | Performed by: RADIOLOGY

## 2020-10-01 PROCEDURE — 85730 THROMBOPLASTIN TIME PARTIAL: CPT | Performed by: RADIOLOGY

## 2020-10-01 PROCEDURE — 96420 CHEMO IA PUSH TECNIQUE: CPT

## 2020-10-01 PROCEDURE — 80048 BASIC METABOLIC PNL TOTAL CA: CPT | Performed by: RADIOLOGY

## 2020-10-01 PROCEDURE — 85027 COMPLETE CBC AUTOMATED: CPT | Performed by: RADIOLOGY

## 2020-10-01 PROCEDURE — 255N000002 HC RX 255 OP 636: Performed by: RADIOLOGY

## 2020-10-01 PROCEDURE — 99222 1ST HOSP IP/OBS MODERATE 55: CPT | Mod: AI | Performed by: INTERNAL MEDICINE

## 2020-10-01 PROCEDURE — 74150 CT ABDOMEN W/O CONTRAST: CPT

## 2020-10-01 PROCEDURE — 272N000199 HC ACCESSORY CR8

## 2020-10-01 PROCEDURE — 250N000009 HC RX 250: Performed by: STUDENT IN AN ORGANIZED HEALTH CARE EDUCATION/TRAINING PROGRAM

## 2020-10-01 PROCEDURE — 80076 HEPATIC FUNCTION PANEL: CPT | Performed by: RADIOLOGY

## 2020-10-01 PROCEDURE — 99207 PR CDG-CODE CATEGORY CHANGED: CPT | Performed by: INTERNAL MEDICINE

## 2020-10-01 PROCEDURE — 999N000134 HC STATISTIC PP CARE STAGE 3

## 2020-10-01 PROCEDURE — 75774 ARTERY X-RAY EACH VESSEL: CPT | Mod: XU

## 2020-10-01 PROCEDURE — 99153 MOD SED SAME PHYS/QHP EA: CPT

## 2020-10-01 PROCEDURE — 272N000143 HC KIT CR3

## 2020-10-01 PROCEDURE — 75726 ARTERY X-RAYS ABDOMEN: CPT | Mod: 26 | Performed by: RADIOLOGY

## 2020-10-01 PROCEDURE — 37243 VASC EMBOLIZE/OCCLUDE ORGAN: CPT | Mod: GC | Performed by: RADIOLOGY

## 2020-10-01 PROCEDURE — C1887 CATHETER, GUIDING: HCPCS

## 2020-10-01 PROCEDURE — 36248 INS CATH ABD/L-EXT ART ADDL: CPT | Mod: 26 | Performed by: RADIOLOGY

## 2020-10-01 PROCEDURE — 272N000192 HC ACCESSORY CR2

## 2020-10-01 PROCEDURE — 36247 INS CATH ABD/L-EXT ART 3RD: CPT

## 2020-10-01 PROCEDURE — 76937 US GUIDE VASCULAR ACCESS: CPT

## 2020-10-01 PROCEDURE — 99152 MOD SED SAME PHYS/QHP 5/>YRS: CPT

## 2020-10-01 PROCEDURE — 272N000147 HC KIT CR7

## 2020-10-01 PROCEDURE — 99152 MOD SED SAME PHYS/QHP 5/>YRS: CPT | Mod: GC | Performed by: RADIOLOGY

## 2020-10-01 PROCEDURE — 272N000504 HC NEEDLE CR4

## 2020-10-01 RX ORDER — ONDANSETRON 4 MG/1
4 TABLET, FILM COATED ORAL EVERY 8 HOURS PRN
Qty: 20 TABLET | Refills: 0 | Status: SHIPPED | OUTPATIENT
Start: 2020-10-01 | End: 2021-01-01

## 2020-10-01 RX ORDER — SODIUM CHLORIDE 9 MG/ML
INJECTION, SOLUTION INTRAVENOUS CONTINUOUS
Status: DISCONTINUED | OUTPATIENT
Start: 2020-10-01 | End: 2020-10-01

## 2020-10-01 RX ORDER — POLYETHYLENE GLYCOL 3350 17 G/17G
1 POWDER, FOR SOLUTION ORAL
COMMUNITY

## 2020-10-01 RX ORDER — NICOTINE POLACRILEX 4 MG
15-30 LOZENGE BUCCAL
Status: DISCONTINUED | OUTPATIENT
Start: 2020-10-01 | End: 2020-10-01

## 2020-10-01 RX ORDER — HYDROMORPHONE HYDROCHLORIDE 1 MG/ML
0.5 INJECTION, SOLUTION INTRAMUSCULAR; INTRAVENOUS; SUBCUTANEOUS
Status: DISCONTINUED | OUTPATIENT
Start: 2020-10-01 | End: 2020-10-01

## 2020-10-01 RX ORDER — DOCUSATE SODIUM 100 MG/1
100 CAPSULE, LIQUID FILLED ORAL 2 TIMES DAILY
Status: DISCONTINUED | OUTPATIENT
Start: 2020-10-01 | End: 2020-10-01

## 2020-10-01 RX ORDER — DESVENLAFAXINE 50 MG/1
200 TABLET, FILM COATED, EXTENDED RELEASE ORAL DAILY
Status: DISCONTINUED | OUTPATIENT
Start: 2020-10-02 | End: 2020-10-02 | Stop reason: HOSPADM

## 2020-10-01 RX ORDER — ONDANSETRON 2 MG/ML
4 INJECTION INTRAMUSCULAR; INTRAVENOUS EVERY 6 HOURS PRN
Status: DISCONTINUED | OUTPATIENT
Start: 2020-10-01 | End: 2020-10-02 | Stop reason: HOSPADM

## 2020-10-01 RX ORDER — HYDROMORPHONE HCL/0.9% NACL/PF 0.2MG/0.2
.2-.5 SYRINGE (ML) INTRAVENOUS
Status: DISCONTINUED | OUTPATIENT
Start: 2020-10-01 | End: 2020-10-01

## 2020-10-01 RX ORDER — PROCHLORPERAZINE MALEATE 5 MG
5 TABLET ORAL EVERY 6 HOURS PRN
Status: DISCONTINUED | OUTPATIENT
Start: 2020-10-01 | End: 2020-10-02 | Stop reason: HOSPADM

## 2020-10-01 RX ORDER — FUROSEMIDE 40 MG
40 TABLET ORAL
Status: DISCONTINUED | OUTPATIENT
Start: 2020-10-02 | End: 2020-10-02 | Stop reason: HOSPADM

## 2020-10-01 RX ORDER — SCOLOPAMINE TRANSDERMAL SYSTEM 1 MG/1
1 PATCH, EXTENDED RELEASE TRANSDERMAL ONCE
Status: DISCONTINUED | OUTPATIENT
Start: 2020-10-01 | End: 2020-10-02 | Stop reason: HOSPADM

## 2020-10-01 RX ORDER — NALOXONE HYDROCHLORIDE 0.4 MG/ML
.1-.4 INJECTION, SOLUTION INTRAMUSCULAR; INTRAVENOUS; SUBCUTANEOUS
Status: DISCONTINUED | OUTPATIENT
Start: 2020-10-01 | End: 2020-10-02 | Stop reason: HOSPADM

## 2020-10-01 RX ORDER — IODIXANOL 320 MG/ML
150 INJECTION, SOLUTION INTRAVASCULAR ONCE
Status: COMPLETED | OUTPATIENT
Start: 2020-10-01 | End: 2020-10-01

## 2020-10-01 RX ORDER — PROCHLORPERAZINE 25 MG
12.5 SUPPOSITORY, RECTAL RECTAL EVERY 12 HOURS PRN
Status: DISCONTINUED | OUTPATIENT
Start: 2020-10-01 | End: 2020-10-02 | Stop reason: HOSPADM

## 2020-10-01 RX ORDER — FENTANYL CITRATE 50 UG/ML
25-50 INJECTION, SOLUTION INTRAMUSCULAR; INTRAVENOUS EVERY 5 MIN PRN
Status: DISCONTINUED | OUTPATIENT
Start: 2020-10-01 | End: 2020-10-01

## 2020-10-01 RX ORDER — ONDANSETRON 4 MG/1
4 TABLET, ORALLY DISINTEGRATING ORAL EVERY 6 HOURS PRN
Status: DISCONTINUED | OUTPATIENT
Start: 2020-10-01 | End: 2020-10-02 | Stop reason: HOSPADM

## 2020-10-01 RX ORDER — DEXTROSE MONOHYDRATE 25 G/50ML
25-50 INJECTION, SOLUTION INTRAVENOUS
Status: DISCONTINUED | OUTPATIENT
Start: 2020-10-01 | End: 2020-10-01

## 2020-10-01 RX ORDER — PROCHLORPERAZINE MALEATE 10 MG
10 TABLET ORAL EVERY 6 HOURS PRN
Qty: 20 TABLET | Refills: 0 | Status: SHIPPED | OUTPATIENT
Start: 2020-10-01

## 2020-10-01 RX ORDER — SPIRONOLACTONE 100 MG/1
100 TABLET, FILM COATED ORAL DAILY
Status: DISCONTINUED | OUTPATIENT
Start: 2020-10-02 | End: 2020-10-02 | Stop reason: HOSPADM

## 2020-10-01 RX ORDER — NITROGLYCERIN 5 MG/ML
100-500 VIAL (ML) INTRAVENOUS
Status: COMPLETED | OUTPATIENT
Start: 2020-10-01 | End: 2020-10-01

## 2020-10-01 RX ORDER — LIDOCAINE 40 MG/G
CREAM TOPICAL
Status: DISCONTINUED | OUTPATIENT
Start: 2020-10-01 | End: 2020-10-02 | Stop reason: HOSPADM

## 2020-10-01 RX ORDER — NICOTINE POLACRILEX 4 MG
15-30 LOZENGE BUCCAL
Status: DISCONTINUED | OUTPATIENT
Start: 2020-10-01 | End: 2020-10-02 | Stop reason: HOSPADM

## 2020-10-01 RX ORDER — HEPARIN SODIUM 200 [USP'U]/100ML
1 INJECTION, SOLUTION INTRAVENOUS CONTINUOUS PRN
Status: DISCONTINUED | OUTPATIENT
Start: 2020-10-01 | End: 2020-10-02 | Stop reason: HOSPADM

## 2020-10-01 RX ORDER — OXYCODONE HYDROCHLORIDE 5 MG/1
5 TABLET ORAL EVERY 4 HOURS PRN
Status: DISCONTINUED | OUTPATIENT
Start: 2020-10-01 | End: 2020-10-02 | Stop reason: HOSPADM

## 2020-10-01 RX ORDER — DEXTROSE MONOHYDRATE 25 G/50ML
25-50 INJECTION, SOLUTION INTRAVENOUS
Status: DISCONTINUED | OUTPATIENT
Start: 2020-10-01 | End: 2020-10-02 | Stop reason: HOSPADM

## 2020-10-01 RX ORDER — CITALOPRAM HYDROBROMIDE 20 MG/1
20 TABLET ORAL DAILY
Status: DISCONTINUED | OUTPATIENT
Start: 2020-10-02 | End: 2020-10-02 | Stop reason: HOSPADM

## 2020-10-01 RX ORDER — ONDANSETRON 2 MG/ML
8 INJECTION INTRAMUSCULAR; INTRAVENOUS EVERY 6 HOURS PRN
Status: DISCONTINUED | OUTPATIENT
Start: 2020-10-01 | End: 2020-10-01

## 2020-10-01 RX ORDER — AMPICILLIN AND SULBACTAM 2; 1 G/1; G/1
3 INJECTION, POWDER, FOR SOLUTION INTRAMUSCULAR; INTRAVENOUS
Status: COMPLETED | OUTPATIENT
Start: 2020-10-01 | End: 2020-10-01

## 2020-10-01 RX ORDER — URSODIOL 300 MG/1
900 CAPSULE ORAL 2 TIMES DAILY
Status: DISCONTINUED | OUTPATIENT
Start: 2020-10-02 | End: 2020-10-02 | Stop reason: HOSPADM

## 2020-10-01 RX ORDER — FLUMAZENIL 0.1 MG/ML
0.2 INJECTION, SOLUTION INTRAVENOUS
Status: DISCONTINUED | OUTPATIENT
Start: 2020-10-01 | End: 2020-10-02 | Stop reason: HOSPADM

## 2020-10-01 RX ORDER — METOCLOPRAMIDE HYDROCHLORIDE 5 MG/ML
5 INJECTION INTRAMUSCULAR; INTRAVENOUS EVERY 6 HOURS PRN
Status: DISCONTINUED | OUTPATIENT
Start: 2020-10-01 | End: 2020-10-01

## 2020-10-01 RX ORDER — METOCLOPRAMIDE 5 MG/1
5 TABLET ORAL EVERY 6 HOURS PRN
Status: DISCONTINUED | OUTPATIENT
Start: 2020-10-01 | End: 2020-10-01

## 2020-10-01 RX ORDER — OXYCODONE HYDROCHLORIDE 5 MG/1
5 TABLET ORAL EVERY 6 HOURS PRN
Qty: 20 TABLET | Refills: 0 | Status: SHIPPED | OUTPATIENT
Start: 2020-10-01 | End: 2020-10-02

## 2020-10-01 RX ORDER — POLYETHYLENE GLYCOL 3350 17 G/17G
17 POWDER, FOR SOLUTION ORAL DAILY
Status: DISCONTINUED | OUTPATIENT
Start: 2020-10-02 | End: 2020-10-02 | Stop reason: HOSPADM

## 2020-10-01 RX ORDER — FAMOTIDINE 20 MG/1
40 TABLET, FILM COATED ORAL DAILY
Status: DISCONTINUED | OUTPATIENT
Start: 2020-10-02 | End: 2020-10-02 | Stop reason: HOSPADM

## 2020-10-01 RX ORDER — ONDANSETRON 2 MG/ML
4 INJECTION INTRAMUSCULAR; INTRAVENOUS ONCE
Status: COMPLETED | OUTPATIENT
Start: 2020-10-01 | End: 2020-10-01

## 2020-10-01 RX ADMIN — LIDOCAINE HYDROCHLORIDE 10 ML: 10 INJECTION, SOLUTION EPIDURAL; INFILTRATION; INTRACAUDAL; PERINEURAL at 12:10

## 2020-10-01 RX ADMIN — HYDROCORTISONE SODIUM SUCCINATE 100 MG: 100 INJECTION, POWDER, FOR SOLUTION INTRAMUSCULAR; INTRAVENOUS at 10:57

## 2020-10-01 RX ADMIN — ONDANSETRON 4 MG: 2 INJECTION INTRAMUSCULAR; INTRAVENOUS at 13:30

## 2020-10-01 RX ADMIN — Medication 0.4 MG: at 12:54

## 2020-10-01 RX ADMIN — Medication 0.2 MG: at 14:18

## 2020-10-01 RX ADMIN — MIDAZOLAM 1 MG: 1 INJECTION INTRAMUSCULAR; INTRAVENOUS at 12:45

## 2020-10-01 RX ADMIN — NITROGLYCERIN 150 MCG: 20 INJECTION INTRAVENOUS at 12:54

## 2020-10-01 RX ADMIN — Medication 0.4 MG: at 12:16

## 2020-10-01 RX ADMIN — MITOMYCIN: 5 INJECTION, POWDER, LYOPHILIZED, FOR SOLUTION INTRAVENOUS at 12:20

## 2020-10-01 RX ADMIN — PROCHLORPERAZINE EDISYLATE 5 MG: 5 INJECTION INTRAMUSCULAR; INTRAVENOUS at 14:01

## 2020-10-01 RX ADMIN — MIDAZOLAM 1 MG: 1 INJECTION INTRAMUSCULAR; INTRAVENOUS at 12:10

## 2020-10-01 RX ADMIN — Medication 0.4 MG: at 14:03

## 2020-10-01 RX ADMIN — HEPARIN SODIUM 1 BAG: 200 INJECTION, SOLUTION INTRAVENOUS at 12:25

## 2020-10-01 RX ADMIN — PROCHLORPERAZINE EDISYLATE 5 MG: 5 INJECTION INTRAMUSCULAR; INTRAVENOUS at 14:24

## 2020-10-01 RX ADMIN — IODIXANOL 150 ML: 320 INJECTION, SOLUTION INTRAVASCULAR at 14:14

## 2020-10-01 RX ADMIN — SODIUM CHLORIDE: 9 INJECTION, SOLUTION INTRAVENOUS at 10:56

## 2020-10-01 RX ADMIN — SCOPALAMINE 1 PATCH: 1 PATCH, EXTENDED RELEASE TRANSDERMAL at 10:55

## 2020-10-01 RX ADMIN — AMPICILLIN SODIUM AND SULBACTAM SODIUM 3 G: 2; 1 INJECTION, POWDER, FOR SOLUTION INTRAMUSCULAR; INTRAVENOUS at 11:00

## 2020-10-01 RX ADMIN — FENTANYL CITRATE 50 MCG: 50 INJECTION, SOLUTION INTRAMUSCULAR; INTRAVENOUS at 12:01

## 2020-10-01 ASSESSMENT — MIFFLIN-ST. JEOR: SCORE: 1879.42

## 2020-10-01 NOTE — DISCHARGE INSTRUCTIONS
[facility]   Interventional Radiology  Discharge Instructions Post TACE     AFTER YOU GO HOME          Do relax and take it easy for 24 hours.       Do drink plenty of fluids.       Do resume your regular diet, unless otherwise instructed by your Primary Physician.       DO NOT smoke for at least 24 hours, if you were given any sedation.       DO NOT drink alcoholic beverages the day of your procedure.       Do not drive or operate machinery at home or at work for 24 hours.          DO NOT do any strenuous exercise or lifting for at least 2 days following your Procedure.       DO NOT take a shower for at least 12 hours following your procedure.       DO NOT make any important or legal decisions for 24 hours following your procedure.    CALL THE PHYSICIAN IF:      - You start bleeding from the procedure site.  If you do start to bleed from the site, lie down flat and hold pressure on the site. A small lump or bruise is common at the puncture site.Your physician will tell you if you need to return to the hospital.      - You develop numbness, coolness or a change in color of the arm or leg that was punctured.      - You experience increased pain or redness at the puncture site.      - You develop hives or a rash or unexplained itching.      - You develop a temperature of 101 degrees F or greater  Additional Information:           Support the puncture site for coughing, sneezing, or moving your bowels for the first 48 hours  No tub bath, hot tubs, or swimming for 5 days  No lotion or powder to the puncture site for 3 days      Instructions for closure device: Mynx, see pamphlet           [facility] INTERVENTIONAL RADIOLOGY DEPARTMENT         Procedure Physicians: Dr Kingston, Dr Hilton                                      Date of Procedure: October 1, 2020       Telephone Numbers:   [phone]......Monday-Friday 8:00 to 4:30 pm                                        [phone].....After 4:30 pm  Monday-Friday, Weekends and  Holidays. Ask for the Interventional Radiologist on call. Someone is on call 24 hrs/day.

## 2020-10-01 NOTE — PRE-PROCEDURE
GENERAL PRE-PROCEDURE:   Procedure:  TACE  Date/Time:  10/1/2020 11:07 AM    Verbal consent obtained?: Yes    Written consent obtained?: Yes    Risks and benefits: Risks, benefits and alternatives were discussed    Consent given by:  Patient  Patient states understanding of procedure being performed: Yes    Patient's understanding of procedure matches consent: Yes    Procedure consent matches procedure scheduled: Yes    Expected level of sedation:  Moderate  Appropriately NPO:  Yes  ASA Class:  Class 2- mild systemic disease, no acute problems, no functional limitations  Mallampati  :  Grade 2- soft palate, base of uvula, tonsillar pillars, and portion of posterior pharyngeal wall visible  Lungs:  Other (comment)  Lung exam comment:  Decreased breath sounds at b/l lung bases.   Heart:  Systolic murmur  History & Physical reviewed:  History and physical reviewed and no updates needed  Statement of review:  I have reviewed the lab findings, diagnostic data, medications, and the plan for sedation

## 2020-10-01 NOTE — PROGRESS NOTES
Patient Name: Lawrence Louie  Medical Record Number: [id number]  Today's Date: 10/1/2020    Procedure: Transarterial Chemo Embolization of Liver Lesion  Proceduralist: Dr. Yady Hilton and Dr. Lonny Kingston    Procedure Start: 1210  Procedure end: 1433  Sedation medications administered: Fentanyl 50 mcg, Versed 2 mg, Dilaudid 1.4 mg    Report given to: RADHA Coffman  : n/a    Other Notes: Pt arrived to IR room #1 from Unit 2A. Consent reviewed. Pt denies any questions or concerns regarding procedure. Chemoembolization done. Pt positioned supine and monitored per protocol. Pt tolerated procedure without any noted complications. Pt transferred back to Unit 2A.     4 mg of zofran and 10 mg compazine given iv for nausea.      Mynx closure device to right groin at 1430.  Bedrest for 6 hours until 2030.

## 2020-10-01 NOTE — PROCEDURES
[facility]     Procedure: IR Procedure Note    Date/Time: 10/1/2020 2:43 PM  Performed by: Lonny Kingston MD  Authorized by: Lonny Kingston MD     UNIVERSAL PROTOCOL   Site Marked: NA  Prior Images Obtained and Reviewed:  Yes  Required items: Required blood products, implants, devices and special equipment available    Patient identity confirmed:  Verbally with patient, arm band, provided demographic data and hospital-assigned identification number  Patient was reevaluated immediately before administering moderate or deep sedation or anesthesia  Confirmation Checklist:  Patient's identity using two indicators, relevant allergies, procedure was appropriate and matched the consent or emergent situation and correct equipment/implants were available  Time out: Immediately prior to the procedure a time out was called    Universal Protocol: the Joint Commission Universal Protocol was followed    Preparation: Patient was prepped and draped in usual sterile fashion           ANESTHESIA    Anesthesia: Local infiltration  Local Anesthetic:  Lidocaine 1% without epinephrine      SEDATION    Patient Sedated: Yes    Sedation Type:  Moderate (conscious) sedation  Sedation:  Fentanyl and midazolam  Vital signs: Vital signs monitored during sedation    See dictated procedure note for full details.  Findings: Significant interval growth of segment 7 lesion. Segment 4 lesion had grown on the MRI, but is unchanged from that time. Segment 7 lesion looked possibly bigger today than the segment 4 lesion, and so we proceeded with cTACE of that lesion, which appears highly successful     Specimens: none    Complications: None    Condition: Stable    Plan: Pt to return for additional cTACE of segment 4 lesion, depending on tolerance of this procedure    Patient is completely unable to follow directions with any sedation medicine, so plan for next procedure will be to hold sedation, get  access, and perform an intra-arterial CTA to evaluate the segment 4 lesion and its feeding vessel(s). Sedation can be initiated prior to treatment, and we will likely repeat intra-arterial lidocaine administration.        PROCEDURE   Patient Tolerance:  Patient tolerated the procedure well with no immediate complications    Length of time physician/provider present for 1:1 monitoring during sedation: 135

## 2020-10-01 NOTE — PROGRESS NOTES
Pt has returned to unit 2a s/p TACE. Pt snoring, wakes up periodically and attempts to sit up. Pt needing reminders to lie flat. Bedrest until 20:30. Right groin site CDI, per IR possible hematoma, site soft. Dr Kingston going to update pt's spouse via telephone. Prescriptions sent to discharge pharmacy.

## 2020-10-01 NOTE — IP AVS SNAPSHOT
[facility] [address] [id number]                                    After Visit Summary   10/1/2020    Lawrence Louie    MRN: [id number]           After Visit Summary Signature Page    I have received my discharge instructions, and my questions have been answered. I have discussed any challenges I see with this plan with the nurse or doctor.    ..........................................................................................................................................  Patient/Patient Representative Signature      ..........................................................................................................................................  Patient Representative Print Name and Relationship to Patient    ..................................................               ................................................  Date                                   Time    ..........................................................................................................................................  Reviewed by Signature/Title    ...................................................              ..............................................  Date                                               Time          22EPIC Rev 08/18

## 2020-10-02 ENCOUNTER — APPOINTMENT (OUTPATIENT)
Dept: PHYSICAL THERAPY | Facility: CLINIC | Age: 65
End: 2020-10-02
Attending: INTERNAL MEDICINE
Payer: COMMERCIAL

## 2020-10-02 VITALS
TEMPERATURE: 98.4 F | SYSTOLIC BLOOD PRESSURE: 122 MMHG | DIASTOLIC BLOOD PRESSURE: 55 MMHG | BODY MASS INDEX: 31.69 KG/M2 | HEIGHT: 72 IN | HEART RATE: 75 BPM | WEIGHT: 234 LBS | RESPIRATION RATE: 16 BRPM | OXYGEN SATURATION: 100 %

## 2020-10-02 PROCEDURE — 250N000013 HC RX MED GY IP 250 OP 250 PS 637: Performed by: INTERNAL MEDICINE

## 2020-10-02 PROCEDURE — 97530 THERAPEUTIC ACTIVITIES: CPT | Mod: GP | Performed by: PHYSICAL THERAPIST

## 2020-10-02 PROCEDURE — 99217 PR OBSERVATION CARE DISCHARGE: CPT | Performed by: STUDENT IN AN ORGANIZED HEALTH CARE EDUCATION/TRAINING PROGRAM

## 2020-10-02 PROCEDURE — 97161 PT EVAL LOW COMPLEX 20 MIN: CPT | Mod: GP | Performed by: PHYSICAL THERAPIST

## 2020-10-02 PROCEDURE — 99207 PR CDG-CODE CATEGORY CHANGED: CPT | Performed by: STUDENT IN AN ORGANIZED HEALTH CARE EDUCATION/TRAINING PROGRAM

## 2020-10-02 RX ADMIN — OXYCODONE HYDROCHLORIDE 5 MG: 5 TABLET ORAL at 03:09

## 2020-10-02 ASSESSMENT — ACTIVITIES OF DAILY LIVING (ADL)
DRESSING/BATHING_DIFFICULTY: YES
DIFFICULTY_EATING/SWALLOWING: NO
FALL_HISTORY_WITHIN_LAST_SIX_MONTHS: NO
EQUIPMENT_CURRENTLY_USED_AT_HOME: WALKER, ROLLING
ADLS_ACUITY_SCORE: 17
WALKING_OR_CLIMBING_STAIRS_DIFFICULTY: YES
DRESSING/BATHING: BATHING DIFFICULTY, ASSISTANCE 1 PERSON
DIFFICULTY_COMMUNICATING: NO
VISION_MANAGEMENT: GLASSES
TOILETING_ASSISTANCE: TOILETING DIFFICULTY, ASSISTANCE 1 PERSON
WEAR_GLASSES_OR_BLIND: YES
ADLS_ACUITY_SCORE: 21
WALKING_OR_CLIMBING_STAIRS: AMBULATION DIFFICULTY, REQUIRES EQUIPMENT
ADLS_ACUITY_SCORE: 21
DOING_ERRANDS_INDEPENDENTLY_DIFFICULTY: YES
TOILETING_ISSUES: NO
CONCENTRATING,_REMEMBERING_OR_MAKING_DECISIONS_DIFFICULTY: NO

## 2020-10-02 NOTE — PLAN OF CARE
Pt was admitted from PACU to unit 5C last evening around 2230. AVSS. Pt was admitted overnight post TACE procedure d/t lethargy. He was very lethargic early in shift, frequently falling asleep mid conversation. TACE puncture site WDL. CMS intact. Oxy given x 1 for back pain. Pt up with assist of one and gait belt and walker. Pt with venous stasis ulcers on bilateral lower extremities. Leg wraps re-done and wound care complete. Unruly has multiple scabs all over body and bleeds easily. Primapore placed to left hip and left elbow. This AM around six pt is much more alert, able to eat peanut butter and jelly toast. Drank some water and ginger ale. Plan is to discharge today pending return to baseline function.     Problem: Adult Inpatient Plan of Care  Goal: Plan of Care Review  Outcome: No Change     Problem: Adult Inpatient Plan of Care  Goal: Optimal Comfort and Wellbeing  Outcome: No Change     Problem: Pain Acute  Goal: Acceptable Pain Control and Functional Ability  Outcome: Improving

## 2020-10-02 NOTE — PROGRESS NOTES
Pt remains lethargic throughout recovery. Pt will wake for very short periods of time, is able to follow commands, but then returns to sleep. Have not been able to offer PO fluids/food. Pt's VSS. Right groin site remains CDI. Pt is a mouth breather, oral care completed every 2 hours. Condom cath intact, with gabrielle urine output.  Dr Kingston notified and decision made to admit pt. MD reports he will call pt's spouse to update her.   PPM and ANS notified.

## 2020-10-02 NOTE — PROGRESS NOTES
10/02/20 1000   Quick Adds   Type of Visit Initial PT Evaluation   Living Environment   People in home child(jose), adult;spouse   Current Living Arrangements house   Home Accessibility wheelchair accessible  (stairs to enter, with a ramp)   Transportation Anticipated family or friend will provide   Living Environment Comments Pt's SO reports they are able to provide close to 24/7 assist. If he is alone during the day, he is comfortable in hospital bed in living room and they are usually out of the house for an hour.    Self-Care   Usual Activity Tolerance moderate   Current Activity Tolerance fair   Regular Exercise No   Equipment Currently Used at Home tub bench;raised toilet seat;cane, straight;hospital bed  (FWW )   Activity/Exercise/Self-Care Comment Pt's SO and daughter split time as PCA providers. Pt's SO usually helps more during the day, his daughter helps at night. Home RN comes 3x/week on M/W/F to complete dressing changes for venous stasis ulcers on LEs.    Disability/Function   Hearing Difficulty or Deaf no   Wear Glasses or Blind yes   Walking or Climbing Stairs Difficulty yes  (uses a FWW for ambulation)   Walking or Climbing Stairs ambulation difficulty, requires equipment  (has a ramp/wheelchair to enter home)   Mobility Management Pt transferring without assist at baseline. Sleeps in a hospital bed.    Dressing/Bathing Difficulty yes   Dressing/Bathing bathing difficulty, requires equipment;bathing difficulty, assistance 1 person;dressing difficulty, assistance 1 person   Dressing/Bathing Management Wife assists with getting out of tub, room set-up   Toileting yes   Toileting Management raised toilet seat   Toileting Assistance toileting difficulty, requires equipment   Fall history within last six months yes   Number of times patient has fallen within last six months 2  (SO states falling when sleep walking)   Change in Functional Status Since Onset of Current Illness/Injury no   General  Information   Onset of Illness/Injury or Date of Surgery 10/01/20   Referring Physician Joe Arroyo MD   Patient/Family Therapy Goals Statement (PT) go home now   Pertinent History of Current Problem (include personal factors and/or comorbidities that impact the POC) Pt is a 64 year old male admitted on 10/1/2020. He has a PMH notable for EtOH cirrhosis c/b ascites s/p TIPS, hepatic encephalopathy, multifocal HCC receiving TACE, chronic lymphedema c/b recurrent cellulitis, and DMII. He presents for hospital admission after he underwent TACE to segment 7 lesion on 10/1. He will be admitted for post-procedure care/monitoring overnight due to lethargy. Likely multifactorial with recent procedure/sedating meds contributing. His vital signs were otherwise stable.    Existing Precautions/Restrictions   (no repetitive hip flexion on R side from groin puncture site)   General Observations Activity: Up Ad homar   Cognition   Orientation Status (Cognition) oriented to;person   Follows Commands (Cognition) follows two-step commands   Safety Deficit (Cognition) impulsivity;insight into deficits/self-awareness;judgment   Memory Deficit (Cognition) moderate deficit   Cognitive Status Comments Pt is oriented to self. Disoriented to place-states he is in the [facility]. Able to state month/year corrently but not the date. Confusion with conversation-noted tangential speech.    Pain Assessment   Patient Currently in Pain No   Integumentary/Edema   Integumentary/Edema Comments Pt has chronic edema in BLEs. Currently wrapped with ACE bandages to secure dressings over chronic venous stasis wounds.    Posture    Posture Forward head position;Protracted shoulders  (cervical flexion)   Range of Motion (ROM)   ROM Quick Adds ROM WNL   Strength   Strength Comments Not formally assessed- pt needs increased use of UEs to stand from toilet height.    Transfers   Transfer Safety Comments Sit>stand from EOB with significant use  of UEs.    Gait/Stairs (Locomotion)   [employer] Level (Gait) supervision;set up   Assistive Device (Gait) walker, front-wheeled   Distance in Feet (Required for LE Total Joints) 10   Deviations/Abnormal Patterns (Gait) gait speed decreased;stride length decreased;festinating/shuffling   Comment (Gait/Stairs) Significant thoracic/cervical flexion with standing/ambulation.    Balance   Balance other (describe)   Standing Balance: Static good balance  (stands with/without UE support, no LOB)   Standing Balance: Dynamic fair balance   Balance Comments requires use of FWW for dynamic balance   Balance Quick Add Standing balance: static;Standing balance: dynamic   Clinical Impression   Criteria for Skilled Therapeutic Intervention yes, treatment indicated   PT Diagnosis (PT) impaired funcitonal mobility   Influenced by the following impairments impaired balance, recent falls at home, AMS   Functional limitations due to impairments difficulty with bed mobility, transfers, ambulaiton   Clinical Presentation Stable/Uncomplicated   Clinical Presentation Rationale medical status, level of impairments   Clinical Decision Making (Complexity) low complexity   Therapy Frequency (PT) One time eval and treatment only   Predicted Duration of Therapy Intervention (days/wks) 1 day   Planned Therapy Interventions (PT) transfer training   Risk & Benefits of therapy have been explained evaluation/treatment results reviewed;risks/benefits reviewed;care plan/treatment goals reviewed;current/potential barriers reviewed;participants voiced agreement with care plan;participants included;patient;spouse/significant other   PT Discharge Planning    PT Discharge Recommendation (DC Rec) home with assist;home with home care physical therapy  (home care Occupational therapy, home care lymphedema)   PT Rationale for DC Rec Pt appears to be near baseline level of mobility. Has had two recent falls at home (likely a side effect of taking Benedryl at  night per discussion with RN). Pt does present with mild confusion and disorientation. Family able to provide close to 24/7 assist at home. Recently obtained a wheelchair for improved safety within the home, pt has not been using. Recommend home PT/OT and lymphedema for home safety eval, assist with improvind safety/independence with mobility and ADLs, re-initiate compresison therapy to address chronic lymphedema.    PT Brief overview of current status  Pt use with SBA, needing min A to stand from low toilet chair. Ambulates short distances within the room with FWW and CGA.

## 2020-10-02 NOTE — PROGRESS NOTES
Pts family updated on plan for admission by this writer. Pts spouse and daughter would only like pt to go to a private room d/t concerns of having a roommate during covid. PPM and ANS updated.  Pt removed condom cath.   Repositioning pt every 2 hours.     2050 ANS talking with pts family via telephone  2110 Admitting MD at bedside.     2215 Report given to FERNANDO Correia RN. Pt transported to  via cart with all belongings, including prescriptions from discharge pharmacy (zofran and compazine). Pharmacy was not able to fill Oxycodone because it was too soon since last refill)

## 2020-10-02 NOTE — H&P
[facility]     History and Physical - Hospitalist Service, Gold Night Team       Date of Admission:  10/1/2020    Assessment & Plan   Lawrence Louie is a 64 year old male admitted on 10/1/2020. He has a PMH notable for EtOH cirrhosis c/b ascites s/p TIPS, hepatic encephalopathy, multifocal HCC receiving TACE, chronic lymphedema c/b recurrent cellulitis, and DMII. He presents for hospital admission after he underwent TACE to segment 7 lesion on 10/1. He will be admitted for post-procedure care/monitoring overnight.     Lethargy/somnolence s/p cTACE procedure  - Likely multifactorial with recent procedure/sedating meds contributing, though at this point procedure was ~7 hours ago. He open his eyes to voice and follow commands when prompted. Labs pre-procedure show no significant change from baseline. HE considered but he has no asterixis and is oriented upon waking. His vital signs are otherwise stable.  - Hold sedating medications overnight  - Bladder scan/straight cath if significant retention given his mental status  - CBC and CMP in AM    S/p cTACE to segment 7 lesion  Multifocal HCC  Severe pruiritis  - S/p embolization on 10/1 with IR; CT A/P ordered to evaluate intervention, awaiting reads  - Pain control: IV Dilaudid PRN, PO Oxycodone as needed (pt currently appears comfortable)  - Nausea control: Zofran, Phenergan  - Ursodiol 900 mg BID, holding home Atarax due to sedation    EtOH cirrhosis c/b hepatic encephalopathy  Recurrent ascites s/p TIPS   - Lasix 40 mg BID, Spironolactone 100 mg daily  - Pepcid 40 mg per day  - Miralax and Rifaximin 550 mg BID for hepatic encephalopathy  - Per PCP notes, they are attempting to coordinate a wheelchair for him as he is not doing well with walker at home. Has PCA services to assist with daily cares. Would benefit from PT/OT evaluation prior to discharge      Chronic medical problems:  Chronic lymphedema: Follows in  "ID clinic due to recurrent cellulitis, continue ACE wraps and home wound care   Depression: Desvenlafaxine, Celexa, holding Quetiapine    DMII: Hold Lantus (5U qAM) given mental status, sliding scale insulin with meals for now     Diet: Advance Diet as Tolerated: Clear Liquid Diet    DVT Prophylaxis: Pneumatic Compression Devices  Payne Catheter: not present  Code Status:   Full code - based on prior code status       Disposition Plan   Expected discharge: Tomorrow, recommended to prior living arrangement once mental status at baseline.  Entered: Joe Arroyo MD 10/01/2020, 8:20 PM     The patient's care was discussed with the Bedside Nurse and patient.    Joe Arroyo MD  [facility]   Pager: [phone]  Please see sticky note for cross cover information  ______________________________________________________________________    Chief Complaint   \"My belly hurts    History is obtained from the patient but limited due to mental status.    History of Present Illness   Lawrence Louie is a 64 year old male who is admitted with lethargy after cTACE procedure for known HCC. He has a PMH notable for EtOH cirrhosis c/b ascites s/p TIPS, hepatic encephalopathy, multifocal HCC, chronic lymphedema c/b recurrent cellulitis, and DMII.     He underwent cTACE to his left liver hepatic lesion this evening. Post-procedure, he has issues with somnolence and so was admitted for further management. He tolerated the procedure without difficulty. On my evaluation, Unruly is sleepy but does arouse to voice. He reports that he has lower abdominal pain and discomfort but he isn't sure if it's from \"needing to pee or needing to poop.\" He denies any chest pain or trouble breathing. His pain is OK at this time. Remainder of history is limited due to his fatigue.    Review of Systems    Review of systems not obtained due to patient factors - mental status    Past Medical History    I have " reviewed this patient's medical history and updated it with pertinent information if needed.   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  Past Surgical History:   Procedure Laterality Date     BIOPSY OF SKIN LESION       COLONOSCOPY Left 6/16/2016    Procedure: COMBINED COLONOSCOPY, SINGLE OR MULTIPLE BIOPSY/POLYPECTOMY BY BIOPSY;  Surgeon: Brandy Barnett MD;  Location:  GI     ESOPHAGOSCOPY, GASTROSCOPY, DUODENOSCOPY (EGD), COMBINED  2/13/2013    Procedure: COMBINED ESOPHAGOSCOPY, GASTROSCOPY, DUODENOSCOPY (EGD);;  Surgeon: Tara Cook MD;  Location:  GI     ESOPHAGOSCOPY, GASTROSCOPY, DUODENOSCOPY (EGD), COMBINED  11/4/2013    Procedure: COMBINED ESOPHAGOSCOPY, GASTROSCOPY, DUODENOSCOPY (EGD);;  Surgeon: Lonny Diaz MD;  Location:  GI     ESOPHAGOSCOPY, GASTROSCOPY, DUODENOSCOPY (EGD), COMBINED Left 6/16/2016    Procedure: COMBINED ESOPHAGOSCOPY, GASTROSCOPY, DUODENOSCOPY (EGD), BIOPSY SINGLE OR MULTIPLE;  Surgeon: Brandy Barnett MD;  Location:  GI     EXCISE LESION TRUNK  9/24/2012    Procedure: EXCISE LESION TRUNK;;  Surgeon: Pepe Dominguez MD;  Location: [facility]     GENITOURINARY SURGERY      vasectomy     HERNIORRHAPHY UMBILICAL  9/24/2012    Procedure: HERNIORRHAPHY UMBILICAL;  UMBILICAL HERNIA REPAIR , EXCISION OF PERIUMBILICAL CYST;  Surgeon: Pepe Dominguez MD;  Location: [facility]     IR CHEMO EMBOLIZATION  7/30/2020       Social History   I have reviewed this patient's social history and updated it with  pertinent information if needed.  Social History     Tobacco Use     Smoking status: Current Every Day Smoker     Packs/day: 0.30     Types: Cigarettes     Smokeless tobacco: Never Used     Tobacco comment: about 4 cigarettes per day   Substance Use Topics     Alcohol use: No     Alcohol/week: 0.0 standard drinks     Comment: 2-3 per day , none since Dec 2012     Drug use: No       Family History   I have reviewed this patient's family history and updated it with pertinent information if needed.  Family History   Problem Relation Age of Onset     Breast Cancer Mother      Liver Cancer Mother      Cardiovascular Father      Cerebrovascular Disease Father         very low blood pressure     Cancer Father         rectal cancer     Cardiovascular Paternal Grandfather      Diabetes Brother      Cancer Sister         skin cancer     C.A.D. Other         MI, 70's     Breast Cancer Sister      Thyroid Disease No family hx of      Lipids No family hx of      Anesthesia Reaction No family hx of        Prior to Admission Medications   Prior to Admission Medications   Prescriptions Last Dose Informant Patient Reported? Taking?   Cyanocobalamin (VITAMIN B-12 PO) Unknown at Unknown time Spouse/Significant Other Yes No   Sig: Take 1 tablet by mouth daily   EQL VITAMIN D3 50 MCG (2000 UT) CAPS Unknown at Unknown time Spouse/Significant Other No No   Sig: Take 1 capsule by mouth daily   OYSTER SHELL CALCIUM/D 500-200 MG-UNIT per tablet 9/30/2020 at Unknown time Spouse/Significant Other No Yes   Sig: Take 1 tablet by mouth daily   QUEtiapine (SEROQUEL) 50 MG tablet 9/30/2020 at Unknown time Spouse/Significant Other Yes Yes   Sig: Take 50 mg by mouth At Bedtime   UNABLE TO FIND not taking Spouse/Significant Other Yes No   Sig: MEDICATION NAME: [place] root   blood glucose (NO BRAND SPECIFIED) lancets standard  Spouse/Significant Other No No   Sig: Use to test blood sugar 4 X  times daily or as directed.   blood glucose (NO BRAND  SPECIFIED) test strip  Spouse/Significant Other No No   Sig: Use to test blood sugar 4 times daily or as directed.   blood glucose (NO BRAND SPECIFIED) test strip  Spouse/Significant Other No No   Sig: Use to test blood sugars 4 X  times daily or as directed   blood glucose monitoring (NO BRAND SPECIFIED) meter device kit  Spouse/Significant Other No No   Sig: Use to test blood sugar 4 X  times daily or as directed.   blood glucose monitoring (NO BRAND SPECIFIED) meter device kit  Spouse/Significant Other No No   Sig: Use to test blood sugar 4 times daily or as directed. Before meals and snack at HS.   childrens multivitamin w/ionr (FLINTSTONES COMPLETE) 60 MG chewable tablet 2020 at Unknown time Spouse/Significant Other Yes Yes   Sig: Take 1 chew tab by mouth daily   ciprofloxacin (CIPRO) 500 MG tablet not taking Spouse/Significant Other Yes No   citalopram (CELEXA) 20 MG tablet 10/1/2020 at Unknown time Spouse/Significant Other No Yes   Sig: Take 1 tablet (20 mg) by mouth daily   desvenlafaxine (PRISTIQ) 100 MG 24 hr tablet 10/1/2020 at Unknown time Spouse/Significant Other No Yes   Sig: Take 2 tablets (200 mg) by mouth daily   famotidine (PEPCID) 40 MG tablet 10/1/2020 at Unknown time Spouse/Significant Other No Yes   Sig: Take 1 tablet (40 mg) by mouth daily   furosemide (LASIX) 40 MG tablet 2020 at Unknown time Spouse/Significant Other No Yes   Sig: Take 40 mg twice daily.   gabapentin (NEURONTIN) 300 MG capsule not taking Spouse/Significant Other No No   Sig: Take 1 capsule (300 mg) by mouth At Bedtime   hydrOXYzine (ATARAX) 25 MG tablet 2020 at Unknown time Spouse/Significant Other No Yes   Sig: Take 1 tablet (25 mg) by mouth 3 times daily as needed for itching   insulin aspart (NOVOLOG FLEXPEN) 100 UNIT/ML pen 10/1/2020 at Unknown time Spouse/Significant Other No Yes   Si unit per 10 grams of carbohydrates + 1 per 50>140. Max daily dose 100 units.   insulin glargine (LANTUS SOLOSTAR) 100  UNIT/ML pen 10/1/2020 at Unknown time Spouse/Significant Other No Yes   Si units daily in am. Increase by 5 units 2x weekly until fasting sugars are <130. Max daily dose 100 units.   insulin pen needle (B-D U/F) 31G X 8 MM miscellaneous  Spouse/Significant Other No No   Sig: USE  6 times daily / OR AS DIRECTED   insulin pen needle (ULTICARE SHORT) 31G X 8 MM miscellaneous  Spouse/Significant Other No No   Sig: Use UP to 6 times daily or as directed   lactulose (CEPHULAC) 20 GM packet Past Month at Unknown time Spouse/Significant Other No Yes   Sig: Take 1 packet (20 g) by mouth 2 times daily   lactulose encephalopathy (CHRONULAC) 10 GM/15ML SOLUTION Past Month at Unknown time Spouse/Significant Other No Yes   Sig: TAKE 30 MLS BY MOUTH 2 TIMES DAILY  TITRATE AS NEEDED TO ACHIEVE 3-5 BOWEL MOVEMENTS DAILY.   magnesium oxide (MAG-OX) 400 (241.3 Mg) MG tablet 2020 at Unknown time Spouse/Significant Other No Yes   Sig: Take 1 tablet (400 mg) by mouth daily   nystatin (MYCOSTATIN) 817273 UNIT/GM external cream 2020 at Unknown time Spouse/Significant Other No Yes   Sig: Apply topically 2 times daily   ondansetron (ZOFRAN) 4 MG tablet Past Week at Unknown time Spouse/Significant Other No Yes   Sig: Take 1 tablet (4 mg) by mouth every 8 hours as needed for nausea   order for DME  Spouse/Significant Other No No   Sig: Equipment being ordered:bilateral pneumatic leg massage for treatment of extreme bilateral lower leg edema.   order for DME  Spouse/Significant Other No No   Sig: Equipment being ordered:BioTab compression pants pneumatic system   order for DME  Spouse/Significant Other No No   Sig: Equipment being ordered: Condom catheter   order for DME  Spouse/Significant Other No No   Sig: Equipment being ordered:Orthopedic shoes   oxyCODONE (ROXICODONE) 5 MG tablet 10/1/2020 at Unknown time Spouse/Significant Other No Yes   Sig: TAKE 1-2 TABLETS BY MOUTH EVERY 8 HOURS AS NNEDED FOR SEVERE PAIN   rifaximin  (XIFAXAN) 550 MG TABS tablet 9/30/2020 at Unknown time Spouse/Significant Other No Yes   Sig: Take 1 tablet (550 mg) by mouth 2 times daily   spironolactone (ALDACTONE) 50 MG tablet 9/30/2020 at Unknown time Spouse/Significant Other No Yes   Sig: Take 2 tablets (100 mg) by mouth daily   sulfamethoxazole-trimethoprim (BACTRIM DS) 800-160 MG tablet not taking Spouse/Significant Other No No   Sig: Take 2 tablets by mouth 2 times daily   ursodiol (ACTIGALL) 300 MG capsule 9/30/2020 at Unknown time Spouse/Significant Other No Yes   Sig: Take 3 capsules (900 mg) by mouth 2 times daily      Facility-Administered Medications: None     Allergies   No Known Allergies    Physical Exam   Vital Signs: Temp: 97.9  F (36.6  C) Temp src: Oral BP: 113/57 Pulse: 81   Resp: 16 SpO2: 95 % O2 Device: Oxi Plus Oxygen Delivery: 1 LPM  Weight: 234 lbs 0 oz    General Appearance: appears older than stated age, elderly male lying in bed  Eyes: pupils pinpoint and reactive to light bilaterally, no scleral icterus  HEENT: MMM, oropharynx benign  Respiratory: breathing comfortably on room air, no respiratory distress, lungs are clear to auscultation bilaterally.  Cardiovascular: Harsh holosystolic murmur best heard over RUSB. Radial pulses 2+ and symmetric.  GI: soft, non-distended, non-tender to palpation, BS+  Genitourinary: scrotal edema present  Musculoskeletal: severe lymphedema present in the lower extremities. Scattered clean based ulcers appreciated with venous stasis ulcers, ACE wraps in place.  Neurologic: sleepy but arouses to voice easily. Oriented to self, place, time. Follows commands. No asterixis present    Data   Data reviewed today: I reviewed all medications, new labs and imaging results over the last 24 hours. I personally reviewed no images or EKG's today.      Most Recent 3 CBC's:  Recent Labs   Lab Test 10/01/20  1034 09/11/20  1343 07/30/20  1517   WBC 2.5* 2.5* 3.0*   HGB 7.6* 7.9* 7.5*   MCV 89 90 92   PLT 72* 94* 85*      Most Recent 3 BMP's:  Recent Labs   Lab Test 10/01/20  1034 09/11/20  1343 03/12/20  1238    137 133   POTASSIUM 3.8 3.8 3.7   CHLORIDE 102 105 101   CO2 26 26 26   BUN 23 20 17   CR 0.92 0.91 0.91   ANIONGAP 7 6 7   ERIN 8.4* 8.6 8.1*   * 132* 116*     Most Recent 2 LFT's:  Recent Labs   Lab Test 10/01/20  1034 09/11/20  1343   AST 36 30   ALT 23 20   ALKPHOS 207* 197*   BILITOTAL 0.7 0.7

## 2020-10-02 NOTE — PROGRESS NOTES
"IR PROGRESS NOTE     Pt seen at bedside. He is now fully awake. Denies new pain, but endorses stable ongoing right flank soreness. Eager for discharge.    /48 (BP Location: Left arm)   Pulse 74   Temp 98  F (36.7  C) (Oral)   Resp 16   Ht 1.829 m (6')   Wt 106.1 kg (234 lb)   SpO2 95%   BMI 31.74 kg/m      Groin CDI  B/l LE lymphedema unchanged    CT a/p:     \"1. Interval TACE post procedure changes in segment 7 with good coverage exceeding the margins of the previously visualized segment 7 lesion. Persistent lipiodol distribution within hepatic segment 6 from previous hepatic segment 6 lesion ablation.    2. Hepatic segment 4A lesion measures approximately 4.6 cm in length, questionably increased in size compared to prior MRI measured 4.3 cm when taking into account difference in modality. Medial left liver lobe lesion seen on prior MRI is poorly conspicuous on the noncontrast exam.\"    The quoted segment represents a radiology RESIDENT PRELIM, and NOT the final read. I have personally viewed the images, and agree with the findings given above.    A/P:  OK for discharge today from IR perspective    Plan to repeat cTACE in segment 4 in 4-6 weeks likely    More detailed procedural plan in my dictation from yesterday    Lonny Kingston MD    "

## 2020-10-02 NOTE — UTILIZATION REVIEW
"Admission Status; Secondary Review Determination     Admission Date: 10/1/2020  9:40 AM       Under the authority of the Utilization Management Committee, the utilization review process indicated a secondary review on the above patient.  The review outcome is based on review of the medical records, discussions with staff, and applying clinical experience noted on the date of the review.        ()      Inpatient Status Appropriate - This patient's medical care is consistent with medical management for inpatient care and reasonable inpatient medical practice.      () Observation Status Appropriate - This patient does not meet hospital inpatient criteria and is placed in observation status. If this patient's primary payer is Medicare and was admitted as an inpatient, Condition Code 44 should be used and patient status changed to \"observation\".   () Admission Status NOT Appropriate - This patient's medical care is not consistent with medical management for Inpatient or Observation Status.        (x) Outpatient Procedure Status Appropriate - Procedure not on Medicare Inpatient list and no complications at the time of this review       RATIONALE FOR DETERMINATION      Brief clinical presentation, information copied from the chart, abbreviated and edited for relevant content:         Lawrence Louie is a 64 year old male admitted on 10/1/2020. He has a PMH notable for EtOH cirrhosis c/b ascites s/p TIPS, hepatic encephalopathy, multifocal HCC receiving TACE, chronic lymphedema c/b recurrent cellulitis, and DMII. He presents for hospital admission after he underwent TACE to segment 7 lesion on 10/1. He will be admitted for post-procedure care/monitoring overnight due to lethargy. Likely multifactorial with recent procedure/sedating meds contributing. His vital signs were otherwise stable. Admitted yesterday, much improved this am and discharging. Spoke with physician who changed the order to OP.          In summary, the " severity of illness, intensity of service provided, expected length of stay and risk for adverse outcome make the care complex, high risk and appropriate for hospital admission.        The information on this document is developed by the utilization review team in order for the business office to ensure compliance.  This only denotes the appropriateness of proper admission status and does not reflect the quality of care rendered.         The definitions of Inpatient Status and Observation Status used in making the determination above are those provided in the CMS Coverage Manual, Chapter 1 and Chapter 6, section 70.4.      Sincerely,      Palma Martines MD   Utilization Review/ Case Management  [facility].

## 2020-10-03 NOTE — DISCHARGE SUMMARY
[facility]   Hospitalist Discharge Summary      Date of Admission:  10/1/2020  Date of Discharge:  10/2/2020  2:20 PM  Discharging Provider: Mi Pleitez MD  Discharge Team: Hospitalist Service, Gold 8    Discharge Diagnoses   Somnolence s/p cTACE procedure  Multifocal HCC  EtOH cirrhosis c/b hepatic encephalopathy  Recurrent ascites s/p TIPS   Chronic Lymphedema  DMII  Depression     Follow-ups Needed After Discharge   Follow-up Appointments     Adult [facility] Follow-up and recommended labs and tests      Follow up with primary care provider, Ary Murphy, within 7 days   for hospital follow- up.  No follow up labs or test are needed.    You will have another TACE procedure in 4-6 weeks with Interventional   Radiology.             Appointments on [place] and/or [facility] (with [facility] or [facility]   provider or service). Call [phone] if you haven't heard regarding   these appointments within 7 days of discharge.             Unresulted Labs Ordered in the Past 30 Days of this Admission     No orders found for last 31 day(s).          Discharge Disposition   Discharged to home  Condition at discharge: Stable    Hospital Course   Lawrence Louie is a 64 year old male admitted on 10/1/2020. He has a PMH notable for EtOH cirrhosis c/b ascites s/p TIPS, hepatic encephalopathy, multifocal HCC receiving TACE, chronic lymphedema c/b recurrent cellulitis, and DMII. He presents for hospital admission after he underwent TACE to segment 7 lesion on 10/1. He was admitted for post-procedure care/monitoring overnight.     Lethargy/somnolence s/p cTACE procedure  - Likely due to sedating medications during procedure.back to baseline after overnight monitoring.  -Sedating medications held overnight; can be resumed on discharge    S/p cTACE to segment 7 lesion  Multifocal HCC  Severe pruiritis  - S/p embolization on 10/1 with IR  -Resume home oral oxycodone as  needed  - Nausea control: Zofran, Phenergan  - Ursodiol 900 mg BID; can resume home Atarax on discharge    EtOH cirrhosis c/b hepatic encephalopathy  Recurrent ascites s/p TIPS   - Lasix 40 mg BID, Spironolactone 100 mg daily  - Pepcid 40 mg per day  - Miralax and Rifaximin 550 mg BID for hepatic encephalopathy  - Per PCP notes, they are attempting to coordinate a wheelchair for him as he is not doing well with walker at home. Has PCA services to assist with daily cares.  Had PT evaluation during admission, and home care referral was placed for home PT,, OT, home nursing.    Chronic medical problems:  Chronic lymphedema: Follows in ID clinic due to recurrent cellulitis, continue ACE wraps and home wound care   Depression: Desvenlafaxine, Celexa, holding Quetiapine    DMII: Hold Lantus (5U qAM) given mental status, sliding scale insulin with meals for now    Consultations This Hospital Stay   PHYSICAL THERAPY ADULT IP CONSULT    Code Status   Prior    Time Spent on this Encounter   I, Mi Pleitez MD, personally saw the patient today and spent less than or equal to 30 minutes discharging this patient.       Mi Pleitez MD  [facility] [address] [facility] [address] [address] [address]  Phone: [phone]  Fax: [phone]  ______________________________________________________________________    Physical Exam   Vital Signs: Temp: 98.4  F (36.9  C) Temp src: Oral BP: 122/55 Pulse: 75   Resp: 16 SpO2: 100 % O2 Device: None (Room air) Oxygen Delivery: 1 LPM  Weight: 234 lbs 0 oz  General Appearance: Eating up in bed this morning ordering breakfast.  Chronically ill-appearing and in no distress.  Respiratory: Breathing is comfortable on room air.  Lungs are clear to auscultation bilaterally.  Cardiovascular: Regular rate and rhythm.  No murmurs rubs or gallops.  GI: Abdomen is distended but soft.  Patient expresses mild tenderness with palpation diffusely.  Skin: No  jaundice  Extremities: Bilateral leg lymphedema which is wrapped       Primary Care Physician   Ary Murphy    Discharge Orders      Physical Therapy Referral      Occupational Therapy Referral      Home care nursing referral      Reason for your hospital stay    Dear Lawrence,    You came to the hospital for your TACE (transarterial chemoembolization) procedure. The procedure went well, but afterward you were very sleepy from the sedation medications required. We monitored you in the hospital overnight to be safe until you woke up. Today you are back to your usual self, and all of your labs and vital signs are normal and stable. You are safe to go home and continue to follow with your regular care team.     It was a pleasure taking care of you during your hospital stay. Stay well and take care!    Mi Pleitez MD  Hospitalist, [facility]     Adult [facility] Follow-up and recommended labs and tests    Follow up with primary care provider, Ary Murphy, within 7 days for hospital follow- up.  No follow up labs or test are needed.    You will have another TACE procedure in 4-6 weeks with Interventional Radiology.             Appointments on [place] and/or [facility] (with [facility] or [facility] provider or service). Call [phone] if you haven't heard regarding these appointments within 7 days of discharge.     Activity    Your activity upon discharge: activity as tolerated     MD face to face encounter    Documentation of Face to Face and Certification for Home Health Services    I certify that patient: Lawrence Louie is under my care and that I, or a nurse practitioner or physician's assistant working with me, had a face-to-face encounter that meets the physician face-to-face encounter requirements with this patient on: 10/2/2020.    This encounter with the patient was in whole, or in part, for the following medical condition, which is the primary reason for home health care:  hepatocellular carcinoma.    I certify that, based on my findings, the following services are medically necessary home health services: Nursing, Occupational Therapy and Physical Therapy.    My clinical findings support the need for the above services because: Nurse is needed: To provide assessment and oversight required in the home to assure adherence to the medical plan due to: wound care MWF. Occupational Therapy Services are needed to assess and treat ADL safety and Physical Therapy Services are needed to assess and treat the following functional impairments: decreased baseline mobility, IADL's.    Further, I certify that my clinical findings support that this patient is homebound (i.e. absences from home require considerable and taxing effort and are for medical reasons or [employer] services or infrequently or of short duration when for other reasons) because: Leaving home is medically contraindicated for the following reason(s): Infection risk / immunocompromised state where it is safer for them to receive services in the home.    Based on the above findings. I certify that this patient is confined to the home and needs intermittent skilled nursing care, physical therapy and/or speech therapy.  The patient is under my care, and I have initiated the establishment of the plan of care.  This patient will be followed by a physician who will periodically review the plan of care.  Physician/Provider to provide follow up care: Ary Murphy    Attending hospital physician (the Medicare certified [place] provider): Mi Pleitez MD  Physician Signature: See electronic signature associated with these discharge orders.  Date: 10/2/2020     Diet    Follow this diet upon discharge: Regular       Significant Results and Procedures   Most Recent 3 CBC's:  Recent Labs   Lab Test 10/01/20  1034 09/11/20  1343 07/30/20  1517   WBC 2.5* 2.5* 3.0*   HGB 7.6* 7.9* 7.5*   MCV 89 90 92   PLT 72* 94* 85*     Most Recent 3  BMP's:  Recent Labs   Lab Test 10/01/20  1034 09/11/20  1343 03/12/20  1238    137 133   POTASSIUM 3.8 3.8 3.7   CHLORIDE 102 105 101   CO2 26 26 26   BUN 23 20 17   CR 0.92 0.91 0.91   ANIONGAP 7 6 7   ERIN 8.4* 8.6 8.1*   * 132* 116*     Most Recent 2 LFT's:  Recent Labs   Lab Test 10/01/20  1034 09/11/20  1343   AST 36 30   ALT 23 20   ALKPHOS 207* 197*   BILITOTAL 0.7 0.7     Most Recent 3 INR's:  Recent Labs   Lab Test 10/01/20  1034 09/11/20  1343 07/30/20  1517   INR 1.24* 1.26* 1.42*   ,   Results for orders placed or performed during the hospital encounter of 10/01/20   IR Chemo Embolization    Narrative    Procedures:   1. Ultrasound guidance for vascular access.  2. Celiac artery catheterization and angiography.   3. 3rd order catheterization and angiography of the proper hepatic  artery.  4. 4th order catheterization and angiography of the right hepatic  artery.  5. 4th order catheterization and angiography of the left hepatic  artery.  6. 4th order catheterization and angiography of the middle hepatic  artery.  7. 5th order catheterization and angiography of the right superior  division hepatic artery.  8. 6th order catheterization and angiography of the 8th segmental  hepatic artery.  9. 6th order catheterization and angiography of the 7th segmental  hepatic artery.  10. Classic transarterial chemoembolization of the 7th segmental  hepatic artery with intra-arterial lidocaine administration.  11. Right hepatic artery angiogram after completion.    Clinical indication: Hepatocellular carcinoma    Comparison studies: Angiography 7/30/2020, MRI 9/11/2020    PROCEDURE:        Staff Radiologist: Yady Hilton MD    Fellow: Lonny Kingston MD    Consent: verbal and written informed consent obtained prior to  procedure.    Procedure details: Patient placed in supine position. Right groin  prepped and draped in standard sterile fashion. Using fluoroscopic and  ultrasound guidance,  micro-puncture access was made to the right  common femoral artery. An image was saved documenting the needle tip  within the patent vessel. This was ultimately exchanged for a 5 Persian  vascular sheath. Through this a C2 glide catheter was advanced to the  abdominal aorta and used to select the celiac artery. Celiac  angiography was performed, confirming position of the catheter,  patency of the vessel, and demonstrating celiac branching anatomy. The  catheter was advanced over a wire into the proper hepatic artery.  Angiography was performed, demonstrating patent proper, right, left,  and middle hepatic arteries, no arterial enhancement of the treated  segment six lesion and arterial enhancing masses in segments four and  seven. A 2.4 Persian drakon catheter was advanced over a fathom wire to  the right hepatic artery. Angiography was performed, which  demonstrated enhancement of the segment seven lesion and apparent  enhancement of the segment four lesion, however it is unclear if this  is related to reflux of contrast. Of note, the segment seven mass  appears significantly enlarged from comparison imaging, to the point  that it is now possibly larger than the segment four lesion, although  this cannot be stated definitively due to differences in position  relative to the x-ray source. The catheter was pulled back and using a  double angled Glidewire used to select the left hepatic artery.  Angiography was performed, demonstrating branches feeding segments two  and three. No tumor blush. The catheter was pulled back and with a  double angled Glidewire was used to select the middle hepatic artery.  Spasm was demonstrated in this artery, however there was no tumoral  blush. The catheter was readvanced into the right hepatic artery and  into the superior divisional artery. Angiography was performed,  demonstrating tumoral blush only in the segment seven mass. The  catheter was advanced into the segment eight artery,  and angiography  was performed. No tumoral blush. The catheter was pulled back and  advanced into the segment seven artery. Angiography was performed.  This demonstrated good coverage of the tumor. From this location, we  delivered the entire 50:50 mixture of chemotherapy (50 mg doxorubicin  and 10 mg mutamycin) and lipiodol. There was exquisite staining of the  tumor and intratumoral portal veins. 3 mg of 1% lidocaine were  administered intra-arterially through the catheter at the same  position. We then delivered approximately one quarter of one vial of  300 to 500 um embospheres. The microcatheter was removed. Angiography  was repeated through the base catheter in the right hepatic artery,  demonstrating complete angiographic stasis in the target vessel.  Access devices were removed. Arteriotomy closure was performed with a  5 Chinese Mynx device. A sterile dressing was applied. A  small-to-moderate hematoma was noted at the arteriotomy site, and this  is most likely attributable to intractable patient motion throughout  the entire exam. Pressure was held manually until the groin was soft.  The patient was transferred in stable condition without immediate  complication.     Medications: Dilaudid 1.4 mg IV, fentanyl 50 mcg IV, midazolam 2.0 mg  IV, 1% lidocaine for local anesthesia.     Monitoring: The patient was placed on continuous monitoring. Vital  signs and sedation monitored by nursing staff under my supervision.  The patient remained stable throughout the procedure.    Sedation time: Total provider face-to-face time was 143 minutes    Fluoroscopy time: 32.6 minutes    Complications: None.      Impression    IMPRESSION:   1. Angiography demonstrates significant interval growth of the segment  seven tumor, to the point that it now appears larger than the segment  four tumor.  2. The decision was therefore made to treat this tumor, and this was  performed with successful cTACE. Intra-arterial  lidocaine  administration was also performed.    PLAN:  Patient will return at a time to be determined based on recovery from  this procedure for additional treatment of the segment four lesion.  Because the patient is completely unable to follow instructions with  any amount of sedation on board, the plan at that time will be to  administer no sedation medicine until access is obtained, and an  intra-arterial CTA is performed from the proper hepatic artery in  order to guide embolization of the segment four tumor. We may  additionally hold sedation until nearly ready to deliver chemotherapy.  We will plan on intra-arterial lidocaine administration. At the time  of this planned procedure, groin closure with an Angio-Seal device may  be warranted, so long as no future short-term intra-arterial treatment  is planned.     CT Abdomen w/o Contrast    Narrative    EXAMINATION: CT ABDOMEN W/O CONTRAST, 10/1/2020 6:23 PM    TECHNIQUE:  Helical CT images from the lung bases through the lower  abdomen were obtained with contrast.  Coronal reformatted images were  generated at a workstation for further assessment.    COMPARISON: MR abdomen 9/11/2020, CT 7/31/2020, fluoroscopy 10/1/2020    HISTORY: HCC s/p segment 7 cTACE    FINDINGS:    Abdomen and pelvis:   Liver: Interval posttreatment changes of hepatic segment 7 lesion,  which measures up to 5.0 cm, previously 4.2 cm. Lipiodol with good  distribution, staining the hepatic segment 7 lesion seen on prior MRI  and the surrounding parenchyma. Persistent lipiodol staining in the  region of hepatic segment 6 lesion corresponding to previous  chemoembolization. Post procedural changes TIPS. Unable to evaluate  TIPS patency due to the lack of intravenous contrast. Lesion along the  subcapsular anterior hepatic segment 4 which previously demonstrated  arterial enhancement and restricted diffusion on prior CT and MRI  imaging measuring approximately 4.6 cm in subscapular length  compared  to 4.3 cm on previous MRI (series 5, image 77) although full  characterization is limited on this noncontrast exam. Additional  arterially enhancing lesion in the medial left hepatic lobe seen on  prior MRIs is poorly conspicuous on this noncontrast exam.  Gallbladder: Layering hyperdensity within the gallbladder near the  gallbladder neck. No gallbladder wall thickening or pericholecystic  fluid. No gallbladder wall thickening. No intrahepatic or extrahepatic  biliary ductal dilatation.  Spleen: Splenomegaly. The spleen measures 18.8 cm in greatest  craniocaudal dimension.  Pancreas: Mild diffuse fatty atrophy of the pancreas.  Adrenal glands: No adrenal nodules.  Kidneys: Unchanged nonobstructing 3 mm renal stone within the  interpolar region of the left kidney (series 5, image 192). No  hydronephrosis or renal mass.   Bowel: Moderate colonic stool burden. No abnormal bowel thickening. No  abnormally dilated loops of large small bowel.   Lymph nodes: No retroperitoneal or mesenteric lymphadenopathy.  Fluid: Small volume peritoneal/mesenteric edema.  Vessels: No infrarenal aortic aneurysm. Scattered atherosclerotic  calcifications of the abdominal aorta. Multiple subcutaneous  collateral vessels. Multiple gastric varices. Decreased abdominal  subcutaneous edema.    Lung bases: Bilateral pleural effusion with small associated  consolidative opacities. Lingular opacities are unchanged from prior  CT and likely represents scarring.    Bones and soft tissues: Degenerative changes of the spine. Lytic and  sclerotic lesion with intervertebral disc height loss involving the  vertebral body of L1 is unchanged. Left anterolateral rib fractures  involving ribs 9 and 10. Chronic-appearing posterolateral left seventh  rib fracture. Chronic bilateral lumbar transverse process fractures.  Bilateral gynecomastia.      Impression    IMPRESSION:     1. Interval TACE post procedure changes in segment 7 with good  coverage  of the previously visualized segment 7 lesion. Persistent  lipiodol staining within the previously chemoembolized hepatic segment  6 lesion.    2. Hepatic segment 4 lesion measures approximately 4.6 cm,  questionably increased in size compared to prior MRI when it measured  4.3 cm when taking into account difference in modality. Medial left  liver lobe lesion seen on prior MRI is is not well visualized on this  noncontrast exam.    3. Cirrhotic configuration of liver with TIPS in place. Splenomegaly.    4. Lytic and sclerotic changes associated with an L1 compression  deformity, unchanged. Chronic left-sided rib fractures.    5. Small bilateral pleural effusions with adjacent atelectasis.    I have personally reviewed the examination and initial interpretation  and I agree with the findings.    ROSEANN SIMONS, DO     *Note: Due to a large number of results and/or encounters for the requested time period, some results have not been displayed. A complete set of results can be found in Results Review.       Discharge Medications   Discharge Medication List as of 10/2/2020 11:47 AM      START taking these medications    Details   prochlorperazine (COMPAZINE) 10 MG tablet Take 1 tablet (10 mg) by mouth every 6 hours as needed for nausea or vomiting (take if zofran does not relieve nausea), Disp-20 tablet, R-0, Local Print         CONTINUE these medications which have CHANGED    Details   ondansetron (ZOFRAN) 4 MG tablet Take 1 tablet (4 mg) by mouth every 8 hours as needed for nausea, Disp-20 tablet, R-0, Local Print         CONTINUE these medications which have NOT CHANGED    Details   blood glucose (NO BRAND SPECIFIED) lancets standard Use to test blood sugar 4 X  times daily or as directed.Disp-1 Box, [id number]      !! blood glucose (NO BRAND SPECIFIED) test strip Use to test blood sugars 4 X  times daily or as directed, Disp-400 strip, R-3, E-Prescribe      !! blood glucose (NO BRAND SPECIFIED) test strip Use to  test blood sugar 4 times daily or as directed., Disp-200 strip, R-3, E-Prescribe      !! blood glucose monitoring (NO BRAND SPECIFIED) meter device kit Use to test blood sugar 4 times daily or as directed. Before meals and snack at HS.Disp-1 kit, [id number]      !! blood glucose monitoring (NO BRAND SPECIFIED) meter device kit Use to test blood sugar 4 X  times daily or as directed.Disp-1 kit, [id number]      childrens multivitamin w/ionr (FLINTSTONES COMPLETE) 60 MG chewable tablet Take 1 chew tab by mouth daily, Historical      citalopram (CELEXA) 20 MG tablet Take 1 tablet (20 mg) by mouth daily, Disp-90 tablet, R-1, E-Prescribe      Cyanocobalamin (VITAMIN B-12 PO) Take 1 tablet by mouth daily, Historical      desvenlafaxine (PRISTIQ) 100 MG 24 hr tablet Take 2 tablets (200 mg) by mouth daily, Disp-180 tablet, R-1, E-Prescribe      EQL VITAMIN D3 50 MCG (2000 UT) CAPS Take 1 capsule by mouth daily, Disp-90 capsule, R-2, E-Prescribe      famotidine (PEPCID) 40 MG tablet Take 1 tablet (40 mg) by mouth daily, Disp-30 tablet, R-11, E-Prescribe      furosemide (LASIX) 40 MG tablet Take 40 mg twice daily., Disp-180 tablet, R-3, E-Prescribe      gabapentin (NEURONTIN) 300 MG capsule Take 1 capsule (300 mg) by mouth At Bedtime, Disp-90 capsule, R-1, E-Prescribe      hydrOXYzine (ATARAX) 25 MG tablet Take 1 tablet (25 mg) by mouth 3 times daily as needed for itching, Disp-90 tablet, R-3, E-Prescribe      insulin aspart (NOVOLOG FLEXPEN) 100 UNIT/ML pen 1 unit per 10 grams of carbohydrates + 1 per 50>140. Max daily dose 100 units., Disp-30 mL, R-11, E-Prescribe      insulin glargine (LANTUS SOLOSTAR) 100 UNIT/ML pen 5 units daily in am. Increase by 5 units 2x weekly until fasting sugars are <130. Max daily dose 100 units., Disp-15 mL, R-11, E-PrescribeIf Lantus is not covered by insurance, may substitute Basaglar at same dose and frequency.        !! insulin pen needle (B-D U/F) 31G X 8 MM miscellaneous USE  6  times daily / OR AS DIRECTEDDisp-300 each, [id number]      !! insulin pen needle (ULTICARE SHORT) 31G X 8 MM miscellaneous Use UP to 6 times daily or as directedDisp-300 each, [id number]      lactulose (CEPHULAC) 20 GM packet Take 1 packet (20 g) by mouth 2 times daily, Disp-60 packet, R-0, E-Prescribe      lactulose encephalopathy (CHRONULAC) 10 GM/15ML SOLUTION TAKE 30 MLS BY MOUTH 2 TIMES DAILY  TITRATE AS NEEDED TO ACHIEVE 3-5 BOWEL MOVEMENTS DAILY., Disp-1892 mL, R-11, E-Prescribe      magnesium oxide (MAG-OX) 400 (241.3 Mg) MG tablet Take 1 tablet (400 mg) by mouth daily, Disp-90 tablet, R-1, E-Prescribe      nystatin (MYCOSTATIN) 012727 UNIT/GM external cream Apply topically 2 times dailyDisp-30 g, [id number]      !! order for DME Equipment being ordered:Orthopedic shoesDisp-2 Units, R-0, Local Print      !! order for DME Equipment being ordered: Condom catheterDisp-1 Device, R-3, Local Print      !! order for DME Equipment being ordered:BioTab compression pants pneumatic systemDisp-1 Piece, R-0, Local Print      !! order for DME Equipment being ordered:bilateral pneumatic leg massage for treatment of extreme bilateral lower leg edema.Disp-2 pump, R-0, Local Print      oxyCODONE (ROXICODONE) 5 MG tablet TAKE 1-2 TABLETS BY MOUTH EVERY 8 HOURS AS NNEDED FOR SEVERE PAIN, Disp-180 tablet, R-0, E-Prescribe      OYSTER SHELL CALCIUM/D 500-200 MG-UNIT per tablet Take 1 tablet by mouth daily, Disp-90 tablet, R-3, COLLIN, E-Prescribe      polyethylene glycol (MIRALAX) 17 g packet Take 1 packet by mouth, Historical      QUEtiapine (SEROQUEL) 50 MG tablet Take 50 mg by mouth At Bedtime, Historical      rifaximin (XIFAXAN) 550 MG TABS tablet Take 1 tablet (550 mg) by mouth 2 times daily, Disp-60 tablet, R-11, E-Prescribe      spironolactone (ALDACTONE) 50 MG tablet Take 2 tablets (100 mg) by mouth daily, Disp-180 tablet, R-3, E-Prescribe      sulfamethoxazole-trimethoprim (BACTRIM DS) 800-160 MG tablet Take 2  tablets by mouth 2 times daily, Disp-56 tablet, R-0, E-Prescribe      UNABLE TO FIND MEDICATION NAME: Seble root, Historical      ursodiol (ACTIGALL) 300 MG capsule Take 3 capsules (900 mg) by mouth 2 times daily, Disp-180 capsule, R-11, E-Prescribe       !! - Potential duplicate medications found. Please discuss with provider.      STOP taking these medications       ciprofloxacin (CIPRO) 500 MG tablet Comments:   Reason for Stopping:             Allergies   No Known Allergies

## 2020-10-03 NOTE — PLAN OF CARE
Physical Therapy Discharge Summary    Reason for therapy discharge:    Discharged to home with home therapy.    Progress towards therapy goal(s). See goals on Care Plan in [facility] electronic health record for goal details.  Goals not met.  Barriers to achieving goals:   discharge on same date as initial evaluation.    Therapy recommendation(s):    Continued therapy is recommended.  Rationale/Recommendations:  Pt appears to be near baseline level of mobility. Has had two recent falls at home (likely a side effect of taking Benedryl at night per discussion with RN). Pt does present with mild confusion and disorientation. Family able to provide close to 24/7 assist at home. Recently obtained a wheelchair for improved safety within the home, pt has not been using. Recommend home PT/OT and lymphedema for home safety eval, assist with improvind safety/independence with mobility and ADLs, re-initiate compresison therapy to address chronic lymphedema. .

## 2020-10-05 ENCOUNTER — TELEPHONE (OUTPATIENT)
Dept: INTERNAL MEDICINE | Facility: CLINIC | Age: 65
End: 2020-10-05

## 2020-10-05 NOTE — TELEPHONE ENCOUNTER
Central Prior Authorization Team   [phone]    PA Initiation    Medication: hydrOXYzine (ATARAX) 25 MG tablet  Insurance Company: [employer] - Phone [phone] Fax [phone]  Pharmacy Filling the Rx: [address]  Filling Pharmacy Phone: [phone]  Filling Pharmacy Fax: [phone]  Start Date: 10/5/2020

## 2020-10-05 NOTE — TELEPHONE ENCOUNTER
Prior Authorization Approval            Authorization Effective Date: 9/5/2020  Authorization Expiration Date: 10/5/2021  Medication: hydrOXYzine (ATARAX) 25 MG tablet-PA APPROVED   Approved Dose/Quantity:   Reference #: CASE # [id number]   Insurance Company: [employer] - Phone [phone] Fax [phone]  Expected CoPay:       CoPay Card Available:      Foundation Assistance Needed:    Which Pharmacy is filling the prescription (Not needed for infusion/clinic administered): [facility] [address] [place] [address]  Pharmacy Notified: Yes- **Instructed pharmacy to notify patient when script is ready to /ship.**  Patient Notified: Yes

## 2020-10-06 ENCOUNTER — TELEPHONE (OUTPATIENT)
Dept: VASCULAR SURGERY | Facility: CLINIC | Age: 65
End: 2020-10-06

## 2020-10-06 ENCOUNTER — TELEPHONE (OUTPATIENT)
Dept: INTERNAL MEDICINE | Facility: CLINIC | Age: 65
End: 2020-10-06

## 2020-10-06 DIAGNOSIS — L29.9 PRURITUS: Primary | ICD-10-CM

## 2020-10-06 NOTE — TELEPHONE ENCOUNTER
Called wife to fup on pt s/p TACE with Dr. Hilton.    Wife states that he had a fever up until yesterday morning    She states that he's doing well day by day.    Abdominal pain? She states that he's been keeping up with pain meds.     N/V: she states that she did give him anti nausea medications.       Wife states that he had a much better experience this time, and very happy as pt did better with this treatment.    She's very happy and wanted to make sure that the team knew    She states that pt is experiencing HE at times and that they are giving him the lactulose and they are also alternating it with Miralax as well.    She states that they are keeping an eye on him closely as well.    We talked about his follow up as well in which they are aware.     Wife states that pt is waiting to get his cataract surgery done as well.   She would like for me to inquire if pt can get his cataract surgery done. Also to keep in mind that they will need 6 weeks afterwards.     I informed her that I will inquire on this request and get back to them.    She agrees to plan.     Sunshine TUCKER RN, BSN  Interventional Radiology/Vascular  Nurse Coordinator   Phone: [phone]  Fax: [phone]

## 2020-10-14 NOTE — TELEPHONE ENCOUNTER
Reason For Call:        Chief Complaint   Patient presents with     Refill Request                 Medication Name, Dose and Monthly Quantity:     Roxicodone 5 mg     Diagnosis requiring opiates:        Problem List Updated:   Yes     Opioid Agreement On File - [facility] PAIN CONTRACT ID# [id number]:       Last Urine Drug Screen (at least once every 12 months) Date:     Unexpected Results:        [facility] Data Reviewed (at least once every 3 months) Date:   10/14/2020     Unexpected Results:         Last Fill Date:   09/16/2020    Next Fill Date:   10/16/2020     Last Visit with PCP:        Future Visits with PCP:      Processing:           CHRISTINA SOL CMA at 10:13 AM on 10/14/2020.

## 2020-10-21 NOTE — PROGRESS NOTES
[facility]      OUTPATIENT PHYSICAL THERAPY EVALUATION  PLAN OF TREATMENT FOR OUTPATIENT REHABILITATION  (COMPLETE FOR INITIAL CLAIMS ONLY)  Patient's Last Name, First Name, M.I.  YOB: 1955  Lawrence Louie                        Provider's Name  [facility] Medical Record No.  [id number]                               Onset Date:  10/01/20   Start of Care Date:  10/02/20      Type:     _X_PT   ___OT   ___SLP Medical Diagnosis:  lethargy/somnolence                        PT Diagnosis:  impaired funcitonal mobility   Visits from SOC:  1   _________________________________________________________________________________  Plan of Treatment/Functional Goals    Planned Interventions: transfer training     Goals: See Physical Therapy Goals on Care Plan in [employer] electronic health record.    Therapy Frequency: One time eval and treatment only  Predicted Duration of Therapy Intervention: 1 day  _________________________________________________________________________________    I CERTIFY THE NEED FOR THESE SERVICES FURNISHED UNDER        THIS PLAN OF TREATMENT AND WHILE UNDER MY CARE     (Physician co-signature of this document indicates review and certification of the therapy plan).                Certification date from: 10/02/20, Certification date to: 10/02/20    Referring Physician: Joe Arroyo MD            Initial Assessment        See Physical Therapy evaluation dated 10/02/20 in Epic electronic health record.

## 2020-10-23 NOTE — PROGRESS NOTES
Patient's wife and daughter have scheduled a follow up with Yovany Lopez PA-C on Wednesday 10/28 as they have further questions and concerns for her. Discussed that the plan had been to follow up next with Dr. Simmons, and her next available is 11/13.   Addendum: Called to review plan and discuss questions. Raven states that patient was taken to hospital Friday morning after a fall with head injury. He is currently in the ICU at [facility]. Review of Care Everywhere notes he discharged home on 10/25 with wife and daughter. Also of note he tested positive for Covid 19.

## 2020-10-27 NOTE — PROGRESS NOTES
"Patient was discharged over the weekend after a fall at home, and subsequent small head bleed. He is doing well at home, no lasting effects of the injury, other than a \"black eye\" from the fall. He had tested positive for covid while inpatient, but has no symptoms at home. His wife reports being very surprised at this, as his only notable symptom was diarrhea he had for about a week prior to hospitalization. The family is looking forward to speaking with Yovany Lopez PA-C tomorrow and have no further concerns at this time.   "

## 2020-10-28 NOTE — PROGRESS NOTES
"Lawrence Louie is a 64 year old male who is being evaluated via a billable video visit.      The patient has been notified of following:     \"This video visit will be conducted via a call between you and your physician/provider. We have found that certain health care needs can be provided without the need for an in-person physical exam.  This service lets us provide the care you need with a video conversation.  If a prescription is necessary we can send it directly to your pharmacy.  If lab work is needed we can place an order for that and you can then stop by our lab to have the test done at a later time.    Video visits are billed at different rates depending on your insurance coverage.  Please reach out to your insurance provider with any questions.    If during the course of the call the physician/provider feels a video visit is not appropriate, you will not be charged for this service.\"    Patient has given verbal consent for Video visit? Yes  How would you like to obtain your AVS? Mail a copy  If you are dropped from the video visit, the video invite should be resent to: Send to e-mail at: [email or web address]  Will anyone else be joining your video visit? No        Video-Visit Details    Type of service:  Video Visit    Video Start Time:2:02 pm  Video End Time: 2:57 pm   Originating Location (pt. Location): Home    Distant Location (provider location):  [facility] [place]     Platform used for Video Visit: Akosua Lopez PA-C      Hepatology Follow-up Clinic note  Lawrence Louie   Date of Birth 1955  Date of Service 10/28/2020    Reason for follow-up: ETOH Cirrhosis / HCC          Assessment/plan:   Lawrence Louie is a 64 year old male with ETOH cirrhosis complicated by history of ascites s/p TIPS, now resolved, hepatic encephalopathy, portal hypertension and multi-focal HCC s/p TACE on 7/20/2020 and 10/1/2020 which one remaining lesion. " Recent AFP had actually trended up (80.2 to 82.3 on 9/11/2020)    His functional status has improved over the past year, but still needs to be optimized. Concerns about his overall frailty given history of failures/injuries. Variceal screening not needed with patent TIPS. His liver function has remained stable.     # History of hepatic encephalopathy  - Continue lactulose - dose to 3-5 bowel movements a day.  - Continue Rifaximin twice daily     # History of anasarca  - Continue 100 mg spironolactone   - Continue 40 mg furosemide   - High protein diet, 2000 mg sodium diet     # Multifocal HCC,   - Continue follow up with IR as recommended in November     # Continue home OT/PT     # Follow up with Dr. Simmons in 3 months     Yovany Lopez PA-C   [facility] Hepat[facility] clinic    -----------------------------------------------------       HPI:   Lawrence Louie is a 64 year old male presenting for follow-up. Daughter and his wife provide a majority of history.     Cirrhosis  - ETOH and SMITH  Complicated by:  Ascites s/p TIPS on April 2018  Hx of HE   EGD: 6/16/2016  HCC: 6/26/2020 - See below      HCC:   Diagnosed: 6/26/2020     CT ABDOMEN:   Lesion 1: inferior right hepatic lobe segment 6 is not well marginated, approximately 4.2 cm  Lesion 2: 4.1 cm area of arterial enhancement in segment 7  Lesion 3: Exophytic arterial enhancing nodule in segment 4A measuring 3.8 cm   AFP: 80.2 on 7/30/20  AFP: 82.3 on 9/11/20    Treatment:   - s/p TACE on 7/30/2020   - s/p cTACE on 10/1/2020     Patient was last seen by me on 8/12/2020.   He was recently in the ER this past week at [facility] after a fall at home at which time he was found to have a subarachnoid hemorrhage. Tested positive on COVID. Labs at discharge were Sodium 141, Cr 0.64 Tbili 0.61 INR 1.3    Did have some fevers after procedure. Had Octo 18 - 22nd, which have since improved. They wonder if these were COVID symptoms.     They are checking  his O2 sats at home and they have been stable at 95. Was having a lot of leg cramps. They are giving him magnesium supplements.     Currently taking 100 mg spironolactone, 40 mg lasix/. Attempted an extra doses of 40 mg lasix and it was not tolerated. No significant edema in his legs right now.     Appetite is okay, but he is forcing himself to eat. Weigh ranges from 234 - 244 Typically having a lot of itching, taking Ursodiol and hydroxyzine. Confusion waxes and wanes, taking lactulose and Rifaximin regularly.     Patient denies jaundice, abdominal distension .  Patient also denies melena, hematochezia or hematemesis. Patient denies weight loss, fevers, sweats or chills.    No recent alcohol intake.     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)      Splenomegaly      Squamous cell carcinoma      Thrombocytopenia (H)      Varices, esophageal (H)     Past Surgical History:   Procedure Laterality Date     BIOPSY OF SKIN LESION       COLONOSCOPY Left 6/16/2016    Procedure: COMBINED COLONOSCOPY, SINGLE OR MULTIPLE BIOPSY/POLYPECTOMY BY BIOPSY;  Surgeon: Brandy Barnett MD;  Location:  GI     ESOPHAGOSCOPY, GASTROSCOPY, DUODENOSCOPY (EGD), COMBINED  2/13/2013    Procedure: COMBINED ESOPHAGOSCOPY, GASTROSCOPY, DUODENOSCOPY (EGD);;  Surgeon: Tara Cook MD;  Location:  GI     ESOPHAGOSCOPY, GASTROSCOPY, DUODENOSCOPY (EGD), COMBINED  11/4/2013    Procedure: COMBINED ESOPHAGOSCOPY, GASTROSCOPY, DUODENOSCOPY (EGD);;  Surgeon: Lonny Diaz MD;  Location:  GI     ESOPHAGOSCOPY, GASTROSCOPY, DUODENOSCOPY (EGD), COMBINED Left 6/16/2016    Procedure: COMBINED ESOPHAGOSCOPY, GASTROSCOPY, DUODENOSCOPY (EGD), BIOPSY SINGLE OR MULTIPLE;  Surgeon: Brandy Barnett  MD Astrid;  Location: [facility]     EXCISE LESION TRUNK  9/24/2012    Procedure: EXCISE LESION TRUNK;;  Surgeon: Pepe Dominguez MD;  Location: [facility]     GENITOURINARY SURGERY      vasectomy     HERNIORRHAPHY UMBILICAL  9/24/2012    Procedure: HERNIORRHAPHY UMBILICAL;  UMBILICAL HERNIA REPAIR , EXCISION OF PERIUMBILICAL CYST;  Surgeon: Pepe Dominguez MD;  Location: [facility]     IR CHEMO EMBOLIZATION  7/30/2020     IR CHEMO EMBOLIZATION  10/1/2020                 Medications:     Current Outpatient Medications   Medication     blood glucose (NO BRAND SPECIFIED) lancets standard     blood glucose (NO BRAND SPECIFIED) test strip     blood glucose (NO BRAND SPECIFIED) test strip     blood glucose monitoring (NO BRAND SPECIFIED) meter device kit     blood glucose monitoring (NO BRAND SPECIFIED) meter device kit     citalopram (CELEXA) 20 MG tablet     COMPOUNDED NON-CONTROLLED SUBSTANCE (CMPD RX) - PHARMACY TO MIX COMPOUNDED MEDICATION     Cyanocobalamin (VITAMIN B-12 PO)     desvenlafaxine (PRISTIQ) 100 MG 24 hr tablet     EQL VITAMIN D3 50 MCG (2000 UT) CAPS     famotidine (PEPCID) 40 MG tablet     furosemide (LASIX) 40 MG tablet     gabapentin (NEURONTIN) 300 MG capsule     hydrOXYzine (ATARAX) 25 MG tablet     insulin aspart (NOVOLOG FLEXPEN) 100 UNIT/ML pen     insulin glargine (LANTUS SOLOSTAR) 100 UNIT/ML pen     insulin pen needle (B-D U/F) 31G X 8 MM miscellaneous     insulin pen needle (ULTICARE SHORT) 31G X 8 MM miscellaneous     lactulose (CEPHULAC) 20 GM packet     lactulose encephalopathy (CHRONULAC) 10 GM/15ML SOLUTION     magnesium oxide (MAG-OX) 400 (241.3 Mg) MG tablet     Nutritional Supplements (ENSURE) LIQD     nystatin (MYCOSTATIN) 267107 UNIT/GM external cream     ondansetron (ZOFRAN) 4 MG tablet     order for DME     order for DME     order for DME     order for DME     oxyCODONE (ROXICODONE) 5 MG tablet     OYSTER SHELL CALCIUM/D 500-200 MG-UNIT per tablet     polyethylene glycol (MIRALAX) 17 g  packet     prochlorperazine (COMPAZINE) 10 MG tablet     QUEtiapine (SEROQUEL) 50 MG tablet     rifaximin (XIFAXAN) 550 MG TABS tablet     spironolactone (ALDACTONE) 50 MG tablet     ursodiol (ACTIGALL) 300 MG capsule     childrens multivitamin w/ionr (FLINTSTONES COMPLETE) 60 MG chewable tablet     sulfamethoxazole-trimethoprim (BACTRIM DS) 800-160 MG tablet     UNABLE TO FIND     No current facility-administered medications for this visit.             Allergies:   No Known Allergies         Review of Systems:   10 points ROS was obtained and highlighted in the HPI, otherwise negative.          Physical Exam:     GENERAL:  alert, no distress, appears chronically ill and older stand stated age,   EYES: Eyes grossly normal to inspection, conjunctivae and sclerae normal  RESP: no audible wheeze, cough, or visible cyanosis.  No visible retractions or increased work of breathing.  Able to speak fully in complete sentences  NEURO: Cranial nerves grossly intact, mentation intact and speech normal  Musculoskeletal - Kyphotic   PSYCH: mentation appears normal, affect normal, judgement and insight intact, normal speech and appearance well-groomed, delayed response            Data:   Reviewed in person and significant for:    Lab Results   Component Value Date     10/01/2020      Lab Results   Component Value Date    POTASSIUM 3.8 10/01/2020     Lab Results   Component Value Date    CHLORIDE 102 10/01/2020     Lab Results   Component Value Date    CO2 26 10/01/2020     Lab Results   Component Value Date    BUN 23 10/01/2020     Lab Results   Component Value Date    CR 0.92 10/01/2020       Lab Results   Component Value Date    WBC 2.5 10/01/2020     Lab Results   Component Value Date    HGB 7.6 10/01/2020     Lab Results   Component Value Date    HCT 24.6 10/01/2020     Lab Results   Component Value Date    MCV 89 10/01/2020     Lab Results   Component Value Date    PLT 72 10/01/2020       Lab Results   Component Value  Date    AST 36 10/01/2020     Lab Results   Component Value Date    ALT 23 10/01/2020     Lab Results   Component Value Date    BILICONJ 0.0 03/12/2014      Lab Results   Component Value Date    BILITOTAL 0.7 10/01/2020       Lab Results   Component Value Date    ALBUMIN 2.3 10/01/2020     Lab Results   Component Value Date    PROTTOTAL 6.8 10/01/2020      Lab Results   Component Value Date    ALKPHOS 207 10/01/2020       Lab Results   Component Value Date    INR 1.24 10/01/2020       CT ABDOMEN WO CONTRAST:   10/1/2020:   IMPRESSION:      1. Interval TACE post procedure changes in segment 7 with good  coverage of the previously visualized segment 7 lesion. Persistent  lipiodol staining within the previously chemoembolized hepatic segment  6 lesion.     2. Hepatic segment 4 lesion measures approximately 4.6 cm,  questionably increased in size compared to prior MRI when it measured  4.3 cm when taking into account difference in modality. Medial left  liver lobe lesion seen on prior MRI is is not well visualized on this  noncontrast exam.     3. Cirrhotic configuration of liver with TIPS in place. Splenomegaly.     4. Lytic and sclerotic changes associated with an L1 compression  deformity, unchanged. Chronic left-sided rib fractures.     5. Small bilateral pleural effusions with adjacent atelectasis.

## 2020-10-28 NOTE — LETTER
"    10/28/2020         RE: Lawrence Louie  [address]  [place] [address]        Dear Colleague,    Thank you for referring your patient, Lawrence Louie, to the [facility] [place]. Please see a copy of my visit note below.    Lawrence Louie is a 64 year old male who is being evaluated via a billable video visit.      The patient has been notified of following:     \"This video visit will be conducted via a call between you and your physician/provider. We have found that certain health care needs can be provided without the need for an in-person physical exam.  This service lets us provide the care you need with a video conversation.  If a prescription is necessary we can send it directly to your pharmacy.  If lab work is needed we can place an order for that and you can then stop by our lab to have the test done at a later time.    Video visits are billed at different rates depending on your insurance coverage.  Please reach out to your insurance provider with any questions.    If during the course of the call the physician/provider feels a video visit is not appropriate, you will not be charged for this service.\"    Patient has given verbal consent for Video visit? Yes  How would you like to obtain your AVS? Mail a copy  If you are dropped from the video visit, the video invite should be resent to: Send to e-mail at: [email or web address]  Will anyone else be joining your video visit? No        Video-Visit Details    Type of service:  Video Visit    Video Start Time:2:02 pm  Video End Time: 2:57 pm   Originating Location (pt. Location): Home    Distant Location (provider location):  [facility] [place]     Platform used for Video Visit: Akosua Lopez PA-C      Hepatology Follow-up Clinic note  Lawrence Louie   Date of Birth 1955  Date of Service 10/28/2020    Reason for follow-up: ETOH Cirrhosis / HCC "          Assessment/plan:   Lawrence Louie is a 64 year old male with ETOH cirrhosis complicated by history of ascites s/p TIPS, now resolved, hepatic encephalopathy, portal hypertension and multi-focal HCC s/p TACE on 7/20/2020 and 10/1/2020 which one remaining lesion. Recent AFP had actually trended up (80.2 to 82.3 on 9/11/2020)    His functional status has improved over the past year, but still needs to be optimized. Concerns about his overall frailty given history of failures/injuries. Variceal screening not needed with patent TIPS. His liver function has remained stable.     # History of hepatic encephalopathy  - Continue lactulose - dose to 3-5 bowel movements a day.  - Continue Rifaximin twice daily     # History of anasarca  - Continue 100 mg spironolactone   - Continue 40 mg furosemide   - High protein diet, 2000 mg sodium diet     # Multifocal HCC,   - Continue follow up with IR as recommended in November     # Continue home OT/PT     # Follow up with Dr. Simmons in 3 months     Yoavny Lopez PA-C   [facility] Hepat[facility] clinic    -----------------------------------------------------       HPI:   Lawrence Louie is a 64 year old male presenting for follow-up. Daughter and his wife provide a majority of history.     Cirrhosis  - ETOH and SMITH  Complicated by:  Ascites s/p TIPS on April 2018  Hx of HE   EGD: 6/16/2016  HCC: 6/26/2020 - See below      HCC:   Diagnosed: 6/26/2020     CT ABDOMEN:   Lesion 1: inferior right hepatic lobe segment 6 is not well marginated, approximately 4.2 cm  Lesion 2: 4.1 cm area of arterial enhancement in segment 7  Lesion 3: Exophytic arterial enhancing nodule in segment 4A measuring 3.8 cm   AFP: 80.2 on 7/30/20  AFP: 82.3 on 9/11/20    Treatment:   - s/p TACE on 7/30/2020   - s/p cTACE on 10/1/2020     Patient was last seen by me on 8/12/2020.   He was recently in the ER this past week at [facility] after a fall at home at which time he was found  to have a subarachnoid hemorrhage. Tested positive on COVID. Labs at discharge were Sodium 141, Cr 0.64 Tbili 0.61 INR 1.3    Did have some fevers after procedure. Had Octo 18 - 22nd, which have since improved. They wonder if these were COVID symptoms.     They are checking his O2 sats at home and they have been stable at 95. Was having a lot of leg cramps. They are giving him magnesium supplements.     Currently taking 100 mg spironolactone, 40 mg lasix/. Attempted an extra doses of 40 mg lasix and it was not tolerated. No significant edema in his legs right now.     Appetite is okay, but he is forcing himself to eat. Weigh ranges from 234 - 244 Typically having a lot of itching, taking Ursodiol and hydroxyzine. Confusion waxes and wanes, taking lactulose and Rifaximin regularly.     Patient denies jaundice, abdominal distension .  Patient also denies melena, hematochezia or hematemesis. Patient denies weight loss, fevers, sweats or chills.    No recent alcohol intake.     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)      Splenomegaly      Squamous cell carcinoma      Thrombocytopenia (H)      Varices, esophageal (H)     Past Surgical History:   Procedure Laterality Date     BIOPSY OF SKIN LESION       COLONOSCOPY Left 6/16/2016    Procedure: COMBINED COLONOSCOPY, SINGLE OR MULTIPLE BIOPSY/POLYPECTOMY BY BIOPSY;  Surgeon: Brandy Barnett MD;  Location:  GI     ESOPHAGOSCOPY, GASTROSCOPY, DUODENOSCOPY (EGD), COMBINED  2/13/2013    Procedure: COMBINED ESOPHAGOSCOPY, GASTROSCOPY, DUODENOSCOPY (EGD);;  Surgeon: Tara Cook MD;  Location:  GI     ESOPHAGOSCOPY, GASTROSCOPY, DUODENOSCOPY (EGD), COMBINED  11/4/2013    Procedure: COMBINED ESOPHAGOSCOPY, GASTROSCOPY,  DUODENOSCOPY (EGD);;  Surgeon: Lonny Diaz MD;  Location:  GI     ESOPHAGOSCOPY, GASTROSCOPY, DUODENOSCOPY (EGD), COMBINED Left 6/16/2016    Procedure: COMBINED ESOPHAGOSCOPY, GASTROSCOPY, DUODENOSCOPY (EGD), BIOPSY SINGLE OR MULTIPLE;  Surgeon: Brandy Barnett MD;  Location:  GI     EXCISE LESION TRUNK  9/24/2012    Procedure: EXCISE LESION TRUNK;;  Surgeon: Pepe Dominguez MD;  Location: [facility]     GENITOURINARY SURGERY      vasectomy     HERNIORRHAPHY UMBILICAL  9/24/2012    Procedure: HERNIORRHAPHY UMBILICAL;  UMBILICAL HERNIA REPAIR , EXCISION OF PERIUMBILICAL CYST;  Surgeon: Pepe Dominguez MD;  Location: [facility]     IR CHEMO EMBOLIZATION  7/30/2020     IR CHEMO EMBOLIZATION  10/1/2020                 Medications:     Current Outpatient Medications   Medication     blood glucose (NO BRAND SPECIFIED) lancets standard     blood glucose (NO BRAND SPECIFIED) test strip     blood glucose (NO BRAND SPECIFIED) test strip     blood glucose monitoring (NO BRAND SPECIFIED) meter device kit     blood glucose monitoring (NO BRAND SPECIFIED) meter device kit     citalopram (CELEXA) 20 MG tablet     COMPOUNDED NON-CONTROLLED SUBSTANCE (CMPD RX) - PHARMACY TO MIX COMPOUNDED MEDICATION     Cyanocobalamin (VITAMIN B-12 PO)     desvenlafaxine (PRISTIQ) 100 MG 24 hr tablet     EQL VITAMIN D3 50 MCG (2000 UT) CAPS     famotidine (PEPCID) 40 MG tablet     furosemide (LASIX) 40 MG tablet     gabapentin (NEURONTIN) 300 MG capsule     hydrOXYzine (ATARAX) 25 MG tablet     insulin aspart (NOVOLOG FLEXPEN) 100 UNIT/ML pen     insulin glargine (LANTUS SOLOSTAR) 100 UNIT/ML pen     insulin pen needle (B-D U/F) 31G X 8 MM miscellaneous     insulin pen needle (ULTICARE SHORT) 31G X 8 MM miscellaneous     lactulose (CEPHULAC) 20 GM packet     lactulose encephalopathy (CHRONULAC) 10 GM/15ML SOLUTION     magnesium oxide (MAG-OX) 400 (241.3 Mg) MG tablet     Nutritional Supplements (ENSURE) LIQD     nystatin (MYCOSTATIN)  [id number] UNIT/GM external cream     ondansetron (ZOFRAN) 4 MG tablet     order for DME     order for DME     order for DME     order for DME     oxyCODONE (ROXICODONE) 5 MG tablet     OYSTER SHELL CALCIUM/D 500-200 MG-UNIT per tablet     polyethylene glycol (MIRALAX) 17 g packet     prochlorperazine (COMPAZINE) 10 MG tablet     QUEtiapine (SEROQUEL) 50 MG tablet     rifaximin (XIFAXAN) 550 MG TABS tablet     spironolactone (ALDACTONE) 50 MG tablet     ursodiol (ACTIGALL) 300 MG capsule     childrens multivitamin w/ionr (FLINTSTONES COMPLETE) 60 MG chewable tablet     sulfamethoxazole-trimethoprim (BACTRIM DS) 800-160 MG tablet     UNABLE TO FIND     No current facility-administered medications for this visit.             Allergies:   No Known Allergies         Review of Systems:   10 points ROS was obtained and highlighted in the HPI, otherwise negative.          Physical Exam:     GENERAL:  alert, no distress, appears chronically ill and older stand stated age,   EYES: Eyes grossly normal to inspection, conjunctivae and sclerae normal  RESP: no audible wheeze, cough, or visible cyanosis.  No visible retractions or increased work of breathing.  Able to speak fully in complete sentences  NEURO: Cranial nerves grossly intact, mentation intact and speech normal  Musculoskeletal - Kyphotic   PSYCH: mentation appears normal, affect normal, judgement and insight intact, normal speech and appearance well-groomed, delayed response            Data:   Reviewed in person and significant for:    Lab Results   Component Value Date     10/01/2020      Lab Results   Component Value Date    POTASSIUM 3.8 10/01/2020     Lab Results   Component Value Date    CHLORIDE 102 10/01/2020     Lab Results   Component Value Date    CO2 26 10/01/2020     Lab Results   Component Value Date    BUN 23 10/01/2020     Lab Results   Component Value Date    CR 0.92 10/01/2020       Lab Results   Component Value Date    WBC 2.5 10/01/2020     Lab  Results   Component Value Date    HGB 7.6 10/01/2020     Lab Results   Component Value Date    HCT 24.6 10/01/2020     Lab Results   Component Value Date    MCV 89 10/01/2020     Lab Results   Component Value Date    PLT 72 10/01/2020       Lab Results   Component Value Date    AST 36 10/01/2020     Lab Results   Component Value Date    ALT 23 10/01/2020     Lab Results   Component Value Date    BILICONJ 0.0 03/12/2014      Lab Results   Component Value Date    BILITOTAL 0.7 10/01/2020       Lab Results   Component Value Date    ALBUMIN 2.3 10/01/2020     Lab Results   Component Value Date    PROTTOTAL 6.8 10/01/2020      Lab Results   Component Value Date    ALKPHOS 207 10/01/2020       Lab Results   Component Value Date    INR 1.24 10/01/2020       CT ABDOMEN WO CONTRAST:   10/1/2020:   IMPRESSION:      1. Interval TACE post procedure changes in segment 7 with good  coverage of the previously visualized segment 7 lesion. Persistent  lipiodol staining within the previously chemoembolized hepatic segment  6 lesion.     2. Hepatic segment 4 lesion measures approximately 4.6 cm,  questionably increased in size compared to prior MRI when it measured  4.3 cm when taking into account difference in modality. Medial left  liver lobe lesion seen on prior MRI is is not well visualized on this  noncontrast exam.     3. Cirrhotic configuration of liver with TIPS in place. Splenomegaly.     4. Lytic and sclerotic changes associated with an L1 compression  deformity, unchanged. Chronic left-sided rib fractures.     5. Small bilateral pleural effusions with adjacent atelectasis.       Again, thank you for allowing me to participate in the care of your patient.        Sincerely,        Yovany Lopez PA-C

## 2020-10-30 NOTE — TELEPHONE ENCOUNTER
[facility] Call Center    Phone Message    May a detailed message be left on voicemail: yes     Reason for Call: Other: Orders- skilled nurse 2x for 1 week, 3 x for 3 weeks, wound care and monitoring of the corona virus symptoms.      Action Taken: Message routed to:  [facility] P[facility]    Travel Screening: Not Applicable

## 2020-11-02 NOTE — TELEPHONE ENCOUNTER
Verbal orders given to [facility] from [facility], per Ary Murphy/Dr. Trevizo, for Orders- skilled nurse 2x for 1 week, 3 x for 3 weeks, wound care and monitoring of the robertson virus symptoms. Magdalena Mott LPN 11/2/2020 8:34 AM

## 2020-11-03 NOTE — TELEPHONE ENCOUNTER
Central Prior Authorization Team   Phone: [phone]      PA Initiation    Medication: Xifaxan  Insurance Company: [employer] - Phone [phone] Fax [phone]  Pharmacy Filling the Rx: [address]  Filling Pharmacy Phone: [phone]  Filling Pharmacy Fax:    Start Date: 11/3/2020

## 2020-11-03 NOTE — NURSING NOTE
Chief Complaint   Patient presents with     Hospital F/U     Pt had positive COVID test and hospital follow up     Kimberly Nissen, EMT at 1:52 PM on 11/3/2020    Video Visit Technology for this patient: No video technology available to patient, please call patient over the phone

## 2020-11-03 NOTE — TELEPHONE ENCOUNTER
Prior Authorization Specialty Medication Request    Medication/Dose: Xifaxan  ICD code (if different than what is on RX):  K72.90   Previously Tried and Failed:  Lactulose alone    Rationale: Xifaxan helps to lower the toxin build up in the blood while lactulose works by cleansing the toxin build up in the liver. Adjunctive therapy.  http://onlinelibrary.pineda.com/doi/10.1111/radha.84204/full       Insurance Name:     Insurance ID:     Insurance Phone Number:       Pharmacy Information (if different than what is on RX)  Name:      Phone:

## 2020-11-03 NOTE — PROGRESS NOTES
"Lawrence Louie is a 64 year old male who is being evaluated via a billable telephone visit.      The patient has been notified of following:     \"This telephone visit will be conducted via a call between you and your physician/provider. We have found that certain health care needs can be provided without the need for a physical exam.  This service lets us provide the care you need with a short phone conversation.  If a prescription is necessary we can send it directly to your pharmacy.  If lab work is needed we can place an order for that and you can then stop by our lab to have the test done at a later time.    Telephone visits are billed at different rates depending on your insurance coverage. During this emergency period, for some insurers they may be billed the same as an in-person visit.  Please reach out to your insurance provider with any questions.    If during the course of the call the physician/provider feels a telephone visit is not appropriate, you will not be charged for this service.\"    Patient has given verbal consent for Telephone visit?  Yes    What phone number would you like to be contacted at? [phone]    How would you like to obtain your AVS? MyChart    Subjective     Lawrence Louie is a 64 year old male who presents via phone visit today for the following health issues:    HPI   Hospitalized at [facility] 10/23/20 and [id number]/25/20.      Hospital Follow-up Visit:    Hospital/Nursing Home/IP Rehab Facility: [facility]  Date of Admission: 10/23/20  Date of Discharge: 10/25/20  Reason(s) for Admission: Traumatic brain injury. Feet were bound in bed sheets when he attempted to rise from bed and fell onto his shoulder and face.  States he thinks he lost consciousness for a few moments.  He was found to have a left SAH along with sylvian fissure and quadrigeminal plate cistern w/o mass effect.  He was admitted  To ICU and was deemed neurologically stable and moved to the " floor the following day and discharged the next day.  No acute skeletal trauma.    He had low platelets and was given 1 unit of platelets in the ER and 1 unit of PRBC in the ICU.  Hgb was 8 at discharge.   He was found to be Covid positive while in the hospital.  No know positive exposures.  He was in  of [facility] for a procedure on 10/2/20.      Was your hospitalization related to COVID-19? No   Problems taking medications regularly:  None  Medication changes since discharge: None  Problems adhering to non-medication therapy:  None    Summary of hospitalization:  See outside records, reviewed and scanned  Diagnostic Tests/Treatments reviewed.  Follow up needed: none  Other Healthcare Providers Involved in Patient s Care:         daughter and ex-wife.   Update since discharge: improved.       Post Discharge Medication Reconciliation: discharge medications reconciled, continue medications without change.  Plan of care communicated with patient and family                Review of Systems   Constitutional, HEENT, cardiovascular, pulmonary, gi and gu systems are negative, except as otherwise noted.   Pain is 5-6 in right abdomen and back.   Wife states he is very depressed.   Still has itching, they were never called regarding the lidocaine cream from the compounding pharmacy.    He is scheduled to see oncology and have an MRI next week.     Vitals:  Temperature 98.8 O2 sat 97 when up in chair, drops to 93 when laying down.  General: cooperative  PSYCH: Alert and oriented times 3; coherent speech, normal   rate and volume. His affect is normal  RESP: No cough, no audible wheezing, able to talk in full sentences  Remainder of exam unable to be completed due to telephone visits        Assessment/Plan:  Traumatic Brain injury after fall and head contusion.  Anemia: improved after platelet and RBC transfusion  Covid positive test result on 10/23/20. Asymptomatic.      Phone call duration:  46 minutes and 29  seconds.    Ary RAY, CNP

## 2020-11-03 NOTE — TELEPHONE ENCOUNTER
Prior Authorization Approval        Authorization Effective Date: 10/4/2020  Authorization Expiration Date: 11/3/2021  Medication: rifaximin (XIFAXAN) 550 MG TABS   Approved Dose/Quantity:  Reference #: [id number]   Insurance Company: [employer] - Phone [phone] Fax [phone]  Which Pharmacy is filling the prescription (Not needed for infusion/clinic administered): [facility] [address] [place] [address]  Pharmacy Notified: Yes - via voicemail  Patient Notified:

## 2020-11-04 NOTE — LETTER
"11/4/2020       RE: Lawrence Louie  [address] [place] [address]     Dear Colleague,    Thank you for referring your patient, Lawrence Louie, to the [facility] DISE[facility]IN[facility] [place] at [facility]. Please see a copy of my visit note below.    Lawrence Louie is a 64 year old male who is being evaluated via a billable video visit.      The patient has been notified of following:     \"This video visit will be conducted via a call between you and your physician/provider. We have found that certain health care needs can be provided without the need for an in-person physical exam.  This service lets us provide the care you need with a video conversation.  If a prescription is necessary we can send it directly to your pharmacy.  If lab work is needed we can place an order for that and you can then stop by our lab to have the test done at a later time.    Video visits are billed at different rates depending on your insurance coverage.  Please reach out to your insurance provider with any questions.    If during the course of the call the physician/provider feels a video visit is not appropriate, you will not be charged for this service.\"    Patient has given verbal consent for Video visit? Yes  How would you like to obtain your AVS? MyChart    Will anyone else be joining your video visit? No        Video-Visit Details    Type of service:  Video Visit    Video Start Time: 4.35 pm  Video End Time: 5.08 pm    Moderate risk and complexity     Originating Location (pt. Location): Home    Distant Location (provider location):  [facility] INFECTIOUS DISEASE [facility]     Platform used for Video Visit: Akosua Bird MD     [facility]  Infectious Disease Clinic Note:  Follow up      Patient:  Lawrence Louie, Date of birth 1955, Medical record number [id number]  Date of Visit:  " 11/04/2020  Consult requested by patient himself.         Assessment and Recommendations:     Recommendations:  - check CBC, CMP, blood cx x 2   - encouraged to increase fluid intake   - hopefully tomorrow the home care nurse can draw blood tests and give NS 1 L fluid  - will fax orders to [facility] [phone] and if they can come tomorrow rather than Friday for this  - if fever increases to 101 or he gets worse, to go to ER    Return visit 3 months    Assessment:  Patient is a 64-year-old gentleman with history of lower extremity lymphedema, recurrent cellulitis, MSSA bacteremia, ESLD, DM 2.  Complaints of recurrent cellulitis complicated by bacteremia.    Fever, lethargy, high blood sugar levels since yesterday: suspect an infection vs dehydration. Will check CBC, CMP, blood cxs x 2 and administer iv fluids. Since covid was diagnosed 10 days ago with good pulse oximeter readings currently, suspect that's not the cause of the current problems.     Past ID issues:    #  Pseudomonas stutzeri bacteremia: June 2020: source not clear,, treated with 5 days of iv zosyn. Doing well 2-3 weeks after completion of abxs. Since source not found, will check blood cxs to make sure there is no recurrence. Could have been from biliary tree although unusual pathogen for this source, as recent liver malignancy diagnosed.     # Recurrent MSSA bacteremia, source thought to be bilateral lower extremities cellulitis  4/13/2019 blood culture-MSSA  10/7/2019 blood culture with-MSSA and Aeromonas veronii  Recent hospitalization in March 2020 for MSSA bacteremia.  Blood cultures 3/21/2020 -MSSA, 3/22-negative  TTE 3/23/2020 negative for vegetations.  Patient treated with IV cefazolin 3/22-4/4.    # Recurrent cellulitis of lower extremities in setting of chronic lymphedema and venous stasis ulcers. .  Patient with multiple open wounds on bilateral anterior shins~0.5- 2.0 cm in diameter, none of which looks actively infected. Recommend  chlorhexidine wash buttocks down for a few months.     # Chronic right heel wound/ulcer  Patient had right heel wound/ulcer for more than 15 years  9/8/2019-heel  ulcer scant Bacteroides pyogenes, MSSA  5/21 Patient's wound opened up during physical examination.  Wound cultures 5/21/20 with heavy growth MSSA.  - B/l foot XRay with no signs of osteomyelitis 5/21/20.  But patient has pain even when the wound is healed and recurrent wounds. It was thought to be due to pressure but the left foot never develops a pressure ulcer. This is better with using foam on the wound.     # End-stage liver disease, hepatic encephalopathy  Patient taking spironolactone, furosemide, lactulose as needed    # DM2, well controlled  Lantus 5 units daily  Last hemoglobin A1c-5.5 3/2020    #HCC, undergoing chemoembolization therapy           Interval history:      Last visit 9/2  Had chemo embolization beginning of OCt. After hat had fever for 1-2 days and recovered. Oct 17, 18 had diarrhea. Oct 22, 23 was very weak, sleeping a lot. Had a fall on oct 23, taken to ER. COVID was pos. He has been back home. Yesterday spiked a fever to 100.8, today 99.2. very sleepy and tired. Yesterday and today sugars are running high, in the 200s and not responsive to insulin. Pulse oximeter reading is good. No chest complaints. Peeing good. No urinary complaints. Leg wounds look ok, not actively infected per wife. Patient fell asleep multiple times today during visit. Fluid intake is down.            History of the Infectious Disease lllness:     Patient is 64-year-old male with history of ESLD, S/P TIPS, DM 2 on Lantus, chronic lymphedema and venous stasis ulcers, recurrent cellulitis and MSSA bacteremia.  Patient seen in ID clinic with his daughter.  He was recently hospitalized at the end of March at [facility] for MSSA bacteremia (blood culture 3/21/2020 with MSSA), source felt to be lower extremity cellulitis.  Patient was treated with 2  weeks of IV cefazolin 3/22-4/4.  TTE 3/23 was negative for vegetations.  In the past patient had episode of MSSA bacteremia in 10/2019.  Per patient's daughter, usually patient has signs and symptoms of lower extremity cellulitis and then progressively becomes septic and requires hospitalization.  Usually he does not have time to make it to physician's appointments due to quick deterioration.  Today they are seen in ID clinic asking if something could be done to prevent frequent bacteremia hospitalizations.  In mid April 2019 patient seen by ID physician who felt that his bilateral lower extremity wounds  are due to pseudomonal infection because of bluish-green discoloration of his wounds.  He was prescribed 10-day course of about 10 days ago.  Today none of his open wounds look infected.  The day before patient was notified that his PCP sent prescription for another 10-day course of ciprofloxacin to take until he will be seen in person.  Patient has history of chronic right heel ulcer/wound >15 years. In 9/2019 it grew MSSA.  Today he is complaining of fatigue generalized weakness for which he is using walker.  He has home care nurse, who also takes care of his lymphedema.  He has no podiatry as had bad experience in the past.  Today patient denies fever, chills, SOB, night sweats, CP, cough, abdominal pain, nausea, vomiting. He does not look sick.  His last bloodwork notable for WBC-3, Hb-7.8, Plt-98 on.        Review of Systems:  10 systems reviewed. 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)      Splenomegaly      Squamous cell carcinoma      Thrombocytopenia (H)      Varices, esophageal (H)     Banded in 2011       Past Surgical History:   Procedure Laterality Date      BIOPSY OF SKIN LESION       COLONOSCOPY Left 6/16/2016    Procedure: COMBINED COLONOSCOPY, SINGLE OR MULTIPLE BIOPSY/POLYPECTOMY BY BIOPSY;  Surgeon: Brandy Barnett MD;  Location:  GI     ESOPHAGOSCOPY, GASTROSCOPY, DUODENOSCOPY (EGD), COMBINED  2/13/2013    Procedure: COMBINED ESOPHAGOSCOPY, GASTROSCOPY, DUODENOSCOPY (EGD);;  Surgeon: Tara Cook MD;  Location:  GI     ESOPHAGOSCOPY, GASTROSCOPY, DUODENOSCOPY (EGD), COMBINED  11/4/2013    Procedure: COMBINED ESOPHAGOSCOPY, GASTROSCOPY, DUODENOSCOPY (EGD);;  Surgeon: Lonny Diaz MD;  Location:  GI     ESOPHAGOSCOPY, GASTROSCOPY, DUODENOSCOPY (EGD), COMBINED Left 6/16/2016    Procedure: COMBINED ESOPHAGOSCOPY, GASTROSCOPY, DUODENOSCOPY (EGD), BIOPSY SINGLE OR MULTIPLE;  Surgeon: Brandy Barnett MD;  Location:  GI     EXCISE LESION TRUNK  9/24/2012    Procedure: EXCISE LESION TRUNK;;  Surgeon: Pepe Dominguez MD;  Location: [facility]     GENITOURINARY SURGERY      vasectomy     HERNIORRHAPHY UMBILICAL  9/24/2012    Procedure: HERNIORRHAPHY UMBILICAL;  UMBILICAL HERNIA REPAIR , EXCISION OF PERIUMBILICAL CYST;  Surgeon: Pepe Dominguez MD;  Location: [facility]     IR CHEMO EMBOLIZATION  7/30/2020     IR CHEMO EMBOLIZATION  10/1/2020       Family History   Problem Relation Age of Onset     Breast Cancer Mother      Liver Cancer Mother      Cardiovascular Father      Cerebrovascular Disease Father         very low blood pressure     Cancer Father         rectal cancer     Cardiovascular Paternal Grandfather      Diabetes Brother      Cancer Sister         skin cancer     C.A.D. Other         MI, 70's     Breast Cancer Sister      Thyroid Disease No family hx of      Lipids No family hx of      Anesthesia Reaction No family hx of        Social History     Social History Narrative    . 3 grown daughters. He has been sober since 2012.     Social History     Tobacco Use     Smoking status: Current Every Day Smoker      Packs/day: 0.30     Types: Cigarettes     Smokeless tobacco: Never Used     Tobacco comment: about 4 cigarettes per day   Substance Use Topics     Alcohol use: No     Alcohol/week: 0.0 standard drinks     Comment: 2-3 per day , none since Dec 2012     Drug use: No       Immunization History   Administered Date(s) Administered     DTAP (<7y) 01/18/1988     HEPA 01/02/2013, 02/18/2013, 10/30/2013     HepB 01/02/2013, 02/18/2013, 10/30/2013     Influenza (IIV3) PF 10/04/2010, 12/18/2012, 09/08/2014, 09/26/2015, 09/27/2016     Influenza Intranasal Vaccine 4 valent 10/12/2017     Influenza Vaccine IM > 6 months Valent IIV4 10/26/2018     Pneumo Conj 13-V (2010&after) 05/12/2015     Pneumococcal 23 valent 09/25/2009, 10/01/2010, 01/02/2013, 04/23/2019     TD (ADULT, 7+) 09/08/1997     TDAP Vaccine (Adacel) 01/02/2013     Twinrix A/B 01/02/2013     Zoster vaccine, live 12/22/2016       Patient Active Problem List   Diagnosis     Mild major depression (H)     Thrombocytopenia (H)     Hypertension goal BP (blood pressure) < 130/80     Plantar warts     Corns and callosities     Family history of colon cancer     Type 2 diabetes, HbA1c goal < 7% (H)     Portal hypertension (H)     Alcoholic cirrhosis (H)     Ascites     Esophageal varices (H)     Multiple rib fractures     Tobacco use disorder     Hyperlipidemia LDL goal <100     Dental caries     Prurigo nodularis     Esophageal reflux     Morbid obesity (H)     History of colonic polyps: tubular adenomas and serrated adenomas     Type II diabetes mellitus with peripheral circulatory disorder (H)     Alcoholic cirrhosis of liver with ascites (H)     Hepatic encephalopathy (H)     Lymphedema of both lower extremities     Venous stasis ulcers of both lower extremities (H)     Inadequate pain control     Post-operative state     HCC (hepatocellular carcinoma) (H)       Current Outpatient Medications   Medication Sig     blood glucose (NO BRAND SPECIFIED) lancets standard Use to  test blood sugar 4 X  times daily or as directed.     blood glucose (NO BRAND SPECIFIED) test strip Use to test blood sugars 4 X  times daily or as directed     blood glucose (NO BRAND SPECIFIED) test strip Use to test blood sugar 4 times daily or as directed.     blood glucose monitoring (NO BRAND SPECIFIED) meter device kit Use to test blood sugar 4 times daily or as directed. Before meals and snack at HS.     blood glucose monitoring (NO BRAND SPECIFIED) meter device kit Use to test blood sugar 4 X  times daily or as directed.     citalopram (CELEXA) 20 MG tablet Take 1 tablet (20 mg) by mouth daily     COMPOUNDED NON-CONTROLLED SUBSTANCE (CMPD RX) - PHARMACY TO MIX COMPOUNDED MEDICATION Apply to the affected area once a day.     Cyanocobalamin (VITAMIN B-12 PO) Take 1 tablet by mouth daily     desvenlafaxine (PRISTIQ) 100 MG 24 hr tablet Take 2 tablets (200 mg) by mouth daily     EQL VITAMIN D3 50 MCG (2000 UT) CAPS Take 1 capsule by mouth daily     famotidine (PEPCID) 40 MG tablet Take 1 tablet (40 mg) by mouth daily     furosemide (LASIX) 40 MG tablet Take 40 mg twice daily.     gabapentin (NEURONTIN) 300 MG capsule Take 1 capsule (300 mg) by mouth At Bedtime     hydrOXYzine (ATARAX) 25 MG tablet Take 1 tablet (25 mg) by mouth 3 times daily as needed for itching     insulin aspart (NOVOLOG FLEXPEN) 100 UNIT/ML pen 1 unit per 10 grams of carbohydrates + 1 per 50>140. Max daily dose 100 units.     insulin glargine (LANTUS SOLOSTAR) 100 UNIT/ML pen 5 units daily in am. Increase by 5 units 2x weekly until fasting sugars are <130. Max daily dose 100 units.     insulin pen needle (B-D U/F) 31G X 8 MM miscellaneous USE  6 times daily / OR AS DIRECTED     insulin pen needle (ULTICARE SHORT) 31G X 8 MM miscellaneous Use UP to 6 times daily or as directed     lactulose (CEPHULAC) 20 GM packet Take 1 packet (20 g) by mouth 2 times daily     lactulose encephalopathy (CHRONULAC) 10 GM/15ML SOLUTION TAKE 30 MLS BY MOUTH 2  TIMES DAILY  TITRATE AS NEEDED TO ACHIEVE 3-5 BOWEL MOVEMENTS DAILY.     Lidocaine (LIDOCARE) 4 % Patch Place 1 patch onto the skin every 24 hours To prevent lidocaine toxicity, patient should be patch free for 12 hrs daily.     magnesium oxide (MAG-OX) 400 (241.3 Mg) MG tablet Take 1 tablet (400 mg) by mouth daily     Nutritional Supplements (ENSURE) LIQD Take 1 Can by mouth daily     nystatin (MYCOSTATIN) 755746 UNIT/GM external cream Apply topically 2 times daily     ondansetron (ZOFRAN) 4 MG tablet Take 1 tablet (4 mg) by mouth every 8 hours as needed for nausea     order for DME Equipment being ordered:Orthopedic shoes     order for DME Equipment being ordered: Condom catheter     order for DME Equipment being ordered:BioTab compression pants pneumatic system     order for DME Equipment being ordered:bilateral pneumatic leg massage for treatment of extreme bilateral lower leg edema.     [START ON 11/16/2020] oxyCODONE (ROXICODONE) 5 MG tablet TAKE 1-2 TABLETS BY MOUTH EVERY 8 HOURS AS NNEDED FOR SEVERE PAIN     OYSTER SHELL CALCIUM/D 500-200 MG-UNIT per tablet Take 1 tablet by mouth daily     polyethylene glycol (MIRALAX) 17 g packet Take 1 packet by mouth     prochlorperazine (COMPAZINE) 10 MG tablet Take 1 tablet (10 mg) by mouth every 6 hours as needed for nausea or vomiting (take if zofran does not relieve nausea)     QUEtiapine (SEROQUEL) 25 MG tablet Give 1 tab with a 50 mg tab at HS.     QUEtiapine (SEROQUEL) 50 MG tablet Take 1 tab with a 25 mg tab at HS.     QUEtiapine (SEROQUEL) 50 MG tablet Take 50 mg by mouth At Bedtime     rifaximin (XIFAXAN) 550 MG TABS tablet Take 1 tablet (550 mg) by mouth 2 times daily     spironolactone (ALDACTONE) 50 MG tablet Take 2 tablets (100 mg) by mouth daily     ursodiol (ACTIGALL) 300 MG capsule Take 3 capsules (900 mg) by mouth 2 times daily     Current Facility-Administered Medications   Medication     0.9% sodium chloride BOLUS       No Known Allergies            Physical Exam:     Limited exam due to video visit           Laboratory Data:   8/14/20 multiple wound cx   Organism Antibiotic Method Susceptibility   Staphylococcus aureus Clindamycin CLAYTON <=0.50: Sensitive    Doxycycline CLAYTON <=0.50: Sensitive    Erythromycin CLAYTON <=0.50: Sensitive    Oxacillin CLAYTON <=0.25: Sensitive    Tetracycline CLAYTON <=0.50: Sensitive    Trimethoprim/Sulfamethoxazole CLAYTON <=1/19: Sensitive    Vancomycin CLAYTON 1.00: Sensitive   Stenotrophomonas maltophilia Ceftazidime CLAYTON <=2.00: Sensitive    Levofloxacin CLAYTON 4.00: Intermediate    Trimethoprim/Sulfamethoxazole CLAYTON <=0.5/9.5: Sensitive   Pseudomonas stutzeri Cefepime CLAYTON <=1.00: Sensitive    Ciprofloxacin CLAYTON >2.00: Resistant    Gentamicin CLAYTON <=2.00: Sensitive    Piperacillin/Tazobactam CLAYTON <=2/4: Sensitive    Tobramycin CLAYTON <=2.00: Sensitive       6/11 and 6/16 blood cx neg   6/7/20 Blood cx Pseudomonas stutzeri R to cipro   05/21/20 Wound culture Right heel- heavy growth MSSA, pansensitive.  3/21/2020 blood culture-MSSA  3/22/2020 MRSA nares negative  10/2019 blood cx MSSA  9/5/2019 heel wound-MSSA  9/8/2019-heel  ulcer scant Bacteroides pyogenes  4/2019 blood cx MSSA      Inflammatory Markers    Recent Labs   Lab Test 05/21/20  1647 08/22/19  0634 08/21/19  0629 08/20/19  0625 08/18/19  1815 12/22/16  1722 12/22/15  1419   CRP 18.9* 60.1* 103.0* 143.0* 77.1* 9.3* 8.6*       Metabolic Studies       Recent Labs   Lab Test 10/01/20  1034 09/11/20  1425 09/11/20  1343 06/26/20  1558 03/12/20  1238 11/18/19  1433 08/21/19  0629 08/19/19  0631 08/18/19  1916 11/06/17  0744 11/06/17  0744     --  137  --  133 127* 137 136  --    < > 144   POTASSIUM 3.8  --  3.8  --  3.7 4.7 4.2 3.8  --    < > 3.7   CHLORIDE 102  --  105  --  101 94 110* 107  --    < > 113*   CO2 26  --  26  --  26 23 21 22  --    < > 24   ANIONGAP 7  --  6  --  7 10 6 7  --    < > 8   BUN 23  --  20  --  17 25 16 16  --    < > 10   CR 0.92  --  0.91  --  0.91 1.12 0.69 0.88  --    <  > 0.71   GFRESTIMATED 86 >90 88 >90 88 68 >90 90  --    < > >90   *  --  132*  --  116* 206* 130* 86  --    < > 93   A1C  --   --   --   --  6.3*  --   --   --   --    < >  --    ERIN 8.4*  --  8.6  --  8.1* 8.7 8.4* 8.0*  --    < > 7.9*   MAG  --   --   --   --   --   --   --  1.9  --   --  1.9   LACT  --   --   --   --   --   --   --   --  1.7  --   --     < > = values in this interval not displayed.       Hematology Studies      Recent Labs   Lab Test 10/01/20  1034 09/11/20  1343 07/30/20  1517 03/12/20  1238 11/18/19  1433 08/20/19  0625 08/19/19  0631 08/18/19  1815 04/27/18  1235 04/27/18  1235 11/04/17 2035 11/04/17  2035 10/28/14  1847 10/28/14  1847 03/04/14  1814 03/04/14  1814 02/19/14  1609   WBC 2.5* 2.5* 3.0* 4.6 9.1  --  4.9 6.4   < > 2.7*   < > 5.0   < > 7.1   < > 6.4 6.4   ANEU  --   --   --   --   --   --   --  4.9  --  1.7  --  3.3  --  3.8  --  3.3 3.3   ALYM  --   --   --   --   --   --   --  0.4*  --  0.4*  --  0.8  --  2.0  --  2.1 1.9   RAFAELA  --   --   --   --   --   --   --  1.0  --  0.5  --  0.7  --  1.2  --  0.8 1.0   AEOS  --   --   --   --   --   --   --  0.1  --  0.1  --  0.1  --  0.1  --  0.2 0.2   HGB 7.6* 7.9* 7.5* 7.6* 10.9* 8.4* 8.0* 9.3*   < > 9.5*   < > 12.3*   < > 14.7   < > 14.9 14.8   HCT 24.6* 25.7* 23.9* 24.2* 33.2*  --  24.0* 27.8*   < > 29.3*   < > 38.5*   < > 41.9   < > 42.2 41.4   PLT 72* 94* 85* 118* 128* 78* 79* 124*   < > 85*   < > 77*   < > 122*   < > 87* 100*    < > = values in this interval not displayed.       Imaging:  MR abdomen 6/11/20  IMPRESSION:  1. Significant respiratory motion artifact degrades evaluation of the liver. Enhancing mass in the inferior right hepatic lobe measuring 37 mm with restricted diffusion and enhancing mass in the medial left hepatic lobe measuring 22 mm with restricted diffusion suspicious for hepatocellular carcinoma. A smaller area of restricted diffusion is identified in the posterior right hepatic lobe, corresponding to  an area of hypodensity seen on the prior CT, this area is also suspicious for malignancy although it is difficult to confidently identify this mass on postcontrast imaging probably due to the degree of respiratory motion.  2. Hepatic cirrhosis with splenomegaly and abdominal ascites unchanged compared to the CT from one day earlier. Diffuse mesenteric edema and anasarca is also unchanged.  3. Contracted gallbladder with internal stones. No biliary duct dilatation.    CT abdomen pelvis 6/10/20  IMPRESSION:   1.  Ascites, cirrhosis, splenomegaly and collateral vessels.  2.  There are several ill-defined hypodense areas within the liver. Recommend multiphase MRI of the liver to evaluate for possible malignancy.  3.  TIPS stent present with patent portal vein, SMV and splenic vein.  4.  Small bilateral effusions with bibasilar atelectasis.  5.  Atherosclerotic vascular disease.  6.  No evidence of abscess, free air or obstruction.  7.  Atherosclerotic vascular disease.  8.  Diffuse subcutaneous edema.  9.  Progression of L1 compression fracture.    Xray feet bilateral 3 views 05/21/20                                                                   IMPRESSION: No acute osseous abnormality. No radiographic evidence of osteomyelitis.        CXR 03/25/20  1.  The left arm PICC has its tip in the proximal right atrium, approximately 2 cm distal to the cavoatrial junction.  2.  Hazy opacities in both lung bases are essentially unchanged.  3.  Mild cardiomegaly.    MRI lumbar spine 03/23/20  1.  The left arm PICC has its tip in the proximal right atrium, approximately 2 cm distal to the cavoatrial junction.  2.  Hazy opacities in both lung bases are essentially unchanged.  3.  Mild cardiomegaly.    ECHO 03/23/20    Interpretation Summary    * The left ventricle is normal size.    * The left ventricular systolic function is normal, estimated LVEF 60-65%.    * No regional wall motion abnormalities.    * There is moderate  aortic stenosis.    * No pulmonary hypertension, estimated right ventricular systolic pressure  is 30 mmHg.    * Compared to prior study dated 02/01/2020 there has been no change.      Sincerely,    Sandee Bird MD

## 2020-11-04 NOTE — PROGRESS NOTES
"Lawrence Louie is a 64 year old male who is being evaluated via a billable video visit.      The patient has been notified of following:     \"This video visit will be conducted via a call between you and your physician/provider. We have found that certain health care needs can be provided without the need for an in-person physical exam.  This service lets us provide the care you need with a video conversation.  If a prescription is necessary we can send it directly to your pharmacy.  If lab work is needed we can place an order for that and you can then stop by our lab to have the test done at a later time.    Video visits are billed at different rates depending on your insurance coverage.  Please reach out to your insurance provider with any questions.    If during the course of the call the physician/provider feels a video visit is not appropriate, you will not be charged for this service.\"    Patient has given verbal consent for Video visit? Yes  How would you like to obtain your AVS? MyChart    Will anyone else be joining your video visit? No        Video-Visit Details    Type of service:  Video Visit    Video Start Time: 4.35 pm  Video End Time: 5.08 pm    Moderate risk and complexity     Originating Location (pt. Location): Home    Distant Location (provider location):  [facility] [place]     Platform used for Video Visit: Akosua Bird MD     [facility]  Infectious Disease Clinic Note:  Follow up      Patient:  Lawrence Louie, Date of birth 1955, Medical record number [id number]  Date of Visit:  11/04/2020  Consult requested by patient himself.         Assessment and Recommendations:     Recommendations:  - check CBC, CMP, blood cx x 2   - encouraged to increase fluid intake   - hopefully tomorrow the home care nurse can draw blood tests and give NS 1 L fluid  - will fax orders to [facility] [phone] and if " they can come tomorrow rather than Friday for this  - if fever increases to 101 or he gets worse, to go to ER    Return visit 3 months    Assessment:  Patient is a 64-year-old gentleman with history of lower extremity lymphedema, recurrent cellulitis, MSSA bacteremia, ESLD, DM 2.  Complaints of recurrent cellulitis complicated by bacteremia.    Fever, lethargy, high blood sugar levels since yesterday: suspect an infection vs dehydration. Will check CBC, CMP, blood cxs x 2 and administer iv fluids. Since covid was diagnosed 10 days ago with good pulse oximeter readings currently, suspect that's not the cause of the current problems.     Past ID issues:    #  Pseudomonas stutzeri bacteremia: June 2020: source not clear,, treated with 5 days of iv zosyn. Doing well 2-3 weeks after completion of abxs. Since source not found, will check blood cxs to make sure there is no recurrence. Could have been from biliary tree although unusual pathogen for this source, as recent liver malignancy diagnosed.     # Recurrent MSSA bacteremia, source thought to be bilateral lower extremities cellulitis  4/13/2019 blood culture-MSSA  10/7/2019 blood culture with-MSSA and Aeromonas veronii  Recent hospitalization in March 2020 for MSSA bacteremia.  Blood cultures 3/21/2020 -MSSA, 3/22-negative  TTE 3/23/2020 negative for vegetations.  Patient treated with IV cefazolin 3/22-4/4.    # Recurrent cellulitis of lower extremities in setting of chronic lymphedema and venous stasis ulcers. .  Patient with multiple open wounds on bilateral anterior shins~0.5- 2.0 cm in diameter, none of which looks actively infected. Recommend chlorhexidine wash buttocks down for a few months.     # Chronic right heel wound/ulcer  Patient had right heel wound/ulcer for more than 15 years  9/8/2019-heel  ulcer scant Bacteroides pyogenes, MSSA  5/21 Patient's wound opened up during physical examination.  Wound cultures 5/21/20 with heavy growth MSSA.  - B/l foot  XRay with no signs of osteomyelitis 5/21/20.  But patient has pain even when the wound is healed and recurrent wounds. It was thought to be due to pressure but the left foot never develops a pressure ulcer. This is better with using foam on the wound.     # End-stage liver disease, hepatic encephalopathy  Patient taking spironolactone, furosemide, lactulose as needed    # DM2, well controlled  Lantus 5 units daily  Last hemoglobin A1c-5.5 3/2020    #HCC, undergoing chemoembolization therapy           Interval history:      Last visit 9/2  Had chemo embolization beginning of OCt. After hat had fever for 1-2 days and recovered. Oct 17, 18 had diarrhea. Oct 22, 23 was very weak, sleeping a lot. Had a fall on oct 23, taken to ER. COVID was pos. He has been back home. Yesterday spiked a fever to 100.8, today 99.2. very sleepy and tired. Yesterday and today sugars are running high, in the 200s and not responsive to insulin. Pulse oximeter reading is good. No chest complaints. Peeing good. No urinary complaints. Leg wounds look ok, not actively infected per wife. Patient fell asleep multiple times today during visit. Fluid intake is down.            History of the Infectious Disease lllness:     Patient is 64-year-old male with history of ESLD, S/P TIPS, DM 2 on Lantus, chronic lymphedema and venous stasis ulcers, recurrent cellulitis and MSSA bacteremia.  Patient seen in ID clinic with his daughter.  He was recently hospitalized at the end of March at [facility] for MSSA bacteremia (blood culture 3/21/2020 with MSSA), source felt to be lower extremity cellulitis.  Patient was treated with 2 weeks of IV cefazolin 3/22-4/4.  TTE 3/23 was negative for vegetations.  In the past patient had episode of MSSA bacteremia in 10/2019.  Per patient's daughter, usually patient has signs and symptoms of lower extremity cellulitis and then progressively becomes septic and requires hospitalization.  Usually he does not have  time to make it to physician's appointments due to quick deterioration.  Today they are seen in ID clinic asking if something could be done to prevent frequent bacteremia hospitalizations.  In mid April 2019 patient seen by ID physician who felt that his bilateral lower extremity wounds  are due to pseudomonal infection because of bluish-green discoloration of his wounds.  He was prescribed 10-day course of about 10 days ago.  Today none of his open wounds look infected.  The day before patient was notified that his PCP sent prescription for another 10-day course of ciprofloxacin to take until he will be seen in person.  Patient has history of chronic right heel ulcer/wound >15 years. In 9/2019 it grew MSSA.  Today he is complaining of fatigue generalized weakness for which he is using walker.  He has home care nurse, who also takes care of his lymphedema.  He has no podiatry as had bad experience in the past.  Today patient denies fever, chills, SOB, night sweats, CP, cough, abdominal pain, nausea, vomiting. He does not look sick.  His last bloodwork notable for WBC-3, Hb-7.8, Plt-98 on.        Review of Systems:  10 systems reviewed. 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)      Splenomegaly      Squamous cell carcinoma      Thrombocytopenia (H)      Varices, esophageal (H)     Banded in 2011       Past Surgical History:   Procedure Laterality Date     BIOPSY OF SKIN LESION       COLONOSCOPY Left 6/16/2016    Procedure: COMBINED COLONOSCOPY, SINGLE OR MULTIPLE BIOPSY/POLYPECTOMY BY BIOPSY;  Surgeon: Brandy Barnett MD;  Location:  GI     ESOPHAGOSCOPY, GASTROSCOPY, DUODENOSCOPY (EGD), COMBINED  2/13/2013    Procedure: COMBINED ESOPHAGOSCOPY, GASTROSCOPY,  DUODENOSCOPY (EGD);;  Surgeon: Tara Cook MD;  Location:  GI     ESOPHAGOSCOPY, GASTROSCOPY, DUODENOSCOPY (EGD), COMBINED  11/4/2013    Procedure: COMBINED ESOPHAGOSCOPY, GASTROSCOPY, DUODENOSCOPY (EGD);;  Surgeon: Lonny Diaz MD;  Location:  GI     ESOPHAGOSCOPY, GASTROSCOPY, DUODENOSCOPY (EGD), COMBINED Left 6/16/2016    Procedure: COMBINED ESOPHAGOSCOPY, GASTROSCOPY, DUODENOSCOPY (EGD), BIOPSY SINGLE OR MULTIPLE;  Surgeon: Brandy Barnett MD;  Location:  GI     EXCISE LESION TRUNK  9/24/2012    Procedure: EXCISE LESION TRUNK;;  Surgeon: Pepe Dominguez MD;  Location: [facility]     GENITOURINARY SURGERY      vasectomy     HERNIORRHAPHY UMBILICAL  9/24/2012    Procedure: HERNIORRHAPHY UMBILICAL;  UMBILICAL HERNIA REPAIR , EXCISION OF PERIUMBILICAL CYST;  Surgeon: Pepe Dominguez MD;  Location: [facility]     IR CHEMO EMBOLIZATION  7/30/2020     IR CHEMO EMBOLIZATION  10/1/2020       Family History   Problem Relation Age of Onset     Breast Cancer Mother      Liver Cancer Mother      Cardiovascular Father      Cerebrovascular Disease Father         very low blood pressure     Cancer Father         rectal cancer     Cardiovascular Paternal Grandfather      Diabetes Brother      Cancer Sister         skin cancer     C.A.D. Other         MI, 70's     Breast Cancer Sister      Thyroid Disease No family hx of      Lipids No family hx of      Anesthesia Reaction No family hx of        Social History     Social History Narrative    . 3 grown daughters. He has been sober since 2012.     Social History     Tobacco Use     Smoking status: Current Every Day Smoker     Packs/day: 0.30     Types: Cigarettes     Smokeless tobacco: Never Used     Tobacco comment: about 4 cigarettes per day   Substance Use Topics     Alcohol use: No     Alcohol/week: 0.0 standard drinks     Comment: 2-3 per day , none since Dec 2012     Drug use: No       Immunization History   Administered Date(s) Administered      DTAP (<7y) 01/18/1988     HEPA 01/02/2013, 02/18/2013, 10/30/2013     HepB 01/02/2013, 02/18/2013, 10/30/2013     Influenza (IIV3) PF 10/04/2010, 12/18/2012, 09/08/2014, 09/26/2015, 09/27/2016     Influenza Intranasal Vaccine 4 valent 10/12/2017     Influenza Vaccine IM > 6 months Valent IIV4 10/26/2018     Pneumo Conj 13-V (2010&after) 05/12/2015     Pneumococcal 23 valent 09/25/2009, 10/01/2010, 01/02/2013, 04/23/2019     TD (ADULT, 7+) 09/08/1997     TDAP Vaccine (Adacel) 01/02/2013     Twinrix A/B 01/02/2013     Zoster vaccine, live 12/22/2016       Patient Active Problem List   Diagnosis     Mild major depression (H)     Thrombocytopenia (H)     Hypertension goal BP (blood pressure) < 130/80     Plantar warts     Corns and callosities     Family history of colon cancer     Type 2 diabetes, HbA1c goal < 7% (H)     Portal hypertension (H)     Alcoholic cirrhosis (H)     Ascites     Esophageal varices (H)     Multiple rib fractures     Tobacco use disorder     Hyperlipidemia LDL goal <100     Dental caries     Prurigo nodularis     Esophageal reflux     Morbid obesity (H)     History of colonic polyps: tubular adenomas and serrated adenomas     Type II diabetes mellitus with peripheral circulatory disorder (H)     Alcoholic cirrhosis of liver with ascites (H)     Hepatic encephalopathy (H)     Lymphedema of both lower extremities     Venous stasis ulcers of both lower extremities (H)     Inadequate pain control     Post-operative state     HCC (hepatocellular carcinoma) (H)       Current Outpatient Medications   Medication Sig     blood glucose (NO BRAND SPECIFIED) lancets standard Use to test blood sugar 4 X  times daily or as directed.     blood glucose (NO BRAND SPECIFIED) test strip Use to test blood sugars 4 X  times daily or as directed     blood glucose (NO BRAND SPECIFIED) test strip Use to test blood sugar 4 times daily or as directed.     blood glucose monitoring (NO BRAND SPECIFIED) meter device kit  Use to test blood sugar 4 times daily or as directed. Before meals and snack at HS.     blood glucose monitoring (NO BRAND SPECIFIED) meter device kit Use to test blood sugar 4 X  times daily or as directed.     citalopram (CELEXA) 20 MG tablet Take 1 tablet (20 mg) by mouth daily     COMPOUNDED NON-CONTROLLED SUBSTANCE (CMPD RX) - PHARMACY TO MIX COMPOUNDED MEDICATION Apply to the affected area once a day.     Cyanocobalamin (VITAMIN B-12 PO) Take 1 tablet by mouth daily     desvenlafaxine (PRISTIQ) 100 MG 24 hr tablet Take 2 tablets (200 mg) by mouth daily     EQL VITAMIN D3 50 MCG (2000 UT) CAPS Take 1 capsule by mouth daily     famotidine (PEPCID) 40 MG tablet Take 1 tablet (40 mg) by mouth daily     furosemide (LASIX) 40 MG tablet Take 40 mg twice daily.     gabapentin (NEURONTIN) 300 MG capsule Take 1 capsule (300 mg) by mouth At Bedtime     hydrOXYzine (ATARAX) 25 MG tablet Take 1 tablet (25 mg) by mouth 3 times daily as needed for itching     insulin aspart (NOVOLOG FLEXPEN) 100 UNIT/ML pen 1 unit per 10 grams of carbohydrates + 1 per 50>140. Max daily dose 100 units.     insulin glargine (LANTUS SOLOSTAR) 100 UNIT/ML pen 5 units daily in am. Increase by 5 units 2x weekly until fasting sugars are <130. Max daily dose 100 units.     insulin pen needle (B-D U/F) 31G X 8 MM miscellaneous USE  6 times daily / OR AS DIRECTED     insulin pen needle (ULTICARE SHORT) 31G X 8 MM miscellaneous Use UP to 6 times daily or as directed     lactulose (CEPHULAC) 20 GM packet Take 1 packet (20 g) by mouth 2 times daily     lactulose encephalopathy (CHRONULAC) 10 GM/15ML SOLUTION TAKE 30 MLS BY MOUTH 2 TIMES DAILY  TITRATE AS NEEDED TO ACHIEVE 3-5 BOWEL MOVEMENTS DAILY.     Lidocaine (LIDOCARE) 4 % Patch Place 1 patch onto the skin every 24 hours To prevent lidocaine toxicity, patient should be patch free for 12 hrs daily.     magnesium oxide (MAG-OX) 400 (241.3 Mg) MG tablet Take 1 tablet (400 mg) by mouth daily      Nutritional Supplements (ENSURE) LIQD Take 1 Can by mouth daily     nystatin (MYCOSTATIN) 024917 UNIT/GM external cream Apply topically 2 times daily     ondansetron (ZOFRAN) 4 MG tablet Take 1 tablet (4 mg) by mouth every 8 hours as needed for nausea     order for DME Equipment being ordered:Orthopedic shoes     order for DME Equipment being ordered: Condom catheter     order for DME Equipment being ordered:BioTab compression pants pneumatic system     order for DME Equipment being ordered:bilateral pneumatic leg massage for treatment of extreme bilateral lower leg edema.     [START ON 11/16/2020] oxyCODONE (ROXICODONE) 5 MG tablet TAKE 1-2 TABLETS BY MOUTH EVERY 8 HOURS AS NNEDED FOR SEVERE PAIN     OYSTER SHELL CALCIUM/D 500-200 MG-UNIT per tablet Take 1 tablet by mouth daily     polyethylene glycol (MIRALAX) 17 g packet Take 1 packet by mouth     prochlorperazine (COMPAZINE) 10 MG tablet Take 1 tablet (10 mg) by mouth every 6 hours as needed for nausea or vomiting (take if zofran does not relieve nausea)     QUEtiapine (SEROQUEL) 25 MG tablet Give 1 tab with a 50 mg tab at HS.     QUEtiapine (SEROQUEL) 50 MG tablet Take 1 tab with a 25 mg tab at HS.     QUEtiapine (SEROQUEL) 50 MG tablet Take 50 mg by mouth At Bedtime     rifaximin (XIFAXAN) 550 MG TABS tablet Take 1 tablet (550 mg) by mouth 2 times daily     spironolactone (ALDACTONE) 50 MG tablet Take 2 tablets (100 mg) by mouth daily     ursodiol (ACTIGALL) 300 MG capsule Take 3 capsules (900 mg) by mouth 2 times daily     Current Facility-Administered Medications   Medication     0.9% sodium chloride BOLUS       No Known Allergies           Physical Exam:     Limited exam due to video visit           Laboratory Data:   8/14/20 multiple wound cx   Organism Antibiotic Method Susceptibility   Staphylococcus aureus Clindamycin CLAYTON <=0.50: Sensitive    Doxycycline CLAYTON <=0.50: Sensitive    Erythromycin CLAYTON <=0.50: Sensitive    Oxacillin CLAYTON <=0.25: Sensitive     Tetracycline CLAYTON <=0.50: Sensitive    Trimethoprim/Sulfamethoxazole CLAYTON <=1/19: Sensitive    Vancomycin CLAYTON 1.00: Sensitive   Stenotrophomonas maltophilia Ceftazidime CLAYTON <=2.00: Sensitive    Levofloxacin CLAYTON 4.00: Intermediate    Trimethoprim/Sulfamethoxazole CLAYTON <=0.5/9.5: Sensitive   Pseudomonas stutzeri Cefepime CLAYTON <=1.00: Sensitive    Ciprofloxacin CLAYTON >2.00: Resistant    Gentamicin CLAYTON <=2.00: Sensitive    Piperacillin/Tazobactam CLAYTON <=2/4: Sensitive    Tobramycin CLAYTON <=2.00: Sensitive       6/11 and 6/16 blood cx neg   6/7/20 Blood cx Pseudomonas stutzeri R to cipro   05/21/20 Wound culture Right heel- heavy growth MSSA, pansensitive.  3/21/2020 blood culture-MSSA  3/22/2020 MRSA nares negative  10/2019 blood cx MSSA  9/5/2019 heel wound-MSSA  9/8/2019-heel  ulcer scant Bacteroides pyogenes  4/2019 blood cx MSSA      Inflammatory Markers    Recent Labs   Lab Test 05/21/20  1647 08/22/19  0634 08/21/19  0629 08/20/19  0625 08/18/19  1815 12/22/16  1722 12/22/15  1419   CRP 18.9* 60.1* 103.0* 143.0* 77.1* 9.3* 8.6*       Metabolic Studies       Recent Labs   Lab Test 10/01/20  1034 09/11/20  1425 09/11/20  1343 06/26/20  1558 03/12/20  1238 11/18/19  1433 08/21/19  0629 08/19/19  0631 08/18/19  1916 11/06/17  0744 11/06/17  0744     --  137  --  133 127* 137 136  --    < > 144   POTASSIUM 3.8  --  3.8  --  3.7 4.7 4.2 3.8  --    < > 3.7   CHLORIDE 102  --  105  --  101 94 110* 107  --    < > 113*   CO2 26  --  26  --  26 23 21 22  --    < > 24   ANIONGAP 7  --  6  --  7 10 6 7  --    < > 8   BUN 23  --  20  --  17 25 16 16  --    < > 10   CR 0.92  --  0.91  --  0.91 1.12 0.69 0.88  --    < > 0.71   GFRESTIMATED 86 >90 88 >90 88 68 >90 90  --    < > >90   *  --  132*  --  116* 206* 130* 86  --    < > 93   A1C  --   --   --   --  6.3*  --   --   --   --    < >  --    ERIN 8.4*  --  8.6  --  8.1* 8.7 8.4* 8.0*  --    < > 7.9*   MAG  --   --   --   --   --   --   --  1.9  --   --  1.9   LACT  --   --    --   --   --   --   --   --  1.7  --   --     < > = values in this interval not displayed.       Hematology Studies      Recent Labs   Lab Test 10/01/20  1034 09/11/20  1343 07/30/20  1517 03/12/20  1238 11/18/19  1433 08/20/19  0625 08/19/19  0631 08/18/19  1815 04/27/18  1235 04/27/18  1235 11/04/17  2035 11/04/17  2035 10/28/14  1847 10/28/14  1847 03/04/14  1814 03/04/14 1814 02/19/14  1609   WBC 2.5* 2.5* 3.0* 4.6 9.1  --  4.9 6.4   < > 2.7*   < > 5.0   < > 7.1   < > 6.4 6.4   ANEU  --   --   --   --   --   --   --  4.9  --  1.7  --  3.3  --  3.8  --  3.3 3.3   ALYM  --   --   --   --   --   --   --  0.4*  --  0.4*  --  0.8  --  2.0  --  2.1 1.9   RAFAELA  --   --   --   --   --   --   --  1.0  --  0.5  --  0.7  --  1.2  --  0.8 1.0   AEOS  --   --   --   --   --   --   --  0.1  --  0.1  --  0.1  --  0.1  --  0.2 0.2   HGB 7.6* 7.9* 7.5* 7.6* 10.9* 8.4* 8.0* 9.3*   < > 9.5*   < > 12.3*   < > 14.7   < > 14.9 14.8   HCT 24.6* 25.7* 23.9* 24.2* 33.2*  --  24.0* 27.8*   < > 29.3*   < > 38.5*   < > 41.9   < > 42.2 41.4   PLT 72* 94* 85* 118* 128* 78* 79* 124*   < > 85*   < > 77*   < > 122*   < > 87* 100*    < > = values in this interval not displayed.       Imaging:  MR abdomen 6/11/20  IMPRESSION:  1. Significant respiratory motion artifact degrades evaluation of the liver. Enhancing mass in the inferior right hepatic lobe measuring 37 mm with restricted diffusion and enhancing mass in the medial left hepatic lobe measuring 22 mm with restricted diffusion suspicious for hepatocellular carcinoma. A smaller area of restricted diffusion is identified in the posterior right hepatic lobe, corresponding to an area of hypodensity seen on the prior CT, this area is also suspicious for malignancy although it is difficult to confidently identify this mass on postcontrast imaging probably due to the degree of respiratory motion.  2. Hepatic cirrhosis with splenomegaly and abdominal ascites unchanged compared to the CT from one  day earlier. Diffuse mesenteric edema and anasarca is also unchanged.  3. Contracted gallbladder with internal stones. No biliary duct dilatation.    CT abdomen pelvis 6/10/20  IMPRESSION:   1.  Ascites, cirrhosis, splenomegaly and collateral vessels.  2.  There are several ill-defined hypodense areas within the liver. Recommend multiphase MRI of the liver to evaluate for possible malignancy.  3.  TIPS stent present with patent portal vein, SMV and splenic vein.  4.  Small bilateral effusions with bibasilar atelectasis.  5.  Atherosclerotic vascular disease.  6.  No evidence of abscess, free air or obstruction.  7.  Atherosclerotic vascular disease.  8.  Diffuse subcutaneous edema.  9.  Progression of L1 compression fracture.    Xray feet bilateral 3 views 05/21/20                                                                   IMPRESSION: No acute osseous abnormality. No radiographic evidence of osteomyelitis.        CXR 03/25/20  1.  The left arm PICC has its tip in the proximal right atrium, approximately 2 cm distal to the cavoatrial junction.  2.  Hazy opacities in both lung bases are essentially unchanged.  3.  Mild cardiomegaly.    MRI lumbar spine 03/23/20  1.  The left arm PICC has its tip in the proximal right atrium, approximately 2 cm distal to the cavoatrial junction.  2.  Hazy opacities in both lung bases are essentially unchanged.  3.  Mild cardiomegaly.    ECHO 03/23/20    Interpretation Summary    * The left ventricle is normal size.    * The left ventricular systolic function is normal, estimated LVEF 60-65%.    * No regional wall motion abnormalities.    * There is moderate aortic stenosis.    * No pulmonary hypertension, estimated right ventricular systolic pressure  is 30 mmHg.    * Compared to prior study dated 02/01/2020 there has been no change.

## 2020-11-05 NOTE — TELEPHONE ENCOUNTER
Spoke w/[facility] home [facility]. They are unable to administer IV fluids. Pt would need to go to an infusion center. Will discuss with Dr. Bird.  Any Kaminski RN

## 2020-11-05 NOTE — TELEPHONE ENCOUNTER
Spoke with pt's spouse, Raven. She reports that pt hasn't been feeling very good today but was able to eat an english muffin some grapes, and a protein shake. He also drank some water and had a snack. He did take a dose of Zofran b/c he felt nauseated but is not sleeping comfortably. He has not had a fever. His blood sugars have been higher, was 287 today, but corrected w/insulin. Raven knows that infection can affect blood glucose levels. She will continue encouraging pt to drink fluids and knows that home care can not administer the IV fluids but will draw labs tomorrow. She does not want to try having an infusion center give the IV fluids if he can continue taking in fluids and keeping them down. Pt will continue taking zofran if needed for nausea. He has not vomited today. Raven knows that if pt has a lot of difficulty taking in fluids, he may need to go to ED. Let Raven know that we will keep an eye out for the lab results tomorrow.  Any Kaminski RN

## 2020-11-05 NOTE — PROGRESS NOTES
Raven states they had a virtual visit with infectious disease to review his past and present infection concerns. Home care was able to draw blood, and there is mention of possibly needing IV hydration. Patient has had a fever off and on since being diagnosed with Covid. She will keep our clinic updated as needed and is aware of change in appointment with Dr. Simmons to January.

## 2020-11-05 NOTE — TELEPHONE ENCOUNTER
----- Message from Sandee Bird MD sent at 11/4/2020  5:10 PM CST -----  Hi,   I have placed some orders for this patient - labs and iv fluids.   Can you please fax them over to [facility] at [phone] - fax.   It seems they go out on fridays, but I was wondering if they can go out tomorrow, Thursday for these?  Thanks  AB

## 2020-11-05 NOTE — TELEPHONE ENCOUNTER
Updated Dr. Bird who asked me to call pt's daughter, Kristi, who manages much of his care. [facility] for her and sent her a [employer] msg. Per Dr. Bird, pt may not need IV fluids if he is drinking enough and keeping the fluids down. Additionally, it may be difficult to get pt in to infusion center for IV fluids as he recently tested positive for COVID-19. Asked Kristi to call me back or reply to [employer] msg.  Any Kaminski RN

## 2020-11-09 NOTE — TELEPHONE ENCOUNTER
RETURNING wife's phone call  She states that pt has been having diarrhea for the pasts 5 days    She states that he's been eating fruit so they've eliminated this from his diet.  He continues to have diarrhea.     She states that he was positive on 10/23     She is going to bring him in to f/up with his PCP due to this    I informed her that I will go ahead and cancel and r/s his appt as Dr. Hilton is going to want to see the MRI first prior to seeing him.     She agrees to   Plan.     Will r/s to the following Tues 11/17 for follow up and then r/s the MRI to [facility] per pt's preference.     Sunshine TUCKER RN, BSN  Interventional Radiology/Vascular  Nurse Coordinator   Phone: [phone]  Fax: [phone]

## 2020-11-11 NOTE — TELEPHONE ENCOUNTER
[facility] Call Center    Phone Message    May a detailed message be left on voicemail: yes     Reason for Call: Medication Refill Request    Has the patient contacted the pharmacy for the refill? Yes   Name of medication being requested: oxyCODONE (ROXICODONE) 5 MG tablet  Provider who prescribed the medication: Ary Murphy NP  Pharmacy:    [facility] [address] [place] [address] N.      Date medication is needed: when it is due         Action Taken: Message routed to:  [facility]    Travel Screening: Not Applicable                                                                         99

## 2020-11-11 NOTE — TELEPHONE ENCOUNTER
RX has been signed and sent to pharmacy to be filled 11/16  CHRISTINA SOL CMA at 12:01 PM on 11/11/2020.

## 2020-11-12 NOTE — TELEPHONE ENCOUNTER
Tobacco Treatment Program at the [facility] attempted to reach Mr. Louie on 11/12/2020 regarding the tobacco cessation program to help Mr. Louie to quit smoking. We will attempt to reach Mr. Louie another time.     [facility]  Tobacco Treatment Specialist  PH: [phone]

## 2020-11-13 PROBLEM — D64.9 ANEMIA: Status: ACTIVE | Noted: 2020-01-01

## 2020-11-13 NOTE — TELEPHONE ENCOUNTER
----- Message -----  From: Ary Murphy APRN CNP  Sent: 11/11/2020   3:24 PM CST  To: Giovanna Alicia RN  Subject: RE: lab results                                  Giovanna,    Thanks for your help.  Can we be sure to have those results scanned into the chart.?    Also I left them a message on their phone and in My Chart regarding placing an order for 2 units of PRBCs in the Infusion Center this week. Can you help set that up?  I messaged her that you would be reaching out to her.   Ary RAY, CNP
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
## 2020-11-17 NOTE — TELEPHONE ENCOUNTER
Tobacco Treatment Program at the [facility] attempted to reach Mr. Louie on 11/17/2020 regarding the tobacco cessation program to help Mr. Louie to quit smoking. We will attempt to reach Mr. Louie another time.     [facility]  Tobacco Treatment Specialist  PH: [phone]

## 2020-11-19 NOTE — TELEPHONE ENCOUNTER
[facility] Call Center    Phone Message    May a detailed message be left on voicemail: yes     Reason for Call: Other: The patient is scheduled for a transfusion 11/20/20, however the patient's wife wanted to know if its urgent that he go to his appointment tomorrow because the patient isn't feeling well and wanted to check if its ok if they rescheduled, please call to address any questions or concerns.     Action Taken: Message routed to:  [facility]    Travel Screening: Not Applicable

## 2020-11-19 NOTE — PROGRESS NOTES
Telephone call to Pt to  Obtain consent for PRB infusion.  Pt.'s wife states pt has diarrhea and will not be able to spend all day in the infusion center tomorrow. Appt. Cancelled and will ask [facility] to draw CBC on 11/23/20.    Ary RAY, CNP

## 2020-11-20 NOTE — TELEPHONE ENCOUNTER
Spoke with [facility] home care nurse ([phone]) and informed the lab order.    Soon-Mi  --------------------------------------------------------------------    ----- Message from CORY Toussaint CNP sent at 11/19/2020  5:10 PM CST -----  Regarding: blood draw  Can you call [facility] and ask them to have a CBC drawn on [place] on Monday 11/23 when they are scheduled to go to his house?  Indication: anemia    Thanks.       Ary RAY, CNP

## 2020-11-20 NOTE — TELEPHONE ENCOUNTER
Tobacco Treatment Program at the [facility] attempted to reach Mr. Louie on 11/20/2020 regarding the tobacco cessation program to help Mr. Louie to quit smoking. We will attempt to reach Mr. Louie another time.     [facility]  Tobacco Treatment Specialist  PH: [phone]

## 2020-11-20 NOTE — TELEPHONE ENCOUNTER
Spoke with pt's wife.  I informed her that pt needs lab for CBC on 11/23, then we will reschedule the transfusion after the result.

## 2020-11-25 NOTE — TELEPHONE ENCOUNTER
Ary,  Some labs were given to me for this patient. I wanted to make sure you previously saw them.  The labs in the system from 11/6 notable fo ranemia Hg 7.4, Ca 7.7, elevated alk phos and low protein.  Thanks,  Sunshine Trevizo MD  Internal Medicine

## 2020-11-27 NOTE — TELEPHONE ENCOUNTER
[facility] Call Center    Phone Message    May a detailed message be left on voicemail: yes     Reason for Call: Order(s): Home Care Orders: Skilled Nursin time a week for 9 weeks for wound care to bilateral lower extremities      Action Taken: Message routed to:  [facility]    Travel Screening: Not Applicable

## 2020-12-01 NOTE — TELEPHONE ENCOUNTER
[facility] Call Center    Phone Message    May a detailed message be left on voicemail: yes     Reason for Call: Other: [facility] called again requesting verbal orders. Please follow up     Action Taken: Message routed to:  [facility]    Travel Screening: Not Applicable

## 2020-12-01 NOTE — TELEPHONE ENCOUNTER
Called: Gave verbal orders to Natasha Murphy CNP for Order(s): Home Care Orders: Skilled Nursin time a week for 9 weeks for wound care to bilateral lower extremities       Sigifredo Andrade CMA (AAMA) at 2:55 PM on 2020

## 2020-12-03 NOTE — TELEPHONE ENCOUNTER
[facility] Call [facility]    Phone Message    May a detailed message be left on voicemail: yes     Reason for Call:     Call back Anna from [facility] verbal orders for PT with start date 12/01/20 for once a week for 5 weeks.     Action Taken: Message routed to:  [facility] id    Travel Screening: Not Applicable

## 2020-12-03 NOTE — TELEPHONE ENCOUNTER
hydrOXYzine HCl Oral Tablet 25 MG   Last Written Prescription Date:  7/21/2020  Last Fill Quantity: 90,   # refills: 3  Last Office Visit : 11/3/2020  Future Office visit:  None  90 Tabs, 3 Refills sent to pharm  12/3/2020      Nessa Martinez RN  Central Triage Red Flags/Med Refills

## 2020-12-07 NOTE — TELEPHONE ENCOUNTER
[facility] Call Center    Phone Message    May a detailed message be left on voicemail: yes     Reason for Call: Order(s): Home Care Orders: Occupational Therapy (OT): Lymphodema focus - 1x a week for 1 week  - 2x a week for 2 weeks - 1x a week for 2 weeks - Start date: 12/04/2020    Action Taken: Message routed to:  [facility] ID    Travel Screening: Not Applicable

## 2020-12-07 NOTE — TELEPHONE ENCOUNTER
I called and left VM verbally approving HC orders below.   Rhonda Rivas, EMT at 2:44 PM on 12/7/2020.

## 2020-12-09 NOTE — TELEPHONE ENCOUNTER
Called Anna back and asked her to request orders from Ary Murphy per request from Dr. Bird. Gave her PCP's clinic phone #.  Any Kaminski RN

## 2020-12-09 NOTE — TELEPHONE ENCOUNTER
[facility] Call Center    Phone Message    May a detailed message be left on voicemail: yes     Reason for Call: Order(s): Home Care Orders: Physical Therapy (PT): 1x a week for 5 weeks, started on 12/1    Action Taken: Message routed to:  [facility]    Travel Screening: Not Applicable

## 2020-12-10 NOTE — TELEPHONE ENCOUNTER
Verbal orders given to Anna from [facility], per Ary Murphy, for Order(s): Home Care Orders: Physical Therapy (PT): 1x a week for 5 weeks, started on 12/1. Magdalena Mott LPN 12/10/2020 6:59 AM

## 2020-12-14 NOTE — PROGRESS NOTES
Patient's spouse Raven called to update on status. He is in the process of having cataract surgery on his eyes, due to recent worsening sight issues. This has caused significant deconditioning, and they are slowing working with him to improve mobility. The first eye surgery was on 12/3 and the second is scheduled for 12/17. He completed lab work and imaging per Dr. Hilton, and appointment is 12/15. Review of next hepatology appointment in January with Dr. Simmons.

## 2020-12-15 NOTE — PROGRESS NOTES
"Lawrence Louie is a 64 year old male who is being evaluated via a billable video visit.      The patient has been notified of following:     \"This video visit will be conducted via a call between you and your physician/provider. We have found that certain health care needs can be provided without the need for an in-person physical exam.  This service lets us provide the care you need with a video conversation.  If a prescription is necessary we can send it directly to your pharmacy.  If lab work is needed we can place an order for that and you can then stop by our lab to have the test done at a later time.    Video visits are billed at different rates depending on your insurance coverage.  Please reach out to your insurance provider with any questions.    If during the course of the call the physician/provider feels a video visit is not appropriate, you will not be charged for this service.\"    Patient has given verbal consent for Video visit? Yes  How would you like to obtain your AVS? MyChart  If you are dropped from the video visit, the video invite should be resent to: Send to e-mail at: [email or web address]  Will anyone else be joining your video visit? No      Vitals - Patient Reported  Weight (Patient Reported): (WIFE UNSURE BUT GUESSING AROUND 260 DUE TO WATER RETENTION)  Pain Score: No Pain (0)        I have reviewed and updated patient's allergy and medication list.    Concerns: YES  Refills: NONE      Ange Hlil CMA  Video-Visit Details    Type of service:  Video Visit    Video Start Time: 12:25  Video End Time: 12:35    Originating Location (pt. 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 He has h/o ETOH " cirrhosis complicated by ascites s/p TIPS and hepatic encephalopathy and diffuse anasarca and MILLY and multiple hospitalizations due to sepsis secondary to leg ulcers. He had a cTACE of his segment 6, in July and cTACE of segment 7 lesion in October. He  Tolerated the second procedure much better. He was well hydrated after the procedure. He needed to be admitted after the procedure because of over reaction to sedation.  His appetite is good and his ECOG is 1.    I have looked at his images. The new MRI shows good response to segment 6 treated lesion. Segment 7 is also well treated however because of some motion artifacts, it is hard to confirm no residual. The segment 4 lesion is stable however, there is a new 3.3 cm lesion in segment 6.    Current Outpatient Medications   Medication     blood glucose (NO BRAND SPECIFIED) lancets standard     blood glucose (NO BRAND SPECIFIED) test strip     blood glucose (NO BRAND SPECIFIED) test strip     blood glucose monitoring (NO BRAND SPECIFIED) meter device kit     blood glucose monitoring (NO BRAND SPECIFIED) meter device kit     citalopram (CELEXA) 20 MG tablet     COMPOUNDED NON-CONTROLLED SUBSTANCE (CMPD RX) - PHARMACY TO MIX COMPOUNDED MEDICATION     Cyanocobalamin (VITAMIN B-12 PO)     desvenlafaxine (PRISTIQ) 100 MG 24 hr tablet     EQL VITAMIN D3 50 MCG (2000 UT) CAPS     famotidine (PEPCID) 40 MG tablet     furosemide (LASIX) 40 MG tablet     gabapentin (NEURONTIN) 300 MG capsule     hydrOXYzine (ATARAX) 25 MG tablet     insulin aspart (NOVOLOG FLEXPEN) 100 UNIT/ML pen     insulin glargine (LANTUS SOLOSTAR) 100 UNIT/ML pen     insulin pen needle (B-D U/F) 31G X 8 MM miscellaneous     insulin pen needle (ULTICARE SHORT) 31G X 8 MM miscellaneous     lactulose (CEPHULAC) 20 GM packet     lactulose encephalopathy (CHRONULAC) 10 GM/15ML SOLUTION     Lidocaine (LIDOCARE) 4 % Patch     magnesium oxide (MAG-OX) 400 (241.3 Mg) MG tablet     Nutritional Supplements (ENSURE)  LIQD     nystatin (MYCOSTATIN) 970491 UNIT/GM external cream     ondansetron (ZOFRAN) 4 MG tablet     order for DME     order for DME     order for DME     order for DME     oxyCODONE (ROXICODONE) 5 MG tablet     OYSTER SHELL CALCIUM/D 500-200 MG-UNIT per tablet     polyethylene glycol (MIRALAX) 17 g packet     prochlorperazine (COMPAZINE) 10 MG tablet     QUEtiapine (SEROQUEL) 25 MG tablet     QUEtiapine (SEROQUEL) 50 MG tablet     QUEtiapine (SEROQUEL) 50 MG tablet     rifaximin (XIFAXAN) 550 MG TABS tablet     spironolactone (ALDACTONE) 50 MG tablet     ursodiol (ACTIGALL) 300 MG capsule     Current Facility-Administered Medications   Medication     0.9% sodium chloride BOLUS     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)      Splenomegaly      Squamous cell carcinoma      Thrombocytopenia (H)      Varices, esophageal (H)     Banded in 2011       Past Surgical History:   Procedure Laterality Date     BIOPSY OF SKIN LESION       COLONOSCOPY Left 6/16/2016    Procedure: COMBINED COLONOSCOPY, SINGLE OR MULTIPLE BIOPSY/POLYPECTOMY BY BIOPSY;  Surgeon: Brandy Barnett MD;  Location:  GI     ESOPHAGOSCOPY, GASTROSCOPY, DUODENOSCOPY (EGD), COMBINED  2/13/2013    Procedure: COMBINED ESOPHAGOSCOPY, GASTROSCOPY, DUODENOSCOPY (EGD);;  Surgeon: Tara Cook MD;  Location:  GI     ESOPHAGOSCOPY, GASTROSCOPY, DUODENOSCOPY (EGD), COMBINED  11/4/2013    Procedure: COMBINED ESOPHAGOSCOPY, GASTROSCOPY, DUODENOSCOPY (EGD);;  Surgeon: Lonny Diaz MD;  Location:  GI     ESOPHAGOSCOPY, GASTROSCOPY, DUODENOSCOPY (EGD), COMBINED Left 6/16/2016    Procedure: COMBINED ESOPHAGOSCOPY, GASTROSCOPY, DUODENOSCOPY (EGD), BIOPSY SINGLE OR MULTIPLE;  Surgeon: Brandy Barnett MD;  Location:  GI      EXCISE LESION TRUNK  9/24/2012    Procedure: EXCISE LESION TRUNK;;  Surgeon: Pepe Dominguez MD;  Location: [facility]     GENITOURINARY SURGERY      vasectomy     HERNIORRHAPHY UMBILICAL  9/24/2012    Procedure: HERNIORRHAPHY UMBILICAL;  UMBILICAL HERNIA REPAIR , EXCISION OF PERIUMBILICAL CYST;  Surgeon: Pepe Dominguez MD;  Location: [facility]     IR CHEMO EMBOLIZATION  7/30/2020     IR CHEMO EMBOLIZATION  10/1/2020       Family History   Problem Relation Age of Onset     Breast Cancer Mother      Liver Cancer Mother      Cardiovascular Father      Cerebrovascular Disease Father         very low blood pressure     Cancer Father         rectal cancer     Cardiovascular Paternal Grandfather      Diabetes Brother      Cancer Sister         skin cancer     C.A.D. Other         MI, 70's     Breast Cancer Sister      Thyroid Disease No family hx of      Lipids No family hx of      Anesthesia Reaction No family hx of        Social History     Tobacco Use     Smoking status: Current Every Day Smoker     Packs/day: 0.30     Types: Cigarettes     Smokeless tobacco: Never Used     Tobacco comment: about 4 cigarettes per day   Substance Use Topics     Alcohol use: No     Alcohol/week: 0.0 standard drinks     Comment: 2-3 per day , none since Dec 2012     Lab Results   Component Value Date    AST 31 12/08/2020     Lab Results   Component Value Date    ALT 20 12/08/2020     Lab Results   Component Value Date    BILICONJ 0.0 03/12/2014      Lab Results   Component Value Date    BILITOTAL 0.7 12/08/2020     Lab Results   Component Value Date    ALBUMIN 2.1 12/08/2020     Lab Results   Component Value Date    PROTTOTAL 6.3 12/08/2020      Lab Results   Component Value Date    ALKPHOS 187 12/08/2020     AFP: 149.9 from 82 in September     Impression and plan:  The patient is doing well however there is a new lesion in segment 6. His labs are good but the AFP has increased. The patient needs a new chest CT and bone scan sometimes in  January and possible cTACE of segment 6/or segment 4. He will have a cataract surgery this week. We could plan for the cTACE in January after the radiology exams. Because of over reaction to sedation, the plan is to perform the procedure first case and not sedate until really needed.  The patient and his family (daughter and wife) were present and agreed with the plan.

## 2020-12-15 NOTE — LETTER
"    12/15/2020         RE: Lawrence Louie  [address] [place] [address]        Dear Colleague,    Thank you for referring your patient, Lawrence Louie, to the [facility]. Please see a copy of my visit note below.    Lawrence Louie is a 64 year old male who is being evaluated via a billable video visit.      The patient has been notified of following:     \"This video visit will be conducted via a call between you and your physician/provider. We have found that certain health care needs can be provided without the need for an in-person physical exam.  This service lets us provide the care you need with a video conversation.  If a prescription is necessary we can send it directly to your pharmacy.  If lab work is needed we can place an order for that and you can then stop by our lab to have the test done at a later time.    Video visits are billed at different rates depending on your insurance coverage.  Please reach out to your insurance provider with any questions.    If during the course of the call the physician/provider feels a video visit is not appropriate, you will not be charged for this service.\"    Patient has given verbal consent for Video visit? Yes  How would you like to obtain your AVS? MyChart  If you are dropped from the video visit, the video invite should be resent to: Send to e-mail at: [email or web address]  Will anyone else be joining your video visit? No      Vitals - Patient Reported  Weight (Patient Reported): (WIFE UNSURE BUT GUESSING AROUND 260 DUE TO WATER RETENTION)  Pain Score: No Pain (0)        I have reviewed and updated patient's allergy and medication list.    Concerns: YES  Refills: NONE      Ange Hill CMA  Video-Visit Details    Type of service:  Video Visit    Video Start Time: 12:25  Video End Time: 12:35    Originating Location (pt. Location): Home    Distant Location (provider location):  [facility]     Platform used for Video Visit: Nikole    Total time: 25 min        Clinic visit:     Lawrence oLuie is a 64 year old male who presents with diagnosis of unresectable multifocal HCC on a background of EtOH cirrhosis. He has h/o ETOH cirrhosis complicated by ascites s/p TIPS and hepatic encephalopathy and diffuse anasarca and MILLY and multiple hospitalizations due to sepsis secondary to leg ulcers. He had a cTACE of his segment 6, in July and cTACE of segment 7 lesion in October. He  Tolerated the second procedure much better. He was well hydrated after the procedure. He needed to be admitted after the procedure because of over reaction to sedation.  His appetite is good and his ECOG is 1.    I have looked at his images. The new MRI shows good response to segment 6 treated lesion. Segment 7 is also well treated however because of some motion artifacts, it is hard to confirm no residual. The segment 4 lesion is stable however, there is a new 3.3 cm lesion in segment 6.    Current Outpatient Medications   Medication     blood glucose (NO BRAND SPECIFIED) lancets standard     blood glucose (NO BRAND SPECIFIED) test strip     blood glucose (NO BRAND SPECIFIED) test strip     blood glucose monitoring (NO BRAND SPECIFIED) meter device kit     blood glucose monitoring (NO BRAND SPECIFIED) meter device kit     citalopram (CELEXA) 20 MG tablet     COMPOUNDED NON-CONTROLLED SUBSTANCE (CMPD RX) - PHARMACY TO MIX COMPOUNDED MEDICATION     Cyanocobalamin (VITAMIN B-12 PO)     desvenlafaxine (PRISTIQ) 100 MG 24 hr tablet     EQL VITAMIN D3 50 MCG (2000 UT) CAPS     famotidine (PEPCID) 40 MG tablet     furosemide (LASIX) 40 MG tablet     gabapentin (NEURONTIN) 300 MG capsule     hydrOXYzine (ATARAX) 25 MG tablet     insulin aspart (NOVOLOG FLEXPEN) 100 UNIT/ML pen     insulin glargine (LANTUS SOLOSTAR) 100 UNIT/ML pen     insulin pen needle (B-D U/F) 31G X 8 MM miscellaneous     insulin pen needle  (ULTICARE SHORT) 31G X 8 MM miscellaneous     lactulose (CEPHULAC) 20 GM packet     lactulose encephalopathy (CHRONULAC) 10 GM/15ML SOLUTION     Lidocaine (LIDOCARE) 4 % Patch     magnesium oxide (MAG-OX) 400 (241.3 Mg) MG tablet     Nutritional Supplements (ENSURE) LIQD     nystatin (MYCOSTATIN) 232397 UNIT/GM external cream     ondansetron (ZOFRAN) 4 MG tablet     order for DME     order for DME     order for DME     order for DME     oxyCODONE (ROXICODONE) 5 MG tablet     OYSTER SHELL CALCIUM/D 500-200 MG-UNIT per tablet     polyethylene glycol (MIRALAX) 17 g packet     prochlorperazine (COMPAZINE) 10 MG tablet     QUEtiapine (SEROQUEL) 25 MG tablet     QUEtiapine (SEROQUEL) 50 MG tablet     QUEtiapine (SEROQUEL) 50 MG tablet     rifaximin (XIFAXAN) 550 MG TABS tablet     spironolactone (ALDACTONE) 50 MG tablet     ursodiol (ACTIGALL) 300 MG capsule     Current Facility-Administered Medications   Medication     0.9% sodium chloride BOLUS     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)      Splenomegaly      Squamous cell carcinoma      Thrombocytopenia (H)      Varices, esophageal (H)     Banded in 2011       Past Surgical History:   Procedure Laterality Date     BIOPSY OF SKIN LESION       COLONOSCOPY Left 6/16/2016    Procedure: COMBINED COLONOSCOPY, SINGLE OR MULTIPLE BIOPSY/POLYPECTOMY BY BIOPSY;  Surgeon: Brandy Barnett MD;  Location:  GI     ESOPHAGOSCOPY, GASTROSCOPY, DUODENOSCOPY (EGD), COMBINED  2/13/2013    Procedure: COMBINED ESOPHAGOSCOPY, GASTROSCOPY, DUODENOSCOPY (EGD);;  Surgeon: Tara Cook MD;  [facility]     ESOPHAGOSCOPY, GASTROSCOPY, DUODENOSCOPY (EGD), COMBINED  11/4/2013    Procedure: COMBINED ESOPHAGOSCOPY, GASTROSCOPY, DUODENOSCOPY (EGD);;  Surgeon: Joe  Lonny SLATER MD;  Location:  GI     ESOPHAGOSCOPY, GASTROSCOPY, DUODENOSCOPY (EGD), COMBINED Left 6/16/2016    Procedure: COMBINED ESOPHAGOSCOPY, GASTROSCOPY, DUODENOSCOPY (EGD), BIOPSY SINGLE OR MULTIPLE;  Surgeon: Brandy Barnett MD;  Location:  GI     EXCISE LESION TRUNK  9/24/2012    Procedure: EXCISE LESION TRUNK;;  Surgeon: Pepe Dominguez MD;  Location: [facility]     GENITOURINARY SURGERY      vasectomy     HERNIORRHAPHY UMBILICAL  9/24/2012    Procedure: HERNIORRHAPHY UMBILICAL;  UMBILICAL HERNIA REPAIR , EXCISION OF PERIUMBILICAL CYST;  Surgeon: Pepe Dominguez MD;  Location: [facility]     IR CHEMO EMBOLIZATION  7/30/2020     IR CHEMO EMBOLIZATION  10/1/2020       Family History   Problem Relation Age of Onset     Breast Cancer Mother      Liver Cancer Mother      Cardiovascular Father      Cerebrovascular Disease Father         very low blood pressure     Cancer Father         rectal cancer     Cardiovascular Paternal Grandfather      Diabetes Brother      Cancer Sister         skin cancer     C.A.D. Other         MI, 70's     Breast Cancer Sister      Thyroid Disease No family hx of      Lipids No family hx of      Anesthesia Reaction No family hx of        Social History     Tobacco Use     Smoking status: Current Every Day Smoker     Packs/day: 0.30     Types: Cigarettes     Smokeless tobacco: Never Used     Tobacco comment: about 4 cigarettes per day   Substance Use Topics     Alcohol use: No     Alcohol/week: 0.0 standard drinks     Comment: 2-3 per day , none since Dec 2012     Lab Results   Component Value Date    AST 31 12/08/2020     Lab Results   Component Value Date    ALT 20 12/08/2020     Lab Results   Component Value Date    BILICONJ 0.0 03/12/2014      Lab Results   Component Value Date    BILITOTAL 0.7 12/08/2020     Lab Results   Component Value Date    ALBUMIN 2.1 12/08/2020     Lab Results   Component Value Date    PROTTOTAL 6.3 12/08/2020      Lab Results   Component Value Date     ALKPHOS 187 12/08/2020     AFP: 149.9 from 82 in September     Impression and plan:  The patient is doing well however there is a new lesion in segment 6. His labs are good but the AFP has increased. The patient needs a new chest CT and bone scan sometimes in January and possible cTACE of segment 6/or segment 4. He will have a cataract surgery this week. We could plan for the cTACE in January after the radiology exams. Because of over reaction to sedation, the plan is to perform the procedure first case and not sedate until really needed.  The patient and his family (daughter and wife) were present and agreed with the plan.          Again, thank you for allowing me to participate in the care of your patient.        Sincerely,        Yady Hilton MD

## 2020-12-16 NOTE — TELEPHONE ENCOUNTER
Reason For Call:        Chief Complaint   Patient presents with     Refill Request                 Medication Name, Dose and Monthly Quantity:     Roxicodone 5 mg     Diagnosis requiring opiates:        Problem List Updated:   Yes     Opioid Agreement On File - [facility] PAIN CONTRACT ID# [id number]:       Last Urine Drug Screen (at least once every 12 months) Date:     Unexpected Results:        [place] [facility] Data Reviewed (at least once every 3 months) Date:   12/16/2020     Unexpected Results:         Last Fill Date:   11/16/2020    Next Fill Date:   12/16/2020     Last Visit with PCP:        Future Visits with PCP:      Processing:           CHRISTINA SOL CMA at 10:19 AM on 12/16/2020.

## 2020-12-28 NOTE — TELEPHONE ENCOUNTER
Called and spoke to wife regarding bone scan and chest Ct    Informed her that we have pt scheduled for his bone scan and chest ct for Tues 1/5.     All appt details given to her in which this will be done at [facility] per pt's wife request.     She verbalized understanding.   We will plan for treatment date on Thurs 1/7 with Dr. Hilton and will see what I can do and then call them back.     She agrees to plan.     Sunshine TUCKER RN, BSN  Interventional Radiology/Vascular  Nurse Coordinator   Phone: [phone]  Fax: [phone]

## 2020-12-28 NOTE — TELEPHONE ENCOUNTER
Called and spoke to wife regarding their TACE procedure     We will plan for treatment with Dr. Hilton around the first week of January    Wife states that they'd like the imaging to be completed at [facility] in the PM hours.     I will see what I can do and then call them back.     She agrees to plan.     Sunshine TUCKER RN, BSN  Interventional Radiology/Vascular  Nurse Coordinator   Phone: [phone]  Fax: [phone]

## 2020-12-29 NOTE — TELEPHONE ENCOUNTER
Called pt and left a msg for wife regarding pt's TACE procedure with Cruzito Hilton    Informed her that this is scheduled for Thurs 1/7.     Check in at 9am for a 1030am start time    All prep informed as well as informing them that he needs covid testing 4 days beforehand    Will send letter as well as mychart msg.     Left direct line for call back.    Sunshine TUCKER RN, BSN  Interventional Radiology/Vascular  Nurse Coordinator   Phone: [phone]  Fax: [phone]

## 2020-12-30 NOTE — TELEPHONE ENCOUNTER
[facility] Call Center    Phone Message    May a detailed message be left on voicemail: yes     Reason for Call: Other: Elizabeth lovett home care nurse calling with requests from family to check and see if patients foot piece has been ordered from [facility] yet. Please call to discuss thank you.      Action Taken: Message routed to:  [facility]    Travel Screening: Not Applicable

## 2020-12-31 NOTE — TELEPHONE ENCOUNTER
[facility] Call Center    Phone Message    May a detailed message be left on voicemail: yes     Reason for Call: Order(s): Home Care Orders: Physical Therapy (PT): Extend 1x a week for 3 weeks - Starting next week.      Action Taken: Message routed to:  [facility]    Travel Screening: Not Applicable

## 2020-12-31 NOTE — TELEPHONE ENCOUNTER
"Called Elizabeth and left a secure VM to return call.    Have Elizabeth return call to specify what this \"foot piece\" they are requesting.    Or if the \"foot piece\" are the orders Ary signed back on 08/11/20 for:    -Pena wraps   -2 sets of foot wraps  -Extra wound care supplies for home wound care dressing changes:    -ABD pads    -Kerlix         Sigifredo Andrade CMA ([facility]) at 10:16 AM on 12/31/2020     "

## 2021-01-01 ENCOUNTER — MEDICAL CORRESPONDENCE (OUTPATIENT)
Dept: HEALTH INFORMATION MANAGEMENT | Facility: CLINIC | Age: 66
End: 2021-01-01

## 2021-01-01 ENCOUNTER — MYC MEDICAL ADVICE (OUTPATIENT)
Dept: INTERNAL MEDICINE | Facility: CLINIC | Age: 66
End: 2021-01-01

## 2021-01-01 ENCOUNTER — PRE VISIT (OUTPATIENT)
Dept: ONCOLOGY | Facility: CLINIC | Age: 66
End: 2021-01-01

## 2021-01-01 ENCOUNTER — TELEPHONE (OUTPATIENT)
Dept: VASCULAR SURGERY | Facility: CLINIC | Age: 66
End: 2021-01-01

## 2021-01-01 ENCOUNTER — LAB (OUTPATIENT)
Dept: LAB | Facility: CLINIC | Age: 66
End: 2021-01-01

## 2021-01-01 ENCOUNTER — TELEPHONE (OUTPATIENT)
Dept: ONCOLOGY | Facility: CLINIC | Age: 66
End: 2021-01-01

## 2021-01-01 ENCOUNTER — PATIENT OUTREACH (OUTPATIENT)
Dept: GASTROENTEROLOGY | Facility: CLINIC | Age: 66
End: 2021-01-01

## 2021-01-01 ENCOUNTER — VIRTUAL VISIT (OUTPATIENT)
Dept: PULMONOLOGY | Facility: CLINIC | Age: 66
End: 2021-01-01
Payer: COMMERCIAL

## 2021-01-01 ENCOUNTER — ANCILLARY PROCEDURE (OUTPATIENT)
Dept: NEUROLOGY | Facility: CLINIC | Age: 66
End: 2021-01-01
Attending: PSYCHIATRY & NEUROLOGY
Payer: COMMERCIAL

## 2021-01-01 ENCOUNTER — HOME INFUSION (PRE-WILLOW HOME INFUSION) (OUTPATIENT)
Dept: PHARMACY | Facility: CLINIC | Age: 66
End: 2021-01-01

## 2021-01-01 ENCOUNTER — DOCUMENTATION ONLY (OUTPATIENT)
Dept: FAMILY MEDICINE | Facility: CLINIC | Age: 66
End: 2021-01-01

## 2021-01-01 ENCOUNTER — MYC REFILL (OUTPATIENT)
Dept: INTERNAL MEDICINE | Facility: CLINIC | Age: 66
End: 2021-01-01

## 2021-01-01 ENCOUNTER — ANCILLARY PROCEDURE (OUTPATIENT)
Dept: CT IMAGING | Facility: CLINIC | Age: 66
End: 2021-01-01
Attending: RADIOLOGY
Payer: COMMERCIAL

## 2021-01-01 ENCOUNTER — ANCILLARY PROCEDURE (OUTPATIENT)
Dept: NUCLEAR MEDICINE | Facility: CLINIC | Age: 66
End: 2021-01-01
Attending: RADIOLOGY
Payer: COMMERCIAL

## 2021-01-01 ENCOUNTER — OFFICE VISIT (OUTPATIENT)
Dept: PODIATRY | Facility: CLINIC | Age: 66
End: 2021-01-01
Payer: COMMERCIAL

## 2021-01-01 ENCOUNTER — TELEPHONE (OUTPATIENT)
Dept: INTERNAL MEDICINE | Facility: CLINIC | Age: 66
End: 2021-01-01

## 2021-01-01 ENCOUNTER — OFFICE VISIT (OUTPATIENT)
Dept: INTERNAL MEDICINE | Facility: CLINIC | Age: 66
End: 2021-01-01
Payer: COMMERCIAL

## 2021-01-01 ENCOUNTER — INFUSION THERAPY VISIT (OUTPATIENT)
Dept: INFUSION THERAPY | Facility: CLINIC | Age: 66
End: 2021-01-01
Attending: PHYSICIAN ASSISTANT
Payer: COMMERCIAL

## 2021-01-01 ENCOUNTER — VIRTUAL VISIT (OUTPATIENT)
Dept: INFECTIOUS DISEASES | Facility: CLINIC | Age: 66
End: 2021-01-01
Attending: STUDENT IN AN ORGANIZED HEALTH CARE EDUCATION/TRAINING PROGRAM
Payer: COMMERCIAL

## 2021-01-01 ENCOUNTER — VIRTUAL VISIT (OUTPATIENT)
Dept: GASTROENTEROLOGY | Facility: CLINIC | Age: 66
End: 2021-01-01
Attending: PHYSICIAN ASSISTANT
Payer: COMMERCIAL

## 2021-01-01 ENCOUNTER — APPOINTMENT (OUTPATIENT)
Dept: INTERVENTIONAL RADIOLOGY/VASCULAR | Facility: CLINIC | Age: 66
End: 2021-01-01
Attending: RADIOLOGY
Payer: COMMERCIAL

## 2021-01-01 ENCOUNTER — VIRTUAL VISIT (OUTPATIENT)
Dept: RADIOLOGY | Facility: CLINIC | Age: 66
End: 2021-01-01
Attending: RADIOLOGY
Payer: COMMERCIAL

## 2021-01-01 ENCOUNTER — VIRTUAL VISIT (OUTPATIENT)
Dept: INFECTIOUS DISEASES | Facility: CLINIC | Age: 66
End: 2021-01-01
Attending: INTERNAL MEDICINE
Payer: COMMERCIAL

## 2021-01-01 ENCOUNTER — TELEPHONE (OUTPATIENT)
Dept: INFECTIOUS DISEASES | Facility: CLINIC | Age: 66
End: 2021-01-01

## 2021-01-01 ENCOUNTER — DOCUMENTATION ONLY (OUTPATIENT)
Dept: CARE COORDINATION | Facility: CLINIC | Age: 66
End: 2021-01-01

## 2021-01-01 ENCOUNTER — OFFICE VISIT (OUTPATIENT)
Dept: WOUND CARE | Facility: CLINIC | Age: 66
End: 2021-01-01
Payer: COMMERCIAL

## 2021-01-01 ENCOUNTER — PATIENT OUTREACH (OUTPATIENT)
Dept: CARE COORDINATION | Facility: CLINIC | Age: 66
End: 2021-01-01

## 2021-01-01 ENCOUNTER — DOCUMENTATION ONLY (OUTPATIENT)
Dept: INFECTIOUS DISEASES | Facility: CLINIC | Age: 66
End: 2021-01-01

## 2021-01-01 ENCOUNTER — TELEPHONE (OUTPATIENT)
Dept: PLASTIC SURGERY | Facility: CLINIC | Age: 66
End: 2021-01-01

## 2021-01-01 ENCOUNTER — HOSPITAL ENCOUNTER (INPATIENT)
Facility: CLINIC | Age: 66
Setting detail: SURGERY ADMIT
End: 2021-01-01
Payer: COMMERCIAL

## 2021-01-01 ENCOUNTER — TELEPHONE (OUTPATIENT)
Dept: UROLOGY | Facility: CLINIC | Age: 66
End: 2021-01-01

## 2021-01-01 ENCOUNTER — CARE COORDINATION (OUTPATIENT)
Dept: INTERNAL MEDICINE | Facility: CLINIC | Age: 66
End: 2021-01-01

## 2021-01-01 ENCOUNTER — APPOINTMENT (OUTPATIENT)
Dept: INTERVENTIONAL RADIOLOGY/VASCULAR | Facility: CLINIC | Age: 66
DRG: 425 | End: 2021-01-01
Attending: RADIOLOGY
Payer: COMMERCIAL

## 2021-01-01 ENCOUNTER — HOSPITAL ENCOUNTER (OUTPATIENT)
Facility: CLINIC | Age: 66
Setting detail: SPECIMEN
Discharge: HOME | End: 2021-06-22
Attending: INTERNAL MEDICINE | Admitting: STUDENT IN AN ORGANIZED HEALTH CARE EDUCATION/TRAINING PROGRAM
Payer: COMMERCIAL

## 2021-01-01 ENCOUNTER — OFFICE VISIT (OUTPATIENT)
Dept: UROLOGY | Facility: CLINIC | Age: 66
End: 2021-01-01
Payer: COMMERCIAL

## 2021-01-01 ENCOUNTER — ANCILLARY PROCEDURE (OUTPATIENT)
Dept: MRI IMAGING | Facility: CLINIC | Age: 66
End: 2021-01-01
Attending: RADIOLOGY
Payer: COMMERCIAL

## 2021-01-01 ENCOUNTER — VIRTUAL VISIT (OUTPATIENT)
Dept: INTERNAL MEDICINE | Facility: CLINIC | Age: 66
End: 2021-01-01
Payer: COMMERCIAL

## 2021-01-01 ENCOUNTER — TELEPHONE (OUTPATIENT)
Dept: INTERVENTIONAL RADIOLOGY/VASCULAR | Facility: CLINIC | Age: 66
End: 2021-01-01

## 2021-01-01 ENCOUNTER — OFFICE VISIT (OUTPATIENT)
Dept: NURSING | Facility: CLINIC | Age: 66
End: 2021-01-01
Payer: COMMERCIAL

## 2021-01-01 ENCOUNTER — VIRTUAL VISIT (OUTPATIENT)
Dept: GASTROENTEROLOGY | Facility: CLINIC | Age: 66
End: 2021-01-01
Attending: INTERNAL MEDICINE
Payer: COMMERCIAL

## 2021-01-01 ENCOUNTER — TRANSFERRED RECORDS (OUTPATIENT)
Dept: HEALTH INFORMATION MANAGEMENT | Facility: CLINIC | Age: 66
End: 2021-01-01

## 2021-01-01 ENCOUNTER — MYC MEDICAL ADVICE (OUTPATIENT)
Dept: INFECTIOUS DISEASES | Facility: CLINIC | Age: 66
End: 2021-01-01

## 2021-01-01 ENCOUNTER — HOSPITAL ENCOUNTER (INPATIENT)
Facility: CLINIC | Age: 66
LOS: 1 days | Discharge: HOME | DRG: 425 | End: 2021-05-05
Attending: RADIOLOGY | Admitting: RADIOLOGY
Payer: COMMERCIAL

## 2021-01-01 ENCOUNTER — MYC MEDICAL ADVICE (OUTPATIENT)
Dept: INFECTIOUS DISEASES | Facility: CLINIC | Age: 66
End: 2021-01-01
Payer: COMMERCIAL

## 2021-01-01 ENCOUNTER — ANCILLARY PROCEDURE (OUTPATIENT)
Dept: GENERAL RADIOLOGY | Facility: CLINIC | Age: 66
End: 2021-01-01
Attending: INTERNAL MEDICINE
Payer: COMMERCIAL

## 2021-01-01 ENCOUNTER — TELEPHONE (OUTPATIENT)
Dept: MULTI SPECIALTY CLINIC | Facility: CLINIC | Age: 66
End: 2021-01-01

## 2021-01-01 ENCOUNTER — ONCOLOGY VISIT (OUTPATIENT)
Dept: ONCOLOGY | Facility: CLINIC | Age: 66
End: 2021-01-01
Attending: RADIOLOGY
Payer: COMMERCIAL

## 2021-01-01 ENCOUNTER — VIRTUAL VISIT (OUTPATIENT)
Dept: ONCOLOGY | Facility: CLINIC | Age: 66
End: 2021-01-01
Payer: COMMERCIAL

## 2021-01-01 ENCOUNTER — HEALTH MAINTENANCE LETTER (OUTPATIENT)
Age: 66
End: 2021-01-01

## 2021-01-01 ENCOUNTER — CARE COORDINATION (OUTPATIENT)
Dept: GASTROENTEROLOGY | Facility: CLINIC | Age: 66
End: 2021-01-01

## 2021-01-01 ENCOUNTER — HOSPITAL ENCOUNTER (OUTPATIENT)
Facility: CLINIC | Age: 66
Discharge: HOME | End: 2021-01-07
Attending: RADIOLOGY | Admitting: RADIOLOGY
Payer: COMMERCIAL

## 2021-01-01 ENCOUNTER — ONCOLOGY VISIT (OUTPATIENT)
Dept: ONCOLOGY | Facility: CLINIC | Age: 66
End: 2021-01-01
Attending: STUDENT IN AN ORGANIZED HEALTH CARE EDUCATION/TRAINING PROGRAM
Payer: COMMERCIAL

## 2021-01-01 ENCOUNTER — APPOINTMENT (OUTPATIENT)
Dept: MEDSURG UNIT | Facility: CLINIC | Age: 66
End: 2021-01-01
Attending: RADIOLOGY
Payer: COMMERCIAL

## 2021-01-01 ENCOUNTER — APPOINTMENT (OUTPATIENT)
Dept: MEDSURG UNIT | Facility: CLINIC | Age: 66
DRG: 425 | End: 2021-01-01
Attending: RADIOLOGY
Payer: COMMERCIAL

## 2021-01-01 ENCOUNTER — HOME INFUSION (PRE-WILLOW HOME INFUSION) (OUTPATIENT)
Dept: PHARMACY | Facility: CLINIC | Age: 66
End: 2021-01-01
Payer: COMMERCIAL

## 2021-01-01 ENCOUNTER — INFUSION THERAPY VISIT (OUTPATIENT)
Dept: INFUSION THERAPY | Facility: CLINIC | Age: 66
End: 2021-01-01
Payer: COMMERCIAL

## 2021-01-01 ENCOUNTER — OFFICE VISIT (OUTPATIENT)
Dept: FAMILY MEDICINE | Facility: CLINIC | Age: 66
End: 2021-01-01
Payer: COMMERCIAL

## 2021-01-01 ENCOUNTER — HOSPITAL ENCOUNTER (OUTPATIENT)
Dept: ULTRASOUND IMAGING | Facility: CLINIC | Age: 66
Discharge: HOME | End: 2021-04-20
Attending: PHYSICIAN ASSISTANT | Admitting: PHYSICIAN ASSISTANT
Payer: COMMERCIAL

## 2021-01-01 ENCOUNTER — TELEPHONE (OUTPATIENT)
Dept: PULMONOLOGY | Facility: CLINIC | Age: 66
End: 2021-01-01

## 2021-01-01 ENCOUNTER — MYC MEDICAL ADVICE (OUTPATIENT)
Dept: DERMATOLOGY | Facility: CLINIC | Age: 66
End: 2021-01-01

## 2021-01-01 ENCOUNTER — TELEPHONE (OUTPATIENT)
Dept: ENDOCRINOLOGY | Facility: CLINIC | Age: 66
End: 2021-01-01

## 2021-01-01 ENCOUNTER — TELEPHONE (OUTPATIENT)
Dept: FAMILY MEDICINE | Facility: CLINIC | Age: 66
End: 2021-01-01

## 2021-01-01 ENCOUNTER — TELEPHONE (OUTPATIENT)
Dept: GASTROENTEROLOGY | Facility: CLINIC | Age: 66
End: 2021-01-01

## 2021-01-01 VITALS
RESPIRATION RATE: 16 BRPM | DIASTOLIC BLOOD PRESSURE: 68 MMHG | OXYGEN SATURATION: 95 % | SYSTOLIC BLOOD PRESSURE: 130 MMHG | BODY MASS INDEX: 31.2 KG/M2 | HEART RATE: 85 BPM | WEIGHT: 243 LBS

## 2021-01-01 VITALS
SYSTOLIC BLOOD PRESSURE: 114 MMHG | TEMPERATURE: 98.2 F | WEIGHT: 234 LBS | BODY MASS INDEX: 30.03 KG/M2 | OXYGEN SATURATION: 96 % | DIASTOLIC BLOOD PRESSURE: 64 MMHG | HEART RATE: 76 BPM | RESPIRATION RATE: 16 BRPM | HEIGHT: 74 IN

## 2021-01-01 VITALS — OXYGEN SATURATION: 98 % | SYSTOLIC BLOOD PRESSURE: 124 MMHG | HEART RATE: 80 BPM | DIASTOLIC BLOOD PRESSURE: 72 MMHG

## 2021-01-01 VITALS
BODY MASS INDEX: 33.52 KG/M2 | TEMPERATURE: 98.6 F | SYSTOLIC BLOOD PRESSURE: 109 MMHG | WEIGHT: 261.1 LBS | HEART RATE: 78 BPM | OXYGEN SATURATION: 97 % | DIASTOLIC BLOOD PRESSURE: 63 MMHG

## 2021-01-01 VITALS
TEMPERATURE: 98.1 F | HEART RATE: 79 BPM | SYSTOLIC BLOOD PRESSURE: 138 MMHG | OXYGEN SATURATION: 94 % | RESPIRATION RATE: 18 BRPM | DIASTOLIC BLOOD PRESSURE: 62 MMHG

## 2021-01-01 VITALS
TEMPERATURE: 97.6 F | OXYGEN SATURATION: 91 % | RESPIRATION RATE: 16 BRPM | HEART RATE: 78 BPM | DIASTOLIC BLOOD PRESSURE: 57 MMHG | SYSTOLIC BLOOD PRESSURE: 114 MMHG

## 2021-01-01 VITALS — SYSTOLIC BLOOD PRESSURE: 120 MMHG | DIASTOLIC BLOOD PRESSURE: 56 MMHG

## 2021-01-01 VITALS
HEART RATE: 68 BPM | TEMPERATURE: 97.9 F | OXYGEN SATURATION: 92 % | SYSTOLIC BLOOD PRESSURE: 114 MMHG | DIASTOLIC BLOOD PRESSURE: 72 MMHG

## 2021-01-01 VITALS
OXYGEN SATURATION: 96 % | TEMPERATURE: 98.5 F | DIASTOLIC BLOOD PRESSURE: 62 MMHG | HEART RATE: 78 BPM | WEIGHT: 261 LBS | HEIGHT: 74 IN | RESPIRATION RATE: 16 BRPM | BODY MASS INDEX: 33.5 KG/M2 | SYSTOLIC BLOOD PRESSURE: 111 MMHG

## 2021-01-01 VITALS
TEMPERATURE: 98 F | DIASTOLIC BLOOD PRESSURE: 68 MMHG | OXYGEN SATURATION: 96 % | HEART RATE: 66 BPM | SYSTOLIC BLOOD PRESSURE: 119 MMHG | RESPIRATION RATE: 18 BRPM

## 2021-01-01 VITALS — OXYGEN SATURATION: 97 % | WEIGHT: 235 LBS | HEART RATE: 80 BPM | BODY MASS INDEX: 30.17 KG/M2

## 2021-01-01 VITALS — SYSTOLIC BLOOD PRESSURE: 126 MMHG | DIASTOLIC BLOOD PRESSURE: 75 MMHG | HEART RATE: 64 BPM | TEMPERATURE: 98 F

## 2021-01-01 VITALS — HEART RATE: 72 BPM | OXYGEN SATURATION: 98 % | SYSTOLIC BLOOD PRESSURE: 114 MMHG | DIASTOLIC BLOOD PRESSURE: 63 MMHG

## 2021-01-01 VITALS — SYSTOLIC BLOOD PRESSURE: 124 MMHG | DIASTOLIC BLOOD PRESSURE: 70 MMHG

## 2021-01-01 DIAGNOSIS — K70.31 ALCOHOLIC CIRRHOSIS OF LIVER WITH ASCITES (H): ICD-10-CM

## 2021-01-01 DIAGNOSIS — D50.9 IRON DEFICIENCY ANEMIA, UNSPECIFIED IRON DEFICIENCY ANEMIA TYPE: ICD-10-CM

## 2021-01-01 DIAGNOSIS — C22.0 HCC (HEPATOCELLULAR CARCINOMA) (H): ICD-10-CM

## 2021-01-01 DIAGNOSIS — R06.09 DYSPNEA ON EXERTION: Primary | ICD-10-CM

## 2021-01-01 DIAGNOSIS — J98.4 RESTRICTIVE LUNG DISEASE: ICD-10-CM

## 2021-01-01 DIAGNOSIS — E11.01 TYPE 2 DIABETES MELLITUS WITH HYPEROSMOLAR COMA, UNSPECIFIED WHETHER LONG TERM INSULIN USE (H): Primary | ICD-10-CM

## 2021-01-01 DIAGNOSIS — R39.9 LOWER URINARY TRACT SYMPTOMS (LUTS): Primary | ICD-10-CM

## 2021-01-01 DIAGNOSIS — I89.0 LYMPHEDEMA OF BOTH LOWER EXTREMITIES: ICD-10-CM

## 2021-01-01 DIAGNOSIS — E11.01 TYPE 2 DIABETES MELLITUS WITH HYPEROSMOLAR COMA, UNSPECIFIED WHETHER LONG TERM INSULIN USE (H): ICD-10-CM

## 2021-01-01 DIAGNOSIS — I89.0 LYMPHEDEMA: Primary | ICD-10-CM

## 2021-01-01 DIAGNOSIS — B37.2 CANDIDAL INTERTRIGO: ICD-10-CM

## 2021-01-01 DIAGNOSIS — E11.9 TYPE 2 DIABETES, HBA1C GOAL < 7% (H): ICD-10-CM

## 2021-01-01 DIAGNOSIS — D64.9 ANEMIA, UNSPECIFIED TYPE: ICD-10-CM

## 2021-01-01 DIAGNOSIS — I87.2 VENOUS STASIS DERMATITIS OF BOTH LOWER EXTREMITIES: ICD-10-CM

## 2021-01-01 DIAGNOSIS — S81.802A OPEN WOUND OF LOWER LIMB, LEFT, INITIAL ENCOUNTER: ICD-10-CM

## 2021-01-01 DIAGNOSIS — R60.0 BILATERAL LOWER EXTREMITY EDEMA: ICD-10-CM

## 2021-01-01 DIAGNOSIS — E11.9 DIABETES MELLITUS, TYPE 2 (H): ICD-10-CM

## 2021-01-01 DIAGNOSIS — I89.0 LYMPHEDEMA OF BOTH LOWER EXTREMITIES: Primary | ICD-10-CM

## 2021-01-01 DIAGNOSIS — K70.31 ALCOHOLIC CIRRHOSIS OF LIVER WITH ASCITES (H): Primary | ICD-10-CM

## 2021-01-01 DIAGNOSIS — D64.9 ANEMIA, UNSPECIFIED TYPE: Primary | ICD-10-CM

## 2021-01-01 DIAGNOSIS — Z53.9 DIAGNOSIS NOT YET DEFINED: Primary | ICD-10-CM

## 2021-01-01 DIAGNOSIS — E11.69 TYPE 2 DIABETES MELLITUS WITH OTHER SPECIFIED COMPLICATION, WITH LONG-TERM CURRENT USE OF INSULIN (H): ICD-10-CM

## 2021-01-01 DIAGNOSIS — E11.621 DIABETIC ULCER OF TOE OF LEFT FOOT ASSOCIATED WITH TYPE 2 DIABETES MELLITUS, WITH FAT LAYER EXPOSED (H): Primary | ICD-10-CM

## 2021-01-01 DIAGNOSIS — K21.9 GASTROESOPHAGEAL REFLUX DISEASE WITHOUT ESOPHAGITIS: ICD-10-CM

## 2021-01-01 DIAGNOSIS — C22.0 HCC (HEPATOCELLULAR CARCINOMA) (H): Primary | ICD-10-CM

## 2021-01-01 DIAGNOSIS — L03.90 RECURRENT CELLULITIS: Primary | ICD-10-CM

## 2021-01-01 DIAGNOSIS — K76.82 HEPATIC ENCEPHALOPATHY (H): ICD-10-CM

## 2021-01-01 DIAGNOSIS — G62.9 PERIPHERAL POLYNEUROPATHY: ICD-10-CM

## 2021-01-01 DIAGNOSIS — S81.802A WOUND OF LEFT LOWER EXTREMITY, INITIAL ENCOUNTER: ICD-10-CM

## 2021-01-01 DIAGNOSIS — R60.1 GENERALIZED EDEMA: ICD-10-CM

## 2021-01-01 DIAGNOSIS — M20.42 HAMMER TOE OF LEFT FOOT: ICD-10-CM

## 2021-01-01 DIAGNOSIS — Z95.828 S/P TIPS (TRANSJUGULAR INTRAHEPATIC PORTOSYSTEMIC SHUNT): ICD-10-CM

## 2021-01-01 DIAGNOSIS — Z11.59 ENCOUNTER FOR SCREENING FOR OTHER VIRAL DISEASES: ICD-10-CM

## 2021-01-01 DIAGNOSIS — L03.032 CELLULITIS OF TOE OF LEFT FOOT: Primary | ICD-10-CM

## 2021-01-01 DIAGNOSIS — R30.0 DYSURIA: ICD-10-CM

## 2021-01-01 DIAGNOSIS — R53.81 PHYSICAL DECONDITIONING: ICD-10-CM

## 2021-01-01 DIAGNOSIS — D50.9 IRON DEFICIENCY ANEMIA, UNSPECIFIED IRON DEFICIENCY ANEMIA TYPE: Primary | ICD-10-CM

## 2021-01-01 DIAGNOSIS — L97.522 DIABETIC ULCER OF TOE OF LEFT FOOT ASSOCIATED WITH TYPE 2 DIABETES MELLITUS, WITH FAT LAYER EXPOSED (H): Primary | ICD-10-CM

## 2021-01-01 DIAGNOSIS — Z79.4 TYPE 2 DIABETES MELLITUS WITH OTHER SPECIFIED COMPLICATION, WITH LONG-TERM CURRENT USE OF INSULIN (H): ICD-10-CM

## 2021-01-01 DIAGNOSIS — G47.00 INSOMNIA, UNSPECIFIED TYPE: ICD-10-CM

## 2021-01-01 DIAGNOSIS — I89.0 LYMPHEDEMA: ICD-10-CM

## 2021-01-01 DIAGNOSIS — E66.811 CLASS 1 OBESITY WITH BODY MASS INDEX (BMI) OF 33.0 TO 33.9 IN ADULT, UNSPECIFIED OBESITY TYPE, UNSPECIFIED WHETHER SERIOUS COMORBIDITY PRESENT: ICD-10-CM

## 2021-01-01 DIAGNOSIS — S81.801A OPEN WOUND OF LOWER LIMB, RIGHT, INITIAL ENCOUNTER: ICD-10-CM

## 2021-01-01 DIAGNOSIS — R06.02 SHORTNESS OF BREATH: ICD-10-CM

## 2021-01-01 DIAGNOSIS — U07.1 INFECTION DUE TO 2019 NOVEL CORONAVIRUS: Primary | ICD-10-CM

## 2021-01-01 DIAGNOSIS — E11.42 DIABETIC POLYNEUROPATHY ASSOCIATED WITH TYPE 2 DIABETES MELLITUS (H): ICD-10-CM

## 2021-01-01 DIAGNOSIS — K72.10 END STAGE LIVER DISEASE (H): ICD-10-CM

## 2021-01-01 DIAGNOSIS — G47.00 INSOMNIA, UNSPECIFIED TYPE: Primary | ICD-10-CM

## 2021-01-01 DIAGNOSIS — E11.610 TYPE 2 DIABETES MELLITUS WITH DIABETIC NEUROPATHIC ARTHROPATHY, WITHOUT LONG-TERM CURRENT USE OF INSULIN (H): ICD-10-CM

## 2021-01-01 DIAGNOSIS — K70.30 ALCOHOLIC CIRRHOSIS, UNSPECIFIED WHETHER ASCITES PRESENT (H): ICD-10-CM

## 2021-01-01 DIAGNOSIS — R56.9 SEIZURES (H): ICD-10-CM

## 2021-01-01 DIAGNOSIS — I87.2 VENOUS STASIS DERMATITIS OF BOTH LOWER EXTREMITIES: Primary | ICD-10-CM

## 2021-01-01 DIAGNOSIS — Z98.890 POST-OPERATIVE STATE: ICD-10-CM

## 2021-01-01 DIAGNOSIS — F17.200 TOBACCO USE DISORDER: Primary | ICD-10-CM

## 2021-01-01 DIAGNOSIS — J18.9 COMMUNITY ACQUIRED PNEUMONIA: Primary | ICD-10-CM

## 2021-01-01 DIAGNOSIS — R60.1 ANASARCA: ICD-10-CM

## 2021-01-01 DIAGNOSIS — K76.6 PORTAL HYPERTENSION (H): ICD-10-CM

## 2021-01-01 DIAGNOSIS — M86.9 OSTEOMYELITIS OF LEFT FOOT, UNSPECIFIED TYPE (H): ICD-10-CM

## 2021-01-01 DIAGNOSIS — R05.8 PRODUCTIVE COUGH: ICD-10-CM

## 2021-01-01 DIAGNOSIS — Z87.891 PERSONAL HISTORY OF TOBACCO USE: Primary | ICD-10-CM

## 2021-01-01 DIAGNOSIS — C22.0 HEPATOCELLULAR CARCINOMA (H): Primary | ICD-10-CM

## 2021-01-01 DIAGNOSIS — J18.9 COMMUNITY ACQUIRED PNEUMONIA: ICD-10-CM

## 2021-01-01 DIAGNOSIS — R06.09 DYSPNEA ON EXERTION: ICD-10-CM

## 2021-01-01 DIAGNOSIS — R60.0 EDEMA OF BOTH LEGS: Primary | ICD-10-CM

## 2021-01-01 DIAGNOSIS — E11.69 TYPE 2 DIABETES MELLITUS WITH OTHER SPECIFIED COMPLICATION, UNSPECIFIED WHETHER LONG TERM INSULIN USE (H): ICD-10-CM

## 2021-01-01 DIAGNOSIS — R32 URINARY INCONTINENCE, UNSPECIFIED TYPE: ICD-10-CM

## 2021-01-01 DIAGNOSIS — R78.81 BACTEREMIA: ICD-10-CM

## 2021-01-01 DIAGNOSIS — G63 POLYNEUROPATHY ASSOCIATED WITH UNDERLYING DISEASE (H): ICD-10-CM

## 2021-01-01 DIAGNOSIS — R56.9 SEIZURES (H): Primary | ICD-10-CM

## 2021-01-01 DIAGNOSIS — M79.672 LEFT FOOT PAIN: ICD-10-CM

## 2021-01-01 DIAGNOSIS — L29.9 ITCHING: ICD-10-CM

## 2021-01-01 DIAGNOSIS — K70.30 ALCOHOLIC CIRRHOSIS OF LIVER WITHOUT ASCITES (H): ICD-10-CM

## 2021-01-01 DIAGNOSIS — L03.032 CELLULITIS OF TOE OF LEFT FOOT: ICD-10-CM

## 2021-01-01 DIAGNOSIS — K21.00 GASTROESOPHAGEAL REFLUX DISEASE WITH ESOPHAGITIS: ICD-10-CM

## 2021-01-01 DIAGNOSIS — R56.9 SEIZURE (H): ICD-10-CM

## 2021-01-01 DIAGNOSIS — E86.0 DEHYDRATION: Primary | ICD-10-CM

## 2021-01-01 LAB
ABO + RH BLD: NORMAL
ABO + RH BLD: NORMAL
AFP SERPL-MCNC: 14.7 UG/L (ref 0–8)
AFP SERPL-MCNC: 6.5 UG/L (ref 0–8)
AFP SERPL-MCNC: 7.1 UG/L (ref 0–8)
ALBUMIN SERPL-MCNC: 2.1 G/DL (ref 3.4–5)
ALBUMIN SERPL-MCNC: 2.3 G/DL (ref 3.4–5)
ALBUMIN SERPL-MCNC: 2.5 G/DL (ref 3.4–5)
ALBUMIN SERPL-MCNC: 2.5 G/DL (ref 3.4–5)
ALBUMIN SERPL-MCNC: 2.6 G/DL (ref 3.4–5)
ALP SERPL-CCNC: 154 U/L (ref 40–150)
ALP SERPL-CCNC: 165 U/L (ref 40–150)
ALP SERPL-CCNC: 179 U/L (ref 40–150)
ALP SERPL-CCNC: 193 U/L (ref 40–150)
ALP SERPL-CCNC: 204 U/L (ref 40–150)
ALT SERPL W P-5'-P-CCNC: 19 U/L (ref 0–70)
ALT SERPL W P-5'-P-CCNC: 21 U/L (ref 0–70)
ALT SERPL W P-5'-P-CCNC: 21 U/L (ref 0–70)
ALT SERPL W P-5'-P-CCNC: 25 U/L (ref 0–70)
ALT SERPL W P-5'-P-CCNC: 26 U/L (ref 0–70)
ANION GAP SERPL CALCULATED.3IONS-SCNC: 4 MMOL/L (ref 3–14)
ANION GAP SERPL CALCULATED.3IONS-SCNC: 4 MMOL/L (ref 3–14)
ANION GAP SERPL CALCULATED.3IONS-SCNC: 5 MMOL/L (ref 3–14)
ANION GAP SERPL CALCULATED.3IONS-SCNC: 6 MMOL/L (ref 3–14)
ANION GAP SERPL CALCULATED.3IONS-SCNC: 6 MMOL/L (ref 3–14)
APTT PPP: 31 SEC (ref 22–37)
AST SERPL W P-5'-P-CCNC: 27 U/L (ref 0–45)
AST SERPL W P-5'-P-CCNC: 29 U/L (ref 0–45)
AST SERPL W P-5'-P-CCNC: 29 U/L (ref 0–45)
AST SERPL W P-5'-P-CCNC: 31 U/L (ref 0–45)
AST SERPL W P-5'-P-CCNC: 31 U/L (ref 0–45)
BACTERIA SPEC CULT: ABNORMAL
BASOPHILS # BLD AUTO: 0 10E9/L (ref 0–0.2)
BASOPHILS NFR BLD AUTO: 0.5 %
BILIRUB DIRECT SERPL-MCNC: 0.5 MG/DL (ref 0–0.2)
BILIRUB DIRECT SERPL-MCNC: 0.6 MG/DL (ref 0–0.2)
BILIRUB DIRECT SERPL-MCNC: 0.6 MG/DL (ref 0–0.2)
BILIRUB DIRECT SERPL-MCNC: 0.7 MG/DL (ref 0–0.2)
BILIRUB DIRECT SERPL-MCNC: 0.7 MG/DL (ref 0–0.2)
BILIRUB SERPL-MCNC: 0.6 MG/DL (ref 0.2–1.3)
BILIRUB SERPL-MCNC: 0.8 MG/DL (ref 0.2–1.3)
BILIRUB SERPL-MCNC: 1 MG/DL (ref 0.2–1.3)
BILIRUB SERPL-MCNC: 1.1 MG/DL (ref 0.2–1.3)
BILIRUB SERPL-MCNC: 1.2 MG/DL (ref 0.2–1.3)
BLD GP AB SCN SERPL QL: NORMAL
BLD PROD TYP BPU: NORMAL
BLD PROD TYP BPU: NORMAL
BLD UNIT ID BPU: 0
BLOOD BANK CMNT PATIENT-IMP: NORMAL
BLOOD PRODUCT CODE: NORMAL
BPU ID: NORMAL
BUN SERPL-MCNC: 11 MG/DL (ref 7–30)
BUN SERPL-MCNC: 16 MG/DL (ref 7–30)
BUN SERPL-MCNC: 18 MG/DL (ref 7–30)
BUN SERPL-MCNC: 20 MG/DL (ref 7–30)
BUN SERPL-MCNC: 24 MG/DL (ref 7–30)
CALCIUM SERPL-MCNC: 7.7 MG/DL (ref 8.5–10.1)
CALCIUM SERPL-MCNC: 8.2 MG/DL (ref 8.5–10.1)
CALCIUM SERPL-MCNC: 8.3 MG/DL (ref 8.5–10.1)
CALCIUM SERPL-MCNC: 8.5 MG/DL (ref 8.5–10.1)
CALCIUM SERPL-MCNC: 8.6 MG/DL (ref 8.5–10.1)
CHLORIDE SERPL-SCNC: 104 MMOL/L (ref 94–109)
CHLORIDE SERPL-SCNC: 107 MMOL/L (ref 94–109)
CHLORIDE SERPL-SCNC: 109 MMOL/L (ref 94–109)
CO2 SERPL-SCNC: 26 MMOL/L (ref 20–32)
CO2 SERPL-SCNC: 27 MMOL/L (ref 20–32)
CO2 SERPL-SCNC: 28 MMOL/L (ref 20–32)
COPATH REPORT: NORMAL
CREAT SERPL-MCNC: 0.77 MG/DL (ref 0.66–1.25)
CREAT SERPL-MCNC: 0.87 MG/DL (ref 0.66–1.25)
CREAT SERPL-MCNC: 0.87 MG/DL (ref 0.66–1.25)
CREAT SERPL-MCNC: 0.97 MG/DL (ref 0.66–1.25)
CREAT SERPL-MCNC: 1.1 MG/DL (ref 0.66–1.25)
DIFFERENTIAL METHOD BLD: ABNORMAL
DLCOUNC-%PRED-PRE: 54 %
DLCOUNC-PRE: 15.32 ML/MIN/MMHG
DLCOUNC-PRED: 28.08 ML/MIN/MMHG
EOSINOPHIL # BLD AUTO: 0.1 10E9/L (ref 0–0.7)
EOSINOPHIL NFR BLD AUTO: 5.1 %
ERV-%PRED-PRE: 17 %
ERV-PRE: 0.18 L
ERV-PRED: 1.01 L
ERYTHROCYTE [DISTWIDTH] IN BLOOD BY AUTOMATED COUNT: 16.6 % (ref 10–15)
ERYTHROCYTE [DISTWIDTH] IN BLOOD BY AUTOMATED COUNT: 16.8 % (ref 10–15)
ERYTHROCYTE [DISTWIDTH] IN BLOOD BY AUTOMATED COUNT: 16.8 % (ref 10–15)
ERYTHROCYTE [DISTWIDTH] IN BLOOD BY AUTOMATED COUNT: 19 % (ref 10–15)
ERYTHROCYTE [DISTWIDTH] IN BLOOD BY AUTOMATED COUNT: 19.1 % (ref 10–15)
ERYTHROCYTE [DISTWIDTH] IN BLOOD BY AUTOMATED COUNT: 20.6 % (ref 10–15)
EXPTIME-PRE: 7.72 SEC
FEF2575-%PRED-POST: 38 %
FEF2575-%PRED-PRE: 56 %
FEF2575-POST: 1.05 L/SEC
FEF2575-PRE: 1.54 L/SEC
FEF2575-PRED: 2.73 L/SEC
FEFMAX-%PRED-PRE: 41 %
FEFMAX-PRE: 3.76 L/SEC
FEFMAX-PRED: 9.1 L/SEC
FERRITIN SERPL-MCNC: 22 NG/ML (ref 26–388)
FEV1-%PRED-PRE: 59 %
FEV1-PRE: 2.11 L
FEV1FEV6-PRE: 75 %
FEV1FEV6-PRED: 78 %
FEV1FVC-PRE: 72 %
FEV1FVC-PRED: 76 %
FEV1SVC-PRE: 70 %
FEV1SVC-PRED: 68 %
FIFMAX-PRE: 2.75 L/SEC
FOLATE SERPL-MCNC: 19.3 NG/ML
FVC-%PRED-PRE: 62 %
FVC-PRE: 2.92 L
FVC-PRED: 4.67 L
GABAPENTIN SERPLBLD-MCNC: 3.4 UG/ML (ref 4–16)
GFR SERPL CREATININE-BSD FRML MDRD: 69 ML/MIN/{1.73_M2}
GFR SERPL CREATININE-BSD FRML MDRD: 81 ML/MIN/{1.73_M2}
GFR SERPL CREATININE-BSD FRML MDRD: 89 ML/MIN/{1.73_M2}
GFR SERPL CREATININE-BSD FRML MDRD: 89 ML/MIN/{1.73_M2}
GFR SERPL CREATININE-BSD FRML MDRD: >90 ML/MIN/{1.73_M2}
GLUCOSE BLDC GLUCOMTR-MCNC: 103 MG/DL (ref 70–99)
GLUCOSE BLDC GLUCOMTR-MCNC: 130 MG/DL (ref 70–99)
GLUCOSE SERPL-MCNC: 102 MG/DL (ref 70–99)
GLUCOSE SERPL-MCNC: 104 MG/DL (ref 70–99)
GLUCOSE SERPL-MCNC: 114 MG/DL (ref 70–99)
GLUCOSE SERPL-MCNC: 146 MG/DL (ref 70–99)
GLUCOSE SERPL-MCNC: 152 MG/DL (ref 70–99)
GRAM STN SPEC: ABNORMAL
HAPTOGLOB SERPL-MCNC: 10 MG/DL (ref 32–197)
HCT VFR BLD AUTO: 22.6 % (ref 40–53)
HCT VFR BLD AUTO: 22.9 % (ref 40–53)
HCT VFR BLD AUTO: 23.5 % (ref 40–53)
HCT VFR BLD AUTO: 25.6 % (ref 40–53)
HCT VFR BLD AUTO: 26.6 % (ref 40–53)
HCT VFR BLD AUTO: 27.4 % (ref 40–53)
HGB BLD-MCNC: 6.8 G/DL (ref 13.3–17.7)
HGB BLD-MCNC: 7 G/DL (ref 13.3–17.7)
HGB BLD-MCNC: 7.2 G/DL (ref 13.3–17.7)
HGB BLD-MCNC: 8.2 G/DL (ref 13.3–17.7)
HGB BLD-MCNC: 8.3 G/DL (ref 13.3–17.7)
HGB BLD-MCNC: 8.6 G/DL (ref 13.3–17.7)
IC-%PRED-PRE: 67 %
IC-PRE: 2.84 L
IC-PRED: 4.18 L
IMM GRANULOCYTES # BLD: 0 10E9/L (ref 0–0.4)
IMM GRANULOCYTES NFR BLD: 0.5 %
INR PPP: 1.28 (ref 0.86–1.14)
INR PPP: 1.31 (ref 0.86–1.14)
INR PPP: 1.36 (ref 0.86–1.14)
INR PPP: 1.42 (ref 0.86–1.14)
INR PPP: 1.43 (ref 0.86–1.14)
IRON SATN MFR SERPL: 10 % (ref 15–46)
IRON SERPL-MCNC: 39 UG/DL (ref 35–180)
LABORATORY COMMENT REPORT: NORMAL
LDH SERPL L TO P-CCNC: 244 U/L (ref 85–227)
LEVETIRACETAM SERPL-MCNC: 21 UG/ML (ref 12–46)
LYMPHOCYTES # BLD AUTO: 0.3 10E9/L (ref 0.8–5.3)
LYMPHOCYTES NFR BLD AUTO: 15.7 %
Lab: ABNORMAL
Lab: ABNORMAL
MCH RBC QN AUTO: 28.1 PG (ref 26.5–33)
MCH RBC QN AUTO: 28.3 PG (ref 26.5–33)
MCH RBC QN AUTO: 28.4 PG (ref 26.5–33)
MCH RBC QN AUTO: 28.5 PG (ref 26.5–33)
MCH RBC QN AUTO: 30.3 PG (ref 26.5–33)
MCH RBC QN AUTO: 31.4 PG (ref 26.5–33)
MCHC RBC AUTO-ENTMCNC: 30.1 G/DL (ref 31.5–36.5)
MCHC RBC AUTO-ENTMCNC: 30.6 G/DL (ref 31.5–36.5)
MCHC RBC AUTO-ENTMCNC: 30.6 G/DL (ref 31.5–36.5)
MCHC RBC AUTO-ENTMCNC: 31.2 G/DL (ref 31.5–36.5)
MCHC RBC AUTO-ENTMCNC: 31.4 G/DL (ref 31.5–36.5)
MCHC RBC AUTO-ENTMCNC: 32 G/DL (ref 31.5–36.5)
MCV RBC AUTO: 91 FL (ref 78–100)
MCV RBC AUTO: 92 FL (ref 78–100)
MCV RBC AUTO: 93 FL (ref 78–100)
MCV RBC AUTO: 95 FL (ref 78–100)
MCV RBC AUTO: 97 FL (ref 78–100)
MCV RBC AUTO: 98 FL (ref 78–100)
MONOCYTES # BLD AUTO: 0.3 10E9/L (ref 0–1.3)
MONOCYTES NFR BLD AUTO: 13.6 %
NEUTROPHILS # BLD AUTO: 1.3 10E9/L (ref 1.6–8.3)
NEUTROPHILS NFR BLD AUTO: 64.6 %
NRBC # BLD AUTO: 0 10*3/UL
NRBC BLD AUTO-RTO: 0 /100
NUM BPU REQUESTED: 1
PLATELET # BLD AUTO: 44 10E9/L (ref 150–450)
PLATELET # BLD AUTO: 51 10E9/L (ref 150–450)
PLATELET # BLD AUTO: 52 10E9/L (ref 150–450)
PLATELET # BLD AUTO: 58 10E9/L (ref 150–450)
PLATELET # BLD AUTO: 60 10E9/L (ref 150–450)
PLATELET # BLD AUTO: 66 10E9/L (ref 150–450)
POTASSIUM SERPL-SCNC: 3.7 MMOL/L (ref 3.4–5.3)
POTASSIUM SERPL-SCNC: 3.8 MMOL/L (ref 3.4–5.3)
POTASSIUM SERPL-SCNC: 3.8 MMOL/L (ref 3.4–5.3)
POTASSIUM SERPL-SCNC: 4.2 MMOL/L (ref 3.4–5.3)
POTASSIUM SERPL-SCNC: 4.4 MMOL/L (ref 3.4–5.3)
PROT SERPL-MCNC: 5.5 G/DL (ref 6.8–8.8)
PROT SERPL-MCNC: 6.2 G/DL (ref 6.8–8.8)
PROT SERPL-MCNC: 6.4 G/DL (ref 6.8–8.8)
PROT SERPL-MCNC: 6.5 G/DL (ref 6.8–8.8)
PROT SERPL-MCNC: 7.1 G/DL (ref 6.8–8.8)
RBC # BLD AUTO: 2.39 10E12/L (ref 4.4–5.9)
RBC # BLD AUTO: 2.49 10E12/L (ref 4.4–5.9)
RBC # BLD AUTO: 2.54 10E12/L (ref 4.4–5.9)
RBC # BLD AUTO: 2.61 10E12/L (ref 4.4–5.9)
RBC # BLD AUTO: 2.84 10E12/L (ref 4.4–5.9)
RBC # BLD AUTO: 2.92 10E12/L (ref 4.4–5.9)
RETICS # AUTO: 66.5 10E9/L (ref 25–95)
RETICS/RBC NFR AUTO: 2.6 % (ref 0.5–2)
SARS-COV-2 RNA RESP QL NAA+PROBE: NEGATIVE
SARS-COV-2 RNA RESP QL NAA+PROBE: NORMAL
SODIUM SERPL-SCNC: 136 MMOL/L (ref 133–144)
SODIUM SERPL-SCNC: 138 MMOL/L (ref 133–144)
SODIUM SERPL-SCNC: 138 MMOL/L (ref 133–144)
SODIUM SERPL-SCNC: 140 MMOL/L (ref 133–144)
SODIUM SERPL-SCNC: 140 MMOL/L (ref 133–144)
SPECIMEN EXP DATE BLD: NORMAL
SPECIMEN SOURCE: ABNORMAL
SPECIMEN SOURCE: ABNORMAL
SPECIMEN SOURCE: NORMAL
SPECIMEN SOURCE: NORMAL
TIBC SERPL-MCNC: 371 UG/DL (ref 240–430)
TRANSFUSION STATUS PATIENT QL: NORMAL
TRANSFUSION STATUS PATIENT QL: NORMAL
VA-%PRED-PRE: 72 %
VA-PRE: 5.01 L
VC-%PRED-PRE: 58 %
VC-PRE: 3.02 L
VC-PRED: 5.18 L
VIT B12 SERPL-MCNC: 795 PG/ML (ref 193–986)
WBC # BLD AUTO: 1.9 10E9/L (ref 4–11)
WBC # BLD AUTO: 2 10E9/L (ref 4–11)
WBC # BLD AUTO: 2.1 10E9/L (ref 4–11)

## 2021-01-01 PROCEDURE — 99153 MOD SED SAME PHYS/QHP EA: CPT

## 2021-01-01 PROCEDURE — 36415 COLL VENOUS BLD VENIPUNCTURE: CPT | Performed by: PATHOLOGY

## 2021-01-01 PROCEDURE — 74183 MRI ABD W/O CNTR FLWD CNTR: CPT | Performed by: RADIOLOGY

## 2021-01-01 PROCEDURE — 83540 ASSAY OF IRON: CPT | Performed by: PATHOLOGY

## 2021-01-01 PROCEDURE — 99213 OFFICE O/P EST LOW 20 MIN: CPT | Performed by: INTERNAL MEDICINE

## 2021-01-01 PROCEDURE — 250N000009 HC RX 250: Performed by: STUDENT IN AN ORGANIZED HEALTH CARE EDUCATION/TRAINING PROGRAM

## 2021-01-01 PROCEDURE — 99417 PROLNG OP E/M EACH 15 MIN: CPT | Performed by: INTERNAL MEDICINE

## 2021-01-01 PROCEDURE — 82728 ASSAY OF FERRITIN: CPT | Performed by: PATHOLOGY

## 2021-01-01 PROCEDURE — 999N000142 HC STATISTIC PROCEDURE PREP ONLY

## 2021-01-01 PROCEDURE — 99215 OFFICE O/P EST HI 40 MIN: CPT | Performed by: INTERNAL MEDICINE

## 2021-01-01 PROCEDURE — 258N000003 HC RX IP 258 OP 636: Performed by: PHYSICIAN ASSISTANT

## 2021-01-01 PROCEDURE — G0179 MD RECERTIFICATION HHA PT: HCPCS | Performed by: NURSE PRACTITIONER

## 2021-01-01 PROCEDURE — 99203 OFFICE O/P NEW LOW 30 MIN: CPT | Performed by: PODIATRIST

## 2021-01-01 PROCEDURE — 87205 SMEAR GRAM STAIN: CPT | Performed by: STUDENT IN AN ORGANIZED HEALTH CARE EDUCATION/TRAINING PROGRAM

## 2021-01-01 PROCEDURE — 99215 OFFICE O/P EST HI 40 MIN: CPT | Mod: 95 | Performed by: INTERNAL MEDICINE

## 2021-01-01 PROCEDURE — 258N000003 HC RX IP 258 OP 636: Performed by: STUDENT IN AN ORGANIZED HEALTH CARE EDUCATION/TRAINING PROGRAM

## 2021-01-01 PROCEDURE — 85060 BLOOD SMEAR INTERPRETATION: CPT | Mod: 26 | Performed by: PATHOLOGY

## 2021-01-01 PROCEDURE — 272N000631 HC COIL/EMBOLIC DEVICE CR12

## 2021-01-01 PROCEDURE — 85027 COMPLETE CBC AUTOMATED: CPT | Performed by: PATHOLOGY

## 2021-01-01 PROCEDURE — C1769 GUIDE WIRE: HCPCS

## 2021-01-01 PROCEDURE — G0296 VISIT TO DETERM LDCT ELIG: HCPCS | Performed by: INTERNAL MEDICINE

## 2021-01-01 PROCEDURE — 82105 ALPHA-FETOPROTEIN SERUM: CPT | Performed by: RADIOLOGY

## 2021-01-01 PROCEDURE — 74183 MRI ABD W/O CNTR FLWD CNTR: CPT | Mod: GC | Performed by: RADIOLOGY

## 2021-01-01 PROCEDURE — 86900 BLOOD TYPING SEROLOGIC ABO: CPT | Performed by: PATHOLOGY

## 2021-01-01 PROCEDURE — 99000 SPECIMEN HANDLING OFFICE-LAB: CPT | Performed by: PATHOLOGY

## 2021-01-01 PROCEDURE — 272N000500 HC NEEDLE CR2

## 2021-01-01 PROCEDURE — 75726 ARTERY X-RAYS ABDOMEN: CPT

## 2021-01-01 PROCEDURE — 37243 VASC EMBOLIZE/OCCLUDE ORGAN: CPT | Mod: GC | Performed by: RADIOLOGY

## 2021-01-01 PROCEDURE — 999N000134 HC STATISTIC PP CARE STAGE 3

## 2021-01-01 PROCEDURE — 83010 ASSAY OF HAPTOGLOBIN QUANT: CPT | Mod: 90 | Performed by: PATHOLOGY

## 2021-01-01 PROCEDURE — 11042 DBRDMT SUBQ TIS 1ST 20SQCM/<: CPT | Performed by: PODIATRIST

## 2021-01-01 PROCEDURE — 99204 OFFICE O/P NEW MOD 45 MIN: CPT | Mod: 95 | Performed by: INTERNAL MEDICINE

## 2021-01-01 PROCEDURE — 272N000192 HC ACCESSORY CR2

## 2021-01-01 PROCEDURE — 272N000143 HC KIT CR3

## 2021-01-01 PROCEDURE — 99214 OFFICE O/P EST MOD 30 MIN: CPT | Mod: 95 | Performed by: INTERNAL MEDICINE

## 2021-01-01 PROCEDURE — U0005 INFEC AGEN DETEC AMPLI PROBE: HCPCS | Mod: 90 | Performed by: PATHOLOGY

## 2021-01-01 PROCEDURE — 75774 ARTERY X-RAY EACH VESSEL: CPT

## 2021-01-01 PROCEDURE — C1887 CATHETER, GUIDING: HCPCS

## 2021-01-01 PROCEDURE — 71046 X-RAY EXAM CHEST 2 VIEWS: CPT | Mod: GC | Performed by: RADIOLOGY

## 2021-01-01 PROCEDURE — 272N000566 HC SHEATH CR3

## 2021-01-01 PROCEDURE — 99417 PROLNG OP E/M EACH 15 MIN: CPT | Performed by: PHYSICIAN ASSISTANT

## 2021-01-01 PROCEDURE — 76937 US GUIDE VASCULAR ACCESS: CPT

## 2021-01-01 PROCEDURE — 36247 INS CATH ABD/L-EXT ART 3RD: CPT | Mod: GZ

## 2021-01-01 PROCEDURE — 250N000011 HC RX IP 250 OP 636: Performed by: RADIOLOGY

## 2021-01-01 PROCEDURE — 86901 BLOOD TYPING SEROLOGIC RH(D): CPT | Performed by: PATHOLOGY

## 2021-01-01 PROCEDURE — 999N001017 HC STATISTIC GLUCOSE BY METER IP

## 2021-01-01 PROCEDURE — G0463 HOSPITAL OUTPT CLINIC VISIT: HCPCS

## 2021-01-01 PROCEDURE — 258N000003 HC RX IP 258 OP 636: Performed by: RADIOLOGY

## 2021-01-01 PROCEDURE — 80076 HEPATIC FUNCTION PANEL: CPT | Performed by: RADIOLOGY

## 2021-01-01 PROCEDURE — 36248 INS CATH ABD/L-EXT ART ADDL: CPT

## 2021-01-01 PROCEDURE — 99213 OFFICE O/P EST LOW 20 MIN: CPT | Mod: 25 | Performed by: PODIATRIST

## 2021-01-01 PROCEDURE — 255N000002 HC RX 255 OP 636: Performed by: RADIOLOGY

## 2021-01-01 PROCEDURE — 96374 THER/PROPH/DIAG INJ IV PUSH: CPT

## 2021-01-01 PROCEDURE — 99215 OFFICE O/P EST HI 40 MIN: CPT | Mod: 95 | Performed by: RADIOLOGY

## 2021-01-01 PROCEDURE — 75726 ARTERY X-RAYS ABDOMEN: CPT | Mod: XU

## 2021-01-01 PROCEDURE — 76937 US GUIDE VASCULAR ACCESS: CPT | Mod: 26 | Performed by: RADIOLOGY

## 2021-01-01 PROCEDURE — 99214 OFFICE O/P EST MOD 30 MIN: CPT | Mod: 95 | Performed by: RADIOLOGY

## 2021-01-01 PROCEDURE — 99152 MOD SED SAME PHYS/QHP 5/>YRS: CPT | Mod: GC | Performed by: RADIOLOGY

## 2021-01-01 PROCEDURE — 80048 BASIC METABOLIC PNL TOTAL CA: CPT | Performed by: PATHOLOGY

## 2021-01-01 PROCEDURE — 80177 DRUG SCRN QUAN LEVETIRACETAM: CPT | Mod: 90 | Performed by: PATHOLOGY

## 2021-01-01 PROCEDURE — 99215 OFFICE O/P EST HI 40 MIN: CPT | Mod: 95 | Performed by: PHYSICIAN ASSISTANT

## 2021-01-01 PROCEDURE — U0003 INFECTIOUS AGENT DETECTION BY NUCLEIC ACID (DNA OR RNA); SEVERE ACUTE RESPIRATORY SYNDROME CORONAVIRUS 2 (SARS-COV-2) (CORONAVIRUS DISEASE [COVID-19]), AMPLIFIED PROBE TECHNIQUE, MAKING USE OF HIGH THROUGHPUT TECHNOLOGIES AS DESCRIBED BY CMS-2020-01-R: HCPCS | Mod: 90 | Performed by: PATHOLOGY

## 2021-01-01 PROCEDURE — 272N000504 HC NEEDLE CR4

## 2021-01-01 PROCEDURE — 04L33DZ OCCLUSION OF HEPATIC ARTERY WITH INTRALUMINAL DEVICE, PERCUTANEOUS APPROACH: ICD-10-PCS | Performed by: RADIOLOGY

## 2021-01-01 PROCEDURE — 99215 OFFICE O/P EST HI 40 MIN: CPT | Mod: 95 | Performed by: NURSE PRACTITIONER

## 2021-01-01 PROCEDURE — 85610 PROTHROMBIN TIME: CPT | Performed by: RADIOLOGY

## 2021-01-01 PROCEDURE — 85610 PROTHROMBIN TIME: CPT | Performed by: PHYSICIAN ASSISTANT

## 2021-01-01 PROCEDURE — 74150 CT ABDOMEN W/O CONTRAST: CPT | Mod: 26 | Performed by: RADIOLOGY

## 2021-01-01 PROCEDURE — 120N000005 HC R&B MS OVERFLOW UMMC

## 2021-01-01 PROCEDURE — 96420 CHEMO IA PUSH TECNIQUE: CPT

## 2021-01-01 PROCEDURE — 99152 MOD SED SAME PHYS/QHP 5/>YRS: CPT

## 2021-01-01 PROCEDURE — 75774 ARTERY X-RAY EACH VESSEL: CPT | Mod: XU

## 2021-01-01 PROCEDURE — 250N000009 HC RX 250: Performed by: RADIOLOGY

## 2021-01-01 PROCEDURE — 93975 VASCULAR STUDY: CPT

## 2021-01-01 PROCEDURE — 999N001193 HC VIDEO/TELEPHONE VISIT; NO CHARGE

## 2021-01-01 PROCEDURE — 80048 BASIC METABOLIC PNL TOTAL CA: CPT | Performed by: RADIOLOGY

## 2021-01-01 PROCEDURE — 99215 OFFICE O/P EST HI 40 MIN: CPT | Mod: 95 | Performed by: STUDENT IN AN ORGANIZED HEALTH CARE EDUCATION/TRAINING PROGRAM

## 2021-01-01 PROCEDURE — A9503 TC99M MEDRONATE: HCPCS | Performed by: RADIOLOGY

## 2021-01-01 PROCEDURE — 85610 PROTHROMBIN TIME: CPT | Performed by: PATHOLOGY

## 2021-01-01 PROCEDURE — 36415 COLL VENOUS BLD VENIPUNCTURE: CPT | Performed by: RADIOLOGY

## 2021-01-01 PROCEDURE — 86923 COMPATIBILITY TEST ELECTRIC: CPT | Mod: 90 | Performed by: PATHOLOGY

## 2021-01-01 PROCEDURE — 85730 THROMBOPLASTIN TIME PARTIAL: CPT | Performed by: RADIOLOGY

## 2021-01-01 PROCEDURE — 87070 CULTURE OTHR SPECIMN AEROBIC: CPT | Performed by: STUDENT IN AN ORGANIZED HEALTH CARE EDUCATION/TRAINING PROGRAM

## 2021-01-01 PROCEDURE — 82105 ALPHA-FETOPROTEIN SERUM: CPT | Mod: 90 | Performed by: PATHOLOGY

## 2021-01-01 PROCEDURE — 83550 IRON BINDING TEST: CPT | Performed by: PATHOLOGY

## 2021-01-01 PROCEDURE — 85045 AUTOMATED RETICULOCYTE COUNT: CPT | Performed by: PATHOLOGY

## 2021-01-01 PROCEDURE — 52000 CYSTOURETHROSCOPY: CPT | Performed by: UROLOGY

## 2021-01-01 PROCEDURE — 36247 INS CATH ABD/L-EXT ART 3RD: CPT | Mod: GC | Performed by: RADIOLOGY

## 2021-01-01 PROCEDURE — 86850 RBC ANTIBODY SCREEN: CPT | Performed by: PATHOLOGY

## 2021-01-01 PROCEDURE — B412ZZZ FLUOROSCOPY OF HEPATIC ARTERY: ICD-10-PCS | Performed by: RADIOLOGY

## 2021-01-01 PROCEDURE — 99207 PR NO CHARGE LOS: CPT

## 2021-01-01 PROCEDURE — 94729 DIFFUSING CAPACITY: CPT | Performed by: INTERNAL MEDICINE

## 2021-01-01 PROCEDURE — 36415 COLL VENOUS BLD VENIPUNCTURE: CPT | Performed by: PHYSICIAN ASSISTANT

## 2021-01-01 PROCEDURE — A9585 GADOBUTROL INJECTION: HCPCS | Performed by: RADIOLOGY

## 2021-01-01 PROCEDURE — 78306 BONE IMAGING WHOLE BODY: CPT | Mod: GC | Performed by: RADIOLOGY

## 2021-01-01 PROCEDURE — 250N000011 HC RX IP 250 OP 636: Performed by: STUDENT IN AN ORGANIZED HEALTH CARE EDUCATION/TRAINING PROGRAM

## 2021-01-01 PROCEDURE — 85027 COMPLETE CBC AUTOMATED: CPT | Performed by: RADIOLOGY

## 2021-01-01 PROCEDURE — 83615 LACTATE (LD) (LDH) ENZYME: CPT | Performed by: PATHOLOGY

## 2021-01-01 PROCEDURE — 80171 DRUG SCREEN QUANT GABAPENTIN: CPT | Mod: 90 | Performed by: PATHOLOGY

## 2021-01-01 PROCEDURE — B41BZZZ FLUOROSCOPY OF OTHER INTRA-ABDOMINAL ARTERIES: ICD-10-PCS | Performed by: RADIOLOGY

## 2021-01-01 PROCEDURE — 250N000011 HC RX IP 250 OP 636: Performed by: NURSE PRACTITIONER

## 2021-01-01 PROCEDURE — 250N000011 HC RX IP 250 OP 636: Performed by: PHYSICIAN ASSISTANT

## 2021-01-01 PROCEDURE — 71250 CT THORAX DX C-: CPT | Mod: GC | Performed by: RADIOLOGY

## 2021-01-01 PROCEDURE — A9503 TC99M MEDRONATE: HCPCS | Performed by: STUDENT IN AN ORGANIZED HEALTH CARE EDUCATION/TRAINING PROGRAM

## 2021-01-01 PROCEDURE — 99205 OFFICE O/P NEW HI 60 MIN: CPT | Mod: 25 | Performed by: PODIATRIST

## 2021-01-01 PROCEDURE — G0179 MD RECERTIFICATION HHA PT: HCPCS | Performed by: INTERNAL MEDICINE

## 2021-01-01 PROCEDURE — 99215 OFFICE O/P EST HI 40 MIN: CPT | Mod: GC | Performed by: STUDENT IN AN ORGANIZED HEALTH CARE EDUCATION/TRAINING PROGRAM

## 2021-01-01 PROCEDURE — 36430 TRANSFUSION BLD/BLD COMPNT: CPT

## 2021-01-01 PROCEDURE — 78306 BONE IMAGING WHOLE BODY: CPT | Mod: GC | Performed by: STUDENT IN AN ORGANIZED HEALTH CARE EDUCATION/TRAINING PROGRAM

## 2021-01-01 PROCEDURE — 999N000109 HC STATISTIC OP CR VISIT

## 2021-01-01 PROCEDURE — P9016 RBC LEUKOCYTES REDUCED: HCPCS | Performed by: PHYSICIAN ASSISTANT

## 2021-01-01 PROCEDURE — 99205 OFFICE O/P NEW HI 60 MIN: CPT | Performed by: STUDENT IN AN ORGANIZED HEALTH CARE EDUCATION/TRAINING PROGRAM

## 2021-01-01 PROCEDURE — 250N000013 HC RX MED GY IP 250 OP 250 PS 637: Performed by: RADIOLOGY

## 2021-01-01 PROCEDURE — 87077 CULTURE AEROBIC IDENTIFY: CPT | Performed by: STUDENT IN AN ORGANIZED HEALTH CARE EDUCATION/TRAINING PROGRAM

## 2021-01-01 PROCEDURE — 87186 SC STD MICRODIL/AGAR DIL: CPT | Performed by: STUDENT IN AN ORGANIZED HEALTH CARE EDUCATION/TRAINING PROGRAM

## 2021-01-01 PROCEDURE — 80048 BASIC METABOLIC PNL TOTAL CA: CPT | Performed by: PHYSICIAN ASSISTANT

## 2021-01-01 PROCEDURE — 99213 OFFICE O/P EST LOW 20 MIN: CPT | Performed by: PODIATRIST

## 2021-01-01 PROCEDURE — 99417 PROLNG OP E/M EACH 15 MIN: CPT | Mod: 95 | Performed by: STUDENT IN AN ORGANIZED HEALTH CARE EDUCATION/TRAINING PROGRAM

## 2021-01-01 PROCEDURE — 99207 PR NO CHARGE LOS: CPT | Mod: 95 | Performed by: STUDENT IN AN ORGANIZED HEALTH CARE EDUCATION/TRAINING PROGRAM

## 2021-01-01 PROCEDURE — 85025 COMPLETE CBC W/AUTO DIFF WBC: CPT | Performed by: PATHOLOGY

## 2021-01-01 PROCEDURE — 29581 APPL MULTLAYER CMPRN SYS LEG: CPT | Mod: 50 | Performed by: PODIATRIST

## 2021-01-01 PROCEDURE — 250N000009 HC RX 250: Performed by: PHYSICIAN ASSISTANT

## 2021-01-01 PROCEDURE — 82746 ASSAY OF FOLIC ACID SERUM: CPT | Mod: 90 | Performed by: PATHOLOGY

## 2021-01-01 PROCEDURE — 37243 VASC EMBOLIZE/OCCLUDE ORGAN: CPT

## 2021-01-01 PROCEDURE — 99204 OFFICE O/P NEW MOD 45 MIN: CPT | Performed by: UROLOGY

## 2021-01-01 PROCEDURE — 80076 HEPATIC FUNCTION PANEL: CPT | Performed by: PHYSICIAN ASSISTANT

## 2021-01-01 PROCEDURE — 82607 VITAMIN B-12: CPT | Performed by: PATHOLOGY

## 2021-01-01 PROCEDURE — C1760 CLOSURE DEV, VASC: HCPCS

## 2021-01-01 PROCEDURE — 85027 COMPLETE CBC AUTOMATED: CPT | Performed by: PHYSICIAN ASSISTANT

## 2021-01-01 PROCEDURE — 250N000009 HC RX 250: Performed by: NURSE PRACTITIONER

## 2021-01-01 PROCEDURE — 80076 HEPATIC FUNCTION PANEL: CPT | Performed by: PATHOLOGY

## 2021-01-01 PROCEDURE — 74150 CT ABDOMEN W/O CONTRAST: CPT

## 2021-01-01 PROCEDURE — 94060 EVALUATION OF WHEEZING: CPT | Performed by: INTERNAL MEDICINE

## 2021-01-01 PROCEDURE — 36247 INS CATH ABD/L-EXT ART 3RD: CPT

## 2021-01-01 PROCEDURE — 80076 HEPATIC FUNCTION PANEL: CPT | Performed by: NURSE PRACTITIONER

## 2021-01-01 PROCEDURE — 96365 THER/PROPH/DIAG IV INF INIT: CPT | Performed by: NURSE PRACTITIONER

## 2021-01-01 RX ORDER — ONDANSETRON 2 MG/ML
4 INJECTION INTRAMUSCULAR; INTRAVENOUS ONCE
Status: CANCELLED | OUTPATIENT
Start: 2021-01-01 | End: 2021-01-01

## 2021-01-01 RX ORDER — HEPARIN SODIUM 200 [USP'U]/100ML
1 INJECTION, SOLUTION INTRAVENOUS CONTINUOUS PRN
Status: CANCELLED | OUTPATIENT
Start: 2021-01-01

## 2021-01-01 RX ORDER — SPIRONOLACTONE 50 MG/1
150 TABLET, FILM COATED ORAL DAILY
Qty: 180 TABLET | Refills: 0
Start: 2021-01-01

## 2021-01-01 RX ORDER — SCOLOPAMINE TRANSDERMAL SYSTEM 1 MG/1
1 PATCH, EXTENDED RELEASE TRANSDERMAL ONCE
Status: DISCONTINUED | OUTPATIENT
Start: 2021-01-01 | End: 2021-01-01 | Stop reason: HOSPADM

## 2021-01-01 RX ORDER — LEVETIRACETAM 500 MG/1
500 TABLET ORAL 2 TIMES DAILY
COMMUNITY
Start: 2021-01-01

## 2021-01-01 RX ORDER — AMPICILLIN AND SULBACTAM 2; 1 G/1; G/1
3 INJECTION, POWDER, FOR SOLUTION INTRAMUSCULAR; INTRAVENOUS
Status: COMPLETED | OUTPATIENT
Start: 2021-01-01 | End: 2021-01-01

## 2021-01-01 RX ORDER — GABAPENTIN 300 MG/1
300 CAPSULE ORAL AT BEDTIME
Qty: 90 CAPSULE | Refills: 3 | Status: SHIPPED | OUTPATIENT
Start: 2021-01-01

## 2021-01-01 RX ORDER — URSODIOL 300 MG/1
CAPSULE ORAL
Qty: 180 CAPSULE | Refills: 11 | Status: SHIPPED | OUTPATIENT
Start: 2021-01-01

## 2021-01-01 RX ORDER — NALOXONE HYDROCHLORIDE 0.4 MG/ML
0.4 INJECTION, SOLUTION INTRAMUSCULAR; INTRAVENOUS; SUBCUTANEOUS
Status: DISCONTINUED | OUTPATIENT
Start: 2021-01-01 | End: 2021-01-01 | Stop reason: HOSPADM

## 2021-01-01 RX ORDER — METHYLPREDNISOLONE SODIUM SUCCINATE 125 MG/2ML
INJECTION, POWDER, FOR SOLUTION INTRAMUSCULAR; INTRAVENOUS
Qty: 300 EACH | Refills: 3 | Status: SHIPPED | OUTPATIENT
Start: 2021-01-01

## 2021-01-01 RX ORDER — SODIUM CHLORIDE 9 MG/ML
INJECTION, SOLUTION INTRAVENOUS CONTINUOUS
Status: DISCONTINUED | OUTPATIENT
Start: 2021-01-01 | End: 2021-01-01 | Stop reason: HOSPADM

## 2021-01-01 RX ORDER — HEPARIN SODIUM (PORCINE) LOCK FLUSH IV SOLN 100 UNIT/ML 100 UNIT/ML
5 SOLUTION INTRAVENOUS
Status: CANCELLED | OUTPATIENT
Start: 2021-01-01

## 2021-01-01 RX ORDER — QUETIAPINE FUMARATE 100 MG/1
100 TABLET, FILM COATED ORAL AT BEDTIME
COMMUNITY

## 2021-01-01 RX ORDER — QUETIAPINE FUMARATE 100 MG/1
100 TABLET, FILM COATED ORAL 2 TIMES DAILY
Qty: 90 TABLET | Refills: 3 | Status: SHIPPED | OUTPATIENT
Start: 2021-01-01

## 2021-01-01 RX ORDER — LIDOCAINE 40 MG/G
CREAM TOPICAL
Status: DISCONTINUED | OUTPATIENT
Start: 2021-01-01 | End: 2021-01-01 | Stop reason: HOSPADM

## 2021-01-01 RX ORDER — FENTANYL CITRATE 50 UG/ML
25-50 INJECTION, SOLUTION INTRAMUSCULAR; INTRAVENOUS EVERY 5 MIN PRN
Status: DISCONTINUED | OUTPATIENT
Start: 2021-01-01 | End: 2021-01-01 | Stop reason: HOSPADM

## 2021-01-01 RX ORDER — NYSTATIN 100000 [USP'U]/G
POWDER TOPICAL 2 TIMES DAILY
Qty: 60 G | Refills: 11 | Status: SHIPPED | OUTPATIENT
Start: 2021-01-01

## 2021-01-01 RX ORDER — HYDROMORPHONE HCL IN WATER/PF 6 MG/30 ML
0.2 PATIENT CONTROLLED ANALGESIA SYRINGE INTRAVENOUS
Status: DISCONTINUED | OUTPATIENT
Start: 2021-01-01 | End: 2021-01-01 | Stop reason: HOSPADM

## 2021-01-01 RX ORDER — CEFADROXIL 500 MG/1
500 CAPSULE ORAL 2 TIMES DAILY
Qty: 14 CAPSULE | Refills: 0 | Status: SHIPPED | OUTPATIENT
Start: 2021-01-01

## 2021-01-01 RX ORDER — BUMETANIDE 0.5 MG/1
1.5 TABLET ORAL DAILY
Qty: 90 TABLET | Refills: 1 | Status: SHIPPED | OUTPATIENT
Start: 2021-01-01

## 2021-01-01 RX ORDER — NYSTATIN 100000 [USP'U]/G
POWDER TOPICAL 2 TIMES DAILY
Qty: 120 G | Refills: 11 | Status: SHIPPED | OUTPATIENT
Start: 2021-01-01

## 2021-01-01 RX ORDER — RIFAXIMIN 550 MG/1
TABLET ORAL
Qty: 60 TABLET | Refills: 11 | Status: SHIPPED | OUTPATIENT
Start: 2021-01-01

## 2021-01-01 RX ORDER — NALOXONE HYDROCHLORIDE 0.4 MG/ML
0.2 INJECTION, SOLUTION INTRAMUSCULAR; INTRAVENOUS; SUBCUTANEOUS
Status: DISCONTINUED | OUTPATIENT
Start: 2021-01-01 | End: 2021-01-01 | Stop reason: HOSPADM

## 2021-01-01 RX ORDER — LACTULOSE 10 G/15ML
SOLUTION ORAL; RECTAL
Refills: 0 | OUTPATIENT
Start: 2021-01-01

## 2021-01-01 RX ORDER — OXYCODONE AND ACETAMINOPHEN 5; 325 MG/1; MG/1
1 TABLET ORAL EVERY 6 HOURS PRN
Qty: 12 TABLET | Refills: 0 | Status: SHIPPED | OUTPATIENT
Start: 2021-01-01 | End: 2021-01-01

## 2021-01-01 RX ORDER — NITROGLYCERIN 5 MG/ML
100-500 VIAL (ML) INTRAVENOUS
Status: DISCONTINUED | OUTPATIENT
Start: 2021-01-01 | End: 2021-01-01 | Stop reason: HOSPADM

## 2021-01-01 RX ORDER — PLANT STANOL ESTER 450 MG
1 TABLET ORAL DAILY
COMMUNITY

## 2021-01-01 RX ORDER — GUAIFENESIN 200 MG/10ML
200 LIQUID ORAL EVERY 4 HOURS PRN
Qty: 1000 ML | Refills: 1 | Status: SHIPPED | OUTPATIENT
Start: 2021-01-01

## 2021-01-01 RX ORDER — NICOTINE POLACRILEX 4 MG
15-30 LOZENGE BUCCAL
Status: DISCONTINUED | OUTPATIENT
Start: 2021-01-01 | End: 2021-01-01 | Stop reason: HOSPADM

## 2021-01-01 RX ORDER — HYDROMORPHONE HYDROCHLORIDE 2 MG/1
2 TABLET ORAL
Status: DISCONTINUED | OUTPATIENT
Start: 2021-01-01 | End: 2021-01-01 | Stop reason: HOSPADM

## 2021-01-01 RX ORDER — TRIAMCINOLONE ACETONIDE 1 MG/G
CREAM TOPICAL
Qty: 90 G | Refills: 0 | Status: SHIPPED | OUTPATIENT
Start: 2021-01-01

## 2021-01-01 RX ORDER — OXYCODONE HYDROCHLORIDE 5 MG/1
TABLET ORAL
Qty: 180 TABLET | Refills: 0 | Status: SHIPPED | OUTPATIENT
Start: 2021-01-01 | End: 2021-01-01

## 2021-01-01 RX ORDER — DEXTROSE MONOHYDRATE 25 G/50ML
25-50 INJECTION, SOLUTION INTRAVENOUS
Status: CANCELLED | OUTPATIENT
Start: 2021-01-01

## 2021-01-01 RX ORDER — DOCUSATE SODIUM 100 MG/1
100 CAPSULE, LIQUID FILLED ORAL 2 TIMES DAILY
Status: DISCONTINUED | OUTPATIENT
Start: 2021-01-01 | End: 2021-01-01 | Stop reason: HOSPADM

## 2021-01-01 RX ORDER — TAMSULOSIN HYDROCHLORIDE 0.4 MG/1
0.8 CAPSULE ORAL DAILY
Qty: 180 CAPSULE | Refills: 3 | Status: SHIPPED | OUTPATIENT
Start: 2021-01-01 | End: 2022-03-11

## 2021-01-01 RX ORDER — OXYCODONE HYDROCHLORIDE 5 MG/1
TABLET ORAL
Qty: 180 TABLET | Refills: 0 | Status: CANCELLED | OUTPATIENT
Start: 2021-01-01

## 2021-01-01 RX ORDER — IODIXANOL 320 MG/ML
150 INJECTION, SOLUTION INTRAVASCULAR ONCE
Status: COMPLETED | OUTPATIENT
Start: 2021-01-01 | End: 2021-01-01

## 2021-01-01 RX ORDER — MELATONIN 5 MG
2 TABLET,CHEWABLE ORAL
COMMUNITY

## 2021-01-01 RX ORDER — CEFAZOLIN SODIUM 2 G/100ML
2 INJECTION, SOLUTION INTRAVENOUS
Status: CANCELLED | OUTPATIENT
Start: 2021-01-01

## 2021-01-01 RX ORDER — CEFADROXIL 500 MG/1
500 CAPSULE ORAL EVERY 12 HOURS
Qty: 14 CAPSULE | Refills: 0 | Status: SHIPPED | OUTPATIENT
Start: 2021-01-01 | End: 2021-01-01

## 2021-01-01 RX ORDER — TRAZODONE HYDROCHLORIDE 50 MG/1
50 TABLET, FILM COATED ORAL AT BEDTIME
Qty: 30 TABLET | Refills: 11 | Status: SHIPPED | OUTPATIENT
Start: 2021-01-01

## 2021-01-01 RX ORDER — CEPHALEXIN 500 MG/1
500 CAPSULE ORAL 4 TIMES DAILY
Qty: 28 CAPSULE | Refills: 0 | Status: SHIPPED | OUTPATIENT
Start: 2021-01-01 | End: 2021-01-01

## 2021-01-01 RX ORDER — HEPARIN SODIUM 200 [USP'U]/100ML
1 INJECTION, SOLUTION INTRAVENOUS CONTINUOUS PRN
Status: DISCONTINUED | OUTPATIENT
Start: 2021-01-01 | End: 2021-01-01 | Stop reason: HOSPADM

## 2021-01-01 RX ORDER — TC 99M MEDRONATE 20 MG/10ML
20-30 INJECTION, POWDER, LYOPHILIZED, FOR SOLUTION INTRAVENOUS ONCE
Status: COMPLETED | OUTPATIENT
Start: 2021-01-01 | End: 2021-01-01

## 2021-01-01 RX ORDER — OXYCODONE HYDROCHLORIDE 5 MG/1
10 TABLET ORAL ONCE
Status: COMPLETED | OUTPATIENT
Start: 2021-01-01 | End: 2021-01-01

## 2021-01-01 RX ORDER — TAMSULOSIN HYDROCHLORIDE 0.4 MG/1
0.8 CAPSULE ORAL DAILY
COMMUNITY
Start: 2021-01-01 | End: 2021-01-01

## 2021-01-01 RX ORDER — FAMOTIDINE 40 MG/1
40 TABLET, FILM COATED ORAL DAILY
Qty: 90 TABLET | Refills: 3 | Status: SHIPPED | OUTPATIENT
Start: 2021-01-01

## 2021-01-01 RX ORDER — LACTULOSE 10 G/15ML
SOLUTION ORAL; RECTAL
Qty: 1892 ML | Refills: 11 | Status: SHIPPED | OUTPATIENT
Start: 2021-01-01

## 2021-01-01 RX ORDER — GADOBUTROL 604.72 MG/ML
10 INJECTION INTRAVENOUS ONCE
Status: COMPLETED | OUTPATIENT
Start: 2021-01-01 | End: 2021-01-01

## 2021-01-01 RX ORDER — HEPARIN SODIUM,PORCINE 10 UNIT/ML
5 VIAL (ML) INTRAVENOUS
Status: CANCELLED | OUTPATIENT
Start: 2021-01-01

## 2021-01-01 RX ORDER — KETOROLAC TROMETHAMINE 30 MG/ML
15 INJECTION, SOLUTION INTRAMUSCULAR; INTRAVENOUS ONCE
Status: CANCELLED | OUTPATIENT
Start: 2021-01-01 | End: 2021-01-01

## 2021-01-01 RX ORDER — OXYCODONE HYDROCHLORIDE 5 MG/1
TABLET ORAL
Qty: 180 TABLET | Refills: 0 | Status: SHIPPED | OUTPATIENT
Start: 2021-01-01

## 2021-01-01 RX ORDER — GADOBUTROL 604.72 MG/ML
10 INJECTION INTRAVENOUS ONCE
Status: DISCONTINUED | OUTPATIENT
Start: 2021-01-01 | End: 2021-01-01

## 2021-01-01 RX ORDER — HYDROXYZINE HYDROCHLORIDE 25 MG/1
25 TABLET, FILM COATED ORAL 3 TIMES DAILY PRN
Qty: 90 TABLET | Refills: 3 | Status: SHIPPED | OUTPATIENT
Start: 2021-01-01

## 2021-01-01 RX ORDER — FLUMAZENIL 0.1 MG/ML
0.2 INJECTION, SOLUTION INTRAVENOUS
Status: DISCONTINUED | OUTPATIENT
Start: 2021-01-01 | End: 2021-01-01 | Stop reason: HOSPADM

## 2021-01-01 RX ORDER — DEXTROSE MONOHYDRATE 25 G/50ML
25-50 INJECTION, SOLUTION INTRAVENOUS
Status: DISCONTINUED | OUTPATIENT
Start: 2021-01-01 | End: 2021-01-01 | Stop reason: HOSPADM

## 2021-01-01 RX ORDER — ONDANSETRON 4 MG/1
4 TABLET, FILM COATED ORAL EVERY 8 HOURS PRN
Qty: 20 TABLET | Refills: 0 | Status: SHIPPED | OUTPATIENT
Start: 2021-01-01

## 2021-01-01 RX ORDER — ONDANSETRON 2 MG/ML
4 INJECTION INTRAMUSCULAR; INTRAVENOUS ONCE
Status: COMPLETED | OUTPATIENT
Start: 2021-01-01 | End: 2021-01-01

## 2021-01-01 RX ORDER — DOXYCYCLINE 100 MG/1
100 CAPSULE ORAL EVERY 12 HOURS
Qty: 14 CAPSULE | Refills: 0 | Status: SHIPPED | OUTPATIENT
Start: 2021-01-01

## 2021-01-01 RX ORDER — QUETIAPINE FUMARATE 100 MG/1
100 TABLET, FILM COATED ORAL
OUTPATIENT
Start: 2021-01-01

## 2021-01-01 RX ORDER — NICOTINE POLACRILEX 4 MG
15-30 LOZENGE BUCCAL
Status: CANCELLED | OUTPATIENT
Start: 2021-01-01

## 2021-01-01 RX ORDER — ONDANSETRON 4 MG/1
4 TABLET, FILM COATED ORAL EVERY 12 HOURS PRN
Qty: 20 TABLET | Refills: 0 | Status: SHIPPED | OUTPATIENT
Start: 2021-01-01 | End: 2021-01-01

## 2021-01-01 RX ORDER — HYDROMORPHONE HCL IN WATER/PF 6 MG/30 ML
0.2 PATIENT CONTROLLED ANALGESIA SYRINGE INTRAVENOUS
Status: DISCONTINUED | OUTPATIENT
Start: 2021-01-01 | End: 2021-01-01

## 2021-01-01 RX ORDER — PANTOPRAZOLE SODIUM 40 MG/1
40 TABLET, DELAYED RELEASE ORAL DAILY
Qty: 14 TABLET | Refills: 0 | Status: SHIPPED | OUTPATIENT
Start: 2021-01-01

## 2021-01-01 RX ORDER — ONDANSETRON 2 MG/ML
4 INJECTION INTRAMUSCULAR; INTRAVENOUS
Status: COMPLETED | OUTPATIENT
Start: 2021-01-01 | End: 2021-01-01

## 2021-01-01 RX ORDER — INSULIN ASPART 100 [IU]/ML
INJECTION, SOLUTION INTRAVENOUS; SUBCUTANEOUS
Qty: 30 ML | Refills: 11 | Status: SHIPPED | OUTPATIENT
Start: 2021-01-01

## 2021-01-01 RX ORDER — LACTULOSE 10 G/15ML
SOLUTION ORAL; RECTAL
Qty: 1892 ML | Refills: 11 | Status: SHIPPED | OUTPATIENT
Start: 2021-01-01 | End: 2021-01-01

## 2021-01-01 RX ADMIN — IODIXANOL 85 ML: 320 INJECTION, SOLUTION INTRAVASCULAR at 13:33

## 2021-01-01 RX ADMIN — LIDOCAINE HYDROCHLORIDE 6 ML: 10 INJECTION, SOLUTION EPIDURAL; INFILTRATION; INTRACAUDAL; PERINEURAL at 13:25

## 2021-01-01 RX ADMIN — GADOBUTROL 10 ML: 604.72 INJECTION INTRAVENOUS at 14:22

## 2021-01-01 RX ADMIN — FERRIC CARBOXYMALTOSE INJECTION 750 MG: 50 INJECTION, SOLUTION INTRAVENOUS at 11:19

## 2021-01-01 RX ADMIN — HEPARIN SODIUM 1 BAG: 200 INJECTION, SOLUTION INTRAVENOUS at 13:07

## 2021-01-01 RX ADMIN — ONDANSETRON 4 MG: 2 INJECTION INTRAMUSCULAR; INTRAVENOUS at 12:58

## 2021-01-01 RX ADMIN — MITOMYCIN: 5 INJECTION, POWDER, LYOPHILIZED, FOR SOLUTION INTRAVENOUS at 13:20

## 2021-01-01 RX ADMIN — GADOBUTROL 10 ML: 604.72 INJECTION INTRAVENOUS at 16:12

## 2021-01-01 RX ADMIN — SODIUM CHLORIDE: 9 INJECTION, SOLUTION INTRAVENOUS at 10:31

## 2021-01-01 RX ADMIN — TC 99M MEDRONATE 24.4 MCI.: 20 INJECTION, POWDER, LYOPHILIZED, FOR SOLUTION INTRAVENOUS at 12:30

## 2021-01-01 RX ADMIN — SCOPALAMINE 1 PATCH: 1 PATCH, EXTENDED RELEASE TRANSDERMAL at 10:06

## 2021-01-01 RX ADMIN — ONDANSETRON 4 MG: 2 INJECTION INTRAMUSCULAR; INTRAVENOUS at 13:33

## 2021-01-01 RX ADMIN — AMPICILLIN SODIUM AND SULBACTAM SODIUM 3 G: 2; 1 INJECTION, POWDER, FOR SOLUTION INTRAMUSCULAR; INTRAVENOUS at 10:30

## 2021-01-01 RX ADMIN — HEPARIN SODIUM 1 BAG: 200 INJECTION, SOLUTION INTRAVENOUS at 13:25

## 2021-01-01 RX ADMIN — SODIUM CHLORIDE: 9 INJECTION, SOLUTION INTRAVENOUS at 14:53

## 2021-01-01 RX ADMIN — NITROGLYCERIN 150 MCG: 20 INJECTION INTRAVENOUS at 12:55

## 2021-01-01 RX ADMIN — SCOPALAMINE 1 PATCH: 1 PATCH, EXTENDED RELEASE TRANSDERMAL at 10:45

## 2021-01-01 RX ADMIN — TC 99M MEDRONATE 25.2 MCI.: 20 INJECTION, POWDER, LYOPHILIZED, FOR SOLUTION INTRAVENOUS at 12:49

## 2021-01-01 RX ADMIN — MITOMYCIN: 5 INJECTION, POWDER, LYOPHILIZED, FOR SOLUTION INTRAVENOUS at 12:01

## 2021-01-01 RX ADMIN — GADOBUTROL 10 ML: 604.72 INJECTION INTRAVENOUS at 15:40

## 2021-01-01 RX ADMIN — HYDROMORPHONE HYDROCHLORIDE 0.2 MG: 0.2 INJECTION, SOLUTION INTRAMUSCULAR; INTRAVENOUS; SUBCUTANEOUS at 12:17

## 2021-01-01 RX ADMIN — HYDROCORTISONE SODIUM SUCCINATE 100 MG: 100 INJECTION, POWDER, FOR SOLUTION INTRAMUSCULAR; INTRAVENOUS at 10:46

## 2021-01-01 RX ADMIN — HYDROCORTISONE SODIUM SUCCINATE 100 MG: 100 INJECTION, POWDER, FOR SOLUTION INTRAMUSCULAR; INTRAVENOUS at 10:17

## 2021-01-01 RX ADMIN — FENTANYL CITRATE 25 MCG: 50 INJECTION, SOLUTION INTRAMUSCULAR; INTRAVENOUS at 13:19

## 2021-01-01 RX ADMIN — LIDOCAINE HYDROCHLORIDE 5 ML: 10 INJECTION, SOLUTION EPIDURAL; INFILTRATION; INTRACAUDAL; PERINEURAL at 12:39

## 2021-01-01 RX ADMIN — OXYCODONE HYDROCHLORIDE 10 MG: 5 TABLET ORAL at 16:23

## 2021-01-01 RX ADMIN — SODIUM CHLORIDE 250 ML: 9 INJECTION, SOLUTION INTRAVENOUS at 11:35

## 2021-01-01 RX ADMIN — Medication 250 ML: at 13:37

## 2021-01-01 RX ADMIN — ONDANSETRON 4 MG: 2 INJECTION INTRAMUSCULAR; INTRAVENOUS at 10:08

## 2021-01-01 RX ADMIN — AMPICILLIN SODIUM AND SULBACTAM SODIUM 3 G: 2; 1 INJECTION, POWDER, FOR SOLUTION INTRAMUSCULAR; INTRAVENOUS at 10:18

## 2021-01-01 RX ADMIN — IODIXANOL 50 ML: 320 INJECTION, SOLUTION INTRAVASCULAR at 13:58

## 2021-01-01 RX ADMIN — HYDROMORPHONE HYDROCHLORIDE 0.2 MG: 0.2 INJECTION, SOLUTION INTRAMUSCULAR; INTRAVENOUS; SUBCUTANEOUS at 13:20

## 2021-01-01 ASSESSMENT — PAIN SCALES - GENERAL
PAINLEVEL: NO PAIN (0)
PAINLEVEL: MILD PAIN (2)
PAINLEVEL: MODERATE PAIN (5)
PAINLEVEL: MODERATE PAIN (4)
PAINLEVEL: NO PAIN (0)
PAINLEVEL: SEVERE PAIN (6)
PAINLEVEL: MODERATE PAIN (5)
PAINLEVEL: EXTREME PAIN (8)
PAINLEVEL: NO PAIN (0)
PAINLEVEL: MODERATE PAIN (5)

## 2021-01-01 ASSESSMENT — ACTIVITIES OF DAILY LIVING (ADL): ADLS_ACUITY_SCORE: 19

## 2021-01-01 ASSESSMENT — MIFFLIN-ST. JEOR
SCORE: 1906.17
SCORE: 2028.64

## 2021-01-01 ASSESSMENT — PATIENT HEALTH QUESTIONNAIRE - PHQ9: SUM OF ALL RESPONSES TO PHQ QUESTIONS 1-9: 19

## 2021-01-04 NOTE — TELEPHONE ENCOUNTER
Called Anna.  Gave verbal orders per Ary Murphy CNP for Order(s): Home Care Orders: Physical Therapy (PT): Extend 1x a week for 3 weeks         Sigifredo Andrade CMA ([facility]) at 9:55 AM on 1/4/2021

## 2021-01-04 NOTE — TELEPHONE ENCOUNTER
Called pt  And his wife on their home phone.       Informed her that pt has tested postivie for covid in Nov 2020.     It would not be advised to retest him again.     I did ask if pt was symptomatic in which wife states that pt is not having any symptoms at all.       I informed her that we will still move fwd with treatment.     She did state that pt had a hard time the first time, however the 2nd time was better. She is also requesting that pt have pain meds to go home with as well as I did advise her to make ride arrangements.     She states that last time pt did not have pain meds.     She states that she'd like for pt to come home afterwards. I informed her that if pt is stable then he can come home however if he's not then we will keep him longer.     She verbalized understanding.     Sunshine TUCKER RN, BSN  Interventional Radiology/Vascular  Nurse Coordinator   Phone: [phone]  Fax: [phone]

## 2021-01-07 NOTE — SEDATION DOCUMENTATION
Personal typed recommendations regarding pain, nausea, and hydration that have been requested by daughter and kimberlyfe reviewed with Dr. Hilton as patient was getting prepped on the table. Also reviewed comments in the order stating sedation. At the time patient was being prepped, patient reports pain in arms and back. At this time, vital signs WDL, patient supine and sleeping on the table, snoring lightly prior to the start of the case prior to initiation of any sedation.

## 2021-01-07 NOTE — PROGRESS NOTES
Prep complete for Chemo Embolization. Awaiting consent. Patients wife Flores will be transporting patient home post procedure cell# [phone].

## 2021-01-07 NOTE — IP AVS SNAPSHOT
MRN:[id number]                      After Visit Summary   1/7/2021    Lawrence Louie    MRN: [id number]           Visit Information        Department      1/7/2021  9:11 AM [facility] [address] [place]          Review of your medicines      UNREVIEWED medicines. Ask your doctor about these medicines       Dose / Directions   calcium carbonate-vitamin D 500-200 MG-UNIT tablet  Commonly known as: OSCAL w/D  Used for: Alcoholic cirrhosis (H)      Dose: 1 tablet  Take 1 tablet by mouth daily  Quantity: 90 tablet  Refills: 3     citalopram 20 MG tablet  Commonly known as: celeXA  Used for: Recurrent major depressive disorder, remission status unspecified (H)      Dose: 20 mg  Take 1 tablet (20 mg) by mouth daily  Quantity: 90 tablet  Refills: 3     COMPOUNDED NON-CONTROLLED SUBSTANCE - PHARMACY TO MIX COMPOUNDED MEDICATION  Commonly known as: CMPD RX  Used for: Pruritus      Apply to the affected area once a day.  Quantity: 500 g  Refills: 5     desvenlafaxine 100 MG 24 hr tablet  Commonly known as: PRISTIQ  Used for: Other depression      Dose: 200 mg  Take 2 tablets (200 mg) by mouth daily  Quantity: 180 tablet  Refills: 1     EQL Vitamin D3 50 MCG (2000 UT) Caps  Used for: Mild major depression (H)  Generic drug: cholecalciferol      Take 1 capsule by mouth daily  Quantity: 90 capsule  Refills: 2     famotidine 40 MG tablet  Commonly known as: PEPCID  Used for: Gastroesophageal reflux disease with esophagitis      Dose: 40 mg  Take 1 tablet (40 mg) by mouth daily  Quantity: 30 tablet  Refills: 11     furosemide 40 MG tablet  Commonly known as: LASIX  Used for: Edema of both legs      Take 40 mg twice daily.  Quantity: 180 tablet  Refills: 3     gabapentin 300 MG capsule  Commonly known as: NEURONTIN  Used for: Diabetic polyneuropathy associated with type 2 diabetes mellitus (H)      Dose: 300 mg  Take 1 capsule (300 mg) by mouth At Bedtime  Quantity: 90 capsule  Refills: 1      hydrOXYzine 25 MG tablet  Commonly known as: ATARAX  Used for: Venous stasis dermatitis of both lower extremities      Dose: 25 mg  Take 1 tablet (25 mg) by mouth 3 times daily as needed for itching  Quantity: 90 tablet  Refills: 3     insulin glargine 100 UNIT/ML pen  Commonly known as: Lantus SoloStar  Used for: Type 2 diabetes mellitus with diabetic neuropathic arthropathy, without long-term current use of insulin (H)      5 units daily in am. Increase by 5 units 2x weekly until fasting sugars are <130. Max daily dose 100 units.  Quantity: 15 mL  Refills: 11     lactulose 20 GM packet  Commonly known as: CEPHULAC  Used for: Hepatic encephalopathy (H)      Dose: 20 g  Take 1 packet (20 g) by mouth 2 times daily  Quantity: 60 packet  Refills: 0     lactulose encephalopathy 10 GM/15ML SOLUTION  Commonly known as: CHRONULAC  Used for: Hepatic encephalopathy (H)      TAKE 30 MLS BY MOUTH 2 TIMES DAILY  TITRATE AS NEEDED TO ACHIEVE 3-5 BOWEL MOVEMENTS DAILY.  Quantity: 1892 mL  Refills: 11     Lidocaine 4 % Patch  Commonly known as: LIDOCARE  Used for: Recurrent major depressive disorder, remission status unspecified (H)      Dose: 1 patch  Place 1 patch onto the skin every 24 hours To prevent lidocaine toxicity, patient should be patch free for 12 hrs daily.  Quantity: 30 patch  Refills: 3     magnesium oxide 400 (241.3 Mg) MG tablet  Commonly known as: MAG-OX  Used for: Cramp of limb      Dose: 400 mg  Take 1 tablet (400 mg) by mouth daily  Quantity: 90 tablet  Refills: 1     NovoLOG FLEXPEN 100 UNIT/ML pen  Used for: Type 2 diabetes mellitus with diabetic neuropathic arthropathy, without long-term current use of insulin (H)  Generic drug: insulin aspart      1 unit per 10 grams of carbohydrates + 1 per 50>140. Max daily dose 100 units.  Quantity: 30 mL  Refills: 11     nystatin 015650 UNIT/GM external cream  Commonly known as: MYCOSTATIN  Used for: Venous stasis dermatitis of both lower extremities      Apply  topically 2 times daily  Quantity: 30 g  Refills: 11     ondansetron 4 MG tablet  Commonly known as: ZOFRAN  Used for: HCC (hepatocellular carcinoma) (H), Post-operative state      Dose: 4 mg  Take 1 tablet (4 mg) by mouth every 8 hours as needed for nausea  Quantity: 20 tablet  Refills: 0     oxyCODONE 5 MG tablet  Commonly known as: ROXICODONE  Used for: Alcoholic cirrhosis of liver with ascites (H)      TAKE 1-2 TABLETS BY MOUTH EVERY 8 HOURS AS NEEDED FOR SEVERE PAIN  Quantity: 180 tablet  Refills: 0     polyethylene glycol 17 g packet  Commonly known as: MIRALAX      Dose: 1 packet  Take 1 packet by mouth  Refills: 0     prochlorperazine 10 MG tablet  Commonly known as: COMPAZINE  Used for: HCC (hepatocellular carcinoma) (H), Post-operative state      Dose: 10 mg  Take 1 tablet (10 mg) by mouth every 6 hours as needed for nausea or vomiting (take if zofran does not relieve nausea)  Quantity: 20 tablet  Refills: 0     * QUEtiapine 50 MG tablet  Commonly known as: SEROquel      Dose: 50 mg  Take 50 mg by mouth At Bedtime  Refills: 0     * QUEtiapine 25 MG tablet  Commonly known as: SEROquel  Used for: Recurrent major depressive disorder, remission status unspecified (H)      Give 1 tab with a 50 mg tab at HS.  Quantity: 60 tablet  Refills: 3     * QUEtiapine 50 MG tablet  Commonly known as: SEROquel  Used for: Recurrent major depressive disorder, remission status unspecified (H)      Take 1 tab with a 25 mg tab at HS.  Quantity: 60 tablet  Refills: 3     rifaximin 550 MG Tabs tablet  Commonly known as: Xifaxan  Used for: Hepatic encephalopathy (H)      Dose: 550 mg  Take 1 tablet (550 mg) by mouth 2 times daily  Quantity: 60 tablet  Refills: 11     spironolactone 50 MG tablet  Commonly known as: ALDACTONE  Used for: Portal hypertension (H), Generalized edema, Alcoholic cirrhosis of liver with ascites (H)      Dose: 100 mg  Take 2 tablets (100 mg) by mouth daily  Quantity: 180 tablet  Refills: 3     ursodiol 300  MG capsule  Commonly known as: ACTIGALL  Used for: Alcoholic cirrhosis of liver with ascites (H), Itching      Dose: 900 mg  Take 3 capsules (900 mg) by mouth 2 times daily  Quantity: 180 capsule  Refills: 11     VITAMIN B-12 PO      Dose: 1 tablet  Take 1 tablet by mouth daily  Refills: 0         * This list has 3 medication(s) that are the same as other medications prescribed for you. Read the directions carefully, and ask your doctor or other care provider to review them with you.            CONTINUE these medicines which have NOT CHANGED       Dose / Directions   * ULTICARE SHORT 31G X 8 MM miscellaneous  Used for: Type 2 diabetes, HbA1c goal < 7% (H)  Generic drug: insulin pen needle      Use UP to 6 times daily or as directed  Quantity: 300 each  Refills: 3     * B-D U/F 31G X 8 MM miscellaneous  Used for: Type 2 diabetes, HbA1c goal < 7% (H)  Generic drug: insulin pen needle      USE  6 times daily / OR AS DIRECTED  Quantity: 300 each  Refills: 3     blood glucose lancets standard  Commonly known as: NO BRAND SPECIFIED  Used for: Diabetes mellitus, type 2 (H)      Use to test blood sugar 4 X  times daily or as directed.  Quantity: 1 Box  Refills: 3     * blood glucose monitoring meter device kit  Commonly known as: NO BRAND SPECIFIED  Used for: Type 2 diabetes mellitus with other specified complication (H)      Use to test blood sugar 4 X  times daily or as directed.  Quantity: 1 kit  Refills: 0     * blood glucose monitoring meter device kit  Commonly known as: NO BRAND SPECIFIED  Used for: Type 2 diabetes, HbA1c goal < 7% (H)      Use to test blood sugar 4 times daily or as directed. Before meals and snack at HS.  Quantity: 1 kit  Refills: 1     * blood glucose test strip  Commonly known as: NO BRAND SPECIFIED  Used for: Type 2 diabetes, HbA1c goal < 7% (H)      Use to test blood sugar 4 times daily or as directed.  Quantity: 200 strip  Refills: 3     * blood glucose test strip  Commonly known as: NO BRAND  SPECIFIED  Used for: Diabetes mellitus, type 2 (H)      Use to test blood sugars 4 X  times daily or as directed  Quantity: 400 strip  Refills: 3     Ensure Liqd  Used for: End-stage liver disease (H)      Dose: 1 Can  Take 1 Can by mouth daily  Quantity: 30 each  Refills: 11     order for DME  Used for: Localized edema      Equipment being ordered:bilateral pneumatic leg massage for treatment of extreme bilateral lower leg edema.  Quantity: 2 pump  Refills: 0     order for DME  Used for: Lymphedema      Equipment being ordered:BioTab compression pants pneumatic system  Quantity: 1 Piece  Refills: 0     order for DME  Used for: Sleep disorder      Equipment being ordered: Condom catheter  Quantity: 1 Device  Refills: 3     order for DME  Used for: Pain in both feet      Equipment being ordered:Orthopedic shoes  Quantity: 2 Units  Refills: 0         * This list has 6 medication(s) that are the same as other medications prescribed for you. Read the directions carefully, and ask your doctor or other care provider to review them with you.                  Protect others around you: Learn how to safely use, store and throw away your medicines at www.disposemymeds.org.       Follow-ups after your visit       Your next 10 appointments already scheduled    Feb 22, 2021  2:30 PM  (Arrive by 2:15 PM)  Video Visit with Elina Partida MD  [facility] ([facility]) [address] [place] [id number] [phone]   [facility]  Note: this is not an onsite visit; there is no need to come to the facility.  Please have a list of all current medications available for appointment.         Care Instructions       Further instructions from your care team       [facility]   Interventional Radiology  Discharge Instructions Post Chemo Embolization.    AFTER YOU GO HOME          Relax and  take it easy for 24 hours.       Drink plenty of fluids.       Resume your regular diet, unless otherwise instructed by your Primary Physician.       DO NOT smoke for at least 24 hours, if you were given any sedation.       DO NOT drink alcoholic beverages the day of your procedure.       Do not drive or operate machinery at home or at work for 24 hours.          DO NOT do any strenuous exercise or lifting for at least 2 days following your procedure.       DO NOT take a bath or shower for at least 12 hours following your procedure.       DO NOT make any important or legal decisions for 24 hours following your procedure.    CALL THE PHYSICIAN IF:       - You start bleeding from the procedure site. lie down flat and hold pressure on the site. A small lump or bruise is common at the puncture site. Your physician will tell you if you need to return to the hospital.      - You develop numbness, coolness or a change in color of the leg that was punctured.      - You experience increased pain or redness at the puncture site.      - You develop hives or a rash or unexplained itching.      - You develop a temperature of 101 degrees F or greater.    Additional Information:           Support the puncture site for coughing, sneezing, or moving your bowels for the first 48 hours  No tub bath, hot tubs, or swimming for 5 days  No lotion or powder to the puncture site for 3 day        Closure Device: Mynx            [facility] INTERVENTIONAL RADIOLOGY DEPARTMENT         Procedure Physician :                           Date of Procedure:January 7, 2021       Telephone Numbers:   [phone]     Monday-Friday 8:00 to 4:30 pm                                        [phone]     After 4:30 pm Monday-Friday, Weekend and Holidays. Ask for the Interventional Radiologist on call. Someone is on call 24 hrs/day.              Additional Information About Your Visit       [employer] Information    [employer] gives you secure access to  "your electronic health record. If you see a primary care provider, you can also send messages to your care team and make appointments. If you have questions, please call your primary care clinic.  If you do not have a primary care provider, please call [phone] and they will assist you.       Care EveryWhere ID    This is your Care EveryWhere ID. This could be used by other organizations to access your [place] medical records  [id number]       Your Vitals Were  Most recent update: 1/7/2021  2:04 PM    Blood Pressure   109/69          Pulse   80          Temperature   98.2  F (36.8  C) (Oral)          Respirations   16          Height   1.88 m (6' 2\")             Weight   106.1 kg (234 lb)    Pulse Oximetry   98%    BMI (Body Mass Index)   30.04 kg/m           Primary Care Provider Office Phone # Fax #    CORY Toussaint [phone]      Equal Access to Services    JOSEF LORENZO AH: Hadii aad ku hadasho Soomaali, waaxda luqadaha, qaybta kaalmada adeegyada, silvia leonin haysaravanann daniela richardson . So [facility] [phone].    ATENCIÓN: Si habla español, tiene a rondon disposición servicios gratuitos de asistencia lingüística. Llame al [phone].    We comply with applicable federal and state civil rights laws, including the [place] Human Rights Act. We do not discriminate on the basis of race, color, creed, Latter day, national origin, marital status, age, disability, sex, sexual orientation, or gender identity.    If you would like an itemization of your charges they will now be available in [employer] 30 days after discharge. To access the itemized statements in [employer] go to billing/billing summary. From there select view account. There will be multiple tabs showing an overview of your account, detail, payments, and communications. From the communications tab you can see your monthly statements, your itemized statements, and any billing letters generated for your account. If you do not have " a [employer] account and need help getting access please contact [employer] support at [phone].  If you would prefer to have your itemized statements mailed please contact our automated itemized bill request line at [phone] option  2.       Thank you!    Thank you for choosing [facility] for your care. Our goal is always to provide you with excellent care. Hearing back from our patients is one way we can continue to improve our services. Please take a few minutes to complete the written survey that you may receive in the mail after you visit with us. Thank you!            Medication List      Medications          Morning Afternoon Evening Bedtime As Needed    * ULTICARE SHORT 31G X 8 MM miscellaneous  INSTRUCTIONS: Use UP to 6 times daily or as directed  Generic drug: insulin pen needle                     * B-D U/F 31G X 8 MM miscellaneous  INSTRUCTIONS: USE  6 times daily / OR AS DIRECTED  Generic drug: insulin pen needle                     blood glucose lancets standard  Also known as: NO BRAND SPECIFIED  INSTRUCTIONS: Use to test blood sugar 4 X  times daily or as directed.                     * blood glucose monitoring meter device kit  Also known as: NO BRAND SPECIFIED  INSTRUCTIONS: Use to test blood sugar 4 X  times daily or as directed.                     * blood glucose monitoring meter device kit  Also known as: NO BRAND SPECIFIED  INSTRUCTIONS: Use to test blood sugar 4 times daily or as directed. Before meals and snack at HS.                     * blood glucose test strip  Also known as: NO BRAND SPECIFIED  INSTRUCTIONS: Use to test blood sugar 4 times daily or as directed.                     * blood glucose test strip  Also known as: NO BRAND SPECIFIED  INSTRUCTIONS: Use to test blood sugars 4 X  times daily or as directed                     Ensure Liqd  INSTRUCTIONS: Take 1 Can by mouth daily  Doctor's comments: End stage liver disease.                     order for DME  INSTRUCTIONS: Equipment  being ordered:bilateral pneumatic leg massage for treatment of extreme bilateral lower leg edema.                     order for DME  INSTRUCTIONS: Equipment being ordered:BioTab compression pants pneumatic system                     order for DME  INSTRUCTIONS: Equipment being ordered: Condom catheter                     order for DME  INSTRUCTIONS: Equipment being ordered:Orthopedic shoes                        * This list has 6 medication(s) that are the same as other medications prescribed for you. Read the directions carefully, and ask your doctor or other care provider to review them with you.            ASK your doctor about these medications          Morning Afternoon Evening Bedtime As Needed    calcium carbonate-vitamin D 500-200 MG-UNIT tablet  Also known as: OSCAL w/D  INSTRUCTIONS: Take 1 tablet by mouth daily                     citalopram 20 MG tablet  Also known as: celeXA  INSTRUCTIONS: Take 1 tablet (20 mg) by mouth daily                     COMPOUNDED NON-CONTROLLED SUBSTANCE - PHARMACY TO MIX COMPOUNDED MEDICATION  Also known as: CMPD RX  INSTRUCTIONS: Apply to the affected area once a day.  Doctor's comments: 6 % lidocaine ointment : standard nystatin cream : 40 % ZnOx paste = 1:1:1                     desvenlafaxine 100 MG 24 hr tablet  Also known as: PRISTIQ  INSTRUCTIONS: Take 2 tablets (200 mg) by mouth daily                     EQL Vitamin D3 50 MCG (2000 UT) Caps  INSTRUCTIONS: Take 1 capsule by mouth daily  Generic drug: cholecalciferol                     famotidine 40 MG tablet  Also known as: PEPCID  INSTRUCTIONS: Take 1 tablet (40 mg) by mouth daily                     furosemide 40 MG tablet  Also known as: LASIX  INSTRUCTIONS: Take 40 mg twice daily.                     gabapentin 300 MG capsule  Also known as: NEURONTIN  INSTRUCTIONS: Take 1 capsule (300 mg) by mouth At Bedtime                     hydrOXYzine 25 MG tablet  Also known as: ATARAX  INSTRUCTIONS: Take 1 tablet (25 mg)  by mouth 3 times daily as needed for itching                     insulin glargine 100 UNIT/ML pen  Also known as: Lantus SoloStar  INSTRUCTIONS: 5 units daily in am. Increase by 5 units 2x weekly until fasting sugars are <130. Max daily dose 100 units.  Doctor's comments: If Lantus is not covered by insurance, may substitute Basaglar at same dose and frequency.                       lactulose 20 GM packet  Also known as: CEPHULAC  INSTRUCTIONS: Take 1 packet (20 g) by mouth 2 times daily                     lactulose encephalopathy 10 GM/15ML SOLUTION  Also known as: CHRONULAC  INSTRUCTIONS: TAKE 30 MLS BY MOUTH 2 TIMES DAILY  TITRATE AS NEEDED TO ACHIEVE 3-5 BOWEL MOVEMENTS DAILY.                     Lidocaine 4 % Patch  Also known as: LIDOCARE  INSTRUCTIONS: Place 1 patch onto the skin every 24 hours To prevent lidocaine toxicity, patient should be patch free for 12 hrs daily.  LAST TAKEN: Ask your nurse or doctor                     magnesium oxide 400 (241.3 Mg) MG tablet  Also known as: MAG-OX  INSTRUCTIONS: Take 1 tablet (400 mg) by mouth daily                     NovoLOG FLEXPEN 100 UNIT/ML pen  INSTRUCTIONS: 1 unit per 10 grams of carbohydrates + 1 per 50>140. Max daily dose 100 units.  Generic drug: insulin aspart                     nystatin 534340 UNIT/GM external cream  Also known as: MYCOSTATIN  INSTRUCTIONS: Apply topically 2 times daily                     ondansetron 4 MG tablet  Also known as: ZOFRAN  INSTRUCTIONS: Take 1 tablet (4 mg) by mouth every 8 hours as needed for nausea  LAST TAKEN: Ask your nurse or doctor                     oxyCODONE 5 MG tablet  Also known as: ROXICODONE  INSTRUCTIONS: TAKE 1-2 TABLETS BY MOUTH EVERY 8 HOURS AS NEEDED FOR SEVERE PAIN                     polyethylene glycol 17 g packet  Also known as: MIRALAX  INSTRUCTIONS: Take 1 packet by mouth                     prochlorperazine 10 MG tablet  Also known as: COMPAZINE  INSTRUCTIONS: Take 1 tablet (10 mg) by mouth  every 6 hours as needed for nausea or vomiting (take if zofran does not relieve nausea)                     * QUEtiapine 50 MG tablet  Also known as: SEROquel  INSTRUCTIONS: Take 50 mg by mouth At Bedtime                     * QUEtiapine 25 MG tablet  Also known as: SEROquel  INSTRUCTIONS: Give 1 tab with a 50 mg tab at HS.                     * QUEtiapine 50 MG tablet  Also known as: SEROquel  INSTRUCTIONS: Take 1 tab with a 25 mg tab at HS.                     rifaximin 550 MG Tabs tablet  Also known as: Xifaxan  INSTRUCTIONS: Take 1 tablet (550 mg) by mouth 2 times daily                     spironolactone 50 MG tablet  Also known as: ALDACTONE  INSTRUCTIONS: Take 2 tablets (100 mg) by mouth daily                     ursodiol 300 MG capsule  Also known as: ACTIGALL  INSTRUCTIONS: Take 3 capsules (900 mg) by mouth 2 times daily                     VITAMIN B-12 PO  INSTRUCTIONS: Take 1 tablet by mouth daily                        * This list has 3 medication(s) that are the same as other medications prescribed for you. Read the directions carefully, and ask your doctor or other care provider to review them with you.

## 2021-01-07 NOTE — PROGRESS NOTES
Patient arrived via cart with RN from IR s/p Chemo Embolization. Patient c/o chronic neck pain with supine bedrest, and then drifts off to sleep. VSS. Right groin site CDI, Mynx closure device intact, no hematoma. Patient on flat bedrest until 1645 per MD orders, patient agreeable to plan.

## 2021-01-07 NOTE — PROGRESS NOTES
Pt completed bedrest; right groin remains soft, dry and intact. Pt taken to CT on litter; pt transferred with walker to bathroom; voided; able to take a few steps, this is his baseline. R groin remains soft, dry and intact. Tolerated clear liquids declined food; IV fluids continued during stay. Pt reports upper abdominal pain; Dr Tyson and Dr Davis aware. Pain med given, some relief noted, pt able to doze off after. IV discontinued, catheter intact; discharge instructions reviewed with dtr and wife over phone. Awaiting ride. Will  scripts on way out.     1755--escorted to vehicle/family per wheelchair with all belongings. Reviewed meds with family, meds given to family. Reviewed family removing scopalomine patch tomorrow.

## 2021-01-07 NOTE — IP AVS SNAPSHOT
[facility] [address] [address]                                    After Visit Summary   1/7/2021    Lawrence Louie    MRN: [id number]           After Visit Summary Signature Page    I have received my discharge instructions, and my questions have been answered. I have discussed any challenges I see with this plan with the nurse or doctor.    ..........................................................................................................................................  Patient/Patient Representative Signature      ..........................................................................................................................................  Patient Representative Print Name and Relationship to Patient    ..................................................               ................................................  Date                                   Time    ..........................................................................................................................................  Reviewed by Signature/Title    ...................................................              ..............................................  Date                                               Time          22EPIC Rev 08/18

## 2021-01-07 NOTE — PRE-PROCEDURE
GENERAL PRE-PROCEDURE:   Procedure:  CTACE  Date/Time:  1/7/2021 11:20 AM    Written consent obtained?: Yes    Risks and benefits: Risks, benefits and alternatives were discussed    Consent given by:  Patient  Patient states understanding of procedure being performed: Yes    Patient's understanding of procedure matches consent: Yes    Procedure consent matches procedure scheduled: Yes    Expected level of sedation:  Moderate  Appropriately NPO:  Yes  ASA Class:  Class 2- mild systemic disease, no acute problems, no functional limitations  Mallampati  :  Grade 2- soft palate, base of uvula, tonsillar pillars, and portion of posterior pharyngeal wall visible  Lungs:  Lungs clear with good breath sounds bilaterally  Heart:  Normal heart sounds and rate  History & Physical reviewed:  History and physical reviewed and no updates needed  Statement of review:  I have reviewed the lab findings, diagnostic data, medications, and the plan for sedation

## 2021-01-07 NOTE — PROCEDURES
[facility]     Procedure: IR Procedure Note    Date/Time: 1/7/2021 1:44 PM  Performed by: Naima Chapa MD  Authorized by: Naima Chapa MD   IR Fellow Physician: Jah  Radiology Resident Physician: Sammi      UNIVERSAL PROTOCOL   Site Marked: NA  Prior Images Obtained and Reviewed:  Yes  Required items: Required blood products, implants, devices and special equipment available    Patient identity confirmed:  Verbally with patient, arm band, provided demographic data and hospital-assigned identification number  Patient was reevaluated immediately before administering moderate or deep sedation or anesthesia  Confirmation Checklist:  Patient's identity using two indicators, relevant allergies, procedure was appropriate and matched the consent or emergent situation and correct equipment/implants were available  Time out: Immediately prior to the procedure a time out was called    Universal Protocol: the Joint Commission Universal Protocol was followed    Preparation: Patient was prepped and draped in usual sterile fashion           ANESTHESIA    Anesthesia: Local infiltration  Local Anesthetic:  Lidocaine 1% without epinephrine      SEDATION    Patient Sedated: Yes    Sedation Type:  Moderate (conscious) sedation  Vital signs: Vital signs monitored during sedation    See dictated procedure note for full details.  Findings: Hepatic segment 6 HCC    Specimens: none    Complications: None    Condition: Stable    Plan: - 3 hours of bedrest    PROCEDURE   Patient Tolerance:  Patient tolerated the procedure well with no immediate complications  Describe Procedure: Conventional TACE to a hepatic segment 6 lesion  Length of time physician/provider present for 1:1 monitoring during sedation: 75

## 2021-01-07 NOTE — PROGRESS NOTES
59-year-old male with segment 6 HCC status post CTACE 7/1/2021.      Patient seen at bedside postprocedure.    Some complaint of left upper quadrant discomfort.    Oxycodone administered.    Scripts for oxycodone, Protonix and Zofran given to the patient.    Family updated about the patient's treatment and further plan.    Signed,    Luis Armando Tyson M.D.  Fellow, Department of Vascular and Interventional Radiology  [place], [place].  January 7, 2021  Beeper:  [phone] After Hours

## 2021-01-07 NOTE — DISCHARGE INSTRUCTIONS
[facility]   Interventional Radiology  Discharge Instructions Post Chemo Embolization.    AFTER YOU GO HOME          Relax and take it easy for 24 hours.       Drink plenty of fluids.       Resume your regular diet, unless otherwise instructed by your Primary Physician.       DO NOT smoke for at least 24 hours, if you were given any sedation.       DO NOT drink alcoholic beverages the day of your procedure.       Do not drive or operate machinery at home or at work for 24 hours.          DO NOT do any strenuous exercise or lifting for at least 2 days following your procedure.       DO NOT take a bath or shower for at least 12 hours following your procedure.       DO NOT make any important or legal decisions for 24 hours following your procedure.    CALL THE PHYSICIAN IF:       - You start bleeding from the procedure site. lie down flat and hold pressure on the site. A small lump or bruise is common at the puncture site. Your physician will tell you if you need to return to the hospital.      - You develop numbness, coolness or a change in color of the leg that was punctured.      - You experience increased pain or redness at the puncture site.      - You develop hives or a rash or unexplained itching.      - You develop a temperature of 101 degrees F or greater.    Additional Information:           Support the puncture site for coughing, sneezing, or moving your bowels for the first 48 hours  No tub bath, hot tubs, or swimming for 5 days  No lotion or powder to the puncture site for 3 day        Closure Device: Mynx            [facility] INTERVENTIONAL RADIOLOGY DEPARTMENT         Procedure Physician :                           Date of Procedure:January 7, 2021       Telephone Numbers:   [phone]     Monday-Friday 8:00 to 4:30 pm                                        [phone]     After 4:30 pm Monday-Friday, Weekend and Holidays. Ask for the Interventional Radiologist on call.  Someone is on call 24 hrs/day.

## 2021-01-08 NOTE — TELEPHONE ENCOUNTER
[facility] Call Center    Phone Message    May a detailed message be left on voicemail: yes     Reason for Call: Order(s): Other:   Reason for requested:  20 abdominal pads per week, 14 rolls of roll guaze per week, Mepilex 4x4 5 per week, 2 rolls of tape per week, 4 sheets of calcium alginate per week, wound wash.     Fax to [place]: [phone]    Date needed: asap    Provider name: Katherine      Action Taken: Message routed to:  [facility]    Travel Screening: Not Applicable

## 2021-01-08 NOTE — TELEPHONE ENCOUNTER
Called and left a msg on wife's phone to fup on pt s/p TACE as well as to see if I can clarify questions regardfing medications.     I've asked them to return my call so that we can further discuss.     Sunshine TUCKER RN, BSN  Interventional Radiology/Vascular  Nurse Coordinator   Phone: [phone]  Fax: [phone]

## 2021-01-08 NOTE — PROGRESS NOTES
"Patient's daughter Kristi Richardson called in regards to her father's post procedural pain following cTACE earlier today. She states that he is having significant abdominal pain that does not seem well controlled. This is similar to pain that he felt following his first procedure, if not slightly worse. He has not been given any additional pain medication since leaving he hospital. He is also having severe nausea and is \"pukey\". Kristi was also wanting to clarify what medications he was given while in-hospital and what the \"patch\" behind his ear was. Writer clarified that he was given a scopolamine patch and that it was okay for the patient to take his additional prescribed antinausea medication. I instructed Kristi to bring Unruly to the emergency department if his pain was not tolerable with the prescribed pain   medication or if the patient was unable to tolerate fluids and showed signs of dehydration.    Naima Chapa MD on 1/7/2021 at 8:18 PM  Radiology resident  "

## 2021-01-09 NOTE — TELEPHONE ENCOUNTER
Called and left a VM on wife and daughter's cell phone.    Called to fup on pt after responding to their mychart msg from yesterday.    Reiterated hydration pain meds and a dose of ibuprofen.     Informed them that if pt doesn't get better to call hospital on call.    Left direct line and will fup with pt on Monday     Sunshine TUCKER RN, BSN  Interventional Radiology/Vascular  Nurse Coordinator   Phone: [phone]  Fax: [phone]

## 2021-01-11 NOTE — TELEPHONE ENCOUNTER
"Called wife to fu on pt over the weekend.     She states that they went into the ER twice over the weekend     Pt developed fever as well as decreased oxygen levels. Was brought into the ER [facility] in [place].     They did a thyroid test in which they noted hypothyroid.    Pt also states that he was couging and was able to expel in which they sent off for culture and it came back as \"yeast\". Was given abx.     Pt was also found to be constipated in which I did go over stool softeners again.  She states that there's no plan for discharge at this time and I've asked her to keep me updated.     She states that there's no note of when he's coming back home.       I informed her that I\"ll update Dr. Hilton in which should anything else come up they are to call me.     She agrees to plan.     Sunshine TUCKER RN, BSN  Interventional Radiology/Vascular  Nurse Coordinator   Phone: [phone]  Fax: [phone]                        "

## 2021-01-11 NOTE — TELEPHONE ENCOUNTER
Have not received a call back from patient/care team. Will close encounter. Magdalena Mott LPN 1/11/2021 10:26 AM

## 2021-01-13 NOTE — TELEPHONE ENCOUNTER
Wife calling to inquire on pt's puncture site post TACE treatment.      She states that the site was oozing blood at one point.     Redness around the site currently.   They did put gauze on the site as well as placed bacitracin over the skin    I've asked her to send me a picture so that I can look at it and also have  review as well.     She states that she will.     Sunshine TUCKER RN, BSN  Interventional Radiology/Vascular  Nurse Coordinator   Phone: [phone]  Fax: [phone]

## 2021-01-14 NOTE — TELEPHONE ENCOUNTER
[facility] Call Center    Phone Message    May a detailed message be left on voicemail: yes     Reason for Call: Order(s): Home Care Orders: Physical Therapy (PT): Verbal orders for PT 2 times per week for 4 weeks to address fall reduction, strength and activity tolerance. Please follow up with Farhan. Thank you!     Action Taken: Message routed to:  [facility] p[facility]    Travel Screening: Not Applicable

## 2021-01-14 NOTE — TELEPHONE ENCOUNTER
Called Pema.   Gave verbal orders per Ary Murphy CNP for Order(s): Home Care Orders: Skilled Nursing:  Skilled nursing -  2x week for  1 week, then  3x week for  4 weeks , 2x week for 4 weeks for pneumonia, overall disease management and wound care to the bilateral legs. Home health aide 1x week for  8 weeks for bathing assistance only.      We do not have potassium gluconate on pt current med list with us.  Pema reports pt is taking potassium gluconate 550 mg.  Take 1 tablet po daily.       We are still reviewing reports potential drug interactions.  For now, have pt continue to take his meds until further notice.  Will call Pema to follow up about this.      Sigifredo Andrade CMA ([facility]) at 3:57 PM on 1/14/2021

## 2021-01-14 NOTE — TELEPHONE ENCOUNTER
[facility] Call Center    Phone Message    May a detailed message be left on voicemail: yes     Reason for Call: Order(s): Home Care Orders: Skilled Nursing:  Skilled nursing -  2x week for  1 week, then  3x week for  4 weeks , 2x week for 4 weeks for pneumonia, overall disease management and wound care to the bilateral legs. Home health aide 1x week for  8 weeks for bathing assistance only.   4 potential drug interactions:  Citalopram with Hydroxyzine.  Citalopram with Zofran, Citalopram with Seroquel, Potassium Gluconate with Spironolactone.     Action Taken: Message routed to:  [facility]    Travel Screening: Not Applicable

## 2021-01-14 NOTE — TELEPHONE ENCOUNTER
Called wife to nafisa on picture that she sent via [employer] regarding concerns about skin at right groin.   Pt is s/p right arteriotomy for chemoembolization treatment.    Informed her that I did show this to Dr. Hilton and we suspect that it could be due to a tape allergy or as if the tape was taken off too fast and compromised epidermis.     Wife state that homecare did come yesterday and cleaned and placed bacitracin on site.     Skin area looks better.     Informed her that she is to continue this skin care and it should start to look better.     If something changes then she is to call me back.     She states that homecare RN is supposed to come back tomorrow. They've been pleased with care so far.       Sunshine TUCKER RN, BSN  Interventional Radiology/Vascular  Nurse Coordinator   Phone: [phone]  Fax: [phone]

## 2021-01-15 NOTE — TELEPHONE ENCOUNTER
Called Farhan and left a secure VM.  Gave verbal orders per Ary Jones CNP for Order(s): Home Care Orders: Physical Therapy (PT): Verbal orders for PT 2 times per week for 4 weeks to address fall reduction, strength and activity tolerance.         Sigifredo Andrade CMA ([facility]) at 9:44 AM on 1/15/2021

## 2021-01-20 NOTE — TELEPHONE ENCOUNTER
[facility] Call Center    Phone Message    May a detailed message be left on voicemail: yes     Reason for Call: Order(s): Home Care Orders: Physical Therapy (PT): PT 2x a week for 4 week to address fall prevention, strengthening and activity tolerance, please call with verbal orders.    Action Taken: Message routed to:  [facility]    Travel Screening: Not Applicable

## 2021-01-20 NOTE — TELEPHONE ENCOUNTER
Reason For Call:        Chief Complaint   Patient presents with     Refill Request                 Medication Name, Dose and Monthly Quantity:     Roxicodone 5 mg     Diagnosis requiring opiates:        Problem List Updated:   Yes     Opioid Agreement On File - [facility] PAIN CONTRACT ID# [id number]:       Last Urine Drug Screen (at least once every 12 months) Date:     Unexpected Results:        [facility] Data Reviewed (at least once every 3 months) Date:   01/20/2021     Unexpected Results:         Last Fill Date:   12/17/2020    Next Fill Date:   01/20/2021     Last Visit with PCP:        Future Visits with PCP:      Processing:           CHRISTINA SOL CMA at 8:04 AM on 1/20/2021.

## 2021-01-21 NOTE — TELEPHONE ENCOUNTER
I called and left VM verbally approving requested HC orders.  Rhonda Rivas, EMT at 3:17 PM on 1/21/2021.

## 2021-01-22 NOTE — TELEPHONE ENCOUNTER
[facility] Call Center    Phone Message    May a detailed message be left on voicemail: yes     Reason for Call: Other: Pt daughter Kristi wants to talk to you about his dad's complex medical issues and seeing if they can have a visit with Dr Ita Bragg and see how that goes and then switch over to her as his PCP due to his conditions - they found her due to research and discussion with  - please call back to discuss if this option is best for her dad and if they can have a meet and greet in person visit with provider before making the decision to re-establish care with new provider - Thanks - was not happy that Pt keeps having only Video Visits and that he is complex and seeing a NP - please call to discus options of care - I did not want to direct her wrong as a  (she wanted someone in clinic to discuss this with)    Action Taken: Message routed to:  [facility]    Travel Screening: Not Applicable

## 2021-01-23 NOTE — TELEPHONE ENCOUNTER
Spoke to daughter. Went over the details apart from her mychart message.   He has been on cefidnir or equivalent since 1/921 it looks like. Last dose was this morning. In the past 24 hours oyxgen sats 94-96%. Fever last night to 99 but not after that. He is using the spirometer now.   I think ok to monitor for now without more abxs. He is to continue spirometer use to expand the lungs.   To go to ER over the weekend if O2 sats below 90% or fever >100.2.     Telephone time 18 mins    Sandee Bird MD

## 2021-01-25 NOTE — TELEPHONE ENCOUNTER
[facility] Call Center    Phone Message    May a detailed message be left on voicemail: yes     Reason for Call: Order(s): Other:   Reason for requested: Decrease Nursing and OT to 2x weekly, please call with verbal orders thank you.  Date needed: asap  Provider name: Ary Murphy APRN CNP      Action Taken: Message routed to:  [facility] p[facility]    Travel Screening: Not Applicable

## 2021-01-25 NOTE — TELEPHONE ENCOUNTER
Called Kristi and left .  Due to pandemic, we offer virtual visits to keep our patients safe.  If feel that Ary or any NP is not a best fit for pt health care then they are welcome to re-establish care with a new provider.  I do not know which MD provider is the best fit for patient care.  I advise asking Ary since she knows pt medical history.  Or may just go ahead and re-establish care with a new provider and go from there. Due to pandemic, we are keeping a 50% compacity so we do not have a lot of in-person appointments.  Dr. Simmons may be booked out in a month or more.   Again, about virtual appt vs in-person appt in the future, this is best if discuss with PCP.      Sigifredo Andrade CMA ([facility]) at 11:37 AM on 1/25/2021

## 2021-01-26 NOTE — TELEPHONE ENCOUNTER
Called Pema.  Gave verbal orders per Ary Murphy CNP for Decrease Nursing and OT to 2x weekly      Sigifredo Andrade CMA ([facility]) at 11:41 AM on 1/26/2021

## 2021-01-26 NOTE — NURSING NOTE
Chief Complaint   Patient presents with     Hospital F/U     Kimberly Nissen, EMT at 3:19 PM on 1/26/2021      Video Visit Technology for this patient: [employer] Video Visit- Patient was left in waiting room

## 2021-01-26 NOTE — PROGRESS NOTES
Called and spoke to patient's spouse Raven for check in. Patient has had several medical issues in recent weeks including pneumonia and one random seizure. They have changed home care providers to [facility] and really like them. Raven takes care of the patient day to day and their daughter Kristi arranges and is part of his various doctor appointments. Raven feels patients mentation has been stable, and his lower extremity edema is once again improving. She states he is difficult to get motivated for things to help himself such as physical therapy sessions. Scheduled patient for follow up with Dr. Simmons on 2/19/21, and will have labs per another provider on 2/8/2021. Raven has no further questions or concerns at this time.    ASCITES: No  - History of SBP: No  - Meets criteria for SBP ppx: No  [History of SBP and/or ascites total protein < 1.5g/dL AND either SCr  >1.2 , Na <130 or TB>3]

## 2021-01-27 NOTE — TELEPHONE ENCOUNTER
Left a detailed message to home care nurse, [phone], regarding the lab order.    Soon[facility]  -------------------------------------------------------------------------------------------        ----- Message from CORY Toussaint CNP sent at 1/26/2021  8:01 PM CST -----  Regarding: home health orders  Can we call his new Home Health company [facility] to ask them to draw a BMP on Thursday 1/28/21.?    Ary RAY, CNP

## 2021-01-28 NOTE — TELEPHONE ENCOUNTER
I spoke with the home care nurse. They are not able to draw lab today. Patient has imaging appointment on Monday and will be getting labs drawn then. fyi message sent to provider and nurse.   Rhonda Rivas EMT at 9:47 AM on 1/28/2021.

## 2021-02-03 NOTE — PROGRESS NOTES
"Unruly is a 65 year old who is being evaluated via a billable video visit.      How would you like to obtain your AVS? MyChart  If the video visit is dropped, the invitation should be resent by: Text to cell phone: [phone]  Will anyone else be joining your video visit? Nidia: Kristi and wife, Raven      Video Start Time : 3:35    Subjective     Unruly is a 65 year old who presents to clinic today for the following health issues : condition after hospitalization.    HPI   his is a 66 y/o male with a hx of alcholic cirrhosis w/ ascites, esophageal varices, HE, s/p TIPS, HCC s/p chemoembolization x 3, T2DM with neuropathy, HTN, HLD, lymphedema, venous stasis dermatitis skin change with chronic bilateral extremity wound, GERD, ACD w/ baseline Hgb 7-8 g, chronic  thrombocytopenia, depression and numerous other medical conditions, was recently hospitalized at [facility] for aspiration pneumonia after a recent chemoembolization treatment for his HCC. He presented to the hospital at [facility] on 1/14/2021 due to new onset seizure like activity.  Work up largely negative. Neurology consulted and started patient on keppra at d/c. Therapies evaluated patient and suggested home care.    emi was discharge to home care on 1/16/2021  New onset seizures              - No history of seizure per the patient and daughter              - New onset this evening, had generalized tonic clonic activity along with some \"staring\" episodes preceding this              - CT head with chronic microvascular changes.  -Neurology consulted, started on keppra  -EEG w/o seizure activity. MRI normal  -patient discharged on keppra 500mg BID  -home care PT/OT/SNV     Recent L[facility] Community Acquired pneumonia              - Discharged on 1/12/2021 with 5 more days of cefdinir and doxycycline, will finish course     [facility] is now providing services.    He is scheduled for 2/8 for MRI and LAB.   He has an appt with Dr. Simmons 2/19/2021. "     He has not been sleeping well and the family is worried about him walking around the house alone at night, in case he falls.     Patient Active Problem List   Diagnosis     Mild major depression (H)     Thrombocytopenia (H)     Hypertension goal BP (blood pressure) < 130/80     Plantar warts     Corns and callosities     Family history of colon cancer     Type 2 diabetes, HbA1c goal < 7% (H)     Portal hypertension (H)     Alcoholic cirrhosis (H)     Ascites     Esophageal varices (H)     Multiple rib fractures     Tobacco use disorder     Hyperlipidemia LDL goal <100     Dental caries     Prurigo nodularis     Esophageal reflux     Morbid obesity (H)     History of colonic polyps: tubular adenomas and serrated adenomas     Type II diabetes mellitus with peripheral circulatory disorder (H)     Alcoholic cirrhosis of liver with ascites (H)     Hepatic encephalopathy (H)     Lymphedema of both lower extremities     Venous stasis ulcers of both lower extremities (H)     Inadequate pain control     Post-operative state     HCC (hepatocellular carcinoma) (H)     Anemia     Current Outpatient Medications   Medication     blood glucose (NO BRAND SPECIFIED) test strip     insulin pen needle (ULTICARE SHORT) 31G X 8 MM miscellaneous     blood glucose (NO BRAND SPECIFIED) lancets standard     blood glucose monitoring (NO BRAND SPECIFIED) meter device kit     citalopram (CELEXA) 20 MG tablet     COMPOUNDED NON-CONTROLLED SUBSTANCE (CMPD RX) - PHARMACY TO MIX COMPOUNDED MEDICATION     Cyanocobalamin (VITAMIN B-12 PO)     desvenlafaxine (PRISTIQ) 100 MG 24 hr tablet     EQL VITAMIN D3 50 MCG (2000 UT) CAPS     famotidine (PEPCID) 40 MG tablet     furosemide (LASIX) 40 MG tablet     gabapentin (NEURONTIN) 300 MG capsule     hydrOXYzine (ATARAX) 25 MG tablet     insulin aspart (NOVOLOG FLEXPEN) 100 UNIT/ML pen     insulin glargine (LANTUS SOLOSTAR) 100 UNIT/ML pen     insulin pen needle (B-D U/F) 31G X 8 MM miscellaneous      lactulose (CEPHULAC) 20 GM packet     lactulose encephalopathy (CHRONULAC) 10 GM/15ML SOLUTION     Lidocaine (LIDOCARE) 4 % Patch     magnesium oxide (MAG-OX) 400 (241.3 Mg) MG tablet     Nutritional Supplements (ENSURE) LIQD     nystatin (MYCOSTATIN) 053034 UNIT/GM external cream     ondansetron (ZOFRAN) 4 MG tablet     ondansetron (ZOFRAN) 4 MG tablet     order for DME     order for DME     order for DME     order for DME     oxyCODONE (ROXICODONE) 5 MG tablet     OYSTER SHELL CALCIUM/D 500-200 MG-UNIT per tablet     pantoprazole (PROTONIX) 40 MG EC tablet     polyethylene glycol (MIRALAX) 17 g packet     potassium gluconate 2.5 MEQ tablet     prochlorperazine (COMPAZINE) 10 MG tablet     QUEtiapine (SEROQUEL) 25 MG tablet     QUEtiapine (SEROQUEL) 50 MG tablet     QUEtiapine (SEROQUEL) 50 MG tablet     rifaximin (XIFAXAN) 550 MG TABS tablet     spironolactone (ALDACTONE) 50 MG tablet     ursodiol (ACTIGALL) 300 MG capsule       Review of Systems   Constitutional, HEENT, cardiovascular, pulmonary, gi and gu systems are negative, except as otherwise noted.      Objective     Vitals: No vitals were obtained today due to virtual visit.    Physical Exam   GENERAL: Healthy, alert and no distress. Communicating appropriately.   EYES: Eyes grossly normal to inspection.  No discharge or erythema, or obvious scleral/conjunctival abnormalities.  RESP: No audible wheeze, cough, or visible cyanosis.  No visible retractions or increased work of breathing.    NEURO: Mentation and speech appropriate for age.  PSYCH: Mentation appears normal, affect normal/bright, judgement and insight intact, normal speech and appearance well-groomed.    Lawrence was seen today for hospital f/u.    Diagnoses and all orders for this visit:    Dehydration  -     Basic metabolic panel; Future    Diabetes mellitus, type 2 (H)  -     blood glucose (NO BRAND SPECIFIED) test strip; Use to test blood sugars 4 X  times daily or as directed    Type 2  diabetes, HbA1c goal < 7% (H)  -     insulin pen needle (ULTICARE SHORT) 31G X 8 MM miscellaneous; Use UP to 6 times daily or as directed          Video-Visit Details    Type of service:  Video Visit    Video End Time: 4:20    Originating Location (pt. Location): Home    Distant Location (provider location):  [facility] [place]     Platform used for Video Visit: [place] Teddy BearOn the day of service 10 min.were spent reviewing records, 48 minutes face to face time spent with patient history and exam  10 minutes spent on documentation and further activities as noted above.     Ary Murphy APRN, CNP

## 2021-02-08 NOTE — TELEPHONE ENCOUNTER
Returning wife's phone call regarding pt's appt tomorrow with Dr. Hilton.    She states that pt was not able to attend the MRI for this morning due to treacherous roads    She did r/s the appt to 2/24 at [facility]  Requesting for an updated appt fup appt with .    Informed her that we did r/s the appt to Tues 3/9 at 12pm     Asked her to return my call with any questions.    Sunshine TUCKER RN, BSN  Interventional Radiology/Vascular  Nurse Coordinator   Phone: [phone]  Fax: [phone]

## 2021-02-09 NOTE — PROGRESS NOTES
Patient unable to get in for labs yesterday, and will get the lab work done on 2/17 at next clinic visit. Then will meet with Dr. Simmons as planned on 2/19. Raven states the patient is doing okay, other than some issues managing blood sugars. They would like to know if there are any contraindications for the patient to receive the covid vaccine. Because of his oncology treatments they are unsure. Discussed that from a hepatology point of view, it is recommended he receive the vaccine. They will also discuss with oncology before signing patient up for it. Raven, as his care taker, is getting her vaccine 1st dose today. They have no further questions or concerns at this time.

## 2021-02-10 NOTE — TELEPHONE ENCOUNTER
[facility] Call Center    Phone Message    May a detailed message be left on voicemail: yes     Reason for Call: Order(s): Home Care Orders: Occupational Therapy (OT): OT: 4 prn Visits in 4 weeks, verbal order please thank you  and Other: Compression garment orders for farrow wrap trim to fit 30 - 40 mmhg bilateral lower extremity and foot piece compressions garments. Please fax to [facility] Fax [phone]    Action Taken: Message routed to:  [facility] ([facility] pcc    Travel Screening: Not Applicable

## 2021-02-11 NOTE — LETTER
"2021       RE: Lawrence Louie  : 1955   MRN: [id number]      Dear Colleague,    Thank you for referring your patient, Lawrence Louie, to the [facility] EPILE[facility] at [facility]. Please see a copy of my visit note below.         [facility] New Patient Evaluation    History of Present Illness: Mr. Louie is a 65 year old man with h/o cirrhosis s/p TIPS procedure, esophageal varices, diabetes, liver malignancy, chronic kidney disease, and previous alcohol abuse who presents following a first time seizure. Daughter describes initially noticing him staring/smiling for moment. Subsequently, the patient began having intermittent movements that appeared like hiccups and were immediately followed by him saying \"yeah\" in a strange way. This may have occurred for up to two hours prior to his generalized tonic clonic seizure. Notably, he was entirely lucid at this time, speaking to friends on the phone. He subsequently had a 3 minute episode consistent with a GTC as described below. He was brought to [facility] where EEG was showed mild slowing without epileptiform activity. Neurology consulted, seizure thought related to recent sepsis and hyperammonemia. Ammonia 45 inpatient. He was started on Keppra 500 mg BID. No seizures since. His daughter reports that he was initially irritable on Keppra followed by somnolence which improved for a time.     The past 3 days he has been much more somnolent. He has been waking multiple times per night to go to the bathroom and often has difficulty falling back asleep. He naps frequently during the day. He takes furosemide and spironolactone, both early in the day. He drinks fluids throughout the day, including with dinner which he eats late, though tries to avoid fluids just before bedtime. He does have low urine volumes and retention is reported on review of systems.     Seizure Types:  Generalized Tonic Clonic: Single " event occurring on 1/14/21. Prolonged (up to 2 hours) periods of stuttering and hiccup like movements immediately followed by strange vocalization preceded the GTC. The daughter asked him to lift up his shirt to give him an insulin shot but he was not responding. Shortly after his neck extended and he had tonic flexion of his upper extremities. She thinks his legs extended. There was low amplitude shaking while he held this position. He became cyanotic. His daughter estimates the seizure lasted 3 minutes. He was incontinent    Risk Factors:  -Positive for traumatic subarachnoid hemorrhage  -Reports being blue at birth  -No family history of seizures  -No developmental delay  -No CNS infection  -No febrile seizures  -No CNS malignancy    Previous Evaluations:  EEG 1/15/2021:  IMPRESSION:  This is a mildly abnormal routine awake, drowsy EEG due to the   presence of mild generalized slowing of the background indicative   of diffuse cerebral dysfunction (encephalopathy) seen in toxic,   metabolic conditions, diffuse structural cerebral abnormalities  This mildly abnormal routine EEG neither refutes nor confirms   diagnosis of seizures or epilepsy.      Brain MRI 1/15/2021:  IMPRESSION:   1.  No acute abnormality.  2.  Mild involutional changes.    CT Head 10/23/2020:  IMPRESSION:   Left frontal scalp soft tissue swelling. No fracture. Small amount of subarachnoid hemorrhage left sylvian fissure and left quadrigeminal plate cistern. No mass effect.    Current Anticonvulsants:  Keppra 500 mg BID  Gabapentin 300 mg daily    Previous Anticonvulsants:  None    Other Medications  Oxycodone 10 mg TID  Rifaxin 550 mg BID  Spironolactone 300 mg daily  Furosemide 100 mg daily  Hydroxyzine 25 mg BID  Ursodiol 300 mg BID  Citalopram 20 mg daily  Desvenlafaxine 100 mg daily  Pepcid 40 mg daily  Seroquel 200 mg daily  Gabapentin 300 mg daily  Lantus 7 units daily  Novolog  Potassium  Magnesium Citrate  Lactulose    Past Medical  History:  Cirrhosis  Diabetes  Portal Hypertension  Esophageal Varices  Depression  Chronic Kidney Disease  Thrombocytopenia  Traumatic subarachnoid hemorrhage  Hepatocellular Carcinoma    Allergies:  NKA    Family History:      Psychosocial:  Reports normal childhood.  Mother passed away of cancer, unclear what age.  No abuse  Completed high school and [employer]  Retired, previous worked at [employer]    3 children, ages 36, 39, and 41    Review of Systems:  Positive for fever, nose bleeds, chest pain, leg swelling, shortness of breath with exercise, hemoptysis, nausea, abdominal pain, diarrhea, beige stools, loss of appetite, constipation, painful urination, urinary incontinence, urinary retention, frequent urination, weaknes, stinffness, itching, flaking, easy bruising, headaches, tingling, memory loss, weakness on one side of body(related to lymphedema), confusion, sleep problems, loss of balance, and general weakness.    HEENT: Normocephalic, atraumatic  MSK: Marked edema bilaterally in lower extremities reaching above the knee. Wraps in place.    Neurologic examination:  Mental status:  Initially difficult to waken, falling asleep almost immediately. With further examination he became far more alert. Oriented to person, city and area, off by one month.    Cranial nerves:  Pupils were round and reacted to light.  Extraocular movements were full. There was no face, jaw, palate or tongue weakness or atrophy. Hearing was grossly intact. Tongue midline. Palate symmetric. Shoulder shrug was normal.       Motor exam:  No action or percussion myotonia or paramyotonia.  Manual muscle testing revealed MRC grade 5/5 strength throughout except hip flexion on which was 4/5 right, 3/5 left(chronic, attributed to asymmetric edema). Mild asterixis seen bilaterally.    Complex motor skills revealed normal coordination.  Finger-nose- finger intact. HS limited by range of motion.     Sensory exam intact to light touch  upper and lower extremities bilaterally.    Gait stable with walker.     Deep tendon reflexes:  2+ biceps and brachioradialis. Unable to elicit patellar and ankle bilaterally. Toes variable, difficult to interpret.    Impression:  Mr. Louie is a 65 year old man with h/o cirrhosis s/p TIPS procedure, esophageal varices, diabetes, liver malignancy, chronic kidney disease, and previous alcohol abuse who presents following a first time seizure. The description is highly consistent with a generalized tonic clonic seizure. While differential diagnosis includes syncope, hepatic encephalopathy, and PNES, these are far less likely. 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 His hiccup like movements and vocalizations for up to 2 hours prior to the GTC are difficult to categorize, especially since he was lucid at this time. While metabolic disturbances, recent infection, and lack of sleep likely contributed to his seizure, it is likely that the root cause of his seizure was the recent subarachnoid hemorrhage. The subarachnoid happened in November, too long ago to categorize this as an acute symptomatic seizure. Thus, he will need long term anticonvulsant treatment. We will continue Keppra 500 mg BID as he's had no further seizures at this dose.    Regarding his somnolence, his poor sleep is likely a large contributor. Hepatic encephalopathy may additionally be contributing. Keppra may be contributing, but it is a fairly low dose and the timeline of his somnolence doesn't fully support this as the primary cause. We will obtain Keppra and gabapentin levels to make sure he isn't supratherapeutic, though the doses prescribed and his kidney function do not support this as a cause. Polypharmacy may very well be contributing and minimizing centrally acting drugs may be beneficial.     His sleep is very poor. Initiation is not an issue.  Sleep maintenance is his problem secondary to frequent urination at night. Additionally, his sleep-wake cycles appear to be off secondary to his frequent waking at night and daytime napping. We recommended morning light therapy. His daughter noted melatonin as an option. It is unlikely that these measures will fully address his issues unless the problem of frequent urination is addressed. He is currently taking his diuretics early in the day. He tries not to drink before bed. He endorsed symptoms suggestive of prostate pathology, which may be contributing. It will be important to address these issues as poor sleep will put him at increased risk of further seizures.    Plan:  Continue Keppra 500 mg BID  Obtain Keppra and gabapentin levels  Follow up in 3 months    Lon Berumen MD  Epilepsy Fellow        Attestation signed by Leo Lawton MD at 2/14/2021 12:06 PM:  Neurology Attending Note:    I have seen and examined the patient with the resident.  I have reviewed the labs and imaging results available.  I agree with the assessment and plan.    Leo Lawton MD          Again, thank you for allowing me to participate in the care of your patient.      Sincerely,    Leo Lawton MD

## 2021-02-11 NOTE — TELEPHONE ENCOUNTER
Called Jenifer and left a secure VM.  Gave verbal orders per Ary Murphy CNP for  Order(s): Home Care Orders: Occupational Therapy (OT): OT: 4 prn Visits in 4 weeks      Sigifredo Andrade CMA (ALESSIO) at 12:06 PM on 2/11/2021

## 2021-02-12 NOTE — PROGRESS NOTES
"     [facility]CEP New Patient Evaluation    History of Present Illness: Mr. Louie is a 65 year old man with h/o cirrhosis s/p TIPS procedure, esophageal varices, diabetes, liver malignancy, chronic kidney disease, and previous alcohol abuse who presents following a first time seizure. Daughter describes initially noticing him staring/smiling for moment. Subsequently, the patient began having intermittent movements that appeared like hiccups and were immediately followed by him saying \"yeah\" in a strange way. This may have occurred for up to two hours prior to his generalized tonic clonic seizure. Notably, he was entirely lucid at this time, speaking to friends on the phone. He subsequently had a 3 minute episode consistent with a GTC as described below. He was brought to [facility] where EEG was showed mild slowing without epileptiform activity. Neurology consulted, seizure thought related to recent sepsis and hyperammonemia. Ammonia 45 inpatient. He was started on Keppra 500 mg BID. No seizures since. His daughter reports that he was initially irritable on Keppra followed by somnolence which improved for a time.     The past 3 days he has been much more somnolent. He has been waking multiple times per night to go to the bathroom and often has difficulty falling back asleep. He naps frequently during the day. He takes furosemide and spironolactone, both early in the day. He drinks fluids throughout the day, including with dinner which he eats late, though tries to avoid fluids just before bedtime. He does have low urine volumes and retention is reported on review of systems.     Seizure Types:  Generalized Tonic Clonic: Single event occurring on 1/14/21. Prolonged (up to 2 hours) periods of stuttering and hiccup like movements immediately followed by strange vocalization preceded the GTC. The daughter asked him to lift up his shirt to give him an insulin shot but he was not responding. Shortly after his neck " extended and he had tonic flexion of his upper extremities. She thinks his legs extended. There was low amplitude shaking while he held this position. He became cyanotic. His daughter estimates the seizure lasted 3 minutes. He was incontinent    Risk Factors:  -Positive for traumatic subarachnoid hemorrhage  -Reports being blue at birth  -No family history of seizures  -No developmental delay  -No CNS infection  -No febrile seizures  -No CNS malignancy    Previous Evaluations:  EEG 1/15/2021:  IMPRESSION:  This is a mildly abnormal routine awake, drowsy EEG due to the   presence of mild generalized slowing of the background indicative   of diffuse cerebral dysfunction (encephalopathy) seen in toxic,   metabolic conditions, diffuse structural cerebral abnormalities  This mildly abnormal routine EEG neither refutes nor confirms   diagnosis of seizures or epilepsy.      Brain MRI 1/15/2021:  IMPRESSION:   1.  No acute abnormality.  2.  Mild involutional changes.    CT Head 10/23/2020:  IMPRESSION:   Left frontal scalp soft tissue swelling. No fracture. Small amount of subarachnoid hemorrhage left sylvian fissure and left quadrigeminal plate cistern. No mass effect.    Current Anticonvulsants:  Keppra 500 mg BID  Gabapentin 300 mg daily    Previous Anticonvulsants:  None    Other Medications  Oxycodone 10 mg TID  Rifaxin 550 mg BID  Spironolactone 300 mg daily  Furosemide 100 mg daily  Hydroxyzine 25 mg BID  Ursodiol 300 mg BID  Citalopram 20 mg daily  Desvenlafaxine 100 mg daily  Pepcid 40 mg daily  Seroquel 200 mg daily  Gabapentin 300 mg daily  Lantus 7 units daily  Novolog  Potassium  Magnesium Citrate  Lactulose    Past Medical History:  Cirrhosis  Diabetes  Portal Hypertension  Esophageal Varices  Depression  Chronic Kidney Disease  Thrombocytopenia  Traumatic subarachnoid hemorrhage  Hepatocellular Carcinoma    Allergies:  NKA    Family History:      Psychosocial:  Reports normal childhood.  Mother passed away of  cancer, unclear what age.  No abuse  Completed high school and [employer]  Retired, previous worked at [employer]    3 children, ages 36, 39, and 41    Review of Systems:  Positive for fever, nose bleeds, chest pain, leg swelling, shortness of breath with exercise, hemoptysis, nausea, abdominal pain, diarrhea, beige stools, loss of appetite, constipation, painful urination, urinary incontinence, urinary retention, frequent urination, weaknes, stinffness, itching, flaking, easy bruising, headaches, tingling, memory loss, weakness on one side of body(related to lymphedema), confusion, sleep problems, loss of balance, and general weakness.    HEENT: Normocephalic, atraumatic  MSK: Marked edema bilaterally in lower extremities reaching above the knee. Wraps in place.    Neurologic examination:  Mental status:  Initially difficult to waken, falling asleep almost immediately. With further examination he became far more alert. Oriented to person, city and area, off by one month.    Cranial nerves:  Pupils were round and reacted to light.  Extraocular movements were full. There was no face, jaw, palate or tongue weakness or atrophy. Hearing was grossly intact. Tongue midline. Palate symmetric. Shoulder shrug was normal.       Motor exam:  No action or percussion myotonia or paramyotonia.  Manual muscle testing revealed MRC grade 5/5 strength throughout except hip flexion on which was 4/5 right, 3/5 left(chronic, attributed to asymmetric edema). Mild asterixis seen bilaterally.    Complex motor skills revealed normal coordination.  Finger-nose- finger intact. HS limited by range of motion.     Sensory exam intact to light touch upper and lower extremities bilaterally.    Gait stable with walker.     Deep tendon reflexes:  2+ biceps and brachioradialis. Unable to elicit patellar and ankle bilaterally. Toes variable, difficult to interpret.    Impression:  Mr. Louie is a 65 year old man with h/o cirrhosis s/p TIPS  procedure, esophageal varices, diabetes, liver malignancy, chronic kidney disease, and previous alcohol abuse who presents following a first time seizure. The description is highly consistent with a generalized tonic clonic seizure. While differential diagnosis includes syncope, hepatic encephalopathy, and PNES, these are far less likely. 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 His hiccup like movements and vocalizations for up to 2 hours prior to the GTC are difficult to categorize, especially since he was lucid at this time. While metabolic disturbances, recent infection, and lack of sleep likely contributed to his seizure, it is likely that the root cause of his seizure was the recent subarachnoid hemorrhage. The subarachnoid happened in November, too long ago to categorize this as an acute symptomatic seizure. Thus, he will need long term anticonvulsant treatment. We will continue Keppra 500 mg BID as he's had no further seizures at this dose.    Regarding his somnolence, his poor sleep is likely a large contributor. Hepatic encephalopathy may additionally be contributing. Keppra may be contributing, but it is a fairly low dose and the timeline of his somnolence doesn't fully support this as the primary cause. We will obtain Keppra and gabapentin levels to make sure he isn't supratherapeutic, though the doses prescribed and his kidney function do not support this as a cause. Polypharmacy may very well be contributing and minimizing centrally acting drugs may be beneficial.     His sleep is very poor. Initiation is not an issue. Sleep maintenance is his problem secondary to frequent urination at night. Additionally, his sleep-wake cycles appear to be off secondary to his frequent waking at night and daytime napping. We recommended morning light therapy. His daughter noted melatonin as an option. It is unlikely that  these measures will fully address his issues unless the problem of frequent urination is addressed. He is currently taking his diuretics early in the day. He tries not to drink before bed. He endorsed symptoms suggestive of prostate pathology, which may be contributing. It will be important to address these issues as poor sleep will put him at increased risk of further seizures.    Plan:  Continue Keppra 500 mg BID  Obtain Keppra and gabapentin levels  Follow up in 3 months    Lon Berumen MD  Epilepsy Fellow

## 2021-02-15 NOTE — TELEPHONE ENCOUNTER
Jenifer called for order for bilateral lower extremity leg and foot pieces (total of 4)  trim to fit farrow wraps. 30-40 mmhg  compression level. Please fax order to Chrissy'alfredo Demarco at [phone]. Thank you!

## 2021-02-16 NOTE — TELEPHONE ENCOUNTER
Contacted [facility] @ [phone] requested CXR from 01/09/2021 be pushed to  PACS for review. Patient scheduled for pulmonary consult with Dr. Finn Thursday 03/11/2021.      Thaddeus Banerjee LPN  Pulmonary Division:  [facility]  Phone: [phone] Fax: [phone]

## 2021-02-17 NOTE — NURSING NOTE
Chief Complaint   Patient presents with     [facility] follow up     Kimberly Nissen, EMT at 2:55 PM on 2/17/2021

## 2021-02-17 NOTE — PROGRESS NOTES
Called by lab with critical result on lab ordered 1/8/21 as future lab order.  Platelets 44K.  Pt seen in clinic today by Dr. Beal but unable to reach her.  I called patient's wife who stated pt and daughter were nor home from clinic yet.  Gave her the results and recommended they go to ER for further evaluation.  Raven agreed to call with this suggestion.    Ary RAY, CNP

## 2021-02-17 NOTE — PROGRESS NOTES
"Unruly is a very pleasant 65 year old male new patient to me, here for hospital follow up and to establish care.    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      I reviewed the discharge summary from end of January:      HOSPITAL COURSE:  This is a 64 y/o male with a hx of alcholic cirrhosis w/ ascites, esophageal varices, HE, s/p TIPS, HCC s/p chemoembolization x 3, T2DM with neuropathy, HTN, HLD, lymphedema, venous stasis dermatitis skin change with chronic bilateral extremity wound, GERD, ACD w/ baseline Hgb 7-8 g, chronic  thrombocytopenia, depression and numerous other medical conditions, was recently hospitalized at [facility] for aspiration pneumonia after a recent chemoembolization treatment for his HCC. He presented to the hospital at [facility] on 1/14/2021 due to new onset seizure like activity.  Work up largely negative. Neurology consulted and started patient on keppra at d/c. Therapies evaluated patient and suggested home care. Spoke at length with patient's daughter who is his PCA and manages his medications. She is OK with plan to d/c home.     New onset seizures              - No history of seizure per the patient and daughter              - New onset this evening, had generalized tonic clonic activity along with some \"staring\" episodes preceding this              - CT head with chronic microvascular changes.  -Neurology consulted, started on keppra  -EEG w/o seizure activity. MRI normal  -patient discharged on keppra 500mg BID  -home care PT/OT/[facility]     Recent [facility] Community Acquired pneumonia              - Discharged on 1/12/2021 with 5 more days of cefdinir and doxycycline, will finish course     Chronic anemia  Chronic " Thrombocytopenia              - Hemoglobin 8.1 today, platelets 68, currently at baseline       Alcoholic cirrhosis with portal hypertension s/p TIPS  HCC s/p chemoembolization x 3  Chronic lymphadema              - Continue home lactulose, rifaxamin, ursodiol              - Continue home lasix and aldactone as well              - Ammonia level 45 in the ER     Insulin dependant diabetes mellitus              - Continue home lantus 10 units qam and very low dose corrective insulin     Essential Hypertension              - Continue home lasix and aldactone as above     Depression              - Hold Seroquel, celexa and Pristiq as there can be some mild lowering of seizure threshold with SNRI and antipsychotics, although effect is minimal. -- resume at d/c-- ok w/ neuro     Chronic Back pain              - Continue home oxycodone prn, neurontin     GERD              - Continue home Pepcid     BPH              - Recently started on flomax, will continue     Lactic acidosis              - Mildly elevated to 2.4 in the ER, likely type B lactic acidosis in setting of seizures and decreased clearance in liver disease, will hold off on trending at this time      Current Outpatient Medications:      blood glucose (NO BRAND SPECIFIED) lancets standard, Use to test blood sugar 4 X  times daily or as directed., Disp: 360 each, Rfl: 3     blood glucose (NO BRAND SPECIFIED) test strip, Use to test blood sugars 4 X  times daily or as directed, Disp: 360 strip, Rfl: 3     blood glucose monitoring (NO BRAND SPECIFIED) meter device kit, Use to test blood sugar 4 times daily or as directed. Before meals and snack at HS., Disp: 1 kit, Rfl: 0     citalopram (CELEXA) 20 MG tablet, Take 1 tablet (20 mg) by mouth daily, Disp: 90 tablet, Rfl: 3     COMPOUNDED NON-CONTROLLED SUBSTANCE (CMPD RX) - PHARMACY TO MIX COMPOUNDED MEDICATION, Apply to the affected area once a day., Disp: 500 g, Rfl: 5     Cyanocobalamin (VITAMIN B-12 PO), Take 1  tablet by mouth daily, Disp: , Rfl:      desvenlafaxine (PRISTIQ) 100 MG 24 hr tablet, Take 2 tablets (200 mg) by mouth daily, Disp: 180 tablet, Rfl: 1     EQL VITAMIN D3 50 MCG (2000 UT) CAPS, Take 1 capsule by mouth daily, Disp: 90 capsule, Rfl: 2     famotidine (PEPCID) 40 MG tablet, Take 1 tablet (40 mg) by mouth daily, Disp: 30 tablet, Rfl: 11     furosemide (LASIX) 40 MG tablet, Take 40 mg twice daily. (Patient taking differently: 80 mg Take 40 mg twice daily.), Disp: 180 tablet, Rfl: 3     gabapentin (NEURONTIN) 300 MG capsule, Take 1 capsule (300 mg) by mouth At Bedtime, Disp: 90 capsule, Rfl: 1     hydrOXYzine (ATARAX) 25 MG tablet, Take 1 tablet (25 mg) by mouth 3 times daily as needed for itching, Disp: 90 tablet, Rfl: 3     insulin aspart (NOVOLOG FLEXPEN) 100 UNIT/ML pen, 1 unit per 10 grams of carbohydrates + 1 per 50>140. Max daily dose 100 units., Disp: 30 mL, Rfl: 11     insulin glargine (LANTUS SOLOSTAR) 100 UNIT/ML pen, 5 units daily in am. Increase by 5 units 2x weekly until fasting sugars are <130. Max daily dose 100 units., Disp: 15 mL, Rfl: 11     insulin pen needle (B-D U/F) 31G X 8 MM miscellaneous, USE  6 times daily / OR AS DIRECTED, Disp: 300 each, Rfl: 3     insulin pen needle (ULTICARE SHORT) 31G X 8 MM miscellaneous, Use UP to 6 times daily or as directed, Disp: 300 each, Rfl: 3     lactulose (CEPHULAC) 20 GM packet, Take 1 packet (20 g) by mouth 2 times daily, Disp: 60 packet, Rfl: 0     lactulose encephalopathy (CHRONULAC) 10 GM/15ML SOLUTION, TAKE 30 MLS BY MOUTH 2 TIMES DAILY  TITRATE AS NEEDED TO ACHIEVE 3-5 BOWEL MOVEMENTS DAILY., Disp: 1892 mL, Rfl: 11     Lidocaine (LIDOCARE) 4 % Patch, Place 1 patch onto the skin every 24 hours To prevent lidocaine toxicity, patient should be patch free for 12 hrs daily., Disp: 30 patch, Rfl: 3     magnesium oxide (MAG-OX) 400 (241.3 Mg) MG tablet, Take 1 tablet (400 mg) by mouth daily, Disp: 90 tablet, Rfl: 1     Nutritional Supplements  (ENSURE) LIQD, Take 1 Can by mouth daily, Disp: 30 each, Rfl: 11     nystatin (MYCOSTATIN) 011401 UNIT/GM external cream, Apply topically 2 times daily, Disp: 30 g, Rfl: 11     ondansetron (ZOFRAN) 4 MG tablet, Take 1 tablet (4 mg) by mouth every 12 hours as needed for nausea, Disp: 20 tablet, Rfl: 0     ondansetron (ZOFRAN) 4 MG tablet, Take 1 tablet (4 mg) by mouth every 8 hours as needed for nausea, Disp: 20 tablet, Rfl: 0     order for DME, Equipment being ordered:Orthopedic shoes, Disp: 2 Units, Rfl: 0     order for DME, Equipment being ordered: Condom catheter, Disp: 1 Device, Rfl: 3     order for DME, Equipment being ordered:BioTab compression pants pneumatic system, Disp: 1 Piece, Rfl: 0     order for DME, Equipment being ordered:bilateral pneumatic leg massage for treatment of extreme bilateral lower leg edema., Disp: 2 pump, Rfl: 0     oxyCODONE (ROXICODONE) 5 MG tablet, TAKE 1 to 2 TABLETS BY MOUTH EVERY 8 HOURS AS NEEDED FOR SEVERE PAIN, Disp: 180 tablet, Rfl: 0     OYSTER SHELL CALCIUM/D 500-200 MG-UNIT per tablet, Take 1 tablet by mouth daily, Disp: 90 tablet, Rfl: 3     pantoprazole (PROTONIX) 40 MG EC tablet, Take 1 tablet (40 mg) by mouth daily, Disp: 14 tablet, Rfl: 0     polyethylene glycol (MIRALAX) 17 g packet, Take 1 packet by mouth, Disp: , Rfl:      potassium gluconate 2.5 MEQ tablet, Take 1 tablet by mouth daily, Disp: , Rfl:      prochlorperazine (COMPAZINE) 10 MG tablet, Take 1 tablet (10 mg) by mouth every 6 hours as needed for nausea or vomiting (take if zofran does not relieve nausea), Disp: 20 tablet, Rfl: 0     QUEtiapine (SEROQUEL) 25 MG tablet, Give 1 tab with a 50 mg tab at HS., Disp: 60 tablet, Rfl: 3     QUEtiapine (SEROQUEL) 50 MG tablet, Take 1 tab with a 25 mg tab at HS., Disp: 60 tablet, Rfl: 3     QUEtiapine (SEROQUEL) 50 MG tablet, Take 50 mg by mouth At Bedtime, Disp: , Rfl:      rifaximin (XIFAXAN) 550 MG TABS tablet, Take 1 tablet (550 mg) by mouth 2 times daily, Disp:  60 tablet, Rfl: 11     spironolactone (ALDACTONE) 50 MG tablet, Take 2 tablets (100 mg) by mouth daily (Patient taking differently: Take 200 mg by mouth daily ), Disp: 180 tablet, Rfl: 3     ursodiol (ACTIGALL) 300 MG capsule, Take 3 capsules (900 mg) by mouth 2 times daily, Disp: 180 capsule, Rfl: 11    Patient is accompanied by his daughter today.  No further seizure activity since being on Keppra.  Their biggest concerns were urinary frequency mostly at night and insomnia.  A trial of flomax seemed to help somewhat. He is also on diuretics to manage lymphedema and cirrhosis however he takes these in the morning, and the most recent imaging did not show ascites.  Also note a concern of minimal oxygen desats during the day (down to low 90s) which seem related to position; was noted to have atelectasis on imaging in the hospital.  He has finished his course of antibiotics for pneumonia, and denies cough, fever.    He also seems somewhat sleepy to me today, nodding off frequently throughout the interview.  However, when aroused he gives appropriate responses.  Daughter says this has improved since being in the hospital but still not back to baseline.    They deny fever, abdominal pain, GI bleeding.   They state the lymphedema is better but still quite significant and they would like me to check the skin on his BLE.    On exam  /72   Pulse 80   SpO2 98%    Cor RRR, systolic murmur noted  Lungs scant bibasilar crackles, otherwise CTA  LE 2-3+ lymphedema noted with chronic skin changes, but no signs of cellulitis.  Neuro: drowsy, but arouses easily to voice.  No asterixis is noted.    ALONZO/GRECIA Bravo was seen today for establish care.      Daughter had questions about his breathing/O2 sats. For atelectasis, I advised using upright positioning and incentive spirometry at home.      In terms of urinary frequency (specifically nocturia) he may have BPH contributing and a Urology referral was offered.  Meanwhile, can try  trazodone for sleep since seroquel was not helping (and may be potentially more sedating).       Lower urinary tract symptoms (LUTS)  -     UROLOGY ADULT REFERRAL    Insomnia, unspecified type  -     traZODone (DESYREL) 50 MG tablet; Take 1 tablet (50 mg) by mouth At Bedtime    RTC three months    Total time spent with the patient 30 minutes with another 10 minutes on chart review and documentation same day.    Ita Bragg MD    Addendum: was paged by the lab with platelet count of 44K.  This is lower than in the past, but he has chronic thrombocytopenia, and did not endorse any bleeding.  I was more concerned about his keppra and gabapentin levels, given these are new medications and he seems to have some sedation, but they are still pending as of the morning of 2/18.  May need Neurology consultation again regarding medication management.  HTB

## 2021-02-17 NOTE — TELEPHONE ENCOUNTER
I faxed both orders to requested fax number below.   Rhonda Rivas, EMT at 11:25 AM on 2/17/2021.

## 2021-02-18 NOTE — PROGRESS NOTES
[facility]  LIVER CLINIC FOLLOW UP      ASSESSMENT AND PLAN:    Mr. Louie is a 65 Y M with alcoholic and SMITH cirrhosis. He has been sober from alcohol for 5 years.     Cirrhosis MELD 9 stable and low    Ascites/MILLY S/P TIPS 2017. This has improved his ascites markedly. He still has the long-standing severe MILLY.     Thrombocytopenia Values  for last several years. Will assess TIPS with MRI next week. No action required for this platelet count. Would not get platelets unless bleeding or in single digits.    HCC Dx 6/2020. S/p TACE x 3, most recent 1/7/21. AFP improved from 149 to 7.1 Multifocal and initially outside [place]. Repeat MRI next week.     Urinary frequency/possible retention UA ordered. Needs DM addressed. Agree with urology referral. No changes to diuretics until urology visit.     Diabetes not under control. Last A1c a year ago. A1C and UA ordered.  Smoking about 1 cigarette per day.    LT Candidacy Outside [place], multiple comorbidites and poor performance status. Will await followup MRI to assess [place] status but I told them today I was not optimistic. Would need to see in person.    Follow-up 3 mo      Meghna Simmons MD  Hepatology/Liver Transplant  Medical Director, Liver Transplantation  [facility]  ===================================================================  SUBJECTIVE:  Mr. Louie is a 65 Y M with SMITH and alcoholic cirrhosis and multifocal HCC He has been sober since 2012. TIPS 10/27/17 for refractory ascites.    He has lost a lot of water weight and MILLY is improved. He is on furosemide 40 mg and spironolactone 100. He is getting up a lot at night (see below). They tried to go up on the furosemide to 80 daily and he got dehydrated, nauseated and dizzy.     He has been having a hard time emptying his bladder. He was already on Flomax from an ED visit. Dr. Ita Simmons has referred him to urology.     HCC  Dx 6/26/20: 4.2 cm seg 6, 4.1 seg 7,  "3.8 seg 4A  TACE 7/30/20 seg 6, 10/1/20 seg 7, 1/7/21 seg 6  AFP  -80 7/30/20  -82 9/11/20  -149 12/8/20  -7.1 2/17/20  MILLY  His biggest complaint has always been profound LE edema, which he has had for many years and was initially worse after TIPS, now improved but still quite signficant.  Goal Na is 1200 mg per day but he tends to be a little higher than that. He is eating much better.    HE  On lactulose and rifaximin. No episodes of HE. Sometimes some word-finding problems. He is also very sleepy during the day.       OTHER COMORBIDITIES: Diabetes, GERD, hyperlipidemia, morbid obesity      SOCIAL HISTORY:  He lives with his wife. Daughter Kristi  involved in care.    MELD-Na score: 9 at 2/17/2021  4:31 PM  MELD score: 9 at 2/17/2021  4:31 PM  Calculated from:  Serum Creatinine: 0.87 mg/dL (Rounded to 1 mg/dL) at 2/17/2021  4:31 PM  Serum Sodium: 140 mmol/L (Rounded to 137 mmol/L) at 2/17/2021  4:31 PM  Total Bilirubin: 0.8 mg/dL (Rounded to 1 mg/dL) at 2/17/2021  4:31 PM  INR(ratio): 1.31 at 2/17/2021  4:31 PM  Age: 65 years 1 month      Exam  Gen Sleepy at times but clear when awake.   Resp No difficulty breathing. No cough  Skin No Jaundice  Eyes No icterus  Neuro RITCHIE  MSK muscle wasting present  Psyche Pleasant, appropriate.    Lawrence Louie is a 65 year old male who is being evaluated via a billable video visit.      The patient has been notified of following:   \"This video visit will be conducted via a call between you and your physician/provider. We have found that certain health care needs can be provided without the need for an in-person physical exam.  This service lets us provide the care you need with a video conversation.  If a prescription is necessary we can send it directly to your pharmacy.  If lab work is needed we can place an order for that and you can then stop by our lab to have the test done at a later time. Video visits are billed at different rates depending on your insurance " "coverage.  Please reach out to your insurance provider with any questions.  If during the course of the call the physician/provider feels a video visit is not appropriate, you will not be charged for this service.\"   Patient has given verbal consent for Video visit? Yes      Type of service:  Video Visit  Video Start Time: 1107  Video End Time 1137  Patient location: home  Will anyone else be joining your video visit? Dtr Kristi  {If patient encounters technical issues they should call [phone] :1  Distant Location (provider location):  [facility] [place]   Mode of Communication:  Video Conference via [email or web address]  I have reviewed and updated the patient's Past Medical History, Social History, Family History and Medication List.              "

## 2021-02-19 NOTE — PROGRESS NOTES
"Unruly is a 65 year old who is being evaluated via a billable video visit.      How would you like to obtain your AVS? MyChart  If the video visit is dropped, the invitation should be resent by: Send to e-mail at: [email or web address]  Will anyone else be joining your video visit? No  {If patient encounters technical issues they should call [phone] :[id number]}    Video Start Time: {video visit start/end time for provider to select:152948}  Video-Visit Details    Type of service:  Video Visit    Video End Time:{video visit start/end time for provider to select:152948}    Originating Location (pt. Location): {video visit patient location:099122::\"Home\"}    Distant Location (provider location):  [facility] [place]     Platform used for Video Visit: {Virtual Visit Platforms:964921::\"Kinopto\"}  "

## 2021-02-19 NOTE — TELEPHONE ENCOUNTER
[facility] Call Center    Phone Message    May a detailed message be left on voicemail: yes     Reason for Call: Order(s): Home Care Orders: Physical Therapy (PT): Continued PT 2 times a week for 4 weeks.     For strengthening, fall risk, program development        Action Taken: Message routed to:  [facility]    Travel Screening: Not Applicable

## 2021-02-19 NOTE — LETTER
2/19/2021      RE: Lawrence Louie  [address] [place] [address]       [facility]  LIVER CLINIC FOLLOW UP      ASSESSMENT AND PLAN:    Mr. Louie is a 65 Y M with alcoholic and SMITH cirrhosis. He has been sober from alcohol for 5 years.     Cirrhosis MELD 9 stable and low    Ascites/MILLY S/P TIPS 2017. This has improved his ascites markedly. He still has the long-standing severe MILLY.     Thrombocytopenia Values  for last several years. Will assess TIPS with MRI next week. No action required for this platelet count. Would not get platelets unless bleeding or in single digits.    HCC Dx 6/2020. S/p TACE x 3, most recent 1/7/21. AFP improved from 149 to 7.1 Multifocal and initially outside [place]. Repeat MRI next week.     Urinary frequency/possible retention UA ordered. Needs DM addressed. Agree with urology referral. No changes to diuretics until urology visit.     Diabetes not under control. Last A1c a year ago. A1C and UA ordered.  Smoking about 1 cigarette per day.    LT Candidacy Outside [place], multiple comorbidites and poor performance status. Will await followup MRI to assess [place] status but I told them today I was not optimistic. Would need to see in person.    Follow-up 3 mo      Meghna Simmons MD  Hepatology/Liver Transplant  Medical Director, Liver Transplantation  [facility]  ===================================================================  SUBJECTIVE:  Mr. Louie is a 65 Y M with SMITH and alcoholic cirrhosis and multifocal HCC He has been sober since 2012. TIPS 10/27/17 for refractory ascites.    He has lost a lot of water weight and MILLY is improved. He is on furosemide 40 mg and spironolactone 100. He is getting up a lot at night (see below). They tried to go up on the furosemide to 80 daily and he got dehydrated, nauseated and dizzy.     He has been having a hard time emptying his bladder. He was already on Flomax from an ED visit.   "Ita Simmons has referred him to urology.     HCC  Dx 6/26/20: 4.2 cm seg 6, 4.1 seg 7, 3.8 seg 4A  TACE 7/30/20 seg 6, 10/1/20 seg 7, 1/7/21 seg 6  AFP  -80 7/30/20  -82 9/11/20  -149 12/8/20  -7.1 2/17/20  MILLY  His biggest complaint has always been profound LE edema, which he has had for many years and was initially worse after TIPS, now improved but still quite signficant.  Goal Na is 1200 mg per day but he tends to be a little higher than that. He is eating much better.    HE  On lactulose and rifaximin. No episodes of HE. Sometimes some word-finding problems. He is also very sleepy during the day.       OTHER COMORBIDITIES: Diabetes, GERD, hyperlipidemia, morbid obesity      SOCIAL HISTORY:  He lives with his wife. Daughter Kristi  involved in care.    MELD-Na score: 9 at 2/17/2021  4:31 PM  MELD score: 9 at 2/17/2021  4:31 PM  Calculated from:  Serum Creatinine: 0.87 mg/dL (Rounded to 1 mg/dL) at 2/17/2021  4:31 PM  Serum Sodium: 140 mmol/L (Rounded to 137 mmol/L) at 2/17/2021  4:31 PM  Total Bilirubin: 0.8 mg/dL (Rounded to 1 mg/dL) at 2/17/2021  4:31 PM  INR(ratio): 1.31 at 2/17/2021  4:31 PM  Age: 65 years 1 month      Exam  Gen Sleepy at times but clear when awake.   Resp No difficulty breathing. No cough  Skin No Jaundice  Eyes No icterus  Neuro RITCHIE  MSK muscle wasting present  Psyche Pleasant, appropriate.    Lawrence Louie is a 65 year old male who is being evaluated via a billable video visit.      The patient has been notified of following:   \"This video visit will be conducted via a call between you and your physician/provider. We have found that certain health care needs can be provided without the need for an in-person physical exam.  This service lets us provide the care you need with a video conversation.  If a prescription is necessary we can send it directly to your pharmacy.  If lab work is needed we can place an order for that and you can then stop by our lab to have the test done at a " "later time. Video visits are billed at different rates depending on your insurance coverage.  Please reach out to your insurance provider with any questions.  If during the course of the call the physician/provider feels a video visit is not appropriate, you will not be charged for this service.\"   Patient has given verbal consent for Video visit? Yes      Type of service:  Video Visit  Video Start Time: 1107  Video End Time 1137  Patient location: home  Will anyone else be joining your video visit? Dtr Kristi  {If patient encounters technical issues they should call [phone] :1  Distant Location (provider location):  [facility] [place]   Mode of Communication:  Video Conference via rVue  I have reviewed and updated the patient's Past Medical History, Social History, Family History and Medication List.        Meghna Simmons MD    "

## 2021-02-19 NOTE — LETTER
2/19/2021         RE: Lawrence Louie  [address] [place] [address]        Dear Colleague,    Thank you for referring your patient, Lawrence Louie, to the [facility] [place]. Please see a copy of my visit note below.    [facility]  LIVER CLINIC FOLLOW UP      ASSESSMENT AND PLAN:    Mr. Louie is a 65 Y M with alcoholic and SMITH cirrhosis. He has been sober from alcohol for 5 years.     Cirrhosis MELD 9 stable and low    Ascites/MILLY S/P TIPS 2017. This has improved his ascites markedly. He still has the long-standing severe MILLY.     Thrombocytopenia Values  for last several years. Will assess TIPS with MRI next week. No action required for this platelet count. Would not get platelets unless bleeding or in single digits.    HCC Dx 6/2020. S/p TACE x 3, most recent 1/7/21. AFP improved from 149 to 7.1 Multifocal and initially outside [place]. Repeat MRI next week.     Urinary frequency/possible retention UA ordered. Needs DM addressed. Agree with urology referral. No changes to diuretics until urology visit.     Diabetes not under control. Last A1c a year ago. A1C and UA ordered.  Smoking about 1 cigarette per day.    LT Candidacy Outside [place], multiple comorbidites and poor performance status. Will await followup MRI to assess [place] status but I told them today I was not optimistic. Would need to see in person.    Follow-up 3 mo      Meghna Simmons MD  Hepatology/Liver Transplant  Medical Director, Liver Transplantation  [facility]  ===================================================================  SUBJECTIVE:  Mr. Louie is a 65 Y M with SMITH and alcoholic cirrhosis and multifocal HCC He has been sober since 2012. TIPS 10/27/17 for refractory ascites.    He has lost a lot of water weight and MILLY is improved. He is on furosemide 40 mg and spironolactone 100. He is getting up a lot at night (see below). They tried to go  "up on the furosemide to 80 daily and he got dehydrated, nauseated and dizzy.     He has been having a hard time emptying his bladder. He was already on Flomax from an ED visit. Dr. Ita Simmons has referred him to urology.     HCC  Dx 6/26/20: 4.2 cm seg 6, 4.1 seg 7, 3.8 seg 4A  TACE 7/30/20 seg 6, 10/1/20 seg 7, 1/7/21 seg 6  AFP  -80 7/30/20  -82 9/11/20  -149 12/8/20  -7.1 2/17/20  MILLY  His biggest complaint has always been profound LE edema, which he has had for many years and was initially worse after TIPS, now improved but still quite signficant.  Goal Na is 1200 mg per day but he tends to be a little higher than that. He is eating much better.    HE  On lactulose and rifaximin. No episodes of HE. Sometimes some word-finding problems. He is also very sleepy during the day.       OTHER COMORBIDITIES: Diabetes, GERD, hyperlipidemia, morbid obesity      SOCIAL HISTORY:  He lives with his wife. Daughter Kristi  involved in care.    MELD-Na score: 9 at 2/17/2021  4:31 PM  MELD score: 9 at 2/17/2021  4:31 PM  Calculated from:  Serum Creatinine: 0.87 mg/dL (Rounded to 1 mg/dL) at 2/17/2021  4:31 PM  Serum Sodium: 140 mmol/L (Rounded to 137 mmol/L) at 2/17/2021  4:31 PM  Total Bilirubin: 0.8 mg/dL (Rounded to 1 mg/dL) at 2/17/2021  4:31 PM  INR(ratio): 1.31 at 2/17/2021  4:31 PM  Age: 65 years 1 month      Exam  Gen Sleepy at times but clear when awake.   Resp No difficulty breathing. No cough  Skin No Jaundice  Eyes No icterus  Neuro RITCHIE  MSK muscle wasting present  Psyche Pleasant, appropriate.    Lawrence Louie is a 65 year old male who is being evaluated via a billable video visit.      The patient has been notified of following:   \"This video visit will be conducted via a call between you and your physician/provider. We have found that certain health care needs can be provided without the need for an in-person physical exam.  This service lets us provide the care you need with a video conversation.  If a " "prescription is necessary we can send it directly to your pharmacy.  If lab work is needed we can place an order for that and you can then stop by our lab to have the test done at a later time. Video visits are billed at different rates depending on your insurance coverage.  Please reach out to your insurance provider with any questions.  If during the course of the call the physician/provider feels a video visit is not appropriate, you will not be charged for this service.\"   Patient has given verbal consent for Video visit? Yes      Type of service:  Video Visit  Video Start Time: 1107  Video End Time 1137  Patient location: home  Will anyone else be joining your video visit? Dtr Kristi  {If patient encounters technical issues they should call [phone] :1  Distant Location (provider location):  [facility] [place]   Mode of Communication:  Video Conference via Network18  I have reviewed and updated the patient's Past Medical History, Social History, Family History and Medication List.                  Again, thank you for allowing me to participate in the care of your patient.        Sincerely,        Meghna Simmons MD    "

## 2021-02-22 NOTE — TELEPHONE ENCOUNTER
Verbal orders given to Farhan from [facility], per Ary Murphy, for Order(s): Home Care Orders: Physical Therapy (PT): Continued PT 2 times a week for 4 weeks.      For strengthening, fall risk, program development. Magdalena Mott LPN 2/22/2021 10:14 AM

## 2021-02-23 NOTE — TELEPHONE ENCOUNTER
CXR from 01/09/2021 is available for review in PACS.      Thaddeus Banerjee LPN  Pulmonary Division:  [facility]  Phone: [phone] Fax: [phone]

## 2021-03-01 NOTE — TELEPHONE ENCOUNTER
[facility] Call Center    Phone Message    May a detailed message be left on voicemail: yes     Reason for Call: Order(s): Home Care Orders: Skilled Nursing: 3 skilled nursing visits, 1 time per week for 3 weeks verbal order.     Action Taken: Message routed to:  [facility] pcc    Travel Screening: Not Applicable

## 2021-03-02 NOTE — TELEPHONE ENCOUNTER
[facility] Call Center    Phone Message    May a detailed message be left on voicemail: yes     Reason for Call: Other: Pt Daughter Kristi called on behalf of Pt Unruly. Per Kristi earliest appt referral they were able to schedule was 04/15/21, and she is concerned that Pt Unruly's seizures were not taken into account in that scheduling. Per Kristi requests Dr. Adrian consider an earlier appt as Pt has seizures when his bladder becomes too full and ongoing condition is putting Pt at additional risk as it goes on. Per protocol referring to clinic for review. Pls call Kristi at [phone]  If earlier scheduling is appropriate.    Action Taken: Other: [facility] URO, Dr. Adrian    Travel Screening: Not Applicable

## 2021-03-02 NOTE — TELEPHONE ENCOUNTER
Verbal orders given to Pema from [facility], per Ary Murphy, for   Order(s): Home Care Orders: Skilled Nursing: 3 skilled nursing visits, 1 time per week for 3 weeks. Magdalena Covarrubias LPN 3/2/2021 7:50 AM

## 2021-03-03 NOTE — TELEPHONE ENCOUNTER
Called patient and assisted in scheduling sooner appointment with Dr. Adrian for 3/11 at 145 as patient will be in clinic for PFT and x-rays.    Hailey Castillo LPN

## 2021-03-05 NOTE — TELEPHONE ENCOUNTER
Reason For Call:        Chief Complaint   Patient presents with     Refill Request                 Medication Name, Dose and Monthly Quantity:     Roxicodone 5 mg     Diagnosis requiring opiates:        Problem List Updated:   Yes     Opioid Agreement On File - [facility] PAIN CONTRACT ID# [id number]:       Last Urine Drug Screen (at least once every 12 months) Date:     Unexpected Results:        [facility] Data Reviewed (at least once every 3 months) Date:   03/05/2021     Unexpected Results:         Last Fill Date:   01/20/2021    Next Fill Date:   03/05/2021     Last Visit with PCP:        Future Visits with PCP:      Processing:         CHRISTINA SOL CMA at 9:06 AM on 3/5/2021.

## 2021-03-09 NOTE — PATIENT INSTRUCTIONS
Plan for TACE some time in April    Pt is getting Covid vaccination on 3/14, will have to wait 3-4 days post vaccination to schedule for TACE-I will call family and work with them on a schedule.     New labs prior to TACE.-this will be arranged by  and to contact pt.

## 2021-03-09 NOTE — PROGRESS NOTES
Unruly is a 65 year old who is being evaluated via a billable video visit.      How would you like to obtain your AVS? MyChart  If the video visit is dropped, the invitation should be resent by: Send to e-mail at: [email or web address]  Will anyone else be joining your video visit? RUSSELL Gonsales      Video Start Time: 12:01pm  Video-Visit Details    Type of service:  Video Visit    Video End Time:12:25 PM    Originating Location (pt. Location): Home    Distant Location (provider location):  [facility]     Platform used for Video Visit: [employer]         INTERVENTIONAL RADIOLOGY CONSULTATION    Name: Lawrence Louie  Age: 65 year old   Referring Physician: Dr. Amaro   REASON FOR REFERRAL: Hepatocellular carcinoma status post cTACE 1/7/2021.    HPI: Lawrence Louie is a 64 year old male who presents with diagnosis of unresectable multifocal HCC on a background of EtOH cirrhosis. He has h/o ETOH cirrhosis complicated by ascites s/p TIPS and hepatic encephalopathy and diffuse anasarca and MILLY and multiple hospitalizations due to sepsis secondary to leg ulcers. He had a cTACE of his segment 6, in July 2020 and cTACE of segment 7 lesion in October 2020 and cTACE to segment 6 lesion in January 2021.     Currently he is doing fine but postprocedure he had lots of pain secondary to embolization.  He was also admitted for pneumonia and was hospitalized recently.  His pain was managed with oxycodone.    His appetite is good and his ECOG is 1.     We discussed the MRI 2/24/2021-shows expected response to segment 6 treated lesion, however, there is increase in size of segment 4A lesion which is suspicious for hepatocellular carcinoma.  Multiple other smaller lesions were also seen which are LI-RADS 3 or equivocal.    His AFP levels have reduced from 149 to 7.1.    He has good family support and is getting physical therapy.  He is looking forward to see the urologist for his increased  frequency of urination at night.    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)      Splenomegaly      Squamous cell carcinoma      Thrombocytopenia (H)      Varices, esophageal (H)     Banded in 2011       PAST SURGICAL HISTORY:   Past Surgical History:   Procedure Laterality Date     BIOPSY OF SKIN LESION       COLONOSCOPY Left 6/16/2016    Procedure: COMBINED COLONOSCOPY, SINGLE OR MULTIPLE BIOPSY/POLYPECTOMY BY BIOPSY;  Surgeon: Brandy Barnett MD;  Location:  GI     ESOPHAGOSCOPY, GASTROSCOPY, DUODENOSCOPY (EGD), COMBINED  2/13/2013    Procedure: COMBINED ESOPHAGOSCOPY, GASTROSCOPY, DUODENOSCOPY (EGD);;  Surgeon: Tara Cook MD;  Location:  GI     ESOPHAGOSCOPY, GASTROSCOPY, DUODENOSCOPY (EGD), COMBINED  11/4/2013    Procedure: COMBINED ESOPHAGOSCOPY, GASTROSCOPY, DUODENOSCOPY (EGD);;  Surgeon: Lonny Diaz MD;  Location:  GI     ESOPHAGOSCOPY, GASTROSCOPY, DUODENOSCOPY (EGD), COMBINED Left 6/16/2016    Procedure: COMBINED ESOPHAGOSCOPY, GASTROSCOPY, DUODENOSCOPY (EGD), BIOPSY SINGLE OR MULTIPLE;  Surgeon: Brandy Barnett MD;  Location:  GI     EXCISE LESION TRUNK  9/24/2012    Procedure: EXCISE LESION TRUNK;;  Surgeon: Pepe Dominguez MD;  Location: [facility]     GENITOURINARY SURGERY      vasectomy     HERNIORRHAPHY UMBILICAL  9/24/2012    Procedure: HERNIORRHAPHY UMBILICAL;  UMBILICAL HERNIA REPAIR , EXCISION OF PERIUMBILICAL CYST;  Surgeon: Pepe Dominguez MD;  Location: [facility]     IR CHEMO EMBOLIZATION  7/30/2020     IR CHEMO EMBOLIZATION  10/1/2020     IR CHEMO EMBOLIZATION  1/7/2021       FAMILY HISTORY:   Family History   Problem Relation Age of Onset     Breast Cancer Mother      Liver Cancer Mother      Cardiovascular Father       Cerebrovascular Disease Father         very low blood pressure     Cancer Father         rectal cancer     Cardiovascular Paternal Grandfather      Diabetes Brother      Cancer Sister         skin cancer     C.A.D. Other         MI, 70's     Breast Cancer Sister      Thyroid Disease No family hx of      Lipids No family hx of      Anesthesia Reaction No family hx of        SOCIAL HISTORY:   Social History     Tobacco Use     Smoking status: Current Every Day Smoker     Packs/day: 0.30     Types: Cigarettes     Smokeless tobacco: Never Used     Tobacco comment: about 4 cigarettes per day   Substance Use Topics     Alcohol use: No     Alcohol/week: 0.0 standard drinks     Comment: 2-3 per day , none since Dec 2012       PROBLEM LIST:   Patient Active Problem List    Diagnosis Date Noted     Anemia 11/13/2020     Priority: Medium     Inadequate pain control 07/30/2020     Priority: Medium     Post-operative state 07/30/2020     Priority: Medium     HCC (hepatocellular carcinoma) (H) 07/30/2020     Priority: Medium     Venous stasis ulcers of both lower extremities (H) 12/04/2019     Priority: Medium     Lymphedema of both lower extremities 01/08/2018     Priority: Medium     Hepatic encephalopathy (H) 11/05/2017     Priority: Medium     Alcoholic cirrhosis of liver with ascites (H) 04/12/2017     Priority: Medium     Type II diabetes mellitus with peripheral circulatory disorder (H) 12/26/2015     Priority: Medium     Esophageal reflux 09/24/2015     Priority: Medium     Morbid obesity (H) 09/24/2015     Priority: Medium     History of colonic polyps: tubular adenomas and serrated adenomas 09/24/2015     Priority: Medium     Prurigo nodularis 02/13/2015     Priority: Medium     Dental caries 06/10/2013     Priority: Medium     Hyperlipidemia LDL goal <100 04/05/2013     Priority: Medium     Tobacco use disorder 04/02/2013     Priority: Medium     Multiple rib fractures 01/21/2013     Priority: Medium     Ascites  01/06/2013     Priority: Medium     Esophageal varices (H) 01/06/2013     Priority: Medium     Portal hypertension (H) 09/20/2012     Priority: Medium     Alcoholic cirrhosis (H) 09/20/2012     Priority: Medium     (Problem list name updated by automated process. Provider to review and confirm.)       Type 2 diabetes, HbA1c goal < 7% (H) 09/17/2012     Priority: Medium     Family history of colon cancer 08/23/2011     Priority: Medium     Mild major depression (H) 09/29/2010     Priority: Medium     Thrombocytopenia (H) 09/29/2010     Priority: Medium     Hypertension goal BP (blood pressure) < 130/80 09/29/2010     Priority: Medium     Plantar warts 09/29/2010     Priority: Medium     Corns and callosities 09/29/2010     Priority: Medium       MEDICATIONS:   Prescription Medications as of 3/9/2021       Rx Number Disp Refills Start End Last Dispensed Date Next Fill Date Owning Pharmacy    blood glucose (NO BRAND SPECIFIED) lancets standard  360 each 3 2/24/2021    [facility] [address] [place] [address] N.    Sig: Use to test blood sugar 3-4 times daily. Use brand compatible with patients insurance and device.    Class: E-Prescribe    blood glucose (NO BRAND SPECIFIED) test strip  360 strip 3 1/26/2021    [facility] [address] [place] [address] [address]    Sig: Use to test blood sugars 4 X  times daily or as directed    Class: E-Prescribe    blood glucose monitoring (NO BRAND SPECIFIED) meter device kit  1 kit 0 1/11/2021    [facility] [address] [place] [address] N.    Sig: Use to test blood sugar 4 times daily or as directed. Before meals and snack at HS.    Class: E-Prescribe    Notes to Pharmacy: Fill with brand covered (one touch)    citalopram (CELEXA) 20 MG tablet  90 tablet 3 11/3/2020    [facility] [address]    Sig: Take 1 tablet (20 mg) by mouth daily    Class: E-Prescribe    Route: Oral    COMPOUNDED NON-CONTROLLED SUBSTANCE (CMPD RX) - PHARMACY  TO MIX COMPOUNDED MEDICATION  500 g 5 10/7/2020    [facility] [place] [address]    Sig: Apply to the affected area once a day.    Class: E-Prescribe    Notes to Pharmacy: 6 % lidocaine ointment : standard nystatin cream : 40 % ZnOx paste = 1:1:1    Cyanocobalamin (VITAMIN B-12 PO)            Sig: Take 1 tablet by mouth daily    Class: Historical    Route: Oral    desvenlafaxine (PRISTIQ) 100 MG 24 hr tablet  180 tablet 1 2020    [facility] [address] [place] [address], [address]    Sig: Take 2 tablets (200 mg) by mouth daily    Class: E-Prescribe    Route: Oral    famotidine (PEPCID) 40 MG tablet  30 tablet 11 2020    [facility] [address] [place] [address], [address]    Sig: Take 1 tablet (40 mg) by mouth daily    Class: E-Prescribe    Route: Oral    furosemide (LASIX) 40 MG tablet  180 tablet 3 2020    [facility] [address] [place] [address] N.    Sig: Take 40 mg twice daily.    Class: E-Prescribe    gabapentin (NEURONTIN) 300 MG capsule  90 capsule 1 2020    [facility] [address] [place] [address] N.    Sig: Take 1 capsule (300 mg) by mouth At Bedtime    Class: E-Prescribe    Route: Oral    hydrOXYzine (ATARAX) 25 MG tablet  90 tablet 3 12/3/2020    [facility] [address] [place] [address] N.    Sig: Take 1 tablet (25 mg) by mouth 3 times daily as needed for itching    Class: E-Prescribe    Route: Oral    Prior authorization: Approved    insulin aspart (NOVOLOG FLEXPEN) 100 UNIT/ML pen  30 mL 11 2020    [facility] [address] [place] [address], [address]    Si unit per 10 grams of carbohydrates + 1 per 50>140. Max daily dose 100 units.    Class: E-Prescribe    insulin glargine (LANTUS SOLOSTAR) 100 UNIT/ML pen  15 mL 11 2020    [facility] [address] [place] [address], [address]    Si units daily in am. Increase by 5 units 2x weekly until fasting sugars are <130. Max daily dose 100 units.     Class: E-Prescribe    Notes to Pharmacy: If Lantus is not covered by insurance, may substitute Basaglar at same dose and frequency.      insulin pen needle (B-D U/F) 31G X 8 MM miscellaneous  300 each 3 5/15/2020    [facility] [address] [place] [address], [address]    Sig: USE  6 times daily / OR AS DIRECTED    Class: E-Prescribe    insulin pen needle (ULTICARE SHORT) 31G X 8 MM miscellaneous  300 each 3 1/26/2021    [facility] [address] [place] [address] N.    Sig: Use UP to 6 times daily or as directed    Class: E-Prescribe    lactulose (CEPHULAC) 20 GM packet  60 packet 0 7/13/2020    [facility] [address] [place] [address] N.    Sig: Take 1 packet (20 g) by mouth 2 times daily    Class: E-Prescribe    Route: Oral    lactulose encephalopathy (CHRONULAC) 10 GM/15ML SOLUTION  1892 mL 11 4/28/2020    [facility] [address] [place] [address] N.    Sig: TAKE 30 MLS BY MOUTH 2 TIMES DAILY  TITRATE AS NEEDED TO ACHIEVE 3-5 BOWEL MOVEMENTS DAILY.    Class: E-Prescribe    levETIRAcetam (KEPPRA) 500 MG tablet    1/16/2021    [facility] [address] [place] [address] N.    Sig: Take 500 mg by mouth daily    Class: Historical    Route: Oral    Lidocaine (LIDOCARE) 4 % Patch  30 patch 3 11/3/2020    [address]    Sig: Place 1 patch onto the skin every 24 hours To prevent lidocaine toxicity, patient should be patch free for 12 hrs daily.    Class: E-Prescribe    Route: Transdermal    magnesium oxide (MAG-OX) 400 (241.3 Mg) MG tablet  90 tablet 1 9/25/2018    [facility] [address] [place] [address] N.    Sig: Take 1 tablet (400 mg) by mouth daily    Class: E-Prescribe    Route: Oral    Melatonin 5 MG CHEW        [facility] [address] [place] [address] N.    Sig: Take 2 tablets by mouth nightly as needed    Class: Historical    Route: Oral    Nutritional Supplements (ENSURE) LIQD  30 each 11 10/25/2020    [facility]  [address] [place] [address] N.    Sig: Take 1 Can by mouth daily    Class: E-Prescribe    Notes to Pharmacy: End stage liver disease.    Route: Oral    nystatin (MYCOSTATIN) 232267 UNIT/GM external cream  30 g 11 1/10/2019    [address]    Sig: Apply topically 2 times daily    Class: E-Prescribe    Route: Topical    ondansetron (ZOFRAN) 4 MG tablet  20 tablet 0 10/1/2020    [address]    Sig: Take 1 tablet (4 mg) by mouth every 8 hours as needed for nausea    Class: Local Print    Route: Oral    order for DME  2 Units 0 7/30/2019    [address]    Sig: Equipment being ordered:Orthopedic shoes    Class: Local Print    order for DME  1 Device 3 4/23/2019    [address]    Sig: Equipment being ordered: Condom catheter    Class: Local Print    order for DME  1 Piece 0 7/31/2018    [address]    Sig: Equipment being ordered:BioTab compression pants pneumatic system    Class: Local Print    order for DME  2 pump 0 4/24/2018        Sig: Equipment being ordered:bilateral pneumatic leg massage for treatment of extreme bilateral lower leg edema.    Class: Local Print    oxyCODONE (ROXICODONE) 5 MG tablet  180 tablet 0 3/6/2021    [address]    Sig: TAKE 1 to 2 TABLETS BY MOUTH EVERY 8 HOURS AS NEEDED FOR SEVERE PAIN    Class: E-Prescribe    Earliest Fill Date: 3/6/2021    OYSTER SHELL CALCIUM/D 500-200 MG-UNIT per tablet  90 tablet 3 7/20/2018    [address]    Sig: Take 1 tablet by mouth daily    Class: E-Prescribe    Route: Oral    polyethylene glycol (MIRALAX) 17 g packet        [address]    Sig: Take 1 packet by mouth    Class: Historical     Route: Oral    potassium gluconate 2.5 MEQ tablet            Sig: Take 1 tablet by mouth daily    Class: Historical    Route: Oral    prochlorperazine (COMPAZINE) 10 MG tablet  20 tablet 0 10/1/2020    [address]    Sig: Take 1 tablet (10 mg) by mouth every 6 hours as needed for nausea or vomiting (take if zofran does not relieve nausea)    Class: Local Print    Route: Oral    QUEtiapine (SEROQUEL) 25 MG tablet  60 tablet 3 11/3/2020    [facility] [address] [place] [address], [address]    Sig: Give 1 tab with a 50 mg tab at HS.    Class: E-Prescribe    QUEtiapine (SEROQUEL) 50 MG tablet  60 tablet 3 11/3/2020    [facility] [address] [place] [address], [address]    Sig: Take 1 tab with a 25 mg tab at HS.    Class: E-Prescribe    QUEtiapine (SEROQUEL) 50 MG tablet        [address], [address]    Sig: Take 50 mg by mouth At Bedtime    Class: Historical    Route: Oral    rifaximin (XIFAXAN) 550 MG TABS tablet  60 tablet 11 7/21/2020    [address], [address]    Sig: Take 1 tablet (550 mg) by mouth 2 times daily    Class: E-Prescribe    Route: Oral    spironolactone (ALDACTONE) 50 MG tablet  180 tablet 3 3/2/2020    [address], [address]    Sig: Take 2 tablets (100 mg) by mouth daily    Class: E-Prescribe    Route: Oral    tamsulosin (FLOMAX) 0.4 MG capsule    1/13/2021    [facility] [address] [place] [address], [address]    Sig: Take 0.4 mg by mouth daily    Class: Historical    Route: Oral    traZODone (DESYREL) 50 MG tablet  30 tablet 11 2/17/2021    [address], [address]    Sig: Take 1 tablet (50 mg) by mouth At Bedtime    Class: E-Prescribe    Route: Oral    ursodiol (ACTIGALL) 300 MG capsule  180 capsule 11 3/16/2020    [facility] [address] [place] [address], [address]    Sig: Take 3 capsules (900 mg) by mouth 2  times daily    Class: E-Prescribe    Route: Oral    EQL VITAMIN D3 50 MCG (2000 UT) CAPS  90 capsule 2 7/7/2020    [address]    Sig: Take 1 capsule by mouth daily    Class: E-Prescribe    pantoprazole (PROTONIX) 40 MG EC tablet  14 tablet 0 1/7/2021        Sig: Take 1 tablet (40 mg) by mouth daily    Class: Local Print    Route: Oral      Clinic-Administered Medications as of 3/9/2021       Dose Frequency Start End    0.9% sodium chloride BOLUS 1,000 mL ONCE 11/4/2020     Class: E-Prescribe    Route: Intravenous          ALLERGIES:   Patient has no known allergies.    ROS:  Constitutional, HEENT, cardiovascular, pulmonary, gi and gu systems are negative, except as otherwise noted.    Physical Examination:   VITALS:   There were no vitals taken for this visit.  GENERAL: Healthy, alert and no distress  SKIN: Visible skin clear. No significant rash, abnormal pigmentation or lesions.  PSYCH: Mentation appears normal, affect normal/bright, judgement and insight intact, normal speech and appearance well-groomed.  EYES: Eyes grossly normal to inspection. No discharge or erythema, or obvious scleral/conjunctival abnormalities.  RESP: No audible wheeze, cough, or visible cyanosis. No visible retractions or increased work of breathing.   NEURO: Cranial nerves grossly intact. Mentation and speech appropriate for age.      Labs:    BMP RESULTS:  Lab Results   Component Value Date     02/17/2021    POTASSIUM 3.8 02/17/2021    CHLORIDE 107 02/17/2021    CO2 27 02/17/2021    ANIONGAP 6 02/17/2021     (H) 02/17/2021    BUN 20 02/17/2021    CR 0.87 02/17/2021    GFRESTIMATED 89 02/17/2021    GFRESTBLACK >90 02/17/2021    ERIN 8.5 02/17/2021        CBC RESULTS:  Lab Results   Component Value Date    WBC 1.9 (L) 02/17/2021    RBC 2.92 (L) 02/17/2021    HGB 8.3 (L) 02/17/2021    HCT 26.6 (L) 02/17/2021    MCV 91 02/17/2021    MCH 28.4 02/17/2021    MCHC 31.2 (L) 02/17/2021    RDW  19.1 (H) 02/17/2021    PLT 44 (LL) 02/17/2021       INR/PTT:  Lab Results   Component Value Date    INR 1.31 (H) 02/17/2021    PTT 31 01/07/2021       Diagnostic studies:   MRI abdomen 2/24/2021-light at 5 lesion in segment 4A measuring 5.3 x 4.3 cm.  Posttreatment changes in segment 6 lesion and segment 7 lesions.  Other smaller LIRADS 3 lesions.    Assessment   Lawrence Louie is a 64 year old male who presents with diagnosis of unresectable multifocal HCC on a background of EtOH cirrhosis. He has h/o ETOH cirrhosis complicated by ascites s/p TIPS and hepatic encephalopathy and diffuse anasarca and MILLY and multiple hospitalizations due to sepsis secondary to leg ulcers. He had a cTACE of his segment 6, in July 2020 and cTACE of segment 7 lesion in October 2020 and cTACE to segment 6 lesion in January 2021.     Currently he is doing fine but postprocedure he had lot of pain secondary to embolization.  He was also admitted for pneumonia and was hospitalized recently.  His pain was managed with oxycodone.    His appetite is good and his ECOG is 1.     We discussed the MRI 2/24/2021-shows expected response to segment 6 treated lesion, however, there is increase in size of segment 4A lesion which is suspicious for hepatocellular carcinoma.  Multiple other smaller lesions were also seen which are LI-RADS 3 or equivocal.    His AFP levels have reduced from 149 to 7.1.    Plan   - Patient will need treatment for segment 4A lesion.  He is getting his second dose of Covid vaccine in middle of March.  We would recommend having an appointment in mid April.  -His pain needs to be well managed before and after the procedure.  Per patient Dilaudid and fentanyl works better in comparison to oxycodone.  He might need an overnight admission after the cTACE.  -We will repeat the blood work before the procedure.    Physician Attestation   I, Dr TEZ Hilton MD, saw this patient and agree with the findings and plan of care as  documented in the note.      Items personally reviewed/procedural attestation: Dr Tyson and me agree with the interpretation documented in the note.      HCC (hepatocellular carcinoma) (H)  cTACE seg 4A  - IR Chemo Embolization; Future  - CBC with platelets; Future  - Basic metabolic panel; Future  - INR; Future  - AFP tumor marker; Future  - Hepatic panel; Future     Review of the result(s) of each unique test - MRI abdomen 2/24/2021  Independent interpretation of a test performed by another physician/other qualified health care professional (not separately reported) -MRI abdomen 2/24/2021 as detailed above.     30 minutes spent on the date of the encounter doing chart review, review of test results, interpretation of tests, patient visit, documentation and discussion with family          CC  Patient Care Team:  Ita Dang MD as PCP - General (Internal Medicine)  Junie Reddy, RN (Diabetes Education)  Ary Murphy APRN CNP as Referring Physician (Nurse Practitioner)  Meghna Simmons MD as MD (Internal Medicine)  David Davis MD as Resident (Radiology)  Meghna Simmons MD as MD (Internal Medicine)  Marlen Pardo MD as MD (Internal Medicine)  Miranda Paiz, RN as Specialty Care Coordinator (Hepatology)  Janey Chapin MD as Fellow (Student in organized health care education/training program)  Ary Murphy APRN CNP as Assigned PCP  Sandee Bird MD as MD (Infectious Diseases)  Olga iWllis MD as Assigned Cancer Care Provider  Yady Hilton MD as Assigned Heart and Vascular Provider  Elina Partida MD as Assigned Endocrinology Provider  Sandee Bird MD as Assigned Infectious Disease Provider  Leo Lawton MD as Assigned Neuroscience Provider  Meghna Simmons MD as Assigned Gastroenterology Provider  SELF, REFERRED

## 2021-03-09 NOTE — LETTER
3/9/2021         RE: Lawrence Louie  [address]  [place] [address]        Dear Colleague,    Thank you for referring your patient, Lawrence Louie, to the [facility]. Please see a copy of my visit note below.    Unruly is a 65 year old who is being evaluated via a billable video visit.      How would you like to obtain your AVS? MyChart  If the video visit is dropped, the invitation should be resent by: Send to e-mail at: [email or web address]  Will anyone else be joining your video visit? No    RUSSELL Aviles      Video Start Time: 12:01pm  Video-Visit Details    Type of service:  Video Visit    Video End Time:12:25 PM    Originating Location (pt. Location): Home    Distant Location (provider location):  [facility]     Platform used for Video Visit: [email or web address]         INTERVENTIONAL RADIOLOGY CONSULTATION    Name: Lawrence Louie  Age: 65 year old   Referring Physician: Dr. Amaro   REASON FOR REFERRAL: Hepatocellular carcinoma status post cTACE 1/7/2021.    HPI: Lawrence Louie is a 64 year old male who presents with diagnosis of unresectable multifocal HCC on a background of EtOH cirrhosis. He has h/o ETOH cirrhosis complicated by ascites s/p TIPS and hepatic encephalopathy and diffuse anasarca and MILLY and multiple hospitalizations due to sepsis secondary to leg ulcers. He had a cTACE of his segment 6, in July 2020 and cTACE of segment 7 lesion in October 2020 and cTACE to segment 6 lesion in January 2021.     Currently he is doing fine but postprocedure he had lots of pain secondary to embolization.  He was also admitted for pneumonia and was hospitalized recently.  His pain was managed with oxycodone.    His appetite is good and his ECOG is 1.     We discussed the MRI 2/24/2021-shows expected response to segment 6 treated lesion, however, there is increase in size of segment 4A lesion which is suspicious for hepatocellular  carcinoma.  Multiple other smaller lesions were also seen which are LI-RADS 3 or equivocal.    His AFP levels have reduced from 149 to 7.1.    He has good family support and is getting physical therapy.  He is looking forward to see the urologist for his increased frequency of urination at night.    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)      Splenomegaly      Squamous cell carcinoma      Thrombocytopenia (H)      Varices, esophageal (H)     Banded in 2011       PAST SURGICAL HISTORY:   Past Surgical History:   Procedure Laterality Date     BIOPSY OF SKIN LESION       COLONOSCOPY Left 6/16/2016    Procedure: COMBINED COLONOSCOPY, SINGLE OR MULTIPLE BIOPSY/POLYPECTOMY BY BIOPSY;  Surgeon: Brandy Barnett MD;  Location:  GI     ESOPHAGOSCOPY, GASTROSCOPY, DUODENOSCOPY (EGD), COMBINED  2/13/2013    Procedure: COMBINED ESOPHAGOSCOPY, GASTROSCOPY, DUODENOSCOPY (EGD);;  Surgeon: Tara Cook MD;  Location:  GI     ESOPHAGOSCOPY, GASTROSCOPY, DUODENOSCOPY (EGD), COMBINED  11/4/2013    Procedure: COMBINED ESOPHAGOSCOPY, GASTROSCOPY, DUODENOSCOPY (EGD);;  Surgeon: Lonny Diaz MD;  Location:  GI     ESOPHAGOSCOPY, GASTROSCOPY, DUODENOSCOPY (EGD), COMBINED Left 6/16/2016    Procedure: COMBINED ESOPHAGOSCOPY, GASTROSCOPY, DUODENOSCOPY (EGD), BIOPSY SINGLE OR MULTIPLE;  Surgeon: Brandy Barnett MD;  Location:  GI     EXCISE LESION TRUNK  9/24/2012    Procedure: EXCISE LESION TRUNK;;  Surgeon: Pepe Dominguez MD;  Location: [facility]     GENITOURINARY SURGERY      vasectomy     HERNIORRHAPHY UMBILICAL  9/24/2012    Procedure: HERNIORRHAPHY UMBILICAL;  UMBILICAL HERNIA REPAIR , EXCISION OF PERIUMBILICAL CYST;  Surgeon: Pepe Dominguez MD;  Location: [facility]      IR CHEMO EMBOLIZATION  7/30/2020     IR CHEMO EMBOLIZATION  10/1/2020     IR CHEMO EMBOLIZATION  1/7/2021       FAMILY HISTORY:   Family History   Problem Relation Age of Onset     Breast Cancer Mother      Liver Cancer Mother      Cardiovascular Father      Cerebrovascular Disease Father         very low blood pressure     Cancer Father         rectal cancer     Cardiovascular Paternal Grandfather      Diabetes Brother      Cancer Sister         skin cancer     C.A.D. Other         MI, 70's     Breast Cancer Sister      Thyroid Disease No family hx of      Lipids No family hx of      Anesthesia Reaction No family hx of        SOCIAL HISTORY:   Social History     Tobacco Use     Smoking status: Current Every Day Smoker     Packs/day: 0.30     Types: Cigarettes     Smokeless tobacco: Never Used     Tobacco comment: about 4 cigarettes per day   Substance Use Topics     Alcohol use: No     Alcohol/week: 0.0 standard drinks     Comment: 2-3 per day , none since Dec 2012       PROBLEM LIST:   Patient Active Problem List    Diagnosis Date Noted     Anemia 11/13/2020     Priority: Medium     Inadequate pain control 07/30/2020     Priority: Medium     Post-operative state 07/30/2020     Priority: Medium     HCC (hepatocellular carcinoma) (H) 07/30/2020     Priority: Medium     Venous stasis ulcers of both lower extremities (H) 12/04/2019     Priority: Medium     Lymphedema of both lower extremities 01/08/2018     Priority: Medium     Hepatic encephalopathy (H) 11/05/2017     Priority: Medium     Alcoholic cirrhosis of liver with ascites (H) 04/12/2017     Priority: Medium     Type II diabetes mellitus with peripheral circulatory disorder (H) 12/26/2015     Priority: Medium     Esophageal reflux 09/24/2015     Priority: Medium     Morbid obesity (H) 09/24/2015     Priority: Medium     History of colonic polyps: tubular adenomas and serrated adenomas 09/24/2015     Priority: Medium     Prurigo nodularis 02/13/2015      Priority: Medium     Dental caries 06/10/2013     Priority: Medium     Hyperlipidemia LDL goal <100 04/05/2013     Priority: Medium     Tobacco use disorder 04/02/2013     Priority: Medium     Multiple rib fractures 01/21/2013     Priority: Medium     Ascites 01/06/2013     Priority: Medium     Esophageal varices (H) 01/06/2013     Priority: Medium     Portal hypertension (H) 09/20/2012     Priority: Medium     Alcoholic cirrhosis (H) 09/20/2012     Priority: Medium     (Problem list name updated by automated process. Provider to review and confirm.)       Type 2 diabetes, HbA1c goal < 7% (H) 09/17/2012     Priority: Medium     Family history of colon cancer 08/23/2011     Priority: Medium     Mild major depression (H) 09/29/2010     Priority: Medium     Thrombocytopenia (H) 09/29/2010     Priority: Medium     Hypertension goal BP (blood pressure) < 130/80 09/29/2010     Priority: Medium     Plantar warts 09/29/2010     Priority: Medium     Corns and callosities 09/29/2010     Priority: Medium       MEDICATIONS:   Prescription Medications as of 3/9/2021       Rx Number Disp Refills Start End Last Dispensed Date Next Fill Date Owning Pharmacy    blood glucose (NO BRAND SPECIFIED) lancets standard  360 each 3 2/24/2021    [facility] [address]    Sig: Use to test blood sugar 3-4 times daily. Use brand compatible with patients insurance and device.    Class: E-Prescribe    blood glucose (NO BRAND SPECIFIED) test strip  360 strip 3 1/26/2021    [facility] [address]    Sig: Use to test blood sugars 4 X  times daily or as directed    Class: E-Prescribe    blood glucose monitoring (NO BRAND SPECIFIED) meter device kit  1 kit 0 1/11/2021    [facility] [address]    Sig: Use to test blood sugar 4 times daily or as directed. Before meals and snack at HS.    Class: E-Prescribe    Notes to Pharmacy: Fill with brand covered (one  touch)    citalopram (CELEXA) 20 MG tablet  90 tablet 3 11/3/2020    [facility] [address] [place] [address] N.    Sig: Take 1 tablet (20 mg) by mouth daily    Class: E-Prescribe    Route: Oral    COMPOUNDED NON-CONTROLLED SUBSTANCE (CMPD RX) - PHARMACY TO MIX COMPOUNDED MEDICATION  500 g 5 10/7/2020    [facility] [place] [address]     Sig: Apply to the affected area once a day.    Class: E-Prescribe    Notes to Pharmacy: 6 % lidocaine ointment : standard nystatin cream : 40 % ZnOx paste = 1:1:1    Cyanocobalamin (VITAMIN B-12 PO)            Sig: Take 1 tablet by mouth daily    Class: Historical    Route: Oral    desvenlafaxine (PRISTIQ) 100 MG 24 hr tablet  180 tablet 1 2020    [facility] [address] [place] [address] N.    Sig: Take 2 tablets (200 mg) by mouth daily    Class: E-Prescribe    Route: Oral    famotidine (PEPCID) 40 MG tablet  30 tablet 11 2020    [facility] [address] [place] [address] N.    Sig: Take 1 tablet (40 mg) by mouth daily    Class: E-Prescribe    Route: Oral    furosemide (LASIX) 40 MG tablet  180 tablet 3 2020    [facility] [address] [place] [address] N.    Sig: Take 40 mg twice daily.    Class: E-Prescribe    gabapentin (NEURONTIN) 300 MG capsule  90 capsule 1 2020    [facility] [address] [place] [address] N.    Sig: Take 1 capsule (300 mg) by mouth At Bedtime    Class: E-Prescribe    Route: Oral    hydrOXYzine (ATARAX) 25 MG tablet  90 tablet 3 12/3/2020    [facility] [address] [place] [address] N.    Sig: Take 1 tablet (25 mg) by mouth 3 times daily as needed for itching    Class: E-Prescribe    Route: Oral    Prior authorization: Approved    insulin aspart (NOVOLOG FLEXPEN) 100 UNIT/ML pen  30 mL 11 2020    [facility] [address] [place] [address], [address]    Si unit per 10 grams of carbohydrates + 1 per 50>140. Max daily dose 100 units.    Class:  E-Prescribe    insulin glargine (LANTUS SOLOSTAR) 100 UNIT/ML pen  15 mL 11 2020    [facility] [address] [place] [address] N.    Si units daily in am. Increase by 5 units 2x weekly until fasting sugars are <130. Max daily dose 100 units.    Class: E-Prescribe    Notes to Pharmacy: If Lantus is not covered by insurance, may substitute Basaglar at same dose and frequency.      insulin pen needle (B-D U/F) 31G X 8 MM miscellaneous  300 each 3 5/15/2020    [facility] [address] [place] [address] N.    Sig: USE  6 times daily / OR AS DIRECTED    Class: E-Prescribe    insulin pen needle (ULTICARE SHORT) 31G X 8 MM miscellaneous  300 each 3 2021    [address]    Sig: Use UP to 6 times daily or as directed    Class: E-Prescribe    lactulose (CEPHULAC) 20 GM packet  60 packet 0 2020    [address]    Sig: Take 1 packet (20 g) by mouth 2 times daily    Class: E-Prescribe    Route: Oral    lactulose encephalopathy (CHRONULAC) 10 GM/15ML SOLUTION  1892 mL 11 2020    [address]    Sig: TAKE 30 MLS BY MOUTH 2 TIMES DAILY  TITRATE AS NEEDED TO ACHIEVE 3-5 BOWEL MOVEMENTS DAILY.    Class: E-Prescribe    levETIRAcetam (KEPPRA) 500 MG tablet    2021    [facility] [address] [place] [address] N.    Sig: Take 500 mg by mouth daily    Class: Historical    Route: Oral    Lidocaine (LIDOCARE) 4 % Patch  30 patch 3 11/3/2020    [address]    Sig: Place 1 patch onto the skin every 24 hours To prevent lidocaine toxicity, patient should be patch free for 12 hrs daily.    Class: E-Prescribe    Route: Transdermal    magnesium oxide (MAG-OX) 400 (241.3 Mg) MG tablet  90 tablet 1 2018    [facility] [address] [place] [address], [address]    Sig: Take 1 tablet (400 mg) by mouth daily    Class: E-Prescribe     Route: Oral    Melatonin 5 MG CHEW        [facility] [address] [place] [address] N.    Sig: Take 2 tablets by mouth nightly as needed    Class: Historical    Route: Oral    Nutritional Supplements (ENSURE) LIQD  30 each 11 10/25/2020    [address]    Sig: Take 1 Can by mouth daily    Class: E-Prescribe    Notes to Pharmacy: End stage liver disease.    Route: Oral    nystatin (MYCOSTATIN) 611697 UNIT/GM external cream  30 g 11 1/10/2019    [address]    Sig: Apply topically 2 times daily    Class: E-Prescribe    Route: Topical    ondansetron (ZOFRAN) 4 MG tablet  20 tablet 0 10/1/2020    [address]    Sig: Take 1 tablet (4 mg) by mouth every 8 hours as needed for nausea    Class: Local Print    Route: Oral    order for DME  2 Units 0 7/30/2019    [address]    Sig: Equipment being ordered:Orthopedic shoes    Class: Local Print    order for DME  1 Device 3 4/23/2019    [address]    Sig: Equipment being ordered: Condom catheter    Class: Local Print    order for DME  1 Piece 0 7/31/2018    [address]    Sig: Equipment being ordered:BioTab compression pants pneumatic system    Class: Local Print    order for DME  2 pump 0 4/24/2018        Sig: Equipment being ordered:bilateral pneumatic leg massage for treatment of extreme bilateral lower leg edema.    Class: Local Print    oxyCODONE (ROXICODONE) 5 MG tablet  180 tablet 0 3/6/2021    [facility] [address] [place] [address] N.    Sig: TAKE 1 to 2 TABLETS BY MOUTH EVERY 8 HOURS AS NEEDED FOR SEVERE PAIN    Class: E-Prescribe    Earliest Fill Date: 3/6/2021    OYSTER SHELL CALCIUM/D 500-200 MG-UNIT per tablet  90 tablet 3 7/20/2018    [address]  [address]    Sig: Take 1 tablet by mouth daily    Class: E-Prescribe    Route: Oral    polyethylene glycol (MIRALAX) 17 g packet        [address]    Sig: Take 1 packet by mouth    Class: Historical    Route: Oral    potassium gluconate 2.5 MEQ tablet            Sig: Take 1 tablet by mouth daily    Class: Historical    Route: Oral    prochlorperazine (COMPAZINE) 10 MG tablet  20 tablet 0 10/1/2020    [address]    Sig: Take 1 tablet (10 mg) by mouth every 6 hours as needed for nausea or vomiting (take if zofran does not relieve nausea)    Class: Local Print    Route: Oral    QUEtiapine (SEROQUEL) 25 MG tablet  60 tablet 3 11/3/2020    [address], [address]    Sig: Give 1 tab with a 50 mg tab at HS.    Class: E-Prescribe    QUEtiapine (SEROQUEL) 50 MG tablet  60 tablet 3 11/3/2020    [address], [address]    Sig: Take 1 tab with a 25 mg tab at HS.    Class: E-Prescribe    QUEtiapine (SEROQUEL) 50 MG tablet        [address], [address]    Sig: Take 50 mg by mouth At Bedtime    Class: Historical    Route: Oral    rifaximin (XIFAXAN) 550 MG TABS tablet  60 tablet 11 7/21/2020    [address], [address]    Sig: Take 1 tablet (550 mg) by mouth 2 times daily    Class: E-Prescribe    Route: Oral    spironolactone (ALDACTONE) 50 MG tablet  180 tablet 3 3/2/2020    [facility] [address] [place] [address], [address]    Sig: Take 2 tablets (100 mg) by mouth daily    Class: E-Prescribe    Route: Oral    tamsulosin (FLOMAX) 0.4 MG capsule    1/13/2021    [address], [address]    Sig: Take 0.4 mg by mouth daily    Class: Historical    Route: Oral    traZODone (DESYREL) 50 MG tablet  30 tablet 11 2/17/2021    [facility] [address] [place] [address], [address]  [address]    Sig: Take 1 tablet (50 mg) by mouth At Bedtime    Class: E-Prescribe    Route: Oral    ursodiol (ACTIGALL) 300 MG capsule  180 capsule 11 3/16/2020    [facility] [address] [place] [address] N.    Sig: Take 3 capsules (900 mg) by mouth 2 times daily    Class: E-Prescribe    Route: Oral    EQL VITAMIN D3 50 MCG (2000 UT) CAPS  90 capsule 2 7/7/2020    [facility] [address] [place] [address] N.    Sig: Take 1 capsule by mouth daily    Class: E-Prescribe    pantoprazole (PROTONIX) 40 MG EC tablet  14 tablet 0 1/7/2021        Sig: Take 1 tablet (40 mg) by mouth daily    Class: Local Print    Route: Oral      Clinic-Administered Medications as of 3/9/2021       Dose Frequency Start End    0.9% sodium chloride BOLUS 1,000 mL ONCE 11/4/2020     Class: E-Prescribe    Route: Intravenous          ALLERGIES:   Patient has no known allergies.    ROS:  Constitutional, HEENT, cardiovascular, pulmonary, gi and gu systems are negative, except as otherwise noted.    Physical Examination:   VITALS:   There were no vitals taken for this visit.  GENERAL: Healthy, alert and no distress  SKIN: Visible skin clear. No significant rash, abnormal pigmentation or lesions.  PSYCH: Mentation appears normal, affect normal/bright, judgement and insight intact, normal speech and appearance well-groomed.  EYES: Eyes grossly normal to inspection. No discharge or erythema, or obvious scleral/conjunctival abnormalities.  RESP: No audible wheeze, cough, or visible cyanosis. No visible retractions or increased work of breathing.   NEURO: Cranial nerves grossly intact. Mentation and speech appropriate for age.      Labs:    BMP RESULTS:  Lab Results   Component Value Date     02/17/2021    POTASSIUM 3.8 02/17/2021    CHLORIDE 107 02/17/2021    CO2 27 02/17/2021    ANIONGAP 6 02/17/2021     (H) 02/17/2021    BUN 20 02/17/2021    CR 0.87 02/17/2021    GFRESTIMATED 89 02/17/2021    GFRESTBLACK >90 02/17/2021     ERIN 8.5 02/17/2021        CBC RESULTS:  Lab Results   Component Value Date    WBC 1.9 (L) 02/17/2021    RBC 2.92 (L) 02/17/2021    HGB 8.3 (L) 02/17/2021    HCT 26.6 (L) 02/17/2021    MCV 91 02/17/2021    MCH 28.4 02/17/2021    MCHC 31.2 (L) 02/17/2021    RDW 19.1 (H) 02/17/2021    PLT 44 (LL) 02/17/2021       INR/PTT:  Lab Results   Component Value Date    INR 1.31 (H) 02/17/2021    PTT 31 01/07/2021       Diagnostic studies:   MRI abdomen 2/24/2021-light at 5 lesion in segment 4A measuring 5.3 x 4.3 cm.  Posttreatment changes in segment 6 lesion and segment 7 lesions.  Other smaller LIRADS 3 lesions.    Assessment   Lawrence Louie is a 64 year old male who presents with diagnosis of unresectable multifocal HCC on a background of EtOH cirrhosis. He has h/o ETOH cirrhosis complicated by ascites s/p TIPS and hepatic encephalopathy and diffuse anasarca and MILLY and multiple hospitalizations due to sepsis secondary to leg ulcers. He had a cTACE of his segment 6, in July 2020 and cTACE of segment 7 lesion in October 2020 and cTACE to segment 6 lesion in January 2021.     Currently he is doing fine but postprocedure he had lot of pain secondary to embolization.  He was also admitted for pneumonia and was hospitalized recently.  His pain was managed with oxycodone.    His appetite is good and his ECOG is 1.     We discussed the MRI 2/24/2021-shows expected response to segment 6 treated lesion, however, there is increase in size of segment 4A lesion which is suspicious for hepatocellular carcinoma.  Multiple other smaller lesions were also seen which are LI-RADS 3 or equivocal.    His AFP levels have reduced from 149 to 7.1.    Plan   - Patient will need treatment for segment 4A lesion.  He is getting his second dose of Covid vaccine in middle of March.  We would recommend having an appointment in mid April.  -His pain needs to be well managed before and after the procedure.  Per patient Dilaudid and fentanyl works  better in comparison to oxycodone.  He might need an overnight admission after the cTACE.  -We will repeat the blood work before the procedure.    Physician Attestation   I, Dr TEZ Hilton MD, saw this patient and agree with the findings and plan of care as documented in the note.      Items personally reviewed/procedural attestation: Dr Tyson and me agree with the interpretation documented in the note.      HCC (hepatocellular carcinoma) (H)  cTACE seg 4A  - IR Chemo Embolization; Future  - CBC with platelets; Future  - Basic metabolic panel; Future  - INR; Future  - AFP tumor marker; Future  - Hepatic panel; Future     Review of the result(s) of each unique test - MRI abdomen 2/24/2021  Independent interpretation of a test performed by another physician/other qualified health care professional (not separately reported) -MRI abdomen 2/24/2021 as detailed above.     30 minutes spent on the date of the encounter doing chart review, review of test results, interpretation of tests, patient visit, documentation and discussion with family        CC  Patient Care Team:  Ita Dang MD as PCP - General (Internal Medicine)  Junie Reddy, RN (Diabetes Education)  Ary Murphy APRN CNP as Referring Physician (Nurse Practitioner)  Meghna Simmons MD as MD (Internal Medicine)  David Davis MD as Resident (Radiology)  Meghna Simmons MD as MD (Internal Medicine)  Marlen Pardo MD as MD (Internal Medicine)  Miranda Paiz, RN as Specialty Care Coordinator (Hepatology)  Janey Chapin MD as Fellow (Student in organized health care education/training program)  Ary Murphy APRN CNP as Assigned PCP  Sandee Bird MD as MD (Infectious Diseases)  Olga Willis MD as Assigned Cancer Care Provider  Yady Hilton MD as Assigned Heart and Vascular Provider  Elina Partida MD as Assigned Endocrinology Provider  Sandee Bird MD as  Assigned Infectious Disease Provider  Leo Lawton MD as Assigned Neuroscience Provider  Meghna Simmons MD as Assigned Gastroenterology Provider          Again, thank you for allowing me to participate in the care of your patient.      Sincerely,      Yady Hilton MD

## 2021-03-11 NOTE — PROGRESS NOTES
Unruly is a 65 year old who is being evaluated via a billable video visit.      How would you like to obtain your AVS? MyChart  If the video visit is dropped, the invitation should be resent by: Text to cell phone: Mobile  Will anyone else be joining your video visit? Yes  Daughter - Rox and Wife- Raven      Video Start Time: 3:52 PM      Subjective   Unruly is a 65 year old who presents for the following health issues: Pneumonia and COPD    HPI   I had the pleasure of speaking with Lawrence Louie, who is a 65 year old male who presents with diagnosis of unresectable multifocal HCC on a background of EtOH cirrhosis to the pulmonary clinic for evaluation of pneumonia and COPD.     To briefly review, he has h/o ETOH cirrhosis complicated by ascites s/p TIPS and hepatic encephalopathy and diffuse anasarca and MILLY and multiple hospitalizations due to sepsis secondary to leg ulcers. He had a cTACE of his segment 6, in July 2020 and cTACE of segment 7 lesion in October 2020 and cTACE to segment 6 lesion in January 2021.     Unruly has had several admissions in the last few weeks.  He was first seen at the ER on 1/8/21 for abdominal pain right after his aforementioned chemoembolization.  He was discharged from the ER with pain medications and as well as a Narcan kit.  He was admitted the next day on 1/9/2021 to [facility] on 1/8/21 for confusion, sepsis and acute hypoxic respiratory failure.  He was thought to have had a community-acquired pneumonia based on chest x-ray.  Of note, he had had a CT chest done on 1/5/21 which did not show any clear-cut pneumonia.  However, he was treated with antibiotics and required supplemental oxygen.  Work-up included lactic acid, WBCs, procalcitonin Legionella/strep antigen as well as cultures were normal/negative.  His opioids and benzos were cut back with resolution of his confusion and did not require any supplementary oxygen during the remainder of the hospitalization.  Ammonia level  "was also not overly elevated.  Unfortunately, Unruly was readmitted yet again on 1/14/201 with new onset seizure-like activity.  MRI brain showed no acute intracranial abnormality.  An awake EEG showed the presence of mild generalized slowing of the background indicative of encephalopathy but could not refute nor confirm the diagnosis of seizures or epilepsy. He was discharged on 1/16/201 with Keppra and suggested home care. He was sent to the ED after her PCP evaluation on 2/17/201 due to concerns for thrombocytopenia and encephalopathy.  After evaluation at the ED, patient was found to be at his baseline and he did not require any admission and was reassured and sent home.  Today, Unruly appears to be doing relatively better.  He denies any shortness of breath even with activities.  Unruly gets around with the help of a walker and he is not very active overall.  He may require a wheelchair during the clinic visits.  He did have significant cough in the past which is productive of a \"rubbery\", yellowish tinged sputum, however this has improved and he barely has any significant coughing now.  He denies any wheezing, no subjective fevers.  He typically sleeps on a recliner and sleeping on his back makes him uncomfortable and more short of breath.  Since his TIPS procedure, he has had minimal abdominal swelling and ascites.  He does have baseline pedal swelling which is stable.  He is also on a diuretic regimen and a salt restricted diet.  He had complained about some urinary hesitancy and was seen by the urologist earlier today and he is being continued on increased dose of Flomax.    Unruly had started smoking in his teens, he does have a combined 45 pack years of smoking, he smoked as much as 1 pack/day in the past and he is currently down to about 2 to 3 cigarettes/day.     No personal or family history of lung cancer.  No asbestos exposures.  No history of exposures to chemical fumes.        Review of Systems "   Constitutional, HEENT, cardiovascular, pulmonary, GI, , musculoskeletal, neuro, skin, endocrine and psych systems are negative, except as otherwise noted.      Objective           Vitals:  No vitals were obtained today due to virtual visit.    Physical Exam   GENERAL: Healthy, alert and no distress  EYES: Eyes grossly normal to inspection.  No discharge or erythema, or obvious scleral/conjunctival abnormalities.  RESP: No audible wheeze, cough, or visible cyanosis.  No visible retractions or increased work of breathing.    SKIN: Visible skin clear. No significant rash, abnormal pigmentation or lesions.  NEURO: Cranial nerves grossly intact.  Mentation and speech appropriate for age.  PSYCH: Mentation appears normal, affect normal/bright, judgement and insight intact, normal speech and appearance well-groomed.    CXR - Reviewed and interpreted by me, Bibasilar opacities    Most Recent [facility] Pulmonary Function Testing  Discussed and reviewed with patient. No obstruction. Reduced forced expiratory flows and impaired diffusion.  FVC-Pred   Date Value Ref Range Status   03/11/2021 4.67 L      FVC-Pre   Date Value Ref Range Status   03/11/2021 2.92 L      FVC-%Pred-Pre   Date Value Ref Range Status   03/11/2021 62 %      FEV1-Pre   Date Value Ref Range Status   03/11/2021 2.11 L      FEV1-%Pred-Pre   Date Value Ref Range Status   03/11/2021 59 %      FEV1FVC-Pred   Date Value Ref Range Status   03/11/2021 76 %      FEV1FVC-Pre   Date Value Ref Range Status   03/11/2021 72 %      No results found for: 20029  FEFMax-Pred   Date Value Ref Range Status   03/11/2021 9.10 L/sec      FEFMax-Pre   Date Value Ref Range Status   03/11/2021 3.76 L/sec      FEFMax-%Pred-Pre   Date Value Ref Range Status   03/11/2021 41 %      ExpTime-Pre   Date Value Ref Range Status   03/11/2021 7.72 sec      FIFMax-Pre   Date Value Ref Range Status   03/11/2021 2.75 L/sec      FEV1FEV6-Pred   Date Value Ref Range Status   03/11/2021 78 %       FEV1FEV6-Pre   Date Value Ref Range Status   03/11/2021 75 %      No results found for: 20055      Assessment and plan  Lawrence Louie, who is a 65 year old male who presents with diagnosis of unresectable multifocal HCC on a background of EtOH cirrhosis to the pulmonary clinic for evaluation of pneumonia and COPD.  He has no symptoms suggestive of an active pneumonia at the moment.  His PFTs does not show any obstructive lung disease, but is concerning for reduced forced expiratory flows as well as impaired DLCO.  Unruly is an active smoker.    Pneumonia   I reviewed his CT scan from 01/05/2021 prior to his admissions and there were no clear-cut evidence of a pneumonia.  He did have bibasilar opacities which were more in keeping with atelectasis than pneumonia.  He however is possible that he developed a pneumonia few days later.  I did express my concern for increased risk of aspiration pneumonia particularly during periods when his ammonia levels are high and he is lethargic.  Also educated him on aspiration precautions and encouraged him to always eat in an upright position to reduce the risk for aspirating.  As of now, he appears to be handling both liquids and solids relatively well with no issues.  His chest x-ray from today after review again does not show any clear-cut pneumonia, opacities are probably likely due to bibasilar atelectasis in keeping with his prior CAT scan.    Restrictive Lung disease  We were unable to get dedicated lung volumes today due to inability for patient to participate.  But he had reduced forced expiratory flow with a normal FEV1 over FVC ratio.  I suspect that his restrictive lung disease is likely due to his abdominal distention from his ascites as well as obesity.  More recent images have been reassuring with no significant ascites. I do not see any ILD changes nor fibrosis from review of his CT chest.    Abnormal CT chest  Concern for a mildly enlarged pretracheal lymph  node.  This will need some surveillance particularly due to his history of HCC and treatments.  Since he also qualifies for an annual lung cancer screening, I will go ahead and get his baseline CAT scans in about 6 months which will double as his surveillance scans and will continue with annual studies    Active smoker  Smoking cessation education given.  Unruly is not ready for quitting which is understandable given that he has been through a lot with his recent diagnosis of HCC and ongoing treatment.  He definitely qualifies for an annual lung cancer screening due to his current smoking history as well as significant history.    Lung Cancer Screening Shared Decision Making Visit     Lawrence Louie is eligible for lung cancer screening on the basis of the information provided in my signed lung cancer screening order.     I have discussed with patient the risks and benefits of screening for lung cancer with low-dose CT.     The risks include:  radiation exposure: one low dose chest CT has as much ionizing radiation as about 15 chest x-rays or 6 months of background radiation living in [place]    false positives: 96% of positive findings/nodules are NOT cancer, but some might still require additional diagnostic evaluation, including biopsy  over-diagnosis: some slow growing cancers that might never have been clinically significant will be detected and treated unnecessarily     The benefit of early detection of lung cancer is contingent upon adherence to annual screening or more frequent follow up if indicated.     Furthermore, reaping the benefits of screening requires Lawrence Louie to be willing and physically able to undergo diagnostic procedures, if indicated. Although no specific guide is available for determining severity of comorbidities, it is reasonable to withhold screening in patients who have greater mortality risk from other diseases.     We did discuss that the only way to prevent lung cancer  is to not smoke. Smoking cessation counseling was given, duration > 10 minutes.      I did not offer risk estimation using a calculator such as this one:          Video-Visit Details    Type of service:  Video Visit    Video End Time:5:00 PM    Originating Location (pt. Location): Home    Distant Location (provider location):  [facility]     Platform used for Video Visit: Forerun  [id number]}

## 2021-03-11 NOTE — PROGRESS NOTES
Urology Consult History and Physical  RAHEEL BRICENO  Name: Lawrence Louie    MRN: [id number]   YOB: 1955       We were asked to see Lawrence Louie at the request of Dr. Murphy for evaluation and treatment of LUTS.        Chief Complaint:   LUTS    History is obtained from the patient            History of Present Illness:   Lawrence Louie is a 65 year old male who is being seen for evaluation of LUTS.     Started on tamsulosin 0.4mg (Flomax) in January   Complex PMH notable for EtOH cirrhosis c/b ascites s/p TIPS, hepatic encephalopathy, multifocal HCC receiving TACE, chronic lymphedema c/b recurrent cellulitis, and DMII.    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)      Splenomegaly      Squamous cell carcinoma      Thrombocytopenia (H)      Varices, esophageal (H)     Banded in 2011            Past Surgical History:     Past Surgical History:   Procedure Laterality Date     BIOPSY OF SKIN LESION       COLONOSCOPY Left 6/16/2016    Procedure: COMBINED COLONOSCOPY, SINGLE OR MULTIPLE BIOPSY/POLYPECTOMY BY BIOPSY;  Surgeon: Brandy Barnett MD;  Location:  GI     ESOPHAGOSCOPY, GASTROSCOPY, DUODENOSCOPY (EGD), COMBINED  2/13/2013    Procedure: COMBINED ESOPHAGOSCOPY, GASTROSCOPY, DUODENOSCOPY (EGD);;  Surgeon: Tara Cook MD;  Location:  GI     ESOPHAGOSCOPY, GASTROSCOPY, DUODENOSCOPY (EGD), COMBINED  11/4/2013    Procedure: COMBINED ESOPHAGOSCOPY, GASTROSCOPY, DUODENOSCOPY (EGD);;  Surgeon: Lonny Diaz MD;  [facility]     ESOPHAGOSCOPY, GASTROSCOPY, DUODENOSCOPY (EGD), COMBINED Left 6/16/2016    Procedure: COMBINED ESOPHAGOSCOPY, GASTROSCOPY,  DUODENOSCOPY (EGD), BIOPSY SINGLE OR MULTIPLE;  Surgeon: Brandy Barnett MD;  Location: [facility]     EXCISE LESION TRUNK  9/24/2012    Procedure: EXCISE LESION TRUNK;;  Surgeon: Pepe Dominguez MD;  Location: [facility]     GENITOURINARY SURGERY      vasectomy     HERNIORRHAPHY UMBILICAL  9/24/2012    Procedure: HERNIORRHAPHY UMBILICAL;  UMBILICAL HERNIA REPAIR , EXCISION OF PERIUMBILICAL CYST;  Surgeon: Pepe Dominguez MD;  Location: [facility]     IR CHEMO EMBOLIZATION  7/30/2020     IR CHEMO EMBOLIZATION  10/1/2020     IR CHEMO EMBOLIZATION  1/7/2021            Social History:     Social History     Tobacco Use     Smoking status: Current Every Day Smoker     Packs/day: 0.30     Types: Cigarettes     Smokeless tobacco: Never Used     Tobacco comment: about 4 cigarettes per day   Substance Use Topics     Alcohol use: No     Alcohol/week: 0.0 standard drinks     Comment: 2-3 per day , none since Dec 2012       History   Smoking Status     Current Every Day Smoker     Packs/day: 0.30     Types: Cigarettes   Smokeless Tobacco     Never Used     Comment: about 4 cigarettes per day            Family History:     Family History   Problem Relation Age of Onset     Breast Cancer Mother      Liver Cancer Mother      Cardiovascular Father      Cerebrovascular Disease Father         very low blood pressure     Cancer Father         rectal cancer     Cardiovascular Paternal Grandfather      Diabetes Brother      Cancer Sister         skin cancer     C.A.D. Other         MI, 70's     Breast Cancer Sister      Thyroid Disease No family hx of      Lipids No family hx of      Anesthesia Reaction No family hx of               Allergies:   No Known Allergies         Medications:     Current Outpatient Medications   Medication Sig     blood glucose (NO BRAND SPECIFIED) lancets standard Use to test blood sugar 3-4 times daily. Use brand compatible with patients insurance and device.     blood glucose (NO BRAND SPECIFIED) test strip  Use to test blood sugars 4 X  times daily or as directed     blood glucose monitoring (NO BRAND SPECIFIED) meter device kit Use to test blood sugar 4 times daily or as directed. Before meals and snack at HS.     citalopram (CELEXA) 20 MG tablet Take 1 tablet (20 mg) by mouth daily     COMPOUNDED NON-CONTROLLED SUBSTANCE (CMPD RX) - PHARMACY TO MIX COMPOUNDED MEDICATION Apply to the affected area once a day.     Cyanocobalamin (VITAMIN B-12 PO) Take 1 tablet by mouth daily     desvenlafaxine (PRISTIQ) 100 MG 24 hr tablet Take 2 tablets (200 mg) by mouth daily (Patient taking differently: Take 100 mg by mouth daily Only 100mg)     EQL VITAMIN D3 50 MCG (2000 UT) CAPS Take 1 capsule by mouth daily     famotidine (PEPCID) 40 MG tablet Take 1 tablet (40 mg) by mouth daily     furosemide (LASIX) 40 MG tablet Take 40 mg twice daily. (Patient taking differently: 80 mg Take 40 mg twice daily.)     gabapentin (NEURONTIN) 300 MG capsule Take 1 capsule (300 mg) by mouth At Bedtime     hydrOXYzine (ATARAX) 25 MG tablet Take 1 tablet (25 mg) by mouth 3 times daily as needed for itching     insulin aspart (NOVOLOG FLEXPEN) 100 UNIT/ML pen 1 unit per 10 grams of carbohydrates + 1 per 50>140. Max daily dose 100 units.     insulin glargine (LANTUS SOLOSTAR) 100 UNIT/ML pen 5 units daily in am. Increase by 5 units 2x weekly until fasting sugars are <130. Max daily dose 100 units.     insulin pen needle (B-D U/F) 31G X 8 MM miscellaneous USE  6 times daily / OR AS DIRECTED     insulin pen needle (ULTICARE SHORT) 31G X 8 MM miscellaneous Use UP to 6 times daily or as directed     lactulose (CEPHULAC) 20 GM packet Take 1 packet (20 g) by mouth 2 times daily     lactulose encephalopathy (CHRONULAC) 10 GM/15ML SOLUTION TAKE 30 MLS BY MOUTH 2 TIMES DAILY  TITRATE AS NEEDED TO ACHIEVE 3-5 BOWEL MOVEMENTS DAILY.     levETIRAcetam (KEPPRA) 500 MG tablet Take 500 mg by mouth daily     Lidocaine (LIDOCARE) 4 % Patch Place 1 patch onto the skin  every 24 hours To prevent lidocaine toxicity, patient should be patch free for 12 hrs daily.     magnesium oxide (MAG-OX) 400 (241.3 Mg) MG tablet Take 1 tablet (400 mg) by mouth daily     Melatonin 5 MG CHEW Take 2 tablets by mouth nightly as needed     Nutritional Supplements (ENSURE) LIQD Take 1 Can by mouth daily     nystatin (MYCOSTATIN) 901786 UNIT/GM external cream Apply topically 2 times daily     ondansetron (ZOFRAN) 4 MG tablet Take 1 tablet (4 mg) by mouth every 8 hours as needed for nausea     order for DME Equipment being ordered:Orthopedic shoes     order for DME Equipment being ordered: Condom catheter     order for DME Equipment being ordered:BioTab compression pants pneumatic system     order for DME Equipment being ordered:bilateral pneumatic leg massage for treatment of extreme bilateral lower leg edema.     oxyCODONE (ROXICODONE) 5 MG tablet TAKE 1 to 2 TABLETS BY MOUTH EVERY 8 HOURS AS NEEDED FOR SEVERE PAIN     OYSTER SHELL CALCIUM/D 500-200 MG-UNIT per tablet Take 1 tablet by mouth daily     pantoprazole (PROTONIX) 40 MG EC tablet Take 1 tablet (40 mg) by mouth daily     polyethylene glycol (MIRALAX) 17 g packet Take 1 packet by mouth     potassium gluconate 2.5 MEQ tablet Take 1 tablet by mouth daily     prochlorperazine (COMPAZINE) 10 MG tablet Take 1 tablet (10 mg) by mouth every 6 hours as needed for nausea or vomiting (take if zofran does not relieve nausea)     QUEtiapine (SEROQUEL) 25 MG tablet Give 1 tab with a 50 mg tab at HS.     QUEtiapine (SEROQUEL) 50 MG tablet Take 1 tab with a 25 mg tab at HS.     QUEtiapine (SEROQUEL) 50 MG tablet Take 50 mg by mouth At Bedtime     rifaximin (XIFAXAN) 550 MG TABS tablet Take 1 tablet (550 mg) by mouth 2 times daily     spironolactone (ALDACTONE) 50 MG tablet Take 2 tablets (100 mg) by mouth daily (Patient taking differently: Take 200 mg by mouth daily )     tamsulosin (FLOMAX) 0.4 MG capsule Take 0.4 mg by mouth daily     traZODone (DESYREL) 50  MG tablet Take 1 tablet (50 mg) by mouth At Bedtime     ursodiol (ACTIGALL) 300 MG capsule Take 3 capsules (900 mg) by mouth 2 times daily     Current Facility-Administered Medications   Medication     0.9% sodium chloride BOLUS             Review of Systems:     Skin: negative  Eyes: negative  Ears/Nose/Throat: negative  Respiratory: No shortness of breath, dyspnea on exertion, cough, or hemoptysis  Cardiovascular: negative  Gastrointestinal: as above  Genitourinary: as above  Musculoskeletal: negative  Neurologic: negative  Psychiatric: negative  Hematologic/Lymphatic/Immunologic: as above  Endocrine: negative          Physical Exam:   No data found.  There is no height or weight on file to calculate BMI.     General: Appears older than stated age  HEENT: Head AT/NC, EOMI, CN Grossly intact  Lungs: no respiratory distress, or pursed lip breathing  Heart: No obvious jugular venous distension present  Back: no bony midline tenderness, no CVAT bilaterally.  Abdomen: soft, obesely-distended, non-tender. No organomegaly  Lymph: no palpable inguinal lymphadenopathy.  LE: 4+ edema.   Musculoskeltal: significant LE edema  Skin: no suspicious lesions or rashes  Neuro: Alert, oriented, speech and mentation normal;  In a wheelchair  Psych: affect and mood normal          Data:   All laboratory data reviewed:    UA RESULTS:  Recent Labs   Lab Test 03/12/20  1329   COLOR Yellow   APPEARANCE Clear   URINEGLC Negative   URINEBILI Negative   URINEKETONE Negative   SG 1.014   UBLD Negative   URINEPH 5.0   PROTEIN Negative   NITRITE Negative   LEUKEST Negative   RBCU 1   WBCU 1      PSA   Date Value Ref Range Status   08/15/2014 0.10 0 - 4 ug/L Final      Creatinine   Date Value Ref Range Status   02/17/2021 0.87 0.66 - 1.25 mg/dL Final   01/07/2021 0.87 0.66 - 1.25 mg/dL Final   12/08/2020 0.88 0.66 - 1.25 mg/dL Final   10/01/2020 0.92 0.66 - 1.25 mg/dL Final   09/11/2020 0.91 0.66 - 1.25 mg/dL Final            Impression and  Plan:   Impression:   65 year old man with very complex PMH notable for EtOH cirrhosis c/b ascites s/p TIPS, hepatic encephalopathy, multifocal HCC receiving TACE, chronic lymphedema c/b recurrent cellulitis, and DMII. Now with bothersome LUTS      Plan:   LUTS  -We reviewed the pathophysiology of the bladder and the prostate and the normal changes associated with development of BPH and LUTS  -We discussed the impacts of his Lasix and spironolactone and his urine production.  He continues to have extensive and significant lower extremity lymphedema which is impacting his mobility and making it challenging for him to reach the bathroom  -We discussed increase of his tamsulosin to 0.8 mg daily  -We are unable to obtain a postvoid residual today and discussed that if he has continuing bothersome symptoms that are neck step would be a PVR, office cystoscopy, and discussion of management of his bladder outlet     Thank you for the kind consultation.    Time spent: 30 minutes spent on the date of the encounter doing chart review, history and exam, documentation and further activities as noted above.    Ti Adrian MD   Urology  [facility]  [facility] Phone: [phone]  [facility] Phone: [phone]

## 2021-03-11 NOTE — RESULT ENCOUNTER NOTE
Results discussed directly with patient while patient was present. Any further details documented in the note.   Veronica Finn MD

## 2021-03-11 NOTE — PATIENT INSTRUCTIONS
Follow up with me in 6 months with a CT scan of your chest prior      Lung Cancer Screening   Frequently Asked Questions  If you are at high-risk for lung cancer, getting screened with low-dose computed tomography (LDCT) every year can help save your life. This handout offers answers to some of the most common questions about lung cancer screening. If you have other questions, please call [phone] ([phone]).     What is it?  Lung cancer screening uses special X-ray technology to create an image of your lung tissue. The exam is quick and easy and takes less than 10 seconds. We don t give you any medicine or use any needles. You can eat before and after the exam. You don t need to change your clothes as long as the clothing on your chest doesn t contain metal. But, you do need to be able to hold your breath for at least 6 seconds during the exam.    What is the goal of lung cancer screening?  The goal of lung cancer screening is to save lives. Many times, lung cancer is not found until a person starts having physical symptoms. Lung cancer screening can help detect lung cancer in the earliest stages when it may be easier to treat.    Who should be screened for lung cancer?  We suggest lung cancer screening for anyone who is at high-risk for lung cancer. You are in the high-risk group if you:      are between the ages of 55 and 79, and    have smoked at least 1 pack of cigarettes a day for 30 or more years, and    still smoke or have quit within the past 15 years.    However, if you have a new cough or shortness of breath, you should talk to your doctor before being screened.    Some national lung health advocacy groups also recommend screening for people ages 50 to 79 who have smoked an average of 1 pack of cigarettes a day for 20 years. They must also have at least 1 other risk factor for lung cancer, not including exposure to secondhand smoke. Other risk factors are having had cancer in the past,  emphysema, pulmonary fibrosis, COPD, a family history of lung cancer, or exposure to certain materials such as arsenic, asbestos, beryllium, cadmium, chromium, diesel fumes, nickel, radon or silica. Your care team can help you know if you have one of these risk factors.     Why does it matter if I have symptoms?  Certain symptoms can be a sign that you have a condition in your lungs that should be checked and treated by your doctor. These symptoms include fever, chest pain, a new or changing cough, shortness of breath that you have never felt before, coughing up blood or unexplained weight loss. Having any of these symptoms can greatly affect the results of lung cancer screening.       Should all smokers get an LDCT lung cancer screening exam?  It depends. Lung cancer screening is for a very specific group of men and women who have a history of heavy smoking over a long period of time (see  Who should be screened for lung cancer  above).  I am in the high-risk group, but have been diagnosed with cancer in the past. Is LDCT lung cancer screening right for me?  In some cases, you should not have LDCT lung screening, such as when your doctor is already following your cancer with CT scan studies. Your doctor will help you decide if LDCT lung screening is right for you.  Do I need to have a screening exam every year?  Yes. If you are in the high-risk group described earlier, you should get an LDCT lung cancer screening exam every year until you are 79, or are no longer willing or able to undergo screening and possible procedures to diagnose and treat lung cancer.  How effective is LDCT at preventing death from lung cancer?  Studies have shown that LDCT lung cancer screening can lower the risk of death from lung cancer by 20 percent in people who are at high-risk.  What are the risks?  There are some risks and limitations of LDCT lung cancer screening. We want to make sure you understand the risks and benefits, so please  let us know if you have any questions. Your doctor may want to talk with you more about these risks.    Radiation exposure: As with any exam that uses radiation, there is a very small increased risk of cancer. The amount of radiation in LDCT is small--about the same amount a person would get from a mammogram. Your doctor orders the exam when he or she feels the potential benefits outweigh the risks.    False negatives: No test is perfect, including LDCT. It is possible that you may have a medical condition, including lung cancer, that is not found during your exam. This is called a false negative result.    False positives and more testing: LDCT very often finds something in the lung that could be cancer, but in fact is not. This is called a false positive result. False positive tests often cause anxiety. To make sure these findings are not cancer, you may need to have more tests. These tests will be done only if you give us permission. Sometimes patients need a treatment that can have side effects, such as a biopsy. For more information on false positives, see  What can I expect from the results?     Findings not related to lung cancer: Your LDCT exam also takes pictures of areas of your body next to your lungs. In a very small number of cases, the CT scan will show an abnormal finding in one of these areas, such as your kidneys, adrenal glands, liver or thyroid. This finding may not be serious, but you may need more tests. Your doctor can help you decide what other tests you may need, if any.  What can I expect from the results?  About 1 out of 4 LDCT exams will find something that may need more tests. Most of the time, these findings are lung nodules. Lung nodules are very small collections of tissue in the lung. These nodules are very common, and the vast majority--more than 97 percent--are not cancer (benign). Most are normal lymph nodes or small areas of scarring from past infections.  But, if a small lung  nodule is found to be cancer, the cancer can be cured more than 90 percent of the time. To know if the nodule is cancer, we may need to get more images before your next yearly screening exam. If the nodule has suspicious features (for example, it is large, has an odd shape or grows over time), we will refer you to a specialist for further testing.  Will my doctor also get the results?  Yes. Your doctor will get a copy of your results.  Is it okay to keep smoking now that there s a cancer screening exam?  No. Tobacco is one of the strongest cancer-causing agents. It causes not only lung cancer, but other cancers and cardiovascular (heart) diseases as well. The damage caused by smoking builds over time. This means that the longer you smoke, the higher your risk of disease. While it is never too late to quit, the sooner you quit, the better.  Where can I find help to quit smoking?  The best way to prevent lung cancer is to stop smoking. If you have already quit smoking, congratulations and keep it up! For help on quitting smoking, please call [employer] at 9-372-948-FTXV ([phone]) or the American [employer] Society at 1-620.232.3246 to find local resources near you.  One-on-one health coaching:  If you d prefer to work individually with a health care provider on tobacco cessation, we offer:      Medication Therapy Management:  Our specially trained pharmacists work closely with you and your doctor to help you quit smoking.  Call [phone] or [phone] (toll free).     Can Do: Health coaching offered by [facility].  [email or web address]

## 2021-03-11 NOTE — PROGRESS NOTES
Unruly is a 65 year old who is being evaluated via a billable video visit.      Video visit - patient will login to my chart    How would you like to obtain your AVS? MyChart  If the video visit is dropped, the invitation should be resent by: Text to cell phone: [phone] (H)  Will anyone else be joining your video visit? Yes - patient's wife Raven and daughter Kristi will be present        Thaddeus Banerjee LPN  Pulmonary Medicine:  [facility]  Phone: [phone] Fax: [phone]

## 2021-03-11 NOTE — PROGRESS NOTES
PFT Lab Note: Pre/post Slick and DLCO done for apt w/ Dr Finn.    Plethysmography deferred as pt unable to transfer from wheelchair independently into body box.

## 2021-03-11 NOTE — NURSING NOTE
Lawrence Louie's goals for this visit include:   Chief Complaint   Patient presents with     New Patient     frequent urination, incontinence       He requests these members of his care team be copied on today's visit information:     PCP: Ita Dang    Referring Provider:  No referring provider defined for this encounter.    There were no vitals taken for this visit.    Do you need any medication refills at today's visit? No    Hailey Castillo LPN

## 2021-03-12 NOTE — TELEPHONE ENCOUNTER
1st attempt, called patient and VM was full, so sent a my chart message.    Per the last visit Dr Adrian patient should schedule the following appointments:    Cystoscopy with Dr Adrian, next available( within 1 to 2 weeks). Put in appointment notes: Cysto & PVR.    Sent teams message to Sherrell Ashraf with questions.    Call center please assist patient with scheduling these appointments.      Sherrell Ashraf  Surgical Specialties Procedure   [email or web address] [place]  3/12/2021 10:40 AM

## 2021-03-18 NOTE — TELEPHONE ENCOUNTER
MILD DRY EYES: PRESCRIBED ARTIFICIAL TEARS BID - QID, OU RETURN FOR FOLLOW-UP AS SCHEDULED OR SOONER IF SYMPTOMS WORSEN. QUEtiapine (SEROQUEL) 100 MG tablet   Last Written Prescription Date:  Na  Last Fill Quantity: Na,   # refills: Na  Last Office Visit : 2/17/2021  Future Office visit:  5/19/2021    Routing refill request to provider for review/approval because:  Medication is reported/historical  Per discharge summary  Depression              - Hold Seroquel, celexa and Pristiq as there can be some mild lowering of seizure threshold with SNRI and antipsychotics, although effect is minimal. -- resume at d/c-- ok w/ neuro

## 2021-03-19 NOTE — TELEPHONE ENCOUNTER
[facility] Call Center    Phone Message    May a detailed message be left on voicemail: yes     Reason for Call: Pt's wife said that Dr. Adrian said if symptoms didn't improve in a week to call back and get in to see him right away.  She's said it's been a week and it hasn't improved.  1st available in person visit not until April 15th.  She said that Dr. Adrian would want to see him next week.  Please call wife Flores to discuss.  Thanks.    Action Taken: Message routed to:  Adult Clinics: Urology p [id number]    Travel Screening: Not Applicable

## 2021-03-22 NOTE — TELEPHONE ENCOUNTER
Attempted to reach Flores (consent to communicate on file) by phone to inform that we have received the message and that a message has been sent to Dr. Adrian to review. No answer and unable to leave message as voicemail is full or not set up.    Francine Zimmer RN, BSN

## 2021-03-22 NOTE — TELEPHONE ENCOUNTER
Called and spoke to Flores and offered for patient to have cysto with Dr. Adrian on 3/25/21 at 12:30 and Flores would like this. Appointment scheduled. Flores is aware of appointment location.    Francine Zimmer RN, BSN

## 2021-03-22 NOTE — TELEPHONE ENCOUNTER
Received message from Dr. Adrian who recommends for patient to have cystoscopy and PVR tomorrow at 0830.    Called and spoke with Flores who is aware of the above information. Per Flores, patient does not tolerate mornings well because he is up all throughout the night. Informed Flores that writer will look into this further and return call to her as soon as possible.    Francine Zimmer RN, BSN

## 2021-03-25 NOTE — Clinical Note
Hi Dr. Troy Bragg,    I saw Unruly today for his cystoscopy and bladder scan postvoid residual.  He empties his bladder well with a postvoid residual of 0 cc.  His cystoscopy today was unremarkable for any evidence of prostatic hypertrophy or bladder outlet obstruction.  As such, we discussed that I do not believe there is any urologic contribution to his symptoms and that no further work-up is needed from urology standpoint.    Thanks,   Ti Adrian M.D.  Cell: [phone]

## 2021-03-25 NOTE — PROGRESS NOTES
CYSTOSCOPY PROCEDURE NOTE:    Lawrence Louie is a 65 year old male  who presents with LUTS for cystoscopy.    Pt ID verified with patient: Yes     Procedure verified with patient: Yes     Procedure confirmed with physician and support staff: Yes     Consent form confirmed with physician and support staff.    Sign In  History and Physical Exam reviewed .  Informed Consent Discussed: Yes   Sign in Communication: Yes   Time Out:  Team Confirms the Correct Patient, Correct Procedure; Yes, Correct Site and Site Marking, Correct Position (if applicable).    Affirmation of Time Out: Yes   Sign Out:  Sign Out Discussion: Yes   Physician: Ti Adrian MD    A urinalysis was performed revealing no evidence of infection.    The benefits, risks, alternatives of the cystoscopy procedure and personnel were discussed with the patient. The verbal consent was obtained and the patient agrees to proceed.      Description of procedure:   After fully informed, voluntary consent was obtained, the patient was brought into the procedure room, identified and placed in a supine position on the cystoscopy table.  The groin/scrotum were prepped with betadine and draped in a sterile fashion.  Urojet lidocaine gel was introduced.  A 15F flexible cystoscope was inserted into the urethra, and the bladder and urethra wereexamined in a systematic manner.  The patient tolerated the procedure well and there were no complications.      Cystoscopic findings:  The urethra was normal without strictures.  The prostate was 2cm long and demonstrated no bilobar hypertrophy.  There was no median lobe.  The external sphincter coapted normally and the bladder neck was normal. The bladder was  entered and careful pan endoscopy was carried out. The posterior, superior and lateral walls and dome of the bladder were all well visualized and the scope was retroflexed upon itself..  There was no trabeculation.  There were no neoplasms, stones, or diverticula  identifed.  The ureteric orifices were normal in position and number and effluxing clear urine.    Assessment/Plan:   Lawrence Louie is a 65 year old man with very complex PMH notable for EtOH cirrhosis c/b ascites s/p TIPS, hepatic encephalopathy, multifocal HCC receiving TACE, chronic lymphedema c/b recurrent cellulitis, and DMII. Now with bothersome LUTS    PVR = 0cc    -We discussed that his cystoscopy today was unremarkable with no evidence of bladder outlet obstruction or prostatic hypertrophy  -His main symptoms are frequency and nocturia  -We discussed that there is no evidence of a bladder outlet obstruction process contributing to his symptoms  -We discussed that I think his symptoms are related to his profound edema.    -No indication for further urologic intervention at this time  -He may follow-up with me on a as needed basis      Ti Adrian MD

## 2021-03-25 NOTE — NURSING NOTE
Lawrence Louie's goals for this visit include:   Chief Complaint   Patient presents with     Cystoscopy     LUTS/retention       He requests these members of his care team be copied on today's visit information:     PCP: Ita Dang    Referring Provider:  No referring provider defined for this encounter.    There were no vitals taken for this visit.    Do you need any medication refills at today's visit? No      post void residual = 0ml

## 2021-03-25 NOTE — PATIENT INSTRUCTIONS
"After Your Cystoscopy    What happens after the exam?    You may go back to your normal diet and activity as you feel ready, unless your doctor tells you not to.    For the next two days, you may notice:    Some blood in your urine  Some burning when you urinate (use the toilet)  An urge to urinate more often  Bladder spasms    These are normal after the procedure and should go away after a day or two.  To relieve these problems drink 6 to 8 large glasses of water each day (includes drinks at meals) as this will help clear the urine.  Take warm baths to relieve pain and bladder spasms.  Do not add anything to the bath water.  You may also take Tylenol (acetaminophen) for pain if needed.    When should I call my doctor?    A fever over 100F (38C) for more than a day. (Before you call the doctor, check your temperature under your tongue)  Chills  Failure to urinate: No urine comes out when you try to use the toilet. (Try soaking in a bathtub full of warm water. If still no urine, call your doctor)  A lot of blood in the urine, or blood clots larger than a nickel  Pain in the back or belly area (abdomen)  Pain or spasms that are not relieved by warm tub baths and pain medicine  Severe pain, burning or other problems while passing urine  Pain that gets worse after two days            AFTER YOUR CYSTOSCOPY        You have just completed a cystoscopy, or \"cysto\", which allowed your physician to learn more about your bladder (or to remove a stent placed after surgery). We suggest that you continue to avoid caffeine, fruit juice, and alcohol for the next 24 hours, however, you are encouraged to return to your normal activities.         A few things that are considered normal after your cystoscopy:     * Small amount of bleeding (or spotting) that clears within the next 24 hours     * Slight burning sensation with urination     * Sensation to of needing to avoid more frequently     * The feeling of \"air\" in your urine     * " Mild discomfort that is relieved with Tylenol        Please contact our office promptly if you:     * Develop a fever above 101 degrees     * Are unable to urinate     * Develop bright red blood that does not stop     * Severe pain or swelling         Please contact our office with any concerns or questions @[facility].

## 2021-03-31 NOTE — TELEPHONE ENCOUNTER
Called and left  Wife a msg re: scheduling for TACE procedure.     Let a msg asking that she return my call.     Sunshine TUCKER RN, BSN  Interventional Radiology/Vascular  Nurse Coordinator   Phone: [phone]  Fax: [phone]

## 2021-03-31 NOTE — TELEPHONE ENCOUNTER
Wife did return my call    Pt is doing well after 2nd dose of covid  vaccination.     She talked about scheduling pt for his next TACE appt from our last conversation on 3/9.       I did ask them to get updated labs prior to treatment as they are asking for a later TACE appt due to difficulty of getting pt to facilty.    Will see what I can do and then call them back.     Sunshine TUCKER RN, BSN  Interventional Radiology/Vascular  Nurse Coordinator   Phone: [phone]  Fax: [phone]

## 2021-04-01 NOTE — TELEPHONE ENCOUNTER
"Per notes from 2/17/21, patient can \"...can try trazodone for sleep since seroquel was not helping (and may be potentially more sedating).\" Seroquel refill not provided.    Per Dr. Simmons, patient should use either seroquel or trazodone, whichever is beneficial.    Yolanda ([facility]) RADHA Alejandre      "

## 2021-04-01 NOTE — TELEPHONE ENCOUNTER
Called wife and informed her that I have pt scheduled for Weds 4/20.     They are to check in at 1130am for a 1pm start time.     I will send a letter to them and jovanni lakeg them the information.     Reminded them that they are to get covid testing done 4 days prior to despite vaccination and I will include that number in my letter.     Also informed her that we are keeping him overnight due to the last clinic fup conversation that Dr. Hilton had with them.     Asked her to return my call with any other questions.     Sunshine TUCKER RN, BSN  Interventional Radiology/Vascular  Nurse Coordinator   Phone: [phone]  Fax: [phone]

## 2021-04-05 NOTE — TELEPHONE ENCOUNTER
"Refill refused.  D/c by PCP Dr Simmons 4/1/2021 \"not helping\". Rx sent for Trazedone 2/17/2021 to take for sleep.  I called pharmacy and spoke with Cecy steele.    "

## 2021-04-16 NOTE — TELEPHONE ENCOUNTER
Called and left a msg for pt's wife that pt will need to get covid testing 4 days prior ot his procedure for next week Weds with Dr. Hilton     Informed her of covid scheduling and asked that she call today to make an appt    Sunshine TUCKER RN, BSN  Interventional Radiology/Vascular  Nurse Coordinator   Phone: [phone]  Fax: [phone]

## 2021-04-18 NOTE — TELEPHONE ENCOUNTER
Hgb < 7 (at 6.8)    Called pt and instructed them to go to ED.    Verbalized understanding that he should go to ED.    Winnie Sadler MD PhD   Gastroenterology Fellow

## 2021-04-19 NOTE — PROGRESS NOTES
Spoke with Raven, patient's wife.  Patient tested for COVID on 4/18 at [facility].  Will bring printed copy of test on Wednesday.

## 2021-04-19 NOTE — LETTER
4/19/2021         RE: Lawrence Louie  [address] [place] [address]        Dear Colleague,    Thank you for referring your patient, Lawrence Louie, to the [facility]PAT[facility]. Please see a copy of my visit note below.    Unruly is a 65 year old who is being evaluated via a billable video visit.      How would you like to obtain your AVS? MyChart  If the video visit is dropped, the invitation should be resent by: Text to cell phone: [phone]  Will anyone else be joining your video visit? No      Video Start Time: 2:00  Video-Visit Details    Type of service:  Video Visit    Video End Time: 2:52 pm     Originating Location (pt. Location): Home    Distant Location (provider location):  [facility]     Platform used for Video Visit: [employer]aka[employer]     Hepatology Follow-up Clinic note  Lawrence Louie   Date of Birth 1955  Date of Service 4/19/2021    Reason for follow-up: ETOH cirrhosis - New ascites          Assessment/plan:   Lawrence Louie is a 65 year old male with history of ETOH cirrhosis, complicated by history of ascites s/p in 2018, hepatic encephalopathy and history HCC s/p TACE x 3. Last MRI in February 2021 showing increase in size to segment 4A lesion and will be scheduled for embolization in a few days. He has multiple other small lesions considered LIRADS or equivocal. His liver function remains stable. MELD-Na 11.     Has complaints today of fluid in abdomen which has not happened in a long time. His MILLY is improved, but having significant scrotal edema. Labs recently also showed hemoglobin of 6.8. He is currently not symptomatic. He is scheduled for TACE on Wednesday with planned inpatient admission overnight for pain control.     # ? Ascites s/p TIPS  / scrotal edema / anasarca  - US Abdomen w/ TIPS doppler   - Continue 40 mg furosemide  - Continue 100 mg spironolactone   - Paracentesis as needed, recommend  sending 500 mL for diagnostics.      # HCC, multi-focal s/p TACE x 3.   - Follow up with IR as previously planned    # Anemia, likely iron deficiency:   - iron panel, LDH, haptoglobin, blood smear, reticulocytes    - CBC tomorrow   - Will set up transfusion PRN     # Follow-up in clinic in 3 months with Dr. Simmons or sooner as needed    Yovany Lopez PA-C   [facility] Hep[facility]    -----------------------------------------------------       HPI:   Lawrence Louie is a 65 year old male presenting for follow-up. He is joined by his wife and daughter Kristi today.     Cirrhosis  - ETOH and SMITH  Complicated by:  Ascites s/p TIPS on April 2018  Hx of HE   EGD: 6/16/2016  HCC: 6/26/2020 - See below      HCC:   Diagnosed: 6/26/2020   Lesion 1: inferior right hepatic lobe segment 6 is not well marginated, approximately 4.2 cm  Lesion 2: 4.1 cm area of arterial enhancement in segment 7  Lesion 3: Exophytic arterial enhancing nodule in segment 4A measuring 3.8 cm     Treatment:   - s/p TACE on 7/30/2020   - s/p TACE on 10/1/2020  - s/p TACE on 1/2021    Patient was last seen by Dr. Simmons on 2/19/2021. No recent hospitalizations or ER visits. Hospitalizated recently due to pneumonia.     Appetite has been fair to good. Family continues to be strict with 2 mg sodium diet. Gets full quickly.     Fluid in abdomen is slowly building in the past month. Fluid in legs are pretty good with wrapping, but has a lot of problems with scrotal edema. Fluid also thought to be in abdomen against/ascites rather than subcutaneous.     He has continued to take Spironolactone 100 mg / Furosemide 40 mg. Did not tolerate an increase to 80 mg furosemide recently.     Hemoglobin was 6.8 over weekend. Last hemoglobin check was a few months ago. Denies any shortness of breath. Does have fatigue but difficult to note an acute change. Denies lightheadedness.     Hasn't done lactulose for a month a half. Continues to take  magnesium and Miralax to ensure regular bowel movements. His cognition waxes and wanes with his blood glucose levels.      Fall on Friday morning. No change in cognition, bruising or evidence of bleeding.   Patient also denies melena, hematochezia or hematemesis.    Patient denies fevers, sweats or chills.    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)      Splenomegaly      Squamous cell carcinoma      Thrombocytopenia (H)      Varices, esophageal (H)     Past Surgical History:   Procedure Laterality Date     BIOPSY OF SKIN LESION       COLONOSCOPY Left 6/16/2016    Procedure: COMBINED COLONOSCOPY, SINGLE OR MULTIPLE BIOPSY/POLYPECTOMY BY BIOPSY;  Surgeon: Brandy Barnett MD;  Location:  GI     ESOPHAGOSCOPY, GASTROSCOPY, DUODENOSCOPY (EGD), COMBINED  2/13/2013    Procedure: COMBINED ESOPHAGOSCOPY, GASTROSCOPY, DUODENOSCOPY (EGD);;  Surgeon: Tara Cook MD;  Location:  GI     ESOPHAGOSCOPY, GASTROSCOPY, DUODENOSCOPY (EGD), COMBINED  11/4/2013    Procedure: COMBINED ESOPHAGOSCOPY, GASTROSCOPY, DUODENOSCOPY (EGD);;  Surgeon: Lonny Diaz MD;  Location:  GI     ESOPHAGOSCOPY, GASTROSCOPY, DUODENOSCOPY (EGD), COMBINED Left 6/16/2016    Procedure: COMBINED ESOPHAGOSCOPY, GASTROSCOPY, DUODENOSCOPY (EGD), BIOPSY SINGLE OR MULTIPLE;  Surgeon: Brandy Barnett MD;  Location:  GI     EXCISE LESION TRUNK  9/24/2012    Procedure: EXCISE LESION TRUNK;;  Surgeon: Pepe Dominguez MD;  Location: [facility]     GENITOURINARY SURGERY      vasectomy     HERNIORRHAPHY UMBILICAL  9/24/2012    Procedure: HERNIORRHAPHY UMBILICAL;  UMBILICAL HERNIA REPAIR , EXCISION OF PERIUMBILICAL CYST;  Surgeon: Pepe Dominguez MD;  Location: [facility]     IR CHEMO EMBOLIZATION  7/30/2020      IR CHEMO EMBOLIZATION  10/1/2020     IR CHEMO EMBOLIZATION  1/7/2021                 Medications:     Current Outpatient Medications   Medication     blood glucose (NO BRAND SPECIFIED) lancets standard     blood glucose (NO BRAND SPECIFIED) test strip     blood glucose monitoring (NO BRAND SPECIFIED) meter device kit     citalopram (CELEXA) 20 MG tablet     COMPOUNDED NON-CONTROLLED SUBSTANCE (CMPD RX) - PHARMACY TO MIX COMPOUNDED MEDICATION     Cyanocobalamin (VITAMIN B-12 PO)     desvenlafaxine (PRISTIQ) 100 MG 24 hr tablet     EQL VITAMIN D3 50 MCG (2000 UT) CAPS     famotidine (PEPCID) 40 MG tablet     furosemide (LASIX) 40 MG tablet     gabapentin (NEURONTIN) 300 MG capsule     hydrOXYzine (ATARAX) 25 MG tablet     insulin aspart (NOVOLOG FLEXPEN) 100 UNIT/ML pen     insulin glargine (LANTUS SOLOSTAR) 100 UNIT/ML pen     insulin pen needle (B-D U/F) 31G X 8 MM miscellaneous     insulin pen needle (ULTICARE SHORT) 31G X 8 MM miscellaneous     lactulose (CEPHULAC) 20 GM packet     lactulose encephalopathy (CHRONULAC) 10 GM/15ML SOLUTION     levETIRAcetam (KEPPRA) 500 MG tablet     Lidocaine (LIDOCARE) 4 % Patch     magnesium oxide (MAG-OX) 400 (241.3 Mg) MG tablet     Melatonin 5 MG CHEW     Nutritional Supplements (ENSURE) LIQD     nystatin (MYCOSTATIN) 008182 UNIT/GM external cream     ondansetron (ZOFRAN) 4 MG tablet     order for DME     order for DME     order for DME     order for DME     oxyCODONE (ROXICODONE) 5 MG tablet     OYSTER SHELL CALCIUM/D 500-200 MG-UNIT per tablet     polyethylene glycol (MIRALAX) 17 g packet     potassium gluconate 2.5 MEQ tablet     prochlorperazine (COMPAZINE) 10 MG tablet     QUEtiapine (SEROQUEL) 100 MG tablet     rifaximin (XIFAXAN) 550 MG TABS tablet     spironolactone (ALDACTONE) 50 MG tablet     tamsulosin (FLOMAX) 0.4 MG capsule     traZODone (DESYREL) 50 MG tablet     ursodiol (ACTIGALL) 300 MG capsule     pantoprazole (PROTONIX) 40 MG EC tablet     QUEtiapine  (SEROQUEL) 25 MG tablet     QUEtiapine (SEROQUEL) 50 MG tablet     QUEtiapine (SEROQUEL) 50 MG tablet     Current Facility-Administered Medications   Medication     0.9% sodium chloride BOLUS            Allergies:   No Known Allergies         Review of Systems:   10 points ROS was obtained and highlighted in the HPI, otherwise negative.          Physical Exam:     GENERAL: healthy, alert and no distress  EYES: Eyes grossly normal to inspection, conjunctivae and sclerae normal  RESP: no audible wheeze, cough, or visible cyanosis.  No visible retractions or increased work of breathing.  Able to speak fully in complete sentences  NEURO: Cranial nerves grossly intact, mentation intact and speech normal  PSYCH: mentation appears normal, affect normal/bright, judgement and insight intact, normal speech and appearance well-groomed         Data:   Reviewed in person and significant for:    Lab Results   Component Value Date     04/17/2021      Lab Results   Component Value Date    POTASSIUM 4.4 04/17/2021     Lab Results   Component Value Date    CHLORIDE 104 04/17/2021     Lab Results   Component Value Date    CO2 26 04/17/2021     Lab Results   Component Value Date    BUN 24 04/17/2021     Lab Results   Component Value Date    CR 1.10 04/17/2021       Lab Results   Component Value Date    WBC 2.1 04/17/2021     Lab Results   Component Value Date    HGB 6.8 04/17/2021     Lab Results   Component Value Date    HCT 22.6 04/17/2021     Lab Results   Component Value Date    MCV 95 04/17/2021     Lab Results   Component Value Date    PLT 58 04/17/2021       Lab Results   Component Value Date    AST 29 04/17/2021     Lab Results   Component Value Date    ALT 26 04/17/2021     Lab Results   Component Value Date    BILICONJ 0.0 03/12/2014      Lab Results   Component Value Date    BILITOTAL 1.0 04/17/2021       Lab Results   Component Value Date    ALBUMIN 2.5 04/17/2021     Lab Results   Component Value Date    PROTTOTAL  6.2 04/17/2021      Lab Results   Component Value Date    ALKPHOS 154 04/17/2021       Lab Results   Component Value Date    INR 1.36 04/17/2021     MR ABDOMEN WO AND W CONTRAST:   2/24/2021:     IMPRESSION:  Images are moderately degraded by motion artifact.  1. Cirrhotic configuration of the liver parenchyma with TIPS, trace  perihepatic fluid, and splenomegaly, compatible with portal  hypertension.  2. Post TACE changes in the segment 6 with decreased enhancement at the site of described lesion along the superior medial aspect  previously. Adjacent wedge shaped arterial enhancement without  associated washout or diffusion restriction. This entire region has  stained with lipiodol on posttreatment imaging from 1/7/2021. LIRADS  TR- equivocal.  3. Increased in size of is a 5.3 x 4.3 cm arterially enhancing lesion  in hepatic segment 4A since 12/8/2020 without convincing washout or  pseudocapsule. There is associated diffusion restriction and mildly  increased T2 signal. LIRADS class 5.  4. Post TACE changes in the segment 7 with ill defined questionable  arterial enhancement superior to the treatment site about hepatic  dome, though images are markedly degraded by motion artifact. No  definite associated ancillary features. Close attention on follow-up.  LIRADS TR- equivocal.   5. A 1.7 cm lesion with mild diffusion restriction and increased T2  signal without associated arterial enhancement, washout or  pseudocapsule, though images are significantly degraded by motion  artifact. LIRADS 3.  6. Scattered subcentimeter foci of arterial enhancement without  washout, pseudocapsule, or restricted diffusion on DWI, indeterminate  for HCC. LIRADS 3.   7. Based on this exam only, the patient is not within Zia criteria.  8. Cholelithiasis without evidence of cholecystitis.        Again, thank you for allowing me to participate in the care of your patient.        Sincerely,        Yovany Lopez PA-C

## 2021-04-19 NOTE — TELEPHONE ENCOUNTER
PHYSICIAN NEXT STEPS:  Call the Patient    CHIEF COMPLAINT:  Chief Complaint/Protocol Used: Urinary Symptoms  Onset: Tuesday      ASSESSMENT:  ? Onset: Tuesday  ? Normal True  ? Symptom: Odorous urine  ? Onset: Tuesday  ? Pain: Rate 10  ? Cause: UTI  ? Other Symptoms: Lower left back discomfort,cloudy (Spinal cord injury-unable to come in-wheelchair lift broken\"  -------------------------------------------------------    DISPOSITION:  Disposition Recommendation: See Physician within 24 Hours  Questions that led to disposition:  ? Side (flank) or lower back pain present  Patient Directed To: Unspecified  Patient Intended Action: Send a message to my doctor      CALL NOTES:  04/19/2021 at 11:25 AM by Alize Hopkins  ? Patient requests medication for UTI symptoms,wheelchair bound, unable to come to center. May be reached [phone]    DISPOSITION OVERRIDE/PROVIDER CONSULT:  Disposition Override: N/A  Override Source: Unspecified  Consulted with PCP: No  Consulted with On-Call Physician: No    CALLER CONTACT INFO:  Name: Barrie Lara (Self)  Phone 1: [phone] (Home Phone) - Preferred      ENCOUNTER STARTED:  04/19/21 11:17:01 AM  ENCOUNTER ASSIGNED TO/CLOSED BY:  Alize Hopkins @ 04/19/21 11:25:55 AM      -------------------------------------------------------    CARE ADVICE given per Urinary Symptoms guideline.  SEE PHYSICIAN WITHIN 24 HOURS:   * IF OFFICE WILL BE OPEN: You need to be seen within the next 24 hours. Call your doctor when the office opens, and make an appointment.  * IF OFFICE WILL BE CLOSED AND NO PCP TRIAGE: You need to be seen within the next 24 hours. An urgent care center is often a good source of care if your doctor's office is closed.  * IF OFFICE WILL BE CLOSED AND PCP TRIAGE REQUIRED: You may need to be seen within the next 24 hours. Your doctor will want to talk with you to decide what's best. I'll page the doctor now. NOTE: Since this isn't serious, hold the page between 10 pm and   7  Rx of Compounded non controlled substance cream to the  compound pharmacy.    6 % lidocaine : nystatin cream : 40 % ZnOx = 1:1:1    Roberto Carlos  ------------------------------------------------------------------------------------------        ----- Message from CORY Toussaint CNP sent at 10/6/2020 11:18 PM CDT -----  Lets try 6 %.    Thanks.     Ary RAY CNP  ----- Message -----  From: Roberto Carlos Ponce RN  Sent: 10/6/2020  12:56 PM CDT  To: CORY Toussaint CNP, Ary    I just found your message about the compound cream of Lidocaine/nystatin/ZnOx.  Compound pharmacy says the most popular strength of the cream is   5% lidocaine ointment : standard nystatin cream : 40 % ZnOx paste = 1:1:1  But they can increase the percentage of lidocaine ointment. What percentage/strength of lidocaine ointment do you want ? Likely 6 % or 7 % or beyond.    Roberto Carlos         am. Page the doctor in the morning.  * IF PATIENT HAS NO PCP: Refer patient to an Urgent Care Center or Retail Clinic. Also try to help caller find a PCP (medical home) for their future care. ?; CALL BACK IF:    * Fever occurs    * Unable to urinate and bladder feels full    * You become worse.      UNDERSTANDS CARE ADVICE: Yes    AGREES WITH CARE ADVICE: Yes    WILL FOLLOW CARE ADVICE: Yes    -------------------------------------------------------

## 2021-04-19 NOTE — PROGRESS NOTES
Unruly is a 65 year old who is being evaluated via a billable video visit.      How would you like to obtain your AVS? MyChart  If the video visit is dropped, the invitation should be resent by: Text to cell phone: [phone]  Will anyone else be joining your video visit? No      Video Start Time: 2:00  Video-Visit Details    Type of service:  Video Visit    Video End Time: 2:52 pm     Originating Location (pt. Location): Home    Distant Location (provider location):  [facility] HEPATOLOGY [facility] [place]     Platform used for Video Visit: [facility]     Hepatology Follow-up Clinic note  Lawrence Louie   Date of Birth 1955  Date of Service 4/19/2021    Reason for follow-up: ETOH cirrhosis - New ascites          Assessment/plan:   Lawrence Louie is a 65 year old male with history of ETOH cirrhosis, complicated by history of ascites s/p in 2018, hepatic encephalopathy and history HCC s/p TACE x 3. Last MRI in February 2021 showing increase in size to segment 4A lesion and will be scheduled for embolization in a few days. He has multiple other small lesions considered LIRADS or equivocal. His liver function remains stable. MELD-Na 11.     Has complaints today of fluid in abdomen which has not happened in a long time. His MILLY is improved, but having significant scrotal edema. Labs recently also showed hemoglobin of 6.8. He is currently not symptomatic. He is scheduled for TACE on Wednesday with planned inpatient admission overnight for pain control.     # ? Ascites s/p TIPS  / scrotal edema / anasarca  - US Abdomen w/ TIPS doppler   - Continue 40 mg furosemide  - Continue 100 mg spironolactone   - Paracentesis as needed, recommend sending 500 mL for diagnostics.      # HCC, multi-focal s/p TACE x 3.   - Follow up with IR as previously planned    # Anemia, likely iron deficiency:   - iron panel, LDH, haptoglobin, blood smear, reticulocytes    - CBC tomorrow   - Will set up transfusion PRN     #  Follow-up in clinic in 3 months with Dr. Simmons or sooner as needed    Yovany Lopez PA-C   [facility] Hepatology clinic    -----------------------------------------------------       HPI:   Lawrence Louie is a 65 year old male presenting for follow-up. He is joined by his wife and daughter Kristi today.     Cirrhosis  - ETOH and SMITH  Complicated by:  Ascites s/p TIPS on April 2018  Hx of HE   EGD: 6/16/2016  HCC: 6/26/2020 - See below      HCC:   Diagnosed: 6/26/2020   Lesion 1: inferior right hepatic lobe segment 6 is not well marginated, approximately 4.2 cm  Lesion 2: 4.1 cm area of arterial enhancement in segment 7  Lesion 3: Exophytic arterial enhancing nodule in segment 4A measuring 3.8 cm     Treatment:   - s/p TACE on 7/30/2020   - s/p TACE on 10/1/2020  - s/p TACE on 1/2021    Patient was last seen by Dr. Simmons on 2/19/2021. No recent hospitalizations or ER visits. Hospitalizated recently due to pneumonia.     Appetite has been fair to good. Family continues to be strict with 2 mg sodium diet. Gets full quickly.     Fluid in abdomen is slowly building in the past month. Fluid in legs are pretty good with wrapping, but has a lot of problems with scrotal edema. Fluid also thought to be in abdomen against/ascites rather than subcutaneous.     He has continued to take Spironolactone 100 mg / Furosemide 40 mg. Did not tolerate an increase to 80 mg furosemide recently.     Hemoglobin was 6.8 over weekend. Last hemoglobin check was a few months ago. Denies any shortness of breath. Does have fatigue but difficult to note an acute change. Denies lightheadedness.     Hasn't done lactulose for a month a half. Continues to take magnesium and Miralax to ensure regular bowel movements. His cognition waxes and wanes with his blood glucose levels.      Fall on Friday morning. No change in cognition, bruising or evidence of bleeding.   Patient also denies melena, hematochezia or hematemesis.    Patient  denies fevers, sweats or chills.    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)      Splenomegaly      Squamous cell carcinoma      Thrombocytopenia (H)      Varices, esophageal (H)     Past Surgical History:   Procedure Laterality Date     BIOPSY OF SKIN LESION       COLONOSCOPY Left 6/16/2016    Procedure: COMBINED COLONOSCOPY, SINGLE OR MULTIPLE BIOPSY/POLYPECTOMY BY BIOPSY;  Surgeon: Brandy Barnett MD;  Location:  GI     ESOPHAGOSCOPY, GASTROSCOPY, DUODENOSCOPY (EGD), COMBINED  2/13/2013    Procedure: COMBINED ESOPHAGOSCOPY, GASTROSCOPY, DUODENOSCOPY (EGD);;  Surgeon: Tara Cook MD;  Location:  GI     ESOPHAGOSCOPY, GASTROSCOPY, DUODENOSCOPY (EGD), COMBINED  11/4/2013    Procedure: COMBINED ESOPHAGOSCOPY, GASTROSCOPY, DUODENOSCOPY (EGD);;  Surgeon: Lonny Diaz MD;  Location:  GI     ESOPHAGOSCOPY, GASTROSCOPY, DUODENOSCOPY (EGD), COMBINED Left 6/16/2016    Procedure: COMBINED ESOPHAGOSCOPY, GASTROSCOPY, DUODENOSCOPY (EGD), BIOPSY SINGLE OR MULTIPLE;  Surgeon: Brandy Barnett MD;  Location:  GI     EXCISE LESION TRUNK  9/24/2012    Procedure: EXCISE LESION TRUNK;;  Surgeon: Pepe Dominguez MD;  Location: [facility]     GENITOURINARY SURGERY      vasectomy     HERNIORRHAPHY UMBILICAL  9/24/2012    Procedure: HERNIORRHAPHY UMBILICAL;  UMBILICAL HERNIA REPAIR , EXCISION OF PERIUMBILICAL CYST;  Surgeon: Pepe Dominguez MD;  Location: [facility]     IR CHEMO EMBOLIZATION  7/30/2020     IR CHEMO EMBOLIZATION  10/1/2020     IR CHEMO EMBOLIZATION  1/7/2021                 Medications:     Current Outpatient Medications   Medication     blood glucose (NO BRAND SPECIFIED) lancets standard     blood glucose (NO BRAND SPECIFIED) test strip     blood glucose  monitoring (NO BRAND SPECIFIED) meter device kit     citalopram (CELEXA) 20 MG tablet     COMPOUNDED NON-CONTROLLED SUBSTANCE (CMPD RX) - PHARMACY TO MIX COMPOUNDED MEDICATION     Cyanocobalamin (VITAMIN B-12 PO)     desvenlafaxine (PRISTIQ) 100 MG 24 hr tablet     EQL VITAMIN D3 50 MCG (2000 UT) CAPS     famotidine (PEPCID) 40 MG tablet     furosemide (LASIX) 40 MG tablet     gabapentin (NEURONTIN) 300 MG capsule     hydrOXYzine (ATARAX) 25 MG tablet     insulin aspart (NOVOLOG FLEXPEN) 100 UNIT/ML pen     insulin glargine (LANTUS SOLOSTAR) 100 UNIT/ML pen     insulin pen needle (B-D U/F) 31G X 8 MM miscellaneous     insulin pen needle (ULTICARE SHORT) 31G X 8 MM miscellaneous     lactulose (CEPHULAC) 20 GM packet     lactulose encephalopathy (CHRONULAC) 10 GM/15ML SOLUTION     levETIRAcetam (KEPPRA) 500 MG tablet     Lidocaine (LIDOCARE) 4 % Patch     magnesium oxide (MAG-OX) 400 (241.3 Mg) MG tablet     Melatonin 5 MG CHEW     Nutritional Supplements (ENSURE) LIQD     nystatin (MYCOSTATIN) 807832 UNIT/GM external cream     ondansetron (ZOFRAN) 4 MG tablet     order for DME     order for DME     order for DME     order for DME     oxyCODONE (ROXICODONE) 5 MG tablet     OYSTER SHELL CALCIUM/D 500-200 MG-UNIT per tablet     polyethylene glycol (MIRALAX) 17 g packet     potassium gluconate 2.5 MEQ tablet     prochlorperazine (COMPAZINE) 10 MG tablet     QUEtiapine (SEROQUEL) 100 MG tablet     rifaximin (XIFAXAN) 550 MG TABS tablet     spironolactone (ALDACTONE) 50 MG tablet     tamsulosin (FLOMAX) 0.4 MG capsule     traZODone (DESYREL) 50 MG tablet     ursodiol (ACTIGALL) 300 MG capsule     pantoprazole (PROTONIX) 40 MG EC tablet     QUEtiapine (SEROQUEL) 25 MG tablet     QUEtiapine (SEROQUEL) 50 MG tablet     QUEtiapine (SEROQUEL) 50 MG tablet     Current Facility-Administered Medications   Medication     0.9% sodium chloride BOLUS            Allergies:   No Known Allergies         Review of Systems:   10 points ROS  was obtained and highlighted in the HPI, otherwise negative.          Physical Exam:     GENERAL: healthy, alert and no distress  EYES: Eyes grossly normal to inspection, conjunctivae and sclerae normal  RESP: no audible wheeze, cough, or visible cyanosis.  No visible retractions or increased work of breathing.  Able to speak fully in complete sentences  NEURO: Cranial nerves grossly intact, mentation intact and speech normal  PSYCH: mentation appears normal, affect normal/bright, judgement and insight intact, normal speech and appearance well-groomed         Data:   Reviewed in person and significant for:    Lab Results   Component Value Date     04/17/2021      Lab Results   Component Value Date    POTASSIUM 4.4 04/17/2021     Lab Results   Component Value Date    CHLORIDE 104 04/17/2021     Lab Results   Component Value Date    CO2 26 04/17/2021     Lab Results   Component Value Date    BUN 24 04/17/2021     Lab Results   Component Value Date    CR 1.10 04/17/2021       Lab Results   Component Value Date    WBC 2.1 04/17/2021     Lab Results   Component Value Date    HGB 6.8 04/17/2021     Lab Results   Component Value Date    HCT 22.6 04/17/2021     Lab Results   Component Value Date    MCV 95 04/17/2021     Lab Results   Component Value Date    PLT 58 04/17/2021       Lab Results   Component Value Date    AST 29 04/17/2021     Lab Results   Component Value Date    ALT 26 04/17/2021     Lab Results   Component Value Date    BILICONJ 0.0 03/12/2014      Lab Results   Component Value Date    BILITOTAL 1.0 04/17/2021       Lab Results   Component Value Date    ALBUMIN 2.5 04/17/2021     Lab Results   Component Value Date    PROTTOTAL 6.2 04/17/2021      Lab Results   Component Value Date    ALKPHOS 154 04/17/2021       Lab Results   Component Value Date    INR 1.36 04/17/2021     MR ABDOMEN WO AND W CONTRAST:   2/24/2021:     IMPRESSION:  Images are moderately degraded by motion artifact.  1. Cirrhotic  configuration of the liver parenchyma with TIPS, trace  perihepatic fluid, and splenomegaly, compatible with portal  hypertension.  2. Post TACE changes in the segment 6 with decreased enhancement at the site of described lesion along the superior medial aspect  previously. Adjacent wedge shaped arterial enhancement without  associated washout or diffusion restriction. This entire region has  stained with lipiodol on posttreatment imaging from 1/7/2021. LIRADS  TR- equivocal.  3. Increased in size of is a 5.3 x 4.3 cm arterially enhancing lesion  in hepatic segment 4A since 12/8/2020 without convincing washout or  pseudocapsule. There is associated diffusion restriction and mildly  increased T2 signal. LIRADS class 5.  4. Post TACE changes in the segment 7 with ill defined questionable  arterial enhancement superior to the treatment site about hepatic  dome, though images are markedly degraded by motion artifact. No  definite associated ancillary features. Close attention on follow-up.  LIRADS TR- equivocal.   5. A 1.7 cm lesion with mild diffusion restriction and increased T2  signal without associated arterial enhancement, washout or  pseudocapsule, though images are significantly degraded by motion  artifact. LIRADS 3.  6. Scattered subcentimeter foci of arterial enhancement without  washout, pseudocapsule, or restricted diffusion on DWI, indeterminate  for HCC. LIRADS 3.   7. Based on this exam only, the patient is not within Zia criteria.  8. Cholelithiasis without evidence of cholecystitis.

## 2021-04-20 NOTE — TELEPHONE ENCOUNTER
[facility] Call Center    Phone Message    May a detailed message be left on voicemail: no     Reason for Call: Other: Elissa calling to request a call back due to some questions about the ultrasound. Unruly is in clinic and they are requesting a call ASAP.     Action Taken: Message routed to:  [facility] ([facility]  HEPATOLOGY    Travel Screening: Not Applicable    Addendum: Radiology spoke to Yovany SCHMITT and questions answered.

## 2021-04-20 NOTE — TELEPHONE ENCOUNTER
Called wife Flores to fup on her VM.    1.Pt is getting labs drawn at [facility] today.  Will review to see if pt will need transfusion due to last hgb was 6/8. Per Yovany Lopez (Hepatology)    -Informed wife that we are more concerned with platelets and so will also assess to see what those results are.     -Informed Raven that pending labs pt may or may not get transfused during CTACE or after when IR admits him.     2.Pt may need a para along with CTACE tomorrow.   Lida notified.     Pt did get an US today, final results are not shown yet.    3. Wife states that pt's covid testing is negative and she will print out the results and bring to the procedure appt tomorrow.           Sunshine TUCKER RN, BSN  Interventional Radiology/Vascular  Nurse Coordinator   Phone: [phone]  Fax: [phone]

## 2021-04-21 NOTE — TELEPHONE ENCOUNTER
Called wife to follow up on needing to cancel TACE procedure today.     She  States that  pt is having scrotal swelling issues.     She states that he's having a lot of pain due to his scrotal swelling and they'd like to cancel the TACE appt today.    She states that pt is not feeling good overall either.    I inquired if someone is addressing the scrotal swelling in which wife states that they would normally just elevate the scrotum. This is nothing out of the ordinary she states and they have their routine that they do at home to help with this.     They are also having issues with lower leg swelling also. He constantly has his legs wrapped and elevated. She would like to get the swelling under control prior to treatment to help alleviate his outcome post treatment.     She states that they'd like to cancel and r/s the TACE for today but would still  Like to get the blood transfusion if needed      We talked about r/s the treatment to another date.     I informed her that I will cancel the TACE appt and will send a msg to Hepatology re: the blood transfusion if Hepatology would still want it, then it would need to be set up separately.     She agrees to plan     Sunshine TUCKER RN, BSN  Interventional Radiology/Vascular  Nurse Coordinator   Phone: [phone]  Fax: [phone]

## 2021-04-23 NOTE — PROGRESS NOTES
Blood Product Transfusion Nursing Note:    Lawrence Louie presents today to [facility] for a 1 unit PRBC transfusion.  During today's [facility] appointment orders from Yovany Lopez were completed.    Progress note:  ID verified by name and .  Assessment completed.  Vitals were stable throughout time in [facility].  verbal and printed education given to patient/representative regarding transfusion and possible side effects.  Patient/representative verbalized understanding.    Type and Crossmatch completed on: 21    All pertinent labs reviewed prior to infusion: YES  Hemoglobin   Date Value Ref Range Status   2021 7.2 (L) 13.3 - 17.7 g/dL Final     Blood Product order verified and double checked for accuracy: YES  2nd : Maday Almonte RN    Date of consent or authorization: today via telephone with provider John and witness    Patient tolerated the procedure well        /65   Pulse 70   Temp 97.8  F (36.6  C) (Oral)   SpO2 98%     Transfusion given over approximately  1.5 hours     Pt accompanied by daughter/PCA. Pt is quite lethargic but arousable and able to transfer from wheelchair to bed with assist. VSS. Pt's PCA reports this 'sleepiness' is intermittent, stating he probably didn't sleep well last night. Pt's PCA asked if pt is taking lactulose to which she states he was switched from lactulose to magnesium and takes that instead. Confirms pt is having BMs. Post transfusion pt a bit more alert. Responds to voice. Pt's daughter/PCA asked if his LOC is normal/baseline and if she thinks further assessment is necessary, writer could set up transportation to the ED for evaluation- Daughter/PCA declined. Pt was discharged with PCA with printed discharge instructions.     Discharge Plan:    Discharge instructions were reviewed with patient Yes  Patient/representative verbalized understanding of discharge instructions and all questions answered Yes.    Discharged from [facility] at 12pm with daughter/PCP to  home.    Mariajose Billingsley RN

## 2021-04-23 NOTE — PATIENT INSTRUCTIONS
Patient Education   After Your Blood Transfusion  Discharge Instructions  After you leave  After a blood transfusion (received red blood cells, platelets, plasma, cryo or granulocytes), you will need to watch for signs of a reaction for the next 48 hours.  Call your clinic or 911 (or go to the Emergency room) if you have any signs of a reaction:    Shaking or chills    Fever (temperature above 100.0 F)    Headache    Nausea    Hives    Itching    Swelling of the face or feeling flushed    Ongoing dry cough (nothing is coughed up)    Trouble breathing or wheezing  Some signs of a reaction won't show up for a few days or up to 4 weeks.   These may include:    Fatigue    Dizziness    Pink or red urine  Your clinic is:   ________________________________________  ________________________________________  For informational purposes only. Not to replace the advice of your health care provider.   Copyright   2015 [employer]. All rights reserved. [employer] 627487 - Rev 07/16.

## 2021-04-23 NOTE — LETTER
2021         RE: Lawrence Louie  [address] [place] [address]        Dear Colleague,    Thank you for referring your patient, Lawrence Louie, to the [facility]. Please see a copy of my visit note below.    Blood Product Transfusion Nursing Note:    Lawrence Louie presents today to [facility] for a 1 unit PRBC transfusion.  During today's [facility] appointment orders from Yovany Lopez were completed.    Progress note:  ID verified by name and .  Assessment completed.  Vitals were stable throughout time in [facility].  verbal and printed education given to patient/representative regarding transfusion and possible side effects.  Patient/representative verbalized understanding.    Type and Crossmatch completed on: 21    All pertinent labs reviewed prior to infusion: YES  Hemoglobin   Date Value Ref Range Status   2021 7.2 (L) 13.3 - 17.7 g/dL Final     Blood Product order verified and double checked for accuracy: YES  2nd : Maday Almonte RN    Date of consent or authorization: today via telephone with provider John and witness    Patient tolerated the procedure well        /65   Pulse 70   Temp 97.8  F (36.6  C) (Oral)   SpO2 98%     Transfusion given over approximately  1.5 hours     Pt accompanied by daughter/PCA. Pt is quite lethargic but arousable and able to transfer from wheelchair to bed with assist. VSS. Pt's PCA reports this 'sleepiness' is intermittent, stating he probably didn't sleep well last night. Pt's PCA asked if pt is taking lactulose to which she states he was switched from lactulose to magnesium and takes that instead. Confirms pt is having BMs. Post transfusion pt a bit more alert. Responds to voice. Pt's daughter/PCA asked if his LOC is normal/baseline and if she thinks further assessment is necessary, writer could set up transportation to the ED for evaluation- Daughter/PCA declined. Pt was discharged  with PCA with printed discharge instructions.     Discharge Plan:    Discharge instructions were reviewed with patient Yes  Patient/representative verbalized understanding of discharge instructions and all questions answered Yes.    Discharged from [facility] at 12pm with daughter/PCP to home.    Mariajose Billingsley RN            Again, thank you for allowing me to participate in the care of your patient.        Sincerely,        [facility]

## 2021-04-27 NOTE — PROGRESS NOTES
Spoke to patient's spouse Raven and reviewed the recommendations per Dr. Simmons. Was able to get patient in on 4/23 for blood transfusion. Iron infusion appointments arranged. Spouse states she will discuss EGD and colonoscopy with patient, but he has repeatedly refused this in the past. Order placed, will continue to discuss.

## 2021-04-28 NOTE — LETTER
4/28/2021         RE: Lawrence Louie  [address] [place] [address]        Dear Colleague,    Thank you for referring your patient, Lawrence Louie, to the [facility] TREATMENT [facility]. Please see a copy of my visit note below.    Nursing Note  Lawrence Louie presents today to [facility] for:   Chief Complaint   Patient presents with     Infusion     Injectafer     During today's [facility] appointment, orders from Dr Lopez were completed.  Frequency: first dose    Progress note:  Patient identification verified by name and date of birth.  Assessment completed.  Vitals recorded in Doc Flowsheets.  Patient was provided with education regarding medication/procedure and possible side effects.  Patient verbalized understanding.     present during visit today: Not Applicable.    Pt here w/ his PCA who is also his daughter.  Pt drowsy.  Falling asleep.  C/O SOB before infusion started.  Head hanging to side as he sleeps.  O2 sats staying around 92%.  Daughter encouraging him to take deep breaths.     Treatment Conditions: Non-applicable.    Premedications: were not ordered.    Drug Waste Record: No    Infusion length and rate:  infusion given over approximately 15 minutes    Labs: were not ordered for this appointment.    Vascular access: peripheral IV placed today.    Is the next appt scheduled? yes  Asymptomatic COVID test completed? n/a    Post Infusion Assessment:  Patient tolerated infusion without incident.     Discharge Plan:   Follow up plan of care with: ongoing infusions at [facility].  Discharge instructions were reviewed with patient.  Patient/representative verbalized understanding of discharge instructions and all questions answered.  Patient discharged from [facility] in stable condition.    JOSEFA JUÁREZ RN    Administrations  This Visit     ferric carboxymaltose (INJECTAFER) 750 mg in sodium chloride 0.9 % 125 mL intermittent infusion     Admin Date  04/28/2021 Action  New Bag Dose  750 mg Rate  500 mL/hr Route  Intravenous Administered By  Mirella Quan RN                /62   Pulse 79   Temp 98.1  F (36.7  C) (Oral)   Resp 18   SpO2 94%           Again, thank you for allowing me to participate in the care of your patient.        Sincerely,        [facility]

## 2021-04-28 NOTE — PROGRESS NOTES
Nursing Note  Lawrence Louie presents today to [facility] for:   Chief Complaint   Patient presents with     Infusion     Injectafer     During today's [facility] appointment, orders from Dr Lopez were completed.  Frequency: first dose    Progress note:  Patient identification verified by name and date of birth.  Assessment completed.  Vitals recorded in Doc Flowsheets.  Patient was provided with education regarding medication/procedure and possible side effects.  Patient verbalized understanding.     present during visit today: Not Applicable.    Pt here w/ his PCA who is also his daughter.  Pt drowsy.  Falling asleep.  C/O SOB before infusion started.  Head hanging to side as he sleeps.  O2 sats staying around 92%.  Daughter encouraging him to take deep breaths.     Treatment Conditions: Non-applicable.    Premedications: were not ordered.    Drug Waste Record: No    Infusion length and rate:  infusion given over approximately 15 minutes    Labs: were not ordered for this appointment.    Vascular access: peripheral IV placed today.    Is the next appt scheduled? yes  Asymptomatic COVID test completed? n/a    Post Infusion Assessment:  Patient tolerated infusion without incident.     Discharge Plan:   Follow up plan of care with: ongoing infusions at [facility].  Discharge instructions were reviewed with patient.  Patient/representative verbalized understanding of discharge instructions and all questions answered.  Patient discharged from [facility] in stable condition.    MIRELLA JUÁREZ RN    Administrations This Visit     ferric carboxymaltose (INJECTAFER) 750 mg in sodium chloride 0.9 % 125 mL intermittent infusion     Admin Date  04/28/2021 Action  New Bag Dose  750 mg Rate  500 mL/hr Route  Intravenous Administered By  Mirella Juárez RN                /62   Pulse 79   Temp  98.1  F (36.7  C) (Oral)   Resp 18   SpO2 94%

## 2021-04-29 NOTE — TELEPHONE ENCOUNTER
Reason For Call:        Chief Complaint   Patient presents with     Refill Request                 Medication Name, Dose and Monthly Quantity:     Roxicodone 5 mg     Diagnosis requiring opiates:        Problem List Updated:   Yes     Opioid Agreement On File - [facility] PAIN CONTRACT ID# [id number]:       Last Urine Drug Screen (at least once every 12 months) Date:     Unexpected Results:        [facility] Data Reviewed (at least once every 3 months) Date:   04/29/2021     Unexpected Results:         Last Fill Date:   03/06/2021    Next Fill Date:   04/29/2021     Last Visit with PCP:        Future Visits with PCP:      Processing:         CHRISTINA SOL CMA at 11:31 AM on 4/29/2021.

## 2021-04-30 NOTE — PROGRESS NOTES
Unruly is a 65 year old who is being evaluated via a billable video visit.      How would you like to obtain your AVS? MyChart  If the video visit is dropped, the invitation should be resent by: Text to cell phone: [phone]  Will anyone else be joining your video visit? No    Video-Visit Details  Video Time: 70 min   Originating Location (pt. Location): Home  Distant Location (provider location):  [facility] [place]   Platform used for Video Visit: [employer]    INFECTIOUS DISEASES PROGRESS NOTE      SUBJECTIVE:                                                      Lawrence Louie is a 65 year old male with a complex medical history significant for EtOH cirrhosis with ascites , esophageal varices s/p TIPS, hepatic encephalopathy, multifocal HCC receiving TACE, chronic lymphedema c/b recurrent cellulitis and wounds, and Diabetes mellitus with last HbA1c 6.3 ( 3/2020), thrombocytopenia, anemia, leukopenia, history of MSSA bacteremia , a patient of my colleague Dr Bird, who is not available in the next few months, with multiple concerns, mainly pain in his right heel, lower legs with a few open wounds on his left leg.     Unruly's daughter Kristi and ex wife are with patient today and assist with history and exam.     Unruly slipped once and fell in his kitchen last week and landed on his buttock and then to his left side. left foot is bruised. He also picks at his skin and with long finger nails. he has a cat and dog in the house. They are trying to keep the animals away from him. He has his legs ace-wrapped. He appears to have increased edema recently. Daughter is also concerned about his slurred speech ( this is chronic, worse with elevated ammonia in the past) . he is not using lactulose due to too many times of bowel movements. He has 1x bowel movement daily recently.         No fever, chills, nausea, vomiting. has good appetite. He is taking diuretics as directed, but legs appear  "more edematous. shortness of breath with activities. can walk 100 feet with his walker. per daughter his abdomen appears bigger as well. per ex wife, he has redness in the abdominal skin folds/ inguinal areas. Has a tendency to pick his skin. he has a few wounds on his left leg  and daughter is concerned about redness in lower left leg ( medial). feet are maldorous . ex wife uses dial soap between toes and antifungal powder in the skin folds. daibetes is \"hard to manage\" 160 before eating and 202 after eating,     Unruly is also concerned about scrotal swelling and hard to sit . per ex wife, this is chronic. no redness . swelling seems to improve with scrotal elevation. he was seen by urologist in March 2021 and did not find any evidence of bladder outlet obstruction/ enlarged prostate. Unruly feels he is not able to empty is bladder and has to go back a few times to empty his bladder. He is taking Flomax . he denies any dysuria.       Problem list and histories reviewed & adjusted, as indicated.  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    PAST SURGICAL HISTORY:  Patient  has a past surgical history that includes Abdomen surgery; Herniorrhaphy umbilical (9/24/2012); Esophagoscopy, gastroscopy, duodenoscopy (EGD), combined (2/13/2013); Excise lesion trunk (9/24/2012); Esophagoscopy, gastroscopy, duodenoscopy (EGD), combined (11/4/2013); biopsy of skin lesion; Esophagoscopy, gastroscopy, duodenoscopy (EGD), combined (Left, 6/16/2016); Colonoscopy (Left, 6/16/2016); IR Chemo Embolization (7/30/2020); IR Chemo Embolization (10/1/2020); and IR Chemo Embolization (1/7/2021).    CURRENT MEDICATIONS:  Current " Outpatient Medications   Medication Sig Dispense Refill     blood glucose (NO BRAND SPECIFIED) lancets standard Use to test blood sugar 3-4 times daily. Use brand compatible with patients insurance and device. 360 each 3     blood glucose (NO BRAND SPECIFIED) test strip Use to test blood sugars 4 X  times daily or as directed 360 strip 3     blood glucose monitoring (NO BRAND SPECIFIED) meter device kit Use to test blood sugar 4 times daily or as directed. Before meals and snack at HS. 1 kit 0     cephALEXin (KEFLEX) 500 MG capsule Take 1 capsule (500 mg) by mouth 4 times daily for 7 days 28 capsule 0     citalopram (CELEXA) 20 MG tablet Take 1 tablet (20 mg) by mouth daily 90 tablet 3     COMPOUNDED NON-CONTROLLED SUBSTANCE (CMPD RX) - PHARMACY TO MIX COMPOUNDED MEDICATION Apply to the affected area once a day. 500 g 5     Cyanocobalamin (VITAMIN B-12 PO) Take 1 tablet by mouth daily       desvenlafaxine (PRISTIQ) 100 MG 24 hr tablet Take 2 tablets (200 mg) by mouth daily (Patient taking differently: Take 100 mg by mouth daily Only 100mg) 180 tablet 1     EQL VITAMIN D3 50 MCG (2000 UT) CAPS Take 1 capsule by mouth daily 90 capsule 2     famotidine (PEPCID) 40 MG tablet Take 1 tablet (40 mg) by mouth daily 30 tablet 11     furosemide (LASIX) 40 MG tablet Take 40 mg twice daily. (Patient taking differently: 80 mg Take 40 mg twice daily.) 180 tablet 3     gabapentin (NEURONTIN) 300 MG capsule Take 1 capsule (300 mg) by mouth At Bedtime 90 capsule 1     hydrOXYzine (ATARAX) 25 MG tablet Take 1 tablet (25 mg) by mouth 3 times daily as needed for itching 90 tablet 3     insulin aspart (NOVOLOG FLEXPEN) 100 UNIT/ML pen 1 unit per 10 grams of carbohydrates + 1 per 50>140. Max daily dose 100 units. 30 mL 11     insulin glargine (LANTUS SOLOSTAR) 100 UNIT/ML pen 5 units daily in am. Increase by 5 units 2x weekly until fasting sugars are <130. Max daily dose 100 units. 15 mL 11     insulin pen needle (B-D U/F) 31G X 8 MM  miscellaneous USE  6 times daily / OR AS DIRECTED 300 each 3     insulin pen needle (ULTICARE SHORT) 31G X 8 MM miscellaneous Use UP to 6 times daily or as directed 300 each 3     lactulose (CEPHULAC) 20 GM packet Take 1 packet (20 g) by mouth 2 times daily 60 packet 0     lactulose encephalopathy (CHRONULAC) 10 GM/15ML SOLUTION TAKE 30 MLS BY MOUTH 2 TIMES DAILY  TITRATE AS NEEDED TO ACHIEVE 3-5 BOWEL MOVEMENTS DAILY. 1892 mL 11     levETIRAcetam (KEPPRA) 500 MG tablet Take 500 mg by mouth daily       Lidocaine (LIDOCARE) 4 % Patch Place 1 patch onto the skin every 24 hours To prevent lidocaine toxicity, patient should be patch free for 12 hrs daily. 30 patch 3     magnesium oxide (MAG-OX) 400 (241.3 Mg) MG tablet Take 1 tablet (400 mg) by mouth daily 90 tablet 1     Melatonin 5 MG CHEW Take 2 tablets by mouth nightly as needed       Nutritional Supplements (ENSURE) LIQD Take 1 Can by mouth daily 30 each 11     nystatin (MYCOSTATIN) 213481 UNIT/GM external cream Apply topically 2 times daily 30 g 11     ondansetron (ZOFRAN) 4 MG tablet Take 1 tablet (4 mg) by mouth every 8 hours as needed for nausea 20 tablet 0     order for DME Equipment being ordered:Orthopedic shoes 2 Units 0     order for DME Equipment being ordered: Condom catheter 1 Device 3     order for DME Equipment being ordered:BioTab compression pants pneumatic system 1 Piece 0     order for DME Equipment being ordered:bilateral pneumatic leg massage for treatment of extreme bilateral lower leg edema. 2 pump 0     oxyCODONE (ROXICODONE) 5 MG tablet TAKE 1 to 2 TABLETS BY MOUTH EVERY 8 HOURS AS NEEDED FOR SEVERE PAIN 180 tablet 0     OYSTER SHELL CALCIUM/D 500-200 MG-UNIT per tablet Take 1 tablet by mouth daily 90 tablet 3     polyethylene glycol (MIRALAX) 17 g packet Take 1 packet by mouth       potassium gluconate 2.5 MEQ tablet Take 1 tablet by mouth daily       prochlorperazine (COMPAZINE) 10 MG tablet Take 1 tablet (10 mg) by mouth every 6 hours as  needed for nausea or vomiting (take if zofran does not relieve nausea) 20 tablet 0     QUEtiapine (SEROQUEL) 100 MG tablet Take 100 mg by mouth At Bedtime        QUEtiapine (SEROQUEL) 50 MG tablet Take 50 mg by mouth At Bedtime       rifaximin (XIFAXAN) 550 MG TABS tablet Take 1 tablet (550 mg) by mouth 2 times daily 60 tablet 11     spironolactone (ALDACTONE) 50 MG tablet Take 2 tablets (100 mg) by mouth daily (Patient taking differently: Take 200 mg by mouth daily ) 180 tablet 3     tamsulosin (FLOMAX) 0.4 MG capsule Take 2 capsules (0.8 mg) by mouth daily 180 capsule 3     traZODone (DESYREL) 50 MG tablet Take 1 tablet (50 mg) by mouth At Bedtime 30 tablet 11     ursodiol (ACTIGALL) 300 MG capsule TAKE THREE CAPSULES BY MOUTH TWO TIMES A DAY  180 capsule 11     pantoprazole (PROTONIX) 40 MG EC tablet Take 1 tablet (40 mg) by mouth daily (Patient not taking: Reported on 4/19/2021) 14 tablet 0     QUEtiapine (SEROQUEL) 25 MG tablet Give 1 tab with a 50 mg tab at HS. (Patient not taking: Reported on 4/19/2021) 60 tablet 3     QUEtiapine (SEROQUEL) 50 MG tablet Take 1 tab with a 25 mg tab at HS. (Patient not taking: Reported on 4/19/2021) 60 tablet 3     ALLERGY:  No Known Allergies    IMMUNIZATION HISTORY:  Immunization History   Administered Date(s) Administered     COVID-19,,Pfizer 02/21/2021     DT (PEDS <7y) 01/18/1988     DTAP (<7y) 01/18/1988     FLU 6-35 months 09/25/2009, 12/18/2012     HEPA 01/02/2013, 02/18/2013, 10/30/2013     HepA-Adult 02/18/2013, 10/30/2013     HepB 01/02/2013, 02/18/2013, 10/30/2013     HepB-Adult 02/18/2013, 10/30/2013     Influenza (IIV3) PF 10/04/2010, 12/18/2012, 09/08/2014, 09/26/2015, 09/27/2016     Influenza Intranasal Vaccine 4 valent 10/12/2017     Influenza Vaccine IM > 6 months Valent IIV4 10/26/2018     Pneumo Conj 13-V (2010&after) 05/12/2015     Pneumococcal 23 valent 09/25/2009, 10/01/2010, 01/02/2013, 04/23/2019     TD (ADULT, 7+) 09/08/1997     TDAP Vaccine (Adacel)  01/02/2013     Td (Adult), Adsorbed 09/08/1997     Twinrix A/B 01/02/2013     Zoster vaccine, live 12/22/2016     Labs reviewed in EPIC    OBJECTIVE:                                                    Video visit   GENERAL: frail, alert and no distress, speech is slow, slow with movement , needs 1-2 assists , keeps drinking bottled water    EYES: Eyes grossly normal to inspection  NECK: supple   RESP: no signs of shortness of breath. no cough   Extremities : edematous legs/ feet . left foot- mildly bruised  SKIN: 2 wounds on left leg. medial side of lower left leg appears red. significant edema in feet no wounds in plantar of feet   NEURO: mentation intact and speech is slow  PSYCH: mentation appears slow but appropriate questions/ answers, affect flat       ASSESSMENT AND PLAN:                                                      1. Recurrent cellulitis in lower extremities   - related to edema and wounds   2. Left leg wounds  - has a tendency to pick at his skin   3. History of MSSA wounds and bacteremia   4. history of EtOH cirrhosis with ascites , esophageal varices s/p TIPS  - history of hepatic encephalopathy  5, HCC receiving TACE  6. chronic lymphedema using ace wrap   7. Diabetes mellitus with last HbA1c 6.3 ( 3/2020)  8. Pancytopenia     Plan/ Recommendations   - advised to use antifungal powder to skin folds/ inguinal areas and in between toes after cleansing. Continue current cleansing with hibiclens/ dial soap  - may use betadine iodine to the open wounds.  - keep finger nails short, avoid picking at skin   - start Cephalexin 500 mg po q6hr x 7 days . monitor area of redness   - ensure at least 2-3 bowel movements, contact GI if any concerns with worsening mentation.   - if any worsening of mentation/ infection, advise to bring patient to ER for further evaluation/ management   - video f/u on 5/12/21 at 10.30 am or sooner with any ID physician      : daughter Kristi ( tel) [phone]       Time: video 70 min, chart review and notes/ care coordination : 20 min ( total : 90 min - on the same day )                              Thank You for allowing me to participate in the care of this patient    Asael Mora MD, M.Med.Sc  Division of Infectious Diseases and International Medicine  [facility]

## 2021-04-30 NOTE — LETTER
4/30/2021       RE: Lawrence Louie  [address] [place] [address]     Dear Colleague,    Thank you for referring your patient, Lawrence Louie, to the [facility] INFECTIOUS DISEASE CLINIC [place] at [facility]. Please see a copy of my visit note below.    Unruly is a 65 year old who is being evaluated via a billable video visit.      How would you like to obtain your AVS? MyChart  If the video visit is dropped, the invitation should be resent by: Text to cell phone: [phone]  Will anyone else be joining your video visit? No    Video-Visit Details  Video Time: 70 min   Originating Location (pt. Location): Home  Distant Location (provider location):  [facility] INFECTIOUS DISEASE CLINIC [place]   Platform used for Video Visit: Yoopay    INFECTIOUS DISEASES PROGRESS NOTE      SUBJECTIVE:                                                      Lawrence Louie is a 65 year old male with a complex medical history significant for EtOH cirrhosis with ascites , esophageal varices s/p TIPS, hepatic encephalopathy, multifocal HCC receiving TACE, chronic lymphedema c/b recurrent cellulitis and wounds, and Diabetes mellitus with last HbA1c 6.3 ( 3/2020), thrombocytopenia, anemia, leukopenia, history of MSSA bacteremia , a patient of my colleague Dr Bird, who is not available in the next few months, with multiple concerns, mainly pain in his right heel, lower legs with a few open wounds on his left leg.     Unruly's daughter Kristi and ex wife are with patient today and assist with history and exam.     Unruly slipped once and fell in his kitchen last week and landed on his buttock and then to his left side. left foot is bruised. He also picks at his skin and with long finger nails. he has a cat and dog in the house. They are trying to keep the animals away from him. He has his legs ace-wrapped. He appears to have increased edema recently.  "Daughter is also concerned about his slurred speech ( this is chronic, worse with elevated ammonia in the past) . he is not using lactulose due to too many times of bowel movements. He has 1x bowel movement daily recently.         No fever, chills, nausea, vomiting. has good appetite. He is taking diuretics as directed, but legs appear more edematous. shortness of breath with activities. can walk 100 feet with his walker. per daughter his abdomen appears bigger as well. per ex wife, he has redness in the abdominal skin folds/ inguinal areas. Has a tendency to pick his skin. he has a few wounds on his left leg  and daughter is concerned about redness in lower left leg ( medial). feet are maldorous . ex wife uses dial soap between toes and antifungal powder in the skin folds. daibetes is \"hard to manage\" 160 before eating and 202 after eating,     Unruly is also concerned about scrotal swelling and hard to sit . per ex wife, this is chronic. no redness . swelling seems to improve with scrotal elevation. he was seen by urologist in March 2021 and did not find any evidence of bladder outlet obstruction/ enlarged prostate. Unruly feels he is not able to empty is bladder and has to go back a few times to empty his bladder. He is taking Flomax . he denies any dysuria.       Problem list and histories reviewed & adjusted, as indicated.  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    PAST SURGICAL HISTORY:  Patient  has a past surgical history that includes Abdomen surgery; Herniorrhaphy umbilical (9/24/2012); Esophagoscopy, gastroscopy, duodenoscopy (EGD), combined (2/13/2013); Excise lesion trunk (9/24/2012); " Esophagoscopy, gastroscopy, duodenoscopy (EGD), combined (11/4/2013); biopsy of skin lesion; Esophagoscopy, gastroscopy, duodenoscopy (EGD), combined (Left, 6/16/2016); Colonoscopy (Left, 6/16/2016); IR Chemo Embolization (7/30/2020); IR Chemo Embolization (10/1/2020); and IR Chemo Embolization (1/7/2021).    CURRENT MEDICATIONS:  Current Outpatient Medications   Medication Sig Dispense Refill     blood glucose (NO BRAND SPECIFIED) lancets standard Use to test blood sugar 3-4 times daily. Use brand compatible with patients insurance and device. 360 each 3     blood glucose (NO BRAND SPECIFIED) test strip Use to test blood sugars 4 X  times daily or as directed 360 strip 3     blood glucose monitoring (NO BRAND SPECIFIED) meter device kit Use to test blood sugar 4 times daily or as directed. Before meals and snack at HS. 1 kit 0     cephALEXin (KEFLEX) 500 MG capsule Take 1 capsule (500 mg) by mouth 4 times daily for 7 days 28 capsule 0     citalopram (CELEXA) 20 MG tablet Take 1 tablet (20 mg) by mouth daily 90 tablet 3     COMPOUNDED NON-CONTROLLED SUBSTANCE (CMPD RX) - PHARMACY TO MIX COMPOUNDED MEDICATION Apply to the affected area once a day. 500 g 5     Cyanocobalamin (VITAMIN B-12 PO) Take 1 tablet by mouth daily       desvenlafaxine (PRISTIQ) 100 MG 24 hr tablet Take 2 tablets (200 mg) by mouth daily (Patient taking differently: Take 100 mg by mouth daily Only 100mg) 180 tablet 1     EQL VITAMIN D3 50 MCG (2000 UT) CAPS Take 1 capsule by mouth daily 90 capsule 2     famotidine (PEPCID) 40 MG tablet Take 1 tablet (40 mg) by mouth daily 30 tablet 11     furosemide (LASIX) 40 MG tablet Take 40 mg twice daily. (Patient taking differently: 80 mg Take 40 mg twice daily.) 180 tablet 3     gabapentin (NEURONTIN) 300 MG capsule Take 1 capsule (300 mg) by mouth At Bedtime 90 capsule 1     hydrOXYzine (ATARAX) 25 MG tablet Take 1 tablet (25 mg) by mouth 3 times daily as needed for itching 90 tablet 3     insulin aspart  (NOVOLOG FLEXPEN) 100 UNIT/ML pen 1 unit per 10 grams of carbohydrates + 1 per 50>140. Max daily dose 100 units. 30 mL 11     insulin glargine (LANTUS SOLOSTAR) 100 UNIT/ML pen 5 units daily in am. Increase by 5 units 2x weekly until fasting sugars are <130. Max daily dose 100 units. 15 mL 11     insulin pen needle (B-D U/F) 31G X 8 MM miscellaneous USE  6 times daily / OR AS DIRECTED 300 each 3     insulin pen needle (ULTICARE SHORT) 31G X 8 MM miscellaneous Use UP to 6 times daily or as directed 300 each 3     lactulose (CEPHULAC) 20 GM packet Take 1 packet (20 g) by mouth 2 times daily 60 packet 0     lactulose encephalopathy (CHRONULAC) 10 GM/15ML SOLUTION TAKE 30 MLS BY MOUTH 2 TIMES DAILY  TITRATE AS NEEDED TO ACHIEVE 3-5 BOWEL MOVEMENTS DAILY. 1892 mL 11     levETIRAcetam (KEPPRA) 500 MG tablet Take 500 mg by mouth daily       Lidocaine (LIDOCARE) 4 % Patch Place 1 patch onto the skin every 24 hours To prevent lidocaine toxicity, patient should be patch free for 12 hrs daily. 30 patch 3     magnesium oxide (MAG-OX) 400 (241.3 Mg) MG tablet Take 1 tablet (400 mg) by mouth daily 90 tablet 1     Melatonin 5 MG CHEW Take 2 tablets by mouth nightly as needed       Nutritional Supplements (ENSURE) LIQD Take 1 Can by mouth daily 30 each 11     nystatin (MYCOSTATIN) 343978 UNIT/GM external cream Apply topically 2 times daily 30 g 11     ondansetron (ZOFRAN) 4 MG tablet Take 1 tablet (4 mg) by mouth every 8 hours as needed for nausea 20 tablet 0     order for DME Equipment being ordered:Orthopedic shoes 2 Units 0     order for DME Equipment being ordered: Condom catheter 1 Device 3     order for DME Equipment being ordered:BioTab compression pants pneumatic system 1 Piece 0     order for DME Equipment being ordered:bilateral pneumatic leg massage for treatment of extreme bilateral lower leg edema. 2 pump 0     oxyCODONE (ROXICODONE) 5 MG tablet TAKE 1 to 2 TABLETS BY MOUTH EVERY 8 HOURS AS NEEDED FOR SEVERE PAIN 180  tablet 0     OYSTER SHELL CALCIUM/D 500-200 MG-UNIT per tablet Take 1 tablet by mouth daily 90 tablet 3     polyethylene glycol (MIRALAX) 17 g packet Take 1 packet by mouth       potassium gluconate 2.5 MEQ tablet Take 1 tablet by mouth daily       prochlorperazine (COMPAZINE) 10 MG tablet Take 1 tablet (10 mg) by mouth every 6 hours as needed for nausea or vomiting (take if zofran does not relieve nausea) 20 tablet 0     QUEtiapine (SEROQUEL) 100 MG tablet Take 100 mg by mouth At Bedtime        QUEtiapine (SEROQUEL) 50 MG tablet Take 50 mg by mouth At Bedtime       rifaximin (XIFAXAN) 550 MG TABS tablet Take 1 tablet (550 mg) by mouth 2 times daily 60 tablet 11     spironolactone (ALDACTONE) 50 MG tablet Take 2 tablets (100 mg) by mouth daily (Patient taking differently: Take 200 mg by mouth daily ) 180 tablet 3     tamsulosin (FLOMAX) 0.4 MG capsule Take 2 capsules (0.8 mg) by mouth daily 180 capsule 3     traZODone (DESYREL) 50 MG tablet Take 1 tablet (50 mg) by mouth At Bedtime 30 tablet 11     ursodiol (ACTIGALL) 300 MG capsule TAKE THREE CAPSULES BY MOUTH TWO TIMES A DAY  180 capsule 11     pantoprazole (PROTONIX) 40 MG EC tablet Take 1 tablet (40 mg) by mouth daily (Patient not taking: Reported on 4/19/2021) 14 tablet 0     QUEtiapine (SEROQUEL) 25 MG tablet Give 1 tab with a 50 mg tab at HS. (Patient not taking: Reported on 4/19/2021) 60 tablet 3     QUEtiapine (SEROQUEL) 50 MG tablet Take 1 tab with a 25 mg tab at HS. (Patient not taking: Reported on 4/19/2021) 60 tablet 3     ALLERGY:  No Known Allergies    IMMUNIZATION HISTORY:  Immunization History   Administered Date(s) Administered     COVID-19,DOROTHY,Pfizer 02/21/2021     DT (PEDS <7y) 01/18/1988     DTAP (<7y) 01/18/1988     FLU 6-35 months 09/25/2009, 12/18/2012     HEPA 01/02/2013, 02/18/2013, 10/30/2013     HepA-Adult 02/18/2013, 10/30/2013     HepB 01/02/2013, 02/18/2013, 10/30/2013     HepB-Adult 02/18/2013, 10/30/2013     Influenza (IIV3) PF  10/04/2010, 12/18/2012, 09/08/2014, 09/26/2015, 09/27/2016     Influenza Intranasal Vaccine 4 valent 10/12/2017     Influenza Vaccine IM > 6 months Valent IIV4 10/26/2018     Pneumo Conj 13-V (2010&after) 05/12/2015     Pneumococcal 23 valent 09/25/2009, 10/01/2010, 01/02/2013, 04/23/2019     TD (ADULT, 7+) 09/08/1997     TDAP Vaccine (Adacel) 01/02/2013     Td (Adult), Adsorbed 09/08/1997     Twinrix A/B 01/02/2013     Zoster vaccine, live 12/22/2016     Labs reviewed in EPIC    OBJECTIVE:                                                    Video visit   GENERAL: frail, alert and no distress, speech is slow, slow with movement , needs 1-2 assists , keeps drinking bottled water    EYES: Eyes grossly normal to inspection  NECK: supple   RESP: no signs of shortness of breath. no cough   Extremities : edematous legs/ feet . left foot- mildly bruised  SKIN: 2 wounds on left leg. medial side of lower left leg appears red. significant edema in feet no wounds in plantar of feet   NEURO: mentation intact and speech is slow  PSYCH: mentation appears slow but appropriate questions/ answers, affect flat       ASSESSMENT AND PLAN:                                                      1. Recurrent cellulitis in lower extremities   - related to edema and wounds   2. Left leg wounds  - has a tendency to pick at his skin   3. History of MSSA wounds and bacteremia   4. history of EtOH cirrhosis with ascites , esophageal varices s/p TIPS  - history of hepatic encephalopathy  5, HCC receiving TACE  6. chronic lymphedema using ace wrap   7. Diabetes mellitus with last HbA1c 6.3 ( 3/2020)  8. Pancytopenia     Plan/ Recommendations   - advised to use antifungal powder to skin folds/ inguinal areas and in between toes after cleansing. Continue current cleansing with hibiclens/ dial soap  - may use betadine iodine to the open wounds.  - keep finger nails short, avoid picking at skin   - start Cephalexin 500 mg po q6hr x 7 days . monitor area  of redness   - ensure at least 2-3 bowel movements, contact GI if any concerns with worsening mentation.   - if any worsening of mentation/ infection, advise to bring patient to ER for further evaluation/ management   - video f/u on 5/12/21 at 10.30 am or sooner with any ID physician      : shayy Berry ( tel) [phone]      Time: video 70 min, chart review and notes/ care coordination : 20 min      Thank You for allowing me to participate in the care of this patient    Asael Mora MD, M.Med.Sc  Division of Infectious Diseases and International Medicine  [facility]

## 2021-05-03 NOTE — TELEPHONE ENCOUNTER
Called wife Raven and left a VM    Informed her that I sent a mychart msg and am leaving a msg that pt needs to get a covid testing.     Informed her that scheduling line and asked her to call again to make this appt prior to Weds 5/5 or else we will have to cancel    Asked her to return my call if she has anymore questions.       *wife did return my call: she said that they went through the [employer] drive through yesterday and that she will bring in the copy of results on Weds.     *IR notified.     Sunshine TUCKER RN, BSN  Interventional Radiology/Vascular  Nurse Coordinator   Phone: [phone]  Fax: [phone]

## 2021-05-05 PROBLEM — C22.0 HCC (HEPATOCELLULAR CARCINOMA) (H): Status: ACTIVE | Noted: 2020-07-30

## 2021-05-05 NOTE — IR NOTE
Patient Name: Lawrence Louie  Medical Record Number: [id number]  Today's Date: 5/5/2021    Procedure: Transarterial Chemo Embolization of Liver Tumer  Proceduralist: Jah Hilton    Sedation medications administered: .4 mg Dilaudid    Procedure start time: 1218  Procedure end time: 1400    Report given to: Pauline GARCIA  : n/a    Report given to Judith JACKSON RN during IR procedure.      Other Notes: Pt arrived to IR room 1 from . Consent reviewed, pt confirmed. Pt denies any questions or concerns regarding procedure. Pt positioned supine and monitored per protocol. Site cleansed and dressed per protocol. Pt tolerated procedure without any noted complications. Pt transferred to     Bilateral pedal pulses are accessed with Doppler prior to procedure.  Right groin accessed with 5 fr sheath. Hemostasis achieved at 1400.  Mynx closure device used. Bedrest for 3 hours, until 1700.   Post procedure pulse accessed with Doppler.  .

## 2021-05-05 NOTE — DISCHARGE INSTRUCTIONS
1. No heavy lifting for 2 days (no greater than 10 pounds)    2. Resume usual diet    3. Can shower tomorrow    4. Dressing can come off at time of next shower    5. Pain killers as needed    6. Interventional Radiology will call you and arrange your follow up

## 2021-05-05 NOTE — PROCEDURES
[facility]    Procedure: Chemoembolization    Date/Time: 5/5/2021 2:12 PM  Performed by: Luis Armando Tyson MD  Authorized by: Luis Armando Tyson MD   IR Fellow Physician: Luis Armando Tyson    UNIVERSAL PROTOCOL   Site Marked: NA  Prior Images Obtained and Reviewed:  Yes  Required items: Required blood products, implants, devices and special equipment available    Patient identity confirmed:  Verbally with patient, arm band, provided demographic data and hospital-assigned identification number  Patient was reevaluated immediately before administering moderate or deep sedation or anesthesia  Confirmation Checklist:  Patient's identity using two indicators, relevant allergies, procedure was appropriate and matched the consent or emergent situation and correct equipment/implants were available  Time out: Immediately prior to the procedure a time out was called    Universal Protocol: the Joint Commission Universal Protocol was followed    Preparation: Patient was prepped and draped in usual sterile fashion    ESBL (mL):  5         ANESTHESIA    Anesthesia: Local infiltration  Local Anesthetic:  Lidocaine 1% without epinephrine  Anesthetic Total (mL):  8      SEDATION    Patient Sedated: Yes    Sedation Type:  Moderate (conscious) sedation  Vital signs: Vital signs monitored during sedation    See dictated procedure note for full details.  Findings: Chemoembolization of segment 4 HCC performed.    Specimens: none    Complications: None    Condition: Stable    Plan: Bed rest for 3 hrs with legs straight    PROCEDURE   Patient Tolerance:  Patient tolerated the procedure well with no immediate complications    Length of time physician/provider present for 1:1 monitoring during sedation: 60

## 2021-05-05 NOTE — PROGRESS NOTES
Pt discharged to: home  Via: personal vehicle  Accompanied by: wife/daughter  Belongings: sent with patient  Teaching: sent patient with discharge instructions, medication list and follow up appointment  Clinic appointment: virtual appointment May 12, 2021  Patient was up moving around, femoral site clean dry and intact, was able to get dressed, denied pain, voided x1, and drank ginger ale.

## 2021-05-05 NOTE — PROGRESS NOTES
Arrived from home for a TACE.  VSS.  Confused, lethargic, slurred speech.  Ex-wife, who is not his POA, nor can sign for his procedures, is accompanying Unruly today.  C/O back pain, as well as bilateral foot pain.  Readily falling asleep.  Needs consent.  H&P current.

## 2021-05-10 NOTE — TELEPHONE ENCOUNTER
"Called pt's wife Raven to fup on how pt is doing.     Wife states that all went well during the procedure.       Wife states that pt is \"way off\". Asked about confusion  Wife states that it's been difficult to communicate with him.     *we talked about lactulose and encouraged use of this medication to help with confusion.     Wife verbalized understanding of this information however states that pt doesn't like this medication.     Informed her to encourage this medication to help clear his confusion also.       She states that they've been watching salt intake but  is defiant about it.   Wife expresses frustration and anxiety re: her upcoming hip surgery.    They are thinking that they may put him in TCU while she is recovering herself.     Informed her to keep me in touch on where pt is going.    We will plan for 1 mos fup s/p TACE and then have our  call her with appt  details so that she has this information incase pt does to got TCU.    She is working on looking into insurance for this.     She will calll back with any other questions.     Sunshine TUCKER RN, BSN  Interventional Radiology/Vascular  Nurse Coordinator   Phone: [phone]  Fax: [phone]                      "

## 2021-05-13 NOTE — PROGRESS NOTES
Patient was scheduled for second iron infusion last week, but did not end up getting the infusion and Raven was calling to reschedule to a time in [place]. The next available time was Thursday 6/3 at 1 PM. Left message for Raven with this information and asked she call back if this will not work for them.

## 2021-05-19 NOTE — NURSING NOTE
Chief Complaint   Patient presents with     Recheck Medication     foot pain, condom cathetar, tooth nerve, medication check, fungal powder, wound powder, abdominal swelling     Kimberly Nissen, EMT at 3:59 PM on 5/19/2021

## 2021-05-19 NOTE — PATIENT INSTRUCTIONS
You have been prescribed condom catheters to help keep you dry with your incontinence.    Hold your penis straight out and roll the catheter over the head and onto the shaft. If you have a foreskin do not pull the foreskin back. Pulling the foreskin back can cause painful swelling.  - To remove, gently roll the condom catheter off. Pulling the condom catheter off can damage the tissues.  - Change the catheter daily.  - Do not ejaculate into the catheter. If this happens you must change it, otherwise the catheter can become clogged.    Please call [phone] with any questions or concerns.

## 2021-05-19 NOTE — PROGRESS NOTES
Unruly is a very pleasant 65 year old male here with his daughter for follow up of cirrhosis, edema, insomnia, pain management, and urinary frequency/near incontinence.    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     He is doing better with insomnia on the 100 mg dose of seroquel.  200 mg caused too much morning sedation.  Today his mental status and level of alertness has improved compared to my previous visit.    Oxycodone works well for pain other than he has a specific sharp breakthrough pain on the bottom of his left foot, which is where he once had a staph aureus infection and a wound that was packed for healing.  He thinks that using a callous pad or other padding (orthotic?) might be helpful.  I would like to get an opinion from Podiatry.    Endorses a rash in his skin folds and is hoping for an antifungal powder as the cream seems too sticky.    They also wanted me to check an area of skin breakdown on his left shin and need DME orders for lymphedema supplies.  Also, we discussed alternative approaches to manage urinary frequency and urinary incontinence.  They expressed interest in a condom type catheter.    They also reported a tooth fracture/loss of tooth where it has avulsed out of its socket.  I recommend a dental referral.    On exam  /63   Pulse 72   SpO2 98%    LE:  Compression wraps are in place on top of an abd pad to absorb edema fluid and this is on top of a layer of guaze to protect the skin.  I did examine an area of his left lateral shin that has a clean based, noninfected shallow ulcer.  They are using a sterile bandage for protection.    ALONZO/GRECIA Bravo was seen today for multiple follow up.  I spent 25 minutes with Unruly and another 15  minutes on chart review, documentation,  same day.    Diagnoses and all orders for this visit:    Insomnia, unspecified type  -     QUEtiapine (SEROQUEL) 100 MG tablet; Take 1 tablet (100 mg) by mouth 2 times daily    Diabetes mellitus, type 2 (H)    Candidal intertrigo  -     nystatin (MYCOSTATIN) 654112 UNIT/GM external powder; Apply topically 2 times daily    Left foot pain  -     Orthopedic & Spine  Referral; Future    Lymphedema  -     LYMPHEDEMA THERAPY REFERRAL; Future  -     MISCELLANEOUS DME SUPPLY OR ACCESSORY, NOT OTHERWISE SPECIFIED    Urinary incontinence, unspecified type  -     Catheterization Supplies Order for DME - ONLY FOR DME    RTC in four weeks for a virtual visit    Ita Bragg MD

## 2021-05-22 NOTE — CONFIDENTIAL NOTE
gabapentin (NEURONTIN) 300 MG capsule    Last Written Prescription Date:  7/22/20  Last Fill Quantity: 90,   # refills: 1  Last Office Visit : 5/19/21  Future Office visit:  6/21/21    Routing refill request to provider for review/approval because: not on protocol. Gap in med rf.

## 2021-05-24 NOTE — TELEPHONE ENCOUNTER
PDMP site reviewed today. Gabapentin 300 mg capsule last filled 2/23/21 for #90, a 90-day supply.    Yolanda Alejandre RN ([place])

## 2021-05-26 NOTE — TELEPHONE ENCOUNTER
[facility] Call Center    Phone Message    May a detailed message be left on voicemail: no     Reason for Call: Other: Fabio calling from [facility] requesting a return call regarding orders for external catheters. Please call to discuss thank you.      Action Taken: Message routed to:  [facility]    Travel Screening: Not Applicable

## 2021-05-27 NOTE — PROGRESS NOTES
"ASSESSMENT:  Encounter Diagnoses   Name Primary?     Diabetic ulcer of toe of left foot associated with type 2 diabetes mellitus, with fat layer exposed (H) Yes     Type 2 diabetes, HbA1c goal < 7% (H)      Peripheral polyneuropathy      Hammer toe of left foot      MEDICAL DECISION MAKING:  I reviewed the 2021 documentation by the referring provider.  Excisional debridement was performed on the left second toe.  The wound appears healthier after debridement and did not probe to bone.    We reviewed the clinical signs of infection.  I reviewed basic wound cares with Unruly and his daughter.  He is to cleanse the wound daily, blot it dry, apply topical antibiotic and a light dressing.  We discussed the use of a crest pad type device to elevate the tip of the toe.    Follow-up in 3 weeks    Disclaimer: This note consists of symbols derived from keyboarding, dictation and/or voice recognition software. As a result, there may be errors in the script that have gone undetected. Please consider this when interpreting information found in this chart.    Lon Curtis DPM, FACFAS, MS    [place] [facility] & Ankle Surgery      ____________________________________________________________________    HPI:       I was asked by Ita Dang MD to evaluate Lawrence Louie in consultation for left foot pain.     Chief Complaint: left foot pain. 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)      Splenomegaly      Squamous cell " carcinoma      Thrombocytopenia (H)      Varices, esophageal (H)     Banded in 2011   *  *  Past Surgical History:   Procedure Laterality Date     BIOPSY OF SKIN LESION       COLONOSCOPY Left 6/16/2016    Procedure: COMBINED COLONOSCOPY, SINGLE OR MULTIPLE BIOPSY/POLYPECTOMY BY BIOPSY;  Surgeon: Brandy Barnett MD;  [facility]     ESOPHAGOSCOPY, GASTROSCOPY, DUODENOSCOPY (EGD), COMBINED  2/13/2013    Procedure: COMBINED ESOPHAGOSCOPY, GASTROSCOPY, DUODENOSCOPY (EGD);;  Surgeon: Tara Cook MD;  [facility]     ESOPHAGOSCOPY, GASTROSCOPY, DUODENOSCOPY (EGD), COMBINED  11/4/2013    Procedure: COMBINED ESOPHAGOSCOPY, GASTROSCOPY, DUODENOSCOPY (EGD);;  Surgeon: Lonny Diaz MD;  Location:  GI     ESOPHAGOSCOPY, GASTROSCOPY, DUODENOSCOPY (EGD), COMBINED Left 6/16/2016    Procedure: COMBINED ESOPHAGOSCOPY, GASTROSCOPY, DUODENOSCOPY (EGD), BIOPSY SINGLE OR MULTIPLE;  Surgeon: Brandy Barnett MD;  Location:  GI     EXCISE LESION TRUNK  9/24/2012    Procedure: EXCISE LESION TRUNK;;  Surgeon: Pepe Dominguez MD;  Location: [facility]     GENITOURINARY SURGERY      vasectomy     HERNIORRHAPHY UMBILICAL  9/24/2012    Procedure: HERNIORRHAPHY UMBILICAL;  UMBILICAL HERNIA REPAIR , EXCISION OF PERIUMBILICAL CYST;  Surgeon: Pepe Dominguez MD;  Location: [facility]     IR CHEMO EMBOLIZATION  7/30/2020     IR CHEMO EMBOLIZATION  10/1/2020     IR CHEMO EMBOLIZATION  1/7/2021     IR CHEMO EMBOLIZATION  5/5/2021   *  *  Current Outpatient Medications   Medication Sig Dispense Refill     Calcium Carbonate-Vit D-Min (CALCIUM 1200 PO) Plant based       desvenlafaxine (PRISTIQ) 100 MG 24 hr tablet Take 2 tablets (200 mg) by mouth daily (Patient taking differently: Take 100 mg by mouth daily Only 100mg) 180 tablet 1     EQL VITAMIN D3 50 MCG (2000 UT) CAPS Take 1 capsule by mouth daily 90 capsule 2     famotidine (PEPCID) 40 MG tablet Take 1 tablet (40 mg) by mouth daily 30 tablet 11      furosemide (LASIX) 40 MG tablet Take 40 mg twice daily. (Patient taking differently: Take 40 mg by mouth daily Take 40 mg daily) 180 tablet 3     hydrOXYzine (ATARAX) 25 MG tablet Take 1 tablet (25 mg) by mouth 3 times daily as needed for itching 90 tablet 3     insulin aspart (NOVOLOG FLEXPEN) 100 UNIT/ML pen 1 unit per 10 grams of carbohydrates + 1 per 50>140. Max daily dose 100 units. 30 mL 11     insulin glargine (LANTUS SOLOSTAR) 100 UNIT/ML pen 5 units daily in am. Increase by 5 units 2x weekly until fasting sugars are <130. Max daily dose 100 units. 15 mL 11     insulin pen needle (B-D U/F) 31G X 8 MM miscellaneous USE  6 times daily / OR AS DIRECTED 300 each 3     insulin pen needle (ULTICARE SHORT) 31G X 8 MM miscellaneous Use UP to 6 times daily or as directed 300 each 3     lactulose encephalopathy (CHRONULAC) 10 GM/15ML SOLUTION TAKE 30 MLS BY MOUTH 2 TIMES DAILY  TITRATE AS NEEDED TO ACHIEVE 3-5 BOWEL MOVEMENTS DAILY. 1892 mL 11     levETIRAcetam (KEPPRA) 500 MG tablet Take 500 mg by mouth 2 times daily        Lidocaine (LIDOCARE) 4 % Patch Place 1 patch onto the skin every 24 hours To prevent lidocaine toxicity, patient should be patch free for 12 hrs daily. 30 patch 3     MAGNESIUM CITRATE PO gummies-nature made       Nutritional Supplements (ENSURE) LIQD Take 1 Can by mouth daily 30 each 11     nystatin (MYCOSTATIN) 615931 UNIT/GM external powder Apply topically 2 times daily 60 g 11     ondansetron (ZOFRAN) 4 MG tablet Take 1 tablet (4 mg) by mouth every 8 hours as needed for nausea 20 tablet 0     order for DME Equipment being ordered:Orthopedic shoes 2 Units 0     oxyCODONE (ROXICODONE) 5 MG tablet TAKE 1 to 2 TABLETS BY MOUTH EVERY 8 HOURS AS NEEDED FOR SEVERE PAIN 180 tablet 0     polyethylene glycol (MIRALAX) 17 g packet Take 1 packet by mouth       potassium gluconate 2.5 MEQ tablet Take 1 tablet by mouth daily       prochlorperazine (COMPAZINE) 10 MG tablet Take 1 tablet (10 mg) by mouth  every 6 hours as needed for nausea or vomiting (take if zofran does not relieve nausea) 20 tablet 0     QUEtiapine (SEROQUEL) 100 MG tablet Take 100 mg by mouth At Bedtime        rifaximin (XIFAXAN) 550 MG TABS tablet Take 1 tablet (550 mg) by mouth 2 times daily 60 tablet 11     spironolactone (ALDACTONE) 50 MG tablet Take 2 tablets (100 mg) by mouth daily (Patient taking differently: Take 200 mg by mouth daily ) 180 tablet 3     traZODone (DESYREL) 50 MG tablet Take 1 tablet (50 mg) by mouth At Bedtime 30 tablet 11     ursodiol (ACTIGALL) 300 MG capsule TAKE THREE CAPSULES BY MOUTH TWO TIMES A DAY  180 capsule 11     blood glucose (NO BRAND SPECIFIED) lancets standard Use to test blood sugar 3-4 times daily. Use brand compatible with patients insurance and device. 360 each 3     blood glucose (NO BRAND SPECIFIED) test strip Use to test blood sugars 4 X  times daily or as directed 360 strip 3     blood glucose monitoring (NO BRAND SPECIFIED) meter device kit Use to test blood sugar 4 times daily or as directed. Before meals and snack at HS. 1 kit 0     citalopram (CELEXA) 20 MG tablet Take 1 tablet (20 mg) by mouth daily 90 tablet 3     COMPOUNDED NON-CONTROLLED SUBSTANCE (CMPD RX) - PHARMACY TO MIX COMPOUNDED MEDICATION Apply to the affected area once a day. (Patient not taking: Reported on 5/27/2021) 500 g 5     Cyanocobalamin (VITAMIN B-12 PO) Take 1 tablet by mouth daily       gabapentin (NEURONTIN) 300 MG capsule Take 1 capsule (300 mg) by mouth At Bedtime (Patient not taking: Reported on 5/27/2021) 90 capsule 3     lactulose (CEPHULAC) 20 GM packet Take 1 packet (20 g) by mouth 2 times daily (Patient not taking: Reported on 5/27/2021) 60 packet 0     magnesium oxide (MAG-OX) 400 (241.3 Mg) MG tablet Take 1 tablet (400 mg) by mouth daily (Patient not taking: Reported on 5/27/2021) 90 tablet 1     Melatonin 5 MG CHEW Take 2 tablets by mouth nightly as needed       nystatin (MYCOSTATIN) 081412 UNIT/GM external  cream Apply topically 2 times daily (Patient not taking: Reported on 5/27/2021) 30 g 11     order for DME Equipment being ordered: Condom catheter (Patient not taking: Reported on 5/27/2021) 1 Device 3     order for DME Equipment being ordered:BioTab compression pants pneumatic system (Patient not taking: Reported on 5/27/2021) 1 Piece 0     order for DME Equipment being ordered:bilateral pneumatic leg massage for treatment of extreme bilateral lower leg edema. (Patient not taking: Reported on 5/27/2021) 2 pump 0     OYSTER SHELL CALCIUM/D 500-200 MG-UNIT per tablet Take 1 tablet by mouth daily (Patient not taking: Reported on 5/27/2021) 90 tablet 3     pantoprazole (PROTONIX) 40 MG EC tablet Take 1 tablet (40 mg) by mouth daily (Patient not taking: Reported on 5/27/2021) 14 tablet 0     QUEtiapine (SEROQUEL) 100 MG tablet Take 1 tablet (100 mg) by mouth 2 times daily (Patient not taking: Reported on 5/27/2021) 90 tablet 3     tamsulosin (FLOMAX) 0.4 MG capsule Take 2 capsules (0.8 mg) by mouth daily (Patient not taking: Reported on 5/27/2021) 180 capsule 3         EXAM:    Vitals: /56   BMI: There is no height or weight on file to calculate BMI.    Constitutional:  Lawrence Louie is in no apparent distress, appears well-nourished.  Cooperative with history and physical exam.    Diabetic Foot Exam (details below):      Vascular:  Pedal pulses are difficult to palpate due to bilateral lower extremity edema.    Neuro: Light touch sensation is diminished, bilateral foot, to the L4, L5, S1 distributions.     Derm: There is some macerated hyperkeratotic skin at the distal aspect of the left second toe.  Deep to this is an ulceration.  Does not probe to bone.    Musculoskeletal:   Digital contractures.

## 2021-05-27 NOTE — LETTER
"    2021         RE: Lawrence Louie  [address] [place] [address]        Dear Colleague,    Thank you for referring your patient, Lawrence Louie, to the [facility]. Please see a copy of my visit note below.    ASSESSMENT:  Encounter Diagnoses   Name Primary?     Diabetic ulcer of toe of left foot associated with type 2 diabetes mellitus, with fat layer exposed (H) Yes     Type 2 diabetes, HbA1c goal < 7% (H)      Peripheral polyneuropathy      Hammer toe of left foot      MEDICAL DECISION MAKING:  I reviewed the 2021 documentation by the referring provider.  Excisional debridement was performed on the left second toe.  The wound appears healthier after debridement and did not probe to bone.    We reviewed the clinical signs of infection.  I reviewed basic wound cares with Unruly and his daughter.  He is to cleanse the wound daily, blot it dry, apply topical antibiotic and a light dressing.  We discussed the use of a crest pad type device to elevate the tip of the toe.    Follow-up in 3 weeks    Disclaimer: This note consists of symbols derived from keyboarding, dictation and/or voice recognition software. As a result, there may be errors in the script that have gone undetected. Please consider this when interpreting information found in this chart.    Lon Curtis DPM, FACFAS, [facility] Department of Podiatry/Foot & Ankle Surgery      ____________________________________________________________________    HPI:       I was asked by Ita Dang MD to evaluate Lawrence Louie in consultation for left foot pain.     Chief Complaint: left foot pain. 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)      Splenomegaly      Squamous cell carcinoma      Thrombocytopenia (H)      Varices, esophageal (H)     Banded in 2011   *  *  Past Surgical History:   Procedure Laterality Date     BIOPSY OF SKIN LESION       COLONOSCOPY Left 6/16/2016    Procedure: COMBINED COLONOSCOPY, SINGLE OR MULTIPLE BIOPSY/POLYPECTOMY BY BIOPSY;  Surgeon: Brandy Barnett MD;  Location:  GI     ESOPHAGOSCOPY, GASTROSCOPY, DUODENOSCOPY (EGD), COMBINED  2/13/2013    Procedure: COMBINED ESOPHAGOSCOPY, GASTROSCOPY, DUODENOSCOPY (EGD);;  Surgeon: Tara Cook MD;  [facility]     ESOPHAGOSCOPY, GASTROSCOPY, DUODENOSCOPY (EGD), COMBINED  11/4/2013    Procedure: COMBINED ESOPHAGOSCOPY, GASTROSCOPY, DUODENOSCOPY (EGD);;  Surgeon: Lonny Diaz MD;  Location:  GI     ESOPHAGOSCOPY, GASTROSCOPY, DUODENOSCOPY (EGD), COMBINED Left 6/16/2016    Procedure: COMBINED ESOPHAGOSCOPY, GASTROSCOPY, DUODENOSCOPY (EGD), BIOPSY SINGLE OR MULTIPLE;  Surgeon: Brandy Barnett MD;  Location:  GI     EXCISE LESION TRUNK  9/24/2012    Procedure: EXCISE LESION TRUNK;;  Surgeon: Pepe Dominguez MD;  Location: [facility]     GENITOURINARY SURGERY      vasectomy     HERNIORRHAPHY UMBILICAL  9/24/2012    Procedure: HERNIORRHAPHY UMBILICAL;  UMBILICAL HERNIA REPAIR , EXCISION OF PERIUMBILICAL CYST;  Surgeon: Pepe Dominguez MD;  Location: [facility]     IR CHEMO EMBOLIZATION  7/30/2020     IR CHEMO EMBOLIZATION  10/1/2020     IR CHEMO EMBOLIZATION  1/7/2021     IR CHEMO EMBOLIZATION  5/5/2021   *  *  Current Outpatient Medications   Medication Sig Dispense Refill     Calcium Carbonate-Vit D-Min (CALCIUM 1200 PO) Plant based       desvenlafaxine (PRISTIQ) 100 MG 24 hr tablet Take 2 tablets (200 mg) by  mouth daily (Patient taking differently: Take 100 mg by mouth daily Only 100mg) 180 tablet 1     EQL VITAMIN D3 50 MCG (2000 UT) CAPS Take 1 capsule by mouth daily 90 capsule 2     famotidine (PEPCID) 40 MG tablet Take 1 tablet (40 mg) by mouth daily 30 tablet 11     furosemide (LASIX) 40 MG tablet Take 40 mg twice daily. (Patient taking differently: Take 40 mg by mouth daily Take 40 mg daily) 180 tablet 3     hydrOXYzine (ATARAX) 25 MG tablet Take 1 tablet (25 mg) by mouth 3 times daily as needed for itching 90 tablet 3     insulin aspart (NOVOLOG FLEXPEN) 100 UNIT/ML pen 1 unit per 10 grams of carbohydrates + 1 per 50>140. Max daily dose 100 units. 30 mL 11     insulin glargine (LANTUS SOLOSTAR) 100 UNIT/ML pen 5 units daily in am. Increase by 5 units 2x weekly until fasting sugars are <130. Max daily dose 100 units. 15 mL 11     insulin pen needle (B-D U/F) 31G X 8 MM miscellaneous USE  6 times daily / OR AS DIRECTED 300 each 3     insulin pen needle (ULTICARE SHORT) 31G X 8 MM miscellaneous Use UP to 6 times daily or as directed 300 each 3     lactulose encephalopathy (CHRONULAC) 10 GM/15ML SOLUTION TAKE 30 MLS BY MOUTH 2 TIMES DAILY  TITRATE AS NEEDED TO ACHIEVE 3-5 BOWEL MOVEMENTS DAILY. 1892 mL 11     levETIRAcetam (KEPPRA) 500 MG tablet Take 500 mg by mouth 2 times daily        Lidocaine (LIDOCARE) 4 % Patch Place 1 patch onto the skin every 24 hours To prevent lidocaine toxicity, patient should be patch free for 12 hrs daily. 30 patch 3     MAGNESIUM CITRATE PO gummies-nature made       Nutritional Supplements (ENSURE) LIQD Take 1 Can by mouth daily 30 each 11     nystatin (MYCOSTATIN) 918286 UNIT/GM external powder Apply topically 2 times daily 60 g 11     ondansetron (ZOFRAN) 4 MG tablet Take 1 tablet (4 mg) by mouth every 8 hours as needed for nausea 20 tablet 0     order for DME Equipment being ordered:Orthopedic shoes 2 Units 0     oxyCODONE (ROXICODONE) 5 MG tablet TAKE 1 to 2 TABLETS BY MOUTH EVERY 8  HOURS AS NEEDED FOR SEVERE PAIN 180 tablet 0     polyethylene glycol (MIRALAX) 17 g packet Take 1 packet by mouth       potassium gluconate 2.5 MEQ tablet Take 1 tablet by mouth daily       prochlorperazine (COMPAZINE) 10 MG tablet Take 1 tablet (10 mg) by mouth every 6 hours as needed for nausea or vomiting (take if zofran does not relieve nausea) 20 tablet 0     QUEtiapine (SEROQUEL) 100 MG tablet Take 100 mg by mouth At Bedtime        rifaximin (XIFAXAN) 550 MG TABS tablet Take 1 tablet (550 mg) by mouth 2 times daily 60 tablet 11     spironolactone (ALDACTONE) 50 MG tablet Take 2 tablets (100 mg) by mouth daily (Patient taking differently: Take 200 mg by mouth daily ) 180 tablet 3     traZODone (DESYREL) 50 MG tablet Take 1 tablet (50 mg) by mouth At Bedtime 30 tablet 11     ursodiol (ACTIGALL) 300 MG capsule TAKE THREE CAPSULES BY MOUTH TWO TIMES A DAY  180 capsule 11     blood glucose (NO BRAND SPECIFIED) lancets standard Use to test blood sugar 3-4 times daily. Use brand compatible with patients insurance and device. 360 each 3     blood glucose (NO BRAND SPECIFIED) test strip Use to test blood sugars 4 X  times daily or as directed 360 strip 3     blood glucose monitoring (NO BRAND SPECIFIED) meter device kit Use to test blood sugar 4 times daily or as directed. Before meals and snack at HS. 1 kit 0     citalopram (CELEXA) 20 MG tablet Take 1 tablet (20 mg) by mouth daily 90 tablet 3     COMPOUNDED NON-CONTROLLED SUBSTANCE (CMPD RX) - PHARMACY TO MIX COMPOUNDED MEDICATION Apply to the affected area once a day. (Patient not taking: Reported on 5/27/2021) 500 g 5     Cyanocobalamin (VITAMIN B-12 PO) Take 1 tablet by mouth daily       gabapentin (NEURONTIN) 300 MG capsule Take 1 capsule (300 mg) by mouth At Bedtime (Patient not taking: Reported on 5/27/2021) 90 capsule 3     lactulose (CEPHULAC) 20 GM packet Take 1 packet (20 g) by mouth 2 times daily (Patient not taking: Reported on 5/27/2021) 60 packet 0      magnesium oxide (MAG-OX) 400 (241.3 Mg) MG tablet Take 1 tablet (400 mg) by mouth daily (Patient not taking: Reported on 5/27/2021) 90 tablet 1     Melatonin 5 MG CHEW Take 2 tablets by mouth nightly as needed       nystatin (MYCOSTATIN) 190743 UNIT/GM external cream Apply topically 2 times daily (Patient not taking: Reported on 5/27/2021) 30 g 11     order for DME Equipment being ordered: Condom catheter (Patient not taking: Reported on 5/27/2021) 1 Device 3     order for DME Equipment being ordered:BioTab compression pants pneumatic system (Patient not taking: Reported on 5/27/2021) 1 Piece 0     order for DME Equipment being ordered:bilateral pneumatic leg massage for treatment of extreme bilateral lower leg edema. (Patient not taking: Reported on 5/27/2021) 2 pump 0     OYSTER SHELL CALCIUM/D 500-200 MG-UNIT per tablet Take 1 tablet by mouth daily (Patient not taking: Reported on 5/27/2021) 90 tablet 3     pantoprazole (PROTONIX) 40 MG EC tablet Take 1 tablet (40 mg) by mouth daily (Patient not taking: Reported on 5/27/2021) 14 tablet 0     QUEtiapine (SEROQUEL) 100 MG tablet Take 1 tablet (100 mg) by mouth 2 times daily (Patient not taking: Reported on 5/27/2021) 90 tablet 3     tamsulosin (FLOMAX) 0.4 MG capsule Take 2 capsules (0.8 mg) by mouth daily (Patient not taking: Reported on 5/27/2021) 180 capsule 3         EXAM:    Vitals: /56   BMI: There is no height or weight on file to calculate BMI.    Constitutional:  Lawrence Louie is in no apparent distress, appears well-nourished.  Cooperative with history and physical exam.    Diabetic Foot Exam (details below):      Vascular:  Pedal pulses are difficult to palpate due to bilateral lower extremity edema.    Neuro: Light touch sensation is diminished, bilateral foot, to the L4, L5, S1 distributions.     Derm: There is some macerated hyperkeratotic skin at the distal aspect of the left second toe.  Deep to this is an ulceration.  Does not probe to  bone.    Musculoskeletal:   Digital contractures.          Again, thank you for allowing me to participate in the care of your patient.        Sincerely,        Lon Curtis DPM

## 2021-05-27 NOTE — PATIENT INSTRUCTIONS
Thank you for choosing [facility] Podiatry / Foot & Ankle Surgery!    DR. LOVE'S CLINIC LOCATIONS     [facility] SCHEDULE SURGERY: [phone]   [address] [place] [address] APPOINTMENTS: [phone]   [place] [address] BILLING QUESTIONS: [phone]  [phone] RADIOLOGY: [phone]       [facility]    [address]    [place] [address]    [phone]  [phone]      Follow up: 3 weeks      Wound Care Recommendations:    1)  Keep the wound covered by a bandage when bathing.    2)  Gently clean the wound with soap water, separate from bath/shower water.      3)  Each day, apply a topical antibiotic ointment to the central, red portion of the wound (Neosporin, Triple antibiotic, Bacitracin).  Betadine can be applied to the whitish skin.  Cover with large band-aid or gauze.      5)  Please seek immediate medical attention if any increasing redness, drainage, smell, or pain related to the wound.     6)  Please return to clinic in the period of time requested by Dr. Love.  (3 weeks)      SIGNS OF INFECTION    expanding redness around the wound     yellow or greenish-colored pus or cloudy wound drainage     red streaking spreading from the wound     increased swelling, tenderness, or pain around the wound     fever  *If you notice any of these signs of infection, call us right away!          [facility] LOCATIONS  [facility]  [address]  [place] [address]  Phone: [phone]  Fax: [phone] [facility]  [address]  [place] [address]  Phone: [phone]   Fax: [phone]   [facility]  [address]  [place] [address]  Phone: [phone]  Fax: [phone] [facility]  [address]  [place] [address]  Phone: [phone]   Fax: [phone]   [facility]   [address]  [place] [address]  Phone: [phone]  Fax: [phone] * Please call any location listed to make an appointment for a casting/fitting. Your referral was sent to their central office and they will all have the order on file.     WEARING YOUR CUSTOM FOOT ORTHOTICS   Most insurance plans cover one pair of orthotics per year. You must check with your   insurance plan to see what your payment responsibility will be. Please call your   insurance company by calling the number on the back of your insurance card.   Orthotic's are non-refundable and non-returnable.   Orthotics are made of various designs. Some orthotics are covered with material that extends beyond your toes. If your orthotic is of this design, you will likely need to trim the toe end to get a proper fit. The insole from your shoe can be used as a template. Simply overlay the shoe insert on top of the custom orthotic. Align the heel end while tracing the length of the insert onto the custom orthotic. Use a large scissor to trim the toe end until you get a proper fit in the shoe.   The orthotic needs to be pushed as far back in the shoe as possible. The heel portion should not ride forward so as not to irritate your heel.   Orthotics are designed to work with socks. Excessive perspiration will shorten the life span of the orthotics. Remove the orthotic from the shoe frequently for proper drying.   The break-in period lasts for weeks. People new to orthotics will likely experience new aches and pains. The orthotic is forcing your foot into a new position. Arch, foot and leg muscle aches and fatigue are common during these weeks. Minor discomfort can be considered normal break in phenomenon. Start wearing your orthotic around your home your first day. Limited activity for one to two hours is recommended. You can increase one or two additional hours each day provided the aches and pains are subsiding. The degree of discomfort, fatigue and problems will dictate the  speed of break in. You may require multiple weeks to work up to full time use.   Do not continue wearing your orthotics if they are creating problems such as blisters or sores. Do not hesitate to call the clinic to speak with a nurse regarding orthotic   break in, fit, trimming, etc. You may also need to see the doctor if the orthotics are   simply not working out. Adjustments are sometimes made to improve orthotic   function.     Orthotics will only work in certain styles and types of shoes. Orthotics rarely work in dress shoes. Slip-ons, clogs, sandals and heels are particularly troublesome. Specially designed orthotics may be necessary for these types of shoes. Your custom orthotic was designed for activities that require appropriate walking or running shoes. Lace up athletic shoes, walking shoes or work boots should work appropriately. You may need a wider or longer shoe. Shoes with a removable  or insert work best. In general, you want to remove an insert from the shoe before placing the orthotic into the shoe. Shoes without a removable liner may not work as well.     When purchasing new shoes, bring your orthotics along to get a proper fit. Shop at stores that are familiar with orthotics.   Frequent washing of the orthotic may shorten the life span of the top cover. The top cover can be replaced but will generally last one to five years depending on use and foot perspiration.

## 2021-05-27 NOTE — TELEPHONE ENCOUNTER
I called and asked for call back window in VM to avoid phone tag. Please get window of time best for call back.  Rhonda Rivas, EMT at 12:44 PM on 5/27/2021.

## 2021-05-30 NOTE — PROGRESS NOTES
[facility]           Assessment and Recommendations:   Problem List:    # Recurrent cellulitis in lower extremities   - Related to edema and wounds     #  Left leg wounds  - Has a tendency to pick at his skin     # History of MSSA wound infection and history of MSSA bacteremia in the past    # history of EtOH cirrhosis with ascites , esophageal varices s/p TIPS  - history of hepatic encephalopathy    # HCC receiving TACE    # Chronic lymphedema using ace wrap     # Diabetes mellitus with last HbA1c 6.3 ( 3/2020)    # Pancytopenia     # Received COVID-19 (Pfizer) in February 2021      Discussion:    Mr. Lawrence Louie is a year-old mal  with a complex medical history significant for EtOH cirrhosis with ascites, esophageal varices s/p TIPS, hepatic encephalopathy, multifocal HCC receiving TACE (transarterial chemoembolization), chronic lymphedema c/b recurrent cellulitis and wounds, and diabetes mellitus with last HbA1c 5.9% (8/2020), history of MSSA bacteremia in the past. Patient is followed in our clinic by Dr. Bird however she is not available at this moment and for a few months so the patient was seen by Dr. Robledo on 4/30/21 and today he will be seen by me. The reason why he was seen by Dr. Robledo on 4/30/21 because of multiple concerns,  mainly pain in his right heel, lower legs with a few open wounds on his left leg. He was started on cephelexin for treatment for presumable LLE cellulitis and superinfected bilateral feet wounds. He was scheduled with me today for follow up. Dr. Robledo started cephalexin 500 mg po q 6h for 7 days for presumable LLE cellulitis and superinfected bilateral feet wounds. He improved at that time.     On today's visit, patient is feeling not different from his baseline. He was seating in a chair and he had his daughter with him during the vitis. His daughter did tell me that he had a recent Podiatry visit on 5/27 with Dr. Curtis and he had some debridement of a wound on  the tip of his left second toe. He states that he is feeling the area more tender over the last couple of days. He denies fever or chills. His daughter was able to help and showed me the area. I did not see purulence in the ulcer but noted some mild erythema on the second left toe (proximal to the ulcer). She did state that he had improved significantly when he received cephalexin last month. He does not have history of MRSA.         Recommendations:    1. I will start cefadroxil 500 mg po q 12h for 7 day, for mild/initial cellulitis proximal to an ulcer on the tip of the left second toe    2. Continue wound care plan as recommended by Podiatry and Dr. Robledo    3. Follow up in 1 month with either Dr. Bird or Dr. Venkat John MD  Date of Service: 06/01/21 (documentation finalized on 06/03/21)       History of Present Illness:   Mr. Lawrence Louie is a year-old mal  with a complex medical history significant for EtOH cirrhosis with ascites, esophageal varices s/p TIPS, hepatic encephalopathy, multifocal HCC receiving TACE (transarterial chemoembolization), chronic lymphedema c/b recurrent cellulitis and wounds, and diabetes mellitus with last HbA1c 5.9% (8/2020), history of MSSA bacteremia in the past. Patient is followed in our clinic by Dr. Bird however she is not available at this moment and for a few months so the patient was seen by Dr. Robledo on 4/30/21 and today he will be seen by me. The reason why he was seen by Dr. Robledo on 4/30/21 because of multiple concerns,  mainly pain in his right heel, lower legs with a few open wounds on his left leg. He was started on cephelexin for treatment for presumable LLE cellulitis and superinfected bilateral feet wounds. He was scheduled with me today for follow up.      Per Dr. Robledo, Mr. Tolliver slipped once and fell in his kitchen one week prior his visit with Dr. Robledo on 4/30/21 and landed on his buttock and then to his left side (left foot is  bruised) He also picks at his skin and with long finger nails. He denied fever, chills, nausea, vomiting. On the visit with with Dr. Robledo on 4/30/21 he had redness on the left lower leg. Per Dr. Robledo's description, the  feet were maldorous. He had a CBC on 4/20/21 and showed WBC: 2; Hb: 7.2 and Plat: 60 (has history of pancytopenia).     Dr. Robledo started cephalexin 500 mg po q 6h for 7 days for presumable LLE cellulitis and superinfected bilateral feet wounds. Dr. Robledo also recommended: 1. To use antifungal powder to skin folds/ inguinal areas and in between toes after cleansing; 2. To continue current cleansing with hibiclens/ dial soap; 3. Use betadine iodine to the open wounds; 4. To keep finger nails short, avoid picking at skin; 4. Contact primary GI team for management of liver disease.    Recent labs (5/5/21):  - WBC: 2; Hb: 8.6; Plat: 51 (chronic cytopenias)  - Cr: 0.97; BUN: 16  - T bili: 0.7; Alk phos: 165  - AST/ALT within normal limits      Today's visit:    Patient is feeling not different from his baseline. He was seating in a chair and he had his daughter with him during the vitis. His daughter did tell me that he had a recent Podiatry visit on 5/27 with Dr. Curtis and he had some debridement of a wound on the tip of his left second toe. He states that he is feeling the area more tender over the last couple of days. He denies fever or chills. His daughter was able to help and showed me the area. I did not see purulence in the ulcer but note some mild erythema on the second toe (proximal to the ulcer). She did state that he had improved significantly when he received cephalexin last month. He does not have history of MRSA.            Review of Systems:     CONSTITUTIONAL:  No fevers or chills  INTEGUMENTARY/SKIN: See HPI  EYES: Negative for icterus, vision changes or irritation  ENT/MOUTH:  Negative for oral lesions and sore throat  RESPIRATORY:  Negative for cough and dyspnea  CARDIOVASCULAR:  Negative  for chest pain, palpitations and  shortness of breath  GASTROINTESTINAL:  Negative for abdominal pain, nausea, vomiting, diarrhea and constipation  GENITOURINARY:  Negative for dysuria, hematuria, frequency and urgency  MUSCULOSKELETAL: Negative for joint pain, swelling, motion restriction, negative for musculoskeletal pain  NEURO:  Negative for headache, altered mental status, numbness or weakness  PSYCHIATRIC: Negative for changes in mood or affect  HEMATOLOGIC/LYMPHATIC: Chronic cytopenias  ALLERGIC/IMMUNOLOGIC: Negative for allergic reaction   ENDOCRINE: Negative for temperature intolerance, skin/hair changes         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    PAST SURGICAL HISTORY:  Patient  has a past surgical history that includes Abdomen surgery; Herniorrhaphy umbilical (9/24/2012); Esophagoscopy, gastroscopy, duodenoscopy (EGD), combined (2/13/2013); Excise lesion trunk (9/24/2012); Esophagoscopy, gastroscopy, duodenoscopy (EGD), combined (11/4/2013); biopsy of skin lesion; Esophagoscopy, gastroscopy, duodenoscopy (EGD), combined (Left, 6/16/2016); Colonoscopy (Left, 6/16/2016); IR Chemo Embolization (7/30/2020); IR Chemo Embolization (10/1/2020); and IR Chemo Embolization (1/7/2021).        Allergies:       No Known Allergies          Family History:      Family History   Problem Relation Age of Onset     Breast Cancer Mother      Liver Cancer Mother      Cardiovascular Father      Cerebrovascular Disease Father         very low blood pressure     Cancer Father         rectal cancer     Cardiovascular Paternal Grandfather      Diabetes Brother      Cancer Sister         skin cancer     C.A.D. Other         MI, 70's     Breast Cancer Sister      Thyroid Disease No family hx of      Lipids No family hx of      Anesthesia Reaction No family hx of              Social History:     Social History     Socioeconomic History     Marital status:      Spouse name: Not on file     Number of children: Not on file     Years of education: Not on file     Highest education level: Not on file   Occupational History     Not on file   Social Needs     Financial resource strain: Not on file     Food insecurity     Worry: Not on file     Inability: Not on file     Transportation needs     Medical: Not on file     Non-medical: Not on file   Tobacco Use     Smoking status: Current Every Day Smoker     Packs/day: 0.10     Types: Cigarettes     Smokeless tobacco: Never Used     Tobacco comment: about 4 cigarettes per day   Substance and Sexual Activity     Alcohol use: No     Alcohol/week: 0.0 standard drinks     Comment: 2-3 per day , none since Dec 2012     Drug use: No     Sexual activity: Yes     Partners: Female     Birth control/protection: Surgical   Lifestyle     Physical activity     Days per week: Not on file     Minutes per session: Not on file     Stress: Not on file   Relationships     Social connections     Talks on phone: Not on file     Gets together: Not on file     Attends Sikh service: Not on file     Active member of club or organization: Not on file     Attends meetings of clubs or organizations: Not on file     Relationship status: Not on file     Intimate partner violence     Fear of current or ex partner: Not on file     Emotionally abused: Not on  file     Physically abused: Not on file     Forced sexual activity: Not on file   Other Topics Concern     Parent/sibling w/ CABG, MI or angioplasty before 65F 55M? Not Asked      Service No     Blood Transfusions No     Caffeine Concern No     Comment: very little coffee, likes beer     Occupational Exposure No     Hobby Hazards No     Sleep Concern Yes     Comment: bed at 3:30 AM, up at 1:00 PM     Stress Concern Yes     Weight Concern Yes     Special Diet No     Back Care No     Exercise No     Bike Helmet No     Seat Belt Yes     Self-Exams No   Social History Narrative    . 3 grown daughters. He has been sober since 2012.             Physical Exam:       Exam:  GENERAL:  Not in acute distress.   HEAD: Normocephalic and atraumatic  ENT:  No hearing impairment  LUNGS:  Breathing comfortably on room air  EXT/MS/SKIN: Bilateral lymphedema. Chronic LE wounds. There is an ulcer on the tip of the left second toe which was debrided by podiatry on 5/27.  I did not see purulence in the ulcer, but noted some mild erythema on the second toe (proximal to the ulcer).   NEUROLOGIC:  Grossly nonfocal. Mentation intact and speech normal  PSYCHIATRIC: Mood stable, mentation appears normal, affect normal

## 2021-06-01 NOTE — LETTER
6/1/2021       RE: Lawrence Louie  [address] [place] [address]     Dear Colleague,    Thank you for referring your patient, Lawrence Louie, to the [facility] DISE[facility]. Please see a copy of my visit note below.       GENERAL ID CLINIC           Assessment and Recommendations:   Problem List:    # Recurrent cellulitis in lower extremities   - Related to edema and wounds     #  Left leg wounds  - Has a tendency to pick at his skin     # History of MSSA wound infection and history of MSSA bacteremia in the past    # history of EtOH cirrhosis with ascites , esophageal varices s/p TIPS  - history of hepatic encephalopathy    # HCC receiving TACE    # Chronic lymphedema using ace wrap     # Diabetes mellitus with last HbA1c 6.3 ( 3/2020)    # Pancytopenia     # Received COVID-19 (Pfizer) in February 2021      Discussion:    Mr. Lawrence Louie is a year-old mal  with a complex medical history significant for EtOH cirrhosis with ascites, esophageal varices s/p TIPS, hepatic encephalopathy, multifocal HCC receiving TACE (transarterial chemoembolization), chronic lymphedema c/b recurrent cellulitis and wounds, and diabetes mellitus with last HbA1c 5.9% (8/2020), history of MSSA bacteremia in the past. Patient is followed in our clinic by Dr. Bird however she is not available at this moment and for a few months so the patient was seen by Dr. Robledo on 4/30/21 and today he will be seen by me. The reason why he was seen by Dr. Robledo on 4/30/21 because of multiple concerns,  mainly pain in his right heel, lower legs with a few open wounds on his left leg. He was started on cephelexin for treatment for presumable LLE cellulitis and superinfected bilateral feet wounds. He was scheduled with me today for follow up. Dr. Robledo started cephalexin 500 mg po q 6h for 7 days for presumable LLE cellulitis and  superinfected bilateral feet wounds. He improved at that time.     On today's visit, patient is feeling not different from his baseline. He was seating in a chair and he had his daughter with him during the vitis. His daughter did tell me that he had a recent Podiatry visit on 5/27 with Dr. Curtis and he had some debridement of a wound on the tip of his left second toe. He states that he is feeling the area more tender over the last couple of days. He denies fever or chills. His daughter was able to help and showed me the area. I did not see purulence in the ulcer but noted some mild erythema on the second left toe (proximal to the ulcer). She did state that he had improved significantly when he received cephalexin last month. He does not have history of MRSA.         Recommendations:    1. I will start cefadroxil 500 mg po q 12h for 7 day, for mild/initial cellulitis proximal to an ulcer on the tip of the left second toe    2. Continue wound care plan as recommended by Podiatry and Dr. Robledo    3. Follow up in 1 month with either Dr. Bird or Dr. Venkat John MD  Date of Service: 06/01/21 (documentation finalized on 06/03/21)       History of Present Illness:   Mr. Lawrence Louie is a year-old mal  with a complex medical history significant for EtOH cirrhosis with ascites, esophageal varices s/p TIPS, hepatic encephalopathy, multifocal HCC receiving TACE (transarterial chemoembolization), chronic lymphedema c/b recurrent cellulitis and wounds, and diabetes mellitus with last HbA1c 5.9% (8/2020), history of MSSA bacteremia in the past. Patient is followed in our clinic by Dr. Bird however she is not available at this moment and for a few months so the patient was seen by Dr. Robledo on 4/30/21 and today he will be seen by me. The reason why he was seen by Dr. Robledo on 4/30/21 because of multiple concerns,  mainly pain in his right heel, lower legs with a few open wounds on his left leg. He  was started on cephelexin for treatment for presumable LLE cellulitis and superinfected bilateral feet wounds. He was scheduled with me today for follow up.      Per Dr. Robledo, Mr. Tolliver slipped once and fell in his kitchen one week prior his visit with Dr. Robledo on 4/30/21 and landed on his buttock and then to his left side (left foot is bruised) He also picks at his skin and with long finger nails. He denied fever, chills, nausea, vomiting. On the visit with with Dr. Robledo on 4/30/21 he had redness on the left lower leg. Per Dr. Robledo's description, the  feet were maldorous. He had a CBC on 4/20/21 and showed WBC: 2; Hb: 7.2 and Plat: 60 (has history of pancytopenia).     Dr. Robledo started cephalexin 500 mg po q 6h for 7 days for presumable LLE cellulitis and superinfected bilateral feet wounds. Dr. Robledo also recommended: 1. To use antifungal powder to skin folds/ inguinal areas and in between toes after cleansing; 2. To continue current cleansing with hibiclens/ dial soap; 3. Use betadine iodine to the open wounds; 4. To keep finger nails short, avoid picking at skin; 4. Contact primary GI team for management of liver disease.    Recent labs (5/5/21):  - WBC: 2; Hb: 8.6; Plat: 51 (chronic cytopenias)  - Cr: 0.97; BUN: 16  - T bili: 0.7; Alk phos: 165  - AST/ALT within normal limits      Today's visit:    Patient is feeling not different from his baseline. He was seating in a chair and he had his daughter with him during the vitis. His daughter did tell me that he had a recent Podiatry visit on 5/27 with Dr. Curtis and he had some debridement of a wound on the tip of his left second toe. He states that he is feeling the area more tender over the last couple of days. He denies fever or chills. His daughter was able to help and showed me the area. I did not see purulence in the ulcer but note some mild erythema on the second toe (proximal to the ulcer). She did state that he had improved significantly when he received  cephalexin last month. He does not have history of MRSA.            Review of Systems:     CONSTITUTIONAL:  No fevers or chills  INTEGUMENTARY/SKIN: See HPI  EYES: Negative for icterus, vision changes or irritation  ENT/MOUTH:  Negative for oral lesions and sore throat  RESPIRATORY:  Negative for cough and dyspnea  CARDIOVASCULAR:  Negative for chest pain, palpitations and  shortness of breath  GASTROINTESTINAL:  Negative for abdominal pain, nausea, vomiting, diarrhea and constipation  GENITOURINARY:  Negative for dysuria, hematuria, frequency and urgency  MUSCULOSKELETAL: Negative for joint pain, swelling, motion restriction, negative for musculoskeletal pain  NEURO:  Negative for headache, altered mental status, numbness or weakness  PSYCHIATRIC: Negative for changes in mood or affect  HEMATOLOGIC/LYMPHATIC: Chronic cytopenias  ALLERGIC/IMMUNOLOGIC: Negative for allergic reaction   ENDOCRINE: Negative for temperature intolerance, skin/hair changes         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    PAST SURGICAL HISTORY:  Patient  has a past surgical history that includes Abdomen surgery; Herniorrhaphy umbilical (9/24/2012); Esophagoscopy, gastroscopy, duodenoscopy (EGD), combined (2/13/2013); Excise lesion trunk (9/24/2012); Esophagoscopy, gastroscopy,  duodenoscopy (EGD), combined (11/4/2013); biopsy of skin lesion; Esophagoscopy, gastroscopy, duodenoscopy (EGD), combined (Left, 6/16/2016); Colonoscopy (Left, 6/16/2016); IR Chemo Embolization (7/30/2020); IR Chemo Embolization (10/1/2020); and IR Chemo Embolization (1/7/2021).        Allergies:       No Known Allergies          Family History:     Family History   Problem Relation Age of Onset     Breast Cancer Mother      Liver Cancer Mother      Cardiovascular Father      Cerebrovascular Disease Father         very low blood pressure     Cancer Father         rectal cancer     Cardiovascular Paternal Grandfather      Diabetes Brother      Cancer Sister         skin cancer     C.A.D. Other         MI, 70's     Breast Cancer Sister      Thyroid Disease No family hx of      Lipids No family hx of      Anesthesia Reaction No family hx of              Social History:     Social History     Socioeconomic History     Marital status:      Spouse name: Not on file     Number of children: Not on file     Years of education: Not on file     Highest education level: Not on file   Occupational History     Not on file   Social Needs     Financial resource strain: Not on file     Food insecurity     Worry: Not on file     Inability: Not on file     Transportation needs     Medical: Not on file     Non-medical: Not on file   Tobacco Use     Smoking status: Current Every Day Smoker     Packs/day: 0.10     Types: Cigarettes     Smokeless tobacco: Never Used     Tobacco comment: about 4 cigarettes per day   Substance and Sexual Activity     Alcohol use: No     Alcohol/week: 0.0 standard drinks     Comment: 2-3 per day , none since Dec 2012     Drug use: No     Sexual activity: Yes     Partners: Female     Birth control/protection: Surgical   Lifestyle     Physical activity     Days per week: Not on file     Minutes per session: Not on file     Stress: Not on file   Relationships     Social connections     Talks on phone:  Not on file     Gets together: Not on file     Attends Islam service: Not on file     Active member of club or organization: Not on file     Attends meetings of clubs or organizations: Not on file     Relationship status: Not on file     Intimate partner violence     Fear of current or ex partner: Not on file     Emotionally abused: Not on file     Physically abused: Not on file     Forced sexual activity: Not on file   Other Topics Concern     Parent/sibling w/ CABG, MI or angioplasty before 65F 55M? Not Asked      Service No     Blood Transfusions No     Caffeine Concern No     Comment: very little coffee, likes beer     Occupational Exposure No     Hobby Hazards No     Sleep Concern Yes     Comment: bed at 3:30 AM, up at 1:00 PM     Stress Concern Yes     Weight Concern Yes     Special Diet No     Back Care No     Exercise No     Bike Helmet No     Seat Belt Yes     Self-Exams No   Social History Narrative    . 3 grown daughters. He has been sober since 2012.             Physical Exam:       Exam:  GENERAL:  Not in acute distress.   HEAD: Normocephalic and atraumatic  ENT:  No hearing impairment  LUNGS:  Breathing comfortably on room air  EXT/MS/SKIN: Bilateral lymphedema. Chronic LE wounds. There is an ulcer on the tip of the left second toe which was debrided by podiatry on 5/27.  I did not see purulence in the ulcer, but noted some mild erythema on the second toe (proximal to the ulcer).   NEUROLOGIC:  Grossly nonfocal. Mentation intact and speech normal  PSYCHIATRIC: Mood stable, mentation appears normal, affect normal            Unruly is a 65 year old who is being evaluated via a billable video visit.     ** patient can do Doximity **     How would you like to obtain your AVS? MyChart  If the video visit is dropped, the invitation should be resent by: Text to cell phone: [phone]  Will anyone else be joining your video visit? No      Video-Visit Details    Type of service:  Video  Visit    Video Start Time: 1:00 pm    Video End Time: 1:30 pm    Originating Location (pt. Location): Home    Distant Location (provider location):  [facility] INFECTIOUS DISEASE CLINIC [place]     Platform used for Video Visit: Nikole John MD

## 2021-06-01 NOTE — PROGRESS NOTES
Unruly is a 65 year old who is being evaluated via a billable video visit.     ** patient can do Doximity **     How would you like to obtain your AVS? MyChart  If the video visit is dropped, the invitation should be resent by: Text to cell phone: [phone]  Will anyone else be joining your video visit? No      Video-Visit Details    Type of service:  Video Visit    Video Start Time: 1:00 pm    Video End Time: 1:30 pm    Originating Location (pt. Location): Home    Distant Location (provider location):  [facility] INFECTIOUS DISEASE CLINIC [place]     Platform used for Video Visit: [employer]

## 2021-06-02 NOTE — TELEPHONE ENCOUNTER
Fabio said she can be reached at anytime today. She works from 7-3:30. Please call [phone]. Thank you.

## 2021-06-03 NOTE — TELEPHONE ENCOUNTER
I spoke with  home medical  Size range for condom cath was too wide, currently at 24-29 mm.   This is the first time the patient is getting this order, I suggested sending the most common sizes (25mm and 28mm) since the patient was not fitted in clinic. Fabio will be updating the order to reflect no specific size so that this can be adjusted if needed.   I spoke with urology and they note that often prescription is for 2-3 different sizes as the patient will often need to change sizes according to the day and comfort so this should not be an issue.     I let her know that the requested changes should be fine, including:   Changing quantity amount to 35 instead of 30. Insurance will cover this many and it is good to have extra in case of an accident or breakage.    Leg bag instead of bed drainage bag. Home medical supply reports that with condom cath leg bag is used instead of the one that was ordered.        home supply will be sending us a fax for Dr. Simmons to sign to officially approve changes to the order. I let her know I would let forms person know to look out for this.     Rhodna Rivas, EMT at 2:08 PM on 6/3/2021.

## 2021-06-03 NOTE — PROGRESS NOTES
Infusion Nursing Note:  Lawrence Louie presents today for Injectafer 2/2.    Patient seen by provider today: No   present during visit today: Not Applicable.    Note: Pt did well with his first infusion, denies any reason to hold therapy - pt in a wheel chair and came with care giver as he is low mobility, bilateral lymphedema, needs assistance with bathroom breaks.  Patient did meet criteria for an asymptomatic covid-19 PCR test in infusion today. Patient declined the covid-19 test. 05/21/2021 COVID vaccinees given on 02/21/2021 and 03/14/2021    Intravenous Access:  Peripheral IV placed.    Treatment Conditions:  Not Applicable.      Post Infusion Assessment:  Patient tolerated infusion without incident.  Patient observed for 30 minutes post Injectafer per protocol.  Site patent and intact, free from redness, edema or discomfort.  No evidence of extravasations.  Access discontinued per protocol.       Discharge Plan:   Follow up with ordering provider for labs/appointment.  Discharge instructions reviewed with: Patient.  Patient and/or family verbalized understanding of discharge instructions and all questions answered.  Patient discharged in stable condition accompanied by: self.  Departure Mode: Ambulatory.      Christina Powell RN

## 2021-06-08 NOTE — PROGRESS NOTES
Unruly is a 65 year old who is being evaluated via a billable video visit.      How would you like to obtain your AVS? [email or web address]  If the video visit is dropped, the invitation should be resent by: Send to e-mail at: [email or web address]  Will anyone else be joining your video visit? Yasmeen     Malinda Prieto HARRIETALONZO      Video Start Time: 12:42    Video-Visit Details    Type of service:  Video Visit    Video End Time:13    Originating Location (pt. Location): Home    Distant Location (provider location):  [facility]     Platform used for Video Visit: [employer]         HPI: Lawrence Louie is a 64 year old male who presents with diagnosis of unresectable multifocal HCC on a background of EtOH cirrhosis. He has h/o ETOH cirrhosis complicated by ascites s/p TIPS and hepatic encephalopathy and diffuse anasarca and MILLY and multiple hospitalizations due to sepsis secondary to leg ulcers. He had a cTACE of his segment 6, in July 2020 and cTACE of segment 7 lesion in October 2020 and cTACE to segment 6 lesion in January 2021. He is coming for 1 month follow up post cTACE of segment 4 lesion.     Currently he is doing fine but postprocedure he had some pain but way less than before. His major complaint is high diuresis and lower leg edema. He has stopped his diuretics and feels much better.   His appetite isn't the best but his weight is stable.      I have looked at his imaging and labs.  MRI show great response to cTACE with no enhancement of the treated tumor. The previous lesions are still non viable/indeterminate.   His MELD is 11.  AFP has risen slightly to 14      Impression and plan:  Good response to the last cTACE at 1 month follow up. There is no identifiable HCC in the last MRI at this point. The patient has not been following up with his oncologists and GI doctor. I strongly recommended to see them. We need a new chest CT and bone scan in about a month to make sure that there is no  extrahepatic progression. I can see the patient back in 3 month with new labs and MRI. The patient was accompanied by his daughter and they agreed with the plan.      Total time: 30 min

## 2021-06-08 NOTE — LETTER
6/8/2021         RE: Lawrence Louie  [address] [place] [address]        Dear Colleague,    Thank you for referring your patient, Lawrence Louie, to the [facility]. Please see a copy of my visit note below.    Unruly is a 65 year old who is being evaluated via a billable video visit.      How would you like to obtain your AVS? MyChart  If the video visit is dropped, the invitation should be resent by: Send to e-mail at: [email or web address]  Will anyone else be joining your video visit? No     Malinda LARIOSA      Video Start Time: 12:42    Video-Visit Details    Type of service:  Video Visit    Video End Time:13    Originating Location (pt. Location): Home    Distant Location (provider location):  [facility]     Platform used for Video Visit: [employer]         HPI: Lawrence Louie is a 64 year old male who presents with diagnosis of unresectable multifocal HCC on a background of EtOH cirrhosis. He has h/o ETOH cirrhosis complicated by ascites s/p TIPS and hepatic encephalopathy and diffuse anasarca and MILLY and multiple hospitalizations due to sepsis secondary to leg ulcers. He had a cTACE of his segment 6, in July 2020 and cTACE of segment 7 lesion in October 2020 and cTACE to segment 6 lesion in January 2021. He is coming for 1 month follow up post cTACE of segment 4 lesion.     Currently he is doing fine but postprocedure he had some pain but way less than before. His major complaint is high diuresis and lower leg edema. He has stopped his diuretics and feels much better.   His appetite isn't the best but his weight is stable.      I have looked at his imaging and labs.  MRI show great response to cTACE with no enhancement of the treated tumor. The previous lesions are still non viable/indeterminate.   His MELD is 11.  AFP has risen slightly to 14      Impression and plan:  Good response to the last cTACE at 1 month  follow up. There is no identifiable HCC in the last MRI at this point. The patient has not been following up with his oncologists and GI doctor. I strongly recommended to see them. We need a new chest CT and bone scan in about a month to make sure that there is no extrahepatic progression. I can see the patient back in 3 month with new labs and MRI. The patient was accompanied by his daughter and they agreed with the plan.      Total time: 30 min      Again, thank you for allowing me to participate in the care of your patient.        Sincerely,        Yady Hilton MD

## 2021-06-11 NOTE — PROGRESS NOTES
Left messages with daughter Kristi and spouse Raven asking for call back to discuss fluid status and diuretic dosing, number provided.  Raven left message confirming that patient has been on the following doses for quite some time: Lasix 40 mg daily and spironolactone 100 mg daily. According to daughter Kristi, some days the diuretics are skipped for one reason or another, and she feels the swelling decreases on those days. They try to keep his LE elevated when he is sitting or laying, and closely watch sodium intake. They will discuss further with Yovany on 6/14.

## 2021-06-14 NOTE — PROGRESS NOTES
Hepatology Follow-up Clinic note  Lawrence Louie   Date of Birth 1955  Date of Service 6/14/2021    Reason for follow-up: Cirrhosis          Assessment/plan:   Lawrence Louie is a 65 year old male with ETOH/SMITH cirrhosis and HCC, complicated by history of ascites s/p TIPS in April 2018. He has multifocal HCC and has undergone TACE x 4, last in May 2021. HE has been fairly well controlled. He has had persistent problems with anasarca and fluid overload. Patient/family has been resistant to increasing diuretics previously due to symptoms of overall malaise, but patient agreeable to attempting an increase again. His overall frailty and deconditioning limit his ability for consideration of liver transplantation.     # Anasarca  - Discontinue furosemide  - Start 1.5 mg torosemide daily   - Increase spironolactone to 150 mg daily   - BMP in one week     # HCC, multi-focal s/p TACE x 4:   - Continue follow up with IR as scheduled  - Oncology in the near future     # HE:  - Continue Rifaxmin 550 mg twice daily   - Continue lactulose (    # Anemia, stable   - Continue iron infusions PRN   - Ferritin in near future     # Frailty / Physical Deconditioning:   - Increase protein intake   - Recommend home PT for optimization. Will discuss with his PCP.     # Follow-up in clinic with Dr. Simmons in 6-8 weeks     Yovany Lopez PA-C   [facility] Hepatology clinic    I spent 75 minutes on the day of the visit reviewing records and discussing plan of care.     -----------------------------------------------------       HPI:   Lawrence Louie is a 65 year old male presenting for follow-up. Daughter, Kristi, joins for appointment today.        Cirrhosis  - ETOH and SMITH  Complicated by:  Ascites s/p TIPS on April 2018  Hx of HE   EGD: 6/16/2016  HCC: 6/26/2020 - See below      HCC:   Diagnosed: 6/26/2020   Lesion 1: inferior right hepatic lobe segment 6 is not well marginated, approximately 4.2 cm  Lesion  2: 4.1 cm area of arterial enhancement in segment 7  Lesion 3: Exophytic arterial enhancing nodule in segment 4A measuring 3.8 cm      Treatment:   - s/p TACE on 7/30/2020   - s/p TACE on 10/1/2020  - s/p TACE on 1/2021  - s/p TACE on 5/2021    Patient was last seen by me on 4/19/2021. He underwent TACE on 5/5/2021. Otherwise no recent hospitalizations.     Seeing podiatry for diabetic foot ulcer and ID for recurrent cellulitus in lower extremity due to edema and wounds. Lymphedema referral was placed. Wear wraps, but fluild builds-up in feet very quickly when they're removed. Has been on 40 mg furosemide and 100 mg spironolactone for along time. Doses are held during busy days with appointments.     No problems with confusion. Does some high fiber bars instead of lactulose to have regular bowel movements. Drinking plenty of fluid. Appetite is pretty good. Family continues to be diligent with 2000 mg sodium . Not sure if they've been getting enough protein in diet throughout day.     Needs help getting up from sitting at times. Has a difficult time walking due to heavy legs and seems to be getting more difficult.     Patient denies jaundice.  Patient also denies melena, hematochezia or hematemesis. Patient denies fevers, sweats or chills.    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)      Splenomegaly      Squamous cell carcinoma      Thrombocytopenia (H)      Varices, esophageal (H)     Past Surgical History:   Procedure Laterality Date     BIOPSY OF SKIN LESION       COLONOSCOPY Left 6/16/2016    Procedure: COMBINED COLONOSCOPY, SINGLE OR MULTIPLE BIOPSY/POLYPECTOMY BY BIOPSY;  Surgeon: Brandy Barnett MD;  Location:  GI     ESOPHAGOSCOPY, GASTROSCOPY, DUODENOSCOPY (EGD),  COMBINED  2/13/2013    Procedure: COMBINED ESOPHAGOSCOPY, GASTROSCOPY, DUODENOSCOPY (EGD);;  Surgeon: Tara Cook MD;  Location: [facility]     ESOPHAGOSCOPY, GASTROSCOPY, DUODENOSCOPY (EGD), COMBINED  11/4/2013    Procedure: COMBINED ESOPHAGOSCOPY, GASTROSCOPY, DUODENOSCOPY (EGD);;  Surgeon: Lonny Diaz MD;  Location: [facility]     ESOPHAGOSCOPY, GASTROSCOPY, DUODENOSCOPY (EGD), COMBINED Left 6/16/2016    Procedure: COMBINED ESOPHAGOSCOPY, GASTROSCOPY, DUODENOSCOPY (EGD), BIOPSY SINGLE OR MULTIPLE;  Surgeon: Brandy Barnett MD;  Location: [facility]     EXCISE LESION TRUNK  9/24/2012    Procedure: EXCISE LESION TRUNK;;  Surgeon: Pepe Dominguez MD;  Location: [facility]     GENITOURINARY SURGERY      vasectomy     HERNIORRHAPHY UMBILICAL  9/24/2012    Procedure: HERNIORRHAPHY UMBILICAL;  UMBILICAL HERNIA REPAIR , EXCISION OF PERIUMBILICAL CYST;  Surgeon: Pepe Dominguez MD;  Location: [facility]     IR CHEMO EMBOLIZATION  7/30/2020     IR CHEMO EMBOLIZATION  10/1/2020     IR CHEMO EMBOLIZATION  1/7/2021     IR CHEMO EMBOLIZATION  5/5/2021                 Medications:     Current Outpatient Medications   Medication     blood glucose (NO BRAND SPECIFIED) lancets standard     blood glucose (NO BRAND SPECIFIED) test strip     blood glucose monitoring (NO BRAND SPECIFIED) meter device kit     Calcium Carbonate-Vit D-Min (CALCIUM 1200 PO)     cefadroxil (DURICEF) 500 MG capsule     citalopram (CELEXA) 20 MG tablet     COMPOUNDED NON-CONTROLLED SUBSTANCE (CMPD RX) - PHARMACY TO MIX COMPOUNDED MEDICATION     Cyanocobalamin (VITAMIN B-12 PO)     desvenlafaxine (PRISTIQ) 100 MG 24 hr tablet     EQL VITAMIN D3 50 MCG (2000 UT) CAPS     famotidine (PEPCID) 40 MG tablet     furosemide (LASIX) 40 MG tablet     gabapentin (NEURONTIN) 300 MG capsule     hydrOXYzine (ATARAX) 25 MG tablet     insulin aspart (NOVOLOG FLEXPEN) 100 UNIT/ML pen     insulin glargine (LANTUS SOLOSTAR) 100 UNIT/ML pen     insulin pen needle (B-D  U/F) 31G X 8 MM miscellaneous     insulin pen needle (ULTICARE SHORT) 31G X 8 MM miscellaneous     lactulose (CEPHULAC) 20 GM packet     lactulose encephalopathy (CHRONULAC) 10 GM/15ML SOLUTION     levETIRAcetam (KEPPRA) 500 MG tablet     Lidocaine (LIDOCARE) 4 % Patch     MAGNESIUM CITRATE PO     magnesium oxide (MAG-OX) 400 (241.3 Mg) MG tablet     Melatonin 5 MG CHEW     Nutritional Supplements (ENSURE) LIQD     nystatin (MYCOSTATIN) 314835 UNIT/GM external cream     nystatin (MYCOSTATIN) 537276 UNIT/GM external powder     ondansetron (ZOFRAN) 4 MG tablet     order for DME     order for DME     order for DME     order for DME     oxyCODONE (ROXICODONE) 5 MG tablet     OYSTER SHELL CALCIUM/D 500-200 MG-UNIT per tablet     pantoprazole (PROTONIX) 40 MG EC tablet     polyethylene glycol (MIRALAX) 17 g packet     potassium gluconate 2.5 MEQ tablet     prochlorperazine (COMPAZINE) 10 MG tablet     QUEtiapine (SEROQUEL) 100 MG tablet     QUEtiapine (SEROQUEL) 100 MG tablet     rifaximin (XIFAXAN) 550 MG TABS tablet     spironolactone (ALDACTONE) 50 MG tablet     tamsulosin (FLOMAX) 0.4 MG capsule     tiotropium (SPIRIVA RESPIMAT) 2.5 MCG/ACT inhaler     traZODone (DESYREL) 50 MG tablet     ursodiol (ACTIGALL) 300 MG capsule     Current Facility-Administered Medications   Medication     0.9% sodium chloride BOLUS            Allergies:   No Known Allergies         Review of Systems:   10 points ROS was obtained and highlighted in the HPI, otherwise negative.          Physical Exam:     GENERAL: alert, appears chronically ill and older than age  EYES: Eyes grossly normal to inspection, conjunctivae and sclerae normal  RESP: no audible wheeze, cough, or visible cyanosis.  No visible retractions or increased work of breathing.  Able to speak fully in complete sentences  Kyphotic.   NEURO: Cranial nerves grossly intact, mentation intact and speech normal  PSYCH: mentation appears normal, affect normal, judgement and insight  intact, normal speech           Data:   Reviewed in person and significant for:    Lab Results   Component Value Date     06/01/2021      Lab Results   Component Value Date    POTASSIUM 3.7 06/01/2021     Lab Results   Component Value Date    CHLORIDE 109 06/01/2021     Lab Results   Component Value Date    CO2 27 06/01/2021     Lab Results   Component Value Date    BUN 11 06/01/2021     Lab Results   Component Value Date    CR 0.77 06/01/2021       Lab Results   Component Value Date    WBC 2.0 06/01/2021     Lab Results   Component Value Date    HGB 8.2 06/01/2021     Lab Results   Component Value Date    HCT 25.6 06/01/2021     Lab Results   Component Value Date    MCV 98 06/01/2021     Lab Results   Component Value Date    PLT 52 06/01/2021       Lab Results   Component Value Date    AST 29 06/01/2021     Lab Results   Component Value Date    ALT 21 06/01/2021     Lab Results   Component Value Date    BILICONJ 0.0 03/12/2014      Lab Results   Component Value Date    BILITOTAL 1.1 06/01/2021       Lab Results   Component Value Date    ALBUMIN 2.1 06/01/2021     Lab Results   Component Value Date    PROTTOTAL 5.5 06/01/2021      Lab Results   Component Value Date    ALKPHOS 179 06/01/2021       Lab Results   Component Value Date    INR 1.43 06/01/2021     MR ABDOMEN WO AND W CONTRAST:   6/1/2021:   IMPRESSION:       1. Cirrhosis and evidence for portal hypertension. Patent TIPS.  2. New post TACE changes in hepatic segment 4A. No residual arterial  enhancement. LR-TR nonviable.  3. Post TACE changes in hepatic segment 6 with small amount of  arterial enhancement at the superior portion likely similar to prior  exam given technical motion degradation. No associated washout. LR-TR  Equivocal.  4. Post TACE changes in hepatic segment 7. Subtle questionable small  focus of arterial enhancement posteriorly similar to prior exam  without additional suspicious ancillary features. LR-TR Equivocal.   5. Additional  tiny subcentimeter foci of arterial enhancement similar  to prior. LR-3.  6. Cholelithiasis.  7. Based on this exam only, the patient is within  [place] criteria

## 2021-06-14 NOTE — PROGRESS NOTES
Unruly is a 65 year old who is being evaluated via a billable video visit.      How would you like to obtain your AVS? Mail a copy  If the video visit is dropped, the invitation should be resent by: Text to cell phone: [phone]  Will anyone else be joining your video visit? No      Video Start Time: 1:16 pm  Video-Visit Details    Type of service:  Video Visit    Video End Time: 1:54 pm     Originating Location (pt. Location): Home    Distant Location (provider location):  [facility]PAT[facility] [place]     Platform used for Video Visit: [employer] ERWIN CMA

## 2021-06-14 NOTE — LETTER
6/14/2021         RE: Lawrence Louie  [address] [place] [address]        Dear Colleague,    Thank you for referring your patient, Lawrence Louie, to the [facility] [place]. Please see a copy of my visit note below.    Hepatology Follow-up Clinic note  Lawrence Louie   Date of Birth 1955  Date of Service 6/14/2021    Reason for follow-up: Cirrhosis          Assessment/plan:   Lawrence Louie is a 65 year old male with ETOH/SMITH cirrhosis and HCC, complicated by history of ascites s/p TIPS in April 2018. He has multifocal HCC and has undergone TACE x 4, last in May 2021. HE has been fairly well controlled. He has had persistent problems with anasarca and fluid overload. Patient/family has been resistant to increasing diuretics previously due to symptoms of overall malaise, but patient agreeable to attempting an increase again. His overall frailty and deconditioning limit his ability for consideration of liver transplantation.     # Anasarca  - Discontinue furosemide  - Start 1.5 mg torosemide daily   - Increase spironolactone to 150 mg daily   - BMP in one week     # HCC, multi-focal s/p TACE x 4:   - Continue follow up with IR as scheduled  - Oncology in the near future     # HE:  - Continue Rifaxmin 550 mg twice daily   - Continue lactulose (    # Anemia, stable   - Continue iron infusions PRN   - Ferritin in near future     # Frailty / Physical Deconditioning:   - Increase protein intake   - Recommend home PT for optimization. Will discuss with his PCP.     # Follow-up in clinic with Dr. Simmons in 6-8 weeks     Yovany Lopez PA-C   [facility] Hepatology clinic    -----------------------------------------------------       HPI:   Lawrence Louie is a 65 year old male presenting for follow-up. Daughter, Kristi, joins for appointment today.        Cirrhosis  - ETOH and SMITH  Complicated by:  Ascites s/p TIPS on April 2018  Hx of  HE   EGD: 6/16/2016  HCC: 6/26/2020 - See below      HCC:   Diagnosed: 6/26/2020   Lesion 1: inferior right hepatic lobe segment 6 is not well marginated, approximately 4.2 cm  Lesion 2: 4.1 cm area of arterial enhancement in segment 7  Lesion 3: Exophytic arterial enhancing nodule in segment 4A measuring 3.8 cm      Treatment:   - s/p TACE on 7/30/2020   - s/p TACE on 10/1/2020  - s/p TACE on 1/2021  - s/p TACE on 5/2021    Patient was last seen by me on 4/19/2021. He underwent TACE on 5/5/2021. Otherwise no recent hospitalizations.     Seeing podiatry for diabetic foot ulcer and ID for recurrent cellulitus in lower extremity due to edema and wounds. Lymphedema referral was placed. Wear wraps, but fluild builds-up in feet very quickly when they're removed. Has been on 40 mg furosemide and 100 mg spironolactone for along time. Doses are held during busy days with appointments.     No problems with confusion. Does some high fiber bars instead of lactulose to have regular bowel movements. Drinking plenty of fluid. Appetite is pretty good. Family continues to be diligent with 2000 mg sodium . Not sure if they've been getting enough protein in diet throughout day.     Needs help getting up from sitting at times. Has a difficult time walking due to heavy legs and seems to be getting more difficult.     Patient denies jaundice.  Patient also denies melena, hematochezia or hematemesis. Patient denies fevers, sweats or chills.    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)      Splenomegaly      Squamous cell carcinoma      Thrombocytopenia (H)      Varices, esophageal (H)     Past Surgical History:   Procedure Laterality Date     BIOPSY OF SKIN LESION       COLONOSCOPY Left 6/16/2016    Procedure:  COMBINED COLONOSCOPY, SINGLE OR MULTIPLE BIOPSY/POLYPECTOMY BY BIOPSY;  Surgeon: Brandy Barnett MD;  Location: [facility]     ESOPHAGOSCOPY, GASTROSCOPY, DUODENOSCOPY (EGD), COMBINED  2/13/2013    Procedure: COMBINED ESOPHAGOSCOPY, GASTROSCOPY, DUODENOSCOPY (EGD);;  Surgeon: Tara Cook MD;  Location: [facility]     ESOPHAGOSCOPY, GASTROSCOPY, DUODENOSCOPY (EGD), COMBINED  11/4/2013    Procedure: COMBINED ESOPHAGOSCOPY, GASTROSCOPY, DUODENOSCOPY (EGD);;  Surgeon: Lonny Diaz MD;  Location: [facility]     ESOPHAGOSCOPY, GASTROSCOPY, DUODENOSCOPY (EGD), COMBINED Left 6/16/2016    Procedure: COMBINED ESOPHAGOSCOPY, GASTROSCOPY, DUODENOSCOPY (EGD), BIOPSY SINGLE OR MULTIPLE;  Surgeon: Brandy Barnett MD;  Location: [facility]     EXCISE LESION TRUNK  9/24/2012    Procedure: EXCISE LESION TRUNK;;  Surgeon: Pepe Dominguez MD;  Location: [facility]     GENITOURINARY SURGERY      vasectomy     HERNIORRHAPHY UMBILICAL  9/24/2012    Procedure: HERNIORRHAPHY UMBILICAL;  UMBILICAL HERNIA REPAIR , EXCISION OF PERIUMBILICAL CYST;  Surgeon: Pepe Dominguez MD;  Location: [facility]     IR CHEMO EMBOLIZATION  7/30/2020     IR CHEMO EMBOLIZATION  10/1/2020     IR CHEMO EMBOLIZATION  1/7/2021     IR CHEMO EMBOLIZATION  5/5/2021                 Medications:     Current Outpatient Medications   Medication     blood glucose (NO BRAND SPECIFIED) lancets standard     blood glucose (NO BRAND SPECIFIED) test strip     blood glucose monitoring (NO BRAND SPECIFIED) meter device kit     Calcium Carbonate-Vit D-Min (CALCIUM 1200 PO)     cefadroxil (DURICEF) 500 MG capsule     citalopram (CELEXA) 20 MG tablet     COMPOUNDED NON-CONTROLLED SUBSTANCE (CMPD RX) - PHARMACY TO MIX COMPOUNDED MEDICATION     Cyanocobalamin (VITAMIN B-12 PO)     desvenlafaxine (PRISTIQ) 100 MG 24 hr tablet     EQL VITAMIN D3 50 MCG (2000 UT) CAPS     famotidine (PEPCID) 40 MG tablet     furosemide (LASIX) 40 MG tablet     gabapentin (NEURONTIN) 300 MG  capsule     hydrOXYzine (ATARAX) 25 MG tablet     insulin aspart (NOVOLOG FLEXPEN) 100 UNIT/ML pen     insulin glargine (LANTUS SOLOSTAR) 100 UNIT/ML pen     insulin pen needle (B-D U/F) 31G X 8 MM miscellaneous     insulin pen needle (ULTICARE SHORT) 31G X 8 MM miscellaneous     lactulose (CEPHULAC) 20 GM packet     lactulose encephalopathy (CHRONULAC) 10 GM/15ML SOLUTION     levETIRAcetam (KEPPRA) 500 MG tablet     Lidocaine (LIDOCARE) 4 % Patch     MAGNESIUM CITRATE PO     magnesium oxide (MAG-OX) 400 (241.3 Mg) MG tablet     Melatonin 5 MG CHEW     Nutritional Supplements (ENSURE) LIQD     nystatin (MYCOSTATIN) 322946 UNIT/GM external cream     nystatin (MYCOSTATIN) 152621 UNIT/GM external powder     ondansetron (ZOFRAN) 4 MG tablet     order for DME     order for DME     order for DME     order for DME     oxyCODONE (ROXICODONE) 5 MG tablet     OYSTER SHELL CALCIUM/D 500-200 MG-UNIT per tablet     pantoprazole (PROTONIX) 40 MG EC tablet     polyethylene glycol (MIRALAX) 17 g packet     potassium gluconate 2.5 MEQ tablet     prochlorperazine (COMPAZINE) 10 MG tablet     QUEtiapine (SEROQUEL) 100 MG tablet     QUEtiapine (SEROQUEL) 100 MG tablet     rifaximin (XIFAXAN) 550 MG TABS tablet     spironolactone (ALDACTONE) 50 MG tablet     tamsulosin (FLOMAX) 0.4 MG capsule     tiotropium (SPIRIVA RESPIMAT) 2.5 MCG/ACT inhaler     traZODone (DESYREL) 50 MG tablet     ursodiol (ACTIGALL) 300 MG capsule     Current Facility-Administered Medications   Medication     0.9% sodium chloride BOLUS            Allergies:   No Known Allergies         Review of Systems:   10 points ROS was obtained and highlighted in the HPI, otherwise negative.          Physical Exam:     GENERAL: alert, appears chronically ill and older than age  EYES: Eyes grossly normal to inspection, conjunctivae and sclerae normal  RESP: no audible wheeze, cough, or visible cyanosis.  No visible retractions or increased work of breathing.  Able to speak  fully in complete sentences  Kyphotic.   NEURO: Cranial nerves grossly intact, mentation intact and speech normal  PSYCH: mentation appears normal, affect normal, judgement and insight intact, normal speech           Data:   Reviewed in person and significant for:    Lab Results   Component Value Date     06/01/2021      Lab Results   Component Value Date    POTASSIUM 3.7 06/01/2021     Lab Results   Component Value Date    CHLORIDE 109 06/01/2021     Lab Results   Component Value Date    CO2 27 06/01/2021     Lab Results   Component Value Date    BUN 11 06/01/2021     Lab Results   Component Value Date    CR 0.77 06/01/2021       Lab Results   Component Value Date    WBC 2.0 06/01/2021     Lab Results   Component Value Date    HGB 8.2 06/01/2021     Lab Results   Component Value Date    HCT 25.6 06/01/2021     Lab Results   Component Value Date    MCV 98 06/01/2021     Lab Results   Component Value Date    PLT 52 06/01/2021       Lab Results   Component Value Date    AST 29 06/01/2021     Lab Results   Component Value Date    ALT 21 06/01/2021     Lab Results   Component Value Date    BILICONJ 0.0 03/12/2014      Lab Results   Component Value Date    BILITOTAL 1.1 06/01/2021       Lab Results   Component Value Date    ALBUMIN 2.1 06/01/2021     Lab Results   Component Value Date    PROTTOTAL 5.5 06/01/2021      Lab Results   Component Value Date    ALKPHOS 179 06/01/2021       Lab Results   Component Value Date    INR 1.43 06/01/2021     MR ABDOMEN WO AND W CONTRAST:   6/1/2021:   IMPRESSION:       1. Cirrhosis and evidence for portal hypertension. Patent TIPS.  2. New post TACE changes in hepatic segment 4A. No residual arterial  enhancement. LR-TR nonviable.  3. Post TACE changes in hepatic segment 6 with small amount of  arterial enhancement at the superior portion likely similar to prior  exam given technical motion degradation. No associated washout. LR-TR  Equivocal.  4. Post TACE changes in hepatic  segment 7. Subtle questionable small  focus of arterial enhancement posteriorly similar to prior exam  without additional suspicious ancillary features. LR-TR Equivocal.   5. Additional tiny subcentimeter foci of arterial enhancement similar  to prior. LR-3.  6. Cholelithiasis.  7. Based on this exam only, the patient is within  [place] criteria       Unruly is a 65 year old who is being evaluated via a billable video visit.      How would you like to obtain your AVS? Mail a copy  If the video visit is dropped, the invitation should be resent by: Text to cell phone: [phone]  Will anyone else be joining your video visit? No      Video Start Time: 1:16 pm  Video-Visit Details    Type of service:  Video Visit    Video End Time: 1:54 pm     Originating Location (pt. Location): [place]    Distant Location (provider location):  [facility] [place]     Platform used for Video Visit: [employer] ERWIN CMA      Again, thank you for allowing me to participate in the care of your patient.        Sincerely,        Yovany Lopez PA-C

## 2021-06-16 NOTE — PROGRESS NOTES
Miscellaneous note:     I received a message from the patient's daughter that the patient improved with the cefadroxil however she recently noted more drainage from the left second toe. He does not have fever, chills or any systemic symptom. I prescribed 7 days of cefadroxil + doxycycline.     I instructed the daughter to bring the patient to ER if any fever, chills, worsening of drainage or inflammation or any new symptom.

## 2021-06-17 PROBLEM — E11.621 DIABETIC ULCER OF TOE OF LEFT FOOT ASSOCIATED WITH TYPE 2 DIABETES MELLITUS, WITH FAT LAYER EXPOSED (H): Status: ACTIVE | Noted: 2021-01-01

## 2021-06-17 PROBLEM — L97.522 DIABETIC ULCER OF TOE OF LEFT FOOT ASSOCIATED WITH TYPE 2 DIABETES MELLITUS, WITH FAT LAYER EXPOSED (H): Status: ACTIVE | Noted: 2021-01-01

## 2021-06-17 NOTE — LETTER
"    6/17/2021         RE: Lawrence Louie  [address] [place] [address]        Dear Colleague,    Thank you for referring your patient, Lawrence Louie, to the [facility]. Please see a copy of my visit note below.    ASSESSMENT:  Encounter Diagnoses   Name Primary?     Diabetic ulcer of toe of left foot associated with type 2 diabetes mellitus, with fat layer exposed (H) Yes     Polyneuropathy associated with underlying disease (H)      Type 2 diabetes, HbA1c goal < 7% (H)      Lymphedema of both lower extremities      MEDICAL DECISION MAKING:  Persisting left second toe wound with underlying chronic conditions of peripheral type 2 diabetes and lymphedema.  I reviewed the 6/1/2021 documentation by infectious disease.  Cellulitis of the left second toe is likely but difficult to know if erythema is from edema.  Other toes are also erythematous.  He is to continue and complete the cefadroxil  I do not have a concern for osteomyelitis, the wound does not probe to a hard endpoint.  The wound was debrided via excision, please see below.  Reviewed daily wound cares: Cleansing, blotting dry, application of topical antibiotic to wound application of Betadine to the macerated skin  This wound will likely be very difficult to heal due to the contracture of the toe, peripheral neuropathy, and severe edema.  I explained that he is at risk for toe amputation.  Should the toe worsen, he is advised to go to the emergency department.  I appreciate infectious disease input  Follow up in 2-3 weeks    Excisional Debridement    The excisional debridement procedure was discussed.  This included the goals of removing non-viable tissue, evaluating the full extent of wound, and promoting wound healing.  Lawrence Louie  provided verba consent.  The \"Time Out\" was called, confirming procedure and location.     Using a sterile #15 blade and tissue nippers, excisional debridment of the " left second toe ulcer was performed. The hyperkeratotic eschar surrounding the wound was removed, by excising skin edges back to healthy, bleeding tissue. Non-viable tissue was excised.  Debridement was carried out to the depth of the fat layer. The ulcer base was scraped to remove bioburden and promote healing.  The area debrided was less than 20 square cm.   There was moderate bleeding with the procedure. No anesthesia was needed due to peripheral neuropathy.   A sterile dressing was applied.    Disclaimer: This note consists of symbols derived from keyboarding, dictation and/or voice recognition software. As a result, there may be errors in the script that have gone undetected. Please consider this when interpreting information found in this chart.    Lon Curtis DPM, FACDAVID, MS    [place] Department of Podiatry/Foot & Ankle Surgery      ____________________________________________________________________    HPI:       Follow-up for left second toe ulceration  He has type 2 diabetes and peripheral neuropathy  I last evaluated him on 5/27/2021.  Since that time, he had a virtual visit with infectious disease, and was placed on  cefadroxil 500 mg po q 12h for 7 day due to concern of cellulitis involving the left second toe.    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)      Splenomegaly      Squamous cell carcinoma      Thrombocytopenia (H)      Varices, esophageal (H)     Banded in 2011   *  *  Past Surgical History:   Procedure Laterality Date     BIOPSY OF SKIN LESION       COLONOSCOPY Left 6/16/2016    Procedure: COMBINED COLONOSCOPY, SINGLE OR MULTIPLE BIOPSY/POLYPECTOMY BY BIOPSY;  Surgeon: Brandy Barnett MD;  Location:  GI     ESOPHAGOSCOPY, GASTROSCOPY, DUODENOSCOPY (EGD), COMBINED   2/13/2013    Procedure: COMBINED ESOPHAGOSCOPY, GASTROSCOPY, DUODENOSCOPY (EGD);;  Surgeon: Tara Cook MD;  Location: [facility]     ESOPHAGOSCOPY, GASTROSCOPY, DUODENOSCOPY (EGD), COMBINED  11/4/2013    Procedure: COMBINED ESOPHAGOSCOPY, GASTROSCOPY, DUODENOSCOPY (EGD);;  Surgeon: Lonny Diaz MD;  Location: [facility] GI     ESOPHAGOSCOPY, GASTROSCOPY, DUODENOSCOPY (EGD), COMBINED Left 6/16/2016    Procedure: COMBINED ESOPHAGOSCOPY, GASTROSCOPY, DUODENOSCOPY (EGD), BIOPSY SINGLE OR MULTIPLE;  Surgeon: Brandy Barnett MD;  [facility]     EXCISE LESION TRUNK  9/24/2012    Procedure: EXCISE LESION TRUNK;;  Surgeon: Pepe Dominguez MD;  Location: [facility]     GENITOURINARY SURGERY      vasectomy     HERNIORRHAPHY UMBILICAL  9/24/2012    Procedure: HERNIORRHAPHY UMBILICAL;  UMBILICAL HERNIA REPAIR , EXCISION OF PERIUMBILICAL CYST;  Surgeon: Pepe Dominguez MD;  Location: [facility]     IR CHEMO EMBOLIZATION  7/30/2020     IR CHEMO EMBOLIZATION  10/1/2020     IR CHEMO EMBOLIZATION  1/7/2021     IR CHEMO EMBOLIZATION  5/5/2021   *  *  Current Outpatient Medications   Medication Sig Dispense Refill     blood glucose (NO BRAND SPECIFIED) lancets standard Use to test blood sugar 3-4 times daily. Use brand compatible with patients insurance and device. 360 each 3     blood glucose (NO BRAND SPECIFIED) test strip Use to test blood sugars 4 X  times daily or as directed 360 strip 3     blood glucose monitoring (NO BRAND SPECIFIED) meter device kit Use to test blood sugar 4 times daily or as directed. Before meals and snack at HS. 1 kit 0     bumetanide (BUMEX) 0.5 MG tablet Take 3 tablets (1.5 mg) by mouth daily 90 tablet 1     Calcium Carbonate-Vit D-Min (CALCIUM 1200 PO) Plant based       citalopram (CELEXA) 20 MG tablet Take 1 tablet (20 mg) by mouth daily 90 tablet 3     COMPOUNDED NON-CONTROLLED SUBSTANCE (CMPD RX) - PHARMACY TO MIX COMPOUNDED MEDICATION Apply to the affected area once a day. 500 g 5      Cyanocobalamin (VITAMIN B-12 PO) Take 1 tablet by mouth daily       desvenlafaxine (PRISTIQ) 100 MG 24 hr tablet Take 2 tablets (200 mg) by mouth daily (Patient taking differently: Take 100 mg by mouth daily Only 100mg) 180 tablet 1     doxycycline hyclate (VIBRAMYCIN) 100 MG capsule Take 1 capsule (100 mg) by mouth every 12 hours 14 capsule 0     EQL VITAMIN D3 50 MCG (2000 UT) CAPS Take 1 capsule by mouth daily 90 capsule 2     famotidine (PEPCID) 40 MG tablet Take 1 tablet (40 mg) by mouth daily 30 tablet 11     gabapentin (NEURONTIN) 300 MG capsule Take 1 capsule (300 mg) by mouth At Bedtime 90 capsule 3     hydrOXYzine (ATARAX) 25 MG tablet Take 1 tablet (25 mg) by mouth 3 times daily as needed for itching 90 tablet 3     insulin aspart (NOVOLOG FLEXPEN) 100 UNIT/ML pen 1 unit per 10 grams of carbohydrates + 1 per 50>140. Max daily dose 100 units. 30 mL 11     insulin glargine (LANTUS SOLOSTAR) 100 UNIT/ML pen 5 units daily in am. Increase by 5 units 2x weekly until fasting sugars are <130. Max daily dose 100 units. 15 mL 11     insulin pen needle (B-D U/F) 31G X 8 MM miscellaneous USE  6 times daily / OR AS DIRECTED 300 each 3     insulin pen needle (ULTICARE SHORT) 31G X 8 MM miscellaneous Use UP to 6 times daily or as directed 300 each 3     lactulose (CEPHULAC) 20 GM packet Take 1 packet (20 g) by mouth 2 times daily 60 packet 0     lactulose encephalopathy (CHRONULAC) 10 GM/15ML SOLUTION TAKE 30 MLS BY MOUTH 2 TIMES DAILY  TITRATE AS NEEDED TO ACHIEVE 3-5 BOWEL MOVEMENTS DAILY. 1892 mL 11     levETIRAcetam (KEPPRA) 500 MG tablet Take 500 mg by mouth 2 times daily        Lidocaine (LIDOCARE) 4 % Patch Place 1 patch onto the skin every 24 hours To prevent lidocaine toxicity, patient should be patch free for 12 hrs daily. 30 patch 3     MAGNESIUM CITRATE PO gummies-nature made       magnesium oxide (MAG-OX) 400 (241.3 Mg) MG tablet Take 1 tablet (400 mg) by mouth daily 90 tablet 1     Melatonin 5 MG CHEW Take  2 tablets by mouth nightly as needed       Nutritional Supplements (ENSURE) LIQD Take 1 Can by mouth daily 30 each 11     nystatin (MYCOSTATIN) 825824 UNIT/GM external cream Apply topically 2 times daily 30 g 11     nystatin (MYCOSTATIN) 357971 UNIT/GM external powder Apply topically 2 times daily 60 g 11     ondansetron (ZOFRAN) 4 MG tablet Take 1 tablet (4 mg) by mouth every 8 hours as needed for nausea 20 tablet 0     order for DME Equipment being ordered:Orthopedic shoes 2 Units 0     order for DME Equipment being ordered: Condom catheter 1 Device 3     order for DME Equipment being ordered:BioTab compression pants pneumatic system 1 Piece 0     order for DME Equipment being ordered:bilateral pneumatic leg massage for treatment of extreme bilateral lower leg edema. 2 pump 0     oxyCODONE (ROXICODONE) 5 MG tablet TAKE 1 to 2 TABLETS BY MOUTH EVERY 8 HOURS AS NEEDED FOR SEVERE PAIN 180 tablet 0     OYSTER SHELL CALCIUM/D 500-200 MG-UNIT per tablet Take 1 tablet by mouth daily 90 tablet 3     pantoprazole (PROTONIX) 40 MG EC tablet Take 1 tablet (40 mg) by mouth daily 14 tablet 0     polyethylene glycol (MIRALAX) 17 g packet Take 1 packet by mouth       potassium gluconate 2.5 MEQ tablet Take 1 tablet by mouth daily       prochlorperazine (COMPAZINE) 10 MG tablet Take 1 tablet (10 mg) by mouth every 6 hours as needed for nausea or vomiting (take if zofran does not relieve nausea) 20 tablet 0     QUEtiapine (SEROQUEL) 100 MG tablet Take 1 tablet (100 mg) by mouth 2 times daily 90 tablet 3     QUEtiapine (SEROQUEL) 100 MG tablet Take 100 mg by mouth At Bedtime        rifaximin (XIFAXAN) 550 MG TABS tablet Take 1 tablet (550 mg) by mouth 2 times daily 60 tablet 11     spironolactone (ALDACTONE) 50 MG tablet Take 3 tablets (150 mg) by mouth daily 180 tablet 0     tamsulosin (FLOMAX) 0.4 MG capsule Take 2 capsules (0.8 mg) by mouth daily 180 capsule 3     tiotropium (SPIRIVA RESPIMAT) 2.5 MCG/ACT inhaler Inhale 2 puffs  into the lungs daily 4 g 0     traZODone (DESYREL) 50 MG tablet Take 1 tablet (50 mg) by mouth At Bedtime 30 tablet 11     ursodiol (ACTIGALL) 300 MG capsule TAKE THREE CAPSULES BY MOUTH TWO TIMES A DAY  180 capsule 11         EXAM:    Vitals: /70   BMI: There is no height or weight on file to calculate BMI.    Constitutional:  Lawrence Louie is in no apparent distress, appears well-nourished.  Cooperative with history and physical exam.    Vascular: Pedal pulses are difficult to palpate due to bilateral lower extremity edema.  Significant bilateral LE edema with compressive wraps     Neuro: Light touch sensation is diminished, bilateral foot, to the L4, L5, S1 distributions     Derm: ulceration distal left 2nd toe; 0.5cm diameter, does not probe to bone, macerated hyperkeratotic skin around the margins. The nail is loose, nearly falling off.  Generalized edema-related erythema bilateral LE.    Musculoskeletal:    Severe flat foot. Digital contractures, including the left 2nd toe.            Again, thank you for allowing me to participate in the care of your patient.        Sincerely,        Lon Curtis, PARESH

## 2021-06-17 NOTE — PROGRESS NOTES
"ASSESSMENT:  Encounter Diagnoses   Name Primary?     Diabetic ulcer of toe of left foot associated with type 2 diabetes mellitus, with fat layer exposed (H) Yes     Polyneuropathy associated with underlying disease (H)      Type 2 diabetes, HbA1c goal < 7% (H)      Lymphedema of both lower extremities      MEDICAL DECISION MAKING:  Persisting left second toe wound with underlying chronic conditions of peripheral type 2 diabetes and lymphedema.  I reviewed the 6/1/2021 documentation by infectious disease.  Cellulitis of the left second toe is likely but difficult to know if erythema is from edema.  Other toes are also erythematous.  He is to continue and complete the cefadroxil  I do not have a concern for osteomyelitis, the wound does not probe to a hard endpoint.  The wound was debrided via excision, please see below.  Reviewed daily wound cares: Cleansing, blotting dry, application of topical antibiotic to wound application of Betadine to the macerated skin  This wound will likely be very difficult to heal due to the contracture of the toe, peripheral neuropathy, and severe edema.  I explained that he is at risk for toe amputation.  Should the toe worsen, he is advised to go to the emergency department.  I appreciate infectious disease input  Follow up in 2-3 weeks    Excisional Debridement    The excisional debridement procedure was discussed.  This included the goals of removing non-viable tissue, evaluating the full extent of wound, and promoting wound healing.  Lawrence Louie  provided verba consent.  The \"Time Out\" was called, confirming procedure and location.     Using a sterile #15 blade and tissue nippers, excisional debridment of the left second toe ulcer was performed. The hyperkeratotic eschar surrounding the wound was removed, by excising skin edges back to healthy, bleeding tissue. Non-viable tissue was excised.  Debridement was carried out to the depth of the fat layer. The ulcer base was " scraped to remove bioburden and promote healing.  The area debrided was less than 20 square cm.   There was moderate bleeding with the procedure. No anesthesia was needed due to peripheral neuropathy.   A sterile dressing was applied.    Disclaimer: This note consists of symbols derived from keyboarding, dictation and/or voice recognition software. As a result, there may be errors in the script that have gone undetected. Please consider this when interpreting information found in this chart.    Lon Curtis, PARESH, FACFAS, MS    [place] Department of Podiatry/Foot & Ankle Surgery      ____________________________________________________________________    HPI:       Follow-up for left second toe ulceration  He has type 2 diabetes and peripheral neuropathy  I last evaluated him on 5/27/2021.  Since that time, he had a virtual visit with infectious disease, and was placed on  cefadroxil 500 mg po q 12h for 7 day due to concern of cellulitis involving the left second toe.    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)      Splenomegaly      Squamous cell carcinoma      Thrombocytopenia (H)      Varices, esophageal (H)     Banded in 2011   *  *  Past Surgical History:   Procedure Laterality Date     BIOPSY OF SKIN LESION       COLONOSCOPY Left 6/16/2016    Procedure: COMBINED COLONOSCOPY, SINGLE OR MULTIPLE BIOPSY/POLYPECTOMY BY BIOPSY;  Surgeon: Brandy Barnett MD;  Location:  GI     ESOPHAGOSCOPY, GASTROSCOPY, DUODENOSCOPY (EGD), COMBINED  2/13/2013    Procedure: COMBINED ESOPHAGOSCOPY, GASTROSCOPY, DUODENOSCOPY (EGD);;  Surgeon: Tara Cook MD;  Location:  GI     ESOPHAGOSCOPY, GASTROSCOPY, DUODENOSCOPY (EGD), COMBINED  11/4/2013    Procedure: COMBINED ESOPHAGOSCOPY, GASTROSCOPY,  DUODENOSCOPY (EGD);;  Surgeon: Lonny Diaz MD;  Location:  GI     ESOPHAGOSCOPY, GASTROSCOPY, DUODENOSCOPY (EGD), COMBINED Left 6/16/2016    Procedure: COMBINED ESOPHAGOSCOPY, GASTROSCOPY, DUODENOSCOPY (EGD), BIOPSY SINGLE OR MULTIPLE;  Surgeon: Brandy Barnett MD;  Location:  GI     EXCISE LESION TRUNK  9/24/2012    Procedure: EXCISE LESION TRUNK;;  Surgeon: Pepe Dominguez MD;  Location: [facility]     GENITOURINARY SURGERY      vasectomy     HERNIORRHAPHY UMBILICAL  9/24/2012    Procedure: HERNIORRHAPHY UMBILICAL;  UMBILICAL HERNIA REPAIR , EXCISION OF PERIUMBILICAL CYST;  Surgeon: Pepe Dominguez MD;  Location: [facility]     IR CHEMO EMBOLIZATION  7/30/2020     IR CHEMO EMBOLIZATION  10/1/2020     IR CHEMO EMBOLIZATION  1/7/2021     IR CHEMO EMBOLIZATION  5/5/2021   *  *  Current Outpatient Medications   Medication Sig Dispense Refill     blood glucose (NO BRAND SPECIFIED) lancets standard Use to test blood sugar 3-4 times daily. Use brand compatible with patients insurance and device. 360 each 3     blood glucose (NO BRAND SPECIFIED) test strip Use to test blood sugars 4 X  times daily or as directed 360 strip 3     blood glucose monitoring (NO BRAND SPECIFIED) meter device kit Use to test blood sugar 4 times daily or as directed. Before meals and snack at HS. 1 kit 0     bumetanide (BUMEX) 0.5 MG tablet Take 3 tablets (1.5 mg) by mouth daily 90 tablet 1     Calcium Carbonate-Vit D-Min (CALCIUM 1200 PO) Plant based       citalopram (CELEXA) 20 MG tablet Take 1 tablet (20 mg) by mouth daily 90 tablet 3     COMPOUNDED NON-CONTROLLED SUBSTANCE (CMPD RX) - PHARMACY TO MIX COMPOUNDED MEDICATION Apply to the affected area once a day. 500 g 5     Cyanocobalamin (VITAMIN B-12 PO) Take 1 tablet by mouth daily       desvenlafaxine (PRISTIQ) 100 MG 24 hr tablet Take 2 tablets (200 mg) by mouth daily (Patient taking differently: Take 100 mg by mouth daily Only 100mg) 180 tablet 1     doxycycline hyclate  (VIBRAMYCIN) 100 MG capsule Take 1 capsule (100 mg) by mouth every 12 hours 14 capsule 0     EQL VITAMIN D3 50 MCG (2000 UT) CAPS Take 1 capsule by mouth daily 90 capsule 2     famotidine (PEPCID) 40 MG tablet Take 1 tablet (40 mg) by mouth daily 30 tablet 11     gabapentin (NEURONTIN) 300 MG capsule Take 1 capsule (300 mg) by mouth At Bedtime 90 capsule 3     hydrOXYzine (ATARAX) 25 MG tablet Take 1 tablet (25 mg) by mouth 3 times daily as needed for itching 90 tablet 3     insulin aspart (NOVOLOG FLEXPEN) 100 UNIT/ML pen 1 unit per 10 grams of carbohydrates + 1 per 50>140. Max daily dose 100 units. 30 mL 11     insulin glargine (LANTUS SOLOSTAR) 100 UNIT/ML pen 5 units daily in am. Increase by 5 units 2x weekly until fasting sugars are <130. Max daily dose 100 units. 15 mL 11     insulin pen needle (B-D U/F) 31G X 8 MM miscellaneous USE  6 times daily / OR AS DIRECTED 300 each 3     insulin pen needle (ULTICARE SHORT) 31G X 8 MM miscellaneous Use UP to 6 times daily or as directed 300 each 3     lactulose (CEPHULAC) 20 GM packet Take 1 packet (20 g) by mouth 2 times daily 60 packet 0     lactulose encephalopathy (CHRONULAC) 10 GM/15ML SOLUTION TAKE 30 MLS BY MOUTH 2 TIMES DAILY  TITRATE AS NEEDED TO ACHIEVE 3-5 BOWEL MOVEMENTS DAILY. 1892 mL 11     levETIRAcetam (KEPPRA) 500 MG tablet Take 500 mg by mouth 2 times daily        Lidocaine (LIDOCARE) 4 % Patch Place 1 patch onto the skin every 24 hours To prevent lidocaine toxicity, patient should be patch free for 12 hrs daily. 30 patch 3     MAGNESIUM CITRATE PO gummies-nature made       magnesium oxide (MAG-OX) 400 (241.3 Mg) MG tablet Take 1 tablet (400 mg) by mouth daily 90 tablet 1     Melatonin 5 MG CHEW Take 2 tablets by mouth nightly as needed       Nutritional Supplements (ENSURE) LIQD Take 1 Can by mouth daily 30 each 11     nystatin (MYCOSTATIN) 429391 UNIT/GM external cream Apply topically 2 times daily 30 g 11     nystatin (MYCOSTATIN) 017819 UNIT/GM  external powder Apply topically 2 times daily 60 g 11     ondansetron (ZOFRAN) 4 MG tablet Take 1 tablet (4 mg) by mouth every 8 hours as needed for nausea 20 tablet 0     order for DME Equipment being ordered:Orthopedic shoes 2 Units 0     order for DME Equipment being ordered: Condom catheter 1 Device 3     order for DME Equipment being ordered:BioTab compression pants pneumatic system 1 Piece 0     order for DME Equipment being ordered:bilateral pneumatic leg massage for treatment of extreme bilateral lower leg edema. 2 pump 0     oxyCODONE (ROXICODONE) 5 MG tablet TAKE 1 to 2 TABLETS BY MOUTH EVERY 8 HOURS AS NEEDED FOR SEVERE PAIN 180 tablet 0     OYSTER SHELL CALCIUM/D 500-200 MG-UNIT per tablet Take 1 tablet by mouth daily 90 tablet 3     pantoprazole (PROTONIX) 40 MG EC tablet Take 1 tablet (40 mg) by mouth daily 14 tablet 0     polyethylene glycol (MIRALAX) 17 g packet Take 1 packet by mouth       potassium gluconate 2.5 MEQ tablet Take 1 tablet by mouth daily       prochlorperazine (COMPAZINE) 10 MG tablet Take 1 tablet (10 mg) by mouth every 6 hours as needed for nausea or vomiting (take if zofran does not relieve nausea) 20 tablet 0     QUEtiapine (SEROQUEL) 100 MG tablet Take 1 tablet (100 mg) by mouth 2 times daily 90 tablet 3     QUEtiapine (SEROQUEL) 100 MG tablet Take 100 mg by mouth At Bedtime        rifaximin (XIFAXAN) 550 MG TABS tablet Take 1 tablet (550 mg) by mouth 2 times daily 60 tablet 11     spironolactone (ALDACTONE) 50 MG tablet Take 3 tablets (150 mg) by mouth daily 180 tablet 0     tamsulosin (FLOMAX) 0.4 MG capsule Take 2 capsules (0.8 mg) by mouth daily 180 capsule 3     tiotropium (SPIRIVA RESPIMAT) 2.5 MCG/ACT inhaler Inhale 2 puffs into the lungs daily 4 g 0     traZODone (DESYREL) 50 MG tablet Take 1 tablet (50 mg) by mouth At Bedtime 30 tablet 11     ursodiol (ACTIGALL) 300 MG capsule TAKE THREE CAPSULES BY MOUTH TWO TIMES A DAY  180 capsule 11         EXAM:    Vitals: /70    BMI: There is no height or weight on file to calculate BMI.    Constitutional:  Lawrence Louie is in no apparent distress, appears well-nourished.  Cooperative with history and physical exam.    Vascular: Pedal pulses are difficult to palpate due to bilateral lower extremity edema.  Significant bilateral LE edema with compressive wraps     Neuro: Light touch sensation is diminished, bilateral foot, to the L4, L5, S1 distributions     Derm: ulceration distal left 2nd toe; 0.5cm diameter, does not probe to bone, macerated hyperkeratotic skin around the margins. The nail is loose, nearly falling off.  Generalized edema-related erythema bilateral LE.    Musculoskeletal:    Severe flat foot. Digital contractures, including the left 2nd toe.

## 2021-06-17 NOTE — PATIENT INSTRUCTIONS
Thank you for choosing [facility] Podiatry / Foot & Ankle Surgery!    DR. LOVE'S CLINIC LOCATIONS     [facility] SCHEDULE SURGERY: [phone]   [address] [place] [address] APPOINTMENTS: [phone]   [place] [address] BILLING QUESTIONS: [phone]  [phone] RADIOLOGY: [phone]       [facility]    [id number]    [place] [address]    [phone]  [phone]      Follow up: 2-3 weeks    SIGNS OF INFECTION    expanding redness around the wound     yellow or greenish-colored pus or cloudy wound drainage     red streaking spreading from the wound     increased swelling, tenderness, or pain around the wound     fever  *If you notice any of these signs of infection, call us right away!        Wound Care Recommendations:    1)  Keep the wound covered by a bandage when bathing.    2)  Gently clean the wound with soap water, separate from bath/shower water.      3)  Each day, apply a topical antibiotic ointment to the wound (Neosporin, Triple antibiotic, Bacitracin).   Cover with large band-aid or gauze.      5)  Please seek immediate medical attention if any increasing redness, drainage, smell, or pain related to the wound.     6)  Please return to clinic in the period of time requested by Dr. Love.

## 2021-06-21 NOTE — CONFIDENTIAL NOTE
5/19/2021  [facility] Internal Medicine [place]   Ita Dang MD  Internal Medicine     Test strips

## 2021-06-21 NOTE — NURSING NOTE
Chief Complaint   Patient presents with     Recheck Medication     4 week follow up per pt        Rhonda Rivas EMT at 11:38 AM on 6/21/2021.

## 2021-06-21 NOTE — PROGRESS NOTES
Unruly is a very pleasant 65 year old who is being evaluated via a billable video visit.        Subjective   Unruly presents with his daughter for the following health issues: follow up ESLD, anasarca, wound healing, deconditioning, urinary frequency      Current Outpatient Medications:      blood glucose (NO BRAND SPECIFIED) lancets standard, Use to test blood sugar 3-4 times daily. Use brand compatible with patients insurance and device., Disp: 360 each, Rfl: 3     blood glucose (NO BRAND SPECIFIED) test strip, Use to test blood sugars 4 X  times daily or as directed. Use brand compatible with patients insurance and device., Disp: 360 strip, Rfl: 3     blood glucose monitoring (NO BRAND SPECIFIED) meter device kit, Use to test blood sugar 4 times daily or as directed. Before meals and snack at HS., Disp: 1 kit, Rfl: 0     bumetanide (BUMEX) 0.5 MG tablet, Take 3 tablets (1.5 mg) by mouth daily, Disp: 90 tablet, Rfl: 1     Calcium Carbonate-Vit D-Min (CALCIUM 1200 PO), Plant based, Disp: , Rfl:      citalopram (CELEXA) 20 MG tablet, Take 1 tablet (20 mg) by mouth daily, Disp: 90 tablet, Rfl: 3     COMPOUNDED NON-CONTROLLED SUBSTANCE (CMPD RX) - PHARMACY TO MIX COMPOUNDED MEDICATION, Apply to the affected area once a day., Disp: 500 g, Rfl: 5     Cyanocobalamin (VITAMIN B-12 PO), Take 1 tablet by mouth daily, Disp: , Rfl:      desvenlafaxine (PRISTIQ) 100 MG 24 hr tablet, Take 2 tablets (200 mg) by mouth daily (Patient taking differently: Take 100 mg by mouth daily Only 100mg), Disp: 180 tablet, Rfl: 1     doxycycline hyclate (VIBRAMYCIN) 100 MG capsule, Take 1 capsule (100 mg) by mouth every 12 hours, Disp: 14 capsule, Rfl: 0     EQL VITAMIN D3 50 MCG (2000 UT) CAPS, Take 1 capsule by mouth daily, Disp: 90 capsule, Rfl: 2     famotidine (PEPCID) 40 MG tablet, Take 1 tablet (40 mg) by mouth daily, Disp: 30 tablet, Rfl: 11     gabapentin (NEURONTIN) 300 MG capsule, Take 1 capsule (300 mg) by mouth At Bedtime, Disp: 90  capsule, Rfl: 3     hydrOXYzine (ATARAX) 25 MG tablet, Take 1 tablet (25 mg) by mouth 3 times daily as needed for itching, Disp: 90 tablet, Rfl: 3     insulin aspart (NOVOLOG FLEXPEN) 100 UNIT/ML pen, 1 unit per 10 grams of carbohydrates + 1 per 50>140. Max daily dose 100 units., Disp: 30 mL, Rfl: 11     insulin glargine (LANTUS SOLOSTAR) 100 UNIT/ML pen, 5 units daily in am. Increase by 5 units 2x weekly until fasting sugars are <130. Max daily dose 100 units., Disp: 15 mL, Rfl: 11     insulin pen needle (B-D U/F) 31G X 8 MM miscellaneous, USE  6 times daily / OR AS DIRECTED, Disp: 300 each, Rfl: 3     insulin pen needle (ULTICARE SHORT) 31G X 8 MM miscellaneous, Use UP to 6 times daily or as directed, Disp: 300 each, Rfl: 3     lactulose (CEPHULAC) 20 GM packet, Take 1 packet (20 g) by mouth 2 times daily, Disp: 60 packet, Rfl: 0     lactulose encephalopathy (CHRONULAC) 10 GM/15ML SOLUTION, TAKE 30 MLS BY MOUTH 2 TIMES DAILY  TITRATE AS NEEDED TO ACHIEVE 3-5 BOWEL MOVEMENTS DAILY., Disp: 1892 mL, Rfl: 11     levETIRAcetam (KEPPRA) 500 MG tablet, Take 500 mg by mouth 2 times daily , Disp: , Rfl:      Lidocaine (LIDOCARE) 4 % Patch, Place 1 patch onto the skin every 24 hours To prevent lidocaine toxicity, patient should be patch free for 12 hrs daily., Disp: 30 patch, Rfl: 3     MAGNESIUM CITRATE PO, gummies-nature made, Disp: , Rfl:      magnesium oxide (MAG-OX) 400 (241.3 Mg) MG tablet, Take 1 tablet (400 mg) by mouth daily, Disp: 90 tablet, Rfl: 1     Melatonin 5 MG CHEW, Take 2 tablets by mouth nightly as needed, Disp: , Rfl:      Nutritional Supplements (ENSURE) LIQD, Take 1 Can by mouth daily, Disp: 30 each, Rfl: 11     nystatin (MYCOSTATIN) 291221 UNIT/GM external cream, Apply topically 2 times daily, Disp: 30 g, Rfl: 11     nystatin (MYCOSTATIN) 570506 UNIT/GM external powder, Apply topically 2 times daily, Disp: 60 g, Rfl: 11     ondansetron (ZOFRAN) 4 MG tablet, Take 1 tablet (4 mg) by mouth every 8 hours  as needed for nausea, Disp: 20 tablet, Rfl: 0     order for DME, Equipment being ordered:Orthopedic shoes, Disp: 2 Units, Rfl: 0     order for DME, Equipment being ordered: Condom catheter, Disp: 1 Device, Rfl: 3     order for DME, Equipment being ordered:BioTab compression pants pneumatic system, Disp: 1 Piece, Rfl: 0     order for DME, Equipment being ordered:bilateral pneumatic leg massage for treatment of extreme bilateral lower leg edema., Disp: 2 pump, Rfl: 0     oxyCODONE (ROXICODONE) 5 MG tablet, TAKE 1 to 2 TABLETS BY MOUTH EVERY 8 HOURS AS NEEDED FOR SEVERE PAIN, Disp: 180 tablet, Rfl: 0     OYSTER SHELL CALCIUM/D 500-200 MG-UNIT per tablet, Take 1 tablet by mouth daily, Disp: 90 tablet, Rfl: 3     pantoprazole (PROTONIX) 40 MG EC tablet, Take 1 tablet (40 mg) by mouth daily, Disp: 14 tablet, Rfl: 0     polyethylene glycol (MIRALAX) 17 g packet, Take 1 packet by mouth, Disp: , Rfl:      potassium gluconate 2.5 MEQ tablet, Take 1 tablet by mouth daily, Disp: , Rfl:      prochlorperazine (COMPAZINE) 10 MG tablet, Take 1 tablet (10 mg) by mouth every 6 hours as needed for nausea or vomiting (take if zofran does not relieve nausea), Disp: 20 tablet, Rfl: 0     QUEtiapine (SEROQUEL) 100 MG tablet, Take 1 tablet (100 mg) by mouth 2 times daily, Disp: 90 tablet, Rfl: 3     QUEtiapine (SEROQUEL) 100 MG tablet, Take 100 mg by mouth At Bedtime , Disp: , Rfl:      rifaximin (XIFAXAN) 550 MG TABS tablet, Take 1 tablet (550 mg) by mouth 2 times daily, Disp: 60 tablet, Rfl: 11     spironolactone (ALDACTONE) 50 MG tablet, Take 3 tablets (150 mg) by mouth daily, Disp: 180 tablet, Rfl: 0     tamsulosin (FLOMAX) 0.4 MG capsule, Take 2 capsules (0.8 mg) by mouth daily, Disp: 180 capsule, Rfl: 3     tiotropium (SPIRIVA RESPIMAT) 2.5 MCG/ACT inhaler, Inhale 2 puffs into the lungs daily, Disp: 4 g, Rfl: 0     traZODone (DESYREL) 50 MG tablet, Take 1 tablet (50 mg) by mouth At Bedtime, Disp: 30 tablet, Rfl: 11     ursodiol  (ACTIGALL) 300 MG capsule, TAKE THREE CAPSULES BY MOUTH TWO TIMES A DAY , Disp: 180 capsule, Rfl: 11      HPI     At last visit, we ordered a condom catheter for home use due to excessive urinary frequency; hasn't started yet, wanted to clear up yeast infection in the groin area first.  The nystatin powder is working however they find they run out of it before one month is over; I will increase the dispense to 120 g (two bottles) to help with this.  During the most recent GI visit his diuretics were increased (gus to 150, torsemide sub for furosemide) so this may become more important as they start the new regimen.  He reports some back pain, and decreased mobility.  He feels very deconditioned and ambulation is becoming more difficult; they are asking for home PT.  Unruly's anasarca leads to skin weeping in the lower extremities and also open wounds that are in need of wound care.  I had referred him in the past, but the appointment is not until July 8th. They are asking about obtaining wound care supplies that are similar to what nurses had brought into the home in the past ([facility]).   His level of alertness fluctuates, but usually in regards to his blood sugar.  He hasn't had signs of hepatic encephalopathy, recently, and decreasing seroquel helped overall with his level of alertness.  Daughter asked about nutritional supplements to boost protein intake.  We discussed adding premiere protein powder to drinks or to yogurt to boost protein intake.  We also reviewed the status of his foot and he is on the last day of antibiotics after seeing Podiatry and also ID for this.  She thinks there might be a sliver or a foreign body present; we discussed following up with Podiatry or taking him to urgent care if worsening.  Due for an A1c to follow up DM.      Review of Systems    ROS: 10 point ROS neg other than the symptoms noted above in the HPI.      Objective           Vitals:  No vitals were obtained  today due to virtual visit.    Physical Exam   GENERAL: Healthy, alert and no distress  EYES: Eyes grossly normal to inspection.  No discharge or erythema, or obvious scleral/conjunctival abnormalities.  RESP: No audible wheeze, cough, or visible cyanosis.  No visible retractions or increased work of breathing.    SKIN: Visible skin clear. No significant rash, abnormal pigmentation or lesions.  NEURO: Cranial nerves grossly intact.  Mentation and speech appropriate for age.  PSYCH: Mentation appears normal, affect normal/bright, judgement and insight intact, normal speech and appearance well-groomed.    Labs, imaging, chart notes reviewed in Epic    A/P Lawrence was seen today for recheck medication, PT referral, f/u DM    Lymphedema of both lower extremities  -     Home Care PT Referral for Hospital Discharge    Physical deconditioning  -     Home Care PT Referral for Hospital Discharge    Type 2 diabetes mellitus with diabetic neuropathic arthropathy, without long-term current use of insulin (H)  -     Hemoglobin A1c; Future    Candidal intertrigo  -     nystatin (MYCOSTATIN) 507589 UNIT/GM external powder; Apply topically 2 times daily            Video-Visit Details    Type of service:  Video Visit started 12:32 ended 12:59 with another 15 minutes chart review and documentation same day    Originating Location (pt. Location): Home    Distant Location (provider location):  [facility] INTERNAL MEDICINE [place]     Platform used for Video Visit: Akosua Bragg MD

## 2021-06-21 NOTE — TELEPHONE ENCOUNTER
NovoLOG FlexPen Subcutaneous Solution Pen-injector 100 UNIT/ML  Last Written Prescription Date:  8/24/2020  Last Fill Quantity: 30,   # refills: 11  Last Office Visit : 5/19/2021  Future Office visit:  6/21/2021    Routing refill request to provider for review/approval because:  A1C due      Pt has visit today @ 12:15 PM  Refer to clinic for Provider to review     Recent Labs   Lab Test 03/12/20  1238 11/25/13  1144 11/25/13  1144   A1C 6.3*   < >  --    HEMOGLOBINA1  --   --  7.6*       Nessa Martinez RN  Central Triage Red Flags/Med Refills      hydrOXYzine HCl Oral Tablet 25 MG  Last Written Prescription Date:  12/3/2020  Last Fill Quantity: 90,   # refills: 3  Last Office Visit : 5/19/2021  90 Tabs, 3 Refills sent to pharm 6/21/2021  Future Office visit:  6/21/2021

## 2021-06-21 NOTE — PATIENT INSTRUCTIONS
[facility] Medication Refill Request Information:  * Please contact your pharmacy regarding ANY request for medication refills.  ** [facility] Prescription Fax = [phone]  * Please allow 3 business days for routine medication refills.  * Please allow 5 business days for controlled substance medication refills.     [facility] Test Result notification information:  *You will be notified with in 7-10 days of your appointment day regarding the results of your test.  If you are on MyChart you will be notified as soon as the provider has reviewed the results and signed off on them.    [facility]: [phone]

## 2021-06-22 NOTE — TELEPHONE ENCOUNTER
Pt has EMERGENT 1-2 day podiatry referral, no openings available.  Please triage and advise/schedule for appropriate time frame.      Thank you     Jeanne RESENDIZ [facility]

## 2021-06-22 NOTE — PATIENT INSTRUCTIONS
1. Another course of cefadroxil was prescribed for your foot wound due to ongoing symptoms and likelihood that the infection has not resolved.  2. Keep your lymphedema appointment in early July to start edema therapy.   3. A wound culture was done today of the left 2nd toe wound. Dr. Mukherjee will touch base with you once the results are in if there are any changes. Dr. Mukherjee will also touch base with your ID team.  4.  As we discussed, there is concern for possible underlying infection of your bone and the possible progression to sepsis with your history of multiple hospitalizations for sepsis.  If symptoms remain or do not get better, it is very strongly recommended that you go to the ED for further evaluation and management as you may need IV antibiotics and imaging to determine what to do next.  4. Return to clinic with Dr. Mukherjee in 1 month.

## 2021-06-22 NOTE — TELEPHONE ENCOUNTER
Prior Authorization Approval        Authorization Effective Date: 5/23/2021  Authorization Expiration Date: 6/22/2022  Medication: desvenlafaxine (PRISTIQ) 100 MG 24 hr tablet-PA APPROVED   Approved Dose/Quantity:   Reference #:     Insurance Company: [employer] - Phone [phone] Fax [phone]  Expected CoPay:       CoPay Card Available:      Foundation Assistance Needed:    Which Pharmacy is filling the prescription (Not needed for infusion/clinic administered): [facility] [address] [place] [address]  Pharmacy Notified: Yes- **Instructed pharmacy to notify patient when script is ready to /ship.**  Patient Notified: Yes

## 2021-06-22 NOTE — PROGRESS NOTES
"    Assessment & Plan     Diabetic ulcer of toe of left foot associated with type 2 diabetes mellitus, with fat layer exposed (H)  Despite antibiotics the second toe is poorly perfusing, and significantly malodorous with green discharge.  Emergent referral to podiatry made.  Antibiotics will be continued.  Small foreign body was removed from the left foot near the metatarsal phalangeal joint/plantar surface  - Orthopedic  Referral; Future       Tobacco Cessation:   reports that he has been smoking cigarettes. He has been smoking about 0.10 packs per day. He has never used smokeless tobacco.      BMI:   Estimated body mass index is 33.52 kg/m  as calculated from the following:    Height as of 1/7/21: 1.88 m (6' 2\").    Weight as of this encounter: 118.4 kg (261 lb 1.6 oz).   Patient with significant obesity in conjunction with poor mobility, lymphedema, chronic ulcerations of lower extremities    See Patient Instructions    No follow-ups on file.    Jose David Rivers MD  [facility] NIKI Tolliver is a 65 year old who presents for the following health issues     HPI patient with diabetes type 2 with worsening ulceration of the left second toe toe is malodorous and pallorous and draining green discharge.  No fever but patient is more lethargic than normal.    Chief Complaint   Patient presents with     Foot Problems     Patient report splinter on left foot, noticed 2 days ago        New Patient/Transfer of Care    Review of Systems   Constitutional, HEENT, cardiovascular, pulmonary, gi and gu systems are negative, except as otherwise noted.      Objective    /63 (BP Location: Left arm, Patient Position: Sitting, Cuff Size: Adult Regular)   Pulse 78   Temp 98.6  F (37  C) (Temporal)   Wt 118.4 kg (261 lb 1.6 oz)   SpO2 97%   BMI 33.52 kg/m    Body mass index is 33.52 kg/m .  Physical Exam   Left foot is warm erythematous at the left metatarsophalangeal joint foreign body " removed with a forceps the foreign body was a small piece of wood roughly 1 mm in length.  The second toe of the left foot is white with green discharge and extremely malodorous.    Lymphedema noted bilaterally    Orders Only on 06/01/2021   Component Date Value Ref Range Status     Alpha Fetoprotein 06/01/2021 14.7* 0 - 8 ug/L Final    Assay Method:  Chemiluminescence using Siemens Centaur XP     Bilirubin Direct 06/01/2021 0.6* 0.0 - 0.2 mg/dL Final     Bilirubin Total 06/01/2021 1.1  0.2 - 1.3 mg/dL Final     Albumin 06/01/2021 2.1* 3.4 - 5.0 g/dL Final     Protein Total 06/01/2021 5.5* 6.8 - 8.8 g/dL Final     Alkaline Phosphatase 06/01/2021 179* 40 - 150 U/L Final     ALT 06/01/2021 21  0 - 70 U/L Final     AST 06/01/2021 29  0 - 45 U/L Final     INR 06/01/2021 1.43* 0.86 - 1.14 Final     Sodium 06/01/2021 140  133 - 144 mmol/L Final     Potassium 06/01/2021 3.7  3.4 - 5.3 mmol/L Final     Chloride 06/01/2021 109  94 - 109 mmol/L Final     Carbon Dioxide 06/01/2021 27  20 - 32 mmol/L Final     Anion Gap 06/01/2021 4  3 - 14 mmol/L Final     Glucose 06/01/2021 104* 70 - 99 mg/dL Final     Urea Nitrogen 06/01/2021 11  7 - 30 mg/dL Final     Creatinine 06/01/2021 0.77  0.66 - 1.25 mg/dL Final     GFR Estimate 06/01/2021 >90  >60 mL/min/[1.73_m2] Final    Comment: Non  GFR Calc  Starting 12/18/2018, serum creatinine based estimated GFR (eGFR) will be   calculated using the Chronic Kidney Disease Epidemiology Collaboration   (CKD-EPI) equation.       GFR Estimate If Black 06/01/2021 >90  >60 mL/min/[1.73_m2] Final    Comment:  GFR Calc  Starting 12/18/2018, serum creatinine based estimated GFR (eGFR) will be   calculated using the Chronic Kidney Disease Epidemiology Collaboration   (CKD-EPI) equation.       Calcium 06/01/2021 7.7* 8.5 - 10.1 mg/dL Final     WBC 06/01/2021 2.0* 4.0 - 11.0 10e9/L Final    Comment: Critical Value called to and read back by   DR SPENCER @ 4216 ON 6/1/21  BY AF       RBC Count 06/01/2021 2.61* 4.4 - 5.9 10e12/L Final     Hemoglobin 06/01/2021 8.2* 13.3 - 17.7 g/dL Final     Hematocrit 06/01/2021 25.6* 40.0 - 53.0 % Final     MCV 06/01/2021 98  78 - 100 fl Final     MCH 06/01/2021 31.4  26.5 - 33.0 pg Final     MCHC 06/01/2021 32.0  31.5 - 36.5 g/dL Final     RDW 06/01/2021 20.6* 10.0 - 15.0 % Final     Platelet Count 06/01/2021 52* 150 - 450 10e9/L Final    Automated count confirmed.  Platelet morphology is normal.

## 2021-06-22 NOTE — LETTER
2021         RE: Lawrence Louie  [address] [place] [address]        Dear Colleague,    Thank you for referring your patient, Lawrence Louie, to the [facility] [place]. Please see a copy of my visit note below.           PM&R Clinic Note     Patient Name: Lawrence Louie : 1955 Medical Record: [id number]     Requesting Physician/clinician: Yady Hilton MD           History of Present Illness:     Lawrence Louie is a 65 year old male with history of unresectable multifocal hepatocellular carcinoma and alcoholic cirrhosis (complicated by ascites, s/p TIPs, hepatic encephalopathy and diffuse anasarca, sepsis secondary to multiple leg ulcers) for evaluation of his rehabilitation needs in the setting of pain, fatigue and lower extremity edema.    Oncology History:  - History of alcoholic cirrhosis diagnosed in , previously heavy alcohol use, quit in  per chart review.  - TIPS procedure in   - 2020- New diagnosis of hepatocellular carcinoma. Seen by Dr. Willis in clinic on 2020. Plan to do liver directed therapy by Dr. Hilton if no evidence of metastatic disease. Plan to do it as staged procedure.  - cTACE of segment 6 lesion in 2020.  - cTACE of segment 7 lesion in 2020  -cTACE of segment 6 lesion in 2021.  -cTACE of segment 4 lesion in 2021.  - Last visit with Dr. Hilton on 2021 and there was no identifiable HCC in the last MRI. Patient was not following up with Oncology and GI, Dr. Hilton recommended to follow up with them.   - GI virtual visit on 2021- Furosemide discontinued for anasarca, patient started on 1.5 mg torosemide daily and spironolactone was increased to 150 mg daily. Continues rifaxmin twice daily for HE. Patient with frailty and physical deconditioning, protein intake was recommended. Home PT has been ordered. Wound Care referral was also placed for lower extremity wounds  and lymphedema.    Symptoms,  Patient was seen this afternoon with his daughter, Reva for an initial evaluation.  He continues to struggle with significant bilateral lower extremity and abdominal edema and multiple lower extremity wounds in both legs/feet.  Today, he had his daughter who does all his cares are particularly concerned with one of his most recent left foot wounds, on his left second toe, for which he has been seeing ID virtually and has had a few courses of antibiotics but his symptoms still remain and the wound is not resolving, in fact it has increasing green/blue drainage and tenderness around the wound site.  A wound clinic order was placed by Dr. Wilson, and patient has not received a call to schedule this appointment yet.  He has a lymphedema appointment outpatient in early July, and an ID follow-up on 6/30/2021.  He has just finished a course of cefadroxil and doxycycline as prescribed by ID.      Per history, he has had multiple episodes of sepsis requiring hospitalization in the past.  He is still struggling with nutrition, and has been starting to take Ensure shakes to help with protein supplementation.  Reva mentions that he has had a few homecare companies, and most recently has been with [facility] care, and they felt that the care was excellent with both therapies and other home care needs.    Patient is currently wheelchair based due to his significant edema and wounds, however he is able to walk short distances now that his edema has decreased from what it was prior.    His daughter, Reva, has been wrapping both his legs and feet and dressing his wounds.  She has been utilizing a Velcro wrap for his bilateral legs and separate wraps for his feet.  He has not had lymphedema therapy recently.        Therapies/HEP,  A home PT order was placed by Dr. Troy Bragg yesterday, and patient is waiting for a call to schedule this.  In addition, patient has an upcoming lymphedema  therapy appointment on July 1, 2021.      Functionally,   Patient is dependent on his daughter and family for mobility, ADLs and IADLs.             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)      Splenomegaly      Squamous cell carcinoma      Thrombocytopenia (H)      Varices, esophageal (H)     Banded in 2011     Past Surgical History:   Procedure Laterality Date     BIOPSY OF SKIN LESION       COLONOSCOPY Left 6/16/2016    Procedure: COMBINED COLONOSCOPY, SINGLE OR MULTIPLE BIOPSY/POLYPECTOMY BY BIOPSY;  Surgeon: Brandy Barnett MD;  Location:  GI     ESOPHAGOSCOPY, GASTROSCOPY, DUODENOSCOPY (EGD), COMBINED  2/13/2013    Procedure: COMBINED ESOPHAGOSCOPY, GASTROSCOPY, DUODENOSCOPY (EGD);;  Surgeon: Tara Cook MD;  Location:  GI     ESOPHAGOSCOPY, GASTROSCOPY, DUODENOSCOPY (EGD), COMBINED  11/4/2013    Procedure: COMBINED ESOPHAGOSCOPY, GASTROSCOPY, DUODENOSCOPY (EGD);;  Surgeon: Lonny Diaz MD;  Location:  GI     ESOPHAGOSCOPY, GASTROSCOPY, DUODENOSCOPY (EGD), COMBINED Left 6/16/2016    Procedure: COMBINED ESOPHAGOSCOPY, GASTROSCOPY, DUODENOSCOPY (EGD), BIOPSY SINGLE OR MULTIPLE;  Surgeon: Brandy Barnett MD;  Location:  GI     EXCISE LESION TRUNK  9/24/2012    Procedure: EXCISE LESION TRUNK;;  Surgeon: Pepe Dominguez MD;  Location: [facility]     GENITOURINARY SURGERY      vasectomy     HERNIORRHAPHY UMBILICAL  9/24/2012    Procedure: HERNIORRHAPHY UMBILICAL;  UMBILICAL HERNIA REPAIR , EXCISION OF PERIUMBILICAL CYST;  Surgeon: Pepe Dominguez MD;  Location: [facility]     IR CHEMO EMBOLIZATION  7/30/2020     IR CHEMO EMBOLIZATION  10/1/2020     IR CHEMO EMBOLIZATION  1/7/2021     IR CHEMO EMBOLIZATION  5/5/2021            Social  History:     Social History     Tobacco Use     Smoking status: Current Every Day Smoker     Packs/day: 0.10     Types: Cigarettes     Smokeless tobacco: Never Used     Tobacco comment: about 1-2 cigarettes per day   Substance Use Topics     Alcohol use: No     Alcohol/week: 0.0 standard drinks     Comment: 2-3 per day , none since Dec 2012       Living situation: Lives in [place]  Family support: Ex-wife, Flores and daughter, Reva are his main support           Functional history:       ADLs: Dependent, receives assist from his daughter Reva and family  Assistive devices: Wheelchair  iADLs (medication management and finances): Receives assist from family  Driving: Not driving           Family History:     Family History   Problem Relation Age of Onset     Breast Cancer Mother      Liver Cancer Mother      Cardiovascular Father      Cerebrovascular Disease Father         very low blood pressure     Cancer Father         rectal cancer     Cardiovascular Paternal Grandfather      Diabetes Brother      Cancer Sister         skin cancer     C.A.D. Other         MI, 70's     Breast Cancer Sister      Thyroid Disease No family hx of      Lipids No family hx of      Anesthesia Reaction No family hx of             Medications:     Current Outpatient Medications   Medication Sig Dispense Refill     blood glucose (NO BRAND SPECIFIED) lancets standard Use to test blood sugar 3-4 times daily. Use brand compatible with patients insurance and device. 360 each 3     blood glucose (NO BRAND SPECIFIED) test strip Use to test blood sugars 4 X  times daily or as directed. Use brand compatible with patients insurance and device. 360 strip 3     blood glucose monitoring (NO BRAND SPECIFIED) meter device kit Use to test blood sugar 4 times daily or as directed. Before meals and snack at HS. 1 kit 0     bumetanide (BUMEX) 0.5 MG tablet Take 3 tablets (1.5 mg) by mouth daily 90 tablet 1     Calcium Carbonate-Vit D-Min (CALCIUM 1200  PO) Plant based       citalopram (CELEXA) 20 MG tablet Take 1 tablet (20 mg) by mouth daily 90 tablet 3     COMPOUNDED NON-CONTROLLED SUBSTANCE (CMPD RX) - PHARMACY TO MIX COMPOUNDED MEDICATION Apply to the affected area once a day. 500 g 5     Cyanocobalamin (VITAMIN B-12 PO) Take 1 tablet by mouth daily       desvenlafaxine (PRISTIQ) 100 MG 24 hr tablet Take 2 tablets (200 mg) by mouth daily (Patient taking differently: Take 100 mg by mouth daily Only 100mg) 180 tablet 1     doxycycline hyclate (VIBRAMYCIN) 100 MG capsule Take 1 capsule (100 mg) by mouth every 12 hours 14 capsule 0     EQL VITAMIN D3 50 MCG (2000 UT) CAPS Take 1 capsule by mouth daily 90 capsule 2     famotidine (PEPCID) 40 MG tablet Take 1 tablet (40 mg) by mouth daily 30 tablet 11     gabapentin (NEURONTIN) 300 MG capsule Take 1 capsule (300 mg) by mouth At Bedtime 90 capsule 3     hydrOXYzine (ATARAX) 25 MG tablet Take 1 tablet (25 mg) by mouth 3 times daily as needed for itching 90 tablet 3     insulin aspart (NOVOLOG FLEXPEN) 100 UNIT/ML pen 1 unit per 10 grams of carbohydrates + 1 per 50>140. Max daily dose 100 units. 30 mL 11     insulin glargine (LANTUS SOLOSTAR) 100 UNIT/ML pen 5 units daily in am. Increase by 5 units 2x weekly until fasting sugars are <130. Max daily dose 100 units. 15 mL 11     insulin pen needle (B-D U/F) 31G X 8 MM miscellaneous USE  6 times daily / OR AS DIRECTED 300 each 3     insulin pen needle (ULTICARE SHORT) 31G X 8 MM miscellaneous Use UP to 6 times daily or as directed 300 each 3     lactulose (CEPHULAC) 20 GM packet Take 1 packet (20 g) by mouth 2 times daily 60 packet 0     lactulose encephalopathy (CHRONULAC) 10 GM/15ML SOLUTION TAKE 30 MLS BY MOUTH 2 TIMES DAILY  TITRATE AS NEEDED TO ACHIEVE 3-5 BOWEL MOVEMENTS DAILY. 1892 mL 11     levETIRAcetam (KEPPRA) 500 MG tablet Take 500 mg by mouth 2 times daily        Lidocaine (LIDOCARE) 4 % Patch Place 1 patch onto the skin every 24 hours To prevent lidocaine  toxicity, patient should be patch free for 12 hrs daily. 30 patch 3     MAGNESIUM CITRATE PO gummies-nature made       magnesium oxide (MAG-OX) 400 (241.3 Mg) MG tablet Take 1 tablet (400 mg) by mouth daily 90 tablet 1     Melatonin 5 MG CHEW Take 2 tablets by mouth nightly as needed       Nutritional Supplements (ENSURE) LIQD Take 1 Can by mouth daily 30 each 11     nystatin (MYCOSTATIN) 155184 UNIT/GM external cream Apply topically 2 times daily 30 g 11     nystatin (MYCOSTATIN) 752149 UNIT/GM external powder Apply topically 2 times daily 120 g 11     nystatin (MYCOSTATIN) 039515 UNIT/GM external powder Apply topically 2 times daily 60 g 11     ondansetron (ZOFRAN) 4 MG tablet Take 1 tablet (4 mg) by mouth every 8 hours as needed for nausea 20 tablet 0     order for DME Equipment being ordered:Orthopedic shoes 2 Units 0     order for DME Equipment being ordered: Condom catheter 1 Device 3     order for DME Equipment being ordered:BioTab compression pants pneumatic system 1 Piece 0     order for DME Equipment being ordered:bilateral pneumatic leg massage for treatment of extreme bilateral lower leg edema. 2 pump 0     oxyCODONE (ROXICODONE) 5 MG tablet TAKE 1 to 2 TABLETS BY MOUTH EVERY 8 HOURS AS NEEDED FOR SEVERE PAIN 180 tablet 0     OYSTER SHELL CALCIUM/D 500-200 MG-UNIT per tablet Take 1 tablet by mouth daily 90 tablet 3     pantoprazole (PROTONIX) 40 MG EC tablet Take 1 tablet (40 mg) by mouth daily 14 tablet 0     polyethylene glycol (MIRALAX) 17 g packet Take 1 packet by mouth       potassium gluconate 2.5 MEQ tablet Take 1 tablet by mouth daily       prochlorperazine (COMPAZINE) 10 MG tablet Take 1 tablet (10 mg) by mouth every 6 hours as needed for nausea or vomiting (take if zofran does not relieve nausea) 20 tablet 0     QUEtiapine (SEROQUEL) 100 MG tablet Take 1 tablet (100 mg) by mouth 2 times daily 90 tablet 3     QUEtiapine (SEROQUEL) 100 MG tablet Take 100 mg by mouth At Bedtime        rifaximin  "(XIFAXAN) 550 MG TABS tablet Take 1 tablet (550 mg) by mouth 2 times daily 60 tablet 11     spironolactone (ALDACTONE) 50 MG tablet Take 3 tablets (150 mg) by mouth daily 180 tablet 0     tamsulosin (FLOMAX) 0.4 MG capsule Take 2 capsules (0.8 mg) by mouth daily 180 capsule 3     tiotropium (SPIRIVA RESPIMAT) 2.5 MCG/ACT inhaler Inhale 2 puffs into the lungs daily 4 g 0     traZODone (DESYREL) 50 MG tablet Take 1 tablet (50 mg) by mouth At Bedtime 30 tablet 11     ursodiol (ACTIGALL) 300 MG capsule TAKE THREE CAPSULES BY MOUTH TWO TIMES A DAY  180 capsule 11            Allergies:     No Known Allergies           ROS:     A focused ROS is negative other than the symptoms noted above in the HPI.             Physical Examiniation:     VITAL SIGNS: There were no vitals taken for this visit.  BMI: Estimated body mass index is 30.17 kg/m  as calculated from the following:    Height as of 1/7/21: 1.88 m (6' 2\").    Weight as of 3/11/21: 106.6 kg (235 lb).    Gen: NAD, pleasant and cooperative   HEENT: Atraumatic, normocephalic, extraocular movements appear intact.  Cardio: regular pulse  Pulm: non-labored breathing in room air  Ext:  Significant 3+ bilateral lower extremity edema and abdominal edema.  Leg and foot wraps removed to visualize left foot wound.  Left foot is mildly warm and erythematous at the left 2nd toe.  There is greenish discharge that is malodorous exuding from the left 2nd toe wound which is approximately 1 cm x 1 cm in diameter.  Wound culture taken from left toe wound.  Patient also with significant curling in all the toes on bilateral feet due to the amount of edema on his feet.  Left lower calf wound visualized as well 2 x 1 cm appears to be healing.  Patient also has a left upper calf just below knee level wound that he keeps scratching due to excoriation which is larger at 3 cm x 2 cm.  Right lower extremity wounds not visualized today.           Laboratory/Imaging:     MR Abdomen W/O and W " Contrast (6/1/2021):  IMPRESSION:    1. Cirrhosis and evidence for portal hypertension. Patent TIPS.  2. New post TACE changes in hepatic segment 4A. No residual arterial  enhancement. LR-TR nonviable.  3. Post TACE changes in hepatic segment 6 with small amount of  arterial enhancement at the superior portion likely similar to prior  exam given technical motion degradation. No associated washout. LR-TR  Equivocal.  4. Post TACE changes in hepatic segment 7. Subtle questionable small  focus of arterial enhancement posteriorly similar to prior exam  without additional suspicious ancillary features. LR-TR Equivocal.   5. Additional tiny subcentimeter foci of arterial enhancement similar  to prior. LR-3.  6. Cholelithiasis.  7. Based on this exam only, the patient is within  [place] criteria           Assessment/Plan:   Lawrence Louie is a 65 year old male with history of unresectable multifocal hepatocellular carcinoma and alcoholic cirrhosis (complicated by ascites, s/p TIPs, hepatic encephalopathy and diffuse anasarca, sepsis secondary to multiple leg ulcers) for evaluation of his rehabilitation needs in the setting of pain, fatigue and lower extremity edema.  As discussed with Unruly and his daughter, Reva, recommendation is to do another course of cefadroxil, which was prescribed today for his left second toe wound which appears not to be healing.  In addition, it was strongly recommended to them that they keep his lymphedema appointment in a few weeks for appropriate edema control measures.  I will also touch base with his ID team regarding ongoing management of his infection based on culture results.  There is also a concern for the development of possible development of sepsis in the setting of his previous episodes of sepsis and risk of osteomyelitis, so patient and his daughter were advised that if symptoms continue or get worse, he should present to the ED for further evaluation and management over the next  "24 hours.    1. Patient education: In depth discussion and education was provided about the assessment and implications of each of the below recommendations for management. Patient indicated readiness to learn, all questions were answered and understanding of material presented was confirmed.  2. Work-up:   1. Wound culture was done of the left foot second toe wound.  3. Therapy/equipment/braces:  1. Lymphedema therapy has been scheduled and will start on July 1. Patient and his daughter were advised to continue daily wrapping and wound dressing changes.  2. Patient is waiting to get home PT scheduled to help with deconditioning and improving mobility.  4. Medications:  1. Cefadroxil 500 mg twice daily for 7 days was prescribed for likely ongoing wound infection in left 2nd toe.  Patient has been afebrile, however with his ongoing wound, purulent foul-smelling discharge and risk for development of osteomyelitis, he was strongly advised that he may need IV antibiotics and should proceed to the ED if symptoms remain or do not get better over the next 24 hours.  5. Follow up with other providers:   1. Dr. Mukherjee to follow-up with ID team regarding ongoing management.  2. Wound care referral placed, patient still has to schedule appointment.  6. Follow up: 1 month.    Jodie Mukherjee MD  Physical Medicine & Rehabilitation    I appreciate the opportunity to participate in the care of your patient.                 Oncology Rooming Note    June 22, 2021 3:16 PM   Lawrence Louie is a 65 year old male who presents for:    Chief Complaint   Patient presents with     Oncology Clinic Visit     HCC (hepatocellular carcinoma)      Initial Vitals: There were no vitals taken for this visit. Estimated body mass index is 33.52 kg/m  as calculated from the following:    Height as of 1/7/21: 1.88 m (6' 2\").    Weight as of an earlier encounter on 6/22/21: 118.4 kg (261 lb 1.6 oz). There is no height or weight on file to calculate " BSA.  Data Unavailable Comment: Data Unavailable   No LMP for male patient.  Allergies reviewed: Yes  Medications reviewed: Yes    Medications: Medication refills not needed today.  Pharmacy name entered into G[employer]:    [facility] [address] [place] [address] PHARMACY [place] [address] [address]     Clinical concerns: Pain on bottom of left foot. Sores on both left and right legs. Dr. Mukherjee was notified.      Vita Lama MA                  Again, thank you for allowing me to participate in the care of your patient.        Sincerely,        Jodie Mukherjee MD

## 2021-06-22 NOTE — TELEPHONE ENCOUNTER
Please see office visit note from Dr. Rivers.   Appears patient should go to the ED but was referred for an emergent referral to podiatry.     Please advise if patient should go to ED now.     KALEIGH Mazariegos RN

## 2021-06-22 NOTE — PROGRESS NOTES
"Oncology Rooming Note    June 22, 2021 3:16 PM   Lawrence Louie is a 65 year old male who presents for:    Chief Complaint   Patient presents with     Oncology Clinic Visit     HCC (hepatocellular carcinoma)      Initial Vitals: There were no vitals taken for this visit. Estimated body mass index is 33.52 kg/m  as calculated from the following:    Height as of 1/7/21: 1.88 m (6' 2\").    Weight as of an earlier encounter on 6/22/21: 118.4 kg (261 lb 1.6 oz). There is no height or weight on file to calculate BSA.  Data Unavailable Comment: Data Unavailable   No LMP for male patient.  Allergies reviewed: Yes  Medications reviewed: Yes    Medications: Medication refills not needed today.  Pharmacy name entered into goTenna:    [facility] [address] [place] [address] PHARMACY - [place] [address] [address]     Clinical concerns: Pain on bottom of left foot. Sores on both left and right legs. Dr. Mukherjee was notified.      Vita Lama MA              "

## 2021-06-22 NOTE — TELEPHONE ENCOUNTER
Per Dr. Curtis, patient should go to [facility] ED now and not be seen in clinic.     Consent to communicate on file for cell number, which is ex-wife, Flores.     Left voicemail recommending patient go to [facility] ED to be treated instead of being seen by podiatrist in clinic. Triage number provided, but left message stating we are closing for the day.     KALEIGH Mazariegos RN

## 2021-06-22 NOTE — TELEPHONE ENCOUNTER
Central Prior Authorization Team   [phone]    PA Initiation    Medication: desvenlafaxine (PRISTIQ) 100 MG 24 hr tablet  Insurance Company: [employer] - Phone [phone] Fax [phone]  Pharmacy Filling the Rx: [address]  Filling Pharmacy Phone: [phone]  Filling Pharmacy Fax: [phone]  Start Date: 6/22/2021

## 2021-06-22 NOTE — PROGRESS NOTES
PM&R Clinic Note     Patient Name: Lawrence Louie : 1955 Medical Record: [id number]     Requesting Physician/clinician: Yady Hilton MD           History of Present Illness:     Lawrence Louie is a 65 year old male with history of unresectable multifocal hepatocellular carcinoma and alcoholic cirrhosis (complicated by ascites, s/p TIPs, hepatic encephalopathy and diffuse anasarca, sepsis secondary to multiple leg ulcers) for evaluation of his rehabilitation needs in the setting of pain, fatigue and lower extremity edema.    Oncology History:  - History of alcoholic cirrhosis diagnosed in , previously heavy alcohol use, quit in  per chart review.  - TIPS procedure in   - 2020- New diagnosis of hepatocellular carcinoma. Seen by Dr. Willis in clinic on 2020. Plan to do liver directed therapy by Dr. Hilton if no evidence of metastatic disease. Plan to do it as staged procedure.  - cTACE of segment 6 lesion in 2020.  - cTACE of segment 7 lesion in 2020  -cTACE of segment 6 lesion in 2021.  -cTACE of segment 4 lesion in 2021.  - Last visit with Dr. Hilton on 2021 and there was no identifiable HCC in the last MRI. Patient was not following up with Oncology and GI, Dr. Hilton recommended to follow up with them.   - GI virtual visit on 2021- Furosemide discontinued for anasarca, patient started on 1.5 mg torosemide daily and spironolactone was increased to 150 mg daily. Continues rifaxmin twice daily for HE. Patient with frailty and physical deconditioning, protein intake was recommended. Home PT has been ordered. Wound Care referral was also placed for lower extremity wounds and lymphedema.    Symptoms,  Patient was seen this afternoon with his daughter, Reva for an initial evaluation.  He continues to struggle with significant bilateral lower extremity and abdominal edema and multiple lower extremity wounds in both legs/feet.  Today,  he had his daughter who does all his cares are particularly concerned with one of his most recent left foot wounds, on his left second toe, for which he has been seeing ID virtually and has had a few courses of antibiotics but his symptoms still remain and the wound is not resolving, in fact it has increasing green/blue drainage and tenderness around the wound site.  A wound clinic order was placed by Dr. Wilson, and patient has not received a call to schedule this appointment yet.  He has a lymphedema appointment outpatient in early July, and an ID follow-up on 6/30/2021.  He has just finished a course of cefadroxil and doxycycline as prescribed by ID.      Per history, he has had multiple episodes of sepsis requiring hospitalization in the past.  He is still struggling with nutrition, and has been starting to take Ensure shakes to help with protein supplementation.  Reva mentions that he has had a few homecare [employer], and most recently has been with [facility], and they felt that the care was excellent with both therapies and other home care needs.    Patient is currently wheelchair based due to his significant edema and wounds, however he is able to walk short distances now that his edema has decreased from what it was prior.    His daughter, Reva, has been wrapping both his legs and feet and dressing his wounds.  She has been utilizing a Velcro wrap for his bilateral legs and separate wraps for his feet.  He has not had lymphedema therapy recently.        Therapies/HEP,  A home PT order was placed by Dr. Troy Bragg yesterday, and patient is waiting for a call to schedule this.  In addition, patient has an upcoming lymphedema therapy appointment on July 1, 2021.      Functionally,   Patient is dependent on his daughter and family for mobility, ADLs and IADLs.             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)      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)      Splenomegaly      Squamous cell carcinoma      Thrombocytopenia (H)      Varices, esophageal (H)     Banded in 2011     Past Surgical History:   Procedure Laterality Date     BIOPSY OF SKIN LESION       COLONOSCOPY Left 6/16/2016    Procedure: COMBINED COLONOSCOPY, SINGLE OR MULTIPLE BIOPSY/POLYPECTOMY BY BIOPSY;  Surgeon: Brandy Barnett MD;  [facility]: [facility]     ESOPHAGOSCOPY, GASTROSCOPY, DUODENOSCOPY (EGD), COMBINED  2/13/2013    Procedure: COMBINED ESOPHAGOSCOPY, GASTROSCOPY, DUODENOSCOPY (EGD);;  Surgeon: Tara Cook MD;  Location: [facility]     ESOPHAGOSCOPY, GASTROSCOPY, DUODENOSCOPY (EGD), COMBINED  11/4/2013    Procedure: COMBINED ESOPHAGOSCOPY, GASTROSCOPY, DUODENOSCOPY (EGD);;  Surgeon: Lonny Diaz MD;  Location:  GI     ESOPHAGOSCOPY, GASTROSCOPY, DUODENOSCOPY (EGD), COMBINED Left 6/16/2016    Procedure: COMBINED ESOPHAGOSCOPY, GASTROSCOPY, DUODENOSCOPY (EGD), BIOPSY SINGLE OR MULTIPLE;  Surgeon: Brandy Barnett MD;  Location:  GI     EXCISE LESION TRUNK  9/24/2012    Procedure: EXCISE LESION TRUNK;;  Surgeon: Pepe Dominguez MD;  Location: [facility]     GENITOURINARY SURGERY      vasectomy     HERNIORRHAPHY UMBILICAL  9/24/2012    Procedure: HERNIORRHAPHY UMBILICAL;  UMBILICAL HERNIA REPAIR , EXCISION OF PERIUMBILICAL CYST;  Surgeon: Pepe Dominguez MD;  Location: [facility]     IR CHEMO EMBOLIZATION  7/30/2020     IR CHEMO EMBOLIZATION  10/1/2020     IR CHEMO EMBOLIZATION  1/7/2021     IR CHEMO EMBOLIZATION  5/5/2021            Social History:     Social History     Tobacco Use     Smoking status: Current Every Day Smoker     Packs/day: 0.10     Types: Cigarettes     Smokeless tobacco: Never Used     Tobacco comment: about 1-2 cigarettes per day   Substance Use Topics     Alcohol use: No      Alcohol/week: 0.0 standard drinks     Comment: 2-3 per day , none since Dec 2012       Living situation: Lives in [place]  Family support: Ex-wife, Flores and daughter, Reva are his main support           Functional history:       ADLs: Dependent, receives assist from his daughter Reva and family  Assistive devices: Wheelchair  iADLs (medication management and finances): Receives assist from family  Driving: Not driving           Family History:     Family History   Problem Relation Age of Onset     Breast Cancer Mother      Liver Cancer Mother      Cardiovascular Father      Cerebrovascular Disease Father         very low blood pressure     Cancer Father         rectal cancer     Cardiovascular Paternal Grandfather      Diabetes Brother      Cancer Sister         skin cancer     C.A.D. Other         MI, 70's     Breast Cancer Sister      Thyroid Disease No family hx of      Lipids No family hx of      Anesthesia Reaction No family hx of             Medications:     Current Outpatient Medications   Medication Sig Dispense Refill     blood glucose (NO BRAND SPECIFIED) lancets standard Use to test blood sugar 3-4 times daily. Use brand compatible with patients insurance and device. 360 each 3     blood glucose (NO BRAND SPECIFIED) test strip Use to test blood sugars 4 X  times daily or as directed. Use brand compatible with patients insurance and device. 360 strip 3     blood glucose monitoring (NO BRAND SPECIFIED) meter device kit Use to test blood sugar 4 times daily or as directed. Before meals and snack at HS. 1 kit 0     bumetanide (BUMEX) 0.5 MG tablet Take 3 tablets (1.5 mg) by mouth daily 90 tablet 1     Calcium Carbonate-Vit D-Min (CALCIUM 1200 PO) Plant based       citalopram (CELEXA) 20 MG tablet Take 1 tablet (20 mg) by mouth daily 90 tablet 3     COMPOUNDED NON-CONTROLLED SUBSTANCE (CMPD RX) - PHARMACY TO MIX COMPOUNDED MEDICATION Apply to the affected area once a day. 500 g 5     Cyanocobalamin  (VITAMIN B-12 PO) Take 1 tablet by mouth daily       desvenlafaxine (PRISTIQ) 100 MG 24 hr tablet Take 2 tablets (200 mg) by mouth daily (Patient taking differently: Take 100 mg by mouth daily Only 100mg) 180 tablet 1     doxycycline hyclate (VIBRAMYCIN) 100 MG capsule Take 1 capsule (100 mg) by mouth every 12 hours 14 capsule 0     EQL VITAMIN D3 50 MCG (2000 UT) CAPS Take 1 capsule by mouth daily 90 capsule 2     famotidine (PEPCID) 40 MG tablet Take 1 tablet (40 mg) by mouth daily 30 tablet 11     gabapentin (NEURONTIN) 300 MG capsule Take 1 capsule (300 mg) by mouth At Bedtime 90 capsule 3     hydrOXYzine (ATARAX) 25 MG tablet Take 1 tablet (25 mg) by mouth 3 times daily as needed for itching 90 tablet 3     insulin aspart (NOVOLOG FLEXPEN) 100 UNIT/ML pen 1 unit per 10 grams of carbohydrates + 1 per 50>140. Max daily dose 100 units. 30 mL 11     insulin glargine (LANTUS SOLOSTAR) 100 UNIT/ML pen 5 units daily in am. Increase by 5 units 2x weekly until fasting sugars are <130. Max daily dose 100 units. 15 mL 11     insulin pen needle (B-D U/F) 31G X 8 MM miscellaneous USE  6 times daily / OR AS DIRECTED 300 each 3     insulin pen needle (ULTICARE SHORT) 31G X 8 MM miscellaneous Use UP to 6 times daily or as directed 300 each 3     lactulose (CEPHULAC) 20 GM packet Take 1 packet (20 g) by mouth 2 times daily 60 packet 0     lactulose encephalopathy (CHRONULAC) 10 GM/15ML SOLUTION TAKE 30 MLS BY MOUTH 2 TIMES DAILY  TITRATE AS NEEDED TO ACHIEVE 3-5 BOWEL MOVEMENTS DAILY. 1892 mL 11     levETIRAcetam (KEPPRA) 500 MG tablet Take 500 mg by mouth 2 times daily        Lidocaine (LIDOCARE) 4 % Patch Place 1 patch onto the skin every 24 hours To prevent lidocaine toxicity, patient should be patch free for 12 hrs daily. 30 patch 3     MAGNESIUM CITRATE PO gummies-nature made       magnesium oxide (MAG-OX) 400 (241.3 Mg) MG tablet Take 1 tablet (400 mg) by mouth daily 90 tablet 1     Melatonin 5 MG CHEW Take 2 tablets by  mouth nightly as needed       Nutritional Supplements (ENSURE) LIQD Take 1 Can by mouth daily 30 each 11     nystatin (MYCOSTATIN) 589799 UNIT/GM external cream Apply topically 2 times daily 30 g 11     nystatin (MYCOSTATIN) 337464 UNIT/GM external powder Apply topically 2 times daily 120 g 11     nystatin (MYCOSTATIN) 965902 UNIT/GM external powder Apply topically 2 times daily 60 g 11     ondansetron (ZOFRAN) 4 MG tablet Take 1 tablet (4 mg) by mouth every 8 hours as needed for nausea 20 tablet 0     order for DME Equipment being ordered:Orthopedic shoes 2 Units 0     order for DME Equipment being ordered: Condom catheter 1 Device 3     order for DME Equipment being ordered:BioTab compression pants pneumatic system 1 Piece 0     order for DME Equipment being ordered:bilateral pneumatic leg massage for treatment of extreme bilateral lower leg edema. 2 pump 0     oxyCODONE (ROXICODONE) 5 MG tablet TAKE 1 to 2 TABLETS BY MOUTH EVERY 8 HOURS AS NEEDED FOR SEVERE PAIN 180 tablet 0     OYSTER SHELL CALCIUM/D 500-200 MG-UNIT per tablet Take 1 tablet by mouth daily 90 tablet 3     pantoprazole (PROTONIX) 40 MG EC tablet Take 1 tablet (40 mg) by mouth daily 14 tablet 0     polyethylene glycol (MIRALAX) 17 g packet Take 1 packet by mouth       potassium gluconate 2.5 MEQ tablet Take 1 tablet by mouth daily       prochlorperazine (COMPAZINE) 10 MG tablet Take 1 tablet (10 mg) by mouth every 6 hours as needed for nausea or vomiting (take if zofran does not relieve nausea) 20 tablet 0     QUEtiapine (SEROQUEL) 100 MG tablet Take 1 tablet (100 mg) by mouth 2 times daily 90 tablet 3     QUEtiapine (SEROQUEL) 100 MG tablet Take 100 mg by mouth At Bedtime        rifaximin (XIFAXAN) 550 MG TABS tablet Take 1 tablet (550 mg) by mouth 2 times daily 60 tablet 11     spironolactone (ALDACTONE) 50 MG tablet Take 3 tablets (150 mg) by mouth daily 180 tablet 0     tamsulosin (FLOMAX) 0.4 MG capsule Take 2 capsules (0.8 mg) by mouth daily  "180 capsule 3     tiotropium (SPIRIVA RESPIMAT) 2.5 MCG/ACT inhaler Inhale 2 puffs into the lungs daily 4 g 0     traZODone (DESYREL) 50 MG tablet Take 1 tablet (50 mg) by mouth At Bedtime 30 tablet 11     ursodiol (ACTIGALL) 300 MG capsule TAKE THREE CAPSULES BY MOUTH TWO TIMES A DAY  180 capsule 11            Allergies:     No Known Allergies           ROS:     A focused ROS is negative other than the symptoms noted above in the HPI.             Physical Examiniation:     VITAL SIGNS: There were no vitals taken for this visit.  BMI: Estimated body mass index is 30.17 kg/m  as calculated from the following:    Height as of 1/7/21: 1.88 m (6' 2\").    Weight as of 3/11/21: 106.6 kg (235 lb).    Gen: NAD, pleasant and cooperative   HEENT: Atraumatic, normocephalic, extraocular movements appear intact.  Cardio: regular pulse  Pulm: non-labored breathing in room air  Ext:  Significant 3+ bilateral lower extremity edema and abdominal edema.  Leg and foot wraps removed to visualize left foot wound.  Left foot is mildly warm and erythematous at the left 2nd toe.  There is greenish discharge that is malodorous exuding from the left 2nd toe wound which is approximately 1 cm x 1 cm in diameter.  Wound culture taken from left toe wound.  Patient also with significant curling in all the toes on bilateral feet due to the amount of edema on his feet.  Left lower calf wound visualized as well 2 x 1 cm appears to be healing.  Patient also has a left upper calf just below knee level wound that he keeps scratching due to excoriation which is larger at 3 cm x 2 cm.  Right lower extremity wounds not visualized today.           Laboratory/Imaging:     MR Abdomen W/O and W Contrast (6/1/2021):  IMPRESSION:    1. Cirrhosis and evidence for portal hypertension. Patent TIPS.  2. New post TACE changes in hepatic segment 4A. No residual arterial  enhancement. LR-TR nonviable.  3. Post TACE changes in hepatic segment 6 with small amount " of  arterial enhancement at the superior portion likely similar to prior  exam given technical motion degradation. No associated washout. LR-TR  Equivocal.  4. Post TACE changes in hepatic segment 7. Subtle questionable small  focus of arterial enhancement posteriorly similar to prior exam  without additional suspicious ancillary features. LR-TR Equivocal.   5. Additional tiny subcentimeter foci of arterial enhancement similar  to prior. LR-3.  6. Cholelithiasis.  7. Based on this exam only, the patient is within  Zia criteria           Assessment/Plan:   Lawrence Louie is a 65 year old male with history of unresectable multifocal hepatocellular carcinoma and alcoholic cirrhosis (complicated by ascites, s/p TIPs, hepatic encephalopathy and diffuse anasarca, sepsis secondary to multiple leg ulcers) for evaluation of his rehabilitation needs in the setting of pain, fatigue and lower extremity edema.  As discussed with Unruly and his daughter, Reva, recommendation is to do another course of cefadroxil, which was prescribed today for his left second toe wound which appears not to be healing.  In addition, it was strongly recommended to them that they keep his lymphedema appointment in a few weeks for appropriate edema control measures.  I will also touch base with his ID team regarding ongoing management of his infection based on culture results.  There is also a concern for the development of possible development of sepsis in the setting of his previous episodes of sepsis and risk of osteomyelitis, so patient and his daughter were advised that if symptoms continue or get worse, he should present to the ED for further evaluation and management over the next 24 hours.    1. Patient education: In depth discussion and education was provided about the assessment and implications of each of the below recommendations for management. Patient indicated readiness to learn, all questions were answered and understanding of  material presented was confirmed.  2. Work-up:   1. Wound culture was done of the left foot second toe wound.  3. Therapy/equipment/braces:  1. Lymphedema therapy has been scheduled and will start on July 1. Patient and his daughter were advised to continue daily wrapping and wound dressing changes.  2. Patient is waiting to get home PT scheduled to help with deconditioning and improving mobility.  4. Medications:  1. Cefadroxil 500 mg twice daily for 7 days was prescribed for likely ongoing wound infection in left 2nd toe.  Patient has been afebrile, however with his ongoing wound, purulent foul-smelling discharge and risk for development of osteomyelitis, he was strongly advised that he may need IV antibiotics and should proceed to the ED if symptoms remain or do not get better over the next 24 hours.  5. Follow up with other providers:   1. Dr. Mukherjee to follow-up with ID team regarding ongoing management.  2. Wound care referral placed, patient still has to schedule appointment.  6. Follow up: 1 month.    Jodie Mukherjee MD  Physical Medicine & Rehabilitation    I appreciate the opportunity to participate in the care of your patient.

## 2021-06-23 NOTE — TELEPHONE ENCOUNTER
Left 2nd message for ex wife, Flores, stating patient should go to [facility] ED to be treated. Asked that they call back to let us know they received our message. Also let them know that Dr. Alvarado was on call.   Sent [employer] message also.     KALEIGH Mazariegos RN

## 2021-06-30 NOTE — LETTER
6/30/2021       RE: Lawrence Louie  [address] [place] [address]     Dear Colleague,    Thank you for referring your patient, Lawrence Louie, to the [facility] [place] at [facility]. Please see a copy of my visit note below.    Unruly is a 65 year old who is being evaluated via a billable video visit.      How would you like to obtain your AVS? Mail a copy  If the video visit is dropped, the invitation should be resent by: Send to e-mail at: [email or web address]  Will anyone else be joining your video visit? No    Video-Visit Details  Video Time 27 min   Originating Location (pt. Location): Home  Distant Location (provider location):  [facility] [place]   Platform used for Video Visit: [email or web address]     INFECTIOUS DISEASES PROGRESS NOTE    SUBJECTIVE:                                                      Lawrence Louie is a 65 year old male with a complex medical history significant for EtOH cirrhosis with ascites, esophageal varices s/p TIPS, hepatic encephalopathy, multifocal HCC receiving TACE, chronic lymphedema c/b recurrent cellulitis and wounds, and Diabetes mellitus with last HbA1c 6.3 ( 3/2020), thrombocytopenia, anemia, leukopenia, history of MSSA bacteremia , a patient of my colleague Dr Bird, who is not available in the next few months, with multiple concerns, mainly pain in his right heel, lower legs with a few open wounds on his left leg.     I saw him via video on 4/30/21.  ( please refer to my note on 4/30/21) and ordered Keflex for 7 days and  recommended f/u on 5/12 but he did not follow-up at that time. He saw Podiatry on 5/27/21 and noted some macerated hyperkeratotic skin at the distal aspect of the left second toe.  Deep to this was  an ulceration.  did not probe to bone. An excisional debridement was performed on the left second toe on that day.  He had a video follow-up with my colleague Dr John on 6/1/21. He was doing better on Keflex at that time and back to baseline health. Dr john noted some redness in left second toe. On 6/15/21 , daughter called to notify drainage from left second toe. 7 days of cefadroxil and doxycycline were then prescribed by Dr John. He got worse and was hospitalized at [facility] on 6/23-6/29/21 for septicemia related to infected left foot ( left second toe osteomyelitis) He was seen by podiatry and had partial amputation of left second toe on 6/26/21. per note, patology showed osteomyelitis. clinical condition improved after surgery and antibiotic treatment. ID was consulted at that time. Blood cultures from 6.23/21 grew Pseudomonas aeruginosa ( sensitive to ciprofloxacin) . Bone culture obtained on 6/26/21 grew few colonies of Staph sp. and presumptive Enterococcus. he recieved Pip/Tazobactam in hospital and discharged on Levofloxacin 500 mg po daily x 5 days. ( till 7/3/21) .     He is seen via video and wife is with him. Unruly feels better and is back to baseline health. No fever, chills, nausea, vomiting. he has good appetite and denies shortness of breath. His left foot is wrapped and wound care nurse will change the dressing later per wife. So, I recommend that they take a photo of the foot and upload to his chart.         Problem list and histories reviewed & adjusted, as indicated.  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    PAST SURGICAL HISTORY:  Patient  has a past surgical history that includes Abdomen surgery; Herniorrhaphy umbilical (9/24/2012); Esophagoscopy, gastroscopy, duodenoscopy  (EGD), combined (2/13/2013); Excise lesion trunk (9/24/2012); Esophagoscopy, gastroscopy, duodenoscopy (EGD), combined (11/4/2013); biopsy of skin lesion; Esophagoscopy, gastroscopy, duodenoscopy (EGD), combined (Left, 6/16/2016); Colonoscopy (Left, 6/16/2016); IR Chemo Embolization (7/30/2020); IR Chemo Embolization (10/1/2020); IR Chemo Embolization (1/7/2021); and IR Chemo Embolization (5/5/2021).    CURRENT MEDICATIONS:  Current Outpatient Medications   Medication Sig Dispense Refill     blood glucose (NO BRAND SPECIFIED) lancets standard Use to test blood sugar 3-4 times daily. Use brand compatible with patients insurance and device. 360 each 3     blood glucose (NO BRAND SPECIFIED) test strip Use to test blood sugars 4 X  times daily or as directed. Use brand compatible with patients insurance and device. 360 strip 3     blood glucose monitoring (NO BRAND SPECIFIED) meter device kit Use to test blood sugar 4 times daily or as directed. Before meals and snack at HS. 1 kit 0     bumetanide (BUMEX) 0.5 MG tablet Take 3 tablets (1.5 mg) by mouth daily 90 tablet 1     Calcium Carbonate-Vit D-Min (CALCIUM 1200 PO) Plant based       cefadroxil (DURICEF) 500 MG capsule Take 1 capsule (500 mg) by mouth 2 times daily 14 capsule 0     citalopram (CELEXA) 20 MG tablet Take 1 tablet (20 mg) by mouth daily 90 tablet 3     COMPOUNDED NON-CONTROLLED SUBSTANCE (CMPD RX) - PHARMACY TO MIX COMPOUNDED MEDICATION Apply to the affected area once a day. 500 g 5     Cyanocobalamin (VITAMIN B-12 PO) Take 1 tablet by mouth daily       desvenlafaxine (PRISTIQ) 100 MG 24 hr tablet Take 2 tablets (200 mg) by mouth daily (Patient taking differently: Take 100 mg by mouth daily Only 100mg) 180 tablet 1     doxycycline hyclate (VIBRAMYCIN) 100 MG capsule Take 1 capsule (100 mg) by mouth every 12 hours 14 capsule 0     EQL VITAMIN D3 50 MCG (2000 UT) CAPS Take 1 capsule by mouth daily 90 capsule 2     famotidine (PEPCID) 40 MG tablet Take 1  tablet (40 mg) by mouth daily 30 tablet 11     gabapentin (NEURONTIN) 300 MG capsule Take 1 capsule (300 mg) by mouth At Bedtime 90 capsule 3     hydrOXYzine (ATARAX) 25 MG tablet Take 1 tablet (25 mg) by mouth 3 times daily as needed for itching 90 tablet 3     insulin aspart (NOVOLOG FLEXPEN) 100 UNIT/ML pen 1 unit per 10 grams of carbohydrates + 1 per 50>140. Max daily dose 100 units. 30 mL 11     insulin glargine (LANTUS SOLOSTAR) 100 UNIT/ML pen 5 units daily in am. Increase by 5 units 2x weekly until fasting sugars are <130. Max daily dose 100 units. 15 mL 11     insulin pen needle (B-D U/F) 31G X 8 MM miscellaneous USE  6 times daily / OR AS DIRECTED 300 each 3     insulin pen needle (ULTICARE SHORT) 31G X 8 MM miscellaneous Use UP to 6 times daily or as directed 300 each 3     lactulose (CEPHULAC) 20 GM packet Take 1 packet (20 g) by mouth 2 times daily 60 packet 0     lactulose encephalopathy (CHRONULAC) 10 GM/15ML SOLUTION TAKE 30 MLS BY MOUTH 2 TIMES DAILY  TITRATE AS NEEDED TO ACHIEVE 3-5 BOWEL MOVEMENTS DAILY. 1892 mL 11     levETIRAcetam (KEPPRA) 500 MG tablet Take 500 mg by mouth 2 times daily        Lidocaine (LIDOCARE) 4 % Patch Place 1 patch onto the skin every 24 hours To prevent lidocaine toxicity, patient should be patch free for 12 hrs daily. 30 patch 3     MAGNESIUM CITRATE PO gummies-nature made       magnesium oxide (MAG-OX) 400 (241.3 Mg) MG tablet Take 1 tablet (400 mg) by mouth daily 90 tablet 1     Melatonin 5 MG CHEW Take 2 tablets by mouth nightly as needed       Nutritional Supplements (ENSURE) LIQD Take 1 Can by mouth daily 30 each 11     nystatin (MYCOSTATIN) 254919 UNIT/GM external cream Apply topically 2 times daily 30 g 11     nystatin (MYCOSTATIN) 935789 UNIT/GM external powder Apply topically 2 times daily 120 g 11     nystatin (MYCOSTATIN) 078187 UNIT/GM external powder Apply topically 2 times daily 60 g 11     ondansetron (ZOFRAN) 4 MG tablet Take 1 tablet (4 mg) by mouth  every 8 hours as needed for nausea 20 tablet 0     order for DME Equipment being ordered:Orthopedic shoes 2 Units 0     order for DME Equipment being ordered: Condom catheter 1 Device 3     order for DME Equipment being ordered:BioTab compression pants pneumatic system 1 Piece 0     order for DME Equipment being ordered:bilateral pneumatic leg massage for treatment of extreme bilateral lower leg edema. 2 pump 0     oxyCODONE (ROXICODONE) 5 MG tablet TAKE 1 to 2 TABLETS BY MOUTH EVERY 8 HOURS AS NEEDED FOR SEVERE PAIN 180 tablet 0     OYSTER SHELL CALCIUM/D 500-200 MG-UNIT per tablet Take 1 tablet by mouth daily 90 tablet 3     pantoprazole (PROTONIX) 40 MG EC tablet Take 1 tablet (40 mg) by mouth daily 14 tablet 0     polyethylene glycol (MIRALAX) 17 g packet Take 1 packet by mouth       potassium gluconate 2.5 MEQ tablet Take 1 tablet by mouth daily       prochlorperazine (COMPAZINE) 10 MG tablet Take 1 tablet (10 mg) by mouth every 6 hours as needed for nausea or vomiting (take if zofran does not relieve nausea) 20 tablet 0     QUEtiapine (SEROQUEL) 100 MG tablet Take 1 tablet (100 mg) by mouth 2 times daily 90 tablet 3     QUEtiapine (SEROQUEL) 100 MG tablet Take 100 mg by mouth At Bedtime        rifaximin (XIFAXAN) 550 MG TABS tablet Take 1 tablet (550 mg) by mouth 2 times daily 60 tablet 11     spironolactone (ALDACTONE) 50 MG tablet Take 3 tablets (150 mg) by mouth daily 180 tablet 0     tamsulosin (FLOMAX) 0.4 MG capsule Take 2 capsules (0.8 mg) by mouth daily 180 capsule 3     tiotropium (SPIRIVA RESPIMAT) 2.5 MCG/ACT inhaler Inhale 2 puffs into the lungs daily 4 g 0     traZODone (DESYREL) 50 MG tablet Take 1 tablet (50 mg) by mouth At Bedtime 30 tablet 11     ursodiol (ACTIGALL) 300 MG capsule TAKE THREE CAPSULES BY MOUTH TWO TIMES A DAY  180 capsule 11     ALLERGY:  No Known Allergies    IMMUNIZATION HISTORY:  Immunization History   Administered Date(s) Administered     COVID-DOROHTY Martínez,Pfizer 02/21/2021,  03/14/2021     DT (PEDS <7y) 01/18/1988     DTAP (<7y) 01/18/1988     FLU 6-35 months 09/25/2009, 12/18/2012     Flu, Unspecified 09/25/2009, 09/29/2017     HEPA 01/02/2013, 02/18/2013, 10/30/2013     HepA-Adult 02/18/2013, 10/30/2013     HepB 01/02/2013, 02/18/2013, 10/30/2013     HepB-Adult 02/18/2013, 10/30/2013     Influenza (IIV3) PF 10/04/2010, 12/18/2012, 09/08/2014, 09/26/2015, 09/27/2016     Influenza Intranasal Vaccine 4 valent 10/12/2017     Influenza Vaccine IM > 6 months Valent IIV4 10/26/2018     Pneumo Conj 13-V (2010&after) 05/12/2015     Pneumococcal 23 valent 09/25/2009, 10/01/2010, 01/02/2013, 04/23/2019     TD (ADULT, 7+) 09/08/1997     TDAP Vaccine (Adacel) 01/02/2013     Td (Adult), Adsorbed 09/08/1997     Twinrix A/B 01/02/2013     Zoster vaccine, live 12/22/2016     Labs reviewed in EPIC    OBJECTIVE:                                                    Video visit   GENERAL: more alert and no distress    EYES: Eyes grossly normal to inspection  NECK: supple   RESP: no signs of shortness of breath. no cough   Extremities : both legs are wrapped  NEURO: mentation intact and speech is slow  PSYCH: mentation appears slow but appropriate questions/ answers, affect flat       ASSESSMENT AND PLAN:                                                      1. Left second toe osteomyelitis ( hospitalized 6/23-6/29/21) with bacteremia ( P.aeruginosa)     2. History of recurrent cellulitis and wounds in lower extremities     3. History of MSSA wounds and bacteremia   4. history of EtOH cirrhosis with ascites , esophageal varices s/p TIPS  - history of hepatic encephalopathy  5, HCC receiving TACE  6. chronic lymphedema using ace wrap   7. Diabetes mellitus with last HbA1c 6.3 ( 3/2020)  8. Pancytopenia     Plan/ Recommendations   - to complete Levofloxacin as directed.   - take photo of left foot.     clinic follow-up on 7/23 at 11.30 am      : daughter Kristi ( tel) [phone]  / wife     Thank  You for allowing me to participate in the care of this patient    Asael Mora MD, M.Med.Sc  Division of Infectious Diseases and International Medicine  [facility]

## 2021-06-30 NOTE — PROGRESS NOTES
Outpatient Occupational Therapy Discharge Note     Patient: Lawrence Louie  : 1955    Beginning/End Dates of Reporting Period:  12/10/2018 to 2018    Referring Provider: Arti Cantu PA-C    Therapy Diagnosis: BLE edema/lymphedema with slow healing wounds    Client Self Report:   0    Objective Measurements:     Objective Measure: skin assessmen   Details: Firm, severe lymphedema and congestion in BLEs including feet.  Creases present at all joints in toes and ankles.  Feet with firm edema.  Firm, fibrotic edema from bilateral ankles to knee crease.  Large papillomas present throughout BLEs from ankles to knee creases. 3 areas on RLE (lateral and medial) and 2 areas on LLE (medial and posterior) are weeping.  Pink coloring from ankles to knee crease (no pinkness on feet).   Objective Measure: volume   Details: NT   Objective Measure: LLIS   Details: NT      Goals:   Goal Identifier home program   Goal Description Pt will demonstrate IND with home program including: GCB to BLEs , self MLD and HEP to reduce edema to improve skin integrity, prevent infection and to be fit for compression garments for edema management at discharge.   Target Date 03/10/19   Date Met      Progress:     Goal Identifier volume   Goal Description Pt will demonstrate a volume reduction of 300mL frome each RLE and LLE to improve skin integrity, prevent infection and to be fit for compression garments for edema management at [facility].   Target Date 03/10/19   Date Met      Progress:     Goal Identifier skin integrity   Goal Description Pt will demonstrate improved skin integrity by no unroofed skin or weeping in BLEs and flattened papillomas.     Target Date 03/10/19   Date Met      Progress:     Goal Identifier self care   Goal Description Due to reduced volume in BLEs pt will report IND with self care including better fit of shoes and increased ease with tub transfer.     Target Date 03/10/19   Date Met      Progress:      Goal Identifier     Goal Description     Target Date     Date Met      Progress:     Goal Identifier     Goal Description     Target Date     Date Met      Progress:     Goal Identifier     Goal Description     Target Date     Date Met      Progress:     Goal Identifier     Goal Description     Target Date     Date Met      Progress:     Progress Toward Goals:   Pt seen for 4 visits and then did not return for therapy.      Plan:  Discharge from therapy.    Discharge:    Reason for Discharge: Patient has failed to schedule further appointments.    Equipment Issued: GCB materials    Discharge Plan: Pt to obtain future lymphedema referral should lymphedema present.

## 2021-06-30 NOTE — PROGRESS NOTES
Unruly is a 65 year old who is being evaluated via a billable video visit.      How would you like to obtain your AVS? Mail a copy  If the video visit is dropped, the invitation should be resent by: Send to e-mail at: [email or web address]  Will anyone else be joining your video visit? No    Video-Visit Details  Video Time 27 min   Originating Location (pt. Location): Home  Distant Location (provider location):  [facility] INFECTIOUS DISEASE [facility]IN[facility] [place]   Platform used for Video Visit: Alcresta     INFECTIOUS DISEASES PROGRESS NOTE    SUBJECTIVE:                                                      Lawrence Louie is a 65 year old male with a complex medical history significant for EtOH cirrhosis with ascites, esophageal varices s/p TIPS, hepatic encephalopathy, multifocal HCC receiving TACE, chronic lymphedema c/b recurrent cellulitis and wounds, and Diabetes mellitus with last HbA1c 6.3 ( 3/2020), thrombocytopenia, anemia, leukopenia, history of MSSA bacteremia , a patient of my colleague Dr Bird, who is not available in the next few months, with multiple concerns, mainly pain in his right heel, lower legs with a few open wounds on his left leg.     I saw him via video on 4/30/21.  ( please refer to my note on 4/30/21) and ordered Keflex for 7 days and  recommended f/u on 5/12 but he did not follow-up at that time. He saw Podiatry on 5/27/21 and noted some macerated hyperkeratotic skin at the distal aspect of the left second toe.  Deep to this was  an ulceration.  did not probe to bone. An excisional debridement was performed on the left second toe on that day. He had a video follow-up with my colleague Dr John on 6/1/21. He was doing better on Keflex at that time and back to baseline health. Dr john noted some redness in left second toe. On 6/15/21 , daughter called to notify drainage from left second toe. 7 days of cefadroxil and doxycycline were then prescribed by Dr John. He got worse  and was hospitalized at [facility] on 6/23-6/29/21 for septicemia related to infected left foot ( left second toe osteomyelitis) He was seen by podiatry and had partial amputation of left second toe on 6/26/21. per note, patology showed osteomyelitis. clinical condition improved after surgery and antibiotic treatment. ID was consulted at that time. Blood cultures from 6.23/21 grew Pseudomonas aeruginosa ( sensitive to ciprofloxacin) . Bone culture obtained on 6/26/21 grew few colonies of Staph sp. and presumptive Enterococcus. he recieved Pip/Tazobactam in hospital and discharged on Levofloxacin 500 mg po daily x 5 days. ( till 7/3/21) .     He is seen via video and wife is with him. Unruly feels better and is back to baseline health. No fever, chills, nausea, vomiting. he has good appetite and denies shortness of breath. His left foot is wrapped and wound care nurse will change the dressing later per wife. So, I recommend that they take a photo of the foot and upload to his chart.         Problem list and histories reviewed & adjusted, as indicated.  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    PAST SURGICAL HISTORY:  Patient  has a past surgical history that includes Abdomen surgery; Herniorrhaphy umbilical (9/24/2012); Esophagoscopy, gastroscopy, duodenoscopy (EGD), combined (2/13/2013); Excise lesion trunk (9/24/2012); Esophagoscopy, gastroscopy, duodenoscopy (EGD), combined (11/4/2013); biopsy of skin lesion; Esophagoscopy, gastroscopy, duodenoscopy (EGD), combined (Left, 6/16/2016); Colonoscopy (Left, 6/16/2016); IR Chemo Embolization (7/30/2020); IR Chemo Embolization (10/1/2020); IR  Chemo Embolization (1/7/2021); and IR Chemo Embolization (5/5/2021).    CURRENT MEDICATIONS:  Current Outpatient Medications   Medication Sig Dispense Refill     blood glucose (NO BRAND SPECIFIED) lancets standard Use to test blood sugar 3-4 times daily. Use brand compatible with patients insurance and device. 360 each 3     blood glucose (NO BRAND SPECIFIED) test strip Use to test blood sugars 4 X  times daily or as directed. Use brand compatible with patients insurance and device. 360 strip 3     blood glucose monitoring (NO BRAND SPECIFIED) meter device kit Use to test blood sugar 4 times daily or as directed. Before meals and snack at HS. 1 kit 0     bumetanide (BUMEX) 0.5 MG tablet Take 3 tablets (1.5 mg) by mouth daily 90 tablet 1     Calcium Carbonate-Vit D-Min (CALCIUM 1200 PO) Plant based       cefadroxil (DURICEF) 500 MG capsule Take 1 capsule (500 mg) by mouth 2 times daily 14 capsule 0     citalopram (CELEXA) 20 MG tablet Take 1 tablet (20 mg) by mouth daily 90 tablet 3     COMPOUNDED NON-CONTROLLED SUBSTANCE (CMPD RX) - PHARMACY TO MIX COMPOUNDED MEDICATION Apply to the affected area once a day. 500 g 5     Cyanocobalamin (VITAMIN B-12 PO) Take 1 tablet by mouth daily       desvenlafaxine (PRISTIQ) 100 MG 24 hr tablet Take 2 tablets (200 mg) by mouth daily (Patient taking differently: Take 100 mg by mouth daily Only 100mg) 180 tablet 1     doxycycline hyclate (VIBRAMYCIN) 100 MG capsule Take 1 capsule (100 mg) by mouth every 12 hours 14 capsule 0     EQL VITAMIN D3 50 MCG (2000 UT) CAPS Take 1 capsule by mouth daily 90 capsule 2     famotidine (PEPCID) 40 MG tablet Take 1 tablet (40 mg) by mouth daily 30 tablet 11     gabapentin (NEURONTIN) 300 MG capsule Take 1 capsule (300 mg) by mouth At Bedtime 90 capsule 3     hydrOXYzine (ATARAX) 25 MG tablet Take 1 tablet (25 mg) by mouth 3 times daily as needed for itching 90 tablet 3     insulin aspart (NOVOLOG FLEXPEN) 100 UNIT/ML pen 1 unit per 10 grams of  carbohydrates + 1 per 50>140. Max daily dose 100 units. 30 mL 11     insulin glargine (LANTUS SOLOSTAR) 100 UNIT/ML pen 5 units daily in am. Increase by 5 units 2x weekly until fasting sugars are <130. Max daily dose 100 units. 15 mL 11     insulin pen needle (B-D U/F) 31G X 8 MM miscellaneous USE  6 times daily / OR AS DIRECTED 300 each 3     insulin pen needle (ULTICARE SHORT) 31G X 8 MM miscellaneous Use UP to 6 times daily or as directed 300 each 3     lactulose (CEPHULAC) 20 GM packet Take 1 packet (20 g) by mouth 2 times daily 60 packet 0     lactulose encephalopathy (CHRONULAC) 10 GM/15ML SOLUTION TAKE 30 MLS BY MOUTH 2 TIMES DAILY  TITRATE AS NEEDED TO ACHIEVE 3-5 BOWEL MOVEMENTS DAILY. 1892 mL 11     levETIRAcetam (KEPPRA) 500 MG tablet Take 500 mg by mouth 2 times daily        Lidocaine (LIDOCARE) 4 % Patch Place 1 patch onto the skin every 24 hours To prevent lidocaine toxicity, patient should be patch free for 12 hrs daily. 30 patch 3     MAGNESIUM CITRATE PO gummies-nature made       magnesium oxide (MAG-OX) 400 (241.3 Mg) MG tablet Take 1 tablet (400 mg) by mouth daily 90 tablet 1     Melatonin 5 MG CHEW Take 2 tablets by mouth nightly as needed       Nutritional Supplements (ENSURE) LIQD Take 1 Can by mouth daily 30 each 11     nystatin (MYCOSTATIN) 099196 UNIT/GM external cream Apply topically 2 times daily 30 g 11     nystatin (MYCOSTATIN) 970890 UNIT/GM external powder Apply topically 2 times daily 120 g 11     nystatin (MYCOSTATIN) 863737 UNIT/GM external powder Apply topically 2 times daily 60 g 11     ondansetron (ZOFRAN) 4 MG tablet Take 1 tablet (4 mg) by mouth every 8 hours as needed for nausea 20 tablet 0     order for DME Equipment being ordered:Orthopedic shoes 2 Units 0     order for DME Equipment being ordered: Condom catheter 1 Device 3     order for DME Equipment being ordered:BioTab compression pants pneumatic system 1 Piece 0     order for DME Equipment being ordered:bilateral  pneumatic leg massage for treatment of extreme bilateral lower leg edema. 2 pump 0     oxyCODONE (ROXICODONE) 5 MG tablet TAKE 1 to 2 TABLETS BY MOUTH EVERY 8 HOURS AS NEEDED FOR SEVERE PAIN 180 tablet 0     OYSTER SHELL CALCIUM/D 500-200 MG-UNIT per tablet Take 1 tablet by mouth daily 90 tablet 3     pantoprazole (PROTONIX) 40 MG EC tablet Take 1 tablet (40 mg) by mouth daily 14 tablet 0     polyethylene glycol (MIRALAX) 17 g packet Take 1 packet by mouth       potassium gluconate 2.5 MEQ tablet Take 1 tablet by mouth daily       prochlorperazine (COMPAZINE) 10 MG tablet Take 1 tablet (10 mg) by mouth every 6 hours as needed for nausea or vomiting (take if zofran does not relieve nausea) 20 tablet 0     QUEtiapine (SEROQUEL) 100 MG tablet Take 1 tablet (100 mg) by mouth 2 times daily 90 tablet 3     QUEtiapine (SEROQUEL) 100 MG tablet Take 100 mg by mouth At Bedtime        rifaximin (XIFAXAN) 550 MG TABS tablet Take 1 tablet (550 mg) by mouth 2 times daily 60 tablet 11     spironolactone (ALDACTONE) 50 MG tablet Take 3 tablets (150 mg) by mouth daily 180 tablet 0     tamsulosin (FLOMAX) 0.4 MG capsule Take 2 capsules (0.8 mg) by mouth daily 180 capsule 3     tiotropium (SPIRIVA RESPIMAT) 2.5 MCG/ACT inhaler Inhale 2 puffs into the lungs daily 4 g 0     traZODone (DESYREL) 50 MG tablet Take 1 tablet (50 mg) by mouth At Bedtime 30 tablet 11     ursodiol (ACTIGALL) 300 MG capsule TAKE THREE CAPSULES BY MOUTH TWO TIMES A DAY  180 capsule 11     ALLERGY:  No Known Allergies    IMMUNIZATION HISTORY:  Immunization History   Administered Date(s) Administered     COVID-19,PF,Pfizer 02/21/2021, 03/14/2021     DT (PEDS <7y) 01/18/1988     DTAP (<7y) 01/18/1988     FLU 6-35 months 09/25/2009, 12/18/2012     Flu, Unspecified 09/25/2009, 09/29/2017     HEPA 01/02/2013, 02/18/2013, 10/30/2013     HepA-Adult 02/18/2013, 10/30/2013     HepB 01/02/2013, 02/18/2013, 10/30/2013     HepB-Adult 02/18/2013, 10/30/2013     Influenza  (IIV3) PF 10/04/2010, 12/18/2012, 09/08/2014, 09/26/2015, 09/27/2016     Influenza Intranasal Vaccine 4 valent 10/12/2017     Influenza Vaccine IM > 6 months Valent IIV4 10/26/2018     Pneumo Conj 13-V (2010&after) 05/12/2015     Pneumococcal 23 valent 09/25/2009, 10/01/2010, 01/02/2013, 04/23/2019     TD (ADULT, 7+) 09/08/1997     TDAP Vaccine (Adacel) 01/02/2013     Td (Adult), Adsorbed 09/08/1997     Twinrix A/B 01/02/2013     Zoster vaccine, live 12/22/2016     Labs reviewed in EPIC    OBJECTIVE:                                                    Video visit   GENERAL: more alert and no distress    EYES: Eyes grossly normal to inspection  NECK: supple   RESP: no signs of shortness of breath. no cough   Extremities : both legs are wrapped  NEURO: mentation intact and speech is slow  PSYCH: mentation appears slow but appropriate questions/ answers, affect flat       ASSESSMENT AND PLAN:                                                      1. Left second toe osteomyelitis ( hospitalized 6/23-6/29/21) with bacteremia ( P.aeruginosa)     2. History of recurrent cellulitis and wounds in lower extremities     3. History of MSSA wounds and bacteremia   4. history of EtOH cirrhosis with ascites , esophageal varices s/p TIPS  - history of hepatic encephalopathy  5, HCC receiving TACE  6. chronic lymphedema using ace wrap   7. Diabetes mellitus with last HbA1c 6.3 ( 3/2020)  8. Pancytopenia     Plan/ Recommendations   - to complete Levofloxacin as directed.   - take photo of left foot.     clinic follow-up on 7/23 at 11.30 am      : daughter Kristi ( tel) [phone]  / wife                             Thank You for allowing me to participate in the care of this patient    Asael Mora MD, M.Med.Sc  Division of Infectious Diseases and International Medicine  [facility]

## 2021-07-02 NOTE — TELEPHONE ENCOUNTER
[facility] Call Center    Phone Message    May a detailed message be left on voicemail: yes     Reason for Call: Order(s): Home Care Orders: Skilled Nursing: Every other day for four weeks. Three times a week for four weeks.    Action Taken: Message routed to:  [facility] primary care clinic    Travel Screening: Not Applicable

## 2021-07-02 NOTE — TELEPHONE ENCOUNTER
Re: Call back with regards to Levofloxacin    I tried to call Unruly back but no one answered the phone  Will try again tomorrow     Asael Mora MD,M.Med.Sc.  Infectious Diseases

## 2021-07-02 NOTE — TELEPHONE ENCOUNTER
[facility] Call Center    Phone Message    May a detailed message be left on voicemail: yes     Reason for Call: Order(s): Home Care Orders: Physical Therapy (PT): .One time a week for one week, two times a week for three weeks to improve strength, balance, and mobility.    Action Taken: Message routed to:  [facility] primary care clinic    Travel Screening: Not Applicable

## 2021-07-02 NOTE — TELEPHONE ENCOUNTER
Re: Levofloxacin     Called patient's wife but no one answered the phone. left a message. ( second attempt)    Asael Mora MD,M.Med.Sc.  Infectious Diseases

## 2021-07-05 NOTE — TELEPHONE ENCOUNTER
Verbal orders given to Pema from [facility], per Ary Murphy, for Home Care Orders: Skilled Nursing: Every other day for four weeks. Three times a week for four weeks. Magdalena Covarrubias LPN 7/5/2021 8:36 AM

## 2021-07-05 NOTE — TELEPHONE ENCOUNTER
Verbal orders given to Francesca from [facility], per Dr. Simmons, for Order(s): Home Care Orders: Physical Therapy (PT): .One time a week for one week, two times a week for three weeks to improve strength, balance, and mobility. Magdalena Covarrubias LPN 7/5/2021 8:34 AM

## 2021-07-08 NOTE — LETTER
7/8/2021       RE: Lawrence Louie  [address] [place] [address]     Dear Colleague,    Thank you for referring your patient, Lawrence Louie, to the [facility]. Please see a copy of my visit note below.    Date of Service: 7/8/2021    Chief Complaint   Patient presents with     Consult     Unruly, is being seen today for a consult regarding bilateral  Lymphedema  lower extremities, diabetic ulcers, has wound care in home, as reported by patient and daughter.          HPI: Lawrence is a 65 year old male who presents today for further evaluation of left foot wound.  He has a complicated history with this foot.  He does have a history of diabetes which have contributed to neuropathy in his feet.  He also has chronic lymphedema lateral legs.  Most recently, he was seen in the [facility] emergency room for infection it was noted that time, that he had seen infectious disease and was started on a course of cefadroxil and doxycycline.  At that time in the ER, he noticed that his left big toe looks swollen and wet.  His last podiatry appointment was with Dr. Curtis, and this was on June 17 of this year. He does have home nursing seeing him.  He had an amputation of the left second digit just under 2 weeks ago.  This was done at [facility].  For follow-up, they were going to see the podiatrist, however they were late and could not be worked into the schedule.  His daughter relates that she needs to make a new appointment for this.  She relates that he has home nursing through [facility] in [place].  He has had lymphedema treatments before.  Home nursing is just doing Ace dressings now.  I did call home nursing and Occupational Therapy is going to set him for the lymphedema on Saturday.  His daughter relates that he has used pumps in the past, however when he did this, he was getting frequent bouts of  cellulitis.  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)      Splenomegaly      Squamous cell carcinoma      Thrombocytopenia (H)      Varices, esophageal (H)     Banded in 2011       PSxH:   Past Surgical History:   Procedure Laterality Date     BIOPSY OF SKIN LESION       COLONOSCOPY Left 6/16/2016    Procedure: COMBINED COLONOSCOPY, SINGLE OR MULTIPLE BIOPSY/POLYPECTOMY BY BIOPSY;  Surgeon: Brandy Barnett MD;  Location:  GI     ESOPHAGOSCOPY, GASTROSCOPY, DUODENOSCOPY (EGD), COMBINED  2/13/2013    Procedure: COMBINED ESOPHAGOSCOPY, GASTROSCOPY, DUODENOSCOPY (EGD);;  Surgeon: Tara Cook MD;  Location:  GI     ESOPHAGOSCOPY, GASTROSCOPY, DUODENOSCOPY (EGD), COMBINED  11/4/2013    Procedure: COMBINED ESOPHAGOSCOPY, GASTROSCOPY, DUODENOSCOPY (EGD);;  Surgeon: Lonny Diaz MD;  Location:  GI     ESOPHAGOSCOPY, GASTROSCOPY, DUODENOSCOPY (EGD), COMBINED Left 6/16/2016    Procedure: COMBINED ESOPHAGOSCOPY, GASTROSCOPY, DUODENOSCOPY (EGD), BIOPSY SINGLE OR MULTIPLE;  Surgeon: Brandy Barnett MD;  Location:  GI     EXCISE LESION TRUNK  9/24/2012    Procedure: EXCISE LESION TRUNK;;  Surgeon: Pepe Dominguez MD;  Location: [facility]     GENITOURINARY SURGERY      vasectomy     HERNIORRHAPHY UMBILICAL  9/24/2012    Procedure: HERNIORRHAPHY UMBILICAL;  UMBILICAL HERNIA REPAIR , EXCISION OF PERIUMBILICAL CYST;  Surgeon: Pepe Dominguez MD;  Location: [facility]     IR CHEMO EMBOLIZATION  7/30/2020     IR CHEMO EMBOLIZATION  10/1/2020     IR CHEMO EMBOLIZATION  1/7/2021     IR CHEMO EMBOLIZATION  5/5/2021       Allergies: Patient has no known allergies.    SH:   Social History     Socioeconomic History     Marital status:       Spouse name: Not on file     Number of children: Not on file     Years of education: Not on file     Highest education level: Not on file   Occupational History     Not on file   Social Needs     Financial resource strain: Not on file     Food insecurity     Worry: Not on file     Inability: Not on file     Transportation needs     Medical: Not on file     Non-medical: Not on file   Tobacco Use     Smoking status: Current Every Day Smoker     Packs/day: 0.10     Types: Cigarettes     Smokeless tobacco: Never Used     Tobacco comment: about 1-2 cigarettes per day   Substance and Sexual Activity     Alcohol use: No     Alcohol/week: 0.0 standard drinks     Comment: 2-3 per day , none since Dec 2012     Drug use: No     Sexual activity: Yes     Partners: Female     Birth control/protection: Surgical   Lifestyle     Physical activity     Days per week: Not on file     Minutes per session: Not on file     Stress: Not on file   Relationships     Social connections     Talks on phone: Not on file     Gets together: Not on file     Attends Buddhist service: Not on file     Active member of club or organization: Not on file     Attends meetings of clubs or organizations: Not on file     Relationship status: Not on file     Intimate partner violence     Fear of current or ex partner: Not on file     Emotionally abused: Not on file     Physically abused: Not on file     Forced sexual activity: Not on file   Other Topics Concern     Parent/sibling w/ CABG, MI or angioplasty before 65F 55M? Not Asked      Service No     Blood Transfusions No     Caffeine Concern No     Comment: very little coffee, likes beer     Occupational Exposure No     Hobby Hazards No     Sleep Concern Yes     Comment: bed at 3:30 AM, up at 1:00 PM     Stress Concern Yes     Weight Concern Yes     Special Diet No     Back Care No     Exercise No     Bike Helmet No     Seat Belt Yes     Self-Exams No   Social History Narrative    . 3 grown  daughters. He has been sober since 2012.       FH:   Family History   Problem Relation Age of Onset     Breast Cancer Mother      Liver Cancer Mother      Cardiovascular Father      Cerebrovascular Disease Father         very low blood pressure     Cancer Father         rectal cancer     Cardiovascular Paternal Grandfather      Diabetes Brother      Cancer Sister         skin cancer     C.A.D. Other         MI, 70's     Breast Cancer Sister      Thyroid Disease No family hx of      Lipids No family hx of      Anesthesia Reaction No family hx of        Objective:  Data Unavailable Data Unavailable Data Unavailable Data Unavailable Data Unavailable 0 lbs 0 oz    PT and DP pulses are nonpalpable secondary to brawny edema bilaterally. CRT is 1 second.  Positive pedal hair.   Gross sensation is diminished bilaterally.   Equinus is noted bilaterally. No pain with active or passive ROM of the ankle, MTJ, 1st ray, or halluces bilaterally,.  Pes planus is noted bilaterally with flexor stabilizing hammertoes to the lesser digits.  Pain noted with palpation of the dorsal feet, with left greater than right.  This is likely secondary to the severe edema.  Pain noted with palpation of the plantar second met head on the left.  There is fat pad atrophy noted about this area.    lipodermatosclerosis noted bilaterally.  Sure line noted to the left distal second digit.  This does appear well coapted.      There is a superficial abrasion that appears as a deroofed bulla noted to the medial right ankle.  No signs or symptoms of infection noted.      2 wounds are noted to the left lateral leg.  Distally, this measures 1.4 cm x 0.8 cm x 0.1 cm.  This is red granulation in the base.  There is 1 just proximal to this.  It measures about 0.5 cm x 0.7 cm x 0.1 cm.  There are no signs or symptoms of infection noted.  Surrounding skin is lipodermatosclerosis.    Assessment: Unruly is a 65-year-old today who presents with his daughter who has  longstanding lymphedema and venous stasis of bilateral legs.  He does have ulcerations to both legs.  He recently had a left second digit amputation.  He has a complex history to his legs.  I have asked him to make an appointment again with the surgeon to take out the sutures.  [facility]  They do have a lymphedema appointment coming up on Saturday.  I think this is the best option to do decrease some of his pain and swelling in his legs.  For now, we will continue to wrap his legs.  Today, we did Profore boots bilaterally.    Plan:  - Pt seen and evaluated.  -We will send in a prescription for pruritic legs for a steroid cream.  He should use this sparingly.  - They will make an appt for pod sx to remove sutures.  - OT coming on Saturday for lymphedema assessment.  - Profore boots applied today.   - Triamcinolone cream sent to the pharmacy.   - See again in 1 month.   I spent 60 minutes today on patient care, documentation, chart review, and care coordination.                Again, thank you for allowing me to participate in the care of your patient.      Sincerely,    Josue Chaidez DPM

## 2021-07-08 NOTE — PROGRESS NOTES
Date of Service: 7/8/2021    Chief Complaint   Patient presents with     Consult     Unruly, is being seen today for a consult regarding bilateral  Lymphedema  lower extremities, diabetic ulcers, has wound care in home, as reported by patient and daughter.          HPI: Lawrence is a 65 year old male who presents today for further evaluation of left foot wound.  He has a complicated history with this foot.  He does have a history of diabetes which have contributed to neuropathy in his feet.  He also has chronic lymphedema lateral legs.  Most recently, he was seen in the [place] emergency room for infection it was noted that time, that he had seen infectious disease and was started on a course of cefadroxil and doxycycline.  At that time in the ER, he noticed that his left big toe looks swollen and wet.  His last podiatry appointment was with Dr. Curtis, and this was on June 17 of this year. He does have home nursing seeing him.  He had an amputation of the left second digit just under 2 weeks ago.  This was done at [facility].  For follow-up, they were going to see the podiatrist, however they were late and could not be worked into the schedule.  His daughter relates that she needs to make a new appointment for this.  She relates that he has home nursing through [facility] in [place].  He has had lymphedema treatments before.  Home nursing is just doing Ace dressings now.  I did call home nursing and Occupational Therapy is going to set him for the lymphedema on Saturday.  His daughter relates that he has used pumps in the past, however when he did this, he was getting frequent bouts of cellulitis.  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)       Splenomegaly      Squamous cell carcinoma      Thrombocytopenia (H)      Varices, esophageal (H)     Banded in 2011       PSxH:   Past Surgical History:   Procedure Laterality Date     BIOPSY OF SKIN LESION       COLONOSCOPY Left 6/16/2016    Procedure: COMBINED COLONOSCOPY, SINGLE OR MULTIPLE BIOPSY/POLYPECTOMY BY BIOPSY;  Surgeon: Brandy Barnett MD;  Location:  GI     ESOPHAGOSCOPY, GASTROSCOPY, DUODENOSCOPY (EGD), COMBINED  2/13/2013    Procedure: COMBINED ESOPHAGOSCOPY, GASTROSCOPY, DUODENOSCOPY (EGD);;  Surgeon: Tara Cook MD;  Location:  GI     ESOPHAGOSCOPY, GASTROSCOPY, DUODENOSCOPY (EGD), COMBINED  11/4/2013    Procedure: COMBINED ESOPHAGOSCOPY, GASTROSCOPY, DUODENOSCOPY (EGD);;  Surgeon: Lonny Diaz MD;  Location:  GI     ESOPHAGOSCOPY, GASTROSCOPY, DUODENOSCOPY (EGD), COMBINED Left 6/16/2016    Procedure: COMBINED ESOPHAGOSCOPY, GASTROSCOPY, DUODENOSCOPY (EGD), BIOPSY SINGLE OR MULTIPLE;  Surgeon: Brandy Barnett MD;  Location:  GI     EXCISE LESION TRUNK  9/24/2012    Procedure: EXCISE LESION TRUNK;;  Surgeon: Pepe Dominguez MD;  Location: [facility]     GENITOURINARY SURGERY      vasectomy     HERNIORRHAPHY UMBILICAL  9/24/2012    Procedure: HERNIORRHAPHY UMBILICAL;  UMBILICAL HERNIA REPAIR , EXCISION OF PERIUMBILICAL CYST;  Surgeon: Pepe Dominguez MD;  Location: [facility]     IR CHEMO EMBOLIZATION  7/30/2020     IR CHEMO EMBOLIZATION  10/1/2020     IR CHEMO EMBOLIZATION  1/7/2021     IR CHEMO EMBOLIZATION  5/5/2021       Allergies: Patient has no known allergies.    SH:   Social History     Socioeconomic History     Marital status:      Spouse name: Not on file     Number of children: Not on file     Years of education: Not on file     Highest education level: Not on file   Occupational History     Not on file   Social Needs     Financial resource strain: Not on file     Food insecurity     Worry: Not on file     Inability: Not on file      Transportation needs     Medical: Not on file     Non-medical: Not on file   Tobacco Use     Smoking status: Current Every Day Smoker     Packs/day: 0.10     Types: Cigarettes     Smokeless tobacco: Never Used     Tobacco comment: about 1-2 cigarettes per day   Substance and Sexual Activity     Alcohol use: No     Alcohol/week: 0.0 standard drinks     Comment: 2-3 per day , none since Dec 2012     Drug use: No     Sexual activity: Yes     Partners: Female     Birth control/protection: Surgical   Lifestyle     Physical activity     Days per week: Not on file     Minutes per session: Not on file     Stress: Not on file   Relationships     Social connections     Talks on phone: Not on file     Gets together: Not on file     Attends [employer] service: Not on file     Active member of club or organization: Not on file     Attends meetings of clubs or organizations: Not on file     Relationship status: Not on file     Intimate partner violence     Fear of current or ex partner: Not on file     Emotionally abused: Not on file     Physically abused: Not on file     Forced sexual activity: Not on file   Other Topics Concern     Parent/sibling w/ CABG, MI or angioplasty before 65F 55M? Not Asked      Service No     Blood Transfusions No     Caffeine Concern No     Comment: very little coffee, likes beer     Occupational Exposure No     Hobby Hazards No     Sleep Concern Yes     Comment: bed at 3:30 AM, up at 1:00 PM     Stress Concern Yes     Weight Concern Yes     Special Diet No     Back Care No     Exercise No     Bike Helmet No     Seat Belt Yes     Self-Exams No   Social History Narrative    . 3 grown daughters. He has been sober since 2012.       FH:   Family History   Problem Relation Age of Onset     Breast Cancer Mother      Liver Cancer Mother      Cardiovascular Father      Cerebrovascular Disease Father         very low blood pressure     Cancer Father         rectal cancer     Cardiovascular  Paternal Grandfather      Diabetes Brother      Cancer Sister         skin cancer     C.A.D. Other         MI, 70's     Breast Cancer Sister      Thyroid Disease No family hx of      Lipids No family hx of      Anesthesia Reaction No family hx of        Objective:  Data Unavailable Data Unavailable Data Unavailable Data Unavailable Data Unavailable 0 lbs 0 oz    PT and DP pulses are nonpalpable secondary to brawny edema bilaterally. CRT is 1 second.  Positive pedal hair.   Gross sensation is diminished bilaterally.   Equinus is noted bilaterally. No pain with active or passive ROM of the ankle, MTJ, 1st ray, or halluces bilaterally,.  Pes planus is noted bilaterally with flexor stabilizing hammertoes to the lesser digits.  Pain noted with palpation of the dorsal feet, with left greater than right.  This is likely secondary to the severe edema.  Pain noted with palpation of the plantar second met head on the left.  There is fat pad atrophy noted about this area.    lipodermatosclerosis noted bilaterally.  Sure line noted to the left distal second digit.  This does appear well coapted.      There is a superficial abrasion that appears as a deroofed bulla noted to the medial right ankle.  No signs or symptoms of infection noted.      2 wounds are noted to the left lateral leg.  Distally, this measures 1.4 cm x 0.8 cm x 0.1 cm.  This is red granulation in the base.  There is 1 just proximal to this.  It measures about 0.5 cm x 0.7 cm x 0.1 cm.  There are no signs or symptoms of infection noted.  Surrounding skin is lipodermatosclerosis.    Assessment: Unruly is a 65-year-old today who presents with his daughter who has longstanding lymphedema and venous stasis of bilateral legs.  He does have ulcerations to both legs.  He recently had a left second digit amputation.  He has a complex history to his legs.  I have asked him to make an appointment again with the surgeon to take out the sutures.  I did talk to his   at [facility].  They do have a lymphedema appointment coming up on Saturday.  I think this is the best option to do decrease some of his pain and swelling in his legs.  For now, we will continue to wrap his legs.  Today, we did Profore boots bilaterally.    Plan:  - Pt seen and evaluated.  -We will send in a prescription for pruritic legs for a steroid cream.  He should use this sparingly.  - They will make an appt for pod sx to remove sutures.  - OT coming on Saturday for lymphedema assessment.  - Profore boots applied today.   - Triamcinolone cream sent to the pharmacy.   - See again in 1 month.   I spent 60 minutes today on patient care, documentation, chart review, and care coordination.

## 2021-07-08 NOTE — NURSING NOTE
Chief Complaint   Patient presents with     Consult     Unruly, is being seen today for a consult regarding bilateral  Lymphedema  lower extremities, diabetic ulcers, has wound care in home, as reported by patient and daughter.       There were no vitals filed for this visit.    There is no height or weight on file to calculate BMI.    No vitals per MD Lore Puente LPN

## 2021-07-09 NOTE — TELEPHONE ENCOUNTER
Reason For Call:        Chief Complaint   Patient presents with     Refill Request                 Medication Name, Dose and Monthly Quantity:     Roxicodone 5 mg     Diagnosis requiring opiates:        Problem List Updated:   Yes     Opioid Agreement On File - [facility] PAIN CONTRACT ID# [id number]:       Last Urine Drug Screen (at least once every 12 months) Date:     Unexpected Results:        [facility] Data Reviewed (at least once every 3 months) Date:   07/09/2021     Unexpected Results:         Last Fill Date:   05/03/2021    Next Fill Date:   07/09/2021     Last Visit with PCP:        Future Visits with PCP:      Processing:         CHRISTINA SOL CMA at 8:35 AM on 7/9/2021.

## 2021-07-13 NOTE — TELEPHONE ENCOUNTER
Spoke to Unruly's daughter Kristi and let her know that the prescription for Unruly's lactulose was sent to the pharmacy on 7/12 at 3:33. She voiced understanding.  Sasha Sue RN

## 2021-07-13 NOTE — PATIENT INSTRUCTIONS
1. Proceed to the ER today for further evaluation of left big toe  2. Reschedule Podiatry appointment at [facility] for amputation follow-up  3. Follow-up with Infectious Disease as recommended  4. Wound care as scheduled: we will follow-up on clarification of instructions and supplies for wound care  5. [facility] for insoles  6. We will reach out to our [facility]  regarding co-ordination of care and option of possible TCU stay  7. Return to clinic with Dr Mukherjee in 2 months

## 2021-07-13 NOTE — TELEPHONE ENCOUNTER
[facility] Call Center    Phone Message    May a detailed message be left on voicemail: yes     Reason for Call: Order(s): Home Care Orders: Skilled Nursing:   Would like verbal wound care orders updated to 2x weekly to cleanse and apply xeroform to open area, covered by telfa adhesive dressing.           Other:    Update about a medication error that happened this morning.  Unruly took 10 extra units of Lantus insulin.   Homecare instructed family to monitor blood sugars and to provide snacks if blood sugar drops below 70.                 Action Taken: Message routed to:  [facility] ([facility]): PCC    Travel Screening: Not Applicable

## 2021-07-13 NOTE — PROGRESS NOTES
"Oncology Rooming Note    July 13, 2021 2:20 PM   Lawrence Louie is a 65 year old male who presents for:    Chief Complaint   Patient presents with     Oncology Clinic Visit     Initial Vitals: /68   Pulse 85   Resp 16   Wt 110.2 kg (243 lb)   SpO2 95%   BMI 31.20 kg/m   Estimated body mass index is 31.2 kg/m  as calculated from the following:    Height as of 6/22/21: 1.88 m (6' 2\").    Weight as of this encounter: 110.2 kg (243 lb). Body surface area is 2.4 meters squared.  Moderate Pain (4) Comment: Data Unavailable   No LMP for male patient.  Allergies reviewed: Yes  Medications reviewed: Yes    Medications: Medication refills not needed today.  Pharmacy name entered into Sp[facility]:    [facility] [address] [place] [address] PHARMACY [place] [id number]    Clinical concerns:  doctor was notified.      Corina Pink, JACQUE            "

## 2021-07-13 NOTE — PROGRESS NOTES
[facility]   PM&R clinic note        Interval history:     Lawrence Louie presents to clinic today for follow up reg his rehab needs.   He has h/o unresectable multifocal hepatocellular carcinoma and alcoholic cirrhosis (complicated by ascites, s/p TIPs, hepatic encephalopathy and diffuse anasarca, sepsis secondary to multiple leg ulcers).  Was last seen in clinic on 6/22/2021.    Recommendations included:  1. Work-up:   1. Wound culture was done of the left foot second toe wound.  2. Therapy/equipment/braces:  1. Lymphedema therapy has been scheduled and will start on July 1. Patient and his daughter were advised to continue daily wrapping and wound dressing changes.  2. Patient is waiting to get home PT scheduled to help with deconditioning and improving mobility.  3. Medications:  1. Cefadroxil 500 mg twice daily for 7 days was prescribed for likely ongoing wound infection in left 2nd toe.  Patient has been afebrile, however with his ongoing wound, purulent foul-smelling discharge and risk for development of osteomyelitis, he was strongly advised that he may need IV antibiotics and should proceed to the ED if symptoms remain or do not get better over the next 24 hours.  4. Follow up with other providers:   1. Dr. Mukherjee to follow-up with ID team regarding ongoing management.  2. Wound care referral placed, patient still has to schedule appointment.  5. Follow up: 1 month.    Oncology History:  - History of alcoholic cirrhosis diagnosed in 2012, previously heavy alcohol use, quit in 2012 per chart review.  - TIPS procedure in 2017  - 7/2020- New diagnosis of hepatocellular carcinoma. Seen by Dr. Willis in clinic on 7/21/2020. Plan to do liver directed therapy by Dr. Hilton if no evidence of metastatic disease. Plan to do it as staged procedure.  - cTACE of segment 6 lesion in July 2020.  - cTACE of segment 7 lesion in October 2020  -cTACE of segment 6 lesion in January  2021.  -cTACE of segment 4 lesion in 5/2021.  - Last visit with Dr. Hilton on 6/8/2021 and there was no identifiable HCC in the last MRI. Patient was not following up with Oncology and GI, Dr. Hilton recommended to follow up with them.   - GI virtual visit on 6/14/2021- Furosemide discontinued for anasarca, patient started on 1.5 mg torosemide daily and spironolactone was increased to 150 mg daily. Continues rifaxmin twice daily for HE. Patient with frailty and physical deconditioning, protein intake was recommended. Home PT has been ordered. Wound Care referral was also placed for lower extremity wounds and lymphedema.    Medical issues since last visit:  Patient was hospitalized for a wound infection/sepsis after his last visit on 6/22/21. He had severe sepsis (Pseudomonas aeruginosa bactermia)  related to infected diabetic ulcer of his left foot and left foot cellulitis. He also had left 2nd toe osteomyelitis. He underwent a partial amputation of his left second toe on 6/26/21. He presented to the ED on 7/2 for evaluation of increased drainage, discoloration and pain on the top of his left foot. There were no significant signs of infection on exam and x-ray. Recommendation was to continue levofloxacin and follow-up with ID (appointment around 7/23). They completed course of levofloxacin.    He was seen for wound care by Podiatry (Dm) on 7/8, Profore boots were applied, he was set up with OT lymphedema therapy and plan to follow up with wound care in 1 month. Per patient and daughter, Kristi, lymphedema therapy did not go well as the provider was unclear with treatment plan. They also expressed frustration with not being clear on overall instruction and supplies for wound cares for his bilateral lower extremity. Profore boots were removed 7/13 by home care nursing and wound dressed.     They report they had also attempted to see Podiatrist at [facility] last week for amputation follow-up, and report  they were unable to be seen as they were late for the appointment because they had some difficulty with the directions.  They report that sutures are due for removal.     Symptoms:  Pain rated 4-5 especially on the left index toe, as well as top of both feet.  Some itching on both feet, planning to  topical prescription from Podiatry visit 7/8  Wound site on the R ankle pictured on 7/8 is healing well  Wound site on the lateral L knee is reported as about the same as pictured on 7/8.  His daughter, Kristi reports it has been challenging to get clear instructions and supplies for overall wound care support at home. We discussed TCU may be an option but they are hesitant due to previous experience, and Kristi feels she will be more aware of subtle changes.       Therapies/HEP:  Continue with home nursing for wound dressings, HHA, OT lymphedema evaluation 7/10, [facility]  Per chart review, plans to visit with [facility] for new insoles around 7/15.    Functionally,   Some fluctuating cognition per Kristi - she reports Unruly is not keeping track of conversation sometimes especially recently, otherwise no changes since last visit    Social history is unchanged.    Medications:  Current Outpatient Medications   Medication Sig Dispense Refill     blood glucose (NO BRAND SPECIFIED) lancets standard Use to test blood sugar 3-4 times daily. Use brand compatible with patients insurance and device. 360 each 3     blood glucose (NO BRAND SPECIFIED) test strip Use to test blood sugars 4 X  times daily or as directed. Use brand compatible with patients insurance and device. 360 strip 3     blood glucose monitoring (NO BRAND SPECIFIED) meter device kit Use to test blood sugar 4 times daily or as directed. Before meals and snack at HS. 1 kit 0     bumetanide (BUMEX) 0.5 MG tablet Take 3 tablets (1.5 mg) by mouth daily 90 tablet 1     Calcium Carbonate-Vit D-Min (CALCIUM 1200 PO) Plant based        cefadroxil (DURICEF) 500 MG capsule Take 1 capsule (500 mg) by mouth 2 times daily 14 capsule 0     citalopram (CELEXA) 20 MG tablet Take 1 tablet (20 mg) by mouth daily 90 tablet 3     COMPOUNDED NON-CONTROLLED SUBSTANCE (CMPD RX) - PHARMACY TO MIX COMPOUNDED MEDICATION Apply to the affected area once a day. 500 g 5     Cyanocobalamin (VITAMIN B-12 PO) Take 1 tablet by mouth daily       desvenlafaxine (PRISTIQ) 100 MG 24 hr tablet Take 2 tablets (200 mg) by mouth daily (Patient taking differently: Take 100 mg by mouth daily Only 100mg) 180 tablet 1     doxycycline hyclate (VIBRAMYCIN) 100 MG capsule Take 1 capsule (100 mg) by mouth every 12 hours 14 capsule 0     EQL VITAMIN D3 50 MCG (2000 UT) CAPS Take 1 capsule by mouth daily 90 capsule 2     famotidine (PEPCID) 40 MG tablet Take 1 tablet (40 mg) by mouth daily 30 tablet 11     gabapentin (NEURONTIN) 300 MG capsule Take 1 capsule (300 mg) by mouth At Bedtime 90 capsule 3     hydrOXYzine (ATARAX) 25 MG tablet Take 1 tablet (25 mg) by mouth 3 times daily as needed for itching 90 tablet 3     insulin aspart (NOVOLOG FLEXPEN) 100 UNIT/ML pen 1 unit per 10 grams of carbohydrates + 1 per 50>140. Max daily dose 100 units. 30 mL 11     insulin glargine (LANTUS SOLOSTAR) 100 UNIT/ML pen 5 units daily in am. Increase by 5 units 2x weekly until fasting sugars are <130. Max daily dose 100 units. 15 mL 11     insulin pen needle (B-D U/F) 31G X 8 MM miscellaneous USE  6 times daily / OR AS DIRECTED 300 each 3     insulin pen needle (ULTICARE SHORT) 31G X 8 MM miscellaneous Use UP to 6 times daily or as directed 300 each 3     lactulose (CEPHULAC) 20 GM packet Take 1 packet (20 g) by mouth 2 times daily (Patient not taking: Reported on 7/8/2021) 60 packet 0     lactulose encephalopathy (CHRONULAC) 10 GM/15ML SOLUTION TAKE 30 MLS BY MOUTH 2 TIMES DAILY -  TITRATE AS NEEDED TO ACHIEVE 3-5 BOWEL MOVEMENTS DAILY. 1892 mL 11     levETIRAcetam (KEPPRA) 500 MG tablet Take 500 mg by  mouth 2 times daily        Lidocaine (LIDOCARE) 4 % Patch Place 1 patch onto the skin every 24 hours To prevent lidocaine toxicity, patient should be patch free for 12 hrs daily. 30 patch 3     MAGNESIUM CITRATE PO gummies-nature made       magnesium oxide (MAG-OX) 400 (241.3 Mg) MG tablet Take 1 tablet (400 mg) by mouth daily 90 tablet 1     Melatonin 5 MG CHEW Take 2 tablets by mouth nightly as needed       Nutritional Supplements (ENSURE) LIQD Take 1 Can by mouth daily 30 each 11     nystatin (MYCOSTATIN) 120229 UNIT/GM external cream Apply topically 2 times daily 30 g 11     nystatin (MYCOSTATIN) 876914 UNIT/GM external powder Apply topically 2 times daily 120 g 11     nystatin (MYCOSTATIN) 605667 UNIT/GM external powder Apply topically 2 times daily 60 g 11     ondansetron (ZOFRAN) 4 MG tablet Take 1 tablet (4 mg) by mouth every 8 hours as needed for nausea 20 tablet 0     order for DME Equipment being ordered:Orthopedic shoes 2 Units 0     order for DME Equipment being ordered: Condom catheter 1 Device 3     order for DME Equipment being ordered:BioTab compression pants pneumatic system 1 Piece 0     order for DME Equipment being ordered:bilateral pneumatic leg massage for treatment of extreme bilateral lower leg edema. 2 pump 0     oxyCODONE (ROXICODONE) 5 MG tablet TAKE 1 to 2 TABLETS BY MOUTH EVERY 8 HOURS AS NEEDED FOR SEVERE PAIN 180 tablet 0     OYSTER SHELL CALCIUM/D 500-200 MG-UNIT per tablet Take 1 tablet by mouth daily 90 tablet 3     pantoprazole (PROTONIX) 40 MG EC tablet Take 1 tablet (40 mg) by mouth daily 14 tablet 0     polyethylene glycol (MIRALAX) 17 g packet Take 1 packet by mouth       potassium gluconate 2.5 MEQ tablet Take 1 tablet by mouth daily       prochlorperazine (COMPAZINE) 10 MG tablet Take 1 tablet (10 mg) by mouth every 6 hours as needed for nausea or vomiting (take if zofran does not relieve nausea) 20 tablet 0     QUEtiapine (SEROQUEL) 100 MG tablet Take 1 tablet (100 mg) by  mouth 2 times daily 90 tablet 3     QUEtiapine (SEROQUEL) 100 MG tablet Take 100 mg by mouth At Bedtime        rifaximin (XIFAXAN) 550 MG TABS tablet Take 1 tablet (550 mg) by mouth 2 times daily 60 tablet 11     spironolactone (ALDACTONE) 50 MG tablet Take 3 tablets (150 mg) by mouth daily 180 tablet 0     tamsulosin (FLOMAX) 0.4 MG capsule Take 2 capsules (0.8 mg) by mouth daily 180 capsule 3     tiotropium (SPIRIVA RESPIMAT) 2.5 MCG/ACT inhaler Inhale 2 puffs into the lungs daily 4 g 0     traZODone (DESYREL) 50 MG tablet Take 1 tablet (50 mg) by mouth At Bedtime (Patient not taking: Reported on 7/8/2021) 30 tablet 11     triamcinolone (KENALOG) 0.1 % external cream Use sparingly on intact leg skin daily for itching. 90 g 0     ursodiol (ACTIGALL) 300 MG capsule TAKE THREE CAPSULES BY MOUTH TWO TIMES A DAY  180 capsule 11              Physical Exam:   There were no vitals taken for this visit.   Gen: NAD, pleasant and cooperative   HEENT: Normocephalic, atraumatic  Cardio: regular pulse  Pulm: non-labored breathing in room air  Abd: benign  Ext: non-blanching pale area on left big toe with greenish-yellow areas, notable odor, sutures in place on amputated site of left index toe, wound site on the R ankle pictured on 7/8 examined and noted to be healing well, wound site on the lateral L knee is in clean dressing and reported as about the same as pictured on 7/8  Neuro/MSK: at least antigravity BUE/BLE, ambulates with use of FWW    Labs/Imaging:  Lab Results   Component Value Date    WBC 2.0 (L) 06/01/2021    HGB 8.2 (L) 06/01/2021    HCT 25.6 (L) 06/01/2021    MCV 98 06/01/2021    PLT 52 (L) 06/01/2021     Lab Results   Component Value Date     06/01/2021    POTASSIUM 3.7 06/01/2021    CHLORIDE 109 06/01/2021    CO2 27 06/01/2021     (H) 06/01/2021     Lab Results   Component Value Date    GFRESTIMATED >90 06/01/2021    GFRESTBLACK >90 06/01/2021     Lab Results   Component Value Date    AST 29  06/01/2021    ALT 21 06/01/2021    ALKPHOS 179 (H) 06/01/2021    BILITOTAL 1.1 06/01/2021    BILICONJ 0.0 03/12/2014    ANGELICA 63 (H) 08/19/2019     Lab Results   Component Value Date    INR 1.43 (H) 06/01/2021     Lab Results   Component Value Date    BUN 11 06/01/2021    CR 0.77 06/01/2021     MRI Left Foot W/O Contrast (6/24/21):  IMPRESSION:   1.  Limited study given the degree of motion. The signal characteristics of the terminal second phalanx are suspicious for osteomyelitis.   2.  Extensive skin and subcutaneous edema in the leg and foot.   3.  Fat signal within the intrinsic foot muscles suggestive of neuropathic change.      XR Left Foot (7/2/2021):  FINDINGS:   Extensive soft tissue swelling again noted. Deformity of the second through fifth toes again noted. Linear lucency through the distal aspect of the proximal phalanx of the left fifth toe again seen. Deformity of the neck of the left fifth metatarsal again noted. No gross/obvious bony erosions, periosteal reaction, or soft tissue air.   IMPRESSION:   Radiographic appearance of the left foot and toes, as described above, is similar to the recent study of 6/23/2021. As noted on the report of the previous study, if there is clinical concern for osteomyelitis, an MRI could be obtained.            Assessment/Plan   Lawrence Louie presents to clinic today for follow up reg his rehab needs.   He has h/o unresectable multifocal hepatocellular carcinoma and alcoholic cirrhosis (complicated by ascites, s/p TIPs, hepatic encephalopathy and diffuse anasarca, sepsis secondary to multiple leg ulcers).  Was last seen in clinic on 6/22/2021.    1. Work-up: no new workup recommended  2. Therapy/equipment/braces: no new equipment recommended  3. Medications: no new medication recommendations  4. Interventions:  1. Continue home nursing wound cares  2. Continue OT lymphedema therapy  3. Will explore TCU stay to support ongoing rehab  5. Referral / follow up with  other providers:  1. Proceed to the ER today for further evaluation of left big toe  2. Reschedule Podiatry appointment at [facility] for amputation follow-up  3. Follow-up with ID as recommended  4. Wound care as scheduled: will follow-up on clarification of instructions and supplies  5. [facility] for insoles  6. We will reach out to our [facility]  regarding co-ordination of care and option of possible TCU stay  6. Follow up: 2 months      Cara Mcgraw MD  PGY-3, PM&R    Patient seen and discussed with attending, Dr Mukherjee    Physician Attestation   I, Jodie Mukherjee MD, saw and examined this patient and agree with the findings and plan of care as documented in the note.      Items personally reviewed/attestation: vitals, labs and imaging and agree with the interpretation documented in the note.    Jodie Mukherjee MD  Physical Medicine & Rehabilitation

## 2021-07-13 NOTE — LETTER
7/13/2021         RE: Lawrence Louie  [address] [place] [address]        Dear Colleague,    Thank you for referring your patient, Lawrence Louie, to the [facility]. Please see a copy of my visit note below.    [facility]   PM&R clinic note        Interval history:     Lawrence Louie presents to clinic today for follow up reg his rehab needs.   He has h/o unresectable multifocal hepatocellular carcinoma and alcoholic cirrhosis (complicated by ascites, s/p TIPs, hepatic encephalopathy and diffuse anasarca, sepsis secondary to multiple leg ulcers).  Was last seen in clinic on 6/22/2021.    Recommendations included:  1. Work-up:   1. Wound culture was done of the left foot second toe wound.  2. Therapy/equipment/braces:  1. Lymphedema therapy has been scheduled and will start on July 1. Patient and his daughter were advised to continue daily wrapping and wound dressing changes.  2. Patient is waiting to get home PT scheduled to help with deconditioning and improving mobility.  3. Medications:  1. Cefadroxil 500 mg twice daily for 7 days was prescribed for likely ongoing wound infection in left 2nd toe.  Patient has been afebrile, however with his ongoing wound, purulent foul-smelling discharge and risk for development of osteomyelitis, he was strongly advised that he may need IV antibiotics and should proceed to the ED if symptoms remain or do not get better over the next 24 hours.  4. Follow up with other providers:   1. Dr. Mukherjee to follow-up with ID team regarding ongoing management.  2. Wound care referral placed, patient still has to schedule appointment.  5. Follow up: 1 month.    Oncology History:  - History of alcoholic cirrhosis diagnosed in 2012, previously heavy alcohol use, quit in 2012 per chart review.  - TIPS procedure in 2017  - 7/2020- New diagnosis of hepatocellular carcinoma. Seen by Dr. Willis in clinic on  7/21/2020. Plan to do liver directed therapy by Dr. Hilton if no evidence of metastatic disease. Plan to do it as staged procedure.  - cTACE of segment 6 lesion in July 2020.  - cTACE of segment 7 lesion in October 2020  -cTACE of segment 6 lesion in January 2021.  -cTACE of segment 4 lesion in 5/2021.  - Last visit with Dr. Hilton on 6/8/2021 and there was no identifiable HCC in the last MRI. Patient was not following up with Oncology and GI, Dr. Hilton recommended to follow up with them.   - GI virtual visit on 6/14/2021- Furosemide discontinued for anasarca, patient started on 1.5 mg torosemide daily and spironolactone was increased to 150 mg daily. Continues rifaxmin twice daily for HE. Patient with frailty and physical deconditioning, protein intake was recommended. Home PT has been ordered. Wound Care referral was also placed for lower extremity wounds and lymphedema.    Medical issues since last visit:  Patient was hospitalized for a wound infection/sepsis after his last visit on 6/22/21. He had severe sepsis (Pseudomonas aeruginosa bactermia)  related to infected diabetic ulcer of his left foot and left foot cellulitis. He also had left 2nd toe osteomyelitis. He underwent a partial amputation of his left second toe on 6/26/21. He presented to the ED on 7/2 for evaluation of increased drainage, discoloration and pain on the top of his left foot. There were no significant signs of infection on exam and x-ray. Recommendation was to continue levofloxacin and follow-up with ID (appointment around 7/23). They completed course of levofloxacin.    He was seen for wound care by Podiatry (Dm) on 7/8, Profore boots were applied, he was set up with OT lymphedema therapy and plan to follow up with wound care in 1 month. Per patient and daughter, Kristi, lymphedema therapy did not go well as the provider was unclear with treatment plan. They also expressed frustration with not being clear on overall  instruction and supplies for wound cares for his bilateral lower extremity. Profore boots were removed 7/13 by home care nursing and wound dressed.     They report they had also attempted to see Podiatrist at [facility] last week for amputation follow-up, and report they were unable to be seen as they were late for the appointment because they had some difficulty with the directions.  They report that sutures are due for removal.     Symptoms:  Pain rated 4-5 especially on the left index toe, as well as top of both feet.  Some itching on both feet, planning to  topical prescription from Podiatry visit 7/8  Wound site on the R ankle pictured on 7/8 is healing well  Wound site on the lateral L knee is reported as about the same as pictured on 7/8.  His daughter, Kristi reports it has been challenging to get clear instructions and supplies for overall wound care support at home. We discussed TCU may be an option but they are hesitant due to previous experience, and Krisit feels she will be more aware of subtle changes.       Therapies/HEP:  Continue with home nursing for wound dressings, HHA, OT lymphedema evaluation 7/10, [facility]  Per chart review, plans to visit with [facility] for new insoles around 7/15.    Functionally,   Some fluctuating cognition per Kristi - she reports Unruly is not keeping track of conversation sometimes especially recently, otherwise no changes since last visit    Social history is unchanged.    Medications:  Current Outpatient Medications   Medication Sig Dispense Refill     blood glucose (NO BRAND SPECIFIED) lancets standard Use to test blood sugar 3-4 times daily. Use brand compatible with patients insurance and device. 360 each 3     blood glucose (NO BRAND SPECIFIED) test strip Use to test blood sugars 4 X  times daily or as directed. Use brand compatible with patients insurance and device. 360 strip 3     blood glucose monitoring (NO BRAND SPECIFIED)  meter device kit Use to test blood sugar 4 times daily or as directed. Before meals and snack at HS. 1 kit 0     bumetanide (BUMEX) 0.5 MG tablet Take 3 tablets (1.5 mg) by mouth daily 90 tablet 1     Calcium Carbonate-Vit D-Min (CALCIUM 1200 PO) Plant based       cefadroxil (DURICEF) 500 MG capsule Take 1 capsule (500 mg) by mouth 2 times daily 14 capsule 0     citalopram (CELEXA) 20 MG tablet Take 1 tablet (20 mg) by mouth daily 90 tablet 3     COMPOUNDED NON-CONTROLLED SUBSTANCE (CMPD RX) - PHARMACY TO MIX COMPOUNDED MEDICATION Apply to the affected area once a day. 500 g 5     Cyanocobalamin (VITAMIN B-12 PO) Take 1 tablet by mouth daily       desvenlafaxine (PRISTIQ) 100 MG 24 hr tablet Take 2 tablets (200 mg) by mouth daily (Patient taking differently: Take 100 mg by mouth daily Only 100mg) 180 tablet 1     doxycycline hyclate (VIBRAMYCIN) 100 MG capsule Take 1 capsule (100 mg) by mouth every 12 hours 14 capsule 0     EQL VITAMIN D3 50 MCG (2000 UT) CAPS Take 1 capsule by mouth daily 90 capsule 2     famotidine (PEPCID) 40 MG tablet Take 1 tablet (40 mg) by mouth daily 30 tablet 11     gabapentin (NEURONTIN) 300 MG capsule Take 1 capsule (300 mg) by mouth At Bedtime 90 capsule 3     hydrOXYzine (ATARAX) 25 MG tablet Take 1 tablet (25 mg) by mouth 3 times daily as needed for itching 90 tablet 3     insulin aspart (NOVOLOG FLEXPEN) 100 UNIT/ML pen 1 unit per 10 grams of carbohydrates + 1 per 50>140. Max daily dose 100 units. 30 mL 11     insulin glargine (LANTUS SOLOSTAR) 100 UNIT/ML pen 5 units daily in am. Increase by 5 units 2x weekly until fasting sugars are <130. Max daily dose 100 units. 15 mL 11     insulin pen needle (B-D U/F) 31G X 8 MM miscellaneous USE  6 times daily / OR AS DIRECTED 300 each 3     insulin pen needle (ULTICARE SHORT) 31G X 8 MM miscellaneous Use UP to 6 times daily or as directed 300 each 3     lactulose (CEPHULAC) 20 GM packet Take 1 packet (20 g) by mouth 2 times daily (Patient not  taking: Reported on 7/8/2021) 60 packet 0     lactulose encephalopathy (CHRONULAC) 10 GM/15ML SOLUTION TAKE 30 MLS BY MOUTH 2 TIMES DAILY -  TITRATE AS NEEDED TO ACHIEVE 3-5 BOWEL MOVEMENTS DAILY. 1892 mL 11     levETIRAcetam (KEPPRA) 500 MG tablet Take 500 mg by mouth 2 times daily        Lidocaine (LIDOCARE) 4 % Patch Place 1 patch onto the skin every 24 hours To prevent lidocaine toxicity, patient should be patch free for 12 hrs daily. 30 patch 3     MAGNESIUM CITRATE PO gummies-nature made       magnesium oxide (MAG-OX) 400 (241.3 Mg) MG tablet Take 1 tablet (400 mg) by mouth daily 90 tablet 1     Melatonin 5 MG CHEW Take 2 tablets by mouth nightly as needed       Nutritional Supplements (ENSURE) LIQD Take 1 Can by mouth daily 30 each 11     nystatin (MYCOSTATIN) 464310 UNIT/GM external cream Apply topically 2 times daily 30 g 11     nystatin (MYCOSTATIN) 485943 UNIT/GM external powder Apply topically 2 times daily 120 g 11     nystatin (MYCOSTATIN) 930745 UNIT/GM external powder Apply topically 2 times daily 60 g 11     ondansetron (ZOFRAN) 4 MG tablet Take 1 tablet (4 mg) by mouth every 8 hours as needed for nausea 20 tablet 0     order for DME Equipment being ordered:Orthopedic shoes 2 Units 0     order for DME Equipment being ordered: Condom catheter 1 Device 3     order for DME Equipment being ordered:BioTab compression pants pneumatic system 1 Piece 0     order for DME Equipment being ordered:bilateral pneumatic leg massage for treatment of extreme bilateral lower leg edema. 2 pump 0     oxyCODONE (ROXICODONE) 5 MG tablet TAKE 1 to 2 TABLETS BY MOUTH EVERY 8 HOURS AS NEEDED FOR SEVERE PAIN 180 tablet 0     OYSTER SHELL CALCIUM/D 500-200 MG-UNIT per tablet Take 1 tablet by mouth daily 90 tablet 3     pantoprazole (PROTONIX) 40 MG EC tablet Take 1 tablet (40 mg) by mouth daily 14 tablet 0     polyethylene glycol (MIRALAX) 17 g packet Take 1 packet by mouth       potassium gluconate 2.5 MEQ tablet Take 1  tablet by mouth daily       prochlorperazine (COMPAZINE) 10 MG tablet Take 1 tablet (10 mg) by mouth every 6 hours as needed for nausea or vomiting (take if zofran does not relieve nausea) 20 tablet 0     QUEtiapine (SEROQUEL) 100 MG tablet Take 1 tablet (100 mg) by mouth 2 times daily 90 tablet 3     QUEtiapine (SEROQUEL) 100 MG tablet Take 100 mg by mouth At Bedtime        rifaximin (XIFAXAN) 550 MG TABS tablet Take 1 tablet (550 mg) by mouth 2 times daily 60 tablet 11     spironolactone (ALDACTONE) 50 MG tablet Take 3 tablets (150 mg) by mouth daily 180 tablet 0     tamsulosin (FLOMAX) 0.4 MG capsule Take 2 capsules (0.8 mg) by mouth daily 180 capsule 3     tiotropium (SPIRIVA RESPIMAT) 2.5 MCG/ACT inhaler Inhale 2 puffs into the lungs daily 4 g 0     traZODone (DESYREL) 50 MG tablet Take 1 tablet (50 mg) by mouth At Bedtime (Patient not taking: Reported on 7/8/2021) 30 tablet 11     triamcinolone (KENALOG) 0.1 % external cream Use sparingly on intact leg skin daily for itching. 90 g 0     ursodiol (ACTIGALL) 300 MG capsule TAKE THREE CAPSULES BY MOUTH TWO TIMES A DAY  180 capsule 11              Physical Exam:   There were no vitals taken for this visit.   Gen: NAD, pleasant and cooperative   HEENT: Normocephalic, atraumatic  Cardio: regular pulse  Pulm: non-labored breathing in room air  Abd: benign  Ext: non-blanching pale area on left big toe with greenish-yellow areas, notable odor, sutures in place on amputated site of left index toe, wound site on the R ankle pictured on 7/8 examined and noted to be healing well, wound site on the lateral L knee is in clean dressing and reported as about the same as pictured on 7/8  Neuro/MSK: at least antigravity BUE/BLE, ambulates with use of FWW    Labs/Imaging:  Lab Results   Component Value Date    WBC 2.0 (L) 06/01/2021    HGB 8.2 (L) 06/01/2021    HCT 25.6 (L) 06/01/2021    MCV 98 06/01/2021    PLT 52 (L) 06/01/2021     Lab Results   Component Value Date      06/01/2021    POTASSIUM 3.7 06/01/2021    CHLORIDE 109 06/01/2021    CO2 27 06/01/2021     (H) 06/01/2021     Lab Results   Component Value Date    GFRESTIMATED >90 06/01/2021    GFRESTBLACK >90 06/01/2021     Lab Results   Component Value Date    AST 29 06/01/2021    ALT 21 06/01/2021    ALKPHOS 179 (H) 06/01/2021    BILITOTAL 1.1 06/01/2021    BILICONJ 0.0 03/12/2014    ANGELICA 63 (H) 08/19/2019     Lab Results   Component Value Date    INR 1.43 (H) 06/01/2021     Lab Results   Component Value Date    BUN 11 06/01/2021    CR 0.77 06/01/2021     MRI Left Foot W/O Contrast (6/24/21):  IMPRESSION:   1.  Limited study given the degree of motion. The signal characteristics of the terminal second phalanx are suspicious for osteomyelitis.   2.  Extensive skin and subcutaneous edema in the leg and foot.   3.  Fat signal within the intrinsic foot muscles suggestive of neuropathic change.      XR Left Foot (7/2/2021):  FINDINGS:   Extensive soft tissue swelling again noted. Deformity of the second through fifth toes again noted. Linear lucency through the distal aspect of the proximal phalanx of the left fifth toe again seen. Deformity of the neck of the left fifth metatarsal again noted. No gross/obvious bony erosions, periosteal reaction, or soft tissue air.   IMPRESSION:   Radiographic appearance of the left foot and toes, as described above, is similar to the recent study of 6/23/2021. As noted on the report of the previous study, if there is clinical concern for osteomyelitis, an MRI could be obtained.            Assessment/Plan   Lawrence Louie presents to clinic today for follow up reg his rehab needs.   He has h/o unresectable multifocal hepatocellular carcinoma and alcoholic cirrhosis (complicated by ascites, s/p TIPs, hepatic encephalopathy and diffuse anasarca, sepsis secondary to multiple leg ulcers).  Was last seen in clinic on 6/22/2021.    1. Work-up: no new workup  "recommended  2. Therapy/equipment/braces: no new equipment recommended  3. Medications: no new medication recommendations  4. Interventions:  1. Continue home nursing wound cares  2. Continue OT lymphedema therapy  3. Will explore TCU stay to support ongoing rehab  5. Referral / follow up with other providers:  1. Proceed to the ER today for further evaluation of left big toe  2. Reschedule Podiatry appointment at [facility] for amputation follow-up  3. Follow-up with ID as recommended  4. Wound care as scheduled: will follow-up on clarification of instructions and supplies  5. [facility] for insoles  6. We will reach out to our [facility]  regarding co-ordination of care and option of possible TCU stay  6. Follow up: 2 months      Cara Mcgraw MD  PGY-3, PM&R    Patient seen and discussed with attending, Dr Mukherjee    Physician Attestation   I, Jodie Mukherjee MD, saw and examined this patient and agree with the findings and plan of care as documented in the note.      Items personally reviewed/attestation: vitals, labs and imaging and agree with the interpretation documented in the note.    Jodie Mukherjee MD  Physical Medicine & Rehabilitation                Oncology Rooming Note    July 13, 2021 2:20 PM   Lawrence Louie is a 65 year old male who presents for:    Chief Complaint   Patient presents with     Oncology Clinic Visit     Initial Vitals: /68   Pulse 85   Resp 16   Wt 110.2 kg (243 lb)   SpO2 95%   BMI 31.20 kg/m   Estimated body mass index is 31.2 kg/m  as calculated from the following:    Height as of 6/22/21: 1.88 m (6' 2\").    Weight as of this encounter: 110.2 kg (243 lb). Body surface area is 2.4 meters squared.  Moderate Pain (4) Comment: Data Unavailable   No LMP for male patient.  Allergies reviewed: Yes  Medications reviewed: Yes    Medications: Medication refills not needed today.  Pharmacy name entered into EPIC:  "   [facility] [address] [place], [place] [address] [place] [address] [address]    Clinical concerns:  doctor was notified.      Corina Pink CMA                Again, thank you for allowing me to participate in the care of your patient.        Sincerely,        Jodie Mukherjee MD

## 2021-07-14 NOTE — TELEPHONE ENCOUNTER
"Social Work Progress Note      Data/Intervention:  Patient Name:  Lawrence Louie  /Age:  1955 (65 year old)    Reason for Follow-Up:  Unruly is a 65-year-old gentleman with a h/o unresectable multifocal hepatocellular carcinoma, cirrhotic liver, extensive bilateral lower extremity edema and multiple lower extremity wounds who is followed by Dr. Mukherjee. This clinician received referral from Dr. Mukherjee for family support and consideration of TCU placement.      Intervention:   This clinician left voicemail for Unruly with SW contact information and availability.    This clinician called Unruly's daughter Kristi, who has been Unruly's full-time PCA through waiver program. Kristi reports that Unruly's ex-wife and her had split PCA hours with ex-wife doing more cares when Unruly needs assistance when without clothing, however this has been complicated by ex-wife's medical needs. Kristi acknowledged that she and pt's ex-wife have different views about benefit of TCU, with ex-wife feeling that Unruly's care needs are too much to manage at home, and Kristi feeling that home is Unruly's preferred location and that she can provide needed cares.     Kristi voices understandable distress in context of not feeling well supported by wound care needs for Unruly, and worry that she is going to inadvertently doing something that is going to be detrimental to him. Kristi reports that has had issues getting necessary supplies for toe and leg, and feels that she has not received adequate instruction on how to care for leg and is frustrated that lymphedema therapist \"will not do anything for the leg\" because they need instruction. Kristi reports that she sees multiple different providers, and does not get answers from anyone and feels \"shipped off\" to another provider. Kristi reports that she likes to keep care with [facility], but has some concern about whether order was written for home care needs. Kristi reports that Unruly is working with  through " "PCP's office (Cat: [phone]), and has discussed possibility of respite care, but is not interested in accessing this \"during a time of crisis\" which she feels she is in now. Kristi voices that if he were around a non-family member that important changes would be missed in his cognition that she knows mean an \"infection is brewing.\" Kristi reports that she notices Unruly gets \"ornery\" and that \"his brain starts to drift\" when he is getting close to an infection.     CEFERINO called and LVM for CEFERINO Shelton to collaborate around home care needs and to verify that order had been placed for leg wound assistance. CEFERINO called [facility] ([phone]) and left message with care coordinator Pema Romano requesting return call.    Plan:  1) CEFERINO will await for return call from PCP CEFERINO Shelton, and Homecare RN Pema. CEFERINO to collaborate with Dr. Mukherjee surrounding families desire to not engage with TCU at present time.  2) CEFERINO will update daughter Kristi after I collaborate with others on care team.     Please call or page if needs or concerns arise.     VIRGINIA Haro, [facility]  Direct Phone: [phone]  "

## 2021-07-14 NOTE — TELEPHONE ENCOUNTER
CEFERINO received call from home care RN Pema ([phone]) who reported that there had been a missunderstanding with the dressing on 7/12/21. Pema reported that home care team will be able to assist with lymphedema, and that OT was out again today. Pema reported that she left supplies yesterday, and would help coordinate ordering supplies needed for toe and leg. Pema reported that wound on leg is chronic, and that she will re-teach Kristi cares needed for leg, but that Unruly continues to pick at scab once healed. Pema reports concern about caregiver burnout, and ongoing dialogue that ex-wife and daughter are having regarding appropriateness of TCU.     CEFERINO attempted to follow-up with Kristi in afternoon, unable to reach. Will followup 7/15/21 and await call from PCP CEFERINO.     VIRGINIA Haro, [facility]  Phone: [phone]  [facility]: M, Thu  *every other Tue, 8am-4:30pm  [facility]: W, F, *every other Tue, 8am-4:30pm

## 2021-07-14 NOTE — TELEPHONE ENCOUNTER
Verbal orders given to Pema from [facility] , per Ary Murphy, for Order(s): Home Care Orders: Skilled Nursing:   Would like verbal wound care orders updated to 2x weekly to cleanse and apply xeroform to open area, covered by telfa adhesive dressing.             Other:    Update about a medication error that happened this morning.  Unruly took 10 extra units of Lantus insulin.   Homecare instructed family to monitor blood sugars and to provide snacks if blood sugar drops below 70.  Magdalena Covarrubias LPN 7/14/2021 7:04 AM

## 2021-07-15 NOTE — TELEPHONE ENCOUNTER
lactulose encephalopathy (CHRONULAC) 10 GM/15ML SOLUTION  Last Written Prescription Date:  7/12/2021  Last Fill Quantity: 1892,   # refills: 11  Last Office Visit : 6/14/2021  Future Office visit:  None    Routing refill request to provider for review/approval because:  Please resend this order.   Order from the 7/12/2021 was not received.  Drug not on the [facility], P or  Health refill protocol or controlled substance      Nessa Martinez RN  Central Triage Red Flags/Med Refills

## 2021-07-15 NOTE — TELEPHONE ENCOUNTER
Social Work Progress Note      Data/Intervention:  Patient Name:  Lawrence Louie  /Age:  1955 (65 year old)    Reason for Follow-Up:  Unruly is a 65-year-old gentleman with a h/o unresectable multifocal hepatocellular carcinoma, cirrhotic liver, extensive bilateral lower extremity edema and multiple lower extremity wounds who is followed by Dr. Mukherjee. This clinician collaborated with [facility] CEFERINO Tony (Phone: [phone]), and also spoke with Kristi.         Intervention:   Collaborated with CEFERINO Shelton surrounding Unruly's complex care needs. Cat reported plan for reassessment of PCA services with family early next week. Cat well aware of need for additional PCA support in home in addition to daughter, and patient and daughter's current preference to keep at home with support.     CEFERINO called Kristi and reviewed content of discussion this clinician had with home care  Pema yesterday. Kristi reported appreciation of support, and acknowledged that family will have different OT therapist Farhan coming out next week who will be able to support with lymphedema needs. CEFERINO encouraged engagement with PCP for support of big picture care needs as family understandably feels overwhelmed at times due to complexity of patient needs. Kristi appreciative of follow-up, and will continue to engage with CEFERINO Shelton for ongoing needs.     Plan:  1) Family pursuing PCA support to help with Raven's hours. Family to continue discussions with CEFERINO Shelton of appropriateness of TCU for LTC for Unruly in future.   2) Family to follow-up with PCP for goals of care discussions.     Please call or page if needs or concerns arise.     VIRGINIA Haro, [facility]  Direct Phone: [phone]

## 2021-07-22 NOTE — TELEPHONE ENCOUNTER
Call to help schedule follow up appointment from last virtual visit on 6/21/21 with Dr Troy Bragg. Left message with PCC number to call to schedule next available virtual with PCP for follow up.

## 2021-07-23 NOTE — PROGRESS NOTES
Telephone call to the client's home for annual health risk assessment and PCA assessment.  An  was not needed.    Current situation/living environment  Lawrence is a 65 year old male who was new to [facility] in July 2020. He sees Dr. Beal for his primary medical care at the [facility]. Unruly lives in a single family home, and his ex-wife Flores stays with him, and daughter Kristi, lives nearby and is there daily to assist him. He also has [facility] weekly for wound care on his lower extremities. Today, we met by phone to complete a health risk assessment, which determines his budget under the elderly waiver program, as well as a PCA assessment. Raven and Kristi are hired as PCA's, and have talked about having a back-up person in case one of them is not able  They are limiting the amount of visitors to the house due to COVID and his risk level.    Activities of daily living (ADL)/instrumental activities of daily living (IADL) and functional issues  Client needs help with the following ADL's: dressing, grooming, bathing, bed mobility, transfers, walking and toileting  Client needs help with the following IADL's: shopping, cooking, housekeeping, laundry, managing finances/bills and transportation  Client states he is unable to perform the above due to HCC, DM-II, lower extremity wounds and lymphedema needs.    Health concerns for today  Unruly has many medical providers that he sees at the [facility]. He also has home care that has been coming for his lymph needs as well as his wounds. A concern here is the family does not have enough supplies to use on the off days that they are not there wrapping his legs. Hx of repetative hospital stays due to leg wounds and infections. Has had TACE procedures for his HCC.    Conversation also today regarding caregiver burnout. Recently, Raven, his ex-wife, sent a [employer] message to Dr. Mukherjee, regarding her level of burnout and asked for help with possibly finding a  facility for respite care. I have offered to help them with the process in the past, and talked about it again today. Raven has expressed to me that she'd be interested in having him go for respite care. Kristi has also said she'd be open to it as well, but feels that she'd want her dad's legs and cares to be more stable. She reports that his infections, and the s/s of them could be missed by someone that hasn't taken care of him.     Both are great supports to Unruly, and I expressed concern for their level of burnout. When I asked Unruly about it, he felt that he'd be safe at home if they would need to take time off or away. I relayed to Unruly that he needs the level of care they are providing and that if his goal is to stay at home, they need a reprieve from caregiving, and in the long run, his goal of staying home is more obtainable.    Has patient fallen 2 or more times in the last year? Yes  Has patient fallen with injury in the last year? No    Cognition/mental health  Family reports that during his chemo treatments, his encephalopathy can get much worse. He has partial intermittent periods of disorientation. Family also reports that Unruly can be resistive to care, tells family to go away. When liver function is bad, gets agitated and frustrated. Also gets frustrated when he is not able to do as much for himself as he use to be able to do.     STARS/Med Adherence  Client is non-compliant with the following quality measures: Medicare Wellness Visit  Comments: education efforts    Client's Plan of Care consists of:  Personal care assistance (PCA) (11 hours per day from [facility]), SNV (3 visits per week from [facility]) and Specialized supplies and equipment (Wounds supplies from the home care agency--I also ordered incontinence supplies, chux pads)    Cat John, [facility]  [phone]

## 2021-07-27 NOTE — PROGRESS NOTES
Unruly is a 65 year old who is being evaluated via a billable video visit.      How would you like to obtain your AVS? MyChart  If the video visit is dropped, the invitation should be resent by: Text to cell phone: [phone]  Will anyone else be joining your video visit? Yes: Daughter Kristi, and Ex Spouse Flores . How would they like to receive their invitation? Other e-mail: N/A

## 2021-07-27 NOTE — LETTER
7/27/2021         RE: Lawrence Louie  [address] [facility] [address]        Dear Colleague,    Thank you for referring your patient, Lawrence Louie, to the [facility]. Please see a copy of my visit note below.    Video start: 12:39  Video End: 12:57  Total visit: 35 min      INTERVENTIONAL RADIOLOGY CLINIC VISIT - FOLLOW UP    Name: Lawrence Louie  Age: 65 year old       HPI: Lawrence Louie is a 65 year old male who presents with diagnosis of unresectable multifocal HCC on a background of EtOH cirrhosis complicated by hepatic encephalopathy and ascites s/p TIPS and multiple TACE treatments. He presents for follow up of TACE of a segment 4 lesion. TACE history includes:    -TACE segment 6 (7/30/20)  -TACE segment 7 (10/1/20)  -TACE Segment 6 (1/7/21)  -TACE Segment 4 (5/5/21)    Patient is overall doing well. Continues to have problems with his feet and balance. He is seeing podiatry and has had a toe amputation. His balance and strength have improved with physical therapy. Able to ambulate short distance without walker. They are happy with their care from podiatry.   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)      Splenomegaly      Squamous cell carcinoma      Thrombocytopenia (H)      Varices, esophageal (H)     Banded in 2011       PAST SURGICAL HISTORY:   Past Surgical History:   Procedure Laterality Date     BIOPSY OF SKIN LESION       COLONOSCOPY Left 6/16/2016    Procedure: COMBINED COLONOSCOPY, SINGLE OR MULTIPLE BIOPSY/POLYPECTOMY BY BIOPSY;  Surgeon: Brandy Barnett MD;  Location:  GI     ESOPHAGOSCOPY, GASTROSCOPY, DUODENOSCOPY (EGD), COMBINED  2/13/2013    Procedure: COMBINED ESOPHAGOSCOPY,  GASTROSCOPY, DUODENOSCOPY (EGD);;  Surgeon: Tara Cook MD;  Location: [facility]     ESOPHAGOSCOPY, GASTROSCOPY, DUODENOSCOPY (EGD), COMBINED  11/4/2013    Procedure: COMBINED ESOPHAGOSCOPY, GASTROSCOPY, DUODENOSCOPY (EGD);;  Surgeon: Lonny Diaz MD;  Location: [facility]     ESOPHAGOSCOPY, GASTROSCOPY, DUODENOSCOPY (EGD), COMBINED Left 6/16/2016    Procedure: COMBINED ESOPHAGOSCOPY, GASTROSCOPY, DUODENOSCOPY (EGD), BIOPSY SINGLE OR MULTIPLE;  Surgeon: Brandy Barnett MD;  [facility]     EXCISE LESION TRUNK  9/24/2012    Procedure: EXCISE LESION TRUNK;;  Surgeon: Pepe Dominguez MD;  [facility]     GENITOURINARY SURGERY      vasectomy     HERNIORRHAPHY UMBILICAL  9/24/2012    Procedure: HERNIORRHAPHY UMBILICAL;  UMBILICAL HERNIA REPAIR , EXCISION OF PERIUMBILICAL CYST;  Surgeon: Pepe Dominguez MD;  Location: [facility]     IR CHEMO EMBOLIZATION  7/30/2020     IR CHEMO EMBOLIZATION  10/1/2020     IR CHEMO EMBOLIZATION  1/7/2021     IR CHEMO EMBOLIZATION  5/5/2021       FAMILY HISTORY:   Family History   Problem Relation Age of Onset     Breast Cancer Mother      Liver Cancer Mother      Cardiovascular Father      Cerebrovascular Disease Father         very low blood pressure     Cancer Father         rectal cancer     Cardiovascular Paternal Grandfather      Diabetes Brother      Cancer Sister         skin cancer     C.A.D. Other         MI, 70's     Breast Cancer Sister      Thyroid Disease No family hx of      Lipids No family hx of      Anesthesia Reaction No family hx of        SOCIAL HISTORY:   Social History     Tobacco Use     Smoking status: Current Every Day Smoker     Packs/day: 0.10     Types: Cigarettes     Smokeless tobacco: Never Used     Tobacco comment: about 1-2 cigarettes per day   Substance Use Topics     Alcohol use: No     Alcohol/week: 0.0 standard drinks     Comment: 2-3 per day , none since Dec 2012       PROBLEM LIST:   Patient Active Problem List    Diagnosis Date  Noted     Diabetic ulcer of toe of left foot associated with type 2 diabetes mellitus, with fat layer exposed (H) 06/17/2021     Priority: Medium     Anemia 11/13/2020     Priority: Medium     Inadequate pain control 07/30/2020     Priority: Medium     Post-operative state 07/30/2020     Priority: Medium     HCC (hepatocellular carcinoma) (H) 07/30/2020     Priority: Medium     Venous stasis ulcers of both lower extremities (H) 12/04/2019     Priority: Medium     Lymphedema of both lower extremities 01/08/2018     Priority: Medium     Hepatic encephalopathy (H) 11/05/2017     Priority: Medium     Alcoholic cirrhosis of liver with ascites (H) 04/12/2017     Priority: Medium     Type II diabetes mellitus with peripheral circulatory disorder (H) 12/26/2015     Priority: Medium     Esophageal reflux 09/24/2015     Priority: Medium     Morbid obesity (H) 09/24/2015     Priority: Medium     History of colonic polyps: tubular adenomas and serrated adenomas 09/24/2015     Priority: Medium     Prurigo nodularis 02/13/2015     Priority: Medium     Dental caries 06/10/2013     Priority: Medium     Hyperlipidemia LDL goal <100 04/05/2013     Priority: Medium     Tobacco use disorder 04/02/2013     Priority: Medium     Multiple rib fractures 01/21/2013     Priority: Medium     Ascites 01/06/2013     Priority: Medium     Esophageal varices (H) 01/06/2013     Priority: Medium     Portal hypertension (H) 09/20/2012     Priority: Medium     Alcoholic cirrhosis (H) 09/20/2012     Priority: Medium     (Problem list name updated by automated process. Provider to review and confirm.)       Type 2 diabetes, HbA1c goal < 7% (H) 09/17/2012     Priority: Medium     Family history of colon cancer 08/23/2011     Priority: Medium     Mild major depression (H) 09/29/2010     Priority: Medium     Thrombocytopenia (H) 09/29/2010     Priority: Medium     Hypertension goal BP (blood pressure) < 130/80 09/29/2010     Priority: Medium     Plantar  warts 09/29/2010     Priority: Medium     Corns and callosities 09/29/2010     Priority: Medium       MEDICATIONS:   Prescription Medications as of 7/27/2021       Rx Number Disp Refills Start End Last Dispensed Date Next Fill Date Owning Pharmacy    blood glucose (NO BRAND SPECIFIED) lancets standard  360 each 3 5/12/2021    [facility] [address] [place] [address] N.    Sig: Use to test blood sugar 3-4 times daily. Use brand compatible with patients insurance and device.    Class: E-Prescribe    blood glucose (NO BRAND SPECIFIED) test strip  360 strip 3 6/21/2021    [facility] [address] [place] [address] N.    Sig: Use to test blood sugars 4 X  times daily or as directed. Use brand compatible with patients insurance and device.    Class: E-Prescribe    blood glucose monitoring (NO BRAND SPECIFIED) meter device kit  1 kit 0 1/11/2021    [address]    Sig: Use to test blood sugar 4 times daily or as directed. Before meals and snack at HS.    Class: E-Prescribe    Notes to Pharmacy: Fill with brand covered (one touch)    bumetanide (BUMEX) 0.5 MG tablet  90 tablet 1 6/15/2021    [facility] [address] [place] [address] N.    Sig: Take 3 tablets (1.5 mg) by mouth daily    Class: E-Prescribe    Route: Oral    Calcium Carbonate-Vit D-Min (CALCIUM 1200 PO)        [address]    Sig: Plant based    Class: Historical    Route: Oral    cefadroxil (DURICEF) 500 MG capsule  14 capsule 0 6/22/2021    [facility] [address] [place] [address] N.    Sig: Take 1 capsule (500 mg) by mouth 2 times daily    Class: E-Prescribe    Route: Oral    citalopram (CELEXA) 20 MG tablet  90 tablet 3 11/3/2020    [facility] [address] [place] [address] N.    Sig: Take 1 tablet (20 mg) by mouth daily    Class: E-Prescribe    Route: Oral    COMPOUNDED NON-CONTROLLED SUBSTANCE (CMPD RX) - PHARMACY TO MIX  COMPOUNDED MEDICATION  500 g 5 10/7/2020    [facility] [place] [address]    Sig: Apply to the affected area once a day.    Class: E-Prescribe    Notes to Pharmacy: 6 % lidocaine ointment : standard nystatin cream : 40 % ZnOx paste = 1:1:1    Cyanocobalamin (VITAMIN B-12 PO)            Sig: Take 1 tablet by mouth daily    Class: Historical    Route: Oral    desvenlafaxine (PRISTIQ) 100 MG 24 hr tablet  180 tablet 1 2020    [facility] [address] [place] [address], [address]    Sig: Take 2 tablets (200 mg) by mouth daily    Class: E-Prescribe    Route: Oral    doxycycline hyclate (VIBRAMYCIN) 100 MG capsule  14 capsule 0 6/15/2021    [facility] [address] [place] [address], [address]    Sig: Take 1 capsule (100 mg) by mouth every 12 hours    Class: E-Prescribe    Route: Oral    EQL VITAMIN D3 50 MCG ( UT) CAPS  90 capsule 2 2020    [facility] [address] [place] [address] N.    Sig: Take 1 capsule by mouth daily    Class: E-Prescribe    famotidine (PEPCID) 40 MG tablet  30 tablet 11 2020    [facility] [address] [place] [address], [address]    Sig: Take 1 tablet (40 mg) by mouth daily    Class: E-Prescribe    Route: Oral    gabapentin (NEURONTIN) 300 MG capsule  90 capsule 3 2021    [facility] [address] [place] [address] N.    Sig: Take 1 capsule (300 mg) by mouth At Bedtime    Class: E-Prescribe    Route: Oral    hydrOXYzine (ATARAX) 25 MG tablet  90 tablet 3 2021    [facility] [address] [place] [address], [address]    Sig: Take 1 tablet (25 mg) by mouth 3 times daily as needed for itching    Class: E-Prescribe    Route: Oral    insulin aspart (NOVOLOG FLEXPEN) 100 UNIT/ML pen  30 mL 11 2021    [facility] [address] [place] [address], [address]    Si unit per 10 grams of carbohydrates + 1 per 50>140. Max daily dose 100 units.    Class: E-Prescribe    insulin glargine (LANTUS SOLOSTAR) 100 UNIT/ML pen  15  mL 11 2020    [address]    Si units daily in am. Increase by 5 units 2x weekly until fasting sugars are <130. Max daily dose 100 units.    Class: E-Prescribe    Notes to Pharmacy: If Lantus is not covered by insurance, may substitute Basaglar at same dose and frequency.      insulin pen needle (B-D U/F) 31G X 8 MM miscellaneous  300 each 3 5/15/2020    [address]    Sig: USE  6 times daily / OR AS DIRECTED    Class: E-Prescribe    insulin pen needle (ULTICARE SHORT) 31G X 8 MM miscellaneous  300 each 3 2021    [address]    Sig: Use UP to 6 times daily or as directed    Class: E-Prescribe    lactulose (CEPHULAC) 20 GM packet  60 packet 0 2020    [address]    Sig: Take 1 packet (20 g) by mouth 2 times daily    Class: E-Prescribe    Route: Oral    lactulose encephalopathy (CHRONULAC) 10 GM/15ML SOLUTION  1892 mL 11 7/15/2021    [address]    Sig: TAKE 30 MLS BY MOUTH 2 TIMES DAILY -  TITRATE AS NEEDED TO ACHIEVE 3-5 BOWEL MOVEMENTS DAILY.    Class: E-Prescribe    levETIRAcetam (KEPPRA) 500 MG tablet    2021    [address]    Sig: Take 500 mg by mouth 2 times daily     Class: Historical    Route: Oral    Lidocaine (LIDOCARE) 4 % Patch  30 patch 3 11/3/2020    [address]    Sig: Place 1 patch onto the skin every 24 hours To prevent lidocaine toxicity, patient should be patch free for 12 hrs daily.    Class: E-Prescribe    Route: Transdermal    MAGNESIUM CITRATE PO        [address], [address] [address]    Sig: gummies-nature made    Class: Historical    Route: Oral    magnesium oxide (MAG-OX) 400 (241.3 Mg) MG tablet  90 tablet 1 2018    [facility] [address] [place] [address], [place] -  [address] [place] [address] N.    Sig: Take 1 tablet (400 mg) by mouth daily    Class: E-Prescribe    Route: Oral    Melatonin 5 MG CHEW        [facility] [address] [place] [address] N.    Sig: Take 2 tablets by mouth nightly as needed    Class: Historical    Route: Oral    Nutritional Supplements (ENSURE) LIQD  30 each 11 10/25/2020    [address]    Sig: Take 1 Can by mouth daily    Class: E-Prescribe    Notes to Pharmacy: End stage liver disease.    Route: Oral    nystatin (MYCOSTATIN) 814808 UNIT/GM external cream  30 g 11 1/10/2019    [address]    Sig: Apply topically 2 times daily    Class: E-Prescribe    Route: Topical    nystatin (MYCOSTATIN) 454535 UNIT/GM external powder  120 g 11 6/21/2021    [address]    Sig: Apply topically 2 times daily    Class: E-Prescribe    Route: Topical    nystatin (MYCOSTATIN) 786751 UNIT/GM external powder  60 g 11 5/19/2021    [address]    Sig: Apply topically 2 times daily    Class: E-Prescribe    Route: Topical    ondansetron (ZOFRAN) 4 MG tablet  20 tablet 0 6/15/2021    [address]    Sig: Take 1 tablet (4 mg) by mouth every 8 hours as needed for nausea    Class: E-Prescribe    Route: Oral    Prior authorization: Approved    order for DME  2 Units 0 7/30/2019    [address]    Sig: Equipment being ordered:Orthopedic shoes    Class: Local Print    order for DME  1 Device 3 4/23/2019    [address]    Sig: Equipment being ordered: Condom catheter    Class: Local Print    order for DME  1 Piece 0 7/31/2018    [address]    Sig: Equipment being ordered:BioTab compression pants pneumatic system    Class: Local Print    order for DME  2 pump  0 4/24/2018        Sig: Equipment being ordered:bilateral pneumatic leg massage for treatment of extreme bilateral lower leg edema.    Class: Local Print    oxyCODONE (ROXICODONE) 5 MG tablet  180 tablet 0 7/9/2021    [facility] [address] [place] [address], [address]    Sig: TAKE 1 to 2 TABLETS BY MOUTH EVERY 8 HOURS AS NEEDED FOR SEVERE PAIN    Class: E-Prescribe    Earliest Fill Date: 7/9/2021    OYSTER SHELL CALCIUM/D 500-200 MG-UNIT per tablet  90 tablet 3 7/20/2018    [address]    Sig: Take 1 tablet by mouth daily    Class: E-Prescribe    Route: Oral    pantoprazole (PROTONIX) 40 MG EC tablet  14 tablet 0 1/7/2021        Sig: Take 1 tablet (40 mg) by mouth daily    Class: Local Print    Route: Oral    polyethylene glycol (MIRALAX) 17 g packet        [address]    Sig: Take 1 packet by mouth    Class: Historical    Route: Oral    potassium gluconate 2.5 MEQ tablet            Sig: Take 1 tablet by mouth daily    Class: Historical    Route: Oral    prochlorperazine (COMPAZINE) 10 MG tablet  20 tablet 0 10/1/2020    [address]    Sig: Take 1 tablet (10 mg) by mouth every 6 hours as needed for nausea or vomiting (take if zofran does not relieve nausea)    Class: Local Print    Route: Oral    QUEtiapine (SEROQUEL) 100 MG tablet  90 tablet 3 5/19/2021    [address]    Sig: Take 1 tablet (100 mg) by mouth 2 times daily    Class: E-Prescribe    Route: Oral    QUEtiapine (SEROQUEL) 100 MG tablet        [facility] [address] [place] [address], [address]    Sig: Take 100 mg by mouth At Bedtime     Class: Historical    Route: Oral    rifaximin (XIFAXAN) 550 MG TABS tablet  60 tablet 11 7/21/2020    [address], [address]    Sig: Take 1 tablet (550 mg) by mouth 2 times daily    Class:  E-Prescribe    Route: Oral    spironolactone (ALDACTONE) 50 MG tablet  180 tablet 0 6/15/2021    [address], [address]    Sig: Take 3 tablets (150 mg) by mouth daily    Class: No Print Out    Route: Oral    tamsulosin (FLOMAX) 0.4 MG capsule  180 capsule 3 3/11/2021 3/11/2022   [address], [address]    Sig: Take 2 capsules (0.8 mg) by mouth daily    Class: E-Prescribe    Route: Oral    tiotropium (SPIRIVA RESPIMAT) 2.5 MCG/ACT inhaler  4 g 0 6/2/2021    [address], [address]    Sig: Inhale 2 puffs into the lungs daily    Class: E-Prescribe    Route: Inhalation    traZODone (DESYREL) 50 MG tablet  30 tablet 11 2/17/2021    [address], [address]    Sig: Take 1 tablet (50 mg) by mouth At Bedtime    Class: E-Prescribe    Route: Oral    triamcinolone (KENALOG) 0.1 % external cream  90 g 0 7/8/2021    [address], [address]    Sig: Use sparingly on intact leg skin daily for itching.    Class: E-Prescribe    ursodiol (ACTIGALL) 300 MG capsule  180 capsule 11 3/30/2021    [address], [address]    Sig: TAKE THREE CAPSULES BY MOUTH TWO TIMES A DAY     Class: E-Prescribe      Clinic-Administered Medications as of 7/27/2021       Dose Frequency Start End    0.9% sodium chloride BOLUS 1,000 mL ONCE 11/4/2020     Class: E-Prescribe    Route: Intravenous          ALLERGIES:   Patient has no known allergies.    ROS:  A brief review of systems was negative as described in HPI.        Physical Examination:   VITALS:   There were no vitals taken for this visit.  A limited virtual physical exam was performed.  General: Awake, alert, NAD  Resp: Breathing comfortably on room air      Labs:    BMP RESULTS:  Lab Results   Component Value Date     06/01/2021    POTASSIUM 3.7 06/01/2021    CHLORIDE 109 06/01/2021    CO2 27 06/01/2021    ANIONGAP 4  06/01/2021     (H) 06/01/2021    BUN 11 06/01/2021    CR 0.77 06/01/2021    GFRESTIMATED >90 06/01/2021    GFRESTBLACK >90 06/01/2021    ERIN 7.7 (L) 06/01/2021        CBC RESULTS:  Lab Results   Component Value Date    WBC 2.0 (L) 06/01/2021    RBC 2.61 (L) 06/01/2021    HGB 8.2 (L) 06/01/2021    HCT 25.6 (L) 06/01/2021    MCV 98 06/01/2021    MCH 31.4 06/01/2021    MCHC 32.0 06/01/2021    RDW 20.6 (H) 06/01/2021    PLT 52 (L) 06/01/2021       INR/PTT:  Lab Results   Component Value Date    INR 1.43 (H) 06/01/2021    PTT 31 01/07/2021     Liver Function Studies - Recent Labs   Lab Test 06/01/21  1458   PROTTOTAL 5.5*   ALBUMIN 2.1*   BILITOTAL 1.1   ALKPHOS 179*   AST 29   ALT 21     I have looked at his imaging and labs.    Diagnostic studies:   Bone scan  1. No scintigraphic evidence of osseous metastatic disease.  2. Unchanged uptake in the ribs corresponding with healing fractures.     MRI  1. Cirrhosis and evidence of portal hypertension. Patent TIPS.  2. Increased size of segment 3/ 4B observation as described above. LIRADS 5/ OPTN 5a  3. Posttreatment changes in segment 6 and a small amount of peripheral arterial enhancement with no significant washout similar to prior study. LR TR equivocal  4. Posttreatment changes in segment 7. Persistent suggestion of arterial enhancement near the dome without significant washout. LR TR equivocal  5. Posttreatment changes in segment 4 without residual arterial enhancement. LR TR nonviable  6. A few additional subcentimeter foci of arterial enhancement are similar to prior study. LR 3  7. Area of increased T2 signal with questionable washout in segment 2 is not well evaluated on arterial phase imaging due to motion artifact. This is suspicious for additional area of HCC. Close attention on follow-up.  8. Based on this exam only, the patient is within [place] criteria.  9. Cholelithiasis.    CT Chest  1. Stable 1.2 cm paratracheal lymph node.  2. No suspicious  pulmonary nodules or masses identified. Decreased conspicuity of previously seen 3 mm nodule in the right lower lobe.  3. Bibasilar atelectasis/scarring.  4. Borderline cardiomegaly similar to prior  5. Unchanged compression deformities at T3 and L1.    Assessment  Lawrence Louie is a 65 year old male who presents with diagnosis of unresectable multifocal HCC on a background of EtOH cirrhosis complicated by hepatic encephalopathy and ascites s/p TIPS and multiple TACE treatments. Most recent abdominal MRI demonstrates increased size of segment 3/4B lesion concerning for HCC progression. The patient is not currently being evaluated for liver transplant and he should be seen by an oncologist.     Patient would benefit from microwave ablation of the hepatic segment 3/4B lesion under general anesthesia. The procedural technique, benefits and risks of the procedure including portal vein thrombosis were discussed with the patient. Their questions were answered.       Plan   -Oncology referral  -Microwave ablation of hepatic segment 3/4B  -If anesthesia is not available, we may proceed with cryoablation        CC  Patient Care Team:  Ita Dang MD as PCP - General (Internal Medicine)  Junie Reddy, RN (Diabetes Education)  Ary Murphy, APRN CNP as Referring Physician (Nurse Practitioner)  Meghna Simmons MD as MD (Internal Medicine)  David Davis MD as Resident (Radiology)  Meghna Simmons MD as MD (Internal Medicine)  Marlen Pardo MD as MD (Internal Medicine)  Miranda Paiz, RN as Specialty Care Coordinator (Hepatology)  Janey Chapin MD as Fellow (Student in organized health care education/training program)  Sandee Bird MD as MD (Infectious Diseases)  Olga Willis MD as Assigned Cancer Care Provider  Yady Hilton MD as Assigned Heart and Vascular Provider  Elina Partida MD as Assigned Endocrinology  Provider  Meghna Simmons MD as Assigned Gastroenterology Provider  Veronica Finn MD as Assigned Pulmonology Provider  Ti Adrian MD as Assigned Surgical Provider  Balbina John MD as MD (Infectious Diseases)  Ita Dang MD as Assigned PCP  Ever, Lon Ziegler DPM as Assigned Musculoskeletal Provider  Jodie Mukherjee MD as Assigned Neuroscience Provider  Sandee Bird MD as Assigned Infectious Disease Provider  SELF, REFERRED        Unruly is a 65 year old who is being evaluated via a billable video visit.      How would you like to obtain your AVS? MyChart  If the video visit is dropped, the invitation should be resent by: Text to cell phone: [phone]  Will anyone else be joining your video visit? Yes: Daughter Kristi, and Ex Spouse Flores . How would they like to receive their invitation? Other e-mail: N/A        Again, thank you for allowing me to participate in the care of your patient.        Sincerely,        Yady Hilton MD

## 2021-07-27 NOTE — PROGRESS NOTES
Video start: 12:39  Video End: 12:57  Total visit: 35 min      INTERVENTIONAL RADIOLOGY CLINIC VISIT - FOLLOW UP    Name: Lawrence Louie  Age: 65 year old       HPI: Lawrence Louie is a 65 year old male who presents with diagnosis of unresectable multifocal HCC on a background of EtOH cirrhosis complicated by hepatic encephalopathy and ascites s/p TIPS and multiple TACE treatments. He presents for follow up of TACE of a segment 4 lesion. TACE history includes:    -TACE segment 6 (7/30/20)  -TACE segment 7 (10/1/20)  -TACE Segment 6 (1/7/21)  -TACE Segment 4 (5/5/21)    Patient is overall doing well. Continues to have problems with his feet and balance. He is seeing podiatry and has had a toe amputation. His balance and strength have improved with physical therapy. Able to ambulate short distance without walker. They are happy with their care from podiatry.   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)      Splenomegaly      Squamous cell carcinoma      Thrombocytopenia (H)      Varices, esophageal (H)     Banded in 2011       PAST SURGICAL HISTORY:   Past Surgical History:   Procedure Laterality Date     BIOPSY OF SKIN LESION       COLONOSCOPY Left 6/16/2016    Procedure: COMBINED COLONOSCOPY, SINGLE OR MULTIPLE BIOPSY/POLYPECTOMY BY BIOPSY;  Surgeon: Brandy Barnett MD;  Location:  GI     ESOPHAGOSCOPY, GASTROSCOPY, DUODENOSCOPY (EGD), COMBINED  2/13/2013    Procedure: COMBINED ESOPHAGOSCOPY, GASTROSCOPY, DUODENOSCOPY (EGD);;  Surgeon: Tara Cook MD;  Location:  GI     ESOPHAGOSCOPY, GASTROSCOPY, DUODENOSCOPY (EGD), COMBINED  11/4/2013    Procedure: COMBINED ESOPHAGOSCOPY, GASTROSCOPY, DUODENOSCOPY (EGD);;  Surgeon: Lonny Diaz MD;   Location: [facility]     ESOPHAGOSCOPY, GASTROSCOPY, DUODENOSCOPY (EGD), COMBINED Left 6/16/2016    Procedure: COMBINED ESOPHAGOSCOPY, GASTROSCOPY, DUODENOSCOPY (EGD), BIOPSY SINGLE OR MULTIPLE;  Surgeon: Brandy Barnett MD;  Location:  GI     EXCISE LESION TRUNK  9/24/2012    Procedure: EXCISE LESION TRUNK;;  Surgeon: Pepe Dominguez MD;  Location: [facility]     GENITOURINARY SURGERY      vasectomy     HERNIORRHAPHY UMBILICAL  9/24/2012    Procedure: HERNIORRHAPHY UMBILICAL;  UMBILICAL HERNIA REPAIR , EXCISION OF PERIUMBILICAL CYST;  Surgeon: Pepe Dominguez MD;  Location: [facility]     IR CHEMO EMBOLIZATION  7/30/2020     IR CHEMO EMBOLIZATION  10/1/2020     IR CHEMO EMBOLIZATION  1/7/2021     IR CHEMO EMBOLIZATION  5/5/2021       FAMILY HISTORY:   Family History   Problem Relation Age of Onset     Breast Cancer Mother      Liver Cancer Mother      Cardiovascular Father      Cerebrovascular Disease Father         very low blood pressure     Cancer Father         rectal cancer     Cardiovascular Paternal Grandfather      Diabetes Brother      Cancer Sister         skin cancer     C.A.D. Other         MI, 70's     Breast Cancer Sister      Thyroid Disease No family hx of      Lipids No family hx of      Anesthesia Reaction No family hx of        SOCIAL HISTORY:   Social History     Tobacco Use     Smoking status: Current Every Day Smoker     Packs/day: 0.10     Types: Cigarettes     Smokeless tobacco: Never Used     Tobacco comment: about 1-2 cigarettes per day   Substance Use Topics     Alcohol use: No     Alcohol/week: 0.0 standard drinks     Comment: 2-3 per day , none since Dec 2012       PROBLEM LIST:   Patient Active Problem List    Diagnosis Date Noted     Diabetic ulcer of toe of left foot associated with type 2 diabetes mellitus, with fat layer exposed (H) 06/17/2021     Priority: Medium     Anemia 11/13/2020     Priority: Medium     Inadequate pain control 07/30/2020     Priority: Medium      Post-operative state 07/30/2020     Priority: Medium     HCC (hepatocellular carcinoma) (H) 07/30/2020     Priority: Medium     Venous stasis ulcers of both lower extremities (H) 12/04/2019     Priority: Medium     Lymphedema of both lower extremities 01/08/2018     Priority: Medium     Hepatic encephalopathy (H) 11/05/2017     Priority: Medium     Alcoholic cirrhosis of liver with ascites (H) 04/12/2017     Priority: Medium     Type II diabetes mellitus with peripheral circulatory disorder (H) 12/26/2015     Priority: Medium     Esophageal reflux 09/24/2015     Priority: Medium     Morbid obesity (H) 09/24/2015     Priority: Medium     History of colonic polyps: tubular adenomas and serrated adenomas 09/24/2015     Priority: Medium     Prurigo nodularis 02/13/2015     Priority: Medium     Dental caries 06/10/2013     Priority: Medium     Hyperlipidemia LDL goal <100 04/05/2013     Priority: Medium     Tobacco use disorder 04/02/2013     Priority: Medium     Multiple rib fractures 01/21/2013     Priority: Medium     Ascites 01/06/2013     Priority: Medium     Esophageal varices (H) 01/06/2013     Priority: Medium     Portal hypertension (H) 09/20/2012     Priority: Medium     Alcoholic cirrhosis (H) 09/20/2012     Priority: Medium     (Problem list name updated by automated process. Provider to review and confirm.)       Type 2 diabetes, HbA1c goal < 7% (H) 09/17/2012     Priority: Medium     Family history of colon cancer 08/23/2011     Priority: Medium     Mild major depression (H) 09/29/2010     Priority: Medium     Thrombocytopenia (H) 09/29/2010     Priority: Medium     Hypertension goal BP (blood pressure) < 130/80 09/29/2010     Priority: Medium     Plantar warts 09/29/2010     Priority: Medium     Corns and callosities 09/29/2010     Priority: Medium       MEDICATIONS:   Prescription Medications as of 7/27/2021       Rx Number Disp Refills Start End Last Dispensed Date Next Fill Date Owning Pharmacy    blood  glucose (NO BRAND SPECIFIED) lancets standard  360 each 3 5/12/2021    [address]    Sig: Use to test blood sugar 3-4 times daily. Use brand compatible with patients insurance and device.    Class: E-Prescribe    blood glucose (NO BRAND SPECIFIED) test strip  360 strip 3 6/21/2021    [address]    Sig: Use to test blood sugars 4 X  times daily or as directed. Use brand compatible with patients insurance and device.    Class: E-Prescribe    blood glucose monitoring (NO BRAND SPECIFIED) meter device kit  1 kit 0 1/11/2021    [address]    Sig: Use to test blood sugar 4 times daily or as directed. Before meals and snack at HS.    Class: E-Prescribe    Notes to Pharmacy: Fill with brand covered (one touch)    bumetanide (BUMEX) 0.5 MG tablet  90 tablet 1 6/15/2021    [address]    Sig: Take 3 tablets (1.5 mg) by mouth daily    Class: E-Prescribe    Route: Oral    Calcium Carbonate-Vit D-Min (CALCIUM 1200 PO)        [address]    Sig: Plant based    Class: Historical    Route: Oral    cefadroxil (DURICEF) 500 MG capsule  14 capsule 0 6/22/2021    [address]    Sig: Take 1 capsule (500 mg) by mouth 2 times daily    Class: E-Prescribe    Route: Oral    citalopram (CELEXA) 20 MG tablet  90 tablet 3 11/3/2020    [address]    Sig: Take 1 tablet (20 mg) by mouth daily    Class: E-Prescribe    Route: Oral    COMPOUNDED NON-CONTROLLED SUBSTANCE (CMPD RX) - PHARMACY TO MIX COMPOUNDED MEDICATION  500 g 5 10/7/2020    [facility] - [place], [place] - [facility] [address]    Sig: Apply to the affected area once a day.    Class: E-Prescribe    Notes to Pharmacy: 6 % lidocaine ointment : standard nystatin cream : 40 % ZnOx  paste = 1:1:1    Cyanocobalamin (VITAMIN B-12 PO)            Sig: Take 1 tablet by mouth daily    Class: Historical    Route: Oral    desvenlafaxine (PRISTIQ) 100 MG 24 hr tablet  180 tablet 1 2020    [facility] [address] [place] [address], [address]    Sig: Take 2 tablets (200 mg) by mouth daily    Class: E-Prescribe    Route: Oral    doxycycline hyclate (VIBRAMYCIN) 100 MG capsule  14 capsule 0 6/15/2021    [facility] [address] [place] [address], [address]    Sig: Take 1 capsule (100 mg) by mouth every 12 hours    Class: E-Prescribe    Route: Oral    EQL VITAMIN D3 50 MCG ( UT) CAPS  90 capsule 2 2020    [facility] [address] [place] [address], [address]    Sig: Take 1 capsule by mouth daily    Class: E-Prescribe    famotidine (PEPCID) 40 MG tablet  30 tablet 11 2020    [facility] [address] [place] [address], [address]    Sig: Take 1 tablet (40 mg) by mouth daily    Class: E-Prescribe    Route: Oral    gabapentin (NEURONTIN) 300 MG capsule  90 capsule 3 2021    [address], [address]    Sig: Take 1 capsule (300 mg) by mouth At Bedtime    Class: E-Prescribe    Route: Oral    hydrOXYzine (ATARAX) 25 MG tablet  90 tablet 3 2021    [address], [address]    Sig: Take 1 tablet (25 mg) by mouth 3 times daily as needed for itching    Class: E-Prescribe    Route: Oral    insulin aspart (NOVOLOG FLEXPEN) 100 UNIT/ML pen  30 mL 11 2021    [facility] [address] [place] [address], [address]    Si unit per 10 grams of carbohydrates + 1 per 50>140. Max daily dose 100 units.    Class: E-Prescribe    insulin glargine (LANTUS SOLOSTAR) 100 UNIT/ML pen  15 mL 11 2020    [facility] [address] [place] [address], [address]    Si units daily in am. Increase by 5 units 2x weekly until fasting sugars are <130. Max daily dose 100 units.    Class: E-Prescribe    Notes to Pharmacy: If Lantus is not covered by  insurance, may substitute Basaglar at same dose and frequency.      insulin pen needle (B-D U/F) 31G X 8 MM miscellaneous  300 each 3 5/15/2020    [address], [address]    Sig: USE  6 times daily / OR AS DIRECTED    Class: E-Prescribe    insulin pen needle (ULTICARE SHORT) 31G X 8 MM miscellaneous  300 each 3 1/26/2021    [address], [address]    Sig: Use UP to 6 times daily or as directed    Class: E-Prescribe    lactulose (CEPHULAC) 20 GM packet  60 packet 0 7/13/2020    [address], [address]    Sig: Take 1 packet (20 g) by mouth 2 times daily    Class: E-Prescribe    Route: Oral    lactulose encephalopathy (CHRONULAC) 10 GM/15ML SOLUTION  1892 mL 11 7/15/2021    [address]    Sig: TAKE 30 MLS BY MOUTH 2 TIMES DAILY -  TITRATE AS NEEDED TO ACHIEVE 3-5 BOWEL MOVEMENTS DAILY.    Class: E-Prescribe    levETIRAcetam (KEPPRA) 500 MG tablet    1/16/2021    [address]    Sig: Take 500 mg by mouth 2 times daily     Class: Historical    Route: Oral    Lidocaine (LIDOCARE) 4 % Patch  30 patch 3 11/3/2020    [address]    Sig: Place 1 patch onto the skin every 24 hours To prevent lidocaine toxicity, patient should be patch free for 12 hrs daily.    Class: E-Prescribe    Route: Transdermal    MAGNESIUM CITRATE PO        [address]    Sig: gummies-nature made    Class: Historical    Route: Oral    magnesium oxide (MAG-OX) 400 (241.3 Mg) MG tablet  90 tablet 1 9/25/2018    [address]    Sig: Take 1 tablet (400 mg) by mouth daily    Class: E-Prescribe    Route: Oral    Melatonin 5 MG CHEW        [address], [address]    Sig: Take 2 tablets by mouth nightly as needed    Class: Historical     Route: Oral    Nutritional Supplements (ENSURE) LIQD  30 each 11 10/25/2020    [address]    Sig: Take 1 Can by mouth daily    Class: E-Prescribe    Notes to Pharmacy: End stage liver disease.    Route: Oral    nystatin (MYCOSTATIN) 727601 UNIT/GM external cream  30 g 11 1/10/2019    [address], [address]    Sig: Apply topically 2 times daily    Class: E-Prescribe    Route: Topical    nystatin (MYCOSTATIN) 828956 UNIT/GM external powder  120 g 11 6/21/2021    [address], [address]    Sig: Apply topically 2 times daily    Class: E-Prescribe    Route: Topical    nystatin (MYCOSTATIN) 800359 UNIT/GM external powder  60 g 11 5/19/2021    [address]    Sig: Apply topically 2 times daily    Class: E-Prescribe    Route: Topical    ondansetron (ZOFRAN) 4 MG tablet  20 tablet 0 6/15/2021    [address]    Sig: Take 1 tablet (4 mg) by mouth every 8 hours as needed for nausea    Class: E-Prescribe    Route: Oral    Prior authorization: Approved    order for DME  2 Units 0 7/30/2019    [address]    Sig: Equipment being ordered:Orthopedic shoes    Class: Local Print    order for DME  1 Device 3 4/23/2019    [address]    Sig: Equipment being ordered: Condom catheter    Class: Local Print    order for DME  1 Piece 0 7/31/2018    [address]    Sig: Equipment being ordered:BioTab compression pants pneumatic system    Class: Local Print    order for DME  2 pump 0 4/24/2018        Sig: Equipment being ordered:bilateral pneumatic leg massage for treatment of extreme bilateral lower leg edema.    Class: Local Print    oxyCODONE (ROXICODONE) 5 MG tablet  180 tablet 0 7/9/2021    [address], [place] [address]  [facility] [address]    Sig: TAKE 1 to 2 TABLETS BY MOUTH EVERY 8 HOURS AS NEEDED FOR SEVERE PAIN    Class: E-Prescribe    Earliest Fill Date: 7/9/2021    OYSTER SHELL CALCIUM/D 500-200 MG-UNIT per tablet  90 tablet 3 7/20/2018    [facility] [address] [place] [address], [address]    Sig: Take 1 tablet by mouth daily    Class: E-Prescribe    Route: Oral    pantoprazole (PROTONIX) 40 MG EC tablet  14 tablet 0 1/7/2021        Sig: Take 1 tablet (40 mg) by mouth daily    Class: Local Print    Route: Oral    polyethylene glycol (MIRALAX) 17 g packet        [address]    Sig: Take 1 packet by mouth    Class: Historical    Route: Oral    potassium gluconate 2.5 MEQ tablet            Sig: Take 1 tablet by mouth daily    Class: Historical    Route: Oral    prochlorperazine (COMPAZINE) 10 MG tablet  20 tablet 0 10/1/2020    [address]    Sig: Take 1 tablet (10 mg) by mouth every 6 hours as needed for nausea or vomiting (take if zofran does not relieve nausea)    Class: Local Print    Route: Oral    QUEtiapine (SEROQUEL) 100 MG tablet  90 tablet 3 5/19/2021    [facility] [address] [place] [address], [address]    Sig: Take 1 tablet (100 mg) by mouth 2 times daily    Class: E-Prescribe    Route: Oral    QUEtiapine (SEROQUEL) 100 MG tablet        [facility] [address] [place] [address], [address]    Sig: Take 100 mg by mouth At Bedtime     Class: Historical    Route: Oral    rifaximin (XIFAXAN) 550 MG TABS tablet  60 tablet 11 7/21/2020    [facility] [address] [place] [address], [address]    Sig: Take 1 tablet (550 mg) by mouth 2 times daily    Class: E-Prescribe    Route: Oral    spironolactone (ALDACTONE) 50 MG tablet  180 tablet 0 6/15/2021    [facility] [address] [place] [address], [address]    Sig: Take 3 tablets (150 mg) by mouth daily    Class: No Print Out    Route: Oral    tamsulosin  (FLOMAX) 0.4 MG capsule  180 capsule 3 3/11/2021 3/11/2022   [facility] [address] [place] [address], [address]    Sig: Take 2 capsules (0.8 mg) by mouth daily    Class: E-Prescribe    Route: Oral    tiotropium (SPIRIVA RESPIMAT) 2.5 MCG/ACT inhaler  4 g 0 6/2/2021    [address], [address]    Sig: Inhale 2 puffs into the lungs daily    Class: E-Prescribe    Route: Inhalation    traZODone (DESYREL) 50 MG tablet  30 tablet 11 2/17/2021    [facility] [address] [place] [address], [address]    Sig: Take 1 tablet (50 mg) by mouth At Bedtime    Class: E-Prescribe    Route: Oral    triamcinolone (KENALOG) 0.1 % external cream  90 g 0 7/8/2021    [address], [address]    Sig: Use sparingly on intact leg skin daily for itching.    Class: E-Prescribe    ursodiol (ACTIGALL) 300 MG capsule  180 capsule 11 3/30/2021    [facility] [address] [place] [address], [address]    Sig: TAKE THREE CAPSULES BY MOUTH TWO TIMES A DAY     Class: E-Prescribe      Clinic-Administered Medications as of 7/27/2021       Dose Frequency Start End    0.9% sodium chloride BOLUS 1,000 mL ONCE 11/4/2020     Class: E-Prescribe    Route: Intravenous          ALLERGIES:   Patient has no known allergies.    ROS:  A brief review of systems was negative as described in HPI.        Physical Examination:   VITALS:   There were no vitals taken for this visit.  A limited virtual physical exam was performed.  General: Awake, alert, NAD  Resp: Breathing comfortably on room air      Labs:    BMP RESULTS:  Lab Results   Component Value Date     06/01/2021    POTASSIUM 3.7 06/01/2021    CHLORIDE 109 06/01/2021    CO2 27 06/01/2021    ANIONGAP 4 06/01/2021     (H) 06/01/2021    BUN 11 06/01/2021    CR 0.77 06/01/2021    GFRESTIMATED >90 06/01/2021    GFRESTBLACK >90 06/01/2021    ERIN 7.7 (L) 06/01/2021        CBC RESULTS:  Lab Results   Component Value Date    WBC 2.0 (L) 06/01/2021     RBC 2.61 (L) 06/01/2021    HGB 8.2 (L) 06/01/2021    HCT 25.6 (L) 06/01/2021    MCV 98 06/01/2021    MCH 31.4 06/01/2021    MCHC 32.0 06/01/2021    RDW 20.6 (H) 06/01/2021    PLT 52 (L) 06/01/2021       INR/PTT:  Lab Results   Component Value Date    INR 1.43 (H) 06/01/2021    PTT 31 01/07/2021     Liver Function Studies - Recent Labs   Lab Test 06/01/21  1458   PROTTOTAL 5.5*   ALBUMIN 2.1*   BILITOTAL 1.1   ALKPHOS 179*   AST 29   ALT 21     I have looked at his imaging and labs.    Diagnostic studies:   Bone scan  1. No scintigraphic evidence of osseous metastatic disease.  2. Unchanged uptake in the ribs corresponding with healing fractures.     MRI  1. Cirrhosis and evidence of portal hypertension. Patent TIPS.  2. Increased size of segment 3/ 4B observation as described above. LIRADS 5/ OPTN 5a  3. Posttreatment changes in segment 6 and a small amount of peripheral arterial enhancement with no significant washout similar to prior study. LR TR equivocal  4. Posttreatment changes in segment 7. Persistent suggestion of arterial enhancement near the dome without significant washout. LR TR equivocal  5. Posttreatment changes in segment 4 without residual arterial enhancement. LR TR nonviable  6. A few additional subcentimeter foci of arterial enhancement are similar to prior study. LR 3  7. Area of increased T2 signal with questionable washout in segment 2 is not well evaluated on arterial phase imaging due to motion artifact. This is suspicious for additional area of HCC. Close attention on follow-up.  8. Based on this exam only, the patient is within Zia criteria.  9. Cholelithiasis.    CT Chest  1. Stable 1.2 cm paratracheal lymph node.  2. No suspicious pulmonary nodules or masses identified. Decreased conspicuity of previously seen 3 mm nodule in the right lower lobe.  3. Bibasilar atelectasis/scarring.  4. Borderline cardiomegaly similar to prior  5. Unchanged compression deformities at T3 and  L1.    Assessment  Lawrence Louie is a 65 year old male who presents with diagnosis of unresectable multifocal HCC on a background of EtOH cirrhosis complicated by hepatic encephalopathy and ascites s/p TIPS and multiple TACE treatments. Most recent abdominal MRI demonstrates increased size of segment 3/4B lesion concerning for HCC progression. The patient is not currently being evaluated for liver transplant and he should be seen by an oncologist.     Patient would benefit from microwave ablation of the hepatic segment 3/4B lesion under general anesthesia. The procedural technique, benefits and risks of the procedure including portal vein thrombosis were discussed with the patient. Their questions were answered.       Plan   -Oncology referral  -Microwave ablation of hepatic segment 3/4B  -If anesthesia is not available, we may proceed with cryoablation        CC  Patient Care Team:  Ita Dang MD as PCP - General (Internal Medicine)  Junie Reddy, RN (Diabetes Education)  Ary Murphy APRN CNP as Referring Physician (Nurse Practitioner)  Meghna Simmons MD as MD (Internal Medicine)  David Davis MD as Resident (Radiology)  Meghna Simmons MD as MD (Internal Medicine)  Marlen Pardo MD as MD (Internal Medicine)  Miranda Paiz, RN as Specialty Care Coordinator (Hepatology)  Janey Chapin MD as Fellow (Student in organized health care education/training program)  Sandee Bird MD as MD (Infectious Diseases)  Olga Willis MD as Assigned Cancer Care Provider  Yady Hilton MD as Assigned Heart and Vascular Provider  Elina Partida MD as Assigned Endocrinology Provider  Meghna Simmons MD as Assigned Gastroenterology Provider  Veronica Finn MD as Assigned Pulmonology Provider  Ti Adrian MD as Assigned Surgical Provider  Balbina John MD as MD (Infectious Diseases)  Ita Dang  MD as Assigned PCP  Lon Curtis DPM as Assigned Musculoskeletal Provider  Jodie Mukherjee MD as Assigned Neuroscience Provider  Sandee Bird MD as Assigned Infectious Disease Provider  SELF, REFERRED

## 2021-08-03 NOTE — PROGRESS NOTES
Oncology follow-up visit:  Date on this visit: 8/03/21     Lawrence Louie  is referred by Dr.Jafar Hilton for an oncology consultation. He requires evaluation for new diagnosis of HCC.    Primary Physician: Ary Murphy     History Of Present Illness:       Mr. Louie is a 64 year old male who presents with new diagnosis of HCC.    He has hx of etoh cirrhosis diagnosed in 2012, hx of esophageal varices bleeding, hepatic encephalopathy, ascites, s/p TIPS procedure in 2017, and hx of leg swellings/ulcer/several episodes of infections from it.  He has chronic back pain from previous falls/vertebral fractures. Continues to have b/l leg swellings/blisters, but it has been somewhat better. No new swellings. He uses a walker because of chronic balance issues for several years. No recent fevers. He occasionally notices tingling/numbness in fingers. Denies recent abnormal bleeding. He does bruise easily. Has some dyspnea on exertion. He has low energy and rests most of the time. Can walk a little but for the most part does not do much physical activities. He does have memory issues especially short term memory issues and has some cognition/confusion at times.     Interval hx:  This is a video visit through [employer].  I have reviewed notes from Dr. Hilton from interventional radiology.  His wife and heideer are also available  He states last saw me in July 2020.  Since then he had several TACE procedures to segment 6 (7/30/2020), segment 7 (10/1/2020), segment 6 again (1/7/2021) in segment 4 (5/5/2021)  MRI on 7/21/2021 showed a 1.6 cm lesion in hepatic segment 3/4B which previously was 0.8 cm.  This is consistent with LIRADS 5/ OPTN 5a  Lesions in segment 6 and segment 7 shows small amount of peripheral arterial enhancement without significant washout and are LR TR equivocal.  Segment 4 lesion is without evidence of residual malignancy.  There is plan to do microwave ablation of segment 3/4B  lesion    His foot is doing better. He had left 2nd toe was amputated on 6/26/2021. He has foot and back pain. Abdomen feels OK. No swelling. No nausea or vomiting. He is taking lactulose. No abnormal bleeding. He is chronically fatigued. He has dyspnea on mild exertion.     ECOG 3    ROS:  A comprehensive ROS was otherwise neg    I reviewed other history in epic as below.    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)      Splenomegaly      Squamous cell carcinoma      Thrombocytopenia (H)      Varices, esophageal (H)     Banded in 2011     Past Surgical History:   Procedure Laterality Date     BIOPSY OF SKIN LESION       COLONOSCOPY Left 6/16/2016    Procedure: COMBINED COLONOSCOPY, SINGLE OR MULTIPLE BIOPSY/POLYPECTOMY BY BIOPSY;  Surgeon: Brandy Barnett MD;  Location:  GI     ESOPHAGOSCOPY, GASTROSCOPY, DUODENOSCOPY (EGD), COMBINED  2/13/2013    Procedure: COMBINED ESOPHAGOSCOPY, GASTROSCOPY, DUODENOSCOPY (EGD);;  Surgeon: Tara Cook MD;  Location:  GI     ESOPHAGOSCOPY, GASTROSCOPY, DUODENOSCOPY (EGD), COMBINED  11/4/2013    Procedure: COMBINED ESOPHAGOSCOPY, GASTROSCOPY, DUODENOSCOPY (EGD);;  Surgeon: Lonny Diaz MD;  Location:  GI     ESOPHAGOSCOPY, GASTROSCOPY, DUODENOSCOPY (EGD), COMBINED Left 6/16/2016    Procedure: COMBINED ESOPHAGOSCOPY, GASTROSCOPY, DUODENOSCOPY (EGD), BIOPSY SINGLE OR MULTIPLE;  Surgeon: Brandy Barnett MD;  Location:  GI     EXCISE LESION TRUNK  9/24/2012    Procedure: EXCISE LESION TRUNK;;  Surgeon: Pepe Dominguez MD;  Location: [facility]     GENITOURINARY SURGERY      vasectomy     HERNIORRHAPHY UMBILICAL  9/24/2012    Procedure: HERNIORRHAPHY UMBILICAL;  UMBILICAL HERNIA REPAIR , EXCISION OF  PERIUMBILICAL CYST;  Surgeon: Pepe Dominguez MD;  Location:  [facility]     IR CHEMO EMBOLIZATION  7/30/2020     IR CHEMO EMBOLIZATION  10/1/2020     IR CHEMO EMBOLIZATION  1/7/2021     IR CHEMO EMBOLIZATION  5/5/2021     Cancer History:   As above    Allergies:  Allergies as of 08/03/2021     (No Known Allergies)     Current Medications:  Current Outpatient Medications   Medication Sig Dispense Refill     blood glucose (NO BRAND SPECIFIED) lancets standard Use to test blood sugar 3-4 times daily. Use brand compatible with patients insurance and device. 360 each 3     blood glucose (NO BRAND SPECIFIED) test strip Use to test blood sugars 4 X  times daily or as directed. Use brand compatible with patients insurance and device. 360 strip 3     blood glucose monitoring (NO BRAND SPECIFIED) meter device kit Use to test blood sugar 4 times daily or as directed. Before meals and snack at HS. 1 kit 0     bumetanide (BUMEX) 0.5 MG tablet Take 3 tablets (1.5 mg) by mouth daily 90 tablet 1     Calcium Carbonate-Vit D-Min (CALCIUM 1200 PO) Plant based       cefadroxil (DURICEF) 500 MG capsule Take 1 capsule (500 mg) by mouth 2 times daily 14 capsule 0     citalopram (CELEXA) 20 MG tablet Take 1 tablet (20 mg) by mouth daily 90 tablet 3     COMPOUNDED NON-CONTROLLED SUBSTANCE (CMPD RX) - PHARMACY TO MIX COMPOUNDED MEDICATION Apply to the affected area once a day. 500 g 5     Cyanocobalamin (VITAMIN B-12 PO) Take 1 tablet by mouth daily       desvenlafaxine (PRISTIQ) 100 MG 24 hr tablet Take 2 tablets (200 mg) by mouth daily (Patient taking differently: Take 100 mg by mouth daily Only 100mg) 180 tablet 1     doxycycline hyclate (VIBRAMYCIN) 100 MG capsule Take 1 capsule (100 mg) by mouth every 12 hours 14 capsule 0     EQL VITAMIN D3 50 MCG (2000 UT) CAPS Take 1 capsule by mouth daily 90 capsule 2     famotidine (PEPCID) 40 MG tablet Take 1 tablet (40 mg) by mouth daily 30 tablet 11     gabapentin (NEURONTIN) 300 MG capsule Take 1  capsule (300 mg) by mouth At Bedtime 90 capsule 3     hydrOXYzine (ATARAX) 25 MG tablet Take 1 tablet (25 mg) by mouth 3 times daily as needed for itching 90 tablet 3     insulin aspart (NOVOLOG FLEXPEN) 100 UNIT/ML pen 1 unit per 10 grams of carbohydrates + 1 per 50>140. Max daily dose 100 units. 30 mL 11     insulin glargine (LANTUS SOLOSTAR) 100 UNIT/ML pen 5 units daily in am. Increase by 5 units 2x weekly until fasting sugars are <130. Max daily dose 100 units. 15 mL 11     insulin pen needle (B-D U/F) 31G X 8 MM miscellaneous USE  6 times daily / OR AS DIRECTED 300 each 3     insulin pen needle (ULTICARE SHORT) 31G X 8 MM miscellaneous Use UP to 6 times daily or as directed 300 each 3     lactulose (CEPHULAC) 20 GM packet Take 1 packet (20 g) by mouth 2 times daily 60 packet 0     lactulose encephalopathy (CHRONULAC) 10 GM/15ML SOLUTION TAKE 30 MLS BY MOUTH 2 TIMES DAILY -  TITRATE AS NEEDED TO ACHIEVE 3-5 BOWEL MOVEMENTS DAILY. 1892 mL 11     levETIRAcetam (KEPPRA) 500 MG tablet Take 500 mg by mouth 2 times daily        Lidocaine (LIDOCARE) 4 % Patch Place 1 patch onto the skin every 24 hours To prevent lidocaine toxicity, patient should be patch free for 12 hrs daily. 30 patch 3     MAGNESIUM CITRATE PO gummies-nature made       magnesium oxide (MAG-OX) 400 (241.3 Mg) MG tablet Take 1 tablet (400 mg) by mouth daily 90 tablet 1     Melatonin 5 MG CHEW Take 2 tablets by mouth nightly as needed       Nutritional Supplements (ENSURE) LIQD Take 1 Can by mouth daily 30 each 11     nystatin (MYCOSTATIN) 106957 UNIT/GM external cream Apply topically 2 times daily 30 g 11     nystatin (MYCOSTATIN) 611520 UNIT/GM external powder Apply topically 2 times daily 120 g 11     nystatin (MYCOSTATIN) 681960 UNIT/GM external powder Apply topically 2 times daily 60 g 11     ondansetron (ZOFRAN) 4 MG tablet Take 1 tablet (4 mg) by mouth every 8 hours as needed for nausea 20 tablet 0     order for DME Equipment being  ordered:Orthopedic shoes 2 Units 0     order for DME Equipment being ordered: Condom catheter 1 Device 3     order for DME Equipment being ordered:BioTab compression pants pneumatic system 1 Piece 0     order for DME Equipment being ordered:bilateral pneumatic leg massage for treatment of extreme bilateral lower leg edema. 2 pump 0     oxyCODONE (ROXICODONE) 5 MG tablet TAKE 1 to 2 TABLETS BY MOUTH EVERY 8 HOURS AS NEEDED FOR SEVERE PAIN 180 tablet 0     OYSTER SHELL CALCIUM/D 500-200 MG-UNIT per tablet Take 1 tablet by mouth daily 90 tablet 3     pantoprazole (PROTONIX) 40 MG EC tablet Take 1 tablet (40 mg) by mouth daily 14 tablet 0     polyethylene glycol (MIRALAX) 17 g packet Take 1 packet by mouth       potassium gluconate 2.5 MEQ tablet Take 1 tablet by mouth daily       prochlorperazine (COMPAZINE) 10 MG tablet Take 1 tablet (10 mg) by mouth every 6 hours as needed for nausea or vomiting (take if zofran does not relieve nausea) 20 tablet 0     QUEtiapine (SEROQUEL) 100 MG tablet Take 1 tablet (100 mg) by mouth 2 times daily 90 tablet 3     QUEtiapine (SEROQUEL) 100 MG tablet Take 100 mg by mouth At Bedtime        rifaximin (XIFAXAN) 550 MG TABS tablet Take 1 tablet (550 mg) by mouth 2 times daily 60 tablet 11     spironolactone (ALDACTONE) 50 MG tablet Take 3 tablets (150 mg) by mouth daily 180 tablet 0     tamsulosin (FLOMAX) 0.4 MG capsule Take 2 capsules (0.8 mg) by mouth daily 180 capsule 3     tiotropium (SPIRIVA RESPIMAT) 2.5 MCG/ACT inhaler Inhale 2 puffs into the lungs daily 4 g 0     traZODone (DESYREL) 50 MG tablet Take 1 tablet (50 mg) by mouth At Bedtime 30 tablet 11     triamcinolone (KENALOG) 0.1 % external cream Use sparingly on intact leg skin daily for itching. 90 g 0     ursodiol (ACTIGALL) 300 MG capsule TAKE THREE CAPSULES BY MOUTH TWO TIMES A DAY  180 capsule 11      Family History:  Family History   Problem Relation Age of Onset     Breast Cancer Mother      Liver Cancer Mother       Cardiovascular Father      Cerebrovascular Disease Father         very low blood pressure     Cancer Father         rectal cancer     Cardiovascular Paternal Grandfather      Diabetes Brother      Cancer Sister         skin cancer     C.A.D. Other         MI, 70's     Breast Cancer Sister      Thyroid Disease No family hx of      Lipids No family hx of      Anesthesia Reaction No family hx of      Sister had breast cancer.  Mom had breast cancer.  Dad had colon cancer.  3 children- all healthy      Social History:  Social History     Socioeconomic History     Marital status:      Spouse name: Not on file     Number of children: Not on file     Years of education: Not on file     Highest education level: Not on file   Occupational History     Not on file   Tobacco Use     Smoking status: Current Every Day Smoker     Packs/day: 0.10     Types: Cigarettes     Smokeless tobacco: Never Used     Tobacco comment: about 1-2 cigarettes per day   Substance and Sexual Activity     Alcohol use: No     Alcohol/week: 0.0 standard drinks     Comment: 2-3 per day , none since Dec 2012     Drug use: No     Sexual activity: Yes     Partners: Female     Birth control/protection: Surgical   Other Topics Concern     Parent/sibling w/ CABG, MI or angioplasty before 65F 55M? Not Asked      Service No     Blood Transfusions No     Caffeine Concern No     Comment: very little coffee, likes beer     Occupational Exposure No     Hobby Hazards No     Sleep Concern Yes     Comment: bed at 3:30 AM, up at 1:00 PM     Stress Concern Yes     Weight Concern Yes     Special Diet No     Back Care No     Exercise No     Bike Helmet No     Seat Belt Yes     Self-Exams No   Social History Narrative    . 3 grown daughters. He has been sober since 2012.     Social Determinants of Health     Financial Resource Strain:      Difficulty of Paying Living Expenses:    Food Insecurity:      Worried About Running Out of Food in the Last  Year:      Ran Out of Food in the Last Year:    Transportation Needs:      Lack of Transportation (Medical):      Lack of Transportation (Non-Medical):    Physical Activity:      Days of Exercise per Week:      Minutes of Exercise per Session:    Stress:      Feeling of Stress :    Social Connections:      Frequency of Communication with Friends and Family:      Frequency of Social Gatherings with Friends and Family:      Attends [employer] Services:      Active Member of Clubs or Organizations:      Attends Club or Organization Meetings:      Marital Status:    Intimate Partner Violence:      Fear of Current or Ex-Partner:      Emotionally Abused:      Physically Abused:      Sexually Abused:    used to be a heavy alcohol drinker but quit in Dec 2012.  Continues to smoke few cigarettes daily and previously used to have a pack a day. Lives alone but his wife and daughter live close by and they come to visit frequently.     Physical Exam:  There were no vitals taken for this visit.     Wt Readings from Last 4 Encounters:   07/13/21 110.2 kg (243 lb)   06/22/21 118.4 kg (261 lb)   06/22/21 118.4 kg (261 lb 1.6 oz)   03/11/21 106.6 kg (235 lb)     Constitutional.  Looks chronically fatigued but in no apparent distress.   Eyes.  Without eye redness or apparent jaundice.   Respiratory.  Non labored breathing. Speaking in full sentences.    Skin.  No concerning skin rashes on the skin visualized.   Neurological.  Is alert and oriented.  Psychiatric.  Mood and affect seem appropriate.      The rest of a comprehensive physical examination is deferred due to Public Health Emergency video visit restrictions.    Laboratory/Imaging Studies    Reviewed  6/1/2021  CBC  WBC 2, Hg 8.2 PLT 52    CMP -albumin 2.1.  Bilirubin 1.1.  Alkaline phosphatase 179.  Normal ALT and AST.  Alpha-fetoprotein 14.7 which has increased.    4/20/2021.  Iron studies show consistent with iron deficiency anemia.  Ferritin 22, iron 39, TIBC 371 and iron  saturation index 10%.    MRI as described above    CT scan Chest on 7/21/2021 with out evidence of metastatic disease.    7/21/2021-bone scan.  No evidence of bone metastasis.    ASSESSMENT/PLAN:    Multifocal HCC in the setting of advanced etoh related cirrhosis.  No evidence of metastatic disease.     Has had multiple TACE procedures.  Now planned for CT guided ablation of Seg 3/4B lesion by Dr Hilton      He will need repeat imaging with MRI and labs every 3 months.  He will follow with IR      Anemia- iron deficiency. He received Injectafer in April and then on 6/3/2021- repeat iron studies.    Leukopenia and thrombocytopenia- related cirrhosis, portal HTN and splenomegaly. Will cont to follow serially.     Smoking. We again discussed that he should try hard to quit smoking    We did not address the following today    Discussion regarding health care directive.  We discussed that it is important that he completes health care directive which would help in making sure that his wishes are followed about his treatment care in case he is not able to make a decision for himself.    RTC 3-4 months with labs MRI prior to that.      All of his and his family's questions were answered to their satisfaction and they are agreeable and comfortable with the plan.    Video Start time. 10:21 AM  Video Stop time. 10:37 AM

## 2021-08-03 NOTE — LETTER
8/3/2021         RE: Lawrence Louie  [address]  [place] [address]        Dear Colleague,    Thank you for referring your patient, Lawrence Louie, to the [facility]. Please see a copy of my visit note below.    Unruly is a 65 year old who is being evaluated via a billable video visit.      How would you like to obtain your AVS? MyChart  If the video visit is dropped, the invitation should be resent by: Send to e-mail at:[phone]   Will anyone else be joining your video visit? Raven (Ex Wife)          Oncology follow-up visit:  Date on this visit: 8/03/21     Lawrence Louie  is referred by Dr.Jafar Hilton for an oncology consultation. He requires evaluation for new diagnosis of HCC.    Primary Physician: Ary Murphy     History Of Present Illness:       Mr. Louie is a 64 year old male who presents with new diagnosis of HCC.    He has hx of etoh cirrhosis diagnosed in 2012, hx of esophageal varices bleeding, hepatic encephalopathy, ascites, s/p TIPS procedure in 2017, and hx of leg swellings/ulcer/several episodes of infections from it.  He has chronic back pain from previous falls/vertebral fractures. Continues to have b/l leg swellings/blisters, but it has been somewhat better. No new swellings. He uses a walker because of chronic balance issues for several years. No recent fevers. He occasionally notices tingling/numbness in fingers. Denies recent abnormal bleeding. He does bruise easily. Has some dyspnea on exertion. He has low energy and rests most of the time. Can walk a little but for the most part does not do much physical activities. He does have memory issues especially short term memory issues and has some cognition/confusion at times.     Interval hx:  This is a video visit through [employer].  I have reviewed notes from Dr. Hilton from interventional radiology.  His wife and duaghter are also available  He states last saw me in  July 2020.  Since then he had several TACE procedures to segment 6 (7/30/2020), segment 7 (10/1/2020), segment 6 again (1/7/2021) in segment 4 (5/5/2021)  MRI on 7/21/2021 showed a 1.6 cm lesion in hepatic segment 3/4B which previously was 0.8 cm.  This is consistent with LIRADS 5/ OPTN 5a  Lesions in segment 6 and segment 7 shows small amount of peripheral arterial enhancement without significant washout and are LR TR equivocal.  Segment 4 lesion is without evidence of residual malignancy.  There is plan to do microwave ablation of segment 3/4B lesion    His foot is doing better. He had left 2nd toe was amputated on 6/26/2021. He has foot and back pain. Abdomen feels OK. No swelling. No nausea or vomiting. He is taking lactulose. No abnormal bleeding. He is chronically fatigued. He has dyspnea on mild exertion.     ECOG 3    ROS:  A comprehensive ROS was otherwise neg    I reviewed other history in epic as below.    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)      Splenomegaly      Squamous cell carcinoma      Thrombocytopenia (H)      Varices, esophageal (H)     Banded in 2011     Past Surgical History:   Procedure Laterality Date     BIOPSY OF SKIN LESION       COLONOSCOPY Left 6/16/2016    Procedure: COMBINED COLONOSCOPY, SINGLE OR MULTIPLE BIOPSY/POLYPECTOMY BY BIOPSY;  Surgeon: Brandy Barnett MD;  Location:  GI     ESOPHAGOSCOPY, GASTROSCOPY, DUODENOSCOPY (EGD), COMBINED  2/13/2013    Procedure: COMBINED ESOPHAGOSCOPY, GASTROSCOPY, DUODENOSCOPY (EGD);;  Surgeon: Tara Cook MD;  Location:  GI     ESOPHAGOSCOPY, GASTROSCOPY, DUODENOSCOPY (EGD), COMBINED  11/4/2013    Procedure: COMBINED ESOPHAGOSCOPY, GASTROSCOPY, DUODENOSCOPY (EGD);;  Surgeon:  Lonny Diaz MD;  Location:  GI     ESOPHAGOSCOPY, GASTROSCOPY, DUODENOSCOPY (EGD), COMBINED Left 6/16/2016    Procedure: COMBINED ESOPHAGOSCOPY, GASTROSCOPY, DUODENOSCOPY (EGD), BIOPSY SINGLE OR MULTIPLE;  Surgeon: Brandy Barnett MD;  Location:  GI     EXCISE LESION TRUNK  9/24/2012    Procedure: EXCISE LESION TRUNK;;  Surgeon: Pepe Dominguez MD;  Location: [facility]     GENITOURINARY SURGERY      vasectomy     HERNIORRHAPHY UMBILICAL  9/24/2012    Procedure: HERNIORRHAPHY UMBILICAL;  UMBILICAL HERNIA REPAIR , EXCISION OF PERIUMBILICAL CYST;  Surgeon: Pepe Dominguez MD;  Location: [facility]     IR CHEMO EMBOLIZATION  7/30/2020     IR CHEMO EMBOLIZATION  10/1/2020     IR CHEMO EMBOLIZATION  1/7/2021     IR CHEMO EMBOLIZATION  5/5/2021     Cancer History:   As above    Allergies:  Allergies as of 08/03/2021     (No Known Allergies)     Current Medications:  Current Outpatient Medications   Medication Sig Dispense Refill     blood glucose (NO BRAND SPECIFIED) lancets standard Use to test blood sugar 3-4 times daily. Use brand compatible with patients insurance and device. 360 each 3     blood glucose (NO BRAND SPECIFIED) test strip Use to test blood sugars 4 X  times daily or as directed. Use brand compatible with patients insurance and device. 360 strip 3     blood glucose monitoring (NO BRAND SPECIFIED) meter device kit Use to test blood sugar 4 times daily or as directed. Before meals and snack at HS. 1 kit 0     bumetanide (BUMEX) 0.5 MG tablet Take 3 tablets (1.5 mg) by mouth daily 90 tablet 1     Calcium Carbonate-Vit D-Min (CALCIUM 1200 PO) Plant based       cefadroxil (DURICEF) 500 MG capsule Take 1 capsule (500 mg) by mouth 2 times daily 14 capsule 0     citalopram (CELEXA) 20 MG tablet Take 1 tablet (20 mg) by mouth daily 90 tablet 3     COMPOUNDED NON-CONTROLLED SUBSTANCE (CMPD RX) - PHARMACY TO MIX COMPOUNDED MEDICATION Apply to the affected area once a day. 500 g 5     Cyanocobalamin  (VITAMIN B-12 PO) Take 1 tablet by mouth daily       desvenlafaxine (PRISTIQ) 100 MG 24 hr tablet Take 2 tablets (200 mg) by mouth daily (Patient taking differently: Take 100 mg by mouth daily Only 100mg) 180 tablet 1     doxycycline hyclate (VIBRAMYCIN) 100 MG capsule Take 1 capsule (100 mg) by mouth every 12 hours 14 capsule 0     EQL VITAMIN D3 50 MCG (2000 UT) CAPS Take 1 capsule by mouth daily 90 capsule 2     famotidine (PEPCID) 40 MG tablet Take 1 tablet (40 mg) by mouth daily 30 tablet 11     gabapentin (NEURONTIN) 300 MG capsule Take 1 capsule (300 mg) by mouth At Bedtime 90 capsule 3     hydrOXYzine (ATARAX) 25 MG tablet Take 1 tablet (25 mg) by mouth 3 times daily as needed for itching 90 tablet 3     insulin aspart (NOVOLOG FLEXPEN) 100 UNIT/ML pen 1 unit per 10 grams of carbohydrates + 1 per 50>140. Max daily dose 100 units. 30 mL 11     insulin glargine (LANTUS SOLOSTAR) 100 UNIT/ML pen 5 units daily in am. Increase by 5 units 2x weekly until fasting sugars are <130. Max daily dose 100 units. 15 mL 11     insulin pen needle (B-D U/F) 31G X 8 MM miscellaneous USE  6 times daily / OR AS DIRECTED 300 each 3     insulin pen needle (ULTICARE SHORT) 31G X 8 MM miscellaneous Use UP to 6 times daily or as directed 300 each 3     lactulose (CEPHULAC) 20 GM packet Take 1 packet (20 g) by mouth 2 times daily 60 packet 0     lactulose encephalopathy (CHRONULAC) 10 GM/15ML SOLUTION TAKE 30 MLS BY MOUTH 2 TIMES DAILY -  TITRATE AS NEEDED TO ACHIEVE 3-5 BOWEL MOVEMENTS DAILY. 1892 mL 11     levETIRAcetam (KEPPRA) 500 MG tablet Take 500 mg by mouth 2 times daily        Lidocaine (LIDOCARE) 4 % Patch Place 1 patch onto the skin every 24 hours To prevent lidocaine toxicity, patient should be patch free for 12 hrs daily. 30 patch 3     MAGNESIUM CITRATE PO gummies-nature made       magnesium oxide (MAG-OX) 400 (241.3 Mg) MG tablet Take 1 tablet (400 mg) by mouth daily 90 tablet 1     Melatonin 5 MG CHEW Take 2 tablets by  mouth nightly as needed       Nutritional Supplements (ENSURE) LIQD Take 1 Can by mouth daily 30 each 11     nystatin (MYCOSTATIN) 209789 UNIT/GM external cream Apply topically 2 times daily 30 g 11     nystatin (MYCOSTATIN) 714214 UNIT/GM external powder Apply topically 2 times daily 120 g 11     nystatin (MYCOSTATIN) 885661 UNIT/GM external powder Apply topically 2 times daily 60 g 11     ondansetron (ZOFRAN) 4 MG tablet Take 1 tablet (4 mg) by mouth every 8 hours as needed for nausea 20 tablet 0     order for DME Equipment being ordered:Orthopedic shoes 2 Units 0     order for DME Equipment being ordered: Condom catheter 1 Device 3     order for DME Equipment being ordered:BioTab compression pants pneumatic system 1 Piece 0     order for DME Equipment being ordered:bilateral pneumatic leg massage for treatment of extreme bilateral lower leg edema. 2 pump 0     oxyCODONE (ROXICODONE) 5 MG tablet TAKE 1 to 2 TABLETS BY MOUTH EVERY 8 HOURS AS NEEDED FOR SEVERE PAIN 180 tablet 0     OYSTER SHELL CALCIUM/D 500-200 MG-UNIT per tablet Take 1 tablet by mouth daily 90 tablet 3     pantoprazole (PROTONIX) 40 MG EC tablet Take 1 tablet (40 mg) by mouth daily 14 tablet 0     polyethylene glycol (MIRALAX) 17 g packet Take 1 packet by mouth       potassium gluconate 2.5 MEQ tablet Take 1 tablet by mouth daily       prochlorperazine (COMPAZINE) 10 MG tablet Take 1 tablet (10 mg) by mouth every 6 hours as needed for nausea or vomiting (take if zofran does not relieve nausea) 20 tablet 0     QUEtiapine (SEROQUEL) 100 MG tablet Take 1 tablet (100 mg) by mouth 2 times daily 90 tablet 3     QUEtiapine (SEROQUEL) 100 MG tablet Take 100 mg by mouth At Bedtime        rifaximin (XIFAXAN) 550 MG TABS tablet Take 1 tablet (550 mg) by mouth 2 times daily 60 tablet 11     spironolactone (ALDACTONE) 50 MG tablet Take 3 tablets (150 mg) by mouth daily 180 tablet 0     tamsulosin (FLOMAX) 0.4 MG capsule Take 2 capsules (0.8 mg) by mouth daily  180 capsule 3     tiotropium (SPIRIVA RESPIMAT) 2.5 MCG/ACT inhaler Inhale 2 puffs into the lungs daily 4 g 0     traZODone (DESYREL) 50 MG tablet Take 1 tablet (50 mg) by mouth At Bedtime 30 tablet 11     triamcinolone (KENALOG) 0.1 % external cream Use sparingly on intact leg skin daily for itching. 90 g 0     ursodiol (ACTIGALL) 300 MG capsule TAKE THREE CAPSULES BY MOUTH TWO TIMES A DAY  180 capsule 11      Family History:  Family History   Problem Relation Age of Onset     Breast Cancer Mother      Liver Cancer Mother      Cardiovascular Father      Cerebrovascular Disease Father         very low blood pressure     Cancer Father         rectal cancer     Cardiovascular Paternal Grandfather      Diabetes Brother      Cancer Sister         skin cancer     C.A.D. Other         MI, 70's     Breast Cancer Sister      Thyroid Disease No family hx of      Lipids No family hx of      Anesthesia Reaction No family hx of      Sister had breast cancer.  Mom had breast cancer.  Dad had colon cancer.  3 children- all healthy      Social History:  Social History     Socioeconomic History     Marital status:      Spouse name: Not on file     Number of children: Not on file     Years of education: Not on file     Highest education level: Not on file   Occupational History     Not on file   Tobacco Use     Smoking status: Current Every Day Smoker     Packs/day: 0.10     Types: Cigarettes     Smokeless tobacco: Never Used     Tobacco comment: about 1-2 cigarettes per day   Substance and Sexual Activity     Alcohol use: No     Alcohol/week: 0.0 standard drinks     Comment: 2-3 per day , none since Dec 2012     Drug use: No     Sexual activity: Yes     Partners: Female     Birth control/protection: Surgical   Other Topics Concern     Parent/sibling w/ CABG, MI or angioplasty before 65F 55M? Not Asked      Service No     Blood Transfusions No     Caffeine Concern No     Comment: very little coffee, likes beer      Occupational Exposure No     Hobby Hazards No     Sleep Concern Yes     Comment: bed at 3:30 AM, up at 1:00 PM     Stress Concern Yes     Weight Concern Yes     Special Diet No     Back Care No     Exercise No     Bike Helmet No     Seat Belt Yes     Self-Exams No   Social History Narrative    . 3 grown daughters. He has been sober since 2012.     Social Determinants of Health     Financial Resource Strain:      Difficulty of Paying Living Expenses:    Food Insecurity:      Worried About Running Out of Food in the Last Year:      Ran Out of Food in the Last Year:    Transportation Needs:      Lack of Transportation (Medical):      Lack of Transportation (Non-Medical):    Physical Activity:      Days of Exercise per Week:      Minutes of Exercise per Session:    Stress:      Feeling of Stress :    Social Connections:      Frequency of Communication with Friends and Family:      Frequency of Social Gatherings with Friends and Family:      Attends Congregation Services:      Active Member of Clubs or Organizations:      Attends Club or Organization Meetings:      Marital Status:    Intimate Partner Violence:      Fear of Current or Ex-Partner:      Emotionally Abused:      Physically Abused:      Sexually Abused:    used to be a heavy alcohol drinker but quit in Dec 2012.  Continues to smoke few cigarettes daily and previously used to have a pack a day. Lives alone but his wife and daughter live close by and they come to visit frequently.     Physical Exam:  There were no vitals taken for this visit.     Wt Readings from Last 4 Encounters:   07/13/21 110.2 kg (243 lb)   06/22/21 118.4 kg (261 lb)   06/22/21 118.4 kg (261 lb 1.6 oz)   03/11/21 106.6 kg (235 lb)     Constitutional.  Looks chronically fatigued but in no apparent distress.   Eyes.  Without eye redness or apparent jaundice.   Respiratory.  Non labored breathing. Speaking in full sentences.    Skin.  No concerning skin rashes on the skin visualized.    Neurological.  Is alert and oriented.  Psychiatric.  Mood and affect seem appropriate.      The rest of a comprehensive physical examination is deferred due to Public Health Emergency video visit restrictions.    Laboratory/Imaging Studies    Reviewed  6/1/2021  CBC  WBC 2, Hg 8.2 PLT 52    CMP -albumin 2.1.  Bilirubin 1.1.  Alkaline phosphatase 179.  Normal ALT and AST.  Alpha-fetoprotein 14.7 which has increased.    4/20/2021.  Iron studies show consistent with iron deficiency anemia.  Ferritin 22, iron 39, TIBC 371 and iron saturation index 10%.    MRI as described above    CT scan Chest on 7/21/2021 with out evidence of metastatic disease.    7/21/2021-bone scan.  No evidence of bone metastasis.    ASSESSMENT/PLAN:    Multifocal HCC in the setting of advanced etoh related cirrhosis.  No evidence of metastatic disease.     Has had multiple TACE procedures.  Now planned for CT guided ablation of Seg 3/4B lesion by Dr Hilton      He will need repeat imaging with MRI and labs every 3 months.  He will follow with IR      Anemia- iron deficiency. He received Injectafer in April and then on 6/3/2021- repeat iron studies.    Leukopenia and thrombocytopenia- related cirrhosis, portal HTN and splenomegaly. Will cont to follow serially.     Smoking. We again discussed that he should try hard to quit smoking    We did not address the following today    Discussion regarding health care directive.  We discussed that it is important that he completes health care directive which would help in making sure that his wishes are followed about his treatment care in case he is not able to make a decision for himself.    RTC 3-4 months with labs MRI prior to that.      All of his and his family's questions were answered to their satisfaction and they are agreeable and comfortable with the plan.    Video Start time. 10:21 AM  Video Stop time. 10:37 AM          Again, thank you for allowing me to participate in the care of your  patient.        Sincerely,        Olga Willis MD

## 2021-08-03 NOTE — PROGRESS NOTES
Unruly is a 65 year old who is being evaluated via a billable video visit.      How would you like to obtain your AVS? MyChart  If the video visit is dropped, the invitation should be resent by: Send to e-mail at:[phone]   Will anyone else be joining your video visit? Raven (Ex Wife)

## 2021-08-03 NOTE — PATIENT INSTRUCTIONS
Labs in the next few days    Follow with Dr Hilton    In 3-4 months, repeat labs and MRI and see me after that

## 2021-08-03 NOTE — TELEPHONE ENCOUNTER
Called pt's wife.     Informed her that we have pt scheduled for his liver ablation with Dr. Hilton.     Monday 8/24  Check in at 6am for an 8am start time  covid test 4 days prior to    He will need to get PCP for a preop also.    We will also admit pt post due to he's usually admitted post treatment due to pain control afterwards    Wife verbalized understanding and will call me with any other questions.     Sunshine TUCKER RN, BSN  Interventional Radiology/Vascular  Nurse Coordinator   Phone: [phone]  Fax: [phone]

## 2021-08-12 NOTE — NURSING NOTE
Chief Complaint   Patient presents with     RECHECK     Unruly, is bieng seen today for a follow up bilateral legs lumphedema and ulcers,  discoloration left foot great toe, as reported by patient.       There were no vitals filed for this visit.    There is no height or weight on file to calculate BMI.  Per Provider.    Lore Puente LPN

## 2021-08-12 NOTE — PROGRESS NOTES
Chief Complaint   Patient presents with     RECHECK     Unruly, claire nathan seen today for a follow up bilateral legs lumphedema and ulcers,  discoloration left foot great toe, as reported by patient.          HPI: Lawrence is a 65 year old male who presents today for further evaluation of bilateral lymphedema.  He is with his wife today.  He relates that the wounds are starting to heal some.  Since last visit here, a month ago, he has been to the emergency room 3 times.  Once he had cellulitis.  In July, he was given Cipro.  A few days ago, his wife took him to the emergency room.  He has some discoloration to the left medial hallux.  They were concerned that it was getting necrotic.  He relates that he did see lymphedema in their house, however they could not do much.  They related that he needed to be seen as an outpatient.  He cannot be seen as outpatient until home care is finished.  Home care comes every other day and looks at the wounds.  He is currently using glycerin on the wounds and compression dressings.      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)      Splenomegaly      Squamous cell carcinoma      Thrombocytopenia (H)      Varices, esophageal (H)     Banded in 2011       PSxH:   Past Surgical History:   Procedure Laterality Date     BIOPSY OF SKIN LESION       COLONOSCOPY Left 6/16/2016    Procedure: COMBINED COLONOSCOPY, SINGLE OR MULTIPLE BIOPSY/POLYPECTOMY BY BIOPSY;  Surgeon: Brandy Barnett MD;  Location:  GI     ESOPHAGOSCOPY, GASTROSCOPY, DUODENOSCOPY (EGD), COMBINED  2/13/2013    Procedure: COMBINED ESOPHAGOSCOPY, GASTROSCOPY, DUODENOSCOPY (EGD);;  Surgeon: Tara Cook MD;  Location:  GI     ESOPHAGOSCOPY,  GASTROSCOPY, DUODENOSCOPY (EGD), COMBINED  11/4/2013    Procedure: COMBINED ESOPHAGOSCOPY, GASTROSCOPY, DUODENOSCOPY (EGD);;  Surgeon: Lonny Diaz MD;  Location:  GI     ESOPHAGOSCOPY, GASTROSCOPY, DUODENOSCOPY (EGD), COMBINED Left 6/16/2016    Procedure: COMBINED ESOPHAGOSCOPY, GASTROSCOPY, DUODENOSCOPY (EGD), BIOPSY SINGLE OR MULTIPLE;  Surgeon: Brandy Barnett MD;  Location:  GI     EXCISE LESION TRUNK  9/24/2012    Procedure: EXCISE LESION TRUNK;;  Surgeon: Pepe Dominguez MD;  Location: [facility]     GENITOURINARY SURGERY      vasectomy     HERNIORRHAPHY UMBILICAL  9/24/2012    Procedure: HERNIORRHAPHY UMBILICAL;  UMBILICAL HERNIA REPAIR , EXCISION OF PERIUMBILICAL CYST;  Surgeon: Pepe Dominguez MD;  Location: [facility]     IR CHEMO EMBOLIZATION  7/30/2020     IR CHEMO EMBOLIZATION  10/1/2020     IR CHEMO EMBOLIZATION  1/7/2021     IR CHEMO EMBOLIZATION  5/5/2021       Allergies: Patient has no known allergies.    SH:   Social History     Socioeconomic History     Marital status:      Spouse name: Not on file     Number of children: Not on file     Years of education: Not on file     Highest education level: Not on file   Occupational History     Not on file   Tobacco Use     Smoking status: Current Every Day Smoker     Packs/day: 0.10     Types: Cigarettes     Smokeless tobacco: Never Used     Tobacco comment: about 1-2 cigarettes per day   Substance and Sexual Activity     Alcohol use: No     Alcohol/week: 0.0 standard drinks     Comment: 2-3 per day , none since Dec 2012     Drug use: No     Sexual activity: Yes     Partners: Female     Birth control/protection: Surgical   Other Topics Concern     Parent/sibling w/ CABG, MI or angioplasty before 65F 55M? Not Asked      Service No     Blood Transfusions No     Caffeine Concern No     Comment: very little coffee, likes beer     Occupational Exposure No     Hobby Hazards No     Sleep Concern Yes     Comment: bed at 3:30 AM, up at  1:00 PM     Stress Concern Yes     Weight Concern Yes     Special Diet No     Back Care No     Exercise No     Bike Helmet No     Seat Belt Yes     Self-Exams No   Social History Narrative    . 3 grown daughters. He has been sober since 2012.     Social Determinants of Health     Financial Resource Strain:      Difficulty of Paying Living Expenses:    Food Insecurity:      Worried About Running Out of Food in the Last Year:      Ran Out of Food in the Last Year:    Transportation Needs:      Lack of Transportation (Medical):      Lack of Transportation (Non-Medical):    Physical Activity:      Days of Exercise per Week:      Minutes of Exercise per Session:    Stress:      Feeling of Stress :    Social Connections:      Frequency of Communication with Friends and Family:      Frequency of Social Gatherings with Friends and Family:      Attends [employer] Services:      Active Member of Clubs or Organizations:      Attends Club or Organization Meetings:      Marital Status:    Intimate Partner Violence:      Fear of Current or Ex-Partner:      Emotionally Abused:      Physically Abused:      Sexually Abused:        FH:   Family History   Problem Relation Age of Onset     Breast Cancer Mother      Liver Cancer Mother      Cardiovascular Father      Cerebrovascular Disease Father         very low blood pressure     Cancer Father         rectal cancer     Cardiovascular Paternal Grandfather      Diabetes Brother      Cancer Sister         skin cancer     C.A.D. Other         MI, 70's     Breast Cancer Sister      Thyroid Disease No family hx of      Lipids No family hx of      Anesthesia Reaction No family hx of        Objective:  Data Unavailable Data Unavailable Data Unavailable Data Unavailable Data Unavailable 0 lbs 0 oz    PT and DP pulses are nonpalpable secondary to brawny edema bilaterally. CRT is 1 second.  Positive pedal hair.   Gross sensation is diminished bilaterally.   Equinus is noted bilaterally.  No pain with active or passive ROM of the ankle, MTJ, 1st ray, or halluces bilaterally,.  Pes planus is noted bilaterally with flexor stabilizing hammertoes to the lesser digits.  Pain noted with palpation of the dorsal feet, with left greater than right.  This is likely secondary to the severe edema.  Pain noted with palpation of the plantar second met head on the left.  There is fat pad atrophy noted about this area.    lipodermatosclerosis noted bilaterally.  Sure line noted to the left distal second digit.  This does appear well coapted.      There is a superficial abrasion that appears as a deroofed bulla noted to the medial right ankle.  No signs or symptoms of infection noted.          Right medial ankle wound has healed today.  He does have some superficial excoriations from picking.  His wife relates that he does this at night.  The wound on the left lateral leg is much smaller than the last visit.  There are no signs or symptoms of infection noted.  He has skin discoloration to the knee to hallux, however it just appears that this is discolored skin that is present across all 12 parts of his foot from the venous stasis.  No signs of cellulitis on his foot.  Surrounding skin is lipodermatosclerosis.    Assessment: Unruly is a 65-year-old today who presents with his daughter who has longstanding lymphedema and venous stasis of bilateral legs.  He does have ulcerations to both legs.  These are significantly improved.  He recently had a left second digit amputation.  He has a complex history to his legs.  He did see the surgeon and the sutures were taken out of the left foot.  For now, I think he do the best with getting lymphedema treatments.  He will send me a message via [employer] when home care is finished.  He thinks that this might be soon.  Once they are finished, I will put in a referral for lymphedema.  He does have pumps, however there are many years old.  They can continue the dressing changes for  now.    Plan:  - Pt seen and evaluated.  -We will send in a prescription for pruritic legs for a steroid cream.  He should use this sparingly.  - Triamcinolone cream sent to the pharmacy at the last visit.  He can use this as needed.  -Continue with lymphedema dressings.  - See again in 1 month.

## 2021-08-12 NOTE — LETTER
8/12/2021       RE: Lawrence Louie  [address] [place] [address]     Dear Colleague,    Thank you for referring your patient, Lawrence Louie, to the [facility] at [facility]. Please see a copy of my visit note below.    Chief Complaint   Patient presents with     RECHECK     Unruly, is bieng seen today for a follow up bilateral legs lumphedema and ulcers,  discoloration left foot great toe, as reported by patient.       HPI: Lawrence is a 65 year old male who presents today for further evaluation of bilateral lymphedema.  He is with his wife today.  He relates that the wounds are starting to heal some.  Since last visit here, a month ago, he has been to the emergency room 3 times.  Once he had cellulitis.  In July, he was given Cipro.  A few days ago, his wife took him to the emergency room.  He has some discoloration to the left medial hallux.  They were concerned that it was getting necrotic.  He relates that he did see lymphedema in their house, however they could not do much.  They related that he needed to be seen as an outpatient.  He cannot be seen as outpatient until home care is finished.  Home care comes every other day and looks at the wounds.  He is currently using glycerin on the wounds and compression dressings.      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)      Splenomegaly      Squamous cell carcinoma      Thrombocytopenia (H)      Varices, esophageal (H)     Banded in 2011       PSxH:   Past Surgical History:   Procedure Laterality Date     BIOPSY OF SKIN LESION       COLONOSCOPY Left 6/16/2016    Procedure: COMBINED COLONOSCOPY, SINGLE OR MULTIPLE  BIOPSY/POLYPECTOMY BY BIOPSY;  Surgeon: Brandy Barnett MD;  Location:  GI     ESOPHAGOSCOPY, GASTROSCOPY, DUODENOSCOPY (EGD), COMBINED  2/13/2013    Procedure: COMBINED ESOPHAGOSCOPY, GASTROSCOPY, DUODENOSCOPY (EGD);;  Surgeon: Tara Cook MD;  Location:  GI     ESOPHAGOSCOPY, GASTROSCOPY, DUODENOSCOPY (EGD), COMBINED  11/4/2013    Procedure: COMBINED ESOPHAGOSCOPY, GASTROSCOPY, DUODENOSCOPY (EGD);;  Surgeon: Lonny Diaz MD;  Location:  GI     ESOPHAGOSCOPY, GASTROSCOPY, DUODENOSCOPY (EGD), COMBINED Left 6/16/2016    Procedure: COMBINED ESOPHAGOSCOPY, GASTROSCOPY, DUODENOSCOPY (EGD), BIOPSY SINGLE OR MULTIPLE;  Surgeon: Brandy Barnett MD;  Location:  GI     EXCISE LESION TRUNK  9/24/2012    Procedure: EXCISE LESION TRUNK;;  Surgeon: Pepe Dominguez MD;  Location: [facility]     GENITOURINARY SURGERY      vasectomy     HERNIORRHAPHY UMBILICAL  9/24/2012    Procedure: HERNIORRHAPHY UMBILICAL;  UMBILICAL HERNIA REPAIR , EXCISION OF PERIUMBILICAL CYST;  Surgeon: Pepe Dominguez MD;  Location: [facility]     IR CHEMO EMBOLIZATION  7/30/2020     IR CHEMO EMBOLIZATION  10/1/2020     IR CHEMO EMBOLIZATION  1/7/2021     IR CHEMO EMBOLIZATION  5/5/2021       Allergies: Patient has no known allergies.    SH:   Social History     Socioeconomic History     Marital status:      Spouse name: Not on file     Number of children: Not on file     Years of education: Not on file     Highest education level: Not on file   Occupational History     Not on file   Tobacco Use     Smoking status: Current Every Day Smoker     Packs/day: 0.10     Types: Cigarettes     Smokeless tobacco: Never Used     Tobacco comment: about 1-2 cigarettes per day   Substance and Sexual Activity     Alcohol use: No     Alcohol/week: 0.0 standard drinks     Comment: 2-3 per day , none since Dec 2012     Drug use: No     Sexual activity: Yes     Partners: Female     Birth control/protection: Surgical   Other  Topics Concern     Parent/sibling w/ CABG, MI or angioplasty before 65F 55M? Not Asked      Service No     Blood Transfusions No     Caffeine Concern No     Comment: very little coffee, likes beer     Occupational Exposure No     Hobby Hazards No     Sleep Concern Yes     Comment: bed at 3:30 AM, up at 1:00 PM     Stress Concern Yes     Weight Concern Yes     Special Diet No     Back Care No     Exercise No     Bike Helmet No     Seat Belt Yes     Self-Exams No   Social History Narrative    . 3 grown daughters. He has been sober since 2012.     Social Determinants of Health     Financial Resource Strain:      Difficulty of Paying Living Expenses:    Food Insecurity:      Worried About Running Out of Food in the Last Year:      Ran Out of Food in the Last Year:    Transportation Needs:      Lack of Transportation (Medical):      Lack of Transportation (Non-Medical):    Physical Activity:      Days of Exercise per Week:      Minutes of Exercise per Session:    Stress:      Feeling of Stress :    Social Connections:      Frequency of Communication with Friends and Family:      Frequency of Social Gatherings with Friends and Family:      Attends Mu-ism Services:      Active Member of Clubs or Organizations:      Attends Club or Organization Meetings:      Marital Status:    Intimate Partner Violence:      Fear of Current or Ex-Partner:      Emotionally Abused:      Physically Abused:      Sexually Abused:        FH:   Family History   Problem Relation Age of Onset     Breast Cancer Mother      Liver Cancer Mother      Cardiovascular Father      Cerebrovascular Disease Father         very low blood pressure     Cancer Father         rectal cancer     Cardiovascular Paternal Grandfather      Diabetes Brother      Cancer Sister         skin cancer     C.A.D. Other         MI, 70's     Breast Cancer Sister      Thyroid Disease No family hx of      Lipids No family hx of      Anesthesia Reaction No family  hx of        Objective:  Data Unavailable Data Unavailable Data Unavailable Data Unavailable Data Unavailable 0 lbs 0 oz    PT and DP pulses are nonpalpable secondary to brawny edema bilaterally. CRT is 1 second.  Positive pedal hair.   Gross sensation is diminished bilaterally.   Equinus is noted bilaterally. No pain with active or passive ROM of the ankle, MTJ, 1st ray, or halluces bilaterally,.  Pes planus is noted bilaterally with flexor stabilizing hammertoes to the lesser digits.  Pain noted with palpation of the dorsal feet, with left greater than right.  This is likely secondary to the severe edema.  Pain noted with palpation of the plantar second met head on the left.  There is fat pad atrophy noted about this area.    lipodermatosclerosis noted bilaterally.  Sure line noted to the left distal second digit.  This does appear well coapted.      There is a superficial abrasion that appears as a deroofed bulla noted to the medial right ankle.  No signs or symptoms of infection noted.          Right medial ankle wound has healed today.  He does have some superficial excoriations from picking.  His wife relates that he does this at night.  The wound on the left lateral leg is much smaller than the last visit.  There are no signs or symptoms of infection noted.  He has skin discoloration to the knee to hallux, however it just appears that this is discolored skin that is present across all 12 parts of his foot from the venous stasis.  No signs of cellulitis on his foot.  Surrounding skin is lipodermatosclerosis.    Assessment: Unruly is a 65-year-old today who presents with his daughter who has longstanding lymphedema and venous stasis of bilateral legs.  He does have ulcerations to both legs.  These are significantly improved.  He recently had a left second digit amputation.  He has a complex history to his legs.  He did see the surgeon and the sutures were taken out of the left foot.  For now, I think he do the  best with getting lymphedema treatments.  He will send me a message via [employer] when home care is finished.  He thinks that this might be soon.  Once they are finished, I will put in a referral for lymphedema.  He does have pumps, however there are many years old.  They can continue the dressing changes for now.    Plan:  - Pt seen and evaluated.  -We will send in a prescription for pruritic legs for a steroid cream.  He should use this sparingly.  - Triamcinolone cream sent to the pharmacy at the last visit.  He can use this as needed.  -Continue with lymphedema dressings.  - See again in 1 month.         Again, thank you for allowing me to participate in the care of your patient.      Sincerely,    Josue Chaidez DPM

## 2021-08-13 NOTE — TELEPHONE ENCOUNTER
андрей Carbajallily called to see if Dr. Mukherjee would be able to do the Pre-Op before 8/23 procedure. Routed message to Dr. Mukherjee to ask if it is appropriate for her to do the Pre-Op.

## 2021-08-17 NOTE — TELEPHONE ENCOUNTER
Spoke to Raven to explain below.  Patient is in the hospita right now, so he may not have the surgery right now.     ----- Message from Jodie Mukherjee MD sent at 8/13/2021  5:11 PM CDT -----  Regarding: RE: Pre-Op  Bret Arguelles,    Thanks for touching base. Unfortunately I don't do pre-ops. If he has an IM physician who could do this, that would be most appropriate for an evaluation to look at his medical status to clear him for surgery prior to his procedure.  Sorry I could not help with this.    Thanks,  Jodie  ----- Message -----  From: Kindra Francois  Sent: 8/13/2021   9:57 AM CDT  To: Jodie Mukherjee MD  Subject: Pre-Op                                           Chung Mukherjee,    Patient's family member, Raven, called to ask if you are able to do the Pre-Op prior to the procedure he has on 8/23? She stated another physician thought you would be able to do the Pre-Op and Unruly appreciated your last visit/appointment.    Sincerely,    Kindra Francois  Schedulng Coordinator  [facility]

## 2021-08-19 NOTE — TELEPHONE ENCOUNTER
Called  Wife to fup on pt's status.     Wife states that pt is currently admission in [facility]    Pt is currently admitted with sepsis. He's currently being worked up at this time    Recommendations are to cancel the liver  ablation with Dr. Hilton at this time.     I will take care of this and we will keep in touch.    Sunshine TUCKER RN, BSN  Interventional Radiology/Vascular  Nurse Coordinator   Phone: [phone]  Fax: [phone]

## 2021-08-20 NOTE — TELEPHONE ENCOUNTER
[facility] Call Center    Phone Message    May a detailed message be left on voicemail: yes    [phone]   Reason for Call: Other: .  Other: Sunshine luo from [place] [employer] called requesting the visit note from 6/21/21. It was supposed to be faxed with his order form. Please fax the notes to [phone].    Action Taken: Message routed to:  [facility]    Travel Screening: Not Applicable

## 2021-08-25 NOTE — PROGRESS NOTES
This is a recent snapshot of the patient's [facility] medical record.  For current drug dose and complete information and questions, call [phone] or In Basket pool, fv home infusion ([id number])  CSN Number:  [id number]

## 2021-08-25 NOTE — PROGRESS NOTES
This is a recent snapshot of the patient's [facility] medical record.  For current drug dose and complete information and questions, call [phone] or In Basket pool, fv home infusion ([id number])  CSN Number:  [id number]

## 2021-08-26 NOTE — PROGRESS NOTES
This is a recent snapshot of the patient's [facility] medical record.  For current drug dose and complete information and questions, call [phone] or In Basket pool, fv home infusion ([id number])  CSN Number:  [id number]

## 2021-08-31 NOTE — PROGRESS NOTES
This is a recent snapshot of the patient's [facility] medical record.  For current drug dose and complete information and questions, call [phone] or In Basket pool, fv home infusion ([id number])  CSN Number:  [id number]

## 2021-09-01 NOTE — TELEPHONE ENCOUNTER
Appears patient is currently admitted at [facility].      I called Kristi to discuss Unruly's care. Discharge plans are not yet known, though Kristi is being updated by nursing staff. We discussed that if oxygen is needed at discharge, the hospital can help initiate this process. Discussed that patient should have follow up with Dr. Simmons once discharged. Kristi voiced understanding.    Yolanda Alejandre RN ([facility])

## 2021-09-08 NOTE — TELEPHONE ENCOUNTER
[facility] Call Center    Phone Message    May a detailed message be left on voicemail: no     Reason for Call: Order(s): Home Care Orders: Other: Start of care 9/9/21.    Action Taken: Message routed to:  [facility]    Travel Screening: Not Applicable

## 2021-09-08 NOTE — TELEPHONE ENCOUNTER
Verbal orders given to El[place] from [facility], per Dr. Simmons, for Order(s): Home Care Orders: Other: Start of care 9/9/21. Magdalena Covarrubias LPN 9/8/2021 2:19 PM

## 2021-09-09 NOTE — TELEPHONE ENCOUNTER
[facility] Call Center    Phone Message    May a detailed message be left on voicemail: yes     Reason for Call: Order(s): Other:   Reason for requested:     Miranda was calling with a few concerns, he on new oxygen at home have he s having a hard time keeping nasal cannula tubing on, they need an order for a mask because the patient is more of a mouth breather Fax the order to [facility]  Fax: [phone] w/ Dx code U071- Covid 19    Oxygen saturation has been declining when he gets up and it has gotten worse today then yesterday, the patient has had low grade temperature. The patient is also attempting to cough but its difficulty for him. The patient muccinex.     The family stop giving him dexamethasone 10mg, because he been having sever anxiety and irritability. The family also wants to know if he can have a catheter because of his frequent urination issues, please review and follow up asap thank you.    Verbal order for: SN 2x a week for 2 weeks, 1x a week for 2 weeks,  PT/OR: Eval and Treat Until after 9/20/21 when covid restrictions are lifted.    Date needed: asap  Provider name: Ita Dang MD      Action Taken: Message routed to:  [facility]    Travel Screening: Not Applicable

## 2021-09-10 NOTE — PROGRESS NOTES
Video start time 3.57 pm   Video end time 4.35 pm   Platform used - ShoutNow  Total time including chart review, care-coordination and documentation time on the date of encounter - 71 mins    [facility]  Infectious Disease Clinic Note:  Follow up      Patient:  Lawrence Louie, Date of birth 1955, Medical record number [id number]  Date of Visit:  09/10/2021  Consult requested by patient himself.         Assessment and Recommendations:     Recommendations:  Continue mucinex, prescribed immediate release  Steam inhalation to loosen phlegm   aerobika - already has but not using   Albuterol inhaler - already has  Restart diuretics  If he gets worse - desaturation, fever or general worsening to go to ER for CBC, CMP, CXR, blood cxs, sputum cx for worsening covid, superimposed bacterial pneumonia or other infection etc.        Assessment:  Patient is a 64-year-old gentleman with history of alcoholic cirrhosis c/b ascites, varices s/p TIPS, hepatic encephalopathy, thrombocytopenia, anemia, leukopenia, liver cancer s/p chemoembolization, DM 2 (well controlled), bilateral lower extremity lymphedema, recurrent cellulitis c/b bacteremia including MSSA, Pseudomonas, Strep, deconditioned.     COVID positive 8/31/21: hospitalized requiring oxygen s/p 4 doses of remdesivir and 8-9 days of dexa. Had terrible side effects to dexa.   Is struggling with breathing which I suspect is due to accumulation pf phlegm and perhaps fluid. Therefore recommend expectorant, steam inhalation, aerobika, albuterol and diuretics.     Past ID issues:    # Strep bacteremia alpha hemolytic 8/2021: source thought to be  Left second toe ulcer, treated with ceftriaxone, but developed neutropenia to it, hence changed to ceftin.     # Pseudomonas aeruginosa bacteremia: osteomyelitis of left second toe s/p partial amputation and treatment with zosyn and levaquin     #  Pseudomonas stutzeri bacteremia: June 2020: source  not clear,, treated with 5 days of iv zosyn. Doing well 2-3 weeks after completion of abxs. Since source not found, will check blood cxs to make sure there is no recurrence. Could have been from biliary tree although unusual pathogen for this source, as recent liver malignancy diagnosed.     # Recurrent MSSA bacteremia, source thought to be bilateral lower extremities cellulitis  4/13/2019 blood culture-MSSA  10/7/2019 blood culture with-MSSA and Aeromonas veronii  Recent hospitalization in March 2020 for MSSA bacteremia.  Blood cultures 3/21/2020 -MSSA, 3/22-negative  TTE 3/23/2020 negative for vegetations.  Patient treated with IV cefazolin 3/22-4/4.    # Chronic right heel wound/ulcer  Patient had right heel wound/ulcer for more than 15 years  9/8/2019-heel  ulcer scant Bacteroides pyogenes, MSSA  5/21 Patient's wound opened up during physical examination.  Wound cultures 5/21/20 with heavy growth MSSA.  - B/l foot XRay with no signs of osteomyelitis 5/21/20.  But patient has pain even when the wound is healed and recurrent wounds. It was thought to be due to pressure but the left foot never develops a pressure ulcer. This is better with using foam on the wound.           Interval history:      Last seen 11/2020. Has been seen by my colleagues Dr. Robledo and Dr. John in the interim.     In summary since I last saw him     He was hospitalized at [facility] on 6/23-6/29/21 for septicemia related to infected left foot ( left second toe osteomyelitis) He was seen by podiatry and had partial amputation of left second toe on 6/26/21. per note, patology showed osteomyelitis. clinical condition improved after surgery and antibiotic treatment. ID was consulted at that time. Blood cultures from 6.23/21 grew Pseudomonas aeruginosa (sensitive to ciprofloxacin) . Bone culture obtained on 6/26/21 grew few colonies of Staph sp. and presumptive Enterococcus. he recieved Pip/Tazobactam in hospital and discharged on  Levofloxacin 500 mg po daily x 5 days ( till 7/3/21) .       Aug mid hospitalized -  with alpha hemolytic strep bacteremia. Source thought to be from left second toe ulcer. CXR with relative increase in infiltrates. Treated with ceftriaxone x 2 weeks.     Aug 26, developed Cough, fever  Saw ID doctor - did CXR - had pneumonia, started po levofloxacin    Patient presented to emergency department with persistent and worsening cough and fever on Aug 30. COVID-19 was positive and was admitted. Got remdesivir 4 doses, got dexamethasone - had terrible anxiety, sleep issues, dizziness with dexa    He was dced on 9/6/21 on dexa, he stopped dexa soon thereafter due to side effects (got 8-9 doses total)    Daughter thought he looked sick at the time of discharge but he looks sicker now. Currently He is having a lot of congestion in the chest and is uncomfortable breathing lying down flat. The phlegm is pinkish orange. She says He looks ill systemically although objectively looks ok   Oxygen sat is 91 at rest. He Is on 2L oxygen through [place] at rest for comfort, however he Breathes through the mouth, Daughter has ordered mask for oxygen delivery on [facility] due to complexities with insurance    He used mucinex for 1 day after discharge   Resumed yesterday evening , using sustained release  Diuretics were stopped after discharge by daughter as he was not eating or drinking well. Although pedal edema has not gotten worse, abdominal girth has increased. His legs look ok per daughter without signs of infection.          Family History   Problem Relation Age of Onset     Breast Cancer Mother      Liver Cancer Mother      Cardiovascular Father      Cerebrovascular Disease Father         very low blood pressure     Cancer Father         rectal cancer     Cardiovascular Paternal Grandfather      Diabetes Brother      Cancer Sister         skin cancer     C.A.D. Other         MI, 70's     Breast Cancer Sister      Thyroid Disease No family  hx of      Lipids No family hx of      Anesthesia Reaction No family hx of        Social History     Social History Narrative    . 3 grown daughters. He has been sober since 2012.     Social History     Tobacco Use     Smoking status: Current Every Day Smoker     Packs/day: 0.10     Types: Cigarettes     Smokeless tobacco: Never Used     Tobacco comment: about 1-2 cigarettes per day   Substance Use Topics     Alcohol use: No     Alcohol/week: 0.0 standard drinks     Comment: 2-3 per day , none since Dec 2012     Drug use: No       Current Outpatient Medications   Medication Sig     blood glucose (NO BRAND SPECIFIED) lancets standard Use to test blood sugar 3-4 times daily. Use brand compatible with patients insurance and device.     blood glucose (NO BRAND SPECIFIED) test strip Use to test blood sugars 4 X  times daily or as directed. Use brand compatible with patients insurance and device.     blood glucose monitoring (NO BRAND SPECIFIED) meter device kit Use to test blood sugar 4 times daily or as directed. Before meals and snack at HS.     bumetanide (BUMEX) 0.5 MG tablet Take 3 tablets (1.5 mg) by mouth daily     Calcium Carbonate-Vit D-Min (CALCIUM 1200 PO) Plant based     cefadroxil (DURICEF) 500 MG capsule Take 1 capsule (500 mg) by mouth 2 times daily     citalopram (CELEXA) 20 MG tablet Take 1 tablet (20 mg) by mouth daily     COMPOUNDED NON-CONTROLLED SUBSTANCE (CMPD RX) - PHARMACY TO MIX COMPOUNDED MEDICATION Apply to the affected area once a day.     Cyanocobalamin (VITAMIN B-12 PO) Take 1 tablet by mouth daily     desvenlafaxine (PRISTIQ) 100 MG 24 hr tablet Take 2 tablets (200 mg) by mouth daily (Patient taking differently: Take 100 mg by mouth daily Only 100mg)     doxycycline hyclate (VIBRAMYCIN) 100 MG capsule Take 1 capsule (100 mg) by mouth every 12 hours     EQL VITAMIN D3 50 MCG (2000 UT) CAPS Take 1 capsule by mouth daily     famotidine (PEPCID) 40 MG tablet Take 1 tablet (40 mg) by  mouth daily     gabapentin (NEURONTIN) 300 MG capsule Take 1 capsule (300 mg) by mouth At Bedtime     guaiFENesin (MUCINEX CHEST CONGESTION CHILD) 100 MG/5ML liquid Take 10 mLs (200 mg) by mouth every 4 hours as needed for cough     hydrOXYzine (ATARAX) 25 MG tablet Take 1 tablet (25 mg) by mouth 3 times daily as needed for itching     insulin aspart (NOVOLOG FLEXPEN) 100 UNIT/ML pen 1 unit per 10 grams of carbohydrates + 1 per 50>140. Max daily dose 100 units.     insulin glargine (LANTUS SOLOSTAR) 100 UNIT/ML pen 5 units daily in am. Increase by 5 units 2x weekly until fasting sugars are <130. Max daily dose 100 units.     insulin pen needle (B-D U/F) 31G X 8 MM miscellaneous USE  6 times daily / OR AS DIRECTED     insulin pen needle (ULTICARE SHORT) 31G X 8 MM miscellaneous Use UP to 6 times daily or as directed     lactulose encephalopathy (CHRONULAC) 10 GM/15ML SOLUTION TAKE 30 MLS BY MOUTH 2 TIMES DAILY -  TITRATE AS NEEDED TO ACHIEVE 3-5 BOWEL MOVEMENTS DAILY.     levETIRAcetam (KEPPRA) 500 MG tablet Take 500 mg by mouth 2 times daily      Lidocaine (LIDOCARE) 4 % Patch Place 1 patch onto the skin every 24 hours To prevent lidocaine toxicity, patient should be patch free for 12 hrs daily.     MAGNESIUM CITRATE PO gummies-nature made     magnesium oxide (MAG-OX) 400 (241.3 Mg) MG tablet Take 1 tablet (400 mg) by mouth daily     Melatonin 5 MG CHEW Take 2 tablets by mouth nightly as needed     Nutritional Supplements (ENSURE) LIQD Take 1 Can by mouth daily     nystatin (MYCOSTATIN) 465344 UNIT/GM external cream Apply topically 2 times daily     nystatin (MYCOSTATIN) 605645 UNIT/GM external powder Apply topically 2 times daily     nystatin (MYCOSTATIN) 321431 UNIT/GM external powder Apply topically 2 times daily     ondansetron (ZOFRAN) 4 MG tablet Take 1 tablet (4 mg) by mouth every 8 hours as needed for nausea     order for DME Equipment being ordered:Orthopedic shoes     order for DME Equipment being ordered:  Condom catheter     order for DME Equipment being ordered:BioTab compression pants pneumatic system     order for DME Equipment being ordered:bilateral pneumatic leg massage for treatment of extreme bilateral lower leg edema.     oxyCODONE (ROXICODONE) 5 MG tablet TAKE 1 to 2 TABLETS BY MOUTH EVERY 8 HOURS AS NEEDED FOR SEVERE PAIN     OYSTER SHELL CALCIUM/D 500-200 MG-UNIT per tablet Take 1 tablet by mouth daily     pantoprazole (PROTONIX) 40 MG EC tablet Take 1 tablet (40 mg) by mouth daily     polyethylene glycol (MIRALAX) 17 g packet Take 1 packet by mouth     potassium gluconate 2.5 MEQ tablet Take 1 tablet by mouth daily     prochlorperazine (COMPAZINE) 10 MG tablet Take 1 tablet (10 mg) by mouth every 6 hours as needed for nausea or vomiting (take if zofran does not relieve nausea)     QUEtiapine (SEROQUEL) 100 MG tablet Take 1 tablet (100 mg) by mouth 2 times daily     QUEtiapine (SEROQUEL) 100 MG tablet Take 100 mg by mouth At Bedtime      spironolactone (ALDACTONE) 50 MG tablet Take 3 tablets (150 mg) by mouth daily     tamsulosin (FLOMAX) 0.4 MG capsule Take 2 capsules (0.8 mg) by mouth daily     tiotropium (SPIRIVA RESPIMAT) 2.5 MCG/ACT inhaler Inhale 2 puffs into the lungs daily     traZODone (DESYREL) 50 MG tablet Take 1 tablet (50 mg) by mouth At Bedtime     triamcinolone (KENALOG) 0.1 % external cream Use sparingly on intact leg skin daily for itching.     ursodiol (ACTIGALL) 300 MG capsule TAKE THREE CAPSULES BY MOUTH TWO TIMES A DAY      XIFAXAN 550 MG TABS tablet Take 1 tablet (550 mg) by mouth 2 times daily     lactulose (CEPHULAC) 20 GM packet Take 1 packet (20 g) by mouth 2 times daily (Patient not taking: Reported on 8/12/2021)     Current Facility-Administered Medications   Medication     0.9% sodium chloride BOLUS       No Known Allergies           Physical Exam:     Limited exam due to video visit  Very deconditioned   Bilateral LE edema with darkening of skin up to knees. No open sores on  blurry video exam          Laboratory Data:       Inflammatory Markers    Recent Labs   Lab Test 05/21/20  1647 08/22/19  0634 08/21/19  0629 08/20/19  0625 08/18/19  1815 12/22/16  1722 12/22/15  1419   CRP 18.9* 60.1* 103.0* 143.0* 77.1* 9.3* 8.6*       Metabolic Studies       Recent Labs   Lab Test 06/01/21  1458 05/05/21  0959 04/17/21 1916 02/17/21  1631 01/07/21  1000 12/08/20  1358 06/26/20  1558 03/12/20  1238 08/19/19  0631 08/18/19 1916 11/06/17  0744    138 136 140 138 137   < > 133 136  --  144   POTASSIUM 3.7 3.8 4.4 3.8 4.2 3.4   < > 3.7 3.8  --  3.7   CHLORIDE 109 107 104 107 107 106   < > 101 107  --  113*   CO2 27 27 26 27 28 25   < > 26 22  --  24   ANIONGAP 4 5 6 6 4 7   < > 7 7  --  8   BUN 11 16 24 20 18 17   < > 17 16  --  10   CR 0.77 0.97 1.10 0.87 0.87 0.88   < > 0.91 0.88  --  0.71   GFRESTIMATED >90 81 69 89 89 89  --  88 90  --  >90   * 102* 146* 114* 152* 167*   < > 116* 86  --  93   A1C  --   --   --   --   --   --   --  6.3*  --   --   --    ERIN 7.7* 8.6 8.2* 8.5 8.3* 7.8*   < > 8.1* 8.0*  --  7.9*   MAG  --   --   --   --   --   --   --   --  1.9  --  1.9   LACT  --   --   --   --   --   --   --   --   --  1.7  --     < > = values in this interval not displayed.       Hematology Studies      Recent Labs   Lab Test 06/01/21  1458 05/05/21  0959 04/20/21  1518 04/17/21 1916 02/17/21  1631 01/07/21  1000 08/18/19  1815 04/27/18  1235 11/04/17  2035 10/28/14  1847 03/04/14  1814   WBC 2.0* 2.0* 2.0* 2.1* 1.9* 2.0* 6.4 2.7* 5.0 7.1 6.4   ANEU  --   --  1.3*  --   --   --  4.9 1.7 3.3 3.8 3.3   ALYM  --   --  0.3*  --   --   --  0.4* 0.4* 0.8 2.0 2.1   RAFAELA  --   --  0.3  --   --   --  1.0 0.5 0.7 1.2 0.8   AEOS  --   --  0.1  --   --   --  0.1 0.1 0.1 0.1 0.2   HGB 8.2* 8.6* 7.2* 6.8* 8.3* 7.0* 9.3* 9.5* 12.3* 14.7 14.9   HCT 25.6* 27.4* 23.5* 22.6* 26.6* 22.9* 27.8* 29.3* 38.5* 41.9 42.2   PLT 52* 51* 60* 58* 44* 66* 124* 85* 77* 122* 87*     Microbiology:  8/31/21 SARS co  v 2 PCR pos  8/31/21 blood cx neg     8/16/21 blood cx - alpha hemolytic strep     Imaging:  CXR 8/31/21  Persistent patchy perihilar infiltrates, similar to yesterday's exam.     CXR 8/30/21  Subtle poorly defined opacity centrally in both lungs could represent pneumonia; a slight element of basal atelectasis or scar is suspected as well, with small bilateral pleural effusions present.     MR abdomen 6/11/20  IMPRESSION:  1. Significant respiratory motion artifact degrades evaluation of the liver. Enhancing mass in the inferior right hepatic lobe measuring 37 mm with restricted diffusion and enhancing mass in the medial left hepatic lobe measuring 22 mm with restricted diffusion suspicious for hepatocellular carcinoma. A smaller area of restricted diffusion is identified in the posterior right hepatic lobe, corresponding to an area of hypodensity seen on the prior CT, this area is also suspicious for malignancy although it is difficult to confidently identify this mass on postcontrast imaging probably due to the degree of respiratory motion.  2. Hepatic cirrhosis with splenomegaly and abdominal ascites unchanged compared to the CT from one day earlier. Diffuse mesenteric edema and anasarca is also unchanged.  3. Contracted gallbladder with internal stones. No biliary duct dilatation.    CT abdomen pelvis 6/10/20  IMPRESSION:   1.  Ascites, cirrhosis, splenomegaly and collateral vessels.  2.  There are several ill-defined hypodense areas within the liver. Recommend multiphase MRI of the liver to evaluate for possible malignancy.  3.  TIPS stent present with patent portal vein, SMV and splenic vein.  4.  Small bilateral effusions with bibasilar atelectasis.  5.  Atherosclerotic vascular disease.  6.  No evidence of abscess, free air or obstruction.  7.  Atherosclerotic vascular disease.  8.  Diffuse subcutaneous edema.  9.  Progression of L1 compression fracture.    Xray feet bilateral 3 views 05/21/20                                                                    IMPRESSION: No acute osseous abnormality. No radiographic evidence of osteomyelitis.        CXR 03/25/20  1.  The left arm PICC has its tip in the proximal right atrium, approximately 2 cm distal to the cavoatrial junction.  2.  Hazy opacities in both lung bases are essentially unchanged.  3.  Mild cardiomegaly.    MRI lumbar spine 03/23/20  1.  The left arm PICC has its tip in the proximal right atrium, approximately 2 cm distal to the cavoatrial junction.  2.  Hazy opacities in both lung bases are essentially unchanged.  3.  Mild cardiomegaly.    ECHO 03/23/20    Interpretation Summary    * The left ventricle is normal size.    * The left ventricular systolic function is normal, estimated LVEF 60-65%.    * No regional wall motion abnormalities.    * There is moderate aortic stenosis.    * No pulmonary hypertension, estimated right ventricular systolic pressure  is 30 mmHg.    * Compared to prior study dated 02/01/2020 there has been no change.

## 2021-09-10 NOTE — LETTER
9/10/2021       RE: Lawrence Louie  [address] [place] [address]     Dear Colleague,    Thank you for referring your patient, Lawrence Louie, to the [facility] INFECTIOUS DISEASE CLINIC [place] at [facility]. Please see a copy of my visit note below.      Video start time 3.57 pm   Video end time 4.35 pm   Platform used - LOOKK  Total time including chart review, care-coordination and documentation time on the date of encounter - 71 mins    [facility]  Infectious Disease Clinic Note:  Follow up      Patient:  Lawrence Louie, Date of birth 1955, Medical record number [id number]  Date of Visit:  09/10/2021  Consult requested by patient himself.         Assessment and Recommendations:     Recommendations:  Continue mucinex, prescribed immediate release  Steam inhalation to loosen phlegm   aerobika - already has but not using   Albuterol inhaler - already has  Restart diuretics  If he gets worse - desaturation, fever or general worsening to go to ER for CBC, CMP, CXR, blood cxs, sputum cx for worsening covid, superimposed bacterial pneumonia or other infection etc.        Assessment:  Patient is a 64-year-old gentleman with history of alcoholic cirrhosis c/b ascites, varices s/p TIPS, hepatic encephalopathy, thrombocytopenia, anemia, leukopenia, liver cancer s/p chemoembolization, DM 2 (well controlled), bilateral lower extremity lymphedema, recurrent cellulitis c/b bacteremia including MSSA, Pseudomonas, Strep, deconditioned.     COVID positive 8/31/21: hospitalized requiring oxygen s/p 4 doses of remdesivir and 8-9 days of dexa. Had terrible side effects to dexa.   Is struggling with breathing which I suspect is due to accumulation pf phlegm and perhaps fluid. Therefore recommend expectorant, steam inhalation, aerobika, albuterol and diuretics.     Past ID issues:    # Strep bacteremia alpha hemolytic  8/2021: source thought to be  Left second toe ulcer, treated with ceftriaxone, but developed neutropenia to it, hence changed to ceftin.     # Pseudomonas aeruginosa bacteremia: osteomyelitis of left second toe s/p partial amputation and treatment with zosyn and levaquin     #  Pseudomonas stutzeri bacteremia: June 2020: source not clear,, treated with 5 days of iv zosyn. Doing well 2-3 weeks after completion of abxs. Since source not found, will check blood cxs to make sure there is no recurrence. Could have been from biliary tree although unusual pathogen for this source, as recent liver malignancy diagnosed.     # Recurrent MSSA bacteremia, source thought to be bilateral lower extremities cellulitis  4/13/2019 blood culture-MSSA  10/7/2019 blood culture with-MSSA and Aeromonas veronii  Recent hospitalization in March 2020 for MSSA bacteremia.  Blood cultures 3/21/2020 -MSSA, 3/22-negative  TTE 3/23/2020 negative for vegetations.  Patient treated with IV cefazolin 3/22-4/4.    # Chronic right heel wound/ulcer  Patient had right heel wound/ulcer for more than 15 years  9/8/2019-heel  ulcer scant Bacteroides pyogenes, MSSA  5/21 Patient's wound opened up during physical examination.  Wound cultures 5/21/20 with heavy growth MSSA.  - B/l foot XRay with no signs of osteomyelitis 5/21/20.  But patient has pain even when the wound is healed and recurrent wounds. It was thought to be due to pressure but the left foot never develops a pressure ulcer. This is better with using foam on the wound.           Interval history:      Last seen 11/2020. Has been seen by my colleagues Dr. Robledo and Dr. John in the interim.     In summary since I last saw him     He was hospitalized at [facility] on 6/23-6/29/21 for septicemia related to infected left foot ( left second toe osteomyelitis) He was seen by podiatry and had partial amputation of left second toe on 6/26/21. per note, patology showed osteomyelitis. clinical  condition improved after surgery and antibiotic treatment. ID was consulted at that time. Blood cultures from 6.23/21 grew Pseudomonas aeruginosa (sensitive to ciprofloxacin) . Bone culture obtained on 6/26/21 grew few colonies of Staph sp. and presumptive Enterococcus. he recieved Pip/Tazobactam in hospital and discharged on Levofloxacin 500 mg po daily x 5 days ( till 7/3/21) .       Aug mid hospitalized -  with alpha hemolytic strep bacteremia. Source thought to be from left second toe ulcer. CXR with relative increase in infiltrates. Treated with ceftriaxone x 2 weeks.     Aug 26, developed Cough, fever  Saw ID doctor - did CXR - had pneumonia, started po levofloxacin    Patient presented to emergency department with persistent and worsening cough and fever on Aug 30. COVID-19 was positive and was admitted. Got remdesivir 4 doses, got dexamethasone - had terrible anxiety, sleep issues, dizziness with dexa    He was dced on 9/6/21 on dexa, he stopped dexa soon thereafter due to side effects (got 8-9 doses total)    Daughter thought he looked sick at the time of discharge but he looks sicker now. Currently He is having a lot of congestion in the chest and is uncomfortable breathing lying down flat. The phlegm is pinkish orange. She says He looks ill systemically although objectively looks ok   Oxygen sat is 91 at rest. He Is on 2L oxygen through [place] at rest for comfort, however he Breathes through the mouth, Daughter has ordered mask for oxygen delivery on [facility] due to complexities with insurance    He used mucinex for 1 day after discharge   Resumed yesterday evening , using sustained release  Diuretics were stopped after discharge by daughter as he was not eating or drinking well. Although pedal edema has not gotten worse, abdominal girth has increased. His legs look ok per daughter without signs of infection.          Family History   Problem Relation Age of Onset     Breast Cancer Mother      Liver Cancer  Mother      Cardiovascular Father      Cerebrovascular Disease Father         very low blood pressure     Cancer Father         rectal cancer     Cardiovascular Paternal Grandfather      Diabetes Brother      Cancer Sister         skin cancer     C.A.D. Other         MI, 70's     Breast Cancer Sister      Thyroid Disease No family hx of      Lipids No family hx of      Anesthesia Reaction No family hx of        Social History     Social History Narrative    . 3 grown daughters. He has been sober since 2012.     Social History     Tobacco Use     Smoking status: Current Every Day Smoker     Packs/day: 0.10     Types: Cigarettes     Smokeless tobacco: Never Used     Tobacco comment: about 1-2 cigarettes per day   Substance Use Topics     Alcohol use: No     Alcohol/week: 0.0 standard drinks     Comment: 2-3 per day , none since Dec 2012     Drug use: No       Current Outpatient Medications   Medication Sig     blood glucose (NO BRAND SPECIFIED) lancets standard Use to test blood sugar 3-4 times daily. Use brand compatible with patients insurance and device.     blood glucose (NO BRAND SPECIFIED) test strip Use to test blood sugars 4 X  times daily or as directed. Use brand compatible with patients insurance and device.     blood glucose monitoring (NO BRAND SPECIFIED) meter device kit Use to test blood sugar 4 times daily or as directed. Before meals and snack at HS.     bumetanide (BUMEX) 0.5 MG tablet Take 3 tablets (1.5 mg) by mouth daily     Calcium Carbonate-Vit D-Min (CALCIUM 1200 PO) Plant based     cefadroxil (DURICEF) 500 MG capsule Take 1 capsule (500 mg) by mouth 2 times daily     citalopram (CELEXA) 20 MG tablet Take 1 tablet (20 mg) by mouth daily     COMPOUNDED NON-CONTROLLED SUBSTANCE (CMPD RX) - PHARMACY TO MIX COMPOUNDED MEDICATION Apply to the affected area once a day.     Cyanocobalamin (VITAMIN B-12 PO) Take 1 tablet by mouth daily     desvenlafaxine (PRISTIQ) 100 MG 24 hr tablet Take 2  tablets (200 mg) by mouth daily (Patient taking differently: Take 100 mg by mouth daily Only 100mg)     doxycycline hyclate (VIBRAMYCIN) 100 MG capsule Take 1 capsule (100 mg) by mouth every 12 hours     EQL VITAMIN D3 50 MCG (2000 UT) CAPS Take 1 capsule by mouth daily     famotidine (PEPCID) 40 MG tablet Take 1 tablet (40 mg) by mouth daily     gabapentin (NEURONTIN) 300 MG capsule Take 1 capsule (300 mg) by mouth At Bedtime     guaiFENesin (MUCINEX CHEST CONGESTION CHILD) 100 MG/5ML liquid Take 10 mLs (200 mg) by mouth every 4 hours as needed for cough     hydrOXYzine (ATARAX) 25 MG tablet Take 1 tablet (25 mg) by mouth 3 times daily as needed for itching     insulin aspart (NOVOLOG FLEXPEN) 100 UNIT/ML pen 1 unit per 10 grams of carbohydrates + 1 per 50>140. Max daily dose 100 units.     insulin glargine (LANTUS SOLOSTAR) 100 UNIT/ML pen 5 units daily in am. Increase by 5 units 2x weekly until fasting sugars are <130. Max daily dose 100 units.     insulin pen needle (B-D U/F) 31G X 8 MM miscellaneous USE  6 times daily / OR AS DIRECTED     insulin pen needle (ULTICARE SHORT) 31G X 8 MM miscellaneous Use UP to 6 times daily or as directed     lactulose encephalopathy (CHRONULAC) 10 GM/15ML SOLUTION TAKE 30 MLS BY MOUTH 2 TIMES DAILY -  TITRATE AS NEEDED TO ACHIEVE 3-5 BOWEL MOVEMENTS DAILY.     levETIRAcetam (KEPPRA) 500 MG tablet Take 500 mg by mouth 2 times daily      Lidocaine (LIDOCARE) 4 % Patch Place 1 patch onto the skin every 24 hours To prevent lidocaine toxicity, patient should be patch free for 12 hrs daily.     MAGNESIUM CITRATE PO gummies-nature made     magnesium oxide (MAG-OX) 400 (241.3 Mg) MG tablet Take 1 tablet (400 mg) by mouth daily     Melatonin 5 MG CHEW Take 2 tablets by mouth nightly as needed     Nutritional Supplements (ENSURE) LIQD Take 1 Can by mouth daily     nystatin (MYCOSTATIN) 324646 UNIT/GM external cream Apply topically 2 times daily     nystatin (MYCOSTATIN) 788322 UNIT/GM  external powder Apply topically 2 times daily     nystatin (MYCOSTATIN) 792056 UNIT/GM external powder Apply topically 2 times daily     ondansetron (ZOFRAN) 4 MG tablet Take 1 tablet (4 mg) by mouth every 8 hours as needed for nausea     order for DME Equipment being ordered:Orthopedic shoes     order for DME Equipment being ordered: Condom catheter     order for DME Equipment being ordered:BioTab compression pants pneumatic system     order for DME Equipment being ordered:bilateral pneumatic leg massage for treatment of extreme bilateral lower leg edema.     oxyCODONE (ROXICODONE) 5 MG tablet TAKE 1 to 2 TABLETS BY MOUTH EVERY 8 HOURS AS NEEDED FOR SEVERE PAIN     OYSTER SHELL CALCIUM/D 500-200 MG-UNIT per tablet Take 1 tablet by mouth daily     pantoprazole (PROTONIX) 40 MG EC tablet Take 1 tablet (40 mg) by mouth daily     polyethylene glycol (MIRALAX) 17 g packet Take 1 packet by mouth     potassium gluconate 2.5 MEQ tablet Take 1 tablet by mouth daily     prochlorperazine (COMPAZINE) 10 MG tablet Take 1 tablet (10 mg) by mouth every 6 hours as needed for nausea or vomiting (take if zofran does not relieve nausea)     QUEtiapine (SEROQUEL) 100 MG tablet Take 1 tablet (100 mg) by mouth 2 times daily     QUEtiapine (SEROQUEL) 100 MG tablet Take 100 mg by mouth At Bedtime      spironolactone (ALDACTONE) 50 MG tablet Take 3 tablets (150 mg) by mouth daily     tamsulosin (FLOMAX) 0.4 MG capsule Take 2 capsules (0.8 mg) by mouth daily     tiotropium (SPIRIVA RESPIMAT) 2.5 MCG/ACT inhaler Inhale 2 puffs into the lungs daily     traZODone (DESYREL) 50 MG tablet Take 1 tablet (50 mg) by mouth At Bedtime     triamcinolone (KENALOG) 0.1 % external cream Use sparingly on intact leg skin daily for itching.     ursodiol (ACTIGALL) 300 MG capsule TAKE THREE CAPSULES BY MOUTH TWO TIMES A DAY      XIFAXAN 550 MG TABS tablet Take 1 tablet (550 mg) by mouth 2 times daily     lactulose (CEPHULAC) 20 GM packet Take 1 packet (20 g)  by mouth 2 times daily (Patient not taking: Reported on 8/12/2021)     Current Facility-Administered Medications   Medication     0.9% sodium chloride BOLUS       No Known Allergies           Physical Exam:     Limited exam due to video visit  Very deconditioned   Bilateral LE edema with darkening of skin up to knees. No open sores on blurry video exam          Laboratory Data:       Inflammatory Markers    Recent Labs   Lab Test 05/21/20  1647 08/22/19  0634 08/21/19  0629 08/20/19  0625 08/18/19  1815 12/22/16  1722 12/22/15  1419   CRP 18.9* 60.1* 103.0* 143.0* 77.1* 9.3* 8.6*       Metabolic Studies       Recent Labs   Lab Test 06/01/21  1458 05/05/21  0959 04/17/21  1916 02/17/21  1631 01/07/21  1000 12/08/20  1358 06/26/20  1558 03/12/20  1238 08/19/19  0631 08/18/19  1916 11/06/17  0744    138 136 140 138 137   < > 133 136  --  144   POTASSIUM 3.7 3.8 4.4 3.8 4.2 3.4   < > 3.7 3.8  --  3.7   CHLORIDE 109 107 104 107 107 106   < > 101 107  --  113*   CO2 27 27 26 27 28 25   < > 26 22  --  24   ANIONGAP 4 5 6 6 4 7   < > 7 7  --  8   BUN 11 16 24 20 18 17   < > 17 16  --  10   CR 0.77 0.97 1.10 0.87 0.87 0.88   < > 0.91 0.88  --  0.71   GFRESTIMATED >90 81 69 89 89 89  --  88 90  --  >90   * 102* 146* 114* 152* 167*   < > 116* 86  --  93   A1C  --   --   --   --   --   --   --  6.3*  --   --   --    ERIN 7.7* 8.6 8.2* 8.5 8.3* 7.8*   < > 8.1* 8.0*  --  7.9*   MAG  --   --   --   --   --   --   --   --  1.9  --  1.9   LACT  --   --   --   --   --   --   --   --   --  1.7  --     < > = values in this interval not displayed.       Hematology Studies      Recent Labs   Lab Test 06/01/21  1458 05/05/21  0959 04/20/21  1518 04/17/21  1916 02/17/21  1631 01/07/21  1000 08/18/19  1815 04/27/18  1235 11/04/17  2035 10/28/14  1847 03/04/14  1814   WBC 2.0* 2.0* 2.0* 2.1* 1.9* 2.0* 6.4 2.7* 5.0 7.1 6.4   ANEU  --   --  1.3*  --   --   --  4.9 1.7 3.3 3.8 3.3   ALYM  --   --  0.3*  --   --   --  0.4* 0.4* 0.8  2.0 2.1   RAFAELA  --   --  0.3  --   --   --  1.0 0.5 0.7 1.2 0.8   AEOS  --   --  0.1  --   --   --  0.1 0.1 0.1 0.1 0.2   HGB 8.2* 8.6* 7.2* 6.8* 8.3* 7.0* 9.3* 9.5* 12.3* 14.7 14.9   HCT 25.6* 27.4* 23.5* 22.6* 26.6* 22.9* 27.8* 29.3* 38.5* 41.9 42.2   PLT 52* 51* 60* 58* 44* 66* 124* 85* 77* 122* 87*     Microbiology:  8/31/21 SARS co v 2 PCR pos  8/31/21 blood cx neg     8/16/21 blood cx - alpha hemolytic strep     Imaging:  CXR 8/31/21  Persistent patchy perihilar infiltrates, similar to yesterday's exam.     CXR 8/30/21  Subtle poorly defined opacity centrally in both lungs could represent pneumonia; a slight element of basal atelectasis or scar is suspected as well, with small bilateral pleural effusions present.     MR abdomen 6/11/20  IMPRESSION:  1. Significant respiratory motion artifact degrades evaluation of the liver. Enhancing mass in the inferior right hepatic lobe measuring 37 mm with restricted diffusion and enhancing mass in the medial left hepatic lobe measuring 22 mm with restricted diffusion suspicious for hepatocellular carcinoma. A smaller area of restricted diffusion is identified in the posterior right hepatic lobe, corresponding to an area of hypodensity seen on the prior CT, this area is also suspicious for malignancy although it is difficult to confidently identify this mass on postcontrast imaging probably due to the degree of respiratory motion.  2. Hepatic cirrhosis with splenomegaly and abdominal ascites unchanged compared to the CT from one day earlier. Diffuse mesenteric edema and anasarca is also unchanged.  3. Contracted gallbladder with internal stones. No biliary duct dilatation.    CT abdomen pelvis 6/10/20  IMPRESSION:   1.  Ascites, cirrhosis, splenomegaly and collateral vessels.  2.  There are several ill-defined hypodense areas within the liver. Recommend multiphase MRI of the liver to evaluate for possible malignancy.  3.  TIPS stent present with patent portal vein, SMV  and splenic vein.  4.  Small bilateral effusions with bibasilar atelectasis.  5.  Atherosclerotic vascular disease.  6.  No evidence of abscess, free air or obstruction.  7.  Atherosclerotic vascular disease.  8.  Diffuse subcutaneous edema.  9.  Progression of L1 compression fracture.    Xray feet bilateral 3 views 05/21/20                                                                   IMPRESSION: No acute osseous abnormality. No radiographic evidence of osteomyelitis.        CXR 03/25/20  1.  The left arm PICC has its tip in the proximal right atrium, approximately 2 cm distal to the cavoatrial junction.  2.  Hazy opacities in both lung bases are essentially unchanged.  3.  Mild cardiomegaly.    MRI lumbar spine 03/23/20  1.  The left arm PICC has its tip in the proximal right atrium, approximately 2 cm distal to the cavoatrial junction.  2.  Hazy opacities in both lung bases are essentially unchanged.  3.  Mild cardiomegaly.    ECHO 03/23/20    Interpretation Summary    * The left ventricle is normal size.    * The left ventricular systolic function is normal, estimated LVEF 60-65%.    * No regional wall motion abnormalities.    * There is moderate aortic stenosis.    * No pulmonary hypertension, estimated right ventricular systolic pressure  is 30 mmHg.    * Compared to prior study dated 02/01/2020 there has been no change.

## 2021-09-10 NOTE — PROGRESS NOTES
This is a recent snapshot of the patient's [facility] medical record.  For current drug dose and complete information and questions, call [phone] or In Basket pool, fv home infusion ([id number])  CSN Number:  [id number]

## 2021-09-10 NOTE — CONFIDENTIAL NOTE
Attempted to contact Miranda from [facility] regarding a recent call to clinic about multiple issues. Gave clinic number to call back  Sasha Sue RN

## 2021-09-14 NOTE — TELEPHONE ENCOUNTER
Spoke with patients wife Raven regarding appointment.  Raven relayed that Pt is currently admitted and wished to cancel the appointment.  Appt cancelled.

## 2021-09-16 NOTE — CONFIDENTIAL NOTE
Attempted to reach Miranda at [facility], but went to voicemail. Left [facility] letting her know I was calling to follow up on her call to clinic regarding multiple issues for Unruly and requested a call back. After I hung up the phone, I noticed that Unruly has since been in patient getting treated. Attempted to call wife Flores to check and see how Unruly is doing, but reached voicemail. Left vm letting her know I was calling to check in on Unruly and help get them set up with an appointment with PCP. Gave clinic number to call back or schedule.  Sasha Sue RN

## 2021-09-20 NOTE — TELEPHONE ENCOUNTER
Contacted [facility] @ [phone]. Requested all CXR images from the year 2021 be pushed to  PACS for review.      Thaddeus Banerjee LPN  Pulmonary Medicine:  [facility]  Phone: [phone] Fax: [phone]

## 2021-09-21 NOTE — TELEPHONE ENCOUNTER
Prior Authorization Retail Medication Request    Medication/Dose: hydrOXYzine (ATARAX) 25 MG tablet  ICD code (if different than what is on RX):  Venous stasis dermatitis of both lower extremities [I87.2]   Previously Tried and Failed:   Rationale:     Insurance Name:  [employer]  Insurance ID:  [id number]    Pharmacy Information (if different than what is on RX)  Name:  [facility] [address] [place] [address].  Phone:  [phone]

## 2021-09-21 NOTE — TELEPHONE ENCOUNTER
Central Prior Authorization Team   Phone: [phone]      PA Initiation    Medication: hydrOXYzine (ATARAX) 25 MG tablet  Insurance Company: "[employer] Rx - Phone [phone] Fax [phone]  Pharmacy Filling the Rx: [address]  Filling Pharmacy Phone: [phone]  Filling Pharmacy Fax:    Start Date: 9/21/2021

## 2021-09-22 NOTE — PROGRESS NOTES
Patient's spouse aRven called to notify that patient's condition has once again worsened and she wonders about a transfer to the  of [place] for care. He is currently at [facility] and has been inpatient this time since 9/12 for pneumonia related to recent Covid infection, diagnosed 8/31. He was improving but is having significant eye irritation and found to be septic. She is concerned as there is no ophthalmologist to see him while inpatient, and she is afraid he may lose the eye. Discussed the process for transfer would need to start with his [facility] physicians, and may not be likely to approve transfer to  due to extremely tight bed situation at the moment. Raven verbalized understanding and will keep writer informed of any further changes.

## 2021-09-23 NOTE — TELEPHONE ENCOUNTER
Prior Authorization Approval    Authorization Effective Date: 8/22/2021  Authorization Expiration Date: 9/22/2022  Medication: hydrOXYzine (ATARAX) 25 MG tablet  Approved Dose/Quantity: 90 per 30 days  Reference #: [id number]   Insurance Company: [employer] - Phone [phone] Fax [phone]  Expected CoPay:        Which Pharmacy is filling the prescription (Not needed for infusion/clinic administered): [facility] [address] [place] [address]  Pharmacy Notified: No - is an automatic renewal for future fills  Patient Notified: No

## 2021-10-30 NOTE — PROGRESS NOTES
Attempted to reach patient for check in, no answer, message left requesting call back, number provided. Reviewed request per Dr. Simmons to titrate lactulose for improved stools and mental status, her recommendation not to take atarax, and asked for call back to further discuss.     yes yes

## 2021-11-05 ENCOUNTER — MEDICAL CORRESPONDENCE (OUTPATIENT)
Dept: HEALTH INFORMATION MANAGEMENT | Facility: CLINIC | Age: 66
End: 2021-11-05

## 2021-12-13 NOTE — PROGRESS NOTES
This is a recent snapshot of the patient's [facility] medical record.  For current drug dose and complete information and questions, call [phone] or In Basket pool, fv home infusion ([id number])  CSN Number:  [id number]

## 2022-01-24 NOTE — TELEPHONE ENCOUNTER
Central Prior Authorization Team   Phone: [phone]      PA Initiation    Medication: nystatin-triamcinolone (MYCOLOG II) cream-PA initiated  Insurance Company: [employer] - Phone [phone] Fax [phone]  Pharmacy Filling the Rx: [address]  Filling Pharmacy Phone: [phone]  Filling Pharmacy Fax:    Start Date: 11/21/2018        
Central Prior Authorization Team   Phone: [phone]                    
PRIOR AUTHORIZATION DENIED    Medication: nystatin-triamcinolone (MYCOLOG II) cream-PA denied    Denial Date: 11/28/2018    Denial Rational:         Appeal Information:       
(1) obesity (BMI greater than 25)

## 2023-02-02 NOTE — MR AVS SNAPSHOT
After Visit Summary   7/31/2018    Lawrence Louie    MRN: [id number]           Patient Information     Date Of Birth          1955        Visit Information        Provider Department      7/31/2018 11:30 AM Ary Murphy, APRN CNP [facility]        Today's Diagnoses     Lymphedema    -  1    Hypertension goal BP (blood pressure) < 130/80        Portal hypertension (H)        Alcoholic cirrhosis of liver with ascites (H)        Type II diabetes mellitus with peripheral circulatory disorder (H)        Dysuria           Follow-ups after your visit        Your next 10 appointments already scheduled     Oct 26, 2018 11:00 AM CDT   (Arrive by 10:45 AM)   Return General Liver with MD RENETTA Chawla [facility] ([facility])    [address]  [address]  [facility] [address] [phone]              Future tests that were ordered for you today     Open Future Orders        Priority Expected Expires Ordered    UA with Micro reflex to Culture Routine 7/31/2018 7/31/2019 7/31/2018    CBC with platelets Routine 7/31/2018 8/14/2018 7/31/2018    Comprehensive metabolic panel Routine 7/31/2018 8/14/2018 7/31/2018            Who to contact     Please call your clinic at [phone] to:    Ask questions about your health    Make or cancel appointments    Discuss your medicines    Learn about your test results    Speak to your doctor            Additional Information About Your Visit        MyChart Information     [employer] gives you secure access to your electronic health record. If you see a primary care provider, you can also send messages to your care team and make appointments. If you have questions, please call your primary care clinic.  If you do not have a primary care provider, please call [phone] and they will assist you.      [employer] is an electronic gateway that provides easy, online access to your medical  records. With [employer], you can request a clinic appointment, read your test results, renew a prescription or communicate with your care team.     To access your existing account, please contact your [facility] or call [phone] for assistance.        Care EveryWhere ID     This is your Care EveryWhere ID. This could be used by other organizations to access your [place] medical records  [id number]        Your Vitals Were     Pulse Pulse Oximetry BMI (Body Mass Index)             74 98% 36.63 kg/m2          Blood Pressure from Last 3 Encounters:   07/31/18 112/52   06/12/18 123/61   05/24/18 125/62    Weight from Last 3 Encounters:   07/31/18 129.4 kg (285 lb 4.8 oz)   06/29/18 127.5 kg (281 lb)   06/12/18 120.7 kg (266 lb)              We Performed the Following     Ammonia          Today's Medication Changes          These changes are accurate as of 7/31/18 12:23 PM.  If you have any questions, ask your nurse or doctor.               Start taking these medicines.        Dose/Directions    order for DME   Used for:  Lymphedema   Started by:  Ary Murphy APRN CNP        Equipment being ordered:BioTab compression pants pneumatic system   Quantity:  1 Piece   Refills:  0         These medicines have changed or have updated prescriptions.        Dose/Directions    * insulin glargine 100 UNIT/ML injection   Commonly known as:  LANTUS   This may have changed:  Another medication with the same name was removed. Continue taking this medication, and follow the directions you see here.   Changed by:  Ary Murphy APRN CNP        Dose:  30 Units   Inject 30 Units Subcutaneous daily   Refills:  0       * insulin glargine 100 UNIT/ML injection   Commonly known as:  LANTUS   This may have changed:  Another medication with the same name was removed. Continue taking this medication, and follow the directions you see here.   Used for:  Type 2 diabetes mellitus with diabetic  neuropathic arthropathy, without long-term current use of insulin (H)   Changed by:  Ary Murphy APRN CNP        Dose:  30 Units   Inject 30 Units Subcutaneous every morning   Quantity:  12 mL   Refills:  3       oxyCODONE IR 5 MG tablet   Commonly known as:  ROXICODONE   This may have changed:  These instructions start on 8/3/2018. If you are unsure what to do until then, ask your doctor or other care provider.   Used for:  Alcoholic cirrhosis of liver with ascites (H)   Changed by:  Ary Murphy APRN CNP        Start taking on:  8/3/2018   Take 1-2 tablet every 8 hours as needed   Quantity:  180 tablet   Refills:  0       * Notice:  This list has 2 medication(s) that are the same as other medications prescribed for you. Read the directions carefully, and ask your doctor or other care provider to review them with you.      Stop taking these medicines if you haven't already. Please contact your care team if you have questions.     famciclovir 500 MG tablet   Commonly known as:  FAMVIR   Stopped by:  Ary Murphy APRN CNP           ferrous sulfate 325 (65 Fe) MG tablet   Commonly known as:  IRON   Stopped by:  Ary Murphy APRN CNP           gabapentin 300 MG capsule   Commonly known as:  NEURONTIN   Stopped by:  Ary Murphy APRN CNP                Where to get your medicines      Some of these will need a paper prescription and others can be bought over the counter.  Ask your nurse if you have questions.     Bring a paper prescription for each of these medications     order for DME    oxyCODONE IR 5 MG tablet               Information about OPIOIDS     PRESCRIPTION OPIOIDS: WHAT YOU NEED TO KNOW   We gave you an opioid (narcotic) pain medicine. It is important to manage your pain, but opioids are not always the best choice. You should first try all the other options your care team gave you. Take this medicine for as short a time (and as few doses) as possible.     These medicines  have risks:    DO NOT drive when on new or higher doses of pain medicine. These medicines can affect your alertness and reaction times, and you could be arrested for driving under the influence (DUI). If you need to use opioids long-term, talk to your care team about driving.    DO NOT operate heave machinery    DO NOT do any other dangerous activities while taking these medicines.     DO NOT drink any alcohol while taking these medicines.      If the opioid prescribed includes acetaminophen, DO NOT take with any other medicines that contain acetaminophen. Read all labels carefully. Look for the word  acetaminophen  or  Tylenol.  Ask your pharmacist if you have questions or are unsure.    You can get addicted to pain medicines, especially if you have a history of addiction (chemical, alcohol or substance dependence). Talk to your care team about ways to reduce this risk.    Store your pills in a secure place, locked if possible. We will not replace any lost or stolen medicine. If you don t finish your medicine, please throw away (dispose) as directed by your pharmacist. The [place] Pollution Control Agency has more information about safe disposal: https://www.pca.[facility].mn.us/living-green/managing-unwanted-medications.     All opioids tend to cause constipation. Drink plenty of water and eat foods that have a lot of fiber, such as fruits, vegetables, prune juice, apple juice and high-fiber cereal. Take a laxative (Miralax, milk of magnesia, Colace, Senna) if you don t move your bowels at least every other day.          Primary Care Provider Office Phone # Fax #    Ary CORY López [phone]       [address] [place] [address]  [facility] [address]        Equal Access to Services     [facility] Center[facility]MY : Hadmario Del Toro, waaxda lunikko, qaybta silvia atkinson. So [facility] [phone].    ATENCIÓN: Si habla español, tiene a rondon disposición  servicios gratuitos de asistencia lingüística. [facility] [phone].    We comply with applicable federal civil rights laws and [place] laws. We do not discriminate on the basis of race, color, national origin, age, disability, sex, sexual orientation, or gender identity.            Thank you!     Thank you for choosing [facility]  for your care. Our goal is always to provide you with excellent care. Hearing back from our patients is one way we can continue to improve our services. Please take a few minutes to complete the written survey that you may receive in the mail after your visit with us. Thank you!             Your Updated Medication List - Protect others around you: Learn how to safely use, store and throw away your medicines at www.disposemymeds.org.          This list is accurate as of 7/31/18 12:23 PM.  Always use your most recent med list.                   Brand Name Dispense Instructions for use Diagnosis    blood glucose lancets standard    no brand specified    1 Box    Use to test blood sugar 4 X  times daily or as directed.    Diabetes mellitus, type 2 (H)       blood glucose monitoring meter device kit    no brand specified    1 kit    Use to test blood sugar 4 X  times daily or as directed.    Type 2 diabetes mellitus with other specified complication (H)       blood glucose monitoring test strip    no brand specified    200 strip    Use to test blood sugars 4 X  times daily or as directed    Diabetes mellitus, type 2 (H)       Calcium carb-Vitamin D 500 mg [place]-200 units 500-200 MG-UNIT per tablet    OSCAL with D;Oyster Shell Calcium    90 tablet    Take 1 tablet by mouth daily    Alcoholic cirrhosis (H)       calcium carbonate 500 MG tablet    OS-ERIN 500 mg [place]. Ca     Take 1 tablet by mouth daily        cephALEXin 500 MG capsule    KEFLEX          citalopram 40 MG tablet    celeXA    30 tablet    Take 1 tablet (40 mg) by mouth daily    Recurrent major depressive  disorder, remission status unspecified (H)       desvenlafaxine succinate 100 MG 24 hr tablet    PRISTIQ    180 tablet    Take 2 tablets (200 mg) by mouth daily    Other depression       doxycycline 100 MG capsule    VIBRAMYCIN     Take 100 mg by mouth 2 times daily        furosemide 40 MG tablet    LASIX    540 tablet    Take 3 tablets twice a day.    Edema of both legs       glipiZIDE 5 MG 24 hr tablet    GLUCOTROL XL    180 tablet    Take 1 tablet (5 mg) by mouth daily    Type 2 diabetes mellitus without complication, with long-term current use of insulin (H)       insulin aspart 100 UNIT/ML injection    NovoLOG FLEXPEN    30 mL    20 units before breakfast, 20 units before lunch, 20 units before dinner, 20 units before snacks    Type 2 diabetes mellitus without complication, with long-term current use of insulin (H)       * insulin glargine 100 UNIT/ML injection    LANTUS     Inject 30 Units Subcutaneous daily        * insulin glargine 100 UNIT/ML injection    LANTUS    12 mL    Inject 30 Units Subcutaneous every morning    Type 2 diabetes mellitus with diabetic neuropathic arthropathy, without long-term current use of insulin (H)       insulin pen needle 31G X 8 MM    B-D U/F    300 each    USE  6 times daily / OR AS DIRECTED    Type 2 diabetes, HbA1c goal < 7% (H)       lactulose 10 GM/15ML solution    CHRONULAC     Take 10 g by mouth        magnesium oxide 400 (241.3 Mg) MG tablet    MAG-OX    90 tablet    Take 1 tablet (400 mg) by mouth daily    Cramp of limb       ondansetron 4 MG tablet    ZOFRAN    18 tablet    Take 1 tablet (4 mg) by mouth every 8 hours as needed for nausea    Alcoholic cirrhosis of liver with ascites (H), Nausea       order for DME     2 pump    Equipment being ordered:bilateral pneumatic leg massage for treatment of extreme bilateral lower leg edema.    Localized edema       order for DME     1 Piece    Equipment being ordered:BioTab compression pants pneumatic system    Lymphedema        oxyCODONE IR 5 MG tablet   Start taking on:  8/3/2018    ROXICODONE    180 tablet    Take 1-2 tablet every 8 hours as needed    Alcoholic cirrhosis of liver with ascites (H)       ranitidine 150 MG tablet    ZANTAC    180 tablet    Take 1 tablet (150 mg) by mouth 2 times daily    Esophageal varices (H)       rifaximin 550 MG Tabs tablet    XIFAXAN    60 tablet    Take 1 tablet (550 mg) by mouth 2 times daily    Hepatic encephalopathy (H)       spironolactone 50 MG tablet    ALDACTONE    150 tablet    Takes 3 tablets (150 mg) every AM and take 2 tablets (100 mg) every PM.    Portal hypertension (H), Generalized edema       STATIN NOT PRESCRIBED (INTENTIONAL)     0 each    1 each daily Statin not prescribed intentionally due to Active liver disease    Hyperlipidemia LDL goal <100       UNABLE TO FIND      MEDICATION NAME: Burdock root        VITAMIN B-12 PO      Take 1 tablet by mouth daily        vitamin D3 2000 units Caps     90 capsule    Take 1 capsule by mouth daily    Mild major depression (H)       * Notice:  This list has 2 medication(s) that are the same as other medications prescribed for you. Read the directions carefully, and ask your doctor or other care provider to review them with you.       Coagulation

## 2023-03-07 NOTE — PROGRESS NOTES
"Attempted to reach patient/wife Raven for check in, no answer, message left requesting call back, number provided.  Patient due for follow up in clinic with Dr. Simmons.  Patient's wife Raven returned call. She states patient is doing well since hospitalization, where they diuresed him and got \"tons of fluid off.\" She requests he be seen on a Tuesday for follow up, and will be seeing his PCP as well. Scheduled with Yovany Lopez PA-C on 2/18 at 11:15.  " PT Name: Alejandro Farrar  MR #: [id number]     Documentation Clarification      CDS/: Jessica Mendoza RN              Contact information:[email or web address]    This form is a permanent document in the medical record.     Query Date: March 7, 2023    By submitting this query, we are merely seeking further clarification of documentation. Please utilize your independent clinical judgment when addressing the question(s) below.    The Medical Record reflects the following:    Clinical Findings Location in Medical Records   Sepsis  - unresponsive, hypothermic, leukocytosis, lactic acidosis, elevated procal  - source not clear based on current workup  - Bcx obtained, follow up final results when available  - jarno cover empirically for now, prefer vanc/zosyn over cefepime use in setting of possible seizure; would try and limit duration to 24-48 hours in setting of concurrent FLORENTINO     Critical care medicine note 2-12   Encephalopathy likely multifactorial with potential stroke, seizure, hypertension, sepsis, and toxins all potential contributors.    - Blood cultures show 1/4 bottles with GPCs; coag-negative staph, likely contaminant. Hold further antibiotic therapy.    Temp: [91.6 °F -96.6 °F]    Pulse: []   Resp: [21-36]     WBC=23.42-13.24-11.71  Lactate=2.5   Discharge summary                H&P        Lab 2-13 to 2-14  Lab 2-12     Due to the conflicting clinical picture, please clinically validate the diagnosis of Sepsis.    If validated, please provide additional clinical support for the diagnosis.       [ X ] Above stated diagnosis has been ruled out at time of discharge     [   ] Above stated diagnosis is not confirmed and/or it has been ruled out, other diagnosis ruled in (please specify):_______________     [   ] Diagnosis is confirmed. Please specify clinical support (signs & symptoms) for the confirmed diagnosis: ____________________     [   ] Other clarification (please specify):  ___________________       Form No. [id number]

## 2024-01-10 NOTE — PROGRESS NOTES
Dr. Trevizo,    Please approve the following wound care orders. Patient was encouraged to see a Dermatology for continued blistering and ulcerations to BLE's.     Wound care bilateral lower extremities.    1. Cleanse wounds betadine and gauze.  2. Apply Mesalt to wound beds.   3. Cover with absorbant pads and kerlix.   4. Continue compression wraps.    Ok for Patient/Care giver to complete after education.    Nutrition Assessment   Reason for Consult/Assessment: Reassessment            Diet Order: Oral nutrition supplement/snacks, 2gm sodium (low sodium), Fluid restriction         Nutrition supplement/snacks: Standard oral supplement daily      Diet tolerance: Tolerating with good appetite/intakes recorded (%  per I&O)   Food Allergies: None known    Demographic/Anthropometrics Information  Gender: female  Patient Age: 57 year old  Height:   Ht Readings from Last 1 Encounters:   01/02/24 4' 11\" (1.499 m)      Weight:   Wt Readings from Last 1 Encounters:   01/10/24 68.9 kg      BMI:   BMI Readings from Last 1 Encounters:   01/10/24 30.70 kg/m²       Estimated Needs:  Calculated Energy Needs: 7080-3244  kcal           Calculated protein needs: 64-86  g  Calculated Fluid Needs: Per Provider              TREATMENT PLAN: Monitoring & Interventions   Intervention: Meals and snacks, Nutrition education, Nutrition supplement therapy        Meals & snacks: Encourage adequate intake at meals.                                                          Nutrition supplement therapy: Will continue Ensure Plus HP strawberry oral supplement daily as patient desires.    Nutrition education: Pt received Nutrition Therapy for Heart Failure 1/5    Goal: Increase oral intake to >/equal 50% of meals and supplements     Intervention goal status: Some progress toward goal (Eating well at meals per I&O.  Pt reports drinking Ensure.)      Dietitian will monitor: Food, beverage, and nutrient intake, Knowledge, beliefs, attitudes            Nutrition Diagnosis / PES  Nutrition Diagnosis: Inadequate oral intake  Related to:  (Poor appetite)   As evidenced by: Documented/reported poor oral intake

## 2024-02-18 NOTE — NURSING NOTE
Chief Complaint   Patient presents with     RECHECK     Alcoholic cirrhosis of liver with ascites     /68  Pulse 78  Temp 98.4  F (36.9  C) (Oral)  Wt 126 kg (277 lb 12.8 oz)  SpO2 99%  BMI 35.67 kg/m2  Dottie Bolivar MA    
Met with patient and his wife along with Dr. Simmons. Hasn't needed a paracentesis in several months, and weight is down significantly. Lower extremity lymphedema persists, however wife states he recently began using compression apparel that has already helped. He has areas that open and drain, and he complains of itching unrelieved with current measures. He has also needed to start taking lactulose due to periods of confusion, and reports only 1 bowel movement daily. Appetite has been poor at times, and he continues to work at maintaining a low sodium diet. Recommended he return to lymphedema clinic to discuss management of itching and open areas. Per Dr Simmons, will increase lactulose as needed for goal of 3-5 bowel movements daily. She made no changes to his diuretics, and he continues to take rifaximin. Patient will schedule abdominal ultrasound and EGD/colonoscopy. Provided this writers contact information again, and will touch base in a couple weeks for check in.   
minimum assist (75% patients effort)

## 2024-03-04 NOTE — PHARMACY-VANCOMYCIN DOSING SERVICE
[address], [place] [address]  Tel.  [phone]    Fax. [phone]     [address], [place] [address]  Tel.  [phone]    Fax. [phone] [address] [place] [address]  Tel.  [phone]    Fax. [phone]       Dimitri Cabrales (: 1982) is a 42 y.o. male, established patient, seen for positive airway pressure follow-up and evaluation of the following chief complaint(s):   Sleep Apnea (PAP follow up; having daytime sleepiness problem)       Dimitri Cabrales was last seen by Ms. Monroy on 23, prior notes reviewed in detail.  Home sleep test 2017 showed AHI of 37.5/hr with a lowest SpO2 of 72%, duration of SpO2 < 88% 55.4 min. Weight at time of sleep testing 295 pounds.     Subsequent BiPAP titration 2018 after continued daytime sleepiness with APAP showed adequate treatment at a pressure of 14/5 cmH2O.  Weight at time of sleep testing 306 pounds.     Negative MSLT 2019 showed Avg sleep latency of 16:12 minutes and no sleep onset REM periods.     ASSESSMENT/PLAN:   Diagnosis Orders   1. Hypersomnia with sleep apnea  Armodafinil (NUVIGIL) 150 MG TABS tablet      2. Anxiety and depression        3. Primary hypertension        4. BMI 50.0-59.9, adult (HCC)        5. Narcolepsy without cataplexy  POLYSOMNOGRAPHY (MSLT)    12-Drug Screen W/Conf, Urine    SLEEP LAB (PAP TITRATION)          On ResMed:  AirCurve 10 VAuto:    Settings:  Mode - VAuto    Max IPAP: 15 cmH2O    Min EPAP: 9 cmH2O      Sleep Apnea -   * He is adherent with PAP therapy and PAP continues to benefit patient and remains necessary for control of his sleep apnea.  Continue on current pressures    Orders Placed This Encounter    12-Drug Screen W/Conf, Urine     Standing Status:   Future     Standing Expiration Date:   3/4/2025    POLYSOMNOGRAPHY (MSLT)     Standing Status:   Future     Standing Expiration Date:   3/4/2025    SLEEP LAB (PAP TITRATION)      Pharmacy Vancomycin Initial Note  Date of Service 2019  Patient's  1955  63 year old, male    Indication: Skin and Soft Tissue Infection    Current estimated CrCl = Estimated Creatinine Clearance: 149.3 mL/min (based on SCr of 0.73 mg/dL).    Creatinine for last 3 days  2019:  6:15 PM Creatinine 0.73 mg/dL    Recent Vancomycin Level(s) for last 3 days  No results found for requested labs within last 72 hours.      Vancomycin IV Administrations (past 72 hours)                   vancomycin (VANCOCIN) 2,500 mg in sodium chloride 0.9 % 500 mL intermittent infusion (mg) 2,500 mg New Bag 19 2140                Nephrotoxins and other renal medications (From now, onward)    Start     Dose/Rate Route Frequency Ordered Stop    19 1000  vancomycin (VANCOCIN) 2,000 mg in sodium chloride 0.9 % 500 mL intermittent infusion      2,000 mg  over 2 Hours Intravenous EVERY 12 HOURS 19 2230      19 0200  piperacillin-tazobactam (ZOSYN) 3.375 g vial to attach to  mL bag     Note to Pharmacy:  Pharmacy can adjust dose based on renal function.    3.375 g  over 30 Minutes Intravenous EVERY 6 HOURS 19 22119 221  ibuprofen (ADVIL/MOTRIN) tablet 600 mg      600 mg Oral EVERY 6 HOURS PRN 19 191  vancomycin (VANCOCIN) 2,500 mg in sodium chloride 0.9 % 500 mL intermittent infusion      2,500 mg  over 2 Hours Intravenous ONCE 19 1913            Contrast Orders - past 72 hours (72h ago, onward)    None                Plan:  1.  Loading dose 2500mg in ED then Start vancomycin  2000 mg IV q12h.   2.  Goal Trough Level: 10-15 mg/L   3.  Pharmacy will check trough levels as appropriate in 1-3 Days.    4. Serum creatinine levels will be ordered daily for the first week of therapy and at least twice weekly for subsequent weeks.    5. Halfway method utilized to dose vancomycin therapy: Method 1    Terrence Martinez

## 2024-05-19 NOTE — PROCEDURES
Hospitalist Procedure Service - Paracentesis Procedure Note  Date of Service: 11/05/2017    Patient: Lawrence Louie  MRN: [id number]  Admission Date: 11/4/2017  Hospital Day # 0    Attending: Duncan  Resident: none  Procedure: Diagnostic paracentesis  Indication: Weakness, fatigue, concern for infection  Pre-procedure diagnosis: SMITH cirrhosis  Post-procedure diagnosis: same    The risks and benefits of the procedure were explained to the patient who expressed understanding and opted to proceed.  Consent was obtained and placed in the chart.  A time out was performed.  An area of ascites was located and marked in the left lower quadrant; the area was prepped and draped in the usual sterile fashion.  5 ml of 1% lidocaine was instilled but ascites could not be located. This was attempted again under direct ultrasound guidance, but again I was unable to obtain fluid due to the thickness of the abdominal wall. I then opted to advance the needle/catheter apparatus and blood tinged fluid was obtained. However, after the needle was inserted, I was unable to get flow of fluid. I again repeated the procedure with another kit and needle/catheter apparatus and was finally able to obtain enough fluid for sample. The fluid continued to be blood tinged. Approximately 20 ml of ascites was removed and was sent for analysis.   Patient tolerated the procedure well with no immediate complications.  I did apply dermabond to prevent leaking.  Given the difficulty of the procedure and the bloody color of the fluid, I will trend the hemoglobin carefully.     Anival Song MD  Med-Peds Hospitalist  Pager [phone]         Yes

## 2024-08-27 NOTE — IP AVS SNAPSHOT
MRN:[id number]                      After Visit Summary   10/1/2020    Lawrence Louie    MRN: [id number]           Visit Information        Department      10/1/2020  9:40 AM [facility] [address] [place]          Review of your medicines      START taking       Dose / Directions   prochlorperazine 10 MG tablet  Commonly known as: COMPAZINE  Used for: HCC (hepatocellular carcinoma) (H), Post-operative state      Dose: 10 mg  Take 1 tablet (10 mg) by mouth every 6 hours as needed for nausea or vomiting (take if zofran does not relieve nausea)  Quantity: 20 tablet  Refills: 0        CONTINUE these medicines which may have CHANGED, or have new prescriptions. If we are uncertain of the size of tablets/capsules you have at home, strength may be listed as something that might have changed.       Dose / Directions   * ondansetron 4 MG tablet  Commonly known as: ZOFRAN  This may have changed: Another medication with the same name was added. Make sure you understand how and when to take each.  Used for: Alcoholic cirrhosis of liver with ascites (H), Nausea      Dose: 4 mg  Take 1 tablet (4 mg) by mouth every 8 hours as needed for nausea  Quantity: 30 tablet  Refills: 1     * ondansetron 4 MG tablet  Commonly known as: ZOFRAN  This may have changed: You were already taking a medication with the same name, and this prescription was added. Make sure you understand how and when to take each.  Used for: HCC (hepatocellular carcinoma) (H), Post-operative state      Dose: 4 mg  Take 1 tablet (4 mg) by mouth every 8 hours as needed for nausea  Quantity: 20 tablet  Refills: 0     * oxyCODONE 5 MG tablet  Commonly known as: ROXICODONE  This may have changed: Another medication with the same name was added. Make sure you understand how and when to take each.  Used for: Alcoholic cirrhosis of liver with ascites (H)      TAKE 1-2 TABLETS BY MOUTH EVERY 8 HOURS AS NNEDED FOR SEVERE PAIN  Quantity: 180  36.5 tablet  Refills: 0     * oxyCODONE 5 MG tablet  Commonly known as: ROXICODONE  This may have changed: You were already taking a medication with the same name, and this prescription was added. Make sure you understand how and when to take each.  Used for: HCC (hepatocellular carcinoma) (H), Post-operative state      Dose: 5 mg  Take 1 tablet (5 mg) by mouth every 6 hours as needed for pain  Quantity: 20 tablet  Refills: 0         * This list has 4 medication(s) that are the same as other medications prescribed for you. Read the directions carefully, and ask your doctor or other care provider to review them with you.            CONTINUE these medicines which have NOT CHANGED       Dose / Directions   * ULTICARE SHORT 31G X 8 MM miscellaneous  Used for: Type 2 diabetes, HbA1c goal < 7% (H)  Generic drug: insulin pen needle      Use UP to 6 times daily or as directed  Quantity: 300 each  Refills: 3     * B-D U/F 31G X 8 MM miscellaneous  Used for: Type 2 diabetes, HbA1c goal < 7% (H)  Generic drug: insulin pen needle      USE  6 times daily / OR AS DIRECTED  Quantity: 300 each  Refills: 3     blood glucose lancets standard  Commonly known as: NO BRAND SPECIFIED  Used for: Diabetes mellitus, type 2 (H)      Use to test blood sugar 4 X  times daily or as directed.  Quantity: 1 Box  Refills: 3     * blood glucose monitoring meter device kit  Commonly known as: NO BRAND SPECIFIED  Used for: Type 2 diabetes mellitus with other specified complication (H)      Use to test blood sugar 4 X  times daily or as directed.  Quantity: 1 kit  Refills: 0     * blood glucose monitoring meter device kit  Commonly known as: NO BRAND SPECIFIED  Used for: Type 2 diabetes, HbA1c goal < 7% (H)      Use to test blood sugar 4 times daily or as directed. Before meals and snack at HS.  Quantity: 1 kit  Refills: 1     * blood glucose test strip  Commonly known as: NO BRAND SPECIFIED  Used for: Type 2 diabetes, HbA1c goal < 7% (H)      Use to test  blood sugar 4 times daily or as directed.  Quantity: 200 strip  Refills: 3     * blood glucose test strip  Commonly known as: NO BRAND SPECIFIED  Used for: Diabetes mellitus, type 2 (H)      Use to test blood sugars 4 X  times daily or as directed  Quantity: 400 strip  Refills: 3     calcium carbonate-vitamin D 500-200 MG-UNIT tablet  Commonly known as: OSCAL w/D  Used for: Alcoholic cirrhosis (H)      Dose: 1 tablet  Take 1 tablet by mouth daily  Quantity: 90 tablet  Refills: 3     childrens multivitamin w/iron 60 MG chewable tablet      Dose: 1 chew tab  Take 1 chew tab by mouth daily  Refills: 0     ciprofloxacin 500 MG tablet  Commonly known as: CIPRO      Refills: 0     citalopram 20 MG tablet  Commonly known as: celeXA  Used for: Recurrent major depressive disorder, remission status unspecified (H)      Dose: 20 mg  Take 1 tablet (20 mg) by mouth daily  Quantity: 90 tablet  Refills: 1     desvenlafaxine 100 MG 24 hr tablet  Commonly known as: PRISTIQ  Used for: Other depression      Dose: 200 mg  Take 2 tablets (200 mg) by mouth daily  Quantity: 180 tablet  Refills: 1     EQL Vitamin D3 50 MCG (2000 UT) Caps  Used for: Mild major depression (H)  Generic drug: cholecalciferol      Take 1 capsule by mouth daily  Quantity: 90 capsule  Refills: 2     famotidine 40 MG tablet  Commonly known as: PEPCID  Used for: Gastroesophageal reflux disease with esophagitis      Dose: 40 mg  Take 1 tablet (40 mg) by mouth daily  Quantity: 30 tablet  Refills: 11     furosemide 40 MG tablet  Commonly known as: LASIX  Used for: Edema of both legs      Take 40 mg twice daily.  Quantity: 180 tablet  Refills: 3     gabapentin 300 MG capsule  Commonly known as: NEURONTIN  Used for: Diabetic polyneuropathy associated with type 2 diabetes mellitus (H)      Dose: 300 mg  Take 1 capsule (300 mg) by mouth At Bedtime  Quantity: 90 capsule  Refills: 1     hydrOXYzine 25 MG tablet  Commonly known as: ATARAX  Used for: Venous stasis dermatitis  of both lower extremities      Dose: 25 mg  Take 1 tablet (25 mg) by mouth 3 times daily as needed for itching  Quantity: 90 tablet  Refills: 3     insulin glargine 100 UNIT/ML pen  Commonly known as: Lantus SoloStar  Used for: Type 2 diabetes mellitus with diabetic neuropathic arthropathy, without long-term current use of insulin (H)      5 units daily in am. Increase by 5 units 2x weekly until fasting sugars are <130. Max daily dose 100 units.  Quantity: 15 mL  Refills: 11     lactulose 20 GM packet  Commonly known as: CEPHULAC  Used for: Hepatic encephalopathy (H)      Dose: 20 g  Take 1 packet (20 g) by mouth 2 times daily  Quantity: 60 packet  Refills: 0     lactulose encephalopathy 10 GM/15ML SOLUTION  Commonly known as: CHRONULAC  Used for: Hepatic encephalopathy (H)      TAKE 30 MLS BY MOUTH 2 TIMES DAILY  TITRATE AS NEEDED TO ACHIEVE 3-5 BOWEL MOVEMENTS DAILY.  Quantity: 1892 mL  Refills: 11     magnesium oxide 400 (241.3 Mg) MG tablet  Commonly known as: MAG-OX  Used for: Cramp of limb      Dose: 400 mg  Take 1 tablet (400 mg) by mouth daily  Quantity: 90 tablet  Refills: 1     NovoLOG FLEXPEN 100 UNIT/ML pen  Used for: Type 2 diabetes mellitus with diabetic neuropathic arthropathy, without long-term current use of insulin (H)  Generic drug: insulin aspart      1 unit per 10 grams of carbohydrates + 1 per 50>140. Max daily dose 100 units.  Quantity: 30 mL  Refills: 11     nystatin 573990 UNIT/GM external cream  Commonly known as: MYCOSTATIN  Used for: Venous stasis dermatitis of both lower extremities      Apply topically 2 times daily  Quantity: 30 g  Refills: 11     order for DME  Used for: Localized edema      Equipment being ordered:bilateral pneumatic leg massage for treatment of extreme bilateral lower leg edema.  Quantity: 2 pump  Refills: 0     order for DME  Used for: Lymphedema      Equipment being ordered:BioTab compression pants pneumatic system  Quantity: 1 Piece  Refills: 0     order for  DME  Used for: Sleep disorder      Equipment being ordered: Condom catheter  Quantity: 1 Device  Refills: 3     order for DME  Used for: Pain in both feet      Equipment being ordered:Orthopedic shoes  Quantity: 2 Units  Refills: 0     QUEtiapine 50 MG tablet  Commonly known as: SEROquel      Dose: 50 mg  Take 50 mg by mouth At Bedtime  Refills: 0     rifaximin 550 MG Tabs tablet  Commonly known as: Xifaxan  Used for: Hepatic encephalopathy (H)      Dose: 550 mg  Take 1 tablet (550 mg) by mouth 2 times daily  Quantity: 60 tablet  Refills: 11     spironolactone 50 MG tablet  Commonly known as: ALDACTONE  Used for: Portal hypertension (H), Generalized edema, Alcoholic cirrhosis of liver with ascites (H)      Dose: 100 mg  Take 2 tablets (100 mg) by mouth daily  Quantity: 180 tablet  Refills: 3     sulfamethoxazole-trimethoprim 800-160 MG tablet  Commonly known as: Bactrim DS  Used for: Recurrent cellulitis      Dose: 2 tablet  Take 2 tablets by mouth 2 times daily  Quantity: 56 tablet  Refills: 0     UNABLE TO FIND      MEDICATION NAME: [place] root  Refills: 0     ursodiol 300 MG capsule  Commonly known as: ACTIGALL  Used for: Alcoholic cirrhosis of liver with ascites (H), Itching      Dose: 900 mg  Take 3 capsules (900 mg) by mouth 2 times daily  Quantity: 180 capsule  Refills: 11     VITAMIN B-12 PO      Dose: 1 tablet  Take 1 tablet by mouth daily  Refills: 0         * This list has 6 medication(s) that are the same as other medications prescribed for you. Read the directions carefully, and ask your doctor or other care provider to review them with you.               Where to get your medicines      Some of these will need a paper prescription and others can be bought over the counter. Ask your nurse if you have questions.    Bring a paper prescription for each of these medications  ondansetron 4 MG tablet  oxyCODONE 5 MG tablet  prochlorperazine 10 MG tablet           Prescriptions were sent or printed at these  locations (3 Prescriptions)            [facility] - [place], [place] - [address]   [address], [facility] [address]    Telephone: [phone]   Fax: [phone]   Hours:                 Printed at Department/Unit printer (3 of 3)         ondansetron (ZOFRAN) 4 MG tablet               oxyCODONE (ROXICODONE) 5 MG tablet               prochlorperazine (COMPAZINE) 10 MG tablet                Protect others around you: Learn how to safely use, store and throw away your medicines at www.disposemymeds.org.       Follow-ups after your visit       Your next 10 appointments already scheduled    Nov 09, 2020  9:15 AM  MR ABDOMEN W/O & W CONTRAST with MGMR1  [facility] ([facility]) [address] [place] [id number] [phone]   How do I prepare for my exam? (Food and drink instructions)  Do not eat or drink for 6 hours prior to exam.  **If you will be receiving sedation or general anesthesia, please see special notes below.**    How do I prepare for my exam? (Other instructions)  Take your medicines as usual, unless your doctor tells you not to.  You may or may not receive IV contrast for this exam pending the discretion of the Radiologist.    **If you will be receiving sedation or general anesthesia, please see special notes below.**    What should I wear: The MRI machine uses a strong magnet. Due to increased risk of metallic materials/threading in clothing, for your safety, you will be requested to change into a hospital gown. Please remove any body piercings and hair extensions before you arrive. You will also remove watches, jewelry, hairpins, wallets, dentures, partial dental plates and hearing aids. You may wear contact lenses, and you may be able to wear your rings. We have a safe place to keep your personal items, but it is safer to leave them at home.    How long does the exam take: Most tests take 30 to 60 minutes.   HOWEVER, IF YOUR DOCTOR PRESCRIBES ANESTHESIA please plan on spending four to five hours in the recovery room.    What should I bring: Bring a list of your current medicines to your exam (including vitamins, minerals and over-the-counter drugs). Also bring the results of similar scans you may have had.  If you are a minor (under age 18) you will need to bring a parent or legal guardian with you to the exam.    Do I need a : **If you will be receiving sedation or general anesthesia, please see special notes below.**    What should I do after the exam: No restrictions, you may resume normal activities.    What is this test: MRI (magnetic resonance imaging) uses a strong magnet and radio waves to look inside the body. An MRA (magnetic resonance angiogram) does the same thing, but it lets us look at your blood vessels. A computer turns the radio waves into pictures showing cross sections of the body, much like slices of bread. This helps us see any problems more clearly. You may receive fluid (called  contrast ) before or during your scan. The fluid helps us see the pictures better. We give the fluid through an IV (small needle in your arm).    Who should I call with questions: If you have any questions, please contact your Imaging Department exam site. Directions, parking instructions, and other information are available on our website, [email or web address].    How do I prepare if I m having sedation or anesthesia?  **IMPORTANT**  THE INSTRUCTIONS BELOW ARE ONLY FOR THOSE PATIENTS WHO HAVE BEEN TOLD THEY WILL RECEIVE SEDATION OR GENERAL ANESTHESIA DURING THEIR MRI PROCEDURE:    IF YOU WILL RECEIVE ORAL SEDATION (take medicine by mouth to help you relax during your exam):  You must get the medicine from your doctor before you arrive. Bring the medicine to the exam. Do not take it at home.  Arrive one hour early with a . Your  must remain on site for the duration of the exam. Your medicine may make you  sleepy. After the exam, you may not drive, take a bus or take a taxi by yourself.    IF YOU WILL RECEIVE SEDATION WITH AN IV OR ANESTHESIA (deeper level of sedation ordered and administered by a health professional):  Arrive 1 1/2 hours early with a . Your  must remain on site for the duration of the exam. Your medicine may make you sleepy. After the exam, you may not drive, take a bus or take a taxi by yourself.  No eating 8 hours before your exam. You may have clear liquids up until 4 hours before your exam. (Clear liquids include water, clear tea, black coffee and fruit juice without pulp.)  You may spend up to four to five hours in the recovery room.     Nov 09, 2020 10:00 AM  LAB with LAB ONC [facility] ([facility]) [address] [place] [id number] [phone]   Please do not eat 10-12 hours before your appointment if you are coming in fasting for labs on lipids, cholesterol, or glucose (sugar). Does not apply to pregnant women. Water, tea and black coffee (with nothing added) is okay. Do not drink other fluids, diet soda or gum. If you have concerns about taking your medications, please send a message by clicking on Secure Messaging, Message Your Care Team.     Nov 10, 2020  1:00 PM  (Arrive by 12:45 PM)  Video Visit with Yady Hilton MD  [facility] ([facility]) [address] [place] [id number] [phone]   [facility]  Note: this is not an onsite visit; there is no need to come to the facility.  Please have a list of all current medications available for appointment.      Feb 22, 2021  2:30 PM  (Arrive by 2:15 PM)  Video Visit with Elina Partida MD  [facility]) [address] [place] [address]  3rd [facility] [id number] [phone]     [facility] [place]  Note: this is not an onsite visit; there is no need to come to the facility.  Please have a list of all current medications available for appointment.         Care Instructions       Further instructions from your care team       [facility]   Interventional Radiology  Discharge Instructions Post TACE     AFTER YOU GO HOME          Do relax and take it easy for 24 hours.       Do drink plenty of fluids.       Do resume your regular diet, unless otherwise instructed by your Primary Physician.       DO NOT smoke for at least 24 hours, if you were given any sedation.       DO NOT drink alcoholic beverages the day of your procedure.       Do not drive or operate machinery at home or at work for 24 hours.          DO NOT do any strenuous exercise or lifting for at least 2 days following your Procedure.       DO NOT take a shower for at least 12 hours following your procedure.       DO NOT make any important or legal decisions for 24 hours following your procedure.    CALL THE PHYSICIAN IF:      - You start bleeding from the procedure site.  If you do start to bleed from the site, lie down flat and hold pressure on the site. A small lump or bruise is common at the puncture site.Your physician will tell you if you need to return to the hospital.      - You develop numbness, coolness or a change in color of the arm or leg that was punctured.      - You experience increased pain or redness at the puncture site.      - You develop hives or a rash or unexplained itching.      - You develop a temperature of 101 degrees F or greater  Additional Information:           Support the puncture site for coughing, sneezing, or moving your bowels for the first 48 hours  No tub bath, hot tubs, or swimming for 5 days  No lotion or powder to the puncture site for 3 days      Instructions for closure device: Mynx, see pamphlet           [facility] INTERVENTIONAL RADIOLOGY  DEPARTMENT         Procedure Physicians: Dr Kingston, Dr Hilton                                      Date of Procedure: October 1, 2020       Telephone Numbers:   [phone]......Monday-Friday 8:00 to 4:30 pm                                        [phone].....After 4:30 pm Monday-Friday, Weekends and  Holidays. Ask for the Interventional Radiologist on call. Someone is on call 24 hrs/day.                Information about OPIOIDS    PRESCRIPTION OPIOIDS: WHAT YOU NEED TO KNOW   We gave you an opioid (narcotic) pain medicine. It is important to manage your pain, but opioids are not always the best choice. You should first try all the other options your care team gave you. Take this medicine for as short a time (and as few doses) as possible.    Some activities can increase your pain, such as bandage changes or therapy sessions. It may help to take your pain medicine 30 to 60 minutes before these activities. Reduce your stress by getting enough sleep, working on hobbies you enjoy and practicing relaxation or meditation. Talk to your care team about ways to manage your pain beyond prescription opioids.    These medicines have risks:    DO NOT drive when on new or higher doses of pain medicine. These medicines can affect your alertness and reaction times, and you could be arrested for driving under the influence (DUI). If you need to use opioids long-term, talk to your care team about driving.    DO NOT operate heavy machinery    DO NOT do any other dangerous activities while taking these medicines.    DO NOT drink any alcohol while taking these medicines.     If the opioid prescribed includes acetaminophen, DO NOT take with any other medicines that contain acetaminophen. Read all labels carefully. Look for the word  acetaminophen  or  Tylenol.  Ask your pharmacist if you have questions or are unsure.    You can get addicted to pain medicines, especially if you have a history of addiction (chemical, alcohol  or substance dependence). Talk to your care team about ways to reduce this risk.    All opioids tend to cause constipation. Drink plenty of water and eat foods that have a lot of fiber, such as fruits, vegetables, prune juice, apple juice and high-fiber cereal. Take a laxative (Miralax, milk of magnesia, Colace, Senna) if you don t move your bowels at least every other day. Other side effects include upset stomach, sleepiness, dizziness, throwing up, tolerance (needing more of the medicine to have the same effect), physical dependence and slowed breathing.    Store your pills in a secure place, locked if possible. We will not replace any lost or stolen medicine. If you don t finish your medicine, please throw away (dispose) as directed by your pharmacist. Medication waste collection kiosks are conveniently located at all [facility] retail pharmacy locations.  The [place] Pollution Control Agency has more information about safe disposal: [email or web address]-[email or web address]       Additional Information About Your Visit       [facility] Information    [employer] gives you secure access to your electronic health record. If you see a primary care provider, you can also send messages to your care team and make appointments. If you have questions, please call your primary care clinic.  If you do not have a primary care provider, please call [phone] and they will assist you.       Care EveryWhere ID    This is your Care EveryWhere ID. This could be used by other organizations to access your [facility] medical records  [id number]       Your Vitals Were  Most recent update: 10/1/2020  5:17 PM    Blood Pressure   119/57          Pulse   77          Temperature   97.9  F (36.6  C) (Oral)          Respirations   16          Height   1.829 m (6')             Weight   106.1 kg (234 lb)    Pulse Oximetry   92%    BMI (Body Mass Index)   31.74 kg/m           Primary Care Provider  Office Phone # Fax #    Ary Murphy, CORY [phone]      Equal Access to Services    JOSEF LORENZO : Hadii aad ku hadgardenialinda Soanthony, wateganda luqadaha, luis eduardota kabernadetteda noel, silvia galarza laKellymiguel salas. So [facility] [phone].    ATENCIÓN: Si habla español, tiene a rondon disposición servicios gratuitos de asistencia lingüística. Llame al [phone].    We comply with applicable federal and state civil rights laws, including the [place] Human Rights Act. We do not discriminate on the basis of race, color, creed, Anabaptism, national origin, marital status, age, disability, sex, sexual orientation, or gender identity.       Thank you!    Thank you for choosing [facility] for your care. Our goal is always to provide you with excellent care. Hearing back from our patients is one way we can continue to improve our services. Please take a few minutes to complete the written survey that you may receive in the mail after you visit with us. Thank you!            Medication List      Medications          Morning Afternoon Evening Bedtime As Needed    * ULTICARE SHORT 31G X 8 MM miscellaneous  INSTRUCTIONS: Use UP to 6 times daily or as directed  Generic drug: insulin pen needle                     * B-D U/F 31G X 8 MM miscellaneous  INSTRUCTIONS: USE  6 times daily / OR AS DIRECTED  Generic drug: insulin pen needle                     blood glucose lancets standard  Also known as: NO BRAND SPECIFIED  INSTRUCTIONS: Use to test blood sugar 4 X  times daily or as directed.                     * blood glucose monitoring meter device kit  Also known as: NO BRAND SPECIFIED  INSTRUCTIONS: Use to test blood sugar 4 X  times daily or as directed.                     * blood glucose monitoring meter device kit  Also known as: NO BRAND SPECIFIED  INSTRUCTIONS: Use to test blood sugar 4 times daily or as directed. Before meals and snack at HS.                     * blood glucose test strip  Also known  as: NO BRAND SPECIFIED  INSTRUCTIONS: Use to test blood sugar 4 times daily or as directed.                     * blood glucose test strip  Also known as: NO BRAND SPECIFIED  INSTRUCTIONS: Use to test blood sugars 4 X  times daily or as directed                     calcium carbonate-vitamin D 500-200 MG-UNIT tablet  Also known as: OSCAL w/D  INSTRUCTIONS: Take 1 tablet by mouth daily                     childrens multivitamin w/iron 60 MG chewable tablet  INSTRUCTIONS: Take 1 chew tab by mouth daily                     ciprofloxacin 500 MG tablet  Also known as: CIPRO                     citalopram 20 MG tablet  Also known as: celeXA  INSTRUCTIONS: Take 1 tablet (20 mg) by mouth daily                     desvenlafaxine 100 MG 24 hr tablet  Also known as: PRISTIQ  INSTRUCTIONS: Take 2 tablets (200 mg) by mouth daily                     EQL Vitamin D3 50 MCG (2000 UT) Caps  INSTRUCTIONS: Take 1 capsule by mouth daily  Generic drug: cholecalciferol                     famotidine 40 MG tablet  Also known as: PEPCID  INSTRUCTIONS: Take 1 tablet (40 mg) by mouth daily                     furosemide 40 MG tablet  Also known as: LASIX  INSTRUCTIONS: Take 40 mg twice daily.                     gabapentin 300 MG capsule  Also known as: NEURONTIN  INSTRUCTIONS: Take 1 capsule (300 mg) by mouth At Bedtime                     hydrOXYzine 25 MG tablet  Also known as: ATARAX  INSTRUCTIONS: Take 1 tablet (25 mg) by mouth 3 times daily as needed for itching                     insulin glargine 100 UNIT/ML pen  Also known as: Lantus SoloStar  INSTRUCTIONS: 5 units daily in am. Increase by 5 units 2x weekly until fasting sugars are <130. Max daily dose 100 units.  Doctor's comments: If Lantus is not covered by insurance, may substitute Basaglar at same dose and frequency.                       lactulose 20 GM packet  Also known as: CEPHULAC  INSTRUCTIONS: Take 1 packet (20 g) by mouth 2 times daily                     lactulose  encephalopathy 10 GM/15ML SOLUTION  Also known as: CHRONULAC  INSTRUCTIONS: TAKE 30 MLS BY MOUTH 2 TIMES DAILY  TITRATE AS NEEDED TO ACHIEVE 3-5 BOWEL MOVEMENTS DAILY.                     magnesium oxide 400 (241.3 Mg) MG tablet  Also known as: MAG-OX  INSTRUCTIONS: Take 1 tablet (400 mg) by mouth daily                     NovoLOG FLEXPEN 100 UNIT/ML pen  INSTRUCTIONS: 1 unit per 10 grams of carbohydrates + 1 per 50>140. Max daily dose 100 units.  Generic drug: insulin aspart                     nystatin [id number] UNIT/GM external cream  Also known as: MYCOSTATIN  INSTRUCTIONS: Apply topically 2 times daily                     * ondansetron 4 MG tablet  Also known as: ZOFRAN  INSTRUCTIONS: Take 1 tablet (4 mg) by mouth every 8 hours as needed for nausea  LAST TAKEN: Ask your nurse or doctor  What changed: Another medication with the same name was added. Make sure you understand how and when to take each.                     * ondansetron 4 MG tablet  Also known as: ZOFRAN  INSTRUCTIONS: Take 1 tablet (4 mg) by mouth every 8 hours as needed for nausea  LAST TAKEN: Ask your nurse or doctor  What changed: You were already taking a medication with the same name, and this prescription was added. Make sure you understand how and when to take each.                     order for DME  INSTRUCTIONS: Equipment being ordered:bilateral pneumatic leg massage for treatment of extreme bilateral lower leg edema.                     order for DME  INSTRUCTIONS: Equipment being ordered:BioTab compression pants pneumatic system                     order for DME  INSTRUCTIONS: Equipment being ordered: Condom catheter                     order for DME  INSTRUCTIONS: Equipment being ordered:Orthopedic shoes                     * oxyCODONE 5 MG tablet  Also known as: ROXICODONE  INSTRUCTIONS: TAKE 1-2 TABLETS BY MOUTH EVERY 8 HOURS AS NNEDED FOR SEVERE PAIN  What changed: Another medication with the same name was added. Make sure you  understand how and when to take each.                     * oxyCODONE 5 MG tablet  Also known as: ROXICODONE  INSTRUCTIONS: Take 1 tablet (5 mg) by mouth every 6 hours as needed for pain  What changed: You were already taking a medication with the same name, and this prescription was added. Make sure you understand how and when to take each.                     prochlorperazine 10 MG tablet  Also known as: COMPAZINE  INSTRUCTIONS: Take 1 tablet (10 mg) by mouth every 6 hours as needed for nausea or vomiting (take if zofran does not relieve nausea)  LAST TAKEN: Ask your nurse or doctor                     QUEtiapine 50 MG tablet  Also known as: SEROquel  INSTRUCTIONS: Take 50 mg by mouth At Bedtime                     rifaximin 550 MG Tabs tablet  Also known as: Xifaxan  INSTRUCTIONS: Take 1 tablet (550 mg) by mouth 2 times daily                     spironolactone 50 MG tablet  Also known as: ALDACTONE  INSTRUCTIONS: Take 2 tablets (100 mg) by mouth daily                     sulfamethoxazole-trimethoprim 800-160 MG tablet  Also known as: Bactrim DS  INSTRUCTIONS: Take 2 tablets by mouth 2 times daily                     UNABLE TO FIND  INSTRUCTIONS: MEDICATION NAME: Seble root                     ursodiol 300 MG capsule  Also known as: ACTIGALL  INSTRUCTIONS: Take 3 capsules (900 mg) by mouth 2 times daily                     VITAMIN B-12 PO  INSTRUCTIONS: Take 1 tablet by mouth daily                        * This list has 10 medication(s) that are the same as other medications prescribed for you. Read the directions carefully, and ask your doctor or other care provider to review them with you.

## 2025-07-08 NOTE — TELEPHONE ENCOUNTER
Verbal orders given to Tami from [facility], per Ary Murphy/Dr. Trevizo, for Order(s): Home Care Orders: Occupational Therapy (OT): Home health OT 3x/week for 3 weeks, 2x/week for 3 weeks, then 1x/week for 1 week. Magdalena Mott LPN 12/2/2019 8:25 AM       with patient

## (undated) RX ORDER — ALBUMIN (HUMAN) 12.5 G/50ML
SOLUTION INTRAVENOUS
Status: DISPENSED
Start: 2018-01-04

## (undated) RX ORDER — HYDROMORPHONE HYDROCHLORIDE 1 MG/ML
INJECTION, SOLUTION INTRAMUSCULAR; INTRAVENOUS; SUBCUTANEOUS
Status: DISPENSED
Start: 2020-10-01

## (undated) RX ORDER — FENTANYL CITRATE 50 UG/ML
INJECTION, SOLUTION INTRAMUSCULAR; INTRAVENOUS
Status: DISPENSED
Start: 2021-01-01

## (undated) RX ORDER — ROCURONIUM BROMIDE 50 MG/5 ML
SYRINGE (ML) INTRAVENOUS
Status: DISPENSED
Start: 2017-10-27

## (undated) RX ORDER — ALBUMIN (HUMAN) 12.5 G/50ML
SOLUTION INTRAVENOUS
Status: DISPENSED
Start: 2017-07-05

## (undated) RX ORDER — FENTANYL CITRATE 50 UG/ML
INJECTION, SOLUTION INTRAMUSCULAR; INTRAVENOUS
Status: DISPENSED
Start: 2020-07-30

## (undated) RX ORDER — LIDOCAINE HYDROCHLORIDE 10 MG/ML
INJECTION, SOLUTION INFILTRATION; PERINEURAL
Status: DISPENSED
Start: 2018-03-08

## (undated) RX ORDER — LIDOCAINE HYDROCHLORIDE 10 MG/ML
INJECTION, SOLUTION INFILTRATION; PERINEURAL
Status: DISPENSED
Start: 2018-04-06

## (undated) RX ORDER — FENTANYL CITRATE 50 UG/ML
INJECTION, SOLUTION INTRAMUSCULAR; INTRAVENOUS
Status: DISPENSED
Start: 2020-10-01

## (undated) RX ORDER — ALBUMIN (HUMAN) 12.5 G/50ML
SOLUTION INTRAVENOUS
Status: DISPENSED
Start: 2018-04-27

## (undated) RX ORDER — ALBUMIN (HUMAN) 12.5 G/50ML
SOLUTION INTRAVENOUS
Status: DISPENSED
Start: 2017-08-09

## (undated) RX ORDER — ALBUMIN (HUMAN) 12.5 G/50ML
SOLUTION INTRAVENOUS
Status: DISPENSED
Start: 2017-10-18

## (undated) RX ORDER — ALBUMIN (HUMAN) 12.5 G/50ML
SOLUTION INTRAVENOUS
Status: DISPENSED
Start: 2017-12-13

## (undated) RX ORDER — SODIUM CHLORIDE 9 MG/ML
INJECTION, SOLUTION INTRAVENOUS
Status: DISPENSED
Start: 2021-01-01

## (undated) RX ORDER — LIDOCAINE HYDROCHLORIDE 10 MG/ML
INJECTION, SOLUTION INFILTRATION; PERINEURAL
Status: DISPENSED
Start: 2017-08-09

## (undated) RX ORDER — OXYCODONE HYDROCHLORIDE 5 MG/1
TABLET ORAL
Status: DISPENSED
Start: 2017-10-27

## (undated) RX ORDER — OXYCODONE HYDROCHLORIDE 5 MG/1
TABLET ORAL
Status: DISPENSED
Start: 2021-01-01

## (undated) RX ORDER — LIDOCAINE HYDROCHLORIDE 10 MG/ML
INJECTION, SOLUTION INFILTRATION; PERINEURAL
Status: DISPENSED
Start: 2017-07-26

## (undated) RX ORDER — NITROGLYCERIN 5 MG/ML
VIAL (ML) INTRAVENOUS
Status: DISPENSED
Start: 2020-10-01

## (undated) RX ORDER — KETOROLAC TROMETHAMINE 30 MG/ML
INJECTION, SOLUTION INTRAMUSCULAR; INTRAVENOUS
Status: DISPENSED
Start: 2020-10-01

## (undated) RX ORDER — AMPICILLIN AND SULBACTAM 2; 1 G/1; G/1
INJECTION, POWDER, FOR SOLUTION INTRAMUSCULAR; INTRAVENOUS
Status: DISPENSED
Start: 2021-01-01

## (undated) RX ORDER — ALBUMIN (HUMAN) 12.5 G/50ML
SOLUTION INTRAVENOUS
Status: DISPENSED
Start: 2017-08-30

## (undated) RX ORDER — ALBUMIN (HUMAN) 12.5 G/50ML
SOLUTION INTRAVENOUS
Status: DISPENSED
Start: 2017-07-19

## (undated) RX ORDER — LIDOCAINE HYDROCHLORIDE 10 MG/ML
INJECTION, SOLUTION INFILTRATION; PERINEURAL
Status: DISPENSED
Start: 2017-11-22

## (undated) RX ORDER — ALBUMIN (HUMAN) 12.5 G/50ML
SOLUTION INTRAVENOUS
Status: DISPENSED
Start: 2017-09-06

## (undated) RX ORDER — ONDANSETRON 2 MG/ML
INJECTION INTRAMUSCULAR; INTRAVENOUS
Status: DISPENSED
Start: 2020-07-30

## (undated) RX ORDER — ALBUMIN (HUMAN) 12.5 G/50ML
SOLUTION INTRAVENOUS
Status: DISPENSED
Start: 2017-10-04

## (undated) RX ORDER — LIDOCAINE HYDROCHLORIDE 10 MG/ML
INJECTION, SOLUTION INFILTRATION; PERINEURAL
Status: DISPENSED
Start: 2018-04-16

## (undated) RX ORDER — AMPICILLIN AND SULBACTAM 2; 1 G/1; G/1
INJECTION, POWDER, FOR SOLUTION INTRAMUSCULAR; INTRAVENOUS
Status: DISPENSED
Start: 2017-10-27

## (undated) RX ORDER — HYDROMORPHONE HCL/0.9% NACL/PF 0.2MG/0.2
SYRINGE (ML) INTRAVENOUS
Status: DISPENSED
Start: 2020-10-01

## (undated) RX ORDER — LIDOCAINE HYDROCHLORIDE 10 MG/ML
INJECTION, SOLUTION EPIDURAL; INFILTRATION; INTRACAUDAL; PERINEURAL
Status: DISPENSED
Start: 2021-01-01

## (undated) RX ORDER — ALBUMIN, HUMAN INJ 5% 5 %
SOLUTION INTRAVENOUS
Status: DISPENSED
Start: 2017-10-27

## (undated) RX ORDER — ALBUMIN (HUMAN) 12.5 G/50ML
SOLUTION INTRAVENOUS
Status: DISPENSED
Start: 2018-05-04

## (undated) RX ORDER — ALBUMIN (HUMAN) 12.5 G/50ML
SOLUTION INTRAVENOUS
Status: DISPENSED
Start: 2017-06-14

## (undated) RX ORDER — ALBUMIN (HUMAN) 12.5 G/50ML
SOLUTION INTRAVENOUS
Status: DISPENSED
Start: 2018-04-06

## (undated) RX ORDER — LIDOCAINE HYDROCHLORIDE 10 MG/ML
INJECTION, SOLUTION INFILTRATION; PERINEURAL
Status: DISPENSED
Start: 2018-03-14

## (undated) RX ORDER — PROPOFOL 10 MG/ML
INJECTION, EMULSION INTRAVENOUS
Status: DISPENSED
Start: 2017-10-27

## (undated) RX ORDER — EPHEDRINE SULFATE 50 MG/ML
INJECTION, SOLUTION INTRAMUSCULAR; INTRAVENOUS; SUBCUTANEOUS
Status: DISPENSED
Start: 2017-10-27

## (undated) RX ORDER — LIDOCAINE HYDROCHLORIDE 10 MG/ML
INJECTION, SOLUTION INFILTRATION; PERINEURAL
Status: DISPENSED
Start: 2018-02-28

## (undated) RX ORDER — LIDOCAINE HYDROCHLORIDE 10 MG/ML
INJECTION, SOLUTION INFILTRATION; PERINEURAL
Status: DISPENSED
Start: 2017-07-12

## (undated) RX ORDER — LIDOCAINE HYDROCHLORIDE 10 MG/ML
INJECTION, SOLUTION INFILTRATION; PERINEURAL
Status: DISPENSED
Start: 2018-03-21

## (undated) RX ORDER — LIDOCAINE HYDROCHLORIDE 10 MG/ML
INJECTION, SOLUTION INFILTRATION; PERINEURAL
Status: DISPENSED
Start: 2017-10-25

## (undated) RX ORDER — LIDOCAINE HYDROCHLORIDE 10 MG/ML
INJECTION, SOLUTION INFILTRATION; PERINEURAL
Status: DISPENSED
Start: 2017-09-20

## (undated) RX ORDER — AMPICILLIN AND SULBACTAM 2; 1 G/1; G/1
INJECTION, POWDER, FOR SOLUTION INTRAMUSCULAR; INTRAVENOUS
Status: DISPENSED
Start: 2020-10-01

## (undated) RX ORDER — FENTANYL CITRATE 50 UG/ML
INJECTION, SOLUTION INTRAMUSCULAR; INTRAVENOUS
Status: DISPENSED
Start: 2017-10-27

## (undated) RX ORDER — LIDOCAINE HYDROCHLORIDE 10 MG/ML
INJECTION, SOLUTION INFILTRATION; PERINEURAL
Status: DISPENSED
Start: 2017-08-02

## (undated) RX ORDER — SODIUM CHLORIDE 9 MG/ML
INJECTION, SOLUTION INTRAVENOUS
Status: DISPENSED
Start: 2020-10-01

## (undated) RX ORDER — SCOLOPAMINE TRANSDERMAL SYSTEM 1 MG/1
PATCH, EXTENDED RELEASE TRANSDERMAL
Status: DISPENSED
Start: 2020-10-01

## (undated) RX ORDER — LIDOCAINE HYDROCHLORIDE 10 MG/ML
INJECTION, SOLUTION EPIDURAL; INFILTRATION; INTRACAUDAL; PERINEURAL
Status: DISPENSED
Start: 2020-07-30

## (undated) RX ORDER — LIDOCAINE HYDROCHLORIDE 10 MG/ML
INJECTION, SOLUTION EPIDURAL; INFILTRATION; INTRACAUDAL; PERINEURAL
Status: DISPENSED
Start: 2018-05-11

## (undated) RX ORDER — ALBUMIN (HUMAN) 12.5 G/50ML
SOLUTION INTRAVENOUS
Status: DISPENSED
Start: 2017-09-27

## (undated) RX ORDER — NITROGLYCERIN 5 MG/ML
VIAL (ML) INTRAVENOUS
Status: DISPENSED
Start: 2021-01-01

## (undated) RX ORDER — SCOLOPAMINE TRANSDERMAL SYSTEM 1 MG/1
PATCH, EXTENDED RELEASE TRANSDERMAL
Status: DISPENSED
Start: 2021-01-01

## (undated) RX ORDER — LIDOCAINE HYDROCHLORIDE 10 MG/ML
INJECTION, SOLUTION EPIDURAL; INFILTRATION; INTRACAUDAL; PERINEURAL
Status: DISPENSED
Start: 2018-05-04

## (undated) RX ORDER — LIDOCAINE HYDROCHLORIDE 10 MG/ML
INJECTION, SOLUTION EPIDURAL; INFILTRATION; INTRACAUDAL; PERINEURAL
Status: DISPENSED
Start: 2018-04-20

## (undated) RX ORDER — HYDROMORPHONE HYDROCHLORIDE 1 MG/ML
INJECTION, SOLUTION INTRAMUSCULAR; INTRAVENOUS; SUBCUTANEOUS
Status: DISPENSED
Start: 2020-07-30

## (undated) RX ORDER — HEPARIN SODIUM 200 [USP'U]/100ML
INJECTION, SOLUTION INTRAVENOUS
Status: DISPENSED
Start: 2020-10-01

## (undated) RX ORDER — ONDANSETRON 2 MG/ML
INJECTION INTRAMUSCULAR; INTRAVENOUS
Status: DISPENSED
Start: 2021-01-01

## (undated) RX ORDER — LIDOCAINE HYDROCHLORIDE 10 MG/ML
INJECTION, SOLUTION INFILTRATION; PERINEURAL
Status: DISPENSED
Start: 2017-10-11

## (undated) RX ORDER — LIDOCAINE HYDROCHLORIDE 10 MG/ML
INJECTION, SOLUTION INFILTRATION; PERINEURAL
Status: DISPENSED
Start: 2018-01-04

## (undated) RX ORDER — MEPERIDINE HYDROCHLORIDE 25 MG/ML
INJECTION INTRAMUSCULAR; INTRAVENOUS; SUBCUTANEOUS
Status: DISPENSED
Start: 2017-10-27

## (undated) RX ORDER — TRIAMCINOLONE ACETONIDE 40 MG/ML
INJECTION, SUSPENSION INTRA-ARTICULAR; INTRAMUSCULAR
Status: DISPENSED
Start: 2017-03-21

## (undated) RX ORDER — LIDOCAINE HYDROCHLORIDE 10 MG/ML
INJECTION, SOLUTION INFILTRATION; PERINEURAL
Status: DISPENSED
Start: 2017-09-27

## (undated) RX ORDER — LIDOCAINE HYDROCHLORIDE 10 MG/ML
INJECTION, SOLUTION INFILTRATION; PERINEURAL
Status: DISPENSED
Start: 2017-08-30

## (undated) RX ORDER — ALBUMIN (HUMAN) 12.5 G/50ML
SOLUTION INTRAVENOUS
Status: DISPENSED
Start: 2018-03-21

## (undated) RX ORDER — HEPARIN SODIUM 200 [USP'U]/100ML
INJECTION, SOLUTION INTRAVENOUS
Status: DISPENSED
Start: 2021-01-01

## (undated) RX ORDER — LIDOCAINE HYDROCHLORIDE 10 MG/ML
INJECTION, SOLUTION INFILTRATION; PERINEURAL
Status: DISPENSED
Start: 2017-07-19

## (undated) RX ORDER — ONDANSETRON 2 MG/ML
INJECTION INTRAMUSCULAR; INTRAVENOUS
Status: DISPENSED
Start: 2017-10-27

## (undated) RX ORDER — ALBUMIN (HUMAN) 12.5 G/50ML
SOLUTION INTRAVENOUS
Status: DISPENSED
Start: 2017-05-11

## (undated) RX ORDER — LIDOCAINE HYDROCHLORIDE 10 MG/ML
INJECTION, SOLUTION INFILTRATION; PERINEURAL
Status: DISPENSED
Start: 2017-08-16

## (undated) RX ORDER — LIDOCAINE HYDROCHLORIDE 10 MG/ML
INJECTION, SOLUTION INFILTRATION; PERINEURAL
Status: DISPENSED
Start: 2017-03-21

## (undated) RX ORDER — ALBUMIN (HUMAN) 12.5 G/50ML
SOLUTION INTRAVENOUS
Status: DISPENSED
Start: 2017-05-03

## (undated) RX ORDER — LIDOCAINE HYDROCHLORIDE 10 MG/ML
INJECTION, SOLUTION INFILTRATION; PERINEURAL
Status: DISPENSED
Start: 2017-08-23

## (undated) RX ORDER — ALBUMIN (HUMAN) 12.5 G/50ML
SOLUTION INTRAVENOUS
Status: DISPENSED
Start: 2017-08-02

## (undated) RX ORDER — ALBUMIN (HUMAN) 12.5 G/50ML
SOLUTION INTRAVENOUS
Status: DISPENSED
Start: 2017-05-17

## (undated) RX ORDER — LIDOCAINE HYDROCHLORIDE 10 MG/ML
INJECTION, SOLUTION INFILTRATION; PERINEURAL
Status: DISPENSED
Start: 2017-10-18

## (undated) RX ORDER — ONDANSETRON 2 MG/ML
INJECTION INTRAMUSCULAR; INTRAVENOUS
Status: DISPENSED
Start: 2020-10-01

## (undated) RX ORDER — ALBUMIN (HUMAN) 12.5 G/50ML
SOLUTION INTRAVENOUS
Status: DISPENSED
Start: 2018-04-20

## (undated) RX ORDER — LIDOCAINE HYDROCHLORIDE 10 MG/ML
INJECTION, SOLUTION INFILTRATION; PERINEURAL
Status: DISPENSED
Start: 2017-11-08

## (undated) RX ORDER — LIDOCAINE HYDROCHLORIDE 10 MG/ML
INJECTION, SOLUTION INFILTRATION; PERINEURAL
Status: DISPENSED
Start: 2017-07-05

## (undated) RX ORDER — SODIUM CHLORIDE 9 MG/ML
INJECTION, SOLUTION INTRAVENOUS
Status: DISPENSED
Start: 2020-07-30

## (undated) RX ORDER — ALBUMIN (HUMAN) 12.5 G/50ML
SOLUTION INTRAVENOUS
Status: DISPENSED
Start: 2017-06-28

## (undated) RX ORDER — LIDOCAINE HYDROCHLORIDE 10 MG/ML
INJECTION, SOLUTION INFILTRATION; PERINEURAL
Status: DISPENSED
Start: 2017-09-06

## (undated) RX ORDER — AMPICILLIN AND SULBACTAM 2; 1 G/1; G/1
INJECTION, POWDER, FOR SOLUTION INTRAMUSCULAR; INTRAVENOUS
Status: DISPENSED
Start: 2020-07-30

## (undated) RX ORDER — LIDOCAINE HYDROCHLORIDE 10 MG/ML
INJECTION, SOLUTION INFILTRATION; PERINEURAL
Status: DISPENSED
Start: 2017-10-04

## (undated) RX ORDER — SODIUM CHLORIDE 9 MG/ML
INJECTION, SOLUTION INTRAVENOUS
Status: DISPENSED
Start: 2018-04-16

## (undated) RX ORDER — ALBUMIN (HUMAN) 12.5 G/50ML
SOLUTION INTRAVENOUS
Status: DISPENSED
Start: 2017-08-23

## (undated) RX ORDER — ALBUMIN (HUMAN) 12.5 G/50ML
SOLUTION INTRAVENOUS
Status: DISPENSED
Start: 2017-09-20

## (undated) RX ORDER — ALBUMIN (HUMAN) 12.5 G/50ML
SOLUTION INTRAVENOUS
Status: DISPENSED
Start: 2017-06-21

## (undated) RX ORDER — GLYCOPYRROLATE 0.2 MG/ML
INJECTION, SOLUTION INTRAMUSCULAR; INTRAVENOUS
Status: DISPENSED
Start: 2017-10-27

## (undated) RX ORDER — ALBUMIN (HUMAN) 12.5 G/50ML
SOLUTION INTRAVENOUS
Status: DISPENSED
Start: 2017-05-31

## (undated) RX ORDER — LIDOCAINE HYDROCHLORIDE 10 MG/ML
INJECTION, SOLUTION EPIDURAL; INFILTRATION; INTRACAUDAL; PERINEURAL
Status: DISPENSED
Start: 2020-10-01

## (undated) RX ORDER — ALBUMIN (HUMAN) 12.5 G/50ML
SOLUTION INTRAVENOUS
Status: DISPENSED
Start: 2018-05-11

## (undated) RX ORDER — ALBUMIN (HUMAN) 12.5 G/50ML
SOLUTION INTRAVENOUS
Status: DISPENSED
Start: 2017-10-11

## (undated) RX ORDER — LIDOCAINE HYDROCHLORIDE 10 MG/ML
INJECTION, SOLUTION EPIDURAL; INFILTRATION; INTRACAUDAL; PERINEURAL
Status: DISPENSED
Start: 2018-04-27

## (undated) RX ORDER — HYDROMORPHONE HCL IN WATER/PF 6 MG/30 ML
PATIENT CONTROLLED ANALGESIA SYRINGE INTRAVENOUS
Status: DISPENSED
Start: 2021-01-01

## (undated) RX ORDER — ALBUMIN (HUMAN) 12.5 G/50ML
SOLUTION INTRAVENOUS
Status: DISPENSED
Start: 2017-04-13

## (undated) RX ORDER — ALBUMIN (HUMAN) 12.5 G/50ML
SOLUTION INTRAVENOUS
Status: DISPENSED
Start: 2017-09-13

## (undated) RX ORDER — ALBUMIN (HUMAN) 12.5 G/50ML
SOLUTION INTRAVENOUS
Status: DISPENSED
Start: 2018-03-14

## (undated) RX ORDER — LIDOCAINE HYDROCHLORIDE 10 MG/ML
INJECTION, SOLUTION INFILTRATION; PERINEURAL
Status: DISPENSED
Start: 2017-12-13

## (undated) RX ORDER — LIDOCAINE HYDROCHLORIDE 10 MG/ML
INJECTION, SOLUTION EPIDURAL; INFILTRATION; INTRACAUDAL; PERINEURAL
Status: DISPENSED
Start: 2018-04-13

## (undated) RX ORDER — ALBUMIN (HUMAN) 12.5 G/50ML
SOLUTION INTRAVENOUS
Status: DISPENSED
Start: 2017-07-12

## (undated) RX ORDER — ALBUMIN (HUMAN) 12.5 G/50ML
SOLUTION INTRAVENOUS
Status: DISPENSED
Start: 2017-11-22

## (undated) RX ORDER — ALBUMIN (HUMAN) 12.5 G/50ML
SOLUTION INTRAVENOUS
Status: DISPENSED
Start: 2017-08-16

## (undated) RX ORDER — ALBUMIN (HUMAN) 12.5 G/50ML
SOLUTION INTRAVENOUS
Status: DISPENSED
Start: 2018-02-28

## (undated) RX ORDER — LIDOCAINE HYDROCHLORIDE 10 MG/ML
INJECTION, SOLUTION INFILTRATION; PERINEURAL
Status: DISPENSED
Start: 2017-06-28

## (undated) RX ORDER — LIDOCAINE HYDROCHLORIDE 10 MG/ML
INJECTION, SOLUTION INFILTRATION; PERINEURAL
Status: DISPENSED
Start: 2017-09-13

## (undated) RX ORDER — LIDOCAINE HYDROCHLORIDE 20 MG/ML
INJECTION, SOLUTION EPIDURAL; INFILTRATION; INTRACAUDAL; PERINEURAL
Status: DISPENSED
Start: 2017-10-27

## (undated) RX ORDER — ALBUMIN (HUMAN) 12.5 G/50ML
SOLUTION INTRAVENOUS
Status: DISPENSED
Start: 2018-04-13

## (undated) RX ORDER — ALBUMIN (HUMAN) 12.5 G/50ML
SOLUTION INTRAVENOUS
Status: DISPENSED
Start: 2018-03-08

## (undated) RX ORDER — ALBUMIN (HUMAN) 12.5 G/50ML
SOLUTION INTRAVENOUS
Status: DISPENSED
Start: 2017-10-25

## (undated) RX ORDER — ALBUMIN (HUMAN) 12.5 G/50ML
SOLUTION INTRAVENOUS
Status: DISPENSED
Start: 2017-07-26

## (undated) RX ORDER — PHENYLEPHRINE HCL IN 0.9% NACL 1 MG/10 ML
SYRINGE (ML) INTRAVENOUS
Status: DISPENSED
Start: 2017-10-27

## (undated) RX ORDER — FENTANYL CITRATE 50 UG/ML
INJECTION, SOLUTION INTRAMUSCULAR; INTRAVENOUS
Status: DISPENSED
Start: 2018-04-16

## (undated) RX ORDER — SCOLOPAMINE TRANSDERMAL SYSTEM 1 MG/1
PATCH, EXTENDED RELEASE TRANSDERMAL
Status: DISPENSED
Start: 2020-07-30

## (undated) RX ORDER — ALBUMIN (HUMAN) 12.5 G/50ML
SOLUTION INTRAVENOUS
Status: DISPENSED
Start: 2017-11-08